# Patient Record
Sex: MALE | Race: BLACK OR AFRICAN AMERICAN | NOT HISPANIC OR LATINO | Employment: OTHER | URBAN - METROPOLITAN AREA
[De-identification: names, ages, dates, MRNs, and addresses within clinical notes are randomized per-mention and may not be internally consistent; named-entity substitution may affect disease eponyms.]

---

## 2023-01-01 ENCOUNTER — APPOINTMENT (OUTPATIENT)
Dept: WOUND CARE | Facility: CLINIC | Age: 78
End: 2023-01-01
Payer: MEDICARE

## 2023-01-01 ENCOUNTER — NURSING HOME VISIT (OUTPATIENT)
Dept: POST ACUTE CARE | Facility: EXTERNAL LOCATION | Age: 78
End: 2023-01-01

## 2023-01-01 ENCOUNTER — NURSING HOME VISIT (OUTPATIENT)
Dept: POST ACUTE CARE | Facility: EXTERNAL LOCATION | Age: 78
End: 2023-01-01
Payer: MEDICARE

## 2023-01-01 ENCOUNTER — APPOINTMENT (OUTPATIENT)
Dept: RADIOLOGY | Facility: HOSPITAL | Age: 78
DRG: 252 | End: 2023-01-01
Payer: MEDICARE

## 2023-01-01 ENCOUNTER — HOSPITAL ENCOUNTER (INPATIENT)
Dept: DATA CONVERSION | Facility: HOSPITAL | Age: 78
LOS: 18 days | Discharge: NURSING, REHAB OR LONG-TERM CARE | DRG: 252 | End: 2023-10-07
Attending: INTERNAL MEDICINE | Admitting: INTERNAL MEDICINE
Payer: MEDICARE

## 2023-01-01 ENCOUNTER — APPOINTMENT (OUTPATIENT)
Dept: RADIOLOGY | Facility: HOSPITAL | Age: 78
End: 2023-01-01
Payer: MEDICARE

## 2023-01-01 ENCOUNTER — HOSPITAL ENCOUNTER (EMERGENCY)
Facility: HOSPITAL | Age: 78
Discharge: NURSING, REHAB OR LONG-TERM CARE | End: 2023-10-09
Attending: EMERGENCY MEDICINE
Payer: MEDICARE

## 2023-01-01 ENCOUNTER — APPOINTMENT (OUTPATIENT)
Dept: CARDIOLOGY | Facility: HOSPITAL | Age: 78
DRG: 252 | End: 2023-01-01
Payer: MEDICARE

## 2023-01-01 ENCOUNTER — APPOINTMENT (OUTPATIENT)
Dept: INFECTIOUS DISEASES | Facility: CLINIC | Age: 78
End: 2023-01-01
Payer: MEDICARE

## 2023-01-01 ENCOUNTER — APPOINTMENT (OUTPATIENT)
Dept: DIALYSIS | Facility: HOSPITAL | Age: 78
DRG: 252 | End: 2023-01-01
Payer: MEDICARE

## 2023-01-01 ENCOUNTER — PREP FOR PROCEDURE (OUTPATIENT)
Dept: CARDIOLOGY | Facility: HOSPITAL | Age: 78
End: 2023-01-01

## 2023-01-01 VITALS
RESPIRATION RATE: 18 BRPM | HEART RATE: 79 BPM | HEIGHT: 68 IN | TEMPERATURE: 96.4 F | SYSTOLIC BLOOD PRESSURE: 100 MMHG | OXYGEN SATURATION: 93 % | DIASTOLIC BLOOD PRESSURE: 61 MMHG | WEIGHT: 216.71 LBS | BODY MASS INDEX: 32.84 KG/M2

## 2023-01-01 VITALS
DIASTOLIC BLOOD PRESSURE: 76 MMHG | RESPIRATION RATE: 18 BRPM | HEART RATE: 89 BPM | TEMPERATURE: 97.5 F | OXYGEN SATURATION: 99 % | SYSTOLIC BLOOD PRESSURE: 127 MMHG | BODY MASS INDEX: 29.98 KG/M2 | WEIGHT: 209.44 LBS | HEIGHT: 70 IN

## 2023-01-01 DIAGNOSIS — S91.301D OPEN WOUND OF RIGHT FOOT, SUBSEQUENT ENCOUNTER: ICD-10-CM

## 2023-01-01 DIAGNOSIS — I13.2: ICD-10-CM

## 2023-01-01 DIAGNOSIS — I73.9 PAD (PERIPHERAL ARTERY DISEASE) (CMS-HCC): Primary | ICD-10-CM

## 2023-01-01 DIAGNOSIS — I50.22 HYPERTENSIVE HEART AND KIDNEY DISEASE WITH CHRONIC SYSTOLIC CONGESTIVE HEART FAILURE AND STAGE 5 CHRONIC KIDNEY DISEASE ON CHRONIC DIALYSIS (MULTI): ICD-10-CM

## 2023-01-01 DIAGNOSIS — R53.1 WEAKNESS: ICD-10-CM

## 2023-01-01 DIAGNOSIS — I48.91 ATRIAL FIBRILLATION, UNSPECIFIED TYPE (MULTI): ICD-10-CM

## 2023-01-01 DIAGNOSIS — N18.6 ESRD ON HEMODIALYSIS (MULTI): Primary | ICD-10-CM

## 2023-01-01 DIAGNOSIS — I50.22 CHRONIC SYSTOLIC CONGESTIVE HEART FAILURE (MULTI): ICD-10-CM

## 2023-01-01 DIAGNOSIS — R53.1 WEAKNESS: Primary | ICD-10-CM

## 2023-01-01 DIAGNOSIS — R06.02 SHORTNESS OF BREATH: Primary | ICD-10-CM

## 2023-01-01 DIAGNOSIS — I48.11 LONGSTANDING PERSISTENT ATRIAL FIBRILLATION (MULTI): ICD-10-CM

## 2023-01-01 DIAGNOSIS — N18.6 HYPERTENSIVE HEART AND KIDNEY DISEASE WITH CHRONIC SYSTOLIC CONGESTIVE HEART FAILURE AND STAGE 5 CHRONIC KIDNEY DISEASE ON CHRONIC DIALYSIS (MULTI): ICD-10-CM

## 2023-01-01 DIAGNOSIS — Z99.2 HYPERTENSIVE HEART AND KIDNEY DISEASE WITH CHRONIC SYSTOLIC CONGESTIVE HEART FAILURE AND STAGE 5 CHRONIC KIDNEY DISEASE ON CHRONIC DIALYSIS (MULTI): ICD-10-CM

## 2023-01-01 DIAGNOSIS — J81.0 ACUTE PULMONARY EDEMA (MULTI): ICD-10-CM

## 2023-01-01 DIAGNOSIS — E11.69 TYPE 2 DIABETES MELLITUS WITH OTHER SPECIFIED COMPLICATION, UNSPECIFIED WHETHER LONG TERM INSULIN USE (MULTI): ICD-10-CM

## 2023-01-01 DIAGNOSIS — N18.6: ICD-10-CM

## 2023-01-01 DIAGNOSIS — M10.9 GOUT, UNSPECIFIED CAUSE, UNSPECIFIED CHRONICITY, UNSPECIFIED SITE: ICD-10-CM

## 2023-01-01 DIAGNOSIS — I13.2 HYPERTENSIVE HEART AND KIDNEY DISEASE WITH CHRONIC SYSTOLIC CONGESTIVE HEART FAILURE AND STAGE 5 CHRONIC KIDNEY DISEASE ON CHRONIC DIALYSIS (MULTI): ICD-10-CM

## 2023-01-01 DIAGNOSIS — R52 PAIN: ICD-10-CM

## 2023-01-01 DIAGNOSIS — I15.9 SECONDARY HYPERTENSION: ICD-10-CM

## 2023-01-01 DIAGNOSIS — S98.131A AMPUTATION OF TOE OF RIGHT FOOT (CMS-HCC): ICD-10-CM

## 2023-01-01 DIAGNOSIS — Z99.2 ESRD ON HEMODIALYSIS (MULTI): Primary | ICD-10-CM

## 2023-01-01 DIAGNOSIS — I99.8 OTHER DISORDER OF CIRCULATORY SYSTEM: ICD-10-CM

## 2023-01-01 DIAGNOSIS — I48.11 LONGSTANDING PERSISTENT ATRIAL FIBRILLATION (MULTI): Primary | ICD-10-CM

## 2023-01-01 DIAGNOSIS — J96.02 ACUTE HYPERCAPNIC RESPIRATORY FAILURE (MULTI): ICD-10-CM

## 2023-01-01 LAB
ABO GROUP (TYPE) IN BLOOD: NORMAL
ACTIVATED PARTIAL THROMBOPLASTIN TIME IN PPP BY COAGULATION ASSAY: NORMAL
ALANINE AMINOTRANSFERASE (SGPT) (U/L) IN SER/PLAS: 21 U/L (ref 10–52)
ALBUMIN (G/DL) IN SER/PLAS: 3.4 G/DL (ref 3.4–5)
ALBUMIN (G/DL) IN SER/PLAS: 4.1 G/DL (ref 3.4–5)
ALBUMIN (G/DL) IN SER/PLAS: NORMAL
ALBUMIN SERPL BCP-MCNC: 3 G/DL (ref 3.4–5)
ALBUMIN SERPL BCP-MCNC: 3.1 G/DL (ref 3.4–5)
ALBUMIN SERPL BCP-MCNC: 3.2 G/DL (ref 3.4–5)
ALBUMIN SERPL BCP-MCNC: 3.2 G/DL (ref 3.4–5)
ALBUMIN SERPL BCP-MCNC: 3.3 G/DL (ref 3.4–5)
ALBUMIN SERPL BCP-MCNC: 3.8 G/DL (ref 3.4–5)
ALKALINE PHOSPHATASE (U/L) IN SER/PLAS: 127 U/L (ref 33–136)
ALP SERPL-CCNC: 107 U/L (ref 33–136)
ALP SERPL-CCNC: 110 U/L (ref 33–136)
ALP SERPL-CCNC: 112 U/L (ref 33–136)
ALP SERPL-CCNC: 115 U/L (ref 33–136)
ALT SERPL W P-5'-P-CCNC: 17 U/L (ref 10–52)
ALT SERPL W P-5'-P-CCNC: 17 U/L (ref 10–52)
ALT SERPL W P-5'-P-CCNC: 27 U/L (ref 10–52)
ALT SERPL W P-5'-P-CCNC: 27 U/L (ref 10–52)
ANION GAP IN SER/PLAS: 20 MMOL/L (ref 10–20)
ANION GAP IN SER/PLAS: 21 MMOL/L (ref 10–20)
ANION GAP IN SER/PLAS: 22 MMOL/L (ref 10–20)
ANION GAP IN SER/PLAS: 23 MMOL/L (ref 10–20)
ANION GAP IN SER/PLAS: 25 MMOL/L (ref 10–20)
ANION GAP IN SER/PLAS: NORMAL
ANION GAP SERPL CALC-SCNC: 18 MMOL/L (ref 10–20)
ANION GAP SERPL CALC-SCNC: 19 MMOL/L (ref 10–20)
ANION GAP SERPL CALC-SCNC: 20 MMOL/L (ref 10–20)
ANION GAP SERPL CALC-SCNC: 20 MMOL/L (ref 10–20)
ANION GAP SERPL CALC-SCNC: 21 MMOL/L (ref 10–20)
ANION GAP SERPL CALC-SCNC: 22 MMOL/L (ref 10–20)
ANION GAP SERPL CALC-SCNC: 23 MMOL/L (ref 10–20)
ANION GAP SERPL CALC-SCNC: 23 MMOL/L (ref 10–20)
ANTIBODY SCREEN: NORMAL
APTT PPP: 30 SECONDS (ref 27–38)
APTT PPP: 31 SECONDS (ref 27–38)
ASPARTATE AMINOTRANSFERASE (SGOT) (U/L) IN SER/PLAS: 18 U/L (ref 9–39)
AST SERPL W P-5'-P-CCNC: 18 U/L (ref 9–39)
AST SERPL W P-5'-P-CCNC: 18 U/L (ref 9–39)
AST SERPL W P-5'-P-CCNC: 21 U/L (ref 9–39)
AST SERPL W P-5'-P-CCNC: 23 U/L (ref 9–39)
ATRIAL RATE: 0 BPM
ATRIAL RATE: 100 BPM
ATRIAL RATE: 110 BPM
BACTERIA BLD CULT: NORMAL
BACTERIA BLD CULT: NORMAL
BASOPHILS # BLD AUTO: 0.09 X10*3/UL (ref 0–0.1)
BASOPHILS # BLD AUTO: 0.13 X10*3/UL (ref 0–0.1)
BASOPHILS # BLD AUTO: 0.13 X10*3/UL (ref 0–0.1)
BASOPHILS # BLD AUTO: 0.15 X10*3/UL (ref 0–0.1)
BASOPHILS (10*3/UL) IN BLOOD BY AUTOMATED COUNT: 0.08 X10E9/L (ref 0–0.1)
BASOPHILS (10*3/UL) IN BLOOD BY AUTOMATED COUNT: NORMAL
BASOPHILS NFR BLD AUTO: 0.9 %
BASOPHILS NFR BLD AUTO: 1.1 %
BASOPHILS NFR BLD AUTO: 1.2 %
BASOPHILS NFR BLD AUTO: 1.4 %
BASOPHILS/100 LEUKOCYTES IN BLOOD BY AUTOMATED COUNT: 1.1 % (ref 0–2)
BASOPHILS/100 LEUKOCYTES IN BLOOD BY AUTOMATED COUNT: NORMAL
BILIRUB SERPL-MCNC: 0.4 MG/DL (ref 0–1.2)
BILIRUB SERPL-MCNC: 0.5 MG/DL (ref 0–1.2)
BILIRUB SERPL-MCNC: 0.6 MG/DL (ref 0–1.2)
BILIRUB SERPL-MCNC: 0.7 MG/DL (ref 0–1.2)
BILIRUBIN TOTAL (MG/DL) IN SER/PLAS: 0.7 MG/DL (ref 0–1.2)
BODY SURFACE AREA: 2.13 M2
BUN SERPL-MCNC: 23 MG/DL (ref 6–23)
BUN SERPL-MCNC: 24 MG/DL (ref 6–23)
BUN SERPL-MCNC: 30 MG/DL (ref 6–23)
BUN SERPL-MCNC: 31 MG/DL (ref 6–23)
BUN SERPL-MCNC: 40 MG/DL (ref 6–23)
BUN SERPL-MCNC: 43 MG/DL (ref 6–23)
BUN SERPL-MCNC: 52 MG/DL (ref 6–23)
BUN SERPL-MCNC: 55 MG/DL (ref 6–23)
CALCIUM (MG/DL) IN SER/PLAS: 8.3 MG/DL (ref 8.6–10.6)
CALCIUM (MG/DL) IN SER/PLAS: 8.4 MG/DL (ref 8.6–10.6)
CALCIUM (MG/DL) IN SER/PLAS: 8.4 MG/DL (ref 8.6–10.6)
CALCIUM (MG/DL) IN SER/PLAS: 8.5 MG/DL (ref 8.6–10.6)
CALCIUM (MG/DL) IN SER/PLAS: 8.6 MG/DL (ref 8.6–10.6)
CALCIUM (MG/DL) IN SER/PLAS: 8.6 MG/DL (ref 8.6–10.6)
CALCIUM (MG/DL) IN SER/PLAS: 8.8 MG/DL (ref 8.6–10.6)
CALCIUM (MG/DL) IN SER/PLAS: 8.9 MG/DL (ref 8.6–10.6)
CALCIUM (MG/DL) IN SER/PLAS: 8.9 MG/DL (ref 8.6–10.6)
CALCIUM (MG/DL) IN SER/PLAS: 9.2 MG/DL (ref 8.6–10.6)
CALCIUM (MG/DL) IN SER/PLAS: NORMAL
CALCIUM SERPL-MCNC: 8.9 MG/DL (ref 8.6–10.6)
CALCIUM SERPL-MCNC: 9 MG/DL (ref 8.6–10.6)
CALCIUM SERPL-MCNC: 9.1 MG/DL (ref 8.6–10.6)
CALCIUM SERPL-MCNC: 9.2 MG/DL (ref 8.6–10.6)
CALCIUM SERPL-MCNC: 9.3 MG/DL (ref 8.6–10.3)
CALCIUM SERPL-MCNC: 9.3 MG/DL (ref 8.6–10.6)
CARBON DIOXIDE, TOTAL (MMOL/L) IN SER/PLAS: 22 MMOL/L (ref 21–32)
CARBON DIOXIDE, TOTAL (MMOL/L) IN SER/PLAS: 23 MMOL/L (ref 21–32)
CARBON DIOXIDE, TOTAL (MMOL/L) IN SER/PLAS: 24 MMOL/L (ref 21–32)
CARBON DIOXIDE, TOTAL (MMOL/L) IN SER/PLAS: 24 MMOL/L (ref 21–32)
CARBON DIOXIDE, TOTAL (MMOL/L) IN SER/PLAS: 25 MMOL/L (ref 21–32)
CARBON DIOXIDE, TOTAL (MMOL/L) IN SER/PLAS: 26 MMOL/L (ref 21–32)
CARBON DIOXIDE, TOTAL (MMOL/L) IN SER/PLAS: 27 MMOL/L (ref 21–32)
CARBON DIOXIDE, TOTAL (MMOL/L) IN SER/PLAS: NORMAL
CARDIAC TROPONIN I PNL SERPL HS: 58 NG/L (ref 0–20)
CARDIAC TROPONIN I PNL SERPL HS: 60 NG/L (ref 0–20)
CHLORIDE (MMOL/L) IN SER/PLAS: 89 MMOL/L (ref 98–107)
CHLORIDE (MMOL/L) IN SER/PLAS: 90 MMOL/L (ref 98–107)
CHLORIDE (MMOL/L) IN SER/PLAS: 91 MMOL/L (ref 98–107)
CHLORIDE (MMOL/L) IN SER/PLAS: 92 MMOL/L (ref 98–107)
CHLORIDE (MMOL/L) IN SER/PLAS: 93 MMOL/L (ref 98–107)
CHLORIDE (MMOL/L) IN SER/PLAS: 94 MMOL/L (ref 98–107)
CHLORIDE (MMOL/L) IN SER/PLAS: 94 MMOL/L (ref 98–107)
CHLORIDE (MMOL/L) IN SER/PLAS: 95 MMOL/L (ref 98–107)
CHLORIDE (MMOL/L) IN SER/PLAS: NORMAL
CHLORIDE SERPL-SCNC: 90 MMOL/L (ref 98–107)
CHLORIDE SERPL-SCNC: 92 MMOL/L (ref 98–107)
CHLORIDE SERPL-SCNC: 92 MMOL/L (ref 98–107)
CHLORIDE SERPL-SCNC: 93 MMOL/L (ref 98–107)
CHLORIDE SERPL-SCNC: 94 MMOL/L (ref 98–107)
CHLORIDE SERPL-SCNC: 95 MMOL/L (ref 98–107)
CO2 SERPL-SCNC: 25 MMOL/L (ref 21–32)
CO2 SERPL-SCNC: 25 MMOL/L (ref 21–32)
CO2 SERPL-SCNC: 26 MMOL/L (ref 21–32)
CO2 SERPL-SCNC: 26 MMOL/L (ref 21–32)
CO2 SERPL-SCNC: 27 MMOL/L (ref 21–32)
CO2 SERPL-SCNC: 27 MMOL/L (ref 21–32)
CO2 SERPL-SCNC: 28 MMOL/L (ref 21–32)
CO2 SERPL-SCNC: 28 MMOL/L (ref 21–32)
COMPLETE PATHOLOGY REPORT: NORMAL
CONVERTED CLINICAL DIAGNOSIS-HISTORY: NORMAL
CONVERTED FINAL DIAGNOSIS: NORMAL
CONVERTED FINAL REPORT PDF LINK TO COPY AND PASTE: NORMAL
CONVERTED GROSS DESCRIPTION: NORMAL
CREAT SERPL-MCNC: 4.58 MG/DL (ref 0.5–1.3)
CREAT SERPL-MCNC: 5.55 MG/DL (ref 0.5–1.3)
CREAT SERPL-MCNC: 6.44 MG/DL (ref 0.5–1.3)
CREAT SERPL-MCNC: 6.81 MG/DL (ref 0.5–1.3)
CREAT SERPL-MCNC: 6.91 MG/DL (ref 0.5–1.3)
CREAT SERPL-MCNC: 7.74 MG/DL (ref 0.5–1.3)
CREAT SERPL-MCNC: 8.05 MG/DL (ref 0.5–1.3)
CREAT SERPL-MCNC: 8.56 MG/DL (ref 0.5–1.3)
CREATININE (MG/DL) IN SER/PLAS: 5.84 MG/DL (ref 0.5–1.3)
CREATININE (MG/DL) IN SER/PLAS: 6.54 MG/DL (ref 0.5–1.3)
CREATININE (MG/DL) IN SER/PLAS: 6.6 MG/DL (ref 0.5–1.3)
CREATININE (MG/DL) IN SER/PLAS: 6.74 MG/DL (ref 0.5–1.3)
CREATININE (MG/DL) IN SER/PLAS: 7.54 MG/DL (ref 0.5–1.3)
CREATININE (MG/DL) IN SER/PLAS: 8 MG/DL (ref 0.5–1.3)
CREATININE (MG/DL) IN SER/PLAS: 8.31 MG/DL (ref 0.5–1.3)
CREATININE (MG/DL) IN SER/PLAS: 8.53 MG/DL (ref 0.5–1.3)
CREATININE (MG/DL) IN SER/PLAS: 8.66 MG/DL (ref 0.5–1.3)
CREATININE (MG/DL) IN SER/PLAS: 9.79 MG/DL (ref 0.5–1.3)
CREATININE (MG/DL) IN SER/PLAS: NORMAL
CRP SERPL-MCNC: 11.53 MG/DL
CRP SERPL-MCNC: 21.44 MG/DL
EJECTION FRACTION APICAL 4 CHAMBER: 35.9
EOSINOPHIL # BLD AUTO: 0.38 X10*3/UL (ref 0–0.4)
EOSINOPHIL # BLD AUTO: 0.77 X10*3/UL (ref 0–0.4)
EOSINOPHIL # BLD AUTO: 0.97 X10*3/UL (ref 0–0.4)
EOSINOPHIL # BLD AUTO: 1.08 X10*3/UL (ref 0–0.4)
EOSINOPHIL NFR BLD AUTO: 4.8 %
EOSINOPHIL NFR BLD AUTO: 6.8 %
EOSINOPHIL NFR BLD AUTO: 8.3 %
EOSINOPHIL NFR BLD AUTO: 8.4 %
EOSINOPHILS (10*3/UL) IN BLOOD BY AUTOMATED COUNT: 0.95 X10E9/L (ref 0–0.4)
EOSINOPHILS (10*3/UL) IN BLOOD BY AUTOMATED COUNT: NORMAL
EOSINOPHILS/100 LEUKOCYTES IN BLOOD BY AUTOMATED COUNT: 12.9 % (ref 0–6)
EOSINOPHILS/100 LEUKOCYTES IN BLOOD BY AUTOMATED COUNT: NORMAL
ERYTHROCYTE DISTRIBUTION WIDTH (RATIO) BY AUTOMATED COUNT: 14.6 % (ref 11.5–14.5)
ERYTHROCYTE DISTRIBUTION WIDTH (RATIO) BY AUTOMATED COUNT: 14.8 % (ref 11.5–14.5)
ERYTHROCYTE DISTRIBUTION WIDTH (RATIO) BY AUTOMATED COUNT: 14.8 % (ref 11.5–14.5)
ERYTHROCYTE DISTRIBUTION WIDTH (RATIO) BY AUTOMATED COUNT: 14.9 % (ref 11.5–14.5)
ERYTHROCYTE DISTRIBUTION WIDTH (RATIO) BY AUTOMATED COUNT: 15.1 % (ref 11.5–14.5)
ERYTHROCYTE DISTRIBUTION WIDTH (RATIO) BY AUTOMATED COUNT: 15.1 % (ref 11.5–14.5)
ERYTHROCYTE DISTRIBUTION WIDTH (RATIO) BY AUTOMATED COUNT: NORMAL
ERYTHROCYTE MEAN CORPUSCULAR HEMOGLOBIN CONCENTRATION (G/DL) BY AUTOMATED: 29.1 G/DL (ref 32–36)
ERYTHROCYTE MEAN CORPUSCULAR HEMOGLOBIN CONCENTRATION (G/DL) BY AUTOMATED: 30.8 G/DL (ref 32–36)
ERYTHROCYTE MEAN CORPUSCULAR HEMOGLOBIN CONCENTRATION (G/DL) BY AUTOMATED: 31.2 G/DL (ref 32–36)
ERYTHROCYTE MEAN CORPUSCULAR HEMOGLOBIN CONCENTRATION (G/DL) BY AUTOMATED: 31.8 G/DL (ref 32–36)
ERYTHROCYTE MEAN CORPUSCULAR HEMOGLOBIN CONCENTRATION (G/DL) BY AUTOMATED: 32.1 G/DL (ref 32–36)
ERYTHROCYTE MEAN CORPUSCULAR HEMOGLOBIN CONCENTRATION (G/DL) BY AUTOMATED: 32.4 G/DL (ref 32–36)
ERYTHROCYTE MEAN CORPUSCULAR HEMOGLOBIN CONCENTRATION (G/DL) BY AUTOMATED: 32.6 G/DL (ref 32–36)
ERYTHROCYTE MEAN CORPUSCULAR HEMOGLOBIN CONCENTRATION (G/DL) BY AUTOMATED: 32.9 G/DL (ref 32–36)
ERYTHROCYTE MEAN CORPUSCULAR HEMOGLOBIN CONCENTRATION (G/DL) BY AUTOMATED: 33.4 G/DL (ref 32–36)
ERYTHROCYTE MEAN CORPUSCULAR HEMOGLOBIN CONCENTRATION (G/DL) BY AUTOMATED: NORMAL
ERYTHROCYTE MEAN CORPUSCULAR VOLUME (FL) BY AUTOMATED COUNT: 100 FL (ref 80–100)
ERYTHROCYTE MEAN CORPUSCULAR VOLUME (FL) BY AUTOMATED COUNT: 104 FL (ref 80–100)
ERYTHROCYTE MEAN CORPUSCULAR VOLUME (FL) BY AUTOMATED COUNT: 96 FL (ref 80–100)
ERYTHROCYTE MEAN CORPUSCULAR VOLUME (FL) BY AUTOMATED COUNT: 96 FL (ref 80–100)
ERYTHROCYTE MEAN CORPUSCULAR VOLUME (FL) BY AUTOMATED COUNT: 97 FL (ref 80–100)
ERYTHROCYTE MEAN CORPUSCULAR VOLUME (FL) BY AUTOMATED COUNT: 97 FL (ref 80–100)
ERYTHROCYTE MEAN CORPUSCULAR VOLUME (FL) BY AUTOMATED COUNT: 98 FL (ref 80–100)
ERYTHROCYTE MEAN CORPUSCULAR VOLUME (FL) BY AUTOMATED COUNT: 98 FL (ref 80–100)
ERYTHROCYTE MEAN CORPUSCULAR VOLUME (FL) BY AUTOMATED COUNT: 99 FL (ref 80–100)
ERYTHROCYTE MEAN CORPUSCULAR VOLUME (FL) BY AUTOMATED COUNT: NORMAL
ERYTHROCYTE [DISTWIDTH] IN BLOOD BY AUTOMATED COUNT: 14.8 % (ref 11.5–14.5)
ERYTHROCYTE [DISTWIDTH] IN BLOOD BY AUTOMATED COUNT: 14.8 % (ref 11.5–14.5)
ERYTHROCYTE [DISTWIDTH] IN BLOOD BY AUTOMATED COUNT: 15 % (ref 11.5–14.5)
ERYTHROCYTE [DISTWIDTH] IN BLOOD BY AUTOMATED COUNT: 15.2 % (ref 11.5–14.5)
ERYTHROCYTE [DISTWIDTH] IN BLOOD BY AUTOMATED COUNT: 15.6 % (ref 11.5–14.5)
ERYTHROCYTE [DISTWIDTH] IN BLOOD BY AUTOMATED COUNT: 15.7 % (ref 11.5–14.5)
ERYTHROCYTE [DISTWIDTH] IN BLOOD BY AUTOMATED COUNT: 15.8 % (ref 11.5–14.5)
ERYTHROCYTE [DISTWIDTH] IN BLOOD BY AUTOMATED COUNT: 16.4 % (ref 11.5–14.5)
ERYTHROCYTE [SEDIMENTATION RATE] IN BLOOD BY WESTERGREN METHOD: 110 MM/H (ref 0–20)
ERYTHROCYTES (10*6/UL) IN BLOOD BY AUTOMATED COUNT: 3.14 X10E12/L (ref 4.5–5.9)
ERYTHROCYTES (10*6/UL) IN BLOOD BY AUTOMATED COUNT: 3.17 X10E12/L (ref 4.5–5.9)
ERYTHROCYTES (10*6/UL) IN BLOOD BY AUTOMATED COUNT: 3.24 X10E12/L (ref 4.5–5.9)
ERYTHROCYTES (10*6/UL) IN BLOOD BY AUTOMATED COUNT: 3.24 X10E12/L (ref 4.5–5.9)
ERYTHROCYTES (10*6/UL) IN BLOOD BY AUTOMATED COUNT: 3.25 X10E12/L (ref 4.5–5.9)
ERYTHROCYTES (10*6/UL) IN BLOOD BY AUTOMATED COUNT: 3.44 X10E12/L (ref 4.5–5.9)
ERYTHROCYTES (10*6/UL) IN BLOOD BY AUTOMATED COUNT: 3.62 X10E12/L (ref 4.5–5.9)
ERYTHROCYTES (10*6/UL) IN BLOOD BY AUTOMATED COUNT: 3.75 X10E12/L (ref 4.5–5.9)
ERYTHROCYTES (10*6/UL) IN BLOOD BY AUTOMATED COUNT: 3.92 X10E12/L (ref 4.5–5.9)
ERYTHROCYTES (10*6/UL) IN BLOOD BY AUTOMATED COUNT: NORMAL
FLUAV RNA RESP QL NAA+PROBE: NOT DETECTED
FLUBV RNA RESP QL NAA+PROBE: NOT DETECTED
FUNGAL CULTURE/SMEAR: ABNORMAL
FUNGAL CULTURE/SMEAR: ABNORMAL
FUNGAL SMEAR: ABNORMAL
FUNGAL SMEAR: ABNORMAL
GFR FEMALE: NORMAL
GFR MALE: 5 ML/MIN/1.73M2
GFR MALE: 6 ML/MIN/1.73M2
GFR MALE: 7 ML/MIN/1.73M2
GFR MALE: 8 ML/MIN/1.73M2
GFR MALE: 9 ML/MIN/1.73M2
GFR MALE: NORMAL
GFR SERPL CREATININE-BSD FRML MDRD: 10 ML/MIN/1.73M*2
GFR SERPL CREATININE-BSD FRML MDRD: 12 ML/MIN/1.73M*2
GFR SERPL CREATININE-BSD FRML MDRD: 6 ML/MIN/1.73M*2
GFR SERPL CREATININE-BSD FRML MDRD: 6 ML/MIN/1.73M*2
GFR SERPL CREATININE-BSD FRML MDRD: 7 ML/MIN/1.73M*2
GFR SERPL CREATININE-BSD FRML MDRD: 8 ML/MIN/1.73M*2
GLUCOSE (MG/DL) IN SER/PLAS: 113 MG/DL (ref 74–99)
GLUCOSE (MG/DL) IN SER/PLAS: 116 MG/DL (ref 74–99)
GLUCOSE (MG/DL) IN SER/PLAS: 131 MG/DL (ref 74–99)
GLUCOSE (MG/DL) IN SER/PLAS: 135 MG/DL (ref 74–99)
GLUCOSE (MG/DL) IN SER/PLAS: 143 MG/DL (ref 74–99)
GLUCOSE (MG/DL) IN SER/PLAS: 146 MG/DL (ref 74–99)
GLUCOSE (MG/DL) IN SER/PLAS: 148 MG/DL (ref 74–99)
GLUCOSE (MG/DL) IN SER/PLAS: 159 MG/DL (ref 74–99)
GLUCOSE (MG/DL) IN SER/PLAS: 167 MG/DL (ref 74–99)
GLUCOSE (MG/DL) IN SER/PLAS: 168 MG/DL (ref 74–99)
GLUCOSE (MG/DL) IN SER/PLAS: NORMAL
GLUCOSE BLD MANUAL STRIP-MCNC: 158 MG/DL (ref 74–99)
GLUCOSE BLD MANUAL STRIP-MCNC: 163 MG/DL (ref 74–99)
GLUCOSE BLD MANUAL STRIP-MCNC: 211 MG/DL (ref 74–99)
GLUCOSE SERPL-MCNC: 119 MG/DL (ref 74–99)
GLUCOSE SERPL-MCNC: 134 MG/DL (ref 74–99)
GLUCOSE SERPL-MCNC: 143 MG/DL (ref 74–99)
GLUCOSE SERPL-MCNC: 146 MG/DL (ref 74–99)
GLUCOSE SERPL-MCNC: 163 MG/DL (ref 74–99)
GLUCOSE SERPL-MCNC: 164 MG/DL (ref 74–99)
GLUCOSE SERPL-MCNC: 164 MG/DL (ref 74–99)
GLUCOSE SERPL-MCNC: 84 MG/DL (ref 74–99)
GRAM STAIN: ABNORMAL
GRAM STAIN: NORMAL
HBV SURFACE AG SERPL QL IA: NONREACTIVE
HCT VFR BLD AUTO: 31.3 % (ref 41–52)
HCT VFR BLD AUTO: 32.9 % (ref 41–52)
HCT VFR BLD AUTO: 33.5 % (ref 41–52)
HCT VFR BLD AUTO: 34.4 % (ref 41–52)
HCT VFR BLD AUTO: 34.6 % (ref 41–52)
HCT VFR BLD AUTO: 34.8 % (ref 41–52)
HCT VFR BLD AUTO: 36.6 % (ref 41–52)
HCT VFR BLD AUTO: 38.9 % (ref 41–52)
HEMATOCRIT (%) IN BLOOD BY AUTOMATED COUNT: 30.7 % (ref 41–52)
HEMATOCRIT (%) IN BLOOD BY AUTOMATED COUNT: 31.1 % (ref 41–52)
HEMATOCRIT (%) IN BLOOD BY AUTOMATED COUNT: 31.2 % (ref 41–52)
HEMATOCRIT (%) IN BLOOD BY AUTOMATED COUNT: 31.6 % (ref 41–52)
HEMATOCRIT (%) IN BLOOD BY AUTOMATED COUNT: 32.5 % (ref 41–52)
HEMATOCRIT (%) IN BLOOD BY AUTOMATED COUNT: 35.2 % (ref 41–52)
HEMATOCRIT (%) IN BLOOD BY AUTOMATED COUNT: 35.7 % (ref 41–52)
HEMATOCRIT (%) IN BLOOD BY AUTOMATED COUNT: 35.9 % (ref 41–52)
HEMATOCRIT (%) IN BLOOD BY AUTOMATED COUNT: 38.4 % (ref 41–52)
HEMATOCRIT (%) IN BLOOD BY AUTOMATED COUNT: NORMAL
HEMOGLOBIN (G/DL) IN BLOOD: 10 G/DL (ref 13.5–17.5)
HEMOGLOBIN (G/DL) IN BLOOD: 10 G/DL (ref 13.5–17.5)
HEMOGLOBIN (G/DL) IN BLOOD: 10.1 G/DL (ref 13.5–17.5)
HEMOGLOBIN (G/DL) IN BLOOD: 10.3 G/DL (ref 13.5–17.5)
HEMOGLOBIN (G/DL) IN BLOOD: 10.4 G/DL (ref 13.5–17.5)
HEMOGLOBIN (G/DL) IN BLOOD: 11.4 G/DL (ref 13.5–17.5)
HEMOGLOBIN (G/DL) IN BLOOD: 12 G/DL (ref 13.5–17.5)
HEMOGLOBIN (G/DL) IN BLOOD: 12.2 G/DL (ref 13.5–17.5)
HEMOGLOBIN (G/DL) IN BLOOD: 9.7 G/DL (ref 13.5–17.5)
HEMOGLOBIN (G/DL) IN BLOOD: NORMAL
HEPARIN UNFRACTIONATED IN PPP BY CHROMOGENIC MET: 0.1 IU/ML
HEPARIN UNFRACTIONATED IN PPP BY CHROMOGENIC MET: 0.3 IU/ML
HEPARIN UNFRACTIONATED IN PPP BY CHROMOGENIC MET: 0.4 IU/ML
HEPARIN UNFRACTIONATED IN PPP BY CHROMOGENIC MET: 0.5 IU/ML
HEPARIN UNFRACTIONATED IN PPP BY CHROMOGENIC MET: 0.6 IU/ML
HEPARIN UNFRACTIONATED IN PPP BY CHROMOGENIC MET: 0.6 IU/ML
HEPARIN UNFRACTIONATED IN PPP BY CHROMOGENIC MET: 0.7 IU/ML
HEPARIN UNFRACTIONATED IN PPP BY CHROMOGENIC MET: 0.7 IU/ML
HEPARIN UNFRACTIONATED IN PPP BY CHROMOGENIC MET: 1 IU/ML
HEPATITIS B VIRUS SURFACE AB (MIU/ML) IN SERUM: 6.7 MIU/ML
HEPATITIS B VIRUS SURFACE AG PRESENCE IN SERUM: NONREACTIVE
HGB BLD-MCNC: 10 G/DL (ref 13.5–17.5)
HGB BLD-MCNC: 10.4 G/DL (ref 13.5–17.5)
HGB BLD-MCNC: 10.5 G/DL (ref 13.5–17.5)
HGB BLD-MCNC: 10.9 G/DL (ref 13.5–17.5)
HGB BLD-MCNC: 11.9 G/DL (ref 13.5–17.5)
IMM GRANULOCYTES # BLD AUTO: 0.05 X10*3/UL (ref 0–0.5)
IMM GRANULOCYTES # BLD AUTO: 0.08 X10*3/UL (ref 0–0.5)
IMM GRANULOCYTES # BLD AUTO: 0.08 X10*3/UL (ref 0–0.5)
IMM GRANULOCYTES # BLD AUTO: 0.09 X10*3/UL (ref 0–0.5)
IMM GRANULOCYTES NFR BLD AUTO: 0.6 % (ref 0–0.9)
IMM GRANULOCYTES NFR BLD AUTO: 0.6 % (ref 0–0.9)
IMM GRANULOCYTES NFR BLD AUTO: 0.7 % (ref 0–0.9)
IMM GRANULOCYTES NFR BLD AUTO: 0.9 % (ref 0–0.9)
IMMATURE GRANULOCYTES/100 LEUKOCYTES IN BLOOD BY AUTOMATED COUNT: 0.1 % (ref 0–0.9)
IMMATURE GRANULOCYTES/100 LEUKOCYTES IN BLOOD BY AUTOMATED COUNT: NORMAL
INR IN PPP BY COAGULATION ASSAY: 1.4 (ref 0.9–1.1)
LACTATE (MMOL/L) IN SER/PLAS: 0.9 MMOL/L (ref 0.4–2)
LEUKOCYTES (10*3/UL) IN BLOOD BY AUTOMATED COUNT: 11 X10E9/L (ref 4.4–11.3)
LEUKOCYTES (10*3/UL) IN BLOOD BY AUTOMATED COUNT: 11.4 X10E9/L (ref 4.4–11.3)
LEUKOCYTES (10*3/UL) IN BLOOD BY AUTOMATED COUNT: 11.5 X10E9/L (ref 4.4–11.3)
LEUKOCYTES (10*3/UL) IN BLOOD BY AUTOMATED COUNT: 11.7 X10E9/L (ref 4.4–11.3)
LEUKOCYTES (10*3/UL) IN BLOOD BY AUTOMATED COUNT: 12 X10E9/L (ref 4.4–11.3)
LEUKOCYTES (10*3/UL) IN BLOOD BY AUTOMATED COUNT: 12.9 X10E9/L (ref 4.4–11.3)
LEUKOCYTES (10*3/UL) IN BLOOD BY AUTOMATED COUNT: 6.7 X10E9/L (ref 4.4–11.3)
LEUKOCYTES (10*3/UL) IN BLOOD BY AUTOMATED COUNT: 7.4 X10E9/L (ref 4.4–11.3)
LEUKOCYTES (10*3/UL) IN BLOOD BY AUTOMATED COUNT: 9.3 X10E9/L (ref 4.4–11.3)
LEUKOCYTES (10*3/UL) IN BLOOD BY AUTOMATED COUNT: NORMAL
LYMPHOCYTES # BLD AUTO: 0.72 X10*3/UL (ref 0.8–3)
LYMPHOCYTES # BLD AUTO: 0.73 X10*3/UL (ref 0.8–3)
LYMPHOCYTES # BLD AUTO: 0.73 X10*3/UL (ref 0.8–3)
LYMPHOCYTES # BLD AUTO: 0.86 X10*3/UL (ref 0.8–3)
LYMPHOCYTES (10*3/UL) IN BLOOD BY AUTOMATED COUNT: 0.93 X10E9/L (ref 0.8–3)
LYMPHOCYTES (10*3/UL) IN BLOOD BY AUTOMATED COUNT: NORMAL
LYMPHOCYTES NFR BLD AUTO: 5.7 %
LYMPHOCYTES NFR BLD AUTO: 6 %
LYMPHOCYTES NFR BLD AUTO: 7.8 %
LYMPHOCYTES NFR BLD AUTO: 9 %
LYMPHOCYTES/100 LEUKOCYTES IN BLOOD BY AUTOMATED COUNT: 12.6 % (ref 13–44)
LYMPHOCYTES/100 LEUKOCYTES IN BLOOD BY AUTOMATED COUNT: NORMAL
MAGNESIUM (MG/DL) IN SER/PLAS: 2.15 MG/DL (ref 1.6–2.4)
MAGNESIUM (MG/DL) IN SER/PLAS: 2.16 MG/DL (ref 1.6–2.4)
MAGNESIUM (MG/DL) IN SER/PLAS: 2.39 MG/DL (ref 1.6–2.4)
MAGNESIUM SERPL-MCNC: 2.36 MG/DL (ref 1.6–2.4)
MAGNESIUM SERPL-MCNC: 2.89 MG/DL (ref 1.6–2.4)
MANUAL DIFFERENTIAL Y/N: NORMAL
MCH RBC QN AUTO: 29.3 PG (ref 26–34)
MCH RBC QN AUTO: 29.4 PG (ref 26–34)
MCH RBC QN AUTO: 29.5 PG (ref 26–34)
MCH RBC QN AUTO: 29.9 PG (ref 26–34)
MCH RBC QN AUTO: 30.1 PG (ref 26–34)
MCH RBC QN AUTO: 30.3 PG (ref 26–34)
MCHC RBC AUTO-ENTMCNC: 29.8 G/DL (ref 32–36)
MCHC RBC AUTO-ENTMCNC: 29.9 G/DL (ref 32–36)
MCHC RBC AUTO-ENTMCNC: 30.1 G/DL (ref 32–36)
MCHC RBC AUTO-ENTMCNC: 30.2 G/DL (ref 32–36)
MCHC RBC AUTO-ENTMCNC: 30.6 G/DL (ref 32–36)
MCHC RBC AUTO-ENTMCNC: 31 G/DL (ref 32–36)
MCHC RBC AUTO-ENTMCNC: 31.9 G/DL (ref 32–36)
MCHC RBC AUTO-ENTMCNC: 31.9 G/DL (ref 32–36)
MCV RBC AUTO: 100 FL (ref 80–100)
MCV RBC AUTO: 101 FL (ref 80–100)
MCV RBC AUTO: 92 FL (ref 80–100)
MCV RBC AUTO: 92 FL (ref 80–100)
MCV RBC AUTO: 97 FL (ref 80–100)
MCV RBC AUTO: 99 FL (ref 80–100)
MONOCYTES # BLD AUTO: 0.76 X10*3/UL (ref 0.05–0.8)
MONOCYTES # BLD AUTO: 1.04 X10*3/UL (ref 0.05–0.8)
MONOCYTES # BLD AUTO: 1.05 X10*3/UL (ref 0.05–0.8)
MONOCYTES # BLD AUTO: 1.22 X10*3/UL (ref 0.05–0.8)
MONOCYTES (10*3/UL) IN BLOOD BY AUTOMATED COUNT: 0.75 X10E9/L (ref 0.05–0.8)
MONOCYTES (10*3/UL) IN BLOOD BY AUTOMATED COUNT: NORMAL
MONOCYTES NFR BLD AUTO: 11.3 %
MONOCYTES NFR BLD AUTO: 8.1 %
MONOCYTES NFR BLD AUTO: 8.5 %
MONOCYTES NFR BLD AUTO: 9.5 %
MONOCYTES/100 LEUKOCYTES IN BLOOD BY AUTOMATED COUNT: 10.2 % (ref 2–10)
MONOCYTES/100 LEUKOCYTES IN BLOOD BY AUTOMATED COUNT: NORMAL
NEUTROPHILS # BLD AUTO: 11.05 X10*3/UL (ref 1.6–5.5)
NEUTROPHILS # BLD AUTO: 6 X10*3/UL (ref 1.6–5.5)
NEUTROPHILS # BLD AUTO: 6.57 X10*3/UL (ref 1.6–5.5)
NEUTROPHILS # BLD AUTO: 9.7 X10*3/UL (ref 1.6–5.5)
NEUTROPHILS (10*3/UL) IN BLOOD BY AUTOMATED COUNT: 4.64 X10E9/L (ref 1.6–5.5)
NEUTROPHILS (10*3/UL) IN BLOOD BY AUTOMATED COUNT: NORMAL
NEUTROPHILS NFR BLD AUTO: 70.3 %
NEUTROPHILS NFR BLD AUTO: 75 %
NEUTROPHILS NFR BLD AUTO: 75.9 %
NEUTROPHILS NFR BLD AUTO: 77.2 %
NEUTROPHILS/100 LEUKOCYTES IN BLOOD BY AUTOMATED COUNT: 63.1 % (ref 40–80)
NEUTROPHILS/100 LEUKOCYTES IN BLOOD BY AUTOMATED COUNT: NORMAL
NRBC (PER 100 WBCS) BY AUTOMATED COUNT: 0 /100 WBC (ref 0–0)
NRBC (PER 100 WBCS) BY AUTOMATED COUNT: 0.2 /100 WBC (ref 0–0)
NRBC (PER 100 WBCS) BY AUTOMATED COUNT: NORMAL
NRBC BLD-RTO: 0 /100 WBCS (ref 0–0)
NRBC BLD-RTO: 0 /100 WBCS (ref 0–0)
NRBC BLD-RTO: 0.1 /100 WBCS (ref 0–0)
NRBC BLD-RTO: 0.2 /100 WBCS (ref 0–0)
NRBC BLD-RTO: 0.5 /100 WBCS (ref 0–0)
PHOSPHATE (MG/DL) IN SER/PLAS: 3 MG/DL (ref 2.5–4.9)
PHOSPHATE (MG/DL) IN SER/PLAS: NORMAL
PHOSPHATE SERPL-MCNC: 3.2 MG/DL (ref 2.5–4.9)
PHOSPHATE SERPL-MCNC: 5 MG/DL (ref 2.5–4.9)
PHOSPHATE SERPL-MCNC: 5.7 MG/DL (ref 2.5–4.9)
PHOSPHATE SERPL-MCNC: 6 MG/DL (ref 2.5–4.9)
PHOSPHATE SERPL-MCNC: 7.3 MG/DL (ref 2.5–4.9)
PLATELET # BLD AUTO: 316 X10*3/UL (ref 150–450)
PLATELET # BLD AUTO: 333 X10*3/UL (ref 150–450)
PLATELET # BLD AUTO: 336 X10*3/UL (ref 150–450)
PLATELET # BLD AUTO: 346 X10*3/UL (ref 150–450)
PLATELET # BLD AUTO: 346 X10*3/UL (ref 150–450)
PLATELET # BLD AUTO: 359 X10*3/UL (ref 150–450)
PLATELET # BLD AUTO: 360 X10*3/UL (ref 150–450)
PLATELET # BLD AUTO: 368 X10*3/UL (ref 150–450)
PLATELETS (10*3/UL) IN BLOOD AUTOMATED COUNT: 167 X10E9/L (ref 150–450)
PLATELETS (10*3/UL) IN BLOOD AUTOMATED COUNT: 169 X10E9/L (ref 150–450)
PLATELETS (10*3/UL) IN BLOOD AUTOMATED COUNT: 171 X10E9/L (ref 150–450)
PLATELETS (10*3/UL) IN BLOOD AUTOMATED COUNT: 178 X10E9/L (ref 150–450)
PLATELETS (10*3/UL) IN BLOOD AUTOMATED COUNT: 181 X10E9/L (ref 150–450)
PLATELETS (10*3/UL) IN BLOOD AUTOMATED COUNT: 188 X10E9/L (ref 150–450)
PLATELETS (10*3/UL) IN BLOOD AUTOMATED COUNT: 190 X10E9/L (ref 150–450)
PLATELETS (10*3/UL) IN BLOOD AUTOMATED COUNT: 193 X10E9/L (ref 150–450)
PLATELETS (10*3/UL) IN BLOOD AUTOMATED COUNT: 225 X10E9/L (ref 150–450)
PLATELETS (10*3/UL) IN BLOOD AUTOMATED COUNT: NORMAL
PMV BLD AUTO: 10.5 FL (ref 7.5–11.5)
PMV BLD AUTO: 10.5 FL (ref 7.5–11.5)
PMV BLD AUTO: 10.6 FL (ref 7.5–11.5)
PMV BLD AUTO: 10.6 FL (ref 7.5–11.5)
PMV BLD AUTO: 10.7 FL (ref 7.5–11.5)
PMV BLD AUTO: 10.8 FL (ref 7.5–11.5)
PMV BLD AUTO: 10.9 FL (ref 7.5–11.5)
PMV BLD AUTO: 11 FL (ref 7.5–11.5)
POC ACTIVATED CLOTTING TIME LOW RANGE: 299 SECONDS (ref 89–169)
POCT GLUCOSE: 113 MG/DL (ref 74–99)
POCT GLUCOSE: 115 MG/DL (ref 74–99)
POCT GLUCOSE: 133 MG/DL (ref 74–99)
POCT GLUCOSE: 160 MG/DL (ref 74–99)
POTASSIUM (MMOL/L) IN SER/PLAS: 4 MMOL/L (ref 3.5–5.3)
POTASSIUM (MMOL/L) IN SER/PLAS: 4.1 MMOL/L (ref 3.5–5.3)
POTASSIUM (MMOL/L) IN SER/PLAS: 4.2 MMOL/L (ref 3.5–5.3)
POTASSIUM (MMOL/L) IN SER/PLAS: 4.3 MMOL/L (ref 3.5–5.3)
POTASSIUM (MMOL/L) IN SER/PLAS: 4.5 MMOL/L (ref 3.5–5.3)
POTASSIUM (MMOL/L) IN SER/PLAS: 4.6 MMOL/L (ref 3.5–5.3)
POTASSIUM (MMOL/L) IN SER/PLAS: 4.7 MMOL/L (ref 3.5–5.3)
POTASSIUM (MMOL/L) IN SER/PLAS: 4.7 MMOL/L (ref 3.5–5.3)
POTASSIUM (MMOL/L) IN SER/PLAS: NORMAL
POTASSIUM SERPL-SCNC: 3.6 MMOL/L (ref 3.5–5.3)
POTASSIUM SERPL-SCNC: 3.8 MMOL/L (ref 3.5–5.3)
POTASSIUM SERPL-SCNC: 4.1 MMOL/L (ref 3.5–5.3)
POTASSIUM SERPL-SCNC: 4.3 MMOL/L (ref 3.5–5.3)
POTASSIUM SERPL-SCNC: 4.8 MMOL/L (ref 3.5–5.3)
PROT SERPL-MCNC: 6.1 G/DL (ref 6.4–8.2)
PROT SERPL-MCNC: 6.6 G/DL (ref 6.4–8.2)
PROT SERPL-MCNC: 6.8 G/DL (ref 6.4–8.2)
PROT SERPL-MCNC: 7.3 G/DL (ref 6.4–8.2)
PROTEIN TOTAL: 6.2 G/DL (ref 6.4–8.2)
PROTHROMBIN TIME (PT) IN PPP BY COAGULATION ASSAY: 15.9 SEC (ref 9.8–12.8)
Q ONSET: 213 MS
Q ONSET: 224 MS
Q ONSET: 225 MS
QRS COUNT: 16 BEATS
QRS COUNT: 16 BEATS
QRS COUNT: 17 BEATS
QRS DURATION: 80 MS
QRS DURATION: 84 MS
QRS DURATION: 84 MS
QT INTERVAL: 374 MS
QT INTERVAL: 380 MS
QT INTERVAL: 408 MS
QTC CALCULATION(BAZETT): 469 MS
QTC CALCULATION(BAZETT): 477 MS
QTC CALCULATION(BAZETT): 536 MS
QTC FREDERICIA: 438 MS
QTC FREDERICIA: 440 MS
QTC FREDERICIA: 490 MS
R AXIS: 1 DEGREES
R AXIS: 13 DEGREES
R AXIS: 16 DEGREES
RBC # BLD AUTO: 3.41 X10*6/UL (ref 4.5–5.9)
RBC # BLD AUTO: 3.43 X10*6/UL (ref 4.5–5.9)
RBC # BLD AUTO: 3.45 X10*6/UL (ref 4.5–5.9)
RBC # BLD AUTO: 3.48 X10*6/UL (ref 4.5–5.9)
RBC # BLD AUTO: 3.52 X10*6/UL (ref 4.5–5.9)
RBC # BLD AUTO: 3.57 X10*6/UL (ref 4.5–5.9)
RBC # BLD AUTO: 3.62 X10*6/UL (ref 4.5–5.9)
RBC # BLD AUTO: 3.95 X10*6/UL (ref 4.5–5.9)
RH FACTOR: NORMAL
RSV RNA RESP QL NAA+PROBE: NOT DETECTED
SARS-COV-2 RNA RESP QL NAA+PROBE: NOT DETECTED
SODIUM (MMOL/L) IN SER/PLAS: 131 MMOL/L (ref 136–145)
SODIUM (MMOL/L) IN SER/PLAS: 132 MMOL/L (ref 136–145)
SODIUM (MMOL/L) IN SER/PLAS: 133 MMOL/L (ref 136–145)
SODIUM (MMOL/L) IN SER/PLAS: 134 MMOL/L (ref 136–145)
SODIUM (MMOL/L) IN SER/PLAS: 135 MMOL/L (ref 136–145)
SODIUM (MMOL/L) IN SER/PLAS: 136 MMOL/L (ref 136–145)
SODIUM (MMOL/L) IN SER/PLAS: NORMAL
SODIUM SERPL-SCNC: 134 MMOL/L (ref 136–145)
SODIUM SERPL-SCNC: 135 MMOL/L (ref 136–145)
SODIUM SERPL-SCNC: 136 MMOL/L (ref 136–145)
SODIUM SERPL-SCNC: 137 MMOL/L (ref 136–145)
SODIUM SERPL-SCNC: 137 MMOL/L (ref 136–145)
SODIUM SERPL-SCNC: 138 MMOL/L (ref 136–145)
STAPH/MRSA SCREEN, CULTURE: NORMAL
T AXIS: 226 DEGREES
T AXIS: 242 DEGREES
T AXIS: 263 DEGREES
T OFFSET: 400 MS
T OFFSET: 414 MS
T OFFSET: 429 MS
TISSUE/WOUND CULTURE/SMEAR: ABNORMAL
TISSUE/WOUND CULTURE/SMEAR: NORMAL
TRYPTASE: 16.9 MCG/L
URATE (MG/DL) IN SER/PLAS: 3.1 MG/DL (ref 4–7.5)
UREA NITROGEN (MG/DL) IN SER/PLAS: 32 MG/DL (ref 6–23)
UREA NITROGEN (MG/DL) IN SER/PLAS: 38 MG/DL (ref 6–23)
UREA NITROGEN (MG/DL) IN SER/PLAS: 38 MG/DL (ref 6–23)
UREA NITROGEN (MG/DL) IN SER/PLAS: 51 MG/DL (ref 6–23)
UREA NITROGEN (MG/DL) IN SER/PLAS: 52 MG/DL (ref 6–23)
UREA NITROGEN (MG/DL) IN SER/PLAS: 52 MG/DL (ref 6–23)
UREA NITROGEN (MG/DL) IN SER/PLAS: 61 MG/DL (ref 6–23)
UREA NITROGEN (MG/DL) IN SER/PLAS: 64 MG/DL (ref 6–23)
UREA NITROGEN (MG/DL) IN SER/PLAS: NORMAL
VANCOMYCIN (UG/ML) IN SER/PLAS: 17.7 UG/ML
VANCOMYCIN SERPL-MCNC: 16.6 UG/ML (ref 5–20)
VENTRICULAR RATE: 104 BPM
VENTRICULAR RATE: 92 BPM
VENTRICULAR RATE: 98 BPM
WBC # BLD AUTO: 12.8 X10*3/UL (ref 4.4–11.3)
WBC # BLD AUTO: 14.3 X10*3/UL (ref 4.4–11.3)
WBC # BLD AUTO: 15.9 X10*3/UL (ref 4.4–11.3)
WBC # BLD AUTO: 16.9 X10*3/UL (ref 4.4–11.3)
WBC # BLD AUTO: 8 X10*3/UL (ref 4.4–11.3)
WBC # BLD AUTO: 9.1 X10*3/UL (ref 4.4–11.3)
WBC # BLD AUTO: 9.3 X10*3/UL (ref 4.4–11.3)
WBC # BLD AUTO: 9.9 X10*3/UL (ref 4.4–11.3)

## 2023-01-01 PROCEDURE — 87205 SMEAR GRAM STAIN: CPT | Mod: 59

## 2023-01-01 PROCEDURE — 80202 ASSAY OF VANCOMYCIN: CPT

## 2023-01-01 PROCEDURE — 80069 RENAL FUNCTION PANEL: CPT

## 2023-01-01 PROCEDURE — 88311 DECALCIFY TISSUE: CPT

## 2023-01-01 PROCEDURE — 97161 PT EVAL LOW COMPLEX 20 MIN: CPT | Mod: GP

## 2023-01-01 PROCEDURE — 97116 GAIT TRAINING THERAPY: CPT | Mod: GP | Performed by: PHYSICAL THERAPIST

## 2023-01-01 PROCEDURE — 85027 COMPLETE CBC AUTOMATED: CPT | Performed by: STUDENT IN AN ORGANIZED HEALTH CARE EDUCATION/TRAINING PROGRAM

## 2023-01-01 PROCEDURE — 74018 RADEX ABDOMEN 1 VIEW: CPT | Performed by: RADIOLOGY

## 2023-01-01 PROCEDURE — 85025 COMPLETE CBC W/AUTO DIFF WBC: CPT | Performed by: EMERGENCY MEDICINE

## 2023-01-01 PROCEDURE — 99309 SBSQ NF CARE MODERATE MDM 30: CPT | Performed by: NURSE PRACTITIONER

## 2023-01-01 PROCEDURE — 36415 COLL VENOUS BLD VENIPUNCTURE: CPT

## 2023-01-01 PROCEDURE — 6350000001 HC RX 635 EPOETIN >10,000 UNITS: Performed by: STUDENT IN AN ORGANIZED HEALTH CARE EDUCATION/TRAINING PROGRAM

## 2023-01-01 PROCEDURE — 87206 SMEAR FLUORESCENT/ACID STAI: CPT | Mod: 59

## 2023-01-01 PROCEDURE — 99308 SBSQ NF CARE LOW MDM 20: CPT | Performed by: INTERNAL MEDICINE

## 2023-01-01 PROCEDURE — 99291 CRITICAL CARE FIRST HOUR: CPT

## 2023-01-01 PROCEDURE — 2500000001 HC RX 250 WO HCPCS SELF ADMINISTERED DRUGS (ALT 637 FOR MEDICARE OP): Performed by: STUDENT IN AN ORGANIZED HEALTH CARE EDUCATION/TRAINING PROGRAM

## 2023-01-01 PROCEDURE — 82947 ASSAY GLUCOSE BLOOD QUANT: CPT

## 2023-01-01 PROCEDURE — 82374 ASSAY BLOOD CARBON DIOXIDE: CPT | Performed by: STUDENT IN AN ORGANIZED HEALTH CARE EDUCATION/TRAINING PROGRAM

## 2023-01-01 PROCEDURE — C1725 CATH, TRANSLUMIN NON-LASER: HCPCS

## 2023-01-01 PROCEDURE — 85610 PROTHROMBIN TIME: CPT

## 2023-01-01 PROCEDURE — 74018 RADEX ABDOMEN 1 VIEW: CPT | Performed by: STUDENT IN AN ORGANIZED HEALTH CARE EDUCATION/TRAINING PROGRAM

## 2023-01-01 PROCEDURE — 9990 CHARGE CONVERSION

## 2023-01-01 PROCEDURE — 87106 FUNGI IDENTIFICATION YEAST: CPT | Mod: 59

## 2023-01-01 PROCEDURE — 8010000001 HC DIALYSIS - HEMODIALYSIS PER DAY

## 2023-01-01 PROCEDURE — C1769 GUIDE WIRE: HCPCS

## 2023-01-01 PROCEDURE — 71045 X-RAY EXAM CHEST 1 VIEW: CPT

## 2023-01-01 PROCEDURE — 2020000001 HC ICU ROOM DAILY

## 2023-01-01 PROCEDURE — 85027 COMPLETE CBC AUTOMATED: CPT

## 2023-01-01 PROCEDURE — 84484 ASSAY OF TROPONIN QUANT: CPT | Mod: 91 | Performed by: EMERGENCY MEDICINE

## 2023-01-01 PROCEDURE — 2500000001 HC RX 250 WO HCPCS SELF ADMINISTERED DRUGS (ALT 637 FOR MEDICARE OP): Performed by: NURSE PRACTITIONER

## 2023-01-01 PROCEDURE — 2500000002 HC RX 250 W HCPCS SELF ADMINISTERED DRUGS (ALT 637 FOR MEDICARE OP, ALT 636 FOR OP/ED): Performed by: STUDENT IN AN ORGANIZED HEALTH CARE EDUCATION/TRAINING PROGRAM

## 2023-01-01 PROCEDURE — 87637 SARSCOV2&INF A&B&RSV AMP PRB: CPT | Performed by: EMERGENCY MEDICINE

## 2023-01-01 PROCEDURE — 93005 ELECTROCARDIOGRAM TRACING: CPT

## 2023-01-01 PROCEDURE — 74018 RADEX ABDOMEN 1 VIEW: CPT

## 2023-01-01 PROCEDURE — 83735 ASSAY OF MAGNESIUM: CPT

## 2023-01-01 PROCEDURE — 2500000002 HC RX 250 W HCPCS SELF ADMINISTERED DRUGS (ALT 637 FOR MEDICARE OP, ALT 636 FOR OP/ED)

## 2023-01-01 PROCEDURE — 97530 THERAPEUTIC ACTIVITIES: CPT | Mod: GP

## 2023-01-01 PROCEDURE — 80048 BASIC METABOLIC PNL TOTAL CA: CPT

## 2023-01-01 PROCEDURE — 86850 RBC ANTIBODY SCREEN: CPT

## 2023-01-01 PROCEDURE — 6350000001 HC RX 635 EPOETIN >10,000 UNITS: Mod: JW

## 2023-01-01 PROCEDURE — 85520 HEPARIN ASSAY: CPT

## 2023-01-01 PROCEDURE — 87206 SMEAR FLUORESCENT/ACID STAI: CPT

## 2023-01-01 PROCEDURE — 90935 HEMODIALYSIS ONE EVALUATION: CPT | Performed by: INTERNAL MEDICINE

## 2023-01-01 PROCEDURE — 85025 COMPLETE CBC W/AUTO DIFF WBC: CPT

## 2023-01-01 PROCEDURE — 86901 BLOOD TYPING SEROLOGIC RH(D): CPT

## 2023-01-01 PROCEDURE — 2500000001 HC RX 250 WO HCPCS SELF ADMINISTERED DRUGS (ALT 637 FOR MEDICARE OP): Performed by: INTERNAL MEDICINE

## 2023-01-01 PROCEDURE — 97530 THERAPEUTIC ACTIVITIES: CPT | Mod: GO | Performed by: OCCUPATIONAL THERAPIST

## 2023-01-01 PROCEDURE — 86900 BLOOD TYPING SEROLOGIC ABO: CPT

## 2023-01-01 PROCEDURE — 87106 FUNGI IDENTIFICATION YEAST: CPT

## 2023-01-01 PROCEDURE — 2500000002 HC RX 250 W HCPCS SELF ADMINISTERED DRUGS (ALT 637 FOR MEDICARE OP, ALT 636 FOR OP/ED): Performed by: EMERGENCY MEDICINE

## 2023-01-01 PROCEDURE — 99232 SBSQ HOSP IP/OBS MODERATE 35: CPT

## 2023-01-01 PROCEDURE — 87075 CULTR BACTERIA EXCEPT BLOOD: CPT | Mod: 59

## 2023-01-01 PROCEDURE — 3490 HC RX 250 GENERAL PHARMACY W/ HCPCS (ALT 636 FOR OP/ED)

## 2023-01-01 PROCEDURE — 2500000001 HC RX 250 WO HCPCS SELF ADMINISTERED DRUGS (ALT 637 FOR MEDICARE OP)

## 2023-01-01 PROCEDURE — 2500000004 HC RX 250 GENERAL PHARMACY W/ HCPCS (ALT 636 FOR OP/ED): Performed by: STUDENT IN AN ORGANIZED HEALTH CARE EDUCATION/TRAINING PROGRAM

## 2023-01-01 PROCEDURE — 9990 CHARGE CONVERSION: Mod: 59

## 2023-01-01 PROCEDURE — 1100000001 HC PRIVATE ROOM DAILY

## 2023-01-01 PROCEDURE — C1887 CATHETER, GUIDING: HCPCS

## 2023-01-01 PROCEDURE — 99254 IP/OBS CNSLTJ NEW/EST MOD 60: CPT | Performed by: INTERNAL MEDICINE

## 2023-01-01 PROCEDURE — 84550 ASSAY OF BLOOD/URIC ACID: CPT

## 2023-01-01 PROCEDURE — 73630 X-RAY EXAM OF FOOT: CPT | Mod: RT

## 2023-01-01 PROCEDURE — 85347 COAGULATION TIME ACTIVATED: CPT

## 2023-01-01 PROCEDURE — 85027 COMPLETE CBC AUTOMATED: CPT | Performed by: INTERNAL MEDICINE

## 2023-01-01 PROCEDURE — 86706 HEP B SURFACE ANTIBODY: CPT

## 2023-01-01 PROCEDURE — 83520 IMMUNOASSAY QUANT NOS NONAB: CPT | Mod: 90

## 2023-01-01 PROCEDURE — 99309 SBSQ NF CARE MODERATE MDM 30: CPT | Performed by: INTERNAL MEDICINE

## 2023-01-01 PROCEDURE — 047T3ZZ DILATION OF RIGHT PERONEAL ARTERY, PERCUTANEOUS APPROACH: ICD-10-PCS | Performed by: INTERNAL MEDICINE

## 2023-01-01 PROCEDURE — 85520 HEPARIN ASSAY: CPT | Mod: 91

## 2023-01-01 PROCEDURE — 74018 RADEX ABDOMEN 1 VIEW: CPT | Mod: FY

## 2023-01-01 PROCEDURE — 87186 SC STD MICRODIL/AGAR DIL: CPT

## 2023-01-01 PROCEDURE — 2500000004 HC RX 250 GENERAL PHARMACY W/ HCPCS (ALT 636 FOR OP/ED): Performed by: NURSE PRACTITIONER

## 2023-01-01 PROCEDURE — 87040 BLOOD CULTURE FOR BACTERIA: CPT | Performed by: INTERNAL MEDICINE

## 2023-01-01 PROCEDURE — 047V3ZZ DILATION OF RIGHT FOOT ARTERY, PERCUTANEOUS APPROACH: ICD-10-PCS | Performed by: INTERNAL MEDICINE

## 2023-01-01 PROCEDURE — 0Y6V0Z0 DETACHMENT AT RIGHT 4TH TOE, COMPLETE, OPEN APPROACH: ICD-10-PCS | Performed by: PODIATRIST

## 2023-01-01 PROCEDURE — 93306 TTE W/DOPPLER COMPLETE: CPT | Performed by: INTERNAL MEDICINE

## 2023-01-01 PROCEDURE — 3490 HC RX 250 GENERAL PHARMACY W/ HCPCS (ALT 636 FOR OP/ED): Performed by: NURSE PRACTITIONER

## 2023-01-01 PROCEDURE — 97166 OT EVAL MOD COMPLEX 45 MIN: CPT | Mod: GO

## 2023-01-01 PROCEDURE — 85730 THROMBOPLASTIN TIME PARTIAL: CPT

## 2023-01-01 PROCEDURE — 84075 ASSAY ALKALINE PHOSPHATASE: CPT | Performed by: STUDENT IN AN ORGANIZED HEALTH CARE EDUCATION/TRAINING PROGRAM

## 2023-01-01 PROCEDURE — C1894 INTRO/SHEATH, NON-LASER: HCPCS

## 2023-01-01 PROCEDURE — 99285 EMERGENCY DEPT VISIT HI MDM: CPT | Mod: 25

## 2023-01-01 PROCEDURE — 82947 ASSAY GLUCOSE BLOOD QUANT: CPT | Mod: 91

## 2023-01-01 PROCEDURE — 2500000004 HC RX 250 GENERAL PHARMACY W/ HCPCS (ALT 636 FOR OP/ED)

## 2023-01-01 PROCEDURE — 85025 COMPLETE CBC W/AUTO DIFF WBC: CPT | Performed by: NURSE PRACTITIONER

## 2023-01-01 PROCEDURE — 80053 COMPREHEN METABOLIC PANEL: CPT | Performed by: EMERGENCY MEDICINE

## 2023-01-01 PROCEDURE — 0Y6T0Z0 DETACHMENT AT RIGHT 3RD TOE, COMPLETE, OPEN APPROACH: ICD-10-PCS | Performed by: PODIATRIST

## 2023-01-01 PROCEDURE — 3490 HC RX 250 GENERAL PHARMACY W/ HCPCS (ALT 636 FOR OP/ED): Performed by: INTERNAL MEDICINE

## 2023-01-01 PROCEDURE — 9990 CHARGE CONVERSION: Mod: 91

## 2023-01-01 PROCEDURE — 2550000001 HC RX 255 CONTRASTS: Performed by: EMERGENCY MEDICINE

## 2023-01-01 PROCEDURE — 37228 CHARGE CONVERSION: CPT

## 2023-01-01 PROCEDURE — A4657 SYRINGE W/WO NEEDLE: HCPCS

## 2023-01-01 PROCEDURE — 2500000005 HC RX 250 GENERAL PHARMACY W/O HCPCS

## 2023-01-01 PROCEDURE — 99232 SBSQ HOSP IP/OBS MODERATE 35: CPT | Performed by: INTERNAL MEDICINE

## 2023-01-01 PROCEDURE — 36415 COLL VENOUS BLD VENIPUNCTURE: CPT | Performed by: EMERGENCY MEDICINE

## 2023-01-01 PROCEDURE — 71045 X-RAY EXAM CHEST 1 VIEW: CPT | Mod: FY

## 2023-01-01 PROCEDURE — 99233 SBSQ HOSP IP/OBS HIGH 50: CPT | Performed by: INTERNAL MEDICINE

## 2023-01-01 PROCEDURE — 37232 CHARGE CONVERSION: CPT

## 2023-01-01 PROCEDURE — 73620 X-RAY EXAM OF FOOT: CPT | Mod: RT

## 2023-01-01 PROCEDURE — 84484 ASSAY OF TROPONIN QUANT: CPT | Performed by: EMERGENCY MEDICINE

## 2023-01-01 PROCEDURE — 85027 COMPLETE CBC AUTOMATED: CPT | Mod: 91

## 2023-01-01 PROCEDURE — 99233 SBSQ HOSP IP/OBS HIGH 50: CPT | Performed by: STUDENT IN AN ORGANIZED HEALTH CARE EDUCATION/TRAINING PROGRAM

## 2023-01-01 PROCEDURE — 2500000004 HC RX 250 GENERAL PHARMACY W/ HCPCS (ALT 636 FOR OP/ED): Performed by: INTERNAL MEDICINE

## 2023-01-01 PROCEDURE — 36415 COLL VENOUS BLD VENIPUNCTURE: CPT | Performed by: NURSE PRACTITIONER

## 2023-01-01 PROCEDURE — 99233 SBSQ HOSP IP/OBS HIGH 50: CPT

## 2023-01-01 PROCEDURE — 36415 COLL VENOUS BLD VENIPUNCTURE: CPT | Performed by: STUDENT IN AN ORGANIZED HEALTH CARE EDUCATION/TRAINING PROGRAM

## 2023-01-01 PROCEDURE — 36415 COLL VENOUS BLD VENIPUNCTURE: CPT | Performed by: INTERNAL MEDICINE

## 2023-01-01 PROCEDURE — 87340 HEPATITIS B SURFACE AG IA: CPT

## 2023-01-01 PROCEDURE — 99232 SBSQ HOSP IP/OBS MODERATE 35: CPT | Performed by: PHYSICIAN ASSISTANT

## 2023-01-01 PROCEDURE — 6350000001 HC RX 635 EPOETIN >10,000 UNITS: Mod: JW | Performed by: STUDENT IN AN ORGANIZED HEALTH CARE EDUCATION/TRAINING PROGRAM

## 2023-01-01 PROCEDURE — 85652 RBC SED RATE AUTOMATED: CPT | Performed by: STUDENT IN AN ORGANIZED HEALTH CARE EDUCATION/TRAINING PROGRAM

## 2023-01-01 PROCEDURE — 87070 CULTURE OTHR SPECIMN AEROBIC: CPT | Mod: 59

## 2023-01-01 PROCEDURE — 99232 SBSQ HOSP IP/OBS MODERATE 35: CPT | Performed by: NURSE PRACTITIONER

## 2023-01-01 PROCEDURE — 3490 HC RX 250 GENERAL PHARMACY W/ HCPCS (ALT 636 FOR OP/ED): Performed by: STUDENT IN AN ORGANIZED HEALTH CARE EDUCATION/TRAINING PROGRAM

## 2023-01-01 PROCEDURE — G0278 ILIAC ART ANGIO,CARDIAC CATH: HCPCS | Mod: 59

## 2023-01-01 PROCEDURE — 97530 THERAPEUTIC ACTIVITIES: CPT | Mod: GP | Performed by: PHYSICAL THERAPIST

## 2023-01-01 PROCEDURE — 0Y6X0Z0 DETACHMENT AT RIGHT 5TH TOE, COMPLETE, OPEN APPROACH: ICD-10-PCS | Performed by: PODIATRIST

## 2023-01-01 PROCEDURE — 88307 TISSUE EXAM BY PATHOLOGIST: CPT

## 2023-01-01 PROCEDURE — 2500000002 HC RX 250 W HCPCS SELF ADMINISTERED DRUGS (ALT 637 FOR MEDICARE OP, ALT 636 FOR OP/ED): Performed by: INTERNAL MEDICINE

## 2023-01-01 PROCEDURE — 36245 INS CATH ABD/L-EXT ART 1ST: CPT

## 2023-01-01 PROCEDURE — 97110 THERAPEUTIC EXERCISES: CPT | Mod: GP | Performed by: PHYSICAL THERAPIST

## 2023-01-01 PROCEDURE — 047P3ZZ DILATION OF RIGHT ANTERIOR TIBIAL ARTERY, PERCUTANEOUS APPROACH: ICD-10-PCS | Performed by: INTERNAL MEDICINE

## 2023-01-01 PROCEDURE — 94640 AIRWAY INHALATION TREATMENT: CPT

## 2023-01-01 PROCEDURE — 93306 TTE W/DOPPLER COMPLETE: CPT

## 2023-01-01 PROCEDURE — 87081 CULTURE SCREEN ONLY: CPT

## 2023-01-01 PROCEDURE — 82374 ASSAY BLOOD CARBON DIOXIDE: CPT | Performed by: NURSE PRACTITIONER

## 2023-01-01 PROCEDURE — 86140 C-REACTIVE PROTEIN: CPT

## 2023-01-01 PROCEDURE — 99222 1ST HOSP IP/OBS MODERATE 55: CPT | Performed by: INTERNAL MEDICINE

## 2023-01-01 PROCEDURE — 71275 CT ANGIOGRAPHY CHEST: CPT

## 2023-01-01 PROCEDURE — 87340 HEPATITIS B SURFACE AG IA: CPT | Performed by: INTERNAL MEDICINE

## 2023-01-01 PROCEDURE — 86140 C-REACTIVE PROTEIN: CPT | Performed by: STUDENT IN AN ORGANIZED HEALTH CARE EDUCATION/TRAINING PROGRAM

## 2023-01-01 PROCEDURE — 99305 1ST NF CARE MODERATE MDM 35: CPT | Performed by: INTERNAL MEDICINE

## 2023-01-01 PROCEDURE — 1200000002 HC GENERAL ROOM WITH TELEMETRY DAILY

## 2023-01-01 PROCEDURE — 84100 ASSAY OF PHOSPHORUS: CPT | Performed by: NURSE PRACTITIONER

## 2023-01-01 PROCEDURE — 87077 CULTURE AEROBIC IDENTIFY: CPT

## 2023-01-01 PROCEDURE — 047R3ZZ DILATION OF RIGHT POSTERIOR TIBIAL ARTERY, PERCUTANEOUS APPROACH: ICD-10-PCS | Performed by: INTERNAL MEDICINE

## 2023-01-01 PROCEDURE — 87102 FUNGUS ISOLATION CULTURE: CPT | Mod: 59

## 2023-01-01 PROCEDURE — 83605 ASSAY OF LACTIC ACID: CPT

## 2023-01-01 PROCEDURE — 87205 SMEAR GRAM STAIN: CPT

## 2023-01-01 PROCEDURE — 80053 COMPREHEN METABOLIC PANEL: CPT | Performed by: INTERNAL MEDICINE

## 2023-01-01 PROCEDURE — 93922 UPR/L XTREMITY ART 2 LEVELS: CPT

## 2023-01-01 PROCEDURE — 93286 PERI-PX EVAL PM/LDLS PM IP: CPT

## 2023-01-01 PROCEDURE — 85027 COMPLETE CBC AUTOMATED: CPT | Performed by: NURSE PRACTITIONER

## 2023-01-01 PROCEDURE — 83735 ASSAY OF MAGNESIUM: CPT | Performed by: STUDENT IN AN ORGANIZED HEALTH CARE EDUCATION/TRAINING PROGRAM

## 2023-01-01 PROCEDURE — 71275 CT ANGIOGRAPHY CHEST: CPT | Performed by: RADIOLOGY

## 2023-01-01 PROCEDURE — 0Y6R0Z0 DETACHMENT AT RIGHT 2ND TOE, COMPLETE, OPEN APPROACH: ICD-10-PCS | Performed by: PODIATRIST

## 2023-01-01 PROCEDURE — 0Y6P0Z0 DETACHMENT AT RIGHT 1ST TOE, COMPLETE, OPEN APPROACH: ICD-10-PCS | Performed by: PODIATRIST

## 2023-01-01 PROCEDURE — 71045 X-RAY EXAM CHEST 1 VIEW: CPT | Performed by: STUDENT IN AN ORGANIZED HEALTH CARE EDUCATION/TRAINING PROGRAM

## 2023-01-01 PROCEDURE — 5A1D70Z PERFORMANCE OF URINARY FILTRATION, INTERMITTENT, LESS THAN 6 HOURS PER DAY: ICD-10-PCS | Performed by: INTERNAL MEDICINE

## 2023-01-01 PROCEDURE — 85730 THROMBOPLASTIN TIME PARTIAL: CPT | Performed by: STUDENT IN AN ORGANIZED HEALTH CARE EDUCATION/TRAINING PROGRAM

## 2023-01-01 PROCEDURE — 97530 THERAPEUTIC ACTIVITIES: CPT | Mod: GO

## 2023-01-01 PROCEDURE — 80053 COMPREHEN METABOLIC PANEL: CPT

## 2023-01-01 PROCEDURE — 97129 THER IVNTJ 1ST 15 MIN: CPT | Mod: GO

## 2023-01-01 PROCEDURE — 84100 ASSAY OF PHOSPHORUS: CPT | Performed by: STUDENT IN AN ORGANIZED HEALTH CARE EDUCATION/TRAINING PROGRAM

## 2023-01-01 PROCEDURE — 97535 SELF CARE MNGMENT TRAINING: CPT | Mod: GO | Performed by: OCCUPATIONAL THERAPIST

## 2023-01-01 PROCEDURE — 99232 SBSQ HOSP IP/OBS MODERATE 35: CPT | Performed by: STUDENT IN AN ORGANIZED HEALTH CARE EDUCATION/TRAINING PROGRAM

## 2023-01-01 RX ORDER — POVIDONE-IODINE 10 MG/G
OINTMENT TOPICAL EVERY OTHER DAY
Status: DISCONTINUED | OUTPATIENT
Start: 2023-01-01 | End: 2023-01-01 | Stop reason: HOSPADM

## 2023-01-01 RX ORDER — TRAMADOL HYDROCHLORIDE 50 MG/1
50 TABLET ORAL EVERY 8 HOURS
Qty: 30 TABLET | Refills: 0 | Status: SHIPPED | OUTPATIENT
Start: 2023-01-01 | End: 2024-01-01 | Stop reason: SDUPTHER

## 2023-01-01 RX ORDER — COLCHICINE 0.6 MG/1
0.5 TABLET ORAL 2 TIMES WEEKLY
COMMUNITY
Start: 2023-01-01 | End: 2024-01-01 | Stop reason: HOSPADM

## 2023-01-01 RX ORDER — DOCUSATE SODIUM 100 MG/1
100 CAPSULE, LIQUID FILLED ORAL 2 TIMES DAILY
Status: DISCONTINUED | OUTPATIENT
Start: 2023-01-01 | End: 2023-01-01 | Stop reason: HOSPADM

## 2023-01-01 RX ORDER — CLINDAMYCIN PHOSPHATE 10 MG/G
1 GEL TOPICAL 2 TIMES DAILY
COMMUNITY
Start: 2023-01-01 | End: 2023-01-01 | Stop reason: HOSPADM

## 2023-01-01 RX ORDER — LANTHANUM CARBONATE 750 MG/1
750 TABLET, CHEWABLE ORAL
COMMUNITY
Start: 2023-01-01 | End: 2024-01-01 | Stop reason: HOSPADM

## 2023-01-01 RX ORDER — POLYETHYLENE GLYCOL 3350 17 G/17G
17 POWDER, FOR SOLUTION ORAL DAILY
Status: DISCONTINUED | OUTPATIENT
Start: 2023-01-01 | End: 2023-01-01

## 2023-01-01 RX ORDER — CEFEPIME 1 G/50ML
2 INJECTION, SOLUTION INTRAVENOUS
Status: DISCONTINUED | OUTPATIENT
Start: 2023-01-01 | End: 2023-01-01

## 2023-01-01 RX ORDER — SENNOSIDES 8.6 MG/1
2 TABLET ORAL NIGHTLY
Status: DISCONTINUED | OUTPATIENT
Start: 2023-01-01 | End: 2023-01-01

## 2023-01-01 RX ORDER — SENNOSIDES 8.6 MG/1
2 TABLET ORAL 2 TIMES DAILY
Status: DISCONTINUED | OUTPATIENT
Start: 2023-01-01 | End: 2023-01-01 | Stop reason: HOSPADM

## 2023-01-01 RX ORDER — POLYETHYLENE GLYCOL 3350 17 G/17G
17 POWDER, FOR SOLUTION ORAL 3 TIMES DAILY
Status: DISCONTINUED | OUTPATIENT
Start: 2023-01-01 | End: 2023-01-01 | Stop reason: HOSPADM

## 2023-01-01 RX ORDER — AMLODIPINE BESYLATE 5 MG/1
5 TABLET ORAL DAILY
Status: DISCONTINUED | OUTPATIENT
Start: 2023-01-01 | End: 2023-01-01

## 2023-01-01 RX ORDER — LIDOCAINE 36 MG/1
1 PATCH TOPICAL
COMMUNITY
Start: 2022-06-10 | End: 2023-01-01 | Stop reason: HOSPADM

## 2023-01-01 RX ORDER — LANTHANUM CARBONATE 500 MG/1
750 TABLET, CHEWABLE ORAL
Status: DISCONTINUED | OUTPATIENT
Start: 2023-01-01 | End: 2023-01-01 | Stop reason: HOSPADM

## 2023-01-01 RX ORDER — METOPROLOL SUCCINATE 25 MG/1
25 TABLET, EXTENDED RELEASE ORAL DAILY
Status: DISCONTINUED | OUTPATIENT
Start: 2023-01-01 | End: 2023-01-01 | Stop reason: HOSPADM

## 2023-01-01 RX ORDER — ALLOPURINOL 100 MG/1
100 TABLET ORAL DAILY
COMMUNITY
Start: 2023-01-01

## 2023-01-01 RX ORDER — CEFEPIME 1 G/50ML
2 INJECTION, SOLUTION INTRAVENOUS
Status: DISCONTINUED | OUTPATIENT
Start: 2023-01-01 | End: 2023-01-01 | Stop reason: HOSPADM

## 2023-01-01 RX ORDER — LORATADINE 10 MG/1
10 TABLET ORAL DAILY
Status: DISCONTINUED | OUTPATIENT
Start: 2023-01-01 | End: 2023-01-01 | Stop reason: HOSPADM

## 2023-01-01 RX ORDER — DOCUSATE SODIUM 100 MG/1
100 CAPSULE, LIQUID FILLED ORAL DAILY
COMMUNITY
Start: 2023-01-01 | End: 2024-01-01 | Stop reason: HOSPADM

## 2023-01-01 RX ORDER — IPRATROPIUM BROMIDE 21 UG/1
2 SPRAY, METERED NASAL EVERY 12 HOURS
COMMUNITY
Start: 2023-01-01 | End: 2024-01-01 | Stop reason: HOSPADM

## 2023-01-01 RX ORDER — IBUPROFEN 200 MG
200 TABLET ORAL ONCE
Status: DISCONTINUED | OUTPATIENT
Start: 2023-01-01 | End: 2023-01-01

## 2023-01-01 RX ORDER — ALBUTEROL SULFATE 0.83 MG/ML
3 SOLUTION RESPIRATORY (INHALATION) EVERY 6 HOURS PRN
Status: DISCONTINUED | OUTPATIENT
Start: 2023-01-01 | End: 2023-01-01 | Stop reason: HOSPADM

## 2023-01-01 RX ORDER — GABAPENTIN 100 MG/1
100 CAPSULE ORAL
Status: DISCONTINUED | OUTPATIENT
Start: 2023-01-01 | End: 2023-01-01 | Stop reason: HOSPADM

## 2023-01-01 RX ORDER — TRAMADOL HYDROCHLORIDE 50 MG/1
50 TABLET ORAL EVERY 8 HOURS
Status: DISCONTINUED | OUTPATIENT
Start: 2023-01-01 | End: 2023-01-01 | Stop reason: HOSPADM

## 2023-01-01 RX ORDER — ATORVASTATIN CALCIUM 10 MG/1
10 TABLET, FILM COATED ORAL DAILY
COMMUNITY
Start: 2023-01-01 | End: 2024-01-01 | Stop reason: HOSPADM

## 2023-01-01 RX ORDER — ASPIRIN 81 MG/1
81 TABLET ORAL DAILY
COMMUNITY
End: 2024-01-01 | Stop reason: HOSPADM

## 2023-01-01 RX ORDER — FLUOCINONIDE 0.5 MG/G
1 OINTMENT TOPICAL DAILY
COMMUNITY
Start: 2023-01-01 | End: 2023-01-01 | Stop reason: HOSPADM

## 2023-01-01 RX ORDER — CEFEPIME 1 G/50ML
1 INJECTION, SOLUTION INTRAVENOUS EVERY 24 HOURS
Status: DISCONTINUED | OUTPATIENT
Start: 2023-01-01 | End: 2023-01-01

## 2023-01-01 RX ORDER — ALBUTEROL SULFATE 0.83 MG/ML
3 SOLUTION RESPIRATORY (INHALATION)
Status: COMPLETED | OUTPATIENT
Start: 2023-01-01 | End: 2023-01-01

## 2023-01-01 RX ORDER — CINACALCET 30 MG/1
30 TABLET, FILM COATED ORAL
Status: DISCONTINUED | OUTPATIENT
Start: 2023-01-01 | End: 2023-01-01 | Stop reason: HOSPADM

## 2023-01-01 RX ORDER — METOPROLOL SUCCINATE 25 MG/1
25 TABLET, EXTENDED RELEASE ORAL DAILY
Qty: 30 TABLET | Refills: 11 | Status: SHIPPED | OUTPATIENT
Start: 2023-01-01 | End: 2023-01-01

## 2023-01-01 RX ORDER — POLYETHYLENE GLYCOL 3350 17 G/17G
17 POWDER, FOR SOLUTION ORAL DAILY
COMMUNITY
End: 2024-01-01 | Stop reason: ALTCHOICE

## 2023-01-01 RX ORDER — ONDANSETRON HYDROCHLORIDE 2 MG/ML
4 INJECTION, SOLUTION INTRAVENOUS EVERY 6 HOURS
Status: DISCONTINUED | OUTPATIENT
Start: 2023-01-01 | End: 2023-01-01 | Stop reason: HOSPADM

## 2023-01-01 RX ORDER — LORATADINE 10 MG/1
10 TABLET ORAL
Qty: 30 TABLET | Refills: 0 | Status: SHIPPED | OUTPATIENT
Start: 2023-01-01 | End: 2023-01-01

## 2023-01-01 RX ORDER — OXYCODONE HYDROCHLORIDE 5 MG/1
5 TABLET ORAL ONCE
Status: COMPLETED | OUTPATIENT
Start: 2023-01-01 | End: 2023-01-01

## 2023-01-01 RX ORDER — GABAPENTIN 100 MG/1
100 CAPSULE ORAL
COMMUNITY
Start: 2023-01-01 | End: 2024-01-01 | Stop reason: HOSPADM

## 2023-01-01 RX ORDER — COLCHICINE 0.6 MG/1
0.6 TABLET ORAL DAILY
COMMUNITY
Start: 2023-01-01 | End: 2023-01-01 | Stop reason: HOSPADM

## 2023-01-01 RX ORDER — BISACODYL 10 MG/1
10 SUPPOSITORY RECTAL DAILY
Status: DISCONTINUED | OUTPATIENT
Start: 2023-01-01 | End: 2023-01-01 | Stop reason: HOSPADM

## 2023-01-01 RX ORDER — MIDODRINE HYDROCHLORIDE 10 MG/1
10 TABLET ORAL 2 TIMES DAILY
COMMUNITY
End: 2024-01-01 | Stop reason: HOSPADM

## 2023-01-01 RX ORDER — LIDOCAINE 36 MG/1
PATCH TOPICAL
COMMUNITY
End: 2024-01-01 | Stop reason: HOSPADM

## 2023-01-01 RX ORDER — PANTOPRAZOLE SODIUM 40 MG/1
40 TABLET, DELAYED RELEASE ORAL
Status: DISCONTINUED | OUTPATIENT
Start: 2023-01-01 | End: 2023-01-01 | Stop reason: HOSPADM

## 2023-01-01 RX ORDER — ALBUTEROL SULFATE 90 UG/1
2 AEROSOL, METERED RESPIRATORY (INHALATION) EVERY 6 HOURS PRN
COMMUNITY
Start: 2023-01-01 | End: 2024-01-01 | Stop reason: ENTERED-IN-ERROR

## 2023-01-01 RX ORDER — POLYETHYLENE GLYCOL 3350, SODIUM CHLORIDE, SODIUM BICARBONATE, POTASSIUM CHLORIDE 420; 11.2; 5.72; 1.48 G/4L; G/4L; G/4L; G/4L
2000 POWDER, FOR SOLUTION ORAL ONCE AS NEEDED
Status: COMPLETED | OUTPATIENT
Start: 2023-01-01 | End: 2023-01-01

## 2023-01-01 RX ORDER — B COMPLEX, C NO.20/FOLIC ACID 1 MG
1 CAPSULE ORAL DAILY
COMMUNITY
Start: 2023-01-01

## 2023-01-01 RX ORDER — LORAZEPAM 2 MG/ML
0.5 INJECTION INTRAMUSCULAR ONCE
Status: COMPLETED | OUTPATIENT
Start: 2023-01-01 | End: 2023-01-01

## 2023-01-01 RX ORDER — CEFEPIME 1 G/50ML
2 INJECTION, SOLUTION INTRAVENOUS
Qty: 1200 ML | Refills: 0 | Status: SHIPPED | OUTPATIENT
Start: 2023-01-01 | End: 2023-01-01

## 2023-01-01 RX ORDER — CLONIDINE HYDROCHLORIDE 0.2 MG/1
0.2 TABLET ORAL EVERY 12 HOURS PRN
COMMUNITY
End: 2024-01-01 | Stop reason: HOSPADM

## 2023-01-01 RX ORDER — NAPROXEN SODIUM 220 MG/1
81 TABLET, FILM COATED ORAL DAILY
COMMUNITY
End: 2023-01-01 | Stop reason: HOSPADM

## 2023-01-01 RX ORDER — MENTHOL AND ZINC OXIDE .44; 20.625 G/100G; G/100G
1 OINTMENT TOPICAL DAILY
Status: DISCONTINUED | OUTPATIENT
Start: 2023-01-01 | End: 2023-01-01 | Stop reason: HOSPADM

## 2023-01-01 RX ORDER — TRAMADOL HYDROCHLORIDE 50 MG/1
50 TABLET ORAL EVERY 12 HOURS PRN
Status: DISCONTINUED | OUTPATIENT
Start: 2023-01-01 | End: 2023-01-01

## 2023-01-01 RX ORDER — MENTHOL AND ZINC OXIDE .44; 20.625 G/100G; G/100G
1 OINTMENT TOPICAL DAILY
Qty: 1 G | Refills: 0 | Status: SHIPPED | OUTPATIENT
Start: 2023-01-01 | End: 2024-01-01 | Stop reason: HOSPADM

## 2023-01-01 RX ORDER — ALBUTEROL SULFATE 90 UG/1
2 AEROSOL, METERED RESPIRATORY (INHALATION) EVERY 4 HOURS PRN
Status: DISCONTINUED | OUTPATIENT
Start: 2023-01-01 | End: 2023-01-01 | Stop reason: HOSPADM

## 2023-01-01 RX ORDER — AMLODIPINE BESYLATE 10 MG/1
10 TABLET ORAL DAILY
Status: DISCONTINUED | OUTPATIENT
Start: 2023-01-01 | End: 2023-01-01

## 2023-01-01 RX ORDER — METOPROLOL SUCCINATE 25 MG/1
TABLET, EXTENDED RELEASE ORAL
Status: COMPLETED
Start: 2023-01-01 | End: 2023-01-01

## 2023-01-01 RX ORDER — ALLOPURINOL 100 MG/1
100 TABLET ORAL EVERY 24 HOURS
Status: DISCONTINUED | OUTPATIENT
Start: 2023-01-01 | End: 2023-01-01 | Stop reason: HOSPADM

## 2023-01-01 RX ORDER — ACETAMINOPHEN 325 MG/1
650 TABLET ORAL EVERY 6 HOURS PRN
Status: DISCONTINUED | OUTPATIENT
Start: 2023-01-01 | End: 2023-01-01

## 2023-01-01 RX ORDER — CLOPIDOGREL BISULFATE 75 MG/1
75 TABLET ORAL DAILY
COMMUNITY
Start: 2023-01-01

## 2023-01-01 RX ORDER — AMLODIPINE BESYLATE 10 MG/1
10 TABLET ORAL DAILY
COMMUNITY
Start: 2023-01-01 | End: 2024-01-01 | Stop reason: HOSPADM

## 2023-01-01 RX ORDER — CLONIDINE HYDROCHLORIDE 0.2 MG/1
0.2 TABLET ORAL 2 TIMES DAILY PRN
COMMUNITY
Start: 2023-01-01 | End: 2023-01-01 | Stop reason: HOSPADM

## 2023-01-01 RX ORDER — OXYCODONE HYDROCHLORIDE 5 MG/1
7.5 TABLET ORAL EVERY 4 HOURS PRN
Status: DISCONTINUED | OUTPATIENT
Start: 2023-01-01 | End: 2023-01-01

## 2023-01-01 RX ORDER — ACETAMINOPHEN 160 MG/5ML
650 SOLUTION ORAL EVERY 4 HOURS PRN
Status: DISCONTINUED | OUTPATIENT
Start: 2023-01-01 | End: 2023-01-01 | Stop reason: HOSPADM

## 2023-01-01 RX ORDER — PANTOPRAZOLE SODIUM 40 MG/1
40 TABLET, DELAYED RELEASE ORAL
Qty: 30 TABLET | Refills: 3 | Status: SHIPPED | OUTPATIENT
Start: 2023-01-01 | End: 2023-01-01

## 2023-01-01 RX ORDER — POVIDONE-IODINE 10 MG/ML
SOLUTION TOPICAL EVERY OTHER DAY
Status: DISCONTINUED | OUTPATIENT
Start: 2023-01-01 | End: 2023-01-01

## 2023-01-01 RX ORDER — CLONIDINE HYDROCHLORIDE 0.1 MG/1
0.2 TABLET ORAL 2 TIMES DAILY PRN
Status: DISCONTINUED | OUTPATIENT
Start: 2023-01-01 | End: 2023-01-01

## 2023-01-01 RX ORDER — CINACALCET 30 MG/1
30 TABLET, FILM COATED ORAL
Qty: 30 TABLET | Refills: 0 | Status: SHIPPED | OUTPATIENT
Start: 2023-01-01 | End: 2024-01-01 | Stop reason: HOSPADM

## 2023-01-01 RX ORDER — VANCOMYCIN HYDROCHLORIDE 1 G/20ML
INJECTION, POWDER, LYOPHILIZED, FOR SOLUTION INTRAVENOUS DAILY PRN
Status: DISCONTINUED | OUTPATIENT
Start: 2023-01-01 | End: 2023-01-01

## 2023-01-01 RX ORDER — CLOPIDOGREL BISULFATE 75 MG/1
75 TABLET ORAL DAILY
Status: DISCONTINUED | OUTPATIENT
Start: 2023-01-01 | End: 2023-01-01 | Stop reason: HOSPADM

## 2023-01-01 RX ORDER — ATORVASTATIN CALCIUM 10 MG/1
10 TABLET, FILM COATED ORAL DAILY
Status: DISCONTINUED | OUTPATIENT
Start: 2023-01-01 | End: 2023-01-01 | Stop reason: HOSPADM

## 2023-01-01 RX ADMIN — LORATADINE 10 MG: 10 TABLET ORAL at 11:44

## 2023-01-01 RX ADMIN — APIXABAN 2.5 MG: 2.5 TABLET, FILM COATED ORAL at 00:49

## 2023-01-01 RX ADMIN — PIPERACILLIN SODIUM AND TAZOBACTAM SODIUM 2.25 G: 2; .25 INJECTION, SOLUTION INTRAVENOUS at 04:33

## 2023-01-01 RX ADMIN — Medication 1 APPLICATION: at 09:00

## 2023-01-01 RX ADMIN — ONDANSETRON 4 MG: 2 INJECTION INTRAMUSCULAR; INTRAVENOUS at 15:39

## 2023-01-01 RX ADMIN — LANTHANUM CARBONATE 750 MG: 500 TABLET, CHEWABLE ORAL at 15:55

## 2023-01-01 RX ADMIN — PERFLUTREN 0.5 ML: 6.52 INJECTION, SUSPENSION INTRAVENOUS at 10:35

## 2023-01-01 RX ADMIN — LANTHANUM CARBONATE 750 MG: 500 TABLET, CHEWABLE ORAL at 08:53

## 2023-01-01 RX ADMIN — METOPROLOL SUCCINATE 25 MG: 25 TABLET, EXTENDED RELEASE ORAL at 11:19

## 2023-01-01 RX ADMIN — ACETAMINOPHEN 650 MG: 650 SOLUTION ORAL at 03:07

## 2023-01-01 RX ADMIN — CINACALCET 30 MG: 30 TABLET ORAL at 09:15

## 2023-01-01 RX ADMIN — EPOETIN ALFA 5000 UNITS: 10000 SOLUTION INTRAVENOUS; SUBCUTANEOUS at 02:14

## 2023-01-01 RX ADMIN — ACETAMINOPHEN 650 MG: 650 SOLUTION ORAL at 11:35

## 2023-01-01 RX ADMIN — DOCUSATE SODIUM 100 MG: 100 CAPSULE, LIQUID FILLED ORAL at 09:00

## 2023-01-01 RX ADMIN — DOCUSATE SODIUM 100 MG: 100 CAPSULE, LIQUID FILLED ORAL at 08:49

## 2023-01-01 RX ADMIN — ALLOPURINOL 100 MG: 100 TABLET ORAL at 23:18

## 2023-01-01 RX ADMIN — ATORVASTATIN CALCIUM 10 MG: 20 TABLET, FILM COATED ORAL at 21:06

## 2023-01-01 RX ADMIN — NAPHAZOLINE HYDROCHLORIDE AND PHENIRAMINE MALEATE 1 DROP: .25; 3 SOLUTION/ DROPS OPHTHALMIC at 21:58

## 2023-01-01 RX ADMIN — ATORVASTATIN CALCIUM 10 MG: 20 TABLET, FILM COATED ORAL at 20:39

## 2023-01-01 RX ADMIN — ALBUTEROL SULFATE 3 ML: 2.5 SOLUTION RESPIRATORY (INHALATION) at 21:05

## 2023-01-01 RX ADMIN — METOPROLOL SUCCINATE 25 MG: 25 TABLET, EXTENDED RELEASE ORAL at 08:49

## 2023-01-01 RX ADMIN — LANTHANUM CARBONATE 750 MG: 500 TABLET, CHEWABLE ORAL at 09:15

## 2023-01-01 RX ADMIN — TRAMADOL HYDROCHLORIDE 50 MG: 50 TABLET, COATED ORAL at 11:21

## 2023-01-01 RX ADMIN — CLOPIDOGREL BISULFATE 75 MG: 75 TABLET, FILM COATED ORAL at 12:02

## 2023-01-01 RX ADMIN — NEPHROCAP 1 CAPSULE: 1 CAP ORAL at 08:49

## 2023-01-01 RX ADMIN — SENNOSIDES 17.2 MG: 8.6 TABLET, FILM COATED ORAL at 09:13

## 2023-01-01 RX ADMIN — APIXABAN 2.5 MG: 2.5 TABLET, FILM COATED ORAL at 13:51

## 2023-01-01 RX ADMIN — CINACALCET 30 MG: 30 TABLET ORAL at 14:17

## 2023-01-01 RX ADMIN — DOCUSATE SODIUM 100 MG: 100 CAPSULE, LIQUID FILLED ORAL at 20:39

## 2023-01-01 RX ADMIN — PANTOPRAZOLE SODIUM 40 MG: 40 TABLET, DELAYED RELEASE ORAL at 09:30

## 2023-01-01 RX ADMIN — DOCUSATE SODIUM 100 MG: 100 CAPSULE, LIQUID FILLED ORAL at 23:18

## 2023-01-01 RX ADMIN — OXYCODONE HYDROCHLORIDE 5 MG: 5 TABLET ORAL at 02:03

## 2023-01-01 RX ADMIN — LORATADINE 10 MG: 10 TABLET ORAL at 09:00

## 2023-01-01 RX ADMIN — NAPHAZOLINE HYDROCHLORIDE AND PHENIRAMINE MALEATE 1 DROP: .25; 3 SOLUTION/ DROPS OPHTHALMIC at 09:15

## 2023-01-01 RX ADMIN — BISACODYL 10 MG: 10 SUPPOSITORY RECTAL at 11:45

## 2023-01-01 RX ADMIN — CLOPIDOGREL BISULFATE 75 MG: 75 TABLET, FILM COATED ORAL at 08:49

## 2023-01-01 RX ADMIN — SENNOSIDES 17.2 MG: 8.6 TABLET, FILM COATED ORAL at 09:00

## 2023-01-01 RX ADMIN — POLYETHYLENE GLYCOL 3350 17 G: 17 POWDER, FOR SOLUTION ORAL at 08:49

## 2023-01-01 RX ADMIN — ALLOPURINOL 100 MG: 100 TABLET ORAL at 21:59

## 2023-01-01 RX ADMIN — ACETAMINOPHEN 650 MG: 325 TABLET, FILM COATED ORAL at 13:51

## 2023-01-01 RX ADMIN — ALBUTEROL SULFATE 3 ML: 2.5 SOLUTION RESPIRATORY (INHALATION) at 21:08

## 2023-01-01 RX ADMIN — NEPHROCAP 1 CAPSULE: 1 CAP ORAL at 11:43

## 2023-01-01 RX ADMIN — ACETAMINOPHEN 650 MG: 650 SOLUTION ORAL at 23:16

## 2023-01-01 RX ADMIN — NAPHAZOLINE HYDROCHLORIDE AND PHENIRAMINE MALEATE 1 DROP: .25; 3 SOLUTION/ DROPS OPHTHALMIC at 12:16

## 2023-01-01 RX ADMIN — GABAPENTIN 100 MG: 100 CAPSULE ORAL at 14:17

## 2023-01-01 RX ADMIN — PIPERACILLIN SODIUM AND TAZOBACTAM SODIUM 2.25 G: 2; .25 INJECTION, SOLUTION INTRAVENOUS at 22:29

## 2023-01-01 RX ADMIN — PIPERACILLIN SODIUM AND TAZOBACTAM SODIUM 2.25 G: 2; .25 INJECTION, SOLUTION INTRAVENOUS at 22:33

## 2023-01-01 RX ADMIN — NAPHAZOLINE HYDROCHLORIDE AND PHENIRAMINE MALEATE 1 DROP: .25; 3 SOLUTION/ DROPS OPHTHALMIC at 21:08

## 2023-01-01 RX ADMIN — CLOPIDOGREL BISULFATE 75 MG: 75 TABLET, FILM COATED ORAL at 09:13

## 2023-01-01 RX ADMIN — BISACODYL 10 MG: 10 SUPPOSITORY RECTAL at 08:49

## 2023-01-01 RX ADMIN — GABAPENTIN 100 MG: 100 CAPSULE ORAL at 15:53

## 2023-01-01 RX ADMIN — ACETAMINOPHEN 650 MG: 325 TABLET, FILM COATED ORAL at 08:52

## 2023-01-01 RX ADMIN — ALLOPURINOL 100 MG: 100 TABLET ORAL at 20:35

## 2023-01-01 RX ADMIN — ONDANSETRON 4 MG: 2 INJECTION INTRAMUSCULAR; INTRAVENOUS at 09:21

## 2023-01-01 RX ADMIN — CLOPIDOGREL BISULFATE 75 MG: 75 TABLET, FILM COATED ORAL at 11:35

## 2023-01-01 RX ADMIN — ONDANSETRON 4 MG: 2 INJECTION INTRAMUSCULAR; INTRAVENOUS at 20:35

## 2023-01-01 RX ADMIN — PIPERACILLIN SODIUM AND TAZOBACTAM SODIUM 2.25 G: 2; .25 INJECTION, SOLUTION INTRAVENOUS at 05:58

## 2023-01-01 RX ADMIN — NEPHROCAP 1 CAPSULE: 1 CAP ORAL at 09:13

## 2023-01-01 RX ADMIN — ISAVUCONAZONIUM SULFATE 372 MG: 186 CAPSULE ORAL at 21:59

## 2023-01-01 RX ADMIN — PANTOPRAZOLE SODIUM 40 MG: 40 TABLET, DELAYED RELEASE ORAL at 06:19

## 2023-01-01 RX ADMIN — APIXABAN 2.5 MG: 2.5 TABLET, FILM COATED ORAL at 13:00

## 2023-01-01 RX ADMIN — ALLOPURINOL 100 MG: 100 TABLET ORAL at 20:44

## 2023-01-01 RX ADMIN — ACETAMINOPHEN 650 MG: 650 SOLUTION ORAL at 15:53

## 2023-01-01 RX ADMIN — VANCOMYCIN HYDROCHLORIDE 1500 MG: 1.5 INJECTION, POWDER, LYOPHILIZED, FOR SOLUTION INTRAVENOUS at 17:15

## 2023-01-01 RX ADMIN — PANTOPRAZOLE SODIUM 40 MG: 40 TABLET, DELAYED RELEASE ORAL at 07:01

## 2023-01-01 RX ADMIN — TRAMADOL HYDROCHLORIDE 50 MG: 50 TABLET, COATED ORAL at 19:30

## 2023-01-01 RX ADMIN — POLYETHYLENE GLYCOL 3350 17 G: 17 POWDER, FOR SOLUTION ORAL at 09:12

## 2023-01-01 RX ADMIN — LANTHANUM CARBONATE 750 MG: 500 TABLET, CHEWABLE ORAL at 12:11

## 2023-01-01 RX ADMIN — CLOPIDOGREL BISULFATE 75 MG: 75 TABLET, FILM COATED ORAL at 08:53

## 2023-01-01 RX ADMIN — NEPHROCAP 1 CAPSULE: 1 CAP ORAL at 08:54

## 2023-01-01 RX ADMIN — LANTHANUM CARBONATE 750 MG: 500 TABLET, CHEWABLE ORAL at 08:00

## 2023-01-01 RX ADMIN — POLYETHYLENE GLYCOL 3350 17 G: 17 POWDER, FOR SOLUTION ORAL at 16:05

## 2023-01-01 RX ADMIN — APIXABAN 2.5 MG: 2.5 TABLET, FILM COATED ORAL at 01:58

## 2023-01-01 RX ADMIN — APIXABAN 2.5 MG: 2.5 TABLET, FILM COATED ORAL at 15:53

## 2023-01-01 RX ADMIN — CEFEPIME 2 G: 1 INJECTION, SOLUTION INTRAVENOUS at 12:26

## 2023-01-01 RX ADMIN — ATORVASTATIN CALCIUM 10 MG: 20 TABLET, FILM COATED ORAL at 23:18

## 2023-01-01 RX ADMIN — NEPHROCAP 1 CAPSULE: 1 CAP ORAL at 09:00

## 2023-01-01 RX ADMIN — CLOPIDOGREL BISULFATE 75 MG: 75 TABLET, FILM COATED ORAL at 09:00

## 2023-01-01 RX ADMIN — LORATADINE 10 MG: 10 TABLET ORAL at 08:49

## 2023-01-01 RX ADMIN — AMLODIPINE BESYLATE 10 MG: 10 TABLET ORAL at 08:53

## 2023-01-01 RX ADMIN — IOHEXOL 75 ML: 350 INJECTION, SOLUTION INTRAVENOUS at 21:42

## 2023-01-01 RX ADMIN — TRAMADOL HYDROCHLORIDE 50 MG: 50 TABLET, COATED ORAL at 09:48

## 2023-01-01 RX ADMIN — SENNOSIDES 17.2 MG: 8.6 TABLET, FILM COATED ORAL at 21:06

## 2023-01-01 RX ADMIN — ACETAMINOPHEN 650 MG: 650 SOLUTION ORAL at 10:39

## 2023-01-01 RX ADMIN — DOCUSATE SODIUM 100 MG: 100 CAPSULE, LIQUID FILLED ORAL at 21:06

## 2023-01-01 RX ADMIN — TRAMADOL HYDROCHLORIDE 50 MG: 50 TABLET, COATED ORAL at 02:32

## 2023-01-01 RX ADMIN — LORATADINE 10 MG: 10 TABLET ORAL at 09:12

## 2023-01-01 RX ADMIN — DOCUSATE SODIUM 100 MG: 100 CAPSULE, LIQUID FILLED ORAL at 12:02

## 2023-01-01 RX ADMIN — CEFEPIME 2 G: 1 INJECTION, SOLUTION INTRAVENOUS at 14:30

## 2023-01-01 RX ADMIN — ATORVASTATIN CALCIUM 10 MG: 20 TABLET, FILM COATED ORAL at 20:44

## 2023-01-01 RX ADMIN — ACETAMINOPHEN 650 MG: 650 SOLUTION ORAL at 20:35

## 2023-01-01 RX ADMIN — METOPROLOL SUCCINATE 25 MG: 25 TABLET, EXTENDED RELEASE ORAL at 10:48

## 2023-01-01 RX ADMIN — EPOETIN ALFA 5000 UNITS: 10000 SOLUTION INTRAVENOUS; SUBCUTANEOUS at 12:07

## 2023-01-01 RX ADMIN — ACETAMINOPHEN 650 MG: 650 SOLUTION ORAL at 00:49

## 2023-01-01 RX ADMIN — CINACALCET 30 MG: 30 TABLET ORAL at 12:02

## 2023-01-01 RX ADMIN — Medication 1 APPLICATION: at 08:55

## 2023-01-01 RX ADMIN — DOCUSATE SODIUM 100 MG: 100 CAPSULE, LIQUID FILLED ORAL at 21:59

## 2023-01-01 RX ADMIN — ONDANSETRON 4 MG: 2 INJECTION INTRAMUSCULAR; INTRAVENOUS at 09:15

## 2023-01-01 RX ADMIN — ACETAMINOPHEN 650 MG: 650 SOLUTION ORAL at 06:44

## 2023-01-01 RX ADMIN — ONDANSETRON 4 MG: 2 INJECTION INTRAMUSCULAR; INTRAVENOUS at 20:44

## 2023-01-01 RX ADMIN — CINACALCET 30 MG: 30 TABLET ORAL at 09:00

## 2023-01-01 RX ADMIN — METOPROLOL SUCCINATE 25 MG: 25 TABLET, EXTENDED RELEASE ORAL at 11:35

## 2023-01-01 RX ADMIN — POLYETHYLENE GLYCOL 3350 17 G: 17 POWDER, FOR SOLUTION ORAL at 09:00

## 2023-01-01 RX ADMIN — APIXABAN 2.5 MG: 2.5 TABLET, FILM COATED ORAL at 01:00

## 2023-01-01 RX ADMIN — ACETAMINOPHEN 650 MG: 650 SOLUTION ORAL at 20:56

## 2023-01-01 RX ADMIN — METOPROLOL SUCCINATE 25 MG: 25 TABLET, EXTENDED RELEASE ORAL at 09:45

## 2023-01-01 RX ADMIN — ACETAMINOPHEN 650 MG: 650 SOLUTION ORAL at 14:16

## 2023-01-01 RX ADMIN — APIXABAN 2.5 MG: 2.5 TABLET, FILM COATED ORAL at 14:42

## 2023-01-01 RX ADMIN — POLYETHYLENE GLYCOL 3350 17 G: 17 POWDER, FOR SOLUTION ORAL at 08:52

## 2023-01-01 RX ADMIN — APIXABAN 2.5 MG: 2.5 TABLET, FILM COATED ORAL at 14:18

## 2023-01-01 RX ADMIN — SENNOSIDES 17.2 MG: 8.6 TABLET, FILM COATED ORAL at 20:38

## 2023-01-01 RX ADMIN — PANTOPRAZOLE SODIUM 40 MG: 40 TABLET, DELAYED RELEASE ORAL at 09:13

## 2023-01-01 RX ADMIN — ACETAMINOPHEN 650 MG: 650 SOLUTION ORAL at 07:33

## 2023-01-01 RX ADMIN — NAPHAZOLINE HYDROCHLORIDE AND PHENIRAMINE MALEATE 1 DROP: .25; 3 SOLUTION/ DROPS OPHTHALMIC at 16:36

## 2023-01-01 RX ADMIN — ATORVASTATIN CALCIUM 10 MG: 20 TABLET, FILM COATED ORAL at 20:35

## 2023-01-01 RX ADMIN — APIXABAN 2.5 MG: 2.5 TABLET, FILM COATED ORAL at 01:16

## 2023-01-01 RX ADMIN — LANTHANUM CARBONATE 750 MG: 500 TABLET, CHEWABLE ORAL at 19:20

## 2023-01-01 RX ADMIN — ACETAMINOPHEN 650 MG: 650 SOLUTION ORAL at 15:39

## 2023-01-01 RX ADMIN — ACETAMINOPHEN 650 MG: 650 SOLUTION ORAL at 11:11

## 2023-01-01 RX ADMIN — ALLOPURINOL 100 MG: 100 TABLET ORAL at 20:39

## 2023-01-01 RX ADMIN — PANTOPRAZOLE SODIUM 40 MG: 40 TABLET, DELAYED RELEASE ORAL at 08:00

## 2023-01-01 RX ADMIN — DOCUSATE SODIUM 100 MG: 100 CAPSULE, LIQUID FILLED ORAL at 20:44

## 2023-01-01 RX ADMIN — ALBUTEROL SULFATE 3 ML: 2.5 SOLUTION RESPIRATORY (INHALATION) at 13:32

## 2023-01-01 RX ADMIN — POVIDONE-IODINE: 100 OINTMENT TOPICAL at 09:00

## 2023-01-01 RX ADMIN — ALLOPURINOL 100 MG: 100 TABLET ORAL at 21:06

## 2023-01-01 RX ADMIN — POLYETHYLENE GLYCOL 3350 17 G: 17 POWDER, FOR SOLUTION ORAL at 12:01

## 2023-01-01 RX ADMIN — PIPERACILLIN SODIUM AND TAZOBACTAM SODIUM 2.25 G: 2; .25 INJECTION, SOLUTION INTRAVENOUS at 16:53

## 2023-01-01 RX ADMIN — BISACODYL 10 MG: 10 SUPPOSITORY RECTAL at 09:00

## 2023-01-01 RX ADMIN — APIXABAN 2.5 MG: 2.5 TABLET, FILM COATED ORAL at 00:29

## 2023-01-01 RX ADMIN — EPOETIN ALFA 5000 UNITS: 10000 SOLUTION INTRAVENOUS; SUBCUTANEOUS at 09:00

## 2023-01-01 RX ADMIN — SENNOSIDES 17.2 MG: 8.6 TABLET, FILM COATED ORAL at 20:44

## 2023-01-01 RX ADMIN — DOCUSATE SODIUM 100 MG: 100 CAPSULE, LIQUID FILLED ORAL at 09:14

## 2023-01-01 RX ADMIN — ONDANSETRON 4 MG: 2 INJECTION INTRAMUSCULAR; INTRAVENOUS at 20:45

## 2023-01-01 RX ADMIN — DOCUSATE SODIUM 100 MG: 100 CAPSULE, LIQUID FILLED ORAL at 14:43

## 2023-01-01 RX ADMIN — POLYETHYLENE GLYCOL 3350 17 G: 17 POWDER, FOR SOLUTION ORAL at 20:38

## 2023-01-01 RX ADMIN — ATORVASTATIN CALCIUM 10 MG: 20 TABLET, FILM COATED ORAL at 21:58

## 2023-01-01 RX ADMIN — LORATADINE 10 MG: 10 TABLET ORAL at 09:13

## 2023-01-01 RX ADMIN — ACETAMINOPHEN 650 MG: 650 SOLUTION ORAL at 21:57

## 2023-01-01 RX ADMIN — DOCUSATE SODIUM 100 MG: 100 CAPSULE, LIQUID FILLED ORAL at 09:13

## 2023-01-01 RX ADMIN — PIPERACILLIN SODIUM AND TAZOBACTAM SODIUM 2.25 G: 2; .25 INJECTION, SOLUTION INTRAVENOUS at 10:46

## 2023-01-01 RX ADMIN — METOPROLOL SUCCINATE 25 MG: 25 TABLET, EXTENDED RELEASE ORAL at 12:10

## 2023-01-01 RX ADMIN — METOPROLOL SUCCINATE 25 MG: 25 TABLET, EXTENDED RELEASE ORAL at 09:13

## 2023-01-01 RX ADMIN — ISAVUCONAZONIUM SULFATE 372 MG: 186 CAPSULE ORAL at 21:06

## 2023-01-01 RX ADMIN — APIXABAN 2.5 MG: 2.5 TABLET, FILM COATED ORAL at 14:03

## 2023-01-01 RX ADMIN — TRAMADOL HYDROCHLORIDE 50 MG: 50 TABLET, COATED ORAL at 17:09

## 2023-01-01 RX ADMIN — NEPHROCAP 1 CAPSULE: 1 CAP ORAL at 12:03

## 2023-01-01 RX ADMIN — ACETAMINOPHEN 650 MG: 650 SOLUTION ORAL at 07:14

## 2023-01-01 RX ADMIN — TRAMADOL HYDROCHLORIDE 50 MG: 50 TABLET, COATED ORAL at 11:30

## 2023-01-01 RX ADMIN — LANTHANUM CARBONATE 750 MG: 500 TABLET, CHEWABLE ORAL at 17:09

## 2023-01-01 RX ADMIN — ONDANSETRON 4 MG: 2 INJECTION INTRAMUSCULAR; INTRAVENOUS at 04:26

## 2023-01-01 RX ADMIN — GABAPENTIN 100 MG: 100 CAPSULE ORAL at 12:16

## 2023-01-01 RX ADMIN — ACETAMINOPHEN 650 MG: 650 SOLUTION ORAL at 20:38

## 2023-01-01 RX ADMIN — SODIUM PHOSPHATE 1 ENEMA: 7; 19 ENEMA RECTAL at 04:28

## 2023-01-01 RX ADMIN — APIXABAN 2.5 MG: 2.5 TABLET, FILM COATED ORAL at 04:26

## 2023-01-01 RX ADMIN — ISAVUCONAZONIUM SULFATE 372 MG: 186 CAPSULE ORAL at 20:46

## 2023-01-01 RX ADMIN — SENNOSIDES 17.2 MG: 8.6 TABLET, FILM COATED ORAL at 21:58

## 2023-01-01 RX ADMIN — CEFEPIME 2 G: 1 INJECTION, SOLUTION INTRAVENOUS at 12:00

## 2023-01-01 RX ADMIN — APIXABAN 2.5 MG: 2.5 TABLET, FILM COATED ORAL at 01:56

## 2023-01-01 RX ADMIN — CINACALCET 30 MG: 30 TABLET ORAL at 08:53

## 2023-01-01 RX ADMIN — Medication: at 13:30

## 2023-01-01 RX ADMIN — TRAMADOL HYDROCHLORIDE 50 MG: 50 TABLET, COATED ORAL at 04:26

## 2023-01-01 RX ADMIN — ISAVUCONAZONIUM SULFATE 372 MG: 186 CAPSULE ORAL at 21:00

## 2023-01-01 RX ADMIN — LORATADINE 10 MG: 10 TABLET ORAL at 08:53

## 2023-01-01 RX ADMIN — SENNOSIDES 17.2 MG: 8.6 TABLET, FILM COATED ORAL at 12:01

## 2023-01-01 RX ADMIN — SENNOSIDES 17.2 MG: 8.6 TABLET, FILM COATED ORAL at 23:17

## 2023-01-01 RX ADMIN — TRAMADOL HYDROCHLORIDE 50 MG: 50 TABLET, COATED ORAL at 19:34

## 2023-01-01 RX ADMIN — ALBUTEROL SULFATE 3 ML: 2.5 SOLUTION RESPIRATORY (INHALATION) at 21:06

## 2023-01-01 RX ADMIN — ONDANSETRON 4 MG: 2 INJECTION INTRAMUSCULAR; INTRAVENOUS at 10:39

## 2023-01-01 RX ADMIN — POLYETHYLENE GLYCOL 3350 17 G: 17 POWDER, FOR SOLUTION ORAL at 09:13

## 2023-01-01 RX ADMIN — POLYETHYLENE GLYCOL 3350 17 G: 17 POWDER, FOR SOLUTION ORAL at 20:44

## 2023-01-01 RX ADMIN — PANTOPRAZOLE SODIUM 40 MG: 40 TABLET, DELAYED RELEASE ORAL at 12:02

## 2023-01-01 RX ADMIN — ONDANSETRON 4 MG: 2 INJECTION INTRAMUSCULAR; INTRAVENOUS at 02:32

## 2023-01-01 RX ADMIN — LORAZEPAM 0.5 MG: 2 INJECTION INTRAMUSCULAR; INTRAVENOUS at 01:40

## 2023-01-01 RX ADMIN — ONDANSETRON 4 MG: 2 INJECTION INTRAMUSCULAR; INTRAVENOUS at 03:28

## 2023-01-01 RX ADMIN — POLYETHYLENE GLYCOL 3350, SODIUM SULFATE ANHYDROUS, SODIUM BICARBONATE, SODIUM CHLORIDE, POTASSIUM CHLORIDE 2000 ML: 236; 22.74; 6.74; 5.86; 2.97 POWDER, FOR SOLUTION ORAL at 01:45

## 2023-01-01 RX ADMIN — ACETAMINOPHEN 650 MG: 650 SOLUTION ORAL at 02:32

## 2023-01-01 RX ADMIN — CEFEPIME 2 G: 1 INJECTION, SOLUTION INTRAVENOUS at 13:00

## 2023-01-01 RX ADMIN — PANTOPRAZOLE SODIUM 40 MG: 40 TABLET, DELAYED RELEASE ORAL at 11:34

## 2023-01-01 RX ADMIN — LORATADINE 10 MG: 10 TABLET ORAL at 12:03

## 2023-01-01 RX ADMIN — ACETAMINOPHEN 650 MG: 650 SOLUTION ORAL at 06:19

## 2023-01-01 RX ADMIN — METOPROLOL SUCCINATE 25 MG: 25 TABLET, EXTENDED RELEASE ORAL at 09:00

## 2023-01-01 RX ADMIN — ONDANSETRON 4 MG: 2 INJECTION INTRAMUSCULAR; INTRAVENOUS at 15:53

## 2023-01-01 ASSESSMENT — COGNITIVE AND FUNCTIONAL STATUS - GENERAL
MOVING TO AND FROM BED TO CHAIR: A LOT
WALKING IN HOSPITAL ROOM: TOTAL
MOVING TO AND FROM BED TO CHAIR: A LOT
TOILETING: A LITTLE
PERSONAL GROOMING: A LITTLE
CLIMB 3 TO 5 STEPS WITH RAILING: TOTAL
TURNING FROM BACK TO SIDE WHILE IN FLAT BAD: A LITTLE
DRESSING REGULAR LOWER BODY CLOTHING: TOTAL
WALKING IN HOSPITAL ROOM: TOTAL
DRESSING REGULAR UPPER BODY CLOTHING: A LITTLE
CLIMB 3 TO 5 STEPS WITH RAILING: TOTAL
HELP NEEDED FOR BATHING: A LOT
DAILY ACTIVITIY SCORE: 15
TOILETING: A LOT
HELP NEEDED FOR BATHING: A LOT
STANDING UP FROM CHAIR USING ARMS: A LOT
TOILETING: TOTAL
DRESSING REGULAR UPPER BODY CLOTHING: A LITTLE
MOVING TO AND FROM BED TO CHAIR: A LOT
MOBILITY SCORE: 11
MOBILITY SCORE: 13
MOVING FROM LYING ON BACK TO SITTING ON SIDE OF FLAT BED WITH BEDRAILS: A LITTLE
WALKING IN HOSPITAL ROOM: TOTAL
MOBILITY SCORE: 12
MOVING TO AND FROM BED TO CHAIR: A LOT
STANDING UP FROM CHAIR USING ARMS: A LOT
TURNING FROM BACK TO SIDE WHILE IN FLAT BAD: A LITTLE
CLIMB 3 TO 5 STEPS WITH RAILING: TOTAL
CLIMB 3 TO 5 STEPS WITH RAILING: TOTAL
TOILETING: TOTAL
WALKING IN HOSPITAL ROOM: TOTAL
DRESSING REGULAR UPPER BODY CLOTHING: A LITTLE
CLIMB 3 TO 5 STEPS WITH RAILING: TOTAL
TURNING FROM BACK TO SIDE WHILE IN FLAT BAD: A LOT
MOVING TO AND FROM BED TO CHAIR: A LOT
WALKING IN HOSPITAL ROOM: TOTAL
DAILY ACTIVITIY SCORE: 14
DRESSING REGULAR LOWER BODY CLOTHING: A LITTLE
MOVING TO AND FROM BED TO CHAIR: A LOT
DRESSING REGULAR LOWER BODY CLOTHING: A LITTLE
HELP NEEDED FOR BATHING: A LOT
STANDING UP FROM CHAIR USING ARMS: TOTAL
TURNING FROM BACK TO SIDE WHILE IN FLAT BAD: A LITTLE
MOBILITY SCORE: 11
STANDING UP FROM CHAIR USING ARMS: A LOT
STANDING UP FROM CHAIR USING ARMS: A LOT
MOBILITY SCORE: 11
MOBILITY SCORE: 13
MOVING FROM LYING ON BACK TO SITTING ON SIDE OF FLAT BED WITH BEDRAILS: A LOT
WALKING IN HOSPITAL ROOM: A LOT
MOBILITY SCORE: 13
DAILY ACTIVITIY SCORE: 19
MOVING TO AND FROM BED TO CHAIR: A LOT
TOILETING: A LITTLE
WALKING IN HOSPITAL ROOM: TOTAL
DRESSING REGULAR UPPER BODY CLOTHING: A LITTLE
STANDING UP FROM CHAIR USING ARMS: A LOT
MOVING TO AND FROM BED TO CHAIR: A LOT
TURNING FROM BACK TO SIDE WHILE IN FLAT BAD: A LITTLE
DRESSING REGULAR UPPER BODY CLOTHING: A LITTLE
CLIMB 3 TO 5 STEPS WITH RAILING: TOTAL
MOBILITY SCORE: 12
DAILY ACTIVITIY SCORE: 14
STANDING UP FROM CHAIR USING ARMS: TOTAL
PERSONAL GROOMING: A LITTLE
TURNING FROM BACK TO SIDE WHILE IN FLAT BAD: A LITTLE
MOVING FROM LYING ON BACK TO SITTING ON SIDE OF FLAT BED WITH BEDRAILS: A LOT
PERSONAL GROOMING: A LITTLE
HELP NEEDED FOR BATHING: A LITTLE
DRESSING REGULAR LOWER BODY CLOTHING: TOTAL
DRESSING REGULAR LOWER BODY CLOTHING: TOTAL
PERSONAL GROOMING: A LITTLE
MOVING FROM LYING ON BACK TO SITTING ON SIDE OF FLAT BED WITH BEDRAILS: A LITTLE
HELP NEEDED FOR BATHING: A LITTLE
TURNING FROM BACK TO SIDE WHILE IN FLAT BAD: A LOT
WALKING IN HOSPITAL ROOM: A LOT
EATING MEALS: A LITTLE
TOILETING: A LITTLE
DAILY ACTIVITIY SCORE: 19
STANDING UP FROM CHAIR USING ARMS: A LOT
DAILY ACTIVITIY SCORE: 14
DRESSING REGULAR LOWER BODY CLOTHING: A LITTLE
PERSONAL GROOMING: A LITTLE
CLIMB 3 TO 5 STEPS WITH RAILING: A LOT
TURNING FROM BACK TO SIDE WHILE IN FLAT BAD: A LITTLE
MOBILITY SCORE: 10
MOVING FROM LYING ON BACK TO SITTING ON SIDE OF FLAT BED WITH BEDRAILS: A LITTLE
HELP NEEDED FOR BATHING: A LOT
DRESSING REGULAR UPPER BODY CLOTHING: A LITTLE
DAILY ACTIVITIY SCORE: 20
DRESSING REGULAR LOWER BODY CLOTHING: TOTAL
STANDING UP FROM CHAIR USING ARMS: TOTAL
WALKING IN HOSPITAL ROOM: A LOT
TURNING FROM BACK TO SIDE WHILE IN FLAT BAD: A LITTLE
MOVING FROM LYING ON BACK TO SITTING ON SIDE OF FLAT BED WITH BEDRAILS: A LITTLE
MOVING TO AND FROM BED TO CHAIR: A LOT
TOILETING: TOTAL
MOVING TO AND FROM BED TO CHAIR: A LOT
STANDING UP FROM CHAIR USING ARMS: A LOT
CLIMB 3 TO 5 STEPS WITH RAILING: TOTAL
CLIMB 3 TO 5 STEPS WITH RAILING: TOTAL
TURNING FROM BACK TO SIDE WHILE IN FLAT BAD: A LOT
DRESSING REGULAR UPPER BODY CLOTHING: A LITTLE
MOVING FROM LYING ON BACK TO SITTING ON SIDE OF FLAT BED WITH BEDRAILS: A LOT
MOVING FROM LYING ON BACK TO SITTING ON SIDE OF FLAT BED WITH BEDRAILS: A LITTLE
HELP NEEDED FOR BATHING: A LITTLE
WALKING IN HOSPITAL ROOM: A LOT
MOVING FROM LYING ON BACK TO SITTING ON SIDE OF FLAT BED WITH BEDRAILS: A LITTLE

## 2023-01-01 ASSESSMENT — PAIN DESCRIPTION - DESCRIPTORS
DESCRIPTORS: JABBING
DESCRIPTORS: ACHING
DESCRIPTORS: SHOOTING;THROBBING
DESCRIPTORS: SHOOTING

## 2023-01-01 ASSESSMENT — PAIN SCALES - PAIN ASSESSMENT IN ADVANCED DEMENTIA (PAINAD)
BREATHING: NORMAL
NEGVOCALIZATION: OCCASIONAL MOAN/GROAN, LOW SPEECH, NEGATIVE/DISAPPROVING QUALITY
CONSOLABILITY: NO NEED TO CONSOLE
BODYLANGUAGE: NORMAL
BODYLANGUAGE: TENSE, DISTRESSED PACING, FIDGETING
CONSOLABILITY: NO NEED TO CONSOLE
BREATHING: NORMAL
BODYLANGUAGE: TENSE, DISTRESSED PACING, FIDGETING
BREATHING: NORMAL
TOTALSCORE: 5
FACIALEXPRESSION: FACIAL GRIMACING
CONSOLABILITY: NO NEED TO CONSOLE
CONSOLABILITY: NO NEED TO CONSOLE
FACIALEXPRESSION: SMILING OR INEXPRESSIVE
TOTALSCORE: 2
NEGVOCALIZATION: OCCASIONAL MOAN/GROAN, LOW SPEECH, NEGATIVE/DISAPPROVING QUALITY
FACIALEXPRESSION: FACIAL GRIMACING
BODYLANGUAGE: RELAXED
BREATHING: OCCASIONAL LABORED BREATHING, SHORT PERIOD OF HYPERVENTILATION

## 2023-01-01 ASSESSMENT — COLUMBIA-SUICIDE SEVERITY RATING SCALE - C-SSRS
6. HAVE YOU EVER DONE ANYTHING, STARTED TO DO ANYTHING, OR PREPARED TO DO ANYTHING TO END YOUR LIFE?: NO
1. IN THE PAST MONTH, HAVE YOU WISHED YOU WERE DEAD OR WISHED YOU COULD GO TO SLEEP AND NOT WAKE UP?: NO
2. HAVE YOU ACTUALLY HAD ANY THOUGHTS OF KILLING YOURSELF?: NO

## 2023-01-01 ASSESSMENT — PAIN - FUNCTIONAL ASSESSMENT
PAIN_FUNCTIONAL_ASSESSMENT: 0-10
PAIN_FUNCTIONAL_ASSESSMENT: NO/DENIES PAIN
PAIN_FUNCTIONAL_ASSESSMENT: 0-10
PAIN_FUNCTIONAL_ASSESSMENT: NO/DENIES PAIN
PAIN_FUNCTIONAL_ASSESSMENT: 0-10
PAIN_FUNCTIONAL_ASSESSMENT: 0-10

## 2023-01-01 ASSESSMENT — ENCOUNTER SYMPTOMS
ROS GI COMMENTS: SEE ABOVE
CONFUSION: 0
EYE PAIN: 0
RHINORRHEA: 0
EYE DISCHARGE: 0
HEADACHES: 0
NAUSEA: 0
FEVER: 0
ENDOCRINE NEGATIVE: 1
DIZZINESS: 0
COUGH: 0
RESPIRATORY NEGATIVE: 1
DYSURIA: 0
HEMATOLOGIC/LYMPHATIC NEGATIVE: 1
PALPITATIONS: 0
ALLERGIC/IMMUNOLOGIC NEGATIVE: 1
CONSTITUTIONAL NEGATIVE: 1
SORE THROAT: 0
PSYCHIATRIC NEGATIVE: 1
SHORTNESS OF BREATH: 1
CHILLS: 0
WOUND: 0
ABDOMINAL PAIN: 0
JOINT SWELLING: 0
NEUROLOGICAL NEGATIVE: 1
VOMITING: 0
CARDIOVASCULAR NEGATIVE: 1
AGITATION: 0
MUSCULOSKELETAL NEGATIVE: 1

## 2023-01-01 ASSESSMENT — PAIN SCALES - GENERAL
PAINLEVEL_OUTOF10: 0 - NO PAIN
PAINLEVEL_OUTOF10: 5 - MODERATE PAIN
PAINLEVEL_OUTOF10: 0 - NO PAIN
PAINLEVEL_OUTOF10: 6
PAINLEVEL_OUTOF10: 0 - NO PAIN
PAINLEVEL_OUTOF10: 0 - NO PAIN
PAINLEVEL_OUTOF10: 5 - MODERATE PAIN
PAINLEVEL_OUTOF10: 5 - MODERATE PAIN
PAINLEVEL_OUTOF10: 8
PAINLEVEL_OUTOF10: 2
PAINLEVEL_OUTOF10: 8
PAINLEVEL_OUTOF10: 5 - MODERATE PAIN
PAINLEVEL_OUTOF10: 5 - MODERATE PAIN
PAINLEVEL_OUTOF10: 0 - NO PAIN
PAINLEVEL_OUTOF10: 5 - MODERATE PAIN
PAINLEVEL_OUTOF10: 0 - NO PAIN
PAINLEVEL_OUTOF10: 5 - MODERATE PAIN
PAINLEVEL_OUTOF10: 7
PAINLEVEL_OUTOF10: 0 - NO PAIN
PAINLEVEL_OUTOF10: 10 - WORST POSSIBLE PAIN
PAINLEVEL_OUTOF10: 0 - NO PAIN
PAINLEVEL_OUTOF10: 8
PAINLEVEL_OUTOF10: 0 - NO PAIN
PAINLEVEL_OUTOF10: 10 - WORST POSSIBLE PAIN
PAINLEVEL_OUTOF10: 10 - WORST POSSIBLE PAIN
PAINLEVEL_OUTOF10: 0 - NO PAIN
PAINLEVEL_OUTOF10: 0 - NO PAIN
PAINLEVEL_OUTOF10: 10 - WORST POSSIBLE PAIN
PAINLEVEL_OUTOF10: 4
PAINLEVEL_OUTOF10: 5 - MODERATE PAIN
PAINLEVEL_OUTOF10: 10 - WORST POSSIBLE PAIN
PAINLEVEL_OUTOF10: 10 - WORST POSSIBLE PAIN
PAINLEVEL_OUTOF10: 2
PAINLEVEL_OUTOF10: 8
PAINLEVEL_OUTOF10: 6
PAINLEVEL_OUTOF10: 0 - NO PAIN
PAINLEVEL_OUTOF10: 6
PAINLEVEL_OUTOF10: 7
PAINLEVEL_OUTOF10: 0 - NO PAIN
PAINLEVEL_OUTOF10: 5 - MODERATE PAIN

## 2023-01-01 ASSESSMENT — ACTIVITIES OF DAILY LIVING (ADL)
BATHING_LEVEL_OF_ASSISTANCE: MODERATE ASSISTANCE
HOME_MANAGEMENT_TIME_ENTRY: 10

## 2023-03-03 LAB
GRAM STAIN: NORMAL
TISSUE/WOUND CULTURE/SMEAR: NORMAL

## 2023-03-08 LAB
GRAM STAIN: ABNORMAL
TISSUE/WOUND CULTURE/SMEAR: ABNORMAL

## 2023-09-30 PROBLEM — I48.91 ATRIAL FIBRILLATION (MULTI): Status: ACTIVE | Noted: 2022-04-30

## 2023-09-30 NOTE — PROGRESS NOTES
Service: Nephrology     Subjective Data:   SHAMEKA SUN is a 78 year old Male who is Hospital Day # 7 and POD #3 for 1. right foot TMA;2. wound vac application.    Additional Information:    pt resting in bed. He denies sob, n/v/d, constipation, fever, cough, chills, chest pains, c/o  Pain to rt lower leg    Objective Data:     Objective Information:      T   P  R  BP   MAP  SpO2   Value  36  95  18  139/82   101  97%  Date/Time 9/25 11:43 9/25 11:43 9/25 11:43 9/25 11:43  9/25 11:43 9/25 11:43  Range  (36C - 37C )  (86 - 116 )  (14 - 19 )  (128 - 164 )/ (76 - 99 )  (95 - 121 )  (95% - 100% )  Highest temp of 37 C was recorded at 9/24 11:27      Pain reported at 9/25 9:11: 8 = Severe    Physical Exam by System:    Constitutional: awake/alert/oriented x3, no distress,  alert and cooperative   Respiratory/Thorax: jacqueline lung sounds slightly diminished   Cardiovascular: afib 93   Gastrointestinal: distended, soft, non-tender to  palpation   Extremities: lt ankle with  minor pitting edema  rt foot amp with kerlix and acewrap drsg   Psychological: Appropriate mood and behavior   Skin: Warm and dry     Medication:    Medications:          Continuous Medications       --------------------------------  No continuous medications are active       Scheduled Medications       --------------------------------    1. Allopurinol:  100  mg  Oral  Every 24 Hours    2. amLODIPine (NORVASC):  5  mg  Oral  Daily    3. Apixaban:  2.5  mg  Oral  Every 12 Hours    4. Atorvastatin:  10  mg  Oral  Daily    5. Cinacalcet:  30  mg  Oral  Daily    6. Clopidogrel:  75  mg  Oral  Daily    7. Colchicine:  0.3  mg  Oral  <User Schedule>    8. diphenhydrAMINE Injectable:  50  mg  IntraVenous Push  Once    9. EPINEPHrine Injectable:  0.3  mg  IntraMuscular  Once    10. Famotidine Injectable:  20  mg  IntraVenous Push  Once    11. Gabapentin:  100  mg  Oral  With Every Dialysis Treatment    12. Influenza Virus (Inactive) HIGH DOSE Adult  Vaccine:  0.7  mL  IntraMuscular  Once    13. Lanthanum Carbonate:  750  mg  Oral  2 Times a Day With Meals    14. Menthol 0.44% - Zinc Oxide 20.625% Topical:  1  application(s)  Topical  Daily    15. methylPREDNISolone Sodium Succinate Injectable:  125  mg  IntraVenous Push  Once    16. Multivitamin Renal:  1  capsule(s)  Oral  Daily    17. Naphazoline 0.012% Ophthalmic:  1  drop(s)  Both Eyes  Every 4 Hours    18. Pneumococcal 23-Valent (PNEUMOVAX) Vaccine:  0.5  mL  IntraMuscular  Once    19. Polyethylene Glycol:  17  gram(s)  Oral  Daily    20. Vancomycin 750 mg/ D5W IVPB Premixed Soln. 150 mL:  750  mg  IntraVenous Piggyback  Following Every Dialysis         PRN Medications       --------------------------------    1. Acetaminophen:  650  mg  Oral  Every 6 Hours    2. Albuterol 2.5 mg/ 3 mL Nebulizer Soln:  3  mL  Inhalation  Every 6 Hours    3. Albuterol 90 micrograms/ Inhalation MDI:  2  inhalation  Inhalation  Every 4 Hours    4. cloNIDine (CATAPRES):  0.2  mg  Oral  2 Times a Day    5. Ondansetron Injectable:  4  mg  IntraVenous Push  Once    6. oxyCODONE Immediate Release:  7.5  mg  Oral  Every 4 Hours        Recent Lab Results:    Results:        I have reviewed these laboratory results:    Basic Metabolic Panel  24-Sep-2023 08:47:00      Result Value    Glucose, Serum  168   H   NA  136    K  4.2    CL  95   L   Bicarbonate, Serum  25    Anion Gap, Serum  20    BUN  32   H   CREAT  5.84   H   GFR Male  9   A   Calcium, Serum  8.3   L     Complete Blood Count  24-Sep-2023 08:47:00      Result Value    White Blood Cell Count  11.7   H   Nucleated Erythrocyte Count  0.0    Red Blood Cell Count  3.25   L   HGB  10.0   L   HCT  32.5   L   MCV  100    MCHC  30.8   L   PLT  167    RDW-CV  14.6   H     Magnesium, Serum  24-Sep-2023 08:47:00      Result Value    Magnesium, Serum  2.15        Assessment and Plan:   Code Status:  ·  Code Status Full Code     Assessment:    ESRD-HD: who was referred to us by   THERESA Sanchez, for consideration for revascularization    Tolerated hemodialysis saturday with 0ml ultrafiltration    Is hemodynamically stable, hypervolemic on exam and has stable electrolytes     Outpatient Dialysis schedule: TTS at [facility]  (Primary Nephrologist Dr. Medel    )     Access: lt ua fist with +thrill/bruit  - no issues - able to achieve prescribed     Anemia of ESRD: 9/25-Erythropoietin 5000 units  to be given on dialysis days..current hgb 10.3..will cont to monitor..(not on HILDA at outpt unit)    CKD-MBD: Phosphate binder: Cinacalcet:  30  mg  Oral  Daily, 1 cap Daily renal vitamin    Plan HD tomorrow with UF as tolerated    Renal diet     Please obtain daily standing wt (if possible)    Medication to be adjusted for ESRD     Patient to continue regular HD schedule while inpatient and to follow with the outpatient nephrologist at discharge        Electronic Signatures:  Portia Urias (APRN-CNP)  (Signed 25-Sep-2023 14:35)   Authored: Service, Subjective Data, Objective Data, Assessment  and Plan, Note Completion      Last Updated: 25-Sep-2023 14:35 by Portia Urias (APRN-CNP)

## 2023-09-30 NOTE — PROGRESS NOTES
Service: Podiatry     Subjective Data:   SHAMEKA SUN is a 78 year old Male who is Hospital Day #3.     Patient sitting in bed comfortably. Does report right foot pain. He is very surprised when we inform him about plan for a right foot TMA tomorrow. He had believed it would be having a amputation of digits  4 and 5 only. After lengthy discussion, patient is understanding of need for TMA and amenable to proceeding with TMA tomorrow. He states his wife will be visiting him today, and they will also discuss it further.     Also report right ankle pain, which he believes is due to gout. Reports a history of gout, has taken colchicine in the past.     Denies other pedal complaints. Denies constitutional symptoms.    Overnight Events: Patient had an uneventful night.     Objective Data:     Objective Information:      T   P  R  BP   MAP  SpO2   Value  36  72  18  162/98   97  99%  Date/Time 9/21 10:35 9/21 10:35 9/21 3:18 9/21 10:35  9/20 7:44 9/21 3:18  Range  (35.9C - 36.3C )  (69 - 124 )  (17 - 18 )  (130 - 195 )/ (77 - 106 )  (95 - 103 )  (95% - 99% )          Pain reported at 9/21 11:35: 5 = Moderate           Weights   9/21 10:35: Post-dialysis weight (Post-dialysis Weight (kg))  93  9/21 6:27: Pre-dialysis weight (Pre-dialysis Weight (kg))  96  9/20 17:13: Weight in kg (Weight (kg))  87  9/20 17:13: Weight in lbs ((lbs))  191.8  9/20 17:13: BMI (kg/m2) (BMI (kg/m2))  29.169    Physical Exam Narrative:  ·  Physical Exam:    General: Well-appearing. No acute distress. Cooperative. Pleasant.     Right focused:    Vascular: Faintly palpable DP/PT pulses. Mild edema of right foot. Skin temperature warm to warm from proximal to distal; was cool to cool before revasc.     Neuro: Right foot sensation to light touch diminished along all aspects. Right ankle pain elicited when assessing PT pulse.    MSK: Right ankle active ROM decreased. Right digits 1-3 active ROM intact. No active ROM of right digits 4 and 5.  Active ROM intact for digits left 1-5.    Derm: Left toenails 1-5 within normal limits. Left webspaces clean, dry, intact. Absence of right toenails 1-5 due to permanent removal in past.  Right foot with dry gangrene of entire digits 4 and 5 as well as distal digits 1, 2, 3; no gangrene or maceration of webspaces. No signs of infection. Right lateral heel with 1 cm superficial ulcer with granular base, no drainage, no pain, no SOI.    Lymphatic: No streaking from wound sites or otherwise.     Resp: Unlabored breathing.     Psych: Normal mood and affect. Communicating normally.      Medication:    Medications:          Continuous Medications       --------------------------------    1. Heparin 25,000 units/ D5W 250 mL Infusion..:  1600  units/hr  IntraVenous  <Continuous>         Scheduled Medications       --------------------------------    1. Allopurinol:  100  mg  Oral  Every 24 Hours    2. Atorvastatin:  10  mg  Oral  Daily    3. Cinacalcet:  30  mg  Oral  Daily    4. Clopidogrel:  75  mg  Oral  Daily    5. Colchicine:  0.3  mg  Oral  2 Times a Week    6. Docusate:  100  mg  Oral  2 Times a Day    7. Influenza Virus (Inactive) HIGH DOSE Adult Vaccine:  0.7  mL  IntraMuscular  Once    8. Lanthanum Carbonate:  750  mg  Oral  2 Times a Day With Meals    9. Menthol 0.44% - Zinc Oxide 20.625% Topical:  1  application(s)  Topical  Daily    10. Multivitamin Renal:  1  capsule(s)  Oral  Daily    11. Paricalcitol Injectable:  3  microgram(s)  IntraVenous Push  <User Schedule>    12. Pneumococcal 23-Valent (PNEUMOVAX) Vaccine:  0.5  mL  IntraMuscular  Once    13. Polyethylene Glycol:  17  gram(s)  Oral  Daily         PRN Medications       --------------------------------    1. Acetaminophen:  650  mg  Oral  Every 4 Hours    2. Albuterol 2.5 mg/ 3 mL Nebulizer Soln:  3  mL  Inhalation  Every 6 Hours    3. Albuterol 90 micrograms/ Inhalation MDI:  2  inhalation  Inhalation  Every 4 Hours    4. Magnesium Hydroxide -Al Hydrox  -Simethicone Oral Liquid:  30  mL  Oral  Every 6 Hours    5. oxyCODONE 5 mg - Acetaminophen 325 m  tablet(s)  Oral  Every 4 Hours    6. traMADol:  50  mg  Oral  Every 6 Hours        Recent Lab Results:    Results:    I have reviewed these laboratory results:        BMP: 2023 06:31  NA+        Cl-     BUN  /                         131 L   89 L    64 H /  --------------------------------  Glucose                ---------------------------  131 H    K+     HCO3-   Creat \                         4.7  22    8.53 H \  Calcium : 9.2     Anion Gap : 25 H      Coagulation: 2023 09:00  PT  /                              /  -------<    INR          ----------<                PTT\                              \            Heparin Assay: 0.3               Heparin assay, UFH  Trending View      Result 21-Sep-2023 09:00:00  20-Sep-2023 08:48:00  20-Sep-2023 03:38:00    Heparin assay, UFH 0.3   1.0   0.7        Basic Metabolic Panel  Trending View      Result 21-Sep-2023 06:31:00  20-Sep-2023 08:38:00    Glucose, Serum 131   H   148   H       L   135   L    K 4.7   4.1    CL 89   L   90   L    Bicarbonate, Serum 22   26    Anion Gap, Serum 25   H   23   H    BUN 64   H   51   H    CREAT 8.53   H   7.54   H    GFR Male 6   A   7   A    Calcium, Serum 9.2   8.6        Glucose_POCT  20-Sep-2023 13:07:00      Result Value    Glucose-POCT  133   H     Complete Blood Count  20-Sep-2023 08:38:00      Result Value    White Blood Cell Count  6.7    Nucleated Erythrocyte Count  0.0    Red Blood Cell Count  3.92   L   HGB  12.2   L   HCT  38.4   L   MCV  98    MCHC  31.8   L   PLT  193    RDW-CV  14.6   H     PT + INR, Plasma  20-Sep-2023 03:38:00      Result Value    Prothrombin Time, Plasma  15.9   H   International Normalized Ratio, Plasma  1.4   H     Complete Blood Count + Differential  20-Sep-2023 03:38:00      Result Value    White Blood Cell Count  7.4    Nucleated Erythrocyte Count  0.0    Red Blood  Cell Count  3.75   L   HGB  12.0   L   HCT  35.9   L   MCV  96    MCHC  33.4    PLT  188    RDW-CV  14.6   H   Neutrophil %  63.1    Immature Granulocytes %  0.1    Lymphocyte %  12.6    Monocyte %  10.2    Eosinophil %  12.9    Basophil %  1.1    Neutrophil Count  4.64    Lymphocyte Count  0.93    Monocyte Count  0.75    Eosinophil Count  0.95   H   Basophil Count  0.08      Lactate, Level  20-Sep-2023 03:38:00      Result Value    Lactate, Level  0.9      Renal Function Panel  20-Sep-2023 00:10:00      Result Value    Glucose, Serum  113   H   NA  134   L   K  4.0    CL  91   L   Bicarbonate, Serum  27    Anion Gap, Serum  20    BUN  52   H   CREAT  6.60   H   GFR Male  8   A   Calcium, Serum  8.9    Phosphorus, Serum  3.0    ALB  4.1        Radiology Results:    Results:      Conclusion:    Right Lower PVR: Evidence of moderate arterial occlusive disease in the right lower extremity at rest. Right pressures of >220 mmHg suggest no compressibility of vessels and may make absolute Segmental Limb Pressures (SLP) unreliable. Decreased digital  perfusion noted. Monophasic flow is noted in the right dorsalis pedis artery. Biphasic flow is noted in the right posterior tibial artery. Triphasic flow is noted in the right common femoral artery. Disease state called by waveforms (including digit PPG).  Left Lower PVR: Evidence of mild arterial occlusive disease in the left lower extremity at rest. Left pressures of >220 mmHg suggest no compressibility of vessels and may make absolute Segmental Limb Pressures (SLP) unreliable. Normal digital perfusion  noted. Monophasic flow is noted in the left dorsalis pedis artery. Biphasic flow is noted in the left posterior tibial artery. Triphasic flow is noted in the left common femoral artery. Disease state called by waveforms. Unable to obtain left brachial  pressure due to AVF.    Imaging & Doppler Findings:    RIGHT Lower PVR                Pressures Ratios  Right Posterior Tibial  (Ankle) 99 mmHg   0.69  Right Dorsalis Pedis (Ankle)   255 mmHg  1.78  Right Digit (Great Toe)        43 mmHg   0.30      LEFT Lower PVR                Pressures Ratios  Left Posterior Tibial (Ankle) 255 mmHg  1.78  Left Dorsalis Pedis (Ankle)   255 mmHg  1.78  Left Digit (Great Toe)        115 mmHg  0.80    Right  Brachial Pressure 143 mmHg      [id number] Stuart Nguyen MD  Electronically signed by [id number] Stuart Nguyen MD on 9/21/2023 at 8:13:08 AM      *** Final ***    VASC LAB PVR RAJ only [Sep 21 2023  8:13AM]        Impression:    1st distal phalangeal soft tissue ulcer without evidence of  underlying osseous erosion. If there is persistent clinical concern  for osteomyelitis, consider MRI.     MACRO: None    Xray Foot Complete Min 3 View [Sep 20 2023  9:58AM]      Assessment and Plan:   Daily Risk Screen:  ·  Does patient have an indwelling urinary catheter? n/a consulting service   ·  Does patient have a central line? 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 All findings discussed. All questions answered to patient's satisfaction.  - Chart, labs, imaging reviewed.  - WBC: 6.7   Hgb: 12.2   Lactate: 0.9     - PVR right: Moderate arterial occlusive disease at rest. No compressibility of vessels. Decreased digital perfusion. Monophasic flow of DP artery. Biphasic flow of PT artery. Triphasic flow of CF artery.   - PVR left: Mild arterial occlusive disease. No compressibility of vessels. Normal digital perfusion. Monophasic flow of DP artery. Biphasic flow of PT artery. Triphasic flow of CF artery. Unable to obtain left brachial pressure due to AVF.  - XR right:  1st distal phalangeal soft tissue ulcer without underlying osseous erosion. Consider MRI if persistent clinical concern for OM.       Recommendations:  - Had lengthy discussing with patient regarding right transmetatarsal amputation, surgical planning, risks and benefits, hospital course, post-op care, and prognosis. Patient was surprised by  plan for TMA as he had been preparing for amputation of right 4th and 5th digits. Patient was informed that given his current ulcerations, poor blood flow status, and comorbidities that the most appropriate and definitive surgical option at this time  is TMA. TMA will allow for removal of all gangrenous tissues, good functional status and continued mobility, and avoidance of future surgeries. In contrast, right 4th and 5th digital amputations would require 4th and 5th partial metatarsal resections  to allow for surgical site closure, and also not solve the issue of gangrenous tissues along remainder of his forefoot; given his poor blood flow, it would be matter of time before other toes / areas of the foot would also have to be amputated, and it  may be such that gangrene will progress to the point that he would require a more proximal RLE amputation. Also explained that the goal of yesterday's revasc was not for healing of his current gangrenous toes, but rather to allow for adequate healing  after amputation is performed. After lengthy discussion, patient was understanding of the need for TMA and amenable to proceed with surgery tomorrow.   - Scheduled for right transmetatarsal amputation for tomorrow, 9/22/23, at 7:15 am in [facility] [place].   - Please start NPO at midnight.  - Please check Type & Screen.  - Please manage DVT ppx agents, currently on heparin and clopidogrel.  - Dressings right foot: betadine 4x4, dry 4x4, ABD on heel, Kerlix. Nursing may reinforce or change as needed.   - Abx: IV vancomycin empirically per primary.   - Abx, medications, and systemic conditions  managed by primary teams.  - Podiatry will continue following.       Patient was discussed with the attending, MERT Calderon, PGY1/DPM  Podiatric Medicine and Surgery  Per, pager #[phone]    A total of 45 minutes were spent coordinating care for this patient. This time was spent doing a combination of tasks such as reviewing records including imaging, labs, previous medical records, as well as discussing the patient's diagnoses and different  treatment options. We discussed risks and benefits of the treatment options to the patient satisfaction, questions were answered. Time was also spent charting for this patient      Plan of Care Reviewed With:  Plan of Care Reviewed With: patient     Attestation:   Note Completion:  I am a:  Resident/Fellow   Attending Attestation I saw and evaluated the patient.  I personally obtained the key and critical portions of the history and physical exam or was physically present for key and  critical portions performed by the resident/fellow. I reviewed the resident/fellow?s documentation and discussed the patient with the resident/fellow.  I agree with the resident/fellow?s medical decision making as documented in the note.     I personally evaluated the patient on 21-Sep-2023         Electronic Signatures:  Elle Jeffery (DPM (Resident))  (Signed 21-Sep-2023 13:04)   Authored: Service, Subjective Data, Objective Data, Assessment  and Plan, Note Completion  Roopa Rodriguez ([email or web address])  (Signed 27-Sep-2023 12:20)   Authored: Note Completion   Co-Signer: Service, Subjective Data, Objective Data, Assessment and Plan, Note Completion      Last Updated: 27-Sep-2023 12:20 by Roopa Rodriguez (MERT)

## 2023-09-30 NOTE — PROGRESS NOTES
Chevy Benton is a 78 y.o. male on day 11 of admission     77 yo Male  PMH of AF on Eliquis, CHF (EF 37%), ESRD/HD (Tuesday Thursday Saturday, [facility], Dr. Medel) via left UE AV fistula, Anemia (POA), SSS, status post pacemaker, non smoker, nondiabetic, who was referred to us by Dr. Sanchez, [facility], for consideration for revascularization, in the setting of right toe gangrene (podiatrist Dr. Reese Solares).  Patient presented to clinic with his wife and son, stating he had a longstanding right heel crack and underwent PTA of right AT/PT on 4/12/2023 with Dr. Sanchez.  The heel crack almost healed.  He then developed right foot severe pain and great toe discoloration in August, was admitted at [facility], and underwent a PTA of right AT on 8/30/2023 with Dr. Sanchez, and  was told there were no other options available for him.  During that admission his toes became gangrenous.  He had a foot x-ray that was questionable for osteomyelitis,  was put on IV antibiotics during hospital stay, but was not discharged on any antibiotics. Up to mid August he was able to walk and drive. He denies CP/SOB.  Pre procedure RAJ/TBI showed impaired blood flow to the RLE: 0.69/0.3, Lt NC/0.8.  Given above presentation, pt was directly admitted to the hospital, for emergent angioplasty.    #Right CLI/RC-VI toe gangrene:  - 9/20/21 s/p PTA of Rt AT/DP, PTA of PT and lateral plantar. Complete reconstruction of AT/PT and plantar arch angioplasty with Dr. Linder,  - continue with Clopidogrel and Eliquis  - 1 month post procedure EVLS clinic with repeat testing  - PVR RAJ done 9/27 : Right Lower PVR: No evidence of arterial occlusive disease in the right lower extremity at rest. Biphasic flow is noted in the right posterior tibial artery. Triphasic flow is noted in the right common femoral artery and right dorsalis pedis artery. Severity of disease called by PVR tracings and Doppler waveforms due to non-compressible  "vessels.  #Rt foot toe gangrene s/p TMA:  - 9/22/2023 Status post right foot transmetatarsal amputation with wound vac placement by Dr. Rodriguez  - 9/25/23 wound vac removed by podiatry, due to some maceration on the plantar side of the flap, otherwise incision looks good.      Patient seen and examined this morning.  Patient does not feel well  Patient is diaphoretic and tachycardic to 100- 110s  Hypothermic to 94 °F  No labs available in last 2 days as patient was being planned for discharge yesterday  Also complains of stomach tightness      Objective     Physical Exam  Slight diaphoresis  AO x 3   CVS - normal S1s2, tachycardiac, no murmurs    Resp - CTAB  Abdo - Soft, + distension, no tenderness  Right extremity TMA    Last Recorded Vitals  Blood pressure 134/83, pulse 101, temperature 34.9 °C (94.8 °F), resp. rate 18, height 1.727 m (5' 7.99\"), weight 87 kg (191 lb 12.8 oz), SpO2 99 %.  Intake/Output last 3 Shifts:  No intake/output data recorded.      Assessment/Plan   Patient seen and examined this morning.  Patient does not feel well  Patient is diaphoretic and tachycardic to 100- 110s  Hypothermic to 94 °F  No labs available in last 2 days as patient was being planned for discharge yesterday  Also complains of stomach tightness    Assessment and plan    Right CLI/RC-VI toe gangrene:  S/p TMA    -We will initiate sepsis protocol  -We will get labs and pan culture  -Broad-spectrum antibiotics with Vanco and Zosyn   -Abdominal imaging  -Reconsult ID. Was on cefepime and for  C. Parapsilosis OM, treat concurrently with Oral Isavuconazole  -Hemodialysis as per schedule      - Leg elevation to control edema and maceration  - Recommend every other day dressing changes with Betadine soaked 4x4s, ABD, kerlix, light ace.       #HTN  - Pt was started on Amlodipine 10 mg daily as his BP was poorly controlled on admission. Pt tolerating medication well with -140's  - c/w as needed Clonidine 0.2 twice daily for SBP " > 180    Afib/CHF/SSS - stable remained on current home treatment   - c/w Eliquis     #Gout  - c/w home Allopurinol daily   - Uric Acid Low 3.1  - Colchicine  was held due to possible severe interaction with Isavuconazole      Roberto Carlos Petersen MD

## 2023-09-30 NOTE — PROGRESS NOTES
Service: Cardiology     Subjective Data:   SHAMEKA SUN is a 78 year old Male who is Hospital Day # 7 and POD #3 for 1. right foot TMA;2. wound vac application.    Overnight Events: Patient had an uneventful night.   Additional Information:    Pt is laying comfortably in bed, c/o foot pain post dressing change this am.  Pt states the pain was better controlled overnight.    Objective Data:     Objective Information:      T   P  R  BP   MAP  SpO2   Value  36.4  101  18  128/80   96  100%  Date/Time 9/25 7:50 9/25 7:50 9/25 7:50 9/25 7:50  9/25 7:50 9/25 7:50  Range  (36C - 37C )  (86 - 116 )  (14 - 19 )  (128 - 164 )/ (76 - 99 )  (95 - 121 )  (95% - 100% )  Highest temp of 37 C was recorded at 9/24 11:27      Pain reported at 9/25 4:15: 8 = Severe    Physical Exam Narrative:  ·  Physical Exam:    Physical Exam  Gen: Alert, awake, O x 3  Head: no evidence of trauma  Neck: no JVD  CVS: RRR, no murmur, no leg edema  Lungs: bilateral clear to auscultation  Abd: soft, NT, ND   Extremities: right foot dressed, leg warm; AVF left arm  Neuro: CN II-XII intact, 5/5 bilateral      Medication:    Medications:          Continuous Medications       --------------------------------  No continuous medications are active       Scheduled Medications       --------------------------------    1. Allopurinol:  100  mg  Oral  Every 24 Hours    2. amLODIPine (NORVASC):  5  mg  Oral  Daily    3. Apixaban:  2.5  mg  Oral  Every 12 Hours    4. Atorvastatin:  10  mg  Oral  Daily    5. Cinacalcet:  30  mg  Oral  Daily    6. Clopidogrel:  75  mg  Oral  Daily    7. Colchicine:  0.3  mg  Oral  <User Schedule>    8. Influenza Virus (Inactive) HIGH DOSE Adult Vaccine:  0.7  mL  IntraMuscular  Once    9. Lanthanum Carbonate:  750  mg  Oral  2 Times a Day With Meals    10. Menthol 0.44% - Zinc Oxide 20.625% Topical:  1  application(s)  Topical  Daily    11. Meropenem IV Piggy Back:  500  mg  IntraVenous Piggyback  Every 24 Hours    12.  Multivitamin Renal:  1  capsule(s)  Oral  Daily    13. Naphazoline 0.012% Ophthalmic:  1  drop(s)  Both Eyes  Every 4 Hours    14. Pneumococcal 23-Valent (PNEUMOVAX) Vaccine:  0.5  mL  IntraMuscular  Once    15. Polyethylene Glycol:  17  gram(s)  Oral  Daily    16. Vancomycin - DISCONTINUED - RPh to Dose:  1  each  As Specified  Variable    17. Vancomycin 750 mg/ D5W IVPB Premixed Soln. 150 mL:  750  mg  IntraVenous Piggyback  Following Every Dialysis         PRN Medications       --------------------------------    1. Acetaminophen:  650  mg  Oral  Every 6 Hours    2. Albuterol 2.5 mg/ 3 mL Nebulizer Soln:  3  mL  Inhalation  Every 6 Hours    3. Albuterol 90 micrograms/ Inhalation MDI:  2  inhalation  Inhalation  Every 4 Hours    4. cloNIDine (CATAPRES):  0.2  mg  Oral  2 Times a Day    5. oxyCODONE Immediate Release:  7.5  mg  Oral  Every 4 Hours        Recent Lab Results:    Results:    Coagulation: 9/24/2023 08:40  PT  /                              /  -------<    INR          ----------<                PTT\                              \            Heparin Assay: 0.6             I have reviewed these laboratory results:    Basic Metabolic Panel  23-Sep-2023 06:30:00      Result Value    Glucose, Serum  116   H   NA  132   L   K  4.7    CL  92   L   Bicarbonate, Serum  24    Anion Gap, Serum  21   H   BUN  51   H   CREAT  8.00   H   GFR Male  6   A   Calcium, Serum  8.4   L     Complete Blood Count  23-Sep-2023 06:30:00      Result Value    White Blood Cell Count  11.4   H   Nucleated Erythrocyte Count  0.0    Red Blood Cell Count  3.24   L   HGB  10.3   L   HCT  31.6   L   MCV  98    MCHC  32.6    PLT  181    RDW-CV  14.6   H       Assessment and Plan:        Additional Dx:   End-stage renal disease: Entered Date: 19-Sep-2023 18:50   Gangrene of toe of right foot: Entered Date: 19-Sep-2023  17:11       Surg History:   S/P transmetatarsal amputation of foot: Entered Date: 22-Sep-2023  16:24   S/P peripheral  artery angioplasty: Entered Date: 20-Sep-2023  17:06    Comorbidities:  ·  Comorbidity Other     Code Status:  ·  Code Status Full Code     Assessment:    77 yo Male  PMH of AF on Eliquis, CHF (EF 37%), ESRD/HD (Tuesday Thursday Saturday, [facility] [place], Dr. Medel) via left UE AV fistula, SSS, status post pacemaker, non  smoker, nondiabetic, who was referred to us by Dr. Sanchez, F, for consideration for revascularization, in the setting of right toe gangrene (podiatrist Dr. Reese Solares).  Patient presented to clinic with his wife and son, stating he had a longstanding  right heel crack and underwent PTA of right AT/PT on 4/12/2023 with Dr. Sanchez.  The heel crack almost healed.  He then developed right foot severe pain and great toe discoloration in August, was admitted at [facility], and underwent a PTA  of right AT on 8/30/2023 with Dr. Sanchez, and  was told there were no other options available for him.  During that admission his toes became gangrenous.  He had a foot x-ray that was questionable for osteomyelitis,  was put on IV antibiotics during  hospital stay, but was not discharged on any antibiotics. Up to mid August he was able to walk and drive. He denies CP/SOB.  Pre procedure RAJ/TBI showed impaired blood flow to the RLE: 0.69/0.3, Lt NC/0.8.  We have reviewed the images from angioplasties from [facility] (images in PACS).  Given above presentation,  pt was directly admitted to the hospital, for emergent angioplasty .    #Right CLI/RC-VI toe gangrene:  - 9/20/21 s/p PTA of Rt AT/DP, PTA of PT and lateral plantar. Complete reconstruction of AT/PT and plantar arch angioplasty with Dr. Linder,  - 9/22/2023 Status post right foot transmetatarsal amputation  with wound vac placement  - 9/25/23 wound vac removed by Podiatry  - continue with Clopidogrel and Eliquis  - Increased oxycodone to 7.5 mg q4h as needed for better pain control  - will add Neurontin 100 mg three times a week on Dialysis days  -  Per ID recs, stoped Vasc, started on Cefepime  2 G with dialysis on Tue and Thur, and 3 g with dialysis on Saturdays, for 6 weeks (from TMA date)  - PCN desensitization needed per ID if long term abx  - weekly labs: CBC, CMP, CRP, ESR to be faxed to ID     #Rt TMA:  - dressing was changed by podiatry today, wound vac removed  - TMA flap noted to have some maceration but no dusky discoloration. Retention sutures intact to surgical site. Mild federica-incision erythema. No active drainage, no calor, no malodor, no ascending lymphangitis.   - Recommend every other day dressing changes with Betadine soaked 4x4s, ABD, kerlix, light ace. Orders placed.    - Can consider Gabapentin 300mg TID for additional pain control modality  - Weight bearing as tolerated to HEEL ONLY in surgical shoe.   - Cleared for discharge from a podiatry standpoint.   - Patient will require SNF placement vs home health care for regular dressing changes.    - Patient to follow up with Dr. Rodriguez after discharge at [address] [place] [address]. Please call [phone] to schedule an appointment within one week of discharge.     #Penicillin allergy:  - per pt he last took Penicillin >40 years ago and developed itchiness and warmth to the skin, (uncertain of how soon after reaction occurred) without any cardiovascular-respiratory symptoms or compromise.  - Allergy Immunology consulted for PCN challenge  - Challenge starting with 875 mg of Augmentin was given with no reactions. He was observed for 15 minutes after the dose was given. Second dose of 875 mg of Augmentin was given with no reactions. Patient was observed for 2 hours after the dose was given.  - will monitor, Anaphylaxis order set placed for possible reaction      #ESRD: on HD TTS  - continue with TTS HD  - c/w home daily Nephrocaps, Sensipar and Fosrenol   - Nephrology following  - Renal dose IV Abx    #HTN  - Started Amlodipine 5 mg daily  - c/w as needed Clonidine 0.2 twice daily  for SBP > 180    #Gout  - c/w home Allopurinol daily  - c/w Colchicine Mon and Thu  - Uric Acid Low 3.1        Aruna Vazquez PA-C, MPAS  Pager # [phone] or [facility]  Endovascular /Limb Salvage Team, Pager #[phone] for day coverage (8am-5pm)  Interventional Cardiology Fellow, Pager # [phone], for night and weekend coverage  Night coverage: [facility] [id number]             Electronic Signatures:  Aruna Vazquez (PAC)  (Signed 25-Sep-2023 17:15)   Authored: Service, Subjective Data, Objective Data, Assessment  and Plan, Note Completion      Last Updated: 25-Sep-2023 17:15 by Aruna Vazquez (PAC)

## 2023-09-30 NOTE — PROGRESS NOTES
Service: Podiatry     Subjective Data:   SHAMEKA SUN is a 78 year old Male who is Hospital Day # 6 and POD #2 for 1. right foot TMA;2. wound vac application.     Patient resting comfortably in bed, surgical dressing c/d/i. Incisional wound vac in place without leak, occlusion, alarm. Reports moderate pain to right foot and requesting increase in pain control  regimen. Denies current constitutional symptoms and denies any new or acute pedal concerns.    Additional Information:    Patient reports wound vac quit working overnight. He reports nursing plugged it in to the  and it returned to functioning.     Objective Data:     Objective Information:      T   P  R  BP   MAP  SpO2   Value  37  107  14  143/91   108  97%  Date/Time 9/24 11:27 9/24 11:27 9/24 11:27 9/24 11:27 9/24 11:27 9/24 11:27  Range  (36C - 37.1C )  (67 - 107 )  (14 - 20 )  (109 - 170 )/ (67 - 104 )  (100 - 125 )  (94% - 100% )  Highest temp of 37.1 C was recorded at 9/23 16:00      Pain reported at 9/24 9:20: 10 = Severe    Physical Exam Narrative:  ·  Physical Exam:    Surgical dressing and incisional wound vac left in place to right foot.     General: Well-appearing. No acute distress. Cooperative. Pleasant.     Right focused:    Vascular: Faintly palpable DP/PT pulses. Mild edema of right foot. Skin temperature warm to warm from proximal to distal    Neuro: light touch diminished pain stimuli intact.     MSK: Right ankle active ROM decreased.     Derm: wound vac left in place. 125mmHg continous setting. No leak, alarm, or occlusion. <10cc output into canister.     Lymphatic: No streaking from wound sites or otherwise.     Resp: Unlabored breathing.     Psych: Normal mood and affect. Communicating normally.      Medication:    Medications:          Continuous Medications       --------------------------------    1. Heparin 25,000 units/ D5W 250 mL Infusion..:  1600  units/hr  IntraVenous  <Continuous>         Scheduled  Medications       --------------------------------    1. Allopurinol:  100  mg  Oral  Every 24 Hours    2. amLODIPine (NORVASC):  5  mg  Oral  Daily    3. Atorvastatin:  10  mg  Oral  Daily    4. Cinacalcet:  30  mg  Oral  Daily    5. Clopidogrel:  75  mg  Oral  Daily    6. Colchicine:  0.3  mg  Oral  <User Schedule>    7. Influenza Virus (Inactive) HIGH DOSE Adult Vaccine:  0.7  mL  IntraMuscular  Once    8. Lanthanum Carbonate:  750  mg  Oral  2 Times a Day With Meals    9. Menthol 0.44% - Zinc Oxide 20.625% Topical:  1  application(s)  Topical  Daily    10. Naphazoline 0.012% Ophthalmic:  1  drop(s)  Both Eyes  Every 4 Hours    11. Pneumococcal 23-Valent (PNEUMOVAX) Vaccine:  0.5  mL  IntraMuscular  Once    12. Polyethylene Glycol:  17  gram(s)  Oral  Daily    13. Vancomycin - RPh to Dose - IV Piggy Back:  1  each  As Specified  Variable    14. Vancomycin 750 mg/ D5W IVPB Premixed Soln. 150 mL:  750  mg  IntraVenous Piggyback  Following Every Dialysis         PRN Medications       --------------------------------    1. Acetaminophen:  650  mg  Oral  Every 6 Hours    2. Albuterol 2.5 mg/ 3 mL Nebulizer Soln:  3  mL  Inhalation  Every 6 Hours    3. Albuterol 90 micrograms/ Inhalation MDI:  2  inhalation  Inhalation  Every 4 Hours    4. cloNIDine (CATAPRES):  0.2  mg  Oral  2 Times a Day    5. oxyCODONE Immediate Release:  7.5  mg  Oral  Every 4 Hours        Recent Lab Results:    Results:    CBC: 9/24/2023 08:47              \     Hgb     /                              \     10.0 L    /  WBC  ----------------  Plt               11.7 H    ----------------    167              /     Hct     \                              /     32.5 L    \            RBC: 3.25 L    MCV: 100           BMP: 9/24/2023 08:47  NA+        Cl-     BUN  /                         136    95 L   32 H  /  --------------------------------  Glucose                ---------------------------  168 H    K+     HCO3-   Creat \                          4.2  25    5.84 H \  Calcium : 8.3 L    Anion Gap : 20      Coagulation: 9/24/2023 08:40  PT  /                              /  -------<    INR          ----------<                PTT\                              \            Heparin Assay: 0.6             I have reviewed these laboratory results:    Basic Metabolic Panel  24-Sep-2023 08:47:00      Result Value    Glucose, Serum  168   H   NA  136    K  4.2    CL  95   L   Bicarbonate, Serum  25    Anion Gap, Serum  20    BUN  32   H   CREAT  5.84   H   GFR Male  9   A   Calcium, Serum  8.3   L     Complete Blood Count  24-Sep-2023 08:47:00      Result Value    White Blood Cell Count  11.7   H   Nucleated Erythrocyte Count  0.0    Red Blood Cell Count  3.25   L   HGB  10.0   L   HCT  32.5   L   MCV  100    MCHC  30.8   L   PLT  167    RDW-CV  14.6   H     Magnesium, Serum  24-Sep-2023 08:47:00      Result Value    Magnesium, Serum  2.15      Heparin assay, UFH  24-Sep-2023 08:40:00      Result Value    Heparin assay, UFH  0.6        Radiology Results:    Results:        Impression:    Postsurgical changes in the right foot status post transmetatarsal  amputation. No evidence of osteomyelitis radiographically     Xray Foot 2 View [Sep 22 2023  1:32PM]      Conclusion:  CONCLUSIONS:  Right Lower PVR: Evidence of moderate arterial occlusive disease in the right lower extremity at rest. Right pressures of >220 mmHg suggest no compressibility of vessels and may make absolute Segmental Limb Pressures (SLP) unreliable. Decreased digital  perfusion noted. Monophasic flow is noted in the right dorsalis pedis artery. Biphasic flow is noted in the right posterior tibial artery. Triphasic flow is noted in the right common femoral artery. Disease state called by waveforms (including digit PPG).  Left Lower PVR: Evidence of mild arterial occlusive disease in the left lower extremity at rest. Left pressures of >220 mmHg suggest no compressibility of vessels and may make  absolute Segmental Limb Pressures (SLP) unreliable. Normal digital perfusion  noted. Monophasic flow is noted in the left dorsalis pedis artery. Biphasic flow is noted in the left posterior tibial artery. Triphasic flow is noted in the left common femoral artery. Disease state called by waveforms. Unable to obtain left brachial  pressure due to AVF.    Imaging & Doppler Findings:    RIGHT Lower PVR                Pressures Ratios  Right Posterior Tibial (Ankle) 99 mmHg   0.69  Right Dorsalis Pedis (Ankle)   255 mmHg  1.78  Right Digit (Great Toe)        43 mmHg   0.30        LEFT Lower PVR                Pressures Ratios  Left Posterior Tibial (Ankle) 255 mmHg  1.78  Left Dorsalis Pedis (Ankle)   255 mmHg  1.78  Left Digit (Great Toe)        115 mmHg  0.80      Right  Brachial Pressure 143 mmHg        [id number] Stuart Nguyen MD  Electronically signed by [id number] Stuart Nguyen MD on 9/21/2023 at 8:13:08 AM        *** Final ***     VASC LAB PVR RAJ only [Sep 21 2023  8:13AM]      Impression:    1st distal phalangeal soft tissue ulcer without evidence of  underlying osseous erosion. If there is persistent clinical concern  for osteomyelitis, consider MRI.     MACRO:  None     Xray Foot Complete Min 3 View [Sep 20 2023  9:58AM]      Assessment and Plan:   Daily Risk Screen:  ·  Does patient have an indwelling urinary catheter? n/a consulting service   ·  Does patient have a central line? 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 All findings discussed. All questions answered to patient's satisfaction.  - Chart, labs, imaging reviewed.  - WBC: 11.7   Hgb: 10.0   Lactate: 0.9     - PVR right: Moderate arterial occlusive disease at rest. No compressibility of vessels. Decreased digital perfusion. Monophasic flow of DP artery. Biphasic flow of PT artery. Triphasic flow of CF artery.   - PVR left: Mild arterial occlusive disease. No compressibility of vessels. Normal digital perfusion. Monophasic flow of DP artery. Biphasic flow of PT artery. Triphasic flow of CF artery. Unable to obtain left brachial pressure due to AVF.  - XR right: 1st distal phalangeal soft tissue ulcer without underlying osseous erosion. Consider MRI if persistent clinical concern for OM.   - deep tissue cx: Enterobacter cloacae, sensitivities pending  - clean bone margin cx: Enterobacter cloacae, sensitivities pending       Recommendations:  - intra-op dusky tissue discoloration encountered, bones resected to healthy bleeding bone. Bone margins sampled and growing E. cloacae.   - Dressings right foot: incisional wound vac. ABD, kerlix, ace outer dressing. Maintain wound vac at 125mmHg continuos   - can consider Gabapentin 300mg TID for additional pain control modality   - Abx: Meropenem, per primary/ID teams  - Abx, medications, and systemic conditions managed by primary teams.  - Podiatry will continue following.       Patient was discussed with the attending, MERT Calderon DPM PGY-2  Podiatric Medicine and Surgery  [facility], pager #[phone]        Plan of Care Reviewed With:  Plan of Care Reviewed With: patient     Attestation:   Note Completion:  I am a:  Resident/Fellow   Attending Attestation I saw and evaluated the patient.  I personally obtained the key and critical portions of the history and physical exam or was physically present for key and  critical portions performed by the resident/fellow. I reviewed the resident/fellow?s documentation and discussed the patient with the resident/fellow.  I agree with the resident/fellow?s medical decision making as  documented in the note.     I personally evaluated the patient on 24-Sep-2023         Electronic Signatures:  Nhung Hall (DPM (Resident))  (Signed 24-Sep-2023 12:14)   Authored: Service, Assessment/Plan Review, Subjective  Data, Objective Data, Assessment and Plan, Note Completion  Roopa Rodriguez (MERT)  (Signed 27-Sep-2023 12:36)   Authored: Assessment and Plan, Note Completion   Co-Signer: Service, Assessment/Plan Review, Subjective Data, Objective Data, Assessment and Plan, Note Completion      Last Updated: 27-Sep-2023 12:36 by Roopa Rodriguez (MERT)

## 2023-09-30 NOTE — PROGRESS NOTES
Vancomycin Dosing by Pharmacy- INITIAL    Chevy Benton is a 78 y.o. year old male who Pharmacy has been consulted for vancomycin dosing for CNS/meningoencephalitis. Based on the patient's indication and renal status this patient will be dosed based on a goal AUC of 500-600.     Renal function is currently declining.    Visit Vitals  /79   Pulse 93   Temp 36 °C (96.8 °F)   Resp 18        Lab Results   Component Value Date    CREATININE 8.31 (H) 09/28/2023    CREATININE 6.74 (H) 09/27/2023    CREATININE 9.79 (H) 09/26/2023    CREATININE 8.66 (H) 09/25/2023          Assessment/Plan     Patient will not be given a loading dose.  Will initiate vancomycin maintenance, a one time dose of 1500 mg.      Follow-up level will be ordered on 10/1 at 1800 unless clinically indicated sooner.  Will continue to monitor renal function daily while on vancomycin and order serum creatinine at least every 48 hours if not already ordered.  Follow for continued vancomycin needs, clinical response, and signs/symptoms of toxicity.       Angela Barros, ChristianD

## 2023-09-30 NOTE — PROGRESS NOTES
Service: Nephrology     Subjective Data:   SHAMEKA SUN is a 78 year old Male who is Hospital Day # 11 and POD #7 for 1. right foot TMA;2. wound vac application.    Additional Information:    pt resting in bed. He has c/o sob,denies  n/v/d, constipation, fever, cough, chills, chest pains, c/o  Pain to rt lower leg     Objective Data:     Objective Information:      T   P  R  BP   MAP  SpO2   Value  36  98  16  128/67   87  99%  Date/Time 9/29 11:40 9/29 11:40 9/29 11:40 9/29 11:40  9/29 11:40 9/29 11:40  Range  (36C - 37.4C )  (56 - 113 )  (16 - 19 )  (110 - 161 )/ (67 - 100 )  (87 - 112 )  (94% - 99% )  Highest temp of 37.4 C was recorded at 9/28 20:13      Pain reported at 9/28 20:30: 6 = Moderate    Physical Exam by System:    Constitutional: awake/alert/oriented x3, no distress,  alert and cooperative   Respiratory/Thorax: jacqueline lung sounds slightly diminished  o2 2L for c/o sob  pt unable to speak in full sentences   Cardiovascular: afib 111   Gastrointestinal: distended, slight soft, non-tender  to palpation   Genitourinary: anuric   Extremities: lt ankle with  minor pitting edema  rt foot amp with kerlix and acewrap drsg   Psychological: Appropriate mood and behavior   Skin: Warm and dry  diaphoretic     Medication:    Medications:          Continuous Medications       --------------------------------  No continuous medications are active       Scheduled Medications       --------------------------------    1. Allopurinol:  100  mg  Oral  Every 24 Hours    2. amLODIPine (NORVASC):  10  mg  Oral  Daily    3. Apixaban:  2.5  mg  Oral  Every 12 Hours    4. Atorvastatin:  10  mg  Oral  Daily    5. Cefepime 2 gram IVPB/ Premixed Soln 100 mL:  100  mL  IntraVenous Piggyback  <User Schedule>    6. Cinacalcet:  30  mg  Oral  Daily    7. Clopidogrel:  75  mg  Oral  Daily    8. Epoetin Jovanny (ESRD) Injectable:  5000  unit(s)  IntraVenous Push  <User Schedule>    9. Gabapentin:  100  mg  Oral  With Every  Dialysis Treatment    10. Influenza Virus (Inactive) HIGH DOSE Adult Vaccine:  0.7  mL  IntraMuscular  Once    11. Isavuconazonium Sulfate:  372  mg  Oral  Daily    12. Lanthanum Carbonate:  750  mg  Oral  2 Times a Day With Meals    13. Loratadine:  10  mg  Oral  Daily    14. Menthol 0.44% - Zinc Oxide 20.625% Topical:  1  application(s)  Topical  Daily    15. Multivitamin Renal:  1  capsule(s)  Oral  Daily    16. Naphazoline 0.012% Ophthalmic:  1  drop(s)  Both Eyes  Every 4 Hours    17. Pneumococcal 23-Valent (PNEUMOVAX) Vaccine:  0.5  mL  IntraMuscular  Once    18. Polyethylene Glycol:  17  gram(s)  Oral  Daily         PRN Medications       --------------------------------    1. Acetaminophen:  650  mg  Oral  Every 6 Hours    2. Albuterol 2.5 mg/ 3 mL Nebulizer Soln:  3  mL  Inhalation  Every 6 Hours    3. Albuterol 90 micrograms/ Inhalation MDI:  2  inhalation  Inhalation  Every 4 Hours    4. cloNIDine (CATAPRES):  0.2  mg  Oral  2 Times a Day    5. oxyCODONE Immediate Release:  7.5  mg  Oral  Every 4 Hours        Recent Lab Results:    Results:    CBC: 9/27/2023 07:51              \     Hgb     /                              \     10.4 L    /  WBC  ----------------  Plt               11.0       ----------------    190              /     Hct     \                              /     35.7 L    \            RBC: 3.44 L    MCV: 104 H          BMP: 9/27/2023 07:51  NA+        Cl-     BUN  /                         135 L   94 L    38 H /  --------------------------------  Glucose                ---------------------------  135 H    K+     HCO3-   Creat \                         4.3  25    6.74 H \  Calcium : 8.8     Anion Gap : 20      Assessment and Plan:   Code Status:  ·  Code Status Full Code     Assessment:    ESRD-HD: who was referred to us by Dr. Sanchez, [facility], for consideration for revascularization    9/29-spoke with King on primary team regarding pt with c/o sob, unable to speak in full sentences  they will order EKG, cxray    Tolerated hemodialysis yesterday with 1500ml ultrafiltration    Is hemodynamically unstable, hypervolemic on exam and has stable electrolytes.  slight HTN during tx, elevated hr during tx     Outpatient Dialysis schedule: TTS at [facility] [place]  (Primary Nephrologist Dr. Medel    )     Access: lt ua fist with +thrill/bruit  - no issues - able to achieve prescribed     Anemia of ESRD: 9/25-Erythropoietin 5000 units  to be given on dialysis days..current hgb 9.7..will cont to monitor..(not on HILDA at outpt unit)    CKD-MBD: Phosphate binder: Cinacalcet:  30  mg  Oral  Daily, 1 cap Daily renal vitamin    Plan HD tomorrow with UF as tolerated    Renal diet     Please obtain daily standing wt (if possible)    Medication to be adjusted for ESRD     Patient to continue regular HD schedule while inpatient and to follow with the outpatient nephrologist at discharge        Electronic Signatures:  Portia Urias (APRN-CNP)  (Signed 29-Sep-2023 13:18)   Authored: Service, Subjective Data, Objective Data, Assessment  and Plan, Note Completion      Last Updated: 29-Sep-2023 13:18 by Portia Urias (APRN-CNP)

## 2023-09-30 NOTE — PROGRESS NOTES
Service: Cardiology     Subjective Data:   SHAMEKA SUN is a 78 year old Male who is Hospital Day # 2.    Overnight Events: Patient had an uneventful night.   Additional Information:    Pt was seen in his room earlier this am, sitting comfortably in bed, complaining of Rt foot pain.     Objective Data:     Objective Information:      T   P  R  BP   MAP  SpO2   Value  36.3  71  17  131/80   97  97%  Date/Time 9/20 7:44 9/20 7:44 9/20 7:44 9/20 7:44  9/20 7:44 9/20 7:44  Range  (35.9C - 36.6C )  (69 - 86 )  (17 - 18 )  (131 - 163 )/ (77 - 90 )  (95 - 103 )  (96% - 100% )      Pain reported at 9/19 21:53: 7 = Severe    Physical Exam Narrative:  ·  Physical Exam:    Constitutional: Well developed, NAD, awake/alert/oriented x3, pleasant, cooperative   Head/Neck: Neck supple,  No JVD, trachea midline, no bruits   Respiratory/Thorax: Patent airways, CTAB, diminished breath sounds, thorax symmetric   Cardiovascular: irregularly irregular rhythm, no murmurs, normal S 1 and S 2   Gastrointestinal: Nondistended, soft, non-tender, +BS,   Psychological: Appropriate mood and behavior   Skin: Warm and dry,   Extremities: Rt foot edema, dependent rubor, toes 1-3 with ischemic skin changes, toes 4-5 dry gangrene, no foul odor, no drainage, skin warm to touch, Rt medial heel dry skin fissure. LLE no open sores, Lt leg 1+ edema  Lt arm AV fistula    Medication:    Medications:          Continuous Medications       --------------------------------    1. Heparin 25,000 units/ D5W 250 mL Infusion..:  1600  units/hr  IntraVenous  <Continuous>         Scheduled Medications       --------------------------------    1. Allopurinol:  100  mg  Oral  Every 24 Hours    2. Aspirin Enteric Coated:  81  mg  Oral  Daily    3. Atorvastatin:  10  mg  Oral  Daily    4. Cinacalcet:  30  mg  Oral  Daily    5. Colchicine:  0.3  mg  Oral  2 Times a Week    6. Docusate:  100  mg  Oral  2 Times a Day    7. Lanthanum Carbonate:  750  mg  Oral  2  Times a Day With Meals    8. Menthol 0.44% - Zinc Oxide 20.625% Topical:  1  application(s)  Topical  Daily    9. Multivitamin Renal:  1  capsule(s)  Oral  Daily    10. Polyethylene Glycol:  17  gram(s)  Oral  Daily    11. Vancomycin - RPh to Dose - IV Piggy Back:  1  each  As Specified  Variable         PRN Medications       --------------------------------    1. Acetaminophen:  650  mg  Oral  Every 4 Hours    2. Albuterol 2.5 mg/ 3 mL Nebulizer Soln:  3  mL  Inhalation  Every 6 Hours    3. Albuterol 90 micrograms/ Inhalation MDI:  2  inhalation  Inhalation  Every 4 Hours    4. Magnesium Hydroxide -Al Hydrox -Simethicone Oral Liquid:  30  mL  Oral  Every 6 Hours    5. oxyCODONE 5 mg - Acetaminophen 325 m  tablet(s)  Oral  Every 4 Hours    6. traMADol:  50  mg  Oral  Every 6 Hours        Recent Lab Results:    Results:    CBC: 2023 08:38              \     Hgb     /                              \     12.2 L    /  WBC  ----------------  Plt               6.7       ----------------    193              /     Hct     \                              /     38.4 L    \            RBC: 3.92 L    MCV: 98     Neutrophil %: 63.1      BMP: 2023 08:38  NA+        Cl-     BUN  /                         135 L   90 L    51 H /  --------------------------------  Glucose                ---------------------------  148 H    K+     HCO3-   Creat \                         4.1  26    7.54 H \  Calcium : 8.6     Anion Gap : 23 H      RFP: 2023 00:10  NA+        Cl-     BUN  /                         134 L   91 L    52 H /  --------------------------------  Glucose                ---------------------------  113 H    K+     HCO3-   Creat \                         4.0    27    6.60 H \  Calcium : 8.9Anion Gap : 20          Albumin : 4.1     Phos : 3.0      Coagulation: 2023 03:38  PT  /                    15.9 H /  -------<    INR          ----------<      1.4 H  PTT\                              \             Heparin Assay: 1.0           Radiology Results:    Results:        Impression:    1st distal phalangeal soft tissue ulcer without evidence of  underlying osseous erosion. If there is persistent clinical concern  for osteomyelitis, consider MRI.     MACRO:  None     Xray Foot Complete Min 3 View [Sep 20 2023  9:58AM]      Conclusion:  Preliminary Cardiology Report    [place] [address]  Tel [phone] and Fax [phone]      Preliminary Vascular Lab Report    PVR RAJ      Patient Name:     SHAMEKA SUN Reading Physician:[id number] Stuart Nguyen MD  Study Date:       9/19/2023         Referring Physician: ARELIS MEJIAS  MRN/PID:          [id number]          PCP:  Accession/Order#: [id number]      CC Report to:  YOB: 1945         Technologist:        Bridgette Freeman RVT  Gender:           M                 Technologist 2:      Elisabeth Betts RVT  Admission Status: Outpatient        Location Performed:  [facility]      Diagnosis/ICD: I70.223-Atherosclerosis of native arteries of extremities with  rest pain, bilateral legs  Procedure/CPT: 27601 Peripheral artery RAJ [id number]      PRELIMINARY CONCLUSIONS:  Right Lower PVR: Evidence of moderate arterial occlusive disease in the right lower extremity at rest. Right pressures of >220 mmHg suggest no compressibility of vessels and may make absolute Segmental Limb Pressures (SLP) unreliable. Decreased digital  perfusion noted. Monophasic flow is noted in the right dorsalis pedis artery. Biphasic flow is noted in the right posterior tibial artery. Triphasic flow is noted in the right common femoral artery.  Left Lower PVR: Evidence of moderate arterial occlusive disease in the left lower extremity at rest. Left pressures of >220 mmHg suggest no compressibility of vessels and may make absolute Segmental Limb Pressures (SLP) unreliable. Normal digital perfusion  noted. Monophasic flow is  noted in the left dorsalis pedis artery. Biphasic flow is noted in the left posterior tibial artery. Triphasic flow is noted in the left common femoral artery. Unable to obtain left brachial pressure due to AVF.    Imaging & Doppler Findings:    RIGHT Lower PVR                Pressures Ratios  Right Posterior Tibial (Ankle) 99 mmHg   0.69  Right Dorsalis Pedis (Ankle)   255 mmHg  1.78  Right Digit (Great Toe)        43 mmHg   0.30        LEFT Lower PVR                Pressures Ratios  Left Posterior Tibial (Ankle) 255 mmHg  1.78  Left Dorsalis Pedis (Ankle)   255 mmHg  1.78  Left Digit (Great Toe)        115 mmHg  0.80        Right  Brachial Pressure 143 mmHg          VASCULAR PRELIMINARY REPORT  completed by Bridgette Freeman RVT on 9/19/2023 at 2:20:49 PM        *** Preliminary ***     VASC LAB PVR RAJ only [Sep 19 2023  2:20PM]      Assessment and Plan:        Additional Dx:   Gangrene of toe of right foot: Entered Date: 19-Sep-2023  17:11   Critical limb ischemia with history of revascularization of same extremity : Entered Date: 19-Sep-2023 17:09    Code Status:  ·  Code Status Full Code     Advance Care Planning:  Advance Care Planning: I evaluated the patient  and determined the patient's capacity to understand the risks, benefits and alternatives to treatment. I elicited the patient's goals for treatment and reviewed advance directives and medical orders for life sustaining treatment. The patient was given  an opportunity to review a blank advance directive as appropriate.     Assessment:    SHAMEKA SUN is a 77 year old Male  PMH of AF on Eliquis, CHF (EF 37%), ESRD/HD (Tuesday Thursday Saturday, [facility] [place], Dr. Medel) via left UE AV fistula,  SSS, status post pacemaker, non smoker, nondiabetic, who was referred to us by Dr. Sanchez, [facility], for consideration for revascularization, in the setting of right toe gangrene (podiatrist Dr. Reese Solares).  Patient presented to clinic with his wife and  son,  stating he had a longstanding right heel crack and underwent PTA of right AT/PT on 4/12/2023 with Dr. Sanchez.  The heel crack almost healed.  He then developed right foot severe pain and great toe discoloration in August, was admitted at [facility], and underwent a PTA of right AT on 8/30/2023 with Dr. Sanchez, and  was told there were no other options available for him.  During that admission hes toes became gangrenous.  He had a foot x-ray that was questionable for osteomyelitis,  was  put on IV antibiotics during hospital stay, but was not discharged on any antibiotics.  Patient is complaining of severe rest pain at night that wakes him up from sleep, and  swelling of the foot.  Up to mid August he was able to walk and drive.  He denies CP/SOB.  ARJ/TBI shows impaired blood flow to the RLE: 0.69/0.3, Lt NC/0.8.  We have reviewed the images from angioplasties from [facility] (images in PACS).  Given above presentation, pt is at risk of major limb loss, if procedure is delayed.  Pt is being  directly admited  to the hospital, for emergent angioplasty, likely tomorrow.  Risk and benefits of the procedure are explained, Pt is agreeable with the plan     CLI/toe gangrene:  - 9/20/21 s/p PTA of Rt AT/DP, PTA of PT and lateral plantar. Complete reconstruction of AT/PT and plantar arch angioplasty with Dr. Linder, via Rt antergrade Rt CFA access, BR for 6 hr  - pt was loaded with  mg and Plavix 300 md PO post procedure  - podiatry to schedule him tentatively for 4-5 toe amputation on Friday   - continue with ASA and statin,   - resume Heparin drip (high intensity- AFib) in am  - holding  Eliquis  - will continue with Plavix and Eliquis at discharge  - pain management Percocet, Tramadol  - continue with Vanc for now, ID consulted  - Rt Foot Xray negative for osteo  - leg elevation and offloading for edema control and pressure ulcer prevention    ESRD:  - on HD TTS  - scheduled for HD tomorrow am  -  Nephrology following    Toe gangrene:  - podiatry following, plan for toe amputation on Friday      Aruna Vazquez PA-C, MPAS  Pager # [phone] or Doc-[facility]  Endovascular /Limb Salvage Team, Pager #[phone] for day coverage (8am-5pm)  Interventional Cardiology Fellow, Pager # [phone], for night and weekend coverage  Night coverage: [facility] [id number]       Plan of Care Reviewed With:  Plan of Care Reviewed With: patient; spouse; son       Electronic Signatures:  Aruna Vazquez (PAC)  (Signed 20-Sep-2023 17:05)   Authored: Service, Subjective Data, Objective Data, Assessment  and Plan, Note Completion      Last Updated: 20-Sep-2023 17:05 by Aruna Vazquez (PAC)

## 2023-09-30 NOTE — DISCHARGE SUMMARY
Send Summary:   Discharge Summary Providers:  Provider Role Provider Name   · Referring Correct Info, Needed   · Attending Homar Linder   · Consulting Dialysis Service   · Consulting Roopa Rodriguez   · Primary Free, Text Entry       Note Recipients: Correct Info, Needed, MD  Free, Text Entry, MD         Discharge:    Summary:   Admission Date: .19-Sep-2023 18:16:00   Discharge Date: 29-Sep-2023   Attending Physician at Discharge: Homar Linder   Admission Reason: PAD Intervention, Ischemic R foot (1)   Final Discharge Diagnoses: Anemia in other chronic  diseases classified elsewhere, Critical limb ischemia with history of revascularization of same extremity, Dependence on hemodialysis, End-stage renal disease, s/p Right TMA   Procedures: Date: 20-Sep-2023 16:05:00  Procedure Name: Peripheral angioplasty    Date: 22-Sep-2023 11:57:00  Procedure Name: 1. transmetatarsal amputation, right foot (94892)  2. incision bone cortex, right foot (43211)  3. wound vac application, right foot <50cm (51424)   Condition at Discharge: Satisfactory   Disposition at Discharge: Skilled Nursing Facility  (SNF)   Vital Signs:        T   P  R  BP   MAP  SpO2   Value  36.2  111  16  154/87   109  96%  Date/Time 9/29 8:34 9/29 8:34 9/29 8:34 9/29 8:34  9/29 8:34 9/29 8:34  Range  (36C - 37.4C )  (56 - 113 )  (16 - 19 )  (110 - 161 )/ (72 - 100 )  (93 - 112 )  (94% - 98% )  Highest temp of 37.4 C was recorded at 9/28 20:13    Date:            Weight/Scale Type:  Height:   20-Sep-2023 17:13  87  kg / standing  172.7  cm  Physical Exam:    Gen: Alert, awake, AO x 3  Head: no evidence of trauma  Neck: no JVD  CVS: irregular, Afib on tele no murmur, no leg edema  Lungs: bilateral lungs diminished BS at baseline (lung volume loss uses inhalers)  Abd: soft, NT, ND, +BS  Extremities: AVF left arm, right TMA site healing nicely with granulation at the incision site, TMA flap w/o maceration or dusky discoloration. Retention sutures  intact to surgical site. no significant federica-incision erythema. No active drainage, no calor,  no malodor. Dressing changed today by EVLS team per podiatry order. warm to touch, no edema, multiphasic doppler signal of DP/PT. Left leg/foot warm to touch, small dried blood at big toe from his wife trying to cut his toe nails yesterday (per pt) instructed  not to cut nails. multiphsic doppler signal of DP/PT  Neuro: CN II-XII intact, 5/5 bilateral      Hospital Course:    77 yo Male  PMH of AF on Eliquis, CHF (EF 37%), ESRD/HD (Tuesday Thursday Saturday, [facility] [place], Dr. Medel) via left UE AV fistula, Anemia (POA), SSS, status post  pacemaker, non smoker, nondiabetic, who was referred to us by Dr. Sanchez, [facility], for consideration for revascularization, in the setting of right toe gangrene (podiatrist Dr. Reese Solares).  Patient presented to clinic with his wife and son, stating he  had a longstanding right heel crack and underwent PTA of right AT/PT on 4/12/2023 with Dr. Sanchez.  The heel crack almost healed.  He then developed right foot severe pain and great toe discoloration in August, was admitted at [facility], and  underwent a PTA of right AT on 8/30/2023 with Dr. Sanchez, and  was told there were no other options available for him.  During that admission his toes became gangrenous.  He had a foot x-ray that was questionable for osteomyelitis,  was put on IV antibiotics  during hospital stay, but was not discharged on any antibiotics. Up to mid August he was able to walk and drive. He denies CP/SOB.  Pre procedure RAJ/TBI showed impaired blood flow to the RLE: 0.69/0.3, Lt NC/0.8.  We have reviewed the images from angioplasties from [facility] (images in PACS).  Given above presentation, pt was directly admitted to the hospital, for emergent angioplasty.  #Right CLI/RC-VI toe gangrene:  - 9/20/21 s/p PTA of Rt AT/DP, PTA of PT and lateral plantar. Complete reconstruction of AT/PT and plantar arch angioplasty  "with Dr. Linder,  - continue with Clopidogrel and Eliquis  - c/w oxycodone to 7.5 mg q4h as needed for better pain control  - c/w Neurontin 100 mg three times a week on Dialysis days  - 1 month post procedure EVLS clinic with repeat testing  - PVR RAJ done 9/27 : Right Lower PVR: No evidence of arterial occlusive disease in the right lower extremity at rest. Biphasic flow is noted in the right posterior tibial artery. Triphasic flow is noted in the right common femoral artery and right dorsalis  pedis artery. Severity of disease called by PVR tracings and Doppler waveforms due to non-compressible vessels.  #Rt foot toe gangrene s/p TMA:  - 9/22/2023 Status post right foot transmetatarsal amputation with wound vac placement by Dr. Rodriguez  - 9/25/23 wound vac removed by podiatry, due to some maceration on the plantar side of the flap, otherwise incision looks good.  - Weight bearing as tolerated to HEEL ONLY in surgical shoe- ordered  - Leg elevation to control edema and maceration  - Recommend every other day dressing changes with Betadine soaked 4x4s, ABD, kerlix, light ace.   - Patient to follow up with Dr. Rodriguez after discharge at [address] [place] [address]. Please call [phone] to schedule an appointment after discharge.   #ID  - 9/28 WBC 12.0, afebrile, no S&S of infection   - Per ID recs: Given positive cultures on bone margins: Treat for R foot residual OM with renally dosed IV Cefepime 2G every dialysis for total 6 weeks END DATE 11/3/2023. Will followup in ID clinic for next antibiotics steps.  - For  C. Parapsilosis OM, treat concurrently with Oral Isavuconazole 372mg Q8H for 2 days (LOADING DOSE), then start Isavuconazole 372mg Q24H afterwards for total 6 weeks course from [facility] operation date 9/22/23 - END DATE 11/7/2023.   - Send weekly CBCdiff, CMP, CRP, ESR, while on IV systemic antibiotics and fax results to ID clinic at [phone] with \"Re: attention Dr Glass\" Next due " 9/29/2023  - ID will arrange follow-up in 6 weeks with Dr. Glass   #History of Penicillin allergy:  - per pt he last took Penicillin >40 years ago and developed itchiness and warmth to the skin, (uncertain of how soon after reaction occurred) without any cardiovascular-respiratory symptoms or compromise.  - Allergy Immunology consulted for PCN challenge  - Pt passed Augmentin challenge on 9/25/23. Penicillin allergy removed from his chart per immunology recs  - Challenges prove that there was no immediate reaction, but this cannot exclude a delayed reaction in the future  #ESRD: on HD TTS  - continue with TTS HD Last session 9/28 tolerated well  - c/w home daily Nephrocaps, Sensipar and Fosrenol   - Renal dose IV Abx  - Epo 5000 units on dialysis days started at this admission and to continue post discharge  - Pt to continue follow up with home Nephrology  #HTN  - Pt was started on Amlodipine 10 mg daily as his BP was poorly controlled on admission. Pt tolerating medication well with -140's  - c/w as needed Clonidine 0.2 twice daily for SBP > 180  Afib/CHF/SSS - stable remained on current home treatment   - c/w Eliquis   - ECG obtained today - afib HR 's  #Gout  - c/w home Allopurinol daily   - Uric Acid Low 3.1  - Holding Colchicine due to possible severe interaction with Isavuconazole  #Allergy/COPD  - pt take Claritin at home. can c/w Claritin Q48 hrs due to CrCL < 30. Pt was educated on this since he been taking it from OTC w/o doctors ordered. He stated understanding and agreeable.   - Pt c/o feeling some what SOB today - Chest xray obtained today no pulmonary edema with mild atelectasis   Patient vital signs were stable. Discharge instruction and all test results were reviewed with the patient and wife. All questions were answered. Pt was discharged to [facility] in stable condition.       I personally spent 35 minutes with this patient, of which >50% of time was spent counseling and  "coordination of care                     Discharge Information:    and Continuing Care:   Lab Results - Pending:    Tryptase, Serum Drawn at 25-Sep-2023 16:08:00  Culture, Fungus + Fungus Stain Drawn at 22-Sep-2023 10:25:00  Radiology Results - Pending: None   Discharge Instructions:    Activity:           Weight-bearing Instructions: heel only weight bearing right foot.            Balance activity with rest, gradually increase your activity as tolerated          Exercise as prescribed by your physician    Nutrition/Diet:           special diet          Special Diet:   renal    Labs:           Lab Test(s):   CBC with dif,  Comprehensive Metabolic Panel,  CRP,  ESR          Date To Be Drawn:   9/30/23    Weekly          Fax Results To:   [phone] with \"Re: attention Dr Glass\"          Comments:   Send weekly CBCdiff, CMP, CRP, ESR, while on IV systemic antibiotics and fax results to ID clinic at [phone] with \"Re: attention Dr Glass\"    Wound Care:           Wound Site:   Transmetatarsal amputation site, right foot.          Wound Type:   surgical incision          Other Instructions:   Recommend every other day dressing changes with Betadine soaked 4x4s, ABD, kerlix, light ace.          Do NOT allow anyone but the Vascular Surgeon to remove staples or sutures.    Dialysis:           Hemodialysis Schedule:   Tue/Thur/Sat    Rehab Services:           Occupational Therapy Orders:   3-5 times/week          Physical Therapy Orders:   3-5 times/week    Care Recommendation:           I recommend that INPATIENT care is required at::   Skilled          Estimated Stay:   Convalescent stay < 30 days    Follow Up Appointments:    Follow-Up Appointment 01:           Physician/Dept/Service:   Dr. Rodriguez - Podiatry          Reason for Referral:   1 week Postop visit          Call to Schedule in:   2-3 days          [place]   [address] [place] [address]          Phone Number:   [phone]    Follow-Up " Appointment 02:           Physician/Dept/Service:   Cole Ybarra  / Infectious Disease          Reason for Referral:   6 week ID follow up          Call to Schedule in:   1 week          Location:   [facility], [address], [address] [place], [address]          Phone Number:       Follow-Up Appointment 03:           Physician/Dept/Service:   Aruna Vazquez PA-C/ [facility]          Reason for Referral:   Hospital f/u post procedure          Scheduled Date/Time:   26-Oct-2023 02:00          Location:   [facility], [address] [place], [address]  .          Phone Number:   [phone] Option 1    Discharge Medications: Home Medication   Eliquis 2.5 mg oral tablet - 1 tab(s) orally 2 times a day  lanthanum 750 mg oral tablet, chewable - 1 tab(s) orally 2 times a day (with meals)  menthol-zinc oxide 0.44%-20.625% topical ointment - null  polyethylene glycol 3350 oral powder for reconstitution - orally once a day  [place] Caps oral capsule - 1 cap(s) orally once a day  traMADol 50 mg oral tablet - 1 tab(s) orally every 6 hours  Ventolin HFA 90 mcg/inh inhalation aerosol - 2 puff(s) inhaled every 4 hours  ZTlido 1.8% topical film - Apply topically to affected area once a day- 12hrs on, 12 hrs off  epoetin dane - 5000 unit(s) intravenous Tuesday, Thursday, Saturday - to IntraVenous Push (Every 1  week at ,22:00 on Tuesday, Thursday, Saturday)  naphazoline 0.012% ophthalmic solution - 1 drop(s) in each eye every 4 hours  allopurinol 100 mg oral tablet - orally once a day  atorvastatin 10 mg oral tablet - 1 tab(s) orally once a day  cinacalcet 30 mg oral tablet - 1 tab(s) orally once a day  colchicine 0.6 mg oral tablet - 0.5 tab(s) orally 2 times a week    HOLD THIS MEDICATION WHILE PATIENT IS ON ORAL ANTIBIOTICS.  clopidogrel 75 mg oral tablet - 1 tab(s) orally once a day  amLODIPine 10 mg oral tablet - 1 tab(s) orally  "once a day  gabapentin 100 mg oral capsule - 1 cap(s) orally Tuesday, Thursday, Saturday - on Dialysis days  isavuconazonium 186 mg oral capsule - 2 cap(s) orally once a day      PT WILL NEED RX UPON DISCHARGE FROM SNF IF NOT COMPLETED  cefepime 2 g/100 mL intravenous solution - 2  intravenous Tuesday, Thursday, Saturday on dialysis days      PT WILL NEED RX UPON DISCHARGE FROM SNF IF NOT COMPLETED     PRN Medication   albuterol 2.5 mg/3 mL (0.083%) inhalation solution - 3 milliliter(s) inhaled every 6 hours, As Needed  loratadine 10 mg oral tablet - 1 tab(s) orally every other day, As Needed  cloNIDine 0.2 mg oral tablet - 1 tab(s) orally 2 times a day, As Needed  acetaminophen 325 mg oral tablet - 2 tab(s) orally every 6 hours, As needed, Pain - Mild (1-3)  oxyCODONE 7.5 mg oral tablet - 1 tab(s) orally every 4 hours, As needed, Pain - Severe (7-10)     DNR Status:   ·  Code Status Code Status order at time of discharge: Full Code     Attestation:   Note Completion:  Provider/Team Pager EDWIN Martin  Endovascular/Limb Salvage Team  Day pager [phone]/Night [facility] [phone]/Weekends pager [phone]  [place]          Electronic Signatures:  Flash Malik (APRN-CNP)  (Signed 29-Sep-2023 17:59)   Authored: Send Summary, Summary Content, Ongoing Care,  DNR Status, Note Completion      Last Updated: 29-Sep-2023 17:59 by Flash Malik (APRN-CNP)    References:  1.  Data Referenced From \"Consult-Infectious Disease\" 20-Sep-2023 18:34   "

## 2023-09-30 NOTE — PROGRESS NOTES
Service: Interventional Cardiology     Subjective Data:   SHAMEKA SUN is a 78 year old Male who is Hospital Day # 5 and POD #1 for 1. right foot TMA;2. wound vac application.    Additional Information:    No overnight major issues.  Patient continues to have pain in the right foot since the surgery.    Objective Data:     Objective Information:      T   P  R  BP   MAP  SpO2   Value  37  92  20  170/90   117  98%  Date/Time 9/23 12:07 9/23 12:08 9/23 12:07 9/23 12:08  9/23 12:08 9/23 12:08  Range  (36C - 37C )  (67 - 107 )  (17 - 20 )  (109 - 178 )/ (51 - 105 )  (100 - 125 )  (94% - 99% )  Highest temp of 37 C was recorded at 9/23 0:05      Pain reported at 9/23 14:45: 10 = Severe    Physical Exam Narrative:  ·  Physical Exam:    Physical Exam  Gen: Alert, awake, O x 3  Head: no evidence of trauma  Neck: access site w/o issues, no JVD  CVS: RRR, no murmur, no leg edema, groin access sites w/o issues  Lungs: bilateral clear to auscultation  Abd: soft, NT, ND   Extremities: right foot dressed; left arm AV fistula  Neuro: CN II-XII intact, 5/5 bilateral      Recent Lab Results:    Results:    CBC: 9/23/2023 06:30              \     Hgb     /                              \     10.3 L    /  WBC  ----------------  Plt               11.4 H    ----------------    181              /     Hct     \                              /     31.6 L    \            RBC: 3.24 L    MCV: 98           BMP: 9/23/2023 06:30  NA+        Cl-     BUN  /                         132 L   92 L    51 H /  --------------------------------  Glucose                ---------------------------  116 H    K+     HCO3-   Creat \                         4.7  24    8.00 H \  Calcium : 8.4 L    Anion Gap : 21 H      Coagulation: 9/23/2023 01:07  PT  /                              /  -------<    INR          ----------<                PTT\                              \            Heparin Assay: 0.7           Assessment and Plan:   Code  Status:  ·  Code Status Full Code     Assessment:    Assessment:    admitted SHAMEKA is a 77 year old Male  PMH of AF on Eliquis, CHF (EF 37%), ESRD/HD (Tuesday Thursday Saturday, [facility] [place], Dr. Medel) via left UE AV fistula,  SSS, status post pacemaker, non smoker, nondiabetic, who was referred to us by Dr. Sanchez, [facility], for consideration for revascularization, in the setting of right toe gangrene (podiatrist Dr. Reese Solares).  Patient presented to clinic with his wife and  son, stating he had a longstanding right heel crack and underwent PTA of right AT/PT on 4/12/2023 with Dr. Sanchez.  The heel crack almost healed.  He then developed right foot severe pain and great toe discoloration in August, was admitted at [facility], and underwent a PTA of right AT on 8/30/2023 with Dr. Sanchez, and  was told there were no other options available for him.  During that admission hes toes became gangrenous.  He had a foot x-ray that was questionable for osteomyelitis,  was  put on IV antibiotics during hospital stay, but was not discharged on any antibiotics.  Patient is complaining of severe rest pain at night that wakes him up from sleep, and  swelling of the foot.  Up to mid August he was able to walk and drive.  He denies CP/SOB.  RAJ/TBI shows impaired blood flow to the RLE: 0.69/0.3, Lt NC/0.8.  We have reviewed the images from angioplasties from [facility] (images in PACS).  Given above presentation, pt is at risk of major limb loss, if procedure is delayed.  Pt was directly admitted  to the hospital, for emergent angioplasty .    # Right CLI/RC-VI toe gangrene:  - 9/20/21 s/p PTA of Rt AT/DP, PTA of PT and lateral plantar. Complete reconstruction of AT/PT and plantar arch angioplasty with Dr. Linder, via Rt antergrade Rt CFA access  - Status post right foot transmetatarsal amputation 9/22/2023    -The patient is on heparin infusion perioperatively; clopidogrel was stopped  -We will check with podiatry  regarding resumption of clopidogrel from their standpoint  -Continue current pain control medications  -Continue to hold Eliquis while inpatient --> plan to discharge on Eliquis and clopidogrel  -Continue as per infectious disease team --> on vancomycin      #ESRD: on HD TTS  - HD done today  - c/w home daily Nephrocaps, Sensipar and Fosrenol   - Nephrology following  - Renal dose IV Abx    #HTN  - Started Amlodipine 5 mg daily  - c/w as needed Clonidine 0.2 twice daily for SBP > 180    #Gout  - c/w home Allopurinol daily  - c/w Colchicine Mon and Thu  - Uric Acid Low 3.1        Attestation:   Note Completion:  I am a:  Resident/Fellow   Attending Attestation I saw and evaluated the patient.  I personally obtained the key and critical portions of the history and physical exam or was physically present for key and  critical portions performed by the resident/fellow. I reviewed the resident/fellow?s documentation and discussed the patient with the resident/fellow.  I agree with the resident/fellow?s medical decision making as documented in the note.     I personally evaluated the patient on 23-Sep-2023         Electronic Signatures:  Constance Crockett)  (Signed 24-Sep-2023 19:20)   Authored: Assessment and Plan, Note Completion   Co-Signer: Service, Subjective Data, Objective Data, Assessment and Plan, Note Completion  Herbert Castillo (Fellow))  (Signed 23-Sep-2023 15:41)   Authored: Service, Subjective Data, Objective Data, Assessment  and Plan, Note Completion      Last Updated: 24-Sep-2023 19:20 by Constance Crockett)

## 2023-09-30 NOTE — PROGRESS NOTES
Service: Cardiology     Subjective Data:   SHAMEKA SUN is a 78 year old Male who is Hospital Day # 9 and POD #5 for 1. right foot TMA;2. wound vac application.    Overnight Events: Patient had an uneventful night.   Additional Information:    Pt seen and assessed at the bedside this morning. Pt in bed comfortable, within significant c/o pain. Pt legs elevated on a pillow.     Objective Data:     Objective Information:      T   P  R  BP   MAP  SpO2   Value  35.9  94  18  127/64   85  98%  Date/Time 9/27 11:15 9/27 11:15 9/27 7:24 9/27 11:15  9/27 11:15 9/27 11:15  Range  (35.9C - 36.8C )  (75 - 116 )  (18 - 18 )  (119 - 194 )/ (64 - 115 )  (83 - 127 )  (92% - 99% )      Pain reported at 9/27 8:00: 7 = Severe    Physical Exam Narrative:  ·  Physical Exam:    Physical Exam  Gen: Alert, awake, AO x 3  Head: no evidence of trauma  Neck: no JVD  CVS: RRR, no murmur, no leg edema  Lungs: bilateral clear to auscultation  Abd: soft, NT, ND   Extremities: AVF left arm, right TMA site dressed, C/D/I dressing by podiatry.   Neuro: CN II-XII intact, 5/5 bilateral      Medication:    Medications:          Continuous Medications       --------------------------------  No continuous medications are active       Scheduled Medications       --------------------------------    1. Allopurinol:  100  mg  Oral  Every 24 Hours    2. amLODIPine (NORVASC):  5  mg  Oral  Daily    3. Apixaban:  2.5  mg  Oral  Every 12 Hours    4. Atorvastatin:  10  mg  Oral  Daily    5. Cinacalcet:  30  mg  Oral  Daily    6. Clopidogrel:  75  mg  Oral  Daily    7. Epoetin Jovanny (ESRD) Injectable:  5000  unit(s)  IntraVenous Push  <User Schedule>    8. Gabapentin:  100  mg  Oral  With Every Dialysis Treatment    9. Influenza Virus (Inactive) HIGH DOSE Adult Vaccine:  0.7  mL  IntraMuscular  Once    10. Isavuconazonium Sulfate:  372  mg  Oral  Every 8 Hours    11. Lanthanum Carbonate:  750  mg  Oral  2 Times a Day With Meals    12. Loratadine:  10   mg  Oral  Daily    13. Menthol 0.44% - Zinc Oxide 20.625% Topical:  1  application(s)  Topical  Daily    14. Multivitamin Renal:  1  capsule(s)  Oral  Daily    15. Naphazoline 0.012% Ophthalmic:  1  drop(s)  Both Eyes  Every 4 Hours    16. Pneumococcal 23-Valent (PNEUMOVAX) Vaccine:  0.5  mL  IntraMuscular  Once    17. Polyethylene Glycol:  17  gram(s)  Oral  Daily         PRN Medications       --------------------------------    1. Acetaminophen:  650  mg  Oral  Every 6 Hours    2. Albuterol 2.5 mg/ 3 mL Nebulizer Soln:  3  mL  Inhalation  Every 6 Hours    3. Albuterol 90 micrograms/ Inhalation MDI:  2  inhalation  Inhalation  Every 4 Hours    4. cloNIDine (CATAPRES):  0.2  mg  Oral  2 Times a Day    5. oxyCODONE Immediate Release:  7.5  mg  Oral  Every 4 Hours        Recent Lab Results:    Results:        I have reviewed these laboratory results:    Basic Metabolic Panel  27-Sep-2023 07:51:00      Result Value    Glucose, Serum  135   H   NA  135   L   K  4.3    CL  94   L   Bicarbonate, Serum  25    Anion Gap, Serum  20    BUN  38   H   CREAT  6.74   H   GFR Male  8   A   Calcium, Serum  8.8      Complete Blood Count  27-Sep-2023 07:51:00      Result Value    White Blood Cell Count  11.0    Nucleated Erythrocyte Count  0.0    Red Blood Cell Count  3.44   L   HGB  10.4   L   HCT  35.7   L   MCV  104   H   MCHC  29.1   L   PLT  190    RDW-CV  15.1   H       Assessment and Plan:   Comorbidities:  ·  Comorbidity anemia   ·  Anemia anemia of chronic disease     Code Status:  ·  Code Status Full Code     Assessment:    77 yo Male  PMH of AF on Eliquis, CHF (EF 37%), ESRD/HD (Tuesday Thursday Saturday, [facility] Dr. Gianfranco Alcocer) via left UE AV fistula, Anemia (POA), SSS, status post  pacemaker, non smoker, nondiabetic, who was referred to us by Dr. Sanchez, [facility], for consideration for revascularization, in the setting of right toe gangrene (podiatrist Dr. Reese Solares).  Patient presented to clinic with his wife and son,  stating he  had a longstanding right heel crack and underwent PTA of right AT/PT on 4/12/2023 with Dr. Sanchez.  The heel crack almost healed.  He then developed right foot severe pain and great toe discoloration in August, was admitted at [facility], and  underwent a PTA of right AT on 8/30/2023 with Dr. Sanchez, and  was told there were no other options available for him.  During that admission his toes became gangrenous.  He had a foot x-ray that was questionable for osteomyelitis,  was put on IV antibiotics  during hospital stay, but was not discharged on any antibiotics. Up to mid August he was able to walk and drive. He denies CP/SOB.  Pre procedure RAJ/TBI showed impaired blood flow to the RLE: 0.69/0.3, Lt NC/0.8.  We have reviewed the images from angioplasties from [facility] (images in PACS).  Given above presentation,  pt was directly admitted to the hospital, for emergent angioplasty .    #Right CLI/RC-VI toe gangrene:  - 9/20/21 s/p PTA of Rt AT/DP, PTA of PT and lateral plantar. Complete reconstruction of AT/PT and plantar arch angioplasty with Dr. Linder,  - continue with Clopidogrel and Eliquis  - c/w oxycodone to 7.5 mg q4h as needed for better pain control  - will add Neurontin 100 mg three times a week on Dialysis days  - RAJ/TBI ordered  - 1 month post procedure EVLS clinic with repeat testing     #Rt foot toe gangrene s/p TMA:  - 9/22/2023 Status post right foot transmetatarsal amputation with wound vac placement  - 9/25/23 wound vac removed by podiatry, picture reviewed: some maceration on the plantar side of the flap, otherwise incision looks good.  - TMA flap noted to have some maceration but no dusky discoloration. Retention sutures intact to surgical site. Mild federica-incision erythema. No active drainage, no calor, no malodor, no ascending lymphangitis.   - Weight bearing as tolerated to HEEL ONLY in surgical shoe- ordered  - Leg elevation to control edema and maceration  - Recommend  "every other day dressing changes with Betadine soaked 4x4s, ABD, kerlix, light ace.   - Cleared for discharge from a podiatry standpoint.   - Patient to follow up with Dr. Rodriguez after discharge at [address] [place] [address]. Please call [phone] to schedule an appointment within one week of discharge.     #ID  - WBC 11.0, afebrile   - Per ID recs: Given positive cultures on bone margins: Treat for R foot residual OM with renally dosed IV Cefepime 2G every dialysis for total  6 weeks END DATE 12/3/2023. Will followup in ID clinic for next antibiotics  steps.  - For  C. Parapsilosis OM, treat concurrently with Oral Isavuconazole 372mg Q8H for 2 days (LOADING DOSE), then start Isavuconazole 372mg Q24H  afterwards for total 6 weeks course from TMA operation date 9/22/23 - END DATE 12/7/2023.   - Send weekly CBCdiff, CMP, CRP, ESR, while on IV systemic antibiotics  and fax results to ID clinic at [phone] with \"Re: attention Dr Glass\"  - ID will arrange follow-up in 6  weeks with Dr. Glass in ID clinic, Appointment will be in EMR chart    #History of Penicillin allergy:  - per pt he last took Penicillin >40 years ago and developed itchiness and warmth to the skin, (uncertain of how soon after reaction occurred) without any cardiovascular-respiratory symptoms or compromise.  - Allergy Immunology consulted for PCN challenge  - Pt passed Augmentin challenge on 9/25/23. Penicillin allergy removed from his chart per immunology recs  - Challenges prove that there was no immediate reaction, but this cannot exclude a delayed reaction in the future  - will monitor, Anaphylaxis order set placed for possible reaction    #ESRD: on HD TTS  - continue with TTS HD  - c/w home daily Nephrocaps, Sensipar and Fosrenol   - Nephrology following  - Renal dose IV Abx  - Epo 5000 units on dialysis days    #HTN  - c/w Amlodipine 5 mg daily  - c/w as needed Clonidine 0.2 twice daily for SBP > 180    #Gout  - c/w home " Allopurinol daily   - Uric Acid Low 3.1  - Holding Colchicine due to possible severe interaction with Isavuconazole    #Environmental allergy  - pt take Claritin at home  - scratchy throat and hoarseness   - c/w Claritin Q48 hrs    Dispo: SNF  - PT/OT recs: AM-PAC score 11 The pt would benefit from moderate intensity therapy for 3-5 days per week to maximize functional mobility and independence.  - Pt awaiting SNF selection                 Plan of Care Reviewed With:  Plan of Care Reviewed With: patient     Attestation:   Note Completion:  Provider/Team Pager EDWIN Martin  Endovascular/Limb Salvage Team  Day pager [phone]/Night HVI [phone]/Weekends pager [phone]  [place]          Electronic Signatures:  Flash Malik (JENIFFER-CNP)  (Signed 27-Sep-2023 14:09)   Authored: Service, Subjective Data, Objective Data, Assessment  and Plan, Note Completion      Last Updated: 27-Sep-2023 14:09 by Flash Malik (JENIFFER-CNP)

## 2023-09-30 NOTE — H&P
History of Present Illness:   Admission Reason: RLE gangrene   HPI:    SHAMEKA SUN is a 77 year old Male  PMH of AF on Eliquis, CHF (EF 37%), ESRD/HD (Tuesday Thursday Saturday, [facility] [place], Dr. Medel) via left UE AV fistula,  SSS, status post pacemaker, non smoker, nondiabetic, who was referred to us by Dr. Sanchez, [facility], for consideration for revascularization, in the setting of right toe gangrene (podiatrist Dr. Reese Solares).  Patient is presenting today with his wife and  son, stating he had a longstanding right heel crack and underwent PTA of right AT/PT on 4/12/2023 with Dr. Sanchez.  The heel crack almost healed.  He then developed right foot severe pain and great toe discoloration in August, was admitted at [facility], and underwent a PTA of right AT on 8/30/2023 with Dr. Sanchez, and  was told there were no other options available for him.  During that admission hes toes became gangrenous.  He had a foot x-ray that was questionable for osteomyelitis,  was  put on IV antibiotics during hospital stay, but was not discharged on any antibiotics.  Patient is complaining of severe rest pain at night that wakes him up from sleep, and  swelling of the foot.  Up to mid August he was able to walk and drive.  He denies CP/SOB  RAJ/TBI done today shows impaired blood flow to the RLE: 0.69/0.3, Lt NC/0.8.  We have reviewed the images from angioplasties from [facility] (images in PACS).  Given above presentation, pt is at risk of major limb loss, if procedure is delayed.  Pt is being  directly admited  to the hospital, for emergent angioplasty, likely tomorrow.  Risk and benefits of the procedure are explained, Pt is agreeable with the plan     Comorbidities:   Comorbidites:  ·  Comorbid Conditions atrial fibrillation, chronic kidney disease   ·  Type of Atrial Fibrillation unknown   ·  Chronic Kidney Disease diabetic   ·  CKD Stage End of stage renal disease     Family History:   Family History: reviewed  and not pertinent to presenting  problem     Social History:   Social History:  Smoking Status former smoker   Alcohol Use denies   Drug Use denies              Allergies:  ·  penicillin : Itching    Medications Prior to Admission:     albuterol 2.5 mg/3 mL (0.083%) inhalation solution: 3 milliliter(s) inhaled every 6 hours, As Needed  allopurinol 100 mg oral tablet: orally once a day  Aspirin Enteric Coated 81 mg oral delayed release tablet: 1 tab(s) orally once a day  atorvastatin 10 mg oral tablet: 1 tab(s) orally once a day  cinacalcet 30 mg oral tablet: 1 tab(s) orally once a day  cloNIDine 0.2 mg oral tablet: 1 tab(s) orally 2 times a day, As Needed  colchicine 0.6 mg oral tablet: 0.5 tab(s) orally 2 times a week  Eliquis 2.5 mg oral tablet: 1 tab(s) orally 2 times a day  lanthanum 750 mg oral tablet, chewable: 1 tab(s) orally 2 times a day (with meals)  menthol-zinc oxide 0.44%-20.625% topical ointment: null  polyethylene glycol 3350 oral powder for reconstitution: orally once a day  [place] Caps oral capsule: 1 cap(s) orally once a day  traMADol 50 mg oral tablet: 1 tab(s) orally every 6 hours  Ventolin HFA 90 mcg/inh inhalation aerosol: 2 puff(s) inhaled every 4 hours  ZTlido 1.8% topical film: Apply topically to affected area once a day- 12hrs on, 12 hrs off.    Review of Systems:   Cardiac: NEGATIVE: Chest Pain, Dyspnea on Exertion     Musculoskeletal: POSITIVE: Pain, Swelling     Skin: POSITIVE: Ulcer       Objective:   Physical Exam Narrative:  ·  Physical Exam:    Constitutional: Well developed, NAD, awake/alert/oriented x3, pleasant, cooperative   Head/Neck: Neck supple,  No JVD, trachea midline, no bruits   Respiratory/Thorax: Patent airways, CTAB, diminished breath sounds, thorax symmetric   Cardiovascular: irregularly irregular rhythm, no murmurs, normal S 1 and S 2   Gastrointestinal: Nondistended, soft, non-tender, +BS,   Psychological: Appropriate mood and behavior   Skin: Warm and  dry,   Extremities: Ry foot edema, dependent rubor, toes 1-3 with ischemic skin changes, toes 4-5 dry gangrene, no foul odor, no drainage, skin warm to touch, Rt medial heel dry skin fissure. LLE no open sores  Lt arm AV fistula    Radiology Results:    Results:        Conclusion:  Preliminary Cardiology Report    [place] [address]  Tel [phone] and Fax [phone]      Preliminary Vascular Lab Report    PVR RAJ      Patient Name:     SHAMEKA SUN Reading Physician:[id number] Stuart Nguyen MD  Study Date:       9/19/2023         Referring Physician: ARELIS MEJIAS  MRN/PID:          [id number]          PCP:  Accession/Order#: [id number]      CC Report to:  YOB: 1945         Technologist:        Bridgette Freeman RVT  Gender:           M                 Technologist 2:      Elisabeth Betts RVT  Admission Status: Outpatient        Location Performed:  [facility]      Diagnosis/ICD: I70.223-Atherosclerosis of native arteries of extremities with  rest pain, bilateral legs  Procedure/CPT: 95957 Peripheral artery RAJ [id number]      PRELIMINARY CONCLUSIONS:  Right Lower PVR: Evidence of moderate arterial occlusive disease in the right lower extremity at rest. Right pressures of >220 mmHg suggest no compressibility of vessels and may make absolute Segmental Limb Pressures (SLP) unreliable. Decreased digital  perfusion noted. Monophasic flow is noted in the right dorsalis pedis artery. Biphasic flow is noted in the right posterior tibial artery. Triphasic flow is noted in the right common femoral artery.  Left Lower PVR: Evidence of moderate arterial occlusive disease in the left lower extremity at rest. Left pressures of >220 mmHg suggest no compressibility of vessels and may make absolute Segmental Limb Pressures (SLP) unreliable. Normal digital perfusion  noted. Monophasic flow is noted in the left dorsalis pedis artery. Biphasic flow is noted  in the left posterior tibial artery. Triphasic flow is noted in the left common femoral artery. Unable to obtain left brachial pressure due to AVF.    Imaging & Doppler Findings:    RIGHT Lower PVR                Pressures Ratios  Right Posterior Tibial (Ankle) 99 mmHg   0.69  Right Dorsalis Pedis (Ankle)   255 mmHg  1.78  Right Digit (Great Toe)        43 mmHg   0.30        LEFT Lower PVR                Pressures Ratios  Left Posterior Tibial (Ankle) 255 mmHg  1.78  Left Dorsalis Pedis (Ankle)   255 mmHg  1.78  Left Digit (Great Toe)        115 mmHg  0.80        Right  Brachial Pressure 143 mmHg          VASCULAR PRELIMINARY REPORT  completed by Bridgette Freeman RVT on 9/19/2023 at 2:20:49 PM        *** Preliminary ***     VASC LAB PVR RAJ only [Sep 19 2023  2:20PM]      Assessment and Plan:   Assessment:    SHAMEKA SUN is a 77 year old Male  PMH of AF on Eliquis, CHF (EF 37%), ESRD/HD (Tuesday Thursday Saturday, [facility] [place], Dr. Medel) via left UE AV fistula,  SSS, status post pacemaker, non smoker, nondiabetic, who was referred to us by Dr. Sanchez, [facility], for consideration for revascularization, in the setting of right toe gangrene (podiatrist Dr. Reese Solares).  Patient is presenting today with his wife and  son, stating he had a longstanding right heel crack and underwent PTA of right AT/PT on 4/12/2023 with Dr. Sanchez.  The heel crack almost healed.  He then developed right foot severe pain and great toe discoloration in August, was admitted at [facility], and underwent a PTA of right AT on 8/30/2023 with Dr. Sanchez, and  was told there were no other options available for him.  During that admission hes toes became gangrenous.  He had a foot x-ray that was questionable for osteomyelitis,  was  put on IV antibiotics during hospital stay, but was not discharged on any antibiotics.  Patient is complaining of severe rest pain at night that wakes him up from sleep, and  swelling of the foot.  Up  to mid August he was able to walk and drive.  He denies CP/SOB  RAJ/TBI done today shows impaired blood flow to the RLE: 0.69/0.3, Lt NC/0.8.  We have reviewed the images from angioplasties from [facility] (images in PACS).  Given above presentation, pt is at risk of major limb loss, if procedure is delayed.  Pt is being  directly admited  to the hospital, for emergent angioplasty, likely tomorrow.  Risk and benefits of the procedure are explained, Pt is agreeable with the plan     CLI/toe gangrene:  - plan for angioplasty tomorrow with Dr. Linder   - NPO after midnight  - cotniue with ASA and statin, hold Eliquis  - start Heparin drip (high intensity- AFib)  - pain management Percocet, Tramadol  - consult podiatry, ID for recs  - start Vanc empirically  - Rt Foot Xray to r/u osteo    ESRD:  - pt received HD today, on HD TTS  - will arrange for HD inhouse   - Nephrology consult    Toe gangrene:  - wash with NS, paint with Betadine,   - podiatry consulted      Aruna Vazquez PA-C, MPAS  Pager # [phone] or [facility]  Endovascular /Limb Salvage Team, Pager #[phone] for day coverage (8am-5pm)  Interventional Cardiology Fellow, Pager # [phone], for night and weekend coverage  Night coverage: [facility] [id number]       Plan of Care Reviewed With:  Plan of Care Reviewed With: patient; spouse; son       Electronic Signatures:  Aruna Vazquez (PAC)  (Signed 19-Sep-2023 18:50)   Authored: History of Present Illness, Comorbidities,  Family History, Social History, Allergies, Medications Prior to Admission, Review of Systems, Objective, Assessment and Plan, Note Completion      Last Updated: 19-Sep-2023 18:50 by Aruna Vazquez (PAC)

## 2023-09-30 NOTE — PROGRESS NOTES
Service: Infectious Disease     Subjective Data:   SHAMEKA SUN is a 78 year old Male who is Hospital Day # 6 and POD #2 for 1. right foot TMA;2. wound vac application.     Afebrile  S/p TMA  Cxs with E. cloacae pending sens.    Objective Data:     Objective Information:      T   P  R  BP   MAP  SpO2   Value  36.5  97  15  146/81   103  97%  Date/Time 9/24 9:01 9/24 9:01 9/24 9:01 9/24 9:01 9/24 9:01 9/24 9:01  Range  (36C - 37.1C )  (67 - 105 )  (15 - 20 )  (109 - 170 )/ (67 - 104 )  (100 - 125 )  (94% - 100% )  Highest temp of 37.1 C was recorded at 9/23 16:00      Pain reported at 9/24 9:20: 10 = Severe    Physical Exam Narrative:  ·  Physical Exam:    Gen alert well appearing NAD pleasant (seen after OR)  HENT mmm, ncat  Eyes sclerae clear   CV rrr nl s1/2, + LUE avf with palpable thrill  Pulm CTAB anteriorly  Abd soft NT BS+  Ext +___   Skin no rash or other lesion  Psych nl mood nl affect good eye contact      Recent Lab Results:    Results:    CBC: 9/24/2023 08:47              \     Hgb     /                              \     10.0 L    /  WBC  ----------------  Plt               11.7 H    ----------------    167              /     Hct     \                              /     32.5 L    \            RBC: 3.25 L    MCV: 100           BMP: 9/24/2023 08:47  NA+        Cl-     BUN  /                         136    95 L   32 H  /  --------------------------------  Glucose                ---------------------------  168 H    K+     HCO3-   Creat \                         4.2  25    5.84 H \  Calcium : 8.3 L    Anion Gap : 20      Coagulation: 9/24/2023 08:40  PT  /                              /  -------<    INR          ----------<                PTT\                              \            Heparin Assay: 0.6           Assessment and Plan:   Daily Risk Screen:  ·  Does patient have an indwelling urinary catheter? n/a consulting service   ·  Does patient have a central line? n/a consulting  service     Comorbidities:  ·  Comorbidity Other     Code Status:  ·  Code Status Full Code     Assessment:    SHAMEKA SUN is a 77 year old Male  PMH of ESRD (TTS) via left UE AV fistula, PAD  with poor RLE perfusion with now R toes digit 1,4,5 with ulcer/gangrene s/p repeated angioplasty procedures for which ID is consulted for R foot OM workup. Patient has been previously seen at outside hospital by vascular surgeon  Dr. Sanhcez, [facility] and podiatrist Dr. Reese Solares .  He describes having a R cracked  heel, for these he underwent  PTA of right AT/PT on 4/12/2023 with Dr. Sanchez with the R  heel crack now almost healed.    He subsequently developed right foot severe pain and a dry gangrene ulcer over tip of right great toe. For this, he was  admitted again at [facility], and underwent a PTA of right AT on 8/30/2023 with Dr. Sanchez .  During the hospital stay (post intervention), he continued to have ischemic rest pain and digits 4/5 became gangrenous.  He had a foot x-ray that was questionable for osteomyelitis,  was  put on IV antibiotics during hospital stay, but was not discharged on any antibiotics. Representing now  9/19 of ischemic foot pain and gangreous R toes ulcers with ecchymosis. For his PAD, he is   now status post revascularization 9/20 PTA of AT/DP, PTA of PT and lateral planter, Complete reconstruction of AT/PT and planter arch for which ID is engaged to assess for R foot OM.    MICRO:   9/22 R foot clean bone margin culture sent: stain 3+ Gram NEG Bacilli, Cultures in progress  9/22 R foot deep foot ulture sent: stain 2+ gran, NO ORG, culturer in progress     Abx:   IV vanc 9/20 - (renal dosing)    ID Problem:  R foot PAD with R foot swelling and ecchymosis of R distal foot, with dry gangrene on tip of R 1st great toe, partially on 4th toe and entire 5th toe.   - Admission R foot xray not fully helpful to assess if OM truly present, although there appear be some irregularity over  the R distal 1st phalangeal joint, we found no correlative ulcer found there as suggested in xray. To best evaluate for OM, MRI would  be the imaging of choice. Some of his swelling likely also from recent procedure.     9/23 update:  -For his gangrenous right toes , he is now also status post 9/22 with podiatry   1. transmetatarsal amputation, right foot 2. incision bone cortex, right foot  3. wound vac application, right foot.  Reported clean margins of right foot with Healthy bleeding bone was appreciated at osteotomy sites.  Less bleeding was encountered than to be expected in setting of recent endovascular intervention.  R foot bone and tissue cultures sent .    UPDATE 9/24  Afebrile  WBC 11.7  S/p TMA  Bone cx with E. cloacae (pending sens)    RECOMMENDATION:    - STOP vanc    - START meropenem 500 mg IV q24    - Follow cxs    Following    Cole GO attending    TEAM B Pager: [phone]                    Electronic Signatures:  Cole Glass)  (Signed 24-Sep-2023 13:51)   Authored: Service, Subjective Data, Objective Data, Assessment  and Plan, Note Completion      Last Updated: 24-Sep-2023 13:51 by Cole Glass)

## 2023-09-30 NOTE — PROGRESS NOTES
Service: Infectious Disease     Subjective Data:   SHAMEKA SUN is a 78 year old Male who is Hospital Day # 7 and POD #3 for 1. right foot TMA;2. wound vac application.     .    Overnight Events: Acute events in the past 24 hours  include   Additional Information:    Afebrile and HDS     For RLE PAD: S/P PTA of right AT/PT on 4/12/2023 with Dr. Sanchez, PTA of right AT on 8/30/2023 with Dr. Sanchez and finally PTA of AT/DP,  PTA of PT and lateral planter with Complete reconstruction of AT/PT and planter arch with Dr Kurtz (9/20/23)    For R toe gangrene: S/p TMA  9/22 with clean margin per podiatry report- however clean bone margin and deep tissue cultured and growing organisms  as below    OR Cx:  9/22 clean bone margin: 4+ E. cloacae 3+ PSA (S cipro/levo, gent, zosyn, cefe, ceftaz),  1+ Candida parapsilosis  9/22 deep tissue culture: 4+ E. cloacae (S cefe, cipro/levo, bactrim), 3+ PSA (S cipro/levo, gent, zosyn, cefe, ceftaz)  9/22 MRSA negative    Seen by A&I who cleared him for future PCN use with successful Augmentin challenge    Objective Data:     Objective Information:      T   P  R  BP   MAP  SpO2   Value  37  89  19  160/79   106  98%  Date/Time 9/25 20:46 9/25 20:46 9/25 20:46 9/25 20:46  9/25 20:46 9/25 20:46  Range  (36C - 37.1C )  (86 - 116 )  (14 - 19 )  (128 - 164 )/ (76 - 99 )  (95 - 121 )  (95% - 100% )  Highest temp of 37.1 C was recorded at 9/25 16:30      Pain reported at 9/25 22:40: 7 = Severe    Physical Exam Narrative:  ·  Physical Exam:    Gen alert well appearing NAD pleasant   HENT mmm, ncat  Eyes sclerae clear   CV rrr nl s1/2, + LUE avf with palpable thrill  Pulm CTAB anteriorly  Abd soft NT BS+  Ext s/p RLE TMA with R foot fully dressed in surgical dressing  Skin no rash or other lesion  Psych nl mood nl affect good eye contact      Medication:    Medications:      BIOLOGICALS:    1. Epoetin Jovanny (ESRD) Injectable:  5000  unit(s)  IntraVenous Push  <User Schedule>       CARDIOVASCULAR AGENTS:    1. cloNIDine (CATAPRES):  0.2  mg  Oral  2 Times a Day   PRN       2. amLODIPine (NORVASC):  5  mg  Oral  Daily    3. EPINEPHrine Injectable:  0.3  mg  IntraMuscular  Once      CENTRAL NERVOUS SYSTEM AGENTS:    1. Acetaminophen:  650  mg  Oral  Every 6 Hours   PRN       2. oxyCODONE Immediate Release:  7.5  mg  Oral  Every 4 Hours   PRN       3. Gabapentin:  100  mg  Oral  With Every Dialysis Treatment    4. Ondansetron Injectable:  4  mg  IntraVenous Push  Once   PRN         COAGULATION MODIFIERS:    1. Apixaban:  2.5  mg  Oral  Every 12 Hours    2. Clopidogrel:  75  mg  Oral  Daily      GASTROINTESTINAL AGENTS:    1. Famotidine Injectable:  20  mg  IntraVenous Push  Once    2. Polyethylene Glycol:  17  gram(s)  Oral  Daily      HORMONES/HORMONE MODIFIERS:    1. methylPREDNISolone Sodium Succinate Injectable:  125  mg  IntraVenous Push  Once      IMMUNOLOGIC AGENTS:    1. Influenza Virus (Inactive) HIGH DOSE Adult Vaccine:  0.7  mL  IntraMuscular  Once    2. Pneumococcal 23-Valent (PNEUMOVAX) Vaccine:  0.5  mL  IntraMuscular  Once      METABOLIC AGENTS:    1. Allopurinol:  100  mg  Oral  Every 24 Hours    2. Colchicine:  0.3  mg  Oral  <User Schedule>    3. Atorvastatin:  10  mg  Oral  Daily      MISCELLANEOUS AGENTS:    1. Cinacalcet:  30  mg  Oral  Daily    2. Lanthanum Carbonate:  750  mg  Oral  2 Times a Day With Meals      NUTRITIONAL PRODUCTS:    1. Multivitamin Renal:  1  capsule(s)  Oral  Daily      RESPIRATORY AGENTS:    1. diphenhydrAMINE Injectable:  50  mg  IntraVenous Push  Once    2. Albuterol 2.5 mg/ 3 mL Nebulizer Soln:  3  mL  Inhalation  Every 6 Hours   PRN       3. Albuterol 90 micrograms/ Inhalation MDI:  2  inhalation  Inhalation  Every 4 Hours   PRN         TOPICAL AGENTS:    1. Menthol 0.44% - Zinc Oxide 20.625% Topical:  1  application(s)  Topical  Daily    2. Naphazoline 0.012% Ophthalmic:  1  drop(s)  Both Eyes  Every 4 Hours      Recent Lab  Results:    Results:        I have reviewed these laboratory results:    PATIENT: SHAMEKA SUN                MRN: [id number]    LOCATION: [place] [address]  BILL#:   [id number]                         : 43  AGE:    SEX: M     ORDER#:  [id number]                        ORDERED BY:   DANIEL PHELPS  SOURCE:  BIOPSY                            COLLECTED:  23 10:31  ANTIBIOTICS AT MICAH.:                      RECEIVED :  23 17:38  SITE:    CLEAN BONE MARGIN                               Corrected Report                                     R E S U L T S     GRAM STAIN                                   FINAL     23 02:43        1+ GRANULOCYTES. 3+ GRAM (-) BACILLI.     MISCELLANEOUS CULT./SM.BACT.                 FINAL     23 09:34                   ISOLATE1 : Enterobacter cloacae       4+     SECOND AND THIRD GENERATION CEPHALOSPORIN     RESULTS ARE NOT REPORTED AS     RESISTANCE TO SECOND AND THIRD GENERATION     CEPHALOSPORINS MAY DEVELOP DURING THERAPY     WITH THESE AGENTS.     FOR ANTIBIOTIC SUSCEPTIBILITY RESULTS SEE ACCN#/DATE     [id number]. 23.  Previous comment was modified by TBERT at 09:34 on 23.          4+        SECOND AND THIRD GENERATION CEPHALOSPORIN        RESULTS ARE NOT REPORTED AS        RESISTANCE TO SECOND AND THIRD GENERATION        CEPHALOSPORINS MAY DEVELOP DURING THERAPY        WITH THESE AGENTS.                   ISOLATE2 : Pseudomonas aeruginosa       3+     ____________________________________________  Organism                   Ps aerug           Antibiotic                 BP      INTRP      ____________________________________________  Aztreonam                          S          Ceftazidime                        S          Ciprofloxacin                      S          Cefepime                           S          Gentamicin                         S          Levofloxacin                       S          Piperc/Tazobact                    S           Tobramycin                         S          ______________________________________________________________________________  S=SUSCEPTIBLE  I=INTERMEDIATE  R=RESISTANT SDD=SUSCEPTIBLE DOSE DEPENDENT   NS=NONSUSCEPTIBLE  X=REPORTED IN ERROR  ______________________________________________________________________________  Abnormal.    PATIENT: SHAMEKA SUN                MRN: [id number]    LOCATION: [place] [address]  BILL#:   [id number]                         : 43  AGE:    SEX: M     ORDER#:  [id number]                        ORDERED BY:   DANIEL PHELPS  SOURCE:  BIOPSY                            COLLECTED:  23 10:31  ANTIBIOTICS AT MICAH.:                      RECEIVED :  23 17:39  SITE:    CLEAN BONE MARGIN                                     R E S U L T S     FUNGAL SMEAR                                 FINAL     23 15:47        FLUORESCENT FUNGAL STAIN: NEGATIVE     FUNGAL CULTURE/, MISC                      FINAL     23 15:52                   ISOLATE1 : Candida parapsilosis       1+  Abnormal.    PATIENT: SHAMEKA SUN                MRN: [id number]    LOCATION: [place] [address]  BILL#:   [id number]                         : 43  AGE:    SEX: M     ORDER#:  [id number]                        ORDERED BY:   DANIEL PHELPS  SOURCE:  BIOPSY                            COLLECTED:  23 10:25  ANTIBIOTICS AT MICAH.:                      RECEIVED :  23 18:04  SITE:    DEEP TISSUE RIGHT FOOT                                     R E S U L T S     GRAM STAIN                                   FINAL     23 02:56        2+ GRANULOCYTES. NO ORGANISMS SEEN.     MISCELLANEOUS CULT./SM.BACT.                 FINAL     23 09:35                   ISOLATE1 : Enterobacter cloacae        3+     SECOND AND THIRD GENERATION CEPHALOSPORIN     RESULTS ARE NOT REPORTED AS     RESISTANCE TO SECOND AND THIRD GENERATION     CEPHALOSPORINS MAY DEVELOP DURING THERAPY     WITH THESE  AGENTS.                   ISOLATE2 : Pseudomonas aeruginosa       4+     _____________________________________________________________  Organism                   Ent cloacae      Ps aerug           Antibiotic                 BP      INTRP    BP      INTRP      _____________________________________________________________  Ampicillin                         R                           Cefazolin                          R                           Ciprofloxacin                      S                S          Cefepime                           S                S          Gentamicin                         S                S          Levofloxacin                       S                S          Piperc/Tazobact                    S                S          Trimeth/Sulfa                      S                           Aztreonam                                           S          Ceftazidime                                         S          Tobramycin                                          S          ______________________________________________________________________________  S=SUSCEPTIBLE  I=INTERMEDIATE  R=RESISTANT SDD=SUSCEPTIBLE DOSE DEPENDENT   NS=NONSUSCEPTIBLE  X=REPORTED IN ERROR  ______________________________________________________________________________  Abnormal.      Complete Blood Count  Trending View      Result 25-Sep-2023 16:08:00  24-Sep-2023 08:47:00    White Blood Cell Count 12.9   H   11.7   H    Nucleated Erythrocyte Count 0.0   0.0    Red Blood Cell Count 3.14   L   3.25   L    HGB 10.0   L   10.0   L    HCT 31.2   L   32.5   L    MCV 99   100    MCHC 32.1   30.8   L       167    RDW-CV 15.1   H   14.6   H        Comprehensive Metabolic Panel  25-Sep-2023 16:08:00      Result Value    Glucose, Serum  167   H   NA  135   L   K  4.5    CL  94   L   Bicarbonate, Serum  23    Anion Gap, Serum  23   H   BUN  51   H   CREAT  8.66   H   GFR Male  6   A   Calcium, Serum  8.4   L   ALB   3.4    ALKP  127    T Pro  6.2   L   T Bili  0.7    Alanine Aminotransferase, Serum  21    Aspartate Transaminase, Serum  18      Basic Metabolic Panel  24-Sep-2023 08:47:00      Result Value    Glucose, Serum  168   H   NA  136    K  4.2    CL  95   L   Bicarbonate, Serum  25    Anion Gap, Serum  20    BUN  32   H   CREAT  5.84   H   GFR Male  9   A   Calcium, Serum  8.3   L     Magnesium, Serum  24-Sep-2023 08:47:00      Result Value    Magnesium, Serum  2.15      Heparin assay, UFH  24-Sep-2023 08:40:00      Result Value    Heparin assay, UFH  0.6        Radiology Results:    Results:        Conclusion:  CONCLUSIONS:  1. Indication: right leg CLTI, ulcers and osteomyelitis.  2. Successful PTA to AT, DP and planter arch.  3. Successful PTA to TP trunk, PT and planter arch.  4. Complete reconstruction of AT, PT and planter arch.  5. Plan: continue with aspirin and plavix for atleast 6 months.    ____________________________________________________________________________________  CPT Codes:  Moderate Sedation Services initial 15 minutes patient >5 years-58659; Moderate Sedation Services 2nd additional 15 minutes patient >5 years-50211; Moderate Sedation Services 1st additional 15 minutes patient >5 years-11522; Angiography, Extremity,uni,S&I  (PER)-52884; Angiography, Each 1st additional vessel studied after basic exam-36591; Revasc Tib/Per Angioplasty unilat initial vessel (PER)-22922; Revasc Tib/Per Angioplasty unilat each addl vessel (PER)-49106    ICD 10 Codes:  I70.235-Atherosclerosis of native arteries of right leg with ulceration of other part of foot    [id number] Homar Phelps DO  Performing Physician  Electronically signed by [id number] Homar Phelps DO on 9/24/2023 at 11:18:05 AM      cc Report to: HOMAR PHELPS          *** Final ***     Cardiac Catheterization Lab Procedures [Sep 24 2023 11:18AM]      Impression:    Postsurgical changes in the right foot status post transmetatarsal  amputation. No  evidence of osteomyelitis radiographically     Xray Foot 2 View [Sep 22 2023  1:32PM]      Conclusion:  CONCLUSIONS:  Right Lower PVR: Evidence of moderate arterial occlusive disease in the right lower extremity at rest. Right pressures of >220 mmHg suggest no compressibility of vessels and may make absolute Segmental Limb Pressures (SLP) unreliable. Decreased digital  perfusion noted. Monophasic flow is noted in the right dorsalis pedis artery. Biphasic flow is noted in the right posterior tibial artery. Triphasic flow is noted in the right common femoral artery. Disease state called by waveforms (including digit PPG).  Left Lower PVR: Evidence of mild arterial occlusive disease in the left lower extremity at rest. Left pressures of >220 mmHg suggest no compressibility of vessels and may make absolute Segmental Limb Pressures (SLP) unreliable. Normal digital perfusion  noted. Monophasic flow is noted in the left dorsalis pedis artery. Biphasic flow is noted in the left posterior tibial artery. Triphasic flow is noted in the left common femoral artery. Disease state called by waveforms. Unable to obtain left brachial  pressure due to AVF.    Imaging & Doppler Findings:    RIGHT Lower PVR                Pressures Ratios  Right Posterior Tibial (Ankle) 99 mmHg   0.69  Right Dorsalis Pedis (Ankle)   255 mmHg  1.78  Right Digit (Great Toe)        43 mmHg   0.30        LEFT Lower PVR                Pressures Ratios  Left Posterior Tibial (Ankle) 255 mmHg  1.78  Left Dorsalis Pedis (Ankle)   255 mmHg  1.78  Left Digit (Great Toe)        115 mmHg  0.80      Right  Brachial Pressure 143 mmHg        [id number] Stuart Nguyen MD  Electronically signed by [id number] Stuart Nguyen MD on 9/21/2023 at 8:13:08 AM        *** Final ***     VASC LAB PVR RAJ only [Sep 21 2023  8:13AM]      Impression:    1st distal phalangeal soft tissue ulcer without evidence of  underlying osseous erosion. If there is persistent clinical concern  for osteomyelitis, consider  MRI.     MACRO:  None     Xray Foot Complete Min 3 View [Sep 20 2023  9:58AM]      Assessment and Plan:   Daily Risk Screen:  ·  Does patient have an indwelling urinary catheter? n/a consulting service   ·  Does patient have a central line? n/a consulting service     Comorbidities:  ·  Comorbidity Other     Code Status:  ·  Code Status Full Code     Assessment:    SHAMEKA SUN is a 77 year old Male  PMH of ESRD (TTS) via left UE AV fistula, PAD  with poor RLE perfusion with now R toes digit 1,4,5 with ulcer/gangrene s/p repeated angioplasty procedures for which ID is consulted for R foot OM workup. Patient has been previously seen at outside hospital by vascular surgeon  Dr. Sanchez, [facility] and podiatrist Dr. Reese Solares .  He describes having a R cracked  heel, for these he underwent  PTA of right AT/PT on 4/12/2023 with Dr. Sanchez with the R  heel crack now almost healed.    He subsequently developed right foot severe pain and a dry gangrene ulcer over tip of right great toe. For this, he was  admitted again at [facility], and underwent a PTA of right AT on 8/30/2023 with Dr. Sanchez .  During the hospital stay (post intervention), he continued to have ischemic rest pain and digits 4/5 became gangrenous.  He had a foot x-ray that was questionable for osteomyelitis,  was  put on IV antibiotics during hospital stay, but was not discharged on any antibiotics. Representing now  9/19 of ischemic foot pain and gangreous R toes ulcers with ecchymosis. For his PAD, he is   now status post revascularization 9/20 PTA of AT/DP, PTA of PT and lateral planter, Complete reconstruction of AT/PT and planter arch for which ID is engaged to assess for R foot OM.    MICRO:   9/22 clean bone margin: 4+ E. cloacae 3+ PSA (S cipro/levo, gent, zosyn, cefe, ceftaz),  1+ Candida parapsilosis  9/22 deep tissue culture: 4+ E. cloacae (S cefe, cipro/levo, bactrim), 3+ PSA (S cipro/levo, gent, zosyn, cefe, ceftaz)  9/22 MRSA  negative    Abx:   IV vanc 9/20 - (renal dosing)    ID Problem:  R toes gangrene: R foot PAD with R foot swelling and ecchymosis of R distal foot, with dry gangrene on tip of R 1st great toe, partially  on 4th toe and entire 5th toe.   - Admission R foot xray not fully helpful to assess if OM truly present, although there appear be some irregularity over the R distal 1st phalangeal joint, we found  no correlative ulcer found there as suggested in xray.  MRI foot was recommended by ID to evaluate OM.  S/p 9/22-transmetatarsal amputation,  right foot 2. incision bone cortex, right foot  3. wound vac application, right foot.  Reported clean margins of right foot with Healthy bleeding bone was appreciated at osteotomy sites.  Less bleeding was encountered than to be expected in setting of recent endovascular intervention.  R foot clean bone margins and tissue cultures sent with clean bone margin cultures growing Ecoli, PSA,  C. parapsilosis and  deep tissue cultures and growing  Ecoli, PSA. Will treat for bacterial and fungal OM for 6 weeks course as below.    For RLE PAD: S/P PTA of right AT/PT on 4/12/2023 with Dr. Sanchez, PTA of right AT on 8/30/2023 with Dr. Sanchez and finally PTA of AT/DP,  PTA of PT and lateral planter with Complete reconstruction of AT/PT and planter arch with Dr Kurtz (9/20/23)    PCN Allergy:   Seen by A&I 9/25 who cleared him for future PCN use with successful Augmentin challenge    RECOMMENDATION:  - Given positive cultures on bone margins: Treat for R foot residual OM with renally dosed  IV Cefepime 2G every Tuesday HD/ 2G every Thurs HD/ 2G every Saturday HD for thrice weekly infusion for total 6 weeks course from TMA operation  date 9/22/23 - END DATE 12/3/2023. Will followup in ID clinic for next antibiotics  steps    - For  C. Parapsilosis OM, treat concurrently with Oral Isavuconazole 372mg Q8H for 2 days (LOADING DOSE), then start I savuconazole 372mg Q24H afterwards for total 6  "weeks course from from TMA operation date 9/22/23 - END DATE 12/3/2023 .     -Send weekly CBCdiff, CMP, CRP, ESR, while on IV systemic antibiotics and fax results to ID clinic at [phone] with \"Re: attention Dr Glass\"    -ID will arrange follow-up in 6 weeks with Dr. Glass in ID clinic, Appointment will be in EMR chart      Thank you for consult; ID has signed off  Pt seen and staffed with Attendant  Dr Marie Mendenhall MD MPH  Infectious Disease Fellow  TEAM B Pager: [phone]                    Attestation:   Note Completion:  I am a:  Resident/Fellow   Attending Attestation I saw and evaluated the patient.  I personally obtained the key and critical portions of the history and physical exam or was physically present for key and  critical portions performed by the resident/fellow. I reviewed the resident/fellow?s documentation and discussed the patient with the resident/fellow.  I agree with the resident/fellow?s medical decision making as documented in the note.   I personally evaluated the patient on 25-Sep-2023         Electronic Signatures:  Mariia Mendenhall (MD (Fellow))  (Signed 26-Sep-2023 12:19)   Authored: Service, Subjective Data, Objective Data, Assessment  and Plan, Note Completion  Cole Glass)  (Signed 26-Sep-2023 09:06)   Authored: Note Completion   Co-Signer: Service, Subjective Data, Objective Data, Assessment and Plan, Note Completion      Last Updated: 26-Sep-2023 12:19 by Mariia Mendenhall (Fellow))   "

## 2023-09-30 NOTE — H&P
History & Physical Reviewed:   I have reviewed the History and Physical dated:  19-Sep-2023   History and Physical reviewed and relevant findings noted. Patient examined to review pertinent physical  findings.: No significant changes   Home Medications Reviewed: no changes noted   Allergies Reviewed: no changes noted       ERAS (Enhanced Recovery After Surgery):  ·  ERAS Patient: no     Consent:   COVID-19 Consent:  ·  COVID-19 Risk Consent Surgeon has reviewed key risks related to the risk of adriana COVID-19 and if they contract COVID-19 what the risks are.     Attestation:   Note Completion:  I am a:  Resident/Fellow   Attending Attestation I saw and evaluated the patient.  I personally obtained the key and critical portions of the history and physical exam or was physically present for key and  critical portions performed by the resident/fellow. I reviewed the resident/fellow?s documentation and discussed the patient with the resident/fellow.  I agree with the resident/fellow?s medical decision making as documented in the note.     I personally evaluated the patient on 22-Sep-2023         Electronic Signatures:  Nhung Hall (MERT (Resident))  (Signed 22-Sep-2023 08:59)   Authored: History & Physical Reviewed, ERAS, Consent,  Note Completion  Roopa Rodriguez (DPSHRUTI)  (Signed 22-Sep-2023 09:02)   Authored: Note Completion   Co-Signer: History & Physical Reviewed, ERAS, Consent, Note Completion      Last Updated: 22-Sep-2023 09:02 by Roopa Rodriguez (MERT)

## 2023-09-30 NOTE — PROGRESS NOTES
Service: Infectious Disease     Subjective Data:   SHAMEKA SUN is a 78 year old Male who is Hospital Day # 5 and POD #1 for 1. right foot TMA;2. wound vac application.    Overnight Events: Acute events in the past 24 hours  include   Additional Information:    Patient is hemodynamically stable, afebrile on IV Vanco    For his PAD, he is status post revascularization 9/20 PTA of AT/DP, PTA of PT and lateral planter, Complete reconstruction of AT/PT and planter arch    For his gangrenous right toes , he is status post 9/22  1. transmetatarsal amputation,  right foot 2. incision bone cortex, right foot  3. wound vac application, right foot.  Reported clean margins of right foot with Healthy bleeding bone was appreciated at osteotomy  sites. Less bleeding was encountered than to be expected in setting of recent endovascular intervention.     9/22 R foot clean bone margin culture sent: stain 3+ Gram NEG Bacilli, Cultures in progress  9/22 R foot deep foot ulture sent: stain 2+ gran, NO ORG, culturer in progress     Objective Data:     Objective Information:      T   P  R  BP   MAP  SpO2   Value  36  85  17  130/81   100  94%  Date/Time 9/23 6:41 9/23 8:39 9/23 0:05 9/23 8:39  9/23 0:05 9/23 0:05  Range  (36C - 37C )  (67 - 107 )  (17 - 18 )  (109 - 178 )/ (51 - 105 )  (100 - 100 )  (94% - 99% )  Highest temp of 37 C was recorded at 9/23 0:05      Pain reported at 9/23 5:19: 8 = Severe    Physical Exam Narrative:  ·  Physical Exam:    Gen alert well appearing NAD pleasant (seen after OR)  HENT mmm, ncat  Eyes sclerae clear   CV rrr nl s1/2, + LUE avf with palpable thrill  Pulm CTAB anteriorly  Abd soft NT BS+  Ext +___   Skin no rash or other lesion  Psych nl mood nl affect good eye contact      Medication:    Medications:      1. Vancomycin - RPh to Dose - IV Piggy Back:  1  each  As Specified  Variable      ANTI-INFECTIVES:    1. Vancomycin 750 mg/ D5W IVPB Premixed Soln. 150 mL:  750  mg  IntraVenous  Piggyback  Following Every Dialysis      CARDIOVASCULAR AGENTS:    1. cloNIDine (CATAPRES):  0.2  mg  Oral  2 Times a Day   PRN       2. amLODIPine (NORVASC):  5  mg  Oral  Daily      CENTRAL NERVOUS SYSTEM AGENTS:    1. HYDROmorphone Injectable:  0.4  mg  IntraVenous Push  Every 4 Hours   PRN       2. oxyCODONE 5 mg - Acetaminophen 325 m  tablet(s)  Oral  Every 4 Hours   PRN       3. traMADol:  50  mg  Oral  Every 6 Hours   PRN         COAGULATION MODIFIERS:    1. Heparin 25,000 units/ D5W 250 mL Infusion..:  1600  units/hr  IntraVenous  <Continuous>      GASTROINTESTINAL AGENTS:    1. Polyethylene Glycol:  17  gram(s)  Oral  Daily      IMMUNOLOGIC AGENTS:    1. Influenza Virus (Inactive) HIGH DOSE Adult Vaccine:  0.7  mL  IntraMuscular  Once    2. Pneumococcal 23-Valent (PNEUMOVAX) Vaccine:  0.5  mL  IntraMuscular  Once      METABOLIC AGENTS:    1. Allopurinol:  100  mg  Oral  Every 24 Hours    2. Colchicine:  0.3  mg  Oral  <User Schedule>    3. Atorvastatin:  10  mg  Oral  Daily      MISCELLANEOUS AGENTS:    1. Cinacalcet:  30  mg  Oral  Daily    2. Lanthanum Carbonate:  750  mg  Oral  2 Times a Day With Meals      RESPIRATORY AGENTS:    1. Albuterol 2.5 mg/ 3 mL Nebulizer Soln:  3  mL  Inhalation  Every 6 Hours   PRN       2. Albuterol 90 micrograms/ Inhalation MDI:  2  inhalation  Inhalation  Every 4 Hours   PRN         TOPICAL AGENTS:    1. Menthol 0.44% - Zinc Oxide 20.625% Topical:  1  application(s)  Topical  Daily    2. Naphazoline 0.012% Ophthalmic:  1  drop(s)  Both Eyes  Every 4 Hours      Recent Lab Results:    Results:        I have reviewed these laboratory results:    Basic Metabolic Panel  23-Sep-2023 06:30:00      Result Value    Glucose, Serum  116   H   NA  132   L   K  4.7    CL  92   L   Bicarbonate, Serum  24    Anion Gap, Serum  21   H   BUN  51   H   CREAT  8.00   H   GFR Male  6   A   Calcium, Serum  8.4   L     Culture, Miscellaneous, includes Gram Stain  Trending View      Result  22-Sep-2023 10:31:00  22-Sep-2023 10:25:00    Gram Stain 1+ GRANULOCYTES. 3+ GRAM (-) BACILLI.   2+ GRANULOCYTES. NO ORGANISMS SEEN.          Assessment and Plan:   Daily Risk Screen:  ·  Does patient have an indwelling urinary catheter? n/a consulting service   ·  Does patient have a central line? n/a consulting service     Comorbidities:  ·  Comorbidity Other     Code Status:  ·  Code Status Full Code     Assessment:    SHAMEKA SUN is a 77 year old Male  PMH of ESRD (TTS) via left UE AV fistula, PAD  with poor RLE perfusion with now R toes digit 1,4,5 with ulcer/gangrene s/p repeated angioplasty procedures for which ID is consulted for R foot OM workup. Patient has been previously seen at outside hospital by vascular surgeon  Dr. Sanchez, [facility] and podiatrist Dr. Reese Solares .  He describes having a R cracked  heel, for these he underwent  PTA of right AT/PT on 4/12/2023 with Dr. Sanchez with the R  heel crack now almost healed.    He subsequently developed right foot severe pain and a dry gangrene ulcer over tip of right great toe. For this, he was  admitted again at [facility], and underwent a PTA of right AT on 8/30/2023 with Dr. Sanchez .  During the hospital stay (post intervention), he continued to have ischemic rest pain and digits 4/5 became gangrenous.  He had a foot x-ray that was questionable for osteomyelitis,  was  put on IV antibiotics during hospital stay, but was not discharged on any antibiotics. Representing now  9/19 of ischemic foot pain and gangreous R toes ulcers with ecchymosis. For his PAD, he is   now status post revascularization 9/20 PTA of AT/DP, PTA of PT and lateral planter, Complete reconstruction of AT/PT and planter arch for which ID is engaged to assess for R foot OM.    MICRO:   9/22 R foot clean bone margin culture sent: stain 3+ Gram NEG Bacilli, Cultures in progress  9/22 R foot deep foot ulture sent: stain 2+ gran, NO ORG, culturer in progress     Abx:   IV  vanc 9/20 - (renal dosing)    ID Problem:  R foot PAD with R foot swelling and ecchymosis of R distal foot, with dry gangrene on tip of R 1st great toe, partially on 4th toe and entire 5th toe.   - Admission R foot xray not fully helpful to assess if OM truly present, although there appear be some irregularity over the R distal 1st phalangeal joint, we found no correlative ulcer found there as suggested in xray. To best evaluate for OM, MRI would  be the imaging of choice. Some of his swelling likely also from recent procedure.     9/23 update:  -For his gangrenous right toes , he is now also status post 9/22 with podiatry   1. transmetatarsal amputation, right foot 2. incision bone cortex, right foot  3. wound vac application, right foot.  Reported clean margins of right foot with Healthy bleeding bone was appreciated at osteotomy sites.  Less bleeding was encountered than to be expected in setting of recent endovascular intervention.  R foot bone and tissue cultures sent .      RECOMMENDATION:  - Ok to continue renally dosed iv vanc    - Decision for MRI will weigh mostly Podiatry's assessment if they feel they have good source control from  recent TMA .    - A&I requested for PCN allergy testing. will followup, no note yet in chart    - Send MRSA swab (using same swab, swab nares armpit and groin)    - Will f/u OR TMA cultures    Thank you for consult; ID will follow  Pt seen and staffed with Attendant  Dr Quan Mendenhall MD MPH  Infectious Disease Fellow  TEAM B Pager: [phone]                  Attestation:   Note Completion:  I am a:  Resident/Fellow   Attending Attestation I saw and evaluated the patient.  I personally obtained the key and critical portions of the history and physical exam or was physically present for key and  critical portions performed by the resident/fellow. I reviewed the resident/fellow?s documentation and discussed the patient with the resident/fellow.  I agree with the  resident/fellow?s medical decision making as documented in the note.     I personally evaluated the patient on 23-Sep-2023         Electronic Signatures:  Mariia Mendenhall (Fellow))  (Signed 23-Sep-2023 18:04)   Authored: Service, Subjective Data, Objective Data, Assessment  and Plan, Note Completion  Cole Glass)  (Signed 24-Sep-2023 10:49)   Authored: Assessment and Plan, Note Completion   Co-Signer: Service, Subjective Data, Objective Data, Assessment and Plan, Note Completion      Last Updated: 24-Sep-2023 10:49 by Cole Glass)

## 2023-09-30 NOTE — H&P
Chief Complaint: tremors/shakes, nausea/eructation  HPI:   79 yo Male  PMH of AF on Eliquis, CHF (EF 37%), ESRD/HD (Tuesday Thursday Saturday, [facility] [place], Dr. Medel) via left UE AV fistula, Anemia (POA), SSSs/p PPM who is s/p reconstruction of AT/PT and plantar arch angioplasty with Dr. Linder on 9/20/21 and underwent post right foot transmetatarsal amputation transferred from the endovascular floor to the CICU with concerns for sepsis.     Briefly, patient presented for right toe gangrene and non-healing heel crack/ulcer despite endovascular intervention of PTA of right AT/PT on 4/12/2023 and right AT 8/30/2023 with Dr. Sanchez. He was then admitted to [facility] and underwent PTA of Rt AT/DP, PTA of PT and lateral plantar. Complete reconstruction of AT/PT and plantar arch angioplasty with Dr. Linder on 9/20/21 and underwent post right foot transmetatarsal amputation with wound vac placement on 9/22/2023 and wound vac removed on 9/25/2023.    Patient was to be discharged yesterday but per clinical event note:  Pt was scheduled to be discharge this evening but pt refused to go saying he has a new onset tremors and worried something major is wrong and needs to be worked up. Pt family also seem to endorse his symptoms saying he could not hold his food while eating dinner. Pt was seen 4 times today, at no point patient mentioned this symptoms until it was time to be discharged.  Pt stated he noticed the tremors last night but it went away so he did not tell anyone but this afternoon it seem to have returned worse than last night. Pt vital signs stable, afebrile, blood sugar 160, PE unremarkable, non-focal, no deficits noted, generalized weakness equal bilateral no change from baseline, mild tremor noted during examination. Pt/family asked if any of his medications including pain medication were causing the tremor. Nothing in particular can be r/I or r/o. Pain medication discontinued and not to be restarted.  No  additional testing requested at this time.  Transportation to [facility] cancelled. Pt will be observed overnight and if stable will be discharge to follow up outpatient. Plan was discussed with attending Dr. Linder.    This AM, patient had a code white call. Patient was found to be diaphoretic and tachycardic. He was reported abdominal distention, pain and nausea. Patient was found to have a temp of 94F, was in chronic afib but in RVR with rates in 120s,  (per code white nurse report), satting well on RA. Transferred to CICU w/ concerns for sepsis.   On arrival to CICU, patient had a HR of 100, /76 satting at 99% on RA.    He reported that he has been having tremors or 'shakes' of both hands since yesterday and unable to use his phone. He describes an overall feeling of being unwell since yesterday but unable to describe the feeling. He also reports abdominal distention, eructation and nausea w/ dry heaving since morning. He endorsed passing gas this morning and a very small BM 2 days ago. He denies chest pain, SOB, LH/dizziness, vomiting, hematochezia/ BRBPR,    Labs 9/28:  CBC WBC 12 Hb 9.7 Plt 225  RFP Na 134 K 4.6 Cl 92 HCO3 24 BUN 52 SCr     Imaging:   CXR IMPRESSION:1.  Decreased lung volumes with mild increased central pulmonary vascularity. Minor interstitial edema.    12 Point ROS was reviewed and negative unless otherwise stated in HPI    PMHx/PSHx: as above    Allergies: NKDA     Fam Hx: reviewed, NC    Soc Hx:  Alcohol: denies  Tobacco:  former, currently does not smoke  Illicits: denies      Physical Exam  Constitutional: Well developed, appeared uncomfortable w/ nausea/dry heaving into an emesis bag  Head/Neck: Neck supple,  No JVD,   Respiratory/Thorax: Patent airways, CTAB, diminished breath sounds at bases  Cardiovascular: mildly tachycardic, irregularly irregular rhythm, no murmurs, normal S 1 and S 2   Gastrointestinal: distended, soft, BS +, non tender  Psychological: Appropriate mood  and behavior   Skin: Warm and dry,   Extremities: Right foot in banadage w/out any oozing or bleeding or soakage. LLE no open sores  Lt arm AV fistula    Assessment:  79 yo Male  PMH of AF on Eliquis, CHF (EF 37%), ESRD/HD (Tuesday Thursday Saturday, [facility] [place], Dr. Medel) via left UE AV fistula, Anemia (POA), SSSs/p PPM who is s/p reconstruction of AT/PT and plantar arch angioplasty with Dr. Linder on 9/20/21 and underwent post right foot transmetatarsal amputation transferred from the endovascular floor to the CICU with concerns for sepsis after code white was called on finding patient diaphorertic, hypothermic (95F), afib (chronic) w/ RVR to 120s, BP stable. Patient reports feeling generally unwell, abdominal distention and w/ eructation. Will draw blood cultures, treat w/ vanc/zosyn for sepsis, consult podiatry to review foot wound and treat constipation.    NEURO  No active issues    CV  #HFrEF   #afib  #HTN  -c/w clopidogrel, eliquis, statin  -Hold amlodipine today given c/f sepsis, will restart tomorrow if BP allow  - Will r/v records for starting a BB tomorrow    PULM  No active issues    GI  #Constipation  #Eructation c/f GERD  -Start pantoprazole 40 daily  -f/u Xray abdomen  -Golytely ordred  -Can consider BMX    RENAL  #ESRD: on HD TTS  - continue with TTS HD Last session today 9/28 tolerated well  - c/w home lanthum   - Nephrology following  - Renal dose IV Abx  - Epo 5000 units on dialysis days    ID  #c/f sepsis  Presented with diaphoresis,   Plan:  -f/u BCX x 2 9/30  -discontinued cefepime  -Start Vanc and Zosyn  -podiatry consulted to review wound    #Rt foot/toe gangrene w/ OM s/p TMA w/ positive margins  - Per ID recs: Given positive cultures on bone margins: Treat for R foot residual OM with renally dosed IV Cefepime 2G every dialysis for total 6 weeks END DATE 12/3/2023  - For  C. Parapsilosis OM, treat concurrently with Oral Isavuconazole 372mg afterwards for total 6 weeks course from TMA  "operation date 9/22/23 - END DATE 12/7/2023.       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 Complete reconstruction of AT/PT and plantar arch angioplasty with Dr. Linder  - 9/22/2023 Status post right foot transmetatarsal amputation with wound vac placement  - 9/25/23 wound vac removed by podiatry, picture reviewed: some maceration on the plantar side of the flap, otherwise incision looks good.  - PVR RAJ done 9/27 : Right Lower PVR: No evidence of arterial occlusive disease in the right lower extremity at rest. Biphasic flow is noted in the right posterior tibial artery. Triphasic flow is noted in the right common femoral artery and right dorsalis pedis artery. Severity of disease called by PVR tracings and Doppler waveforms due to non-compressible vessels.  Plan:  - continue with Clopidogrel and Eliquis  - c/w Neurontin 100 mg three times a week on Dialysis days  -holding oxycodone for now with abdominal distention  - Consulted podiatry to review wound site and any focus of infection  -tylenol Prn for pain    #Gout  - c/w home Allopurinol daily   - Uric Acid Low 3.1  - Holding Colchicine due to possible severe interaction with Isavuconazole      Dispo: SNF  - PT/OT recs: AM-PAC score 11 The pt would benefit from moderate intensity therapy for 3-5 days per week to maximize functional mobility and independence.  - Pt and wife has selected SNF, precert pending.    Discharge planning:  - 1 month post procedure EVLS clinic with repeat testing  - Patient to follow up with Dr. Rodriguez after discharge at [address] [place] [address]. Please call [phone] to schedule an appointment within one week of discharge.   - Send weekly CBCdiff, CMP, CRP, ESR, while on IV systemic antibiotics and fax results to ID clinic at [phone] with \"Re: attention Dr Glass\"  - ID will arrange follow-up in 6 " weeks with Dr. Glass in ID clinic, Appointment will be in EMR chart      F: PRN  E: PRN  N: renal  GI: pantoprazole  DVT: home eliquis    Code Status: FULL (confirmed on admission)  Surrogate decision maker: Wife Julia [phone]

## 2023-09-30 NOTE — PROGRESS NOTES
Service: Nephrology     Subjective Data:   SHAMEKA SUN is a 78 year old Male who is Hospital Day # 9 and POD #5 for 1. right foot TMA;2. wound vac application.    Additional Information:    pt resting in bed. He denies sob, n/v/d, constipation, fever, cough, chills, chest pains, c/o  Pain to rt lower leg     Objective Data:     Objective Information:      T   P  R  BP   MAP  SpO2   Value  35.9  94  18  127/64   85  98%  Date/Time 9/27 11:15 9/27 11:15 9/27 7:24 9/27 11:15  9/27 11:15 9/27 11:15  Range  (35.9C - 36.8C )  (75 - 116 )  (18 - 18 )  (119 - 194 )/ (64 - 115 )  (83 - 127 )  (92% - 99% )      Pain reported at 9/27 8:00: 7 = Severe    Physical Exam by System:    Constitutional: awake/alert/oriented x3, no distress,  alert and cooperative   Respiratory/Thorax: jacqueline lung sounds slightly diminished   Cardiovascular: afib 88   Gastrointestinal: distended, soft, non-tender to  palpation   Genitourinary: anuric   Extremities: lt ankle with  minor pitting edema  rt foot amp with kerlix and acewrap drsg   Psychological: Appropriate mood and behavior   Skin: Warm and dry     Medication:    Medications:          Continuous Medications       --------------------------------  No continuous medications are active       Scheduled Medications       --------------------------------    1. Allopurinol:  100  mg  Oral  Every 24 Hours    2. amLODIPine (NORVASC):  5  mg  Oral  Daily    3. Apixaban:  2.5  mg  Oral  Every 12 Hours    4. Atorvastatin:  10  mg  Oral  Daily    5. Cinacalcet:  30  mg  Oral  Daily    6. Clopidogrel:  75  mg  Oral  Daily    7. Epoetin Jovanny (ESRD) Injectable:  5000  unit(s)  IntraVenous Push  <User Schedule>    8. Gabapentin:  100  mg  Oral  With Every Dialysis Treatment    9. Influenza Virus (Inactive) HIGH DOSE Adult Vaccine:  0.7  mL  IntraMuscular  Once    10. Isavuconazonium Sulfate:  372  mg  Oral  Every 8 Hours    11. Lanthanum Carbonate:  750  mg  Oral  2 Times a Day With Meals    12.  Loratadine:  10  mg  Oral  Daily    13. Menthol 0.44% - Zinc Oxide 20.625% Topical:  1  application(s)  Topical  Daily    14. Multivitamin Renal:  1  capsule(s)  Oral  Daily    15. Naphazoline 0.012% Ophthalmic:  1  drop(s)  Both Eyes  Every 4 Hours    16. Pneumococcal 23-Valent (PNEUMOVAX) Vaccine:  0.5  mL  IntraMuscular  Once    17. Polyethylene Glycol:  17  gram(s)  Oral  Daily         PRN Medications       --------------------------------    1. Acetaminophen:  650  mg  Oral  Every 6 Hours    2. Albuterol 2.5 mg/ 3 mL Nebulizer Soln:  3  mL  Inhalation  Every 6 Hours    3. Albuterol 90 micrograms/ Inhalation MDI:  2  inhalation  Inhalation  Every 4 Hours    4. cloNIDine (CATAPRES):  0.2  mg  Oral  2 Times a Day    5. oxyCODONE Immediate Release:  7.5  mg  Oral  Every 4 Hours        Recent Lab Results:    Results:    CBC: 9/27/2023 07:51              \     Hgb     /                              \     10.4 L    /  WBC  ----------------  Plt               11.0       ----------------    190              /     Hct     \                              /     35.7 L    \            RBC: 3.44 L    MCV: 104 H          BMP: 9/27/2023 07:51  NA+        Cl-     BUN  /                         135 L   94 L    38 H /  --------------------------------  Glucose                ---------------------------  135 H    K+     HCO3-   Creat \                         4.3  25    6.74 H \  Calcium : 8.8     Anion Gap : 20      Assessment and Plan:   Code Status:  ·  Code Status Full Code     Assessment:    ESRD-HD: who was referred to us by Dr. Sanchez, [facility], for consideration for revascularization    Tolerated hemodialysis yesterday with 1500ml ultrafiltration    Is hemodynamically stable, hypervolemic on exam and has stable electrolytes.  HTN during tx     Outpatient Dialysis schedule: TTS at [facility] [place]  (Primary Nephrologist Dr. Medel    )     Access: lt ua fist with +thrill/bruit  - no issues - able to achieve prescribed BFR  400    Anemia of ESRD: 9/25-Erythropoietin 5000 units  to be given on dialysis days..current hgb 10.4..will cont to monitor..(not on HILDA at outpt unit)    CKD-MBD: Phosphate binder: Cinacalcet:  30  mg  Oral  Daily, 1 cap Daily renal vitamin    Plan HD tomorrow with UF as tolerated    Renal diet     Please obtain daily standing wt (if possible)    Medication to be adjusted for ESRD     Patient to continue regular HD schedule while inpatient and to follow with the outpatient nephrologist at discharge        Electronic Signatures:  Portia Urias (APRN-CNP)  (Signed 27-Sep-2023 14:59)   Authored: Service, Subjective Data, Objective Data, Assessment  and Plan, Note Completion      Last Updated: 27-Sep-2023 14:59 by Portia Urias (APRN-CNP)

## 2023-09-30 NOTE — SIGNIFICANT EVENT
09/30/23 1041   Onset Documentation   Rapid Response Initiated By RN   Location/Room [facility]  (LT7)   Pager Time 1020   Arrival Time 1022   Primary Reason for Call Sepsis  - see sepsis narrator         Rapid Response Note  /83 (Patient Position: Lying)   Pulse 101   Temp 34.9 °C (94.8 °F)   Resp 18   SpO2 99%       Rapid Response called for concern for sepsis.  Per report patient tachycardic and diaphoretic. When I arrived to the bedside the patient was cool and clammy, in Afib but rate controlled, and normotensive.  Attending Dr. Linder concerned that patient is septic so patient to be transferred to the CICU.

## 2023-09-30 NOTE — PROGRESS NOTES
Service: Interventional Cardiology     Subjective Data:   SHAMEKA SUN is a 78 year old Male who is Hospital Day # 6 and POD #2 for 1. right foot TMA;2. wound vac application.    Additional Information:    Suboptimal pain control at the foot. No other issues    Objective Data:     Objective Information:      T   P  R  BP   MAP  SpO2   Value  36.5  97  15  146/81   103  97%  Date/Time 9/24 9:01 9/24 9:01 9/24 9:01 9/24 9:01 9/24 9:01 9/24 9:01  Range  (36C - 37.1C )  (67 - 105 )  (15 - 20 )  (109 - 170 )/ (67 - 104 )  (100 - 125 )  (94% - 100% )  Highest temp of 37.1 C was recorded at 9/23 16:00      Pain reported at 9/24 2:39: 7 = Severe    Physical Exam Narrative:  ·  Physical Exam:    Physical Exam  Gen: Alert, awake, O x 3  Head: no evidence of trauma  Neck: no JVD  CVS: RRR, no murmur, no leg edema  Lungs: bilateral clear to auscultation  Abd: soft, NT, ND   Extremities: right foot dressed, leg warm; AVF left arm  Neuro: CN II-XII intact, 5/5 bilateral      Medication:    Medications:          Continuous Medications       --------------------------------    1. Heparin 25,000 units/ D5W 250 mL Infusion..:  1600  units/hr  IntraVenous  <Continuous>         Scheduled Medications       --------------------------------    1. Allopurinol:  100  mg  Oral  Every 24 Hours    2. amLODIPine (NORVASC):  5  mg  Oral  Daily    3. Atorvastatin:  10  mg  Oral  Daily    4. Cinacalcet:  30  mg  Oral  Daily    5. Clopidogrel:  75  mg  Oral  Daily    6. Colchicine:  0.3  mg  Oral  <User Schedule>    7. Influenza Virus (Inactive) HIGH DOSE Adult Vaccine:  0.7  mL  IntraMuscular  Once    8. Lanthanum Carbonate:  750  mg  Oral  2 Times a Day With Meals    9. Menthol 0.44% - Zinc Oxide 20.625% Topical:  1  application(s)  Topical  Daily    10. Naphazoline 0.012% Ophthalmic:  1  drop(s)  Both Eyes  Every 4 Hours    11. Pneumococcal 23-Valent (PNEUMOVAX) Vaccine:  0.5  mL  IntraMuscular  Once    12. Polyethylene Glycol:  17   gram(s)  Oral  Daily    13. Vancomycin - RPh to Dose - IV Piggy Back:  1  each  As Specified  Variable    14. Vancomycin 750 mg/ D5W IVPB Premixed Soln. 150 mL:  750  mg  IntraVenous Piggyback  Following Every Dialysis         PRN Medications       --------------------------------    1. Acetaminophen:  650  mg  Oral  Every 6 Hours    2. Albuterol 2.5 mg/ 3 mL Nebulizer Soln:  3  mL  Inhalation  Every 6 Hours    3. Albuterol 90 micrograms/ Inhalation MDI:  2  inhalation  Inhalation  Every 4 Hours    4. cloNIDine (CATAPRES):  0.2  mg  Oral  2 Times a Day    5. oxyCODONE Immediate Release:  7.5  mg  Oral  Every 4 Hours        Recent Lab Results:    Results:    Coagulation: 9/24/2023 08:40  PT  /                              /  -------<    INR          ----------<                PTT\                              \            Heparin Assay: 0.6             I have reviewed these laboratory results:    Basic Metabolic Panel  23-Sep-2023 06:30:00      Result Value    Glucose, Serum  116   H   NA  132   L   K  4.7    CL  92   L   Bicarbonate, Serum  24    Anion Gap, Serum  21   H   BUN  51   H   CREAT  8.00   H   GFR Male  6   A   Calcium, Serum  8.4   L     Complete Blood Count  23-Sep-2023 06:30:00      Result Value    White Blood Cell Count  11.4   H   Nucleated Erythrocyte Count  0.0    Red Blood Cell Count  3.24   L   HGB  10.3   L   HCT  31.6   L   MCV  98    MCHC  32.6    PLT  181    RDW-CV  14.6   H       Assessment and Plan:   Comorbidities:  ·  Comorbidity Other     Code Status:  ·  Code Status Full Code     Assessment:    admitted SHAMEKA is a 77 year old Male  PMH of AF on Eliquis, CHF (EF 37%), ESRD/HD (Tuesday Thursday Saturday, [facility] Dr. Gianfranco Alcocer) via left UE AV fistula,  SSS, status post pacemaker, non smoker, nondiabetic, who was referred to us by Dr. Sanchez, [facility], for consideration for revascularization, in the setting of right toe gangrene (podiatrist Dr. Reese Solares).  Patient presented to clinic  with his wife and  son, stating he had a longstanding right heel crack and underwent PTA of right AT/PT on 4/12/2023 with Dr. Sanchez.  The heel crack almost healed.  He then developed right foot severe pain and great toe discoloration in August, was admitted at [facility], and underwent a PTA of right AT on 8/30/2023 with Dr. Sanchez, and  was told there were no other options available for him.  During that admission hes toes became gangrenous.  He had a foot x-ray that was questionable for osteomyelitis,  was  put on IV antibiotics during hospital stay, but was not discharged on any antibiotics.  Patient is complaining of severe rest pain at night that wakes him up from sleep, and  swelling of the foot.  Up to mid August he was able to walk and drive.  He denies CP/SOB.  RAJ/TBI shows impaired blood flow to the RLE: 0.69/0.3, Lt NC/0.8.  We have reviewed the images from angioplasties from [facility] (images in PACS).  Given above presentation, pt is at risk of major limb loss, if procedure is delayed.  Pt was directly admitted  to the hospital, for emergent angioplasty .    # Right CLI/RC-VI toe gangrene:  - 9/20/21 s/p PTA of Rt AT/DP, PTA of PT and lateral plantar. Complete reconstruction of AT/PT and plantar arch angioplasty with Dr. Linder, via Rt antergrade Rt CFA access  - Status post right foot transmetatarsal amputation 9/22/2023    -The patient is on heparin infusion perioperatively;  -Clopidogrel restarted 9/23/23 --> d/w podiatry --> will stop heparin drip and start eliquis 2.5 twice a day  -Increase oxycodone to 7.5 mg q4h as needed; stop Dilaudid and tramadol, continue tylenol  -Continue to hold Eliquis while inpatient --> plan to discharge on Eliquis and clopidogrel  -Continue as per infectious disease team --> on vancomycin  - PCN desensitization need per ID if long term abx --> d/w podiatry --> continue abx currently , they will advise on duration once final c/s are back      #ESRD: on HD  TTS  - HD done today  - c/w home daily Nephrocaps, Sensipar and Fosrenol   - Nephrology following  - Renal dose IV Abx    #HTN  - Started Amlodipine 5 mg daily  - c/w as needed Clonidine 0.2 twice daily for SBP > 180    #Gout  - c/w home Allopurinol daily  - c/w Colchicine Mon and Thu  - Uric Acid Low 3.1        Attestation:   Note Completion:  I am a:  Resident/Fellow             Attestation:   Note Completion:  I am a:  Resident/Fellow   Attending Attestation I saw and evaluated the patient.  I personally obtained the key and critical portions of the history and physical exam or was physically present for key and  critical portions performed by the resident/fellow. I reviewed the resident/fellow?s documentation and discussed the patient with the resident/fellow.  I agree with the resident/fellow?s medical decision making as documented in the note.     I personally evaluated the patient on 24-Sep-2023         Electronic Signatures:  Constance Crockett)  (Signed 24-Sep-2023 19:21)   Authored: Note Completion   Co-Signer: Service, Subjective Data, Objective Data, Assessment and Plan, Note Completion  Herbert Castillo (Fellow))  (Signed 24-Sep-2023 12:24)   Authored: Service, Subjective Data, Objective Data, Assessment  and Plan, Note Completion      Last Updated: 24-Sep-2023 19:21 by Constance Crockett)

## 2023-09-30 NOTE — PROGRESS NOTES
Service: Cardiology     Subjective Data:   SHAMEKA SUN is a 78 year old Male who is Hospital Day # 8 and POD #4 for 1. right foot TMA;2. wound vac application.    Overnight Events: Patient had an uneventful night.   Additional Information:    Pt is laying comfortably in bed, c/o foot pain   Pt states the pain was better controlled overnight.    Objective Data:     Objective Information:      T   P  R  BP   MAP  SpO2   Value  36.2  84  18  171/96   122  92%  Date/Time 9/26 10:39 9/26 10:56 9/26 4:31 9/26 10:56  9/26 4:34 9/26 4:34  Range  (36C - 37.1C )  (75 - 116 )  (18 - 19 )  (128 - 194 )/ (71 - 115 )  (94 - 127 )  (92% - 100% )  Highest temp of 37.1 C was recorded at 9/25 16:30      Pain reported at 9/26 6:30: 0 = None    Physical Exam Narrative:  ·  Physical Exam:    Physical Exam  Gen: Alert, awake, O x 3  Head: no evidence of trauma  Neck: no JVD  CVS: RRR, no murmur, no leg edema  Lungs: bilateral clear to auscultation  Abd: soft, NT, ND   Extremities: right foot dressed, leg warm; AVF left arm  Neuro: CN II-XII intact, 5/5 bilateral      Medication:    Medications:          Continuous Medications       --------------------------------  No continuous medications are active       Scheduled Medications       --------------------------------    1. Allopurinol:  100  mg  Oral  Every 24 Hours    2. amLODIPine (NORVASC):  5  mg  Oral  Daily    3. Apixaban:  2.5  mg  Oral  Every 12 Hours    4. Atorvastatin:  10  mg  Oral  Daily    5. Cefepime 2 gram IVPB/ Premixed Soln 100 mL:  100  mL  IntraVenous Piggyback  With Every Dialysis Treatment    6. Cinacalcet:  30  mg  Oral  Daily    7. Clopidogrel:  75  mg  Oral  Daily    8. Colchicine:  0.3  mg  Oral  <User Schedule>    9. Epoetin Jovanny (ESRD) Injectable:  5000  unit(s)  IntraVenous Push  <User Schedule>    10. Gabapentin:  100  mg  Oral  With Every Dialysis Treatment    11. Influenza Virus (Inactive) HIGH DOSE Adult Vaccine:  0.7  mL  IntraMuscular  Once     12. Lanthanum Carbonate:  750  mg  Oral  2 Times a Day With Meals    13. Menthol 0.44% - Zinc Oxide 20.625% Topical:  1  application(s)  Topical  Daily    14. Multivitamin Renal:  1  capsule(s)  Oral  Daily    15. Naphazoline 0.012% Ophthalmic:  1  drop(s)  Both Eyes  Every 4 Hours    16. Pneumococcal 23-Valent (PNEUMOVAX) Vaccine:  0.5  mL  IntraMuscular  Once    17. Polyethylene Glycol:  17  gram(s)  Oral  Daily         PRN Medications       --------------------------------    1. Acetaminophen:  650  mg  Oral  Every 6 Hours    2. Albuterol 2.5 mg/ 3 mL Nebulizer Soln:  3  mL  Inhalation  Every 6 Hours    3. Albuterol 90 micrograms/ Inhalation MDI:  2  inhalation  Inhalation  Every 4 Hours    4. cloNIDine (CATAPRES):  0.2  mg  Oral  2 Times a Day    5. oxyCODONE Immediate Release:  7.5  mg  Oral  Every 4 Hours        Recent Lab Results:    Results:    CBC: 9/26/2023 06:31              \     Hgb     /                              \     10.1 L    /  WBC  ----------------  Plt               11.5 H    ----------------    178              /     Hct     \                              /     30.7 L    \            RBC: 3.17 L    MCV: 97           BMP: 9/26/2023 06:31  NA+        Cl-     BUN  /                         134 L   92 L    61 H /  --------------------------------  Glucose                ---------------------------  143 H    K+     HCO3-   Creat \                         4.6  25    9.79 H \  Calcium : 8.5 L    Anion Gap : 22 H      CMP: 9/25/2023 16:08  NA+        Cl-     BUN  /                         135 L   94 L    51 H /  --------------------------------  Glucose                ---------------------------  167 H    K+     HCO3-   Creat \                         4.5    23    8.66 H \           \  T Bili  /                    \  0.7  /  AST  x ---- x ALT        18 x ---- x 21         /  Alk P   \               /  127  \  Calcium : 8.4 L    Anion Gap : 23 H     Albumin : 3.4     T Protein : 6.2  L          Assessment and Plan:        Additional Dx:   History of allergy to penicillin: Entered Date: 25-Sep-2023  17:16   Gangrene of toe of right foot: Entered Date: 19-Sep-2023  17:11   Critical limb ischemia with history of revascularization of same extremity : Entered Date: 19-Sep-2023 17:09       Surg History:   S/P transmetatarsal amputation of foot: Entered Date: 22-Sep-2023  16:24   S/P peripheral artery angioplasty: Entered Date: 20-Sep-2023  17:06    Comorbidities:  ·  Comorbidity anemia   ·  Anemia anemia of chronic disease     Code Status:  ·  Code Status Full Code     Assessment:    77 yo Male  PMH of AF on Eliquis, CHF (EF 37%), ESRD/HD (Tuesday Thursday Saturday, [facility] [place], Dr. Medel) via left UE AV fistula, Anemia (POA), SSS, status post  pacemaker, non smoker, nondiabetic, who was referred to us by Dr. Sanchez, [facility], for consideration for revascularization, in the setting of right toe gangrene (podiatrist Dr. Reese Solares).  Patient presented to clinic with his wife and son, stating he  had a longstanding right heel crack and underwent PTA of right AT/PT on 4/12/2023 with Dr. Sanchez.  The heel crack almost healed.  He then developed right foot severe pain and great toe discoloration in August, was admitted at [facility], and  underwent a PTA of right AT on 8/30/2023 with Dr. Sanchez, and  was told there were no other options available for him.  During that admission his toes became gangrenous.  He had a foot x-ray that was questionable for osteomyelitis,  was put on IV antibiotics  during hospital stay, but was not discharged on any antibiotics. Up to mid August he was able to walk and drive. He denies CP/SOB.  Pre procedure RAJ/TBI showed impaired blood flow to the RLE: 0.69/0.3, Lt NC/0.8.  We have reviewed the images from angioplasties from [facility] (images in PACS).  Given above presentation,  pt was directly admitted to the hospital, for emergent angioplasty .    #Right CLI/RC-VI toe  "gangrene:  - 9/20/21 s/p PTA of Rt AT/DP, PTA of PT and lateral plantar. Complete reconstruction of AT/PT and plantar arch angioplasty with Dr. Linder,  - 9/22/2023 Status post right foot transmetatarsal amputation  with wound vac placement  - 9/25/23 wound vac removed by Podiatry  - continue with Clopidogrel and Eliquis  - Increased oxycodone to 7.5 mg q4h as needed for better pain control  - will add Neurontin 100 mg three times a week on Dialysis days  - Per ID recs: Given positive cultures on bone margins: Treat for R foot residual OM with renally dosed IV Cefepime 2G every Tuesday HD/ 2G every  Thurs HD/ 3G every Saturday HD for thrice weekly infusion for total 6 weeks course from [facility] operation date 9/22/23 - END DATE 12/3/2023 . Will followup in ID clinic for next antibiotics steps.  - For  C. Parapsilosis OM, treat concurrently with Oral Isavuconazole 372mg Q8H for 2 days (LOADING DOSE), then start Isavuconazole 372mg Q24H  afterwards for total 6 weeks course from from [facility] operation date 9/22/23 - END DATE 12/3/2023.   - Send weekly CBCdiff, CMP, CRP, ESR, while on IV systemic antibiotics  and fax results to ID clinic at [phone] with \"Re: attention Dr Glass\"  - ID will arrange follow-up in 6  weeks with Dr. Glass in ID clinic, Appointment will be in EMR chart      #Rt TMA:  - dressing changed 9/25 by podiatry, picture reviewed: some maceration on the plantar side of the flap, otherwise incision looks good.  - [facility] flap noted to have some maceration but no dusky discoloration. Retention sutures intact to surgical site. Mild federica-incision erythema. No active drainage, no calor, no malodor, no ascending lymphangitis.   - Recommend every other day dressing changes with Betadine soaked 4x4s, ABD, kerlix, light ace.   - Weight bearing as tolerated to HEEL ONLY in surgical shoe- ordered  - Leg elevation to control edema and maceration  - Cleared for discharge from a podiatry standpoint.   - Patient will " require SNF placement vs home health care for regular dressing changes.    - Patient to follow up with Dr. Rodriguez after discharge at [address] [place] [address]. Please call [phone] to schedule an appointment within one week of discharge.     #History of Penicillin allergy:  - per pt he last took Penicillin >40 years ago and developed itchiness and warmth to the skin, (uncertain of how soon after reaction occurred) without any cardiovascular-respiratory symptoms or compromise.  - Allergy Immunology consulted for PCN challenge  - Pt passed Augmentin challenge on 9/25/23  - He is no longer allergic to Penicillin. Penicillin allergy removed from his chart  - ID/Primary team are free to start Augmentin 875 at any time  - Challenges prove that there was no immediate reaction, but this cannot exclude a delayed reaction in the future  - will monitor, Anaphylaxis order set placed for possible reaction      #ESRD: on HD TTS  - continue with TTS HD  - c/w home daily Nephrocaps, Sensipar and Fosrenol   - Nephrology following  - Renal dose IV Abx    #HTN  - Started Amlodipine 5 mg daily  - c/w as needed Clonidine 0.2 twice daily for SBP > 180    #Gout  - c/w home Allopurinol daily  - D/c'd Colchicine due to possible severe interaction with Isavuconazole   - Uric Acid Low 3.1    #Environmental allergy  - pt take Claritin at home  - scratchy throat and hoarseness   - will order    Dispo:  - PT/OT recs: The pt would benefit from moderate intensity therapy for 3-5 days per week. The pt would benefit from continued PT to maximize functional mobility and independence.      Aruna Vazquez PA-C, MPAS  Pager # [phone] or Doc-[facility]  Endovascular /Limb Salvage Team, Pager #[phone] for day coverage (8am-5pm)  Interventional Cardiology Fellow, Pager # [phone], for night and weekend coverage  [id number]             Electronic Signatures:  Aruna Vazquez (PAC)  (Signed 26-Sep-2023 18:02)   Authored: Service,  Subjective Data, Objective Data, Assessment  and Plan, Note Completion      Last Updated: 26-Sep-2023 18:02 by Aruna Vazquez (PAC)

## 2023-09-30 NOTE — PROGRESS NOTES
Service: Cardiology     Subjective Data:   SHAMEKA SUN is a 78 year old Male who is Hospital Day # 3.    Overnight Events: Acute events in the past 24 hours  include   Additional Information:    Pt was seen and assessed at dialysis, lying in bed. Pt complained of Rt foot and ankle pain. He said it may be his gout acting up again, because sometimes when he  has flare-ups it feels the same way. The pain medication is working but not lasting long.    9/20/23 s/p PTA of Rt AT/DP, PTA of PT and lateral plantar. Complete reconstruction  of AT/PT and plantar arch angioplasty with Dr. Linder, via Rt antegrade Rt CFA access.   Discussed plan with Pt of amputation of gangrene toes 4 & 5 and possibly needing TMA tomorrow but final to be determined by podiatry.         Objective Data:     Objective Information:      T   P  R  BP   MAP  SpO2   Value  36  79  18  164/94   97  99%  Date/Time 9/21 6:27 9/21 10:30 9/21 3:18 9/21 10:30  9/20 7:44 9/21 3:18  Range  (35.9C - 36.3C )  (69 - 124 )  (17 - 18 )  (130 - 195 )/ (77 - 106 )  (95 - 103 )  (95% - 99% )      Pain reported at 9/20 21:00: 7 = Severe    Physical Exam Narrative:  ·  Physical Exam:    Constitutional: Well developed, NAD, awake/alert/oriented x3, pleasant, cooperative   Head/Neck: Neck supple,  No JVD, trachea midline, no bruits   Respiratory/Thorax: Patent airways, CTAB, diminished breath sounds bases, thorax symmetric   Cardiovascular: irregularly irregular rhythm, no murmurs, normal S 1 and S 2   Gastrointestinal: Nondistended, soft, non-tender, +BS,    Skin: Warm and dry,   Extremities: Rt foot edema +1, dependent rubor, toes 1-3 with ischemic skin changes, toes 4-5 dry gangrene, no maceration of webspaces, no foul odor, no drainage, skin warm to touch, Rt medial heel dry skin fissure with granular base. Multiphasic doppler  signal of Rt PT/DP/distal AT. Absence of right toenails 1-5 due to permanent removal in past.  LLE no open sores, Lt leg 1+  edema  Left toenails 1-5 within normal limits. Left webspaces clean, dry, intact.   Lt arm AV fistula.  Psychological: Appropriate mood and behavior    Medication:    Medications:          Continuous Medications       --------------------------------    1. Heparin 25,000 units/ D5W 250 mL Infusion..:  1600  units/hr  IntraVenous  <Continuous>         Scheduled Medications       --------------------------------    1. Allopurinol:  100  mg  Oral  Every 24 Hours    2. Atorvastatin:  10  mg  Oral  Daily    3. Cinacalcet:  30  mg  Oral  Daily    4. Clopidogrel:  75  mg  Oral  Daily    5. Colchicine:  0.3  mg  Oral  2 Times a Week    6. Docusate:  100  mg  Oral  2 Times a Day    7. Influenza Virus (Inactive) HIGH DOSE Adult Vaccine:  0.7  mL  IntraMuscular  Once    8. Lanthanum Carbonate:  750  mg  Oral  2 Times a Day With Meals    9. Menthol 0.44% - Zinc Oxide 20.625% Topical:  1  application(s)  Topical  Daily    10. Multivitamin Renal:  1  capsule(s)  Oral  Daily    11. Paricalcitol Injectable:  3  microgram(s)  IntraVenous Push  <User Schedule>    12. Pneumococcal 23-Valent (PNEUMOVAX) Vaccine:  0.5  mL  IntraMuscular  Once    13. Polyethylene Glycol:  17  gram(s)  Oral  Daily         PRN Medications       --------------------------------    1. Acetaminophen:  650  mg  Oral  Every 4 Hours    2. Albuterol 2.5 mg/ 3 mL Nebulizer Soln:  3  mL  Inhalation  Every 6 Hours    3. Albuterol 90 micrograms/ Inhalation MDI:  2  inhalation  Inhalation  Every 4 Hours    4. Magnesium Hydroxide -Al Hydrox -Simethicone Oral Liquid:  30  mL  Oral  Every 6 Hours    5. oxyCODONE 5 mg - Acetaminophen 325 m  tablet(s)  Oral  Every 4 Hours    6. traMADol:  50  mg  Oral  Every 6 Hours        Recent Lab Results:    Results:        I have reviewed these laboratory results:    Heparin assay, [facility]  21-Sep-2023 09:00:00      Result Value    Heparin assay, U[facility]  0.3      Basic Metabolic Panel  21-Sep-2023 06:31:00      Result Value    Glucose,  Serum  131   H   NA  131   L   K  4.7    CL  89   L   Bicarbonate, Serum  22    Anion Gap, Serum  25   H   BUN  64   H   CREAT  8.53   H   GFR Male  6   A   Calcium, Serum  9.2        Assessment and Plan:   Code Status:  ·  Code Status Full Code     Assessment:    admitted SHAMEKA is a 77 year old Male  PMH of AF on Eliquis, CHF (EF 37%), ESRD/HD (Tuesday Thursday Saturday, [facility], Dr. Medel) via left UE AV fistula,  SSS, status post pacemaker, non smoker, nondiabetic, who was referred to us by Dr. Sanchez, [facility], for consideration for revascularization, in the setting of right toe gangrene (podiatrist Dr. Reese Solares).  Patient presented to clinic with his wife and  son, stating he had a longstanding right heel crack and underwent PTA of right AT/PT on 4/12/2023 with Dr. Sanchez.  The heel crack almost healed.  He then developed right foot severe pain and great toe discoloration in August, was admitted at [facility], and underwent a PTA of right AT on 8/30/2023 with Dr. Sanchez, and  was told there were no other options available for him.  During that admission hes toes became gangrenous.  He had a foot x-ray that was questionable for osteomyelitis,  was  put on IV antibiotics during hospital stay, but was not discharged on any antibiotics.  Patient is complaining of severe rest pain at night that wakes him up from sleep, and  swelling of the foot.  Up to mid August he was able to walk and drive.  He denies CP/SOB.  RAJ/TBI shows impaired blood flow to the RLE: 0.69/0.3, Lt NC/0.8.  We have reviewed the images from angioplasties from [facility] (images in PACS).  Given above presentation, pt is at risk of major limb loss, if procedure is delayed.  Pt was directly admitted  to the hospital, for emergent angioplasty .    # Right CLI/RC-VI toe gangrene:  - 9/20/21 s/p PTA of Rt AT/DP, PTA of PT and lateral plantar. Complete reconstruction of AT/PT and plantar arch angioplasty with Dr. Linder, via Rt  antergrade Rt CFA access  - pt was loaded with  mg and Plavix 300 md PO post procedure  - resumed Heparin drip and d/c ASA this AM  - continue with Plavix and Hep gtt for now will transition to Eliquis and Plavix at discharge  - continue holding Eliquis while inpt for procedures  - pain management Percocet, Tramadol    #Right foot toe 4&5 dry  gangrene, also with ischemic changes of 1-3  - podiatry following, plan for Right TMA tomorrow Friday 9/22/23  - NPO at [facility]  - T&S ordered  - Dressings right foot: betadine 4x4, dry 4x4, ABD on heel, Kerlix. Nursing may reinforce or change as needed.   - d/c Vanc yesterday 9/20 as Pt has no sign and symptom of infection WBC 6.7 Lactate 0.9 and Rt Foot Xray negative for osteo  - leg elevation and offloading for edema control and pressure ulcer prevention    #ESRD: on HD TTS  - HD done today  - c/w home daily Nephrocaps, Sensipar and Fosrenol   - Nephrology following    #Gout  - c/w home Allopurinol daily  - Colchicine Mon and Thu  - Uric Acid serum ordered     Diet: Renal, Diabetic  Access: PIV, LUE AV fistula   DVT ppx: Hep gtt  IVF: None  Code status: Full Code             Plan of Care Reviewed With:  Plan of Care Reviewed With: patient; spouse; son     Attestation:   Note Completion:  Provider/Team Pager EDWIN Martin  Endovascular/Limb Salvage Team  Day pager [phone]/Night HVI [phone]/Weekends pager [phone]  [facility]         Electronic Signatures:  Flash Malik (APRN-CNP)  (Signed 21-Sep-2023 14:27)   Authored: Service, Subjective Data, Objective Data, Assessment  and Plan, Note Completion      Last Updated: 21-Sep-2023 14:27 by Flash Malik (APRN-CNP)

## 2023-09-30 NOTE — PROGRESS NOTES
Service: Podiatry     Subjective Data:   SHAMEKA SUN is a 78 year old Male who is Hospital Day # 7 and POD #3 for 1. right foot TMA;2. wound vac application.     Patient seen at bedside, resting comfortably in bed. Patient admits to intermittent 7/10 pain to his right foot. Denies constitutional symptoms.    Objective Data:     Objective Information:      T   P  R  BP   MAP  SpO2   Value  36.4  101  18  128/80   96  100%  Date/Time 9/25 7:50 9/25 7:50 9/25 7:50 9/25 7:50  9/25 7:50 9/25 7:50  Range  (36C - 37C )  (86 - 116 )  (14 - 19 )  (128 - 164 )/ (76 - 99 )  (95 - 121 )  (95% - 100% )  Highest temp of 37 C was recorded at 9/24 11:27      Pain reported at 9/25 4:15: 8 = Severe    Physical Exam Narrative:  ·  Physical Exam:    Surgical dressing and incisional wound vac left in place to right foot.     General: Well-appearing. No acute distress. Cooperative. Pleasant.     Right focused:    Vascular: Faintly palpable DP/PT pulses. Mild edema of right foot. Skin temperature warm to warm from proximal to distal    Neuro: Light touch diminished pain stimuli intact.     MSK: Right ankle active ROM decreased.     Derm: Wound vac removed this visit, <50mLs. TMA flap noted to have some maceration but no dusky discoloration. Retention sutures intact to surgical site. Mild federica-incision erythema. No active drainage, no calor, no malodor, no ascending lymphangitis.     Lymphatic: No streaking from wound sites or otherwise.     Resp: Unlabored breathing.     Psych: Normal mood and affect. Communicating normally.      Medication:    Medications:          Continuous Medications       --------------------------------  No continuous medications are active       Scheduled Medications       --------------------------------    1. Allopurinol:  100  mg  Oral  Every 24 Hours    2. amLODIPine (NORVASC):  5  mg  Oral  Daily    3. Apixaban:  2.5  mg  Oral  Every 12 Hours    4. Atorvastatin:  10  mg  Oral  Daily    5.  Cinacalcet:  30  mg  Oral  Daily    6. Clopidogrel:  75  mg  Oral  Daily    7. Colchicine:  0.3  mg  Oral  <User Schedule>    8. HYDROmorphone Injectable:  0.2  mg  IntraVenous Push  Once    9. Influenza Virus (Inactive) HIGH DOSE Adult Vaccine:  0.7  mL  IntraMuscular  Once    10. Lanthanum Carbonate:  750  mg  Oral  2 Times a Day With Meals    11. Menthol 0.44% - Zinc Oxide 20.625% Topical:  1  application(s)  Topical  Daily    12. Meropenem IV Piggy Back:  500  mg  IntraVenous Piggyback  Every 24 Hours    13. Multivitamin Renal:  1  capsule(s)  Oral  Daily    14. Naphazoline 0.012% Ophthalmic:  1  drop(s)  Both Eyes  Every 4 Hours    15. Pneumococcal 23-Valent (PNEUMOVAX) Vaccine:  0.5  mL  IntraMuscular  Once    16. Polyethylene Glycol:  17  gram(s)  Oral  Daily    17. Vancomycin - DISCONTINUED - RPh to Dose:  1  each  As Specified  Variable    18. Vancomycin 750 mg/ D5W IVPB Premixed Soln. 150 mL:  750  mg  IntraVenous Piggyback  Following Every Dialysis         PRN Medications       --------------------------------    1. Acetaminophen:  650  mg  Oral  Every 6 Hours    2. Albuterol 2.5 mg/ 3 mL Nebulizer Soln:  3  mL  Inhalation  Every 6 Hours    3. Albuterol 90 micrograms/ Inhalation MDI:  2  inhalation  Inhalation  Every 4 Hours    4. cloNIDine (CATAPRES):  0.2  mg  Oral  2 Times a Day    5. oxyCODONE Immediate Release:  7.5  mg  Oral  Every 4 Hours        Recent Lab Results:    Results:    CBC: 9/24/2023 08:47              \     Hgb     /                              \     10.0 L    /  WBC  ----------------  Plt               11.7 H    ----------------    167              /     Hct     \                              /     32.5 L    \            RBC: 3.25 L    MCV: 100           BMP: 9/24/2023 08:47  NA+        Cl-     BUN  /                         136    95 L   32 H  /  --------------------------------  Glucose                ---------------------------  168 H    K+     HCO3-   Creat \                          4.2  25    5.84 H \  Calcium : 8.3 L    Anion Gap : 20      Coagulation: 9/24/2023 08:40  PT  /                              /  -------<    INR          ----------<                PTT\                              \            Heparin Assay: 0.6             I have reviewed these laboratory results:    Basic Metabolic Panel  24-Sep-2023 08:47:00      Result Value    Glucose, Serum  168   H   NA  136    K  4.2    CL  95   L   Bicarbonate, Serum  25    Anion Gap, Serum  20    BUN  32   H   CREAT  5.84   H   GFR Male  9   A   Calcium, Serum  8.3   L     Complete Blood Count  24-Sep-2023 08:47:00      Result Value    White Blood Cell Count  11.7   H   Nucleated Erythrocyte Count  0.0    Red Blood Cell Count  3.25   L   HGB  10.0   L   HCT  32.5   L   MCV  100    MCHC  30.8   L   PLT  167    RDW-CV  14.6   H     Magnesium, Serum  24-Sep-2023 08:47:00      Result Value    Magnesium, Serum  2.15      Heparin assay, UFH  24-Sep-2023 08:40:00      Result Value    Heparin assay, UFH  0.6        Radiology Results:    Results:        Impression:    Postsurgical changes in the right foot status post transmetatarsal  amputation. No evidence of osteomyelitis radiographically     Xray Foot 2 View [Sep 22 2023  1:32PM]      Conclusion:  CONCLUSIONS:  Right Lower PVR: Evidence of moderate arterial occlusive disease in the right lower extremity at rest. Right pressures of >220 mmHg suggest no compressibility of vessels and may make absolute Segmental Limb Pressures (SLP) unreliable. Decreased digital  perfusion noted. Monophasic flow is noted in the right dorsalis pedis artery. Biphasic flow is noted in the right posterior tibial artery. Triphasic flow is noted in the right common femoral artery. Disease state called by waveforms (including digit PPG).  Left Lower PVR: Evidence of mild arterial occlusive disease in the left lower extremity at rest. Left pressures of >220 mmHg suggest no compressibility of vessels and may make  absolute Segmental Limb Pressures (SLP) unreliable. Normal digital perfusion  noted. Monophasic flow is noted in the left dorsalis pedis artery. Biphasic flow is noted in the left posterior tibial artery. Triphasic flow is noted in the left common femoral artery. Disease state called by waveforms. Unable to obtain left brachial  pressure due to AVF.    Imaging & Doppler Findings:    RIGHT Lower PVR                Pressures Ratios  Right Posterior Tibial (Ankle) 99 mmHg   0.69  Right Dorsalis Pedis (Ankle)   255 mmHg  1.78  Right Digit (Great Toe)        43 mmHg   0.30        LEFT Lower PVR                Pressures Ratios  Left Posterior Tibial (Ankle) 255 mmHg  1.78  Left Dorsalis Pedis (Ankle)   255 mmHg  1.78  Left Digit (Great Toe)        115 mmHg  0.80      Right  Brachial Pressure 143 mmHg        [id number] Stuart Nguyen MD  Electronically signed by [id number] Stuart Nguyen MD on 9/21/2023 at 8:13:08 AM        *** Final ***     VASC LAB PVR RAJ only [Sep 21 2023  8:13AM]      Impression:    1st distal phalangeal soft tissue ulcer without evidence of  underlying osseous erosion. If there is persistent clinical concern  for osteomyelitis, consider MRI.     MACRO:  None     Xray Foot Complete Min 3 View [Sep 20 2023  9:58AM]      Assessment and Plan:   Daily Risk Screen:  ·  Does patient have an indwelling urinary catheter? n/a consulting service   ·  Does patient have a central line? 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 All findings discussed. All questions answered to patient's satisfaction.  - Chart, labs, imaging reviewed.  - No labs today.  Labs from 9/24/23 as follows: WBC: 11.7  Hgb: 10.0   Lactate: 0.9     - PVR right: Moderate arterial occlusive disease at rest. No compressibility of vessels. Decreased digital perfusion. Monophasic flow of DP artery. Biphasic flow of PT artery. Triphasic flow of CF artery.   - PVR left: Mild arterial occlusive disease. No compressibility of vessels. Normal digital perfusion. Monophasic flow of DP artery. Biphasic flow of PT artery. Triphasic flow of CF artery. Unable to obtain left brachial pressure due to AVF.  - XR right: 1st distal phalangeal soft tissue ulcer without underlying osseous erosion. Consider MRI if persistent clinical concern for OM.   - Deep tissue cx: Enterobacter cloacae, sensitive to multiple antibiotics.  - Clean bone margin cx: Enterobacter cloacae, sensitive to multiple antibiotics.      Recommendations:  - Intra-op dusky tissue discoloration encountered, bones resected to healthy bleeding bone. Bone margins sampled and growing E. cloacae.   - Wound vac removed this visit. Dressed with Betadine soaked 4x4s, ABD, kerlix, light ace outer dressing.   - Recommend every other day dressing changes with Betadine soaked 4x4s, ABD, kerlix, light ace. Orders placed.    - Can consider Gabapentin 300mg TID for additional pain control modality.   - Abx: per ID.  - Pain management per primary.  - Weight bearing as tolerated to HEEL ONLY in surgical shoe.     - Cleared for discharge from a podiatry standpoint.   - Patient will require SNF placement vs home health care for regular dressing changes.    - Patient to follow up with Dr. Rodriguez after discharge at [id number] [place] [address]. Please call [phone] to schedule an appointment within one week of discharge.     Patient was seen with the attending, Dr. Roopa Rodriguez, DPSHRUTI Johnson DPM PGY-3  Podiatric Medicine and Surgery  [facility], pager #[phone]    A total of 30 minutes were spent coordinating care for this patient. This time  was spent doing a combination of tasks such as reviewing records including imaging, labs, previous medical records, as well as discussing the patient's diagnoses and different  treatment options. We discussed risks and benefits of the treatment options to the patient satisfaction, questions were answered. Time was also spent charting for this patient      Plan of Care Reviewed With:  Plan of Care Reviewed With: patient     Attestation:   Note Completion:  I am a:  Resident/Fellow   Attending Attestation I saw and evaluated the patient.  I personally obtained the key and critical portions of the history and physical exam or was physically present for key and  critical portions performed by the resident/fellow. I reviewed the resident/fellow?s documentation and discussed the patient with the resident/fellow.  I agree with the resident/fellow?s medical decision making as documented in the note.     I personally evaluated the patient on 25-Sep-2023         Electronic Signatures:  Cheryle Johnson (DP (Resident))  (Signed 25-Sep-2023 11:27)   Authored: Service, Subjective Data, Objective Data, Assessment  and Plan, Note Completion  Roopa Rodriguez (RADHA)  (Signed 27-Sep-2023 12:36)   Authored: Note Completion   Co-Signer: Service, Subjective Data, Objective Data, Assessment and Plan, Note Completion      Last Updated: 27-Sep-2023 12:36 by Roopa Rodriguez (RADHA)

## 2023-09-30 NOTE — H&P
History & Physical Reviewed:   I have reviewed the History and Physical dated:  19-Sep-2023   History and Physical reviewed and relevant findings noted. Patient examined to review pertinent physical  findings.: No significant changes   Home Medications Reviewed: no changes noted   Allergies Reviewed: no changes noted       Airway/Sedation Assessment:  ·  Emotional Status calm   ·  Neurologic alert & oriented x 3   ·  Respiratory clear to auscultation   ·  Cardiovascular rhythm & rate regular   ·  GI/ soft, nontender     ·  Pulses absent: Pedal Right    present: Pedal Left, Radial Left, Radial Right     ·  Mouth Opening OK yes   ·  Neck Flexibility OK yes   ·  Loose Teeth no     Oropharyngeal Classification:          ·  Oropharyngeal Classification Class III   ·  ASA PS Classification ASA IV   ·  Sedation Plan moderate sedation       ERAS (Enhanced Recovery After Surgery):  ·  ERAS Patient: no     Consent:   COVID-19 Consent:  ·  COVID-19 Risk Consent Surgeon has reviewed key risks related to the risk of adriana COVID-19 and if they contract COVID-19 what the risks are.     Attestation:   Note Completion:  I am a:  Resident/Fellow   Attending Attestation I saw and evaluated the patient.  I personally obtained the key and critical portions of the history and physical exam or was physically present for key and  critical portions performed by the resident/fellow. I reviewed the resident/fellow?s documentation and discussed the patient with the resident/fellow.  I agree with the resident/fellow?s medical decision making as documented in the note.     I personally evaluated the patient on 20-Sep-2023         Electronic Signatures:  Darnell Umanzor (Fellow))  (Signed 20-Sep-2023 14:14)   Authored: History & Physical Reviewed, Airway/Sedation,  ERAS, Consent, Note Completion  Homar Linder (DO)  (Signed 24-Sep-2023 11:41)   Authored: Note Completion   Co-Signer: History & Physical Reviewed,  Methacholine challenge test.    Flow-volume loop showed adequate effort and tracings. Initial spirometry is within normal limits. Patient underwent methacholine challenge testing. At level 5, the patient's FEV1 dropped by 25%. Beta-2 agonist therapy reveals near normalization of the numbers. In summary,    Based upon a drop in level 5 the study is equivocal for reactive airway disease. It is not a positive test definitively. Clinical correlation recommended.     Lupe Walton MD Airway/Sedation, ERAS, Consent, Note Completion      Last Updated: 24-Sep-2023 11:41 by Homar Linder (DO)

## 2023-09-30 NOTE — PROGRESS NOTES
Service: Cardiology     Subjective Data:   SHAMEKA SUN is a 78 year old Male who is Hospital Day # 4 and POD #0 for 1. right foot TMA;2. wound vac application.    Overnight Events: Acute events in the past 24 hours  include   Additional Information:    Pt seen and assessed in his room this afternoon after he got back from OT. S/p Right TMA & Prevena wound Vac placement by Dr. Rodriguez podiatry. Pt reported moderate  pain on the foot. Vital signs stable.      Objective Data:     Objective Information:      T   P  R  BP   MAP  SpO2   Value  36.5  80  18  131/77      99%  Date/Time 9/22 7:43 9/22 7:43 9/22 7:43 9/22 7:43    9/22 7:43  Range  (36C - 36.6C )  (72 - 107 )  (17 - 18 )  (130 - 195 )/ (77 - 105 )    (95% - 99% )      Pain reported at 9/22 14:55: 8 = Severe    Physical Exam Narrative   ·  Physical Exam:    Constitutional: Well developed, NAD, awake/alert/oriented x3, pleasant, cooperative   Head/Neck: Neck supple,  No JVD, trachea midline, no bruits   Respiratory/Thorax: Patent airways, CTAB, diminished breath sounds bases, thorax symmetric   Cardiovascular: irregularly irregular rhythm, no murmurs, normal S 1 and S 2   Gastrointestinal: Nondistended, soft, non-tender, +BS,    Skin: Warm and dry,   Extremities: Rt foot edema +1, s/p Right TMA today with Prevena wound vac, surgical dressing C/D/I, unable to Doppler distal signals due to dressing. LLE no open sores, Lt leg 1+ edema Left toenails 1-5 within normal limits. Left webspaces clean, dry,  intact.   Lt arm AV fistula.  Psychological: Appropriate mood and behavior    Medication     Medications:          Continuous Medications       --------------------------------    1. Heparin 25,000 units/ D5W 250 mL Infusion..:  1600  units/hr  IntraVenous  <Continuous>         Scheduled Medications       --------------------------------    1. Allopurinol:  100  mg  Oral  Every 24 Hours    2. amLODIPine (NORVASC):  5  mg  Oral  Daily    3. Atorvastatin:  10   mg  Oral  Daily    4. Cinacalcet:  30  mg  Oral  Daily    5. Colchicine:  0.3  mg  Oral  <User Schedule>    6. Influenza Virus (Inactive) HIGH DOSE Adult Vaccine:  0.7  mL  IntraMuscular  Once    7. Lanthanum Carbonate:  750  mg  Oral  2 Times a Day With Meals    8. Menthol 0.44% - Zinc Oxide 20.625% Topical:  1  application(s)  Topical  Daily    9. Pneumococcal 23-Valent (PNEUMOVAX) Vaccine:  0.5  mL  IntraMuscular  Once    10. Polyethylene Glycol:  17  gram(s)  Oral  Daily    11. Vancomycin - RPh to Dose - IV Piggy Back:  1  each  As Specified  Variable    12. Vancomycin 750 mg/ D5W IVPB Premixed Soln. 150 mL:  750  mg  IntraVenous Piggyback  Following Every Dialysis         PRN Medications       --------------------------------    1. Albuterol 2.5 mg/ 3 mL Nebulizer Soln:  3  mL  Inhalation  Every 6 Hours    2. Albuterol 90 micrograms/ Inhalation MDI:  2  inhalation  Inhalation  Every 4 Hours    3. oxyCODONE 5 mg - Acetaminophen 325 m  tablet(s)  Oral  Every 4 Hours    4. traMADol:  50  mg  Oral  Every 6 Hours        Recent Lab Results     Results:        I have reviewed these laboratory results:    Complete Blood Count  22-Sep-2023 02:16:00      Result Value    White Blood Cell Count  9.3    Nucleated Erythrocyte Count  0.0    Red Blood Cell Count  3.62   L   HGB  11.4   L   HCT  35.2   L   MCV  97    MCHC  32.4    PLT  169    RDW-CV  14.9   H     Basic Metabolic Panel  22-Sep-2023 02:16:00      Result Value    Glucose, Serum  146   H   NA  133   L   K  4.6    CL  93   L   Bicarbonate, Serum  25    Anion Gap, Serum  20    BUN  38   H   CREAT  6.54   H   GFR Male  8   A   Calcium, Serum  8.9      Uric Acid, Serum  22-Sep-2023 02:16:00      Result Value    Uric Acid, Serum  3.1   L     Magnesium, Serum  22-Sep-2023 02:16:00      Result Value    Magnesium, Serum  2.16      Heparin assay, UFH  22-Sep-2023 02:16:00      Result Value    Heparin assay, UFH  0.5        Radiology Results     Results:         Impression:    Postsurgical changes in the right foot status post transmetatarsal  amputation. No evidence of osteomyelitis radiographically     Xray Foot 2 View [Sep 22 2023  1:32PM]      Assessment and Plan:   Code Status   ·  Code Status Full Code     Assessment:    admitted SHAMEKA is a 77 year old Male  PMH of AF on Eliquis, CHF (EF 37%), ESRD/HD (Tuesday Thursday Saturday, [facility] [place], Dr. Medel) via left UE AV fistula,  SSS, status post pacemaker, non smoker, nondiabetic, who was referred to us by Dr. Sanchez, [facility], for consideration for revascularization, in the setting of right toe gangrene (podiatrist Dr. Reese Solares).  Patient presented to clinic with his wife and  son, stating he had a longstanding right heel crack and underwent PTA of right AT/PT on 4/12/2023 with Dr. Sanchez.  The heel crack almost healed.  He then developed right foot severe pain and great toe discoloration in August, was admitted at [facility], and underwent a PTA of right AT on 8/30/2023 with Dr. Sanchez, and  was told there were no other options available for him.  During that admission hes toes became gangrenous.  He had a foot x-ray that was questionable for osteomyelitis,  was  put on IV antibiotics during hospital stay, but was not discharged on any antibiotics.  Patient is complaining of severe rest pain at night that wakes him up from sleep, and  swelling of the foot.  Up to mid August he was able to walk and drive.  He denies CP/SOB.  RAJ/TBI shows impaired blood flow to the RLE: 0.69/0.3, Lt NC/0.8.  We have reviewed the images from angioplasties from [facility] (images in PACS).  Given above presentation, pt is at risk of major limb loss, if procedure is delayed.  Pt was directly admitted  to the hospital, for emergent angioplasty .    # Right CLI/RC-VI toe gangrene:  - 9/20/21 s/p PTA of Rt AT/DP, PTA of PT and lateral plantar. Complete reconstruction of AT/PT and plantar arch angioplasty with Dr. Linder,  via Rt antergrade Rt CFA access  - pt was loaded with  mg and Plavix 300 md PO post procedure  - continue with Plavix and Hep gtt for now will transition to Eliquis and Plavix at discharge  - continue holding Eliquis while inpt for procedures  - pain management Percocet, Tramadol and Dilaudid for breakthrough     #Right foot toe 4&5 dry  gangrene, also with ischemic changes of 1-3  - d/c Vanc yesterday 9/20 as Pt has no sign and symptom of infection WBC 6.7 Lactate 0.9 and Rt Foot Xray negative for osteo  - leg elevation and offloading for edema control and pressure ulcer prevention  - 9/22/23 Pt underwent 1. right foot TMA;2. wound vac application by Dr. Rodriguez podiatry   - NWB to surgical extremity. May elevate surgical extremity.  - Please keep dressing clean, dry and intact with post-op shoegear.  - May reinforce dressings with 4x4s, ABDs, Kerlix, and light ACE wrap for strikethrough bleeding.  - Podiatry to change dressing daily.  - ease keep in patient until final intra-op cultures are available due infectious appearance of soft tissues.   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 awaiting recs  - Sending MRSA swab (using same swab, swab nares armpit and groin)    #ESRD: on HD TTS  - HD done today  - c/w home daily Nephrocaps, Sensipar and Fosrenol   - Nephrology following  - Renal dose IV Abx    #HTN  - Started Amlodipine 5 mg daily  - c/w as needed Clonidine 0.2 twice daily for SBP > 180    #Gout  - c/w home Allopurinol daily  - c/w Colchicine Mon and Thu  - Uric Acid Low 3.1    Diet: Diabetic Low Phos,  K  Access: PIV, LUE AV fistula   DVT ppx: Hep gtt  IVF: PRN  Code status: Full Code             Plan of Care Reviewed With   Plan of Care Reviewed With: patient     Attestation:   Note Completion   Provider/Team Pager EDWIN Martin  Endovascular/Limb Salvage Team  Day pager [phone]/Night HVI [phone]/Weekends pager [phone]  [place]       Electronic Signatures for Addendum Section:   Flash Malik (JENIFFER-CNP) (Signed Addendum 22-Sep-2023 16:41)   Cardiovascular  #Afib  #HFrEF EF 37% s/p PPM  - Afib on the monitor, rate controlled HR 70 - low 90's  - Occasional V-pacing.     Electronic Signatures:  Flash Malik (JENIFFER-CNP)  (Signed 22-Sep-2023 16:22)   Authored: Service, Assessment/Plan Review, Subjective  Data, Objective Data, Assessment and Plan, Note Completion      Last Updated: 22-Sep-2023 16:41 by Flash Malik (JENIFFER-KALEB)

## 2023-09-30 NOTE — PROGRESS NOTES
Service: Cardiology     Subjective Data:   SHAMEKA SUN is a 78 year old Male who is Hospital Day # 10 and POD #6 for 1. right foot TMA;2. wound vac application.    Overnight Events: Patient had an uneventful night.   Additional Information:    Pt seen and assessed at HD. Pt in bed comfortable, no significant c/o pain. Pt legs elevated on a pillow.     Objective Data:     Objective Information:      T   P  R  BP   MAP  SpO2   Value  36.5  108  18  145/89   98  95%  Date/Time 9/28 10:18 9/28 10:18 9/28 4:31 9/28 10:18  9/28 4:31 9/28 4:31  Range  (35.9C - 37.2C )  (64 - 113 )  (17 - 19 )  (110 - 161 )/ (64 - 100 )  (85 - 112 )  (94% - 98% )  Highest temp of 37.2 C was recorded at 9/28 4:31      Pain reported at 9/28 6:29: 4 = Moderate    Physical Exam Narrative:  ·  Physical Exam:    Physical Exam  Gen: Alert, awake, AO x 3  Head: no evidence of trauma  Neck: no JVD  CVS: RRR, no murmur, no leg edema  Lungs: bilateral clear to auscultation  Abd: soft, NT, ND   Extremities: AVF left arm, right TMA site dressed C/D/I dressing by podiatry. warm to touch, no edema, unable to doppler due to dressing.   Neuro: CN II-XII intact, 5/5 bilateral    Medication:    Medications:          Continuous Medications       --------------------------------  No continuous medications are active       Scheduled Medications       --------------------------------    1. Allopurinol:  100  mg  Oral  Every 24 Hours    2. amLODIPine (NORVASC):  5  mg  Oral  Daily    3. Apixaban:  2.5  mg  Oral  Every 12 Hours    4. Atorvastatin:  10  mg  Oral  Daily    5. Cefepime 2 gram IVPB/ Premixed Soln 100 mL:  100  mL  IntraVenous Piggyback  With Every Dialysis Treatment    6. Cinacalcet:  30  mg  Oral  Daily    7. Clopidogrel:  75  mg  Oral  Daily    8. Epoetin Jovanny (ESRD) Injectable:  5000  unit(s)  IntraVenous Push  <User Schedule>    9. Gabapentin:  100  mg  Oral  With Every Dialysis Treatment    10. Influenza Virus (Inactive) HIGH DOSE  Adult Vaccine:  0.7  mL  IntraMuscular  Once    11. Isavuconazonium Sulfate:  372  mg  Oral  Daily    12. Lanthanum Carbonate:  750  mg  Oral  2 Times a Day With Meals    13. Loratadine:  10  mg  Oral  Daily    14. Menthol 0.44% - Zinc Oxide 20.625% Topical:  1  application(s)  Topical  Daily    15. Multivitamin Renal:  1  capsule(s)  Oral  Daily    16. Naphazoline 0.012% Ophthalmic:  1  drop(s)  Both Eyes  Every 4 Hours    17. Pneumococcal 23-Valent (PNEUMOVAX) Vaccine:  0.5  mL  IntraMuscular  Once    18. Polyethylene Glycol:  17  gram(s)  Oral  Daily         PRN Medications       --------------------------------    1. Acetaminophen:  650  mg  Oral  Every 6 Hours    2. Albuterol 2.5 mg/ 3 mL Nebulizer Soln:  3  mL  Inhalation  Every 6 Hours    3. Albuterol 90 micrograms/ Inhalation MDI:  2  inhalation  Inhalation  Every 4 Hours    4. cloNIDine (CATAPRES):  0.2  mg  Oral  2 Times a Day    5. oxyCODONE Immediate Release:  7.5  mg  Oral  Every 4 Hours        Recent Lab Results:    Results:        I have reviewed these laboratory results:    Basic Metabolic Panel  28-Sep-2023 06:27:00      Result Value    Glucose, Serum  159   H   NA  134   L   K  4.6    CL  92   L   Bicarbonate, Serum  24    Anion Gap, Serum  23   H   BUN  52   H   CREAT  8.31   H   GFR Male  6   A   Calcium, Serum  8.6      Complete Blood Count  28-Sep-2023 06:27:00      Result Value    White Blood Cell Count  12.0   H   Nucleated Erythrocyte Count  0.2    Red Blood Cell Count  3.24   L   HGB  9.7   L   HCT  31.1   L   MCV  96    MCHC  31.2   L   PLT  225    RDW-CV  14.8   H       Radiology Results:    Results:        Conclusion:  Preliminary Cardiology Report    [place] [id number]  Tel [phone] and Fax [phone]      Preliminary Vascular Lab Report    PVR RAJ      Patient Name:     SHAMEKA CORINNE Mendez Physician:  [id number] Madeleine Hsu MD  Study Date:       9/27/2023         Referring            KAREN DOMINGO  Physician:  MRN/PID:          [id number]          PCP:  Accession/Order#: [id number]         CC Report to:  YOB: 1945         Technologist:       Lul De La Torre RVT  Gender:           M                 Technologist 2:  Admission Status: Inpatient         Location Performed: [facility]      Diagnosis/ICD: I73.9-Peripheral vascular disease, unspecified  Procedure/CPT: 92460 Peripheral artery RAJ Only-[id number]      Patient History: S/p Peripheral angioplasty.      PRELIMINARY CONCLUSIONS:  Right Lower PVR: No evidence of arterial occlusive disease in the right lower extremity at rest. Right pressures of >220 mmHg suggest no compressibility of vessels and may make absolute Segmental Limb Pressures (SLP) unreliable. Biphasic flow is noted  in the right posterior tibial artery. Triphasic flow is noted in the right common femoral artery and right dorsalis pedis artery. Severityof disease called by PVR tracings and Doppler waveforms due to non-compressible vessels.  Left Lower PVR: No evidence of arterial occlusive disease in the left lower extremity at rest. Left pressures of >220 mmHg suggest no compressibility of vesselsand may make absolute Segmental Limb Pressures (SLP) unreliable. Normal digital perfusion noted.  Triphasic flow is noted in the left common femoral artery, left posterior tibial artery and left dorsalis pedis artery. Severity of disease called by PVRtracings and Doppler waveforms due to non-compressible vessels.    Imaging & Doppler Findings:    RIGHT Lower PVR                Pressures Ratios  Right Posterior Tibial (Ankle) 255 mmHg  2.06  Right Dorsalis Pedis (Ankle)   255 mmHg  2.06        LEFT Lower PVR                Pressures Ratios  Left Posterior Tibial (Ankle) 255 mmHg  2.06  Left Dorsalis Pedis (Ankle)   255 mmHg  2.06  Left Digit (Great Toe)        91 mmHg   0.73        Right  Brachial Pressure 124 mmHg          VASCULAR PRELIMINARY  REPORT  completed by Lul De La Torre RVT on 9/27/2023 at 4:35:16 PM        *** Preliminary ***     VASC LAB PVR RAJ only [Sep 27 2023  4:35PM]      Assessment and Plan:   Comorbidities:  ·  Comorbidity anemia   ·  Anemia anemia of chronic disease     Code Status:  ·  Code Status Full Code     Assessment:    79 yo Male  PMH of AF on Eliquis, CHF (EF 37%), ESRD/HD (Tuesday Thursday Saturday, [facility] [place], Dr. Medel) via left UE AV fistula, Anemia (POA), SSS, status post  pacemaker, non smoker, nondiabetic, who was referred to us by Dr. Sanchez, [facility], for consideration for revascularization, in the setting of right toe gangrene (podiatrist Dr. Reese Solares).  Patient presented to clinic with his wife and son, stating he  had a longstanding right heel crack and underwent PTA of right AT/PT on 4/12/2023 with Dr. Sanchez.  The heel crack almost healed.  He then developed right foot severe pain and great toe discoloration in August, was admitted at [facility], and  underwent a PTA of right AT on 8/30/2023 with Dr. Sanchez, and  was told there were no other options available for him.  During that admission his toes became gangrenous.  He had a foot x-ray that was questionable for osteomyelitis,  was put on IV antibiotics  during hospital stay, but was not discharged on any antibiotics. Up to mid August he was able to walk and drive. He denies CP/SOB.  Pre procedure RAJ/TBI showed impaired blood flow to the RLE: 0.69/0.3, Lt NC/0.8.  We have reviewed the images from angioplasties from [facility] (images in PACS).  Given above presentation,  pt was directly admitted to the hospital, for emergent angioplasty .    #Right CLI/RC-VI toe gangrene:  - 9/20/21 s/p PTA of Rt AT/DP, PTA of PT and lateral plantar. Complete reconstruction of AT/PT and plantar arch angioplasty with Dr. Linder,  - continue with Clopidogrel and Eliquis  - c/w oxycodone to 7.5 mg q4h as needed for better pain control  - c/w Neurontin 100 mg three times  a week on Dialysis days  - 1 month post procedure EVLS clinic with repeat testing  - PVR RAJ done 9/27 : Right Lower PVR: No evidence of arterial occlusive disease in the right lower extremity at rest . Biphasic flow is noted in the right posterior tibial artery. Triphasic flow is noted in the right common femoral artery and right dorsalis pedis artery. Severity of disease called by PVR tracings and Doppler waveforms due to non-compressible vessels.    #Rt foot toe gangrene s/p TMA:  - 9/22/2023 Status post right foot transmetatarsal amputation with wound vac placement  - 9/25/23 wound vac removed by podiatry, picture reviewed: some maceration on the plantar side of the flap, otherwise incision looks good.  - TMA flap noted to have some maceration but no dusky discoloration. Retention sutures intact to surgical site. Mild federica-incision erythema. No active drainage, no calor, no malodor, no ascending lymphangitis.   - Weight bearing as tolerated to HEEL ONLY in surgical shoe- ordered  - Leg elevation to control edema and maceration  - Recommend every other day dressing changes with Betadine soaked 4x4s, ABD, kerlix, light ace.   - Cleared for discharge from a podiatry standpoint.   - Patient to follow up with Dr. Rodriguez after discharge at [address] [place] [address]. Please call [phone] to schedule an appointment within one week of discharge.     #ID  - 9/28 WBC 12.0, afebrile, no s7s of infection   - Per ID recs: Given positive cultures on bone margins: Treat for R foot residual OM with renally dosed IV Cefepime 2G every dialysis for total  6 weeks END DATE 12/3/2023. Will followup in ID clinic for next antibiotics  steps.  - For  C. Parapsilosis OM, treat concurrently with Oral Isavuconazole 372mg Q8H for 2 days (LOADING DOSE), then start Isavuconazole 372mg Q24H  afterwards for total 6 weeks course from TMA operation date 9/22/23 - END DATE 12/7/2023.   - Send weekly CBCdiff, CMP, CRP, ESR, while on  "IV systemic antibiotics  and fax results to ID clinic at [phone] with \"Re: attention Dr Glass\"  - ID will arrange follow-up in 6  weeks with Dr. Glass in ID clinic, Appointment will be in EMR chart    #History of Penicillin allergy:  - per pt he last took Penicillin >40 years ago and developed itchiness and warmth to the skin, (uncertain of how soon after reaction occurred) without any cardiovascular-respiratory symptoms or compromise.  - Allergy Immunology consulted for PCN challenge  - Pt passed Augmentin challenge on 9/25/23. Penicillin allergy removed from his chart per immunology recs  - Challenges prove that there was no immediate reaction, but this cannot exclude a delayed reaction in the future  - will monitor, Anaphylaxis order set placed for possible reaction    #ESRD: on HD TTS  - continue with TTS HD Last session today 9/28 tolerated well  - c/w home daily Nephrocaps, Sensipar and Fosrenol   - Nephrology following  - Renal dose IV Abx  - Epo 5000 units on dialysis days    #HTN  - c/w Amlodipine 5 mg daily  - c/w as needed Clonidine 0.2 twice daily for SBP > 180    #Gout  - c/w home Allopurinol daily   - Uric Acid Low 3.1  - Holding Colchicine due to possible severe interaction with Isavuconazole    #Environmental allergy  - pt take Claritin at home  - scratchy throat and hoarseness   - c/w Claritin Q48 hrs    Dispo: SNF  - PT/OT recs: AM-PAC score 11 The pt would benefit from moderate intensity therapy for 3-5 days per week to maximize functional mobility and independence.  - Pt and wife has selected SNF, precert pending.                  Plan of Care Reviewed With:  Plan of Care Reviewed With: patient     Attestation:   Note Completion:  Provider/Team Pager EDWIN Martin  Endovascular/Limb Salvage Team  Day pager [phone]/Night HVI [phone]/Weekends pager [phone]  [place]         Electronic Signatures:  Flash Malik (APRN-CNP)  (Signed 28-Sep-2023 12:51)   Authored: Service, Subjective " Data, Objective Data, Assessment  and Plan, Note Completion      Last Updated: 28-Sep-2023 12:51 by Flash Malik (APRN-CNP)

## 2023-10-01 NOTE — PROGRESS NOTES
"  Subjective:  Doing better since yesterday. Serial abdominal Xrays show non obstructive bowel gas pattern with radioopaque material in bowel c/f ? pills not passing through. NGT placed overnight w/ minimal output of 10cc when on intermittent suction.  Patient states his nausea/ dry heaving is better, no abdominal pain. He does still report abdominal distention and tightness but improved since yesterday. Denies CP, SOB . Hardly consumed golytely yesterday but received an enema w/ hardly a streak of BM   Slightly tachy through the day    Objective:  /83   Pulse 95   Temp 36.6 °C (97.9 °F)   Resp 19   Ht 1.727 m (5' 7.99\")   Wt 90.6 kg (199 lb 11.8 oz)   SpO2 93%   BMI 30.38 kg/m²      Physical Exam  Constitutional: Well developed, comfortable , NAD  Head/Neck: Neck supple,  No JVD,   Respiratory/Thorax: Patent airways, CTAB, diminished breath sounds at bases  Cardiovascular: mildly tachycardic, irregularly irregular rhythm, no murmurs             Gastrointestinal: distended abdomen, soft, BS +, non tender  Psychological: Appropriate mood and behavior           Skin: Warm and dry,      Extremities: Right foot in banadage w/out any oozing or bleeding or soakage. LLE no open sores  Lt arm AV fistula       Results for orders placed or performed during the hospital encounter of 09/19/23 (from the past 24 hour(s))   POCT GLUCOSE   Result Value Ref Range    POCT Glucose 211 (H) 74 - 99 mg/dL   CBC   Result Value Ref Range    WBC 15.9 (H) 4.4 - 11.3 x10*3/uL    nRBC 0.1 (H) 0.0 - 0.0 /100 WBCs    RBC 3.57 (L) 4.50 - 5.90 x10*6/uL    Hemoglobin 10.5 (L) 13.5 - 17.5 g/dL    Hematocrit 32.9 (L) 41.0 - 52.0 %    MCV 92 80 - 100 fL    MCH 29.4 26.0 - 34.0 pg    MCHC 31.9 (L) 32.0 - 36.0 g/dL    RDW 14.8 (H) 11.5 - 14.5 %    Platelets 336 150 - 450 x10*3/uL    MPV 10.9 7.5 - 11.5 fL   Comprehensive Metabolic Panel   Result Value Ref Range    Glucose 163 (H) 74 - 99 mg/dL    Sodium 135 (L) 136 - 145 mmol/L    Potassium " 4.8 3.5 - 5.3 mmol/L    Chloride 92 (L) 98 - 107 mmol/L    Bicarbonate 25 21 - 32 mmol/L    Anion Gap 23 (H) 10 - 20 mmol/L    Urea Nitrogen 52 (H) 6 - 23 mg/dL    Creatinine 8.56 (H) 0.50 - 1.30 mg/dL    eGFR 6 (L) >60 mL/min/1.73m*2    Calcium 9.1 8.6 - 10.6 mg/dL    Albumin 3.3 (L) 3.4 - 5.0 g/dL    Alkaline Phosphatase 112 33 - 136 U/L    Total Protein 6.8 6.4 - 8.2 g/dL    AST 23 9 - 39 U/L    Bilirubin, Total 0.6 0.0 - 1.2 mg/dL    ALT 27 10 - 52 U/L   C-Reactive Protein   Result Value Ref Range    C-Reactive Protein 21.44 (H) <1.00 mg/dL   CBC   Result Value Ref Range    WBC 16.9 (H) 4.4 - 11.3 x10*3/uL    nRBC 0.2 (H) 0.0 - 0.0 /100 WBCs    RBC 3.45 (L) 4.50 - 5.90 x10*6/uL    Hemoglobin 10.4 (L) 13.5 - 17.5 g/dL    Hematocrit 33.5 (L) 41.0 - 52.0 %    MCV 97 80 - 100 fL    MCH 30.1 26.0 - 34.0 pg    MCHC 31.0 (L) 32.0 - 36.0 g/dL    RDW 14.8 (H) 11.5 - 14.5 %    Platelets 316 150 - 450 x10*3/uL    MPV 10.5 7.5 - 11.5 fL   Magnesium   Result Value Ref Range    Magnesium 2.36 1.60 - 2.40 mg/dL   Phosphorus   Result Value Ref Range    Phosphorus 3.2 2.5 - 4.9 mg/dL   Comprehensive Metabolic Panel   Result Value Ref Range    Glucose 164 (H) 74 - 99 mg/dL    Sodium 138 136 - 145 mmol/L    Potassium 3.8 3.5 - 5.3 mmol/L    Chloride 94 (L) 98 - 107 mmol/L    Bicarbonate 27 21 - 32 mmol/L    Anion Gap 21 (H) 10 - 20 mmol/L    Urea Nitrogen 23 6 - 23 mg/dL    Creatinine 4.58 (H) 0.50 - 1.30 mg/dL    eGFR 12 (L) >60 mL/min/1.73m*2    Calcium 9.3 8.6 - 10.6 mg/dL    Albumin 3.3 (L) 3.4 - 5.0 g/dL    Alkaline Phosphatase 107 33 - 136 U/L    Total Protein 6.6 6.4 - 8.2 g/dL    AST 21 9 - 39 U/L    Bilirubin, Total 0.7 0.0 - 1.2 mg/dL    ALT 27 10 - 52 U/L   POCT GLUCOSE   Result Value Ref Range    POCT Glucose 163 (H) 74 - 99 mg/dL        XR abdomen 1 view 10/01/2023 ([facility] Read)  This result has not been signed. Information might be incompleted    Impression  Interval placement of enteric tube with tip projecting over  the right upper quadrant in the expected location of the gastric body/antrum.    [Gaseous distention of the bowel in a nonspecific nonobstructive pattern.]    Similar appearance of numerous radiopaque items projecting over the abdomen, most concentrated over the right hemiabdomen, likely representing ingested materials.     Scheduled medications  allopurinol, 100 mg, oral, q24h  apixaban, 2.5 mg, oral, q12h  atorvastatin, 10 mg, oral, Daily  B complex-vitamin C-folic acid, 1 capsule, oral, Daily  cinacalcet, 30 mg, oral, Daily with breakfast  clopidogrel, 75 mg, oral, Daily  [START ON 10/3/2023] epoetin dane, 5,000 Units, intravenous, Once per day on Tue Thu Sat  gabapentin, 100 mg, oral, After Dialysis  isavuconazonium sulfate, 372 mg, oral, Daily  lanthanum, 750 mg, oral, BID with meals  loratadine, 10 mg, oral, Daily  menthol-zinc oxide, 1 Application, Topical, Daily  naphazoline, 1 drop, Both Eyes, q4h  pantoprazole, 40 mg, oral, Daily before breakfast  piperacillin-tazobactam, 2.25 g, intravenous, q6h  pneumococcal polysaccharide, 0.5 mL, intramuscular, During hospitalization  polyethylene glycol, 17 g, oral, Daily  povidone-iodine, , Topical, Every other day  surgical lubricant, , ,   trimethobenzamide, 200 mg, intramuscular, Once    Assessment:  79 yo Male  PMH of AF on Eliquis, CHF (EF 37%), ESRD/HD (Tuesday Thursday Saturday, [facility], Dr. Medel) via left UE AV fistula, Anemia (POA), SSSs/p PPM who is s/p reconstruction of AT/PT and plantar arch angioplasty with Dr. Linder on 9/20/21 and underwent post right foot transmetatarsal amputation transferred from the endovascular floor to the CICU on 9/30 with concerns for sepsis after code heavenly was called on finding patient diaphorertic, hypothermic (95F), afib (chronic) w/ RVR to 120s, BP stable. Patient reports feeling generally unwell, w/ abdominal distention and w/ eructation/dry heaving. Will treat w/ vanc/zosyn for sepsis, consult podiatry to review  foot wound and consult GI for ileus.     Updates:  - Podiatry to review wound today  - GI consulted for ileus  - Start metop succ 25 daily  - Continue giving meds and hold suction for 2-4 hours after giving meds  - c/w Vanc and Zosyn for 48hours as low as he remians clinically stable, afebrile, cultures neg and then can discontinue but switch back to IV Cefepime 2G every dialysis for total 6 weeks END DATE 12/3/2023 for OM  - TTE ordered to evaluate EF (none in  EMR)    NEURO  No active issues     CV  #HFrEF   #afib  #HTN  #SSS s/p PPM  PPM interrogation 9/22: AT/AF 99.5%, appropriate sensing and capture, RV paced 40.5%. no VT episodes. UL rate 60 LL rate 130.  -c/w clopidogrel, eliquis, statin  -Start metoprolol succ 25mg daily  -Hold amlodipine and PRN clonidine given c/f sepsis  -ESRD limits GDMT but can still be started on outpatient basis     PULM  No active issues     GI  #Ileus  Xray abdomen showing gaseous distention of the bowel in a nonspecific nonobstructive pattern. Similar appearance of numerous radiopaque items projecting over the abdomen, most concentrated over the right hemiabdomen, likely representing ingested materials  S/p miralax and enema : no BM- hardly any stool burden on xray  Plan:  -c/w intermittent suction via NGT (not much output) for decompression of bowel  - c/w pantoprazole 40 daily  - GI consulted  - Ok to give PO meds w/ holding NGT suction for 2-4 hours     RENAL  #ESRD: on HD TTS  - continue with TTS HD Last session 9/30  - c/w home lanthanum, cinacalcinet, MV  - Nephrology following  - Epo 5000 units on dialysis days     ID  #c/f sepsis  Presented with diaphoresis, hypothermia, afib RVR  Plan:  -f/u BCX x 2 9/30: NGTD  -c/w Vanc and Zosyn (but transition to cefepime when discont. 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 Parapsilosis OM, treat concurrently with Oral Isavuconazole 372mg afterwards for total 6 weeks course from TMA operation date 9/22/23 - END DATE 12/7/2023.         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 Complete reconstruction of AT/PT and plantar arch angioplasty with Dr. Linder  - 9/22/2023 Status post right foot transmetatarsal amputation with wound vac placement  - 9/25/23 wound vac removed by podiatry, picture reviewed: some maceration on the plantar side of the flap, otherwise incision looks good.  - PVR RAJ done 9/27 : Right Lower PVR: No evidence of arterial occlusive disease in the right lower extremity at rest. Biphasic flow is noted in the right posterior tibial artery. Triphasic flow is noted in the right common femoral artery and right dorsalis pedis artery. Severity of disease called by PVR tracings and Doppler waveforms due to non-compressible vessels.  Plan:  - continue with Clopidogrel and Eliquis  - c/w Neurontin 100 mg three times a week on Dialysis days  -holding oxycodone given ileus  - Consulted podiatry to review wound site and any focus of infection  - tylenol Prn for pain     #Gout  - c/w home Allopurinol daily   - Uric Acid Low 3.1  - Holding Colchicine due to possible severe interaction with Isavuconazole        Dispo: SNF  - PT/OT recs: AM-PAC score 11 The pt would benefit from moderate intensity therapy for 3-5 days per week to maximize functional mobility and independence.  - Pt and wife has selected SNF, precert pending.    Discharge planning:  - 1 month post procedure EVLS clinic with repeat testing  - Patient to follow up with Dr. Rodriguez after discharge at [address] [place] [address]. Please call [phone] to schedule an appointment within one  "week of discharge.   - Send weekly CBCdiff, CMP, CRP, ESR, while on IV systemic antibiotics and fax results to ID clinic at [phone] with \"Re: attention Dr Glass\"  - ID will arrange follow-up in 6 weeks with Dr. Glass in ID clinic, Appointment will be in EMR chart        F: PRN  E: PRN  N: NPO while on NGT via intermittent suction  GI: pantoprazole  DVT: home eliquis     Code Status: FULL (confirmed on admission)  Surrogate decision maker: Wife Julia [phone]        "

## 2023-10-01 NOTE — OP NOTE
PROCEDURE DETAILS    Preoperative Diagnosis:  1. 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 transmetatarsal amputation, right foot (47608)  2. incision bone cortex, right foot (94025)  3. wound vac application, right foot <50cm (15308)   Anesthesia: MAC + local pre-op 24mL 1:1 mix of 2% lidocaine and 0.5% Marcaine pre + local intra op 20mL   Estimated Blood Loss: 150 mL  Findings: Intraoperative findings consistent with clinical and radiographic findings.   Specimens(s) Collected: yes,  1. right toes to path  2. deep tissue right foot for cx  3. clean bone margin right foot for cx    Patient Returned To/Condition: Patient returned to post operative recovery area with all vital signs stable and intact. Normal capillary perfusion noted to most distal aspect of surgical  extremity.         Operative Report:   Indication for Operation:  This patient presents for podiatric surgical intervention today. Patient has had unsuccessful conservative treatment in the past and wishes to have surgical intervention. All of the patient?s questions have been answered and concerns have been  appropriately addressed including an explanation of risks and benefits of today?s planned surgical intervention. The patient states that have been NPO since midnight. An updated H&P and consent have been completed prior to today?s surgical  intervention.    The nature of the deformity, problems anticipated procedures, recovery/convalescence, risks/complications including but not limited to numbness, CRPS, over/under correction, problems healing of soft tissue or bone, postoperative wound infection, wound  dehiscence, DVT and/or persistent pain/disability have been explained to the patient in detail. All questions were answered to  the patient's satisfaction. No promises or guarantees have been given.     Operative Report:  The patient was transferred from the preop holding area to the OR where he remained on his bed and secured in a secure supine position. MAC anesthesia and pre-op antibiotics were administered by the anesthesia team. Local anesthesia was obtained using  24mL of 1:1 mix of 2% lidocaine and 0.5% Marcaine in an ankle block fashion. The right lower extremity was prepped and draped in sterile aseptic technique.  An appropriate timeout was performed all in the room were in agreement.    Attention was directed to the right forefoot where dry gangrene was appreciated to digits 4 and 5 and the tips of digits 1-3.  Using a #15 blade, an incision was made at a level of the metatarsals such that this incision allowed adequate coverage of the  remaining metatarsal bones for closure while still allowing for resection of the necrotic tissue and bone. An additional 10mL of 1:1 mix of 2% lidocaine and 0.5% Marcaine was administered along the sural and posterior tibial nerves. This incision was  deepened to the level of the bone and utilizing sharp dissection, the distal aspects of the metatarsals were removed after a sagittal saw was used to perform the osteotomy of the metatarsals. Dusky discoloration and fat necrosis was appreciated to the  intermetatarsal spaces of digits 3,4,5. Deep discolored tissue was sent for culture. Healthy bleeding bone was appreciated at osteotomy sites. Less bleeding was encountered than to be expected in setting of recent endovascular intervention. Utilizing  a rongeur and other instrumentation, all devitalized soft tissue was removed from the area.     The wound was copiously irrigated. A bone cutter was used to take clean margin sample of all the mets. 2-0 silk, Bovie, and direct pressure were used to achieve hemostasis. Deep tissues were reapproximated using 3-0 monocryl. Subcutaneous approximation  was done  with 3-0 monocryl. Skin closure was done cautiously in an attempt to prevent further necrosis. Skin closure consisted of widely spaced 3-0 prolene and steri strips. An additional 10mL 1:1 mix of 2% Lidocaine and 0.5% Marcaine was administered  in an ankle block fashion. An incisional preavana wound vac was applied. Seal was verified with no signs of leak, occlusion, or alarms. Vac set to 125mmHg continuous. An exterior dry sterile dressing was applied consisting 4x4, ABD, kerlix, ace was applied.     The patient tolerated the procedure well and was transferred from the OR to PACU with all vital signs stable and vascular status unchanged to the  right lower extremity. Patient to return to nursing floor following PACU recovery.                         Attestation:   Note Completion:  Attending Attestation I was present for the entire procedure    I am a: Resident/Fellow         Electronic Signatures:  Nhung Hall (MERT (Resident))  (Signed 22-Sep-2023 21:50)   Authored: Post-Operative Note, Chart Review, Note Completion  Roopa Rodriguez (MERT)  (Signed 27-Sep-2023 12:34)   Authored: Post-Operative Note, Chart Review, Note Completion   Co-Signer: Post-Operative Note, Chart Review, Note Completion      Last Updated: 27-Sep-2023 12:34 by Roopa Rodriguez (MERT)

## 2023-10-01 NOTE — PROGRESS NOTES
Chevy Benton is a 78 y.o. male on day 12 of admission     77 yo Male  PMH of AF on Eliquis, CHF (EF 37%), ESRD/HD (Tuesday Thursday Saturday, [facility], Dr. Medel) via left UE AV fistula, Anemia (POA), SSS, status post pacemaker, non smoker, nondiabetic, who was referred to us by Dr. Sanchez, [facility], for consideration for revascularization, in the setting of right toe gangrene (podiatrist Dr. Reese Solares).  Patient presented to clinic with his wife and son, stating he had a longstanding right heel crack and underwent PTA of right AT/PT on 4/12/2023 with Dr. Sanchez.  The heel crack almost healed.  He then developed right foot severe pain and great toe discoloration in August, was admitted at [facility], and underwent a PTA of right AT on 8/30/2023 with Dr. Sanchez, and  was told there were no other options available for him.  During that admission his toes became gangrenous.  He had a foot x-ray that was questionable for osteomyelitis,  was put on IV antibiotics during hospital stay, but was not discharged on any antibiotics. Up to mid August he was able to walk and drive. He denies CP/SOB.  Pre procedure RAJ/TBI showed impaired blood flow to the RLE: 0.69/0.3, Lt NC/0.8.  Given above presentation, pt was directly admitted to the hospital, for emergent angioplasty.    #Right CLI/RC-VI toe gangrene:  - 9/20/21 s/p PTA of Rt AT/DP, PTA of PT and lateral plantar. Complete reconstruction of AT/PT and plantar arch angioplasty with Dr. Linder,  - continue with Clopidogrel and Eliquis  - 1 month post procedure EVLS clinic with repeat testing  - PVR RAJ done 9/27 : Right Lower PVR: No evidence of arterial occlusive disease in the right lower extremity at rest. Biphasic flow is noted in the right posterior tibial artery. Triphasic flow is noted in the right common femoral artery and right dorsalis pedis artery. Severity of disease called by PVR tracings and Doppler waveforms due to non-compressible  "vessels.  #Rt foot toe gangrene s/p TMA:  - 9/22/2023 Status post right foot transmetatarsal amputation with wound vac placement by Dr. Rodriguez  - 9/25/23 wound vac removed by podiatry, due to some maceration on the plantar side of the flap, otherwise incision looks good.  - 9/30 - was diphoretic tachycardiac, with abdominal pain and hypothermic , was moved to CICU for sepsis workup     Patient seen and examined this morning.  Patient doing better, stable vitals  Says he feels better   Labs with elevated WBC       Objective     Physical Exam  Lying in bed, comfortable, NG tube in place   AO x 3   CVS - normal S1s2, tachycardiac, no murmurs    Resp - CTAB  Abdo - Soft, + distension, no tenderness  Right extremity TMA    Last Recorded Vitals  Blood pressure 127/83, pulse 95, temperature 36.2 °C (97.2 °F), temperature source Temporal, resp. rate 19, height 1.727 m (5' 7.99\"), weight 90.6 kg (199 lb 11.8 oz), SpO2 93 %.  Intake/Output last 3 Shifts:  I/O last 3 completed shifts:  In: 1440 (15.9 mL/kg) [P.O.:290; I.V.:500 (5.5 mL/kg); IV Piggyback:650]  Out: 10 (0.1 mL/kg) [Emesis/NG output:10]  Weight: 90.6 kg       Assessment/Plan     Patient seen and examined this morning.  Patient doing better, stable vitals  Says he feels better   Abdominal pain is better, denies any CP     Assessment and plan    Right CLI/RC-VI toe gangrene:  S/p TMA    --Broad-spectrum antibiotics   -Abdominal XRAY with non specific distension with bowel gas   - ID and Podiatry consult   - Hemodialysis as per schedule  - Leg elevation to control edema and maceration  - Recommend every other day dressing changes with Betadine soaked 4x4s, ABD, kerlix, light ace.       #HTN  - Pt was started on Amlodipine 10 mg daily as his BP was poorly controlled on admission. Pt tolerating medication well with -140's  - c/w as needed Clonidine 0.2 twice daily for SBP > 180    Afib/CHF/SSS - stable remained on current home treatment   - c/w Eliquis "     #Gout  - c/w home Allopurinol daily   - Uric Acid Low 3.1  - Colchicine  was held due to possible severe interaction with Isavuconazole      Roberto Carlos Petersen MD

## 2023-10-01 NOTE — PROGRESS NOTES
"Chevy Benton is a 78 y.o. male on day 12 of admission.    Subjective   Patient seen at bedside. Patient admits to 2/10 pain to his right foot but states the pain has gradually improved since his surgery. Denies calf pain. Admits to some pain in his abdoment and feeling bloated but states he is feeling better than yesterday.        Objective     General: No acute distress. Cooperative. Pleasant.     Right focused:    Vascular: Faintly palpable DP/PT pulses. Mild edema of right foot. Skin temperature warm to warm from proximal to distal    Neuro: Light touch diminished pain stimuli intact.     MSK: Right ankle active ROM decreased.     Derm: Surgical incision to right foot with mostly granular tissue but some areas of fibrotic tissue. Retention sutures intact. Mild federica-incisional erythema noted. Very mild serosanguinous drainage noted. Mild calor noted. No purulence. No dusky discoloration. No malodor. No ascending lymphangitis.     Psych: Normal mood and affect. Communicating normally.    Last Recorded Vitals  Blood pressure 142/79, pulse 108, temperature 36.2 °C (97.2 °F), temperature source Temporal, resp. rate 21, height 1.727 m (5' 7.99\"), weight 90.6 kg (199 lb 11.8 oz), SpO2 94 %.  Intake/Output last 3 Shifts:  I/O last 3 completed shifts:  In: 1440 (15.9 mL/kg) [P.O.:290; I.V.:500 (5.5 mL/kg); IV Piggyback:650]  Out: 10 (0.1 mL/kg) [Emesis/NG output:10]  Weight: 90.6 kg     Relevant Results                Scheduled medications  allopurinol, 100 mg, oral, q24h  apixaban, 2.5 mg, oral, q12h  atorvastatin, 10 mg, oral, Daily  B complex-vitamin C-folic acid, 1 capsule, oral, Daily  cinacalcet, 30 mg, oral, Daily with breakfast  clopidogrel, 75 mg, oral, Daily  docusate sodium, 100 mg, oral, BID  [START ON 10/3/2023] epoetin dane, 5,000 Units, intravenous, Once per day on Tue Thu Sat  gabapentin, 100 mg, oral, After Dialysis  isavuconazonium sulfate, 372 mg, oral, Daily  lanthanum, 750 mg, oral, BID with " meals  loratadine, 10 mg, oral, Daily  menthol-zinc oxide, 1 Application, Topical, Daily  metoprolol succinate XL, 25 mg, oral, Daily  naphazoline, 1 drop, Both Eyes, q4h  pantoprazole, 40 mg, oral, Daily before breakfast  piperacillin-tazobactam, 2.25 g, intravenous, q6h  pneumococcal polysaccharide, 0.5 mL, intramuscular, During hospitalization  polyethylene glycol, 17 g, oral, Daily  povidone-iodine, , Topical, Every other day  sennosides, 2 tablet, oral, Nightly  surgical lubricant, , ,   trimethobenzamide, 200 mg, intramuscular, Once      Continuous medications     PRN medications  PRN medications: acetaminophen, albuterol, albuterol, surgical lubricant    Results for orders placed or performed during the hospital encounter of 09/19/23 (from the past 24 hour(s))   CBC   Result Value Ref Range    WBC 15.9 (H) 4.4 - 11.3 x10*3/uL    nRBC 0.1 (H) 0.0 - 0.0 /100 WBCs    RBC 3.57 (L) 4.50 - 5.90 x10*6/uL    Hemoglobin 10.5 (L) 13.5 - 17.5 g/dL    Hematocrit 32.9 (L) 41.0 - 52.0 %    MCV 92 80 - 100 fL    MCH 29.4 26.0 - 34.0 pg    MCHC 31.9 (L) 32.0 - 36.0 g/dL    RDW 14.8 (H) 11.5 - 14.5 %    Platelets 336 150 - 450 x10*3/uL    MPV 10.9 7.5 - 11.5 fL   Comprehensive Metabolic Panel   Result Value Ref Range    Glucose 163 (H) 74 - 99 mg/dL    Sodium 135 (L) 136 - 145 mmol/L    Potassium 4.8 3.5 - 5.3 mmol/L    Chloride 92 (L) 98 - 107 mmol/L    Bicarbonate 25 21 - 32 mmol/L    Anion Gap 23 (H) 10 - 20 mmol/L    Urea Nitrogen 52 (H) 6 - 23 mg/dL    Creatinine 8.56 (H) 0.50 - 1.30 mg/dL    eGFR 6 (L) >60 mL/min/1.73m*2    Calcium 9.1 8.6 - 10.6 mg/dL    Albumin 3.3 (L) 3.4 - 5.0 g/dL    Alkaline Phosphatase 112 33 - 136 U/L    Total Protein 6.8 6.4 - 8.2 g/dL    AST 23 9 - 39 U/L    Bilirubin, Total 0.6 0.0 - 1.2 mg/dL    ALT 27 10 - 52 U/L   C-Reactive Protein   Result Value Ref Range    C-Reactive Protein 21.44 (H) <1.00 mg/dL   CBC   Result Value Ref Range    WBC 16.9 (H) 4.4 - 11.3 x10*3/uL    nRBC 0.2 (H) 0.0 -  0.0 /100 WBCs    RBC 3.45 (L) 4.50 - 5.90 x10*6/uL    Hemoglobin 10.4 (L) 13.5 - 17.5 g/dL    Hematocrit 33.5 (L) 41.0 - 52.0 %    MCV 97 80 - 100 fL    MCH 30.1 26.0 - 34.0 pg    MCHC 31.0 (L) 32.0 - 36.0 g/dL    RDW 14.8 (H) 11.5 - 14.5 %    Platelets 316 150 - 450 x10*3/uL    MPV 10.5 7.5 - 11.5 fL   Magnesium   Result Value Ref Range    Magnesium 2.36 1.60 - 2.40 mg/dL   Phosphorus   Result Value Ref Range    Phosphorus 3.2 2.5 - 4.9 mg/dL   Comprehensive Metabolic Panel   Result Value Ref Range    Glucose 164 (H) 74 - 99 mg/dL    Sodium 138 136 - 145 mmol/L    Potassium 3.8 3.5 - 5.3 mmol/L    Chloride 94 (L) 98 - 107 mmol/L    Bicarbonate 27 21 - 32 mmol/L    Anion Gap 21 (H) 10 - 20 mmol/L    Urea Nitrogen 23 6 - 23 mg/dL    Creatinine 4.58 (H) 0.50 - 1.30 mg/dL    eGFR 12 (L) >60 mL/min/1.73m*2    Calcium 9.3 8.6 - 10.6 mg/dL    Albumin 3.3 (L) 3.4 - 5.0 g/dL    Alkaline Phosphatase 107 33 - 136 U/L    Total Protein 6.6 6.4 - 8.2 g/dL    AST 21 9 - 39 U/L    Bilirubin, Total 0.7 0.0 - 1.2 mg/dL    ALT 27 10 - 52 U/L   POCT GLUCOSE   Result Value Ref Range    POCT Glucose 163 (H) 74 - 99 mg/dL            Assessment/Plan   #S/p TMA, right foot (DOS: 9/22/23)  #CLI, right foot  #Chronic unstageable non-pressure ulcer, right foot  #Non-pressure ulcer to level of subcutaneous tissue, right heel  #Diabetic foot ulcer, right  #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 All findings discussed. All questions answered to patient's satisfaction.  - Chart, labs, imaging reviewed.  - WBC:16.9, tachycardic (108), mildly tachypneic (21)  - PVR right: Moderate arterial occlusive disease at rest. No compressibility of vessels. Decreased digital perfusion. Monophasic flow of DP artery. Biphasic flow of PT artery. Triphasic flow of CF artery.   - PVR left: Mild arterial occlusive disease. No compressibility  of vessels. Normal digital perfusion. Monophasic flow of DP artery. Biphasic flow of PT artery. Triphasic flow of CF artery. Unable to obtain left brachial pressure due to AVF.  - XR right: 1st distal phalangeal soft tissue ulcer without underlying osseous erosion. Consider MRI if persistent clinical concern for OM.   - Deep tissue cx: Enterobacter cloacae, sensitive to multiple antibiotics.  - Clean bone margin cx: Enterobacter cloacae, sensitive to multiple antibiotics.      Recommendations:  - Patient's foot is healing well after surgery. No signs of necrosis or infection at this time. Low suspicion that sepsis is stemming from the foot.   - Dressed with Betadine soaked 4x4s, ABD, kerlix, light ace outer dressing.   - Recommend every other day dressing changes with Betadine soaked 4x4s, ABD, kerlix, light ace. Orders placed.    - Can consider Gabapentin 300mg TID for additional pain control modality.   - Abx: per ID.  - Pain management per primary.  - Weight bearing as tolerated to HEEL ONLY in surgical shoe.   - Patient is okay to spend part of the day in a chair with feet hanging down to alleviate symptoms of ileus.      - Patient will require SNF placement vs home health care for regular dressing changes.    - Patient to follow up with Dr. Rodriguez after discharge at [address] [place] [address]. Please call [phone] to schedule an appointment within one week of discharge.   -Podiatry will follow peripherally.     Patient was seen with the attending, Dr. Poole. Note is not final until signed by attending.     Cheryle Johnson DPM PGY-3  Podiatric Medicine and Surgery  [facility], pager #[phone]           Cheryle Johnson DPM

## 2023-10-01 NOTE — CONSULTS
I saw and evaluated the patient.  I personally obtained the key and critical portions of the history and physical exam or was physically present for key and critical portions performed by the resident/fellow.  I reviewed the resident/fellow's documentation and discussed the patient with the resident/fellow.  I agree with the resident/fellow's medical decision making as documented in the note.      Clinically, stable, but only gas and no stool, though patient feels like needs to defecate.  Would add bisacodyl rectal suppository to see if able to move bowels.    Gastroenterology Consult Note    Reason For Consult  Constipation, abdominal distension    History Of Present Illness  Chevy Benton is a 78 y.o. male with a past medical history of PMH of AF on Eliquis, CHF (EF 37%), ESRD/HD (Tuesday Thursday Saturday, [facility] [place], Dr. 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 Berger followed by R LINO on 9/22/2023. GI is consulted for constipation and abdominal distension    Yesterday, was about to be discharged but reported abdominal pain and nausea/vomiting plus new onset tremors/shaking. Code White was called. He was mildly hypothermic to 94F, HR 120s, but normal BP. He was transferred to CICU for concern of sepsis. He was started on empiric Vanc/Zosyn. He had NGT placed that is now to [facility]. KUB showed nonobstructive bowel gas pattern. He was given golytely yesterday for possible constipation. Was receiving opiates earlier this admission for pain control but this has been stopped.    He feels better today. No more abdominal pain. Nausea is better, no more dry  heaving since NGT was placed. He is still passing gas, last was right before I walked into the room. His last BM was about 3-4 days ago.  At home, I usually has 1-2BMs daily in the morning and he usually takes miralax once  daily and a stool softner once daily. Doesn't remember the name but thinks its docusate    Review of Systems  Review of Systems   Constitutional: Negative.    HENT: Negative.     Respiratory: Negative.     Cardiovascular: Negative.    Gastrointestinal:         See above   Endocrine: Negative.    Genitourinary: Negative.    Musculoskeletal: Negative.    Skin: Negative.    Allergic/Immunologic: Negative.    Neurological: Negative.    Hematological: Negative.    Psychiatric/Behavioral: Negative.         Past Medical History:  See above    Surgical History:  TMA this admission  Fistula creation  Denies abdominal surgeries    Allergies:  No Known Allergies    Social History:  Denies tobacco, alcohol, drugs    Social History     Socioeconomic History    Marital status:      Spouse name: Not on file    Number of children: Not on file    Years of education: Not on file    Highest education level: Not on file   Occupational History    Not on file   Tobacco Use    Smoking status: Not on file    Smokeless tobacco: Not on file   Substance and Sexual Activity    Alcohol use: Not on file    Drug use: Not on file    Sexual activity: Not on file   Other Topics Concern    Not on file   Social History Narrative    Not on file     Social Determinants of Health     Financial Resource Strain: Not on file   Food Insecurity: Not on file   Transportation Needs: Not on file   Physical Activity: Not on file   Stress: Not on file   Social Connections: Not on file   Intimate Partner Violence: Not on file   Housing Stability: Not on file       Family History:  Non-contributory to current problem    Physical Exam:    Vitals:    10/01/23 0700 10/01/23 0800 10/01/23 0900 10/01/23 1000   BP:  127/83 127/81 123/70   Pulse: 109 95 105 100   Resp: 17 19 20 19   Temp:  36.2 °C (97.2 °F)     TempSrc:  Temporal     SpO2:  93% 93% 95%   Weight:       Height:             Intake/Output Summary (Last 24 hours) at 10/1/2023 1035  Last data filed at  9/30/2023 2229  Gross per 24 hour   Intake 1440 ml   Output 10 ml   Net 1430 ml         Physical Exam  General: well-nourished, no acute distress  HEENT: PERRLA, EOM intact, no scleral icterus, moist MM, NGT in place with bilious output   Respiratory: CTA bilaterally, normal work of breathing  Cardiovascular: RRR, no murmurs/rubs/gallops  Abdomen: Soft, nontender, nondistended, bowel sounds hypoactive, No masses palpated  Extremities: no edema, no asterixis, R TMA bandaged  Neuro: alert and oriented, CNII-XII grossly intact, moves all 4 extremities with no focal deficits     Relevant Results    Current Facility-Administered Medications:     acetaminophen (Tylenol) tablet 650 mg, 650 mg, oral, q6h PRN, Coral Xie MD, 650 mg at 09/30/23 0852    albuterol 2.5 mg /3 mL (0.083 %) nebulizer solution 3 mL, 3 mL, nebulization, q6h PRN, Coral Xie MD    albuterol 90 mcg/actuation inhaler 2 puff, 2 puff, inhalation, q4h PRN, Coral Xie MD    allopurinol (Zyloprim) tablet 100 mg, 100 mg, oral, q24h, Coral Xie MD    apixaban (Eliquis) tablet 2.5 mg, 2.5 mg, oral, q12h, Coral Xie MD, 2.5 mg at 09/30/23 1300    atorvastatin (Lipitor) tablet 10 mg, 10 mg, oral, Daily, Coral Xie MD    B complex-vitamin C-folic acid (Nephrocaps) capsule 1 capsule, 1 capsule, oral, Daily, Coral Xie MD, 1 capsule at 10/01/23 0900    cinacalcet (Sensipar) tablet 30 mg, 30 mg, oral, Daily with breakfast, Coral Xie MD, 30 mg at 10/01/23 0900    clopidogrel (Plavix) tablet 75 mg, 75 mg, oral, Daily, Coral Xie MD, 75 mg at 10/01/23 0900    [START ON 10/3/2023] epoetin adne (Epogen,Procrit) injection 5,000 Units, 5,000 Units, intravenous, Once per day on Tue Thu Sat, Coral Xie MD    gabapentin (Neurontin) capsule 100 mg, 100 mg, oral, After Dialysis, Coral Xie MD    isavuconazonium sulfate (Cresemba) capsule 372 mg, 372 mg, oral, Daily, Coral Xie MD    lanthanum (Fosrenol) chewable tablet  750 mg, 750 mg, oral, BID with meals, Coral Xie MD, 750 mg at 09/30/23 0853    loratadine (Claritin) tablet 10 mg, 10 mg, oral, Daily, Coral Xie MD, 10 mg at 10/01/23 0900    menthol-zinc oxide (Calmoseptine - Risamine) 0.44-20.6 % ointment 1 Application, 1 Application, Topical, Daily, Coral Xie MD, 1 Application at 09/30/23 0855    metoprolol succinate XL (Toprol-XL) 24 hr tablet 25 mg, 25 mg, oral, Daily, Cass Ortiz MD, 25 mg at 10/01/23 0945    naphazoline (Clear Eyes) 0.012-0.25 % ophthalmic solution 1 drop, 1 drop, Both Eyes, q4h, Coral Xie MD    pantoprazole (ProtoNix) EC tablet 40 mg, 40 mg, oral, Daily before breakfast, Coral Xie MD    piperacillin-tazobactam-dextrose (Zosyn) IV 2.25 g, 2.25 g, intravenous, q6h, oCral Xie MD, Stopped at 10/01/23 0628    pneumococcal polysaccharide (Pneumovax 23) vaccine injection 0.5 mL, 0.5 mL, intramuscular, During hospitalization, Coral Xie MD    polyethylene glycol (Glycolax, Miralax) packet 17 g, 17 g, oral, Daily, Coral Xie MD, 17 g at 10/01/23 0900    povidone-iodine (Betadine) 10 % ointment, , Topical, Every other day, Coral Xie MD    surgical lubricant gel  - Omnicell Override Pull, , , ,     trimethobenzamide (Tigan) injection 200 mg, 200 mg, intramuscular, Once, Fernando Levy MD     Results for orders placed or performed during the hospital encounter of 09/19/23 (from the past 24 hour(s))   POCT GLUCOSE   Result Value Ref Range    POCT Glucose 211 (H) 74 - 99 mg/dL   CBC   Result Value Ref Range    WBC 15.9 (H) 4.4 - 11.3 x10*3/uL    nRBC 0.1 (H) 0.0 - 0.0 /100 WBCs    RBC 3.57 (L) 4.50 - 5.90 x10*6/uL    Hemoglobin 10.5 (L) 13.5 - 17.5 g/dL    Hematocrit 32.9 (L) 41.0 - 52.0 %    MCV 92 80 - 100 fL    MCH 29.4 26.0 - 34.0 pg    MCHC 31.9 (L) 32.0 - 36.0 g/dL    RDW 14.8 (H) 11.5 - 14.5 %    Platelets 336 150 - 450 x10*3/uL    MPV 10.9 7.5 - 11.5 fL   Comprehensive Metabolic Panel   Result Value  Ref Range    Glucose 163 (H) 74 - 99 mg/dL    Sodium 135 (L) 136 - 145 mmol/L    Potassium 4.8 3.5 - 5.3 mmol/L    Chloride 92 (L) 98 - 107 mmol/L    Bicarbonate 25 21 - 32 mmol/L    Anion Gap 23 (H) 10 - 20 mmol/L    Urea Nitrogen 52 (H) 6 - 23 mg/dL    Creatinine 8.56 (H) 0.50 - 1.30 mg/dL    eGFR 6 (L) >60 mL/min/1.73m*2    Calcium 9.1 8.6 - 10.6 mg/dL    Albumin 3.3 (L) 3.4 - 5.0 g/dL    Alkaline Phosphatase 112 33 - 136 U/L    Total Protein 6.8 6.4 - 8.2 g/dL    AST 23 9 - 39 U/L    Bilirubin, Total 0.6 0.0 - 1.2 mg/dL    ALT 27 10 - 52 U/L   C-Reactive Protein   Result Value Ref Range    C-Reactive Protein 21.44 (H) <1.00 mg/dL   CBC   Result Value Ref Range    WBC 16.9 (H) 4.4 - 11.3 x10*3/uL    nRBC 0.2 (H) 0.0 - 0.0 /100 WBCs    RBC 3.45 (L) 4.50 - 5.90 x10*6/uL    Hemoglobin 10.4 (L) 13.5 - 17.5 g/dL    Hematocrit 33.5 (L) 41.0 - 52.0 %    MCV 97 80 - 100 fL    MCH 30.1 26.0 - 34.0 pg    MCHC 31.0 (L) 32.0 - 36.0 g/dL    RDW 14.8 (H) 11.5 - 14.5 %    Platelets 316 150 - 450 x10*3/uL    MPV 10.5 7.5 - 11.5 fL   Magnesium   Result Value Ref Range    Magnesium 2.36 1.60 - 2.40 mg/dL   Phosphorus   Result Value Ref Range    Phosphorus 3.2 2.5 - 4.9 mg/dL   Comprehensive Metabolic Panel   Result Value Ref Range    Glucose 164 (H) 74 - 99 mg/dL    Sodium 138 136 - 145 mmol/L    Potassium 3.8 3.5 - 5.3 mmol/L    Chloride 94 (L) 98 - 107 mmol/L    Bicarbonate 27 21 - 32 mmol/L    Anion Gap 21 (H) 10 - 20 mmol/L    Urea Nitrogen 23 6 - 23 mg/dL    Creatinine 4.58 (H) 0.50 - 1.30 mg/dL    eGFR 12 (L) >60 mL/min/1.73m*2    Calcium 9.3 8.6 - 10.6 mg/dL    Albumin 3.3 (L) 3.4 - 5.0 g/dL    Alkaline Phosphatase 107 33 - 136 U/L    Total Protein 6.6 6.4 - 8.2 g/dL    AST 21 9 - 39 U/L    Bilirubin, Total 0.7 0.0 - 1.2 mg/dL    ALT 27 10 - 52 U/L   POCT GLUCOSE   Result Value Ref Range    POCT Glucose 163 (H) 74 - 99 mg/dL         Imaging:     Preliminary KUB read 10/1:  IMPRESSION:  Interval placement of enteric tube  with tip projecting over the right upper quadrant in the expected location of the gastric body/antrum.     [Gaseous distention of the bowel in a nonspecific nonobstructive pattern.]     Similar appearance of numerous radiopaque items projecting over the abdomen, most concentrated over the right hemiabdomen, likely representing ingested materials.   This result has not been signed. Information might be incomplete.     ASSESSMENT/PLAN:  Chevy Benton is a 78 y.o. male with a past medical history of PMH of AF on Eliquis, CHF (EF 37%), ESRD/HD (Tuesday Thursday Saturday, [facility] [place], Dr. 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 GI is consulted for constipation and abdominal distension    #Ileus  -Suspect post operative ileus, possibly worsened by narcotics  -KUB is without transition point, has nonobstructive bowel gas pattern. Shows some pill products retained in RUQ suggestive of slow transit. He has no prior history of abdominal surgeries that would suggest he is high risk for SBO    Recommendations:  -Agree with NPO and NGT to LIWS for now  -Agree with discontinuation of opioids  -OOB (at least to chair) at least 3 times daily. Weight bearing recs per podiatry  -If unable to get OOB, would recommend turns Q2hr  -Bowel regimen: continue home miralax once daily, add back docusate once daily  -Consider adding Senna 2 tabs at bedtime  -Continue trending daily KUB    Patient was discussed with Dr. Genevieve Jalloh    Thank you for this interesting consult. Gastroenterology will continue to folllow  -During weekday hours of 7am-5pm please do not hesitate to contact me on [employer] Chat or page [phone] if there are any further questions between the weekday hours of 7 AM - 5 PM.   -After hours, on weekends, and on holidays, please page the [place] [phone]. Thank you.    I spent  35 minutes in the professional and overall care of this patient.    Kailyn Cabrera MD  PGY4 Gastroenterology Fellow  [facility] [place]

## 2023-10-01 NOTE — CONSULTS
"NEPHROLOGY NEW CONSULT NOTE   Chevy Benton   78 y.o.    @WT@  MRN/Room: [id number]/06/06-A    Reason for consult: ESRD management    HPI:  Chevy Benton is a 78 y.o. male   - With past medical Hx of ESRD TTS. Follows with Dr. 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  C/o nausea and vomiting  Denies any fevers     In The ER: /83   Pulse 95   Temp 36.2 °C (97.2 °F) (Temporal)   Resp 19   Ht 1.727 m (5' 7.99\")   Wt 90.6 kg (199 lb 11.8 oz)   SpO2 93%   BMI 30.38 kg/m²      No past medical history on file.   No past surgical history on file.   No family history on file.  Social History     Socioeconomic History    Marital status:      Spouse name: Not on file    Number of children: Not on file    Years of education: Not on file    Highest education level: Not on file   Occupational History    Not on file   Tobacco Use    Smoking status: Not on file    Smokeless tobacco: Not on file   Substance and Sexual Activity    Alcohol use: Not on file    Drug use: Not on file    Sexual activity: Not on file   Other Topics Concern    Not on file   Social History Narrative    Not on file     Social Determinants of Health     Financial Resource Strain: Not on file   Food Insecurity: Not on file   Transportation Needs: Not on file   Physical Activity: Not on file   Stress: Not on file   Social Connections: Not on file   Intimate Partner Violence: Not on file   Housing Stability: Not on file     No Known Allergies     [unfilled]     Meds:   allopurinol, 100 mg, q24h  apixaban, 2.5 mg, q12h  atorvastatin, 10 mg, Daily  B complex-vitamin C-folic acid, 1 capsule, Daily  cinacalcet, 30 mg, Daily with breakfast  clopidogrel, 75 mg, Daily  [START ON 10/3/2023] epoetin dane, 5,000 Units, Once per day on Tue Thu Sat  gabapentin, 100 mg, After " Dialysis  isavuconazonium sulfate, 372 mg, Daily  lanthanum, 750 mg, BID with meals  loratadine, 10 mg, Daily  menthol-zinc oxide, 1 Application, Daily  metoprolol succinate XL, 25 mg, Daily  naphazoline, 1 drop, q4h  pantoprazole, 40 mg, Daily before breakfast  piperacillin-tazobactam, 2.25 g, q6h  pneumococcal polysaccharide, 0.5 mL, During hospitalization  polyethylene glycol, 17 g, Daily  povidone-iodine, , Every other day  surgical lubricant, ,   trimethobenzamide, 200 mg, Once         acetaminophen, 650 mg, q6h PRN  albuterol, 3 mL, q6h PRN  albuterol, 2 puff, q4h PRN  surgical lubricant, ,         Vitals:    10/01/23 0800   BP: 127/83   Pulse: 95   Resp: 19   Temp: 36.2 °C (97.2 °F)   SpO2: 93%               General appearance: Awake and alert, oriented, . No distress  HEENT: supple,  oral mucosa, no mouth ulcers  Neck: nolymphadenopathy, no JVP  Skin: no apparent rash  Heart: heart sounds 1 & 2 present and normal, no murmurs heard or friction rub  Lungs: Adequate air entry, breath sounds no.  no wheezing/crackles  Abdomen: soft, non tender, no masses palpated, no flank tenderness  Extremities: No  edema, no joint swelling,  : no Alfonso  Neuro: No FND, cranial nerves 2-12 grossly intact,  no asterixis   ACCESS: LUE AVF    Blood Labs:  Results for orders placed or performed during the hospital encounter of 09/19/23 (from the past 96 hour(s))   Basic Metabolic Panel   Result Value Ref Range    Glucose 159 (H) 74 - 99 mg/dL    Sodium 134 (L) 136 - 145 mmol/L    Potassium 4.6 3.5 - 5.3 mmol/L    Chloride 92 (L) 98 - 107 mmol/L    Bicarbonate 24 21 - 32 mmol/L    Anion Gap 23 (H) 10 - 20 mmol/L    Urea Nitrogen 52 (H) 6 - 23 mg/dL    Creatinine 8.31 (H) 0.50 - 1.30 mg/dL    GFR MALE 6 (A) >90 mL/min/1.73m2    Calcium 8.6 8.6 - 10.6 mg/dL   CBC   Result Value Ref Range    WBC 12.0 (H) 4.4 - 11.3 x10E9/L    nRBC 0.2 0.0 - 0.0 /100 WBC    RBC 3.24 (L) 4.50 - 5.90 x10E12/L    Hemoglobin 9.7 (L) 13.5 - 17.5 g/dL     Hematocrit 31.1 (L) 41.0 - 52.0 %    MCV 96 80 - 100 fL    MCHC 31.2 (L) 32.0 - 36.0 g/dL    Platelets 225 150 - 450 x10E9/L    RDW 14.8 (H) 11.5 - 14.5 %   POCT GLUCOSE   Result Value Ref Range    POCT Glucose 160 (H) 74 - 99 mg/dL   POCT GLUCOSE   Result Value Ref Range    POCT Glucose 211 (H) 74 - 99 mg/dL   CBC   Result Value Ref Range    WBC 15.9 (H) 4.4 - 11.3 x10*3/uL    nRBC 0.1 (H) 0.0 - 0.0 /100 WBCs    RBC 3.57 (L) 4.50 - 5.90 x10*6/uL    Hemoglobin 10.5 (L) 13.5 - 17.5 g/dL    Hematocrit 32.9 (L) 41.0 - 52.0 %    MCV 92 80 - 100 fL    MCH 29.4 26.0 - 34.0 pg    MCHC 31.9 (L) 32.0 - 36.0 g/dL    RDW 14.8 (H) 11.5 - 14.5 %    Platelets 336 150 - 450 x10*3/uL    MPV 10.9 7.5 - 11.5 fL   Comprehensive Metabolic Panel   Result Value Ref Range    Glucose 163 (H) 74 - 99 mg/dL    Sodium 135 (L) 136 - 145 mmol/L    Potassium 4.8 3.5 - 5.3 mmol/L    Chloride 92 (L) 98 - 107 mmol/L    Bicarbonate 25 21 - 32 mmol/L    Anion Gap 23 (H) 10 - 20 mmol/L    Urea Nitrogen 52 (H) 6 - 23 mg/dL    Creatinine 8.56 (H) 0.50 - 1.30 mg/dL    eGFR 6 (L) >60 mL/min/1.73m*2    Calcium 9.1 8.6 - 10.6 mg/dL    Albumin 3.3 (L) 3.4 - 5.0 g/dL    Alkaline Phosphatase 112 33 - 136 U/L    Total Protein 6.8 6.4 - 8.2 g/dL    AST 23 9 - 39 U/L    Bilirubin, Total 0.6 0.0 - 1.2 mg/dL    ALT 27 10 - 52 U/L   C-Reactive Protein   Result Value Ref Range    C-Reactive Protein 21.44 (H) <1.00 mg/dL   CBC   Result Value Ref Range    WBC 16.9 (H) 4.4 - 11.3 x10*3/uL    nRBC 0.2 (H) 0.0 - 0.0 /100 WBCs    RBC 3.45 (L) 4.50 - 5.90 x10*6/uL    Hemoglobin 10.4 (L) 13.5 - 17.5 g/dL    Hematocrit 33.5 (L) 41.0 - 52.0 %    MCV 97 80 - 100 fL    MCH 30.1 26.0 - 34.0 pg    MCHC 31.0 (L) 32.0 - 36.0 g/dL    RDW 14.8 (H) 11.5 - 14.5 %    Platelets 316 150 - 450 x10*3/uL    MPV 10.5 7.5 - 11.5 fL   Magnesium   Result Value Ref Range    Magnesium 2.36 1.60 - 2.40 mg/dL   Phosphorus   Result Value Ref Range    Phosphorus 3.2 2.5 - 4.9 mg/dL   Comprehensive  Metabolic Panel   Result Value Ref Range    Glucose 164 (H) 74 - 99 mg/dL    Sodium 138 136 - 145 mmol/L    Potassium 3.8 3.5 - 5.3 mmol/L    Chloride 94 (L) 98 - 107 mmol/L    Bicarbonate 27 21 - 32 mmol/L    Anion Gap 21 (H) 10 - 20 mmol/L    Urea Nitrogen 23 6 - 23 mg/dL    Creatinine 4.58 (H) 0.50 - 1.30 mg/dL    eGFR 12 (L) >60 mL/min/1.73m*2    Calcium 9.3 8.6 - 10.6 mg/dL    Albumin 3.3 (L) 3.4 - 5.0 g/dL    Alkaline Phosphatase 107 33 - 136 U/L    Total Protein 6.6 6.4 - 8.2 g/dL    AST 21 9 - 39 U/L    Bilirubin, Total 0.7 0.0 - 1.2 mg/dL    ALT 27 10 - 52 U/L   POCT GLUCOSE   Result Value Ref Range    POCT Glucose 163 (H) 74 - 99 mg/dL          ASSESSMENT:  ESRD on HD TTS via LUE AVF   Sepsis 2/2 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

## 2023-10-02 PROBLEM — I73.9 PAD (PERIPHERAL ARTERY DISEASE) (CMS-HCC): Status: ACTIVE | Noted: 2023-01-01

## 2023-10-02 NOTE — CARE PLAN
Transcribed from  CARE:   Problem: ADLs  Goal: UB Bathing  Description: Bathing Upper Body: Established Date 26-Sep-2023  Bathing Upper Body: [place] Level Goal modified independent  Bathing Upper Body: Assistive Device Goal AE as needed    Outcome: Progressing  Goal: LB Bathing  Description: Bathing Lower Body: Established Date 26-Sep-2023  Bathing Lower Body: [place] Goal minimum assist (75% patients effort)  Bathing Lower Body: Assistive Device Goal AE as needed  Bathing Lower Body: Time Frame for Goal 2 wks    Outcome: Progressing  Goal: UB Dressing  Description: Upper Body Dressing: Established Date 26-Sep-2023  Upper Body Dressing: [place] Goal independent  Upper Body Dressing: Time Frame for Goal 2 wks    Outcome: Progressing  Goal: LB Dressing  Description: Lower Body Dressing: Established Date 26-Sep-2023  Lower Body Dressing: [place] Goal minimum assist (75% patients effort)  Lower Body Dressing: Assistive Device Goal AE as needed  Lower Body Dressing: Time Frame for Goal 2 wks    Outcome: Progressing  Goal: Toileting  Description: Toileting: Established Date 26-Sep-2023  Toileting: [place] Goal minimum assist (75% patients effort)  Toileting: Time Frame for Goal 2 wks    Outcome: Progressing     Problem: MOBILITY  Goal: Transfer goal  Description: Transfer: Established Date 26-Sep-2023  Transfer: Transfer Type Goal all transfers  Transfer: [place] Goal minimum assist (75% patients effort)  Transfer: Assistive Device Goal LRAD  Transfer: Time Frame for Goal 2 wks    Outcome: Progressing

## 2023-10-02 NOTE — CARE PLAN
Problem: Transfers  Goal: STG - Patient will perform bed mobility- Transcribed from  Care  Description: Bed Mobility: Date Established 26-Sep-2023  Bed Mobility: [place] Level Goal stand-by assist  Bed Mobility: Time Frame for Goal 2 wks    Outcome: Progressing     Problem: Transfers  Goal: STG - Patient will transfer sit to and from stand and bed to chair- Transcribed from  care  Description: 26-Sep-2023  bed-to-chair/chair-to-bed; sit-to-stand/stand-to-sit  minimum assist (75% patients effort)  1-person assist  rolling walker  2 wks    Outcome: Progressing     Problem: Balance  Goal: Balance Goal- Transcribed from  Care  Description: 26-Sep-2023  no LOB with transfers/ambulation  2 wks         Outcome: Progressing     Problem: Mobility  Goal: STG - Patient will ambulate- Transcribed from  Care  Description: 26-Sep-2023  minimum assist (75% patients effort)  rolling walker  50 feet    Outcome: Progressing  Goal: STG - Patient will ascend and descend a flight of stairs= Transcribed from  care   Description: 26-Sep-2023  minimum assist (75% patients effort)  ascend/descend 4 stairs with LRD  2 wks    Outcome: Progressing

## 2023-10-02 NOTE — PROGRESS NOTES
Physical Therapy    Physical Therapy Treatment    Patient Name: Chevy Benton  MRN: [id number]  Today's Date: 10/2/2023  Time Calculation  Start Time: 0958  Stop Time: 1025  Time Calculation (min): 27 min       Assessment/Plan   PT Assessment  PT Assessment Results: Decreased strength, Decreased endurance, Impaired balance, Decreased mobility, Impaired judgement  Rehab Prognosis: Good  Evaluation/Treatment Tolerance: Patient limited by fatigue  Medical Staff Made Aware: Yes  Strengths: Attitude of self  End of Session Communication: Bedside nurse  Assessment Comment: Pt participated in bed to chair transfer during treatment session. During this task the patient demonstrated impaired dynamic balance, activity tolerance and strength. It is recommended that the patient continue to receive skilled physical therapy to improve overall functional mobility.  End of Session Patient Position: Alarm off, caregiver present, Up in chair  PT Plan  Inpatient/Swing Bed or Outpatient: Inpatient  PT Plan  Treatment/Interventions: Bed mobility, Gait training, Transfer training, Stair training, Balance training, Neuromuscular re-education, Strengthening, Endurance training, Therapeutic activity, Therapeutic exercise, Home exercise program, Range of motion  PT Plan: Skilled PT  PT Frequency: 3 times per week  PT Discharge Recommendations: Moderate intensity level of continued care  Equipment Recommended upon Discharge:  (To be determined)  PT Recommended Transfer Status: Assist x2      General Visit Information:   PT  Visit  PT Received On: 10/02/23  Response to Previous Treatment: Patient with no complaints from previous session.  General  Reason for Referral: impaired functional mobility  Co-Treatment: OT  Co-Treatment Reason: Co-treatment performed to maximize patient safety  Prior to Session Communication: Bedside nurse  Patient Position Received: Bed, 3 rail up, Alarm off, not on at start of session  General Comment: Pt found  supine in bed and was agreeable to therapy    Subjective   Precautions:  Precautions  LE Weight Bearing Status:  (RLE heel weight bearing)  Medical Precautions: Fall precautions  Vital Signs:  Vital Signs  Heart Rate: 93  SpO2: 96 %  BP: 139/81  MAP (mmHg): 95  Patient Position:  (post vitals:  SPO2 98% /88)    Objective   Pain:  Pain Assessment  Pain Assessment: 0-10  Pain Score: 5 - Moderate pain  Pain Type: Surgical pain  Pain Location: Foot  Pain Orientation: Right  Pain Descriptors: Aching  Pain Frequency: Constant/continuous  Pain Onset: Ongoing       Treatments:  Therapeutic Exercise  Therapeutic Exercise Performed: Yes  Therapeutic Exercise Activity 1: [id number]  Therapeutic Exercise Activity 2: ankle [id number] bilaterally    Bed Mobility  Bed Mobility: Yes  Bed Mobility 1  Bed Mobility 1: Supine to sitting  Level of Assistance 1: Moderate assistance, Moderate verbal cues  Bed Mobility Comments 1: Pt required assistance at trunk    Ambulation/Gait Training  Ambulation/Gait Training Performed: Yes  Ambulation/Gait Training 1  Surface 1: Level tile  Device 1: Rolling walker  Assistance 1: Moderate assistance  Quality of Gait 1: Wide base of support  Comments/Distance (ft) 1: Pt ambulated 3 feet to bedside chair. Pt demonstrated retropulsion in standing, required cues for weightbearing, posture and sequencing.  Transfers  Transfer: Yes  Transfer 1  Transfer From 1: Sit to  Transfer to 1: Stand  Technique 1: Sit to stand  Transfer Device 1: Walker  Transfer Level of Assistance 1: Maximum assistance  Trials/Comments 1: Pt performed this twice with assist of two peopel  Transfers 2  Transfer From 2: Stand to  Transfer to 2: Sit  Technique 2: Stand to sit  Transfer Device 2: Walker  Transfer Level of Assistance 2: Moderate assistance  Trials/Comments 2: assistx2 people    Outcome Measures:  [facility] Basic Mobility  Turning from your back to your side while in a flat bed without using bedrails: A lot  Moving  from lying on your back to sitting on the side of a flat bed without using bedrails: A lot  Moving to and from bed to chair (including a wheelchair): A lot  Standing up from a chair using your arms (e.g. wheelchair or bedside chair): A lot  To walk in hospital room: Total  Climbing 3-5 steps with railing: Total  Basic Mobility - Total Score: 10    FSS-ICU  Ambulation: Walks <50 feet with any assistance x1 or walks any distance with assistance x2 people  Rolling: Moderate assistance (performs 50 - 74% of task)  Sitting: Supervision or set-up only  Transfer Sit-to-Stand: Maximal assistance (performs 25% - 49% of task)  Transfer Supine-to-Sit: Moderate assistance (performs 50 - 74% of task)  Total Score: 14    Education Documentation  Precautions, taught by Yulia Gomez PT at 10/2/2023 11:58 AM.  Learner: Patient  Readiness: Acceptance  Method: Explanation  Response: Verbalizes Understanding    Body Mechanics, taught by Yulia Gomez PT at 10/2/2023 11:58 AM.  Learner: Patient  Readiness: Acceptance  Method: Explanation  Response: Verbalizes Understanding    Mobility Training, taught by Yulia Gomez PT at 10/2/2023 11:58 AM.  Learner: Patient  Readiness: Acceptance  Method: Explanation  Response: Verbalizes Understanding    Education Comments  No comments found.        OP EDUCATION:       Encounter Problems       Encounter Problems (Active)       Balance       Balance Goal- Transcribed from [facility] (Progressing)       Start:  10/02/23    Expected End:  10/10/23       26-Sep-2023  no LOB with transfers/ambulation  2 wks                   Mobility       STG - Patient will ambulate- Transcribed from [facility] (Progressing)       Start:  10/02/23    Expected End:  10/10/23       26-Sep-2023  minimum assist (75% patients effort)  rolling walker  50 feet           STG - Patient will ascend and descend a flight of stairs= Transcribed from [facility]  (Progressing)       Start:  10/02/23    Expected End:  10/10/23        26-Sep-2023  minimum assist (75% patients effort)  ascend/descend 4 stairs with LRD  2 wks              Pain - Adult          Transfers       STG - Patient will perform bed mobility- Transcribed from  Care (Progressing)       Start:  10/02/23    Expected End:  10/10/23       Bed Mobility: Date Established 26-Sep-2023  Bed Mobility: [place] Level Goal stand-by assist  Bed Mobility: Time Frame for Goal 2 wks              Transfers       STG - Patient will transfer sit to and from stand and bed to chair- Transcribed from [facility] (Progressing)       Start:  10/02/23    Expected End:  10/10/23       26-Sep-2023  bed-to-chair/chair-to-bed; sit-to-stand/stand-to-sit  minimum assist (75% patients effort)  1-person assist  rolling walker  2 wks

## 2023-10-02 NOTE — CONSULTS
Vancomycin Dosing by Pharmacy- Cessation of Therapy    Consult to pharmacy for vancomycin dosing has been discontinued by the prescriber, pharmacy will sign off at this time.    Please call pharmacy if there are further questions or re-enter a consult if vancomycin is resumed.     Roopa March, PharmD

## 2023-10-02 NOTE — PROGRESS NOTES
Occupational Therapy    OT Treatment    Patient Name: Chevy Benton  MRN: [id number]  Today's Date: 10/2/2023  Time Calculation  Start Time: 0957  Stop Time: 1024  Time Calculation (min): 27 min         Assessment:  OT Assessment: Pt. requiring assist x2 for safe transfer. Pt. requiring increased time and cueing to complete. Reporting some dizziness with VSS. Pt. left seated in chair with X-ray tech present and all needs met.  Prognosis: Good  Barriers to Discharge: None  Evaluation/Treatment Tolerance: Patient tolerated treatment well  Medical Staff Made Aware: Yes  OT Assessment Results: Decreased ADL status, Decreased upper extremity strength, Decreased endurance, Decreased functional mobility  Prognosis: Good  Barriers to Discharge: None  Evaluation/Treatment Tolerance: Patient tolerated treatment well  Medical Staff Made Aware: Yes  Strengths: Ability to acquire knowledge, Attitude of self  Barriers to Participation: Coping skills  Plan:  Treatment Interventions:  (ADL retraining; IADL retraining; balance training; bed mobility training; positioning; stretching; strengthening; transfer training)  OT Frequency: 3 times per week  OT Discharge Recommendations: Moderate intensity level of continued care  OT Recommended Transfer Status: Assist of 2, Moderate assist  OT - Requires Next F/U Appointment: 10/04/23  Treatment Interventions:  (ADL retraining; IADL retraining; balance training; bed mobility training; positioning; stretching; strengthening; transfer training)    Subjective   General:  OT Received On: 10/02/23  Reason for Referral: ADL deficits  Referred By: Dr. Ku  Past Medical History Relevant to Rehab: AF on Eliquis, CHF (EF 37%), ESRD/HD (TTS) via left UE AV fistula, Anemia (POA), SSSs/p PPM who is s/p reconstruction of AT/PT and plantar arch angioplasty with Dr. Linder on 9/20/21 and underwent post right foot transmetatarsal amputation transferred from the endovascular floor to the CICU on  9/30 with concerns for sepsis after code white was called on finding patient diaphorertic, hypothermic (95F), afib (chronic) w/ RVR to 120s, BP stable.  Family/Caregiver Present: No  Caregiver Feedback: educated RN on pt assist level and position at end of session with pt left seated in chair with call light within reach and all needs met.  Co-Treatment: PT  Co-Treatment Reason: Patient requiring assist of 2 sets of skilled hands for safe OOB activity and skilled cueing for pt safety.  Prior to Session Communication: Bedside nurse  Patient Position Received: Bed, 3 rail up  Preferred Learning Style: visual, verbal, auditory  General Comment: Agreeable to OT sesion  Vital Signs:     Pain:  Pain Assessment  Pain Assessment: 0-10  Pain Score: 5 - Moderate pain  Pain Type: Acute pain  Pain Location: Foot  Pain Orientation: Right  Pain Descriptors: Aching  Pain Frequency: Constant/continuous  Pain Onset: Ongoing    Objective    Activities of Daily Living: Feeding  Feeding Level of Assistance: Setup    Grooming  Grooming Level of Assistance: Setup  Grooming Where Assessed: Chair    UE Bathing  UE Bathing Level of Assistance: Setup  UE Bathing Where Assessed:  (seated)    LE Bathing  LE Bathing Level of Assistance: Moderate assistance  LE Bathing Where Assessed:  (seated)    UE Dressing  UE Dressing Level of Assistance: Minimum assistance  UE Dressing Where Assessed:  (seated)    LE Dressing  LE Dressing: Yes  Pants Level of Assistance: Dependent  LE Dressing Where Assessed:  (seated)    Toileting  Toileting Level of Assistance: Dependent  Where Assessed:  (bed level or BSC)  Functional Standing Tolerance:     Bed Mobility/Transfers: Bed Mobility  Bed Mobility: Yes  Bed Mobility 1  Bed Mobility 1: Supine to sitting  Level of Assistance 1: Moderate assistance, Minimal verbal cues    Transfers  Transfer: Yes  Transfer 1  Transfer From 1: Sit to  Transfer to 1: Stand  Technique 1: Sit to stand  Transfer Device 1:  Walker  Transfer Level of Assistance 1: Maximum assistance, Moderate verbal cues, +2  Trials/Comments 1: cues for forward leaning and optimal upright posture  Transfers 2  Transfer From 2: Bed to  Transfer to 2: Chair with arms  Transfer Device 2: Walker  Transfer Level of Assistance 2: Moderate assistance, +2  Trials/Comments 2: increased time for sequencing with transfer  Transfers 3  Transfer From 3: Stand to  Transfer to 3: Sit  Transfer Device 3: Walker  Transfer Level of Assistance 3: Moderate assistance, +2       Vision:  Vision Comments: wearing corrective lenses during session  Strength:  Strength Comments: UB ROM WFL; UB MMT grossly 4-/5    Outcome Measures:[facility] Daily Activity  Putting on and taking off regular lower body clothing: Total  Bathing (including washing, rinsing, drying): A lot  Putting on and taking off regular upper body clothing: A little  Toileting, which includes using toilet, bedpan or urinal: Total  Taking care of personal grooming such as brushing teeth: None  Eating Meals: None  Daily Activity - Total Score: 15    Education Documentation  Body Mechanics, taught by Nevaeh Salcido OT at 10/2/2023 12:02 PM.  Learner: Patient  Readiness: Acceptance  Method: Explanation, Demonstration  Response: Needs Reinforcement, Verbalizes Understanding    ADL Training, taught by Nevaeh Salcido OT at 10/2/2023 12:02 PM.  Learner: Patient  Readiness: Acceptance  Method: Explanation, Demonstration  Response: Needs Reinforcement, Verbalizes Understanding    Education Comments  No comments found.           Goals:  Encounter Problems       Encounter Problems (Active)       ADLs       UB Bathing (Progressing)       Start:  10/02/23    Expected End:  10/10/23       Bathing Upper Body: Established Date 26-Sep-2023  Bathing Upper Body: [place] Level Goal modified independent  Bathing Upper Body: Assistive Device Goal AE as needed           LB Bathing (Progressing)       Start:  10/02/23    Expected  End:  10/10/23       Bathing Lower Body: Established Date 26-Sep-2023  Bathing Lower Body: [place] Level Goal minimum assist (75% patients effort)  Bathing Lower Body: Assistive Device Goal AE as needed  Bathing Lower Body: Time Frame for Goal 2 wks           UB Dressing (Progressing)       Start:  10/02/23    Expected End:  10/10/23       Upper Body Dressing: Established Date 26-Sep-2023  Upper Body Dressing: [place] Level Goal independent  Upper Body Dressing: Time Frame for Goal 2 wks           LB Dressing (Progressing)       Start:  10/02/23    Expected End:  10/10/23       Lower Body Dressing: Established Date 26-Sep-2023  Lower Body Dressing: [place] Level Goal minimum assist (75% patients effort)  Lower Body Dressing: Assistive Device Goal AE as needed  Lower Body Dressing: Time Frame for Goal 2 wks           Toileting (Progressing)       Start:  10/02/23    Expected End:  10/10/23       Toileting: Established Date 26-Sep-2023  Toileting: [place] Level Goal minimum assist (75% patients effort)  Toileting: Time Frame for Goal 2 wks              Balance       Balance Goal- Transcribed from  Care (Progressing)       Start:  10/02/23    Expected End:  10/10/23       26-Sep-2023  no LOB with transfers/ambulation  2 wks                   MOBILITY       Transfer goal (Progressing)       Start:  10/02/23    Expected End:  10/10/23       Transfer: Established Date 26-Sep-2023  Transfer: Transfer Type Goal all transfers  Transfer: [place] Level Goal minimum assist (75% patients effort)  Transfer: Assistive Device Goal LRAD  Transfer: Time Frame for Goal 2 wks              Mobility       STG - Patient will ambulate- Transcribed from  Care (Progressing)       Start:  10/02/23    Expected End:  10/10/23       26-Sep-2023  minimum assist (75% patients effort)  rolling walker  50 feet           STG - Patient will ascend and descend a flight of stairs= Transcribed from [facility]  (Progressing)        Start:  10/02/23    Expected End:  10/10/23       26-Sep-2023  minimum assist (75% patients effort)  ascend/descend 4 stairs with LRD  2 wks              Transfers       STG - Patient will perform bed mobility- Transcribed from  Care (Progressing)       Start:  10/02/23    Expected End:  10/10/23       Bed Mobility: Date Established 26-Sep-2023  Bed Mobility: [place] Level Goal stand-by assist  Bed Mobility: Time Frame for Goal 2 wks              Transfers       STG - Patient will transfer sit to and from stand and bed to chair- Transcribed from  care (Progressing)       Start:  10/02/23    Expected End:  10/10/23       26-Sep-2023  bed-to-chair/chair-to-bed; sit-to-stand/stand-to-sit  minimum assist (75% patients effort)  1-person assist  rolling walker  2 wks

## 2023-10-02 NOTE — PROGRESS NOTES
Chevy Benton is a 78 y.o. male on day 13 of admission presenting with PAD (peripheral artery disease) (CMS/[facility]).    Subjective    Feels better this morning, reports his abdomen is less distended a little bit. Has 400 ml of greenish material from NGT tube with no suction.     Hospital course:   79 yo Male  PMH of AF on Eliquis, CHF (EF 37%), ESRD/HD (Tuesday Thursday Saturday, [facility] [place], Dr. Medel) via left UE AV fistula, Anemia (POA), SSSs/p PPM who is s/p reconstruction of AT/PT and plantar arch angioplasty with Dr. Linder on 9/20/21 and underwent post right foot transmetatarsal amputation transferred from the endovascular floor to the CICU with concerns for sepsis.      Briefly, patient presented for right toe gangrene and non-healing heel crack/ulcer despite endovascular intervention of PTA of right AT/PT on 4/12/2023 and right AT 8/30/2023 with Dr. Sanchez. He was then admitted to [facility] and underwent PTA of Rt AT/DP, PTA of PT and lateral plantar. Complete reconstruction of AT/PT and plantar arch angioplasty with Dr. Linder on 9/20/21 and underwent post right foot transmetatarsal amputation with wound vac placement on 9/22/2023 and wound vac removed on 9/25/2023.     09/29 Pt was scheduled to be discharge 3 days ago but pt refused to go saying he has a new onset tremors and worried something major is wrong and needs to be worked up. Pt family also seem to endorse his symptoms saying he could not hold his food while eating dinner. Pt was seen 4 times then, at no point patient mentioned this symptoms until it was time to be discharged.  Pt stated he noticed the tremors last night but it went away so he did not tell anyone but this afternoon it seem to have returned worse than last night. Pt vital signs stable, afebrile, blood sugar 160, PE unremarkable, non-focal, no deficits noted, generalized weakness equal bilateral no change from baseline, mild tremor noted during examination. Pt/family asked if  "any of his medications including pain medication were causing the tremor. Nothing in particular can be r/I or r/o. Pain medication discontinued and not to be restarted.  No additional testing requested at this time.  Transportation to  cancelled. Pt will be observed overnight and if stable will be discharge to follow up outpatient. Plan was discussed with attending Dr. Linder.     09/30, patient had a code white call. Patient was found to be diaphoretic and tachycardic. He was reported abdominal distention, pain and nausea. Patient was found to have a temp of 94F, was in chronic afib but in RVR with rates in 120s,  (per code white nurse report), satting well on RA. Transferred to CICU w/ concerns for sepsis.   On arrival to CICU, patient had a HR of 100, /76 satting at 99% on RA.    Since transfer he has been on vanc/zosyn with no fevers and bcx has been negative to date for which he was switched back to cefepime. His course was complicated by ileus for which and NGT is in place.     To follow:  - TTE official to evaluate EF (no official TTE in our records)  - NGT for decompression and de-escalation (he is off suction 2-4 hours after meds) still doing meds PO             Objective   Visit Vitals  /75   Pulse 98   Temp 36.3 °C (97.3 °F) (Temporal)   Resp 17         Physical Exam  /75   Pulse 98   Temp 36.3 °C (97.3 °F) (Temporal)   Resp 17   Ht 1.727 m (5' 7.99\")   Wt 94.5 kg (208 lb 5.4 oz)   SpO2 92%   BMI 31.68 kg/m²     General Appearance:  Alert, cooperative, no distress, appears stated age   Head:  Normocephalic, without obvious abnormality, atraumatic                   Neck: Supple, symmetrical, trachea midline,   Back:   Symmetric, no curvature, ROM normal, no CVA tenderness   Lungs:   Clear to auscultation bilaterally, respirations unlabored   Chest Wall:  No tenderness or deformity   Heart:  Regular rate and rhythm, S1, S2 normal, no murmur, rub or gallop   Abdomen:   " "Soft, non-tender, bowel sounds hypoactive, no masses, no organomegaly           Extremities: Extremities normal, atraumatic, no cyanosis or edema   Pulses: 2+ and symmetric   Skin: Skin color, texture, turgor normal, no rashes or lesions       Neurologic: Normal         Last Recorded Vitals  Blood pressure 129/75, pulse 98, temperature 36.3 °C (97.3 °F), temperature source Temporal, resp. rate 17, height 1.727 m (5' 7.99\"), weight 94.5 kg (208 lb 5.4 oz), SpO2 92 %.  Intake/Output last 3 Shifts:  I/O last 3 completed shifts:  In: 1300 (13.8 mL/kg) [I.V.:500 (5.3 mL/kg); NG/GT:50; IV Piggyback:750]  Out: 10 (0.1 mL/kg) [Emesis/NG output:10]  Weight: 94.5 kg     Relevant Results  Scheduled medications  allopurinol, 100 mg, oral, q24h  apixaban, 2.5 mg, oral, q12h  atorvastatin, 10 mg, oral, Daily  B complex-vitamin C-folic acid, 1 capsule, oral, Daily  cinacalcet, 30 mg, oral, Daily with breakfast  clopidogrel, 75 mg, oral, Daily  docusate sodium, 100 mg, oral, BID  [START ON 10/3/2023] epoetin dane, 5,000 Units, intravenous, Once per day on Tue Thu Sat  gabapentin, 100 mg, oral, After Dialysis  isavuconazonium sulfate, 372 mg, oral, Daily  lanthanum, 750 mg, oral, BID with meals  loratadine, 10 mg, oral, Daily  menthol-zinc oxide, 1 Application, Topical, Daily  metoprolol succinate XL, 25 mg, oral, Daily  naphazoline-pheniramine, 1 drop, Both Eyes, 4x daily  pantoprazole, 40 mg, oral, Daily before breakfast  piperacillin-tazobactam, 2.25 g, intravenous, q6h  pneumococcal polysaccharide, 0.5 mL, intramuscular, During hospitalization  polyethylene glycol, 17 g, oral, Daily  povidone-iodine, , Topical, Every other day  sennosides, 2 tablet, oral, Nightly  trimethobenzamide, 200 mg, intramuscular, Once      Results for orders placed or performed during the hospital encounter of 09/19/23 (from the past 24 hour(s))   POCT GLUCOSE   Result Value Ref Range    POCT Glucose 163 (H) 74 - 99 mg/dL   POCT GLUCOSE   Result Value " Ref Range    POCT Glucose 158 (H) 74 - 99 mg/dL   Vancomycin   Result Value Ref Range    Vancomycin 16.6 5.0 - 20.0 ug/mL   Renal Function Panel   Result Value Ref Range    Glucose 134 (H) 74 - 99 mg/dL    Sodium 137 136 - 145 mmol/L    Potassium 4.3 3.5 - 5.3 mmol/L    Chloride 95 (L) 98 - 107 mmol/L    Bicarbonate 27 21 - 32 mmol/L    Anion Gap 19 10 - 20 mmol/L    Urea Nitrogen 40 (H) 6 - 23 mg/dL    Creatinine 6.44 (H) 0.50 - 1.30 mg/dL    eGFR 8 (L) >60 mL/min/1.73m*2    Calcium 9.1 8.6 - 10.6 mg/dL    Phosphorus 5.0 (H) 2.5 - 4.9 mg/dL    Albumin 3.0 (L) 3.4 - 5.0 g/dL   CBC and Auto Differential   Result Value Ref Range    WBC 14.3 (H) 4.4 - 11.3 x10*3/uL    nRBC 0.0 0.0 - 0.0 /100 WBCs    RBC 3.41 (L) 4.50 - 5.90 x10*6/uL    Hemoglobin 10.0 (L) 13.5 - 17.5 g/dL    Hematocrit 31.3 (L) 41.0 - 52.0 %    MCV 92 80 - 100 fL    MCH 29.3 26.0 - 34.0 pg    MCHC 31.9 (L) 32.0 - 36.0 g/dL    RDW 15.0 (H) 11.5 - 14.5 %    Platelets 333 150 - 450 x10*3/uL    MPV 10.6 7.5 - 11.5 fL    Neutrophils % 77.2 40.0 - 80.0 %    Immature Granulocytes %, Automated 0.6 0.0 - 0.9 %    Lymphocytes % 6.0 13.0 - 44.0 %    Monocytes % 8.5 2.0 - 10.0 %    Eosinophils % 6.8 0.0 - 6.0 %    Basophils % 0.9 0.0 - 2.0 %    Neutrophils Absolute 11.05 (H) 1.60 - 5.50 x10*3/uL    Immature Granulocytes Absolute, Automated 0.08 0.00 - 0.50 x10*3/uL    Lymphocytes Absolute 0.86 0.80 - 3.00 x10*3/uL    Monocytes Absolute 1.22 (H) 0.05 - 0.80 x10*3/uL    Eosinophils Absolute 0.97 (H) 0.00 - 0.40 x10*3/uL    Basophils Absolute 0.13 (H) 0.00 - 0.10 x10*3/uL               Assessment/Plan   Principal Problem:    PAD (peripheral artery disease) (CMS/HCC)    Assessment:  79 yo Male  PMH of AF on Eliquis, CHF (EF 37%), ESRD/HD (Tuesday Thursday Saturday, [facility], Dr. Medel) via left UE AV fistula, Anemia (POA), SSSs/p PPM who is s/p reconstruction of AT/PT and plantar arch angioplasty with Dr. Linder on 9/20/21 and underwent post right foot  transmetatarsal amputation transferred from the endovascular floor to the CICU on 9/30 with concerns for sepsis after code white was called on finding patient diaphorertic, hypothermic (95F), afib (chronic) w/ RVR to 120s, BP stable. Patient reports feeling generally better today, w/ abdominal distention improving. Currently on vanc/zosyn for sepsis.     Update 10/02:  - Vanc/zosyn dced, switched to cefepime 2gm post HD as per initial  plan  - Transfer to floor         NEURO  No active issues     CV  #HFrEF   #afib  #HTN  #SSS s/p PPM  PPM interrogation 9/22: AT/AF 99.5%, appropriate sensing and capture, RV paced 40.5%. no VT episodes. UL rate 60 LL rate 130.  -c/w clopidogrel, eliquis, statin  -c/w metoprolol succ 25mg daily  -Hold amlodipine and PRN clonidine given c/f sepsis  -ESRD limits GDMT but can still be started on outpatient basis     PULM  No active issues     GI  #Ileus  Xray abdomen showing gaseous distention of the bowel in a nonspecific nonobstructive pattern. Similar appearance of numerous radiopaque items projecting over the abdomen, most concentrated over the right hemiabdomen, likely representing ingested materials  S/p miralax and enema : no BM- hardly any stool burden on xray  Plan:  - c/w NGT  for decompression of bowel  - XR abdomen this AM   - c/w pantoprazole 40 daily  - GI on board   - Ok to give PO meds w/ holding NGT suction for 2-4 hours     RENAL  #ESRD: on HD TTS  - continue with TTS HD Last session 9/30  - c/w home lanthanum, cinacalcinet, MV  - Nephrology following  - Epo 5000 units on dialysis days     ID  #c/f sepsis  Presented with diaphoresis, hypothermia, afib RVR  Plan:  -f/u BCX x 2 9/30: NGTD  -transition to cefepime 2 gm post HD  With stop date 12/3/23)  -podiatry on board      #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 Parapsilosis OM, treat concurrently with Oral Isavuconazole 372mg afterwards for total 6 weeks course from TMA operation date 9/22/23 - END DATE 12/7/2023.         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 Complete reconstruction of AT/PT and plantar arch angioplasty with Dr. Linder  - 9/22/2023 Status post right foot transmetatarsal amputation with wound vac placement  - 9/25/23 wound vac removed by podiatry, picture reviewed: some maceration on the plantar side of the flap, otherwise incision looks good.  - PVR RAJ done 9/27 : Right Lower PVR: No evidence of arterial occlusive disease in the right lower extremity at rest. Biphasic flow is noted in the right posterior tibial artery. Triphasic flow is noted in the right common femoral artery and right dorsalis pedis artery. Severity of disease called by PVR tracings and Doppler waveforms due to non-compressible vessels.  Plan:  - continue with Clopidogrel and Eliquis  - c/w Neurontin 100 mg three times a week on Dialysis days  - holding oxycodone given ileus  - Consulted podiatry to review wound site and any focus of infection  - tylenol Prn for pain     #Gout  - c/w home Allopurinol daily   - Uric Acid Low 3.1  - Holding Colchicine due to possible severe interaction with Isavuconazole        Dispo: SNF  - PT/OT recs: AM-PAC score 11 The pt would benefit from moderate intensity therapy for 3-5 days per week to maximize functional mobility and independence.  - Pt and wife has selected SNF, precert pending.    Discharge planning:  - 1 month post procedure EVLS clinic with repeat testing  - Patient to follow up with Dr. Rodriguez after discharge at [address] [place] [address]. Please call [phone] to schedule an appointment within one week of discharge.   - Send weekly CBCdiff, CMP, CRP, ESR, while on IV  "systemic antibiotics and fax results to ID clinic at [phone] with \"Re: attention Dr Glass\"  - ID will arrange follow-up in 6 weeks with Dr. Glass in ID clinic, Appointment will be in EMR chart        F: PRN  E: PRN  N: NPO while on NGT   GI: pantoprazole  DVT: home eliquis     Code Status: FULL (confirmed on admission)  Surrogate decision maker: Wife Julia [phone]           Carina Payne MD      "

## 2023-10-02 NOTE — PROGRESS NOTES
Subjective Data:  Pt was seen and assessed this AM at the bedside in CICU. Pt sitting in a chair reported feeling still constipated, he has passed gas once or twice but still no BM x4 days. He reported he does not feel well at all, also still feel shaky.     Overnight Events:    Has 400 ml of greenish material from NGT tube with no suction.  no Bm.     Objective Data:  Last Recorded Vitals:  Vitals:    10/02/23 0600 10/02/23 0800 10/02/23 0958 10/02/23 1100   BP:   139/81    Pulse:   93    Resp:       Temp:  36.4 °C (97.5 °F)  36.1 °C (97 °F)   TempSrc:  Temporal     SpO2:   96%    Weight: 94.5 kg (208 lb 5.4 oz)      Height:           Last Labs:  CBC - 10/2/2023:  4:41 AM  14.3 10.0 333    31.3      CMP - 10/2/2023:  4:41 AM  9.1 6.6 21 --- 0.7   5.0 3.0 27 107           Last I/O:  I/O last 3 completed shifts:  In: 1300 (13.8 mL/kg) [I.V.:500 (5.3 mL/kg); NG/GT:50; IV Piggyback:750]  Out: 10 (0.1 mL/kg) [Emesis/NG output:10]  Weight: 94.5 kg     Echo:  Transthoracic Echo (TTE) Complete 10/2/2023    Inpatient Medications:  Scheduled medications   Medication Dose Route Frequency    allopurinol  100 mg oral q24h    apixaban  2.5 mg oral q12h    atorvastatin  10 mg oral Daily    B complex-vitamin C-folic acid  1 capsule oral Daily    bisacodyl  10 mg rectal Daily    cefepime  2 g intravenous After Dialysis    cinacalcet  30 mg oral Daily with breakfast    clopidogrel  75 mg oral Daily    docusate sodium  100 mg oral BID    [START ON 10/3/2023] epoetin dane  5,000 Units intravenous Once per day on Tue Thu Sat    gabapentin  100 mg oral After Dialysis    isavuconazonium sulfate  372 mg oral Daily    lanthanum  750 mg oral BID with meals    loratadine  10 mg oral Daily    menthol-zinc oxide  1 Application Topical Daily    metoprolol succinate XL  25 mg oral Daily    naphazoline-pheniramine  1 drop Both Eyes 4x daily    pantoprazole  40 mg oral Daily before breakfast    pneumococcal polysaccharide  0.5 mL intramuscular  During hospitalization    polyethylene glycol  17 g oral Daily    povidone-iodine   Topical Every other day    sennosides  2 tablet oral Nightly    trimethobenzamide  200 mg intramuscular Once     PRN medications   Medication    acetaminophen    albuterol    albuterol     Continuous Medications   Medication Dose Last Rate       Physical Exam:  Gen: Alert, awake, AO x 3  Head: no evidence of trauma, NG in place  Neck: no JVD  CVS: RRR, no murmur, no leg edema  Lungs: bilateral clear with diminished bases  Abd: obese, NT, mild distension, no mass palpated    Extremities: AVF left arm, right TMA site dressed C/D/I dressing by podiatry. warm to touch, no edema, unable to doppler due to dressing.   Neuro: CN II-XII intact, Generalized weakness      Assessment/Plan   79 yo Male  PMH of AF on Eliquis, CHF (EF 37%), [facility] via left UE AV fistula, Anemia (POA), SSS, status post pacemaker, non smoker, nondiabetic, who was referred to us by Dr. Sanchez, [facility], for consideration for revascularization, in the setting of right toe gangrene (podiatrist Dr. Reese Solares).  Patient presented to clinic with his wife and son, stating he had a longstanding right heel crack and underwent PTA of right AT/PT on 4/12/2023 with Dr. Sanchez.  The heel crack almost healed.  He then developed right foot severe pain and great toe discoloration in August, was admitted at [facility], and underwent a PTA of right AT on 8/30/2023 with Dr. Sanchez, and  was told there were no other options available for him.  During that admission his toes became gangrenous.  He had a foot x-ray that was questionable for osteomyelitis,  was put on IV antibiotics during hospital stay, but was not discharged on any antibiotics. Up to mid August he was able to walk and drive. He denies CP/SOB.  Pre procedure RAJ/TBI showed impaired blood flow to the RLE: 0.69/0.3, Lt NC/0.8.  We have reviewed the images from  angioplasties from [facility] (images in PACS).  Given above presentation, pt was directly admitted to the hospital, for emergent angioplasty.    09/29 Pt was scheduled to be discharge but pt refused to go saying he has a new onset tremors and worried something major is wrong and needs to be worked up. Pt family also seem to endorse his symptoms saying he could not hold his food while eating dinner. Pt stated he noticed the tremors last night but it went away so he did not tell anyone but this afternoon it seem to have returned worse than last night. Pt vital signs stable, afebrile, blood sugar 160, PE unremarkable, non-focal, no deficits noted, generalized weakness equal bilateral no change from baseline, mild tremor noted during examination. Pain medication discontinued.  No additional testing requested at this time. Transportation to SNF cancelled. Pt kept for an overnight observation.     09/30, patient had a code white call. Patient was found to be diaphoretic and tachycardic. He was reported abdominal distention, pain and nausea. Patient was found to have a temp of 94F, was in chronic afib but in RVR with rates in 120s,  (per code white nurse report), satting well on RA. Transferred to CICU w/ concerns for sepsis.   On arrival to CICU, patient had a HR of 100, /76 satting at 99% on RA.  Since transfer he has been on vanc/zosyn with no fevers and bcx has been negative to date for which he was switched back to cefepime today. KUB showed non-obstructive ileus for which and NGT is in place.     #Right CLI/RC-VI toe gangrene:  - 9/20/21 s/p PTA of Rt AT/DP, PTA of PT and lateral plantar. Complete reconstruction of AT/PT and plantar arch angioplasty with Dr. Linder,  - continue with Clopidogrel and Eliquis  - c/w Neurontin 100 mg three times a week on Dialysis days  - PVR RAJ done 9/27 : Right Lower PVR: No evidence of arterial occlusive disease in the right lower extremity at rest. Biphasic flow is noted  "in the right posterior tibial artery. Triphasic flow is noted in the right common femoral artery and right dorsalis pedis artery. Severity of disease called by PVR tracings and Doppler waveforms due to non-compressible vessels.  - 1 month post procedure EVLS clinic with repeat testing    10/2 - Pt feeling better and might transfer out of the CICU later today    #Rt foot toe gangrene s/p TMA:  - 9/22/2023 Status post right foot transmetatarsal amputation with wound vac placement  - 9/25/23 wound vac removed by podiatry, picture reviewed: some maceration on the plantar side of the flap, otherwise incision looks good.  - TMA flap noted to have some maceration but no dusky discoloration. Retention sutures intact to surgical site. Mild federica-incision erythema. No active drainage, no calor, no malodor, no ascending lymphangitis.   - Weight bearing as tolerated to HEEL ONLY in surgical shoe- ordered  - Leg elevation to control edema and maceration  - Recommend every other day dressing changes with Betadine soaked 4x4s, ABD, kerlix, light ace.   - Cleared for discharge from a podiatry standpoint.   - Patient to follow up with Dr. Rodriguez after discharge at [address] [place] [address]. Please call [phone] to schedule an appointment within one week of discharge.     #ID  - Per ID recs: Given positive cultures on bone margins: Treat for R foot residual OM with renally dosed IV Cefepime 2G every dialysis for total 6 weeks END DATE 11/3/2023. Will followup in ID clinic for next antibiotics steps.  - For  C. Parapsilosis OM, treat concurrently with Oral Isavuconazole 372mg Q8H for 2 days (LOADING DOSE), then start Isavuconazole 372mg Q24H afterwards for total 6 weeks course from TMA operation date 9/22/23 - END DATE 11/7/2023.   - Send weekly CBCdiff, CMP, CRP, ESR, while on IV systemic antibiotics and fax results to ID clinic at [phone] with \"Re: attention Dr Glass\"  - ID will arrange follow-up in 6 weeks with " Dr. Glass in ID clinic, Appointment will be in EMR chart  - blood Cx NTD  - discontinue Vanc/Zosyn c/w cefepime 2G every dialysis days  - 10/2 WBC 14.3, afebrile, no hypothermia Temp 36.4     GI  #Ileus  - 10/2 Xray abdomen:- multiple radiodense fragments continue to project over the abdomen, not significantly changed from prior exam. Stable air-filled distention of multiple central bowel loops.  - S/p miralax and enema : no BM- hardly any stool burden on xray  - c/w NGT  for decompression of bowel  - c/w pantoprazole 40 daily  - GI on board   - Ok to give PO meds w/ holding NGT suction for 2-4 hours    #ESRD: on HD TTS  - continue with TTS HD Last session today 9/28 tolerated well  - c/w home daily Nephrocaps, Sensipar and Fosrenol   - Nephrology following  - Renal dose IV Abx  - Epo 5000 units on dialysis days    #HFrEF   #afib  #HTN  #SSS s/p PPM  -PPM interrogation 9/22: AT/AF 99.5%, appropriate sensing and capture, RV paced 40.5%. no VT episodes. UL rate 60 LL rate 130.   - Resume Amlodipine 10 mg daily  - Resume  as needed Clonidine 0.2 twice daily for SBP > 180  - c/w metoprolol succ 25mg daily   - c/w Eliquis, Plavix and statin    #Gout  - c/w home Allopurinol daily   - Uric Acid Low 3.1  - continue to Hold Colchicine due to possible severe interaction with Isavuconazole    Dispo: SNF  - PT/OT recs: AM-PAC score 11 The pt would benefit from moderate intensity therapy for 3-5 days per week to maximize functional mobility and independence.  - Once Pt is transferred back to regular floor, will resubmit for Precert. Pt and wife have selected [facility] and was previously accepted.   Code Status:  Full Code      Peripheral IV 09/30/23 22 G Right;Posterior Hand (Active)   Site Assessment Clean;Dry 10/02/23 1122   Dressing Status Clean;Dry;Occlusive 10/02/23 1122   Number of days: 2       Peripheral IV 09/30/23 20 G Right Antecubital (Active)   Site Assessment Clean;Dry;Intact 10/02/23 0810   Dressing  Status Clean;Dry 10/02/23 0810   Number of days: 2       NG/OG/Feeding Tube Nasogastric Left nostril (Active)   Site Assessment Clean;Dry 10/02/23 1402   Number of days: 1       Hemodialysis Arteriovenous Fistula  Left Upper arm (Active)   Site Assessment Clean;Dry 10/02/23 1246   Dressing Status Clean;Dry 10/02/23 1246   Number of days:            JENIFFER Ann-CNP  Endovascular/Limb Salvage Team  [id number]/[place]

## 2023-10-03 NOTE — PROGRESS NOTES
Physical Therapy    Physical Therapy Treatment    Patient Name: Chevy Benton  MRN: [id number]  Today's Date: 10/3/2023  Time Calculation  Start Time: 1153  Stop Time: 1216  Time Calculation (min): 23 min       Assessment/Plan   PT Assessment  PT Assessment Results: Decreased strength, Impaired balance, Decreased endurance, Decreased mobility, Pain, Orthopedic restrictions  Rehab Prognosis: Good  Evaluation/Treatment Tolerance: Patient limited by pain  Medical Staff Made Aware: Yes  Strengths: Attitude of self  End of Session Communication: Bedside nurse  Assessment Comment: Pt limited by pain this date, required min A for supine to sit transfer. Pt contnues to demonstrate impaired dynamic balance, strength and endurnace and would benefit from continued skilled physical therapy to improve overall functional mobility.  .  End of Session Patient Position: Bed, 3 rail up, Alarm off, not on at start of session  PT Plan  Inpatient/Swing Bed or Outpatient: Inpatient  PT Plan  Treatment/Interventions: Bed mobility, Balance training, Gait training, Transfer training, Neuromuscular re-education, Strengthening, Endurance training, Range of motion, Therapeutic exercise, Therapeutic activity, Home exercise program, Positioning, Postural re-education  PT Plan: Skilled PT  PT Frequency: 3 times per week  PT Discharge Recommendations: Moderate intensity level of continued care  Equipment Recommended upon Discharge: Wheeled walker  PT Recommended Transfer Status: Assist x2      General Visit Information:   PT  Visit  PT Received On: 10/03/23  Response to Previous Treatment: Patient with no complaints from previous session.  General  Reason for Referral: impaired functional mobility  Prior to Session Communication: Bedside nurse  Patient Position Received: Bed, 3 rail up, Alarm off, not on at start of session  General Comment: Pt found supine in bed and was agreeable to therapy    Subjective   Precautions:  Precautions  LE Weight  Bearing Status: Heel Weight Bearing in Post-Op Shoe (RLE)  Medical Precautions: Fall precautions  Vital Signs:  Vital Signs  Heart Rate: 94  SpO2: 99 %  BP: 129/70  MAP (mmHg): 95  Patient Position: Other (Comment) (post vitals: HR: 96 SP)2: 98% BP: 133/71 (86))    Objective   Pain:  Pain Assessment  Pain Assessment: 0-10  Pain Score: 5 - Moderate pain  Pain Type: Surgical pain  Pain Location: Foot  Pain Orientation: Right  Pain Descriptors: Aching  Pain Frequency: Constant/continuous  Pain Onset: Ongoing  Pain Interventions: Repositioned        Treatments:  Bed Mobility  Bed Mobility: Yes  Bed Mobility 1  Bed Mobility 1: Supine to sitting, Sitting to supine  Level of Assistance 1: Minimum assistance  Bed Mobility Comments 1: Pt performed supine to sit transfer with min A. Pt sat at EOB for 15 minutes, complained of increasing pain level and shortness of breath. SPO2 WFL, Pt declined standing and requested to return to supine.        Outcome Measures:  [facility] Basic Mobility  Turning from your back to your side while in a flat bed without using bedrails: A little  Moving from lying on your back to sitting on the side of a flat bed without using bedrails: A little  Moving to and from bed to chair (including a wheelchair): A lot  Standing up from a chair using your arms (e.g. wheelchair or bedside chair): A lot  To walk in hospital room: Total  Climbing 3-5 steps with railing: Total  Basic Mobility - Total Score: 12    FSS-ICU  Ambulation: Unable to attempt due to weakness  Rolling: Minimal assistance (performs 75% or more of task)  Sitting: Supervision or set-up only  Transfer Sit-to-Stand: Maximal assistance (performs 25% - 49% of task)  Transfer Supine-to-Sit: Minimal assistance (performs 75% or more of task)  Total Score: 15    Education Documentation  Precautions, taught by Yulia Gomez, PT at 10/3/2023  1:22 PM.  Learner: Patient  Readiness: Acceptance  Method: Explanation  Response: Verbalizes  Understanding    Body Mechanics, taught by Yulia Gomez PT at 10/3/2023  1:22 PM.  Learner: Patient  Readiness: Acceptance  Method: Explanation  Response: Verbalizes Understanding    Mobility Training, taught by Yulia Gomez PT at 10/3/2023  1:22 PM.  Learner: Patient  Readiness: Acceptance  Method: Explanation  Response: Verbalizes Understanding    Education Comments  No comments found.               Encounter Problems       Encounter Problems (Active)       Balance       Balance Goal- Transcribed from [facility] (Progressing)       Start:  10/02/23    Expected End:  10/10/23       26-Sep-2023  no LOB with transfers/ambulation  2 wks                   Mobility       STG - Patient will ambulate- Transcribed from [facility] (Progressing)       Start:  10/02/23    Expected End:  10/10/23       26-Sep-2023  minimum assist (75% patients effort)  rolling walker  50 feet           STG - Patient will ascend and descend a flight of stairs= Transcribed from [facility]  (Progressing)       Start:  10/02/23    Expected End:  10/10/23       26-Sep-2023  minimum assist (75% patients effort)  ascend/descend 4 stairs with LRD  2 wks              Pain - Adult          Transfers       STG - Patient will perform bed mobility- Transcribed from [facility] (Progressing)       Start:  10/02/23    Expected End:  10/10/23       Bed Mobility: Date Established 26-Sep-2023  Bed Mobility: [place] Level Goal stand-by assist  Bed Mobility: Time Frame for Goal 2 wks              Transfers       STG - Patient will transfer sit to and from stand and bed to chair- Transcribed from [facility] (Progressing)       Start:  10/02/23    Expected End:  10/10/23       26-Sep-2023  bed-to-chair/chair-to-bed; sit-to-stand/stand-to-sit  minimum assist (75% patients effort)  1-person assist  rolling walker  2 wks

## 2023-10-03 NOTE — PROGRESS NOTES
Chevy Benton is a 78 y.o. male on day 14 of admission presenting with PAD (peripheral artery disease) (CMS/[facility]).    Subjective   Mr. Benton is feeling well this morning. Had a large bm after rectal bisacodyl yesterday. Passing small amount of gas this morning. Denies fever, chills, abdominal pain, chest pain. Sitting on bed pan during rounds.      Hospital course:  77 yo Male  PMH of AF on Eliquis, CHF (EF this admit 35-40%), ESRD/HD (Tuesday Thursday Saturday, [facility]note 3[facility] [place], Dr. Medel) via left UE AV fistula, Anemia (POA), SSSs/p PPM who is s/p reconstruction of AT/PT and plantar arch angioplasty with Dr. Linder on 9/20/21 and underwent post right foot transmetatarsal amputation transferred from the endovascular floor to the CICU on 9/30 with concerns for sepsis after code white was called on finding patient diaphorertic, hypothermic (95F), afib (chronic) w/ RVR to 120s, BP stable. He has now been transitioned from vanc/zosyn to cefepime that will be given with dialysis. Per ID note 9/25, plan for continuation of bother cefepime (R foot residual OM) and isavuconazole (for C. Parapsilosis OM) for 6 week course from TMA operation on 9/22/23, with stop date being 11/3/23 (previously incorrectly listed throughout chart as 12/3/23). He has remained in CICU in setting of ileus, with GI on board. This appears to be resolving, with large bm 10/2/23 after bisacodyl suppository. Plan for dialysis per regular schedule today. Considered stable for transfer to the floor, back to endovascular service.      Objective     Physical Exam  General- cooperative, no distress, appears stated age  HEENT- normocephalic, atraumatic, MMM, eomi  Lungs- clear, no crackles or wheezes  Cards- RRR, no murmurs  GI- NG tube in place, soft, nttp, non-distended  Extremities- trace R leg edema, none on left, R foot wrapped  Psych- appropriate mood and affect  Neuro- A&Ox3 , no focal deficit    Last Recorded Vitals  Blood pressure 129/70,  "pulse 94, temperature 36.2 °C (97.2 °F), resp. rate 14, height 1.727 m (5' 7.99\"), weight 93.3 kg (205 lb 11 oz), SpO2 99 %.  Intake/Output last 3 Shifts:  I/O last 3 completed shifts:  In: 400 (4.3 mL/kg) [NG/GT:250; IV Piggyback:150]  Out: 675 (7.2 mL/kg) [Emesis/NG output:675]  Weight: 93.3 kg     Relevant Results              Assessment/Plan   Principal Problem:    PAD (peripheral artery disease) (CMS/[facility])    Assessment:  79 yo Male  PMH of AF on Eliquis, CHF (EF 37%), ESRD/HD (Tuesday Thursday Saturday, [place], Dr. Medel) via left UE AV fistula, Anemia (POA), SSSs/p PPM who is s/p reconstruction of AT/PT and plantar arch angioplasty with Dr. Linder on 9/20/21 and underwent post right foot transmetatarsal amputation transferred from the endovascular floor to the CICU on 9/30 with concerns for sepsis after code heavenly was called on finding patient diaphorertic, hypothermic (95F), afib (chronic) w/ RVR to 120s, BP stable. Patient reports feeling generally better today, w/ abdominal distention improving. Transfer to floor today.    Update 10/3:  - Large bm yesterday after bisacodyl  - Dialysis today  - Of note, previous notes have stated stop date for abx as 12/3/23 but 6 weeks would be 11/3/23  - Transfer to floor         NEURO  No active issues     CV  #HFrEF   #afib  #HTN  #SSS s/p PPM  PPM interrogation 9/22: AT/AF 99.5%, appropriate sensing and capture, RV paced 40.5%. no VT episodes. UL rate 60 LL rate 130.  -c/w clopidogrel, eliquis, statin  -c/w metoprolol succ 25mg daily  -Hold amlodipine and PRN clonidine given c/f sepsis  -ESRD limits GDMT but can still be started on outpatient basis     PULM  No active issues     GI  #Ileus  Xray abdomen showing gaseous distention of the bowel in a nonspecific nonobstructive pattern. Similar appearance of numerous radiopaque items projecting over the abdomen, most concentrated over the right hemiabdomen, likely representing ingested materials  S/p miralax and " enema : large bm on 10/2/23  Plan:  - c/w NGT  for decompression of bowel  - XR abdomen this AM   - c/w pantoprazole 40 daily  - GI on board   - Ok to give PO meds w/ holding NGT suction for 2-4 hours     RENAL  #ESRD: on HD TTS  - continue with TTS HD Last session 9/30, session today  - c/w home lanthanum, cinacalcinet, MV  - Nephrology following  - Epo 5000 units on dialysis days     ID  #c/f sepsis  Presented with diaphoresis, hypothermia, afib RVR  Plan:  -f/u BCX x 2 9/30: NGTD  -transition to cefepime 2 gm post HD  With stop date 12/3/23)  -podiatry on board      #Rt foot/toe gangrene w/ OM s/p TMA w/ positive margins  - Per ID recs: Given positive cultures on bone margins: Treat for R foot residual OM with renally dosed IV Cefepime 2G every dialysis for total 6 weeks (end date written in ID note as 12/3/23, but 6 weeks would be 11/3/23)  - For  C. Parapsilosis OM, treat concurrently with Oral Isavuconazole 372mg afterwards for total 6 weeks course from TMA operation date 9/22/23 - END DATE 11/3/23     MSK  #Right CLI/RC-VI toe gangrene:  #Rt foot/toe gangrene w/ OM s/p TMA w/ positive margins  - 9/20/21 s/p PTA of Rt AT/DP, PTA of PT and lateral plantar. Complete reconstruction of AT/PT and plantar arch angioplasty with Dr. Linder  - 9/22/2023 Status post right foot transmetatarsal amputation with wound vac placement  - 9/25/23 wound vac removed by podiatry, picture reviewed: some maceration on the plantar side of the flap, otherwise incision looks good.  - PVR RAJ done 9/27 : Right Lower PVR: No evidence of arterial occlusive disease in the right lower extremity at rest. Biphasic flow is noted in the right posterior tibial artery. Triphasic flow is noted in the right common femoral artery and right dorsalis pedis artery. Severity of disease called by PVR tracings and Doppler waveforms due to non-compressible vessels.  Plan:  - continue with Clopidogrel and Eliquis  - c/w Neurontin 100 mg three times a  "week on Dialysis days  - holding oxycodone given ileus  - Consulted podiatry to review wound site and any focus of infection  - tylenol Prn for pain     #Gout  - c/w home Allopurinol daily   - Uric Acid Low 3.1  - Holding Colchicine due to possible severe interaction with Isavuconazole     Dispo: SNF  - PT/OT recs: AM-PAC score 11 The pt would benefit from moderate intensity therapy for 3-5 days per week to maximize functional mobility and independence.  - Pt and wife has selected SNF, precert pending.    Discharge planning:  - 1 month post procedure EVLS clinic with repeat testing  - Patient to follow up with Dr. Rodriguez after discharge at [address] [place] [address]. Please call [phone] to schedule an appointment within one week of discharge.   - Send weekly CBCdiff, CMP, CRP, ESR, while on IV systemic antibiotics and fax results to ID clinic at [phone] with \"Re: attention Dr Glass\"  - ID will arrange follow-up in 6 weeks with Dr. Glass in ID clinic, Appointment will be in EMR chart        F: PRN  E: PRN  N: NPO while on NGT   GI: pantoprazole  DVT: home eliquis     Code Status: FULL (confirmed on admission)  Surrogate decision maker: Wife Julia [phone]           Lucian Tripathi MD      "

## 2023-10-03 NOTE — PROGRESS NOTES
"Chevy Benton is a 78 y.o. male on day 14 of admission presenting with PAD (peripheral artery disease) (CMS/[facility]).    Subjective   BM yesterday after bisacodyl rectal. Still large NG output. Feeling slightly improved, certainly in better spirits today.        Objective     Physical Exam  Vitals reviewed.   Constitutional:       General: He is awake.   Cardiovascular:      Rate and Rhythm: Normal rate and regular rhythm.   Pulmonary:      Effort: Pulmonary effort is normal.      Breath sounds: Normal breath sounds.   Abdominal:      General: Abdomen is flat. Bowel sounds are normal. There is no distension.      Palpations: Abdomen is soft.      Tenderness: There is no abdominal tenderness. There is no guarding.   Musculoskeletal:         General: Deformity present.      Comments: S/p R TKA   Neurological:      Mental Status: He is alert and oriented to person, place, and time.   Psychiatric:         Attention and Perception: Attention and perception normal.         Behavior: Behavior normal.         Last Recorded Vitals  Blood pressure 140/78, pulse 91, temperature 36.5 °C (97.7 °F), temperature source Tympanic, resp. rate 13, height 1.727 m (5' 7.99\"), weight 93.3 kg (205 lb 11 oz), SpO2 97 %.  Intake/Output last 3 Shifts:  I/O last 3 completed shifts:  In: 400 (4.3 mL/kg) [NG/GT:250; IV Piggyback:150]  Out: 675 (7.2 mL/kg) [Emesis/NG output:675]  Weight: 93.3 kg     Relevant Results                             Assessment/Plan   Principal Problem:    PAD (peripheral artery disease) (CMS/[facility])    Chevy Benton is a 78 y.o. male with a past medical history of AF on Eliquis, CHF (EF 37%), ESRD/HD (Tuesday Thursday Saturday, [facility] Dr. Gianfranco Alcocer) via left UE AV fistula, Anemia (POA), SSSs/p PPM admitted on 9/19/2023 with right leg critical limb ischema with right toe gangrene s/p revascularization by Ilya Berger followed by R TMA on 9/22/2023. GI is consulted for constipation and abdominal distension. Pt " likely had mild ileus, this was likely post-op worsened by narcotics, pt also with limited physical activity/ mobility. At this time, KUB is pretty unremarkable- non obstructive bowel gas pattern, can see stool burden/ pill remants in RLQ suggestive of slow transit. Pt had first BM yesterday after bisacodyl rectal, will reattempt today.     - NG tube to low-intermittent suction  - please redose bisacodyl rectal today   - daily KUB  - Aggressively replete K and Mg (hypokalemia can worsen ileus, and hypomagnesemia can worsen hypokalemia)  - mobilize pt to chair as much as possible, or if unable, frequently change position in bed    Patient seen and examined with attending, Dr. Rehman.    Stephie Chen MD  GI Fellow     Thank you for the consultation. Gastroenterology will continue to the follow the patient.   Please do not hesitate to contact me on DocHalo or page [phone] if there are any further questions between the weekday hours of 7 AM - 5 PM.   If there is an urgent concern during the weekend, after-hours, or holidays; then please page the on-call GI fellow at [phone]. Thank you.

## 2023-10-03 NOTE — NURSING NOTE
Report from Sending RN:    Report From: Fernie ( RN)  Recent Surgery of Procedure: No  Baseline Level of Consciousness (LOC): A/O x 3  Oxygen Use: No  Type: n/a  Diabetic: Yes;   Last BP Med Given Day of Dialysis: metoprolol 25 mg 1135 am  Last Pain Med Given: tylenol 650 mg 1416  Lab Tests to be Obtained with Dialysis: No  Blood Transfusion to be Given During Dialysis: No  Available IV Access: Yes  Medications to be Administered During Dialysis: No  Continuous IV Infusion Running: no  Restraints on Currently or in the Last 24 Hours: No  Hand-Off Communication: No acute overnight or morning events; vss; pt will not need labs; Pt did take morning medications; Pt is a full code; Ela Morales RN.

## 2023-10-03 NOTE — PROGRESS NOTES
"Chevy Benton is a 78 y.o. male on day 14 of admission presenting with PAD (peripheral artery disease) (CMS/[facility]).    Subjective   Patient seen at bedside, laying flat in bed. Patient admits to nausea and pain all over his body including his feet.       Physical Exam    Objective     General: Uncomfortable but cooperative. Pleasant.      Right focused:     Vascular: Faintly palpable DP/PT pulses. Mild edema of right foot. Skin temperature warm to warm from proximal to distal     Neuro: Light touch diminished pain stimuli intact.      MSK: Right ankle active ROM decreased.      Derm: Surgical incision to right foot with fibrogranular tissue. Retention sutures intact. Very mild federica-incisional erythema noted. Very mild serous drainage noted. Mild calor noted. No purulence. No dusky discoloration. No malodor. No ascending lymphangitis.      Last Recorded Vitals  Blood pressure 150/86, pulse 92, temperature 36.5 °C (97.7 °F), temperature source Tympanic, resp. rate 14, height 1.727 m (5' 7.99\"), weight 93.3 kg (205 lb 11 oz), SpO2 97 %.    Intake/Output last 3 Shifts:  I/O last 3 completed shifts:  In: 400 (4.3 mL/kg) [NG/GT:250; IV Piggyback:150]  Out: 675 (7.2 mL/kg) [Emesis/NG output:675]  Weight: 93.3 kg     Relevant Results      Scheduled medications  allopurinol, 100 mg, oral, q24h  apixaban, 2.5 mg, oral, q12h  atorvastatin, 10 mg, oral, Daily  B complex-vitamin C-folic acid, 1 capsule, oral, Daily  bisacodyl, 10 mg, rectal, Daily  cefepime, 2 g, intravenous, After Dialysis  cinacalcet, 30 mg, oral, Daily with breakfast  clopidogrel, 75 mg, oral, Daily  docusate sodium, 100 mg, oral, BID  epoetin dane, 5,000 Units, intravenous, Once per day on Tue Thu Sat  gabapentin, 100 mg, oral, After Dialysis  isavuconazonium sulfate, 372 mg, oral, Daily  lanthanum, 750 mg, oral, BID with meals  loratadine, 10 mg, oral, Daily  menthol-zinc oxide, 1 Application, Topical, Daily  metoprolol succinate XL, 25 mg, oral, " Daily  naphazoline-pheniramine, 1 drop, Both Eyes, 4x daily  pantoprazole, 40 mg, oral, Daily before breakfast  pneumococcal polysaccharide, 0.5 mL, intramuscular, During hospitalization  polyethylene glycol, 17 g, oral, Daily  povidone-iodine, , Topical, Every other day  sennosides, 2 tablet, oral, Nightly  trimethobenzamide, 200 mg, intramuscular, Once      Continuous medications     PRN medications  PRN medications: acetaminophen, albuterol, albuterol    Results for orders placed or performed during the hospital encounter of 09/19/23 (from the past 24 hour(s))   CBC and Auto Differential   Result Value Ref Range    WBC 12.8 (H) 4.4 - 11.3 x10*3/uL    nRBC 0.2 (H) 0.0 - 0.0 /100 WBCs    RBC 3.62 (L) 4.50 - 5.90 x10*6/uL    Hemoglobin 10.9 (L) 13.5 - 17.5 g/dL    Hematocrit 36.6 (L) 41.0 - 52.0 %     (H) 80 - 100 fL    MCH 30.1 26.0 - 34.0 pg    MCHC 29.8 (L) 32.0 - 36.0 g/dL    RDW 15.2 (H) 11.5 - 14.5 %    Platelets 346 150 - 450 x10*3/uL    MPV 10.6 7.5 - 11.5 fL    Neutrophils % 75.9 40.0 - 80.0 %    Immature Granulocytes %, Automated 0.7 0.0 - 0.9 %    Lymphocytes % 5.7 13.0 - 44.0 %    Monocytes % 8.1 2.0 - 10.0 %    Eosinophils % 8.4 0.0 - 6.0 %    Basophils % 1.2 0.0 - 2.0 %    Neutrophils Absolute 9.70 (H) 1.60 - 5.50 x10*3/uL    Immature Granulocytes Absolute, Automated 0.09 0.00 - 0.50 x10*3/uL    Lymphocytes Absolute 0.73 (L) 0.80 - 3.00 x10*3/uL    Monocytes Absolute 1.04 (H) 0.05 - 0.80 x10*3/uL    Eosinophils Absolute 1.08 (H) 0.00 - 0.40 x10*3/uL    Basophils Absolute 0.15 (H) 0.00 - 0.10 x10*3/uL   Renal Function Panel   Result Value Ref Range    Glucose 119 (H) 74 - 99 mg/dL    Sodium 136 136 - 145 mmol/L    Potassium 4.1 3.5 - 5.3 mmol/L    Chloride 93 (L) 98 - 107 mmol/L    Bicarbonate 25 21 - 32 mmol/L    Anion Gap 22 (H) 10 - 20 mmol/L    Urea Nitrogen 55 (H) 6 - 23 mg/dL    Creatinine 8.05 (H) 0.50 - 1.30 mg/dL    eGFR 6 (L) >60 mL/min/1.73m*2    Calcium 9.2 8.6 - 10.6 mg/dL     Phosphorus 6.0 (H) 2.5 - 4.9 mg/dL    Albumin 3.3 (L) 3.4 - 5.0 g/dL   Magnesium   Result Value Ref Range    Magnesium 2.89 (H) 1.60 - 2.40 mg/dL            Assessment/Plan   #S/p TMA, right foot (DOS: 9/22/23)  #CLI, right foot  #Chronic unstageable non-pressure ulcer, right foot  #Non-pressure ulcer to level of subcutaneous tissue, right heel  #Diabetic foot ulcer, right  #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 All findings discussed. All questions answered to patient's satisfaction.  - Chart, labs, imaging reviewed.  - WBC:12.9 (trending down), mildly tachycardic (92). Vitals otherwise stable.   - PVR right: Moderate arterial occlusive disease at rest.   - PVR left: Mild arterial occlusive disease.   - XR right: 1st distal phalangeal soft tissue ulcer without underlying osseous erosion. Consider MRI if persistent clinical concern for OM.   - Deep tissue cx: Enterobacter cloacae, sensitive to multiple antibiotics.  - Clean bone margin cx: Enterobacter cloacae, sensitive to multiple antibiotics.        Recommendations:  - Patient's foot continues to heal well after surgery. No signs of necrosis or infection at this time. Low suspicion that sepsis is stemming from the foot.   - Dressings changed this visit in the form of Betadine soaked 4x4s, ABD, kerlix, light ace outer dressing.   - Recommend every other day dressing changes with Betadine soaked 4x4s, ABD, kerlix, light ace. Orders placed.    - Can consider Gabapentin 300mg TID for additional pain control modality.   - Abx: per ID.  - Pain management per primary.  - Weight bearing as tolerated to HEEL ONLY in surgical shoe.   - Patient is okay to spend part of the day in a chair with feet hanging down to alleviate symptoms of ileus.       - Patient will require SNF placement vs home health care for regular dressing changes.    - Patient  to follow up with Dr. Rodriguez after discharge at [address] [place] [address]. Please call [phone] to schedule an appointment within one week of discharge.   -Podiatry will follow peripherally.      Patient was discussed with the attending, Dr. Rodriguez. Note is not final until signed by attending.        Cheryle Johnson, DPM PGY-3  Pager [phone]

## 2023-10-03 NOTE — CARE PLAN
Problem: Transfers  Goal: STG - Patient will perform bed mobility- Transcribed from  Care  Description: Bed Mobility: Date Established 26-Sep-2023  Bed Mobility: [place] Goal stand-by assist  Bed Mobility: Time Frame for Goal 2 wks    Outcome: Progressing     Problem: Transfers  Goal: STG - Patient will transfer sit to and from stand and bed to chair- Transcribed from  care  Description: 26-Sep-2023  bed-to-chair/chair-to-bed; sit-to-stand/stand-to-sit  minimum assist (75% patients effort)  1-person assist  rolling walker  2 wks    Outcome: Progressing     Problem: Balance  Goal: Balance Goal- Transcribed from  Care  Description: 26-Sep-2023  no LOB with transfers/ambulation  2 wks         Outcome: Progressing     Problem: Mobility  Goal: STG - Patient will ambulate- Transcribed from  Care  Description: 26-Sep-2023  minimum assist (75% patients effort)  rolling walker  50 feet    Outcome: Progressing  Goal: STG - Patient will ascend and descend a flight of stairs= Transcribed from  care   Description: 26-Sep-2023  minimum assist (75% patients effort)  ascend/descend 4 stairs with LRD  2 wks    Outcome: Progressing

## 2023-10-03 NOTE — PROGRESS NOTES
Sw completed a chart review of the discharge planning note in the.  Assessment was completed. The patient was scheduled to be discharged  to [facility] on 9/29. SNF facility phone number: [phone]  the same number. Patient was found to be Septic and transferred to the CICU.

## 2023-10-03 NOTE — PROGRESS NOTES
Endovascular/limb salvage note     Subjective Data:  Pt was seen and assessed this AM at the bedside in CICU. Pt is laying in a bed, in NAD, Aox3, his stomach still feels uncomfortable, he had a BM last night, passing gas, no n/v, shakes are better, belly soft  Overnight Events:          Objective Data:  Last Recorded Vitals:  Vitals:    10/03/23 0700 10/03/23 0750 10/03/23 0800 10/03/23 0900   BP: 143/89  151/87 150/86   Pulse: 93  98 92   Resp: 24  (!) 31 14   Temp:  36.5 °C (97.7 °F)     TempSrc:  Tympanic     SpO2: 92%  94% 97%   Weight:       Height:           Last Labs:  CBC - 10/3/2023:  6:40 AM  12.8 10.9 346    36.6      CMP - 10/3/2023:  6:40 AM  9.2 6.6 21 --- 0.7   6.0 3.3 27 107           Last I/O:  I/O last 3 completed shifts:  In: 400 (4.3 mL/kg) [NG/GT:250; IV Piggyback:150]  Out: 675 (7.2 mL/kg) [Emesis/NG output:675]  Weight: 93.3 kg     Echo:  Transthoracic Echo (TTE) Complete 10/2/2023    Inpatient Medications:  Scheduled medications   Medication Dose Route Frequency    allopurinol  100 mg oral q24h    apixaban  2.5 mg oral q12h    atorvastatin  10 mg oral Daily    B complex-vitamin C-folic acid  1 capsule oral Daily    bisacodyl  10 mg rectal Daily    cefepime  2 g intravenous After Dialysis    cinacalcet  30 mg oral Daily with breakfast    clopidogrel  75 mg oral Daily    docusate sodium  100 mg oral BID    epoetin dane  5,000 Units intravenous Once per day on Tue Thu Sat    gabapentin  100 mg oral After Dialysis    isavuconazonium sulfate  372 mg oral Daily    lanthanum  750 mg oral BID with meals    loratadine  10 mg oral Daily    menthol-zinc oxide  1 Application Topical Daily    metoprolol succinate XL  25 mg oral Daily    naphazoline-pheniramine  1 drop Both Eyes 4x daily    pantoprazole  40 mg oral Daily before breakfast    pneumococcal polysaccharide  0.5 mL intramuscular During hospitalization    polyethylene glycol  17 g oral Daily    povidone-iodine   Topical Every other day     sennosides  2 tablet oral Nightly    trimethobenzamide  200 mg intramuscular Once     PRN medications   Medication    acetaminophen    albuterol    albuterol     Continuous Medications   Medication Dose Last Rate       Physical Exam:  Gen: Alert, awake, AO x 3  Head: no evidence of trauma, NG in place  Neck: no JVD  CVS: RRR, no murmur, no leg edema  Lungs: bilateral clear with diminished bases  Abd: obese, NT, soft, no mass palpated    Extremities: AVF left arm, right TMA site dressed C/D/I dressing by podiatry. warm to touch, no edema, unable to doppler due to dressing.   Neuro: CN II-XII intact, Generalized weakness      Assessment/Plan   79 yo Male  PMH of AF on Eliquis, CHF (EF 37%), ESRD/HD (Tuesday Thursday Saturday, [facility], Dr. Medel) via left UE AV fistula, Anemia (POA), SSS, status post pacemaker, non smoker, nondiabetic, who was referred to us by Dr. Sanchez, [facility], for consideration for revascularization, in the setting of right toe gangrene (podiatrist Dr. Reese Solares).  Patient presented to clinic with his wife and son, stating he had a longstanding right heel crack and underwent PTA of right AT/PT on 4/12/2023 with Dr. Sanchez.  The heel crack almost healed.  He then developed right foot severe pain and great toe discoloration in August, was admitted at [facility], and underwent a PTA of right AT on 8/30/2023 with Dr. Sanchez, and  was told there were no other options available for him.  During that admission his toes became gangrenous.  He had a foot x-ray that was questionable for osteomyelitis,  was put on IV antibiotics during hospital stay, but was not discharged on any antibiotics. Up to mid August he was able to walk and drive. He denies CP/SOB.  Pre procedure RAJ/TBI showed impaired blood flow to the RLE: 0.69/0.3, Lt NC/0.8.  We have reviewed the images from angioplasties from [facility] (images in PACS).  Given above presentation, pt was directly admitted to the hospital, for emergent  angioplasty.    09/29 Pt was scheduled to be discharged, but pt had a new onset tremors, and Pt was kept in CICU for observation.  09/30, patient had a code white call. Patient was found to be diaphoretic and tachycardic. He was reported abdominal distention, pain and nausea. Patient was found to have a temp of 94F, was in chronic afib but in RVR with rates in 120s,  (per code white nurse report), satting well on RA. Transferred to CICU w/ concerns for sepsis.   Since transfer he has been on IV abx.  KUB showed non-obstructive ileus for which and NGT is in place.     #Right CLI/RC-VI toe gangrene:  - 9/20/21 s/p PTA of Rt AT/DP, PTA of PT and lateral plantar. Complete reconstruction of AT/PT and plantar arch angioplasty with Dr. Linder,  - continue with Clopidogrel and Eliquis  - c/w Neurontin 100 mg three times a week on Dialysis days  - PVR RAJ done 9/27 : Right Lower PVR: No evidence of arterial occlusive disease in the right lower extremity at rest. Biphasic flow is noted in the right posterior tibial artery. Triphasic flow is noted in the right common femoral artery and right dorsalis pedis artery. Severity of disease called by PVR tracings and Doppler waveforms due to non-compressible vessels.  - 1 month post procedure EVLS clinic with repeat testing    10/2 - Pt feeling better and might transfer out of the CICU later today    #Rt foot toe gangrene s/p TMA:  - 9/22/2023 Status post right foot transmetatarsal amputation with wound vac placement  - 9/25/23 wound vac removed by podiatry, picture reviewed: some maceration on the plantar side of the flap, otherwise incision looks good.  - TMA flap noted to have some maceration but no dusky discoloration. Retention sutures intact to surgical site. Mild federica-incision erythema. No active drainage, no calor, no malodor, no ascending lymphangitis.   - Weight bearing as tolerated to HEEL ONLY in surgical shoe- ordered  - Leg elevation to control edema and  "maceration  - Recommend every other day dressing changes with Betadine soaked 4x4s, ABD, kerlix, light ace.   - Cleared for discharge from a podiatry standpoint.   - Patient to follow up with Dr. Rodriguez after discharge at [address] [place] [address]. Please call [phone] to schedule an appointment within one week of discharge.     #ID  - Per ID recs: Given positive cultures on bone margins: Treat for R foot residual OM with renally dosed IV Cefepime 2G every dialysis for total 6 weeks END DATE 11/3/2023. Will followup in ID clinic for next antibiotics steps.  - For  C. Parapsilosis OM, treat concurrently with Oral Isavuconazole 372mg Q8H for 2 days (LOADING DOSE), then start Isavuconazole 372mg Q24H afterwards for total 6 weeks course from [facility] operation date 9/22/23 - END DATE 11/7/2023.   - Send weekly CBCdiff, CMP, CRP, ESR, while on IV systemic antibiotics and fax results to ID clinic at [phone] with \"Re: attention Dr Glass\"  - ID will arrange follow-up in 6 weeks with Dr. Glass in ID clinic, Appointment will be in EMR chart  - blood Cx NTD  - discontinue Vanc/Zosyn c/w cefepime 2G every dialysis days  - WBC  trending down 12.8,  afebrile, Temp 36.5    GI  #Ileus  - 10/2 Xray abdomen:- multiple radiodense fragments continue to project over the abdomen, not significantly changed from prior exam. Stable air-filled distention of multiple central bowel loops.  - S/p miralax and enema : no BM- hardly any stool burden on xray  - c/w NGT  for decompression of bowel  - c/w pantoprazole 40 daily  - GI on board   - Ok to give PO meds w/ holding NGT suction for 2-4 hours    #ESRD: on HD TTS  - continue with TTS HD Last session today 9/28 tolerated well  - c/w home daily Nephrocaps, Sensipar and Fosrenol   - Nephrology following  - Renal dose IV Abx  - Epo 5000 units on dialysis days    #HFrEF   #afib  #HTN  #SSS s/p PPM  -PPM interrogation 9/22: AT/AF 99.5%, appropriate sensing and capture, RV paced " 40.5%. no VT episodes. UL rate 60 LL rate 130.   - Resume Amlodipine 10 mg daily  - Resume  as needed Clonidine 0.2 twice daily for SBP > 180  - c/w metoprolol succ 25mg daily   - c/w Eliquis, Plavix and statin    #Gout  - c/w home Allopurinol daily   - Uric Acid Low 3.1  - continue to Hold Colchicine due to possible severe interaction with Isavuconazole    Dispo: SNF  - PT/OT recs: AM-PAC score 11 The pt would benefit from moderate intensity therapy for 3-5 days per week to maximize functional mobility and independence.  - Once Pt is transferred back to regular floor, will resubmit for Precert. Pt and wife have selected [facility] and was previously accepted.   Code Status:  Full Code      Peripheral IV 09/30/23 22 G Right;Posterior Hand (Active)   Site Assessment Clean;Dry 10/02/23 1122   Dressing Status Clean;Dry;Occlusive 10/02/23 1122   Number of days: 2       Peripheral IV 09/30/23 20 G Right Antecubital (Active)   Site Assessment Clean;Dry;Intact 10/02/23 0810   Dressing Status Clean;Dry 10/02/23 0810   Number of days: 2       NG/OG/Feeding Tube Nasogastric Left nostril (Active)   Site Assessment Clean;Dry 10/02/23 1402   Number of days: 1       Hemodialysis Arteriovenous Fistula  Left Upper arm (Active)   Site Assessment Clean;Dry 10/02/23 1246   Dressing Status Clean;Dry 10/02/23 1246   Number of days:          Aruna Vazquez PA-C, MPAS  Endovascular/Limb Salvage Team  [id number]

## 2023-10-04 NOTE — PROGRESS NOTES
Transitional Care Coordination Progress Note:  Patient discussed during interdisciplinary rounds.   Team members present: Avery Schumacher RN TCC, Endovascular, Nurse Manager  Plan per Medical/Surgical team: s/p right foot amputation, PT/OT, dialysis, Nephrology, NG for decompression of bowel, IV antibiotics in dialysis  Payer: [employer]   Status: inpatient  Discharge disposition: [facility]  Potential Barriers: Need precert  ADOD: Early next week   Avery Schumacher RN Transitional Care Coordinator [phone]

## 2023-10-04 NOTE — PROGRESS NOTES
"Chevy Benton is a 78 y.o. male on day 15 of admission presenting with PAD (peripheral artery disease) (CMS/[facility]).    Subjective   BM yesterday. Transferred from ICU -- > Floor. This AM, NG tube still to low intermittent suction. Pt feeling well.        Objective     Physical Exam  Vitals reviewed.   Constitutional:       General: He is awake.   Cardiovascular:      Rate and Rhythm: Normal rate and regular rhythm.   Pulmonary:      Effort: Pulmonary effort is normal.      Breath sounds: Normal breath sounds.   Abdominal:      General: Abdomen is flat. Bowel sounds are normal. There is no distension.      Palpations: Abdomen is soft.      Tenderness: There is no abdominal tenderness. There is no guarding.   Musculoskeletal:         General: Deformity present.      Comments: S/p R TKA   Neurological:      Mental Status: He is alert and oriented to person, place, and time.   Psychiatric:         Attention and Perception: Attention and perception normal.         Behavior: Behavior normal.         Last Recorded Vitals  Blood pressure 149/79, pulse 97, temperature 36.3 °C (97.4 °F), temperature source Temporal, resp. rate 20, height 1.727 m (5' 7.99\"), weight 96.7 kg (213 lb 3 oz), SpO2 95 %.  Intake/Output last 3 Shifts:  I/O last 3 completed shifts:  In: 1650 (17.1 mL/kg) [I.V.:600 (6.2 mL/kg); Other:800; NG/GT:250]  Out: 4425 (45.8 mL/kg) [Emesis/NG output:425; Other:4000]  Weight: 96.7 kg     Relevant Results                             Assessment/Plan   Principal Problem:    PAD (peripheral artery disease) (CMS/[facility])    Chevy Benton is a 78 y.o. male with a past medical history of AF on Eliquis, CHF (EF 37%), ESRD/HD (Tuesday Thursday Saturday, [facility] Dr. Gianfranco Alcocer) via left UE AV fistula, Anemia (POA), SSSs/p PPM admitted on 9/19/2023 with right leg critical limb ischema with right toe gangrene s/p revascularization by Ilya Berger followed by R LINO on 9/22/2023. GI is consulted for constipation and " abdominal distension. Pt likely had mild ileus, this was likely post-op worsened by narcotics, pt also with limited physical activity/ mobility. At this time, KUB is pretty unremarkable- non obstructive bowel gas pattern, can see stool burden/ pill remants in RLQ suggestive of slow transit. Pt having Bms w/ TN bisacodyl.     - please put NG tube to gravity; advance diet   - continue per rectum bisacodyl today and tomorrow; can continue current miralax dose    Aggressively replete K and Mg (hypokalemia can worsen ileus, and hypomagnesemia can worsen hypokalemia)  - mobilize pt to chair as much as possible, or if unable, frequently change position in bed    Stephie Chen, GI fellow    Thank you for the consultation.  The consulting team will sign off now.  Please do not hesitate to contact us again on by paging the consultation team again between the weekday hours of 7 AM - 5 PM.  If there is an urgent concern during the weekend, after-hours, or holidays; then please page the on-call GI fellow at [phone]. Thank you.

## 2023-10-04 NOTE — CARE PLAN
The patient's goals for the shift include      The clinical goals for the shift include patient free from injury at end of shift    Over the shift, the patient did not make progress toward the following goals. Barriers to progression include none. Recommendations to address these barriers include none.

## 2023-10-04 NOTE — PROGRESS NOTES
"Chevy Benton is a 78 y.o. male on day 15 of admission presenting with PAD (peripheral artery disease) (CMS/HCC).    Subjective   Pt sitting up in chair. Denies n/v/d , constipation, fever, cough, chills , chest pains, cont with pain to rt leg, c/o sob       Objective     Physical Exam  Vitals reviewed.   Constitutional:       Appearance: Normal appearance.   HENT:      Mouth/Throat:      Comments: NGT  Cardiovascular:      Rate and Rhythm: Rhythm irregular.      Comments: Hr 93  Pulmonary:      Breath sounds: Rales present.      Comments: C/o sob..pox 90-91% RA  Scattered crackles  O2 4L  Breathing tx's/inhalers as needed  Abdominal:      General: There is distension.      Comments: Non tender to palpation   Musculoskeletal:         General: Swelling and tenderness present.      Comments: Rt leg wrapped with kerlix/acewrap   Skin:     General: Skin is warm and dry.   Neurological:      General: No focal deficit present.      Mental Status: He is alert and oriented to person, place, and time.         Last Recorded Vitals  Blood pressure 127/78, pulse 89, temperature 35.9 °C (96.7 °F), temperature source Temporal, resp. rate 20, height 1.727 m (5' 7.99\"), weight 96.7 kg (213 lb 3 oz), SpO2 91 %.  Intake/Output last 3 Shifts:  I/O last 3 completed shifts:  In: 1640 (17 mL/kg) [P.O.:240; I.V.:600 (6.2 mL/kg); Other:800]  Out: 4600 (47.6 mL/kg) [Emesis/NG output:600; Other:4000]  Weight: 96.7 kg     Relevant Results  Scheduled medications  allopurinol, 100 mg, oral, q24h  apixaban, 2.5 mg, oral, q12h  atorvastatin, 10 mg, oral, Daily  B complex-vitamin C-folic acid, 1 capsule, oral, Daily  bisacodyl, 10 mg, rectal, Daily  cefepime, 2 g, intravenous, After Dialysis  cinacalcet, 30 mg, oral, Daily with breakfast  clopidogrel, 75 mg, oral, Daily  docusate sodium, 100 mg, oral, BID  epoetin dane, 5,000 Units, intravenous, Once per day on Tue Thu Sat  gabapentin, 100 mg, oral, After Dialysis  isavuconazonium sulfate, 372 " mg, oral, Daily  lanthanum, 750 mg, oral, BID with meals  loratadine, 10 mg, oral, Daily  menthol-zinc oxide, 1 Application, Topical, Daily  metoprolol succinate XL, 25 mg, oral, Daily  naphazoline-pheniramine, 1 drop, Both Eyes, 4x daily  ondansetron, 4 mg, intravenous, q6h  pantoprazole, 40 mg, oral, Daily before breakfast  pneumococcal polysaccharide, 0.5 mL, intramuscular, During hospitalization  polyethylene glycol, 17 g, oral, TID  povidone-iodine, , Topical, Every other day  sennosides, 2 tablet, oral, BID      Continuous medications     PRN medications  PRN medications: acetaminophen, albuterol, albuterol                          Assessment/Plan   Principal Problem:    PAD (peripheral artery disease) (CMS/[facility])    -referred to us by Dr. Sanchez, [facility], for consideration for revascularization, in the setting of right toe gangrene (podiatrist Dr. Reese Solares).  Patient presented to clinic with his wife and son, stating he had a longstanding right heel crack and underwent PTA of right AT/PT on 4/12/2023 with Dr. Sanchez.  The heel crack almost healed.  He then developed right foot severe pain and great toe discoloration in August, was admitted at [facility], and underwent a PTA of right AT on 8/30/2023 with Dr. Sanchez, and  was told there were no other options available for him.     Tolerated hemodialysis with net fluid loss 2000cc    Is hemodynamically stable, hypervolemic on exam and has stable electrolytes      Outpatient Dialysis schedule:  Outpatient Dialysis schedule: TTS at [facility]  (Primary Nephrologist Dr. Medel    )      Access: lt fistula with +thrill/bruit- no issues - able to achieve      Anemia of ESRD:   10/3-epogen 5000 units on dialysis ..current hgb 10.9..will cont to monitor   (not on HILDA at unit)      CKD-MBD: cinacalcet 30 mg daily , Nephro caps 1 cap qd     Plan HD tomorrow with UF as tolerated     Renal diet      Please obtain daily standing wt (if possible)      Medication to be adjusted for ESRD      Patient to continue regular HD schedule while inpatient and to follow with the outpatient nephrologist at discharge      per primary        JENIFFER Bates-CNP

## 2023-10-04 NOTE — CARE PLAN
Problem: Transfers  Goal: STG - Patient will perform bed mobility- Transcribed from  Care  Description: Bed Mobility: Date Established 26-Sep-2023  Bed Mobility: [place] Level Goal stand-by assist  Bed Mobility: Time Frame for Goal 2 wks    Outcome: Progressing     Problem: Transfers  Goal: STG - Patient will transfer sit to and from stand and bed to chair- Transcribed from  care  Description: 26-Sep-2023  bed-to-chair/chair-to-bed; sit-to-stand/stand-to-sit  minimum assist (75% patients effort)  1-person assist  rolling walker  2 wks    Outcome: Progressing     Problem: Balance  Goal: Balance Goal- Transcribed from  Care  Description: 26-Sep-2023  no LOB with transfers/ambulation  2 wks         Outcome: Progressing     Problem: Mobility  Goal: STG - Patient will ambulate- Transcribed from  Care  Description: 26-Sep-2023  minimum assist (75% patients effort)  rolling walker  50 feet    Outcome: Progressing  Goal: STG - Patient will ascend and descend a flight of stairs= Transcribed from  care   Description: 26-Sep-2023  minimum assist (75% patients effort)  ascend/descend 4 stairs with LRD  2 wks    Outcome: Progressing

## 2023-10-04 NOTE — PROGRESS NOTES
Physical Therapy    Physical Therapy Treatment    Patient Name: Chevy Benton  MRN: [id number]  Today's Date: 10/4/2023  Time Calculation  Start Time: 1222  Stop Time: 1247  Time Calculation (min): 25 min       Assessment/Plan   PT Assessment  PT Assessment Results: Decreased strength, Impaired balance, Decreased endurance, Decreased mobility, Pain, Orthopedic restrictions  Rehab Prognosis: Good  Evaluation/Treatment Tolerance: Patient limited by pain  Medical Staff Made Aware: Yes  Strengths: Attitude of self  End of Session Communication: Bedside nurse  Assessment Comment: Pt continues to remain appropriate for MOD intensity continued PT after DC.  End of Session Patient Position: Up in chair, Alarm off, not on at start of session  PT Plan  Inpatient/Swing Bed or Outpatient: Inpatient  PT Plan  Treatment/Interventions: Bed mobility, Balance training, Gait training, Transfer training, Neuromuscular re-education, Strengthening, Endurance training, Range of motion, Therapeutic exercise, Therapeutic activity, Home exercise program, Positioning, Postural re-education  PT Plan: Skilled PT  PT Frequency: 3 times per week  PT Discharge Recommendations: Moderate intensity level of continued care  Equipment Recommended upon Discharge: Wheeled walker  PT Recommended Transfer Status: Assist x2      General Visit Information:   PT  Visit  PT Received On: 10/04/23  Response to Previous Treatment: Patient with no complaints from previous session.  General  Prior to Session Communication: Bedside nurse  Patient Position Received: Bed, 3 rail up, Alarm off, not on at start of session  General Comment: Pt supine in bed upon entry to room. Pt pleasant, cooperative and willing to work with PT.    Subjective   Precautions:  Precautions  LE Weight Bearing Status: Heel Weight Bearing in Post-Op Shoe (RLE)  Medical Precautions: Fall precautions    Objective   Pain:  Pain Assessment  Pain Assessment: 0-10  Pain Score: 5 - Moderate pain  (pt stating pain level incresed to 10/10 iwth movment; RN aware)  Pain Location:  (R foot)  Cognition:  Cognition  Overall Cognitive Status: Within Functional Limits  Orientation Level: Oriented X4    Treatments:  Therapeutic Exercise  Therapeutic Exercise Performed: Yes  Therapeutic Exercise Activity 1: x10 B LAQ  Therapeutic Exercise Activity 2: x5 B seated marches    Therapeutic Activity  Therapeutic Activity Performed: Yes  Therapeutic Activity 1: STS transfer with fww and max assist x1  Therapeutic Activity 2: x10 minutes static/dynamic sitting balance with sba     Bed Mobility  Bed Mobility: Yes  Bed Mobility 1  Bed Mobility 1: Supine to sitting  Level of Assistance 1: Minimum assistance    Ambulation/Gait Training  Ambulation/Gait Training Performed: Yes  Ambulation/Gait Training 1  Surface 1: Level tile  Device 1: Rolling walker  Assistance 1: Moderate assistance  Comments/Distance (ft) 1: 2ft to chair  Transfers  Transfer: Yes  Transfer 1  Transfer From 1: Sit to, Stand to  Transfer to 1: Stand, Sit  Transfer Device 1: Walker  Transfer Level of Assistance 1: Maximum assistance    Outcome Measures:  [facility] Basic [facility]  Turning from your back to your side while in a flat bed without using bedrails: A little  Moving from lying on your back to sitting on the side of a flat bed without using bedrails: A little  Moving to and from bed to chair (including a wheelchair): A lot  Standing up from a chair using your arms (e.g. wheelchair or bedside chair): A lot  To walk in hospital room: A lot  Climbing 3-5 steps with railing: Total  Basic Mobility - Total Score: 13    Education Documentation  No documentation found.  Education Comments  No comments found.      OP EDUCATION:       Encounter Problems       Encounter Problems (Active)       Balance       Balance Goal- Transcribed from  Care (Progressing)       Start:  10/02/23    Expected End:  10/10/23       26-Sep-2023  no LOB with transfers/ambulation  2 wks                    Mobility       STG - Patient will ambulate- Transcribed from [facility] (Progressing)       Start:  10/02/23    Expected End:  10/10/23       26-Sep-2023  minimum assist (75% patients effort)  rolling walker  50 feet           STG - Patient will ascend and descend a flight of stairs= Transcribed from [facility]  (Progressing)       Start:  10/02/23    Expected End:  10/10/23       26-Sep-2023  minimum assist (75% patients effort)  ascend/descend 4 stairs with LRD  2 wks              Pain - Adult          Transfers       STG - Patient will perform bed mobility- Transcribed from [facility] (Progressing)       Start:  10/02/23    Expected End:  10/10/23       Bed Mobility: Date Established 26-Sep-2023  Bed Mobility: [place] Level Goal stand-by assist  Bed Mobility: Time Frame for Goal 2 wks              Transfers       STG - Patient will transfer sit to and from stand and bed to chair- Transcribed from [facility] (Progressing)       Start:  10/02/23    Expected End:  10/10/23       26-Sep-2023  bed-to-chair/chair-to-bed; sit-to-stand/stand-to-sit  minimum assist (75% patients effort)  1-person assist  rolling walker  2 wks

## 2023-10-04 NOTE — PROGRESS NOTES
Endovascular/limb salvage note     Subjective Data:  Pt was seen and assessed this AM at the bedside. Pt is laying in a bed, in NAD, Aox3, his stomach still feels uncomfortable, he had a BM yesterday, passing gas, shakes are better but still having nausea and frothy sputum but no vomiting.     Overnight Events:     NO acute event overnight     Objective Data:  Last Recorded Vitals:  Vitals:    10/04/23 0357 10/04/23 0600 10/04/23 0751 10/04/23 1107   BP: 137/87  149/79 119/75   BP Location:   Right arm Right arm   Patient Position:   Lying Lying   Pulse: 83  97 98   Resp: 20  20 20   Temp: 36.7 °C (98 °F)  36.3 °C (97.4 °F) 36.3 °C (97.4 °F)   TempSrc:   Temporal Temporal   SpO2: 95%  95% 99%   Weight: 96.7 kg (213 lb 3 oz) 96.7 kg (213 lb 3 oz)     Height:           Last Labs:  CBC - 10/3/2023:  6:40 AM  12.8 10.9 346    36.6      CMP - 10/3/2023:  6:40 AM  9.2 6.6 21 --- 0.7   6.0 3.3 27 107           Last I/O:  I/O last 3 completed shifts:  In: 1650 (17.1 mL/kg) [I.V.:600 (6.2 mL/kg); Other:800; NG/GT:250]  Out: 4425 (45.8 mL/kg) [Emesis/NG output:425; Other:4000]  Weight: 96.7 kg     Echo:  Transthoracic Echo (TTE) Complete 10/2/2023      Inpatient Medications:  Scheduled medications   Medication Dose Route Frequency    allopurinol  100 mg oral q24h    apixaban  2.5 mg oral q12h    atorvastatin  10 mg oral Daily    B complex-vitamin C-folic acid  1 capsule oral Daily    bisacodyl  10 mg rectal Daily    cefepime  2 g intravenous After Dialysis    cinacalcet  30 mg oral Daily with breakfast    clopidogrel  75 mg oral Daily    docusate sodium  100 mg oral BID    epoetin dane  5,000 Units intravenous Once per day on Tue Thu Sat    gabapentin  100 mg oral After Dialysis    isavuconazonium sulfate  372 mg oral Daily    lanthanum  750 mg oral BID with meals    loratadine  10 mg oral Daily    menthol-zinc oxide  1 Application Topical Daily    metoprolol succinate XL   25 mg oral Daily    naphazoline-pheniramine  1 drop Both Eyes 4x daily    ondansetron  4 mg intravenous q6h    pantoprazole  40 mg oral Daily before breakfast    pneumococcal polysaccharide  0.5 mL intramuscular During hospitalization    polyethylene glycol  17 g oral TID    povidone-iodine   Topical Every other day    sennosides  2 tablet oral BID     PRN medications   Medication    acetaminophen    albuterol    albuterol     Continuous Medications   Medication Dose Last Rate       Physical Exam:  Gen: Alert, awake, AO x 3  Head: no evidence of trauma, NG in placed to LIWS  Neck: no JVD  CVS: irregular rate and rhythm, no murmur, no leg edema, Afib on Tele  Lungs: bilateral clear with diminished bases  Abd: obese, NT, soft, no mass, no pain with palpation   Extremities: AVF left arm, right TMA site dressed C/D/I dressing yesterday. warm to touch, no edema, unable to doppler due to dressing.   Neuro: CN II-XII intact, Generalized weakness      Assessment/Plan   79 yo Male  PMH of AF on Eliquis, CHF (EF 37%), ESRD/HD (Tuesday Thursday Saturday, [facility], Dr. Medel) via left UE AV fistula, Anemia (POA), SSS, status post pacemaker, non smoker, nondiabetic, who was referred to us by Dr. Sanchez, [facility], for consideration for revascularization, in the setting of right toe gangrene (podiatrist Dr. Reese Solares).  Patient presented to clinic with his wife and son, stating he had a longstanding right heel crack and underwent PTA of right AT/PT on 4/12/2023 with Dr. Sanchez.  The heel crack almost healed.  He then developed right foot severe pain and great toe discoloration in August, was admitted at [facility], and underwent a PTA of right AT on 8/30/2023 with Dr. Sanchez, and  was told there were no other options available for him.  During that admission his toes became gangrenous.  He had a foot x-ray that was questionable for osteomyelitis,  was put on IV antibiotics during hospital stay, but was not discharged  on any antibiotics. Up to mid August he was able to walk and drive. He denies CP/SOB.  Pre procedure RAJ/TBI showed impaired blood flow to the RLE: 0.69/0.3, Lt NC/0.8.  We have reviewed the images from angioplasties from [facility] (images in PACS).  Given above presentation, pt was directly admitted to the hospital, for emergent angioplasty.    09/29 Pt was scheduled to be discharged, but pt had a new onset tremors, and Pt was kept in CICU for observation.  09/30, patient had a code white call. Patient was found to be diaphoretic and tachycardic. He was reported abdominal distention, pain and nausea. Patient was found to have a temp of 94F, was in chronic afib but in RVR with rates in 120s,  (per code white nurse report), satting well on RA. Transferred to CICU w/ concerns for sepsis.   Since transfer he has been on IV abx.  KUB showed non-obstructive ileus for which and NGT is in place.     #Right CLI/RC-VI toe gangrene: (Resolved)  - 9/20/21 s/p PTA of Rt AT/DP, PTA of PT and lateral plantar. Complete reconstruction of AT/PT and plantar arch angioplasty with Dr. Linder,  - continue with Clopidogrel and Eliquis  - c/w Neurontin 100 mg three times a week on Dialysis days  - PVR RAJ done 9/27 : Right Lower PVR: No evidence of arterial occlusive disease in the right lower extremity at rest. Biphasic flow is noted in the right posterior tibial artery. Triphasic flow is noted in the right common femoral artery and right dorsalis pedis artery. Severity of disease called by PVR tracings and Doppler waveforms due to non-compressible vessels.  - 1 month post procedure EVLS clinic with repeat testing    #Rt foot toe gangrene s/p TMA:  - 9/22/2023 Status post right foot transmetatarsal amputation with wound vac placement  - 9/25/23 wound vac removed by podiatry, - TMA flap noted to have some maceration but no dusky discoloration. Retention sutures intact to surgical site. Mild federica-incision erythema. No active drainage,  "no calor, no malodor, no ascending lymphangitis.   - Weight bearing as tolerated to HEEL ONLY on surgical shoe  - Leg elevation to control edema and maceration  - Recommend every other day dressing changes with Betadine soaked 4x4s, ABD, kerlix, light ace.   - Patient to follow up with Dr. Rodriguez after discharge at [address] [place] [address]. Please call [phone] to schedule an appointment within one week of discharge.   - Cleared for discharge from a podiatry standpoint.     #ID  - Per ID recs: Given positive cultures on bone margins: Treat for R foot residual OM with renally dosed IV Cefepime 2G every dialysis for total 6 weeks END DATE 11/3/2023. Will followup in ID clinic for next antibiotics steps.  - For  C. Parapsilosis OM, treat concurrently with Oral Isavuconazole 372mg Q8H for 2 days (LOADING DOSE), then start Isavuconazole 372mg Q24H afterwards for total 6 weeks course from TMA operation date 9/22/23 - END DATE 11/7/2023.   - Send weekly CBCdiff, CMP, CRP, ESR, while on IV systemic antibiotics and fax results to ID clinic at [phone] with \"Re: attention Dr Glass\"  - ID will arrange follow-up in 6 weeks with Dr. Glass in ID clinic, Appointment will be in EMR chart  - blood Cx NTD  - discontinue Vanc/Zosyn c/w cefepime 2G every dialysis days  - 10/3 WBC trending down 12.8, afebrile, Temp 36.5    GI  #Ileus  - 10/2 Xray abdomen:- multiple radiodense fragments continue to project over the abdomen, not significantly changed from prior exam. Stable air-filled distention of multiple central bowel loops.  - GI on board with following recs:  - NG tube to low-intermittent suction. Ok to give PO meds w/ holding NGT suction for 1-2 hours  - please redose bisacodyl rectal today   - daily KUB ordered   - Aggressively replete K and Mg (hypokalemia can worsen ileus, and hypomagnesemia can worsen hypokalemia)  K 4.1  Mg 2.89   - mobilize pt to chair as much as possible, or if unable, frequently " change position in bed  - c/w BID Senna, Miralax and prn supp  - Holding oral opioids     #ESRD: on HD TTS  - continue with TTS HD Last session 10/3/23 tolerated well  - c/w home daily Nephrocaps, Sensipar and Fosrenol   - Nephrology following  - Renal dose IV Abx  - Epo 5000 units on dialysis days    #HFrEF   #afib  #HTN  #SSS s/p PPM  -PPM interrogation 9/22: AT/AF 99.5%, appropriate sensing and capture, RV paced 40.5%. no VT episodes. UL rate 60 LL rate 130.   - c/w Amlodipine 10 mg daily  - c/w  as needed Clonidine 0.2 twice daily for SBP > 180  - c/w metoprolol succ 25mg daily   - c/w Eliquis, Plavix and statin    #Gout  - c/w home Allopurinol daily   - Uric Acid Low 3.1  - continue to Hold Colchicine due to possible severe interaction with Isavuconazole    Dispo: SNF  - PT/OT recs: AM-PAC score 11 The pt would benefit from moderate intensity therapy for 3-5 days per week to maximize functional mobility and independence.  - Once Pt is ready for discharge will resubmit for precert. Pt and wife have selected [facility] and was previously accepted.   Code Status:  Full Code      Peripheral IV 09/30/23 22 G Right;Posterior Hand (Active)   Site Assessment Clean;Dry 10/02/23 1122   Dressing Status Clean;Dry;Occlusive 10/02/23 1122   Number of days: 2       Peripheral IV 09/30/23 20 G Right Antecubital (Active)   Site Assessment Clean;Dry;Intact 10/02/23 0810   Dressing Status Clean;Dry 10/02/23 0810   Number of days: 2       NG/OG/Feeding Tube Nasogastric Left nostril (Active)   Site Assessment Clean;Dry 10/02/23 1402   Number of days: 1       Hemodialysis Arteriovenous Fistula  Left Upper arm (Active)   Site Assessment Clean;Dry 10/02/23 1246   Dressing Status Clean;Dry 10/02/23 1246   Number of days:          Flash Malik CNP  Endovascular/Limb Salvage Service   Day: 07574/[place]/Night HHVI [id number]

## 2023-10-04 NOTE — CARE PLAN
Problem: Safety - Adult  Goal: Free from fall injury  Outcome: Progressing   The patient's goals for the shift include      The clinical goals for the shift include patient free from injury at end of shift    Over the shift, the patient did not make progress toward the following goals. Barriers to progression include pain level. Recommendations to address these barriers include stronger pain med.    Problem: Pain - Adult  Goal: Verbalizes/displays adequate comfort level or baseline comfort level  Outcome: Progressing     Problem: Safety - Adult  Goal: Free from fall injury  Outcome: Progressing     Problem: Discharge Planning  Goal: Discharge to home or other facility with appropriate resources  Outcome: Progressing     Problem: Chronic Conditions and Co-morbidities  Goal: Patient's chronic conditions and co-morbidity symptoms are monitored and maintained or improved  Outcome: Progressing     Problem: Skin  Goal: Decreased wound size/increased tissue granulation at next dressing change  Outcome: Progressing  Goal: Participates in plan/prevention/treatment measures  Outcome: Progressing  Goal: Prevent/manage excess moisture  Outcome: Progressing  Goal: Prevent/minimize sheer/friction injuries  Outcome: Progressing  Goal: Promote/optimize nutrition  Outcome: Progressing  Goal: Promote skin healing  Outcome: Progressing     Problem: Fall/Injury  Goal: Not fall by end of shift  Outcome: Progressing  Goal: Be free from injury by end of the shift  Outcome: Progressing  Goal: Verbalize understanding of personal risk factors for fall in the hospital  Outcome: Progressing  Goal: Verbalize understanding of risk factor reduction measures to prevent injury from fall in the home  Outcome: Progressing  Goal: Use assistive devices by end of the shift  Outcome: Progressing  Goal: Pace activities to prevent fatigue by end of the shift  Outcome: Progressing   .

## 2023-10-05 NOTE — PROGRESS NOTES
"Chevy Benton is a 78 y.o. male on day 16 of admission presenting with PAD (peripheral artery disease) (CMS/[facility]).    Subjective   Patient seen at bedside, laying in bed. Patient admits to pain in his left foot. Also admits to excessive saliva (spitting) but denies other constitutional symptoms. States he is feeling generally better today compared to the last few days.        Physical Exam    Objective     General: Alert, oriented x3, cooperative. Pleasant.      Right focused:     Vascular: Faintly palpable DP/PT pulses. Mild edema of right foot. Skin temperature warm to warm from proximal to distal     Neuro: Light touch diminished pain stimuli intact.      MSK: Right ankle active ROM decreased.      Derm: Surgical incision to right foot with fibrogranular tissue. Retention sutures intact. Very mild serous drainage noted. No erythema. No calor noted. No purulence. No dusky discoloration. No malodor. No ascending lymphangitis.      Last Recorded Vitals  Blood pressure 115/75, pulse 88, temperature 36.1 °C (96.9 °F), resp. rate 20, height 1.727 m (5' 7.99\"), weight 98.3 kg (216 lb 11.4 oz), SpO2 92 %.    Intake/Output last 3 Shifts:  I/O last 3 completed shifts:  In: 240 (2.4 mL/kg) [P.O.:240]  Out: 600 (6.1 mL/kg) [Emesis/NG output:600]  Weight: 98.3 kg     Relevant Results      Scheduled medications  allopurinol, 100 mg, oral, q24h  apixaban, 2.5 mg, oral, q12h  atorvastatin, 10 mg, oral, Daily  B complex-vitamin C-folic acid, 1 capsule, oral, Daily  bisacodyl, 10 mg, rectal, Daily  cefepime, 2 g, intravenous, After Dialysis  cinacalcet, 30 mg, oral, Daily with breakfast  clopidogrel, 75 mg, oral, Daily  docusate sodium, 100 mg, oral, BID  epoetin dane, 5,000 Units, intravenous, Once per day on Tue Thu Sat  gabapentin, 100 mg, oral, After Dialysis  isavuconazonium sulfate, 372 mg, oral, Daily  lanthanum, 750 mg, oral, BID with meals  loratadine, 10 mg, oral, Daily  menthol-zinc oxide, 1 Application, Topical, " Daily  metoprolol succinate XL, 25 mg, oral, Daily  naphazoline-pheniramine, 1 drop, Both Eyes, 4x daily  ondansetron, 4 mg, intravenous, q6h  pantoprazole, 40 mg, oral, Daily before breakfast  pneumococcal polysaccharide, 0.5 mL, intramuscular, During hospitalization  polyethylene glycol, 17 g, oral, TID  povidone-iodine, , Topical, Every other day  sennosides, 2 tablet, oral, BID      Continuous medications     PRN medications  PRN medications: acetaminophen, albuterol, albuterol    No results found for this or any previous visit (from the past 24 hour(s)).         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 All findings discussed. All questions answered to patient's satisfaction.  - Chart, labs, imaging reviewed.  - No labs today, vitals stable.   - PVR right: Moderate arterial occlusive disease at rest.   - PVR left: Mild arterial occlusive disease.   - XR right: 1st distal phalangeal soft tissue ulcer without underlying osseous erosion. Consider MRI if persistent clinical concern for OM.   - Deep tissue cx: Enterobacter cloacae, sensitive to multiple antibiotics.  - Clean bone margin cx: Enterobacter cloacae, sensitive to multiple antibiotics.        Recommendations:  - Patient's foot continues to heal well after surgery. No signs of necrosis or infection at this time. Low suspicion that sepsis was stemming from the foot.   - Dressings changed this visit in the form of Betadine soaked 4x4s, ABD, kerlix, light ace outer dressing.   - Recommend every other day dressing changes with Betadine soaked 4x4s, ABD, kerlix, light ace. Orders placed.    - Can consider Gabapentin 300mg  TID for additional pain control modality.   - Patient's pain is severe at times, however, per nurse, patient is being started on Tramadol today which should help.   - Abx: per ID.  - Pain management per primary.  - Weight bearing as tolerated to HEEL ONLY in surgical shoe.   - Patient is okay to spend part of the day in a chair with feet hanging down to alleviate symptoms of ileus.       - Waiting on precert to [facility].     - Patient to follow up with Dr. Rodriguez after discharge at [address] [place] [address]. Please call [phone] to schedule an appointment within one week of discharge.   -Podiatry signing off.      Patient was discussed with the attending, Dr. Rodriguez. Note is not final until signed by attending.        Cheryle Johnson, DPM PGY-3  Pager [phone]

## 2023-10-05 NOTE — PROGRESS NOTES
Subjective   Patient ID: Chevy Benton is a 78 y.o. male.     Evaluation of patient on dialysis at (Not currently on dialysis) on 9/19/2023     Chief Complaint   Patient presents with    Other     ACUTE LIMB ASCHEMIA- CALL  (SONS PHONE WHEN BED IS CLEAN)      HPI  Seen and examined during hemodialysis. Labs and events reviewed.        Subjective:   Feels OK, no c/o CP/SOB/F/C/Abd pain  No c/o related to HD     [unfilled]   Scheduled medications  allopurinol, 100 mg, oral, q24h  apixaban, 2.5 mg, oral, q12h  atorvastatin, 10 mg, oral, Daily  B complex-vitamin C-folic acid, 1 capsule, oral, Daily  bisacodyl, 10 mg, rectal, Daily  cefepime, 2 g, intravenous, After Dialysis  cinacalcet, 30 mg, oral, Daily with breakfast  clopidogrel, 75 mg, oral, Daily  docusate sodium, 100 mg, oral, BID  epoetin dane, 5,000 Units, intravenous, Once per day on Tue Thu Sat  gabapentin, 100 mg, oral, After Dialysis  isavuconazonium sulfate, 372 mg, oral, Daily  lanthanum, 750 mg, oral, BID with meals  loratadine, 10 mg, oral, Daily  menthol-zinc oxide, 1 Application, Topical, Daily  metoprolol succinate XL, 25 mg, oral, Daily  naphazoline-pheniramine, 1 drop, Both Eyes, 4x daily  ondansetron, 4 mg, intravenous, q6h  pantoprazole, 40 mg, oral, Daily before breakfast  pneumococcal polysaccharide, 0.5 mL, intramuscular, During hospitalization  polyethylene glycol, 17 g, oral, TID  povidone-iodine, , Topical, Every other day  sennosides, 2 tablet, oral, BID  traMADol, 50 mg, oral, q8h      Continuous medications     PRN medications  PRN medications: acetaminophen, albuterol, albuterol     Heart Rate:  [70-94]   Temp:  [35.6 °C (96.1 °F)-37.1 °C (98.7 °F)]   Resp:  [20-22]   BP: (115-137)/(68-83)   Weight:  [98.3 kg (216 lb 11.4 oz)]   SpO2:  [90 %-95 %]    Weight: 87 kg (191 lb 12.8 oz)   Gen: alert, NAD  HEENT: NC/AT, NG tube to drain   Neck: supple, no JVD   Pulm: clear ant b/l   CVS: RRR, no rub  Abd: S/NT/ND  LE:  trace  edema , no cyanosis   Neuro: no asterixis   Dialysis acces: LUEAVF        Results from last 7 days   Lab Units 10/03/23  0640   SODIUM mmol/L 136   POTASSIUM mmol/L 4.1   PHOSPHORUS mg/dL 6.0*   CALCIUM mg/dL 9.2   CO2 mmol/L 25   HEMOGLOBIN g/dL 10.9*        [unfilled]     Results from last 72 hours   Lab Units 10/03/23  0640   CO2 mmol/L 25   BUN mg/dL 55*   ALBUMIN g/dL 3.3*   GLUCOSE mg/dL 119*   HEMOGLOBIN g/dL 10.9*   WBC AUTO x10*3/uL 12.8*          Results from last 72 hours   Lab Units 10/03/23  0640   SODIUM mmol/L 136   POTASSIUM mmol/L 4.1   CO2 mmol/L 25   BUN mg/dL 55*   CREATININE mg/dL 8.05*   PHOSPHORUS mg/dL 6.0*   CALCIUM mg/dL 9.2   ALBUMIN g/dL 3.3*   GLUCOSE mg/dL 119*   HEMOGLOBIN g/dL 10.9*   WBC AUTO x10*3/uL 12.8*        A/P  77 yo Male  PMH of AF on Eliquis, CHF (EF 37%), ESRD/HD (Tuesday Thursday Saturday, [facility], Dr. 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

## 2023-10-05 NOTE — PROGRESS NOTES
Occupational Therapy                 Therapy Communication Note    Patient Name: Chevy Benton  MRN: [id number]  Today's Date: 10/5/2023     Discipline: Occupational Therapy    Missed Visit Reason: Missed Visit Reason:  (Unavailable, currently off unit at dialysis; will re-attempt as pt/schedule permits)    Missed Time: Attempt

## 2023-10-05 NOTE — NURSING NOTE
Report to Receiving RN:    Report to: trace  Time Report Called: 1918  Hand-Off Communication: 1l fluid removed, bp stable pt alert  Complications During Treatment: No  Ultrafiltration Treatment: Yes  Medications Administered During Dialysis: No  Blood Products Administered During Dialysis: No  Labs Sent During Dialysis: No  Heparin Drip Rate Changes: No    Electronic Signatures:  tf (Signed tf)   Authored: tf  tf (Signed tf)   Authored: tf    Last Updated: 7:20 PM by STEPHANIE GORMAN

## 2023-10-05 NOTE — PROGRESS NOTES
Endovascular/limb salvage note     Subjective Data:  Pt was seen and assessed this AM at the bedside. Pt is laying in a bed, in NAD, Aox3, He reported his feels much better. Had a large BM this AM. No nausea or vomiting overnight. Tolerating water and ice chips. Some frothy sputum. He worked with PT and was up in chair for most of the day. He is happy with his progress and asked when he can have the NG tube removed.    Overnight Events:     NO acute event overnight     Objective Data:  Pt appears to in good spirits today and happy he had a good BM. Abdomen was soft and non-distended or tender.     Last Recorded Vitals:  Vitals:    10/05/23 0434 10/05/23 0800 10/05/23 1200 10/05/23 1345   BP: 115/79 115/75 118/83    Pulse: 88 88 89 70   Resp: 20 20 20    Temp: 36.9 °C (98.5 °F) 36.1 °C (96.9 °F) 35.6 °C (96.1 °F) 36.2 °C (97.2 °F)   TempSrc:    Tympanic   SpO2: 95% 92% 91%    Weight: 98.3 kg (216 lb 11.4 oz)      Height:           Last Labs:  CBC - 10/3/2023:  6:40 AM  12.8 10.9 346    36.6      CMP - 10/3/2023:  6:40 AM  9.2 6.6 21 --- 0.7   6.0 3.3 27 107           Last I/O:  I/O last 3 completed shifts:  In: 240 (2.4 mL/kg) [P.O.:240]  Out: 600 (6.1 mL/kg) [Emesis/NG output:600]  Weight: 98.3 kg     Echo:  Transthoracic Echo (TTE) Complete 10/2/2023      Inpatient Medications:  Scheduled medications   Medication Dose Route Frequency    allopurinol  100 mg oral q24h    apixaban  2.5 mg oral q12h    atorvastatin  10 mg oral Daily    B complex-vitamin C-folic acid  1 capsule oral Daily    bisacodyl  10 mg rectal Daily    cefepime  2 g intravenous After Dialysis    cinacalcet  30 mg oral Daily with breakfast    clopidogrel  75 mg oral Daily    docusate sodium  100 mg oral BID    epoetin dane  5,000 Units intravenous Once per day on Tue Thu Sat    gabapentin  100 mg oral After Dialysis    isavuconazonium sulfate  372 mg oral Daily    lanthanum  750 mg oral BID with  meals    loratadine  10 mg oral Daily    menthol-zinc oxide  1 Application Topical Daily    metoprolol succinate XL  25 mg oral Daily    naphazoline-pheniramine  1 drop Both Eyes 4x daily    ondansetron  4 mg intravenous q6h    pantoprazole  40 mg oral Daily before breakfast    pneumococcal polysaccharide  0.5 mL intramuscular During hospitalization    polyethylene glycol  17 g oral TID    povidone-iodine   Topical Every other day    sennosides  2 tablet oral BID    traMADol  50 mg oral q8h     PRN medications   Medication    acetaminophen    albuterol    albuterol     Continuous Medications   Medication Dose Last Rate       Physical Exam:  Gen: Alert, awake, AO x 3  Head: no evidence of trauma, NG in placed suction on hold.   Neck: no JVD, bruit or trauma  CVS: irregular rate and rhythm, no murmur, no leg edema, Afib on Tele, has PPM  Lungs: bilateral clear with diminished bases  Abd: obese, NT, soft, no mass, no pain with palpation, +BM, +BS  Extremities: AVF left arm, right TMA site dressed C/D/I dressing by podiatry yesterday. warm to touch, no edema, unable to doppler due to dressing.   Neuro: CN II-XII intact, Generalized weakness      Assessment/Plan   79 yo Male  PMH of AF on Eliquis, CHF (EF 37%), ESRD/HD (Tuesday Thursday Saturday, [facility], Dr. Medel) via left UE AV fistula, Anemia (POA), SSS, status post pacemaker, non smoker, nondiabetic, who was referred to us by Dr. Sanchez, [facility], for consideration for revascularization, in the setting of right toe gangrene (podiatrist Dr. Reese Solares).  Patient presented to clinic with his wife and son, stating he had a longstanding right heel crack and underwent PTA of right AT/PT on 4/12/2023 with Dr. Sanchez.  The heel crack almost healed.  He then developed right foot severe pain and great toe discoloration in August, was admitted at [facility], and underwent a PTA of right AT on 8/30/2023 with Dr. Sanchez, and  was told there were no other options  available for him.  During that admission his toes became gangrenous.  He had a foot x-ray that was questionable for osteomyelitis,  was put on IV antibiotics during hospital stay, but was not discharged on any antibiotics. Up to mid August he was able to walk and drive. He denies CP/SOB.  Pre procedure RAJ/TBI showed impaired blood flow to the RLE: 0.69/0.3, Lt NC/0.8.  We have reviewed the images from angioplasties from [facility] (images in PACS).  Given above presentation, pt was directly admitted to the hospital, for emergent angioplasty.    09/29 Pt was scheduled to be discharged, but pt had a new onset tremors, and Pt was kept in CICU for observation.  09/30, patient had a code white call. Patient was found to be diaphoretic and tachycardic. He was reported abdominal distention, pain and nausea. Patient was found to have a temp of 94F, was in chronic afib but in RVR with rates in 120s,  (per code white nurse report), satting well on RA. Transferred to CICU w/ concerns for sepsis.   Since transfer he has been on IV abx.  KUB showed non-obstructive ileus for which and NGT is in place.   10/5/23 improved symptoms. 10/4 KUB IMPRESSION: There is residual dilated loops of small bowel and correlate with continued ileus. Repeat KUB ordered - pending result    #Right CLI/RC-VI toe gangrene: (Resolved)  - 9/20/21 s/p PTA of Rt AT/DP, PTA of PT and lateral plantar. Complete reconstruction of AT/PT and plantar arch angioplasty with Dr. Linder,  - continue with Clopidogrel and Eliquis  - c/w Neurontin 100 mg three times a week on Dialysis days  - PVR RAJ done 9/27 : Right Lower PVR: No evidence of arterial occlusive disease in the right lower extremity at rest. Biphasic flow is noted in the right posterior tibial artery. Triphasic flow is noted in the right common femoral artery and right dorsalis pedis artery. Severity of disease called by PVR tracings and Doppler waveforms due to non-compressible vessels.  - 1 month  "post procedure EVLS clinic with repeat testing  - no change in assessment     #Rt foot toe gangrene s/p TMA:  - 9/22/2023 Status post right foot transmetatarsal amputation with wound vac placement  - 9/25/23 wound vac removed by podiatry, - TMA flap noted to have some maceration but no dusky discoloration. Retention sutures intact to surgical site. Mild federica-incision erythema. No active drainage, no calor, no malodor, no ascending lymphangitis.   - Weight bearing as tolerated to HEEL ONLY on surgical shoe  - Leg elevation to control edema and maceration  - Recommend every other day dressing changes with Betadine soaked 4x4s, ABD, kerlix, light ace.   - Patient to follow up with Dr. Rodriguez after discharge at [address] [place] [address]. Please call [phone] to schedule an appointment within one week of discharge.   - Cleared for discharge from a podiatry standpoint.   - 10/5 dressing change by podiatry and wound healing progressing well. Added q8 tramadol for pain    #ID  - Per ID recs: Given positive cultures on bone margins: Treat for R foot residual OM with renally dosed IV Cefepime 2G every dialysis for total 6 weeks END DATE 11/3/2023. Will followup in ID clinic for next antibiotics steps.  - For  C. Parapsilosis OM, treat concurrently with Oral Isavuconazole 372mg Q8H for 2 days (LOADING DOSE), then start Isavuconazole 372mg Q24H afterwards for total 6 weeks course from TMA operation date 9/22/23 - END DATE 11/7/2023.   - Send weekly CBCdiff, CMP, CRP, ESR, while on IV systemic antibiotics and fax results to ID clinic at [phone] with \"Re: attention Dr Glass\"  - ID will arrange follow-up in 6 weeks with Dr. Glass in ID clinic, Appointment will be in EMR chart  - blood Cx NTD  - discontinue Vanc/Zosyn c/w cefepime 2G every dialysis days  - 10/3 WBC trending down 12.8, afebrile, Temp 36.5  - Repeat CBC today during HD pending     GI  #Ileus  - 10/2 Xray abdomen:- multiple radiodense fragments " continue to project over the abdomen, not significantly changed from prior exam. Stable air-filled distention of multiple central bowel loops.  - GI on board with following recs:  - NG tube suction on hold  - daily KUB ordered   - Aggressively replete K and Mg (hypokalemia can worsen ileus, and hypomagnesemia can worsen hypokalemia)  K 4.1  Mg 2.89   - mobilize pt to chair as much as possible, or if unable, frequently change position in bed  - c/w BID Senna, Miralax and prn supp  - Holding oral opioids   - Advanced diet to full liquids     #ESRD: on HD TTS  - continue with TTS HD  - c/w home daily Nephrocaps, Sensipar and Fosrenol   - Nephrology following  - Renal dose IV Abx  - Epo 5000 units on dialysis days  - HD per is home scheduled today     #HFrEF   #afib  #HTN  #SSS s/p PPM  -PPM interrogation 9/22: AT/AF 99.5%, appropriate sensing and capture, RV paced 40.5%. no VT episodes. UL rate 60 LL rate 130.   - HR 80-90's  - 140's  - c/w Amlodipine 10 mg daily  - c/w  as needed Clonidine 0.2 twice daily for SBP > 180  - c/w metoprolol succ 25mg daily   - c/w Eliquis, Plavix and statin    #Gout  - c/w home Allopurinol daily   - Uric Acid Low 3.1  - continue to Hold Colchicine due to possible severe interaction with Isavuconazole    Dispo: SNF  - PT/OT recs: AM-PAC score 11 The pt would benefit from moderate intensity therapy for 3-5 days per week to maximize functional mobility and independence.  - Once Pt is ready for discharge will resubmit for precert. Pt and wife have selected [facility] and was previously accepted.   Code Status:  Full Code      Peripheral IV 09/30/23 22 G Right;Posterior Hand (Active)   Site Assessment Clean;Dry 10/02/23 1122   Dressing Status Clean;Dry;Occlusive 10/02/23 1122   Number of days: 2       Peripheral IV 09/30/23 20 G Right Antecubital (Active)   Site Assessment Clean;Dry;Intact 10/02/23 0810   Dressing Status Clean;Dry 10/02/23 0810   Number of days: 2        NG/OG/Feeding Tube Nasogastric Left nostril (Active)   Site Assessment Clean;Dry 10/02/23 1402   Number of days: 1       Hemodialysis Arteriovenous Fistula  Left Upper arm (Active)   Site Assessment Clean;Dry 10/02/23 1246   Dressing Status Clean;Dry 10/02/23 1246   Number of days:          Flash Malik CNP  Endovascular/Limb Salvage Service   Day: 20220/Haiku/Night [facility]VI [id number]

## 2023-10-05 NOTE — PRE-PROCEDURE NOTE
Report from Sending RN:    Report From: ABEL Palomino  Recent Surgery of Procedure: No  Baseline Level of Consciousness (LOC): x4  Oxygen Use: No  Type:   Diabetic: No  Last BP Med Given Day of Dialysis:   Last Pain Med Given:   Lab Tests to be Obtained with Dialysis: No  Blood Transfusion to be Given During Dialysis: No  Available IV Access: Yes  Medications to be Administered During Dialysis: No  Continuous IV Infusion Running: No  Restraints on Currently or in the Last 24 Hours: No  Hand-Off Communication:   No complaint

## 2023-10-05 NOTE — PROGRESS NOTES
10/5/23 PCN note:   updates sent to [facility], ADOD still a few days, OT needed in order to initiate precert, requested in OT column.  Terrie Shane

## 2023-10-05 NOTE — DISCHARGE INSTRUCTIONS
Podiatry Discharge Instructions:  Patient to follow up with Dr. Rodriguez at [address] [place] [address].   Please call [phone] to schedule an appointment within one week of discharge.   Please continue every other day dressing changes in the form of betadine wet 2 dry.

## 2023-10-06 NOTE — CARE PLAN
The clinical goals for the shift include patient will rate pain below 6/10 by the end of shift, and pt will tolerate working with PT/OT this shfit.

## 2023-10-06 NOTE — CARE PLAN
Problem: Transfers  Goal: STG - Patient will perform bed mobility- Transcribed from  Care  Description: Bed Mobility: Date Established 26-Sep-2023  Bed Mobility: [place] Level Goal stand-by assist  Bed Mobility: Time Frame for Goal 2 wks    Outcome: Progressing     Problem: Transfers  Goal: STG - Patient will transfer sit to and from stand and bed to chair- Transcribed from  care  Description: 26-Sep-2023  bed-to-chair/chair-to-bed; sit-to-stand/stand-to-sit  minimum assist (75% patients effort)  1-person assist  rolling walker  2 wks    Outcome: Progressing     Problem: Balance  Goal: Balance Goal- Transcribed from  Care  Description: 26-Sep-2023  no LOB with transfers/ambulation  2 wks         Outcome: Progressing     Problem: Mobility  Goal: STG - Patient will ambulate- Transcribed from  Care  Description: 26-Sep-2023  minimum assist (75% patients effort)  rolling walker  50 feet    Outcome: Progressing  Goal: STG - Patient will ascend and descend a flight of stairs= Transcribed from  care   Description: 26-Sep-2023  minimum assist (75% patients effort)  ascend/descend 4 stairs with LRD  2 wks    Outcome: Progressing

## 2023-10-06 NOTE — PROGRESS NOTES
Endovascular/limb salvage note     Subjective Data:  Pt was seen and assessed this AM at bedside.   Pt is laying in a bed, in NAD, Aox3, He reported his feels much better. Had a  BM last night. No nausea or vomiting overnight. Tolerating liquid diet. He accidentally pulled his NG tube last night. At the end of the visit, he was asking to be assisted for another BM     Overnight Events:     NO acute event overnight     Objective Data:      Last Recorded Vitals:  Vitals:    10/05/23 1759 10/05/23 2041 10/06/23 0159 10/06/23 0835   BP:  118/74 108/70 113/65   BP Location:  Right arm Right arm    Patient Position:  Lying Lying    Pulse: 98 97 93 90   Resp:  18 18 18   Temp: 36.1 °C (97 °F) 37.2 °C (99 °F) 36.5 °C (97.7 °F) 35.8 °C (96.4 °F)   TempSrc: Temporal Temporal Temporal    SpO2:  90% 91% 93%   Weight:       Height:           Last Labs:  CBC - 10/5/2023:  3:00 PM  9.9 10.4 360    34.8      CMP - 10/5/2023:  3:00 PM  9.0 6.6 21 --- 0.7   7.3 3.2 27 107           Last I/O:  I/O last 3 completed shifts:  In: 800 (8.1 mL/kg) [I.V.:400 (4.1 mL/kg); Other:400]  Out: 1400 (14.2 mL/kg) [Other:1400]  Weight: 98.3 kg     Echo:  Transthoracic Echo (TTE) Complete 10/2/2023      Inpatient Medications:  Scheduled medications   Medication Dose Route Frequency    allopurinol  100 mg oral q24h    apixaban  2.5 mg oral q12h    atorvastatin  10 mg oral Daily    B complex-vitamin C-folic acid  1 capsule oral Daily    bisacodyl  10 mg rectal Daily    cefepime  2 g intravenous After Dialysis    cinacalcet  30 mg oral Daily with breakfast    clopidogrel  75 mg oral Daily    docusate sodium  100 mg oral BID    epoetin dane  5,000 Units intravenous Once per day on Tue Thu Sat    gabapentin  100 mg oral After Dialysis    isavuconazonium sulfate  372 mg oral Daily    lanthanum  750 mg oral BID with meals    loratadine  10 mg oral Daily    menthol-zinc oxide  1 Application Topical Daily     metoprolol succinate XL  25 mg oral Daily    naphazoline-pheniramine  1 drop Both Eyes 4x daily    ondansetron  4 mg intravenous q6h    pantoprazole  40 mg oral Daily before breakfast    pneumococcal polysaccharide  0.5 mL intramuscular During hospitalization    polyethylene glycol  17 g oral TID    povidone-iodine   Topical Every other day    sennosides  2 tablet oral BID    traMADol  50 mg oral q8h     PRN medications   Medication    acetaminophen    albuterol    albuterol     Continuous Medications   Medication Dose Last Rate       Physical Exam:  Gen: Alert, awake, AO x 3  Head: no evidence of trauma, NG in placed suction on hold.   Neck: no JVD, bruit   CVS: irregular rate and rhythm, no murmur, no leg edema, Afib on Tele, has PPM  Lungs: bilateral clear with diminished bases  Abd: obese, NT, soft, no mass, no pain with palpation, +BM, +BS  Extremities: AVF left arm, right TMA site dressed C/D/I. Skin warm to touch, no edema,   Neuro: CN II-XII intact, Generalized weakness      Assessment/Plan   79 yo Male  PMH of AF on Eliquis, CHF (EF 37%), [facility] via left UE AV fistula, Anemia (POA), SSS, status post pacemaker, non smoker, nondiabetic, who was referred to us by Dr. Sanchez, [facility], for consideration for revascularization, in the setting of right toe gangrene (podiatrist Dr. Reese Solares).  Patient presented to clinic with his wife and son, stating he had a longstanding right heel crack and underwent PTA of right AT/PT on 4/12/2023 with Dr. Sanchez.  The heel crack almost healed.  He then developed right foot severe pain and great toe discoloration in August, was admitted at [facility], and underwent a PTA of right AT on 8/30/2023 with Dr. Sanchez, and  was told there were no other options available for him.  During that admission his toes became gangrenous.  He had a foot x-ray that was questionable for osteomyelitis,  was put on IV antibiotics  during hospital stay, but was not discharged on any antibiotics. Up to mid August he was able to walk and drive. He denies CP/SOB.  Pre procedure RAJ/TBI showed impaired blood flow to the RLE: 0.69/0.3, Lt NC/0.8.  We have reviewed the images from angioplasties from [facility] (images in PACS).  Given above presentation, pt was directly admitted to the hospital, for emergent angioplasty.    09/29 Pt was scheduled to be discharged, but pt had a new onset tremors, and Pt was kept in CICU for observation.  09/30, patient had a code white call. He was found to be diaphoretic and tachycardic, with abdominal distention, pain and nausea. Patient was found to have a temp of 94F, was in chronic afib but in RVR with rates in 120s,  (per code white nurse report), satting well on RA. Transferred to CICU w/ concerns for sepsis.   Since transfer he has been on IV abx.  KUB showed non-obstructive ileus for which and NGT was placed.   10/4 KUB IMPRESSION: There is residual dilated loops of small bowel and correlate with continued ileus.   10/6  Symptoms resolved, Repeat KUB ordered - pending results    #Right CLI/RC-VI toe gangrene: (Resolved)  - 9/20/21 s/p PTA of Rt AT/DP, PTA of PT and lateral plantar. Complete reconstruction of AT/PT and plantar arch angioplasty with Dr. Linder  - continue with Clopidogrel and Eliquis  - c/w Neurontin 100 mg three times a week on Dialysis days  - PVR RAJ done 9/27 : Right Lower PVR: No evidence of arterial occlusive disease in the right lower extremity at rest. Biphasic flow is noted in the right posterior tibial artery. Triphasic flow is noted in the right common femoral artery and right dorsalis pedis artery. Severity of disease called by PVR tracings and Doppler waveforms due to non-compressible vessels.  - 1 month post procedure EVLS clinic with repeat testing      #Rt foot toe gangrene s/p TMA:  - 9/22/2023 Status post right foot transmetatarsal amputation with wound vac placement  -  "9/25/23 wound vac removed by podiatry, - TMA flap noted to have some maceration but no dusky discoloration. Retention sutures intact to surgical site. Mild federica-incision erythema. No active drainage, no calor, no malodor, no ascending lymphangitis.   - Weight bearing as tolerated to HEEL ONLY on surgical shoe  - Leg elevation to control edema and maceration  - Recommend every other day dressing changes with Betadine soaked 4x4s, ABD, kerlix, light ace.   - Patient to follow up with Dr. Rodriguez after discharge at [address] [place] [address]. Please call [phone] to schedule an appointment within one week of discharge.   - Cleared for discharge from a podiatry standpoint.   - 10/5 dressing change by podiatry and wound healing progressing well. Added q8 tramadol for pain    #ID  - Per ID recs: Given positive cultures on bone margins: Treat for R foot residual OM with renally dosed IV Cefepime 2G every dialysis for total 6 weeks END DATE 11/3/2023. Will followup in ID clinic for next antibiotics steps.  - For  C. Parapsilosis OM, treat concurrently with Oral Isavuconazole 372mg Q8H for 2 days (LOADING DOSE), then start Isavuconazole 372mg Q24H afterwards for total 6 weeks course from TMA operation date 9/22/23 - END DATE 11/7/2023.   - Send weekly CBCdiff, CMP, CRP, ESR, while on IV systemic antibiotics and fax results to ID clinic at [phone] with \"Re: attention Dr Glass\"  - ID will arrange follow-up in 6 weeks with Dr. Glass in ID clinic, Appointment will be in EMR chart  - blood Cx NTD  - discontinue Vanc/Zosyn c/w cefepime 2G every dialysis days    GI  #Ileus  - 10/2 Xray abdomen:- multiple radiodense fragments continue to project over the abdomen, not significantly changed from prior exam. Stable air-filled distention of multiple central bowel loops.  - GI on board  - NG tube removed  - KUB 10/6/23 pending  - Aggressively replete K and Mg (hypokalemia can worsen ileus, and hypomagnesemia can " worsen hypokalemia)    - mobilize pt to chair as much as possible, or if unable, frequently change position in bed  - c/w BID Senna, Miralax and prn supp  - Holding oral opioids   - Advanced diet to renal     #ESRD: on HD TTS  - continue with TTS HD  - c/w home daily Nephrocaps, Sensipar and Fosrenol   - Nephrology following  - Renal dose IV Abx  - Epo 5000 units on dialysis days  - HD per is home scheduled today     #HFrEF   #afib  #HTN  #SSS s/p PPM  -PPM interrogation 9/22: AT/AF 99.5%, appropriate sensing and capture, RV paced 40.5%. no VT episodes. UL rate 60 LL rate 130.   - HR 80-90's  - 140's  - c/w Amlodipine 10 mg daily  - c/w  as needed Clonidine 0.2 twice daily for SBP > 180  - c/w metoprolol succ 25mg daily   - c/w Eliquis, Plavix and statin    #Gout  - c/w home Allopurinol daily   - Uric Acid Low 3.1  - continue to Hold Colchicine due to possible severe interaction with Isavuconazole    Pt is not eligible for transfer of service: Given that patient is without acute active medical issues (ileus resolving, NG tube removed yesterday and diet advanced to renal diet today), he can stay on current primary team and continue with current medical management. If an acute medical issue arises or there is a change in the patient's condition over the weekend, please reach out to the [facility], who can assist with medical needs.          Dispo: SNF  - PT/OT recs: AM-PAC score 11 The pt would benefit from moderate intensity therapy for 3-5 days per week to maximize functional mobility and independence.  - Precert started. Pt and wife have selected [facility] and was previously accepted.   Code Status:  Full Code      Peripheral IV 09/30/23 22 G Right;Posterior Hand (Active)   Site Assessment Clean;Dry 10/02/23 1122   Dressing Status Clean;Dry;Occlusive 10/02/23 1122   Number of days: 2       Peripheral IV 09/30/23 20 G Right Antecubital (Active)   Site Assessment Clean;Dry;Intact 10/02/23 0810   Dressing  Status Clean;Dry 10/02/23 0810   Number of days: 2       NG/OG/Feeding Tube Nasogastric Left nostril (Active)   Site Assessment Clean;Dry 10/02/23 1402   Number of days: 1       Hemodialysis Arteriovenous Fistula  Left Upper arm (Active)   Site Assessment Clean;Dry 10/02/23 1246   Dressing Status Clean;Dry 10/02/23 1246   Number of days:          Aruna Vazquez PA-C  Endovascular/Limb Salvage Service   Day: 16155/Haiku/Night HHVI [id number]

## 2023-10-06 NOTE — PROGRESS NOTES
10/6/23 PCN note: updates sent to [facility] and asked for precert to be initiated.   Terrie Shane

## 2023-10-06 NOTE — PROGRESS NOTES
Occupational Therapy    OT Treatment    Patient Name: Chevy Benton  MRN: [id number]  Today's Date: 10/6/2023  Time Calculation  Start Time: 1107  Stop Time: 1132  Time Calculation (min): 25 min         Assessment:  OT Assessment: Pt tolerated session fairly well, limited by pain and requiring significant assist for functional transfers; pt will continue to benefit from skilled OT serviecs  Prognosis: Good  Prognosis: Good          Subjective   General:  OT Received On: 10/06/23  Missed Visit: Yes  Missed Visit Reason:  (Unavailable, currently off unit at dialysis; will re-attempt as pt/schedule permits)  Patient Position Received: Bed, 3 rail up  General Comment: Pt met in bed, cooperative and willing to participate; at end of session pt remains up in chair with chair alarm and needs in reach  Vital Signs:  Vital Signs  Heart Rate:  (90 at start of session with mobility up to 100, down to 93 at end of session)  Pain:  Pain Assessment  Pain Assessment: 0-10  Pain Score: 10 - Worst possible pain  Pain Type: Acute pain  Pain Location: Ankle  Pain Orientation: Right  Pain Interventions: Repositioned, Elevated, Rest (RN notified, present to administer meds)    Objective    Activities of Daily Living:      Toileting  Toileting Level of Assistance: Dependent  Where Assessed: Bed level  Toileting Comments: total assist for federica-care and hygiene following BM on bedpan, as received  Functional Standing Tolerance:  Functional Standing Tolerance Comments: Pt mobilized limited distances from EOB to bedside chair with max A and max VCs for safety, use of RW and sequencing, pt with poor safety awareness, impulsivity and poor problem solving requiring increased A to safely control lowering to chair  Bed Mobility/Transfers: Bed Mobility  Bed Mobility: Yes  Bed Mobility 1  Bed Mobility 1: Supine to sitting  Level of Assistance 1: Minimum assistance, Minimal verbal cues (increased time)    Transfers  Transfer: Yes  Transfer  1  Technique 1: Sit to stand, Stand to sit  Transfer Device 1: Walker  Transfer Level of Assistance 1: Maximum assistance, Moderate verbal cues (increased time, from elevated EOB, 2 trials for success, use of momentum; max A to safely controll eccentric lowering)    Outcome Measures:[facility] Daily Activity  Putting on and taking off regular lower body clothing: Total  Bathing (including washing, rinsing, drying): A lot  Putting on and taking off regular upper body clothing: A little  Toileting, which includes using toilet, bedpan or urinal: Total  Taking care of personal grooming such as brushing teeth: A little  Eating Meals: None  Daily Activity - Total Score: 14        Education Documentation  Body Mechanics, taught by Amanda Ding OT at 10/6/2023 12:12 PM.  Learner: Patient  Readiness: Acceptance  Method: Explanation  Response: Verbalizes Understanding    ADL Training, taught by Amanda Ding OT at 10/6/2023 12:12 PM.  Learner: Patient  Readiness: Acceptance  Method: Explanation  Response: Verbalizes Understanding         Goals:  Encounter Problems       Encounter Problems (Active)       ADLs       UB Bathing (Progressing)       Start:  10/02/23    Expected End:  10/10/23       Bathing Upper Body: Established Date 26-Sep-2023  Bathing Upper Body: [place] Level Goal modified independent  Bathing Upper Body: Assistive Device Goal AE as needed           LB Bathing (Progressing)       Start:  10/02/23    Expected End:  10/10/23       Bathing Lower Body: Established Date 26-Sep-2023  Bathing Lower Body: [place] Level Goal minimum assist (75% patients effort)  Bathing Lower Body: Assistive Device Goal AE as needed  Bathing Lower Body: Time Frame for Goal 2 wks           UB Dressing (Progressing)       Start:  10/02/23    Expected End:  10/10/23       Upper Body Dressing: Established Date 26-Sep-2023  Upper Body Dressing: [place] Level Goal independent  Upper Body Dressing: Time Frame for Goal 2  wks           LB Dressing (Progressing)       Start:  10/02/23    Expected End:  10/10/23       Lower Body Dressing: Established Date 26-Sep-2023  Lower Body Dressing: [place] Level Goal minimum assist (75% patients effort)  Lower Body Dressing: Assistive Device Goal AE as needed  Lower Body Dressing: Time Frame for Goal 2 wks           Toileting (Progressing)       Start:  10/02/23    Expected End:  10/10/23       Toileting: Established Date 26-Sep-2023  Toileting: [place] Level Goal minimum assist (75% patients effort)  Toileting: Time Frame for Goal 2 wks              Balance       Balance Goal- Transcribed from [facility] (Progressing)       Start:  10/02/23    Expected End:  10/10/23       26-Sep-2023  no LOB with transfers/ambulation  2 wks                   MOBILITY       Transfer goal (Progressing)       Start:  10/02/23    Expected End:  10/10/23       Transfer: Established Date 26-Sep-2023  Transfer: Transfer Type Goal all transfers  Transfer: [place] Level Goal minimum assist (75% patients effort)  Transfer: Assistive Device Goal LRAD  Transfer: Time Frame for Goal 2 wks              Mobility       STG - Patient will ambulate- Transcribed from  [facility] (Progressing)       Start:  10/02/23    Expected End:  10/10/23       26-Sep-2023  minimum assist (75% patients effort)  rolling walker  50 feet           STG - Patient will ascend and descend a flight of stairs= Transcribed from [facility]  (Progressing)       Start:  10/02/23    Expected End:  10/10/23       26-Sep-2023  minimum assist (75% patients effort)  ascend/descend 4 stairs with LRD  2 wks              Transfers       STG - Patient will perform bed mobility- Transcribed from [facility] (Progressing)       Start:  10/02/23    Expected End:  10/10/23       Bed Mobility: Date Established 26-Sep-2023  Bed Mobility: [place] Level Goal stand-by assist  Bed Mobility: Time Frame for Goal 2 wks              Transfers       STG - Patient will  transfer sit to and from stand and bed to chair- Transcribed from  care (Progressing)       Start:  10/02/23    Expected End:  10/10/23       26-Sep-2023  bed-to-chair/chair-to-bed; sit-to-stand/stand-to-sit  minimum assist (75% patients effort)  1-person assist  rolling walker  2 wks

## 2023-10-06 NOTE — CONSULTS
Reason For Consult  Transfer of service     History Of Present Illness  79 yo Male with PMH of AF on Eliquis, CHF (EF 37%), ESRD on HD (T/R/Sat at [facility] [place], Dr. Medel) via left UE AV fistula, T2DM (diet controlled), Anemia (POA), gout, SSS s/p pacemaker, PAD s/p R AT/PT (4/12/2023 w/ Dr. Sanchez at [facility]) c/b R toe gangrene with c/f OM s/p R AT PTA (8/20/2023 again with Dr. Sanchez) who presented as direct admit from endovascular limb clinic for emergent angioplasty.  S/p PTA of Rt AT/DP/PT and lateral plantar with complete reconstruction of AT/PT and plantar arch angioplasty via Rt antegrade CFA access with Dr. Linder on 9/20. Podiatry consulted for gangrene of toes 4 and 5. S/p R TMA on 9/22. Bone margin positive for 3+ gram neg bacilli and patient started on IV Cefepime 2G every dialysis for total 6 weeks (end 11/3/2023). 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 Patient scheduled to be discharged on 9/29, but developed SOB. On 9/30, patient found to be diaphoretic, tachycardic (Afib with RVR), hypothermic to 94F with abdominal distension, pain and nausea. Code white was called and patient transferred to CICU for concern of sepsis and started on empiric antibiotics (vanc/zosyn). Blood cx neg.  KUB on 10/1 showing non-obstructive ileus and NG tube was placed with gradual improvement of symptoms and advancement of diet. NG accidentally removed last yesterday evening, pending repeat KUB.     Today, patient without any acute complaints and denies any abdominal pain, nausea, vomiting. Had two loose bowel movements. Tolerated full liquid diet at lunch (milk, jello).      Past Medical History  As per HPI.    Surgical History  As per HPI     Social History  Former smoker. Denies alcohol or drug use.     Family History  Noncontributory    "  Allergies  Patient has no known allergies.    Review of Systems  As per HPI.     Physical Exam  Constitutional: Awake and alert.  No acute distress, Patient appears stated age.  Head and face:  Normocephalic, atraumatic. Hearing is grossly intact.   Eyes: Sclera non-icteric and eye lids are without obvious rash or drooping. EOMI.   Pulmonary: CTAB. No crackles or wheezes. No gasping or shortness of breath noted. Normal respiratory movements without use of accessory muscles.   Cardiovascular: irregularly irregular. Normal S1 and S2. No murmurs, rubs, or gallops.   Abdomen: non-tender, non-distended   Extremities: R foot in boot.   Psychiatric: Mood and affect are normal.       Last Recorded Vitals  Blood pressure 100/69, pulse 83, temperature 36.1 °C (96.9 °F), resp. rate 18, height 1.727 m (5' 7.99\"), weight 98.3 kg (216 lb 11.4 oz), SpO2 95 %.    Relevant Results  XR abdomen 1 view   Final Result   1.  Multiple dilated featureless loops of bowel throughout the   abdomen and pelvis, worse compared to prior study. Correlation with   worsening ileus or developing obstruction.   2. No definite evidence of free air in the abdomen. If there is   concern for free intra-abdominal air consider upright or decubitus   film.        Signed by: Kendrick Painting 10/6/2023 7:36 AM   Dictation workstation:   [id number]      XR abdomen 1 view   Final Result   1.  NG tube overlies the body of the stomach. There is residual   dilated loops of small bowel and correlate with continued ileus.        Signed by: Jimmy Ny 10/4/2023 4:22 PM   Dictation workstation:   [id number]      XR abdomen 1 view   Final Result   1.  Stable air-filled dilated loops of bowel projecting over the   central abdomen.        MACRO:   None        Signed by: Cj Croft 10/2/2023 11:34 AM   Dictation workstation:   [id number]      Transthoracic Echo (TTE) Complete   Final Result      XR abdomen 1 view   Final Result   1. Interval placement of " enteric tube with tip projecting over the   right upper quadrant in the expected location of the gastric   body/antrum.   2. Interval resolution of gaseous gastric distension. Similar gaseous   distention of multiple bowel loops and attention on follow-up imaging   is recommended.   3. Similar appearance of numerous radiopaque densities projecting   over the abdomen, mostly over the right hemiabdomen, likely   representing ingested material.        I personally reviewed the images/study and I agree with the resident   findings as stated. This study was interpreted at [place], [place].        MACRO:   None.        Signed by: Kirk Vanessa 10/1/2023 6:31 AM   Dictation workstation:   [id number]      XR abdomen 1 view   Final Result   1. Similar gaseous distention of stomach as well as multiple bowel   loops throughout the abdomen. Correlate with concern for ileus.   Attention on follow-up imaging is recommended.   2. Similar appearance of numerous radiopaque densities projecting   over the abdomen, mostly over the right hemiabdomen, likely   representing ingested material.        I personally reviewed the images/study and I agree with the resident   findings as stated. This study was interpreted at [place], [place].        MACRO:   None.        Signed by: Kirk Vanessa 10/1/2023 4:42 AM   Dictation workstation:   [id number]      XR abdomen 1 view         XR chest 1 view         XR chest 1 view   Final Result   1.  Decreased lung volumes with mild increased central pulmonary   vascularity. Minor interstitial edema.               MACRO:   None      PVR (Arterial Physiologic) RAJ   Final Result      XR foot   Final Result   Postsurgical changes in the right foot status post transmetatarsal   amputation. No evidence of osteomyelitis radiographically      Adult Cath   Final Result      XR foot   Final Result   1st distal  phalangeal soft tissue ulcer without evidence of   underlying osseous erosion. If there is persistent clinical concern   for osteomyelitis, consider MRI.       MACRO:   None      CARDIAC DEVICE REPORT    (Results Pending)   XR abdomen 1 view    (Results Pending)         Assessment/Plan     77 yo Male with PMH of AF on Eliquis, CHF (EF 37%), ESRD on HD ([facility], Dr. Medel) via left UE AV fistula, Anemia (POA), SSS s/p pacemaker, PAD s/p R AT/PT (4/12/2023 w/ Dr. Sanchez at [facility]) c/b R toe gangrene with c/f OM s/p R AT PTA (8/20/2023 again with Dr. Sanchez) who presented as direct admit from endovascular limb clinic for emergent angioplasty.  S/p PTA of Rt AT/DP/PT and lateral plantar with complete reconstruction of AT/PT and plantar arch angioplasty via Rt antegrade CFA access with Dr. Linder on 9/20. Podiatry consulted for gangrene of toes 4 and 5. S/p R TMA on 9/22. Bone margin positive for 3+ gram neg bacilli and patient started on IV Cefepime 2G every dialysis for total 6 weeks (end 11/3/2023). 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 Patient scheduled to be discharged on 9/29, but developed SOB and tremors. On 9/30, patient found to be diaphoretic, tachycardic (Afib with RVR), hypothermic to 94F with abdominal distension, pain and nausea. Code white was called and patient transferred to CICU for concern of sepsis and started on empiric antibiotics (vanc/zosyn). Blood Cx neg. KUB on 10/1 showing non-obstructive ileus and NG tube was placed with gradual improvement of symptoms and advancement of diet. NG accidentally removed last yesterday evening, pending repeat KUB.     Today, patient without any acute complaints and denies any abdominal pain, nausea, vomiting. Had two loose bowel movements. Tolerated full liquid diet at lunch and  will advance to renal diet for dinner.     Medicine consulted for transfer of service.     Patient is clinically stable and awaiting SNF placement. Pre-cert restarted today. Patient and wife selected [facility] and was previously accepted. Given that patient is without acute active medical issues (ileus resolving, NG tube removed yesterday and diet advanced to renal diet today), he can stay on current primary team and continue with current medical management. If an acute medical issue arises or there is a change in the patient's condition over the weekend, please reach out to the [facility], who can assist with medical needs.     Thank you for your consult. We will sign off at this time.   Patient seen and discussed with attending physician, Dr. Terry.       Nova Dubois MD  Anesthesiology, PGY1  Available via [employer]   Medicine consult pager:

## 2023-10-06 NOTE — PROGRESS NOTES
Physical Therapy    Physical Therapy Treatment    Patient Name: Chevy Benton  MRN: [id number]  Today's Date: 10/6/2023  Time Calculation  Start Time: 1552  Stop Time: 1607  Time Calculation (min): 15 min       Assessment/Plan   PT Assessment  PT Assessment Results: Decreased strength, Impaired balance, Decreased endurance, Decreased mobility, Pain, Orthopedic restrictions  Rehab Prognosis: Good  Evaluation/Treatment Tolerance: Patient limited by pain  Medical Staff Made Aware: Yes  Strengths: Attitude of self  End of Session Communication: Bedside nurse  Assessment Comment: Pt continues to remain appropriate for MOD intensity continued PT after DC.  End of Session Patient Position: Bed, 3 rail up, Alarm on  PT Plan  Inpatient/Swing Bed or Outpatient: Inpatient  PT Plan  Treatment/Interventions: Bed mobility, Balance training, Gait training, Transfer training, Neuromuscular re-education, Strengthening, Endurance training, Range of motion, Therapeutic exercise, Therapeutic activity, Home exercise program, Positioning, Postural re-education  PT Plan: Skilled PT  PT Frequency: 4 times per week  PT Discharge Recommendations: Moderate intensity level of continued care  Equipment Recommended upon Discharge: Wheeled walker  PT Recommended Transfer Status: Assist x2      General Visit Information:   PT  Visit  PT Received On: 10/06/23  Response to Previous Treatment: Patient with no complaints from previous session.  General  Prior to Session Communication: Bedside nurse  Patient Position Received: Bed, 3 rail up, Alarm off, not on at start of session  General Comment: Pt sitting in chair upon entry to room. Pt pleasant, cooperative and willing to work with PT.    Subjective   Precautions:  Precautions  LE Weight Bearing Status: Heel Weight Bearing in Post-Op Shoe (RLE)  Medical Precautions: Fall precautions  Vital Signs:       Objective   Pain:  Pain Assessment  Pain Assessment: 0-10  Pain Score: 10 - Worst possible  pain  Pain Type:  (R foot)  Pain Location:  (R foot)  Cognition:  Cognition  Overall Cognitive Status: Within Functional Limits  Orientation Level: Oriented X4  Postural Control:     Extremity/Trunk Assessments:    Activity Tolerance:     Treatments:  Bed Mobility  Bed Mobility: Yes  Bed Mobility 1  Bed Mobility 1: Sitting to supine  Level of Assistance 1: Contact guard    Ambulation/Gait Training  Ambulation/Gait Training Performed: Yes  Ambulation/Gait Training 1  Surface 1: Level tile  Device 1: Rolling walker  Assistance 1: Maximum assistance (x2)  Quality of Gait 1:  (pt with posterior lean)  Comments/Distance (ft) 1: 2ft  Transfers  Transfer: Yes  Transfer 1  Transfer From 1: Sit to, Stand to  Transfer to 1: Stand, Sit  Technique 1: Sit to stand, Stand to sit  Transfer Device 1: Walker  Transfer Level of Assistance 1: Maximum assistance (x2)    Outcome Measures:  [facility] Basic Mobility  Turning from your back to your side while in a flat bed without using bedrails: A little  Moving from lying on your back to sitting on the side of a flat bed without using bedrails: A little  Moving to and from bed to chair (including a wheelchair): A lot  Standing up from a chair using your arms (e.g. wheelchair or bedside chair): Total  To walk in hospital room: Total  Climbing 3-5 steps with railing: Total  Basic Mobility - Total Score: 11    Education Documentation  No documentation found.  Education Comments  No comments found.        OP EDUCATION:       Encounter Problems       Encounter Problems (Active)       Balance       Balance Goal- Transcribed from  Care (Progressing)       Start:  10/02/23    Expected End:  10/10/23       26-Sep-2023  no LOB with transfers/ambulation  2 wks                   Mobility       STG - Patient will ambulate- Transcribed from  Care (Progressing)       Start:  10/02/23    Expected End:  10/10/23       26-Sep-2023  minimum assist (75% patients effort)  rolling walker  50 feet            STG - Patient will ascend and descend a flight of stairs= Transcribed from [facility]  (Progressing)       Start:  10/02/23    Expected End:  10/10/23       26-Sep-2023  minimum assist (75% patients effort)  ascend/descend 4 stairs with LRD  2 wks              Pain - Adult          Transfers       STG - Patient will perform bed mobility- Transcribed from [facility] (Progressing)       Start:  10/02/23    Expected End:  10/10/23       Bed Mobility: Date Established 26-Sep-2023  Bed Mobility: [place] Level Goal stand-by assist  Bed Mobility: Time Frame for Goal 2 wks              Transfers       STG - Patient will transfer sit to and from stand and bed to chair- Transcribed from [facility] (Progressing)       Start:  10/02/23    Expected End:  10/10/23       26-Sep-2023  bed-to-chair/chair-to-bed; sit-to-stand/stand-to-sit  minimum assist (75% patients effort)  1-person assist  rolling walker  2 wks

## 2023-10-06 NOTE — CARE PLAN
Problem: ADLs  Goal: UB Bathing  Description: Bathing Upper Body: Established Date 26-Sep-2023  Bathing Upper Body: [place] Level Goal modified independent  Bathing Upper Body: Assistive Device Goal AE as needed    Outcome: Progressing  Goal: LB Bathing  Description: Bathing Lower Body: Established Date 26-Sep-2023  Bathing Lower Body: [place] Level Goal minimum assist (75% patients effort)  Bathing Lower Body: Assistive Device Goal AE as needed  Bathing Lower Body: Time Frame for Goal 2 wks    Outcome: Progressing  Goal: UB Dressing  Description: Upper Body Dressing: Established Date 26-Sep-2023  Upper Body Dressing: [place] Level Goal independent  Upper Body Dressing: Time Frame for Goal 2 wks    Outcome: Progressing  Goal: LB Dressing  Description: Lower Body Dressing: Established Date 26-Sep-2023  Lower Body Dressing: [place] Level Goal minimum assist (75% patients effort)  Lower Body Dressing: Assistive Device Goal AE as needed  Lower Body Dressing: Time Frame for Goal 2 wks    Outcome: Progressing  Goal: Toileting  Description: Toileting: Established Date 26-Sep-2023  Toileting: [place] Level Goal minimum assist (75% patients effort)  Toileting: Time Frame for Goal 2 wks    Outcome: Progressing     Problem: MOBILITY  Goal: Transfer goal  Description: Transfer: Established Date 26-Sep-2023  Transfer: Transfer Type Goal all transfers  Transfer: [place] Level Goal minimum assist (75% patients effort)  Transfer: Assistive Device Goal LRAD  Transfer: Time Frame for Goal 2 wks    Outcome: Progressing

## 2023-10-07 PROBLEM — S68.712A: Status: ACTIVE | Noted: 2023-01-01

## 2023-10-07 NOTE — DISCHARGE SUMMARY
Discharge Diagnosis  PAD (peripheral artery disease) (CMS/[facility])    Issues Requiring Follow-Up  As mentioned below.    Test Results Pending At Discharge  Pending Labs       Order Current Status    Basic metabolic panel In process    Extra Tubes In process    Light Blue Top In process            Hospital Course   77 yo Male  PMH of AF on Eliquis, CHF (EF 37%), ESRD/HD (Tuesday Thursday Saturday, [facility], Dr. Medel) via left UE AV fistula, Anemia (POA), SSS, status post pacemaker, non smoker, nondiabetic, who was referred to us by Dr. Sanchez, [facility], for consideration for revascularization, in the setting of right toe gangrene (podiatrist Dr. Reese Solares).  Patient presented to clinic with his wife and son, stating he had a longstanding right heel crack and underwent PTA of right AT/PT on 4/12/2023 with Dr. Sanchez.  The heel crack almost healed.  He then developed right foot severe pain and great toe discoloration in August, was admitted at [facility], and underwent a PTA of right AT on 8/30/2023 with Dr. Sanchez, and  was told there were no other options available for him.  During that admission his toes became gangrenous.  He had a foot x-ray that was questionable for osteomyelitis,  was put on IV antibiotics during hospital stay, but was not discharged on any antibiotics. Up to mid August he was able to walk and drive. He denies CP/SOB.  Pre procedure RAJ/TBI showed impaired blood flow to the RLE: 0.69/0.3, Lt NC/0.8.  We have reviewed the images from angioplasties from [facility] (images in PACS).  Given above presentation, pt was directly admitted to the hospital, for emergent angioplasty.     09/29 Pt was scheduled to be discharged, but pt had a new onset tremors, and Pt was kept in CICU for observation.  09/30, patient had a code white call. He was found to be diaphoretic and tachycardic, with abdominal distention, pain and nausea. Patient was found to have a temp of 94F, was in chronic afib but in RVR with  rates in 120s,  (per code white nurse report), satting well on RA. Transferred to CICU w/ concerns for sepsis.   Since transfer he has been on IV abx.  KUB showed non-obstructive ileus for which and NGT was placed.   10/4 KUB IMPRESSION: There is residual dilated loops of small bowel and correlate with continued ileus.   10/6  Symptoms resolved, Repeat KUB ordered - pending results     #Right CLI/RC-VI toe gangrene: (Resolved)  - 9/20/21 s/p PTA of Rt AT/DP, PTA of PT and lateral plantar. Complete reconstruction of AT/PT and plantar arch angioplasty with Dr. Linder  - continue with Clopidogrel and Eliquis  - c/w Neurontin 100 mg three times a week on Dialysis days  - PVR RAJ done 9/27 : Right Lower PVR: No evidence of arterial occlusive disease in the right lower extremity at rest. Biphasic flow is noted in the right posterior tibial artery. Triphasic flow is noted in the right common femoral artery and right dorsalis pedis artery. Severity of disease called by PVR tracings and Doppler waveforms due to non-compressible vessels.  - 1 month post procedure EVLS clinic with repeat testing        #Rt foot toe gangrene s/p TMA:  - 9/22/2023 Status post right foot transmetatarsal amputation with wound vac placement  - 9/25/23 wound vac removed by podiatry, - TMA flap noted to have some maceration but no dusky discoloration. Retention sutures intact to surgical site. Mild federica-incision erythema. No active drainage, no calor, no malodor, no ascending lymphangitis.   - Weight bearing as tolerated to HEEL ONLY on surgical shoe  - Leg elevation to control edema and maceration  - Recommend every other day dressing changes with Betadine soaked 4x4s, ABD, kerlix, light ace.   - Patient to follow up with Dr. Rodriguez after discharge at [address] [place] [address]. Please call [phone] to schedule an appointment within one week of discharge.   - Cleared for discharge from a podiatry standpoint.   - 10/5  "dressing change by podiatry and wound healing progressing well. Added q8 tramadol for pain     #ID  - Per ID recs: Given positive cultures on bone margins: Treat for R foot residual OM with renally dosed IV Cefepime 2G every dialysis for total 6 weeks END DATE 11/3/2023. Will followup in ID clinic for next antibiotics steps.  - For  C. Parapsilosis OM, treat concurrently with Oral Isavuconazole 372mg Q8H for 2 days (LOADING DOSE), then start Isavuconazole 372mg Q24H afterwards for total 6 weeks course from TMA operation date 9/22/23 - END DATE 11/7/2023.   - Send weekly CBCdiff, CMP, CRP, ESR, while on IV systemic antibiotics and fax results to ID clinic at [phone] with \"Re: attention Dr Glass\"  - ID will arrange follow-up in 6 weeks with Dr. Glass in ID clinic, Appointment will be in EMR chart  - blood Cx NTD  - discontinue Vanc/Zosyn c/w cefepime 2G every dialysis days    Pertinent Physical Exam At Time of Discharge  Physical Exam    Home Medications     Medication List      START taking these medications     cefepime IV; Commonly known as: Maxipime; Infuse 100 mL (2 g) at 200   mL/hr over 30 minutes into a venous catheter once daily on dialysis days   for 12 doses. Do not start before October 8, 2023.; Start taking on:   October 8, 2023   cinacalcet 30 mg tablet; Commonly known as: Sensipar; Take 1 tablet (30   mg) by mouth once daily with a meal. Take with food or shortly afer a   meal. Swallow tablet whole; do not break or divide.   epoetin dane 3,000 unit/mL injection; Commonly known as: Epogen,Procrit;   Inject 1 mL (3,000 Units) under the skin 3 times a week.   isavuconazonium sulfate 186 mg capsule capsule; Commonly known as:   Cresemba; Take 2 capsules (372 mg) by mouth once daily.   loratadine 10 mg tablet; Commonly known as: Claritin; Take 1 tablet (10   mg) by mouth every other day if needed for allergies.   menthol-zinc oxide 0.44-20.6 % ointment; Commonly known as: Calmoseptine   - Risamine; " Apply 1 Application topically once daily. Apply to sacrum   metoprolol succinate XL 25 mg 24 hr tablet; Commonly known as:   Toprol-XL; Take 1 tablet (25 mg) by mouth once daily. Do not crush or   chew.   naphazoline-pheniramine 0.025-0.3 % ophthalmic solution; Commonly known   as: Naphcon-A; Administer 1 drop into both eyes 4 times a day.   pantoprazole 40 mg EC tablet; Commonly known as: ProtoNix; Take 1 tablet   (40 mg) by mouth once daily in the morning. Take before meals. Do not   crush, chew, or split.   traMADol 50 mg tablet; Commonly known as: Ultram; Take 1 tablet (50 mg)   by mouth every 8 hours.     CONTINUE taking these medications     albuterol 90 mcg/actuation inhaler   allopurinol 100 mg tablet; Commonly known as: Zyloprim   amLODIPine 10 mg tablet; Commonly known as: Norvasc   apixaban 2.5 mg tablet; Commonly known as: Eliquis   atorvastatin 10 mg tablet; Commonly known as: Lipitor   clopidogrel 75 mg tablet; Commonly known as: Plavix   docusate sodium 100 mg capsule; Commonly known as: Colace   gabapentin 100 mg capsule; Commonly known as: Neurontin   ipratropium 21 mcg (0.03 %) nasal spray; Commonly known as: Atrovent   lanthanum 750 mg chewable tablet; Commonly known as: Fosrenol   midodrine 10 mg tablet; Commonly known as: Proamatine   Miralax 17 gram packet; Generic drug: polyethylene glycol   Triphrocaps 1 mg capsule; Generic drug: B complex-vitamin C-folic acid     STOP taking these medications     aspirin 81 mg chewable tablet   clindamycin 1 % gel; Commonly known as: Clindagel   cloNIDine 0.2 mg tablet; Commonly known as: Catapres   colchicine (gout) 0.6 mg tablet   fluocinonide 0.05 % ointment; Commonly known as: Lidex   ZTlido 1.8 % adhesive patch,medicated; Generic drug: lidocaine       Outpatient Follow-Up  Future Appointments   Date Time Provider Department Center   10/26/2023  2:00 PM [facility] 1 [facility]   10/26/2023  3:00 PM [id number]   11/9/2023 11:00 AM  Cole Glass MD MPH [id number] Academic       Darnell Umanzor MD MPH

## 2023-10-07 NOTE — CARE PLAN
The patient's goals for the shift include      The clinical goals for the shift include and tolerate working with PT/OT    Problem: Pain - Adult  Goal: Verbalizes/displays adequate comfort level or baseline comfort level  Outcome: Met     Problem: Safety - Adult  Goal: Free from fall injury  Outcome: Met     Problem: Discharge Planning  Goal: Discharge to home or other facility with appropriate resources  Outcome: Met     Problem: Chronic Conditions and Co-morbidities  Goal: Patient's chronic conditions and co-morbidity symptoms are monitored and maintained or improved  Outcome: Met     Over the shift, the patient did not make progress toward the following goals. Barriers to progression include   Recommendations to address these barriers include

## 2023-10-07 NOTE — PROGRESS NOTES
Transitional Care Coordinator Note: Patient confirmed discharge to [facility]. [facility] arranged transport confirmed for 6pm. Patient with family at bedside updated. Nursing and team notified and report number provided.

## 2023-10-07 NOTE — NURSING NOTE
10/7/2023--1600--Nursing Discharge Note--IV's and tele removed.PAD (peripheral artery disease) (CMS/[facility]) summary sent with patient to facility--SNF.Ambulance to  patient at 1800 Called report to [employer] A: ready bfor discharge B Katia ROMAN

## 2023-10-07 NOTE — NURSING NOTE
Report from Sending RN:    Report From: ABEL Buitrago  Recent Surgery of Procedure: No  Baseline Level of Consciousness (LOC): x4  Oxygen Use: No  Type:   Diabetic: No  Last BP Med Given Day of Dialysis:   Last Pain Med Given:   Lab Tests to be Obtained with Dialysis: yes  Blood Transfusion to be Given During Dialysis: No  Available IV Access: Yes  Medications to be Administered During Dialysis: No  Continuous IV Infusion Running: No  Restraints on Currently or in the Last 24 Hours: No  Hand-Off Communication:   Patient complaint Right foot pain and received Tramadol this morning.

## 2023-10-07 NOTE — PROGRESS NOTES
CLINICAL EVENT - DIALYSIS NOTE    Chevy Benotn is a 78 y.o. male on day 18 of admission presenting with PAD (peripheral artery disease) (CMS/[facility]).  Nephrology following for dialysis care.    Seen during hemodialysis, undergoing treatment per submitted orders:  2 K,  2.5 Ca,  4 hours. Fluid removal      1 liter as tolerated (keep SBP> 90mmHg).  Doing well.    Vitals 24HR  Heart Rate:  [58-93]   Temp:  [35.7 °C (96.3 °F)-36.4 °C (97.5 °F)]   Resp:  [18]   BP: (102-120)/(68-69)   SpO2:  [90 %-96 %]            Results from last 72 hours   Lab Units 10/07/23  0719   SODIUM mmol/L 136  136   POTASSIUM mmol/L 3.8  3.8   CO2 mmol/L 26  26   BUN mg/dL 31*  30*   CREATININE mg/dL 6.91*  6.81*   PHOSPHORUS mg/dL 5.7*   CALCIUM mg/dL 9.1  8.9   ALBUMIN g/dL 3.1*  3.2*   GLUCOSE mg/dL 146*  143*   WBC AUTO x10*3/uL 9.3  9.1

## 2023-10-08 NOTE — PROGRESS NOTES
Patient with end-stage renal disease, postoperative from foot surgery, with dyspnea.  Needs CT PE.  Given chronic renal failure, benefits outweigh risk at this point in time for CT PE.

## 2023-10-08 NOTE — ED PROVIDER NOTES
HPI   Chief Complaint   Patient presents with    Shortness of Breath     Started this morning, he said that he has spells of SOB and he is having one today         78-year male, multiple medical problems, presents with dyspnea.  Recent toe amputation at [facility], just discharged to rehab facility yesterday.  Complains of intermittent dyspnea.  Actually has had it intermittently for a week, marked dyspnea at the extended care/rehab facility so transferred to the ED.  Apparently felt diaphoretic.  No new pain in any location other than ongoing right foot/toe pain from the surgery cell.  No chest pain.  No cough or sputum.  It appears that he is likely on oxygen fairly regularly with an oxygen concentrator in the rehab room.  When EMS found him, he was off of the oxygen/nasal cannula was not in his nose.  They placed him back on nasal cannula oxygen his saturations come up into the mid to high 90% range.    Patient feels improved at this point time with the oxygen on.  Again denies pain no.  Denies any cough sputum or hemoptysis or any new pain.    Per review from ECF, chronic atrial fibrillation on Eliquis Plavix, PAD dyslipidemia diabetes anemia hypertension GERD pacemaker placement end-stage renal disease hemodialysis Tuesday Thursday Saturday with full dialysis Saturday which is 1 day prior to arrival in the ED today.                            Douglas Coma Scale Score: 15                  Patient History   No past medical history on file.  No past surgical history on file.  No family history on file.  Social History     Tobacco Use    Smoking status: Not on file    Smokeless tobacco: Not on file   Substance Use Topics    Alcohol use: Not on file    Drug use: Not on file       Review of Systems   Constitutional:  Negative for chills and fever.   HENT:  Negative for rhinorrhea and sore throat.    Eyes:  Negative for pain and discharge.   Respiratory:  Positive for shortness of breath. Negative for cough.     Cardiovascular:  Negative for chest pain and palpitations.   Gastrointestinal:  Negative for abdominal pain, nausea and vomiting.   Genitourinary:  Negative for dysuria and urgency.   Musculoskeletal:  Negative for joint swelling.        Foot pain from surgery.   Skin:  Negative for rash and wound.   Neurological:  Negative for dizziness and headaches.        No focal/lateralizing weakness or numbness on review   Psychiatric/Behavioral:  Negative for agitation and confusion.    All other systems reviewed and are negative.       Physical Exam   ED Triage Vitals   Temp Pulse Resp BP   -- -- -- --      SpO2 Temp src Heart Rate Source Patient Position   -- -- -- --      BP Location FiO2 (%)     -- --       Physical Exam  Vitals reviewed.   Constitutional:       General: He is not in acute distress.     Appearance: He is well-developed.   HENT:      Head: Normocephalic and atraumatic.      Right Ear: External ear normal.      Left Ear: External ear normal.      Nose: Nose normal.      Mouth/Throat:      Mouth: Mucous membranes are moist.      Pharynx: Oropharynx is clear.   Eyes:      Conjunctiva/sclera: Conjunctivae normal.      Pupils: Pupils are equal, round, and reactive to light.   Neck:      Vascular: No JVD.   Cardiovascular:      Rate and Rhythm: Normal rate and regular rhythm.      Pulses: Normal pulses.   Pulmonary:      Effort: Pulmonary effort is normal. No accessory muscle usage or respiratory distress.      Comments: Scattered rhonchi and occasional crackle but no definitive consolidation.  No asymmetry.  No overt increased work of breathing.  Oxygenation status adequate.  Abdominal:      General: Abdomen is flat. There is no distension.      Palpations: Abdomen is soft. There is no mass.      Tenderness: There is no abdominal tenderness.   Musculoskeletal:         General: Normal range of motion.      Cervical back: Normal range of motion and neck supple.      Comments: Right foot with extensive  bandage/dressing in place left currently given close postop status.    No gross asymmetry in the legs  Overall.   Lymphadenopathy:      Cervical: No cervical adenopathy.   Skin:     General: Skin is warm and dry.      Capillary Refill: Capillary refill takes less than 2 seconds.      Comments: No zoster rash seen   Neurological:      General: No focal deficit present.      Mental Status: He is alert. Mental status is at baseline.   Psychiatric:         Mood and Affect: Mood normal.                 ECG if performed, ordered and interpreted by ED physician: Atrial fibrillation, 94 bpm.  Nonspecific ST-T wave abnormalities but no acute change from prior ECG in comparison.  Narrow QRS.        Labs Reviewed   CBC WITH AUTO DIFFERENTIAL - Abnormal       Result Value    WBC 8.0      nRBC 0.5 (*)     RBC 3.95 (*)     Hemoglobin 11.9 (*)     Hematocrit 38.9 (*)     MCV 99      MCH 30.1      MCHC 30.6 (*)     RDW 16.4 (*)     Platelets 368      MPV 10.7      Neutrophils % 75.0      Immature Granulocytes %, Automated 0.6      Lymphocytes % 9.0      Monocytes % 9.5      Eosinophils % 4.8      Basophils % 1.1      Neutrophils Absolute 6.00 (*)     Immature Granulocytes Absolute, Automated 0.05      Lymphocytes Absolute 0.72 (*)     Monocytes Absolute 0.76      Eosinophils Absolute 0.38      Basophils Absolute 0.09     COMPREHENSIVE METABOLIC PANEL - Abnormal    Glucose 164 (*)     Sodium 134 (*)     Potassium 3.6      Chloride 92 (*)     Bicarbonate 28      Anion Gap 18      Urea Nitrogen 24 (*)     Creatinine 5.55 (*)     eGFR 10 (*)     Calcium 9.3      Albumin 3.8      Alkaline Phosphatase 115      Total Protein 7.3      AST 18      Bilirubin, Total 0.5      ALT 17     SERIAL TROPONIN-INITIAL - Abnormal    Troponin I, High Sensitivity 58 (*)     Narrative:     Less than 99th percentile of normal range cutoff-  Female and children under 18 years old <14 ng/L; Male <21 ng/L: Negative  Repeat testing should be performed if  clinically indicated.     Female and children under 18 years old 14-50 ng/L; Male 21-50 ng/L:  Consistent with possible cardiac damage and possible increased clinical   risk. Serial measurements may help to assess extent of myocardial damage.     >50 ng/L: Consistent with cardiac damage, increased clinical risk and  myocardial infarction. Serial measurements may help assess extent of   myocardial damage.      NOTE: Children less than 1 year old may have higher baseline troponin   levels and results should be interpreted in conjunction with the overall   clinical context.     NOTE: Troponin I testing is performed using a different   testing methodology at [facility] than at other   [facility]. Direct result comparisons should only   be made within the same method.   SERIAL TROPONIN, 1 HOUR - Abnormal    Troponin I, High Sensitivity 60 (*)     Narrative:     Less than 99th percentile of normal range cutoff-  Female and children under 18 years old <14 ng/L; Male <21 ng/L: Negative  Repeat testing should be performed if clinically indicated.     Female and children under 18 years old 14-50 ng/L; Male 21-50 ng/L:  Consistent with possible cardiac damage and possible increased clinical   risk. Serial measurements may help to assess extent of myocardial damage.     >50 ng/L: Consistent with cardiac damage, increased clinical risk and  myocardial infarction. Serial measurements may help assess extent of   myocardial damage.      NOTE: Children less than 1 year old may have higher baseline troponin   levels and results should be interpreted in conjunction with the overall   clinical context.     NOTE: Troponin I testing is performed using a different   testing methodology at [facility] than at other   [facility]. Direct result comparisons should only   be made within the same method.   SARS-COV-2 PCR, SYMPTOMATIC - Normal    Coronavirus 2019, PCR Not Detected      Narrative:     This  assay has received FDA Emergency Use Authorization (EUA) and is only authorized for the duration of time that circumstances exist to justify the authorization of the emergency use of in vitro diagnostic tests for the detection of SARS-CoV-2 virus and/or diagnosis of COVID-19 infection under section 564(b)(1) of the Act, 21 U.S.C. 360bbb-3(b)(1). This assay is an in vitro diagnostic nucleic acid amplification test for the qualitative detection of SARS-CoV-2 from nasopharyngeal specimens and has been validated for use at [facility]. Negative results do not preclude COVID-19 infections and should not be used as the sole basis for diagnosis, treatment, or other management decisions.     INFLUENZA A AND B PCR - Normal    Flu A Result Not Detected      Flu B Result Not Detected      Narrative:     This assay is an in vitro diagnostic multiplex nucleic acid amplification test for the detection and discrimination of Influenza A & B from nasopharyngeal specimens, and has been validated for use at [facility]. Negative results do not preclude Influenza A/B infections, and should not be used as the sole basis for diagnosis, treatment, or other management decisions. If Influenza A/B and RSV PCR results are negative, testing for Parainfluenza virus, Adenovirus and Metapneumovirus is routinely performed for [facility] pediatric oncology and intensive care inpatients, and is available on other patients by placing an add-on request.   RSV PCR - Normal    RSV PCR Not Detected      Narrative:     This assay is an FDA-cleared, in vitro diagnostic nucleic acid amplification test for the detection of RSV from nasopharyngeal specimens, and has been validated for use at [facility]. Negative results do not preclude RSV infections, and should not be used as the sole basis for diagnosis, treatment, or other management decisions. If Influenza A/B and RSV PCR results are negative,  testing for Parainfluenza virus, Adenovirus and Metapneumovirus is routinely performed for pediatric oncology and intensive care inpatients at [facility], and is available on other patients by placing an add-on request.       TROPONIN SERIES- (INITIAL, 1 HR)    Narrative:     The following orders were created for panel order Troponin I Series, High Sensitivity (0, 1 HR).  Procedure                               Abnormality         Status                     ---------                               -----------         ------                     Troponin I, High Sensiti...[[id number]]  Abnormal            Final result               Troponin, High Sensitivi...[[id number]]  Abnormal            Final result                 Please view results for these tests on the individual orders.   TROPONIN I, HIGH SENSITIVITY          CT angio chest for pulmonary embolism   Final Result   No acute pulmonary embolus to the segmental level.        Cardiomegaly with thickened interlobular septal markings suggestive   of pulmonary interstitial edema.        Moderate right and small left pleural effusions with superimposed   atelectasis.        Incompletely visualized indeterminate large hypodense area in the   right hepatic lobe with several calcifications. Recommend nonemergent   follow-up with MRI.        Enlarged left thyroid lobe with 1 cm hypodense nodule. Recommend   nonemergent targeted ultrasound.        MACRO:   Critical Finding:  See findings. Notification was initiated on   10/8/2023 at 10:12 pm by  Evan Finkelstein.  (**-YCF-**) Instructions:        Signed by: Evan Finkelstein 10/8/2023 10:13 PM   Dictation workstation:   [id number]               ED Course & MDM     ED Medication Administration from 10/08/2023 1826 to 10/08/2023 2311         Date/Time Order Dose Route Action Action by     10/08/2023 2105 EDT albuterol 2.5 mg /3 mL (0.083 %) nebulizer solution 3 mL 3 mL nebulization Given YURIDIA Shanks     10/08/2023 2106 EDT albuterol  2.5 mg /3 mL (0.083 %) nebulizer solution 3 mL 3 mL nebulization Given Haydu, H     10/08/2023 2108 EDT albuterol 2.5 mg /3 mL (0.083 %) nebulizer solution 3 mL 3 mL nebulization Given Haydu, H     10/08/2023 2142 EDT iohexol (OMNIPaque) 350 mg iodine/mL injection 75 mL 75 mL intravenous Given SANDRO Villa as of 10/08/23 2311   Shortness of breath   Acute pulmonary edema (CMS/HCC)       Medical Decision Making  Dyspnea.  Resolved with nasal cannula application.  Does not appear in distress and vital signs are stable.  Nonetheless, postop and recent hospitalization, possibility of PE despite being on Eliquis.  Basic labs, chest imaging and reassessment.      Evaluation unremarkable other than perhaps minimal venous congestion on CT.  No PE.  Troponins mildly elevated but flat.    Patient was sleeping comfortably in the room.  1 to 2 L nasal cannula 99%.  No increased respiratory rate.  Reasonable for discharge back.  Due for dialysis on Tuesday.  Understood signs symptoms to watch return for.          Procedure  Procedures                 Isaias Manning MD MPH  10/08/23 1846       Isaias Manning MD MPH  10/08/23 1853       Isaias Manning MD MPH  10/08/23 2311

## 2023-10-12 NOTE — CONSULTS
Service:   Service: Infectious Disease     Consult:  Consult requested by (Attending Name): Homar Linder   Reason: Rt foot gangrene, ? osteo, CLI     History of Present Illness:   Admission Reason: PAD Intervention, Ischemic R foot   HPI:    SHAMEKA SUN is a 77 year old Male  PMH of ESRD (TTS) via left UE AV fistula, PAD  with poor RLE perfusion with now R toes digit 1,4,5 with ulcer/gangrene s/p repeated angioplasty procedures for which ID is consulted for R foot OM workup.    Patient has been previously seen at outside hospital by vascular surgeon Dr. Sanchez,  [facility] and podiatrist Dr. Reese Solares .  He describes having a R cracked heel, for these  he underwent  PTA of right AT/PT on 4/12/2023 with Dr. Sanchez with the R  heel crack now almost healed.      He subsequently developed right foot severe pain and a dry gangrene ulcer over tip of right great toe. For this, he was  admitted again at [facility], and underwent a PTA of right AT on 8/30/2023 with Dr. Sanchez .  During the hospital stay (post intervention), he continued to have ischemic rest pain and digits 4/5 became gangrenous.  He had a foot x-ray that was questionable for osteomyelitis,  was  put on IV antibiotics during hospital stay, but was not discharged on any antibiotics.     He is readmitted now 9/19 at [facility] for additional PAD intervention through Dr Kurtz's service for continued R leg severe rest pain at night that wakes him up from sleep, and  swelling  of the foot.    Admisison labs:  WBC 7.4 >12< 188 N63.5  Cr 6.60, Bun 52  Lactate 0.9  9/19 R foot xray: 1st distal phalangeal soft tissue ulcer without evidence of  underlying osseous erosion. If there is persistent clinical concern  for osteomyelitis, consider MRI.  RAJ/TBI done showed impaired blood flow to the RLE  with decision made to admit admitted for urgent revascularization on 9/20.  ID consulted for evaluation of R foot to assess and manage for possible R  toe osteomyelitis    PMH: afib on eliquis, hfref, SSS, status post pacemaker  PSH:  as above  Meds: see med rec  ALL: PCN (from many year ago, developed itchiness, wife says also developed skin rash on back)  FH: reviewed and negative  SH: lives with wife, no animals          Review Family/Social History and ROS:   Social History:    Smoking Status: former smoker  (1)   Alcohol Use: denies (1)   Drug Use: denies  (1)            Allergies:  ·  penicillin : Itching    Objective:     Objective Information:        T   P  R  BP   MAP  SpO2   Value  36.3  71  17  131/80   97  97%  Date/Time  7:44  7:44  7:44  7:44   7:44  7:44  Range  (35.9C - 36.6C )  (69 - 86 )  (17 - 18 )  (131 - 163 )/ (77 - 90 )  (95 - 103 )  (96% - 100% )    Physical Exam Narrative:  ·  Physical Exam:    Gen alert well appearing NAD pleasant (seen after OR)  HENT mmm, ncat  Eyes sclerae clear   CV rrr nl s1/2, + LUE avf with palpable thrill  Pulm CTAB anteriorly  Abd soft NT BS+  Ext + R distal foot with mild-moderate swelling; + ecchymosis over R toes digit 2-5, with  dry gangrene on tip of R 1st great toe, partially on 4th toe and entire 5th toe. + appears to have sensation over toes 1-4.  + small shallow skin ulcer of R lateral heel with good granulation tissue  seen over base  Skin no rash or other lesion  Psych nl mood nl affect good eye contact      Medications:    Medications:      CENTRAL NERVOUS SYSTEM AGENTS:    1. Acetaminophen:  650  mg  Oral  Every 4 Hours   PRN       2. oxyCODONE 5 mg - Acetaminophen 325 m  tablet(s)  Oral  Every 4 Hours   PRN       3. traMADol:  50  mg  Oral  Every 6 Hours   PRN         COAGULATION MODIFIERS:    1. Heparin 25,000 units/ D5W 250 mL Infusion..:  1600  units/hr  IntraVenous  <Continuous>    2. Clopidogrel:  75  mg  Oral  Daily      GASTROINTESTINAL AGENTS:    1. Magnesium Hydroxide -Al Hydrox -Simethicone Oral Liquid:  30  mL  Oral  Every 6 Hours   PRN       2. Docusate:  100   mg  Oral  2 Times a Day    3. Polyethylene Glycol:  17  gram(s)  Oral  Daily      IMMUNOLOGIC AGENTS:    1. Influenza Virus (Inactive) HIGH DOSE Adult Vaccine:  0.7  mL  IntraMuscular  Once    2. Pneumococcal 23-Valent (PNEUMOVAX) Vaccine:  0.5  mL  IntraMuscular  Once      METABOLIC AGENTS:    1. Allopurinol:  100  mg  Oral  Every 24 Hours    2. Colchicine:  0.3  mg  Oral  <User Schedule>    3. Atorvastatin:  10  mg  Oral  Daily      MISCELLANEOUS AGENTS:    1. Cinacalcet:  30  mg  Oral  Daily    2. Lanthanum Carbonate:  750  mg  Oral  2 Times a Day With Meals      NUTRITIONAL PRODUCTS:    1. Multivitamin Renal:  1  capsule(s)  Oral  Daily    2. Paricalcitol Injectable:  3  microgram(s)  IntraVenous Push  <User Schedule>      RESPIRATORY AGENTS:    1. Albuterol 2.5 mg/ 3 mL Nebulizer Soln:  3  mL  Inhalation  Every 6 Hours   PRN       2. Albuterol 90 micrograms/ Inhalation MDI:  2  inhalation  Inhalation  Every 4 Hours   PRN         TOPICAL AGENTS:    1. Menthol 0.44% - Zinc Oxide 20.625% Topical:  1  application(s)  Topical  Daily      Recent Lab Results:    Results:        I have reviewed these laboratory results:    Glucose_POCT  20-Sep-2023 13:07:00      Result Value    Glucose-POCT  133   H     Heparin assay, UFH  Trending View      Result 20-Sep-2023 08:48:00  20-Sep-2023 03:38:00    Heparin assay, UFH 1.0   0.7        Basic Metabolic Panel  20-Sep-2023 08:38:00      Result Value    Glucose, Serum  148   H   NA  135   L   K  4.1    CL  90   L   Bicarbonate, Serum  26    Anion Gap, Serum  23   H   BUN  51   H   CREAT  7.54   H   GFR Male  7   A   Calcium, Serum  8.6      Complete Blood Count  20-Sep-2023 08:38:00      Result Value    White Blood Cell Count  6.7    Nucleated Erythrocyte Count  0.0    Red Blood Cell Count  3.92   L   HGB  12.2   L   HCT  38.4   L   MCV  98    MCHC  31.8   L   PLT  193    RDW-CV  14.6   H     PT + INR, Plasma  20-Sep-2023 03:38:00      Result Value    Prothrombin Time, Plasma   15.9   H   International Normalized Ratio, Plasma  1.4   H     Complete Blood Count + Differential  20-Sep-2023 03:38:00      Result Value    White Blood Cell Count  7.4    Nucleated Erythrocyte Count  0.0    Red Blood Cell Count  3.75   L   HGB  12.0   L   HCT  35.9   L   MCV  96    MCHC  33.4    PLT  188    RDW-CV  14.6   H   Neutrophil %  63.1    Immature Granulocytes %  0.1    Lymphocyte %  12.6    Monocyte %  10.2    Eosinophil %  12.9    Basophil %  1.1    Neutrophil Count  4.64    Lymphocyte Count  0.93    Monocyte Count  0.75    Eosinophil Count  0.95   H   Basophil Count  0.08      Lactate, Level  20-Sep-2023 03:38:00      Result Value    Lactate, Level  0.9      Renal Function Panel  20-Sep-2023 00:10:00      Result Value    Glucose, Serum  113   H   NA  134   L   K  4.0    CL  91   L   Bicarbonate, Serum  27    Anion Gap, Serum  20    BUN  52   H   CREAT  6.60   H   GFR Male  8   A   Calcium, Serum  8.9    Phosphorus, Serum  3.0    ALB  4.1          Assessment:    SHAMEKA SUN is a 77 year old Male  PMH of ESRD (TTS) via left UE AV fistula, PAD  with poor RLE perfusion with now R toes digit 1,4,5 with ulcer/gangrene s/p repeated angioplasty procedures for which ID is consulted for R foot OM workup.    Patient has been previously seen at outside hospital by vascular surgeon Dr. Sanhcez,  [facility] and podiatrist Dr. Resee Solares .  He describes having a R cracked heel, for these  he underwent  PTA of right AT/PT on 4/12/2023 with Dr. Sanchez with the R  heel crack now almost healed.      He subsequently developed right foot severe pain and a dry gangrene ulcer over tip of right great toe. For this, he was  admitted again at [facility], and underwent a PTA of right AT on 8/30/2023 with Dr. Sanchez .  During the hospital stay (post intervention), he continued to have ischemic rest pain and digits 4/5 became gangrenous.  He had a foot x-ray that was questionable for osteomyelitis,  was  put on IV  antibiotics during hospital stay, but was not discharged on any antibiotics.     MICRO:   none    Abx:   IV vanc 9/20    ID Problem:  R foot PAD with R foot swelling and ecchymosis of R distal foot, with dry gangrene on tip of R 1st great toe, partially on 4th toe and entire 5th toe.   - Admission R foot xray not fully helpful to assess if OM truly present, although there appear be some irregularity over the R distal 1st phalangeal joint, we found no correlative ulcer found there as suggested in xray. To best evaluate for OM, MRI would  be the imaging of choice. Some of his swelling likely also from recent procedure.     RECOMMENDATION:  - Ok to continue renally dosed iv vanc    - Attain MRI of R foot (add contrast if safe and permissible with nephrology esrd team)    - Consult allergy/immunology for allergy testing/medication challenge as pt has remote unclear PCN allergy which limits treatment options.     - Send MRSA swab (using same swab, swab nares armpit and groin)    Thank you for consult; ID will follow  Pt seen and staffed with Attendant  Dr So Mendenhall MD MPH  Infectious Disease Fellow  TEAM B Pager: [phone]              Consult Status:  Consult Status    (select all that apply): initial  consult complete, will follow   Consult Order ID: [id number]     Attestation:   Note Completion:  I am a:  Resident/Fellow   Attending Attestation I saw and evaluated the patient.  I personally obtained the key and critical portions of the history and physical exam or was physically present for key and  critical portions performed by the resident/fellow. I reviewed the resident/fellow?s documentation and discussed the patient with the resident/fellow.  I agree with the resident/fellow?s medical decision making as documented in the note.     I personally evaluated the patient on 20-Sep-2023         Electronic Signatures:  Dain Rizzo)  (Signed 21-Sep-2023 21:11)   Authored: Objective, Note  "Completion  Mariia Mendenhall (Fellow))  (Signed 20-Sep-2023 21:39)   Authored: Service, History of Present Illness, Review  Family/Social History and ROS, Allergies, Objective, Assessment/Recommendations, Note Completion      Last Updated: 21-Sep-2023 21:11 by Dain Rizzo)    References:  1.  Data Referenced From \"Consult-Podiatry\" 20-Sep-2023 12:48   "

## 2023-10-12 NOTE — CONSULTS
Service:   Service: Podiatry     Consult:  Consult requested by (Attending Name): Homar Linder   Reason: RLE gangrene     History of Present Illness:   Admission Reason: CLI, right   HPI:    SHAMEKA SUN is a 77 year old Male  PMH of AF on Eliquis, CHF (EF 37%), ESRD/HD (Tuesday Thursday Saturday, [facility], Dr. Medel) via left UE AV fistula,  SSS, status post pacemaker, non smoker, nondiabetic, who was referred to us by Dr. Sanchez, [facility], for consideration for revascularization, in the setting of right toe gangrene (podiatrist Dr. Reese Solares).  Patient presented to Dr. Linder's vascular  clinic with his wife and son, stating he had a longstanding right heel crack and underwent PTA of right AT/PT on 4/12/2023 with Dr. Sanchez. The heel crack almost healed. He then developed right foot severe pain and great toe discoloration in August,  was admitted at [facility], and underwent a PTA of right AT on 8/30/2023 with Dr. Sanchez, and was told there were no other options available for him. During that admission his toes became gangrenous. He had a foot x-ray that was questionable  for osteomyelitis, was put on IV antibiotics during hospital stay, but was not discharged on any antibiotics. Patient is complaining of severe rest pain at night that wakes him up from sleep, and swelling of the foot. Up to mid-August he was able to walk  and drive. Admitted to [facility] yesterday, 9/19/23, for emergent angioplasty. Angioplasty is planned for tomorrow, 9/21/23, with Dr. Linder.    Podiatry consulted for right foot gangrene. Patient seen and evaluated at bedside. Laying in bed comfortably. Reports pain and ulcerations of the right foot. He reports that the wounds have been present for over a month with some times of improvement  and then worsening. Denies issues with the left foot. States he sees outpatient podiatrist, Dr. Solares and wound care podiatrist, Dr. Marmolejo. Denies acute pedal concerns.  Denies constitutional symptoms.    PMHx: as above    Surgical: per EMR    Social: former smoker, denies EtOH, denies illicit drugs     Family: non contributaory     Allergies: penicillin (itching)    ROS: A 10 point review of systems was performed; negative except as per HPI.         Review Family/Social History and ROS:   Social History:    Smoking Status: former smoker  (1)   Alcohol Use: denies (1)   Drug Use: denies  (1)            Allergies:  ·  penicillin : Itching    Objective:     Objective Information:        T   P  R  BP   MAP  SpO2   Value  36.3  71  17  131/80   97  97%  Date/Time 9/20 7:44 9/20 7:44 9/20 7:44 9/20 7:44  9/20 7:44 9/20 7:44  Range  (35.9C - 36.6C )  (69 - 86 )  (17 - 18 )  (131 - 163 )/ (77 - 90 )  (95 - 103 )  (96% - 100% )        Pain reported at 9/19 21:53: 7 = Severe         Weights   9/19 21:59: Weight in kg (Weight (kg))  94.3  9/19 21:59: Weight in lbs ((lbs))  207.8  9/19 21:59: BMI (kg/m2) (BMI (kg/m2))  29.829    Physical Exam Narrative:  ·  Physical Exam:    General: Well-appearing. No acute distress. Cooperative. Pleasant.     Vascular: Non-palpable DP/PT pulses b/l. Left foot pitting pedal and federica-malleolar edema. No edema of right foot. Skin temperature cool to cool from tibial tuberosity to digits b/l.     Neuro: Sensation to light touch diminished along all aspects of feet b/l.     MSK: Active ROM of ankles b/l intact. Active ROM of digits 1-5 L, digits 1-3 R, no active ROM of R 4-5.     Derm: Left toenails 1-5 within normal limits. Left webspaces clean, dry, intact. Absence of right toenails 1-5 due to permanent removal in past.  Right foot with dry gangrene of entire digits 4 and 5 as well as distal digits 1, 2, 3; no gangrene or maceration of webspaces. No signs of infection. Right lateral heel with 1 cm superficial ulcer with granular base, no drainage, no pain, no SOI.    Lymphatic: No streaking from wound sites or otherwise.     Resp: Unlabored breathing.      Psych: Normal mood and affect. Communicating normally.      Medications:    Medications:          Continuous Medications       --------------------------------    1. Heparin 25,000 units/ D5W 250 mL Infusion..:  1600  units/hr  IntraVenous  <Continuous>         Scheduled Medications       --------------------------------    1. Allopurinol:  100  mg  Oral  Every 24 Hours    2. Aspirin Enteric Coated:  81  mg  Oral  Daily    3. Atorvastatin:  10  mg  Oral  Daily    4. Cinacalcet:  30  mg  Oral  Daily    5. Colchicine:  0.3  mg  Oral  2 Times a Week    6. Docusate:  100  mg  Oral  2 Times a Day    7. Lanthanum Carbonate:  750  mg  Oral  2 Times a Day With Meals    8. Menthol 0.44% - Zinc Oxide 20.625% Topical:  1  application(s)  Topical  Daily    9. Multivitamin Renal:  1  capsule(s)  Oral  Daily    10. Polyethylene Glycol:  17  gram(s)  Oral  Daily    11. Vancomycin - RPh to Dose - IV Piggy Back:  1  each  As Specified  Variable         PRN Medications       --------------------------------    1. Acetaminophen:  650  mg  Oral  Every 4 Hours    2. Albuterol 2.5 mg/ 3 mL Nebulizer Soln:  3  mL  Inhalation  Every 6 Hours    3. Albuterol 90 micrograms/ Inhalation MDI:  2  inhalation  Inhalation  Every 4 Hours    4. Magnesium Hydroxide -Al Hydrox -Simethicone Oral Liquid:  30  mL  Oral  Every 6 Hours    5. oxyCODONE 5 mg - Acetaminophen 325 m  tablet(s)  Oral  Every 4 Hours    6. traMADol:  50  mg  Oral  Every 6 Hours        Recent Lab Results:    Results:        I have reviewed these laboratory results:    Heparin assay, UFH  Trending View      Result 20-Sep-2023 08:48:00  20-Sep-2023 03:38:00    Heparin assay, UFH 1.0   0.7        Basic Metabolic Panel  20-Sep-2023 08:38:00      Result Value    Glucose, Serum  148   H   NA  135   L   K  4.1    CL  90   L   Bicarbonate, Serum  26    Anion Gap, Serum  23   H   BUN  51   H   CREAT  7.54   H   GFR Male  7   A   Calcium, Serum  8.6      Complete Blood Count   20-Sep-2023 08:38:00      Result Value    White Blood Cell Count  6.7    Nucleated Erythrocyte Count  0.0    Red Blood Cell Count  3.92   L   HGB  12.2   L   HCT  38.4   L   MCV  98    MCHC  31.8   L   PLT  193    RDW-CV  14.6   H     PT + INR, Plasma  20-Sep-2023 03:38:00      Result Value    Prothrombin Time, Plasma  15.9   H   International Normalized Ratio, Plasma  1.4   H     Complete Blood Count + Differential  20-Sep-2023 03:38:00      Result Value    White Blood Cell Count  7.4    Nucleated Erythrocyte Count  0.0    Red Blood Cell Count  3.75   L   HGB  12.0   L   HCT  35.9   L   MCV  96    MCHC  33.4    PLT  188    RDW-CV  14.6   H   Neutrophil %  63.1    Immature Granulocytes %  0.1    Lymphocyte %  12.6    Monocyte %  10.2    Eosinophil %  12.9    Basophil %  1.1    Neutrophil Count  4.64    Lymphocyte Count  0.93    Monocyte Count  0.75    Eosinophil Count  0.95   H   Basophil Count  0.08      Lactate, Level  20-Sep-2023 03:38:00      Result Value    Lactate, Level  0.9      Renal Function Panel  20-Sep-2023 00:10:00      Result Value    Glucose, Serum  113   H   NA  134   L   K  4.0    CL  91   L   Bicarbonate, Serum  27    Anion Gap, Serum  20    BUN  52   H   CREAT  6.60   H   GFR Male  8   A   Calcium, Serum  8.9    Phosphorus, Serum  3.0    ALB  4.1        Radiology Results:    Results:        Impression:    1st distal phalangeal soft tissue ulcer without evidence of  underlying osseous erosion. If there is persistent clinical concern  for osteomyelitis, consider MRI.     MACRO:  None     Xray Foot Complete Min 3 View [Sep 20 2023  9:58AM]          Conclusion:  Preliminary Cardiology Report    [place] [address]  Tel [phone] and Fax [phone]      Preliminary Vascular Lab Report    PVR RAJ      Patient Name:     SHAMEKA Mendez Physician:[id number] Nguyen MD  Study Date:       9/19/2023         Referring Physician: ARELIS  RANDELL  MRN/PID:          [id number]          PCP:  Accession/Order#: [id number]      CC Report to:  YOB: 1945         Technologist:        Bridgette Freeman RVT  Gender:           M                 Technologist 2:      Elisabeth Betts RVT  Admission Status: Outpatient        Location Performed:  [facility]      Diagnosis/ICD: I70.223-Atherosclerosis of native arteries of extremities with  rest pain, bilateral legs  Procedure/CPT: 98154 Peripheral artery RAJ [id number]      PRELIMINARY CONCLUSIONS:  Right Lower PVR: Evidence of moderate arterial occlusive disease in the right lower extremity at rest. Right pressures of >220 mmHg suggest no compressibility of vessels and may make absolute Segmental Limb Pressures (SLP) unreliable. Decreased digital  perfusion noted. Monophasic flow is noted in the right dorsalis pedis artery. Biphasic flow is noted in the right posterior tibial artery. Triphasic flow is noted in the right common femoral artery.  Left Lower PVR: Evidence of moderate arterial occlusive disease in the left lower extremity at rest. Left pressures of >220 mmHg suggest no compressibility of vessels and may make absolute Segmental Limb Pressures (SLP) unreliable. Normal digital perfusion  noted. Monophasic flow is noted in the left dorsalis pedis artery. Biphasic flow is noted in the left posterior tibial artery. Triphasic flow is noted in the left common femoral artery. Unable to obtain left brachial pressure due to AVF.    Imaging & Doppler Findings:    RIGHT Lower PVR                Pressures Ratios  Right Posterior Tibial (Ankle) 99 mmHg   0.69  Right Dorsalis Pedis (Ankle)   255 mmHg  1.78  Right Digit (Great Toe)        43 mmHg   0.30        LEFT Lower PVR                Pressures Ratios  Left Posterior Tibial (Ankle) 255 mmHg  1.78  Left Dorsalis Pedis (Ankle)   255 mmHg  1.78  Left Digit (Great Toe)        115 mmHg  0.80        Right  Brachial Pressure 143  mmHg          VASCULAR PRELIMINARY REPORT  completed by Bridgette Freeman RVT on 9/19/2023 at 2:20:49 PM        *** Preliminary ***     VASC LAB PVR RAJ only [Sep 19 2023  2:20PM]        Assessment:    #CLI, right foot  #Chronic unstageable non-pressure ulcer, right foot  #Non-pressure ulcer to level of subcutaneous tissue, right heel  #Diabetic foot ulcer. right  #Type 2 diabetes mellitus,   #Diabetic peripheral neuropathy      - Patient was seen and evaluated. All findings discussed. All questions answered to patient's satisfaction.  - Chart, labs, imaging reviewed.  - WBC: 6.7   Lactate: 0.9     - PVR right: Moderate arterial occlusive disease at rest. No compressibility of vessels. Decreased digital perfusion. Monophasic flow of DP artery. Biphasic flow of PT artery. Triphasic flow of CF artery.   - PVR left: Mild arterial occlusive disease. No compressibility of vessels. Normal digital perfusion. Monophasic flow of DP artery. Biphasic flow of PT artery. Triphasic flow of CF artery. Unable to obtain left brachial pressure due to AVF.  - XR right: 1st distal phalangeal soft tissue ulcer without underlying osseous erosion. Consider MRI if persistent clinical concern for OM.       Recommendations:  - Plan for angioplasty with Dr. Linder/ALICIA tomorrow, 9/21/23.  - No indication for emergent podiatric surgery at this time. No plan for podiatric surgery during current admission. Patient sees outpatient podiatrist, Dr. Beck DPM, and podiatrist at wound  care, Dr. Francie DPM. Will defer to them for further surgical planning in the out patient setting.  - Dressings right foot: betadine 4x4, dry 4x4, ABD on heel, Kerlix. Nursing may reinforce or change as needed.   - Abx: IV vancomycin empirically per primary.   - Abx, medications, and systemic conditions managed by primary teams.  - Podiatry will continue following. Will re-evaluate after tomorrow's scheduled revasc.     Patient was seen and evaluated with the  attending, Dr. Roopa Rodriguez, MERT Jeffery, PGY1/DPM  Podiatric Medicine and Surgery  [facility], pager #[phone]    A total of 60 minutes were spent coordinating care for this patient. This time was spent doing a combination of tasks such as reviewing records including imaging, labs, previous medical records, as well as discussing the patient's diagnoses and different  treatment options. We discussed risks and benefits of the treatment options to the patient satisfaction, questions were answered. Time was also spent charting for this patient      Plan of Care Reviewed With:  Plan of Care Reviewed With: patient     Consult Status:  Consult Status    (select all that apply): initial  consult complete, will follow   Consult Order ID: [id number]     Attestation:   Note Completion:  I am a:  Resident/Fellow   Attending Attestation I saw and evaluated the patient.  I personally obtained the key and critical portions of the history and physical exam or was physically present for key and  critical portions performed by the resident/fellow. I reviewed the resident/fellow?s documentation and discussed the patient with the resident/fellow.  I agree with the resident/fellow?s medical decision making as documented in the note.     I personally evaluated the patient on 20-Sep-2023         Electronic Signatures:  Nhung Hall (DPM (Resident))  (Signed 20-Sep-2023 13:35)   Authored: History of Present Illness, Objective, Assessment/Recommendations  Elle Jeffery (DP (Resident))  (Signed 21-Sep-2023 13:04)   Authored: Service, History of Present Illness, Review  Family/Social History and ROS, Allergies, Objective, Assessment/Recommendations, Note Completion  Roopa Rodriguez ([facility])  (Signed 27-Sep-2023 12:20)   Authored: Assessment/Recommendations, Note Completion   Co-Signer: History of Present Illness, Objective, Assessment/Recommendations      Last Updated: 27-Sep-2023 12:20 by Roopa Rodriguez ([facility])    References:  1.  " Data Referenced From \"Patient Profile - Adult v2\" 19-Sep-2023 21:59   "

## 2023-10-12 NOTE — CONSULTS
Service:   Service: Nephrology     Consult:  Consult requested by (Attending Name): Homar Linder   Reason: HD pt, TTS, [facility], Dr Gianfranco Alcocer     History of Present Illness:   HPI:    SHAMEKA SUN is a 78 year old Male with ESRD admitted for evaluation of lower extremity ichemia/possible infection.    He gets dialysis TTS at [facility] [place]/Dr Davis is his primary nephrologist  Last HD 9/19 with post weight 93.8 kg  DW 93 kg  He has a Left forearm AV Fistula    Labs:  K 4.0  Hgb 12.0  /84  Not on any HILDA at [facility]      PM  ESRD , PAD with now R toes digit 1,4,5 with ulcer/gangrene s/p repeated angioplasty procedures         Review Family/Social History and ROS:     Review Family/Social History and ROS:       I have reviewed the family and social history and review of systems from the History and Physical.    Social History:    Smoking Status: former smoker  (1)   Alcohol Use: denies (1)   Drug Use: denies  (1)            Allergies:  ·  penicillin : Itching    Objective:     Objective Information:        T   P  R  BP   MAP  SpO2   Value  36  79  18  164/94   97  99%  Date/Time 9/21 6:27 9/21 10:30 9/21 3:18 9/21 10:30  9/20 7:44 9/21 3:18  Range  (35.9C - 36.3C )  (69 - 124 )  (17 - 18 )  (130 - 195 )/ (77 - 106 )  (95 - 103 )  (95% - 99% )    Physical Exam by System:    Constitutional: Well developed, awake/alert/oriented  x3, no distress, alert and cooperative   Eyes: EOMI, clear sclera   ENMT: mucous membranes moist, no apparent injury,  no lesions seen   Head/Neck: Neck supple, no apparent injury,  No JVD,  trachea midline, no bruits   Respiratory/Thorax: Patent airways, CTAB, normal  breath sounds with good chest expansion, thorax symmetric   Cardiovascular: Regular, rate and rhythm, no murmurs,  normal S 1and S 2   Gastrointestinal: Abdomen soft   Extremities: Left upper extremity AV fistula in place   Skin: Warm and dry, no lesions, no rashes     Medications:    Medications:          Continuous  Medications       --------------------------------    1. Heparin 25,000 units/ D5W 250 mL Infusion..:  1600  units/hr  IntraVenous  <Continuous>         Scheduled Medications       --------------------------------    1. Allopurinol:  100  mg  Oral  Every 24 Hours    2. Atorvastatin:  10  mg  Oral  Daily    3. Cinacalcet:  30  mg  Oral  Daily    4. Clopidogrel:  75  mg  Oral  Daily    5. Colchicine:  0.3  mg  Oral  2 Times a Week    6. Docusate:  100  mg  Oral  2 Times a Day    7. Influenza Virus (Inactive) HIGH DOSE Adult Vaccine:  0.7  mL  IntraMuscular  Once    8. Lanthanum Carbonate:  750  mg  Oral  2 Times a Day With Meals    9. Menthol 0.44% - Zinc Oxide 20.625% Topical:  1  application(s)  Topical  Daily    10. Multivitamin Renal:  1  capsule(s)  Oral  Daily    11. Paricalcitol Injectable:  3  microgram(s)  IntraVenous Push  <User Schedule>    12. Pneumococcal 23-Valent (PNEUMOVAX) Vaccine:  0.5  mL  IntraMuscular  Once    13. Polyethylene Glycol:  17  gram(s)  Oral  Daily         PRN Medications       --------------------------------    1. Acetaminophen:  650  mg  Oral  Every 4 Hours    2. Albuterol 2.5 mg/ 3 mL Nebulizer Soln:  3  mL  Inhalation  Every 6 Hours    3. Albuterol 90 micrograms/ Inhalation MDI:  2  inhalation  Inhalation  Every 4 Hours    4. Magnesium Hydroxide -Al Hydrox -Simethicone Oral Liquid:  30  mL  Oral  Every 6 Hours    5. oxyCODONE 5 mg - Acetaminophen 325 m  tablet(s)  Oral  Every 4 Hours    6. traMADol:  50  mg  Oral  Every 6 Hours          Assessment:    78 year old patient with ESRD admitted with PAD/lower limb ischemia/infection.  Patient seen and examined while on dialysis. Tolerating dialysis well, recent events, orders, labs and medications reviewed.   Will follow overall management per primary team and continue regular dialysis while in house.     Consult Status:  Consult Status    (select all that apply): initial  consult complete, will follow   Consult Order ID:  "[id number]       Electronic Signatures:  Corine Will)  (Signed 21-Sep-2023 10:33)   Authored: Service, History of Present Illness, Review  Family/Social History and ROS, Allergies, Objective, Assessment/Recommendations, Note Completion      Last Updated: 21-Sep-2023 10:33 by Corine Will)    References:  1.  Data Referenced From \"Consult-Infectious Disease\" 20-Sep-2023 18:34   "

## 2023-10-26 PROBLEM — R60.0 BILATERAL LOWER EXTREMITY EDEMA: Status: ACTIVE | Noted: 2022-04-29

## 2023-10-26 PROBLEM — I74.3 EMBOLISM AND THROMBOSIS OF ARTERIES OF THE LOWER EXTREMITIES (MULTI): Status: ACTIVE | Noted: 2023-01-01

## 2023-10-26 PROBLEM — S82.843A BIMALLEOLAR ANKLE FRACTURE: Status: ACTIVE | Noted: 2022-04-29

## 2023-10-26 PROBLEM — A41.9 SEPSIS (MULTI): Status: ACTIVE | Noted: 2021-07-20

## 2023-10-26 PROBLEM — N18.6 ESRD ON HEMODIALYSIS (MULTI): Status: ACTIVE | Noted: 2023-01-01

## 2023-10-26 PROBLEM — N18.6 END STAGE RENAL DISEASE (MULTI): Status: ACTIVE | Noted: 2022-04-30

## 2023-10-26 PROBLEM — K56.41 FECAL IMPACTION (MULTI): Status: ACTIVE | Noted: 2023-01-01

## 2023-10-26 PROBLEM — Z99.2 DEPENDENCE ON RENAL DIALYSIS (CMS-HCC): Status: ACTIVE | Noted: 2022-04-30

## 2023-10-26 PROBLEM — E78.5 HYPERLIPIDEMIA LDL GOAL <70: Status: ACTIVE | Noted: 2020-01-20

## 2023-10-26 PROBLEM — E11.9 DM2 (DIABETES MELLITUS, TYPE 2) (MULTI): Status: ACTIVE | Noted: 2022-04-30

## 2023-10-26 PROBLEM — I48.91 ATRIAL FIBRILLATION WITH RVR (MULTI): Status: ACTIVE | Noted: 2023-01-01

## 2023-10-26 PROBLEM — E66.9 OBESITY, CLASS I, BMI 30-34.9: Status: ACTIVE | Noted: 2023-01-01

## 2023-10-26 PROBLEM — S91.301A OPEN WOUND OF RIGHT FOOT: Status: ACTIVE | Noted: 2023-01-01

## 2023-10-26 PROBLEM — M25.461 BILATERAL KNEE SWELLING: Status: ACTIVE | Noted: 2022-04-29

## 2023-10-26 PROBLEM — D72.829 LEUKOCYTOSIS: Status: ACTIVE | Noted: 2023-01-01

## 2023-10-26 PROBLEM — Z95.0 PACEMAKER: Status: ACTIVE | Noted: 2023-01-01

## 2023-10-26 PROBLEM — R07.89 NON-CARDIAC CHEST PAIN: Status: ACTIVE | Noted: 2023-01-01

## 2023-10-26 PROBLEM — I96 TOE GANGRENE (MULTI): Status: ACTIVE | Noted: 2023-01-01

## 2023-10-26 PROBLEM — Z98.890 CRITICAL LIMB ISCHEMIA WITH HISTORY OF REVASCULARIZATION OF SAME EXTREMITY (MULTI): Status: ACTIVE | Noted: 2023-01-01

## 2023-10-26 PROBLEM — I77.810 AORTIC ROOT DILATION (CMS-HCC): Status: ACTIVE | Noted: 2017-06-19

## 2023-10-26 PROBLEM — I25.119 CORONARY ARTERY DISEASE INVOLVING NATIVE CORONARY ARTERY OF NATIVE HEART WITH ANGINA PECTORIS (CMS-HCC): Status: ACTIVE | Noted: 2020-01-20

## 2023-10-26 PROBLEM — M25.462 BILATERAL KNEE SWELLING: Status: ACTIVE | Noted: 2022-04-29

## 2023-10-26 PROBLEM — I50.22 CHRONIC SYSTOLIC CONGESTIVE HEART FAILURE (MULTI): Status: ACTIVE | Noted: 2021-07-20

## 2023-10-26 PROBLEM — I77.810 THORACIC AORTIC ECTASIA (CMS-HCC): Status: ACTIVE | Noted: 2022-04-30

## 2023-10-26 PROBLEM — M10.9 GOUT: Status: ACTIVE | Noted: 2023-01-01

## 2023-10-26 PROBLEM — E44.1 MALNUTRITION OF MILD DEGREE (MULTI): Status: ACTIVE | Noted: 2023-01-01

## 2023-10-26 PROBLEM — E04.9 THYROID ENLARGED: Status: ACTIVE | Noted: 2017-08-24

## 2023-10-26 PROBLEM — I27.20 PULMONARY HTN (MULTI): Status: ACTIVE | Noted: 2017-06-19

## 2023-10-26 PROBLEM — I10 PRIMARY HYPERTENSION: Status: ACTIVE | Noted: 2020-01-20

## 2023-10-26 PROBLEM — M19.90 UNSPECIFIED OSTEOARTHRITIS, UNSPECIFIED SITE: Status: ACTIVE | Noted: 2022-04-30

## 2023-10-26 PROBLEM — I49.5 SICK SINUS SYNDROME (MULTI): Status: ACTIVE | Noted: 2017-08-16

## 2023-10-26 PROBLEM — I96 GANGRENE OF RIGHT FOOT (MULTI): Status: ACTIVE | Noted: 2023-01-01

## 2023-10-26 PROBLEM — E78.5 HYPERLIPIDEMIA, UNSPECIFIED: Status: ACTIVE | Noted: 2022-04-30

## 2023-10-26 PROBLEM — I34.0 NON-RHEUMATIC MITRAL REGURGITATION: Status: ACTIVE | Noted: 2017-06-19

## 2023-10-26 PROBLEM — I70.261 ATHEROSCLEROSIS OF NATIVE ARTERY OF RIGHT LOWER EXTREMITY WITH GANGRENE (MULTI): Status: ACTIVE | Noted: 2023-01-01

## 2023-10-26 PROBLEM — Z99.2 ESRD ON HEMODIALYSIS (MULTI): Status: ACTIVE | Noted: 2023-01-01

## 2023-10-26 PROBLEM — L81.9 DISCOLORATION OF SKIN OF MULTIPLE SITES: Status: ACTIVE | Noted: 2023-01-01

## 2023-10-26 PROBLEM — R16.0 HEPATOMEGALY, NOT ELSEWHERE CLASSIFIED: Status: ACTIVE | Noted: 2022-04-30

## 2023-10-26 PROBLEM — K63.89 PNEUMATOSIS INTESTINALIS: Status: ACTIVE | Noted: 2023-01-01

## 2023-10-26 PROBLEM — I48.11 LONGSTANDING PERSISTENT ATRIAL FIBRILLATION (MULTI): Status: ACTIVE | Noted: 2023-01-01

## 2023-10-26 PROBLEM — E11.51 PERIPHERAL VASCULAR DISORDER DUE TO DIABETES MELLITUS (MULTI): Status: ACTIVE | Noted: 2023-01-01

## 2023-10-26 PROBLEM — R23.9 ALTERATION IN SKIN INTEGRITY DUE TO MOISTURE: Status: ACTIVE | Noted: 2023-01-01

## 2023-10-26 PROBLEM — K56.609 SMALL BOWEL OBSTRUCTION (MULTI): Status: ACTIVE | Noted: 2023-01-01

## 2023-10-26 PROBLEM — I70.244 ATHEROSCLEROSIS OF NATIVE ARTERIES OF LEFT LEG WITH ULCERATION OF HEEL AND MIDFOOT (MULTI): Status: ACTIVE | Noted: 2023-01-01

## 2023-10-26 PROBLEM — I34.0 NONRHEUMATIC MITRAL (VALVE) INSUFFICIENCY: Status: ACTIVE | Noted: 2022-04-30

## 2023-10-26 PROBLEM — L97.412: Status: ACTIVE | Noted: 2023-01-01

## 2023-10-26 PROBLEM — L97.511: Status: ACTIVE | Noted: 2023-01-01

## 2023-10-26 PROBLEM — Z98.62 HISTORY OF ANGIOPLASTY OF PERIPHERAL VESSEL: Status: ACTIVE | Noted: 2023-01-01

## 2023-10-26 PROBLEM — M15.9 POLYOSTEOARTHRITIS, UNSPECIFIED: Status: ACTIVE | Noted: 2022-04-30

## 2023-10-26 PROBLEM — I70.229 CRITICAL LIMB ISCHEMIA WITH HISTORY OF REVASCULARIZATION OF SAME EXTREMITY (MULTI): Status: ACTIVE | Noted: 2023-01-01

## 2023-10-26 PROBLEM — L97.512 NON-PRESSURE CHRONIC ULCER OF OTHER PART OF RIGHT FOOT WITH FAT LAYER EXPOSED (MULTI): Status: ACTIVE | Noted: 2023-01-01

## 2023-10-26 PROBLEM — R79.89 LFT ELEVATION: Status: ACTIVE | Noted: 2021-07-20

## 2023-10-26 PROBLEM — I70.25: Status: ACTIVE | Noted: 2023-01-01

## 2023-10-26 PROBLEM — D63.8 ANEMIA IN OTHER CHRONIC DISEASES CLASSIFIED ELSEWHERE: Status: ACTIVE | Noted: 2022-04-30

## 2023-10-26 PROBLEM — D64.9 ANEMIA: Status: ACTIVE | Noted: 2023-01-01

## 2023-12-15 PROBLEM — I10 HTN (HYPERTENSION): Status: ACTIVE | Noted: 2023-01-01

## 2023-12-15 PROBLEM — L89.92: Status: ACTIVE | Noted: 2023-01-01

## 2023-12-15 PROBLEM — S98.131A AMPUTATION OF TOE OF RIGHT FOOT (CMS-HCC): Status: ACTIVE | Noted: 2023-01-01

## 2023-12-15 PROBLEM — J96.02 ACUTE HYPERCAPNIC RESPIRATORY FAILURE (MULTI): Status: ACTIVE | Noted: 2023-01-01

## 2023-12-15 NOTE — PROGRESS NOTES
HISTORY & PHYSICAL    Subjective   Chief complaint: Chevy Benton is a 78 y.o. male who is being seen and evaluated for multiple medical problems.  Patient admitted to SNF for therapy due to weakness after recent hospitalization.    HPI:  HPI  Patient has a past medical history of anemia, aortic root dilation, A-fib, end-stage renal disease on HD, diabetes, hypertension, sick sinus syndrome status post pacemaker.  Patient presented to the hospital with concern for mental status change, family reported more confusion, sleepy, falling asleep midsentence.  Patient was admitted with a diagnosis of acute respiratory failure with hypercapnia.  Patient was found to have respiratory acidosis, pulmonary edema on x-ray and fluid overloaded.  Patient was treated in hospital with BiPAP albuterol treatments and CPAP for nighttime.  Therapy evaluated patient recommending further rehab.  Patient is to follow-up with vascular surgery outpatient.  Patient deemed stable for discharge to skilled nursing facility for continued medical management and therapy.  Patient seen and examined at bedside, no acute distress.    Past Medical History:   Diagnosis Date    Acute hypercapnic respiratory failure (CMS/HCC)     Acute on chronic HFrEF (heart failure with reduced ejection fraction) (CMS/[facility])     ESRD on hemodialysis (CMS/[facility])     HTN (hypertension)     Stage II pressure ulcer (CMS/[facility])        No past surgical history on file.    Family History   Problem Relation Name Age of Onset    Diabetes Mother         Social History     Socioeconomic History    Marital status:      Spouse name: Not on file    Number of children: Not on file    Years of education: Not on file    Highest education level: Not on file   Occupational History    Not on file   Tobacco Use    Smoking status: Not on file    Smokeless tobacco: Not on file   Substance and Sexual Activity    Alcohol use: Not on file    Drug use: Not on file    Sexual activity: Not  on file   Other Topics Concern    Not on file   Social History Narrative    Not on file     Social Determinants of Health     Financial Resource Strain: Not on file   Food Insecurity: Not on file   Transportation Needs: Not on file   Physical Activity: Not on file   Stress: Not on file   Social Connections: Not on file   Intimate Partner Violence: Not on file   Housing Stability: Not on file       Vital signs: 116/60, 97.5, 18, 94, blood sugar 135, 95%    Objective   Physical Exam  Constitutional:       General: He is not in acute distress.  Eyes:      Extraocular Movements: Extraocular movements intact.   Pulmonary:      Effort: Pulmonary effort is normal.   Musculoskeletal:      Cervical back: Neck supple.   Neurological:      Mental Status: He is alert.   Psychiatric:         Mood and Affect: Mood normal.         Behavior: Behavior is cooperative.         Assessment/Plan   Problem List Items Addressed This Visit       Atrial fibrillation (CMS/[facility])     Apixaban  Monitor heart rate           ESRD on hemodialysis (CMS/[facility]) - Primary     Follow-up with  Renal diet  Cinacalcet  Selevamer Carbonate  Nephro-Consuelo           Acute hypercapnic respiratory failure (CMS/[facility])     Treated with BiPAP           HTN (hypertension)     Controlled  Monitor BP         Amputation of toe of right foot (CMS/[facility])     Follows with  vascular  From prior hospitalization  Wound care as listed          Hospital records reviewed  Medications, treatments, and labs reviewed  Continue medications and treatments as listed in EMR  Discussed with nursing and therapy      Scribe Attestation  I, Erickson San   attest that this documentation has been prepared under the direction and in the presence of Miley Guerra MD    Provider Attestation - Scribe documentation  All medical record entries made by the Scribe were at my direction and personally dictated by me. I have reviewed the chart and agree that the record accurately reflects my  personal performance of the history, physical exam, discussion and plan.   Miley Guerra MD

## 2023-12-15 NOTE — LETTER
Patient: Chevy Benton  : 1945    Encounter Date: 12/15/2023    HISTORY & PHYSICAL    Subjective  Chief complaint: Chevy Benton is a 78 y.o. male who is being seen and evaluated for multiple medical problems.  Patient admitted to SNF for therapy due to weakness after recent hospitalization.    HPI:  HPI  Patient has a past medical history of anemia, aortic root dilation, A-fib, end-stage renal disease on HD, diabetes, hypertension, sick sinus syndrome status post pacemaker.  Patient presented to the hospital with concern for mental status change, family reported more confusion, sleepy, falling asleep midsentence.  Patient was admitted with a diagnosis of acute respiratory failure with hypercapnia.  Patient was found to have respiratory acidosis, pulmonary edema on x-ray and fluid overloaded.  Patient was treated in hospital with BiPAP albuterol treatments and CPAP for nighttime.  Therapy evaluated patient recommending further rehab.  Patient is to follow-up with vascular surgery outpatient.  Patient deemed stable for discharge to skilled nursing facility for continued medical management and therapy.  Patient seen and examined at bedside, no acute distress.    Past Medical History:   Diagnosis Date   • Acute hypercapnic respiratory failure (CMS/HCC)    • Acute on chronic HFrEF (heart failure with reduced ejection fraction) (CMS/HCC)    • ESRD on hemodialysis (CMS/HCC)    • HTN (hypertension)    • Stage II pressure ulcer (CMS/HCC)        No past surgical history on file.    Family History   Problem Relation Name Age of Onset   • Diabetes Mother         Social History     Socioeconomic History   • Marital status:      Spouse name: Not on file   • Number of children: Not on file   • Years of education: Not on file   • Highest education level: Not on file   Occupational History   • Not on file   Tobacco Use   • Smoking status: Not on file   • Smokeless tobacco: Not on file   Substance and Sexual  Activity   • Alcohol use: Not on file   • Drug use: Not on file   • Sexual activity: Not on file   Other Topics Concern   • Not on file   Social History Narrative   • Not on file     Social Determinants of Health     Financial Resource Strain: Not on file   Food Insecurity: Not on file   Transportation Needs: Not on file   Physical Activity: Not on file   Stress: Not on file   Social Connections: Not on file   Intimate Partner Violence: Not on file   Housing Stability: Not on file       Vital signs: 116/60, 97.5, 18, 94, blood sugar 135, 95%    Objective  Physical Exam  Constitutional:       General: He is not in acute distress.  Eyes:      Extraocular Movements: Extraocular movements intact.   Pulmonary:      Effort: Pulmonary effort is normal.   Musculoskeletal:      Cervical back: Neck supple.   Neurological:      Mental Status: He is alert.   Psychiatric:         Mood and Affect: Mood normal.         Behavior: Behavior is cooperative.         Assessment/Plan  Problem List Items Addressed This Visit       Atrial fibrillation (CMS/HCC)     Apixaban  Monitor heart rate           ESRD on hemodialysis (CMS/[facility]) - Primary     Follow-up with  Renal diet  Cinacalcet  Selevamer Carbonate  Nephro-Consuelo           Acute hypercapnic respiratory failure (CMS/HCC)     Treated with BiPAP           HTN (hypertension)     Controlled  Monitor BP         Amputation of toe of right foot (CMS/[facility])     Follows with  vascular  From prior hospitalization  Wound care as listed          Hospital records reviewed  Medications, treatments, and labs reviewed  Continue medications and treatments as listed in EMR  Discussed with nursing and therapy      Scribe Attestation  I, Erickson San   attest that this documentation has been prepared under the direction and in the presence of Miley Guerra MD    Provider Attestation - Scribe documentation  All medical record entries made by the Scribe were at my direction and personally  dictated by me. I have reviewed the chart and agree that the record accurately reflects my personal performance of the history, physical exam, discussion and plan.   Miley Guerra MD      Electronically Signed By: Miley Guerra MD   12/16/23  8:21 AM

## 2023-12-18 PROBLEM — N18.6: Status: ACTIVE | Noted: 2023-01-01

## 2023-12-18 PROBLEM — I13.2: Status: ACTIVE | Noted: 2023-01-01

## 2023-12-18 PROBLEM — R53.1 WEAKNESS: Status: ACTIVE | Noted: 2023-01-01

## 2023-12-18 NOTE — PROGRESS NOTES
PROGRESS NOTE    Subjective   Chief complaint: Chevy Benton is a 78 y.o. male who is an acute skilled patient being seen and evaluated for weakness    HPI:  Patient recently admitted to nursing facility is working in therapy due to weakness and deconditioning since recent hospitalization. Therapy to evaluate and treat accordingly. Denies constitutional symptoms. No acute distress.       Objective   Vital signs: 145/79, 99%    Physical Exam  Constitutional:       General: He is not in acute distress.  Eyes:      Extraocular Movements: Extraocular movements intact.   Cardiovascular:      Rate and Rhythm: Normal rate and regular rhythm.   Pulmonary:      Effort: Pulmonary effort is normal.      Breath sounds: Normal breath sounds.   Abdominal:      General: Bowel sounds are normal.      Palpations: Abdomen is soft.   Musculoskeletal:      Cervical back: Neck supple.      Right lower leg: Edema present.      Left lower leg: Edema present.   Skin:     Comments: Right foot dressing clean and intact   Neurological:      Mental Status: He is alert.   Psychiatric:         Mood and Affect: Mood normal.         Behavior: Behavior is cooperative.         Assessment/Plan   Problem List Items Addressed This Visit       Atrial fibrillation (CMS/HCC)     Apixaban  Monitor heart rate         Chronic systolic congestive heart failure (CMS/HCC)     Stable, no shortness of breath.  Remove extra fluid and dialysis  Monitor weight         Hypertensive heart failure with end stage renal disease (CMS/HCC)     Monitor BP  HD per nephrology  Monitor SOB, orthopnea or weight gain         Weakness - Primary     Therapy to eval and treat          Medications, treatments, and labs reviewed  Continue medications and treatments as listed in PCC    Scribe Attestation  By signing my name below, IMarilyn, Scribe   attest that this documentation has been prepared under the direction and in the presence of Mariposa Stiles,  APRN-CNP.    Provider Attestation - Scribe documentation  All medical record entries made by the Scribe were at my direction and personally dictated by me. I have reviewed the chart and agree that the record accurately reflects my personal performance of the history, physical exam, discussion and plan.

## 2023-12-18 NOTE — LETTER
Patient: Chevy Benton  : 1945    Encounter Date: 2023    PROGRESS NOTE    Subjective  Chief complaint: Chevy Benton is a 78 y.o. male who is an acute skilled patient being seen and evaluated for weakness    HPI:  Patient recently admitted to nursing facility is working in therapy due to weakness and deconditioning since recent hospitalization. Therapy to evaluate and treat accordingly. Denies constitutional symptoms. No acute distress.       Objective  Vital signs: 145/79, 99%    Physical Exam  Constitutional:       General: He is not in acute distress.  Eyes:      Extraocular Movements: Extraocular movements intact.   Cardiovascular:      Rate and Rhythm: Normal rate and regular rhythm.   Pulmonary:      Effort: Pulmonary effort is normal.      Breath sounds: Normal breath sounds.   Abdominal:      General: Bowel sounds are normal.      Palpations: Abdomen is soft.   Musculoskeletal:      Cervical back: Neck supple.      Right lower leg: Edema present.      Left lower leg: Edema present.   Skin:     Comments: Right foot dressing clean and intact   Neurological:      Mental Status: He is alert.   Psychiatric:         Mood and Affect: Mood normal.         Behavior: Behavior is cooperative.         Assessment/Plan  Problem List Items Addressed This Visit       Atrial fibrillation (CMS/HCC)     Apixaban  Monitor heart rate         Chronic systolic congestive heart failure (CMS/HCC)     Stable, no shortness of breath.  Remove extra fluid and dialysis  Monitor weight         Hypertensive heart failure with end stage renal disease (CMS/HCC)     Monitor BP  HD per nephrology  Monitor SOB, orthopnea or weight gain         Weakness - Primary     Therapy to eval and treat          Medications, treatments, and labs reviewed  Continue medications and treatments as listed in PCC    Scribe Attestation  By signing my name below, IMarilyn, Erickson   attest that this documentation has been prepared  under the direction and in the presence of EDWIN Aguilar.    Provider Attestation - Scribe documentation  All medical record entries made by the Scribe were at my direction and personally dictated by me. I have reviewed the chart and agree that the record accurately reflects my personal performance of the history, physical exam, discussion and plan.      Electronically Signed By: EDWIN Aguilar   12/24/23  6:41 PM

## 2023-12-19 NOTE — PROGRESS NOTES
PROGRESS NOTE    Subjective   Chief complaint: Chevy Benton is a 78 y.o. male who is an acute skilled patient being seen and evaluated for weakness    HPI:  Patient admitted to SNF for therapy d/t weakness after recent hospitalization. Patient requires assist with ADLs and transfers.  Patient able to sit EOB x 15 minutes with min to SBA. Patient completing multiple therapeutic exercises to increase strength, mobility and flexibility.  No new complaints.        Objective   Vital signs: 136/76,86,98%,     Physical Exam  Constitutional:       General: He is not in acute distress.  Eyes:      Extraocular Movements: Extraocular movements intact.   Cardiovascular:      Rate and Rhythm: Normal rate and regular rhythm.   Pulmonary:      Effort: Pulmonary effort is normal.      Breath sounds: Normal breath sounds.   Abdominal:      General: Bowel sounds are normal.      Palpations: Abdomen is soft.   Musculoskeletal:      Cervical back: Neck supple.      Right lower leg: Edema present.      Left lower leg: Edema present.   Skin:     Comments: Right foot dressing clean and intact   Neurological:      Mental Status: He is alert.   Psychiatric:         Mood and Affect: Mood normal.         Behavior: Behavior is cooperative.         Assessment/Plan   Problem List Items Addressed This Visit       Atrial fibrillation (CMS/[facility])     Apixaban  Monitor heart rate  Bleeding precautions         DM2 (diabetes mellitus, type 2) (CMS/[facility])     Insulin  Gabapentin   Monitor blood sugar         Weakness     Continue therapy          Hypertensive heart failure with end stage renal disease (CMS/[facility])     Monitor BP  HD per nephrology  Monitor SOB, orthopnea or weight gain          Medications, treatments, and labs reviewed  Continue medications and treatments as listed in EMR    Scribe Attestation  Jaye LAUREN Scribe   attest that this documentation has been prepared under the direction and in the presence of Miley BAHENA  MD Mari.     Provider Attestation - Scribe documentation  All medical record entries made by the Scribe were at my direction and personally dictated by me. I have reviewed the chart and agree that the record accurately reflects my personal performance of the history, physical exam, discussion and plan.   Miley Guerra MD

## 2023-12-19 NOTE — LETTER
Patient: Chevy Benton  : 1945    Encounter Date: 2023    PROGRESS NOTE    Subjective  Chief complaint: Chevy Benton is a 78 y.o. male who is an acute skilled patient being seen and evaluated for weakness    HPI:  Patient admitted to SNF for therapy d/t weakness after recent hospitalization. Patient requires assist with ADLs and transfers.  Patient able to sit EOB x 15 minutes with min to SBA. Patient completing multiple therapeutic exercises to increase strength, mobility and flexibility.  No new complaints.        Objective  Vital signs: 136/76,86,98%,     Physical Exam  Constitutional:       General: He is not in acute distress.  Eyes:      Extraocular Movements: Extraocular movements intact.   Cardiovascular:      Rate and Rhythm: Normal rate and regular rhythm.   Pulmonary:      Effort: Pulmonary effort is normal.      Breath sounds: Normal breath sounds.   Abdominal:      General: Bowel sounds are normal.      Palpations: Abdomen is soft.   Musculoskeletal:      Cervical back: Neck supple.      Right lower leg: Edema present.      Left lower leg: Edema present.   Skin:     Comments: Right foot dressing clean and intact   Neurological:      Mental Status: He is alert.   Psychiatric:         Mood and Affect: Mood normal.         Behavior: Behavior is cooperative.         Assessment/Plan  Problem List Items Addressed This Visit       Atrial fibrillation (CMS/[facility])     Apixaban  Monitor heart rate  Bleeding precautions         DM2 (diabetes mellitus, type 2) (CMS/[facility])     Insulin  Gabapentin   Monitor blood sugar         Weakness     Continue therapy          Hypertensive heart failure with end stage renal disease (CMS/[facility])     Monitor BP  HD per nephrology  Monitor SOB, orthopnea or weight gain          Medications, treatments, and labs reviewed  Continue medications and treatments as listed in EMR    Scribe Attestation  I, Erickson Yung   attest that this documentation  has been prepared under the direction and in the presence of Miley Guerra MD.     Provider Attestation - Scribe documentation  All medical record entries made by the Scribe were at my direction and personally dictated by me. I have reviewed the chart and agree that the record accurately reflects my personal performance of the history, physical exam, discussion and plan.   Miley Guerra MD      Electronically Signed By: Miley Guerra MD   12/19/23  4:47 PM

## 2023-12-20 NOTE — LETTER
Patient: Chevy Benton  : 1945    Encounter Date: 2023    PROGRESS NOTE    Subjective  Chief complaint: Chevy Benton is a 78 y.o. male who is an acute skilled patient being seen and evaluated for weakness    HPI:  HPI Patient working in therapy due to weakness and debility. He requires max a x2 with sit to supine. He is totally dependent for positioning in bed. No acute distress. Denies SOB or orthopnea.     Objective  Vital signs: 126/74, 98%    Physical Exam  Constitutional:       General: He is not in acute distress.  Eyes:      Extraocular Movements: Extraocular movements intact.   Cardiovascular:      Rate and Rhythm: Normal rate and regular rhythm.   Pulmonary:      Effort: Pulmonary effort is normal.      Breath sounds: Normal breath sounds.   Musculoskeletal:      Cervical back: Neck supple.      Right lower leg: No edema.      Left lower leg: No edema.   Skin:     Comments: Dressing Right foot CDI   Neurological:      Mental Status: He is alert.      Motor: Weakness present.   Psychiatric:         Mood and Affect: Mood normal.         Behavior: Behavior is cooperative.         Assessment/Plan  Problem List Items Addressed This Visit       Chronic systolic congestive heart failure (CMS/HCC)     Stable, no shortness of breath.  Remove extra fluid and dialysis  Monitor weight         Hypertensive heart failure with end stage renal disease (CMS/HCC)     Monitor BP  HD per nephrology  Monitor SOB, orthopnea or weight gain         Longstanding persistent atrial fibrillation (CMS/HCC)     Apixaban  Monitor heart rate         Open wound of right foot     Wound doctor has been consulted and will be responsible for the evaluation and comprehensive management of of the wound including appropriate control of complicating factors such as unrelieved pressure, infection, vascular and/or uncontrolled metabolic derangement, and/or nutritional deficiency in addition to appropriate debridement.          Weakness - Primary     Continue to work in therapy          Medications, treatments, and labs reviewed  Continue medications and treatments as listed in PCC    Scribe Attestation  By signing my name below, I, Erickson Blanchard   attest that this documentation has been prepared under the direction and in the presence of EDWIN Aguilar.    Provider Attestation - Scribe documentation  All medical record entries made by the Scribe were at my direction and personally dictated by me. I have reviewed the chart and agree that the record accurately reflects my personal performance of the history, physical exam, discussion and plan.      Electronically Signed By: EDWIN Aguilar   12/24/23  8:43 PM

## 2023-12-21 NOTE — LETTER
Patient: Chevy Benton  : 1945    Encounter Date: 2023    PROGRESS NOTE    Subjective  Chief complaint: Chevy Benton is a 78 y.o. male who is an acute skilled patient being seen and evaluated for weakness    HPI:  HPI  Therapy has been working with the patient to improve strength and endurance with ADLs, transfers, and mobility.  Patient continues to work toward goals.  Patient on HD due to diagnosis of ESRD.  Patient is stable and has no new complaints.  Denying chest pain, shortness of breath, nausea or vomiting.  Nursing staff voices no new concerns today.    Objective  Vital signs: 126/74, 18, 80, 98%     Physical Exam  Constitutional:       General: He is not in acute distress.  Eyes:      Extraocular Movements: Extraocular movements intact.   Cardiovascular:      Rate and Rhythm: Normal rate. Rhythm irregular.   Pulmonary:      Effort: Pulmonary effort is normal.      Breath sounds: Normal breath sounds.   Musculoskeletal:      Cervical back: Neck supple.      Right lower leg: No edema.      Left lower leg: No edema.   Neurological:      Mental Status: He is alert.      Motor: Weakness present.   Psychiatric:         Mood and Affect: Mood normal.         Behavior: Behavior is cooperative.         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  Monitor BP and weight  HD per nephrology  Remove extra fluid in dialysis  Monitor SOB, orthopnea or weight gain         Longstanding persistent atrial fibrillation (CMS/[facility])     Apixaban  Monitor heart rate  Bleeding precautions         Weakness - Primary     Continue to work in therapy          Medications, treatments, and labs reviewed  Continue medications and treatments as listed in EMR      Scribe Attestation  I,  Erickson San   attest that this documentation has been prepared under the direction and in the presence of EDWIN Aguilar    Provider Attestation - Scribe documentation  All medical record entries made by the Scribe were at my direction and personally dictated by me. I have reviewed the chart and agree that the record accurately reflects my personal performance of the history, physical exam, discussion and plan.   EDWIN Aguilar        Electronically Signed By: EDWIN Aguilar   12/27/23  3:36 PM

## 2023-12-22 NOTE — PROGRESS NOTES
PROGRESS NOTE    Subjective   Chief complaint: Chevy Benton is a 78 y.o. male who is an acute skilled patient being seen and evaluated for weakness    HPI:  HPI Patient working in therapy due to weakness and debility. He requires max a x2 with sit to supine. He is totally dependent for positioning in bed. No acute distress. Denies SOB or orthopnea.     Objective   Vital signs: 126/74, 98%    Physical Exam  Constitutional:       General: He is not in acute distress.  Eyes:      Extraocular Movements: Extraocular movements intact.   Cardiovascular:      Rate and Rhythm: Normal rate and regular rhythm.   Pulmonary:      Effort: Pulmonary effort is normal.      Breath sounds: Normal breath sounds.   Musculoskeletal:      Cervical back: Neck supple.      Right lower leg: No edema.      Left lower leg: No edema.   Skin:     Comments: Dressing Right foot CDI   Neurological:      Mental Status: He is alert.      Motor: Weakness present.   Psychiatric:         Mood and Affect: Mood normal.         Behavior: Behavior is cooperative.         Assessment/Plan   Problem List Items Addressed This Visit       Chronic systolic congestive heart failure (CMS/HCC)     Stable, no shortness of breath.  Remove extra fluid and dialysis  Monitor weight         Hypertensive heart failure with end stage renal disease (CMS/HCC)     Monitor BP  HD per nephrology  Monitor SOB, orthopnea or weight gain         Longstanding persistent atrial fibrillation (CMS/HCC)     Apixaban  Monitor heart rate         Open wound of right foot     Wound doctor has been consulted and will be responsible for the evaluation and comprehensive management of of the wound including appropriate control of complicating factors such as unrelieved pressure, infection, vascular and/or uncontrolled metabolic derangement, and/or nutritional deficiency in addition to appropriate debridement.         Weakness - Primary     Continue to work in therapy           Medications, treatments, and labs reviewed  Continue medications and treatments as listed in PCC    Scribe Attestation  By signing my name below, I, Erickson Blanchard   attest that this documentation has been prepared under the direction and in the presence of EDWIN Aguilar.    Provider Attestation - Scribe documentation  All medical record entries made by the Scribe were at my direction and personally dictated by me. I have reviewed the chart and agree that the record accurately reflects my personal performance of the history, physical exam, discussion and plan.

## 2023-12-22 NOTE — PROGRESS NOTES
PROGRESS NOTE    Subjective   Chief complaint: Chevy Benton is a 78 y.o. male who is an acute skilled patient being seen and evaluated for weakness    HPI:  HPI  Patient is working in therapy due to weakness after recent hospitalization, patient requires max assist x 2 with sit<-> supine.  Patient requires total dependence for positioning in bed.  Patient is on HD for diagnosis of ESRD.  Nursing reporting no new concerns at this time.  Patient seen and examined at bedside, no acute distress.    Objective   Vital signs: 128/60, 98.0, 72, 18, blood sugar 1 1295%    Physical Exam  Constitutional:       General: He is not in acute distress.  Eyes:      Extraocular Movements: Extraocular movements intact.   Cardiovascular:      Rate and Rhythm: Normal rate. Rhythm irregular.   Pulmonary:      Effort: Pulmonary effort is normal.      Breath sounds: Normal breath sounds.   Abdominal:      General: Bowel sounds are normal.      Palpations: Abdomen is soft.   Musculoskeletal:      Cervical back: Neck supple.      Right lower leg: No edema.      Left lower leg: No edema.   Skin:     Comments: AV fistula left arm  Coccyx wound   Neurological:      Mental Status: He is alert.      Motor: Weakness present.   Psychiatric:         Mood and Affect: Mood normal.         Behavior: Behavior is cooperative.         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 I have reviewed the chart and agree that the record accurately reflects my personal performance of the history, physical exam, discussion and plan.   Miley Guerra MD

## 2023-12-22 NOTE — LETTER
Patient: Chevy Benton  : 1945    Encounter Date: 2023    PROGRESS NOTE    Subjective  Chief complaint: Chevy Benton is a 78 y.o. male who is an acute skilled patient being seen and evaluated for weakness    HPI:  HPI  Patient is working in therapy due to weakness after recent hospitalization, patient requires max assist x 2 with sit<-> supine.  Patient requires total dependence for positioning in bed.  Patient is on HD for diagnosis of ESRD.  Nursing reporting no new concerns at this time.  Patient seen and examined at bedside, no acute distress.    Objective  Vital signs: 128/60, 98.0, 72, 18, blood sugar 1 1295%    Physical Exam  Constitutional:       General: He is not in acute distress.  Eyes:      Extraocular Movements: Extraocular movements intact.   Cardiovascular:      Rate and Rhythm: Normal rate. Rhythm irregular.   Pulmonary:      Effort: Pulmonary effort is normal.      Breath sounds: Normal breath sounds.   Abdominal:      General: Bowel sounds are normal.      Palpations: Abdomen is soft.   Musculoskeletal:      Cervical back: Neck supple.      Right lower leg: No edema.      Left lower leg: No edema.   Skin:     Comments: AV fistula left arm  Coccyx wound   Neurological:      Mental Status: He is alert.      Motor: Weakness present.   Psychiatric:         Mood and Affect: Mood normal.         Behavior: Behavior is cooperative.         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 I have reviewed the chart and agree that the record accurately reflects my personal performance of the history, physical exam, discussion and plan.   Miley Guerra MD        Electronically Signed By: Miley Guerra MD   12/23/23  8:10 AM

## 2023-12-24 PROBLEM — K56.609 SMALL BOWEL OBSTRUCTION (MULTI): Status: RESOLVED | Noted: 2023-01-01 | Resolved: 2023-01-01

## 2023-12-24 PROBLEM — N18.6 ESRD ON HEMODIALYSIS (MULTI): Status: RESOLVED | Noted: 2023-01-01 | Resolved: 2023-01-01

## 2023-12-24 PROBLEM — K56.41 FECAL IMPACTION (MULTI): Status: RESOLVED | Noted: 2023-01-01 | Resolved: 2023-01-01

## 2023-12-24 PROBLEM — J96.02 ACUTE HYPERCAPNIC RESPIRATORY FAILURE (MULTI): Status: RESOLVED | Noted: 2023-01-01 | Resolved: 2023-01-01

## 2023-12-24 PROBLEM — M25.462 BILATERAL KNEE SWELLING: Status: RESOLVED | Noted: 2022-04-29 | Resolved: 2023-01-01

## 2023-12-24 PROBLEM — R60.0 BILATERAL LOWER EXTREMITY EDEMA: Status: RESOLVED | Noted: 2022-04-29 | Resolved: 2023-01-01

## 2023-12-24 PROBLEM — R79.89 LFT ELEVATION: Status: RESOLVED | Noted: 2021-07-20 | Resolved: 2023-01-01

## 2023-12-24 PROBLEM — I48.91 ATRIAL FIBRILLATION (MULTI): Status: RESOLVED | Noted: 2022-04-30 | Resolved: 2023-01-01

## 2023-12-24 PROBLEM — I10 HTN (HYPERTENSION): Status: RESOLVED | Noted: 2023-01-01 | Resolved: 2023-01-01

## 2023-12-24 PROBLEM — N18.6 END STAGE RENAL DISEASE (MULTI): Status: RESOLVED | Noted: 2022-04-30 | Resolved: 2023-01-01

## 2023-12-24 PROBLEM — I48.91 ATRIAL FIBRILLATION WITH RVR (MULTI): Status: RESOLVED | Noted: 2023-01-01 | Resolved: 2023-01-01

## 2023-12-24 PROBLEM — M25.461 BILATERAL KNEE SWELLING: Status: RESOLVED | Noted: 2022-04-29 | Resolved: 2023-01-01

## 2023-12-24 PROBLEM — Z99.2 ESRD ON HEMODIALYSIS (MULTI): Status: RESOLVED | Noted: 2023-01-01 | Resolved: 2023-01-01

## 2023-12-24 PROBLEM — L97.511: Status: RESOLVED | Noted: 2023-01-01 | Resolved: 2023-01-01

## 2023-12-24 PROBLEM — I10 PRIMARY HYPERTENSION: Status: RESOLVED | Noted: 2020-01-20 | Resolved: 2023-01-01

## 2023-12-24 PROBLEM — D72.829 LEUKOCYTOSIS: Status: RESOLVED | Noted: 2023-01-01 | Resolved: 2023-01-01

## 2023-12-24 PROBLEM — R23.9 ALTERATION IN SKIN INTEGRITY DUE TO MOISTURE: Status: RESOLVED | Noted: 2023-01-01 | Resolved: 2023-01-01

## 2023-12-24 PROBLEM — I96 TOE GANGRENE (MULTI): Status: RESOLVED | Noted: 2023-01-01 | Resolved: 2023-01-01

## 2023-12-24 PROBLEM — R07.89 NON-CARDIAC CHEST PAIN: Status: RESOLVED | Noted: 2023-01-01 | Resolved: 2023-01-01

## 2023-12-24 PROBLEM — Z98.62 HISTORY OF ANGIOPLASTY OF PERIPHERAL VESSEL: Status: RESOLVED | Noted: 2023-01-01 | Resolved: 2023-01-01

## 2023-12-25 NOTE — ASSESSMENT & PLAN NOTE
Wound doctor has been consulted and will be responsible for the evaluation and comprehensive management of of the wound including appropriate control of complicating factors such as unrelieved pressure, infection, vascular and/or uncontrolled metabolic derangement, and/or nutritional deficiency in addition to appropriate debridement.

## 2023-12-26 PROBLEM — R52 PAIN: Status: ACTIVE | Noted: 2023-01-01

## 2023-12-26 NOTE — PROGRESS NOTES
PROGRESS NOTE    Subjective   Chief complaint: Chevy Benton is a 78 y.o. male who is an acute skilled patient being seen and evaluated for weakness    HPI:  Patient presents for general medical care and f/u.  Patient seen and examined at bedside. Patient has diagosis of Afib, denies palpitation or chest pain.. T2DM, Patient denies vision changes, excessive thirst, sweating, urinary frequency. Patient has diagnosis of ESRD.  Continues on dialysis Monday Wednesday and Fridays. Tolerating well. CHF, no changes in weight or SOB.  Patient has c\o leg pain, currently has ultram 25mg BID PRN. He continues working with therapy and requires mod assist with balance and posture at EOB. Therapy reports he demos self limiting behaviors and lack of initiation of tasks and is very passive.       Objective   Vital signs: 156/90,82,96%,    Physical Exam  Constitutional:       General: He is not in acute distress.  Eyes:      Extraocular Movements: Extraocular movements intact.   Cardiovascular:      Rate and Rhythm: Normal rate. Rhythm irregular.   Pulmonary:      Effort: Pulmonary effort is normal.      Breath sounds: Normal breath sounds.   Abdominal:      General: Bowel sounds are normal.      Palpations: Abdomen is soft.   Musculoskeletal:      Cervical back: Neck supple.      Right lower leg: No edema.      Left lower leg: No edema.   Skin:     Comments: AV fistula left arm  Coccyx wound   Neurological:      Mental Status: He is alert.      Motor: Weakness present.   Psychiatric:         Mood and Affect: Mood normal.         Behavior: Behavior is cooperative.         Assessment/Plan   Problem List Items Addressed This Visit       DM2 (diabetes mellitus, type 2) (CMS/[facility])     Insulin  Gabapentin   Monitor blood sugar         Longstanding persistent atrial fibrillation (CMS/[facility])     Apixaban  Monitor heart rate  Bleeding precautions         Weakness     Continue therapy          Hypertensive heart failure with end  stage renal disease (CMS/[facility])     Monitor BP  HD per nephrology  Monitor SOB, orthopnea or weight gain         Pain     Increase Tramadol to Q6H PRN  Monitor pain          Medications, treatments, and labs reviewed  Continue medications and treatments as listed in EMR    Scribe Attestation  Jaye LAUREN Scribe   attest that this documentation has been prepared under the direction and in the presence of Miley Guerra MD.     Provider Attestation - Scribe documentation  All medical record entries made by the Scribe were at my direction and personally dictated by me. I have reviewed the chart and agree that the record accurately reflects my personal performance of the history, physical exam, discussion and plan.   Miley Guerra MD

## 2023-12-26 NOTE — ASSESSMENT & PLAN NOTE
Blood pressure at goal  CHF stable, no shortness of breath.  Monitor BP and weight  HD per nephrology  Remove extra fluid in dialysis  Monitor SOB, orthopnea or weight gain

## 2023-12-26 NOTE — PROGRESS NOTES
PROGRESS NOTE    Subjective   Chief complaint: Chevy Benton is a 78 y.o. male who is an acute skilled patient being seen and evaluated for weakness    HPI:  HPI  Therapy has been working with the patient to improve strength and endurance with ADLs, transfers, and mobility.  Patient continues to work toward goals.  Patient on HD due to diagnosis of ESRD.  Patient is stable and has no new complaints.  Denying chest pain, shortness of breath, nausea or vomiting.  Nursing staff voices no new concerns today.    Objective   Vital signs: 126/74, 18, 80, 98%     Physical Exam  Constitutional:       General: He is not in acute distress.  Eyes:      Extraocular Movements: Extraocular movements intact.   Cardiovascular:      Rate and Rhythm: Normal rate. Rhythm irregular.   Pulmonary:      Effort: Pulmonary effort is normal.      Breath sounds: Normal breath sounds.   Musculoskeletal:      Cervical back: Neck supple.      Right lower leg: No edema.      Left lower leg: No edema.   Neurological:      Mental Status: He is alert.      Motor: Weakness present.   Psychiatric:         Mood and Affect: Mood normal.         Behavior: Behavior is cooperative.         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  Monitor BP and weight  HD per nephrology  Remove extra fluid in dialysis  Monitor SOB, orthopnea or weight gain         Longstanding persistent atrial fibrillation (CMS/[facility])     Apixaban  Monitor heart rate  Bleeding precautions         Weakness - Primary     Continue to work in therapy          Medications, treatments, and labs reviewed  Continue medications and treatments as listed in EMR      Scribe Attestation  XIN, Erickson San   attest that this documentation has been prepared  under the direction and in the presence of EDWIN Aguilar    Provider Attestation - Scribe documentation  All medical record entries made by the Scribe were at my direction and personally dictated by me. I have reviewed the chart and agree that the record accurately reflects my personal performance of the history, physical exam, discussion and plan.   EDWIN Aguilar

## 2023-12-26 NOTE — LETTER
Patient: Chevy Benton  : 1945    Encounter Date: 2023    PROGRESS NOTE    Subjective  Chief complaint: Chevy Benton is a 78 y.o. male who is an acute skilled patient being seen and evaluated for weakness    HPI:  Patient presents for general medical care and f/u.  Patient seen and examined at bedside. Patient has diagosis of Afib, denies palpitation or chest pain.. T2DM, Patient denies vision changes, excessive thirst, sweating, urinary frequency. Patient has diagnosis of ESRD.  Continues on dialysis  and . Tolerating well. CHF, no changes in weight or SOB.  Patient has c\o leg pain, currently has ultram 25mg BID PRN. He continues working with therapy and requires mod assist with balance and posture at EOB. Therapy reports he demos self limiting behaviors and lack of initiation of tasks and is very passive.       Objective  Vital signs: 156/90,82,96%,    Physical Exam  Constitutional:       General: He is not in acute distress.  Eyes:      Extraocular Movements: Extraocular movements intact.   Cardiovascular:      Rate and Rhythm: Normal rate. Rhythm irregular.   Pulmonary:      Effort: Pulmonary effort is normal.      Breath sounds: Normal breath sounds.   Abdominal:      General: Bowel sounds are normal.      Palpations: Abdomen is soft.   Musculoskeletal:      Cervical back: Neck supple.      Right lower leg: No edema.      Left lower leg: No edema.   Skin:     Comments: AV fistula left arm  Coccyx wound   Neurological:      Mental Status: He is alert.      Motor: Weakness present.   Psychiatric:         Mood and Affect: Mood normal.         Behavior: Behavior is cooperative.         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     Provider Attestation - Scribe documentation  All medical record entries made by the Scribe were at my direction and personally dictated by me. I have reviewed the chart and agree that the record accurately reflects my personal performance of the history, physical exam, discussion and plan.   Miley Guerra MD      Electronically Signed By: Miley Guerra MD   12/26/23  6:15 PM

## 2023-12-27 PROBLEM — Z99.2: Status: ACTIVE | Noted: 2023-01-01

## 2023-12-27 PROBLEM — S68.712A: Status: RESOLVED | Noted: 2023-01-01 | Resolved: 2023-01-01

## 2023-12-27 PROBLEM — M19.90 UNSPECIFIED OSTEOARTHRITIS, UNSPECIFIED SITE: Status: RESOLVED | Noted: 2022-04-30 | Resolved: 2023-01-01

## 2023-12-27 PROBLEM — Z99.2 DEPENDENCE ON RENAL DIALYSIS (CMS-HCC): Status: RESOLVED | Noted: 2022-04-30 | Resolved: 2023-01-01

## 2023-12-27 PROBLEM — I50.22 CHRONIC SYSTOLIC CONGESTIVE HEART FAILURE (MULTI): Status: RESOLVED | Noted: 2021-07-20 | Resolved: 2023-01-01

## 2023-12-27 PROBLEM — D64.9 ANEMIA: Status: RESOLVED | Noted: 2023-01-01 | Resolved: 2023-01-01

## 2023-12-27 PROBLEM — I50.22: Status: ACTIVE | Noted: 2023-01-01

## 2023-12-27 PROBLEM — A41.9 SEPSIS (MULTI): Status: RESOLVED | Noted: 2021-07-20 | Resolved: 2023-01-01

## 2023-12-27 PROBLEM — R52 PAIN: Status: RESOLVED | Noted: 2023-01-01 | Resolved: 2023-01-01

## 2023-12-27 PROBLEM — E78.5 HYPERLIPIDEMIA, UNSPECIFIED: Status: RESOLVED | Noted: 2022-04-30 | Resolved: 2023-01-01

## 2023-12-27 PROBLEM — L81.9 DISCOLORATION OF SKIN OF MULTIPLE SITES: Status: RESOLVED | Noted: 2023-01-01 | Resolved: 2023-01-01

## 2023-12-27 NOTE — LETTER
Patient: Chevy Benton  : 1945    Encounter Date: 2023    PROGRESS NOTE    Subjective  Chief complaint: Chevy Benton is a 78 y.o. male who is an acute skilled patient being seen and evaluated for weakness    HPI:  HPI  Patient working in therapy, requires max assist x 2 to perform sit to supine.  Patient was total dependence for positioning in bed.  Patient complains of right foot pain x 3 days.  Feels he is having a gout flare.  Patient seen and examined at bedside, in no apparent distress.  Denies chest pain or shortness of breath.  Denies nausea, vomiting    Objective  Vital signs: 130/53, 97.8, 80, 18, 98%, blood sugar 77    Physical Exam  Constitutional:       General: He is not in acute distress.  Eyes:      Extraocular Movements: Extraocular movements intact.   Cardiovascular:      Rate and Rhythm: Normal rate. Rhythm irregular.   Pulmonary:      Effort: Pulmonary effort is normal.      Breath sounds: Normal breath sounds.   Musculoskeletal:      Cervical back: Neck supple.      Right lower leg: Edema present.      Left lower leg: Edema present.   Skin:     Comments: Right foot DSG, CDI, foot TTP   Neurological:      Mental Status: He is alert.      Motor: Weakness present.   Psychiatric:         Mood and Affect: Mood normal.         Behavior: Behavior is cooperative.         Assessment/Plan  Problem List Items Addressed This Visit       Gout     Continue allopurinol and colchicine.  Obtain uric acid level.  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 (CMS/HCC)     BP at goal  Monitor BP  HD per nephrology  Monitor SOB, orthopnea or weight gain         Longstanding persistent atrial fibrillation (CMS/[facility])     Apixaban  Monitor heart rate  Bleeding precautions         Weakness - Primary     Continue to work in therapy          Medications, treatments, and labs  reviewed  Continue medications and treatments as listed in EMR      Scribe Attestation  Darline LAUREN Scribe   attest that this documentation has been prepared under the direction and in the presence of EDWIN Aguilar    Provider Attestation - Scribe documentation  All medical record entries made by the Scribe were at my direction and personally dictated by me. I have reviewed the chart and agree that the record accurately reflects my personal performance of the history, physical exam, discussion and plan.   EDWIN Aguilar        Electronically Signed By: EDWIN Aguilar   12/29/23  5:39 PM

## 2023-12-28 NOTE — LETTER
Patient: Chevy Benton  : 1945    Encounter Date: 2023    PROGRESS NOTE    Subjective  Chief complaint: Chevy Benton is a 78 y.o. male who is an acute skilled patient being seen and evaluated for weakness    HPI:  HPI Patient continues to work in therapy.  He requires max assist of 2 with sit to supine transfers.  Patient is totally dependent for positioning in bed.  No new concerns today.    Objective  Vital signs: 132/64, 97%    Physical Exam  Constitutional:       General: He is not in acute distress.  Eyes:      Extraocular Movements: Extraocular movements intact.   Cardiovascular:      Rate and Rhythm: Normal rate. Rhythm irregular.   Pulmonary:      Effort: Pulmonary effort is normal.      Breath sounds: Normal breath sounds.   Musculoskeletal:      Cervical back: Neck supple.      Right lower leg: No edema.      Left lower leg: No edema.   Neurological:      Mental Status: He is alert.      Motor: Weakness present.   Psychiatric:         Mood and Affect: Mood normal.         Behavior: Behavior is cooperative.         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  Monitor BP and weight  HD per nephrology  Remove extra fluid in dialysis  Monitor SOB, orthopnea or weight gain         Weakness - Primary     Continue to work in therapy          Medications, treatments, and labs reviewed  Continue medications and treatments as listed in PCC    Scribe Attestation  By signing my name below, Marilyn LAUREN Scribe   attest that this documentation has been prepared under the direction and in the presence of EDWIN Aguilar.    Provider Attestation - Scribe documentation  All medical record entries made by the Scribe were at my direction and personally  dictated by me. I have reviewed the chart and agree that the record accurately reflects my personal performance of the history, physical exam, discussion and plan.      Electronically Signed By: EDWIN Aguilar   1/6/24  8:28 PM

## 2023-12-28 NOTE — PROGRESS NOTES
PROGRESS NOTE    Subjective   Chief complaint: Chevy Benton is a 78 y.o. male who is an acute skilled patient being seen and evaluated for weakness    HPI:  HPI  Patient working in therapy, requires max assist x 2 to perform sit to supine.  Patient was total dependence for positioning in bed.  Patient complains of right foot pain x 3 days.  Feels he is having a gout flare.  Patient seen and examined at bedside, in no apparent distress.  Denies chest pain or shortness of breath.  Denies nausea, vomiting    Objective   Vital signs: 130/53, 97.8, 80, 18, 98%, blood sugar 77    Physical Exam  Constitutional:       General: He is not in acute distress.  Eyes:      Extraocular Movements: Extraocular movements intact.   Cardiovascular:      Rate and Rhythm: Normal rate. Rhythm irregular.   Pulmonary:      Effort: Pulmonary effort is normal.      Breath sounds: Normal breath sounds.   Musculoskeletal:      Cervical back: Neck supple.      Right lower leg: Edema present.      Left lower leg: Edema present.   Skin:     Comments: Right foot DSG, CDI, foot TTP   Neurological:      Mental Status: He is alert.      Motor: Weakness present.   Psychiatric:         Mood and Affect: Mood normal.         Behavior: Behavior is cooperative.         Assessment/Plan   Problem List Items Addressed This Visit       Gout     Continue allopurinol and colchicine.  Obtain uric acid level.  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 (CMS/[facility])     BP at goal  Monitor BP  HD per nephrology  Monitor SOB, orthopnea or weight gain         Longstanding persistent atrial fibrillation (CMS/[facility])     Apixaban  Monitor heart rate  Bleeding precautions         Weakness - Primary     Continue to work in therapy          Medications, treatments, and labs reviewed  Continue medications and treatments as listed in EMR      Scribe  Attestation  I, Erickson San   attest that this documentation has been prepared under the direction and in the presence of EDWIN Aguilar    Provider Attestation - Scribe documentation  All medical record entries made by the Scribe were at my direction and personally dictated by me. I have reviewed the chart and agree that the record accurately reflects my personal performance of the history, physical exam, discussion and plan.   EDWIN Aguilar

## 2023-12-29 NOTE — PROGRESS NOTES
PROGRESS NOTE    Subjective   Chief complaint: Chevy Benton is a 78 y.o. male who is an acute skilled patient being seen and evaluated for weakness    HPI:  HPI  Patient is seen in follow-up of right foot pain x 3 days.  Patient feels that it is a Croker and is on allopurinol and colchicine.  Patient was started on Medrol Dosepak and ordered uric acid level, uric acid level is 2.8. Patient is reporting pain is better.  Patient was seen and examined at bedside, in no apparent distress.  Denies chest pain or shortness of breath.  Denies nausea or vomiting.  Therapy continues to work with patient, requires max to total dependence for all bed mobility and all transfers.  Patient with a diagnosis of ESRD on HD.    Objective   Vital signs: 142/78, 97.5, 88, 18, blood sugar 137    Physical Exam  Constitutional:       General: He is not in acute distress.  Eyes:      Extraocular Movements: Extraocular movements intact.   Cardiovascular:      Rate and Rhythm: Normal rate. Rhythm irregular.   Pulmonary:      Effort: Pulmonary effort is normal.      Breath sounds: Normal breath sounds.   Abdominal:      General: Bowel sounds are normal.      Palpations: Abdomen is soft.   Musculoskeletal:      Cervical back: Neck supple.      Right lower leg: No edema.      Left lower leg: No edema.   Skin:     Comments: AV fistula left arm  Coccyx wound   Neurological:      Mental Status: He is alert.      Motor: Weakness present.   Psychiatric:         Mood and Affect: Mood normal.         Behavior: Behavior is cooperative.         Assessment/Plan   Problem List Items Addressed This Visit       Longstanding persistent atrial fibrillation (CMS/HCC)     Apixaban  Monitor heart rate  Bleeding precautions         Gout     Continue allopurinol and colchicine.  Obtain uric acid level.  Start Medrol Dosepak         Weakness - Primary     Continue to work in therapy         Hypertensive heart and kidney disease with chronic systolic  congestive heart failure and stage 5 chronic kidney disease on chronic dialysis (CMS/[facility])     Monitor BP  HD per nephrology  Monitor SOB, orthopnea or weight gain          Medications, treatments, and labs reviewed  Continue medications and treatments as listed in EMR      Scribe Attestation  Darline LAUREN Scribe   attest that this documentation has been prepared under the direction and in the presence of Miley Guerra MD    Provider Attestation - Scribe documentation  All medical record entries made by the Scribe were at my direction and personally dictated by me. I have reviewed the chart and agree that the record accurately reflects my personal performance of the history, physical exam, discussion and plan.   Miley Guerra MD

## 2023-12-29 NOTE — LETTER
Patient: Chevy Benton  : 1945    Encounter Date: 2023    PROGRESS NOTE    Subjective  Chief complaint: Chevy Benton is a 78 y.o. male who is an acute skilled patient being seen and evaluated for weakness    HPI:  HPI  Patient is seen in follow-up of right foot pain x 3 days.  Patient feels that it is a Croker and is on allopurinol and colchicine.  Patient was started on Medrol Dosepak and ordered uric acid level, uric acid level is 2.8. Patient is reporting pain is better.  Patient was seen and examined at bedside, in no apparent distress.  Denies chest pain or shortness of breath.  Denies nausea or vomiting.  Therapy continues to work with patient, requires max to total dependence for all bed mobility and all transfers.  Patient with a diagnosis of ESRD on HD.    Objective  Vital signs: 142/78, 97.5, 88, 18, blood sugar 137    Physical Exam  Constitutional:       General: He is not in acute distress.  Eyes:      Extraocular Movements: Extraocular movements intact.   Cardiovascular:      Rate and Rhythm: Normal rate. Rhythm irregular.   Pulmonary:      Effort: Pulmonary effort is normal.      Breath sounds: Normal breath sounds.   Abdominal:      General: Bowel sounds are normal.      Palpations: Abdomen is soft.   Musculoskeletal:      Cervical back: Neck supple.      Right lower leg: No edema.      Left lower leg: No edema.   Skin:     Comments: AV fistula left arm  Coccyx wound   Neurological:      Mental Status: He is alert.      Motor: Weakness present.   Psychiatric:         Mood and Affect: Mood normal.         Behavior: Behavior is cooperative.         Assessment/Plan  Problem List Items Addressed This Visit       Longstanding persistent atrial fibrillation (CMS/HCC)     Apixaban  Monitor heart rate  Bleeding precautions         Gout     Continue allopurinol and colchicine.  Obtain uric acid level.  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 I have reviewed the chart and agree that the record accurately reflects my personal performance of the history, physical exam, discussion and plan.   Miley Guerra MD        Electronically Signed By: Miley Guerra MD   12/31/23  7:12 AM

## 2023-12-30 NOTE — PROGRESS NOTES
PROGRESS NOTE    Subjective   Chief complaint: Chevy Benton is a 78 y.o. male who is an acute skilled patient being seen and evaluated for weakness    HPI:  HPI Patient continues to work in therapy.  He requires max assist of 2 with sit to supine transfers.  Patient is totally dependent for positioning in bed.  No new concerns today.    Objective   Vital signs: 132/64, 97%    Physical Exam  Constitutional:       General: He is not in acute distress.  Eyes:      Extraocular Movements: Extraocular movements intact.   Cardiovascular:      Rate and Rhythm: Normal rate. Rhythm irregular.   Pulmonary:      Effort: Pulmonary effort is normal.      Breath sounds: Normal breath sounds.   Musculoskeletal:      Cervical back: Neck supple.      Right lower leg: No edema.      Left lower leg: No edema.   Neurological:      Mental Status: He is alert.      Motor: Weakness present.   Psychiatric:         Mood and Affect: Mood normal.         Behavior: Behavior is cooperative.         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  Monitor BP and weight  HD per nephrology  Remove extra fluid in dialysis  Monitor SOB, orthopnea or weight gain         Weakness - Primary     Continue to work in therapy          Medications, treatments, and labs reviewed  Continue medications and treatments as listed in PCC    Scribe Attestation  By signing my name below, I, Erickson Blanchard   attest that this documentation has been prepared under the direction and in the presence of EDWIN Aguilar.    Provider Attestation - Scribe documentation  All medical record entries made by the Scribe were at my direction and personally dictated by me. I have reviewed the chart and agree that the record  accurately reflects my personal performance of the history, physical exam, discussion and plan.

## 2024-01-01 ENCOUNTER — NURSING HOME VISIT (OUTPATIENT)
Dept: POST ACUTE CARE | Facility: EXTERNAL LOCATION | Age: 79
End: 2024-01-01
Payer: MEDICARE

## 2024-01-01 ENCOUNTER — APPOINTMENT (OUTPATIENT)
Dept: RADIOLOGY | Facility: HOSPITAL | Age: 79
DRG: 252 | End: 2024-01-01
Payer: MEDICARE

## 2024-01-01 ENCOUNTER — APPOINTMENT (OUTPATIENT)
Dept: VASCULAR MEDICINE | Facility: HOSPITAL | Age: 79
DRG: 252 | End: 2024-01-01
Payer: MEDICARE

## 2024-01-01 ENCOUNTER — APPOINTMENT (OUTPATIENT)
Dept: DIALYSIS | Facility: HOSPITAL | Age: 79
End: 2024-01-01
Payer: MEDICARE

## 2024-01-01 ENCOUNTER — ANESTHESIA EVENT (OUTPATIENT)
Dept: CARDIOLOGY | Facility: HOSPITAL | Age: 79
DRG: 252 | End: 2024-01-01
Payer: MEDICARE

## 2024-01-01 ENCOUNTER — APPOINTMENT (OUTPATIENT)
Dept: CARDIOLOGY | Facility: HOSPITAL | Age: 79
DRG: 252 | End: 2024-01-01
Payer: MEDICARE

## 2024-01-01 ENCOUNTER — ANESTHESIA (OUTPATIENT)
Dept: OPERATING ROOM | Facility: HOSPITAL | Age: 79
DRG: 252 | End: 2024-01-01
Payer: MEDICARE

## 2024-01-01 ENCOUNTER — APPOINTMENT (OUTPATIENT)
Dept: CARDIOLOGY | Facility: HOSPITAL | Age: 79
DRG: 981 | End: 2024-01-01
Payer: MEDICARE

## 2024-01-01 ENCOUNTER — APPOINTMENT (OUTPATIENT)
Dept: RADIOLOGY | Facility: HOSPITAL | Age: 79
DRG: 208 | End: 2024-01-01
Payer: MEDICARE

## 2024-01-01 ENCOUNTER — ANESTHESIA EVENT (OUTPATIENT)
Dept: OPERATING ROOM | Facility: HOSPITAL | Age: 79
DRG: 252 | End: 2024-01-01
Payer: MEDICARE

## 2024-01-01 ENCOUNTER — OFFICE VISIT (OUTPATIENT)
Dept: CARDIOLOGY | Facility: CLINIC | Age: 79
End: 2024-01-01
Payer: MEDICARE

## 2024-01-01 ENCOUNTER — NURSING HOME VISIT (OUTPATIENT)
Dept: POST ACUTE CARE | Facility: EXTERNAL LOCATION | Age: 79
End: 2024-01-01

## 2024-01-01 ENCOUNTER — APPOINTMENT (OUTPATIENT)
Dept: VASCULAR SURGERY | Facility: CLINIC | Age: 79
End: 2024-01-01
Payer: MEDICARE

## 2024-01-01 ENCOUNTER — APPOINTMENT (OUTPATIENT)
Dept: CARDIOLOGY | Facility: CLINIC | Age: 79
End: 2024-01-01
Payer: MEDICARE

## 2024-01-01 ENCOUNTER — APPOINTMENT (OUTPATIENT)
Dept: VASCULAR MEDICINE | Facility: HOSPITAL | Age: 79
End: 2024-01-01
Payer: MEDICARE

## 2024-01-01 ENCOUNTER — LAB REQUISITION (OUTPATIENT)
Dept: LAB | Facility: HOSPITAL | Age: 79
End: 2024-01-01

## 2024-01-01 ENCOUNTER — HOSPITAL ENCOUNTER (INPATIENT)
Facility: HOSPITAL | Age: 79
LOS: 32 days | Discharge: NURSING, REHAB OR LONG-TERM CARE | DRG: 252 | End: 2024-02-10
Attending: INTERNAL MEDICINE | Admitting: INTERNAL MEDICINE
Payer: MEDICARE

## 2024-01-01 ENCOUNTER — HOSPITAL ENCOUNTER (INPATIENT)
Facility: HOSPITAL | Age: 79
LOS: 12 days | Discharge: NURSING, REHAB OR LONG-TERM CARE | DRG: 981 | End: 2024-02-29
Attending: EMERGENCY MEDICINE | Admitting: INTERNAL MEDICINE
Payer: MEDICARE

## 2024-01-01 ENCOUNTER — ANESTHESIA (OUTPATIENT)
Dept: CARDIOLOGY | Facility: HOSPITAL | Age: 79
DRG: 252 | End: 2024-01-01
Payer: MEDICARE

## 2024-01-01 ENCOUNTER — APPOINTMENT (OUTPATIENT)
Dept: WOUND CARE | Facility: CLINIC | Age: 79
End: 2024-01-01
Payer: MEDICARE

## 2024-01-01 ENCOUNTER — HOSPITAL ENCOUNTER (OUTPATIENT)
Dept: VASCULAR MEDICINE | Facility: HOSPITAL | Age: 79
Discharge: HOME | DRG: 252 | End: 2024-01-09
Payer: MEDICARE

## 2024-01-01 ENCOUNTER — PREP FOR PROCEDURE (OUTPATIENT)
Dept: CARDIOLOGY | Facility: HOSPITAL | Age: 79
End: 2024-01-01

## 2024-01-01 ENCOUNTER — APPOINTMENT (OUTPATIENT)
Dept: ENDOCRINOLOGY | Facility: CLINIC | Age: 79
End: 2024-01-01
Payer: MEDICARE

## 2024-01-01 ENCOUNTER — HOSPITAL ENCOUNTER (INPATIENT)
Facility: HOSPITAL | Age: 79
LOS: 2 days | DRG: 208 | End: 2024-04-08
Attending: GENERAL PRACTICE | Admitting: INTERNAL MEDICINE
Payer: MEDICARE

## 2024-01-01 ENCOUNTER — CLINICAL SUPPORT (OUTPATIENT)
Dept: EMERGENCY MEDICINE | Facility: HOSPITAL | Age: 79
DRG: 252 | End: 2024-01-01
Payer: MEDICARE

## 2024-01-01 ENCOUNTER — HOSPITAL ENCOUNTER (EMERGENCY)
Facility: HOSPITAL | Age: 79
Discharge: OTHER INSTITUTION | End: 2024-03-08
Attending: EMERGENCY MEDICINE
Payer: MEDICARE

## 2024-01-01 ENCOUNTER — HOSPITAL ENCOUNTER (INPATIENT)
Facility: HOSPITAL | Age: 79
LOS: 26 days | Discharge: NURSING, REHAB OR LONG-TERM CARE | DRG: 252 | End: 2024-04-03
Attending: EMERGENCY MEDICINE | Admitting: INTERNAL MEDICINE
Payer: MEDICARE

## 2024-01-01 VITALS
BODY MASS INDEX: 28.41 KG/M2 | HEART RATE: 88 BPM | WEIGHT: 191.8 LBS | DIASTOLIC BLOOD PRESSURE: 76 MMHG | HEIGHT: 69 IN | TEMPERATURE: 97 F | OXYGEN SATURATION: 92 % | RESPIRATION RATE: 20 BRPM | SYSTOLIC BLOOD PRESSURE: 120 MMHG

## 2024-01-01 VITALS
SYSTOLIC BLOOD PRESSURE: 143 MMHG | BODY MASS INDEX: 27.46 KG/M2 | WEIGHT: 191.8 LBS | TEMPERATURE: 96.4 F | HEART RATE: 61 BPM | DIASTOLIC BLOOD PRESSURE: 91 MMHG | HEIGHT: 70 IN | OXYGEN SATURATION: 95 % | RESPIRATION RATE: 16 BRPM

## 2024-01-01 VITALS
HEIGHT: 70 IN | TEMPERATURE: 98.6 F | SYSTOLIC BLOOD PRESSURE: 100 MMHG | WEIGHT: 217.59 LBS | OXYGEN SATURATION: 100 % | RESPIRATION RATE: 18 BRPM | BODY MASS INDEX: 31.15 KG/M2 | HEART RATE: 90 BPM | DIASTOLIC BLOOD PRESSURE: 78 MMHG

## 2024-01-01 VITALS
OXYGEN SATURATION: 99 % | RESPIRATION RATE: 18 BRPM | SYSTOLIC BLOOD PRESSURE: 90 MMHG | HEART RATE: 101 BPM | BODY MASS INDEX: 27.74 KG/M2 | WEIGHT: 193.78 LBS | TEMPERATURE: 97.1 F | HEIGHT: 70 IN | DIASTOLIC BLOOD PRESSURE: 53 MMHG

## 2024-01-01 VITALS
OXYGEN SATURATION: 93 % | HEIGHT: 70 IN | TEMPERATURE: 96.7 F | SYSTOLIC BLOOD PRESSURE: 90 MMHG | DIASTOLIC BLOOD PRESSURE: 67 MMHG | RESPIRATION RATE: 15 BRPM | BODY MASS INDEX: 28.63 KG/M2 | HEART RATE: 80 BPM | WEIGHT: 200 LBS

## 2024-01-01 VITALS — DIASTOLIC BLOOD PRESSURE: 101 MMHG | SYSTOLIC BLOOD PRESSURE: 146 MMHG | HEART RATE: 89 BPM

## 2024-01-01 DIAGNOSIS — K21.9 GASTROESOPHAGEAL REFLUX DISEASE, UNSPECIFIED WHETHER ESOPHAGITIS PRESENT: ICD-10-CM

## 2024-01-01 DIAGNOSIS — Z99.2 HYPERTENSIVE HEART AND KIDNEY DISEASE WITH CHRONIC SYSTOLIC CONGESTIVE HEART FAILURE AND STAGE 5 CHRONIC KIDNEY DISEASE ON CHRONIC DIALYSIS (MULTI): ICD-10-CM

## 2024-01-01 DIAGNOSIS — I70.25: ICD-10-CM

## 2024-01-01 DIAGNOSIS — I48.91 ATRIAL FIBRILLATION, UNSPECIFIED TYPE (MULTI): ICD-10-CM

## 2024-01-01 DIAGNOSIS — M79.89 SWELLING OF ARM: ICD-10-CM

## 2024-01-01 DIAGNOSIS — I13.2 HYPERTENSIVE HEART AND KIDNEY DISEASE WITH CHRONIC SYSTOLIC CONGESTIVE HEART FAILURE AND STAGE 5 CHRONIC KIDNEY DISEASE ON CHRONIC DIALYSIS (MULTI): ICD-10-CM

## 2024-01-01 DIAGNOSIS — N18.6 HYPERTENSIVE HEART AND KIDNEY DISEASE WITH CHRONIC SYSTOLIC CONGESTIVE HEART FAILURE AND STAGE 5 CHRONIC KIDNEY DISEASE ON CHRONIC DIALYSIS (MULTI): ICD-10-CM

## 2024-01-01 DIAGNOSIS — Z79.4 TYPE 2 DIABETES MELLITUS WITH DIABETIC PERIPHERAL ANGIOPATHY AND GANGRENE, WITH LONG-TERM CURRENT USE OF INSULIN (MULTI): ICD-10-CM

## 2024-01-01 DIAGNOSIS — E11.52 TYPE 2 DIABETES MELLITUS WITH DIABETIC PERIPHERAL ANGIOPATHY AND GANGRENE, WITH LONG-TERM CURRENT USE OF INSULIN (MULTI): ICD-10-CM

## 2024-01-01 DIAGNOSIS — U07.1 COVID: ICD-10-CM

## 2024-01-01 DIAGNOSIS — J10.1 INFLUENZA A: Primary | ICD-10-CM

## 2024-01-01 DIAGNOSIS — E11.69 TYPE 2 DIABETES MELLITUS WITH OTHER SPECIFIED COMPLICATION, UNSPECIFIED WHETHER LONG TERM INSULIN USE (MULTI): ICD-10-CM

## 2024-01-01 DIAGNOSIS — R53.1 WEAKNESS: Primary | ICD-10-CM

## 2024-01-01 DIAGNOSIS — L97.412 SKIN ULCER OF RIGHT HEEL WITH FAT LAYER EXPOSED (MULTI): ICD-10-CM

## 2024-01-01 DIAGNOSIS — I50.22 HYPERTENSIVE HEART AND KIDNEY DISEASE WITH CHRONIC SYSTOLIC CONGESTIVE HEART FAILURE AND STAGE 5 CHRONIC KIDNEY DISEASE ON CHRONIC DIALYSIS (MULTI): ICD-10-CM

## 2024-01-01 DIAGNOSIS — I50.20 HFREF (HEART FAILURE WITH REDUCED EJECTION FRACTION) (MULTI): ICD-10-CM

## 2024-01-01 DIAGNOSIS — J18.9 PNEUMONIA DUE TO INFECTIOUS ORGANISM, UNSPECIFIED LATERALITY, UNSPECIFIED PART OF LUNG: ICD-10-CM

## 2024-01-01 DIAGNOSIS — Z98.890 CRITICAL LIMB ISCHEMIA WITH HISTORY OF REVASCULARIZATION OF SAME EXTREMITY (MULTI): ICD-10-CM

## 2024-01-01 DIAGNOSIS — L97.909 ULCER OF LOWER EXTREMITY, UNSPECIFIED LATERALITY, UNSPECIFIED ULCER STAGE (MULTI): ICD-10-CM

## 2024-01-01 DIAGNOSIS — N18.9 CHRONIC RENAL FAILURE, UNSPECIFIED CKD STAGE: ICD-10-CM

## 2024-01-01 DIAGNOSIS — E11.59 TYPE 2 DIABETES MELLITUS WITH OTHER CIRCULATORY COMPLICATION, UNSPECIFIED WHETHER LONG TERM INSULIN USE (MULTI): ICD-10-CM

## 2024-01-01 DIAGNOSIS — T82.838D HEMORRHAGE OF ARTERIOVENOUS FISTULA, SUBSEQUENT ENCOUNTER: ICD-10-CM

## 2024-01-01 DIAGNOSIS — N18.6 ESRD (END STAGE RENAL DISEASE) ON DIALYSIS (MULTI): ICD-10-CM

## 2024-01-01 DIAGNOSIS — R07.9 CHEST PAIN, UNSPECIFIED TYPE: ICD-10-CM

## 2024-01-01 DIAGNOSIS — I73.9 PAD (PERIPHERAL ARTERY DISEASE) (CMS-HCC): ICD-10-CM

## 2024-01-01 DIAGNOSIS — K21.9 GASTROESOPHAGEAL REFLUX DISEASE WITHOUT ESOPHAGITIS: ICD-10-CM

## 2024-01-01 DIAGNOSIS — I48.11 LONGSTANDING PERSISTENT ATRIAL FIBRILLATION (MULTI): ICD-10-CM

## 2024-01-01 DIAGNOSIS — S98.131A AMPUTATION OF TOE OF RIGHT FOOT (CMS-HCC): Primary | ICD-10-CM

## 2024-01-01 DIAGNOSIS — A41.9 SEPSIS, DUE TO UNSPECIFIED ORGANISM, UNSPECIFIED WHETHER ACUTE ORGAN DYSFUNCTION PRESENT (MULTI): Primary | ICD-10-CM

## 2024-01-01 DIAGNOSIS — R53.1 WEAKNESS: ICD-10-CM

## 2024-01-01 DIAGNOSIS — S98.131A AMPUTATION OF TOE OF RIGHT FOOT (CMS-HCC): ICD-10-CM

## 2024-01-01 DIAGNOSIS — I96 GANGRENE OF TOE OF RIGHT FOOT (MULTI): ICD-10-CM

## 2024-01-01 DIAGNOSIS — R60.0 EDEMA OF BOTH UPPER ARMS: ICD-10-CM

## 2024-01-01 DIAGNOSIS — I50.42 CHRONIC COMBINED SYSTOLIC AND DIASTOLIC HEART FAILURE (MULTI): ICD-10-CM

## 2024-01-01 DIAGNOSIS — Z99.2 ESRD (END STAGE RENAL DISEASE) ON DIALYSIS (MULTI): ICD-10-CM

## 2024-01-01 DIAGNOSIS — I70.235 ATHEROSCLEROSIS OF NATIVE ARTERIES OF RIGHT LEG WITH ULCERATION OF OTHER PART OF FOOT (MULTI): ICD-10-CM

## 2024-01-01 DIAGNOSIS — I70.222 CRITICAL LIMB ISCHEMIA OF LEFT LOWER EXTREMITY (MULTI): Primary | ICD-10-CM

## 2024-01-01 DIAGNOSIS — R60.9 SWELLING: ICD-10-CM

## 2024-01-01 DIAGNOSIS — R52 GENERALIZED PAIN: ICD-10-CM

## 2024-01-01 DIAGNOSIS — I49.5 SSS (SICK SINUS SYNDROME) (MULTI): ICD-10-CM

## 2024-01-01 DIAGNOSIS — I70.229 CRITICAL LIMB ISCHEMIA WITH HISTORY OF REVASCULARIZATION OF SAME EXTREMITY (MULTI): ICD-10-CM

## 2024-01-01 DIAGNOSIS — T82.838A HEMORRHAGE OF ARTERIOVENOUS FISTULA, INITIAL ENCOUNTER (CMS-HCC): Primary | ICD-10-CM

## 2024-01-01 DIAGNOSIS — I70.223 CRITICAL LIMB ISCHEMIA OF BOTH LOWER EXTREMITIES (MULTI): Primary | ICD-10-CM

## 2024-01-01 DIAGNOSIS — R52 PAIN: Primary | ICD-10-CM

## 2024-01-01 DIAGNOSIS — A41.9 SEPSIS, DUE TO UNSPECIFIED ORGANISM, UNSPECIFIED WHETHER ACUTE ORGAN DYSFUNCTION PRESENT (MULTI): ICD-10-CM

## 2024-01-01 DIAGNOSIS — N48.1 BALANITIS: ICD-10-CM

## 2024-01-01 DIAGNOSIS — I95.9 HYPOTENSION, UNSPECIFIED HYPOTENSION TYPE: ICD-10-CM

## 2024-01-01 DIAGNOSIS — I70.221 CRITICAL LIMB ISCHEMIA OF RIGHT LOWER EXTREMITY (MULTI): ICD-10-CM

## 2024-01-01 DIAGNOSIS — N18.6 ESRD (END STAGE RENAL DISEASE) (MULTI): ICD-10-CM

## 2024-01-01 DIAGNOSIS — T07.XXXA MULTIPLE WOUNDS: ICD-10-CM

## 2024-01-01 DIAGNOSIS — I87.1 SUBCLAVIAN VEIN STENOSIS, LEFT: ICD-10-CM

## 2024-01-01 DIAGNOSIS — D64.9 ANEMIA, UNSPECIFIED: ICD-10-CM

## 2024-01-01 DIAGNOSIS — R57.9 SHOCK (MULTI): ICD-10-CM

## 2024-01-01 DIAGNOSIS — E78.5 HYPERLIPIDEMIA LDL GOAL <70: ICD-10-CM

## 2024-01-01 DIAGNOSIS — L97.512 NON-PRESSURE CHRONIC ULCER OF OTHER PART OF RIGHT FOOT WITH FAT LAYER EXPOSED (MULTI): ICD-10-CM

## 2024-01-01 DIAGNOSIS — N48.1 BALANITIS: Primary | ICD-10-CM

## 2024-01-01 DIAGNOSIS — L97.906: ICD-10-CM

## 2024-01-01 DIAGNOSIS — R60.1 GENERALIZED EDEMA: ICD-10-CM

## 2024-01-01 DIAGNOSIS — T82.838D HEMORRHAGE OF ARTERIOVENOUS FISTULA, SUBSEQUENT ENCOUNTER: Primary | ICD-10-CM

## 2024-01-01 DIAGNOSIS — I49.5 SICK SINUS SYNDROME (MULTI): ICD-10-CM

## 2024-01-01 DIAGNOSIS — I77.0 A-V FISTULA (CMS-HCC): Primary | ICD-10-CM

## 2024-01-01 DIAGNOSIS — M79.89 OTHER SPECIFIED SOFT TISSUE DISORDERS: ICD-10-CM

## 2024-01-01 DIAGNOSIS — I70.222 CRITICAL LIMB ISCHEMIA OF LEFT LOWER EXTREMITY (MULTI): ICD-10-CM

## 2024-01-01 DIAGNOSIS — L97.509 TOE ULCER (MULTI): ICD-10-CM

## 2024-01-01 DIAGNOSIS — I82.A23: ICD-10-CM

## 2024-01-01 LAB
ABO GROUP (TYPE) IN BLOOD: NORMAL
ACT BLD: 344 SEC (ref 89–169)
ACT BLD: 358 SEC (ref 89–169)
ACT BLD: 399 SEC (ref 89–169)
ACTH PLAS-MCNC: 39.7 PG/ML (ref 7.2–63.3)
ALBUMIN SERPL BCP-MCNC: 1.8 G/DL (ref 3.4–5)
ALBUMIN SERPL BCP-MCNC: 2.2 G/DL (ref 3.4–5)
ALBUMIN SERPL BCP-MCNC: 2.2 G/DL (ref 3.4–5)
ALBUMIN SERPL BCP-MCNC: 2.3 G/DL (ref 3.4–5)
ALBUMIN SERPL BCP-MCNC: 2.4 G/DL (ref 3.4–5)
ALBUMIN SERPL BCP-MCNC: 2.5 G/DL (ref 3.4–5)
ALBUMIN SERPL BCP-MCNC: 2.6 G/DL (ref 3.4–5)
ALBUMIN SERPL BCP-MCNC: 2.7 G/DL (ref 3.4–5)
ALBUMIN SERPL BCP-MCNC: 2.8 G/DL (ref 3.4–5)
ALBUMIN SERPL BCP-MCNC: 2.9 G/DL (ref 3.4–5)
ALBUMIN SERPL BCP-MCNC: 2.9 G/DL (ref 3.4–5)
ALBUMIN SERPL BCP-MCNC: 3 G/DL (ref 3.4–5)
ALBUMIN SERPL BCP-MCNC: 3.1 G/DL (ref 3.4–5)
ALBUMIN SERPL BCP-MCNC: 3.2 G/DL (ref 3.4–5)
ALBUMIN SERPL BCP-MCNC: 3.3 G/DL (ref 3.4–5)
ALBUMIN SERPL BCP-MCNC: 3.4 G/DL (ref 3.4–5)
ALP SERPL-CCNC: 108 U/L (ref 33–136)
ALP SERPL-CCNC: 133 U/L (ref 33–136)
ALP SERPL-CCNC: 133 U/L (ref 33–136)
ALP SERPL-CCNC: 150 U/L (ref 33–136)
ALP SERPL-CCNC: 153 U/L (ref 33–136)
ALP SERPL-CCNC: 164 U/L (ref 33–136)
ALP SERPL-CCNC: 181 U/L (ref 33–136)
ALP SERPL-CCNC: 78 U/L (ref 33–136)
ALP SERPL-CCNC: 79 U/L (ref 33–136)
ALP SERPL-CCNC: 83 U/L (ref 33–136)
ALP SERPL-CCNC: 86 U/L (ref 33–136)
ALT SERPL W P-5'-P-CCNC: 12 U/L (ref 10–52)
ALT SERPL W P-5'-P-CCNC: 14 U/L (ref 10–52)
ALT SERPL W P-5'-P-CCNC: 16 U/L (ref 10–52)
ALT SERPL W P-5'-P-CCNC: 16 U/L (ref 10–52)
ALT SERPL W P-5'-P-CCNC: 19 U/L (ref 10–52)
ALT SERPL W P-5'-P-CCNC: 4 U/L (ref 10–52)
ALT SERPL W P-5'-P-CCNC: 6 U/L (ref 10–52)
ALT SERPL W P-5'-P-CCNC: 6 U/L (ref 10–52)
ALT SERPL W P-5'-P-CCNC: <3 U/L (ref 10–52)
AMMONIA PLAS-SCNC: 48 UMOL/L (ref 16–53)
ANION GAP BLDA CALCULATED.4IONS-SCNC: 10 MMO/L (ref 10–25)
ANION GAP BLDA CALCULATED.4IONS-SCNC: 12 MMO/L (ref 10–25)
ANION GAP BLDA CALCULATED.4IONS-SCNC: 3 MMO/L (ref 10–25)
ANION GAP BLDA CALCULATED.4IONS-SCNC: 6 MMO/L (ref 10–25)
ANION GAP BLDA CALCULATED.4IONS-SCNC: 6 MMO/L (ref 10–25)
ANION GAP BLDA CALCULATED.4IONS-SCNC: 7 MMO/L (ref 10–25)
ANION GAP BLDA CALCULATED.4IONS-SCNC: 8 MMO/L (ref 10–25)
ANION GAP BLDA CALCULATED.4IONS-SCNC: 9 MMO/L (ref 10–25)
ANION GAP BLDA CALCULATED.4IONS-SCNC: 9 MMO/L (ref 10–25)
ANION GAP BLDV CALCULATED.4IONS-SCNC: 11 MMOL/L (ref 10–25)
ANION GAP BLDV CALCULATED.4IONS-SCNC: 9 MMOL/L (ref 10–25)
ANION GAP SERPL CALC-SCNC: 11 MMOL/L (ref 10–20)
ANION GAP SERPL CALC-SCNC: 12 MMOL/L (ref 10–20)
ANION GAP SERPL CALC-SCNC: 13 MMOL/L (ref 10–20)
ANION GAP SERPL CALC-SCNC: 14 MMOL/L (ref 10–20)
ANION GAP SERPL CALC-SCNC: 15 MMOL/L
ANION GAP SERPL CALC-SCNC: 15 MMOL/L (ref 10–20)
ANION GAP SERPL CALC-SCNC: 16 MMOL/L (ref 10–20)
ANION GAP SERPL CALC-SCNC: 17 MMOL/L (ref 10–20)
ANION GAP SERPL CALC-SCNC: 18 MMOL/L (ref 10–20)
ANION GAP SERPL CALC-SCNC: 19 MMOL/L (ref 10–20)
ANION GAP SERPL CALC-SCNC: 20 MMOL/L (ref 10–20)
ANION GAP SERPL CALC-SCNC: 21 MMOL/L (ref 10–20)
ANION GAP SERPL CALC-SCNC: 21 MMOL/L (ref 10–20)
ANION GAP SERPL CALC-SCNC: 22 MMOL/L (ref 10–20)
ANION GAP SERPL CALC-SCNC: 23 MMOL/L (ref 10–20)
ANTIBODY SCREEN: NORMAL
AORTIC VALVE MEAN GRADIENT: 13 MMHG
AORTIC VALVE MEAN GRADIENT: 7 MMHG
AORTIC VALVE PEAK VELOCITY: 1.98 M/S
AORTIC VALVE PEAK VELOCITY: 2.51 M/S
APTT PPP: 135 SECONDS (ref 27–38)
APTT PPP: 27 SECONDS (ref 27–38)
APTT PPP: 28 SECONDS (ref 27–38)
APTT PPP: 30 SECONDS (ref 27–38)
APTT PPP: 30 SECONDS (ref 27–38)
AST SERPL W P-5'-P-CCNC: 13 U/L (ref 9–39)
AST SERPL W P-5'-P-CCNC: 14 U/L (ref 9–39)
AST SERPL W P-5'-P-CCNC: 14 U/L (ref 9–39)
AST SERPL W P-5'-P-CCNC: 15 U/L (ref 9–39)
AST SERPL W P-5'-P-CCNC: 16 U/L (ref 9–39)
AST SERPL W P-5'-P-CCNC: 19 U/L (ref 9–39)
AST SERPL W P-5'-P-CCNC: 20 U/L (ref 9–39)
AST SERPL W P-5'-P-CCNC: 23 U/L (ref 9–39)
AST SERPL W P-5'-P-CCNC: 27 U/L (ref 9–39)
AST SERPL W P-5'-P-CCNC: 28 U/L (ref 9–39)
AST SERPL W P-5'-P-CCNC: 37 U/L (ref 9–39)
ATRIAL RATE: 136 BPM
ATRIAL RATE: 150 BPM
ATRIAL RATE: 28 BPM
ATRIAL RATE: 68 BPM
ATRIAL RATE: 88 BPM
AV PEAK GRADIENT: 15.7 MMHG
AV PEAK GRADIENT: 25.2 MMHG
AVA (PEAK VEL): 1.31 CM2
AVA (PEAK VEL): 1.58 CM2
AVA (VTI): 1.35 CM2
AVA (VTI): 1.5 CM2
BACTERIA BLD CULT: NORMAL
BACTERIA GENITAL AEROBE CULT: NORMAL
BACTERIA GENITAL AEROBE CULT: NORMAL
BACTERIA SPEC CULT: ABNORMAL
BACTERIA SPEC CULT: NORMAL
BASE EXCESS BLDA CALC-SCNC: -0.6 MMOL/L (ref -2–3)
BASE EXCESS BLDA CALC-SCNC: -0.6 MMOL/L (ref -2–3)
BASE EXCESS BLDA CALC-SCNC: -0.8 MMOL/L (ref -2–3)
BASE EXCESS BLDA CALC-SCNC: -0.9 MMOL/L (ref -2–3)
BASE EXCESS BLDA CALC-SCNC: -1.2 MMOL/L (ref -2–3)
BASE EXCESS BLDA CALC-SCNC: -1.6 MMOL/L (ref -2–3)
BASE EXCESS BLDA CALC-SCNC: -2.6 MMOL/L (ref -2–3)
BASE EXCESS BLDA CALC-SCNC: 0 MMOL/L (ref -2–3)
BASE EXCESS BLDA CALC-SCNC: 0.2 MMOL/L (ref -2–3)
BASE EXCESS BLDA CALC-SCNC: 0.4 MMOL/L (ref -2–3)
BASE EXCESS BLDA CALC-SCNC: 0.7 MMOL/L (ref -2–3)
BASE EXCESS BLDA CALC-SCNC: 0.9 MMOL/L (ref -2–3)
BASE EXCESS BLDA CALC-SCNC: 2.6 MMOL/L (ref -2–3)
BASE EXCESS BLDA CALC-SCNC: 4.2 MMOL/L (ref -2–3)
BASE EXCESS BLDA CALC-SCNC: 4.7 MMOL/L (ref -2–3)
BASE EXCESS BLDV CALC-SCNC: -0.8 MMOL/L (ref -2–3)
BASE EXCESS BLDV CALC-SCNC: 1.8 MMOL/L (ref -2–3)
BASE EXCESS BLDV CALC-SCNC: 3.4 MMOL/L (ref -2–3)
BASE EXCESS BLDV CALC-SCNC: 4 MMOL/L (ref -2–3)
BASOPHILS # BLD AUTO: 0.01 X10*3/UL (ref 0–0.1)
BASOPHILS # BLD AUTO: 0.01 X10*3/UL (ref 0–0.1)
BASOPHILS # BLD AUTO: 0.02 X10*3/UL (ref 0–0.1)
BASOPHILS # BLD AUTO: 0.02 X10*3/UL (ref 0–0.1)
BASOPHILS # BLD AUTO: 0.03 X10*3/UL (ref 0–0.1)
BASOPHILS # BLD AUTO: 0.05 X10*3/UL (ref 0–0.1)
BASOPHILS # BLD AUTO: 0.05 X10*3/UL (ref 0–0.1)
BASOPHILS # BLD AUTO: 0.06 X10*3/UL (ref 0–0.1)
BASOPHILS # BLD AUTO: 0.07 X10*3/UL (ref 0–0.1)
BASOPHILS # BLD AUTO: 0.08 X10*3/UL (ref 0–0.1)
BASOPHILS # BLD AUTO: 0.09 X10*3/UL (ref 0–0.1)
BASOPHILS # BLD AUTO: 0.16 X10*3/UL (ref 0–0.1)
BASOPHILS # BLD MANUAL: 0 X10*3/UL (ref 0–0.1)
BASOPHILS NFR BLD AUTO: 0.1 %
BASOPHILS NFR BLD AUTO: 0.1 %
BASOPHILS NFR BLD AUTO: 0.2 %
BASOPHILS NFR BLD AUTO: 0.2 %
BASOPHILS NFR BLD AUTO: 0.3 %
BASOPHILS NFR BLD AUTO: 0.4 %
BASOPHILS NFR BLD AUTO: 0.5 %
BASOPHILS NFR BLD AUTO: 0.6 %
BASOPHILS NFR BLD AUTO: 0.7 %
BASOPHILS NFR BLD AUTO: 0.7 %
BASOPHILS NFR BLD AUTO: 0.8 %
BASOPHILS NFR BLD AUTO: 1 %
BASOPHILS NFR BLD AUTO: 1.3 %
BASOPHILS NFR BLD MANUAL: 0 %
BILIRUB DIRECT SERPL-MCNC: 0.2 MG/DL (ref 0–0.3)
BILIRUB DIRECT SERPL-MCNC: 0.2 MG/DL (ref 0–0.3)
BILIRUB DIRECT SERPL-MCNC: 0.3 MG/DL (ref 0–0.3)
BILIRUB SERPL-MCNC: 0.5 MG/DL (ref 0–1.2)
BILIRUB SERPL-MCNC: 0.6 MG/DL (ref 0–1.2)
BILIRUB SERPL-MCNC: 0.7 MG/DL (ref 0–1.2)
BLOOD EXPIRATION DATE: NORMAL
BNP SERPL-MCNC: 1525 PG/ML (ref 0–99)
BNP SERPL-MCNC: 1717 PG/ML (ref 0–99)
BNP SERPL-MCNC: 4033 PG/ML (ref 0–99)
BNP SERPL-MCNC: 486 PG/ML (ref 0–99)
BODY TEMPERATURE: 37 DEGREES CELSIUS
BUN SERPL-MCNC: 12 MG/DL (ref 6–23)
BUN SERPL-MCNC: 15 MG/DL (ref 6–23)
BUN SERPL-MCNC: 16 MG/DL (ref 6–23)
BUN SERPL-MCNC: 17 MG/DL (ref 6–23)
BUN SERPL-MCNC: 18 MG/DL (ref 6–23)
BUN SERPL-MCNC: 20 MG/DL (ref 6–23)
BUN SERPL-MCNC: 21 MG/DL (ref 6–23)
BUN SERPL-MCNC: 21 MG/DL (ref 6–23)
BUN SERPL-MCNC: 22 MG/DL (ref 6–23)
BUN SERPL-MCNC: 23 MG/DL (ref 6–23)
BUN SERPL-MCNC: 23 MG/DL (ref 6–23)
BUN SERPL-MCNC: 24 MG/DL (ref 6–23)
BUN SERPL-MCNC: 25 MG/DL (ref 6–23)
BUN SERPL-MCNC: 26 MG/DL (ref 6–23)
BUN SERPL-MCNC: 26 MG/DL (ref 6–23)
BUN SERPL-MCNC: 27 MG/DL (ref 6–23)
BUN SERPL-MCNC: 30 MG/DL (ref 6–23)
BUN SERPL-MCNC: 32 MG/DL (ref 6–23)
BUN SERPL-MCNC: 33 MG/DL (ref 6–23)
BUN SERPL-MCNC: 34 MG/DL (ref 6–23)
BUN SERPL-MCNC: 35 MG/DL (ref 6–23)
BUN SERPL-MCNC: 36 MG/DL (ref 6–23)
BUN SERPL-MCNC: 37 MG/DL (ref 6–23)
BUN SERPL-MCNC: 38 MG/DL (ref 6–23)
BUN SERPL-MCNC: 39 MG/DL (ref 6–23)
BUN SERPL-MCNC: 40 MG/DL (ref 6–23)
BUN SERPL-MCNC: 40 MG/DL (ref 6–23)
BUN SERPL-MCNC: 41 MG/DL (ref 6–23)
BUN SERPL-MCNC: 41 MG/DL (ref 6–23)
BUN SERPL-MCNC: 42 MG/DL (ref 6–23)
BUN SERPL-MCNC: 43 MG/DL (ref 6–23)
BUN SERPL-MCNC: 44 MG/DL (ref 6–23)
BUN SERPL-MCNC: 45 MG/DL (ref 6–23)
BUN SERPL-MCNC: 45 MG/DL (ref 6–23)
BUN SERPL-MCNC: 46 MG/DL (ref 6–23)
BUN SERPL-MCNC: 48 MG/DL (ref 6–23)
BUN SERPL-MCNC: 49 MG/DL (ref 6–23)
BUN SERPL-MCNC: 50 MG/DL (ref 6–23)
BUN SERPL-MCNC: 52 MG/DL (ref 6–23)
BUN SERPL-MCNC: 52 MG/DL (ref 6–23)
BUN SERPL-MCNC: 53 MG/DL (ref 6–23)
BUN SERPL-MCNC: 53 MG/DL (ref 6–23)
BUN SERPL-MCNC: 54 MG/DL (ref 6–23)
BUN SERPL-MCNC: 58 MG/DL (ref 6–23)
BUN SERPL-MCNC: 60 MG/DL (ref 6–23)
BUN SERPL-MCNC: 64 MG/DL (ref 6–23)
BUN SERPL-MCNC: 67 MG/DL (ref 6–23)
BUN SERPL-MCNC: 70 MG/DL (ref 6–23)
BURR CELLS BLD QL SMEAR: NORMAL
C TRACH SPEC QL CULT: NORMAL
CA-I BLD-SCNC: 1.29 MMOL/L (ref 1.1–1.33)
CA-I BLD-SCNC: 1.3 MMOL/L (ref 1.1–1.33)
CA-I BLD-SCNC: 1.31 MMOL/L (ref 1.1–1.33)
CA-I BLD-SCNC: 1.37 MMOL/L (ref 1.1–1.33)
CA-I BLDA-SCNC: 1.19 MMOL/L (ref 1.1–1.33)
CA-I BLDA-SCNC: 1.25 MMOL/L (ref 1.1–1.33)
CA-I BLDA-SCNC: 1.27 MMOL/L (ref 1.1–1.33)
CA-I BLDA-SCNC: 1.28 MMOL/L (ref 1.1–1.33)
CA-I BLDA-SCNC: 1.33 MMOL/L (ref 1.1–1.33)
CA-I BLDA-SCNC: 1.34 MMOL/L (ref 1.1–1.33)
CA-I BLDA-SCNC: 1.34 MMOL/L (ref 1.1–1.33)
CA-I BLDA-SCNC: 1.35 MMOL/L (ref 1.1–1.33)
CA-I BLDA-SCNC: 1.35 MMOL/L (ref 1.1–1.33)
CA-I BLDA-SCNC: 1.36 MMOL/L (ref 1.1–1.33)
CA-I BLDA-SCNC: 1.36 MMOL/L (ref 1.1–1.33)
CA-I BLDA-SCNC: 1.37 MMOL/L (ref 1.1–1.33)
CA-I BLDV-SCNC: 1.2 MMOL/L (ref 1.1–1.33)
CA-I BLDV-SCNC: 1.25 MMOL/L (ref 1.1–1.33)
CA-I BLDV-SCNC: 1.26 MMOL/L (ref 1.1–1.33)
CA-I BLDV-SCNC: 1.4 MMOL/L (ref 1.1–1.33)
CALCIUM SERPL-MCNC: 10 MG/DL (ref 8.6–10.3)
CALCIUM SERPL-MCNC: 10 MG/DL (ref 8.6–10.6)
CALCIUM SERPL-MCNC: 10 MG/DL (ref 8.6–10.6)
CALCIUM SERPL-MCNC: 10.1 MG/DL (ref 8.6–10.6)
CALCIUM SERPL-MCNC: 10.2 MG/DL (ref 8.6–10.6)
CALCIUM SERPL-MCNC: 10.2 MG/DL (ref 8.6–10.6)
CALCIUM SERPL-MCNC: 10.3 MG/DL (ref 8.6–10.6)
CALCIUM SERPL-MCNC: 10.4 MG/DL (ref 8.6–10.6)
CALCIUM SERPL-MCNC: 10.4 MG/DL (ref 8.6–10.6)
CALCIUM SERPL-MCNC: 10.5 MG/DL (ref 8.6–10.6)
CALCIUM SERPL-MCNC: 6.4 MG/DL (ref 8.6–10.6)
CALCIUM SERPL-MCNC: 8.2 MG/DL (ref 8.6–10.6)
CALCIUM SERPL-MCNC: 9 MG/DL (ref 8.6–10.6)
CALCIUM SERPL-MCNC: 9 MG/DL (ref 8.6–10.6)
CALCIUM SERPL-MCNC: 9.1 MG/DL (ref 8.6–10.6)
CALCIUM SERPL-MCNC: 9.2 MG/DL (ref 8.6–10.3)
CALCIUM SERPL-MCNC: 9.2 MG/DL (ref 8.6–10.6)
CALCIUM SERPL-MCNC: 9.2 MG/DL (ref 8.6–10.6)
CALCIUM SERPL-MCNC: 9.3 MG/DL (ref 8.6–10.3)
CALCIUM SERPL-MCNC: 9.3 MG/DL (ref 8.6–10.3)
CALCIUM SERPL-MCNC: 9.3 MG/DL (ref 8.6–10.6)
CALCIUM SERPL-MCNC: 9.4 MG/DL (ref 8.6–10.6)
CALCIUM SERPL-MCNC: 9.5 MG/DL (ref 8.6–10.3)
CALCIUM SERPL-MCNC: 9.5 MG/DL (ref 8.6–10.6)
CALCIUM SERPL-MCNC: 9.6 MG/DL (ref 8.6–10.3)
CALCIUM SERPL-MCNC: 9.6 MG/DL (ref 8.6–10.6)
CALCIUM SERPL-MCNC: 9.7 MG/DL (ref 8.6–10.3)
CALCIUM SERPL-MCNC: 9.7 MG/DL (ref 8.6–10.6)
CALCIUM SERPL-MCNC: 9.8 MG/DL (ref 8.6–10.3)
CALCIUM SERPL-MCNC: 9.8 MG/DL (ref 8.6–10.6)
CALCIUM SERPL-MCNC: 9.9 MG/DL (ref 8.6–10.3)
CALCIUM SERPL-MCNC: 9.9 MG/DL (ref 8.6–10.6)
CARDIAC TROPONIN I PNL SERPL HS: 33 NG/L (ref 0–53)
CARDIAC TROPONIN I PNL SERPL HS: 36 NG/L (ref 0–53)
CARDIAC TROPONIN I PNL SERPL HS: 91 NG/L (ref 0–20)
CARDIAC TROPONIN I PNL SERPL HS: 92 NG/L (ref 0–20)
CHLORIDE BLDA-SCNC: 100 MMOL/L (ref 98–107)
CHLORIDE BLDA-SCNC: 91 MMOL/L (ref 98–107)
CHLORIDE BLDA-SCNC: 93 MMOL/L (ref 98–107)
CHLORIDE BLDA-SCNC: 93 MMOL/L (ref 98–107)
CHLORIDE BLDA-SCNC: 95 MMOL/L (ref 98–107)
CHLORIDE BLDA-SCNC: 97 MMOL/L (ref 98–107)
CHLORIDE BLDA-SCNC: 98 MMOL/L (ref 98–107)
CHLORIDE BLDA-SCNC: 99 MMOL/L (ref 98–107)
CHLORIDE BLDA-SCNC: 99 MMOL/L (ref 98–107)
CHLORIDE BLDV-SCNC: 91 MMOL/L (ref 98–107)
CHLORIDE BLDV-SCNC: 93 MMOL/L (ref 98–107)
CHLORIDE BLDV-SCNC: 94 MMOL/L (ref 98–107)
CHLORIDE BLDV-SCNC: 95 MMOL/L (ref 98–107)
CHLORIDE SERPL-SCNC: 100 MMOL/L (ref 98–107)
CHLORIDE SERPL-SCNC: 100 MMOL/L (ref 98–107)
CHLORIDE SERPL-SCNC: 101 MMOL/L (ref 98–107)
CHLORIDE SERPL-SCNC: 111 MMOL/L (ref 98–107)
CHLORIDE SERPL-SCNC: 87 MMOL/L (ref 98–107)
CHLORIDE SERPL-SCNC: 87 MMOL/L (ref 98–107)
CHLORIDE SERPL-SCNC: 88 MMOL/L (ref 98–107)
CHLORIDE SERPL-SCNC: 88 MMOL/L (ref 98–107)
CHLORIDE SERPL-SCNC: 89 MMOL/L (ref 98–107)
CHLORIDE SERPL-SCNC: 90 MMOL/L (ref 98–107)
CHLORIDE SERPL-SCNC: 91 MMOL/L (ref 98–107)
CHLORIDE SERPL-SCNC: 92 MMOL/L (ref 98–107)
CHLORIDE SERPL-SCNC: 93 MMOL/L (ref 98–107)
CHLORIDE SERPL-SCNC: 94 MMOL/L (ref 98–107)
CHLORIDE SERPL-SCNC: 95 MMOL/L (ref 98–107)
CHLORIDE SERPL-SCNC: 96 MMOL/L (ref 98–107)
CHLORIDE SERPL-SCNC: 97 MMOL/L (ref 98–107)
CHLORIDE SERPL-SCNC: 98 MMOL/L (ref 98–107)
CHLORIDE SERPL-SCNC: 99 MMOL/L (ref 98–107)
CHLORIDE SERPL-SCNC: 99 MMOL/L (ref 98–107)
CLUE CELLS SPEC QL WET PREP: ABNORMAL
CO2 SERPL-SCNC: 16 MMOL/L (ref 21–32)
CO2 SERPL-SCNC: 19 MMOL/L (ref 21–32)
CO2 SERPL-SCNC: 20 MMOL/L (ref 21–32)
CO2 SERPL-SCNC: 22 MMOL/L (ref 21–32)
CO2 SERPL-SCNC: 22 MMOL/L (ref 21–32)
CO2 SERPL-SCNC: 23 MMOL/L (ref 21–32)
CO2 SERPL-SCNC: 24 MMOL/L (ref 21–32)
CO2 SERPL-SCNC: 25 MMOL/L (ref 21–32)
CO2 SERPL-SCNC: 26 MMOL/L (ref 21–32)
CO2 SERPL-SCNC: 27 MMOL/L (ref 21–32)
CO2 SERPL-SCNC: 28 MMOL/L (ref 21–32)
CO2 SERPL-SCNC: 29 MMOL/L (ref 21–32)
CO2 SERPL-SCNC: 30 MMOL/L (ref 21–32)
CO2 SERPL-SCNC: 31 MMOL/L (ref 21–32)
CO2 SERPL-SCNC: 32 MMOL/L (ref 21–32)
CORTIS AM PEAK SERPL-MSCNC: 21 UG/DL (ref 5–20)
CORTIS AM PEAK SERPL-MSCNC: 22.2 UG/DL (ref 5–20)
CREAT SERPL-MCNC: 1.53 MG/DL (ref 0.5–1.3)
CREAT SERPL-MCNC: 1.62 MG/DL (ref 0.5–1.3)
CREAT SERPL-MCNC: 1.62 MG/DL (ref 0.5–1.3)
CREAT SERPL-MCNC: 1.64 MG/DL (ref 0.5–1.3)
CREAT SERPL-MCNC: 1.88 MG/DL (ref 0.5–1.3)
CREAT SERPL-MCNC: 1.89 MG/DL (ref 0.5–1.3)
CREAT SERPL-MCNC: 1.91 MG/DL (ref 0.5–1.3)
CREAT SERPL-MCNC: 1.94 MG/DL (ref 0.5–1.3)
CREAT SERPL-MCNC: 1.96 MG/DL (ref 0.5–1.3)
CREAT SERPL-MCNC: 1.96 MG/DL (ref 0.5–1.3)
CREAT SERPL-MCNC: 2.22 MG/DL (ref 0.5–1.3)
CREAT SERPL-MCNC: 2.24 MG/DL (ref 0.5–1.3)
CREAT SERPL-MCNC: 2.29 MG/DL (ref 0.5–1.3)
CREAT SERPL-MCNC: 2.35 MG/DL (ref 0.5–1.3)
CREAT SERPL-MCNC: 2.36 MG/DL (ref 0.5–1.3)
CREAT SERPL-MCNC: 2.36 MG/DL (ref 0.5–1.3)
CREAT SERPL-MCNC: 2.37 MG/DL (ref 0.5–1.3)
CREAT SERPL-MCNC: 2.63 MG/DL (ref 0.5–1.3)
CREAT SERPL-MCNC: 2.63 MG/DL (ref 0.5–1.3)
CREAT SERPL-MCNC: 2.76 MG/DL (ref 0.5–1.3)
CREAT SERPL-MCNC: 2.83 MG/DL (ref 0.5–1.3)
CREAT SERPL-MCNC: 2.85 MG/DL (ref 0.5–1.3)
CREAT SERPL-MCNC: 2.89 MG/DL (ref 0.5–1.3)
CREAT SERPL-MCNC: 2.92 MG/DL (ref 0.5–1.3)
CREAT SERPL-MCNC: 2.92 MG/DL (ref 0.5–1.3)
CREAT SERPL-MCNC: 2.93 MG/DL (ref 0.5–1.3)
CREAT SERPL-MCNC: 2.93 MG/DL (ref 0.5–1.3)
CREAT SERPL-MCNC: 3.1 MG/DL (ref 0.5–1.3)
CREAT SERPL-MCNC: 3.12 MG/DL (ref 0.5–1.3)
CREAT SERPL-MCNC: 3.15 MG/DL (ref 0.5–1.3)
CREAT SERPL-MCNC: 3.17 MG/DL (ref 0.5–1.3)
CREAT SERPL-MCNC: 3.23 MG/DL (ref 0.5–1.3)
CREAT SERPL-MCNC: 3.34 MG/DL (ref 0.5–1.3)
CREAT SERPL-MCNC: 3.35 MG/DL (ref 0.5–1.3)
CREAT SERPL-MCNC: 3.41 MG/DL (ref 0.5–1.3)
CREAT SERPL-MCNC: 3.42 MG/DL (ref 0.5–1.3)
CREAT SERPL-MCNC: 3.43 MG/DL (ref 0.5–1.3)
CREAT SERPL-MCNC: 3.43 MG/DL (ref 0.5–1.3)
CREAT SERPL-MCNC: 3.45 MG/DL (ref 0.5–1.3)
CREAT SERPL-MCNC: 3.46 MG/DL (ref 0.5–1.3)
CREAT SERPL-MCNC: 3.49 MG/DL (ref 0.5–1.3)
CREAT SERPL-MCNC: 3.58 MG/DL (ref 0.5–1.3)
CREAT SERPL-MCNC: 3.59 MG/DL (ref 0.5–1.3)
CREAT SERPL-MCNC: 3.62 MG/DL (ref 0.5–1.3)
CREAT SERPL-MCNC: 3.69 MG/DL (ref 0.5–1.3)
CREAT SERPL-MCNC: 3.7 MG/DL (ref 0.5–1.3)
CREAT SERPL-MCNC: 3.71 MG/DL (ref 0.5–1.3)
CREAT SERPL-MCNC: 3.73 MG/DL (ref 0.5–1.3)
CREAT SERPL-MCNC: 3.75 MG/DL (ref 0.5–1.3)
CREAT SERPL-MCNC: 3.77 MG/DL (ref 0.5–1.3)
CREAT SERPL-MCNC: 3.79 MG/DL (ref 0.5–1.3)
CREAT SERPL-MCNC: 3.81 MG/DL (ref 0.5–1.3)
CREAT SERPL-MCNC: 3.83 MG/DL (ref 0.5–1.3)
CREAT SERPL-MCNC: 3.85 MG/DL (ref 0.5–1.3)
CREAT SERPL-MCNC: 3.92 MG/DL (ref 0.5–1.3)
CREAT SERPL-MCNC: 3.97 MG/DL (ref 0.5–1.3)
CREAT SERPL-MCNC: 4 MG/DL (ref 0.5–1.3)
CREAT SERPL-MCNC: 4.02 MG/DL (ref 0.5–1.3)
CREAT SERPL-MCNC: 4.04 MG/DL (ref 0.5–1.3)
CREAT SERPL-MCNC: 4.06 MG/DL (ref 0.5–1.3)
CREAT SERPL-MCNC: 4.07 MG/DL (ref 0.5–1.3)
CREAT SERPL-MCNC: 4.31 MG/DL (ref 0.5–1.3)
CREAT SERPL-MCNC: 4.31 MG/DL (ref 0.5–1.3)
CREAT SERPL-MCNC: 4.38 MG/DL (ref 0.5–1.3)
CREAT SERPL-MCNC: 4.44 MG/DL (ref 0.5–1.3)
CREAT SERPL-MCNC: 4.46 MG/DL (ref 0.5–1.3)
CREAT SERPL-MCNC: 4.53 MG/DL (ref 0.5–1.3)
CREAT SERPL-MCNC: 4.55 MG/DL (ref 0.5–1.3)
CREAT SERPL-MCNC: 4.56 MG/DL (ref 0.5–1.3)
CREAT SERPL-MCNC: 4.59 MG/DL (ref 0.5–1.3)
CREAT SERPL-MCNC: 4.68 MG/DL (ref 0.5–1.3)
CREAT SERPL-MCNC: 4.69 MG/DL (ref 0.5–1.3)
CREAT SERPL-MCNC: 4.69 MG/DL (ref 0.5–1.3)
CREAT SERPL-MCNC: 4.85 MG/DL (ref 0.5–1.3)
CREAT SERPL-MCNC: 4.93 MG/DL (ref 0.5–1.3)
CREAT SERPL-MCNC: 5.1 MG/DL (ref 0.5–1.3)
CREAT SERPL-MCNC: 5.23 MG/DL (ref 0.5–1.3)
CREAT SERPL-MCNC: 5.28 MG/DL (ref 0.5–1.3)
CREAT SERPL-MCNC: 5.34 MG/DL (ref 0.5–1.3)
CREAT SERPL-MCNC: 5.44 MG/DL (ref 0.5–1.3)
CREAT SERPL-MCNC: 6.11 MG/DL (ref 0.5–1.3)
DACRYOCYTES BLD QL SMEAR: NORMAL
DISPENSE STATUS: NORMAL
EGFRCR SERPLBLD CKD-EPI 2021: 10 ML/MIN/1.73M*2
EGFRCR SERPLBLD CKD-EPI 2021: 11 ML/MIN/1.73M*2
EGFRCR SERPLBLD CKD-EPI 2021: 12 ML/MIN/1.73M*2
EGFRCR SERPLBLD CKD-EPI 2021: 13 ML/MIN/1.73M*2
EGFRCR SERPLBLD CKD-EPI 2021: 14 ML/MIN/1.73M*2
EGFRCR SERPLBLD CKD-EPI 2021: 15 ML/MIN/1.73M*2
EGFRCR SERPLBLD CKD-EPI 2021: 16 ML/MIN/1.73M*2
EGFRCR SERPLBLD CKD-EPI 2021: 17 ML/MIN/1.73M*2
EGFRCR SERPLBLD CKD-EPI 2021: 18 ML/MIN/1.73M*2
EGFRCR SERPLBLD CKD-EPI 2021: 19 ML/MIN/1.73M*2
EGFRCR SERPLBLD CKD-EPI 2021: 20 ML/MIN/1.73M*2
EGFRCR SERPLBLD CKD-EPI 2021: 20 ML/MIN/1.73M*2
EGFRCR SERPLBLD CKD-EPI 2021: 21 ML/MIN/1.73M*2
EGFRCR SERPLBLD CKD-EPI 2021: 22 ML/MIN/1.73M*2
EGFRCR SERPLBLD CKD-EPI 2021: 22 ML/MIN/1.73M*2
EGFRCR SERPLBLD CKD-EPI 2021: 23 ML/MIN/1.73M*2
EGFRCR SERPLBLD CKD-EPI 2021: 24 ML/MIN/1.73M*2
EGFRCR SERPLBLD CKD-EPI 2021: 24 ML/MIN/1.73M*2
EGFRCR SERPLBLD CKD-EPI 2021: 27 ML/MIN/1.73M*2
EGFRCR SERPLBLD CKD-EPI 2021: 28 ML/MIN/1.73M*2
EGFRCR SERPLBLD CKD-EPI 2021: 29 ML/MIN/1.73M*2
EGFRCR SERPLBLD CKD-EPI 2021: 29 ML/MIN/1.73M*2
EGFRCR SERPLBLD CKD-EPI 2021: 34 ML/MIN/1.73M*2
EGFRCR SERPLBLD CKD-EPI 2021: 34 ML/MIN/1.73M*2
EGFRCR SERPLBLD CKD-EPI 2021: 35 ML/MIN/1.73M*2
EGFRCR SERPLBLD CKD-EPI 2021: 35 ML/MIN/1.73M*2
EGFRCR SERPLBLD CKD-EPI 2021: 36 ML/MIN/1.73M*2
EGFRCR SERPLBLD CKD-EPI 2021: 36 ML/MIN/1.73M*2
EGFRCR SERPLBLD CKD-EPI 2021: 42 ML/MIN/1.73M*2
EGFRCR SERPLBLD CKD-EPI 2021: 43 ML/MIN/1.73M*2
EGFRCR SERPLBLD CKD-EPI 2021: 43 ML/MIN/1.73M*2
EGFRCR SERPLBLD CKD-EPI 2021: 46 ML/MIN/1.73M*2
EGFRCR SERPLBLD CKD-EPI 2021: 9 ML/MIN/1.73M*2
EJECTION FRACTION APICAL 4 CHAMBER: 35
EJECTION FRACTION APICAL 4 CHAMBER: 39.4
EJECTION FRACTION: 38 %
EJECTION FRACTION: 38 %
EOSINOPHIL # BLD AUTO: 0.04 X10*3/UL (ref 0–0.4)
EOSINOPHIL # BLD AUTO: 0.08 X10*3/UL (ref 0–0.4)
EOSINOPHIL # BLD AUTO: 0.1 X10*3/UL (ref 0–0.4)
EOSINOPHIL # BLD AUTO: 0.11 X10*3/UL (ref 0–0.4)
EOSINOPHIL # BLD AUTO: 0.11 X10*3/UL (ref 0–0.4)
EOSINOPHIL # BLD AUTO: 0.15 X10*3/UL (ref 0–0.4)
EOSINOPHIL # BLD AUTO: 0.24 X10*3/UL (ref 0–0.4)
EOSINOPHIL # BLD AUTO: 0.27 X10*3/UL (ref 0–0.4)
EOSINOPHIL # BLD AUTO: 0.28 X10*3/UL (ref 0–0.4)
EOSINOPHIL # BLD AUTO: 0.28 X10*3/UL (ref 0–0.4)
EOSINOPHIL # BLD AUTO: 0.3 X10*3/UL (ref 0–0.4)
EOSINOPHIL # BLD AUTO: 0.34 X10*3/UL (ref 0–0.4)
EOSINOPHIL # BLD AUTO: 0.34 X10*3/UL (ref 0–0.4)
EOSINOPHIL # BLD AUTO: 0.36 X10*3/UL (ref 0–0.4)
EOSINOPHIL # BLD AUTO: 0.37 X10*3/UL (ref 0–0.4)
EOSINOPHIL # BLD AUTO: 0.42 X10*3/UL (ref 0–0.4)
EOSINOPHIL # BLD AUTO: 0.5 X10*3/UL (ref 0–0.4)
EOSINOPHIL # BLD AUTO: 2.97 X10*3/UL (ref 0–0.4)
EOSINOPHIL # BLD MANUAL: 0.65 X10*3/UL (ref 0–0.4)
EOSINOPHIL NFR BLD AUTO: 0.5 %
EOSINOPHIL NFR BLD AUTO: 0.9 %
EOSINOPHIL NFR BLD AUTO: 1.1 %
EOSINOPHIL NFR BLD AUTO: 1.1 %
EOSINOPHIL NFR BLD AUTO: 1.2 %
EOSINOPHIL NFR BLD AUTO: 1.7 %
EOSINOPHIL NFR BLD AUTO: 2 %
EOSINOPHIL NFR BLD AUTO: 2.7 %
EOSINOPHIL NFR BLD AUTO: 2.9 %
EOSINOPHIL NFR BLD AUTO: 25 %
EOSINOPHIL NFR BLD AUTO: 3.2 %
EOSINOPHIL NFR BLD AUTO: 3.6 %
EOSINOPHIL NFR BLD AUTO: 3.8 %
EOSINOPHIL NFR BLD AUTO: 3.9 %
EOSINOPHIL NFR BLD AUTO: 4 %
EOSINOPHIL NFR BLD AUTO: 4.4 %
EOSINOPHIL NFR BLD AUTO: 4.9 %
EOSINOPHIL NFR BLD AUTO: 5 %
EOSINOPHIL NFR BLD MANUAL: 6 %
ERYTHROCYTE [DISTWIDTH] IN BLOOD BY AUTOMATED COUNT: 17.2 % (ref 11.5–14.5)
ERYTHROCYTE [DISTWIDTH] IN BLOOD BY AUTOMATED COUNT: 17.2 % (ref 11.5–14.5)
ERYTHROCYTE [DISTWIDTH] IN BLOOD BY AUTOMATED COUNT: 17.4 % (ref 11.5–14.5)
ERYTHROCYTE [DISTWIDTH] IN BLOOD BY AUTOMATED COUNT: 17.4 % (ref 11.5–14.5)
ERYTHROCYTE [DISTWIDTH] IN BLOOD BY AUTOMATED COUNT: 17.5 % (ref 11.5–14.5)
ERYTHROCYTE [DISTWIDTH] IN BLOOD BY AUTOMATED COUNT: 17.7 % (ref 11.5–14.5)
ERYTHROCYTE [DISTWIDTH] IN BLOOD BY AUTOMATED COUNT: 17.7 % (ref 11.5–14.5)
ERYTHROCYTE [DISTWIDTH] IN BLOOD BY AUTOMATED COUNT: 17.9 % (ref 11.5–14.5)
ERYTHROCYTE [DISTWIDTH] IN BLOOD BY AUTOMATED COUNT: 17.9 % (ref 11.5–14.5)
ERYTHROCYTE [DISTWIDTH] IN BLOOD BY AUTOMATED COUNT: 18.1 % (ref 11.5–14.5)
ERYTHROCYTE [DISTWIDTH] IN BLOOD BY AUTOMATED COUNT: 18.2 % (ref 11.5–14.5)
ERYTHROCYTE [DISTWIDTH] IN BLOOD BY AUTOMATED COUNT: 18.2 % (ref 11.5–14.5)
ERYTHROCYTE [DISTWIDTH] IN BLOOD BY AUTOMATED COUNT: 18.3 % (ref 11.5–14.5)
ERYTHROCYTE [DISTWIDTH] IN BLOOD BY AUTOMATED COUNT: 18.3 % (ref 11.5–14.5)
ERYTHROCYTE [DISTWIDTH] IN BLOOD BY AUTOMATED COUNT: 18.4 % (ref 11.5–14.5)
ERYTHROCYTE [DISTWIDTH] IN BLOOD BY AUTOMATED COUNT: 18.5 % (ref 11.5–14.5)
ERYTHROCYTE [DISTWIDTH] IN BLOOD BY AUTOMATED COUNT: 18.6 % (ref 11.5–14.5)
ERYTHROCYTE [DISTWIDTH] IN BLOOD BY AUTOMATED COUNT: 18.7 % (ref 11.5–14.5)
ERYTHROCYTE [DISTWIDTH] IN BLOOD BY AUTOMATED COUNT: 18.8 % (ref 11.5–14.5)
ERYTHROCYTE [DISTWIDTH] IN BLOOD BY AUTOMATED COUNT: 18.9 % (ref 11.5–14.5)
ERYTHROCYTE [DISTWIDTH] IN BLOOD BY AUTOMATED COUNT: 18.9 % (ref 11.5–14.5)
ERYTHROCYTE [DISTWIDTH] IN BLOOD BY AUTOMATED COUNT: 19 % (ref 11.5–14.5)
ERYTHROCYTE [DISTWIDTH] IN BLOOD BY AUTOMATED COUNT: 19.1 % (ref 11.5–14.5)
ERYTHROCYTE [DISTWIDTH] IN BLOOD BY AUTOMATED COUNT: 19.2 % (ref 11.5–14.5)
ERYTHROCYTE [DISTWIDTH] IN BLOOD BY AUTOMATED COUNT: 19.3 % (ref 11.5–14.5)
ERYTHROCYTE [DISTWIDTH] IN BLOOD BY AUTOMATED COUNT: 19.4 % (ref 11.5–14.5)
ERYTHROCYTE [DISTWIDTH] IN BLOOD BY AUTOMATED COUNT: 19.5 % (ref 11.5–14.5)
ERYTHROCYTE [DISTWIDTH] IN BLOOD BY AUTOMATED COUNT: 19.7 % (ref 11.5–14.5)
ERYTHROCYTE [DISTWIDTH] IN BLOOD BY AUTOMATED COUNT: 19.9 % (ref 11.5–14.5)
ERYTHROCYTE [DISTWIDTH] IN BLOOD BY AUTOMATED COUNT: 19.9 % (ref 11.5–14.5)
ERYTHROCYTE [DISTWIDTH] IN BLOOD BY AUTOMATED COUNT: 20 % (ref 11.5–14.5)
ERYTHROCYTE [DISTWIDTH] IN BLOOD BY AUTOMATED COUNT: 20 % (ref 11.5–14.5)
ERYTHROCYTE [DISTWIDTH] IN BLOOD BY AUTOMATED COUNT: 20.1 % (ref 11.5–14.5)
ERYTHROCYTE [DISTWIDTH] IN BLOOD BY AUTOMATED COUNT: 20.2 % (ref 11.5–14.5)
ERYTHROCYTE [DISTWIDTH] IN BLOOD BY AUTOMATED COUNT: 20.4 % (ref 11.5–14.5)
ERYTHROCYTE [DISTWIDTH] IN BLOOD BY AUTOMATED COUNT: 20.5 % (ref 11.5–14.5)
ERYTHROCYTE [DISTWIDTH] IN BLOOD BY AUTOMATED COUNT: 20.6 % (ref 11.5–14.5)
ERYTHROCYTE [DISTWIDTH] IN BLOOD BY AUTOMATED COUNT: 20.7 % (ref 11.5–14.5)
ERYTHROCYTE [DISTWIDTH] IN BLOOD BY AUTOMATED COUNT: 20.9 % (ref 11.5–14.5)
ERYTHROCYTE [DISTWIDTH] IN BLOOD BY AUTOMATED COUNT: 20.9 % (ref 11.5–14.5)
ERYTHROCYTE [DISTWIDTH] IN BLOOD BY AUTOMATED COUNT: 21.1 % (ref 11.5–14.5)
ERYTHROCYTE [DISTWIDTH] IN BLOOD BY AUTOMATED COUNT: 21.2 % (ref 11.5–14.5)
ERYTHROCYTE [DISTWIDTH] IN BLOOD BY AUTOMATED COUNT: 21.2 % (ref 11.5–14.5)
ERYTHROCYTE [DISTWIDTH] IN BLOOD BY AUTOMATED COUNT: 21.3 % (ref 11.5–14.5)
ERYTHROCYTE [DISTWIDTH] IN BLOOD BY AUTOMATED COUNT: 21.4 % (ref 11.5–14.5)
ERYTHROCYTE [DISTWIDTH] IN BLOOD BY AUTOMATED COUNT: 21.5 % (ref 11.5–14.5)
ERYTHROCYTE [DISTWIDTH] IN BLOOD BY AUTOMATED COUNT: 21.6 % (ref 11.5–14.5)
ERYTHROCYTE [DISTWIDTH] IN BLOOD BY AUTOMATED COUNT: 21.6 % (ref 11.5–14.5)
ERYTHROCYTE [DISTWIDTH] IN BLOOD BY AUTOMATED COUNT: 21.9 % (ref 11.5–14.5)
ERYTHROCYTE [DISTWIDTH] IN BLOOD BY AUTOMATED COUNT: 22 % (ref 11.5–14.5)
EST. AVERAGE GLUCOSE BLD GHB EST-MCNC: 94 MG/DL
FERRITIN SERPL-MCNC: 333 NG/ML (ref 20–300)
FLUAV RNA RESP QL NAA+PROBE: DETECTED
FLUBV RNA RESP QL NAA+PROBE: NOT DETECTED
FOLATE SERPL-MCNC: >24 NG/ML
FUNGUS SPEC CULT: ABNORMAL
FUNGUS SPEC CULT: NORMAL
FUNGUS SPEC FUNGUS STN: ABNORMAL
FUNGUS SPEC FUNGUS STN: NORMAL
GLUCOSE BLD MANUAL STRIP-MCNC: 100 MG/DL (ref 74–99)
GLUCOSE BLD MANUAL STRIP-MCNC: 101 MG/DL (ref 74–99)
GLUCOSE BLD MANUAL STRIP-MCNC: 102 MG/DL (ref 74–99)
GLUCOSE BLD MANUAL STRIP-MCNC: 103 MG/DL (ref 74–99)
GLUCOSE BLD MANUAL STRIP-MCNC: 104 MG/DL (ref 74–99)
GLUCOSE BLD MANUAL STRIP-MCNC: 105 MG/DL (ref 74–99)
GLUCOSE BLD MANUAL STRIP-MCNC: 107 MG/DL (ref 74–99)
GLUCOSE BLD MANUAL STRIP-MCNC: 108 MG/DL (ref 74–99)
GLUCOSE BLD MANUAL STRIP-MCNC: 109 MG/DL (ref 74–99)
GLUCOSE BLD MANUAL STRIP-MCNC: 110 MG/DL (ref 74–99)
GLUCOSE BLD MANUAL STRIP-MCNC: 110 MG/DL (ref 74–99)
GLUCOSE BLD MANUAL STRIP-MCNC: 111 MG/DL (ref 74–99)
GLUCOSE BLD MANUAL STRIP-MCNC: 112 MG/DL (ref 74–99)
GLUCOSE BLD MANUAL STRIP-MCNC: 113 MG/DL (ref 74–99)
GLUCOSE BLD MANUAL STRIP-MCNC: 113 MG/DL (ref 74–99)
GLUCOSE BLD MANUAL STRIP-MCNC: 114 MG/DL (ref 74–99)
GLUCOSE BLD MANUAL STRIP-MCNC: 114 MG/DL (ref 74–99)
GLUCOSE BLD MANUAL STRIP-MCNC: 115 MG/DL (ref 74–99)
GLUCOSE BLD MANUAL STRIP-MCNC: 115 MG/DL (ref 74–99)
GLUCOSE BLD MANUAL STRIP-MCNC: 116 MG/DL (ref 74–99)
GLUCOSE BLD MANUAL STRIP-MCNC: 117 MG/DL (ref 74–99)
GLUCOSE BLD MANUAL STRIP-MCNC: 118 MG/DL (ref 74–99)
GLUCOSE BLD MANUAL STRIP-MCNC: 118 MG/DL (ref 74–99)
GLUCOSE BLD MANUAL STRIP-MCNC: 119 MG/DL (ref 74–99)
GLUCOSE BLD MANUAL STRIP-MCNC: 120 MG/DL (ref 74–99)
GLUCOSE BLD MANUAL STRIP-MCNC: 121 MG/DL (ref 74–99)
GLUCOSE BLD MANUAL STRIP-MCNC: 122 MG/DL (ref 74–99)
GLUCOSE BLD MANUAL STRIP-MCNC: 123 MG/DL (ref 74–99)
GLUCOSE BLD MANUAL STRIP-MCNC: 123 MG/DL (ref 74–99)
GLUCOSE BLD MANUAL STRIP-MCNC: 124 MG/DL (ref 74–99)
GLUCOSE BLD MANUAL STRIP-MCNC: 125 MG/DL (ref 74–99)
GLUCOSE BLD MANUAL STRIP-MCNC: 125 MG/DL (ref 74–99)
GLUCOSE BLD MANUAL STRIP-MCNC: 126 MG/DL (ref 74–99)
GLUCOSE BLD MANUAL STRIP-MCNC: 127 MG/DL (ref 74–99)
GLUCOSE BLD MANUAL STRIP-MCNC: 128 MG/DL (ref 74–99)
GLUCOSE BLD MANUAL STRIP-MCNC: 129 MG/DL (ref 74–99)
GLUCOSE BLD MANUAL STRIP-MCNC: 130 MG/DL (ref 74–99)
GLUCOSE BLD MANUAL STRIP-MCNC: 130 MG/DL (ref 74–99)
GLUCOSE BLD MANUAL STRIP-MCNC: 131 MG/DL (ref 74–99)
GLUCOSE BLD MANUAL STRIP-MCNC: 132 MG/DL (ref 74–99)
GLUCOSE BLD MANUAL STRIP-MCNC: 133 MG/DL (ref 74–99)
GLUCOSE BLD MANUAL STRIP-MCNC: 135 MG/DL (ref 74–99)
GLUCOSE BLD MANUAL STRIP-MCNC: 136 MG/DL (ref 74–99)
GLUCOSE BLD MANUAL STRIP-MCNC: 138 MG/DL (ref 74–99)
GLUCOSE BLD MANUAL STRIP-MCNC: 139 MG/DL (ref 74–99)
GLUCOSE BLD MANUAL STRIP-MCNC: 140 MG/DL (ref 74–99)
GLUCOSE BLD MANUAL STRIP-MCNC: 141 MG/DL (ref 74–99)
GLUCOSE BLD MANUAL STRIP-MCNC: 141 MG/DL (ref 74–99)
GLUCOSE BLD MANUAL STRIP-MCNC: 142 MG/DL (ref 74–99)
GLUCOSE BLD MANUAL STRIP-MCNC: 144 MG/DL (ref 74–99)
GLUCOSE BLD MANUAL STRIP-MCNC: 144 MG/DL (ref 74–99)
GLUCOSE BLD MANUAL STRIP-MCNC: 145 MG/DL (ref 74–99)
GLUCOSE BLD MANUAL STRIP-MCNC: 147 MG/DL (ref 74–99)
GLUCOSE BLD MANUAL STRIP-MCNC: 149 MG/DL (ref 74–99)
GLUCOSE BLD MANUAL STRIP-MCNC: 150 MG/DL (ref 74–99)
GLUCOSE BLD MANUAL STRIP-MCNC: 151 MG/DL (ref 74–99)
GLUCOSE BLD MANUAL STRIP-MCNC: 152 MG/DL (ref 74–99)
GLUCOSE BLD MANUAL STRIP-MCNC: 153 MG/DL (ref 74–99)
GLUCOSE BLD MANUAL STRIP-MCNC: 153 MG/DL (ref 74–99)
GLUCOSE BLD MANUAL STRIP-MCNC: 154 MG/DL (ref 74–99)
GLUCOSE BLD MANUAL STRIP-MCNC: 155 MG/DL (ref 74–99)
GLUCOSE BLD MANUAL STRIP-MCNC: 155 MG/DL (ref 74–99)
GLUCOSE BLD MANUAL STRIP-MCNC: 156 MG/DL (ref 74–99)
GLUCOSE BLD MANUAL STRIP-MCNC: 156 MG/DL (ref 74–99)
GLUCOSE BLD MANUAL STRIP-MCNC: 157 MG/DL (ref 74–99)
GLUCOSE BLD MANUAL STRIP-MCNC: 158 MG/DL (ref 74–99)
GLUCOSE BLD MANUAL STRIP-MCNC: 159 MG/DL (ref 74–99)
GLUCOSE BLD MANUAL STRIP-MCNC: 160 MG/DL (ref 74–99)
GLUCOSE BLD MANUAL STRIP-MCNC: 160 MG/DL (ref 74–99)
GLUCOSE BLD MANUAL STRIP-MCNC: 161 MG/DL (ref 74–99)
GLUCOSE BLD MANUAL STRIP-MCNC: 162 MG/DL (ref 74–99)
GLUCOSE BLD MANUAL STRIP-MCNC: 162 MG/DL (ref 74–99)
GLUCOSE BLD MANUAL STRIP-MCNC: 163 MG/DL (ref 74–99)
GLUCOSE BLD MANUAL STRIP-MCNC: 163 MG/DL (ref 74–99)
GLUCOSE BLD MANUAL STRIP-MCNC: 164 MG/DL (ref 74–99)
GLUCOSE BLD MANUAL STRIP-MCNC: 165 MG/DL (ref 74–99)
GLUCOSE BLD MANUAL STRIP-MCNC: 167 MG/DL (ref 74–99)
GLUCOSE BLD MANUAL STRIP-MCNC: 169 MG/DL (ref 74–99)
GLUCOSE BLD MANUAL STRIP-MCNC: 169 MG/DL (ref 74–99)
GLUCOSE BLD MANUAL STRIP-MCNC: 170 MG/DL (ref 74–99)
GLUCOSE BLD MANUAL STRIP-MCNC: 171 MG/DL (ref 74–99)
GLUCOSE BLD MANUAL STRIP-MCNC: 172 MG/DL (ref 74–99)
GLUCOSE BLD MANUAL STRIP-MCNC: 173 MG/DL (ref 74–99)
GLUCOSE BLD MANUAL STRIP-MCNC: 175 MG/DL (ref 74–99)
GLUCOSE BLD MANUAL STRIP-MCNC: 176 MG/DL (ref 74–99)
GLUCOSE BLD MANUAL STRIP-MCNC: 177 MG/DL (ref 74–99)
GLUCOSE BLD MANUAL STRIP-MCNC: 178 MG/DL (ref 74–99)
GLUCOSE BLD MANUAL STRIP-MCNC: 178 MG/DL (ref 74–99)
GLUCOSE BLD MANUAL STRIP-MCNC: 180 MG/DL (ref 74–99)
GLUCOSE BLD MANUAL STRIP-MCNC: 183 MG/DL (ref 74–99)
GLUCOSE BLD MANUAL STRIP-MCNC: 183 MG/DL (ref 74–99)
GLUCOSE BLD MANUAL STRIP-MCNC: 184 MG/DL (ref 74–99)
GLUCOSE BLD MANUAL STRIP-MCNC: 185 MG/DL (ref 74–99)
GLUCOSE BLD MANUAL STRIP-MCNC: 186 MG/DL (ref 74–99)
GLUCOSE BLD MANUAL STRIP-MCNC: 188 MG/DL (ref 74–99)
GLUCOSE BLD MANUAL STRIP-MCNC: 188 MG/DL (ref 74–99)
GLUCOSE BLD MANUAL STRIP-MCNC: 189 MG/DL (ref 74–99)
GLUCOSE BLD MANUAL STRIP-MCNC: 191 MG/DL (ref 74–99)
GLUCOSE BLD MANUAL STRIP-MCNC: 192 MG/DL (ref 74–99)
GLUCOSE BLD MANUAL STRIP-MCNC: 196 MG/DL (ref 74–99)
GLUCOSE BLD MANUAL STRIP-MCNC: 199 MG/DL (ref 74–99)
GLUCOSE BLD MANUAL STRIP-MCNC: 199 MG/DL (ref 74–99)
GLUCOSE BLD MANUAL STRIP-MCNC: 200 MG/DL (ref 74–99)
GLUCOSE BLD MANUAL STRIP-MCNC: 201 MG/DL (ref 74–99)
GLUCOSE BLD MANUAL STRIP-MCNC: 206 MG/DL (ref 74–99)
GLUCOSE BLD MANUAL STRIP-MCNC: 206 MG/DL (ref 74–99)
GLUCOSE BLD MANUAL STRIP-MCNC: 207 MG/DL (ref 74–99)
GLUCOSE BLD MANUAL STRIP-MCNC: 208 MG/DL (ref 74–99)
GLUCOSE BLD MANUAL STRIP-MCNC: 210 MG/DL (ref 74–99)
GLUCOSE BLD MANUAL STRIP-MCNC: 210 MG/DL (ref 74–99)
GLUCOSE BLD MANUAL STRIP-MCNC: 212 MG/DL (ref 74–99)
GLUCOSE BLD MANUAL STRIP-MCNC: 213 MG/DL (ref 74–99)
GLUCOSE BLD MANUAL STRIP-MCNC: 216 MG/DL (ref 74–99)
GLUCOSE BLD MANUAL STRIP-MCNC: 219 MG/DL (ref 74–99)
GLUCOSE BLD MANUAL STRIP-MCNC: 222 MG/DL (ref 74–99)
GLUCOSE BLD MANUAL STRIP-MCNC: 223 MG/DL (ref 74–99)
GLUCOSE BLD MANUAL STRIP-MCNC: 233 MG/DL (ref 74–99)
GLUCOSE BLD MANUAL STRIP-MCNC: 236 MG/DL (ref 74–99)
GLUCOSE BLD MANUAL STRIP-MCNC: 263 MG/DL (ref 74–99)
GLUCOSE BLD MANUAL STRIP-MCNC: 276 MG/DL (ref 74–99)
GLUCOSE BLD MANUAL STRIP-MCNC: 280 MG/DL (ref 74–99)
GLUCOSE BLD MANUAL STRIP-MCNC: 41 MG/DL (ref 74–99)
GLUCOSE BLD MANUAL STRIP-MCNC: 58 MG/DL (ref 74–99)
GLUCOSE BLD MANUAL STRIP-MCNC: 63 MG/DL (ref 74–99)
GLUCOSE BLD MANUAL STRIP-MCNC: 63 MG/DL (ref 74–99)
GLUCOSE BLD MANUAL STRIP-MCNC: 64 MG/DL (ref 74–99)
GLUCOSE BLD MANUAL STRIP-MCNC: 68 MG/DL (ref 74–99)
GLUCOSE BLD MANUAL STRIP-MCNC: 71 MG/DL (ref 74–99)
GLUCOSE BLD MANUAL STRIP-MCNC: 76 MG/DL (ref 74–99)
GLUCOSE BLD MANUAL STRIP-MCNC: 77 MG/DL (ref 74–99)
GLUCOSE BLD MANUAL STRIP-MCNC: 77 MG/DL (ref 74–99)
GLUCOSE BLD MANUAL STRIP-MCNC: 79 MG/DL (ref 74–99)
GLUCOSE BLD MANUAL STRIP-MCNC: 80 MG/DL (ref 74–99)
GLUCOSE BLD MANUAL STRIP-MCNC: 81 MG/DL (ref 74–99)
GLUCOSE BLD MANUAL STRIP-MCNC: 82 MG/DL (ref 74–99)
GLUCOSE BLD MANUAL STRIP-MCNC: 83 MG/DL (ref 74–99)
GLUCOSE BLD MANUAL STRIP-MCNC: 84 MG/DL (ref 74–99)
GLUCOSE BLD MANUAL STRIP-MCNC: 85 MG/DL (ref 74–99)
GLUCOSE BLD MANUAL STRIP-MCNC: 85 MG/DL (ref 74–99)
GLUCOSE BLD MANUAL STRIP-MCNC: 87 MG/DL (ref 74–99)
GLUCOSE BLD MANUAL STRIP-MCNC: 88 MG/DL (ref 74–99)
GLUCOSE BLD MANUAL STRIP-MCNC: 88 MG/DL (ref 74–99)
GLUCOSE BLD MANUAL STRIP-MCNC: 89 MG/DL (ref 74–99)
GLUCOSE BLD MANUAL STRIP-MCNC: 89 MG/DL (ref 74–99)
GLUCOSE BLD MANUAL STRIP-MCNC: 90 MG/DL (ref 74–99)
GLUCOSE BLD MANUAL STRIP-MCNC: 91 MG/DL (ref 74–99)
GLUCOSE BLD MANUAL STRIP-MCNC: 92 MG/DL (ref 74–99)
GLUCOSE BLD MANUAL STRIP-MCNC: 92 MG/DL (ref 74–99)
GLUCOSE BLD MANUAL STRIP-MCNC: 93 MG/DL (ref 74–99)
GLUCOSE BLD MANUAL STRIP-MCNC: 94 MG/DL (ref 74–99)
GLUCOSE BLD MANUAL STRIP-MCNC: 94 MG/DL (ref 74–99)
GLUCOSE BLD MANUAL STRIP-MCNC: 95 MG/DL (ref 74–99)
GLUCOSE BLD MANUAL STRIP-MCNC: 95 MG/DL (ref 74–99)
GLUCOSE BLD MANUAL STRIP-MCNC: 98 MG/DL (ref 74–99)
GLUCOSE BLDA-MCNC: 111 MG/DL (ref 74–99)
GLUCOSE BLDA-MCNC: 118 MG/DL (ref 74–99)
GLUCOSE BLDA-MCNC: 120 MG/DL (ref 74–99)
GLUCOSE BLDA-MCNC: 123 MG/DL (ref 74–99)
GLUCOSE BLDA-MCNC: 126 MG/DL (ref 74–99)
GLUCOSE BLDA-MCNC: 126 MG/DL (ref 74–99)
GLUCOSE BLDA-MCNC: 133 MG/DL (ref 74–99)
GLUCOSE BLDA-MCNC: 179 MG/DL (ref 74–99)
GLUCOSE BLDA-MCNC: 181 MG/DL (ref 74–99)
GLUCOSE BLDA-MCNC: 195 MG/DL (ref 74–99)
GLUCOSE BLDA-MCNC: 196 MG/DL (ref 74–99)
GLUCOSE BLDA-MCNC: 222 MG/DL (ref 74–99)
GLUCOSE BLDA-MCNC: 253 MG/DL (ref 74–99)
GLUCOSE BLDA-MCNC: 81 MG/DL (ref 74–99)
GLUCOSE BLDV-MCNC: 158 MG/DL (ref 74–99)
GLUCOSE BLDV-MCNC: 167 MG/DL (ref 74–99)
GLUCOSE BLDV-MCNC: 186 MG/DL (ref 74–99)
GLUCOSE BLDV-MCNC: 83 MG/DL (ref 74–99)
GLUCOSE SERPL-MCNC: 100 MG/DL (ref 74–99)
GLUCOSE SERPL-MCNC: 101 MG/DL (ref 74–99)
GLUCOSE SERPL-MCNC: 102 MG/DL (ref 74–99)
GLUCOSE SERPL-MCNC: 111 MG/DL (ref 74–99)
GLUCOSE SERPL-MCNC: 112 MG/DL (ref 74–99)
GLUCOSE SERPL-MCNC: 114 MG/DL (ref 74–99)
GLUCOSE SERPL-MCNC: 115 MG/DL (ref 74–99)
GLUCOSE SERPL-MCNC: 119 MG/DL (ref 74–99)
GLUCOSE SERPL-MCNC: 119 MG/DL (ref 74–99)
GLUCOSE SERPL-MCNC: 121 MG/DL (ref 74–99)
GLUCOSE SERPL-MCNC: 121 MG/DL (ref 74–99)
GLUCOSE SERPL-MCNC: 123 MG/DL (ref 74–99)
GLUCOSE SERPL-MCNC: 127 MG/DL (ref 74–99)
GLUCOSE SERPL-MCNC: 128 MG/DL (ref 74–99)
GLUCOSE SERPL-MCNC: 129 MG/DL (ref 74–99)
GLUCOSE SERPL-MCNC: 129 MG/DL (ref 74–99)
GLUCOSE SERPL-MCNC: 130 MG/DL (ref 74–99)
GLUCOSE SERPL-MCNC: 131 MG/DL (ref 74–99)
GLUCOSE SERPL-MCNC: 132 MG/DL (ref 74–99)
GLUCOSE SERPL-MCNC: 135 MG/DL (ref 74–99)
GLUCOSE SERPL-MCNC: 136 MG/DL (ref 74–99)
GLUCOSE SERPL-MCNC: 137 MG/DL (ref 74–99)
GLUCOSE SERPL-MCNC: 138 MG/DL (ref 74–99)
GLUCOSE SERPL-MCNC: 139 MG/DL (ref 74–99)
GLUCOSE SERPL-MCNC: 141 MG/DL (ref 74–99)
GLUCOSE SERPL-MCNC: 141 MG/DL (ref 74–99)
GLUCOSE SERPL-MCNC: 144 MG/DL (ref 74–99)
GLUCOSE SERPL-MCNC: 145 MG/DL (ref 74–99)
GLUCOSE SERPL-MCNC: 146 MG/DL (ref 74–99)
GLUCOSE SERPL-MCNC: 146 MG/DL (ref 74–99)
GLUCOSE SERPL-MCNC: 147 MG/DL (ref 74–99)
GLUCOSE SERPL-MCNC: 148 MG/DL (ref 74–99)
GLUCOSE SERPL-MCNC: 149 MG/DL (ref 74–99)
GLUCOSE SERPL-MCNC: 151 MG/DL (ref 74–99)
GLUCOSE SERPL-MCNC: 152 MG/DL (ref 74–99)
GLUCOSE SERPL-MCNC: 157 MG/DL (ref 74–99)
GLUCOSE SERPL-MCNC: 159 MG/DL (ref 74–99)
GLUCOSE SERPL-MCNC: 164 MG/DL (ref 74–99)
GLUCOSE SERPL-MCNC: 165 MG/DL (ref 74–99)
GLUCOSE SERPL-MCNC: 165 MG/DL (ref 74–99)
GLUCOSE SERPL-MCNC: 168 MG/DL (ref 74–99)
GLUCOSE SERPL-MCNC: 170 MG/DL (ref 74–99)
GLUCOSE SERPL-MCNC: 172 MG/DL (ref 74–99)
GLUCOSE SERPL-MCNC: 172 MG/DL (ref 74–99)
GLUCOSE SERPL-MCNC: 173 MG/DL (ref 74–99)
GLUCOSE SERPL-MCNC: 173 MG/DL (ref 74–99)
GLUCOSE SERPL-MCNC: 175 MG/DL (ref 74–99)
GLUCOSE SERPL-MCNC: 176 MG/DL (ref 74–99)
GLUCOSE SERPL-MCNC: 176 MG/DL (ref 74–99)
GLUCOSE SERPL-MCNC: 179 MG/DL (ref 74–99)
GLUCOSE SERPL-MCNC: 181 MG/DL (ref 74–99)
GLUCOSE SERPL-MCNC: 182 MG/DL (ref 74–99)
GLUCOSE SERPL-MCNC: 183 MG/DL (ref 74–99)
GLUCOSE SERPL-MCNC: 184 MG/DL (ref 74–99)
GLUCOSE SERPL-MCNC: 196 MG/DL (ref 74–99)
GLUCOSE SERPL-MCNC: 200 MG/DL (ref 74–99)
GLUCOSE SERPL-MCNC: 205 MG/DL (ref 74–99)
GLUCOSE SERPL-MCNC: 221 MG/DL (ref 74–99)
GLUCOSE SERPL-MCNC: 234 MG/DL (ref 74–99)
GLUCOSE SERPL-MCNC: 68 MG/DL (ref 74–99)
GLUCOSE SERPL-MCNC: 69 MG/DL (ref 74–99)
GLUCOSE SERPL-MCNC: 74 MG/DL (ref 74–99)
GLUCOSE SERPL-MCNC: 78 MG/DL (ref 74–99)
GLUCOSE SERPL-MCNC: 79 MG/DL (ref 74–99)
GLUCOSE SERPL-MCNC: 79 MG/DL (ref 74–99)
GLUCOSE SERPL-MCNC: 89 MG/DL (ref 74–99)
GLUCOSE SERPL-MCNC: 89 MG/DL (ref 74–99)
GLUCOSE SERPL-MCNC: 93 MG/DL (ref 74–99)
GLUCOSE SERPL-MCNC: 93 MG/DL (ref 74–99)
GLUCOSE SERPL-MCNC: 94 MG/DL (ref 74–99)
GLUCOSE SERPL-MCNC: 98 MG/DL (ref 74–99)
GLUCOSE SERPL-MCNC: 98 MG/DL (ref 74–99)
GRAM STN SPEC: ABNORMAL
GRAM STN SPEC: NORMAL
GRAM STN SPEC: NORMAL
HBA1C MFR BLD: 4.9 %
HBV SURFACE AB SER-ACNC: 3.3 MIU/ML
HBV SURFACE AB SER-ACNC: <3.1 MIU/ML
HBV SURFACE AG SERPL QL IA: NONREACTIVE
HCO3 BLDA-SCNC: 24.7 MMOL/L (ref 22–26)
HCO3 BLDA-SCNC: 25.4 MMOL/L (ref 22–26)
HCO3 BLDA-SCNC: 25.4 MMOL/L (ref 22–26)
HCO3 BLDA-SCNC: 26 MMOL/L (ref 22–26)
HCO3 BLDA-SCNC: 26 MMOL/L (ref 22–26)
HCO3 BLDA-SCNC: 26.4 MMOL/L (ref 22–26)
HCO3 BLDA-SCNC: 26.4 MMOL/L (ref 22–26)
HCO3 BLDA-SCNC: 26.6 MMOL/L (ref 22–26)
HCO3 BLDA-SCNC: 27 MMOL/L (ref 22–26)
HCO3 BLDA-SCNC: 27.6 MMOL/L (ref 22–26)
HCO3 BLDA-SCNC: 27.7 MMOL/L (ref 22–26)
HCO3 BLDA-SCNC: 27.8 MMOL/L (ref 22–26)
HCO3 BLDA-SCNC: 29.2 MMOL/L (ref 22–26)
HCO3 BLDA-SCNC: 29.2 MMOL/L (ref 22–26)
HCO3 BLDA-SCNC: 29.8 MMOL/L (ref 22–26)
HCO3 BLDV-SCNC: 27.4 MMOL/L (ref 22–26)
HCO3 BLDV-SCNC: 28.5 MMOL/L (ref 22–26)
HCO3 BLDV-SCNC: 29.8 MMOL/L (ref 22–26)
HCO3 BLDV-SCNC: 29.9 MMOL/L (ref 22–26)
HCT VFR BLD AUTO: 20.7 % (ref 41–52)
HCT VFR BLD AUTO: 20.8 % (ref 41–52)
HCT VFR BLD AUTO: 21.2 % (ref 41–52)
HCT VFR BLD AUTO: 22.1 % (ref 41–52)
HCT VFR BLD AUTO: 22.2 % (ref 41–52)
HCT VFR BLD AUTO: 22.5 % (ref 41–52)
HCT VFR BLD AUTO: 22.6 % (ref 41–52)
HCT VFR BLD AUTO: 23 % (ref 41–52)
HCT VFR BLD AUTO: 24 % (ref 41–52)
HCT VFR BLD AUTO: 24.1 % (ref 41–52)
HCT VFR BLD AUTO: 24.2 % (ref 41–52)
HCT VFR BLD AUTO: 24.3 % (ref 41–52)
HCT VFR BLD AUTO: 24.4 % (ref 41–52)
HCT VFR BLD AUTO: 24.5 % (ref 41–52)
HCT VFR BLD AUTO: 24.5 % (ref 41–52)
HCT VFR BLD AUTO: 24.6 % (ref 41–52)
HCT VFR BLD AUTO: 24.6 % (ref 41–52)
HCT VFR BLD AUTO: 24.7 % (ref 41–52)
HCT VFR BLD AUTO: 24.8 % (ref 41–52)
HCT VFR BLD AUTO: 24.8 % (ref 41–52)
HCT VFR BLD AUTO: 25 % (ref 41–52)
HCT VFR BLD AUTO: 25.1 % (ref 41–52)
HCT VFR BLD AUTO: 25.2 % (ref 41–52)
HCT VFR BLD AUTO: 25.3 % (ref 41–52)
HCT VFR BLD AUTO: 25.4 % (ref 41–52)
HCT VFR BLD AUTO: 25.5 % (ref 41–52)
HCT VFR BLD AUTO: 25.6 % (ref 41–52)
HCT VFR BLD AUTO: 25.9 % (ref 41–52)
HCT VFR BLD AUTO: 25.9 % (ref 41–52)
HCT VFR BLD AUTO: 26.1 % (ref 41–52)
HCT VFR BLD AUTO: 26.1 % (ref 41–52)
HCT VFR BLD AUTO: 26.2 % (ref 41–52)
HCT VFR BLD AUTO: 26.3 % (ref 41–52)
HCT VFR BLD AUTO: 26.5 % (ref 41–52)
HCT VFR BLD AUTO: 26.6 % (ref 41–52)
HCT VFR BLD AUTO: 26.7 % (ref 41–52)
HCT VFR BLD AUTO: 26.8 % (ref 41–52)
HCT VFR BLD AUTO: 27 % (ref 41–52)
HCT VFR BLD AUTO: 27 % (ref 41–52)
HCT VFR BLD AUTO: 27.1 % (ref 41–52)
HCT VFR BLD AUTO: 27.2 % (ref 41–52)
HCT VFR BLD AUTO: 27.4 % (ref 41–52)
HCT VFR BLD AUTO: 27.7 % (ref 41–52)
HCT VFR BLD AUTO: 28 % (ref 41–52)
HCT VFR BLD AUTO: 28.3 % (ref 41–52)
HCT VFR BLD AUTO: 28.4 % (ref 41–52)
HCT VFR BLD AUTO: 28.9 % (ref 41–52)
HCT VFR BLD AUTO: 28.9 % (ref 41–52)
HCT VFR BLD AUTO: 29 % (ref 41–52)
HCT VFR BLD AUTO: 29.4 % (ref 41–52)
HCT VFR BLD AUTO: 29.6 % (ref 41–52)
HCT VFR BLD AUTO: 29.6 % (ref 41–52)
HCT VFR BLD AUTO: 29.9 % (ref 41–52)
HCT VFR BLD AUTO: 30 % (ref 41–52)
HCT VFR BLD AUTO: 30.1 % (ref 41–52)
HCT VFR BLD AUTO: 30.1 % (ref 41–52)
HCT VFR BLD AUTO: 30.8 % (ref 41–52)
HCT VFR BLD AUTO: 31.6 % (ref 41–52)
HCT VFR BLD AUTO: 31.8 % (ref 41–52)
HCT VFR BLD AUTO: 31.9 % (ref 41–52)
HCT VFR BLD AUTO: 32.4 % (ref 41–52)
HCT VFR BLD AUTO: 32.5 % (ref 41–52)
HCT VFR BLD AUTO: 33.9 % (ref 41–52)
HCT VFR BLD AUTO: 34 % (ref 41–52)
HCT VFR BLD AUTO: 34.4 % (ref 41–52)
HCT VFR BLD AUTO: 34.8 % (ref 41–52)
HCT VFR BLD AUTO: 35.8 % (ref 41–52)
HCT VFR BLD AUTO: 36 % (ref 41–52)
HCT VFR BLD AUTO: 36.1 % (ref 41–52)
HCT VFR BLD AUTO: 37.9 % (ref 41–52)
HCT VFR BLD EST: 20 % (ref 41–52)
HCT VFR BLD EST: 24 % (ref 41–52)
HCT VFR BLD EST: 25 % (ref 41–52)
HCT VFR BLD EST: 25 % (ref 41–52)
HCT VFR BLD EST: 26 % (ref 41–52)
HCT VFR BLD EST: 27 % (ref 41–52)
HCT VFR BLD EST: 28 % (ref 41–52)
HCT VFR BLD EST: 29 % (ref 41–52)
HCT VFR BLD EST: 29 % (ref 41–52)
HCT VFR BLD EST: 31 % (ref 41–52)
HCT VFR BLD EST: 53 % (ref 41–52)
HGB BLD-MCNC: 10 G/DL (ref 13.5–17.5)
HGB BLD-MCNC: 10.1 G/DL (ref 13.5–17.5)
HGB BLD-MCNC: 10.3 G/DL (ref 13.5–17.5)
HGB BLD-MCNC: 10.5 G/DL (ref 13.5–17.5)
HGB BLD-MCNC: 10.5 G/DL (ref 13.5–17.5)
HGB BLD-MCNC: 10.6 G/DL (ref 13.5–17.5)
HGB BLD-MCNC: 11 G/DL (ref 13.5–17.5)
HGB BLD-MCNC: 11.1 G/DL (ref 13.5–17.5)
HGB BLD-MCNC: 11.2 G/DL (ref 13.5–17.5)
HGB BLD-MCNC: 11.3 G/DL (ref 13.5–17.5)
HGB BLD-MCNC: 11.6 G/DL (ref 13.5–17.5)
HGB BLD-MCNC: 6.5 G/DL (ref 13.5–17.5)
HGB BLD-MCNC: 6.6 G/DL (ref 13.5–17.5)
HGB BLD-MCNC: 6.9 G/DL (ref 13.5–17.5)
HGB BLD-MCNC: 7 G/DL (ref 13.5–17.5)
HGB BLD-MCNC: 7.1 G/DL (ref 13.5–17.5)
HGB BLD-MCNC: 7.2 G/DL (ref 13.5–17.5)
HGB BLD-MCNC: 7.3 G/DL (ref 13.5–17.5)
HGB BLD-MCNC: 7.4 G/DL (ref 13.5–17.5)
HGB BLD-MCNC: 7.4 G/DL (ref 13.5–17.5)
HGB BLD-MCNC: 7.5 G/DL (ref 13.5–17.5)
HGB BLD-MCNC: 7.5 G/DL (ref 13.5–17.5)
HGB BLD-MCNC: 7.6 G/DL (ref 13.5–17.5)
HGB BLD-MCNC: 7.6 G/DL (ref 13.5–17.5)
HGB BLD-MCNC: 7.7 G/DL (ref 13.5–17.5)
HGB BLD-MCNC: 7.8 G/DL (ref 13.5–17.5)
HGB BLD-MCNC: 7.9 G/DL (ref 13.5–17.5)
HGB BLD-MCNC: 8 G/DL (ref 13.5–17.5)
HGB BLD-MCNC: 8.1 G/DL (ref 13.5–17.5)
HGB BLD-MCNC: 8.2 G/DL (ref 13.5–17.5)
HGB BLD-MCNC: 8.3 G/DL (ref 13.5–17.5)
HGB BLD-MCNC: 8.3 G/DL (ref 13.5–17.5)
HGB BLD-MCNC: 8.4 G/DL (ref 13.5–17.5)
HGB BLD-MCNC: 8.5 G/DL (ref 13.5–17.5)
HGB BLD-MCNC: 8.7 G/DL (ref 13.5–17.5)
HGB BLD-MCNC: 8.9 G/DL (ref 13.5–17.5)
HGB BLD-MCNC: 9.2 G/DL (ref 13.5–17.5)
HGB BLD-MCNC: 9.3 G/DL (ref 13.5–17.5)
HGB BLD-MCNC: 9.3 G/DL (ref 13.5–17.5)
HGB BLD-MCNC: 9.4 G/DL (ref 13.5–17.5)
HGB BLDA-MCNC: 8.1 G/DL (ref 13.5–17.5)
HGB BLDA-MCNC: 8.2 G/DL (ref 13.5–17.5)
HGB BLDA-MCNC: 8.4 G/DL (ref 13.5–17.5)
HGB BLDA-MCNC: 8.5 G/DL (ref 13.5–17.5)
HGB BLDA-MCNC: 8.7 G/DL (ref 13.5–17.5)
HGB BLDA-MCNC: 8.8 G/DL (ref 13.5–17.5)
HGB BLDA-MCNC: 8.9 G/DL (ref 13.5–17.5)
HGB BLDA-MCNC: 9.1 G/DL (ref 13.5–17.5)
HGB BLDA-MCNC: 9.2 G/DL (ref 13.5–17.5)
HGB BLDA-MCNC: 9.2 G/DL (ref 13.5–17.5)
HGB BLDA-MCNC: 9.4 G/DL (ref 13.5–17.5)
HGB BLDA-MCNC: 9.6 G/DL (ref 13.5–17.5)
HGB BLDV-MCNC: 10.4 G/DL (ref 13.5–17.5)
HGB BLDV-MCNC: 17.6 G/DL (ref 13.5–17.5)
HGB BLDV-MCNC: 6.5 G/DL (ref 13.5–17.5)
HGB BLDV-MCNC: 9.5 G/DL (ref 13.5–17.5)
IMM GRANULOCYTES # BLD AUTO: 0.03 X10*3/UL (ref 0–0.5)
IMM GRANULOCYTES # BLD AUTO: 0.04 X10*3/UL (ref 0–0.5)
IMM GRANULOCYTES # BLD AUTO: 0.05 X10*3/UL (ref 0–0.5)
IMM GRANULOCYTES # BLD AUTO: 0.05 X10*3/UL (ref 0–0.5)
IMM GRANULOCYTES # BLD AUTO: 0.06 X10*3/UL (ref 0–0.5)
IMM GRANULOCYTES # BLD AUTO: 0.07 X10*3/UL (ref 0–0.5)
IMM GRANULOCYTES # BLD AUTO: 0.08 X10*3/UL (ref 0–0.5)
IMM GRANULOCYTES # BLD AUTO: 0.08 X10*3/UL (ref 0–0.5)
IMM GRANULOCYTES # BLD AUTO: 0.17 X10*3/UL (ref 0–0.5)
IMM GRANULOCYTES # BLD AUTO: 0.23 X10*3/UL (ref 0–0.5)
IMM GRANULOCYTES NFR BLD AUTO: 0.4 % (ref 0–0.9)
IMM GRANULOCYTES NFR BLD AUTO: 0.5 % (ref 0–0.9)
IMM GRANULOCYTES NFR BLD AUTO: 0.6 % (ref 0–0.9)
IMM GRANULOCYTES NFR BLD AUTO: 0.7 % (ref 0–0.9)
IMM GRANULOCYTES NFR BLD AUTO: 0.8 % (ref 0–0.9)
IMM GRANULOCYTES NFR BLD AUTO: 1.2 % (ref 0–0.9)
IMM GRANULOCYTES NFR BLD AUTO: 1.7 % (ref 0–0.9)
INHALED O2 CONCENTRATION: 100 %
INHALED O2 CONCENTRATION: 21 %
INHALED O2 CONCENTRATION: 24 %
INHALED O2 CONCENTRATION: 28 %
INHALED O2 CONCENTRATION: 30 %
INHALED O2 CONCENTRATION: 32 %
INHALED O2 CONCENTRATION: 40 %
INHALED O2 CONCENTRATION: 95 %
INR PPP: 1.1 (ref 0.9–1.1)
INR PPP: 1.2 (ref 0.9–1.1)
INR PPP: 1.3 (ref 0.9–1.1)
INR PPP: 1.3 (ref 0.9–1.1)
INTERPRETATION FOR ANTI-PLATELET FACTOR 4 ANTIBODY: NEGATIVE
IRON SATN MFR SERPL: 9 % (ref 25–45)
IRON SERPL-MCNC: 20 UG/DL (ref 35–150)
LACTATE BLDA-SCNC: 0.5 MMOL/L (ref 0.4–2)
LACTATE BLDA-SCNC: 0.7 MMOL/L (ref 0.4–2)
LACTATE BLDA-SCNC: 0.8 MMOL/L (ref 0.4–2)
LACTATE BLDA-SCNC: 0.9 MMOL/L (ref 0.4–2)
LACTATE BLDA-SCNC: 1 MMOL/L (ref 0.4–2)
LACTATE BLDA-SCNC: 1.1 MMOL/L (ref 0.4–2)
LACTATE BLDA-SCNC: 1.2 MMOL/L (ref 0.4–2)
LACTATE BLDA-SCNC: 1.3 MMOL/L (ref 0.4–2)
LACTATE BLDA-SCNC: 1.3 MMOL/L (ref 0.4–2)
LACTATE BLDA-SCNC: 1.4 MMOL/L (ref 0.4–2)
LACTATE BLDA-SCNC: 1.8 MMOL/L (ref 0.4–2)
LACTATE BLDA-SCNC: 2.6 MMOL/L (ref 0.4–2)
LACTATE BLDV-SCNC: 1.1 MMOL/L (ref 0.4–2)
LACTATE BLDV-SCNC: 1.1 MMOL/L (ref 0.4–2)
LACTATE BLDV-SCNC: 1.2 MMOL/L (ref 0.4–2)
LACTATE BLDV-SCNC: 1.2 MMOL/L (ref 0.4–2)
LACTATE BLDV-SCNC: 3.5 MMOL/L (ref 0.4–2)
LACTATE SERPL-SCNC: 0.7 MMOL/L (ref 0.4–2)
LACTATE SERPL-SCNC: 1 MMOL/L (ref 0.4–2)
LACTATE SERPL-SCNC: 1.1 MMOL/L (ref 0.4–2)
LACTATE SERPL-SCNC: 1.7 MMOL/L (ref 0.4–2)
LACTATE SERPL-SCNC: 1.9 MMOL/L (ref 0.4–2)
LACTATE SERPL-SCNC: 2.2 MMOL/L (ref 0.4–2)
LACTATE SERPL-SCNC: 2.4 MMOL/L (ref 0.4–2)
LACTATE SERPL-SCNC: 3.8 MMOL/L (ref 0.4–2)
LEFT ATRIUM VOLUME AREA LENGTH INDEX BSA: 58.1 ML/M2
LEFT ATRIUM VOLUME AREA LENGTH INDEX BSA: 63.8 ML/M2
LEFT VENTRICLE INTERNAL DIMENSION DIASTOLE: 3.7 CM (ref 3.5–6)
LEFT VENTRICLE INTERNAL DIMENSION DIASTOLE: 4.5 CM (ref 3.5–6)
LEFT VENTRICULAR OUTFLOW TRACT DIAMETER: 1.9 CM
LEFT VENTRICULAR OUTFLOW TRACT DIAMETER: 2 CM
LYMPHOCYTES # BLD AUTO: 0.36 X10*3/UL (ref 0.8–3)
LYMPHOCYTES # BLD AUTO: 0.42 X10*3/UL (ref 0.8–3)
LYMPHOCYTES # BLD AUTO: 0.5 X10*3/UL (ref 0.8–3)
LYMPHOCYTES # BLD AUTO: 0.6 X10*3/UL (ref 0.8–3)
LYMPHOCYTES # BLD AUTO: 0.62 X10*3/UL (ref 0.8–3)
LYMPHOCYTES # BLD AUTO: 0.71 X10*3/UL (ref 0.8–3)
LYMPHOCYTES # BLD AUTO: 0.75 X10*3/UL (ref 0.8–3)
LYMPHOCYTES # BLD AUTO: 0.81 X10*3/UL (ref 0.8–3)
LYMPHOCYTES # BLD AUTO: 0.81 X10*3/UL (ref 0.8–3)
LYMPHOCYTES # BLD AUTO: 0.88 X10*3/UL (ref 0.8–3)
LYMPHOCYTES # BLD AUTO: 0.92 X10*3/UL (ref 0.8–3)
LYMPHOCYTES # BLD AUTO: 0.97 X10*3/UL (ref 0.8–3)
LYMPHOCYTES # BLD AUTO: 1.02 X10*3/UL (ref 0.8–3)
LYMPHOCYTES # BLD AUTO: 1.06 X10*3/UL (ref 0.8–3)
LYMPHOCYTES # BLD AUTO: 1.16 X10*3/UL (ref 0.8–3)
LYMPHOCYTES # BLD AUTO: 1.41 X10*3/UL (ref 0.8–3)
LYMPHOCYTES # BLD AUTO: 1.66 X10*3/UL (ref 0.8–3)
LYMPHOCYTES # BLD AUTO: 1.68 X10*3/UL (ref 0.8–3)
LYMPHOCYTES # BLD MANUAL: 1.19 X10*3/UL (ref 0.8–3)
LYMPHOCYTES NFR BLD AUTO: 10.3 %
LYMPHOCYTES NFR BLD AUTO: 10.7 %
LYMPHOCYTES NFR BLD AUTO: 11.9 %
LYMPHOCYTES NFR BLD AUTO: 12.5 %
LYMPHOCYTES NFR BLD AUTO: 13.6 %
LYMPHOCYTES NFR BLD AUTO: 14.1 %
LYMPHOCYTES NFR BLD AUTO: 14.3 %
LYMPHOCYTES NFR BLD AUTO: 15 %
LYMPHOCYTES NFR BLD AUTO: 4 %
LYMPHOCYTES NFR BLD AUTO: 4.9 %
LYMPHOCYTES NFR BLD AUTO: 5.6 %
LYMPHOCYTES NFR BLD AUTO: 7 %
LYMPHOCYTES NFR BLD AUTO: 7.2 %
LYMPHOCYTES NFR BLD AUTO: 7.4 %
LYMPHOCYTES NFR BLD AUTO: 7.4 %
LYMPHOCYTES NFR BLD AUTO: 8.6 %
LYMPHOCYTES NFR BLD AUTO: 9 %
LYMPHOCYTES NFR BLD AUTO: 9.6 %
LYMPHOCYTES NFR BLD MANUAL: 11 %
MAGNESIUM SERPL-MCNC: 1.5 MG/DL (ref 1.6–2.4)
MAGNESIUM SERPL-MCNC: 1.79 MG/DL (ref 1.6–2.4)
MAGNESIUM SERPL-MCNC: 1.84 MG/DL (ref 1.6–2.4)
MAGNESIUM SERPL-MCNC: 1.85 MG/DL (ref 1.6–2.4)
MAGNESIUM SERPL-MCNC: 1.88 MG/DL (ref 1.6–2.4)
MAGNESIUM SERPL-MCNC: 1.89 MG/DL (ref 1.6–2.4)
MAGNESIUM SERPL-MCNC: 1.9 MG/DL (ref 1.6–2.4)
MAGNESIUM SERPL-MCNC: 1.9 MG/DL (ref 1.6–2.4)
MAGNESIUM SERPL-MCNC: 1.99 MG/DL (ref 1.6–2.4)
MAGNESIUM SERPL-MCNC: 2.06 MG/DL (ref 1.6–2.4)
MAGNESIUM SERPL-MCNC: 2.09 MG/DL (ref 1.6–2.4)
MAGNESIUM SERPL-MCNC: 2.12 MG/DL (ref 1.6–2.4)
MAGNESIUM SERPL-MCNC: 2.14 MG/DL (ref 1.6–2.4)
MAGNESIUM SERPL-MCNC: 2.15 MG/DL (ref 1.6–2.4)
MAGNESIUM SERPL-MCNC: 2.17 MG/DL (ref 1.6–2.4)
MAGNESIUM SERPL-MCNC: 2.18 MG/DL (ref 1.6–2.4)
MAGNESIUM SERPL-MCNC: 2.2 MG/DL (ref 1.6–2.4)
MAGNESIUM SERPL-MCNC: 2.22 MG/DL (ref 1.6–2.4)
MAGNESIUM SERPL-MCNC: 2.24 MG/DL (ref 1.6–2.4)
MAGNESIUM SERPL-MCNC: 2.26 MG/DL (ref 1.6–2.4)
MAGNESIUM SERPL-MCNC: 2.27 MG/DL (ref 1.6–2.4)
MCH RBC QN AUTO: 26.3 PG (ref 26–34)
MCH RBC QN AUTO: 26.7 PG (ref 26–34)
MCH RBC QN AUTO: 26.8 PG (ref 26–34)
MCH RBC QN AUTO: 26.9 PG (ref 26–34)
MCH RBC QN AUTO: 27.1 PG (ref 26–34)
MCH RBC QN AUTO: 27.3 PG (ref 26–34)
MCH RBC QN AUTO: 27.4 PG (ref 26–34)
MCH RBC QN AUTO: 27.4 PG (ref 26–34)
MCH RBC QN AUTO: 27.7 PG (ref 26–34)
MCH RBC QN AUTO: 27.8 PG (ref 26–34)
MCH RBC QN AUTO: 28 PG (ref 26–34)
MCH RBC QN AUTO: 28.1 PG (ref 26–34)
MCH RBC QN AUTO: 28.2 PG (ref 26–34)
MCH RBC QN AUTO: 28.3 PG (ref 26–34)
MCH RBC QN AUTO: 28.4 PG (ref 26–34)
MCH RBC QN AUTO: 28.5 PG (ref 26–34)
MCH RBC QN AUTO: 29 PG (ref 26–34)
MCH RBC QN AUTO: 29 PG (ref 26–34)
MCH RBC QN AUTO: 29.1 PG (ref 26–34)
MCH RBC QN AUTO: 29.2 PG (ref 26–34)
MCH RBC QN AUTO: 29.3 PG (ref 26–34)
MCH RBC QN AUTO: 29.3 PG (ref 26–34)
MCH RBC QN AUTO: 29.4 PG (ref 26–34)
MCH RBC QN AUTO: 29.5 PG (ref 26–34)
MCH RBC QN AUTO: 29.6 PG (ref 26–34)
MCH RBC QN AUTO: 29.7 PG (ref 26–34)
MCH RBC QN AUTO: 29.7 PG (ref 26–34)
MCH RBC QN AUTO: 29.9 PG (ref 26–34)
MCH RBC QN AUTO: 30 PG (ref 26–34)
MCH RBC QN AUTO: 30 PG (ref 26–34)
MCH RBC QN AUTO: 30.1 PG (ref 26–34)
MCH RBC QN AUTO: 30.2 PG (ref 26–34)
MCH RBC QN AUTO: 30.3 PG (ref 26–34)
MCH RBC QN AUTO: 30.4 PG (ref 26–34)
MCH RBC QN AUTO: 30.6 PG (ref 26–34)
MCH RBC QN AUTO: 30.6 PG (ref 26–34)
MCH RBC QN AUTO: 30.7 PG (ref 26–34)
MCH RBC QN AUTO: 30.8 PG (ref 26–34)
MCH RBC QN AUTO: 30.9 PG (ref 26–34)
MCH RBC QN AUTO: 30.9 PG (ref 26–34)
MCH RBC QN AUTO: 31 PG (ref 26–34)
MCH RBC QN AUTO: 31.1 PG (ref 26–34)
MCH RBC QN AUTO: 31.2 PG (ref 26–34)
MCH RBC QN AUTO: 31.4 PG (ref 26–34)
MCHC RBC AUTO-ENTMCNC: 26.4 G/DL (ref 32–36)
MCHC RBC AUTO-ENTMCNC: 26.6 G/DL (ref 32–36)
MCHC RBC AUTO-ENTMCNC: 26.6 G/DL (ref 32–36)
MCHC RBC AUTO-ENTMCNC: 26.9 G/DL (ref 32–36)
MCHC RBC AUTO-ENTMCNC: 27.1 G/DL (ref 32–36)
MCHC RBC AUTO-ENTMCNC: 27.3 G/DL (ref 32–36)
MCHC RBC AUTO-ENTMCNC: 27.8 G/DL (ref 32–36)
MCHC RBC AUTO-ENTMCNC: 27.8 G/DL (ref 32–36)
MCHC RBC AUTO-ENTMCNC: 28 G/DL (ref 32–36)
MCHC RBC AUTO-ENTMCNC: 28.2 G/DL (ref 32–36)
MCHC RBC AUTO-ENTMCNC: 28.2 G/DL (ref 32–36)
MCHC RBC AUTO-ENTMCNC: 28.3 G/DL (ref 32–36)
MCHC RBC AUTO-ENTMCNC: 28.4 G/DL (ref 32–36)
MCHC RBC AUTO-ENTMCNC: 28.5 G/DL (ref 32–36)
MCHC RBC AUTO-ENTMCNC: 28.5 G/DL (ref 32–36)
MCHC RBC AUTO-ENTMCNC: 28.6 G/DL (ref 32–36)
MCHC RBC AUTO-ENTMCNC: 28.7 G/DL (ref 32–36)
MCHC RBC AUTO-ENTMCNC: 28.8 G/DL (ref 32–36)
MCHC RBC AUTO-ENTMCNC: 29 G/DL (ref 32–36)
MCHC RBC AUTO-ENTMCNC: 29 G/DL (ref 32–36)
MCHC RBC AUTO-ENTMCNC: 29.1 G/DL (ref 32–36)
MCHC RBC AUTO-ENTMCNC: 29.2 G/DL (ref 32–36)
MCHC RBC AUTO-ENTMCNC: 29.3 G/DL (ref 32–36)
MCHC RBC AUTO-ENTMCNC: 29.3 G/DL (ref 32–36)
MCHC RBC AUTO-ENTMCNC: 29.4 G/DL (ref 32–36)
MCHC RBC AUTO-ENTMCNC: 29.5 G/DL (ref 32–36)
MCHC RBC AUTO-ENTMCNC: 29.6 G/DL (ref 32–36)
MCHC RBC AUTO-ENTMCNC: 29.7 G/DL (ref 32–36)
MCHC RBC AUTO-ENTMCNC: 29.7 G/DL (ref 32–36)
MCHC RBC AUTO-ENTMCNC: 29.8 G/DL (ref 32–36)
MCHC RBC AUTO-ENTMCNC: 30.1 G/DL (ref 32–36)
MCHC RBC AUTO-ENTMCNC: 30.1 G/DL (ref 32–36)
MCHC RBC AUTO-ENTMCNC: 30.3 G/DL (ref 32–36)
MCHC RBC AUTO-ENTMCNC: 30.4 G/DL (ref 32–36)
MCHC RBC AUTO-ENTMCNC: 30.5 G/DL (ref 32–36)
MCHC RBC AUTO-ENTMCNC: 30.6 G/DL (ref 32–36)
MCHC RBC AUTO-ENTMCNC: 30.6 G/DL (ref 32–36)
MCHC RBC AUTO-ENTMCNC: 30.7 G/DL (ref 32–36)
MCHC RBC AUTO-ENTMCNC: 30.8 G/DL (ref 32–36)
MCHC RBC AUTO-ENTMCNC: 30.8 G/DL (ref 32–36)
MCHC RBC AUTO-ENTMCNC: 30.9 G/DL (ref 32–36)
MCHC RBC AUTO-ENTMCNC: 31 G/DL (ref 32–36)
MCHC RBC AUTO-ENTMCNC: 31.3 G/DL (ref 32–36)
MCHC RBC AUTO-ENTMCNC: 31.4 G/DL (ref 32–36)
MCHC RBC AUTO-ENTMCNC: 31.7 G/DL (ref 32–36)
MCHC RBC AUTO-ENTMCNC: 31.7 G/DL (ref 32–36)
MCHC RBC AUTO-ENTMCNC: 31.8 G/DL (ref 32–36)
MCHC RBC AUTO-ENTMCNC: 31.9 G/DL (ref 32–36)
MCHC RBC AUTO-ENTMCNC: 31.9 G/DL (ref 32–36)
MCHC RBC AUTO-ENTMCNC: 32.1 G/DL (ref 32–36)
MCHC RBC AUTO-ENTMCNC: 32.1 G/DL (ref 32–36)
MCHC RBC AUTO-ENTMCNC: 32.5 G/DL (ref 32–36)
MCHC RBC AUTO-ENTMCNC: 33 G/DL (ref 32–36)
MCHC RBC AUTO-ENTMCNC: 33.6 G/DL (ref 32–36)
MCHC RBC AUTO-ENTMCNC: 33.6 G/DL (ref 32–36)
MCHC RBC AUTO-ENTMCNC: 33.8 G/DL (ref 32–36)
MCV RBC AUTO: 100 FL (ref 80–100)
MCV RBC AUTO: 101 FL (ref 80–100)
MCV RBC AUTO: 102 FL (ref 80–100)
MCV RBC AUTO: 103 FL (ref 80–100)
MCV RBC AUTO: 104 FL (ref 80–100)
MCV RBC AUTO: 106 FL (ref 80–100)
MCV RBC AUTO: 109 FL (ref 80–100)
MCV RBC AUTO: 90 FL (ref 80–100)
MCV RBC AUTO: 90 FL (ref 80–100)
MCV RBC AUTO: 92 FL (ref 80–100)
MCV RBC AUTO: 92 FL (ref 80–100)
MCV RBC AUTO: 93 FL (ref 80–100)
MCV RBC AUTO: 94 FL (ref 80–100)
MCV RBC AUTO: 94 FL (ref 80–100)
MCV RBC AUTO: 95 FL (ref 80–100)
MCV RBC AUTO: 96 FL (ref 80–100)
MCV RBC AUTO: 97 FL (ref 80–100)
MCV RBC AUTO: 98 FL (ref 80–100)
MCV RBC AUTO: 99 FL (ref 80–100)
MITRAL VALVE E/E' RATIO: 9.57
MONOCYTES # BLD AUTO: 0.59 X10*3/UL (ref 0.05–0.8)
MONOCYTES # BLD AUTO: 0.61 X10*3/UL (ref 0.05–0.8)
MONOCYTES # BLD AUTO: 0.61 X10*3/UL (ref 0.05–0.8)
MONOCYTES # BLD AUTO: 0.63 X10*3/UL (ref 0.05–0.8)
MONOCYTES # BLD AUTO: 0.65 X10*3/UL (ref 0.05–0.8)
MONOCYTES # BLD AUTO: 0.7 X10*3/UL (ref 0.05–0.8)
MONOCYTES # BLD AUTO: 0.75 X10*3/UL (ref 0.05–0.8)
MONOCYTES # BLD AUTO: 0.77 X10*3/UL (ref 0.05–0.8)
MONOCYTES # BLD AUTO: 0.78 X10*3/UL (ref 0.05–0.8)
MONOCYTES # BLD AUTO: 0.79 X10*3/UL (ref 0.05–0.8)
MONOCYTES # BLD AUTO: 0.81 X10*3/UL (ref 0.05–0.8)
MONOCYTES # BLD AUTO: 0.84 X10*3/UL (ref 0.05–0.8)
MONOCYTES # BLD AUTO: 0.87 X10*3/UL (ref 0.05–0.8)
MONOCYTES # BLD AUTO: 0.9 X10*3/UL (ref 0.05–0.8)
MONOCYTES # BLD AUTO: 0.91 X10*3/UL (ref 0.05–0.8)
MONOCYTES # BLD AUTO: 0.99 X10*3/UL (ref 0.05–0.8)
MONOCYTES # BLD AUTO: 1.28 X10*3/UL (ref 0.05–0.8)
MONOCYTES # BLD AUTO: 1.42 X10*3/UL (ref 0.05–0.8)
MONOCYTES # BLD MANUAL: 0.54 X10*3/UL (ref 0.05–0.8)
MONOCYTES NFR BLD AUTO: 10.2 %
MONOCYTES NFR BLD AUTO: 10.9 %
MONOCYTES NFR BLD AUTO: 11.2 %
MONOCYTES NFR BLD AUTO: 12.8 %
MONOCYTES NFR BLD AUTO: 6.6 %
MONOCYTES NFR BLD AUTO: 6.9 %
MONOCYTES NFR BLD AUTO: 7.2 %
MONOCYTES NFR BLD AUTO: 7.3 %
MONOCYTES NFR BLD AUTO: 7.3 %
MONOCYTES NFR BLD AUTO: 8 %
MONOCYTES NFR BLD AUTO: 8 %
MONOCYTES NFR BLD AUTO: 8.2 %
MONOCYTES NFR BLD AUTO: 8.3 %
MONOCYTES NFR BLD AUTO: 8.7 %
MONOCYTES NFR BLD AUTO: 8.8 %
MONOCYTES NFR BLD AUTO: 8.9 %
MONOCYTES NFR BLD AUTO: 9.6 %
MONOCYTES NFR BLD AUTO: 9.7 %
MONOCYTES NFR BLD MANUAL: 5 %
N GONORRHOEA SPEC QL CULT: NORMAL
N GONORRHOEA SPEC QL CULT: NORMAL
NEUTROPHILS # BLD AUTO: 10.36 X10*3/UL (ref 1.6–5.5)
NEUTROPHILS # BLD AUTO: 4.17 X10*3/UL (ref 1.6–5.5)
NEUTROPHILS # BLD AUTO: 4.49 X10*3/UL (ref 1.6–5.5)
NEUTROPHILS # BLD AUTO: 5.59 X10*3/UL (ref 1.6–5.5)
NEUTROPHILS # BLD AUTO: 6.02 X10*3/UL (ref 1.6–5.5)
NEUTROPHILS # BLD AUTO: 6.18 X10*3/UL (ref 1.6–5.5)
NEUTROPHILS # BLD AUTO: 7.01 X10*3/UL (ref 1.6–5.5)
NEUTROPHILS # BLD AUTO: 7.19 X10*3/UL (ref 1.6–5.5)
NEUTROPHILS # BLD AUTO: 7.27 X10*3/UL (ref 1.6–5.5)
NEUTROPHILS # BLD AUTO: 7.3 X10*3/UL (ref 1.6–5.5)
NEUTROPHILS # BLD AUTO: 7.34 X10*3/UL (ref 1.6–5.5)
NEUTROPHILS # BLD AUTO: 7.41 X10*3/UL (ref 1.6–5.5)
NEUTROPHILS # BLD AUTO: 7.73 X10*3/UL (ref 1.6–5.5)
NEUTROPHILS # BLD AUTO: 7.76 X10*3/UL (ref 1.6–5.5)
NEUTROPHILS # BLD AUTO: 7.93 X10*3/UL (ref 1.6–5.5)
NEUTROPHILS # BLD AUTO: 7.95 X10*3/UL (ref 1.6–5.5)
NEUTROPHILS # BLD AUTO: 8.26 X10*3/UL (ref 1.6–5.5)
NEUTROPHILS # BLD AUTO: 9.68 X10*3/UL (ref 1.6–5.5)
NEUTROPHILS NFR BLD AUTO: 50.6 %
NEUTROPHILS NFR BLD AUTO: 65.8 %
NEUTROPHILS NFR BLD AUTO: 68.7 %
NEUTROPHILS NFR BLD AUTO: 70 %
NEUTROPHILS NFR BLD AUTO: 72.7 %
NEUTROPHILS NFR BLD AUTO: 73.4 %
NEUTROPHILS NFR BLD AUTO: 74.1 %
NEUTROPHILS NFR BLD AUTO: 76 %
NEUTROPHILS NFR BLD AUTO: 77.2 %
NEUTROPHILS NFR BLD AUTO: 77.8 %
NEUTROPHILS NFR BLD AUTO: 81 %
NEUTROPHILS NFR BLD AUTO: 81.1 %
NEUTROPHILS NFR BLD AUTO: 81.5 %
NEUTROPHILS NFR BLD AUTO: 81.9 %
NEUTROPHILS NFR BLD AUTO: 84.4 %
NEUTROPHILS NFR BLD AUTO: 86.1 %
NEUTROPHILS NFR BLD AUTO: 86.4 %
NEUTROPHILS NFR BLD AUTO: 87.2 %
NEUTS SEG # BLD MANUAL: 8.32 X10*3/UL (ref 1.6–5)
NEUTS SEG NFR BLD MANUAL: 77 %
NRBC BLD-RTO: 0 /100 WBCS (ref 0–0)
NRBC BLD-RTO: 0.1 /100 WBCS (ref 0–0)
NRBC BLD-RTO: 0.2 /100 WBCS (ref 0–0)
NRBC BLD-RTO: 0.3 /100 WBCS (ref 0–0)
NRBC BLD-RTO: 0.4 /100 WBCS (ref 0–0)
NRBC BLD-RTO: 0.5 /100 WBCS (ref 0–0)
NRBC BLD-RTO: 0.6 /100 WBCS (ref 0–0)
NRBC BLD-RTO: 0.7 /100 WBCS (ref 0–0)
NRBC BLD-RTO: 0.8 /100 WBCS (ref 0–0)
NRBC BLD-RTO: 1 /100 WBCS (ref 0–0)
NRBC BLD-RTO: 1 /100 WBCS (ref 0–0)
NRBC BLD-RTO: 1.2 /100 WBCS (ref 0–0)
NRBC BLD-RTO: 1.4 /100 WBCS (ref 0–0)
NRBC BLD-RTO: 1.5 /100 WBCS (ref 0–0)
NRBC BLD-RTO: 1.6 /100 WBCS (ref 0–0)
NRBC BLD-RTO: 2 /100 WBCS (ref 0–0)
NRBC BLD-RTO: 2.2 /100 WBCS (ref 0–0)
NRBC BLD-RTO: 2.9 /100 WBCS (ref 0–0)
NRBC BLD-RTO: 3.2 /100 WBCS (ref 0–0)
OVALOCYTES BLD QL SMEAR: NORMAL
OVALOCYTES BLD QL SMEAR: NORMAL
OXYHGB MFR BLDA: 69.3 % (ref 94–98)
OXYHGB MFR BLDA: 86.2 % (ref 94–98)
OXYHGB MFR BLDA: 94.1 % (ref 94–98)
OXYHGB MFR BLDA: 95.7 % (ref 94–98)
OXYHGB MFR BLDA: 95.9 % (ref 94–98)
OXYHGB MFR BLDA: 96.5 % (ref 94–98)
OXYHGB MFR BLDA: 96.6 % (ref 94–98)
OXYHGB MFR BLDA: 97 % (ref 94–98)
OXYHGB MFR BLDA: 97.1 % (ref 94–98)
OXYHGB MFR BLDA: 97.2 % (ref 94–98)
OXYHGB MFR BLDA: 97.5 % (ref 94–98)
OXYHGB MFR BLDA: 97.8 % (ref 94–98)
OXYHGB MFR BLDA: 97.8 % (ref 94–98)
OXYHGB MFR BLDV: 51 % (ref 45–75)
OXYHGB MFR BLDV: 68 % (ref 45–75)
OXYHGB MFR BLDV: 85.7 % (ref 45–75)
OXYHGB MFR BLDV: 96.7 % (ref 45–75)
PCO2 BLDA: 42 MM HG (ref 38–42)
PCO2 BLDA: 43 MM HG (ref 38–42)
PCO2 BLDA: 43 MM HG (ref 38–42)
PCO2 BLDA: 45 MM HG (ref 38–42)
PCO2 BLDA: 47 MM HG (ref 38–42)
PCO2 BLDA: 54 MM HG (ref 38–42)
PCO2 BLDA: 54 MM HG (ref 38–42)
PCO2 BLDA: 55 MM HG (ref 38–42)
PCO2 BLDA: 55 MM HG (ref 38–42)
PCO2 BLDA: 59 MM HG (ref 38–42)
PCO2 BLDA: 60 MM HG (ref 38–42)
PCO2 BLDA: 62 MM HG (ref 38–42)
PCO2 BLDA: 62 MM HG (ref 38–42)
PCO2 BLDA: 63 MM HG (ref 38–42)
PCO2 BLDA: 63 MM HG (ref 38–42)
PCO2 BLDV: 53 MM HG (ref 41–51)
PCO2 BLDV: 54 MM HG (ref 41–51)
PCO2 BLDV: 54 MM HG (ref 41–51)
PCO2 BLDV: 57 MM HG (ref 41–51)
PH BLDA: 7.23 PH (ref 7.38–7.42)
PH BLDA: 7.24 PH (ref 7.38–7.42)
PH BLDA: 7.24 PH (ref 7.38–7.42)
PH BLDA: 7.26 PH (ref 7.38–7.42)
PH BLDA: 7.27 PH (ref 7.38–7.42)
PH BLDA: 7.28 PH (ref 7.38–7.42)
PH BLDA: 7.29 PH (ref 7.38–7.42)
PH BLDA: 7.32 PH (ref 7.38–7.42)
PH BLDA: 7.34 PH (ref 7.38–7.42)
PH BLDA: 7.38 PH (ref 7.38–7.42)
PH BLDA: 7.39 PH (ref 7.38–7.42)
PH BLDA: 7.42 PH (ref 7.38–7.42)
PH BLDA: 7.45 PH (ref 7.38–7.42)
PH BLDV: 7.29 PH (ref 7.33–7.43)
PH BLDV: 7.33 PH (ref 7.33–7.43)
PH BLDV: 7.35 PH (ref 7.33–7.43)
PH BLDV: 7.36 PH (ref 7.33–7.43)
PHOSPHATE SERPL-MCNC: 1.8 MG/DL (ref 2.5–4.9)
PHOSPHATE SERPL-MCNC: 2 MG/DL (ref 2.5–4.9)
PHOSPHATE SERPL-MCNC: 2.2 MG/DL (ref 2.5–4.9)
PHOSPHATE SERPL-MCNC: 2.2 MG/DL (ref 2.5–4.9)
PHOSPHATE SERPL-MCNC: 2.4 MG/DL (ref 2.5–4.9)
PHOSPHATE SERPL-MCNC: 2.5 MG/DL (ref 2.5–4.9)
PHOSPHATE SERPL-MCNC: 2.6 MG/DL (ref 2.5–4.9)
PHOSPHATE SERPL-MCNC: 2.7 MG/DL (ref 2.5–4.9)
PHOSPHATE SERPL-MCNC: 2.9 MG/DL (ref 2.5–4.9)
PHOSPHATE SERPL-MCNC: 2.9 MG/DL (ref 2.5–4.9)
PHOSPHATE SERPL-MCNC: 3 MG/DL (ref 2.5–4.9)
PHOSPHATE SERPL-MCNC: 3 MG/DL (ref 2.5–4.9)
PHOSPHATE SERPL-MCNC: 3.2 MG/DL (ref 2.5–4.9)
PHOSPHATE SERPL-MCNC: 3.3 MG/DL (ref 2.5–4.9)
PHOSPHATE SERPL-MCNC: 3.3 MG/DL (ref 2.5–4.9)
PHOSPHATE SERPL-MCNC: 3.5 MG/DL (ref 2.5–4.9)
PHOSPHATE SERPL-MCNC: 3.7 MG/DL (ref 2.5–4.9)
PHOSPHATE SERPL-MCNC: 3.9 MG/DL (ref 2.5–4.9)
PHOSPHATE SERPL-MCNC: 3.9 MG/DL (ref 2.5–4.9)
PHOSPHATE SERPL-MCNC: 4 MG/DL (ref 2.5–4.9)
PHOSPHATE SERPL-MCNC: 4.2 MG/DL (ref 2.5–4.9)
PHOSPHATE SERPL-MCNC: 4.3 MG/DL (ref 2.5–4.9)
PHOSPHATE SERPL-MCNC: 4.3 MG/DL (ref 2.5–4.9)
PHOSPHATE SERPL-MCNC: 4.4 MG/DL (ref 2.5–4.9)
PHOSPHATE SERPL-MCNC: 4.4 MG/DL (ref 2.5–4.9)
PHOSPHATE SERPL-MCNC: 4.5 MG/DL (ref 2.5–4.9)
PHOSPHATE SERPL-MCNC: 4.5 MG/DL (ref 2.5–4.9)
PHOSPHATE SERPL-MCNC: 4.7 MG/DL (ref 2.5–4.9)
PHOSPHATE SERPL-MCNC: 4.7 MG/DL (ref 2.5–4.9)
PHOSPHATE SERPL-MCNC: 4.8 MG/DL (ref 2.5–4.9)
PHOSPHATE SERPL-MCNC: 4.9 MG/DL (ref 2.5–4.9)
PHOSPHATE SERPL-MCNC: 5 MG/DL (ref 2.5–4.9)
PHOSPHATE SERPL-MCNC: 5 MG/DL (ref 2.5–4.9)
PHOSPHATE SERPL-MCNC: 5.2 MG/DL (ref 2.5–4.9)
PHOSPHATE SERPL-MCNC: 5.3 MG/DL (ref 2.5–4.9)
PHOSPHATE SERPL-MCNC: 5.3 MG/DL (ref 2.5–4.9)
PHOSPHATE SERPL-MCNC: 5.5 MG/DL (ref 2.5–4.9)
PHOSPHATE SERPL-MCNC: 5.6 MG/DL (ref 2.5–4.9)
PHOSPHATE SERPL-MCNC: 5.7 MG/DL (ref 2.5–4.9)
PHOSPHATE SERPL-MCNC: 5.8 MG/DL (ref 2.5–4.9)
PHOSPHATE SERPL-MCNC: 5.8 MG/DL (ref 2.5–4.9)
PHOSPHATE SERPL-MCNC: 5.9 MG/DL (ref 2.5–4.9)
PHOSPHATE SERPL-MCNC: 6 MG/DL (ref 2.5–4.9)
PHOSPHATE SERPL-MCNC: 6 MG/DL (ref 2.5–4.9)
PHOSPHATE SERPL-MCNC: 6.1 MG/DL (ref 2.5–4.9)
PHOSPHATE SERPL-MCNC: 6.4 MG/DL (ref 2.5–4.9)
PHOSPHATE SERPL-MCNC: 6.4 MG/DL (ref 2.5–4.9)
PHOSPHATE SERPL-MCNC: 6.5 MG/DL (ref 2.5–4.9)
PHOSPHATE SERPL-MCNC: 6.7 MG/DL (ref 2.5–4.9)
PHOSPHATE SERPL-MCNC: 6.8 MG/DL (ref 2.5–4.9)
PHOSPHATE SERPL-MCNC: 7 MG/DL (ref 2.5–4.9)
PHOSPHATE SERPL-MCNC: 7.5 MG/DL (ref 2.5–4.9)
PHOSPHATE SERPL-MCNC: 7.7 MG/DL (ref 2.5–4.9)
PLATELET # BLD AUTO: 100 X10*3/UL (ref 150–450)
PLATELET # BLD AUTO: 102 X10*3/UL (ref 150–450)
PLATELET # BLD AUTO: 110 X10*3/UL (ref 150–450)
PLATELET # BLD AUTO: 112 X10*3/UL (ref 150–450)
PLATELET # BLD AUTO: 112 X10*3/UL (ref 150–450)
PLATELET # BLD AUTO: 119 X10*3/UL (ref 150–450)
PLATELET # BLD AUTO: 139 X10*3/UL (ref 150–450)
PLATELET # BLD AUTO: 163 X10*3/UL (ref 150–450)
PLATELET # BLD AUTO: 173 X10*3/UL (ref 150–450)
PLATELET # BLD AUTO: 178 X10*3/UL (ref 150–450)
PLATELET # BLD AUTO: 195 X10*3/UL (ref 150–450)
PLATELET # BLD AUTO: 197 X10*3/UL (ref 150–450)
PLATELET # BLD AUTO: 216 X10*3/UL (ref 150–450)
PLATELET # BLD AUTO: 220 X10*3/UL (ref 150–450)
PLATELET # BLD AUTO: 222 X10*3/UL (ref 150–450)
PLATELET # BLD AUTO: 243 X10*3/UL (ref 150–450)
PLATELET # BLD AUTO: 243 X10*3/UL (ref 150–450)
PLATELET # BLD AUTO: 253 X10*3/UL (ref 150–450)
PLATELET # BLD AUTO: 255 X10*3/UL (ref 150–450)
PLATELET # BLD AUTO: 261 X10*3/UL (ref 150–450)
PLATELET # BLD AUTO: 263 X10*3/UL (ref 150–450)
PLATELET # BLD AUTO: 264 X10*3/UL (ref 150–450)
PLATELET # BLD AUTO: 265 X10*3/UL (ref 150–450)
PLATELET # BLD AUTO: 265 X10*3/UL (ref 150–450)
PLATELET # BLD AUTO: 271 X10*3/UL (ref 150–450)
PLATELET # BLD AUTO: 275 X10*3/UL (ref 150–450)
PLATELET # BLD AUTO: 285 X10*3/UL (ref 150–450)
PLATELET # BLD AUTO: 309 X10*3/UL (ref 150–450)
PLATELET # BLD AUTO: 312 X10*3/UL (ref 150–450)
PLATELET # BLD AUTO: 320 X10*3/UL (ref 150–450)
PLATELET # BLD AUTO: 323 X10*3/UL (ref 150–450)
PLATELET # BLD AUTO: 326 X10*3/UL (ref 150–450)
PLATELET # BLD AUTO: 330 X10*3/UL (ref 150–450)
PLATELET # BLD AUTO: 331 X10*3/UL (ref 150–450)
PLATELET # BLD AUTO: 332 X10*3/UL (ref 150–450)
PLATELET # BLD AUTO: 332 X10*3/UL (ref 150–450)
PLATELET # BLD AUTO: 339 X10*3/UL (ref 150–450)
PLATELET # BLD AUTO: 341 X10*3/UL (ref 150–450)
PLATELET # BLD AUTO: 349 X10*3/UL (ref 150–450)
PLATELET # BLD AUTO: 350 X10*3/UL (ref 150–450)
PLATELET # BLD AUTO: 350 X10*3/UL (ref 150–450)
PLATELET # BLD AUTO: 351 X10*3/UL (ref 150–450)
PLATELET # BLD AUTO: 357 X10*3/UL (ref 150–450)
PLATELET # BLD AUTO: 358 X10*3/UL (ref 150–450)
PLATELET # BLD AUTO: 369 X10*3/UL (ref 150–450)
PLATELET # BLD AUTO: 394 X10*3/UL (ref 150–450)
PLATELET # BLD AUTO: 404 X10*3/UL (ref 150–450)
PLATELET # BLD AUTO: 411 X10*3/UL (ref 150–450)
PLATELET # BLD AUTO: 415 X10*3/UL (ref 150–450)
PLATELET # BLD AUTO: 418 X10*3/UL (ref 150–450)
PLATELET # BLD AUTO: 423 X10*3/UL (ref 150–450)
PLATELET # BLD AUTO: 424 X10*3/UL (ref 150–450)
PLATELET # BLD AUTO: 424 X10*3/UL (ref 150–450)
PLATELET # BLD AUTO: 431 X10*3/UL (ref 150–450)
PLATELET # BLD AUTO: 432 X10*3/UL (ref 150–450)
PLATELET # BLD AUTO: 433 X10*3/UL (ref 150–450)
PLATELET # BLD AUTO: 442 X10*3/UL (ref 150–450)
PLATELET # BLD AUTO: 447 X10*3/UL (ref 150–450)
PLATELET # BLD AUTO: 456 X10*3/UL (ref 150–450)
PLATELET # BLD AUTO: 459 X10*3/UL (ref 150–450)
PLATELET # BLD AUTO: 460 X10*3/UL (ref 150–450)
PLATELET # BLD AUTO: 500 X10*3/UL (ref 150–450)
PLATELET # BLD AUTO: 71 X10*3/UL (ref 150–450)
PLATELET # BLD AUTO: 78 X10*3/UL (ref 150–450)
PLATELET # BLD AUTO: 79 X10*3/UL (ref 150–450)
PLATELET # BLD AUTO: 79 X10*3/UL (ref 150–450)
PLATELET # BLD AUTO: 80 X10*3/UL (ref 150–450)
PLATELET # BLD AUTO: 80 X10*3/UL (ref 150–450)
PLATELET # BLD AUTO: 81 X10*3/UL (ref 150–450)
PLATELET # BLD AUTO: 82 X10*3/UL (ref 150–450)
PLATELET # BLD AUTO: 83 X10*3/UL (ref 150–450)
PLATELET # BLD AUTO: 86 X10*3/UL (ref 150–450)
PLATELET # BLD AUTO: 88 X10*3/UL (ref 150–450)
PLATELET # BLD AUTO: 89 X10*3/UL (ref 150–450)
PLATELET # BLD AUTO: 91 X10*3/UL (ref 150–450)
PLATELET # BLD AUTO: 98 X10*3/UL (ref 150–450)
PO2 BLDA: 100 MM HG (ref 85–95)
PO2 BLDA: 116 MM HG (ref 85–95)
PO2 BLDA: 123 MM HG (ref 85–95)
PO2 BLDA: 124 MM HG (ref 85–95)
PO2 BLDA: 126 MM HG (ref 85–95)
PO2 BLDA: 130 MM HG (ref 85–95)
PO2 BLDA: 134 MM HG (ref 85–95)
PO2 BLDA: 136 MM HG (ref 85–95)
PO2 BLDA: 146 MM HG (ref 85–95)
PO2 BLDA: 161 MM HG (ref 85–95)
PO2 BLDA: 164 MM HG (ref 85–95)
PO2 BLDA: 48 MM HG (ref 85–95)
PO2 BLDA: 60 MM HG (ref 85–95)
PO2 BLDA: 76 MM HG (ref 85–95)
PO2 BLDA: 92 MM HG (ref 85–95)
PO2 BLDV: 109 MM HG (ref 35–45)
PO2 BLDV: 33 MM HG (ref 35–45)
PO2 BLDV: 49 MM HG (ref 35–45)
PO2 BLDV: 58 MM HG (ref 35–45)
POLYCHROMASIA BLD QL SMEAR: ABNORMAL
POLYCHROMASIA BLD QL SMEAR: NORMAL
POLYCHROMASIA BLD QL SMEAR: NORMAL
POTASSIUM BLDA-SCNC: 3.4 MMOL/L (ref 3.5–5.3)
POTASSIUM BLDA-SCNC: 3.5 MMOL/L (ref 3.5–5.3)
POTASSIUM BLDA-SCNC: 3.6 MMOL/L (ref 3.5–5.3)
POTASSIUM BLDA-SCNC: 3.7 MMOL/L (ref 3.5–5.3)
POTASSIUM BLDA-SCNC: 3.8 MMOL/L (ref 3.5–5.3)
POTASSIUM BLDA-SCNC: 3.8 MMOL/L (ref 3.5–5.3)
POTASSIUM BLDA-SCNC: 3.9 MMOL/L (ref 3.5–5.3)
POTASSIUM BLDA-SCNC: 3.9 MMOL/L (ref 3.5–5.3)
POTASSIUM BLDA-SCNC: 4.1 MMOL/L (ref 3.5–5.3)
POTASSIUM BLDA-SCNC: 4.1 MMOL/L (ref 3.5–5.3)
POTASSIUM BLDA-SCNC: 4.2 MMOL/L (ref 3.5–5.3)
POTASSIUM BLDA-SCNC: 5.2 MMOL/L (ref 3.5–5.3)
POTASSIUM BLDA-SCNC: 5.2 MMOL/L (ref 3.5–5.3)
POTASSIUM BLDA-SCNC: 5.4 MMOL/L (ref 3.5–5.3)
POTASSIUM BLDV-SCNC: 4 MMOL/L (ref 3.5–5.3)
POTASSIUM BLDV-SCNC: 4.4 MMOL/L (ref 3.5–5.3)
POTASSIUM BLDV-SCNC: 4.5 MMOL/L (ref 3.5–5.3)
POTASSIUM BLDV-SCNC: 4.6 MMOL/L (ref 3.5–5.3)
POTASSIUM SERPL-SCNC: 3.2 MMOL/L (ref 3.5–5.3)
POTASSIUM SERPL-SCNC: 3.2 MMOL/L (ref 3.5–5.3)
POTASSIUM SERPL-SCNC: 3.3 MMOL/L (ref 3.5–5.3)
POTASSIUM SERPL-SCNC: 3.4 MMOL/L (ref 3.5–5.3)
POTASSIUM SERPL-SCNC: 3.4 MMOL/L (ref 3.5–5.3)
POTASSIUM SERPL-SCNC: 3.5 MMOL/L (ref 3.5–5.3)
POTASSIUM SERPL-SCNC: 3.6 MMOL/L (ref 3.5–5.3)
POTASSIUM SERPL-SCNC: 3.7 MMOL/L (ref 3.5–5.3)
POTASSIUM SERPL-SCNC: 3.8 MMOL/L (ref 3.5–5.3)
POTASSIUM SERPL-SCNC: 3.9 MMOL/L (ref 3.5–5.3)
POTASSIUM SERPL-SCNC: 4 MMOL/L (ref 3.5–5.3)
POTASSIUM SERPL-SCNC: 4.1 MMOL/L (ref 3.5–5.3)
POTASSIUM SERPL-SCNC: 4.2 MMOL/L (ref 3.5–5.3)
POTASSIUM SERPL-SCNC: 4.3 MMOL/L (ref 3.5–5.3)
POTASSIUM SERPL-SCNC: 4.4 MMOL/L (ref 3.5–5.3)
POTASSIUM SERPL-SCNC: 4.5 MMOL/L (ref 3.5–5.3)
POTASSIUM SERPL-SCNC: 4.6 MMOL/L (ref 3.5–5.3)
POTASSIUM SERPL-SCNC: 4.6 MMOL/L (ref 3.5–5.3)
POTASSIUM SERPL-SCNC: 4.7 MMOL/L (ref 3.5–5.3)
POTASSIUM SERPL-SCNC: 4.8 MMOL/L (ref 3.5–5.3)
POTASSIUM SERPL-SCNC: 4.9 MMOL/L (ref 3.5–5.3)
POTASSIUM SERPL-SCNC: 4.9 MMOL/L (ref 3.5–5.3)
POTASSIUM SERPL-SCNC: 5 MMOL/L (ref 3.5–5.3)
POTASSIUM SERPL-SCNC: 5 MMOL/L (ref 3.5–5.3)
POTASSIUM SERPL-SCNC: 5.1 MMOL/L (ref 3.5–5.3)
POTASSIUM SERPL-SCNC: 5.1 MMOL/L (ref 3.5–5.3)
POTASSIUM SERPL-SCNC: 5.3 MMOL/L (ref 3.5–5.3)
POTASSIUM SERPL-SCNC: 5.4 MMOL/L (ref 3.5–5.3)
POTASSIUM SERPL-SCNC: 5.7 MMOL/L (ref 3.5–5.3)
POTASSIUM SERPL-SCNC: 6.7 MMOL/L (ref 3.5–5.3)
POTASSIUM SERPL-SCNC: 9 MMOL/L (ref 3.5–5.3)
PROCALCITONIN SERPL-MCNC: 0.7 NG/ML
PRODUCT BLOOD TYPE: 7300
PRODUCT CODE: NORMAL
PROT SERPL-MCNC: 4.5 G/DL (ref 6.4–8.2)
PROT SERPL-MCNC: 4.9 G/DL (ref 6.4–8.2)
PROT SERPL-MCNC: 5.3 G/DL (ref 6.4–8.2)
PROT SERPL-MCNC: 5.3 G/DL (ref 6.4–8.2)
PROT SERPL-MCNC: 5.6 G/DL (ref 6.4–8.2)
PROT SERPL-MCNC: 5.7 G/DL (ref 6.4–8.2)
PROT SERPL-MCNC: 5.9 G/DL (ref 6.4–8.2)
PROT SERPL-MCNC: 6 G/DL (ref 6.4–8.2)
PROTHROMBIN TIME: 12.2 SECONDS (ref 9.8–12.8)
PROTHROMBIN TIME: 13.9 SECONDS (ref 9.8–12.8)
PROTHROMBIN TIME: 14.5 SECONDS (ref 9.8–12.8)
PROTHROMBIN TIME: 14.7 SECONDS (ref 9.8–12.8)
PTH-INTACT SERPL-MCNC: 158 PG/ML (ref 18.5–88)
Q ONSET: 214 MS
Q ONSET: 223 MS
Q ONSET: 224 MS
Q ONSET: 225 MS
Q ONSET: 225 MS
Q ONSET: 227 MS
Q ONSET: 228 MS
QRS COUNT: 11 BEATS
QRS COUNT: 12 BEATS
QRS COUNT: 13 BEATS
QRS COUNT: 14 BEATS
QRS COUNT: 16 BEATS
QRS DURATION: 80 MS
QRS DURATION: 82 MS
QRS DURATION: 82 MS
QRS DURATION: 84 MS
QRS DURATION: 88 MS
QT INTERVAL: 332 MS
QT INTERVAL: 340 MS
QT INTERVAL: 354 MS
QT INTERVAL: 354 MS
QT INTERVAL: 392 MS
QT INTERVAL: 400 MS
QT INTERVAL: 432 MS
QTC CALCULATION(BAZETT): 404 MS
QTC CALCULATION(BAZETT): 419 MS
QTC CALCULATION(BAZETT): 438 MS
QTC CALCULATION(BAZETT): 447 MS
QTC CALCULATION(BAZETT): 449 MS
QTC CALCULATION(BAZETT): 462 MS
QTC CALCULATION(BAZETT): 476 MS
QTC FREDERICIA: 381 MS
QTC FREDERICIA: 388 MS
QTC FREDERICIA: 414 MS
QTC FREDERICIA: 415 MS
QTC FREDERICIA: 425 MS
QTC FREDERICIA: 446 MS
QTC FREDERICIA: 452 MS
R AXIS: 12 DEGREES
R AXIS: 32 DEGREES
R AXIS: 33 DEGREES
R AXIS: 4 DEGREES
R AXIS: 41 DEGREES
R AXIS: 46 DEGREES
R AXIS: 7 DEGREES
RBC # BLD AUTO: 2.24 X10*6/UL (ref 4.5–5.9)
RBC # BLD AUTO: 2.25 X10*6/UL (ref 4.5–5.9)
RBC # BLD AUTO: 2.28 X10*6/UL (ref 4.5–5.9)
RBC # BLD AUTO: 2.29 X10*6/UL (ref 4.5–5.9)
RBC # BLD AUTO: 2.34 X10*6/UL (ref 4.5–5.9)
RBC # BLD AUTO: 2.41 X10*6/UL (ref 4.5–5.9)
RBC # BLD AUTO: 2.41 X10*6/UL (ref 4.5–5.9)
RBC # BLD AUTO: 2.42 X10*6/UL (ref 4.5–5.9)
RBC # BLD AUTO: 2.43 X10*6/UL (ref 4.5–5.9)
RBC # BLD AUTO: 2.44 X10*6/UL (ref 4.5–5.9)
RBC # BLD AUTO: 2.44 X10*6/UL (ref 4.5–5.9)
RBC # BLD AUTO: 2.45 X10*6/UL (ref 4.5–5.9)
RBC # BLD AUTO: 2.46 X10*6/UL (ref 4.5–5.9)
RBC # BLD AUTO: 2.46 X10*6/UL (ref 4.5–5.9)
RBC # BLD AUTO: 2.48 X10*6/UL (ref 4.5–5.9)
RBC # BLD AUTO: 2.49 X10*6/UL (ref 4.5–5.9)
RBC # BLD AUTO: 2.51 X10*6/UL (ref 4.5–5.9)
RBC # BLD AUTO: 2.51 X10*6/UL (ref 4.5–5.9)
RBC # BLD AUTO: 2.52 X10*6/UL (ref 4.5–5.9)
RBC # BLD AUTO: 2.53 X10*6/UL (ref 4.5–5.9)
RBC # BLD AUTO: 2.53 X10*6/UL (ref 4.5–5.9)
RBC # BLD AUTO: 2.54 X10*6/UL (ref 4.5–5.9)
RBC # BLD AUTO: 2.54 X10*6/UL (ref 4.5–5.9)
RBC # BLD AUTO: 2.56 X10*6/UL (ref 4.5–5.9)
RBC # BLD AUTO: 2.57 X10*6/UL (ref 4.5–5.9)
RBC # BLD AUTO: 2.57 X10*6/UL (ref 4.5–5.9)
RBC # BLD AUTO: 2.58 X10*6/UL (ref 4.5–5.9)
RBC # BLD AUTO: 2.59 X10*6/UL (ref 4.5–5.9)
RBC # BLD AUTO: 2.6 X10*6/UL (ref 4.5–5.9)
RBC # BLD AUTO: 2.6 X10*6/UL (ref 4.5–5.9)
RBC # BLD AUTO: 2.61 X10*6/UL (ref 4.5–5.9)
RBC # BLD AUTO: 2.62 X10*6/UL (ref 4.5–5.9)
RBC # BLD AUTO: 2.63 X10*6/UL (ref 4.5–5.9)
RBC # BLD AUTO: 2.65 X10*6/UL (ref 4.5–5.9)
RBC # BLD AUTO: 2.66 X10*6/UL (ref 4.5–5.9)
RBC # BLD AUTO: 2.66 X10*6/UL (ref 4.5–5.9)
RBC # BLD AUTO: 2.69 X10*6/UL (ref 4.5–5.9)
RBC # BLD AUTO: 2.7 X10*6/UL (ref 4.5–5.9)
RBC # BLD AUTO: 2.71 X10*6/UL (ref 4.5–5.9)
RBC # BLD AUTO: 2.72 X10*6/UL (ref 4.5–5.9)
RBC # BLD AUTO: 2.75 X10*6/UL (ref 4.5–5.9)
RBC # BLD AUTO: 2.77 X10*6/UL (ref 4.5–5.9)
RBC # BLD AUTO: 2.8 X10*6/UL (ref 4.5–5.9)
RBC # BLD AUTO: 2.8 X10*6/UL (ref 4.5–5.9)
RBC # BLD AUTO: 2.84 X10*6/UL (ref 4.5–5.9)
RBC # BLD AUTO: 2.85 X10*6/UL (ref 4.5–5.9)
RBC # BLD AUTO: 2.89 X10*6/UL (ref 4.5–5.9)
RBC # BLD AUTO: 2.91 X10*6/UL (ref 4.5–5.9)
RBC # BLD AUTO: 2.93 X10*6/UL (ref 4.5–5.9)
RBC # BLD AUTO: 2.96 X10*6/UL (ref 4.5–5.9)
RBC # BLD AUTO: 2.97 X10*6/UL (ref 4.5–5.9)
RBC # BLD AUTO: 3 X10*6/UL (ref 4.5–5.9)
RBC # BLD AUTO: 3 X10*6/UL (ref 4.5–5.9)
RBC # BLD AUTO: 3.09 X10*6/UL (ref 4.5–5.9)
RBC # BLD AUTO: 3.1 X10*6/UL (ref 4.5–5.9)
RBC # BLD AUTO: 3.2 X10*6/UL (ref 4.5–5.9)
RBC # BLD AUTO: 3.23 X10*6/UL (ref 4.5–5.9)
RBC # BLD AUTO: 3.27 X10*6/UL (ref 4.5–5.9)
RBC # BLD AUTO: 3.28 X10*6/UL (ref 4.5–5.9)
RBC # BLD AUTO: 3.42 X10*6/UL (ref 4.5–5.9)
RBC # BLD AUTO: 3.47 X10*6/UL (ref 4.5–5.9)
RBC # BLD AUTO: 3.5 X10*6/UL (ref 4.5–5.9)
RBC # BLD AUTO: 3.51 X10*6/UL (ref 4.5–5.9)
RBC # BLD AUTO: 3.53 X10*6/UL (ref 4.5–5.9)
RBC # BLD AUTO: 3.64 X10*6/UL (ref 4.5–5.9)
RBC # BLD AUTO: 3.65 X10*6/UL (ref 4.5–5.9)
RBC # BLD AUTO: 3.75 X10*6/UL (ref 4.5–5.9)
RBC # BLD AUTO: 3.81 X10*6/UL (ref 4.5–5.9)
RBC MORPH BLD: ABNORMAL
RBC MORPH BLD: NORMAL
RBC MORPH BLD: NORMAL
RH FACTOR (ANTIGEN D): NORMAL
RIGHT VENTRICLE FREE WALL PEAK S': 3.55 CM/S
RIGHT VENTRICLE PEAK SYSTOLIC PRESSURE: 33.5 MMHG
RIGHT VENTRICLE PEAK SYSTOLIC PRESSURE: 35.5 MMHG
SAO2 % BLDA: 100 % (ref 94–100)
SAO2 % BLDA: 71 % (ref 94–100)
SAO2 % BLDA: 88 % (ref 94–100)
SAO2 % BLDA: 97 % (ref 94–100)
SAO2 % BLDA: 98 % (ref 94–100)
SAO2 % BLDA: 98 % (ref 94–100)
SAO2 % BLDA: 99 % (ref 94–100)
SAO2 % BLDV: 52 % (ref 45–75)
SAO2 % BLDV: 70 % (ref 45–75)
SAO2 % BLDV: 88 % (ref 45–75)
SAO2 % BLDV: 99 % (ref 45–75)
SARS-COV-2 RNA RESP QL NAA+PROBE: DETECTED
SARS-COV-2 RNA RESP QL NAA+PROBE: NOT DETECTED
SERUM AND PLATELET FACTOR 4: 0.12 OD UNITS
SODIUM BLDA-SCNC: 121 MMOL/L (ref 136–145)
SODIUM BLDA-SCNC: 125 MMOL/L (ref 136–145)
SODIUM BLDA-SCNC: 125 MMOL/L (ref 136–145)
SODIUM BLDA-SCNC: 126 MMOL/L (ref 136–145)
SODIUM BLDA-SCNC: 127 MMOL/L (ref 136–145)
SODIUM BLDA-SCNC: 128 MMOL/L (ref 136–145)
SODIUM BLDA-SCNC: 129 MMOL/L (ref 136–145)
SODIUM BLDA-SCNC: 129 MMOL/L (ref 136–145)
SODIUM BLDA-SCNC: 130 MMOL/L (ref 136–145)
SODIUM BLDA-SCNC: 131 MMOL/L (ref 136–145)
SODIUM BLDV-SCNC: 123 MMOL/L (ref 136–145)
SODIUM BLDV-SCNC: 128 MMOL/L (ref 136–145)
SODIUM BLDV-SCNC: 128 MMOL/L (ref 136–145)
SODIUM BLDV-SCNC: 129 MMOL/L (ref 136–145)
SODIUM SERPL-SCNC: 126 MMOL/L (ref 136–145)
SODIUM SERPL-SCNC: 126 MMOL/L (ref 136–145)
SODIUM SERPL-SCNC: 127 MMOL/L (ref 136–145)
SODIUM SERPL-SCNC: 128 MMOL/L (ref 136–145)
SODIUM SERPL-SCNC: 129 MMOL/L (ref 136–145)
SODIUM SERPL-SCNC: 130 MMOL/L (ref 136–145)
SODIUM SERPL-SCNC: 131 MMOL/L (ref 136–145)
SODIUM SERPL-SCNC: 132 MMOL/L (ref 136–145)
SODIUM SERPL-SCNC: 133 MMOL/L (ref 136–145)
SODIUM SERPL-SCNC: 134 MMOL/L (ref 136–145)
SODIUM SERPL-SCNC: 135 MMOL/L (ref 136–145)
SODIUM SERPL-SCNC: 136 MMOL/L (ref 136–145)
SODIUM SERPL-SCNC: 137 MMOL/L (ref 136–145)
STAPHYLOCOCCUS SPEC CULT: ABNORMAL
STAPHYLOCOCCUS SPEC CULT: NORMAL
T AXIS: -52 DEGREES
T AXIS: 147 DEGREES
T AXIS: 221 DEGREES
T AXIS: 230 DEGREES
T AXIS: 261 DEGREES
T AXIS: 264 DEGREES
T AXIS: 67 DEGREES
T OFFSET: 390 MS
T OFFSET: 391 MS
T OFFSET: 395 MS
T OFFSET: 402 MS
T OFFSET: 424 MS
T OFFSET: 427 MS
T OFFSET: 439 MS
T VAGINALIS SPEC QL WET PREP: ABNORMAL
TIBC SERPL-MCNC: 211 UG/DL (ref 240–445)
TOTAL CELLS COUNTED BLD: 100
TREPONEMA PALLIDUM IGG+IGM AB [PRESENCE] IN SERUM OR PLASMA BY IMMUNOASSAY: NONREACTIVE
TREPONEMA PALLIDUM IGG+IGM AB [PRESENCE] IN SERUM OR PLASMA BY IMMUNOASSAY: NONREACTIVE
TRICHOMONAS REFLEX COMMENT: ABNORMAL
TRICUSPID ANNULAR PLANE SYSTOLIC EXCURSION: 0.9 CM
TSH SERPL-ACNC: 2 MIU/L (ref 0.44–3.98)
UFH PPP CHRO-ACNC: 0.3 IU/ML
UFH PPP CHRO-ACNC: 0.4 IU/ML
UFH PPP CHRO-ACNC: 0.5 IU/ML
UFH PPP CHRO-ACNC: 0.6 IU/ML
UFH PPP CHRO-ACNC: 0.7 IU/ML
UFH PPP CHRO-ACNC: 0.7 IU/ML
UFH PPP CHRO-ACNC: 0.8 IU/ML
UFH PPP CHRO-ACNC: 0.9 IU/ML
UFH PPP CHRO-ACNC: 1 IU/ML
UFH PPP CHRO-ACNC: 1 IU/ML
UFH PPP CHRO-ACNC: 1.2 IU/ML
UFH PPP CHRO-ACNC: 1.7 IU/ML
UFH PPP CHRO-ACNC: 1.7 IU/ML
UFH PPP CHRO-ACNC: >2 IU/ML
UIBC SERPL-MCNC: 191 UG/DL (ref 110–370)
UNIT ABO: NORMAL
UNIT NUMBER: NORMAL
UNIT RH: NORMAL
UNIT VOLUME: 350
VANCOMYCIN SERPL-MCNC: 18.5 UG/ML (ref 5–20)
VANCOMYCIN SERPL-MCNC: 20.4 UG/ML (ref 5–20)
VANCOMYCIN SERPL-MCNC: 21.9 UG/ML (ref 5–20)
VANCOMYCIN SERPL-MCNC: 8.9 UG/ML (ref 5–20)
VANCOMYCIN TROUGH SERPL-MCNC: 8.3 UG/ML (ref 5–20)
VANCOMYCIN TROUGH SERPL-MCNC: 8.5 UG/ML (ref 5–20)
VARIANT LYMPHS # BLD MANUAL: 0.11 X10*3/UL (ref 0–0.3)
VARIANT LYMPHS NFR BLD: 1 %
VENTRICULAR RATE: 69 BPM
VENTRICULAR RATE: 72 BPM
VENTRICULAR RATE: 85 BPM
VENTRICULAR RATE: 89 BPM
VENTRICULAR RATE: 96 BPM
VENTRICULAR RATE: 96 BPM
VENTRICULAR RATE: 97 BPM
VIT B12 SERPL-MCNC: 1438 PG/ML (ref 211–911)
WBC # BLD AUTO: 10 X10*3/UL (ref 4.4–11.3)
WBC # BLD AUTO: 10.2 X10*3/UL (ref 4.4–11.3)
WBC # BLD AUTO: 10.4 X10*3/UL (ref 4.4–11.3)
WBC # BLD AUTO: 10.5 X10*3/UL (ref 4.4–11.3)
WBC # BLD AUTO: 10.5 X10*3/UL (ref 4.4–11.3)
WBC # BLD AUTO: 10.8 X10*3/UL (ref 4.4–11.3)
WBC # BLD AUTO: 10.9 X10*3/UL (ref 4.4–11.3)
WBC # BLD AUTO: 11 X10*3/UL (ref 4.4–11.3)
WBC # BLD AUTO: 11.3 X10*3/UL (ref 4.4–11.3)
WBC # BLD AUTO: 11.5 X10*3/UL (ref 4.4–11.3)
WBC # BLD AUTO: 11.7 X10*3/UL (ref 4.4–11.3)
WBC # BLD AUTO: 11.8 X10*3/UL (ref 4.4–11.3)
WBC # BLD AUTO: 11.9 X10*3/UL (ref 4.4–11.3)
WBC # BLD AUTO: 12 X10*3/UL (ref 4.4–11.3)
WBC # BLD AUTO: 12 X10*3/UL (ref 4.4–11.3)
WBC # BLD AUTO: 12.3 X10*3/UL (ref 4.4–11.3)
WBC # BLD AUTO: 12.9 X10*3/UL (ref 4.4–11.3)
WBC # BLD AUTO: 13.1 X10*3/UL (ref 4.4–11.3)
WBC # BLD AUTO: 13.2 X10*3/UL (ref 4.4–11.3)
WBC # BLD AUTO: 13.5 X10*3/UL (ref 4.4–11.3)
WBC # BLD AUTO: 13.6 X10*3/UL (ref 4.4–11.3)
WBC # BLD AUTO: 13.8 X10*3/UL (ref 4.4–11.3)
WBC # BLD AUTO: 14 X10*3/UL (ref 4.4–11.3)
WBC # BLD AUTO: 14 X10*3/UL (ref 4.4–11.3)
WBC # BLD AUTO: 14.4 X10*3/UL (ref 4.4–11.3)
WBC # BLD AUTO: 14.5 X10*3/UL (ref 4.4–11.3)
WBC # BLD AUTO: 14.5 X10*3/UL (ref 4.4–11.3)
WBC # BLD AUTO: 14.8 X10*3/UL (ref 4.4–11.3)
WBC # BLD AUTO: 14.9 X10*3/UL (ref 4.4–11.3)
WBC # BLD AUTO: 15.1 X10*3/UL (ref 4.4–11.3)
WBC # BLD AUTO: 15.2 X10*3/UL (ref 4.4–11.3)
WBC # BLD AUTO: 15.4 X10*3/UL (ref 4.4–11.3)
WBC # BLD AUTO: 15.7 X10*3/UL (ref 4.4–11.3)
WBC # BLD AUTO: 2.3 X10*3/UL (ref 4.4–11.3)
WBC # BLD AUTO: 6 X10*3/UL (ref 4.4–11.3)
WBC # BLD AUTO: 6.1 X10*3/UL (ref 4.4–11.3)
WBC # BLD AUTO: 6.4 X10*3/UL (ref 4.4–11.3)
WBC # BLD AUTO: 6.8 X10*3/UL (ref 4.4–11.3)
WBC # BLD AUTO: 6.8 X10*3/UL (ref 4.4–11.3)
WBC # BLD AUTO: 7.3 X10*3/UL (ref 4.4–11.3)
WBC # BLD AUTO: 7.4 X10*3/UL (ref 4.4–11.3)
WBC # BLD AUTO: 7.5 X10*3/UL (ref 4.4–11.3)
WBC # BLD AUTO: 7.7 X10*3/UL (ref 4.4–11.3)
WBC # BLD AUTO: 7.7 X10*3/UL (ref 4.4–11.3)
WBC # BLD AUTO: 7.9 X10*3/UL (ref 4.4–11.3)
WBC # BLD AUTO: 8.1 X10*3/UL (ref 4.4–11.3)
WBC # BLD AUTO: 8.2 X10*3/UL (ref 4.4–11.3)
WBC # BLD AUTO: 8.3 X10*3/UL (ref 4.4–11.3)
WBC # BLD AUTO: 8.4 X10*3/UL (ref 4.4–11.3)
WBC # BLD AUTO: 8.5 X10*3/UL (ref 4.4–11.3)
WBC # BLD AUTO: 8.6 X10*3/UL (ref 4.4–11.3)
WBC # BLD AUTO: 8.9 X10*3/UL (ref 4.4–11.3)
WBC # BLD AUTO: 8.9 X10*3/UL (ref 4.4–11.3)
WBC # BLD AUTO: 9 X10*3/UL (ref 4.4–11.3)
WBC # BLD AUTO: 9.1 X10*3/UL (ref 4.4–11.3)
WBC # BLD AUTO: 9.1 X10*3/UL (ref 4.4–11.3)
WBC # BLD AUTO: 9.4 X10*3/UL (ref 4.4–11.3)
WBC # BLD AUTO: 9.5 X10*3/UL (ref 4.4–11.3)
WBC # BLD AUTO: 9.5 X10*3/UL (ref 4.4–11.3)
WBC # BLD AUTO: 9.6 X10*3/UL (ref 4.4–11.3)
WBC # BLD AUTO: 9.7 X10*3/UL (ref 4.4–11.3)
WBC # BLD AUTO: 9.7 X10*3/UL (ref 4.4–11.3)
WBC # BLD AUTO: 9.8 X10*3/UL (ref 4.4–11.3)
WBC # BLD AUTO: 9.9 X10*3/UL (ref 4.4–11.3)
WBC # BLD AUTO: 9.9 X10*3/UL (ref 4.4–11.3)
WBC VAG QL WET PREP: ABNORMAL
XM INTEP: NORMAL
YEAST VAG QL WET PREP: PRESENT

## 2024-01-01 PROCEDURE — 99233 SBSQ HOSP IP/OBS HIGH 50: CPT

## 2024-01-01 PROCEDURE — 82947 ASSAY GLUCOSE BLOOD QUANT: CPT

## 2024-01-01 PROCEDURE — 2500000001 HC RX 250 WO HCPCS SELF ADMINISTERED DRUGS (ALT 637 FOR MEDICARE OP): Performed by: PHYSICIAN ASSISTANT

## 2024-01-01 PROCEDURE — 2500000001 HC RX 250 WO HCPCS SELF ADMINISTERED DRUGS (ALT 637 FOR MEDICARE OP): Performed by: STUDENT IN AN ORGANIZED HEALTH CARE EDUCATION/TRAINING PROGRAM

## 2024-01-01 PROCEDURE — 1100000001 HC PRIVATE ROOM DAILY

## 2024-01-01 PROCEDURE — 2500000004 HC RX 250 GENERAL PHARMACY W/ HCPCS (ALT 636 FOR OP/ED): Performed by: STUDENT IN AN ORGANIZED HEALTH CARE EDUCATION/TRAINING PROGRAM

## 2024-01-01 PROCEDURE — 99232 SBSQ HOSP IP/OBS MODERATE 35: CPT | Performed by: STUDENT IN AN ORGANIZED HEALTH CARE EDUCATION/TRAINING PROGRAM

## 2024-01-01 PROCEDURE — 85520 HEPARIN ASSAY: CPT | Performed by: STUDENT IN AN ORGANIZED HEALTH CARE EDUCATION/TRAINING PROGRAM

## 2024-01-01 PROCEDURE — 36415 COLL VENOUS BLD VENIPUNCTURE: CPT

## 2024-01-01 PROCEDURE — 2500000005 HC RX 250 GENERAL PHARMACY W/O HCPCS: Performed by: NURSE PRACTITIONER

## 2024-01-01 PROCEDURE — 2500000001 HC RX 250 WO HCPCS SELF ADMINISTERED DRUGS (ALT 637 FOR MEDICARE OP)

## 2024-01-01 PROCEDURE — 80069 RENAL FUNCTION PANEL: CPT | Performed by: STUDENT IN AN ORGANIZED HEALTH CARE EDUCATION/TRAINING PROGRAM

## 2024-01-01 PROCEDURE — 8010000001 HC DIALYSIS - HEMODIALYSIS PER DAY

## 2024-01-01 PROCEDURE — 02HV33Z INSERTION OF INFUSION DEVICE INTO SUPERIOR VENA CAVA, PERCUTANEOUS APPROACH: ICD-10-PCS | Performed by: EMERGENCY MEDICINE

## 2024-01-01 PROCEDURE — 2500000001 HC RX 250 WO HCPCS SELF ADMINISTERED DRUGS (ALT 637 FOR MEDICARE OP): Performed by: NURSE PRACTITIONER

## 2024-01-01 PROCEDURE — 1200000002 HC GENERAL ROOM WITH TELEMETRY DAILY

## 2024-01-01 PROCEDURE — 97530 THERAPEUTIC ACTIVITIES: CPT | Mod: GP

## 2024-01-01 PROCEDURE — 85520 HEPARIN ASSAY: CPT

## 2024-01-01 PROCEDURE — 2500000004 HC RX 250 GENERAL PHARMACY W/ HCPCS (ALT 636 FOR OP/ED): Performed by: GENERAL PRACTICE

## 2024-01-01 PROCEDURE — 92526 ORAL FUNCTION THERAPY: CPT | Mod: GN | Performed by: SPEECH-LANGUAGE PATHOLOGIST

## 2024-01-01 PROCEDURE — 2720000007 HC OR 272 NO HCPCS: Performed by: SURGERY

## 2024-01-01 PROCEDURE — 2500000002 HC RX 250 W HCPCS SELF ADMINISTERED DRUGS (ALT 637 FOR MEDICARE OP, ALT 636 FOR OP/ED): Performed by: INTERNAL MEDICINE

## 2024-01-01 PROCEDURE — 83605 ASSAY OF LACTIC ACID: CPT | Performed by: NURSE PRACTITIONER

## 2024-01-01 PROCEDURE — 99233 SBSQ HOSP IP/OBS HIGH 50: CPT | Performed by: NURSE PRACTITIONER

## 2024-01-01 PROCEDURE — 84132 ASSAY OF SERUM POTASSIUM: CPT

## 2024-01-01 PROCEDURE — 2500000001 HC RX 250 WO HCPCS SELF ADMINISTERED DRUGS (ALT 637 FOR MEDICARE OP): Performed by: INTERNAL MEDICINE

## 2024-01-01 PROCEDURE — 80202 ASSAY OF VANCOMYCIN: CPT | Performed by: STUDENT IN AN ORGANIZED HEALTH CARE EDUCATION/TRAINING PROGRAM

## 2024-01-01 PROCEDURE — 3600000008 HC OR TIME - EACH INCREMENTAL 1 MINUTE - PROCEDURE LEVEL THREE: Performed by: PODIATRIST

## 2024-01-01 PROCEDURE — 2500000004 HC RX 250 GENERAL PHARMACY W/ HCPCS (ALT 636 FOR OP/ED): Performed by: NURSE PRACTITIONER

## 2024-01-01 PROCEDURE — C1887 CATHETER, GUIDING: HCPCS | Performed by: INTERNAL MEDICINE

## 2024-01-01 PROCEDURE — 94640 AIRWAY INHALATION TREATMENT: CPT

## 2024-01-01 PROCEDURE — 85027 COMPLETE CBC AUTOMATED: CPT | Performed by: STUDENT IN AN ORGANIZED HEALTH CARE EDUCATION/TRAINING PROGRAM

## 2024-01-01 PROCEDURE — 80069 RENAL FUNCTION PANEL: CPT

## 2024-01-01 PROCEDURE — 71045 X-RAY EXAM CHEST 1 VIEW: CPT

## 2024-01-01 PROCEDURE — 84100 ASSAY OF PHOSPHORUS: CPT

## 2024-01-01 PROCEDURE — 85025 COMPLETE CBC W/AUTO DIFF WBC: CPT

## 2024-01-01 PROCEDURE — 86022 PLATELET ANTIBODIES: CPT

## 2024-01-01 PROCEDURE — 03LY0ZZ OCCLUSION OF UPPER ARTERY, OPEN APPROACH: ICD-10-PCS | Performed by: SURGERY

## 2024-01-01 PROCEDURE — 99497 ADVNCD CARE PLAN 30 MIN: CPT

## 2024-01-01 PROCEDURE — 85027 COMPLETE CBC AUTOMATED: CPT | Performed by: NURSE PRACTITIONER

## 2024-01-01 PROCEDURE — C1725 CATH, TRANSLUMIN NON-LASER: HCPCS

## 2024-01-01 PROCEDURE — 36415 COLL VENOUS BLD VENIPUNCTURE: CPT | Performed by: STUDENT IN AN ORGANIZED HEALTH CARE EDUCATION/TRAINING PROGRAM

## 2024-01-01 PROCEDURE — 2500000004 HC RX 250 GENERAL PHARMACY W/ HCPCS (ALT 636 FOR OP/ED)

## 2024-01-01 PROCEDURE — 86706 HEP B SURFACE ANTIBODY: CPT | Performed by: INTERNAL MEDICINE

## 2024-01-01 PROCEDURE — 93970 EXTREMITY STUDY: CPT

## 2024-01-01 PROCEDURE — 82330 ASSAY OF CALCIUM: CPT | Performed by: STUDENT IN AN ORGANIZED HEALTH CARE EDUCATION/TRAINING PROGRAM

## 2024-01-01 PROCEDURE — C1750 CATH, HEMODIALYSIS,LONG-TERM: HCPCS

## 2024-01-01 PROCEDURE — 36415 COLL VENOUS BLD VENIPUNCTURE: CPT | Performed by: GENERAL PRACTICE

## 2024-01-01 PROCEDURE — C1894 INTRO/SHEATH, NON-LASER: HCPCS | Performed by: INTERNAL MEDICINE

## 2024-01-01 PROCEDURE — 2500000005 HC RX 250 GENERAL PHARMACY W/O HCPCS: Performed by: INTERNAL MEDICINE

## 2024-01-01 PROCEDURE — 97110 THERAPEUTIC EXERCISES: CPT | Mod: GP

## 2024-01-01 PROCEDURE — 2500000004 HC RX 250 GENERAL PHARMACY W/ HCPCS (ALT 636 FOR OP/ED): Performed by: INTERNAL MEDICINE

## 2024-01-01 PROCEDURE — 80053 COMPREHEN METABOLIC PANEL: CPT | Performed by: INTERNAL MEDICINE

## 2024-01-01 PROCEDURE — 93005 ELECTROCARDIOGRAM TRACING: CPT

## 2024-01-01 PROCEDURE — 7100000001 HC RECOVERY ROOM TIME - INITIAL BASE CHARGE: Performed by: SURGERY

## 2024-01-01 PROCEDURE — 87081 CULTURE SCREEN ONLY: CPT | Performed by: STUDENT IN AN ORGANIZED HEALTH CARE EDUCATION/TRAINING PROGRAM

## 2024-01-01 PROCEDURE — 85610 PROTHROMBIN TIME: CPT | Performed by: INTERNAL MEDICINE

## 2024-01-01 PROCEDURE — 80069 RENAL FUNCTION PANEL: CPT | Performed by: NURSE PRACTITIONER

## 2024-01-01 PROCEDURE — C1769 GUIDE WIRE: HCPCS

## 2024-01-01 PROCEDURE — 99233 SBSQ HOSP IP/OBS HIGH 50: CPT | Performed by: INTERNAL MEDICINE

## 2024-01-01 PROCEDURE — 0JH63XZ INSERTION OF TUNNELED VASCULAR ACCESS DEVICE INTO CHEST SUBCUTANEOUS TISSUE AND FASCIA, PERCUTANEOUS APPROACH: ICD-10-PCS

## 2024-01-01 PROCEDURE — 99223 1ST HOSP IP/OBS HIGH 75: CPT | Performed by: INTERNAL MEDICINE

## 2024-01-01 PROCEDURE — 99498 ADVNCD CARE PLAN ADDL 30 MIN: CPT

## 2024-01-01 PROCEDURE — 85025 COMPLETE CBC W/AUTO DIFF WBC: CPT | Performed by: NURSE PRACTITIONER

## 2024-01-01 PROCEDURE — 3600000008 HC OR TIME - EACH INCREMENTAL 1 MINUTE - PROCEDURE LEVEL THREE: Performed by: SURGERY

## 2024-01-01 PROCEDURE — 36430 TRANSFUSION BLD/BLD COMPNT: CPT | Performed by: ANESTHESIOLOGIST ASSISTANT

## 2024-01-01 PROCEDURE — 2500000004 HC RX 250 GENERAL PHARMACY W/ HCPCS (ALT 636 FOR OP/ED): Mod: JW

## 2024-01-01 PROCEDURE — 2500000002 HC RX 250 W HCPCS SELF ADMINISTERED DRUGS (ALT 637 FOR MEDICARE OP, ALT 636 FOR OP/ED): Performed by: NURSE PRACTITIONER

## 2024-01-01 PROCEDURE — 83735 ASSAY OF MAGNESIUM: CPT

## 2024-01-01 PROCEDURE — 87070 CULTURE OTHR SPECIMN AEROBIC: CPT | Performed by: STUDENT IN AN ORGANIZED HEALTH CARE EDUCATION/TRAINING PROGRAM

## 2024-01-01 PROCEDURE — 86901 BLOOD TYPING SEROLOGIC RH(D): CPT | Performed by: STUDENT IN AN ORGANIZED HEALTH CARE EDUCATION/TRAINING PROGRAM

## 2024-01-01 PROCEDURE — 99231 SBSQ HOSP IP/OBS SF/LOW 25: CPT | Performed by: NURSE PRACTITIONER

## 2024-01-01 PROCEDURE — 2780000003 HC OR 278 NO HCPCS: Performed by: INTERNAL MEDICINE

## 2024-01-01 PROCEDURE — 80053 COMPREHEN METABOLIC PANEL: CPT

## 2024-01-01 PROCEDURE — 97535 SELF CARE MNGMENT TRAINING: CPT | Mod: GO

## 2024-01-01 PROCEDURE — 99153 MOD SED SAME PHYS/QHP EA: CPT | Performed by: INTERNAL MEDICINE

## 2024-01-01 PROCEDURE — 80202 ASSAY OF VANCOMYCIN: CPT | Performed by: INTERNAL MEDICINE

## 2024-01-01 PROCEDURE — 99233 SBSQ HOSP IP/OBS HIGH 50: CPT | Performed by: STUDENT IN AN ORGANIZED HEALTH CARE EDUCATION/TRAINING PROGRAM

## 2024-01-01 PROCEDURE — 99232 SBSQ HOSP IP/OBS MODERATE 35: CPT | Performed by: INTERNAL MEDICINE

## 2024-01-01 PROCEDURE — 84100 ASSAY OF PHOSPHORUS: CPT | Performed by: INTERNAL MEDICINE

## 2024-01-01 PROCEDURE — A4217 STERILE WATER/SALINE, 500 ML: HCPCS | Performed by: PODIATRIST

## 2024-01-01 PROCEDURE — C1887 CATHETER, GUIDING: HCPCS

## 2024-01-01 PROCEDURE — 71045 X-RAY EXAM CHEST 1 VIEW: CPT | Performed by: RADIOLOGY

## 2024-01-01 PROCEDURE — 6360000003 HC OR 636 NO HCPCS: Performed by: PODIATRIST

## 2024-01-01 PROCEDURE — 2720000007 HC OR 272 NO HCPCS

## 2024-01-01 PROCEDURE — 83036 HEMOGLOBIN GLYCOSYLATED A1C: CPT | Performed by: NURSE PRACTITIONER

## 2024-01-01 PROCEDURE — 37799 UNLISTED PX VASCULAR SURGERY: CPT

## 2024-01-01 PROCEDURE — 36246 INS CATH ABD/L-EXT ART 2ND: CPT | Performed by: INTERNAL MEDICINE

## 2024-01-01 PROCEDURE — 7100000010 HC PHASE TWO TIME - EACH INCREMENTAL 1 MINUTE: Performed by: INTERNAL MEDICINE

## 2024-01-01 PROCEDURE — 82330 ASSAY OF CALCIUM: CPT

## 2024-01-01 PROCEDURE — 84520 ASSAY OF UREA NITROGEN: CPT | Performed by: STUDENT IN AN ORGANIZED HEALTH CARE EDUCATION/TRAINING PROGRAM

## 2024-01-01 PROCEDURE — 99232 SBSQ HOSP IP/OBS MODERATE 35: CPT | Performed by: NURSE PRACTITIONER

## 2024-01-01 PROCEDURE — 82805 BLOOD GASES W/O2 SATURATION: CPT | Performed by: STUDENT IN AN ORGANIZED HEALTH CARE EDUCATION/TRAINING PROGRAM

## 2024-01-01 PROCEDURE — 84100 ASSAY OF PHOSPHORUS: CPT | Performed by: NURSE PRACTITIONER

## 2024-01-01 PROCEDURE — 85027 COMPLETE CBC AUTOMATED: CPT

## 2024-01-01 PROCEDURE — 2500000002 HC RX 250 W HCPCS SELF ADMINISTERED DRUGS (ALT 637 FOR MEDICARE OP, ALT 636 FOR OP/ED): Performed by: STUDENT IN AN ORGANIZED HEALTH CARE EDUCATION/TRAINING PROGRAM

## 2024-01-01 PROCEDURE — 83605 ASSAY OF LACTIC ACID: CPT | Performed by: EMERGENCY MEDICINE

## 2024-01-01 PROCEDURE — 73620 X-RAY EXAM OF FOOT: CPT | Mod: RT

## 2024-01-01 PROCEDURE — 99232 SBSQ HOSP IP/OBS MODERATE 35: CPT | Performed by: HOSPITALIST

## 2024-01-01 PROCEDURE — 2500000004 HC RX 250 GENERAL PHARMACY W/ HCPCS (ALT 636 FOR OP/ED): Performed by: ANESTHESIOLOGIST ASSISTANT

## 2024-01-01 PROCEDURE — 84100 ASSAY OF PHOSPHORUS: CPT | Performed by: STUDENT IN AN ORGANIZED HEALTH CARE EDUCATION/TRAINING PROGRAM

## 2024-01-01 PROCEDURE — 93922 UPR/L XTREMITY ART 2 LEVELS: CPT | Performed by: INTERNAL MEDICINE

## 2024-01-01 PROCEDURE — 6350000001 HC RX 635 EPOETIN >10,000 UNITS: Mod: JW | Performed by: INTERNAL MEDICINE

## 2024-01-01 PROCEDURE — C1751 CATH, INF, PER/CENT/MIDLINE: HCPCS

## 2024-01-01 PROCEDURE — C1887 CATHETER, GUIDING: HCPCS | Performed by: SURGERY

## 2024-01-01 PROCEDURE — 73718 MRI LOWER EXTREMITY W/O DYE: CPT | Mod: RT,RSC

## 2024-01-01 PROCEDURE — 93922 UPR/L XTREMITY ART 2 LEVELS: CPT

## 2024-01-01 PROCEDURE — 73630 X-RAY EXAM OF FOOT: CPT | Mod: RT

## 2024-01-01 PROCEDURE — 36589 REMOVAL TUNNELED CV CATH: CPT | Performed by: NURSE PRACTITIONER

## 2024-01-01 PROCEDURE — 36410 VNPNXR 3YR/> PHY/QHP DX/THER: CPT

## 2024-01-01 PROCEDURE — 90937 HEMODIALYSIS REPEATED EVAL: CPT

## 2024-01-01 PROCEDURE — 2500000004 HC RX 250 GENERAL PHARMACY W/ HCPCS (ALT 636 FOR OP/ED): Performed by: EMERGENCY MEDICINE

## 2024-01-01 PROCEDURE — 87070 CULTURE OTHR SPECIMN AEROBIC: CPT

## 2024-01-01 PROCEDURE — 84132 ASSAY OF SERUM POTASSIUM: CPT | Performed by: NURSE PRACTITIONER

## 2024-01-01 PROCEDURE — 92610 EVALUATE SWALLOWING FUNCTION: CPT | Mod: GN | Performed by: SPEECH-LANGUAGE PATHOLOGIST

## 2024-01-01 PROCEDURE — A4217 STERILE WATER/SALINE, 500 ML: HCPCS | Performed by: EMERGENCY MEDICINE

## 2024-01-01 PROCEDURE — 2500000001 HC RX 250 WO HCPCS SELF ADMINISTERED DRUGS (ALT 637 FOR MEDICARE OP): Performed by: HOSPITALIST

## 2024-01-01 PROCEDURE — C1894 INTRO/SHEATH, NON-LASER: HCPCS | Performed by: SURGERY

## 2024-01-01 PROCEDURE — 6350000001 HC RX 635 EPOETIN >10,000 UNITS: Performed by: NURSE PRACTITIONER

## 2024-01-01 PROCEDURE — 99222 1ST HOSP IP/OBS MODERATE 55: CPT | Performed by: NURSE PRACTITIONER

## 2024-01-01 PROCEDURE — 2500000005 HC RX 250 GENERAL PHARMACY W/O HCPCS: Performed by: RADIOLOGY

## 2024-01-01 PROCEDURE — B41F1ZZ FLUOROSCOPY OF RIGHT LOWER EXTREMITY ARTERIES USING LOW OSMOLAR CONTRAST: ICD-10-PCS | Performed by: INTERNAL MEDICINE

## 2024-01-01 PROCEDURE — 99418 PROLNG IP/OBS E/M EA 15 MIN: CPT | Performed by: NURSE PRACTITIONER

## 2024-01-01 PROCEDURE — 94660 CPAP INITIATION&MGMT: CPT

## 2024-01-01 PROCEDURE — 87340 HEPATITIS B SURFACE AG IA: CPT | Performed by: INTERNAL MEDICINE

## 2024-01-01 PROCEDURE — 82746 ASSAY OF FOLIC ACID SERUM: CPT

## 2024-01-01 PROCEDURE — 99309 SBSQ NF CARE MODERATE MDM 30: CPT | Performed by: NURSE PRACTITIONER

## 2024-01-01 PROCEDURE — 36901 INTRO CATH DIALYSIS CIRCUIT: CPT | Performed by: RADIOLOGY

## 2024-01-01 PROCEDURE — 99291 CRITICAL CARE FIRST HOUR: CPT | Performed by: INTERNAL MEDICINE

## 2024-01-01 PROCEDURE — 87340 HEPATITIS B SURFACE AG IA: CPT | Mod: AHULAB | Performed by: INTERNAL MEDICINE

## 2024-01-01 PROCEDURE — 99232 SBSQ HOSP IP/OBS MODERATE 35: CPT

## 2024-01-01 PROCEDURE — 0BH17EZ INSERTION OF ENDOTRACHEAL AIRWAY INTO TRACHEA, VIA NATURAL OR ARTIFICIAL OPENING: ICD-10-PCS | Performed by: INTERNAL MEDICINE

## 2024-01-01 PROCEDURE — 99291 CRITICAL CARE FIRST HOUR: CPT | Mod: 25 | Performed by: EMERGENCY MEDICINE

## 2024-01-01 PROCEDURE — 97530 THERAPEUTIC ACTIVITIES: CPT | Mod: GP | Performed by: PHYSICAL THERAPIST

## 2024-01-01 PROCEDURE — 2500000004 HC RX 250 GENERAL PHARMACY W/ HCPCS (ALT 636 FOR OP/ED): Mod: JZ | Performed by: INTERNAL MEDICINE

## 2024-01-01 PROCEDURE — 2060000001 HC INTERMEDIATE ICU ROOM DAILY

## 2024-01-01 PROCEDURE — 2500000005 HC RX 250 GENERAL PHARMACY W/O HCPCS: Performed by: SURGERY

## 2024-01-01 PROCEDURE — 85014 HEMATOCRIT: CPT | Performed by: STUDENT IN AN ORGANIZED HEALTH CARE EDUCATION/TRAINING PROGRAM

## 2024-01-01 PROCEDURE — 84132 ASSAY OF SERUM POTASSIUM: CPT | Performed by: STUDENT IN AN ORGANIZED HEALTH CARE EDUCATION/TRAINING PROGRAM

## 2024-01-01 PROCEDURE — A4217 STERILE WATER/SALINE, 500 ML: HCPCS | Performed by: NURSE PRACTITIONER

## 2024-01-01 PROCEDURE — 92950 HEART/LUNG RESUSCITATION CPR: CPT | Performed by: STUDENT IN AN ORGANIZED HEALTH CARE EDUCATION/TRAINING PROGRAM

## 2024-01-01 PROCEDURE — 2500000005 HC RX 250 GENERAL PHARMACY W/O HCPCS: Performed by: STUDENT IN AN ORGANIZED HEALTH CARE EDUCATION/TRAINING PROGRAM

## 2024-01-01 PROCEDURE — 93010 ELECTROCARDIOGRAM REPORT: CPT | Performed by: INTERNAL MEDICINE

## 2024-01-01 PROCEDURE — 99221 1ST HOSP IP/OBS SF/LOW 40: CPT | Performed by: INTERNAL MEDICINE

## 2024-01-01 PROCEDURE — C1769 GUIDE WIRE: HCPCS | Performed by: INTERNAL MEDICINE

## 2024-01-01 PROCEDURE — 36558 INSERT TUNNELED CV CATH: CPT

## 2024-01-01 PROCEDURE — 87040 BLOOD CULTURE FOR BACTERIA: CPT | Performed by: STUDENT IN AN ORGANIZED HEALTH CARE EDUCATION/TRAINING PROGRAM

## 2024-01-01 PROCEDURE — 36415 COLL VENOUS BLD VENIPUNCTURE: CPT | Performed by: NURSE PRACTITIONER

## 2024-01-01 PROCEDURE — 76000 FLUOROSCOPY <1 HR PHYS/QHP: CPT

## 2024-01-01 PROCEDURE — 85025 COMPLETE CBC W/AUTO DIFF WBC: CPT | Performed by: EMERGENCY MEDICINE

## 2024-01-01 PROCEDURE — 36430 TRANSFUSION BLD/BLD COMPNT: CPT

## 2024-01-01 PROCEDURE — 93306 TTE W/DOPPLER COMPLETE: CPT | Performed by: INTERNAL MEDICINE

## 2024-01-01 PROCEDURE — 94762 N-INVAS EAR/PLS OXIMTRY CONT: CPT

## 2024-01-01 PROCEDURE — B548ZZA ULTRASONOGRAPHY OF SUPERIOR VENA CAVA, GUIDANCE: ICD-10-PCS | Performed by: RADIOLOGY

## 2024-01-01 PROCEDURE — 05763ZZ DILATION OF LEFT SUBCLAVIAN VEIN, PERCUTANEOUS APPROACH: ICD-10-PCS | Performed by: INTERNAL MEDICINE

## 2024-01-01 PROCEDURE — 97166 OT EVAL MOD COMPLEX 45 MIN: CPT | Mod: GO

## 2024-01-01 PROCEDURE — 99285 EMERGENCY DEPT VISIT HI MDM: CPT | Performed by: EMERGENCY MEDICINE

## 2024-01-01 PROCEDURE — 85610 PROTHROMBIN TIME: CPT | Performed by: NURSE PRACTITIONER

## 2024-01-01 PROCEDURE — 5A1D90Z PERFORMANCE OF URINARY FILTRATION, CONTINUOUS, GREATER THAN 18 HOURS PER DAY: ICD-10-PCS | Performed by: INTERNAL MEDICINE

## 2024-01-01 PROCEDURE — A4217 STERILE WATER/SALINE, 500 ML: HCPCS | Performed by: SURGERY

## 2024-01-01 PROCEDURE — 2780000003 HC OR 278 NO HCPCS: Performed by: SURGERY

## 2024-01-01 PROCEDURE — 94668 MNPJ CHEST WALL SBSQ: CPT

## 2024-01-01 PROCEDURE — 85520 HEPARIN ASSAY: CPT | Performed by: NURSE PRACTITIONER

## 2024-01-01 PROCEDURE — 80069 RENAL FUNCTION PANEL: CPT | Performed by: INTERNAL MEDICINE

## 2024-01-01 PROCEDURE — 71046 X-RAY EXAM CHEST 2 VIEWS: CPT | Performed by: RADIOLOGY

## 2024-01-01 PROCEDURE — 97535 SELF CARE MNGMENT TRAINING: CPT | Mod: GO,CO

## 2024-01-01 PROCEDURE — 93922 UPR/L XTREMITY ART 2 LEVELS: CPT | Performed by: SURGERY

## 2024-01-01 PROCEDURE — 2020000001 HC ICU ROOM DAILY

## 2024-01-01 PROCEDURE — 93970 EXTREMITY STUDY: CPT | Performed by: SURGERY

## 2024-01-01 PROCEDURE — 99100 ANES PT EXTEME AGE<1 YR&>70: CPT | Performed by: STUDENT IN AN ORGANIZED HEALTH CARE EDUCATION/TRAINING PROGRAM

## 2024-01-01 PROCEDURE — 83540 ASSAY OF IRON: CPT | Performed by: INTERNAL MEDICINE

## 2024-01-01 PROCEDURE — 99309 SBSQ NF CARE MODERATE MDM 30: CPT | Performed by: INTERNAL MEDICINE

## 2024-01-01 PROCEDURE — A4217 STERILE WATER/SALINE, 500 ML: HCPCS

## 2024-01-01 PROCEDURE — 80076 HEPATIC FUNCTION PANEL: CPT | Mod: CCI | Performed by: STUDENT IN AN ORGANIZED HEALTH CARE EDUCATION/TRAINING PROGRAM

## 2024-01-01 PROCEDURE — P9047 ALBUMIN (HUMAN), 25%, 50ML: HCPCS | Mod: JZ

## 2024-01-01 PROCEDURE — 99231 SBSQ HOSP IP/OBS SF/LOW 25: CPT | Performed by: STUDENT IN AN ORGANIZED HEALTH CARE EDUCATION/TRAINING PROGRAM

## 2024-01-01 PROCEDURE — 93306 TTE W/DOPPLER COMPLETE: CPT

## 2024-01-01 PROCEDURE — 84484 ASSAY OF TROPONIN QUANT: CPT | Performed by: STUDENT IN AN ORGANIZED HEALTH CARE EDUCATION/TRAINING PROGRAM

## 2024-01-01 PROCEDURE — 93286 PERI-PX EVAL PM/LDLS PM IP: CPT | Performed by: INTERNAL MEDICINE

## 2024-01-01 PROCEDURE — 77001 FLUOROGUIDE FOR VEIN DEVICE: CPT

## 2024-01-01 PROCEDURE — 37799 UNLISTED PX VASCULAR SURGERY: CPT | Performed by: STUDENT IN AN ORGANIZED HEALTH CARE EDUCATION/TRAINING PROGRAM

## 2024-01-01 PROCEDURE — 74018 RADEX ABDOMEN 1 VIEW: CPT | Performed by: RADIOLOGY

## 2024-01-01 PROCEDURE — 2780000003 HC OR 278 NO HCPCS

## 2024-01-01 PROCEDURE — 97161 PT EVAL LOW COMPLEX 20 MIN: CPT | Mod: GP

## 2024-01-01 PROCEDURE — 85025 COMPLETE CBC W/AUTO DIFF WBC: CPT | Performed by: STUDENT IN AN ORGANIZED HEALTH CARE EDUCATION/TRAINING PROGRAM

## 2024-01-01 PROCEDURE — 99305 1ST NF CARE MODERATE MDM 35: CPT | Performed by: INTERNAL MEDICINE

## 2024-01-01 PROCEDURE — 99222 1ST HOSP IP/OBS MODERATE 55: CPT | Performed by: SURGERY

## 2024-01-01 PROCEDURE — 80202 ASSAY OF VANCOMYCIN: CPT | Performed by: PHARMACIST

## 2024-01-01 PROCEDURE — 82728 ASSAY OF FERRITIN: CPT | Performed by: INTERNAL MEDICINE

## 2024-01-01 PROCEDURE — 70450 CT HEAD/BRAIN W/O DYE: CPT | Performed by: STUDENT IN AN ORGANIZED HEALTH CARE EDUCATION/TRAINING PROGRAM

## 2024-01-01 PROCEDURE — 2500000005 HC RX 250 GENERAL PHARMACY W/O HCPCS

## 2024-01-01 PROCEDURE — 0HRMXK3 REPLACEMENT OF RIGHT FOOT SKIN WITH NONAUTOLOGOUS TISSUE SUBSTITUTE, FULL THICKNESS, EXTERNAL APPROACH: ICD-10-PCS | Performed by: PODIATRIST

## 2024-01-01 PROCEDURE — P9016 RBC LEUKOCYTES REDUCED: HCPCS

## 2024-01-01 PROCEDURE — B51W1ZZ FLUOROSCOPY OF DIALYSIS SHUNT/FISTULA USING LOW OSMOLAR CONTRAST: ICD-10-PCS | Performed by: SURGERY

## 2024-01-01 PROCEDURE — 3600000003 HC OR TIME - INITIAL BASE CHARGE - PROCEDURE LEVEL THREE: Performed by: PODIATRIST

## 2024-01-01 PROCEDURE — 82607 VITAMIN B-12: CPT

## 2024-01-01 PROCEDURE — 2550000001 HC RX 255 CONTRASTS: Performed by: INTERNAL MEDICINE

## 2024-01-01 PROCEDURE — 3700000001 HC GENERAL ANESTHESIA TIME - INITIAL BASE CHARGE: Performed by: PODIATRIST

## 2024-01-01 PROCEDURE — 83735 ASSAY OF MAGNESIUM: CPT | Performed by: STUDENT IN AN ORGANIZED HEALTH CARE EDUCATION/TRAINING PROGRAM

## 2024-01-01 PROCEDURE — 87635 SARS-COV-2 COVID-19 AMP PRB: CPT

## 2024-01-01 PROCEDURE — 97112 NEUROMUSCULAR REEDUCATION: CPT | Mod: GO

## 2024-01-01 PROCEDURE — 84443 ASSAY THYROID STIM HORMONE: CPT

## 2024-01-01 PROCEDURE — 85347 COAGULATION TIME ACTIVATED: CPT

## 2024-01-01 PROCEDURE — 85027 COMPLETE CBC AUTOMATED: CPT | Performed by: INTERNAL MEDICINE

## 2024-01-01 PROCEDURE — 99239 HOSP IP/OBS DSCHRG MGMT >30: CPT | Performed by: INTERNAL MEDICINE

## 2024-01-01 PROCEDURE — 2500000005 HC RX 250 GENERAL PHARMACY W/O HCPCS: Performed by: PODIATRIST

## 2024-01-01 PROCEDURE — C1769 GUIDE WIRE: HCPCS | Performed by: SURGERY

## 2024-01-01 PROCEDURE — 73630 X-RAY EXAM OF FOOT: CPT | Mod: LT

## 2024-01-01 PROCEDURE — 82374 ASSAY BLOOD CARBON DIOXIDE: CPT | Performed by: INTERNAL MEDICINE

## 2024-01-01 PROCEDURE — 7100000002 HC RECOVERY ROOM TIME - EACH INCREMENTAL 1 MINUTE: Performed by: SURGERY

## 2024-01-01 PROCEDURE — 31500 INSERT EMERGENCY AIRWAY: CPT | Mod: ZK | Performed by: NURSE PRACTITIONER

## 2024-01-01 PROCEDURE — 2500000005 HC RX 250 GENERAL PHARMACY W/O HCPCS: Performed by: ANESTHESIOLOGIST ASSISTANT

## 2024-01-01 PROCEDURE — 71046 X-RAY EXAM CHEST 2 VIEWS: CPT

## 2024-01-01 PROCEDURE — 99222 1ST HOSP IP/OBS MODERATE 55: CPT | Performed by: INTERNAL MEDICINE

## 2024-01-01 PROCEDURE — 99234 HOSP IP/OBS SM DT SF/LOW 45: CPT | Performed by: STUDENT IN AN ORGANIZED HEALTH CARE EDUCATION/TRAINING PROGRAM

## 2024-01-01 PROCEDURE — 85347 COAGULATION TIME ACTIVATED: CPT | Performed by: INTERNAL MEDICINE

## 2024-01-01 PROCEDURE — 7100000001 HC RECOVERY ROOM TIME - INITIAL BASE CHARGE: Performed by: PODIATRIST

## 2024-01-01 PROCEDURE — 84484 ASSAY OF TROPONIN QUANT: CPT | Performed by: GENERAL PRACTICE

## 2024-01-01 PROCEDURE — 71045 X-RAY EXAM CHEST 1 VIEW: CPT | Mod: FOREIGN READ | Performed by: RADIOLOGY

## 2024-01-01 PROCEDURE — 80053 COMPREHEN METABOLIC PANEL: CPT | Performed by: EMERGENCY MEDICINE

## 2024-01-01 PROCEDURE — 75710 ARTERY X-RAYS ARM/LEG: CPT | Performed by: INTERNAL MEDICINE

## 2024-01-01 PROCEDURE — 90935 HEMODIALYSIS ONE EVALUATION: CPT | Performed by: INTERNAL MEDICINE

## 2024-01-01 PROCEDURE — 99291 CRITICAL CARE FIRST HOUR: CPT

## 2024-01-01 PROCEDURE — 97129 THER IVNTJ 1ST 15 MIN: CPT | Mod: GO

## 2024-01-01 PROCEDURE — 83880 ASSAY OF NATRIURETIC PEPTIDE: CPT

## 2024-01-01 PROCEDURE — 99235 HOSP IP/OBS SAME DATE MOD 70: CPT | Performed by: STUDENT IN AN ORGANIZED HEALTH CARE EDUCATION/TRAINING PROGRAM

## 2024-01-01 PROCEDURE — 36558 INSERT TUNNELED CV CATH: CPT | Performed by: RADIOLOGY

## 2024-01-01 PROCEDURE — 97110 THERAPEUTIC EXERCISES: CPT | Mod: GP | Performed by: PHYSICAL THERAPIST

## 2024-01-01 PROCEDURE — 97110 THERAPEUTIC EXERCISES: CPT | Mod: GP,CQ

## 2024-01-01 PROCEDURE — 97165 OT EVAL LOW COMPLEX 30 MIN: CPT | Mod: GO

## 2024-01-01 PROCEDURE — 05743ZZ DILATION OF LEFT INNOMINATE VEIN, PERCUTANEOUS APPROACH: ICD-10-PCS | Performed by: RADIOLOGY

## 2024-01-01 PROCEDURE — 2550000001 HC RX 255 CONTRASTS: Performed by: RADIOLOGY

## 2024-01-01 PROCEDURE — A11043 PR DEBRIDEMENT, SKIN, SUB-Q TISSUE,MUSCLE,=<20 SQ CM

## 2024-01-01 PROCEDURE — 2500000005 HC RX 250 GENERAL PHARMACY W/O HCPCS: Performed by: ANESTHESIOLOGY

## 2024-01-01 PROCEDURE — 82248 BILIRUBIN DIRECT: CPT | Performed by: STUDENT IN AN ORGANIZED HEALTH CARE EDUCATION/TRAINING PROGRAM

## 2024-01-01 PROCEDURE — C1725 CATH, TRANSLUMIN NON-LASER: HCPCS | Performed by: SURGERY

## 2024-01-01 PROCEDURE — 82024 ASSAY OF ACTH: CPT

## 2024-01-01 PROCEDURE — 99221 1ST HOSP IP/OBS SF/LOW 40: CPT | Performed by: NURSE PRACTITIONER

## 2024-01-01 PROCEDURE — 87040 BLOOD CULTURE FOR BACTERIA: CPT | Mod: AHULAB | Performed by: EMERGENCY MEDICINE

## 2024-01-01 PROCEDURE — 0QBN0ZZ EXCISION OF RIGHT METATARSAL, OPEN APPROACH: ICD-10-PCS | Performed by: PODIATRIST

## 2024-01-01 PROCEDURE — 86780 TREPONEMA PALLIDUM: CPT

## 2024-01-01 PROCEDURE — 99285 EMERGENCY DEPT VISIT HI MDM: CPT | Mod: 25

## 2024-01-01 PROCEDURE — 99152 MOD SED SAME PHYS/QHP 5/>YRS: CPT | Performed by: INTERNAL MEDICINE

## 2024-01-01 PROCEDURE — 6350000001 HC RX 635 EPOETIN >10,000 UNITS: Mod: JZ | Performed by: STUDENT IN AN ORGANIZED HEALTH CARE EDUCATION/TRAINING PROGRAM

## 2024-01-01 PROCEDURE — 86780 TREPONEMA PALLIDUM: CPT | Performed by: STUDENT IN AN ORGANIZED HEALTH CARE EDUCATION/TRAINING PROGRAM

## 2024-01-01 PROCEDURE — 37228 PR REVSC OPN/PRQ TIB/PERO W/ANGIOPLASTY UNI: CPT | Performed by: INTERNAL MEDICINE

## 2024-01-01 PROCEDURE — 36902 INTRO CATH DIALYSIS CIRCUIT: CPT

## 2024-01-01 PROCEDURE — 5A1935Z RESPIRATORY VENTILATION, LESS THAN 24 CONSECUTIVE HOURS: ICD-10-PCS | Performed by: INTERNAL MEDICINE

## 2024-01-01 PROCEDURE — 86901 BLOOD TYPING SEROLOGIC RH(D): CPT | Performed by: INTERNAL MEDICINE

## 2024-01-01 PROCEDURE — 31500 INSERT EMERGENCY AIRWAY: CPT | Performed by: NURSE PRACTITIONER

## 2024-01-01 PROCEDURE — A4217 STERILE WATER/SALINE, 500 ML: HCPCS | Performed by: INTERNAL MEDICINE

## 2024-01-01 PROCEDURE — 99223 1ST HOSP IP/OBS HIGH 75: CPT

## 2024-01-01 PROCEDURE — 83970 ASSAY OF PARATHORMONE: CPT | Performed by: STUDENT IN AN ORGANIZED HEALTH CARE EDUCATION/TRAINING PROGRAM

## 2024-01-01 PROCEDURE — 2500000002 HC RX 250 W HCPCS SELF ADMINISTERED DRUGS (ALT 637 FOR MEDICARE OP, ALT 636 FOR OP/ED)

## 2024-01-01 PROCEDURE — 80048 BASIC METABOLIC PNL TOTAL CA: CPT | Performed by: INTERNAL MEDICINE

## 2024-01-01 PROCEDURE — 76937 US GUIDE VASCULAR ACCESS: CPT

## 2024-01-01 PROCEDURE — 2500000004 HC RX 250 GENERAL PHARMACY W/ HCPCS (ALT 636 FOR OP/ED): Performed by: ANESTHESIOLOGY

## 2024-01-01 PROCEDURE — C9113 INJ PANTOPRAZOLE SODIUM, VIA: HCPCS

## 2024-01-01 PROCEDURE — 77001 FLUOROGUIDE FOR VEIN DEVICE: CPT | Performed by: RADIOLOGY

## 2024-01-01 PROCEDURE — 83605 ASSAY OF LACTIC ACID: CPT

## 2024-01-01 PROCEDURE — 2500000004 HC RX 250 GENERAL PHARMACY W/ HCPCS (ALT 636 FOR OP/ED): Mod: JW | Performed by: NURSE PRACTITIONER

## 2024-01-01 PROCEDURE — 36246 INS CATH ABD/L-EXT ART 2ND: CPT | Mod: 59 | Performed by: INTERNAL MEDICINE

## 2024-01-01 PROCEDURE — 99231 SBSQ HOSP IP/OBS SF/LOW 25: CPT

## 2024-01-01 PROCEDURE — 1159F MED LIST DOCD IN RCRD: CPT | Performed by: INTERNAL MEDICINE

## 2024-01-01 PROCEDURE — 82565 ASSAY OF CREATININE: CPT | Performed by: STUDENT IN AN ORGANIZED HEALTH CARE EDUCATION/TRAINING PROGRAM

## 2024-01-01 PROCEDURE — C1725 CATH, TRANSLUMIN NON-LASER: HCPCS | Performed by: INTERNAL MEDICINE

## 2024-01-01 PROCEDURE — 2720000007 HC OR 272 NO HCPCS: Performed by: PODIATRIST

## 2024-01-01 PROCEDURE — 83880 ASSAY OF NATRIURETIC PEPTIDE: CPT | Performed by: NURSE PRACTITIONER

## 2024-01-01 PROCEDURE — 82140 ASSAY OF AMMONIA: CPT | Performed by: NURSE PRACTITIONER

## 2024-01-01 PROCEDURE — 6360000002 HC RX 636 FACTOR: Mod: JZ | Performed by: EMERGENCY MEDICINE

## 2024-01-01 PROCEDURE — 85610 PROTHROMBIN TIME: CPT

## 2024-01-01 PROCEDURE — 83735 ASSAY OF MAGNESIUM: CPT | Performed by: GENERAL PRACTICE

## 2024-01-01 PROCEDURE — 37232 PR REVSC OPN/PRQ TIB/PERO W/ANGIOPLASTY UNI EA VSL: CPT | Performed by: INTERNAL MEDICINE

## 2024-01-01 PROCEDURE — 36556 INSERT NON-TUNNEL CV CATH: CPT

## 2024-01-01 PROCEDURE — 5A09357 ASSISTANCE WITH RESPIRATORY VENTILATION, LESS THAN 24 CONSECUTIVE HOURS, CONTINUOUS POSITIVE AIRWAY PRESSURE: ICD-10-PCS | Performed by: INTERNAL MEDICINE

## 2024-01-01 PROCEDURE — 7100000002 HC RECOVERY ROOM TIME - EACH INCREMENTAL 1 MINUTE: Performed by: PODIATRIST

## 2024-01-01 PROCEDURE — 73630 X-RAY EXAM OF FOOT: CPT | Mod: RIGHT SIDE | Performed by: RADIOLOGY

## 2024-01-01 PROCEDURE — 37607 LIG/BANDING ANGIOACS AV FSTL: CPT | Performed by: SURGERY

## 2024-01-01 PROCEDURE — 92612 ENDOSCOPY SWALLOW (FEES) VID: CPT | Mod: GN | Performed by: SPEECH-LANGUAGE PATHOLOGIST

## 2024-01-01 PROCEDURE — 3700000002 HC GENERAL ANESTHESIA TIME - EACH INCREMENTAL 1 MINUTE: Performed by: SURGERY

## 2024-01-01 PROCEDURE — 97530 THERAPEUTIC ACTIVITIES: CPT | Mod: GP,CQ

## 2024-01-01 PROCEDURE — 80051 ELECTROLYTE PANEL: CPT | Performed by: INTERNAL MEDICINE

## 2024-01-01 PROCEDURE — 03HY32Z INSERTION OF MONITORING DEVICE INTO UPPER ARTERY, PERCUTANEOUS APPROACH: ICD-10-PCS | Performed by: STUDENT IN AN ORGANIZED HEALTH CARE EDUCATION/TRAINING PROGRAM

## 2024-01-01 PROCEDURE — 02HV33Z INSERTION OF INFUSION DEVICE INTO SUPERIOR VENA CAVA, PERCUTANEOUS APPROACH: ICD-10-PCS

## 2024-01-01 PROCEDURE — 71046 X-RAY EXAM CHEST 2 VIEWS: CPT | Performed by: STUDENT IN AN ORGANIZED HEALTH CARE EDUCATION/TRAINING PROGRAM

## 2024-01-01 PROCEDURE — 36902 INTRO CATH DIALYSIS CIRCUIT: CPT | Performed by: RADIOLOGY

## 2024-01-01 PROCEDURE — 93970 EXTREMITY STUDY: CPT | Performed by: INTERNAL MEDICINE

## 2024-01-01 PROCEDURE — 83735 ASSAY OF MAGNESIUM: CPT | Performed by: EMERGENCY MEDICINE

## 2024-01-01 PROCEDURE — 70450 CT HEAD/BRAIN W/O DYE: CPT

## 2024-01-01 PROCEDURE — 70450 CT HEAD/BRAIN W/O DYE: CPT | Performed by: RADIOLOGY

## 2024-01-01 PROCEDURE — 82947 ASSAY GLUCOSE BLOOD QUANT: CPT | Performed by: STUDENT IN AN ORGANIZED HEALTH CARE EDUCATION/TRAINING PROGRAM

## 2024-01-01 PROCEDURE — 2720000007 HC OR 272 NO HCPCS: Performed by: INTERNAL MEDICINE

## 2024-01-01 PROCEDURE — 93010 ELECTROCARDIOGRAM REPORT: CPT | Performed by: EMERGENCY MEDICINE

## 2024-01-01 PROCEDURE — 5A1D70Z PERFORMANCE OF URINARY FILTRATION, INTERMITTENT, LESS THAN 6 HOURS PER DAY: ICD-10-PCS | Performed by: STUDENT IN AN ORGANIZED HEALTH CARE EDUCATION/TRAINING PROGRAM

## 2024-01-01 PROCEDURE — 92950 HEART/LUNG RESUSCITATION CPR: CPT

## 2024-01-01 PROCEDURE — 85730 THROMBOPLASTIN TIME PARTIAL: CPT | Performed by: EMERGENCY MEDICINE

## 2024-01-01 PROCEDURE — 86920 COMPATIBILITY TEST SPIN: CPT

## 2024-01-01 PROCEDURE — 73630 X-RAY EXAM OF FOOT: CPT | Mod: LEFT SIDE | Performed by: RADIOLOGY

## 2024-01-01 PROCEDURE — 80048 BASIC METABOLIC PNL TOTAL CA: CPT | Performed by: EMERGENCY MEDICINE

## 2024-01-01 PROCEDURE — C1760 CLOSURE DEV, VASC: HCPCS | Performed by: INTERNAL MEDICINE

## 2024-01-01 PROCEDURE — 05LY0ZZ OCCLUSION OF UPPER VEIN, OPEN APPROACH: ICD-10-PCS | Performed by: SURGERY

## 2024-01-01 PROCEDURE — 83735 ASSAY OF MAGNESIUM: CPT | Performed by: NURSE PRACTITIONER

## 2024-01-01 PROCEDURE — 2500000004 HC RX 250 GENERAL PHARMACY W/ HCPCS (ALT 636 FOR OP/ED): Performed by: RADIOLOGY

## 2024-01-01 PROCEDURE — 76937 US GUIDE VASCULAR ACCESS: CPT | Performed by: RADIOLOGY

## 2024-01-01 PROCEDURE — 3700000001 HC GENERAL ANESTHESIA TIME - INITIAL BASE CHARGE: Performed by: SURGERY

## 2024-01-01 PROCEDURE — 2500000004 HC RX 250 GENERAL PHARMACY W/ HCPCS (ALT 636 FOR OP/ED): Performed by: SURGERY

## 2024-01-01 PROCEDURE — 87210 SMEAR WET MOUNT SALINE/INK: CPT

## 2024-01-01 PROCEDURE — 2780000003 HC OR 278 NO HCPCS: Performed by: PODIATRIST

## 2024-01-01 PROCEDURE — 84520 ASSAY OF UREA NITROGEN: CPT

## 2024-01-01 PROCEDURE — A11043 PR DEBRIDEMENT, SKIN, SUB-Q TISSUE,MUSCLE,=<20 SQ CM: Performed by: STUDENT IN AN ORGANIZED HEALTH CARE EDUCATION/TRAINING PROGRAM

## 2024-01-01 PROCEDURE — 37228 HC REVASCULARIZE TIBIAL/PERON ARTERY,ANGIOPLASTY INITIAL: CPT | Performed by: INTERNAL MEDICINE

## 2024-01-01 PROCEDURE — 97110 THERAPEUTIC EXERCISES: CPT | Mod: GO

## 2024-01-01 PROCEDURE — 2500000004 HC RX 250 GENERAL PHARMACY W/ HCPCS (ALT 636 FOR OP/ED): Mod: JZ

## 2024-01-01 PROCEDURE — 2500000002 HC RX 250 W HCPCS SELF ADMINISTERED DRUGS (ALT 637 FOR MEDICARE OP, ALT 636 FOR OP/ED): Performed by: HOSPITALIST

## 2024-01-01 PROCEDURE — 80053 COMPREHEN METABOLIC PANEL: CPT | Performed by: GENERAL PRACTICE

## 2024-01-01 PROCEDURE — 37232 HC REVASCULARIZE TIBIAL/PERON ARTERY,ANGIOPLASTY EA ADD: CPT | Performed by: INTERNAL MEDICINE

## 2024-01-01 PROCEDURE — 5A1D70Z PERFORMANCE OF URINARY FILTRATION, INTERMITTENT, LESS THAN 6 HOURS PER DAY: ICD-10-PCS

## 2024-01-01 PROCEDURE — 1125F AMNT PAIN NOTED PAIN PRSNT: CPT | Performed by: INTERNAL MEDICINE

## 2024-01-01 PROCEDURE — 87110 CHLAMYDIA CULTURE: CPT | Performed by: STUDENT IN AN ORGANIZED HEALTH CARE EDUCATION/TRAINING PROGRAM

## 2024-01-01 PROCEDURE — 74018 RADEX ABDOMEN 1 VIEW: CPT

## 2024-01-01 PROCEDURE — 84075 ASSAY ALKALINE PHOSPHATASE: CPT

## 2024-01-01 PROCEDURE — 99239 HOSP IP/OBS DSCHRG MGMT >30: CPT | Performed by: STUDENT IN AN ORGANIZED HEALTH CARE EDUCATION/TRAINING PROGRAM

## 2024-01-01 PROCEDURE — 2500000004 HC RX 250 GENERAL PHARMACY W/ HCPCS (ALT 636 FOR OP/ED): Performed by: HOSPITALIST

## 2024-01-01 PROCEDURE — 36600 WITHDRAWAL OF ARTERIAL BLOOD: CPT

## 2024-01-01 PROCEDURE — 97530 THERAPEUTIC ACTIVITIES: CPT | Mod: GO

## 2024-01-01 PROCEDURE — 2550000001 HC RX 255 CONTRASTS: Performed by: STUDENT IN AN ORGANIZED HEALTH CARE EDUCATION/TRAINING PROGRAM

## 2024-01-01 PROCEDURE — 99152 MOD SED SAME PHYS/QHP 5/>YRS: CPT | Performed by: RADIOLOGY

## 2024-01-01 PROCEDURE — A36832 PR REVISE AV FISTULA,W/O THROMBECTOMY: Performed by: STUDENT IN AN ORGANIZED HEALTH CARE EDUCATION/TRAINING PROGRAM

## 2024-01-01 PROCEDURE — 75820 VEIN X-RAY ARM/LEG: CPT | Mod: LT | Performed by: RADIOLOGY

## 2024-01-01 PROCEDURE — 99308 SBSQ NF CARE LOW MDM 20: CPT | Performed by: INTERNAL MEDICINE

## 2024-01-01 PROCEDURE — 97162 PT EVAL MOD COMPLEX 30 MIN: CPT | Mod: GP

## 2024-01-01 PROCEDURE — 82533 TOTAL CORTISOL: CPT

## 2024-01-01 PROCEDURE — 5A1D70Z PERFORMANCE OF URINARY FILTRATION, INTERMITTENT, LESS THAN 6 HOURS PER DAY: ICD-10-PCS | Performed by: INTERNAL MEDICINE

## 2024-01-01 PROCEDURE — 99152 MOD SED SAME PHYS/QHP 5/>YRS: CPT

## 2024-01-01 PROCEDURE — 85007 BL SMEAR W/DIFF WBC COUNT: CPT | Performed by: GENERAL PRACTICE

## 2024-01-01 PROCEDURE — 87205 SMEAR GRAM STAIN: CPT | Performed by: STUDENT IN AN ORGANIZED HEALTH CARE EDUCATION/TRAINING PROGRAM

## 2024-01-01 PROCEDURE — 85027 COMPLETE CBC AUTOMATED: CPT | Performed by: GENERAL PRACTICE

## 2024-01-01 PROCEDURE — 97165 OT EVAL LOW COMPLEX 30 MIN: CPT | Mod: GO | Performed by: OCCUPATIONAL THERAPIST

## 2024-01-01 PROCEDURE — 97530 THERAPEUTIC ACTIVITIES: CPT | Mod: GO,CO

## 2024-01-01 PROCEDURE — 80048 BASIC METABOLIC PNL TOTAL CA: CPT

## 2024-01-01 PROCEDURE — 3700000002 HC GENERAL ANESTHESIA TIME - EACH INCREMENTAL 1 MINUTE: Performed by: PODIATRIST

## 2024-01-01 PROCEDURE — 96374 THER/PROPH/DIAG INJ IV PUSH: CPT

## 2024-01-01 PROCEDURE — 93971 EXTREMITY STUDY: CPT | Performed by: INTERNAL MEDICINE

## 2024-01-01 PROCEDURE — 99222 1ST HOSP IP/OBS MODERATE 55: CPT

## 2024-01-01 PROCEDURE — 93286 PERI-PX EVAL PM/LDLS PM IP: CPT

## 2024-01-01 PROCEDURE — 36556 INSERT NON-TUNNEL CV CATH: CPT | Performed by: EMERGENCY MEDICINE

## 2024-01-01 PROCEDURE — 85730 THROMBOPLASTIN TIME PARTIAL: CPT

## 2024-01-01 PROCEDURE — 99100 ANES PT EXTEME AGE<1 YR&>70: CPT | Performed by: ANESTHESIOLOGY

## 2024-01-01 PROCEDURE — 87040 BLOOD CULTURE FOR BACTERIA: CPT

## 2024-01-01 PROCEDURE — 057F3ZZ DILATION OF LEFT CEPHALIC VEIN, PERCUTANEOUS APPROACH: ICD-10-PCS | Performed by: INTERNAL MEDICINE

## 2024-01-01 PROCEDURE — 36415 COLL VENOUS BLD VENIPUNCTURE: CPT | Performed by: INTERNAL MEDICINE

## 2024-01-01 PROCEDURE — 84145 PROCALCITONIN (PCT): CPT

## 2024-01-01 PROCEDURE — 36600 WITHDRAWAL OF ARTERIAL BLOOD: CPT | Performed by: STUDENT IN AN ORGANIZED HEALTH CARE EDUCATION/TRAINING PROGRAM

## 2024-01-01 PROCEDURE — 2500000002 HC RX 250 W HCPCS SELF ADMINISTERED DRUGS (ALT 637 FOR MEDICARE OP, ALT 636 FOR OP/ED): Mod: MUE | Performed by: HOSPITALIST

## 2024-01-01 PROCEDURE — 99215 OFFICE O/P EST HI 40 MIN: CPT | Performed by: INTERNAL MEDICINE

## 2024-01-01 PROCEDURE — 2500000004 HC RX 250 GENERAL PHARMACY W/ HCPCS (ALT 636 FOR OP/ED): Performed by: PATHOLOGY

## 2024-01-01 PROCEDURE — 87102 FUNGUS ISOLATION CULTURE: CPT | Performed by: STUDENT IN AN ORGANIZED HEALTH CARE EDUCATION/TRAINING PROGRAM

## 2024-01-01 PROCEDURE — 84132 ASSAY OF SERUM POTASSIUM: CPT | Performed by: INTERNAL MEDICINE

## 2024-01-01 PROCEDURE — 75710 ARTERY X-RAYS ARM/LEG: CPT | Mod: 59 | Performed by: INTERNAL MEDICINE

## 2024-01-01 PROCEDURE — 93971 EXTREMITY STUDY: CPT

## 2024-01-01 PROCEDURE — 36415 COLL VENOUS BLD VENIPUNCTURE: CPT | Performed by: EMERGENCY MEDICINE

## 2024-01-01 PROCEDURE — 87081 CULTURE SCREEN ONLY: CPT

## 2024-01-01 PROCEDURE — 85610 PROTHROMBIN TIME: CPT | Performed by: STUDENT IN AN ORGANIZED HEALTH CARE EDUCATION/TRAINING PROGRAM

## 2024-01-01 PROCEDURE — A4217 STERILE WATER/SALINE, 500 ML: HCPCS | Performed by: STUDENT IN AN ORGANIZED HEALTH CARE EDUCATION/TRAINING PROGRAM

## 2024-01-01 PROCEDURE — 2500000004 HC RX 250 GENERAL PHARMACY W/ HCPCS (ALT 636 FOR OP/ED): Performed by: PODIATRIST

## 2024-01-01 PROCEDURE — 86706 HEP B SURFACE ANTIBODY: CPT | Mod: AHULAB | Performed by: INTERNAL MEDICINE

## 2024-01-01 PROCEDURE — 5A12012 PERFORMANCE OF CARDIAC OUTPUT, SINGLE, MANUAL: ICD-10-PCS | Performed by: INTERNAL MEDICINE

## 2024-01-01 PROCEDURE — 36600 WITHDRAWAL OF ARTERIAL BLOOD: CPT | Performed by: NURSE PRACTITIONER

## 2024-01-01 PROCEDURE — 83880 ASSAY OF NATRIURETIC PEPTIDE: CPT | Performed by: GENERAL PRACTICE

## 2024-01-01 PROCEDURE — 3600000003 HC OR TIME - INITIAL BASE CHARGE - PROCEDURE LEVEL THREE: Performed by: SURGERY

## 2024-01-01 PROCEDURE — 99223 1ST HOSP IP/OBS HIGH 75: CPT | Performed by: PHYSICIAN ASSISTANT

## 2024-01-01 PROCEDURE — 96374 THER/PROPH/DIAG INJ IV PUSH: CPT | Performed by: EMERGENCY MEDICINE

## 2024-01-01 PROCEDURE — 86901 BLOOD TYPING SEROLOGIC RH(D): CPT

## 2024-01-01 PROCEDURE — 5A2204Z RESTORATION OF CARDIAC RHYTHM, SINGLE: ICD-10-PCS | Performed by: INTERNAL MEDICINE

## 2024-01-01 PROCEDURE — 0KBV0ZZ EXCISION OF RIGHT FOOT MUSCLE, OPEN APPROACH: ICD-10-PCS

## 2024-01-01 PROCEDURE — 7100000009 HC PHASE TWO TIME - INITIAL BASE CHARGE: Performed by: INTERNAL MEDICINE

## 2024-01-01 PROCEDURE — 2550000001 HC RX 255 CONTRASTS: Performed by: SURGERY

## 2024-01-01 PROCEDURE — C1894 INTRO/SHEATH, NON-LASER: HCPCS

## 2024-01-01 PROCEDURE — 96374 THER/PROPH/DIAG INJ IV PUSH: CPT | Mod: 59

## 2024-01-01 PROCEDURE — 87636 SARSCOV2 & INF A&B AMP PRB: CPT | Performed by: GENERAL PRACTICE

## 2024-01-01 PROCEDURE — A37248: Performed by: ANESTHESIOLOGIST ASSISTANT

## 2024-01-01 PROCEDURE — 057Y3ZZ DILATION OF UPPER VEIN, PERCUTANEOUS APPROACH: ICD-10-PCS | Performed by: RADIOLOGY

## 2024-01-01 PROCEDURE — A37248: Performed by: ANESTHESIOLOGY

## 2024-01-01 PROCEDURE — 85025 COMPLETE CBC W/AUTO DIFF WBC: CPT | Performed by: INTERNAL MEDICINE

## 2024-01-01 PROCEDURE — 87110 CHLAMYDIA CULTURE: CPT

## 2024-01-01 DEVICE — MICROMATRIX® UBM STANDARD PARTICULATE 1000 MG
Type: IMPLANTABLE DEVICE | Site: FOOT | Status: FUNCTIONAL
Brand: MICROMATRIX®

## 2024-01-01 DEVICE — WOUND MATRIX, INTEGRA, MESHED BILAYER, 2 X 2 IN, 1 SHEET: Type: IMPLANTABLE DEVICE | Site: FOOT | Status: FUNCTIONAL

## 2024-01-01 RX ORDER — NOREPINEPHRINE BITARTRATE/D5W 8 MG/250ML
.01-1 PLASTIC BAG, INJECTION (ML) INTRAVENOUS CONTINUOUS
Status: DISCONTINUED | OUTPATIENT
Start: 2024-01-01 | End: 2024-01-01

## 2024-01-01 RX ORDER — ALUMINUM HYDROXIDE, MAGNESIUM HYDROXIDE, AND SIMETHICONE 1200; 120; 1200 MG/30ML; MG/30ML; MG/30ML
30 SUSPENSION ORAL 4 TIMES DAILY PRN
Status: DISCONTINUED | OUTPATIENT
Start: 2024-01-01 | End: 2024-01-01

## 2024-01-01 RX ORDER — ACETAMINOPHEN 325 MG/1
650 TABLET ORAL EVERY 4 HOURS PRN
Status: DISCONTINUED | OUTPATIENT
Start: 2024-01-01 | End: 2024-01-01

## 2024-01-01 RX ORDER — ACETAMINOPHEN 325 MG/1
975 TABLET ORAL EVERY 6 HOURS PRN
Start: 2024-01-01 | End: 2024-04-08

## 2024-01-01 RX ORDER — ACETAMINOPHEN 500 MG
5 TABLET ORAL NIGHTLY
Start: 2024-01-01 | End: 2024-04-08

## 2024-01-01 RX ORDER — OXYCODONE HYDROCHLORIDE 5 MG/1
5 TABLET ORAL EVERY 4 HOURS PRN
Status: DISCONTINUED | OUTPATIENT
Start: 2024-01-01 | End: 2024-01-01 | Stop reason: HOSPADM

## 2024-01-01 RX ORDER — ALBUMIN HUMAN 250 G/1000ML
12.5 SOLUTION INTRAVENOUS DAILY
Status: DISCONTINUED | OUTPATIENT
Start: 2024-01-01 | End: 2024-01-01

## 2024-01-01 RX ORDER — POLYETHYLENE GLYCOL 3350 17 G/17G
17 POWDER, FOR SOLUTION ORAL DAILY
Status: DISCONTINUED | OUTPATIENT
Start: 2024-01-01 | End: 2024-01-01 | Stop reason: HOSPADM

## 2024-01-01 RX ORDER — PANTOPRAZOLE SODIUM 40 MG/10ML
40 INJECTION, POWDER, LYOPHILIZED, FOR SOLUTION INTRAVENOUS
Status: DISCONTINUED | OUTPATIENT
Start: 2024-01-01 | End: 2024-04-08 | Stop reason: HOSPADM

## 2024-01-01 RX ORDER — COLLAGENASE SANTYL 250 [ARB'U]/G
1 OINTMENT TOPICAL DAILY PRN
COMMUNITY

## 2024-01-01 RX ORDER — MUPIROCIN 20 MG/G
OINTMENT TOPICAL 2 TIMES DAILY
Qty: 22 G | Refills: 0 | Status: SHIPPED | OUTPATIENT
Start: 2024-01-01 | End: 2024-01-01 | Stop reason: HOSPADM

## 2024-01-01 RX ORDER — OXYCODONE HYDROCHLORIDE 5 MG/1
2.5 TABLET ORAL ONCE
Status: COMPLETED | OUTPATIENT
Start: 2024-01-01 | End: 2024-01-01

## 2024-01-01 RX ORDER — IPRATROPIUM BROMIDE AND ALBUTEROL SULFATE 2.5; .5 MG/3ML; MG/3ML
3 SOLUTION RESPIRATORY (INHALATION) EVERY 2 HOUR PRN
Status: DISCONTINUED | OUTPATIENT
Start: 2024-01-01 | End: 2024-04-08 | Stop reason: HOSPADM

## 2024-01-01 RX ORDER — CEFAZOLIN 1 G/1
INJECTION, POWDER, FOR SOLUTION INTRAVENOUS AS NEEDED
Status: DISCONTINUED | OUTPATIENT
Start: 2024-01-01 | End: 2024-01-01 | Stop reason: HOSPADM

## 2024-01-01 RX ORDER — ACETAMINOPHEN 325 MG/1
975 TABLET ORAL EVERY 6 HOURS PRN
Status: DISCONTINUED | OUTPATIENT
Start: 2024-01-01 | End: 2024-01-01

## 2024-01-01 RX ORDER — ALBUTEROL SULFATE 90 UG/1
2 AEROSOL, METERED RESPIRATORY (INHALATION) EVERY 6 HOURS PRN
Status: DISCONTINUED | OUTPATIENT
Start: 2024-01-01 | End: 2024-01-01 | Stop reason: HOSPADM

## 2024-01-01 RX ORDER — INSULIN LISPRO 100 [IU]/ML
0-5 INJECTION, SOLUTION INTRAVENOUS; SUBCUTANEOUS
Status: DISCONTINUED | OUTPATIENT
Start: 2024-01-01 | End: 2024-01-01 | Stop reason: HOSPADM

## 2024-01-01 RX ORDER — PHENYLEPHRINE HCL IN 0.9% NACL 0.4MG/10ML
SYRINGE (ML) INTRAVENOUS AS NEEDED
Status: DISCONTINUED | OUTPATIENT
Start: 2024-01-01 | End: 2024-01-01

## 2024-01-01 RX ORDER — DEXTROSE MONOHYDRATE 100 MG/ML
0.3 INJECTION, SOLUTION INTRAVENOUS ONCE AS NEEDED
Status: COMPLETED | OUTPATIENT
Start: 2024-01-01 | End: 2024-01-01

## 2024-01-01 RX ORDER — OXYCODONE HYDROCHLORIDE 5 MG/1
5 TABLET ORAL ONCE
Status: COMPLETED | OUTPATIENT
Start: 2024-01-01 | End: 2024-01-01

## 2024-01-01 RX ORDER — CLOPIDOGREL BISULFATE 300 MG/1
TABLET, FILM COATED ORAL AS NEEDED
Status: DISCONTINUED | OUTPATIENT
Start: 2024-01-01 | End: 2024-01-01 | Stop reason: HOSPADM

## 2024-01-01 RX ORDER — INDOMETHACIN 25 MG/1
CAPSULE ORAL CODE/TRAUMA/SEDATION MEDICATION
Status: COMPLETED | OUTPATIENT
Start: 2024-01-01 | End: 2024-01-01

## 2024-01-01 RX ORDER — CEFEPIME 1 G/50ML
2 INJECTION, SOLUTION INTRAVENOUS 3 TIMES WEEKLY
Status: DISCONTINUED | OUTPATIENT
Start: 2024-01-01 | End: 2024-01-01 | Stop reason: HOSPADM

## 2024-01-01 RX ORDER — IODIXANOL 320 MG/ML
INJECTION, SOLUTION INTRAVASCULAR AS NEEDED
Status: DISCONTINUED | OUTPATIENT
Start: 2024-01-01 | End: 2024-01-01 | Stop reason: HOSPADM

## 2024-01-01 RX ORDER — FENTANYL CITRATE 50 UG/ML
INJECTION, SOLUTION INTRAMUSCULAR; INTRAVENOUS AS NEEDED
Status: DISCONTINUED | OUTPATIENT
Start: 2024-01-01 | End: 2024-01-01

## 2024-01-01 RX ORDER — HYDROMORPHONE HYDROCHLORIDE 1 MG/ML
0.2 INJECTION, SOLUTION INTRAMUSCULAR; INTRAVENOUS; SUBCUTANEOUS ONCE
Status: COMPLETED | OUTPATIENT
Start: 2024-01-01 | End: 2024-01-01

## 2024-01-01 RX ORDER — MIDODRINE HYDROCHLORIDE 5 MG/1
15 TABLET ORAL EVERY 8 HOURS
Status: DISCONTINUED | OUTPATIENT
Start: 2024-01-01 | End: 2024-01-01 | Stop reason: HOSPADM

## 2024-01-01 RX ORDER — HEPARIN SODIUM 1000 [USP'U]/ML
2000 INJECTION, SOLUTION INTRAVENOUS; SUBCUTANEOUS
Status: DISCONTINUED | OUTPATIENT
Start: 2024-01-01 | End: 2024-01-01 | Stop reason: HOSPADM

## 2024-01-01 RX ORDER — ALLOPURINOL 100 MG/1
100 TABLET ORAL DAILY
Status: DISCONTINUED | OUTPATIENT
Start: 2024-01-01 | End: 2024-01-01 | Stop reason: HOSPADM

## 2024-01-01 RX ORDER — ALBUTEROL SULFATE 90 UG/1
2 AEROSOL, METERED RESPIRATORY (INHALATION) EVERY 6 HOURS PRN
Status: DISCONTINUED | OUTPATIENT
Start: 2024-01-01 | End: 2024-01-01

## 2024-01-01 RX ORDER — HEPARIN SODIUM 5000 [USP'U]/ML
1200 INJECTION, SOLUTION INTRAVENOUS; SUBCUTANEOUS AS NEEDED
Status: DISCONTINUED | OUTPATIENT
Start: 2024-01-01 | End: 2024-01-01

## 2024-01-01 RX ORDER — DEXTROSE 50 % IN WATER (D50W) INTRAVENOUS SYRINGE
25
Status: DISCONTINUED | OUTPATIENT
Start: 2024-01-01 | End: 2024-04-08 | Stop reason: HOSPADM

## 2024-01-01 RX ORDER — BENZONATATE 100 MG/1
200 CAPSULE ORAL 3 TIMES DAILY PRN
Status: DISCONTINUED | OUTPATIENT
Start: 2024-01-01 | End: 2024-01-01 | Stop reason: HOSPADM

## 2024-01-01 RX ORDER — ROCURONIUM BROMIDE 10 MG/ML
INJECTION, SOLUTION INTRAVENOUS AS NEEDED
Status: DISCONTINUED | OUTPATIENT
Start: 2024-01-01 | End: 2024-01-01

## 2024-01-01 RX ORDER — CEFEPIME 1 G/50ML
2 INJECTION, SOLUTION INTRAVENOUS 3 TIMES WEEKLY
Qty: 1000 ML | Refills: 0
Start: 2024-01-01 | End: 2024-04-08

## 2024-01-01 RX ORDER — TRAMADOL HYDROCHLORIDE 50 MG/1
50 TABLET ORAL EVERY 12 HOURS PRN
Status: DISCONTINUED | OUTPATIENT
Start: 2024-01-01 | End: 2024-01-01

## 2024-01-01 RX ORDER — DOCUSATE SODIUM 100 MG/1
100 CAPSULE, LIQUID FILLED ORAL 2 TIMES DAILY PRN
Status: DISCONTINUED | OUTPATIENT
Start: 2024-01-01 | End: 2024-01-01 | Stop reason: HOSPADM

## 2024-01-01 RX ORDER — HYDROCORTISONE 25 MG/G
OINTMENT TOPICAL 2 TIMES DAILY PRN
Status: DISCONTINUED | OUTPATIENT
Start: 2024-01-01 | End: 2024-01-01 | Stop reason: HOSPADM

## 2024-01-01 RX ORDER — POTASSIUM CHLORIDE 1.5 G/1.58G
40 POWDER, FOR SOLUTION ORAL ONCE
Status: COMPLETED | OUTPATIENT
Start: 2024-01-01 | End: 2024-01-01

## 2024-01-01 RX ORDER — HEPARIN SODIUM 5000 [USP'U]/ML
2000-4000 INJECTION, SOLUTION INTRAVENOUS; SUBCUTANEOUS EVERY 4 HOURS PRN
Status: DISCONTINUED | OUTPATIENT
Start: 2024-01-01 | End: 2024-01-01

## 2024-01-01 RX ORDER — PHENYLEPHRINE 10 MG/250 ML(40 MCG/ML)IN 0.9 % SOD.CHLORIDE INTRAVENOUS
CONTINUOUS PRN
Status: DISCONTINUED | OUTPATIENT
Start: 2024-01-01 | End: 2024-01-01

## 2024-01-01 RX ORDER — MIDODRINE HYDROCHLORIDE 10 MG/1
20 TABLET ORAL EVERY 8 HOURS
Status: DISCONTINUED | OUTPATIENT
Start: 2024-01-01 | End: 2024-01-01

## 2024-01-01 RX ORDER — GABAPENTIN 100 MG/1
100 CAPSULE ORAL EVERY 24 HOURS
Status: DISCONTINUED | OUTPATIENT
Start: 2024-01-01 | End: 2024-01-01

## 2024-01-01 RX ORDER — NITROGLYCERIN 5 MG/ML
INJECTION, SOLUTION INTRAVENOUS AS NEEDED
Status: DISCONTINUED | OUTPATIENT
Start: 2024-01-01 | End: 2024-01-01 | Stop reason: HOSPADM

## 2024-01-01 RX ORDER — VANCOMYCIN HYDROCHLORIDE 750 MG/150ML
750 INJECTION, SOLUTION INTRAVENOUS ONCE
Status: COMPLETED | OUTPATIENT
Start: 2024-01-01 | End: 2024-01-01

## 2024-01-01 RX ORDER — ACETAMINOPHEN 325 MG/1
975 TABLET ORAL EVERY 6 HOURS PRN
Status: DISCONTINUED | OUTPATIENT
Start: 2024-01-01 | End: 2024-01-01 | Stop reason: HOSPADM

## 2024-01-01 RX ORDER — SODIUM CHLORIDE 0.9 G/100ML
IRRIGANT IRRIGATION AS NEEDED
Status: DISCONTINUED | OUTPATIENT
Start: 2024-01-01 | End: 2024-01-01 | Stop reason: HOSPADM

## 2024-01-01 RX ORDER — ALBUMIN HUMAN 250 G/1000ML
25 SOLUTION INTRAVENOUS ONCE
Status: COMPLETED | OUTPATIENT
Start: 2024-01-01 | End: 2024-01-01

## 2024-01-01 RX ORDER — FUROSEMIDE 10 MG/ML
80 INJECTION INTRAMUSCULAR; INTRAVENOUS ONCE
Status: COMPLETED | OUTPATIENT
Start: 2024-01-01 | End: 2024-01-01

## 2024-01-01 RX ORDER — MIDODRINE HYDROCHLORIDE 10 MG/1
20 TABLET ORAL
Status: DISCONTINUED | OUTPATIENT
Start: 2024-01-01 | End: 2024-01-01

## 2024-01-01 RX ORDER — TALC
6 POWDER (GRAM) TOPICAL NIGHTLY
Status: DISCONTINUED | OUTPATIENT
Start: 2024-01-01 | End: 2024-04-08 | Stop reason: HOSPADM

## 2024-01-01 RX ORDER — HYDROXYZINE HYDROCHLORIDE 25 MG/1
25 TABLET, FILM COATED ORAL EVERY 8 HOURS PRN
Status: DISCONTINUED | OUTPATIENT
Start: 2024-01-01 | End: 2024-04-08 | Stop reason: HOSPADM

## 2024-01-01 RX ORDER — CLOTRIMAZOLE 1 %
1 CREAM (GRAM) TOPICAL 2 TIMES DAILY
Qty: 28 G | Refills: 0 | Status: SHIPPED | OUTPATIENT
Start: 2024-01-01 | End: 2024-01-01 | Stop reason: HOSPADM

## 2024-01-01 RX ORDER — CALCIUM CHLORIDE INJECTION 100 MG/ML
INJECTION, SOLUTION INTRAVENOUS CODE/TRAUMA/SEDATION MEDICATION
Status: COMPLETED | OUTPATIENT
Start: 2024-01-01 | End: 2024-01-01

## 2024-01-01 RX ORDER — CEFEPIME 1 G/50ML
2 INJECTION, SOLUTION INTRAVENOUS 3 TIMES WEEKLY
Status: DISCONTINUED | OUTPATIENT
Start: 2024-04-08 | End: 2024-01-01

## 2024-01-01 RX ORDER — HYDROMORPHONE HYDROCHLORIDE 1 MG/ML
0.2 INJECTION, SOLUTION INTRAMUSCULAR; INTRAVENOUS; SUBCUTANEOUS EVERY 8 HOURS PRN
Status: DISCONTINUED | OUTPATIENT
Start: 2024-01-01 | End: 2024-01-01

## 2024-01-01 RX ORDER — ONDANSETRON HYDROCHLORIDE 2 MG/ML
4 INJECTION, SOLUTION INTRAVENOUS ONCE AS NEEDED
Status: DISCONTINUED | OUTPATIENT
Start: 2024-01-01 | End: 2024-01-01 | Stop reason: HOSPADM

## 2024-01-01 RX ORDER — VANCOMYCIN HYDROCHLORIDE 750 MG/150ML
750 INJECTION, SOLUTION INTRAVENOUS
Status: DISCONTINUED | OUTPATIENT
Start: 2024-01-01 | End: 2024-01-01

## 2024-01-01 RX ORDER — OXYCODONE HYDROCHLORIDE 5 MG/1
5 TABLET ORAL EVERY 6 HOURS PRN
Status: DISCONTINUED | OUTPATIENT
Start: 2024-01-01 | End: 2024-01-01 | Stop reason: HOSPADM

## 2024-01-01 RX ORDER — LEVOFLOXACIN 750 MG/1
750 TABLET ORAL ONCE
Status: COMPLETED | OUTPATIENT
Start: 2024-01-01 | End: 2024-01-01

## 2024-01-01 RX ORDER — HEPARIN SODIUM 20000 [USP'U]/ML
250 INJECTION INTRAVENOUS; SUBCUTANEOUS EVERY 12 HOURS
Status: DISCONTINUED | OUTPATIENT
Start: 2024-01-01 | End: 2024-01-01

## 2024-01-01 RX ORDER — CHLORHEXIDINE GLUCONATE 40 MG/ML
SOLUTION TOPICAL DAILY
Status: ACTIVE | OUTPATIENT
Start: 2024-01-01 | End: 2024-01-01

## 2024-01-01 RX ORDER — IPRATROPIUM BROMIDE AND ALBUTEROL SULFATE 2.5; .5 MG/3ML; MG/3ML
3 SOLUTION RESPIRATORY (INHALATION) EVERY 6 HOURS PRN
Status: DISCONTINUED | OUTPATIENT
Start: 2024-01-01 | End: 2024-01-01

## 2024-01-01 RX ORDER — ACETAMINOPHEN 325 MG/1
650 TABLET ORAL EVERY 4 HOURS PRN
Status: DISCONTINUED | OUTPATIENT
Start: 2024-01-01 | End: 2024-01-01 | Stop reason: HOSPADM

## 2024-01-01 RX ORDER — POVIDONE-IODINE 7.5 MG/ML
1 SOLUTION TOPICAL DAILY PRN
Status: DISCONTINUED | OUTPATIENT
Start: 2024-01-01 | End: 2024-01-01 | Stop reason: CLARIF

## 2024-01-01 RX ORDER — OSELTAMIVIR PHOSPHATE 30 MG/1
30 CAPSULE ORAL ONCE
Status: DISCONTINUED | OUTPATIENT
Start: 2024-01-01 | End: 2024-01-01

## 2024-01-01 RX ORDER — HEPARIN SODIUM 1000 [USP'U]/ML
2000 INJECTION, SOLUTION INTRAVENOUS; SUBCUTANEOUS
Status: DISCONTINUED | OUTPATIENT
Start: 2024-04-08 | End: 2024-04-08 | Stop reason: HOSPADM

## 2024-01-01 RX ORDER — INSULIN LISPRO 100 [IU]/ML
0-10 INJECTION, SOLUTION INTRAVENOUS; SUBCUTANEOUS
Status: DISCONTINUED | OUTPATIENT
Start: 2024-01-01 | End: 2024-04-08 | Stop reason: HOSPADM

## 2024-01-01 RX ORDER — METRONIDAZOLE 500 MG/100ML
500 INJECTION, SOLUTION INTRAVENOUS ONCE
Status: COMPLETED | OUTPATIENT
Start: 2024-01-01 | End: 2024-01-01

## 2024-01-01 RX ORDER — IODIXANOL 270 MG/ML
INJECTION, SOLUTION INTRAVASCULAR AS NEEDED
Status: DISCONTINUED | OUTPATIENT
Start: 2024-01-01 | End: 2024-01-01 | Stop reason: HOSPADM

## 2024-01-01 RX ORDER — MUPIROCIN 20 MG/G
OINTMENT TOPICAL 3 TIMES DAILY
Status: DISCONTINUED | OUTPATIENT
Start: 2024-01-01 | End: 2024-01-01 | Stop reason: HOSPADM

## 2024-01-01 RX ORDER — ATORVASTATIN CALCIUM 40 MG/1
40 TABLET, FILM COATED ORAL NIGHTLY
Status: DISCONTINUED | OUTPATIENT
Start: 2024-01-01 | End: 2024-01-01 | Stop reason: HOSPADM

## 2024-01-01 RX ORDER — PROPOFOL 10 MG/ML
INJECTION, EMULSION INTRAVENOUS AS NEEDED
Status: DISCONTINUED | OUTPATIENT
Start: 2024-01-01 | End: 2024-01-01

## 2024-01-01 RX ORDER — POVIDONE-IODINE 7.5 MG/ML
1 SOLUTION TOPICAL DAILY PRN
Status: DISCONTINUED | OUTPATIENT
Start: 2024-01-01 | End: 2024-01-01 | Stop reason: HOSPADM

## 2024-01-01 RX ORDER — LANOLIN ALCOHOL/MO/W.PET/CERES
400 CREAM (GRAM) TOPICAL ONCE
Status: COMPLETED | OUTPATIENT
Start: 2024-01-01 | End: 2024-01-01

## 2024-01-01 RX ORDER — HYDROXYZINE HYDROCHLORIDE 25 MG/1
25 TABLET, FILM COATED ORAL EVERY 8 HOURS PRN
Start: 2024-01-01 | End: 2024-04-08

## 2024-01-01 RX ORDER — ATORVASTATIN CALCIUM 40 MG/1
40 TABLET, FILM COATED ORAL NIGHTLY
Start: 2024-01-01 | End: 2024-04-08

## 2024-01-01 RX ORDER — IPRATROPIUM BROMIDE AND ALBUTEROL SULFATE 2.5; .5 MG/3ML; MG/3ML
3 SOLUTION RESPIRATORY (INHALATION) EVERY 2 HOUR PRN
Status: DISCONTINUED | OUTPATIENT
Start: 2024-01-01 | End: 2024-01-01 | Stop reason: HOSPADM

## 2024-01-01 RX ORDER — PANTOPRAZOLE SODIUM 40 MG/1
40 TABLET, DELAYED RELEASE ORAL
Status: DISCONTINUED | OUTPATIENT
Start: 2024-01-01 | End: 2024-01-01 | Stop reason: HOSPADM

## 2024-01-01 RX ORDER — ONDANSETRON HYDROCHLORIDE 2 MG/ML
4 INJECTION, SOLUTION INTRAVENOUS ONCE
Status: COMPLETED | OUTPATIENT
Start: 2024-01-01 | End: 2024-01-01

## 2024-01-01 RX ORDER — AMOXICILLIN 250 MG
2 CAPSULE ORAL 2 TIMES DAILY
Status: DISCONTINUED | OUTPATIENT
Start: 2024-01-01 | End: 2024-01-01 | Stop reason: HOSPADM

## 2024-01-01 RX ORDER — LIDOCAINE 560 MG/1
2 PATCH PERCUTANEOUS; TOPICAL; TRANSDERMAL DAILY
Status: DISCONTINUED | OUTPATIENT
Start: 2024-01-01 | End: 2024-01-01 | Stop reason: HOSPADM

## 2024-01-01 RX ORDER — LIDOCAINE HYDROCHLORIDE 10 MG/ML
INJECTION INFILTRATION; PERINEURAL AS NEEDED
Status: DISCONTINUED | OUTPATIENT
Start: 2024-01-01 | End: 2024-01-01 | Stop reason: HOSPADM

## 2024-01-01 RX ORDER — HYDROMORPHONE HYDROCHLORIDE 1 MG/ML
0.2 INJECTION, SOLUTION INTRAMUSCULAR; INTRAVENOUS; SUBCUTANEOUS EVERY 5 MIN PRN
Status: DISCONTINUED | OUTPATIENT
Start: 2024-01-01 | End: 2024-01-01 | Stop reason: HOSPADM

## 2024-01-01 RX ORDER — VANCOMYCIN HYDROCHLORIDE 1 G/20ML
INJECTION, POWDER, LYOPHILIZED, FOR SOLUTION INTRAVENOUS DAILY PRN
Status: DISCONTINUED | OUTPATIENT
Start: 2024-01-01 | End: 2024-01-01

## 2024-01-01 RX ORDER — DEXTROSE 50 % IN WATER (D50W) INTRAVENOUS SYRINGE
12.5
Status: DISCONTINUED | OUTPATIENT
Start: 2024-01-01 | End: 2024-04-08 | Stop reason: HOSPADM

## 2024-01-01 RX ORDER — PANTOPRAZOLE SODIUM 40 MG/1
40 TABLET, DELAYED RELEASE ORAL
Status: DISCONTINUED | OUTPATIENT
Start: 2024-01-01 | End: 2024-04-08 | Stop reason: HOSPADM

## 2024-01-01 RX ORDER — OSELTAMIVIR PHOSPHATE 30 MG/1
30 CAPSULE ORAL
Status: DISCONTINUED | OUTPATIENT
Start: 2024-04-08 | End: 2024-04-08 | Stop reason: HOSPADM

## 2024-01-01 RX ORDER — DEXTROSE 50 % IN WATER (D50W) INTRAVENOUS SYRINGE
25
Start: 2024-01-01 | End: 2024-04-08

## 2024-01-01 RX ORDER — SODIUM CHLORIDE 9 MG/ML
INJECTION, SOLUTION INTRAVENOUS CONTINUOUS PRN
Status: COMPLETED | OUTPATIENT
Start: 2024-01-01 | End: 2024-01-01

## 2024-01-01 RX ORDER — PANTOPRAZOLE SODIUM 40 MG/1
40 TABLET, DELAYED RELEASE ORAL
Start: 2024-01-01 | End: 2024-04-08

## 2024-01-01 RX ORDER — HEPARIN SODIUM 10000 [USP'U]/100ML
0-4500 INJECTION, SOLUTION INTRAVENOUS CONTINUOUS
Status: DISCONTINUED | OUTPATIENT
Start: 2024-01-01 | End: 2024-01-01

## 2024-01-01 RX ORDER — LIDOCAINE HYDROCHLORIDE 10 MG/ML
5 INJECTION INFILTRATION; PERINEURAL ONCE
Status: DISCONTINUED | OUTPATIENT
Start: 2024-01-01 | End: 2024-01-01 | Stop reason: WASHOUT

## 2024-01-01 RX ORDER — HYDROMORPHONE HYDROCHLORIDE 1 MG/ML
0.6 INJECTION, SOLUTION INTRAMUSCULAR; INTRAVENOUS; SUBCUTANEOUS
Status: DISCONTINUED | OUTPATIENT
Start: 2024-01-01 | End: 2024-01-01

## 2024-01-01 RX ORDER — MIDODRINE HYDROCHLORIDE 10 MG/1
15 TABLET ORAL EVERY 8 HOURS
Status: DISCONTINUED | OUTPATIENT
Start: 2024-01-01 | End: 2024-01-01 | Stop reason: HOSPADM

## 2024-01-01 RX ORDER — MIDAZOLAM HYDROCHLORIDE 1 MG/ML
INJECTION, SOLUTION INTRAMUSCULAR; INTRAVENOUS AS NEEDED
Status: DISCONTINUED | OUTPATIENT
Start: 2024-01-01 | End: 2024-01-01 | Stop reason: HOSPADM

## 2024-01-01 RX ORDER — INSULIN GLARGINE 100 [IU]/ML
6 INJECTION, SOLUTION SUBCUTANEOUS NIGHTLY
Status: DISCONTINUED | OUTPATIENT
Start: 2024-01-01 | End: 2024-01-01

## 2024-01-01 RX ORDER — CLOPIDOGREL BISULFATE 75 MG/1
75 TABLET ORAL DAILY
Status: DISCONTINUED | OUTPATIENT
Start: 2024-01-01 | End: 2024-01-01 | Stop reason: HOSPADM

## 2024-01-01 RX ORDER — HYDROMORPHONE HYDROCHLORIDE 1 MG/ML
0.6 INJECTION, SOLUTION INTRAMUSCULAR; INTRAVENOUS; SUBCUTANEOUS ONCE
Status: COMPLETED | OUTPATIENT
Start: 2024-01-01 | End: 2024-01-01

## 2024-01-01 RX ORDER — HEPARIN SODIUM 5000 [USP'U]/ML
80 INJECTION, SOLUTION INTRAVENOUS; SUBCUTANEOUS ONCE
Status: COMPLETED | OUTPATIENT
Start: 2024-01-01 | End: 2024-01-01

## 2024-01-01 RX ORDER — ACETAMINOPHEN 160 MG/5ML
650 SOLUTION ORAL EVERY 4 HOURS PRN
Status: DISCONTINUED | OUTPATIENT
Start: 2024-01-01 | End: 2024-01-01

## 2024-01-01 RX ORDER — HEPARIN SODIUM 1000 [USP'U]/ML
2000 INJECTION, SOLUTION INTRAVENOUS; SUBCUTANEOUS
Start: 2024-01-01 | End: 2024-04-08

## 2024-01-01 RX ORDER — SODIUM CHLORIDE, SODIUM LACTATE, POTASSIUM CHLORIDE, CALCIUM CHLORIDE 600; 310; 30; 20 MG/100ML; MG/100ML; MG/100ML; MG/100ML
INJECTION, SOLUTION INTRAVENOUS CONTINUOUS PRN
Status: DISCONTINUED | OUTPATIENT
Start: 2024-01-01 | End: 2024-01-01

## 2024-01-01 RX ORDER — LIDOCAINE 40 MG/G
CREAM TOPICAL 4 TIMES DAILY PRN
Status: DISCONTINUED | OUTPATIENT
Start: 2024-01-01 | End: 2024-01-01 | Stop reason: HOSPADM

## 2024-01-01 RX ORDER — INSULIN GLARGINE 100 [IU]/ML
6 INJECTION, SOLUTION SUBCUTANEOUS NIGHTLY
Status: DISCONTINUED | OUTPATIENT
Start: 2024-01-01 | End: 2024-01-01 | Stop reason: HOSPADM

## 2024-01-01 RX ORDER — PANTOPRAZOLE SODIUM 40 MG/1
40 TABLET, DELAYED RELEASE ORAL
Status: DISCONTINUED | OUTPATIENT
Start: 2024-01-01 | End: 2024-01-01

## 2024-01-01 RX ORDER — MIDAZOLAM HYDROCHLORIDE 1 MG/ML
INJECTION INTRAMUSCULAR; INTRAVENOUS AS NEEDED
Status: DISCONTINUED | OUTPATIENT
Start: 2024-01-01 | End: 2024-01-01 | Stop reason: HOSPADM

## 2024-01-01 RX ORDER — DEXTROSE MONOHYDRATE 100 MG/ML
0.3 INJECTION, SOLUTION INTRAVENOUS ONCE AS NEEDED
Status: DISCONTINUED | OUTPATIENT
Start: 2024-01-01 | End: 2024-01-01

## 2024-01-01 RX ORDER — MIDAZOLAM HYDROCHLORIDE 1 MG/ML
INJECTION INTRAMUSCULAR; INTRAVENOUS
Status: COMPLETED | OUTPATIENT
Start: 2024-01-01 | End: 2024-01-01

## 2024-01-01 RX ORDER — HEPARIN SODIUM 5000 [USP'U]/ML
5000 INJECTION, SOLUTION INTRAVENOUS; SUBCUTANEOUS EVERY 8 HOURS
Status: DISCONTINUED | OUTPATIENT
Start: 2024-01-01 | End: 2024-01-01

## 2024-01-01 RX ORDER — OXYCODONE HYDROCHLORIDE 5 MG/1
5 TABLET ORAL EVERY 6 HOURS PRN
Status: DISCONTINUED | OUTPATIENT
Start: 2024-01-01 | End: 2024-01-01

## 2024-01-01 RX ORDER — MAGNESIUM SULFATE HEPTAHYDRATE 40 MG/ML
2 INJECTION, SOLUTION INTRAVENOUS ONCE
Status: COMPLETED | OUTPATIENT
Start: 2024-01-01 | End: 2024-01-01

## 2024-01-01 RX ORDER — MIDAZOLAM HYDROCHLORIDE 1 MG/ML
INJECTION INTRAMUSCULAR; INTRAVENOUS CONTINUOUS PRN
Status: DISCONTINUED | OUTPATIENT
Start: 2024-01-01 | End: 2024-01-01

## 2024-01-01 RX ORDER — POLYETHYLENE GLYCOL 3350 17 G/17G
17 POWDER, FOR SOLUTION ORAL DAILY
Status: DISCONTINUED | OUTPATIENT
Start: 2024-01-01 | End: 2024-01-01

## 2024-01-01 RX ORDER — MIDODRINE HYDROCHLORIDE 5 MG/1
15 TABLET ORAL EVERY 8 HOURS
Status: DISCONTINUED | OUTPATIENT
Start: 2024-01-01 | End: 2024-04-08 | Stop reason: HOSPADM

## 2024-01-01 RX ORDER — OXYCODONE HYDROCHLORIDE 5 MG/1
5 TABLET ORAL ONCE
Status: DISCONTINUED | OUTPATIENT
Start: 2024-01-01 | End: 2024-01-01

## 2024-01-01 RX ORDER — LIDOCAINE HYDROCHLORIDE 10 MG/ML
0.1 INJECTION INFILTRATION; PERINEURAL ONCE
Status: DISCONTINUED | OUTPATIENT
Start: 2024-01-01 | End: 2024-01-01 | Stop reason: HOSPADM

## 2024-01-01 RX ORDER — OXYCODONE HYDROCHLORIDE 5 MG/1
5 TABLET ORAL ONCE
Status: DISCONTINUED | OUTPATIENT
Start: 2024-01-01 | End: 2024-01-01 | Stop reason: SDUPTHER

## 2024-01-01 RX ORDER — HEPARIN SODIUM 1000 [USP'U]/ML
INJECTION, SOLUTION INTRAVENOUS; SUBCUTANEOUS AS NEEDED
Status: DISCONTINUED | OUTPATIENT
Start: 2024-01-01 | End: 2024-01-01 | Stop reason: HOSPADM

## 2024-01-01 RX ORDER — DEXAMETHASONE 6 MG/1
6 TABLET ORAL DAILY
Status: COMPLETED | OUTPATIENT
Start: 2024-01-01 | End: 2024-01-01

## 2024-01-01 RX ORDER — CEFAZOLIN 1 G/1
INJECTION, POWDER, FOR SOLUTION INTRAVENOUS AS NEEDED
Status: DISCONTINUED | OUTPATIENT
Start: 2024-01-01 | End: 2024-01-01

## 2024-01-01 RX ORDER — POLYETHYLENE GLYCOL 3350 17 G/17G
17 POWDER, FOR SOLUTION ORAL DAILY
Status: DISCONTINUED | OUTPATIENT
Start: 2024-01-01 | End: 2024-01-01 | Stop reason: SDUPTHER

## 2024-01-01 RX ORDER — HEPARIN SODIUM 5000 [USP'U]/ML
3000-6000 INJECTION, SOLUTION INTRAVENOUS; SUBCUTANEOUS EVERY 4 HOURS PRN
Status: DISCONTINUED | OUTPATIENT
Start: 2024-01-01 | End: 2024-01-01

## 2024-01-01 RX ORDER — POVIDONE-IODINE 7.5 MG/ML
1 SOLUTION TOPICAL DAILY PRN
Start: 2024-01-01 | End: 2024-04-08

## 2024-01-01 RX ORDER — GABAPENTIN 100 MG/1
100 CAPSULE ORAL DAILY
Start: 2024-01-01 | End: 2024-04-08

## 2024-01-01 RX ORDER — ACETAMINOPHEN 325 MG/1
650 TABLET ORAL EVERY 4 HOURS PRN
Status: DISCONTINUED | OUTPATIENT
Start: 2024-01-01 | End: 2024-04-08 | Stop reason: HOSPADM

## 2024-01-01 RX ORDER — ACETAMINOPHEN 325 MG/1
975 TABLET ORAL EVERY 6 HOURS
Status: DISCONTINUED | OUTPATIENT
Start: 2024-01-01 | End: 2024-01-01 | Stop reason: HOSPADM

## 2024-01-01 RX ORDER — DEXTROSE 50 % IN WATER (D50W) INTRAVENOUS SYRINGE
25
Status: DISCONTINUED | OUTPATIENT
Start: 2024-01-01 | End: 2024-01-01 | Stop reason: HOSPADM

## 2024-01-01 RX ORDER — DEXTROSE MONOHYDRATE 100 MG/ML
0.3 INJECTION, SOLUTION INTRAVENOUS ONCE AS NEEDED
Status: DISCONTINUED | OUTPATIENT
Start: 2024-01-01 | End: 2024-01-01 | Stop reason: HOSPADM

## 2024-01-01 RX ORDER — PANTOPRAZOLE SODIUM 40 MG/10ML
40 INJECTION, POWDER, LYOPHILIZED, FOR SOLUTION INTRAVENOUS DAILY
Status: DISCONTINUED | OUTPATIENT
Start: 2024-01-01 | End: 2024-01-01

## 2024-01-01 RX ORDER — KETOTIFEN FUMARATE 0.35 MG/ML
1 SOLUTION/ DROPS OPHTHALMIC 2 TIMES DAILY
Status: DISCONTINUED | OUTPATIENT
Start: 2024-01-01 | End: 2024-01-01 | Stop reason: HOSPADM

## 2024-01-01 RX ORDER — ALBUTEROL SULFATE 0.83 MG/ML
2.5 SOLUTION RESPIRATORY (INHALATION) EVERY 6 HOURS PRN
Status: DISCONTINUED | OUTPATIENT
Start: 2024-01-01 | End: 2024-01-01 | Stop reason: HOSPADM

## 2024-01-01 RX ORDER — HYDRALAZINE HYDROCHLORIDE 20 MG/ML
INJECTION INTRAMUSCULAR; INTRAVENOUS AS NEEDED
Status: DISCONTINUED | OUTPATIENT
Start: 2024-01-01 | End: 2024-01-01 | Stop reason: HOSPADM

## 2024-01-01 RX ORDER — POLYETHYLENE GLYCOL 3350 17 G/17G
17 POWDER, FOR SOLUTION ORAL DAILY
COMMUNITY

## 2024-01-01 RX ORDER — OXYCODONE HYDROCHLORIDE 5 MG/1
5 TABLET ORAL EVERY 6 HOURS PRN
Qty: 15 TABLET | Refills: 0 | Status: SHIPPED | OUTPATIENT
Start: 2024-01-01 | End: 2024-01-01 | Stop reason: HOSPADM

## 2024-01-01 RX ORDER — ONDANSETRON 4 MG/1
4 TABLET, FILM COATED ORAL EVERY 8 HOURS PRN
Status: DISCONTINUED | OUTPATIENT
Start: 2024-01-01 | End: 2024-04-08 | Stop reason: HOSPADM

## 2024-01-01 RX ORDER — BENZONATATE 200 MG/1
200 CAPSULE ORAL 3 TIMES DAILY PRN
Qty: 20 CAPSULE | Refills: 0
Start: 2024-01-01 | End: 2024-04-08

## 2024-01-01 RX ORDER — MUPIROCIN 20 MG/G
OINTMENT TOPICAL 2 TIMES DAILY
Status: COMPLETED | OUTPATIENT
Start: 2024-01-01 | End: 2024-01-01

## 2024-01-01 RX ORDER — OXYCODONE HYDROCHLORIDE 5 MG/1
5 TABLET ORAL ONCE AS NEEDED
Status: COMPLETED | OUTPATIENT
Start: 2024-01-01 | End: 2024-01-01

## 2024-01-01 RX ORDER — METHOCARBAMOL 500 MG/1
500 TABLET, FILM COATED ORAL EVERY 8 HOURS PRN
Status: DISCONTINUED | OUTPATIENT
Start: 2024-01-01 | End: 2024-01-01

## 2024-01-01 RX ORDER — POLYETHYLENE GLYCOL 3350 17 G/17G
17 POWDER, FOR SOLUTION ORAL DAILY
Status: DISCONTINUED | OUTPATIENT
Start: 2024-01-01 | End: 2024-04-08 | Stop reason: HOSPADM

## 2024-01-01 RX ORDER — INSULIN LISPRO 100 [IU]/ML
0-5 INJECTION, SOLUTION INTRAVENOUS; SUBCUTANEOUS
Start: 2024-01-01 | End: 2024-01-01 | Stop reason: HOSPADM

## 2024-01-01 RX ORDER — HEPARIN SODIUM 5000 [USP'U]/ML
1200 INJECTION, SOLUTION INTRAVENOUS; SUBCUTANEOUS AS NEEDED
Status: DISCONTINUED | OUTPATIENT
Start: 2024-01-01 | End: 2024-01-01 | Stop reason: HOSPADM

## 2024-01-01 RX ORDER — LIDOCAINE HYDROCHLORIDE 20 MG/ML
INJECTION, SOLUTION INFILTRATION; PERINEURAL AS NEEDED
Status: DISCONTINUED | OUTPATIENT
Start: 2024-01-01 | End: 2024-01-01

## 2024-01-01 RX ORDER — SENNOSIDES 8.6 MG/1
1 TABLET ORAL NIGHTLY
Status: DISCONTINUED | OUTPATIENT
Start: 2024-01-01 | End: 2024-01-01 | Stop reason: HOSPADM

## 2024-01-01 RX ORDER — FENTANYL CITRATE 50 UG/ML
INJECTION, SOLUTION INTRAMUSCULAR; INTRAVENOUS
Status: COMPLETED | OUTPATIENT
Start: 2024-01-01 | End: 2024-01-01

## 2024-01-01 RX ORDER — NOREPINEPHRINE BITARTRATE/D5W 8 MG/250ML
PLASTIC BAG, INJECTION (ML) INTRAVENOUS
Status: DISPENSED
Start: 2024-01-01 | End: 2024-01-01

## 2024-01-01 RX ORDER — SODIUM CHLORIDE 0.9 % (FLUSH) 0.9 %
10 SYRINGE (ML) INJECTION AS NEEDED
Status: DISCONTINUED | OUTPATIENT
Start: 2024-01-01 | End: 2024-01-01 | Stop reason: HOSPADM

## 2024-01-01 RX ORDER — ADENOSINE 3 MG/ML
INJECTION, SOLUTION INTRAVENOUS AS NEEDED
Status: DISCONTINUED | OUTPATIENT
Start: 2024-01-01 | End: 2024-01-01 | Stop reason: HOSPADM

## 2024-01-01 RX ORDER — ACETAMINOPHEN 500 MG
5 TABLET ORAL NIGHTLY
Status: DISCONTINUED | OUTPATIENT
Start: 2024-01-01 | End: 2024-01-01 | Stop reason: HOSPADM

## 2024-01-01 RX ORDER — ONDANSETRON HYDROCHLORIDE 2 MG/ML
INJECTION, SOLUTION INTRAVENOUS AS NEEDED
Status: DISCONTINUED | OUTPATIENT
Start: 2024-01-01 | End: 2024-01-01

## 2024-01-01 RX ORDER — IPRATROPIUM BROMIDE AND ALBUTEROL SULFATE 2.5; .5 MG/3ML; MG/3ML
3 SOLUTION RESPIRATORY (INHALATION) EVERY 6 HOURS PRN
Status: DISCONTINUED | OUTPATIENT
Start: 2024-01-01 | End: 2024-01-01 | Stop reason: HOSPADM

## 2024-01-01 RX ORDER — HEPARIN SODIUM 5000 [USP'U]/ML
5000-10000 INJECTION, SOLUTION INTRAVENOUS; SUBCUTANEOUS EVERY 4 HOURS PRN
Status: DISCONTINUED | OUTPATIENT
Start: 2024-01-01 | End: 2024-01-01

## 2024-01-01 RX ORDER — TRAMADOL HYDROCHLORIDE 50 MG/1
50 TABLET ORAL EVERY 6 HOURS PRN
Status: DISCONTINUED | OUTPATIENT
Start: 2024-01-01 | End: 2024-01-01

## 2024-01-01 RX ORDER — FENTANYL CITRATE 50 UG/ML
INJECTION, SOLUTION INTRAMUSCULAR; INTRAVENOUS AS NEEDED
Status: DISCONTINUED | OUTPATIENT
Start: 2024-01-01 | End: 2024-01-01 | Stop reason: HOSPADM

## 2024-01-01 RX ORDER — MAGNESIUM SULFATE 1 G/100ML
1 INJECTION INTRAVENOUS ONCE
Status: DISCONTINUED | OUTPATIENT
Start: 2024-01-01 | End: 2024-01-01

## 2024-01-01 RX ORDER — MIDODRINE HYDROCHLORIDE 10 MG/1
10 TABLET ORAL
Status: DISCONTINUED | OUTPATIENT
Start: 2024-01-01 | End: 2024-01-01

## 2024-01-01 RX ORDER — VANCOMYCIN HYDROCHLORIDE 1 G/200ML
1000 INJECTION, SOLUTION INTRAVENOUS EVERY 12 HOURS
Status: DISCONTINUED | OUTPATIENT
Start: 2024-01-01 | End: 2024-01-01

## 2024-01-01 RX ORDER — LIDOCAINE 560 MG/1
1 PATCH PERCUTANEOUS; TOPICAL; TRANSDERMAL DAILY
Status: DISCONTINUED | OUTPATIENT
Start: 2024-01-01 | End: 2024-01-01 | Stop reason: HOSPADM

## 2024-01-01 RX ORDER — TRAMADOL HYDROCHLORIDE 50 MG/1
50 TABLET ORAL 3 TIMES DAILY PRN
Status: DISCONTINUED | OUTPATIENT
Start: 2024-01-01 | End: 2024-01-01 | Stop reason: HOSPADM

## 2024-01-01 RX ORDER — GABAPENTIN 100 MG/1
100 CAPSULE ORAL DAILY
Status: DISCONTINUED | OUTPATIENT
Start: 2024-01-01 | End: 2024-01-01 | Stop reason: HOSPADM

## 2024-01-01 RX ORDER — ACETAMINOPHEN 325 MG/1
975 TABLET ORAL EVERY 6 HOURS
Status: ON HOLD
Start: 2024-01-01 | End: 2024-01-01 | Stop reason: SDUPTHER

## 2024-01-01 RX ORDER — HYDROMORPHONE HYDROCHLORIDE 1 MG/ML
0.5 INJECTION, SOLUTION INTRAMUSCULAR; INTRAVENOUS; SUBCUTANEOUS EVERY 5 MIN PRN
Status: DISCONTINUED | OUTPATIENT
Start: 2024-01-01 | End: 2024-01-01 | Stop reason: HOSPADM

## 2024-01-01 RX ORDER — POTASSIUM CHLORIDE 1.5 G/1.58G
20 POWDER, FOR SOLUTION ORAL ONCE
Status: COMPLETED | OUTPATIENT
Start: 2024-01-01 | End: 2024-01-01

## 2024-01-01 RX ORDER — OXYCODONE HYDROCHLORIDE 5 MG/1
2.5 TABLET ORAL EVERY 6 HOURS PRN
Status: DISCONTINUED | OUTPATIENT
Start: 2024-01-01 | End: 2024-01-01

## 2024-01-01 RX ORDER — ACETAMINOPHEN 650 MG/1
650 SUPPOSITORY RECTAL EVERY 4 HOURS PRN
Status: DISCONTINUED | OUTPATIENT
Start: 2024-01-01 | End: 2024-01-01

## 2024-01-01 RX ORDER — DROPERIDOL 2.5 MG/ML
0.62 INJECTION, SOLUTION INTRAMUSCULAR; INTRAVENOUS ONCE AS NEEDED
Status: DISCONTINUED | OUTPATIENT
Start: 2024-01-01 | End: 2024-01-01 | Stop reason: HOSPADM

## 2024-01-01 RX ORDER — TRAMADOL HYDROCHLORIDE 50 MG/1
25 TABLET ORAL EVERY 12 HOURS PRN
Qty: 30 TABLET | Refills: 1 | Status: SHIPPED | OUTPATIENT
Start: 2024-01-01 | End: 2024-01-01 | Stop reason: HOSPADM

## 2024-01-01 RX ORDER — HEPARIN SODIUM 1000 [USP'U]/ML
INJECTION, SOLUTION INTRAVENOUS; SUBCUTANEOUS
Status: COMPLETED
Start: 2024-01-01 | End: 2024-01-01

## 2024-01-01 RX ORDER — LIDOCAINE HYDROCHLORIDE 10 MG/ML
5 INJECTION INFILTRATION; PERINEURAL ONCE
Status: DISCONTINUED | OUTPATIENT
Start: 2024-01-01 | End: 2024-01-01 | Stop reason: HOSPADM

## 2024-01-01 RX ORDER — SODIUM CHLORIDE 9 MG/ML
20 INJECTION, SOLUTION INTRAVENOUS CONTINUOUS
Status: DISCONTINUED | OUTPATIENT
Start: 2024-01-01 | End: 2024-01-01 | Stop reason: HOSPADM

## 2024-01-01 RX ORDER — EPINEPHRINE 0.1 MG/ML
INJECTION INTRACARDIAC; INTRAVENOUS CODE/TRAUMA/SEDATION MEDICATION
Status: COMPLETED | OUTPATIENT
Start: 2024-01-01 | End: 2024-01-01

## 2024-01-01 RX ORDER — LORATADINE 10 MG/1
10 TABLET ORAL
Status: DISCONTINUED | OUTPATIENT
Start: 2024-01-01 | End: 2024-04-08 | Stop reason: HOSPADM

## 2024-01-01 RX ORDER — ATORVASTATIN CALCIUM 40 MG/1
40 TABLET, FILM COATED ORAL NIGHTLY
Status: DISCONTINUED | OUTPATIENT
Start: 2024-01-01 | End: 2024-04-08 | Stop reason: HOSPADM

## 2024-01-01 RX ORDER — MIDODRINE HYDROCHLORIDE 5 MG/1
15 TABLET ORAL EVERY 8 HOURS
Start: 2024-01-01 | End: 2024-04-08

## 2024-01-01 RX ORDER — HEPARIN SODIUM 5000 [USP'U]/ML
80 INJECTION, SOLUTION INTRAVENOUS; SUBCUTANEOUS ONCE
Status: DISCONTINUED | OUTPATIENT
Start: 2024-01-01 | End: 2024-01-01

## 2024-01-01 RX ORDER — INSULIN GLARGINE 100 [IU]/ML
6 INJECTION, SOLUTION SUBCUTANEOUS NIGHTLY
Start: 2024-01-01 | End: 2024-01-01 | Stop reason: HOSPADM

## 2024-01-01 RX ORDER — IPRATROPIUM BROMIDE AND ALBUTEROL SULFATE 2.5; .5 MG/3ML; MG/3ML
3 SOLUTION RESPIRATORY (INHALATION) EVERY 6 HOURS PRN
Qty: 180 ML | Refills: 11 | Status: SHIPPED | OUTPATIENT
Start: 2024-01-01 | End: 2024-04-08

## 2024-01-01 RX ORDER — HEPARIN SODIUM 1000 [USP'U]/ML
INJECTION, SOLUTION INTRAVENOUS; SUBCUTANEOUS AS NEEDED
Status: DISCONTINUED | OUTPATIENT
Start: 2024-01-01 | End: 2024-01-01

## 2024-01-01 RX ORDER — ONDANSETRON HYDROCHLORIDE 2 MG/ML
4 INJECTION, SOLUTION INTRAVENOUS EVERY 8 HOURS PRN
Status: DISCONTINUED | OUTPATIENT
Start: 2024-01-01 | End: 2024-04-08 | Stop reason: HOSPADM

## 2024-01-01 RX ORDER — SENNOSIDES 8.6 MG/1
1 TABLET ORAL 2 TIMES DAILY
Status: DISCONTINUED | OUTPATIENT
Start: 2024-01-01 | End: 2024-01-01

## 2024-01-01 RX ORDER — TRAMADOL HYDROCHLORIDE 50 MG/1
50 TABLET ORAL EVERY 12 HOURS PRN
Status: DISCONTINUED | OUTPATIENT
Start: 2024-01-01 | End: 2024-04-08 | Stop reason: HOSPADM

## 2024-01-01 RX ORDER — PROPOFOL 10 MG/ML
INJECTION, EMULSION INTRAVENOUS CONTINUOUS PRN
Status: DISCONTINUED | OUTPATIENT
Start: 2024-01-01 | End: 2024-01-01

## 2024-01-01 RX ORDER — SEVELAMER CARBONATE 800 MG/1
800 TABLET, FILM COATED ORAL
Status: DISCONTINUED | OUTPATIENT
Start: 2024-01-01 | End: 2024-01-01 | Stop reason: HOSPADM

## 2024-01-01 RX ORDER — METOPROLOL SUCCINATE 25 MG/1
25 TABLET, EXTENDED RELEASE ORAL DAILY
Status: DISCONTINUED | OUTPATIENT
Start: 2024-01-01 | End: 2024-01-01

## 2024-01-01 RX ORDER — ACETAMINOPHEN 650 MG/1
650 SUPPOSITORY RECTAL EVERY 4 HOURS PRN
Status: DISCONTINUED | OUTPATIENT
Start: 2024-01-01 | End: 2024-04-08 | Stop reason: HOSPADM

## 2024-01-01 RX ORDER — ALBUTEROL SULFATE 0.83 MG/ML
2.5 SOLUTION RESPIRATORY (INHALATION) ONCE AS NEEDED
Status: DISCONTINUED | OUTPATIENT
Start: 2024-01-01 | End: 2024-01-01 | Stop reason: HOSPADM

## 2024-01-01 RX ORDER — BENZONATATE 100 MG/1
200 CAPSULE ORAL 3 TIMES DAILY PRN
Status: DISCONTINUED | OUTPATIENT
Start: 2024-01-01 | End: 2024-04-08 | Stop reason: HOSPADM

## 2024-01-01 RX ORDER — CLOPIDOGREL BISULFATE 75 MG/1
75 TABLET ORAL DAILY
Status: DISCONTINUED | OUTPATIENT
Start: 2024-01-01 | End: 2024-04-08 | Stop reason: HOSPADM

## 2024-01-01 RX ORDER — HYDROXYZINE HYDROCHLORIDE 25 MG/1
25 TABLET, FILM COATED ORAL EVERY 8 HOURS PRN
Status: DISCONTINUED | OUTPATIENT
Start: 2024-01-01 | End: 2024-01-01 | Stop reason: HOSPADM

## 2024-01-01 RX ORDER — TRAMADOL HYDROCHLORIDE 50 MG/1
50 TABLET ORAL EVERY 12 HOURS PRN
Qty: 60 TABLET | Refills: 3 | Status: SHIPPED | OUTPATIENT
Start: 2024-01-01 | End: 2024-04-08

## 2024-01-01 RX ORDER — NITROGLYCERIN 40 MG/100ML
INJECTION INTRAVENOUS AS NEEDED
Status: DISCONTINUED | OUTPATIENT
Start: 2024-01-01 | End: 2024-01-01 | Stop reason: HOSPADM

## 2024-01-01 RX ORDER — ONDANSETRON HYDROCHLORIDE 2 MG/ML
4 INJECTION, SOLUTION INTRAVENOUS EVERY 8 HOURS PRN
Status: DISCONTINUED | OUTPATIENT
Start: 2024-01-01 | End: 2024-01-01 | Stop reason: HOSPADM

## 2024-01-01 RX ORDER — LIDOCAINE 560 MG/1
1 PATCH PERCUTANEOUS; TOPICAL; TRANSDERMAL DAILY
Status: DISCONTINUED | OUTPATIENT
Start: 2024-01-01 | End: 2024-04-08 | Stop reason: HOSPADM

## 2024-01-01 RX ORDER — TRAMADOL HYDROCHLORIDE 50 MG/1
50 TABLET ORAL ONCE
Status: COMPLETED | OUTPATIENT
Start: 2024-01-01 | End: 2024-01-01

## 2024-01-01 RX ORDER — VANCOMYCIN HYDROCHLORIDE 1 G/200ML
1000 INJECTION, SOLUTION INTRAVENOUS
Status: DISCONTINUED | OUTPATIENT
Start: 2024-01-01 | End: 2024-01-01

## 2024-01-01 RX ORDER — ALLOPURINOL 100 MG/1
100 TABLET ORAL DAILY
Status: DISCONTINUED | OUTPATIENT
Start: 2024-01-01 | End: 2024-04-08 | Stop reason: HOSPADM

## 2024-01-01 RX ORDER — SENNOSIDES 8.6 MG/1
1 TABLET ORAL NIGHTLY
Status: DISCONTINUED | OUTPATIENT
Start: 2024-01-01 | End: 2024-04-08 | Stop reason: HOSPADM

## 2024-01-01 RX ORDER — AMMONIUM LACTATE 12 G/100G
LOTION TOPICAL
Status: DISCONTINUED | OUTPATIENT
Start: 2024-01-01 | End: 2024-01-01 | Stop reason: HOSPADM

## 2024-01-01 RX ORDER — PETROLATUM 420 MG/G
OINTMENT TOPICAL
Status: DISCONTINUED | OUTPATIENT
Start: 2024-01-01 | End: 2024-01-01 | Stop reason: HOSPADM

## 2024-01-01 RX ORDER — FENTANYL CITRATE 50 UG/ML
25 INJECTION, SOLUTION INTRAMUSCULAR; INTRAVENOUS EVERY 5 MIN PRN
Status: DISCONTINUED | OUTPATIENT
Start: 2024-01-01 | End: 2024-01-01 | Stop reason: HOSPADM

## 2024-01-01 RX ORDER — VANCOMYCIN HYDROCHLORIDE 750 MG/150ML
750 INJECTION, SOLUTION INTRAVENOUS EVERY 12 HOURS
Status: DISCONTINUED | OUTPATIENT
Start: 2024-01-01 | End: 2024-01-01

## 2024-01-01 RX ORDER — INSULIN GLARGINE 100 [IU]/ML
12 INJECTION, SOLUTION SUBCUTANEOUS NIGHTLY
Status: DISCONTINUED | OUTPATIENT
Start: 2024-01-01 | End: 2024-01-01

## 2024-01-01 RX ORDER — VANCOMYCIN HYDROCHLORIDE 750 MG/150ML
750 INJECTION, SOLUTION INTRAVENOUS ONCE
Status: DISCONTINUED | OUTPATIENT
Start: 2024-01-01 | End: 2024-01-01

## 2024-01-01 RX ORDER — GUAIFENESIN 100 MG/5ML
200 SOLUTION ORAL EVERY 4 HOURS PRN
Status: DISCONTINUED | OUTPATIENT
Start: 2024-01-01 | End: 2024-01-01 | Stop reason: HOSPADM

## 2024-01-01 RX ORDER — INSULIN LISPRO 100 [IU]/ML
0-5 INJECTION, SOLUTION INTRAVENOUS; SUBCUTANEOUS
Status: DISCONTINUED | OUTPATIENT
Start: 2024-01-01 | End: 2024-01-01

## 2024-01-01 RX ORDER — OXYCODONE AND ACETAMINOPHEN 5; 325 MG/1; MG/1
1 TABLET ORAL EVERY 6 HOURS PRN
Status: DISCONTINUED | OUTPATIENT
Start: 2024-01-01 | End: 2024-01-01 | Stop reason: HOSPADM

## 2024-01-01 RX ORDER — SODIUM CHLORIDE 0.9 % (FLUSH) 0.9 %
10 SYRINGE (ML) INJECTION EVERY 12 HOURS
Status: DISCONTINUED | OUTPATIENT
Start: 2024-01-01 | End: 2024-01-01

## 2024-01-01 RX ORDER — NAPROXEN SODIUM 220 MG/1
81 TABLET, FILM COATED ORAL DAILY
Status: DISCONTINUED | OUTPATIENT
Start: 2024-01-01 | End: 2024-01-01

## 2024-01-01 RX ORDER — LIDOCAINE HYDROCHLORIDE 20 MG/ML
INJECTION, SOLUTION INFILTRATION; PERINEURAL AS NEEDED
Status: DISCONTINUED | OUTPATIENT
Start: 2024-01-01 | End: 2024-01-01 | Stop reason: HOSPADM

## 2024-01-01 RX ORDER — OSELTAMIVIR PHOSPHATE 30 MG/1
30 CAPSULE ORAL ONCE
Status: COMPLETED | OUTPATIENT
Start: 2024-01-01 | End: 2024-01-01

## 2024-01-01 RX ORDER — ACETAMINOPHEN 160 MG/5ML
650 SOLUTION ORAL EVERY 4 HOURS PRN
Status: DISCONTINUED | OUTPATIENT
Start: 2024-01-01 | End: 2024-04-08 | Stop reason: HOSPADM

## 2024-01-01 RX ORDER — SODIUM CHLORIDE, SODIUM LACTATE, POTASSIUM CHLORIDE, CALCIUM CHLORIDE 600; 310; 30; 20 MG/100ML; MG/100ML; MG/100ML; MG/100ML
100 INJECTION, SOLUTION INTRAVENOUS CONTINUOUS
Status: DISCONTINUED | OUTPATIENT
Start: 2024-01-01 | End: 2024-01-01 | Stop reason: HOSPADM

## 2024-01-01 RX ORDER — SENNOSIDES 8.6 MG/1
1 TABLET ORAL NIGHTLY
Start: 2024-01-01 | End: 2024-04-08

## 2024-01-01 RX ORDER — INSULIN LISPRO 100 [IU]/ML
0-5 INJECTION, SOLUTION INTRAVENOUS; SUBCUTANEOUS
Qty: 4.5 ML | Refills: 0
Start: 2024-01-01 | End: 2024-04-08

## 2024-01-01 RX ORDER — NALOXONE HYDROCHLORIDE 0.4 MG/ML
0.4 INJECTION, SOLUTION INTRAMUSCULAR; INTRAVENOUS; SUBCUTANEOUS
Status: DISCONTINUED | OUTPATIENT
Start: 2024-01-01 | End: 2024-01-01 | Stop reason: HOSPADM

## 2024-01-01 RX ORDER — GABAPENTIN 100 MG/1
100 CAPSULE ORAL DAILY
Status: DISCONTINUED | OUTPATIENT
Start: 2024-01-01 | End: 2024-04-08 | Stop reason: HOSPADM

## 2024-01-01 RX ORDER — LIDOCAINE HYDROCHLORIDE 10 MG/ML
INJECTION, SOLUTION EPIDURAL; INFILTRATION; INTRACAUDAL; PERINEURAL AS NEEDED
Status: DISCONTINUED | OUTPATIENT
Start: 2024-01-01 | End: 2024-01-01 | Stop reason: HOSPADM

## 2024-01-01 RX ORDER — PROTAMINE SULFATE 10 MG/ML
INJECTION, SOLUTION INTRAVENOUS CONTINUOUS PRN
Status: COMPLETED | OUTPATIENT
Start: 2024-01-01 | End: 2024-01-01

## 2024-01-01 RX ORDER — LIDOCAINE 560 MG/1
1 PATCH PERCUTANEOUS; TOPICAL; TRANSDERMAL DAILY
Start: 2024-01-01 | End: 2024-04-08

## 2024-01-01 RX ORDER — POTASSIUM CHLORIDE 20 MEQ/1
20 TABLET, EXTENDED RELEASE ORAL ONCE
Status: COMPLETED | OUTPATIENT
Start: 2024-01-01 | End: 2024-01-01

## 2024-01-01 RX ORDER — AMLODIPINE BESYLATE 10 MG/1
10 TABLET ORAL DAILY
Status: DISCONTINUED | OUTPATIENT
Start: 2024-01-01 | End: 2024-01-01

## 2024-01-01 RX ADMIN — TRAMADOL HYDROCHLORIDE 50 MG: 50 TABLET, COATED ORAL at 20:45

## 2024-01-01 RX ADMIN — NEPHROCAP 1 CAPSULE: 1 CAP ORAL at 09:05

## 2024-01-01 RX ADMIN — CLOPIDOGREL BISULFATE 75 MG: 75 TABLET, FILM COATED ORAL at 11:33

## 2024-01-01 RX ADMIN — MIDODRINE HYDROCHLORIDE 15 MG: 10 TABLET ORAL at 08:26

## 2024-01-01 RX ADMIN — CARBOXYMETHYLCELLULOSE SODIUM 2 DROP: 5 SOLUTION/ DROPS OPHTHALMIC at 08:55

## 2024-01-01 RX ADMIN — OXYCODONE AND ACETAMINOPHEN 1 TABLET: 5; 325 TABLET ORAL at 15:38

## 2024-01-01 RX ADMIN — APIXABAN 2.5 MG: 2.5 TABLET, FILM COATED ORAL at 20:29

## 2024-01-01 RX ADMIN — ASPIRIN 81 MG CHEWABLE TABLET 81 MG: 81 TABLET CHEWABLE at 08:44

## 2024-01-01 RX ADMIN — POTASSIUM CHLORIDE 20 MEQ: 1500 TABLET, EXTENDED RELEASE ORAL at 15:30

## 2024-01-01 RX ADMIN — OXYCODONE HYDROCHLORIDE 2.5 MG: 5 TABLET ORAL at 22:07

## 2024-01-01 RX ADMIN — MIDODRINE HYDROCHLORIDE 15 MG: 10 TABLET ORAL at 00:10

## 2024-01-01 RX ADMIN — MIDODRINE HYDROCHLORIDE 15 MG: 5 TABLET ORAL at 13:27

## 2024-01-01 RX ADMIN — Medication 5 MG: at 00:37

## 2024-01-01 RX ADMIN — Medication 5 MG: at 21:27

## 2024-01-01 RX ADMIN — VANCOMYCIN HYDROCHLORIDE 750 MG: 750 INJECTION, SOLUTION INTRAVENOUS at 17:37

## 2024-01-01 RX ADMIN — GABAPENTIN 100 MG: 100 CAPSULE ORAL at 11:29

## 2024-01-01 RX ADMIN — MIDODRINE HYDROCHLORIDE 20 MG: 10 TABLET ORAL at 08:33

## 2024-01-01 RX ADMIN — INSULIN GLARGINE 6 UNITS: 100 INJECTION, SOLUTION SUBCUTANEOUS at 20:56

## 2024-01-01 RX ADMIN — OXYCODONE HYDROCHLORIDE 2.5 MG: 5 TABLET ORAL at 11:49

## 2024-01-01 RX ADMIN — CLOPIDOGREL BISULFATE 75 MG: 75 TABLET, FILM COATED ORAL at 12:49

## 2024-01-01 RX ADMIN — ATORVASTATIN CALCIUM 40 MG: 40 TABLET, FILM COATED ORAL at 22:12

## 2024-01-01 RX ADMIN — EPOETIN ALFA 10000 UNITS: 10000 SOLUTION INTRAVENOUS; SUBCUTANEOUS at 16:30

## 2024-01-01 RX ADMIN — Medication 200 MCG: at 11:52

## 2024-01-01 RX ADMIN — INSULIN LISPRO 1 UNITS: 100 INJECTION, SOLUTION INTRAVENOUS; SUBCUTANEOUS at 18:33

## 2024-01-01 RX ADMIN — EPINEPHRINE 1 MG: 0.1 INJECTION, SOLUTION ENDOTRACHEAL; INTRACARDIAC; INTRAVENOUS at 18:47

## 2024-01-01 RX ADMIN — Medication: at 15:15

## 2024-01-01 RX ADMIN — MIDODRINE HYDROCHLORIDE 20 MG: 10 TABLET ORAL at 14:01

## 2024-01-01 RX ADMIN — ACETAMINOPHEN 650 MG: 325 TABLET ORAL at 11:05

## 2024-01-01 RX ADMIN — Medication 5 MG: at 20:47

## 2024-01-01 RX ADMIN — MIDODRINE HYDROCHLORIDE 20 MG: 10 TABLET ORAL at 16:29

## 2024-01-01 RX ADMIN — GABAPENTIN 100 MG: 100 CAPSULE ORAL at 12:57

## 2024-01-01 RX ADMIN — ACETAMINOPHEN 975 MG: 325 TABLET ORAL at 17:23

## 2024-01-01 RX ADMIN — ALLOPURINOL 100 MG: 100 TABLET ORAL at 09:20

## 2024-01-01 RX ADMIN — METOPROLOL SUCCINATE 25 MG: 25 TABLET, FILM COATED, EXTENDED RELEASE ORAL at 10:08

## 2024-01-01 RX ADMIN — SENNOSIDES 8.6 MG: 8.6 TABLET, FILM COATED ORAL at 20:47

## 2024-01-01 RX ADMIN — GABAPENTIN 100 MG: 100 CAPSULE ORAL at 08:12

## 2024-01-01 RX ADMIN — NEPHROCAP 1 CAPSULE: 1 CAP ORAL at 09:34

## 2024-01-01 RX ADMIN — APIXABAN 2.5 MG: 5 TABLET, FILM COATED ORAL at 08:21

## 2024-01-01 RX ADMIN — ALLOPURINOL 100 MG: 100 TABLET ORAL at 10:46

## 2024-01-01 RX ADMIN — GABAPENTIN 100 MG: 100 CAPSULE ORAL at 20:50

## 2024-01-01 RX ADMIN — POLYETHYLENE GLYCOL 3350 17 G: 17 POWDER, FOR SOLUTION ORAL at 08:03

## 2024-01-01 RX ADMIN — INSULIN LISPRO 1 UNITS: 100 INJECTION, SOLUTION INTRAVENOUS; SUBCUTANEOUS at 12:30

## 2024-01-01 RX ADMIN — MUPIROCIN: 20 OINTMENT TOPICAL at 09:00

## 2024-01-01 RX ADMIN — Medication 10 ML: at 04:30

## 2024-01-01 RX ADMIN — MIDODRINE HYDROCHLORIDE 15 MG: 10 TABLET ORAL at 00:04

## 2024-01-01 RX ADMIN — APIXABAN 2.5 MG: 5 TABLET, FILM COATED ORAL at 22:48

## 2024-01-01 RX ADMIN — PIPERACILLIN SODIUM AND TAZOBACTAM SODIUM 2.25 G: 2; .25 INJECTION, SOLUTION INTRAVENOUS at 19:17

## 2024-01-01 RX ADMIN — MEROPENEM 1000 MG: 1 INJECTION, POWDER, FOR SOLUTION INTRAVENOUS at 02:48

## 2024-01-01 RX ADMIN — Medication 5 MG: at 20:29

## 2024-01-01 RX ADMIN — Medication 160 MCG: at 11:35

## 2024-01-01 RX ADMIN — FUROSEMIDE 80 MG: 10 INJECTION, SOLUTION INTRAMUSCULAR; INTRAVENOUS at 15:22

## 2024-01-01 RX ADMIN — NEPHROCAP 1 CAPSULE: 1 CAP ORAL at 09:16

## 2024-01-01 RX ADMIN — INSULIN LISPRO 2 UNITS: 100 INJECTION, SOLUTION INTRAVENOUS; SUBCUTANEOUS at 18:32

## 2024-01-01 RX ADMIN — Medication 200 MCG: at 12:07

## 2024-01-01 RX ADMIN — MIDODRINE HYDROCHLORIDE 15 MG: 10 TABLET ORAL at 08:40

## 2024-01-01 RX ADMIN — ATORVASTATIN CALCIUM 40 MG: 40 TABLET, FILM COATED ORAL at 20:19

## 2024-01-01 RX ADMIN — PANTOPRAZOLE SODIUM 40 MG: 40 TABLET, DELAYED RELEASE ORAL at 09:37

## 2024-01-01 RX ADMIN — MIDODRINE HYDROCHLORIDE 20 MG: 10 TABLET ORAL at 09:03

## 2024-01-01 RX ADMIN — OXYCODONE HYDROCHLORIDE 5 MG: 5 TABLET ORAL at 09:19

## 2024-01-01 RX ADMIN — INSULIN GLARGINE 6 UNITS: 100 INJECTION, SOLUTION SUBCUTANEOUS at 20:07

## 2024-01-01 RX ADMIN — ATORVASTATIN CALCIUM 40 MG: 40 TABLET, FILM COATED ORAL at 20:32

## 2024-01-01 RX ADMIN — ALLOPURINOL 100 MG: 100 TABLET ORAL at 08:34

## 2024-01-01 RX ADMIN — CARBOXYMETHYLCELLULOSE SODIUM 2 DROP: 5 SOLUTION/ DROPS OPHTHALMIC at 21:40

## 2024-01-01 RX ADMIN — HEPARIN SODIUM 5000 UNITS: 5000 INJECTION INTRAVENOUS; SUBCUTANEOUS at 16:52

## 2024-01-01 RX ADMIN — ATORVASTATIN CALCIUM 40 MG: 40 TABLET, FILM COATED ORAL at 21:22

## 2024-01-01 RX ADMIN — MIDODRINE HYDROCHLORIDE 15 MG: 10 TABLET ORAL at 08:12

## 2024-01-01 RX ADMIN — ALLOPURINOL 100 MG: 100 TABLET ORAL at 09:47

## 2024-01-01 RX ADMIN — MUPIROCIN: 20 OINTMENT TOPICAL at 11:58

## 2024-01-01 RX ADMIN — RENO CAPS 1 CAPSULE: 100; 1.5; 1.7; 20; 10; 1; 150; 5; 6 CAPSULE ORAL at 09:34

## 2024-01-01 RX ADMIN — ALLOPURINOL 100 MG: 100 TABLET ORAL at 10:02

## 2024-01-01 RX ADMIN — SENNOSIDES 8.6 MG: 8.6 TABLET, FILM COATED ORAL at 20:03

## 2024-01-01 RX ADMIN — NEPHROCAP 1 CAPSULE: 1 CAP ORAL at 23:03

## 2024-01-01 RX ADMIN — GABAPENTIN 100 MG: 100 CAPSULE ORAL at 09:39

## 2024-01-01 RX ADMIN — LIDOCAINE 1 PATCH: 4 PATCH TOPICAL at 09:00

## 2024-01-01 RX ADMIN — ACETAMINOPHEN 975 MG: 325 TABLET ORAL at 21:22

## 2024-01-01 RX ADMIN — INSULIN LISPRO 1 UNITS: 100 INJECTION, SOLUTION INTRAVENOUS; SUBCUTANEOUS at 17:28

## 2024-01-01 RX ADMIN — SODIUM CHLORIDE 250 ML: 9 INJECTION, SOLUTION INTRAVENOUS at 01:53

## 2024-01-01 RX ADMIN — MUPIROCIN: 20 OINTMENT TOPICAL at 21:14

## 2024-01-01 RX ADMIN — AMLODIPINE BESYLATE 10 MG: 10 TABLET ORAL at 09:23

## 2024-01-01 RX ADMIN — ACETAMINOPHEN 975 MG: 325 TABLET ORAL at 00:54

## 2024-01-01 RX ADMIN — OXYCODONE HYDROCHLORIDE 5 MG: 5 TABLET ORAL at 21:04

## 2024-01-01 RX ADMIN — ATORVASTATIN CALCIUM 40 MG: 40 TABLET, FILM COATED ORAL at 21:40

## 2024-01-01 RX ADMIN — ACETAMINOPHEN 975 MG: 325 TABLET ORAL at 11:49

## 2024-01-01 RX ADMIN — CLOPIDOGREL 75 MG: 75 TABLET ORAL at 13:45

## 2024-01-01 RX ADMIN — Medication 10 ML: at 16:22

## 2024-01-01 RX ADMIN — VANCOMYCIN HYDROCHLORIDE 750 MG: 750 INJECTION, SOLUTION INTRAVENOUS at 21:16

## 2024-01-01 RX ADMIN — MUPIROCIN: 20 OINTMENT TOPICAL at 09:23

## 2024-01-01 RX ADMIN — MIDODRINE HYDROCHLORIDE 15 MG: 10 TABLET ORAL at 16:57

## 2024-01-01 RX ADMIN — TRAMADOL HYDROCHLORIDE 50 MG: 50 TABLET, COATED ORAL at 05:18

## 2024-01-01 RX ADMIN — CARBOXYMETHYLCELLULOSE SODIUM 2 DROP: 5 SOLUTION/ DROPS OPHTHALMIC at 22:12

## 2024-01-01 RX ADMIN — MIDODRINE HYDROCHLORIDE 15 MG: 10 TABLET ORAL at 17:01

## 2024-01-01 RX ADMIN — ATORVASTATIN CALCIUM 40 MG: 40 TABLET, FILM COATED ORAL at 21:59

## 2024-01-01 RX ADMIN — MUPIROCIN: 20 OINTMENT TOPICAL at 16:00

## 2024-01-01 RX ADMIN — ACETAMINOPHEN 975 MG: 325 TABLET ORAL at 06:57

## 2024-01-01 RX ADMIN — PANTOPRAZOLE SODIUM 40 MG: 40 TABLET, DELAYED RELEASE ORAL at 08:34

## 2024-01-01 RX ADMIN — ACETAMINOPHEN 975 MG: 325 TABLET ORAL at 11:00

## 2024-01-01 RX ADMIN — Medication 0.04 MCG/KG/MIN: at 15:01

## 2024-01-01 RX ADMIN — GABAPENTIN 100 MG: 100 CAPSULE ORAL at 21:20

## 2024-01-01 RX ADMIN — APIXABAN 2.5 MG: 5 TABLET, FILM COATED ORAL at 21:42

## 2024-01-01 RX ADMIN — SENNOSIDES AND DOCUSATE SODIUM 2 TABLET: 8.6; 5 TABLET ORAL at 20:34

## 2024-01-01 RX ADMIN — Medication 5 MG: at 20:32

## 2024-01-01 RX ADMIN — ALLOPURINOL 100 MG: 100 TABLET ORAL at 15:43

## 2024-01-01 RX ADMIN — CARBOXYMETHYLCELLULOSE SODIUM 2 DROP: 5 SOLUTION/ DROPS OPHTHALMIC at 15:44

## 2024-01-01 RX ADMIN — ATORVASTATIN CALCIUM 40 MG: 40 TABLET, FILM COATED ORAL at 21:48

## 2024-01-01 RX ADMIN — APIXABAN 2.5 MG: 2.5 TABLET, FILM COATED ORAL at 20:02

## 2024-01-01 RX ADMIN — HEPARIN SODIUM 5000 UNITS: 5000 INJECTION INTRAVENOUS; SUBCUTANEOUS at 10:17

## 2024-01-01 RX ADMIN — Medication 400 MG: at 11:50

## 2024-01-01 RX ADMIN — ALLOPURINOL 100 MG: 100 TABLET ORAL at 12:49

## 2024-01-01 RX ADMIN — OXYCODONE HYDROCHLORIDE 5 MG: 5 TABLET ORAL at 02:07

## 2024-01-01 RX ADMIN — Medication 120 MCG: at 11:17

## 2024-01-01 RX ADMIN — ATORVASTATIN CALCIUM 40 MG: 40 TABLET, FILM COATED ORAL at 20:44

## 2024-01-01 RX ADMIN — CLOPIDOGREL BISULFATE 75 MG: 75 TABLET, FILM COATED ORAL at 11:00

## 2024-01-01 RX ADMIN — Medication 200 MCG: at 11:43

## 2024-01-01 RX ADMIN — ACETAMINOPHEN 975 MG: 325 TABLET ORAL at 12:42

## 2024-01-01 RX ADMIN — ATORVASTATIN CALCIUM 40 MG: 40 TABLET, FILM COATED ORAL at 21:42

## 2024-01-01 RX ADMIN — Medication 10 ML: at 04:21

## 2024-01-01 RX ADMIN — ALLOPURINOL 100 MG: 100 TABLET ORAL at 08:50

## 2024-01-01 RX ADMIN — ALLOPURINOL 100 MG: 100 TABLET ORAL at 09:09

## 2024-01-01 RX ADMIN — OXYCODONE HYDROCHLORIDE 2.5 MG: 5 TABLET ORAL at 21:21

## 2024-01-01 RX ADMIN — ACETAMINOPHEN 650 MG: 325 TABLET ORAL at 13:25

## 2024-01-01 RX ADMIN — GABAPENTIN 100 MG: 100 CAPSULE ORAL at 21:15

## 2024-01-01 RX ADMIN — SENNOSIDES AND DOCUSATE SODIUM 2 TABLET: 8.6; 5 TABLET ORAL at 20:16

## 2024-01-01 RX ADMIN — MIDODRINE HYDROCHLORIDE 15 MG: 10 TABLET ORAL at 01:24

## 2024-01-01 RX ADMIN — ACETAMINOPHEN 975 MG: 325 TABLET ORAL at 14:01

## 2024-01-01 RX ADMIN — INSULIN LISPRO 1 UNITS: 100 INJECTION, SOLUTION INTRAVENOUS; SUBCUTANEOUS at 10:05

## 2024-01-01 RX ADMIN — SEVELAMER CARBONATE 800 MG: 800 TABLET, FILM COATED ORAL at 09:48

## 2024-01-01 RX ADMIN — ACETAMINOPHEN 975 MG: 325 TABLET ORAL at 20:31

## 2024-01-01 RX ADMIN — MIDODRINE HYDROCHLORIDE 15 MG: 10 TABLET ORAL at 15:38

## 2024-01-01 RX ADMIN — IOHEXOL 75 ML: 350 INJECTION, SOLUTION INTRAVENOUS at 16:21

## 2024-01-01 RX ADMIN — POLYETHYLENE GLYCOL 3350 17 G: 17 POWDER, FOR SOLUTION ORAL at 08:49

## 2024-01-01 RX ADMIN — ACETAMINOPHEN 975 MG: 325 TABLET ORAL at 23:31

## 2024-01-01 RX ADMIN — GABAPENTIN 100 MG: 100 CAPSULE ORAL at 08:33

## 2024-01-01 RX ADMIN — ALLOPURINOL 100 MG: 100 TABLET ORAL at 09:04

## 2024-01-01 RX ADMIN — ALLOPURINOL 100 MG: 100 TABLET ORAL at 12:58

## 2024-01-01 RX ADMIN — CLOPIDOGREL BISULFATE 75 MG: 75 TABLET ORAL at 12:47

## 2024-01-01 RX ADMIN — OXYCODONE AND ACETAMINOPHEN 1 TABLET: 5; 325 TABLET ORAL at 18:43

## 2024-01-01 RX ADMIN — CARBOXYMETHYLCELLULOSE SODIUM 2 DROP: 5 SOLUTION/ DROPS OPHTHALMIC at 16:28

## 2024-01-01 RX ADMIN — ALLOPURINOL 100 MG: 100 TABLET ORAL at 09:37

## 2024-01-01 RX ADMIN — METOPROLOL SUCCINATE 25 MG: 25 TABLET, FILM COATED, EXTENDED RELEASE ORAL at 09:32

## 2024-01-01 RX ADMIN — EPOETIN ALFA-EPBX 6000 UNITS: 10000 INJECTION, SOLUTION INTRAVENOUS; SUBCUTANEOUS at 21:34

## 2024-01-01 RX ADMIN — SENNOSIDES AND DOCUSATE SODIUM 2 TABLET: 8.6; 5 TABLET ORAL at 23:55

## 2024-01-01 RX ADMIN — ATORVASTATIN CALCIUM 40 MG: 40 TABLET, FILM COATED ORAL at 21:27

## 2024-01-01 RX ADMIN — NEPHROCAP 1 CAPSULE: 1 CAP ORAL at 15:42

## 2024-01-01 RX ADMIN — CARBOXYMETHYLCELLULOSE SODIUM 2 DROP: 5 SOLUTION/ DROPS OPHTHALMIC at 12:42

## 2024-01-01 RX ADMIN — CARBOXYMETHYLCELLULOSE SODIUM 2 DROP: 5 SOLUTION/ DROPS OPHTHALMIC at 08:40

## 2024-01-01 RX ADMIN — CLOPIDOGREL BISULFATE 75 MG: 75 TABLET ORAL at 10:44

## 2024-01-01 RX ADMIN — ATORVASTATIN CALCIUM 40 MG: 40 TABLET, FILM COATED ORAL at 21:16

## 2024-01-01 RX ADMIN — ACETAMINOPHEN 975 MG: 325 TABLET ORAL at 12:30

## 2024-01-01 RX ADMIN — PIPERACILLIN SODIUM AND TAZOBACTAM SODIUM 2.25 G: 2; .25 INJECTION, SOLUTION INTRAVENOUS at 17:17

## 2024-01-01 RX ADMIN — APIXABAN 2.5 MG: 2.5 TABLET, FILM COATED ORAL at 20:50

## 2024-01-01 RX ADMIN — CLOPIDOGREL BISULFATE 75 MG: 75 TABLET, FILM COATED ORAL at 08:34

## 2024-01-01 RX ADMIN — SENNOSIDES 8.6 MG: 8.6 TABLET, FILM COATED ORAL at 22:20

## 2024-01-01 RX ADMIN — GABAPENTIN 100 MG: 100 CAPSULE ORAL at 15:44

## 2024-01-01 RX ADMIN — PANTOPRAZOLE SODIUM 40 MG: 40 TABLET, DELAYED RELEASE ORAL at 09:25

## 2024-01-01 RX ADMIN — OXYCODONE HYDROCHLORIDE 2.5 MG: 5 TABLET ORAL at 18:45

## 2024-01-01 RX ADMIN — DAKIN'S SOLUTION 0.125% (QUARTER STRENGTH): 0.12 SOLUTION at 13:00

## 2024-01-01 RX ADMIN — RENO CAPS 1 CAPSULE: 100; 1.5; 1.7; 20; 10; 1; 150; 5; 6 CAPSULE ORAL at 08:12

## 2024-01-01 RX ADMIN — POLYETHYLENE GLYCOL 3350 17 G: 17 POWDER, FOR SOLUTION ORAL at 09:00

## 2024-01-01 RX ADMIN — NEPHROCAP 1 CAPSULE: 1 CAP ORAL at 09:48

## 2024-01-01 RX ADMIN — ACETAMINOPHEN 650 MG: 650 SOLUTION ORAL at 16:52

## 2024-01-01 RX ADMIN — ALLOPURINOL 100 MG: 100 TABLET ORAL at 08:54

## 2024-01-01 RX ADMIN — MIDODRINE HYDROCHLORIDE 15 MG: 5 TABLET ORAL at 03:24

## 2024-01-01 RX ADMIN — SENNOSIDES AND DOCUSATE SODIUM 2 TABLET: 8.6; 5 TABLET ORAL at 12:38

## 2024-01-01 RX ADMIN — GABAPENTIN 100 MG: 100 CAPSULE ORAL at 08:53

## 2024-01-01 RX ADMIN — MIDODRINE HYDROCHLORIDE 20 MG: 10 TABLET ORAL at 17:17

## 2024-01-01 RX ADMIN — Medication 5 MG: at 22:20

## 2024-01-01 RX ADMIN — CLOPIDOGREL BISULFATE 75 MG: 75 TABLET, FILM COATED ORAL at 11:19

## 2024-01-01 RX ADMIN — HYDROMORPHONE HYDROCHLORIDE 0.2 MG: 1 INJECTION, SOLUTION INTRAMUSCULAR; INTRAVENOUS; SUBCUTANEOUS at 11:11

## 2024-01-01 RX ADMIN — ACETAMINOPHEN 975 MG: 325 TABLET ORAL at 17:04

## 2024-01-01 RX ADMIN — OXYCODONE AND ACETAMINOPHEN 1 TABLET: 5; 325 TABLET ORAL at 09:31

## 2024-01-01 RX ADMIN — LIDOCAINE 4%: 4 CREAM TOPICAL at 15:38

## 2024-01-01 RX ADMIN — Medication 100 PERCENT: at 07:40

## 2024-01-01 RX ADMIN — ACETAMINOPHEN 975 MG: 325 TABLET ORAL at 18:31

## 2024-01-01 RX ADMIN — ALLOPURINOL 100 MG: 100 TABLET ORAL at 09:16

## 2024-01-01 RX ADMIN — PIPERACILLIN SODIUM AND TAZOBACTAM SODIUM 2.25 G: 2; .25 INJECTION, SOLUTION INTRAVENOUS at 05:01

## 2024-01-01 RX ADMIN — INSULIN LISPRO 1 UNITS: 100 INJECTION, SOLUTION INTRAVENOUS; SUBCUTANEOUS at 17:06

## 2024-01-01 RX ADMIN — RENO CAPS 1 CAPSULE: 100; 1.5; 1.7; 20; 10; 1; 150; 5; 6 CAPSULE ORAL at 10:46

## 2024-01-01 RX ADMIN — SENNOSIDES AND DOCUSATE SODIUM 2 TABLET: 8.6; 5 TABLET ORAL at 12:45

## 2024-01-01 RX ADMIN — MIDODRINE HYDROCHLORIDE 15 MG: 10 TABLET ORAL at 08:34

## 2024-01-01 RX ADMIN — TRAMADOL HYDROCHLORIDE 50 MG: 50 TABLET, COATED ORAL at 11:35

## 2024-01-01 RX ADMIN — ACETAMINOPHEN 975 MG: 325 TABLET ORAL at 17:17

## 2024-01-01 RX ADMIN — TRAMADOL HYDROCHLORIDE 50 MG: 50 TABLET, COATED ORAL at 13:09

## 2024-01-01 RX ADMIN — ALLOPURINOL 100 MG: 100 TABLET ORAL at 09:05

## 2024-01-01 RX ADMIN — APIXABAN 2.5 MG: 5 TABLET, FILM COATED ORAL at 08:49

## 2024-01-01 RX ADMIN — MIDODRINE HYDROCHLORIDE 20 MG: 10 TABLET ORAL at 00:31

## 2024-01-01 RX ADMIN — POLYETHYLENE GLYCOL (3350) 17 G: 17 POWDER, FOR SOLUTION ORAL at 10:01

## 2024-01-01 RX ADMIN — Medication 150 MCG: at 12:02

## 2024-01-01 RX ADMIN — ACETAMINOPHEN 975 MG: 325 TABLET ORAL at 02:41

## 2024-01-01 RX ADMIN — MUPIROCIN: 20 OINTMENT TOPICAL at 15:58

## 2024-01-01 RX ADMIN — NEPHROCAP 1 CAPSULE: 1 CAP ORAL at 08:50

## 2024-01-01 RX ADMIN — EPINEPHRINE 1 MG: 0.1 INJECTION, SOLUTION ENDOTRACHEAL; INTRACARDIAC; INTRAVENOUS at 18:51

## 2024-01-01 RX ADMIN — GABAPENTIN 100 MG: 100 CAPSULE ORAL at 08:51

## 2024-01-01 RX ADMIN — LIDOCAINE 1 PATCH: 4 PATCH TOPICAL at 08:44

## 2024-01-01 RX ADMIN — HEPARIN SODIUM 22000 UNITS: 20000 INJECTION, SOLUTION INTRAVENOUS; SUBCUTANEOUS at 10:55

## 2024-01-01 RX ADMIN — SENNOSIDES AND DOCUSATE SODIUM 2 TABLET: 8.6; 5 TABLET ORAL at 10:41

## 2024-01-01 RX ADMIN — LIDOCAINE 1 PATCH: 4 PATCH TOPICAL at 09:47

## 2024-01-01 RX ADMIN — INSULIN LISPRO 1 UNITS: 100 INJECTION, SOLUTION INTRAVENOUS; SUBCUTANEOUS at 08:21

## 2024-01-01 RX ADMIN — OXYCODONE AND ACETAMINOPHEN 1 TABLET: 5; 325 TABLET ORAL at 18:39

## 2024-01-01 RX ADMIN — NEPHROCAP 1 CAPSULE: 1 CAP ORAL at 11:33

## 2024-01-01 RX ADMIN — ALLOPURINOL 100 MG: 100 TABLET ORAL at 12:01

## 2024-01-01 RX ADMIN — POLYETHYLENE GLYCOL 3350 17 G: 17 POWDER, FOR SOLUTION ORAL at 16:22

## 2024-01-01 RX ADMIN — ATORVASTATIN CALCIUM 40 MG: 40 TABLET, FILM COATED ORAL at 22:02

## 2024-01-01 RX ADMIN — OXYCODONE AND ACETAMINOPHEN 1 TABLET: 5; 325 TABLET ORAL at 10:00

## 2024-01-01 RX ADMIN — MIDODRINE HYDROCHLORIDE 20 MG: 10 TABLET ORAL at 17:00

## 2024-01-01 RX ADMIN — PANTOPRAZOLE SODIUM 40 MG: 40 TABLET, DELAYED RELEASE ORAL at 06:23

## 2024-01-01 RX ADMIN — OXYCODONE HYDROCHLORIDE 5 MG: 5 TABLET ORAL at 12:58

## 2024-01-01 RX ADMIN — APIXABAN 2.5 MG: 2.5 TABLET, FILM COATED ORAL at 08:17

## 2024-01-01 RX ADMIN — MIDODRINE HYDROCHLORIDE 20 MG: 10 TABLET ORAL at 08:03

## 2024-01-01 RX ADMIN — INSULIN GLARGINE 6 UNITS: 100 INJECTION, SOLUTION SUBCUTANEOUS at 21:12

## 2024-01-01 RX ADMIN — HEPARIN SODIUM 5000 UNITS: 1000 INJECTION INTRAVENOUS; SUBCUTANEOUS at 13:03

## 2024-01-01 RX ADMIN — DEXAMETHASONE 6 MG: 6 TABLET ORAL at 09:23

## 2024-01-01 RX ADMIN — MIDODRINE HYDROCHLORIDE 20 MG: 10 TABLET ORAL at 09:15

## 2024-01-01 RX ADMIN — NEPHROCAP 1 CAPSULE: 1 CAP ORAL at 08:06

## 2024-01-01 RX ADMIN — MIDODRINE HYDROCHLORIDE 15 MG: 10 TABLET ORAL at 23:23

## 2024-01-01 RX ADMIN — MIDODRINE HYDROCHLORIDE 15 MG: 10 TABLET ORAL at 20:53

## 2024-01-01 RX ADMIN — DAKIN'S SOLUTION 0.125% (QUARTER STRENGTH): 0.12 SOLUTION at 13:56

## 2024-01-01 RX ADMIN — ALLOPURINOL 100 MG: 100 TABLET ORAL at 10:29

## 2024-01-01 RX ADMIN — POLYETHYLENE GLYCOL 3350 17 G: 17 POWDER, FOR SOLUTION ORAL at 08:07

## 2024-01-01 RX ADMIN — ACETAMINOPHEN 975 MG: 325 TABLET ORAL at 10:41

## 2024-01-01 RX ADMIN — ACETAMINOPHEN 975 MG: 325 TABLET ORAL at 01:34

## 2024-01-01 RX ADMIN — HEPARIN SODIUM 11.6 UNITS/HR: 10000 INJECTION, SOLUTION INTRAVENOUS at 07:31

## 2024-01-01 RX ADMIN — SENNOSIDES AND DOCUSATE SODIUM 2 TABLET: 8.6; 5 TABLET ORAL at 08:27

## 2024-01-01 RX ADMIN — DEXTROSE MONOHYDRATE 25 G: 25 INJECTION, SOLUTION INTRAVENOUS at 22:53

## 2024-01-01 RX ADMIN — ATORVASTATIN CALCIUM 40 MG: 40 TABLET, FILM COATED ORAL at 20:07

## 2024-01-01 RX ADMIN — Medication 5 MG: at 21:36

## 2024-01-01 RX ADMIN — RENO CAPS 1 CAPSULE: 100; 1.5; 1.7; 20; 10; 1; 150; 5; 6 CAPSULE ORAL at 10:42

## 2024-01-01 RX ADMIN — VANCOMYCIN HYDROCHLORIDE 1000 MG: 1 INJECTION, SOLUTION INTRAVENOUS at 17:20

## 2024-01-01 RX ADMIN — GABAPENTIN 100 MG: 100 CAPSULE ORAL at 21:12

## 2024-01-01 RX ADMIN — HEPARIN SODIUM 1160 UNITS/HR: 10000 INJECTION, SOLUTION INTRAVENOUS at 06:06

## 2024-01-01 RX ADMIN — INSULIN LISPRO 1 UNITS: 100 INJECTION, SOLUTION INTRAVENOUS; SUBCUTANEOUS at 09:09

## 2024-01-01 RX ADMIN — ALLOPURINOL 100 MG: 100 TABLET ORAL at 08:33

## 2024-01-01 RX ADMIN — ALLOPURINOL 100 MG: 100 TABLET ORAL at 09:34

## 2024-01-01 RX ADMIN — Medication 5 MG: at 20:02

## 2024-01-01 RX ADMIN — POLYETHYLENE GLYCOL 3350 17 G: 17 POWDER, FOR SOLUTION ORAL at 09:03

## 2024-01-01 RX ADMIN — DOCUSATE SODIUM 100 MG: 100 CAPSULE, LIQUID FILLED ORAL at 11:21

## 2024-01-01 RX ADMIN — MIDODRINE HYDROCHLORIDE 15 MG: 5 TABLET ORAL at 18:24

## 2024-01-01 RX ADMIN — MIDODRINE HYDROCHLORIDE 20 MG: 10 TABLET ORAL at 00:41

## 2024-01-01 RX ADMIN — OXYCODONE HYDROCHLORIDE 2.5 MG: 5 TABLET ORAL at 12:02

## 2024-01-01 RX ADMIN — MIDODRINE HYDROCHLORIDE 15 MG: 10 TABLET ORAL at 00:11

## 2024-01-01 RX ADMIN — SEVELAMER CARBONATE 800 MG: 800 TABLET, FILM COATED ORAL at 13:24

## 2024-01-01 RX ADMIN — INSULIN GLARGINE 6 UNITS: 100 INJECTION, SOLUTION SUBCUTANEOUS at 20:03

## 2024-01-01 RX ADMIN — PANTOPRAZOLE SODIUM 40 MG: 40 TABLET, DELAYED RELEASE ORAL at 08:26

## 2024-01-01 RX ADMIN — MIDODRINE HYDROCHLORIDE 15 MG: 5 TABLET ORAL at 19:39

## 2024-01-01 RX ADMIN — LIDOCAINE 1 PATCH: 4 PATCH TOPICAL at 10:00

## 2024-01-01 RX ADMIN — ALLOPURINOL 100 MG: 100 TABLET ORAL at 09:23

## 2024-01-01 RX ADMIN — CARBOXYMETHYLCELLULOSE SODIUM 2 DROP: 5 SOLUTION/ DROPS OPHTHALMIC at 20:32

## 2024-01-01 RX ADMIN — INSULIN GLARGINE 6 UNITS: 100 INJECTION, SOLUTION SUBCUTANEOUS at 22:23

## 2024-01-01 RX ADMIN — NEPHROCAP 1 CAPSULE: 1 CAP ORAL at 08:25

## 2024-01-01 RX ADMIN — PANTOPRAZOLE SODIUM 40 MG: 40 TABLET, DELAYED RELEASE ORAL at 12:02

## 2024-01-01 RX ADMIN — ACETAMINOPHEN 975 MG: 325 TABLET ORAL at 15:44

## 2024-01-01 RX ADMIN — PANTOPRAZOLE SODIUM 40 MG: 40 TABLET, DELAYED RELEASE ORAL at 10:30

## 2024-01-01 RX ADMIN — POLYETHYLENE GLYCOL (3350) 17 G: 17 POWDER, FOR SOLUTION ORAL at 09:12

## 2024-01-01 RX ADMIN — INSULIN GLARGINE 12 UNITS: 100 INJECTION, SOLUTION SUBCUTANEOUS at 21:20

## 2024-01-01 RX ADMIN — MIDODRINE HYDROCHLORIDE 15 MG: 5 TABLET ORAL at 21:03

## 2024-01-01 RX ADMIN — CARBOXYMETHYLCELLULOSE SODIUM 2 DROP: 5 SOLUTION/ DROPS OPHTHALMIC at 11:59

## 2024-01-01 RX ADMIN — GABAPENTIN 100 MG: 100 CAPSULE ORAL at 08:21

## 2024-01-01 RX ADMIN — PANTOPRAZOLE SODIUM 40 MG: 40 TABLET, DELAYED RELEASE ORAL at 12:58

## 2024-01-01 RX ADMIN — APIXABAN 2.5 MG: 5 TABLET, FILM COATED ORAL at 22:40

## 2024-01-01 RX ADMIN — PIPERACILLIN SODIUM AND TAZOBACTAM SODIUM 2.25 G: 2; .25 INJECTION, SOLUTION INTRAVENOUS at 18:38

## 2024-01-01 RX ADMIN — PANTOPRAZOLE SODIUM 40 MG: 40 TABLET, DELAYED RELEASE ORAL at 10:02

## 2024-01-01 RX ADMIN — TRAMADOL HYDROCHLORIDE 50 MG: 50 TABLET, COATED ORAL at 21:41

## 2024-01-01 RX ADMIN — PANTOPRAZOLE SODIUM 40 MG: 40 INJECTION, POWDER, FOR SOLUTION INTRAVENOUS at 09:14

## 2024-01-01 RX ADMIN — INSULIN LISPRO 1 UNITS: 100 INJECTION, SOLUTION INTRAVENOUS; SUBCUTANEOUS at 12:43

## 2024-01-01 RX ADMIN — PANTOPRAZOLE SODIUM 40 MG: 40 TABLET, DELAYED RELEASE ORAL at 08:03

## 2024-01-01 RX ADMIN — CLOPIDOGREL BISULFATE 75 MG: 75 TABLET, FILM COATED ORAL at 08:50

## 2024-01-01 RX ADMIN — OXYCODONE HYDROCHLORIDE 5 MG: 5 TABLET ORAL at 21:59

## 2024-01-01 RX ADMIN — INSULIN LISPRO 1 UNITS: 100 INJECTION, SOLUTION INTRAVENOUS; SUBCUTANEOUS at 17:25

## 2024-01-01 RX ADMIN — ACETAMINOPHEN 650 MG: 325 TABLET ORAL at 18:06

## 2024-01-01 RX ADMIN — HEPARIN SODIUM 5000 UNITS: 5000 INJECTION INTRAVENOUS; SUBCUTANEOUS at 09:15

## 2024-01-01 RX ADMIN — ASPIRIN 81 MG CHEWABLE TABLET 81 MG: 81 TABLET CHEWABLE at 08:05

## 2024-01-01 RX ADMIN — PIPERACILLIN SODIUM AND TAZOBACTAM SODIUM 2.25 G: 2; .25 INJECTION, SOLUTION INTRAVENOUS at 05:24

## 2024-01-01 RX ADMIN — PIPERACILLIN SODIUM AND TAZOBACTAM SODIUM 2.25 G: 2; .25 INJECTION, SOLUTION INTRAVENOUS at 18:24

## 2024-01-01 RX ADMIN — OXYCODONE HYDROCHLORIDE 2.5 MG: 5 TABLET ORAL at 07:30

## 2024-01-01 RX ADMIN — Medication 120 MCG: at 11:14

## 2024-01-01 RX ADMIN — APIXABAN 2.5 MG: 2.5 TABLET, FILM COATED ORAL at 20:19

## 2024-01-01 RX ADMIN — PANTOPRAZOLE SODIUM 40 MG: 40 TABLET, DELAYED RELEASE ORAL at 07:56

## 2024-01-01 RX ADMIN — MIDODRINE HYDROCHLORIDE 15 MG: 5 TABLET ORAL at 05:57

## 2024-01-01 RX ADMIN — APIXABAN 2.5 MG: 2.5 TABLET, FILM COATED ORAL at 11:18

## 2024-01-01 RX ADMIN — PANTOPRAZOLE SODIUM 40 MG: 40 TABLET, DELAYED RELEASE ORAL at 08:40

## 2024-01-01 RX ADMIN — Medication 2000 UNITS: at 16:46

## 2024-01-01 RX ADMIN — HEPARIN SODIUM 929 UNITS/HR: 10000 INJECTION, SOLUTION INTRAVENOUS at 09:03

## 2024-01-01 RX ADMIN — ACETAMINOPHEN 650 MG: 650 SOLUTION ORAL at 00:41

## 2024-01-01 RX ADMIN — OXYCODONE HYDROCHLORIDE 5 MG: 5 TABLET ORAL at 16:26

## 2024-01-01 RX ADMIN — INSULIN GLARGINE 6 UNITS: 100 INJECTION, SOLUTION SUBCUTANEOUS at 21:24

## 2024-01-01 RX ADMIN — APIXABAN 2.5 MG: 5 TABLET, FILM COATED ORAL at 12:09

## 2024-01-01 RX ADMIN — TRAMADOL HYDROCHLORIDE 50 MG: 50 TABLET, COATED ORAL at 10:53

## 2024-01-01 RX ADMIN — APIXABAN 2.5 MG: 2.5 TABLET, FILM COATED ORAL at 12:47

## 2024-01-01 RX ADMIN — HEPARIN SODIUM 929 UNITS/HR: 10000 INJECTION, SOLUTION INTRAVENOUS at 21:37

## 2024-01-01 RX ADMIN — Medication 5 MG: at 20:43

## 2024-01-01 RX ADMIN — MIDODRINE HYDROCHLORIDE 15 MG: 10 TABLET ORAL at 16:28

## 2024-01-01 RX ADMIN — PANTOPRAZOLE SODIUM 40 MG: 40 TABLET, DELAYED RELEASE ORAL at 06:36

## 2024-01-01 RX ADMIN — ALLOPURINOL 100 MG: 100 TABLET ORAL at 08:45

## 2024-01-01 RX ADMIN — POLYETHYLENE GLYCOL 3350 17 G: 17 POWDER, FOR SOLUTION ORAL at 08:56

## 2024-01-01 RX ADMIN — HEPARIN SODIUM 5000 UNITS: 5000 INJECTION INTRAVENOUS; SUBCUTANEOUS at 18:18

## 2024-01-01 RX ADMIN — APIXABAN 2.5 MG: 2.5 TABLET, FILM COATED ORAL at 21:23

## 2024-01-01 RX ADMIN — Medication 200 MCG: at 11:49

## 2024-01-01 RX ADMIN — MIDODRINE HYDROCHLORIDE 15 MG: 10 TABLET ORAL at 12:11

## 2024-01-01 RX ADMIN — GABAPENTIN 100 MG: 100 CAPSULE ORAL at 11:11

## 2024-01-01 RX ADMIN — HEPARIN SODIUM 5000 UNITS: 5000 INJECTION INTRAVENOUS; SUBCUTANEOUS at 17:16

## 2024-01-01 RX ADMIN — PANTOPRAZOLE SODIUM 40 MG: 40 TABLET, DELAYED RELEASE ORAL at 07:00

## 2024-01-01 RX ADMIN — ASPIRIN 81 MG CHEWABLE TABLET 81 MG: 81 TABLET CHEWABLE at 08:50

## 2024-01-01 RX ADMIN — ACETAMINOPHEN 650 MG: 650 SOLUTION ORAL at 20:58

## 2024-01-01 RX ADMIN — INSULIN GLARGINE 6 UNITS: 100 INJECTION, SOLUTION SUBCUTANEOUS at 21:59

## 2024-01-01 RX ADMIN — SODIUM CHLORIDE, POTASSIUM CHLORIDE, SODIUM LACTATE AND CALCIUM CHLORIDE: 600; 310; 30; 20 INJECTION, SOLUTION INTRAVENOUS at 12:41

## 2024-01-01 RX ADMIN — HEPARIN SODIUM 2600 UNITS: 1000 INJECTION, SOLUTION INTRAVENOUS; SUBCUTANEOUS at 11:30

## 2024-01-01 RX ADMIN — MIDODRINE HYDROCHLORIDE 20 MG: 10 TABLET ORAL at 11:00

## 2024-01-01 RX ADMIN — CEFEPIME 1 G: 1 INJECTION, POWDER, FOR SOLUTION INTRAMUSCULAR; INTRAVENOUS at 01:00

## 2024-01-01 RX ADMIN — CLOPIDOGREL BISULFATE 75 MG: 75 TABLET ORAL at 08:54

## 2024-01-01 RX ADMIN — ACETAMINOPHEN 975 MG: 325 TABLET ORAL at 23:59

## 2024-01-01 RX ADMIN — ATORVASTATIN CALCIUM 40 MG: 40 TABLET, FILM COATED ORAL at 20:29

## 2024-01-01 RX ADMIN — PANTOPRAZOLE SODIUM 40 MG: 40 TABLET, DELAYED RELEASE ORAL at 11:55

## 2024-01-01 RX ADMIN — ACETAMINOPHEN 650 MG: 325 TABLET ORAL at 08:41

## 2024-01-01 RX ADMIN — CLOPIDOGREL BISULFATE 75 MG: 75 TABLET ORAL at 09:34

## 2024-01-01 RX ADMIN — NEPHROCAP 1 CAPSULE: 1 CAP ORAL at 08:49

## 2024-01-01 RX ADMIN — PIPERACILLIN SODIUM AND TAZOBACTAM SODIUM 2.25 G: 2; .25 INJECTION, SOLUTION INTRAVENOUS at 18:11

## 2024-01-01 RX ADMIN — MIDODRINE HYDROCHLORIDE 15 MG: 10 TABLET ORAL at 00:37

## 2024-01-01 RX ADMIN — ATORVASTATIN CALCIUM 40 MG: 40 TABLET, FILM COATED ORAL at 20:15

## 2024-01-01 RX ADMIN — INSULIN LISPRO 1 UNITS: 100 INJECTION, SOLUTION INTRAVENOUS; SUBCUTANEOUS at 17:00

## 2024-01-01 RX ADMIN — Medication 5 MG: at 22:03

## 2024-01-01 RX ADMIN — RENO CAPS 1 CAPSULE: 100; 1.5; 1.7; 20; 10; 1; 150; 5; 6 CAPSULE ORAL at 11:11

## 2024-01-01 RX ADMIN — Medication 5 MG: at 20:15

## 2024-01-01 RX ADMIN — INSULIN GLARGINE 12 UNITS: 100 INJECTION, SOLUTION SUBCUTANEOUS at 23:07

## 2024-01-01 RX ADMIN — Medication 3 L/MIN: at 09:00

## 2024-01-01 RX ADMIN — PIPERACILLIN SODIUM AND TAZOBACTAM SODIUM 2.25 G: 2; .25 INJECTION, SOLUTION INTRAVENOUS at 06:06

## 2024-01-01 RX ADMIN — MIDODRINE HYDROCHLORIDE 20 MG: 10 TABLET ORAL at 16:32

## 2024-01-01 RX ADMIN — INSULIN GLARGINE 6 UNITS: 100 INJECTION, SOLUTION SUBCUTANEOUS at 21:16

## 2024-01-01 RX ADMIN — GABAPENTIN 100 MG: 100 CAPSULE ORAL at 09:13

## 2024-01-01 RX ADMIN — ATORVASTATIN CALCIUM 40 MG: 40 TABLET, FILM COATED ORAL at 20:26

## 2024-01-01 RX ADMIN — SENNOSIDES AND DOCUSATE SODIUM 2 TABLET: 8.6; 5 TABLET ORAL at 20:20

## 2024-01-01 RX ADMIN — Medication 150 MCG: at 11:57

## 2024-01-01 RX ADMIN — MIDODRINE HYDROCHLORIDE 20 MG: 10 TABLET ORAL at 23:43

## 2024-01-01 RX ADMIN — Medication 5 MG: at 21:40

## 2024-01-01 RX ADMIN — SEVELAMER CARBONATE 800 MG: 800 TABLET, FILM COATED ORAL at 14:00

## 2024-01-01 RX ADMIN — ACETAMINOPHEN 975 MG: 325 TABLET ORAL at 10:42

## 2024-01-01 RX ADMIN — ACETAMINOPHEN 975 MG: 325 TABLET ORAL at 23:19

## 2024-01-01 RX ADMIN — OXYCODONE AND ACETAMINOPHEN 1 TABLET: 5; 325 TABLET ORAL at 02:23

## 2024-01-01 RX ADMIN — MIDODRINE HYDROCHLORIDE 20 MG: 10 TABLET ORAL at 08:05

## 2024-01-01 RX ADMIN — GABAPENTIN 100 MG: 100 CAPSULE ORAL at 09:47

## 2024-01-01 RX ADMIN — HYDROMORPHONE HYDROCHLORIDE 0.2 MG: 1 INJECTION, SOLUTION INTRAMUSCULAR; INTRAVENOUS; SUBCUTANEOUS at 21:36

## 2024-01-01 RX ADMIN — MIDODRINE HYDROCHLORIDE 15 MG: 10 TABLET ORAL at 15:03

## 2024-01-01 RX ADMIN — HEPARIN SODIUM 2000 UNITS: 1000 INJECTION INTRAVENOUS; SUBCUTANEOUS at 10:15

## 2024-01-01 RX ADMIN — MIDODRINE HYDROCHLORIDE 10 MG: 10 TABLET ORAL at 21:20

## 2024-01-01 RX ADMIN — CLOPIDOGREL BISULFATE 75 MG: 75 TABLET ORAL at 13:17

## 2024-01-01 RX ADMIN — MIDODRINE HYDROCHLORIDE 15 MG: 10 TABLET ORAL at 16:23

## 2024-01-01 RX ADMIN — CLOPIDOGREL BISULFATE 75 MG: 75 TABLET, FILM COATED ORAL at 09:48

## 2024-01-01 RX ADMIN — POLYETHYLENE GLYCOL 3350 17 G: 17 POWDER, FOR SOLUTION ORAL at 12:10

## 2024-01-01 RX ADMIN — NEPHROCAP 1 CAPSULE: 1 CAP ORAL at 08:45

## 2024-01-01 RX ADMIN — PANTOPRAZOLE SODIUM 40 MG: 40 TABLET, DELAYED RELEASE ORAL at 06:18

## 2024-01-01 RX ADMIN — TRAMADOL HYDROCHLORIDE 50 MG: 50 TABLET, COATED ORAL at 15:01

## 2024-01-01 RX ADMIN — CARBOXYMETHYLCELLULOSE SODIUM 2 DROP: 5 SOLUTION/ DROPS OPHTHALMIC at 17:15

## 2024-01-01 RX ADMIN — APIXABAN 2.5 MG: 2.5 TABLET, FILM COATED ORAL at 15:44

## 2024-01-01 RX ADMIN — DEXAMETHASONE 6 MG: 6 TABLET ORAL at 09:05

## 2024-01-01 RX ADMIN — CARBOXYMETHYLCELLULOSE SODIUM 2 DROP: 5 SOLUTION/ DROPS OPHTHALMIC at 15:00

## 2024-01-01 RX ADMIN — CLOPIDOGREL BISULFATE 75 MG: 75 TABLET ORAL at 09:20

## 2024-01-01 RX ADMIN — SENNOSIDES 8.6 MG: 8.6 TABLET, FILM COATED ORAL at 20:43

## 2024-01-01 RX ADMIN — TRAMADOL HYDROCHLORIDE 50 MG: 50 TABLET, COATED ORAL at 08:05

## 2024-01-01 RX ADMIN — HEPARIN SODIUM 5000 UNITS: 5000 INJECTION INTRAVENOUS; SUBCUTANEOUS at 00:29

## 2024-01-01 RX ADMIN — CARBOXYMETHYLCELLULOSE SODIUM 2 DROP: 5 SOLUTION/ DROPS OPHTHALMIC at 17:45

## 2024-01-01 RX ADMIN — RENO CAPS 1 CAPSULE: 100; 1.5; 1.7; 20; 10; 1; 150; 5; 6 CAPSULE ORAL at 08:40

## 2024-01-01 RX ADMIN — CLOPIDOGREL BISULFATE 75 MG: 75 TABLET, FILM COATED ORAL at 09:15

## 2024-01-01 RX ADMIN — OXYCODONE HYDROCHLORIDE 2.5 MG: 5 TABLET ORAL at 17:12

## 2024-01-01 RX ADMIN — PANTOPRAZOLE SODIUM 40 MG: 40 INJECTION, POWDER, FOR SOLUTION INTRAVENOUS at 08:25

## 2024-01-01 RX ADMIN — ATORVASTATIN CALCIUM 40 MG: 40 TABLET, FILM COATED ORAL at 20:57

## 2024-01-01 RX ADMIN — SUGAMMADEX 200 MG: 100 INJECTION, SOLUTION INTRAVENOUS at 13:57

## 2024-01-01 RX ADMIN — ALLOPURINOL 100 MG: 100 TABLET ORAL at 11:00

## 2024-01-01 RX ADMIN — CARBOXYMETHYLCELLULOSE SODIUM 2 DROP: 5 SOLUTION/ DROPS OPHTHALMIC at 20:44

## 2024-01-01 RX ADMIN — POLYETHYLENE GLYCOL 3350 17 G: 17 POWDER, FOR SOLUTION ORAL at 11:52

## 2024-01-01 RX ADMIN — OXYCODONE HYDROCHLORIDE 5 MG: 5 TABLET ORAL at 06:50

## 2024-01-01 RX ADMIN — POTASSIUM PHOSPHATE, MONOBASIC AND POTASSIUM PHOSPHATE, DIBASIC 15 MMOL: 224; 236 INJECTION, SOLUTION, CONCENTRATE INTRAVENOUS at 13:20

## 2024-01-01 RX ADMIN — CARBOXYMETHYLCELLULOSE SODIUM 2 DROP: 5 SOLUTION/ DROPS OPHTHALMIC at 20:43

## 2024-01-01 RX ADMIN — NOREPINEPHRINE BITARTRATE 0.03 MCG/KG/MIN: 8 INJECTION, SOLUTION INTRAVENOUS at 21:11

## 2024-01-01 RX ADMIN — EPINEPHRINE 1 MG: 0.1 INJECTION, SOLUTION ENDOTRACHEAL; INTRACARDIAC; INTRAVENOUS at 18:58

## 2024-01-01 RX ADMIN — HEPARIN SODIUM 5000 UNITS: 5000 INJECTION INTRAVENOUS; SUBCUTANEOUS at 18:29

## 2024-01-01 RX ADMIN — FENTANYL CITRATE 25 MCG: 50 INJECTION, SOLUTION INTRAMUSCULAR; INTRAVENOUS at 16:22

## 2024-01-01 RX ADMIN — HEPARIN SODIUM 929 UNITS/HR: 10000 INJECTION, SOLUTION INTRAVENOUS at 20:51

## 2024-01-01 RX ADMIN — MIDODRINE HYDROCHLORIDE 20 MG: 10 TABLET ORAL at 05:27

## 2024-01-01 RX ADMIN — APIXABAN 2.5 MG: 5 TABLET, FILM COATED ORAL at 23:36

## 2024-01-01 RX ADMIN — ALLOPURINOL 100 MG: 100 TABLET ORAL at 09:03

## 2024-01-01 RX ADMIN — LIDOCAINE 1 PATCH: 4 PATCH TOPICAL at 08:11

## 2024-01-01 RX ADMIN — OXYCODONE HYDROCHLORIDE 5 MG: 5 TABLET ORAL at 19:19

## 2024-01-01 RX ADMIN — TRAMADOL HYDROCHLORIDE 50 MG: 50 TABLET, COATED ORAL at 21:16

## 2024-01-01 RX ADMIN — SODIUM CHLORIDE, POTASSIUM CHLORIDE, SODIUM LACTATE AND CALCIUM CHLORIDE: 600; 310; 30; 20 INJECTION, SOLUTION INTRAVENOUS at 12:43

## 2024-01-01 RX ADMIN — GABAPENTIN 100 MG: 100 CAPSULE ORAL at 09:16

## 2024-01-01 RX ADMIN — MUPIROCIN: 20 OINTMENT TOPICAL at 09:03

## 2024-01-01 RX ADMIN — ACETAMINOPHEN 650 MG: 650 SOLUTION ORAL at 11:45

## 2024-01-01 RX ADMIN — GABAPENTIN 100 MG: 100 CAPSULE ORAL at 11:00

## 2024-01-01 RX ADMIN — APIXABAN 2.5 MG: 5 TABLET, FILM COATED ORAL at 13:45

## 2024-01-01 RX ADMIN — SENNOSIDES AND DOCUSATE SODIUM 2 TABLET: 8.6; 5 TABLET ORAL at 09:09

## 2024-01-01 RX ADMIN — APIXABAN 2.5 MG: 5 TABLET, FILM COATED ORAL at 11:11

## 2024-01-01 RX ADMIN — PANTOPRAZOLE SODIUM 40 MG: 40 TABLET, DELAYED RELEASE ORAL at 12:38

## 2024-01-01 RX ADMIN — KETOTIFEN FUMARATE 1 DROP: 0.35 SOLUTION/ DROPS OPHTHALMIC at 20:26

## 2024-01-01 RX ADMIN — ALTEPLASE 2 MG: 2.2 INJECTION, POWDER, LYOPHILIZED, FOR SOLUTION INTRAVENOUS at 07:15

## 2024-01-01 RX ADMIN — PIPERACILLIN SODIUM AND TAZOBACTAM SODIUM 2.25 G: 2; .25 INJECTION, SOLUTION INTRAVENOUS at 00:48

## 2024-01-01 RX ADMIN — PANTOPRAZOLE SODIUM 40 MG: 40 TABLET, DELAYED RELEASE ORAL at 09:05

## 2024-01-01 RX ADMIN — APIXABAN 2.5 MG: 5 TABLET, FILM COATED ORAL at 22:03

## 2024-01-01 RX ADMIN — ATORVASTATIN CALCIUM 40 MG: 40 TABLET, FILM COATED ORAL at 20:40

## 2024-01-01 RX ADMIN — MIDODRINE HYDROCHLORIDE 15 MG: 10 TABLET ORAL at 21:23

## 2024-01-01 RX ADMIN — OXYCODONE HYDROCHLORIDE 5 MG: 5 TABLET ORAL at 20:05

## 2024-01-01 RX ADMIN — APIXABAN 2.5 MG: 5 TABLET, FILM COATED ORAL at 08:00

## 2024-01-01 RX ADMIN — INSULIN GLARGINE 6 UNITS: 100 INJECTION, SOLUTION SUBCUTANEOUS at 20:57

## 2024-01-01 RX ADMIN — PANTOPRAZOLE SODIUM 40 MG: 40 TABLET, DELAYED RELEASE ORAL at 09:56

## 2024-01-01 RX ADMIN — ATORVASTATIN CALCIUM 40 MG: 40 TABLET, FILM COATED ORAL at 20:50

## 2024-01-01 RX ADMIN — DEXTROSE MONOHYDRATE 25 G: 25 INJECTION, SOLUTION INTRAVENOUS at 11:44

## 2024-01-01 RX ADMIN — TRAMADOL HYDROCHLORIDE 50 MG: 50 TABLET, COATED ORAL at 08:19

## 2024-01-01 RX ADMIN — CARBOXYMETHYLCELLULOSE SODIUM 2 DROP: 5 SOLUTION/ DROPS OPHTHALMIC at 21:54

## 2024-01-01 RX ADMIN — ACETAMINOPHEN 975 MG: 325 TABLET ORAL at 05:25

## 2024-01-01 RX ADMIN — LIDOCAINE 1 PATCH: 4 PATCH TOPICAL at 10:29

## 2024-01-01 RX ADMIN — APIXABAN 2.5 MG: 5 TABLET, FILM COATED ORAL at 10:01

## 2024-01-01 RX ADMIN — ACETAMINOPHEN 975 MG: 325 TABLET ORAL at 05:36

## 2024-01-01 RX ADMIN — INSULIN GLARGINE 6 UNITS: 100 INJECTION, SOLUTION SUBCUTANEOUS at 20:50

## 2024-01-01 RX ADMIN — IOHEXOL 15 ML: 350 INJECTION, SOLUTION INTRAVENOUS at 16:25

## 2024-01-01 RX ADMIN — ATORVASTATIN CALCIUM 40 MG: 40 TABLET, FILM COATED ORAL at 21:47

## 2024-01-01 RX ADMIN — NEPHROCAP 1 CAPSULE: 1 CAP ORAL at 08:34

## 2024-01-01 RX ADMIN — EPINEPHRINE 1 MG: 0.1 INJECTION, SOLUTION ENDOTRACHEAL; INTRACARDIAC; INTRAVENOUS at 19:02

## 2024-01-01 RX ADMIN — INSULIN LISPRO 1 UNITS: 100 INJECTION, SOLUTION INTRAVENOUS; SUBCUTANEOUS at 09:36

## 2024-01-01 RX ADMIN — PANTOPRAZOLE SODIUM 40 MG: 40 TABLET, DELAYED RELEASE ORAL at 09:02

## 2024-01-01 RX ADMIN — MUPIROCIN: 20 OINTMENT TOPICAL at 21:02

## 2024-01-01 RX ADMIN — ACETAMINOPHEN 975 MG: 325 TABLET ORAL at 08:16

## 2024-01-01 RX ADMIN — METOPROLOL SUCCINATE 25 MG: 25 TABLET, FILM COATED, EXTENDED RELEASE ORAL at 08:40

## 2024-01-01 RX ADMIN — ATORVASTATIN CALCIUM 40 MG: 40 TABLET, FILM COATED ORAL at 20:06

## 2024-01-01 RX ADMIN — MIDODRINE HYDROCHLORIDE 15 MG: 5 TABLET ORAL at 05:59

## 2024-01-01 RX ADMIN — MIDODRINE HYDROCHLORIDE 15 MG: 10 TABLET ORAL at 07:30

## 2024-01-01 RX ADMIN — POLYETHYLENE GLYCOL (3350) 17 G: 17 POWDER, FOR SOLUTION ORAL at 08:49

## 2024-01-01 RX ADMIN — GABAPENTIN 100 MG: 100 CAPSULE ORAL at 09:00

## 2024-01-01 RX ADMIN — PIPERACILLIN SODIUM AND TAZOBACTAM SODIUM 2.25 G: 2; .25 INJECTION, SOLUTION INTRAVENOUS at 17:53

## 2024-01-01 RX ADMIN — ROCURONIUM BROMIDE 30 MG: 10 INJECTION, SOLUTION INTRAVENOUS at 11:50

## 2024-01-01 RX ADMIN — CLOPIDOGREL 75 MG: 75 TABLET ORAL at 08:00

## 2024-01-01 RX ADMIN — POTASSIUM CHLORIDE 40 MEQ: 1.5 POWDER, FOR SOLUTION ORAL at 18:32

## 2024-01-01 RX ADMIN — POLYETHYLENE GLYCOL 3350 17 G: 17 POWDER, FOR SOLUTION ORAL at 08:00

## 2024-01-01 RX ADMIN — PANTOPRAZOLE SODIUM 40 MG: 40 TABLET, DELAYED RELEASE ORAL at 08:51

## 2024-01-01 RX ADMIN — APIXABAN 2.5 MG: 2.5 TABLET, FILM COATED ORAL at 10:44

## 2024-01-01 RX ADMIN — CARBOXYMETHYLCELLULOSE SODIUM 2 DROP: 5 SOLUTION/ DROPS OPHTHALMIC at 16:14

## 2024-01-01 RX ADMIN — KETOTIFEN FUMARATE 1 DROP: 0.35 SOLUTION/ DROPS OPHTHALMIC at 08:49

## 2024-01-01 RX ADMIN — SEVELAMER CARBONATE 800 MG: 800 TABLET, FILM COATED ORAL at 17:00

## 2024-01-01 RX ADMIN — EPOETIN ALFA-EPBX 6000 UNITS: 10000 INJECTION, SOLUTION INTRAVENOUS; SUBCUTANEOUS at 17:57

## 2024-01-01 RX ADMIN — Medication 120 MCG: at 10:37

## 2024-01-01 RX ADMIN — NEPHROCAP 1 CAPSULE: 1 CAP ORAL at 09:00

## 2024-01-01 RX ADMIN — CLOPIDOGREL BISULFATE 75 MG: 75 TABLET, FILM COATED ORAL at 08:33

## 2024-01-01 RX ADMIN — Medication 5 MG: at 19:40

## 2024-01-01 RX ADMIN — ROCURONIUM BROMIDE 50 MG: 10 INJECTION, SOLUTION INTRAVENOUS at 11:21

## 2024-01-01 RX ADMIN — OXYCODONE AND ACETAMINOPHEN 1 TABLET: 5; 325 TABLET ORAL at 17:17

## 2024-01-01 RX ADMIN — ALLOPURINOL 100 MG: 100 TABLET ORAL at 09:00

## 2024-01-01 RX ADMIN — ATORVASTATIN CALCIUM 40 MG: 40 TABLET, FILM COATED ORAL at 20:18

## 2024-01-01 RX ADMIN — MIDODRINE HYDROCHLORIDE 20 MG: 10 TABLET ORAL at 16:52

## 2024-01-01 RX ADMIN — GABAPENTIN 100 MG: 100 CAPSULE ORAL at 12:38

## 2024-01-01 RX ADMIN — GABAPENTIN 100 MG: 100 CAPSULE ORAL at 10:46

## 2024-01-01 RX ADMIN — DEXAMETHASONE 6 MG: 6 TABLET ORAL at 08:50

## 2024-01-01 RX ADMIN — CLOPIDOGREL BISULFATE 75 MG: 75 TABLET ORAL at 12:38

## 2024-01-01 RX ADMIN — SENNOSIDES AND DOCUSATE SODIUM 2 TABLET: 8.6; 5 TABLET ORAL at 21:27

## 2024-01-01 RX ADMIN — HEPARIN SODIUM 5000 UNITS: 5000 INJECTION INTRAVENOUS; SUBCUTANEOUS at 17:55

## 2024-01-01 RX ADMIN — APIXABAN 2.5 MG: 2.5 TABLET, FILM COATED ORAL at 05:30

## 2024-01-01 RX ADMIN — EPOETIN ALFA 10000 UNITS: 10000 SOLUTION INTRAVENOUS; SUBCUTANEOUS at 16:32

## 2024-01-01 RX ADMIN — ATORVASTATIN CALCIUM 40 MG: 40 TABLET, FILM COATED ORAL at 20:16

## 2024-01-01 RX ADMIN — RENO CAPS 1 CAPSULE: 100; 1.5; 1.7; 20; 10; 1; 150; 5; 6 CAPSULE ORAL at 08:17

## 2024-01-01 RX ADMIN — NEPHROCAP 1 CAPSULE: 1 CAP ORAL at 09:47

## 2024-01-01 RX ADMIN — HEPARIN SODIUM 929 UNITS/HR: 10000 INJECTION, SOLUTION INTRAVENOUS at 05:22

## 2024-01-01 RX ADMIN — HEPARIN SODIUM 5000 UNITS: 5000 INJECTION INTRAVENOUS; SUBCUTANEOUS at 08:43

## 2024-01-01 RX ADMIN — CLOPIDOGREL BISULFATE 75 MG: 75 TABLET, FILM COATED ORAL at 08:44

## 2024-01-01 RX ADMIN — ATORVASTATIN CALCIUM 40 MG: 40 TABLET, FILM COATED ORAL at 23:55

## 2024-01-01 RX ADMIN — HEPARIN SODIUM 5000 UNITS: 5000 INJECTION INTRAVENOUS; SUBCUTANEOUS at 17:18

## 2024-01-01 RX ADMIN — SENNOSIDES 8.6 MG: 8.6 TABLET, FILM COATED ORAL at 21:29

## 2024-01-01 RX ADMIN — CEFAZOLIN 2 G: 1 INJECTION, POWDER, FOR SOLUTION INTRAMUSCULAR; INTRAVENOUS at 12:44

## 2024-01-01 RX ADMIN — APIXABAN 2.5 MG: 5 TABLET, FILM COATED ORAL at 12:55

## 2024-01-01 RX ADMIN — SUGAMMADEX 200 MG: 100 INJECTION, SOLUTION INTRAVENOUS at 13:46

## 2024-01-01 RX ADMIN — NEPHROCAP 1 CAPSULE: 1 CAP ORAL at 09:23

## 2024-01-01 RX ADMIN — ATORVASTATIN CALCIUM 40 MG: 40 TABLET, FILM COATED ORAL at 20:43

## 2024-01-01 RX ADMIN — MIDODRINE HYDROCHLORIDE 15 MG: 10 TABLET ORAL at 10:41

## 2024-01-01 RX ADMIN — MIDODRINE HYDROCHLORIDE 20 MG: 10 TABLET ORAL at 12:00

## 2024-01-01 RX ADMIN — APIXABAN 2.5 MG: 2.5 TABLET, FILM COATED ORAL at 10:30

## 2024-01-01 RX ADMIN — GABAPENTIN 100 MG: 100 CAPSULE ORAL at 23:03

## 2024-01-01 RX ADMIN — ACETAMINOPHEN 975 MG: 325 TABLET ORAL at 11:33

## 2024-01-01 RX ADMIN — OXYCODONE HYDROCHLORIDE 2.5 MG: 5 TABLET ORAL at 14:50

## 2024-01-01 RX ADMIN — ALLOPURINOL 100 MG: 100 TABLET ORAL at 10:08

## 2024-01-01 RX ADMIN — HEPARIN SODIUM 2000 UNITS: 1000 INJECTION INTRAVENOUS; SUBCUTANEOUS at 16:25

## 2024-01-01 RX ADMIN — IPRATROPIUM BROMIDE AND ALBUTEROL SULFATE 3 ML: .5; 3 SOLUTION RESPIRATORY (INHALATION) at 20:20

## 2024-01-01 RX ADMIN — ACETAMINOPHEN 975 MG: 325 TABLET ORAL at 13:54

## 2024-01-01 RX ADMIN — SODIUM CHLORIDE 1000 ML: 0.9 INJECTION, SOLUTION INTRAVENOUS at 15:40

## 2024-01-01 RX ADMIN — MIDODRINE HYDROCHLORIDE 20 MG: 10 TABLET ORAL at 20:47

## 2024-01-01 RX ADMIN — MIDODRINE HYDROCHLORIDE 15 MG: 5 TABLET ORAL at 16:13

## 2024-01-01 RX ADMIN — LIDOCAINE HYDROCHLORIDE 100 MG: 20 INJECTION, SOLUTION INFILTRATION; PERINEURAL at 11:21

## 2024-01-01 RX ADMIN — ACETAMINOPHEN 975 MG: 325 TABLET ORAL at 10:27

## 2024-01-01 RX ADMIN — INSULIN GLARGINE 6 UNITS: 100 INJECTION, SOLUTION SUBCUTANEOUS at 21:11

## 2024-01-01 RX ADMIN — PANTOPRAZOLE SODIUM 40 MG: 40 TABLET, DELAYED RELEASE ORAL at 09:19

## 2024-01-01 RX ADMIN — DAKIN'S SOLUTION 0.125% (QUARTER STRENGTH): 0.12 SOLUTION at 11:32

## 2024-01-01 RX ADMIN — Medication 120 MCG: at 11:07

## 2024-01-01 RX ADMIN — ALLOPURINOL 100 MG: 100 TABLET ORAL at 09:15

## 2024-01-01 RX ADMIN — PIPERACILLIN SODIUM AND TAZOBACTAM SODIUM 2.25 G: 2; .25 INJECTION, SOLUTION INTRAVENOUS at 17:08

## 2024-01-01 RX ADMIN — PIPERACILLIN SODIUM AND TAZOBACTAM SODIUM 2.25 G: 2; .25 INJECTION, SOLUTION INTRAVENOUS at 05:56

## 2024-01-01 RX ADMIN — GABAPENTIN 100 MG: 100 CAPSULE ORAL at 08:44

## 2024-01-01 RX ADMIN — SENNOSIDES AND DOCUSATE SODIUM 2 TABLET: 8.6; 5 TABLET ORAL at 09:20

## 2024-01-01 RX ADMIN — SENNOSIDES 8.6 MG: 8.6 TABLET, FILM COATED ORAL at 20:07

## 2024-01-01 RX ADMIN — NEPHROCAP 1 CAPSULE: 1 CAP ORAL at 08:33

## 2024-01-01 RX ADMIN — ACETAMINOPHEN 975 MG: 325 TABLET ORAL at 16:56

## 2024-01-01 RX ADMIN — CLOPIDOGREL 75 MG: 75 TABLET ORAL at 10:01

## 2024-01-01 RX ADMIN — CALCIUM CHLORIDE 1 G: 100 INJECTION, SOLUTION INTRAVENOUS at 19:00

## 2024-01-01 RX ADMIN — HEPARIN SODIUM 5000 UNITS: 5000 INJECTION INTRAVENOUS; SUBCUTANEOUS at 17:15

## 2024-01-01 RX ADMIN — ATORVASTATIN CALCIUM 40 MG: 40 TABLET, FILM COATED ORAL at 21:36

## 2024-01-01 RX ADMIN — MIDODRINE HYDROCHLORIDE 15 MG: 10 TABLET ORAL at 16:22

## 2024-01-01 RX ADMIN — MUPIROCIN: 20 OINTMENT TOPICAL at 08:27

## 2024-01-01 RX ADMIN — NEPHROCAP 1 CAPSULE: 1 CAP ORAL at 11:18

## 2024-01-01 RX ADMIN — CLOPIDOGREL BISULFATE 75 MG: 75 TABLET ORAL at 15:43

## 2024-01-01 RX ADMIN — MIDODRINE HYDROCHLORIDE 15 MG: 10 TABLET ORAL at 15:11

## 2024-01-01 RX ADMIN — PANTOPRAZOLE SODIUM 40 MG: 40 TABLET, DELAYED RELEASE ORAL at 06:02

## 2024-01-01 RX ADMIN — CARBOXYMETHYLCELLULOSE SODIUM 2 DROP: 5 SOLUTION/ DROPS OPHTHALMIC at 21:39

## 2024-01-01 RX ADMIN — GABAPENTIN 100 MG: 100 CAPSULE ORAL at 19:33

## 2024-01-01 RX ADMIN — LIDOCAINE 1 PATCH: 4 PATCH TOPICAL at 09:31

## 2024-01-01 RX ADMIN — OXYCODONE HYDROCHLORIDE 5 MG: 5 TABLET ORAL at 14:39

## 2024-01-01 RX ADMIN — SODIUM HYPOCHLORITE: 2.5 SOLUTION TOPICAL at 09:00

## 2024-01-01 RX ADMIN — CARBOXYMETHYLCELLULOSE SODIUM 2 DROP: 5 SOLUTION/ DROPS OPHTHALMIC at 15:41

## 2024-01-01 RX ADMIN — LIDOCAINE 1 PATCH: 4 PATCH TOPICAL at 11:20

## 2024-01-01 RX ADMIN — EPOETIN ALFA 10000 UNITS: 10000 SOLUTION INTRAVENOUS; SUBCUTANEOUS at 21:33

## 2024-01-01 RX ADMIN — MIDODRINE HYDROCHLORIDE 15 MG: 10 TABLET ORAL at 01:55

## 2024-01-01 RX ADMIN — MIDODRINE HYDROCHLORIDE 15 MG: 10 TABLET ORAL at 16:00

## 2024-01-01 RX ADMIN — HEPARIN SODIUM 22000 UNITS: 20000 INJECTION, SOLUTION INTRAVENOUS; SUBCUTANEOUS at 19:41

## 2024-01-01 RX ADMIN — RENO CAPS 1 CAPSULE: 100; 1.5; 1.7; 20; 10; 1; 150; 5; 6 CAPSULE ORAL at 09:04

## 2024-01-01 RX ADMIN — MIDODRINE HYDROCHLORIDE 15 MG: 10 TABLET ORAL at 08:02

## 2024-01-01 RX ADMIN — MIDODRINE HYDROCHLORIDE 20 MG: 10 TABLET ORAL at 12:35

## 2024-01-01 RX ADMIN — MIDODRINE HYDROCHLORIDE 15 MG: 10 TABLET ORAL at 00:26

## 2024-01-01 RX ADMIN — Medication 160 MCG: at 11:29

## 2024-01-01 RX ADMIN — MUPIROCIN: 20 OINTMENT TOPICAL at 02:23

## 2024-01-01 RX ADMIN — CLOPIDOGREL 75 MG: 75 TABLET ORAL at 09:02

## 2024-01-01 RX ADMIN — Medication 5 MG: at 23:55

## 2024-01-01 RX ADMIN — MIDODRINE HYDROCHLORIDE 15 MG: 10 TABLET ORAL at 18:42

## 2024-01-01 RX ADMIN — ALLOPURINOL 100 MG: 100 TABLET ORAL at 08:09

## 2024-01-01 RX ADMIN — PANTOPRAZOLE SODIUM 40 MG: 40 TABLET, DELAYED RELEASE ORAL at 06:10

## 2024-01-01 RX ADMIN — CARBOXYMETHYLCELLULOSE SODIUM 2 DROP: 5 SOLUTION/ DROPS OPHTHALMIC at 09:10

## 2024-01-01 RX ADMIN — SODIUM HYPOCHLORITE: 2.5 SOLUTION TOPICAL at 05:50

## 2024-01-01 RX ADMIN — SENNOSIDES AND DOCUSATE SODIUM 2 TABLET: 8.6; 5 TABLET ORAL at 21:42

## 2024-01-01 RX ADMIN — ACETAMINOPHEN 975 MG: 325 TABLET ORAL at 18:29

## 2024-01-01 RX ADMIN — MAGNESIUM SULFATE HEPTAHYDRATE 2 G: 40 INJECTION, SOLUTION INTRAVENOUS at 11:11

## 2024-01-01 RX ADMIN — GABAPENTIN 100 MG: 100 CAPSULE ORAL at 10:30

## 2024-01-01 RX ADMIN — APIXABAN 2.5 MG: 2.5 TABLET, FILM COATED ORAL at 21:54

## 2024-01-01 RX ADMIN — PIPERACILLIN SODIUM AND TAZOBACTAM SODIUM 2.25 G: 2; .25 INJECTION, SOLUTION INTRAVENOUS at 05:55

## 2024-01-01 RX ADMIN — ATORVASTATIN CALCIUM 40 MG: 40 TABLET, FILM COATED ORAL at 20:47

## 2024-01-01 RX ADMIN — Medication 5 MG: at 20:35

## 2024-01-01 RX ADMIN — APIXABAN 2.5 MG: 5 TABLET, FILM COATED ORAL at 22:56

## 2024-01-01 RX ADMIN — METRONIDAZOLE 500 MG: 500 INJECTION, SOLUTION INTRAVENOUS at 10:20

## 2024-01-01 RX ADMIN — CLOPIDOGREL BISULFATE 75 MG: 75 TABLET, FILM COATED ORAL at 08:25

## 2024-01-01 RX ADMIN — ACETAMINOPHEN 975 MG: 325 TABLET ORAL at 17:15

## 2024-01-01 RX ADMIN — ATORVASTATIN CALCIUM 40 MG: 40 TABLET, FILM COATED ORAL at 23:03

## 2024-01-01 RX ADMIN — ALLOPURINOL 100 MG: 100 TABLET ORAL at 08:40

## 2024-01-01 RX ADMIN — METOPROLOL SUCCINATE 25 MG: 25 TABLET, FILM COATED, EXTENDED RELEASE ORAL at 09:23

## 2024-01-01 RX ADMIN — APIXABAN 2.5 MG: 5 TABLET, FILM COATED ORAL at 09:30

## 2024-01-01 RX ADMIN — ALLOPURINOL 100 MG: 100 TABLET ORAL at 08:06

## 2024-01-01 RX ADMIN — APIXABAN 2.5 MG: 5 TABLET, FILM COATED ORAL at 22:12

## 2024-01-01 RX ADMIN — INSULIN GLARGINE 6 UNITS: 100 INJECTION, SOLUTION SUBCUTANEOUS at 22:12

## 2024-01-01 RX ADMIN — MIDODRINE HYDROCHLORIDE 15 MG: 10 TABLET ORAL at 16:48

## 2024-01-01 RX ADMIN — NEPHROCAP 1 CAPSULE: 1 CAP ORAL at 09:10

## 2024-01-01 RX ADMIN — INSULIN LISPRO 1 UNITS: 100 INJECTION, SOLUTION INTRAVENOUS; SUBCUTANEOUS at 09:20

## 2024-01-01 RX ADMIN — POTASSIUM CHLORIDE 20 MEQ: 1.5 POWDER, FOR SOLUTION ORAL at 16:12

## 2024-01-01 RX ADMIN — CARBOXYMETHYLCELLULOSE SODIUM 2 DROP: 5 SOLUTION/ DROPS OPHTHALMIC at 17:02

## 2024-01-01 RX ADMIN — ACETAMINOPHEN 975 MG: 325 TABLET ORAL at 17:09

## 2024-01-01 RX ADMIN — MIDODRINE HYDROCHLORIDE 20 MG: 10 TABLET ORAL at 12:34

## 2024-01-01 RX ADMIN — PANTOPRAZOLE SODIUM 40 MG: 40 TABLET, DELAYED RELEASE ORAL at 06:00

## 2024-01-01 RX ADMIN — ALLOPURINOL 100 MG: 100 TABLET ORAL at 12:47

## 2024-01-01 RX ADMIN — OXYCODONE HYDROCHLORIDE 5 MG: 5 TABLET ORAL at 22:42

## 2024-01-01 RX ADMIN — POLYETHYLENE GLYCOL (3350) 17 G: 17 POWDER, FOR SOLUTION ORAL at 09:39

## 2024-01-01 RX ADMIN — HEPARIN SODIUM 5000 UNITS: 5000 INJECTION INTRAVENOUS; SUBCUTANEOUS at 10:28

## 2024-01-01 RX ADMIN — ATORVASTATIN CALCIUM 40 MG: 40 TABLET, FILM COATED ORAL at 20:51

## 2024-01-01 RX ADMIN — ALLOPURINOL 100 MG: 100 TABLET ORAL at 08:17

## 2024-01-01 RX ADMIN — INSULIN LISPRO 1 UNITS: 100 INJECTION, SOLUTION INTRAVENOUS; SUBCUTANEOUS at 16:16

## 2024-01-01 RX ADMIN — CLOPIDOGREL BISULFATE 75 MG: 75 TABLET ORAL at 11:11

## 2024-01-01 RX ADMIN — BENZONATATE 200 MG: 100 CAPSULE ORAL at 08:16

## 2024-01-01 RX ADMIN — MUPIROCIN: 20 OINTMENT TOPICAL at 16:15

## 2024-01-01 RX ADMIN — CLOPIDOGREL BISULFATE 75 MG: 75 TABLET ORAL at 11:57

## 2024-01-01 RX ADMIN — INSULIN GLARGINE 6 UNITS: 100 INJECTION, SOLUTION SUBCUTANEOUS at 20:51

## 2024-01-01 RX ADMIN — CLOPIDOGREL BISULFATE 75 MG: 75 TABLET, FILM COATED ORAL at 09:10

## 2024-01-01 RX ADMIN — INSULIN LISPRO 1 UNITS: 100 INJECTION, SOLUTION INTRAVENOUS; SUBCUTANEOUS at 13:53

## 2024-01-01 RX ADMIN — VANCOMYCIN HYDROCHLORIDE 2000 MG: 5 INJECTION, POWDER, LYOPHILIZED, FOR SOLUTION INTRAVENOUS at 14:28

## 2024-01-01 RX ADMIN — OXYCODONE AND ACETAMINOPHEN 1 TABLET: 5; 325 TABLET ORAL at 18:47

## 2024-01-01 RX ADMIN — OXYCODONE HYDROCHLORIDE 5 MG: 5 TABLET ORAL at 20:20

## 2024-01-01 RX ADMIN — MIDODRINE HYDROCHLORIDE 15 MG: 5 TABLET ORAL at 11:28

## 2024-01-01 RX ADMIN — MUPIROCIN: 20 OINTMENT TOPICAL at 08:49

## 2024-01-01 RX ADMIN — CLOPIDOGREL 75 MG: 75 TABLET ORAL at 09:30

## 2024-01-01 RX ADMIN — CARBOXYMETHYLCELLULOSE SODIUM 2 DROP: 5 SOLUTION/ DROPS OPHTHALMIC at 19:40

## 2024-01-01 RX ADMIN — MIDODRINE HYDROCHLORIDE 15 MG: 10 TABLET ORAL at 23:07

## 2024-01-01 RX ADMIN — MIDODRINE HYDROCHLORIDE 15 MG: 5 TABLET ORAL at 02:29

## 2024-01-01 RX ADMIN — SODIUM PHOSPHATE, MONOBASIC, MONOHYDRATE AND SODIUM PHOSPHATE, DIBASIC, ANHYDROUS 15 MMOL: 142; 276 INJECTION, SOLUTION INTRAVENOUS at 13:26

## 2024-01-01 RX ADMIN — SODIUM HYPOCHLORITE: 2.5 SOLUTION TOPICAL at 05:52

## 2024-01-01 RX ADMIN — HYDROMORPHONE HYDROCHLORIDE 0.6 MG: 1 INJECTION, SOLUTION INTRAMUSCULAR; INTRAVENOUS; SUBCUTANEOUS at 03:10

## 2024-01-01 RX ADMIN — CARBOXYMETHYLCELLULOSE SODIUM 2 DROP: 5 SOLUTION/ DROPS OPHTHALMIC at 20:30

## 2024-01-01 RX ADMIN — ACETAMINOPHEN 975 MG: 325 TABLET ORAL at 08:33

## 2024-01-01 RX ADMIN — TRAMADOL HYDROCHLORIDE 50 MG: 50 TABLET, COATED ORAL at 01:33

## 2024-01-01 RX ADMIN — EPOETIN ALFA 12000 UNITS: 10000 SOLUTION INTRAVENOUS; SUBCUTANEOUS at 09:17

## 2024-01-01 RX ADMIN — HEPARIN SODIUM 5000 UNITS: 5000 INJECTION INTRAVENOUS; SUBCUTANEOUS at 01:15

## 2024-01-01 RX ADMIN — MUPIROCIN: 20 OINTMENT TOPICAL at 20:44

## 2024-01-01 RX ADMIN — APIXABAN 2.5 MG: 5 TABLET, FILM COATED ORAL at 10:24

## 2024-01-01 RX ADMIN — OXYCODONE HYDROCHLORIDE 2.5 MG: 5 TABLET ORAL at 06:06

## 2024-01-01 RX ADMIN — ATORVASTATIN CALCIUM 40 MG: 40 TABLET, FILM COATED ORAL at 21:29

## 2024-01-01 RX ADMIN — CARBOXYMETHYLCELLULOSE SODIUM 2 DROP: 5 SOLUTION/ DROPS OPHTHALMIC at 20:55

## 2024-01-01 RX ADMIN — APIXABAN 2.5 MG: 2.5 TABLET, FILM COATED ORAL at 12:49

## 2024-01-01 RX ADMIN — APIXABAN 2.5 MG: 2.5 TABLET, FILM COATED ORAL at 12:42

## 2024-01-01 RX ADMIN — METOPROLOL SUCCINATE 25 MG: 25 TABLET, FILM COATED, EXTENDED RELEASE ORAL at 10:17

## 2024-01-01 RX ADMIN — INSULIN GLARGINE 6 UNITS: 100 INJECTION, SOLUTION SUBCUTANEOUS at 22:11

## 2024-01-01 RX ADMIN — IPRATROPIUM BROMIDE AND ALBUTEROL SULFATE 3 ML: .5; 3 SOLUTION RESPIRATORY (INHALATION) at 20:52

## 2024-01-01 RX ADMIN — HEPARIN SODIUM 5000 UNITS: 5000 INJECTION INTRAVENOUS; SUBCUTANEOUS at 09:16

## 2024-01-01 RX ADMIN — POTASSIUM PHOSPHATE, MONOBASIC POTASSIUM PHOSPHATE, DIBASIC 15 MMOL: 224; 236 INJECTION, SOLUTION, CONCENTRATE INTRAVENOUS at 20:58

## 2024-01-01 RX ADMIN — DEXAMETHASONE 6 MG: 6 TABLET ORAL at 09:22

## 2024-01-01 RX ADMIN — OXYCODONE HYDROCHLORIDE 2.5 MG: 5 TABLET ORAL at 09:36

## 2024-01-01 RX ADMIN — CARBOXYMETHYLCELLULOSE SODIUM 2 DROP: 5 SOLUTION/ DROPS OPHTHALMIC at 09:34

## 2024-01-01 RX ADMIN — CLOPIDOGREL 75 MG: 75 TABLET ORAL at 11:29

## 2024-01-01 RX ADMIN — ATORVASTATIN CALCIUM 40 MG: 40 TABLET, FILM COATED ORAL at 20:35

## 2024-01-01 RX ADMIN — MIDODRINE HYDROCHLORIDE 20 MG: 10 TABLET ORAL at 00:00

## 2024-01-01 RX ADMIN — HYDROCORTISONE SODIUM SUCCINATE 100 MG: 100 INJECTION, POWDER, FOR SOLUTION INTRAMUSCULAR; INTRAVENOUS at 09:48

## 2024-01-01 RX ADMIN — MIDODRINE HYDROCHLORIDE 20 MG: 10 TABLET ORAL at 06:04

## 2024-01-01 RX ADMIN — ATORVASTATIN CALCIUM 40 MG: 40 TABLET, FILM COATED ORAL at 21:08

## 2024-01-01 RX ADMIN — GABAPENTIN 100 MG: 100 CAPSULE ORAL at 08:10

## 2024-01-01 RX ADMIN — HEPARIN SODIUM 929 UNITS/HR: 10000 INJECTION, SOLUTION INTRAVENOUS at 10:09

## 2024-01-01 RX ADMIN — Medication 5 MG: at 20:53

## 2024-01-01 RX ADMIN — CLOPIDOGREL BISULFATE 75 MG: 75 TABLET, FILM COATED ORAL at 09:46

## 2024-01-01 RX ADMIN — CLOPIDOGREL BISULFATE 75 MG: 75 TABLET ORAL at 12:58

## 2024-01-01 RX ADMIN — PIPERACILLIN SODIUM AND TAZOBACTAM SODIUM 2.25 G: 2; .25 INJECTION, SOLUTION INTRAVENOUS at 17:46

## 2024-01-01 RX ADMIN — PANTOPRAZOLE SODIUM 40 MG: 40 TABLET, DELAYED RELEASE ORAL at 11:11

## 2024-01-01 RX ADMIN — PANTOPRAZOLE SODIUM 40 MG: 40 TABLET, DELAYED RELEASE ORAL at 12:42

## 2024-01-01 RX ADMIN — ATORVASTATIN CALCIUM 40 MG: 40 TABLET, FILM COATED ORAL at 22:22

## 2024-01-01 RX ADMIN — MIDODRINE HYDROCHLORIDE 15 MG: 5 TABLET ORAL at 03:44

## 2024-01-01 RX ADMIN — OXYCODONE HYDROCHLORIDE 2.5 MG: 5 TABLET ORAL at 00:08

## 2024-01-01 RX ADMIN — TRAMADOL HYDROCHLORIDE 50 MG: 50 TABLET, COATED ORAL at 10:19

## 2024-01-01 RX ADMIN — CLOPIDOGREL BISULFATE 75 MG: 75 TABLET ORAL at 10:30

## 2024-01-01 RX ADMIN — MIDODRINE HYDROCHLORIDE 20 MG: 10 TABLET ORAL at 08:25

## 2024-01-01 RX ADMIN — CARBOXYMETHYLCELLULOSE SODIUM 2 DROP: 5 SOLUTION/ DROPS OPHTHALMIC at 09:08

## 2024-01-01 RX ADMIN — NEPHROCAP 1 CAPSULE: 1 CAP ORAL at 10:29

## 2024-01-01 RX ADMIN — SENNOSIDES 8.6 MG: 8.6 TABLET, FILM COATED ORAL at 22:03

## 2024-01-01 RX ADMIN — PIPERACILLIN SODIUM AND TAZOBACTAM SODIUM 2.25 G: 2; .25 INJECTION, SOLUTION INTRAVENOUS at 05:17

## 2024-01-01 RX ADMIN — INSULIN GLARGINE 6 UNITS: 100 INJECTION, SOLUTION SUBCUTANEOUS at 20:37

## 2024-01-01 RX ADMIN — CLOPIDOGREL BISULFATE 75 MG: 75 TABLET ORAL at 08:26

## 2024-01-01 RX ADMIN — FENTANYL CITRATE 25 MCG: 50 INJECTION, SOLUTION INTRAMUSCULAR; INTRAVENOUS at 10:18

## 2024-01-01 RX ADMIN — ACETAMINOPHEN 975 MG: 325 TABLET ORAL at 07:37

## 2024-01-01 RX ADMIN — ALLOPURINOL 100 MG: 100 TABLET ORAL at 09:10

## 2024-01-01 RX ADMIN — OXYCODONE HYDROCHLORIDE 5 MG: 5 TABLET ORAL at 22:40

## 2024-01-01 RX ADMIN — PIPERACILLIN SODIUM AND TAZOBACTAM SODIUM 2.25 G: 2; .25 INJECTION, SOLUTION INTRAVENOUS at 01:15

## 2024-01-01 RX ADMIN — Medication 5 MG: at 20:26

## 2024-01-01 RX ADMIN — ALLOPURINOL 100 MG: 100 TABLET ORAL at 08:10

## 2024-01-01 RX ADMIN — RENO CAPS 1 CAPSULE: 100; 1.5; 1.7; 20; 10; 1; 150; 5; 6 CAPSULE ORAL at 09:09

## 2024-01-01 RX ADMIN — HYDROMORPHONE HYDROCHLORIDE 0.2 MG: 1 INJECTION, SOLUTION INTRAMUSCULAR; INTRAVENOUS; SUBCUTANEOUS at 09:51

## 2024-01-01 RX ADMIN — SENNOSIDES AND DOCUSATE SODIUM 2 TABLET: 8.6; 5 TABLET ORAL at 20:18

## 2024-01-01 RX ADMIN — SEVELAMER CARBONATE 800 MG: 800 TABLET, FILM COATED ORAL at 08:25

## 2024-01-01 RX ADMIN — Medication 5 MG: at 20:21

## 2024-01-01 RX ADMIN — MUPIROCIN: 20 OINTMENT TOPICAL at 09:11

## 2024-01-01 RX ADMIN — DAKIN'S SOLUTION 0.125% (QUARTER STRENGTH): 0.12 SOLUTION at 20:44

## 2024-01-01 RX ADMIN — POLYETHYLENE GLYCOL (3350) 17 G: 17 POWDER, FOR SOLUTION ORAL at 13:45

## 2024-01-01 RX ADMIN — PIPERACILLIN SODIUM AND TAZOBACTAM SODIUM 2.25 G: 2; .25 INJECTION, SOLUTION INTRAVENOUS at 19:50

## 2024-01-01 RX ADMIN — SEVELAMER CARBONATE 800 MG: 800 TABLET, FILM COATED ORAL at 12:35

## 2024-01-01 RX ADMIN — ASPIRIN 81 MG CHEWABLE TABLET 81 MG: 81 TABLET CHEWABLE at 08:25

## 2024-01-01 RX ADMIN — EPOETIN ALFA 10000 UNITS: 10000 SOLUTION INTRAVENOUS; SUBCUTANEOUS at 00:27

## 2024-01-01 RX ADMIN — MIDODRINE HYDROCHLORIDE 15 MG: 10 TABLET ORAL at 00:05

## 2024-01-01 RX ADMIN — OXYCODONE HYDROCHLORIDE 5 MG: 5 TABLET ORAL at 01:24

## 2024-01-01 RX ADMIN — RENO CAPS 1 CAPSULE: 100; 1.5; 1.7; 20; 10; 1; 150; 5; 6 CAPSULE ORAL at 10:03

## 2024-01-01 RX ADMIN — ASPIRIN 81 MG CHEWABLE TABLET 81 MG: 81 TABLET CHEWABLE at 09:00

## 2024-01-01 RX ADMIN — Medication 10 ML: at 17:59

## 2024-01-01 RX ADMIN — Medication 5 MG: at 20:18

## 2024-01-01 RX ADMIN — MIDODRINE HYDROCHLORIDE 20 MG: 10 TABLET ORAL at 00:10

## 2024-01-01 RX ADMIN — PANTOPRAZOLE SODIUM 40 MG: 40 TABLET, DELAYED RELEASE ORAL at 06:32

## 2024-01-01 RX ADMIN — GABAPENTIN 100 MG: 100 CAPSULE ORAL at 12:47

## 2024-01-01 RX ADMIN — NEPHROCAP 1 CAPSULE: 1 CAP ORAL at 12:48

## 2024-01-01 RX ADMIN — HEPARIN SODIUM 5000 UNITS: 5000 INJECTION INTRAVENOUS; SUBCUTANEOUS at 01:26

## 2024-01-01 RX ADMIN — INSULIN GLARGINE 6 UNITS: 100 INJECTION, SOLUTION SUBCUTANEOUS at 21:42

## 2024-01-01 RX ADMIN — SEVELAMER CARBONATE 800 MG: 800 TABLET, FILM COATED ORAL at 08:50

## 2024-01-01 RX ADMIN — CLOPIDOGREL BISULFATE 75 MG: 75 TABLET, FILM COATED ORAL at 09:03

## 2024-01-01 RX ADMIN — ATORVASTATIN CALCIUM 40 MG: 40 TABLET, FILM COATED ORAL at 20:53

## 2024-01-01 RX ADMIN — ACETAMINOPHEN 975 MG: 325 TABLET ORAL at 08:40

## 2024-01-01 RX ADMIN — PANTOPRAZOLE SODIUM 40 MG: 40 TABLET, DELAYED RELEASE ORAL at 08:00

## 2024-01-01 RX ADMIN — Medication 5 MG: at 20:07

## 2024-01-01 RX ADMIN — MIDODRINE HYDROCHLORIDE 15 MG: 10 TABLET ORAL at 07:38

## 2024-01-01 RX ADMIN — DEXAMETHASONE 6 MG: 6 TABLET ORAL at 08:40

## 2024-01-01 RX ADMIN — HEPARIN SODIUM 5000 UNITS: 5000 INJECTION INTRAVENOUS; SUBCUTANEOUS at 08:45

## 2024-01-01 RX ADMIN — PIPERACILLIN SODIUM AND TAZOBACTAM SODIUM 2.25 G: 2; .25 INJECTION, SOLUTION INTRAVENOUS at 17:01

## 2024-01-01 RX ADMIN — ACETAMINOPHEN 975 MG: 325 TABLET ORAL at 20:06

## 2024-01-01 RX ADMIN — ASPIRIN 81 MG CHEWABLE TABLET 81 MG: 81 TABLET CHEWABLE at 10:08

## 2024-01-01 RX ADMIN — INSULIN GLARGINE 6 UNITS: 100 INJECTION, SOLUTION SUBCUTANEOUS at 20:20

## 2024-01-01 RX ADMIN — PANTOPRAZOLE SODIUM 40 MG: 40 TABLET, DELAYED RELEASE ORAL at 12:47

## 2024-01-01 RX ADMIN — ATORVASTATIN CALCIUM 40 MG: 40 TABLET, FILM COATED ORAL at 20:37

## 2024-01-01 RX ADMIN — MIDODRINE HYDROCHLORIDE 15 MG: 5 TABLET ORAL at 10:24

## 2024-01-01 RX ADMIN — SODIUM CHLORIDE: 9 INJECTION, SOLUTION INTRAVENOUS at 11:21

## 2024-01-01 RX ADMIN — EPOETIN ALFA 10000 UNITS: 10000 SOLUTION INTRAVENOUS; SUBCUTANEOUS at 17:18

## 2024-01-01 RX ADMIN — INSULIN LISPRO 1 UNITS: 100 INJECTION, SOLUTION INTRAVENOUS; SUBCUTANEOUS at 17:04

## 2024-01-01 RX ADMIN — METHOCARBAMOL 500 MG: 500 TABLET ORAL at 14:39

## 2024-01-01 RX ADMIN — RENO CAPS 1 CAPSULE: 100; 1.5; 1.7; 20; 10; 1; 150; 5; 6 CAPSULE ORAL at 12:01

## 2024-01-01 RX ADMIN — MIDODRINE HYDROCHLORIDE 15 MG: 10 TABLET ORAL at 12:49

## 2024-01-01 RX ADMIN — MIDODRINE HYDROCHLORIDE 15 MG: 10 TABLET ORAL at 17:57

## 2024-01-01 RX ADMIN — ALLOPURINOL 100 MG: 100 TABLET ORAL at 10:17

## 2024-01-01 RX ADMIN — ALLOPURINOL 100 MG: 100 TABLET ORAL at 08:19

## 2024-01-01 RX ADMIN — OXYCODONE HYDROCHLORIDE 2.5 MG: 5 TABLET ORAL at 03:24

## 2024-01-01 RX ADMIN — MIDODRINE HYDROCHLORIDE 15 MG: 10 TABLET ORAL at 00:51

## 2024-01-01 RX ADMIN — HYDROXYZINE HYDROCHLORIDE 25 MG: 25 TABLET, FILM COATED ORAL at 12:58

## 2024-01-01 RX ADMIN — OXYCODONE HYDROCHLORIDE 2.5 MG: 5 TABLET ORAL at 05:31

## 2024-01-01 RX ADMIN — PIPERACILLIN SODIUM AND TAZOBACTAM SODIUM 2.25 G: 2; .25 INJECTION, SOLUTION INTRAVENOUS at 05:29

## 2024-01-01 RX ADMIN — CLOPIDOGREL BISULFATE 75 MG: 75 TABLET, FILM COATED ORAL at 08:10

## 2024-01-01 RX ADMIN — MIDODRINE HYDROCHLORIDE 15 MG: 10 TABLET ORAL at 00:44

## 2024-01-01 RX ADMIN — ATORVASTATIN CALCIUM 40 MG: 40 TABLET, FILM COATED ORAL at 21:02

## 2024-01-01 RX ADMIN — SENNOSIDES AND DOCUSATE SODIUM 2 TABLET: 8.6; 5 TABLET ORAL at 08:54

## 2024-01-01 RX ADMIN — PANTOPRAZOLE SODIUM 40 MG: 40 TABLET, DELAYED RELEASE ORAL at 08:12

## 2024-01-01 RX ADMIN — AMLODIPINE BESYLATE 10 MG: 10 TABLET ORAL at 08:40

## 2024-01-01 RX ADMIN — Medication 5 MG: at 21:03

## 2024-01-01 RX ADMIN — ASPIRIN 81 MG CHEWABLE TABLET 81 MG: 81 TABLET CHEWABLE at 09:48

## 2024-01-01 RX ADMIN — EPOETIN ALFA 12000 UNITS: 10000 SOLUTION INTRAVENOUS; SUBCUTANEOUS at 22:00

## 2024-01-01 RX ADMIN — MIDODRINE HYDROCHLORIDE 15 MG: 10 TABLET ORAL at 23:55

## 2024-01-01 RX ADMIN — NEPHROCAP 1 CAPSULE: 1 CAP ORAL at 08:40

## 2024-01-01 RX ADMIN — CARBOXYMETHYLCELLULOSE SODIUM 2 DROP: 5 SOLUTION/ DROPS OPHTHALMIC at 20:49

## 2024-01-01 RX ADMIN — MIDODRINE HYDROCHLORIDE 20 MG: 10 TABLET ORAL at 07:44

## 2024-01-01 RX ADMIN — SEVELAMER CARBONATE 800 MG: 800 TABLET, FILM COATED ORAL at 10:18

## 2024-01-01 RX ADMIN — CALCIUM CHLORIDE, MAGNESIUM CHLORIDE, DEXTROSE MONOHYDRATE, LACTIC ACID, SODIUM CHLORIDE, SODIUM BICARBONATE AND POTASSIUM CHLORIDE 2700 ML/HR: 5.15; 2.03; 22; 5.4; 6.46; 3.09; .157 INJECTION INTRAVENOUS at 14:30

## 2024-01-01 RX ADMIN — EPOETIN ALFA-EPBX 6000 UNITS: 10000 INJECTION, SOLUTION INTRAVENOUS; SUBCUTANEOUS at 17:59

## 2024-01-01 RX ADMIN — CLOPIDOGREL BISULFATE 75 MG: 75 TABLET, FILM COATED ORAL at 08:05

## 2024-01-01 RX ADMIN — HEPARIN SODIUM 5000 UNITS: 5000 INJECTION INTRAVENOUS; SUBCUTANEOUS at 09:14

## 2024-01-01 RX ADMIN — HEPARIN SODIUM 1429 UNITS/HR: 10000 INJECTION, SOLUTION INTRAVENOUS at 02:13

## 2024-01-01 RX ADMIN — PANTOPRAZOLE SODIUM 40 MG: 40 TABLET, DELAYED RELEASE ORAL at 06:31

## 2024-01-01 RX ADMIN — ALLOPURINOL 100 MG: 100 TABLET ORAL at 11:26

## 2024-01-01 RX ADMIN — ALLOPURINOL 100 MG: 100 TABLET ORAL at 11:18

## 2024-01-01 RX ADMIN — MIDODRINE HYDROCHLORIDE 15 MG: 10 TABLET ORAL at 15:43

## 2024-01-01 RX ADMIN — HYDROMORPHONE HYDROCHLORIDE 0.2 MG: 1 INJECTION, SOLUTION INTRAMUSCULAR; INTRAVENOUS; SUBCUTANEOUS at 09:08

## 2024-01-01 RX ADMIN — ACETAMINOPHEN 975 MG: 325 TABLET ORAL at 18:13

## 2024-01-01 RX ADMIN — ACETAMINOPHEN 975 MG: 325 TABLET ORAL at 17:37

## 2024-01-01 RX ADMIN — HEPARIN SODIUM 5000 UNITS: 5000 INJECTION INTRAVENOUS; SUBCUTANEOUS at 10:59

## 2024-01-01 RX ADMIN — APIXABAN 2.5 MG: 2.5 TABLET, FILM COATED ORAL at 09:47

## 2024-01-01 RX ADMIN — GABAPENTIN 100 MG: 100 CAPSULE ORAL at 10:01

## 2024-01-01 RX ADMIN — ATORVASTATIN CALCIUM 40 MG: 40 TABLET, FILM COATED ORAL at 22:03

## 2024-01-01 RX ADMIN — EPOETIN ALFA-EPBX 6000 UNITS: 10000 INJECTION, SOLUTION INTRAVENOUS; SUBCUTANEOUS at 08:48

## 2024-01-01 RX ADMIN — GABAPENTIN 100 MG: 100 CAPSULE ORAL at 09:04

## 2024-01-01 RX ADMIN — SODIUM HYPOCHLORITE: 2.5 SOLUTION TOPICAL at 05:59

## 2024-01-01 RX ADMIN — SENNOSIDES AND DOCUSATE SODIUM 2 TABLET: 8.6; 5 TABLET ORAL at 20:44

## 2024-01-01 RX ADMIN — PANTOPRAZOLE SODIUM 40 MG: 40 TABLET, DELAYED RELEASE ORAL at 08:17

## 2024-01-01 RX ADMIN — MIDODRINE HYDROCHLORIDE 20 MG: 10 TABLET ORAL at 16:15

## 2024-01-01 RX ADMIN — OXYCODONE AND ACETAMINOPHEN 1 TABLET: 5; 325 TABLET ORAL at 06:07

## 2024-01-01 RX ADMIN — CARBOXYMETHYLCELLULOSE SODIUM 2 DROP: 5 SOLUTION/ DROPS OPHTHALMIC at 09:00

## 2024-01-01 RX ADMIN — LIDOCAINE HYDROCHLORIDE 10 ML: 20 INJECTION, SOLUTION INFILTRATION; PERINEURAL at 09:52

## 2024-01-01 RX ADMIN — MIDODRINE HYDROCHLORIDE 20 MG: 10 TABLET ORAL at 06:01

## 2024-01-01 RX ADMIN — MIDODRINE HYDROCHLORIDE 15 MG: 10 TABLET ORAL at 00:27

## 2024-01-01 RX ADMIN — TRAMADOL HYDROCHLORIDE 50 MG: 50 TABLET, COATED ORAL at 18:00

## 2024-01-01 RX ADMIN — GABAPENTIN 100 MG: 100 CAPSULE ORAL at 08:48

## 2024-01-01 RX ADMIN — GABAPENTIN 100 MG: 100 CAPSULE ORAL at 13:44

## 2024-01-01 RX ADMIN — HEPARIN SODIUM 2000 UNITS: 1000 INJECTION INTRAVENOUS; SUBCUTANEOUS at 16:26

## 2024-01-01 RX ADMIN — POLYETHYLENE GLYCOL 3350 17 G: 17 POWDER, FOR SOLUTION ORAL at 09:23

## 2024-01-01 RX ADMIN — INSULIN LISPRO 1 UNITS: 100 INJECTION, SOLUTION INTRAVENOUS; SUBCUTANEOUS at 13:39

## 2024-01-01 RX ADMIN — SENNOSIDES 8.6 MG: 8.6 TABLET, FILM COATED ORAL at 21:20

## 2024-01-01 RX ADMIN — ATORVASTATIN CALCIUM 40 MG: 40 TABLET, FILM COATED ORAL at 21:04

## 2024-01-01 RX ADMIN — Medication 5 MG: at 20:57

## 2024-01-01 RX ADMIN — MIDODRINE HYDROCHLORIDE 15 MG: 10 TABLET ORAL at 15:41

## 2024-01-01 RX ADMIN — HEPARIN SODIUM 1629 UNITS/HR: 10000 INJECTION, SOLUTION INTRAVENOUS at 23:43

## 2024-01-01 RX ADMIN — SENNOSIDES 8.6 MG: 8.6 TABLET, FILM COATED ORAL at 20:57

## 2024-01-01 RX ADMIN — PROPOFOL 120 MG: 10 INJECTION, EMULSION INTRAVENOUS at 11:21

## 2024-01-01 RX ADMIN — APIXABAN 2.5 MG: 5 TABLET, FILM COATED ORAL at 08:27

## 2024-01-01 RX ADMIN — Medication 120 MCG: at 10:29

## 2024-01-01 RX ADMIN — INSULIN LISPRO 1 UNITS: 100 INJECTION, SOLUTION INTRAVENOUS; SUBCUTANEOUS at 14:39

## 2024-01-01 RX ADMIN — RENO CAPS 1 CAPSULE: 100; 1.5; 1.7; 20; 10; 1; 150; 5; 6 CAPSULE ORAL at 13:17

## 2024-01-01 RX ADMIN — Medication 5 MG: at 20:44

## 2024-01-01 RX ADMIN — MUPIROCIN: 20 OINTMENT TOPICAL at 14:43

## 2024-01-01 RX ADMIN — APIXABAN 2.5 MG: 2.5 TABLET, FILM COATED ORAL at 20:53

## 2024-01-01 RX ADMIN — PIPERACILLIN SODIUM AND TAZOBACTAM SODIUM 2.25 G: 2; .25 INJECTION, SOLUTION INTRAVENOUS at 05:31

## 2024-01-01 RX ADMIN — LIDOCAINE 1 PATCH: 4 PATCH TOPICAL at 11:34

## 2024-01-01 RX ADMIN — POLYETHYLENE GLYCOL 3350 17 G: 17 POWDER, FOR SOLUTION ORAL at 09:37

## 2024-01-01 RX ADMIN — AMLODIPINE BESYLATE 10 MG: 10 TABLET ORAL at 11:26

## 2024-01-01 RX ADMIN — ASPIRIN 81 MG CHEWABLE TABLET 81 MG: 81 TABLET CHEWABLE at 09:16

## 2024-01-01 RX ADMIN — Medication 5 MG: at 21:23

## 2024-01-01 RX ADMIN — ATORVASTATIN CALCIUM 40 MG: 40 TABLET, FILM COATED ORAL at 21:23

## 2024-01-01 RX ADMIN — GABAPENTIN 100 MG: 100 CAPSULE ORAL at 08:40

## 2024-01-01 RX ADMIN — NEPHROCAP 1 CAPSULE: 1 CAP ORAL at 10:08

## 2024-01-01 RX ADMIN — ATORVASTATIN CALCIUM 40 MG: 40 TABLET, FILM COATED ORAL at 21:20

## 2024-01-01 RX ADMIN — LIDOCAINE 1 PATCH: 4 PATCH TOPICAL at 09:02

## 2024-01-01 RX ADMIN — SENNOSIDES 8.6 MG: 8.6 TABLET, FILM COATED ORAL at 21:12

## 2024-01-01 RX ADMIN — MIDODRINE HYDROCHLORIDE 15 MG: 10 TABLET ORAL at 10:03

## 2024-01-01 RX ADMIN — SENNOSIDES 8.6 MG: 8.6 TABLET, FILM COATED ORAL at 20:25

## 2024-01-01 RX ADMIN — EPOETIN ALFA-EPBX 6000 UNITS: 10000 INJECTION, SOLUTION INTRAVENOUS; SUBCUTANEOUS at 17:05

## 2024-01-01 RX ADMIN — CARBOXYMETHYLCELLULOSE SODIUM 2 DROP: 5 SOLUTION/ DROPS OPHTHALMIC at 17:08

## 2024-01-01 RX ADMIN — ASPIRIN 81 MG CHEWABLE TABLET 81 MG: 81 TABLET CHEWABLE at 10:17

## 2024-01-01 RX ADMIN — EPOETIN ALFA 15000 UNITS: 20000 SOLUTION INTRAVENOUS; SUBCUTANEOUS at 21:09

## 2024-01-01 RX ADMIN — INSULIN LISPRO 1 UNITS: 100 INJECTION, SOLUTION INTRAVENOUS; SUBCUTANEOUS at 16:51

## 2024-01-01 RX ADMIN — SENNOSIDES 8.6 MG: 8.6 TABLET, FILM COATED ORAL at 20:50

## 2024-01-01 RX ADMIN — MIDODRINE HYDROCHLORIDE 20 MG: 10 TABLET ORAL at 17:16

## 2024-01-01 RX ADMIN — SEVELAMER CARBONATE 800 MG: 800 TABLET, FILM COATED ORAL at 17:51

## 2024-01-01 RX ADMIN — ASPIRIN 81 MG CHEWABLE TABLET 81 MG: 81 TABLET CHEWABLE at 09:05

## 2024-01-01 RX ADMIN — MIDODRINE HYDROCHLORIDE 15 MG: 10 TABLET ORAL at 17:06

## 2024-01-01 RX ADMIN — ACETAMINOPHEN 975 MG: 325 TABLET ORAL at 01:18

## 2024-01-01 RX ADMIN — INSULIN LISPRO 1 UNITS: 100 INJECTION, SOLUTION INTRAVENOUS; SUBCUTANEOUS at 08:36

## 2024-01-01 RX ADMIN — ACETAMINOPHEN 975 MG: 325 TABLET ORAL at 01:47

## 2024-01-01 RX ADMIN — DEXTROSE MONOHYDRATE 21 MMOL: 50 INJECTION, SOLUTION INTRAVENOUS at 12:48

## 2024-01-01 RX ADMIN — MIDODRINE HYDROCHLORIDE 20 MG: 10 TABLET ORAL at 00:15

## 2024-01-01 RX ADMIN — HYDROMORPHONE HYDROCHLORIDE 0.2 MG: 1 INJECTION, SOLUTION INTRAMUSCULAR; INTRAVENOUS; SUBCUTANEOUS at 02:33

## 2024-01-01 RX ADMIN — ASPIRIN 81 MG CHEWABLE TABLET 81 MG: 81 TABLET CHEWABLE at 09:46

## 2024-01-01 RX ADMIN — INSULIN GLARGINE 6 UNITS: 100 INJECTION, SOLUTION SUBCUTANEOUS at 21:55

## 2024-01-01 RX ADMIN — GABAPENTIN 100 MG: 100 CAPSULE ORAL at 22:22

## 2024-01-01 RX ADMIN — OXYCODONE HYDROCHLORIDE 2.5 MG: 5 TABLET ORAL at 15:42

## 2024-01-01 RX ADMIN — RENO CAPS 1 CAPSULE: 100; 1.5; 1.7; 20; 10; 1; 150; 5; 6 CAPSULE ORAL at 12:46

## 2024-01-01 RX ADMIN — PANTOPRAZOLE SODIUM 40 MG: 40 TABLET, DELAYED RELEASE ORAL at 06:07

## 2024-01-01 RX ADMIN — PANTOPRAZOLE SODIUM 40 MG: 40 TABLET, DELAYED RELEASE ORAL at 10:46

## 2024-01-01 RX ADMIN — HYDROMORPHONE HYDROCHLORIDE 0.2 MG: 1 INJECTION, SOLUTION INTRAMUSCULAR; INTRAVENOUS; SUBCUTANEOUS at 08:51

## 2024-01-01 RX ADMIN — SODIUM CHLORIDE: 9 INJECTION, SOLUTION INTRAVENOUS at 10:16

## 2024-01-01 RX ADMIN — GABAPENTIN 100 MG: 100 CAPSULE ORAL at 08:19

## 2024-01-01 RX ADMIN — ALLOPURINOL 100 MG: 100 TABLET ORAL at 08:25

## 2024-01-01 RX ADMIN — OXYCODONE HYDROCHLORIDE 5 MG: 5 TABLET ORAL at 23:23

## 2024-01-01 RX ADMIN — ACETAMINOPHEN 975 MG: 325 TABLET ORAL at 12:09

## 2024-01-01 RX ADMIN — EPINEPHRINE 1 MG: 0.1 INJECTION, SOLUTION ENDOTRACHEAL; INTRACARDIAC; INTRAVENOUS at 18:56

## 2024-01-01 RX ADMIN — APIXABAN 2.5 MG: 5 TABLET, FILM COATED ORAL at 20:44

## 2024-01-01 RX ADMIN — NEPHROCAP 1 CAPSULE: 1 CAP ORAL at 08:09

## 2024-01-01 RX ADMIN — SODIUM HYPOCHLORITE: 2.5 SOLUTION TOPICAL at 05:32

## 2024-01-01 RX ADMIN — OXYCODONE AND ACETAMINOPHEN 1 TABLET: 5; 325 TABLET ORAL at 14:31

## 2024-01-01 RX ADMIN — PANTOPRAZOLE SODIUM 40 MG: 40 TABLET, DELAYED RELEASE ORAL at 06:24

## 2024-01-01 RX ADMIN — MIDODRINE HYDROCHLORIDE 15 MG: 10 TABLET ORAL at 01:28

## 2024-01-01 RX ADMIN — RENO CAPS 1 CAPSULE: 100; 1.5; 1.7; 20; 10; 1; 150; 5; 6 CAPSULE ORAL at 09:21

## 2024-01-01 RX ADMIN — CARBOXYMETHYLCELLULOSE SODIUM 2 DROP: 5 SOLUTION/ DROPS OPHTHALMIC at 14:55

## 2024-01-01 RX ADMIN — PANTOPRAZOLE SODIUM 40 MG: 40 TABLET, DELAYED RELEASE ORAL at 06:01

## 2024-01-01 RX ADMIN — EPOETIN ALFA 12000 UNITS: 10000 SOLUTION INTRAVENOUS; SUBCUTANEOUS at 22:40

## 2024-01-01 RX ADMIN — HYDROXYZINE HYDROCHLORIDE 25 MG: 25 TABLET, FILM COATED ORAL at 22:03

## 2024-01-01 RX ADMIN — EPOETIN ALFA 20000 UNITS: 20000 SOLUTION INTRAVENOUS; SUBCUTANEOUS at 00:37

## 2024-01-01 RX ADMIN — VANCOMYCIN HYDROCHLORIDE 2000 MG: 5 INJECTION, POWDER, LYOPHILIZED, FOR SOLUTION INTRAVENOUS at 20:40

## 2024-01-01 RX ADMIN — GABAPENTIN 100 MG: 100 CAPSULE ORAL at 08:45

## 2024-01-01 RX ADMIN — CLOPIDOGREL BISULFATE 75 MG: 75 TABLET, FILM COATED ORAL at 09:16

## 2024-01-01 RX ADMIN — HYDROMORPHONE HYDROCHLORIDE 0.2 MG: 1 INJECTION, SOLUTION INTRAMUSCULAR; INTRAVENOUS; SUBCUTANEOUS at 10:49

## 2024-01-01 RX ADMIN — ASPIRIN 81 MG CHEWABLE TABLET 81 MG: 81 TABLET CHEWABLE at 09:03

## 2024-01-01 RX ADMIN — HEPARIN SODIUM 5000 UNITS: 5000 INJECTION INTRAVENOUS; SUBCUTANEOUS at 00:15

## 2024-01-01 RX ADMIN — PANTOPRAZOLE SODIUM 40 MG: 40 TABLET, DELAYED RELEASE ORAL at 10:44

## 2024-01-01 RX ADMIN — SENNOSIDES 8.6 MG: 8.6 TABLET, FILM COATED ORAL at 23:03

## 2024-01-01 RX ADMIN — SENNOSIDES AND DOCUSATE SODIUM 2 TABLET: 8.6; 5 TABLET ORAL at 21:22

## 2024-01-01 RX ADMIN — INSULIN LISPRO 1 UNITS: 100 INJECTION, SOLUTION INTRAVENOUS; SUBCUTANEOUS at 13:22

## 2024-01-01 RX ADMIN — INSULIN GLARGINE 6 UNITS: 100 INJECTION, SOLUTION SUBCUTANEOUS at 22:21

## 2024-01-01 RX ADMIN — DAKIN'S SOLUTION 0.125% (QUARTER STRENGTH): 0.12 SOLUTION at 16:16

## 2024-01-01 RX ADMIN — MEROPENEM 1000 MG: 1 INJECTION, POWDER, FOR SOLUTION INTRAVENOUS at 18:08

## 2024-01-01 RX ADMIN — CLOPIDOGREL BISULFATE 75 MG: 75 TABLET ORAL at 15:47

## 2024-01-01 RX ADMIN — POLYETHYLENE GLYCOL 3350 17 G: 17 POWDER, FOR SOLUTION ORAL at 12:38

## 2024-01-01 RX ADMIN — CLOPIDOGREL BISULFATE 75 MG: 75 TABLET, FILM COATED ORAL at 08:03

## 2024-01-01 RX ADMIN — HEPARIN SODIUM 7000 UNITS: 5000 INJECTION INTRAVENOUS; SUBCUTANEOUS at 20:07

## 2024-01-01 RX ADMIN — ASPIRIN 81 MG CHEWABLE TABLET 81 MG: 81 TABLET CHEWABLE at 08:10

## 2024-01-01 RX ADMIN — MIDODRINE HYDROCHLORIDE 20 MG: 10 TABLET ORAL at 05:36

## 2024-01-01 RX ADMIN — SEVELAMER CARBONATE 800 MG: 800 TABLET, FILM COATED ORAL at 17:25

## 2024-01-01 RX ADMIN — MIDODRINE HYDROCHLORIDE 20 MG: 10 TABLET ORAL at 08:34

## 2024-01-01 RX ADMIN — SODIUM ZIRCONIUM CYCLOSILICATE 10 G: 10 POWDER, FOR SUSPENSION ORAL at 20:49

## 2024-01-01 RX ADMIN — APIXABAN 2.5 MG: 5 TABLET, FILM COATED ORAL at 22:53

## 2024-01-01 RX ADMIN — ACETAMINOPHEN 975 MG: 325 TABLET ORAL at 20:29

## 2024-01-01 RX ADMIN — HEPARIN SODIUM 22000 UNITS: 20000 INJECTION, SOLUTION INTRAVENOUS; SUBCUTANEOUS at 08:40

## 2024-01-01 RX ADMIN — MIDODRINE HYDROCHLORIDE 15 MG: 5 TABLET ORAL at 10:01

## 2024-01-01 RX ADMIN — ACETAMINOPHEN 975 MG: 325 TABLET ORAL at 17:16

## 2024-01-01 RX ADMIN — TRAMADOL HYDROCHLORIDE 50 MG: 50 TABLET, COATED ORAL at 13:38

## 2024-01-01 RX ADMIN — Medication 10 ML: at 16:00

## 2024-01-01 RX ADMIN — MIDODRINE HYDROCHLORIDE 15 MG: 10 TABLET ORAL at 09:04

## 2024-01-01 RX ADMIN — ACETAMINOPHEN 975 MG: 325 TABLET ORAL at 08:24

## 2024-01-01 RX ADMIN — CLOPIDOGREL 75 MG: 75 TABLET ORAL at 09:11

## 2024-01-01 RX ADMIN — OXYCODONE HYDROCHLORIDE 5 MG: 5 TABLET ORAL at 08:53

## 2024-01-01 RX ADMIN — INSULIN LISPRO 1 UNITS: 100 INJECTION, SOLUTION INTRAVENOUS; SUBCUTANEOUS at 12:37

## 2024-01-01 RX ADMIN — ACETAMINOPHEN 975 MG: 325 TABLET ORAL at 20:21

## 2024-01-01 RX ADMIN — POLYETHYLENE GLYCOL 3350 17 G: 17 POWDER, FOR SOLUTION ORAL at 10:41

## 2024-01-01 RX ADMIN — VANCOMYCIN HYDROCHLORIDE 750 MG: 750 INJECTION, SOLUTION INTRAVENOUS at 08:34

## 2024-01-01 RX ADMIN — MIDODRINE HYDROCHLORIDE 15 MG: 10 TABLET ORAL at 09:03

## 2024-01-01 RX ADMIN — EPOETIN ALFA 20000 UNITS: 20000 SOLUTION INTRAVENOUS; SUBCUTANEOUS at 20:50

## 2024-01-01 RX ADMIN — CLOPIDOGREL 75 MG: 75 TABLET ORAL at 08:21

## 2024-01-01 RX ADMIN — GABAPENTIN 100 MG: 100 CAPSULE ORAL at 10:42

## 2024-01-01 RX ADMIN — CARBOXYMETHYLCELLULOSE SODIUM 2 DROP: 5 SOLUTION/ DROPS OPHTHALMIC at 09:05

## 2024-01-01 RX ADMIN — Medication 120 MCG: at 10:59

## 2024-01-01 RX ADMIN — ALLOPURINOL 100 MG: 100 TABLET ORAL at 08:26

## 2024-01-01 RX ADMIN — CLOPIDOGREL 75 MG: 75 TABLET ORAL at 09:38

## 2024-01-01 RX ADMIN — CARBOXYMETHYLCELLULOSE SODIUM 2 DROP: 5 SOLUTION/ DROPS OPHTHALMIC at 12:02

## 2024-01-01 RX ADMIN — MIDODRINE HYDROCHLORIDE 15 MG: 5 TABLET ORAL at 17:17

## 2024-01-01 RX ADMIN — PANTOPRAZOLE SODIUM 40 MG: 40 TABLET, DELAYED RELEASE ORAL at 13:17

## 2024-01-01 RX ADMIN — FENTANYL CITRATE 25 MCG: 50 INJECTION, SOLUTION INTRAMUSCULAR; INTRAVENOUS at 13:08

## 2024-01-01 RX ADMIN — MIDODRINE HYDROCHLORIDE 15 MG: 10 TABLET ORAL at 17:23

## 2024-01-01 RX ADMIN — EPOETIN ALFA 15000 UNITS: 20000 SOLUTION INTRAVENOUS; SUBCUTANEOUS at 20:31

## 2024-01-01 RX ADMIN — ACETAMINOPHEN 975 MG: 325 TABLET ORAL at 19:32

## 2024-01-01 RX ADMIN — APIXABAN 2.5 MG: 2.5 TABLET, FILM COATED ORAL at 09:04

## 2024-01-01 RX ADMIN — Medication 2 L/MIN: at 20:00

## 2024-01-01 RX ADMIN — Medication 100 MCG: at 11:50

## 2024-01-01 RX ADMIN — SENNOSIDES AND DOCUSATE SODIUM 2 TABLET: 8.6; 5 TABLET ORAL at 09:37

## 2024-01-01 RX ADMIN — POLYETHYLENE GLYCOL 3350 17 G: 17 POWDER, FOR SOLUTION ORAL at 08:50

## 2024-01-01 RX ADMIN — SENNOSIDES AND DOCUSATE SODIUM 2 TABLET: 8.6; 5 TABLET ORAL at 20:15

## 2024-01-01 RX ADMIN — MUPIROCIN: 20 OINTMENT TOPICAL at 08:54

## 2024-01-01 RX ADMIN — OXYCODONE HYDROCHLORIDE 5 MG: 5 TABLET ORAL at 09:33

## 2024-01-01 RX ADMIN — HYDROMORPHONE HYDROCHLORIDE 0.2 MG: 1 INJECTION, SOLUTION INTRAMUSCULAR; INTRAVENOUS; SUBCUTANEOUS at 01:35

## 2024-01-01 RX ADMIN — DOCUSATE SODIUM 100 MG: 100 CAPSULE, LIQUID FILLED ORAL at 12:49

## 2024-01-01 RX ADMIN — APIXABAN 2.5 MG: 2.5 TABLET, FILM COATED ORAL at 13:17

## 2024-01-01 RX ADMIN — SEVELAMER CARBONATE 800 MG: 800 TABLET, FILM COATED ORAL at 18:31

## 2024-01-01 RX ADMIN — POLYETHYLENE GLYCOL 3350 17 G: 17 POWDER, FOR SOLUTION ORAL at 08:40

## 2024-01-01 RX ADMIN — SENNOSIDES 8.6 MG: 8.6 TABLET, FILM COATED ORAL at 21:16

## 2024-01-01 RX ADMIN — OXYCODONE HYDROCHLORIDE 2.5 MG: 5 TABLET ORAL at 08:35

## 2024-01-01 RX ADMIN — ATORVASTATIN CALCIUM 40 MG: 40 TABLET, FILM COATED ORAL at 22:20

## 2024-01-01 RX ADMIN — VANCOMYCIN HYDROCHLORIDE 750 MG: 750 INJECTION, SOLUTION INTRAVENOUS at 17:01

## 2024-01-01 RX ADMIN — Medication 5 MG: at 20:40

## 2024-01-01 RX ADMIN — ALLOPURINOL 100 MG: 100 TABLET ORAL at 13:17

## 2024-01-01 RX ADMIN — MIDODRINE HYDROCHLORIDE 20 MG: 10 TABLET ORAL at 16:27

## 2024-01-01 RX ADMIN — GABAPENTIN 100 MG: 100 CAPSULE ORAL at 11:56

## 2024-01-01 RX ADMIN — MIDODRINE HYDROCHLORIDE 15 MG: 10 TABLET ORAL at 18:15

## 2024-01-01 RX ADMIN — MIDODRINE HYDROCHLORIDE 15 MG: 10 TABLET ORAL at 05:03

## 2024-01-01 RX ADMIN — MIDODRINE HYDROCHLORIDE 20 MG: 10 TABLET ORAL at 06:58

## 2024-01-01 RX ADMIN — MIDODRINE HYDROCHLORIDE 15 MG: 5 TABLET ORAL at 18:40

## 2024-01-01 RX ADMIN — INSULIN GLARGINE 12 UNITS: 100 INJECTION, SOLUTION SUBCUTANEOUS at 21:50

## 2024-01-01 RX ADMIN — APIXABAN 2.5 MG: 2.5 TABLET, FILM COATED ORAL at 12:38

## 2024-01-01 RX ADMIN — ACETAMINOPHEN 975 MG: 325 TABLET ORAL at 00:37

## 2024-01-01 RX ADMIN — ALLOPURINOL 100 MG: 100 TABLET ORAL at 08:44

## 2024-01-01 RX ADMIN — ACETAMINOPHEN 975 MG: 325 TABLET ORAL at 12:49

## 2024-01-01 RX ADMIN — BENZONATATE 200 MG: 100 CAPSULE ORAL at 02:47

## 2024-01-01 RX ADMIN — PANTOPRAZOLE SODIUM 40 MG: 40 INJECTION, POWDER, FOR SOLUTION INTRAVENOUS at 08:05

## 2024-01-01 RX ADMIN — ALLOPURINOL 100 MG: 100 TABLET ORAL at 11:55

## 2024-01-01 RX ADMIN — EPOETIN ALFA 15000 UNITS: 20000 SOLUTION INTRAVENOUS; SUBCUTANEOUS at 22:00

## 2024-01-01 RX ADMIN — ALLOPURINOL 100 MG: 100 TABLET ORAL at 11:11

## 2024-01-01 RX ADMIN — TRAMADOL HYDROCHLORIDE 50 MG: 50 TABLET, COATED ORAL at 13:53

## 2024-01-01 RX ADMIN — MIDODRINE HYDROCHLORIDE 15 MG: 10 TABLET ORAL at 16:13

## 2024-01-01 RX ADMIN — Medication 5 MG: at 21:11

## 2024-01-01 RX ADMIN — BENZONATATE 200 MG: 100 CAPSULE ORAL at 10:05

## 2024-01-01 RX ADMIN — NEPHROCAP 1 CAPSULE: 1 CAP ORAL at 08:10

## 2024-01-01 RX ADMIN — AMLODIPINE BESYLATE 10 MG: 10 TABLET ORAL at 10:08

## 2024-01-01 RX ADMIN — ACETAMINOPHEN 975 MG: 325 TABLET ORAL at 15:46

## 2024-01-01 RX ADMIN — METOPROLOL SUCCINATE 25 MG: 25 TABLET, FILM COATED, EXTENDED RELEASE ORAL at 23:03

## 2024-01-01 RX ADMIN — CLOPIDOGREL BISULFATE 75 MG: 75 TABLET ORAL at 12:42

## 2024-01-01 RX ADMIN — MIDODRINE HYDROCHLORIDE 15 MG: 10 TABLET ORAL at 05:00

## 2024-01-01 RX ADMIN — GABAPENTIN 100 MG: 100 CAPSULE ORAL at 12:42

## 2024-01-01 RX ADMIN — FENTANYL CITRATE 50 MCG: 50 INJECTION, SOLUTION INTRAMUSCULAR; INTRAVENOUS at 11:21

## 2024-01-01 RX ADMIN — ATORVASTATIN CALCIUM 40 MG: 40 TABLET, FILM COATED ORAL at 21:12

## 2024-01-01 RX ADMIN — MIDODRINE HYDROCHLORIDE 15 MG: 10 TABLET ORAL at 14:38

## 2024-01-01 RX ADMIN — MIDODRINE HYDROCHLORIDE 15 MG: 10 TABLET ORAL at 00:42

## 2024-01-01 RX ADMIN — TRAMADOL HYDROCHLORIDE 50 MG: 50 TABLET, COATED ORAL at 22:23

## 2024-01-01 RX ADMIN — OXYCODONE HYDROCHLORIDE 5 MG: 5 TABLET ORAL at 05:21

## 2024-01-01 RX ADMIN — MIDODRINE HYDROCHLORIDE 15 MG: 10 TABLET ORAL at 01:29

## 2024-01-01 RX ADMIN — SENNOSIDES AND DOCUSATE SODIUM 2 TABLET: 8.6; 5 TABLET ORAL at 08:12

## 2024-01-01 RX ADMIN — POLYETHYLENE GLYCOL (3350) 17 G: 17 POWDER, FOR SOLUTION ORAL at 09:00

## 2024-01-01 RX ADMIN — CEFAZOLIN 2 G: 330 INJECTION, POWDER, FOR SOLUTION INTRAMUSCULAR; INTRAVENOUS at 10:25

## 2024-01-01 RX ADMIN — LIDOCAINE 1 PATCH: 4 PATCH TOPICAL at 09:11

## 2024-01-01 RX ADMIN — HEPARIN SODIUM 5000 UNITS: 5000 INJECTION INTRAVENOUS; SUBCUTANEOUS at 01:56

## 2024-01-01 RX ADMIN — CLOPIDOGREL 75 MG: 75 TABLET ORAL at 08:48

## 2024-01-01 RX ADMIN — PANTOPRAZOLE SODIUM 40 MG: 40 TABLET, DELAYED RELEASE ORAL at 06:04

## 2024-01-01 RX ADMIN — ATORVASTATIN CALCIUM 40 MG: 40 TABLET, FILM COATED ORAL at 21:11

## 2024-01-01 RX ADMIN — OXYCODONE HYDROCHLORIDE 5 MG: 5 TABLET ORAL at 16:22

## 2024-01-01 RX ADMIN — INSULIN GLARGINE 12 UNITS: 100 INJECTION, SOLUTION SUBCUTANEOUS at 21:41

## 2024-01-01 RX ADMIN — OXYCODONE HYDROCHLORIDE 5 MG: 5 TABLET ORAL at 06:01

## 2024-01-01 RX ADMIN — MIDODRINE HYDROCHLORIDE 20 MG: 10 TABLET ORAL at 12:12

## 2024-01-01 RX ADMIN — INSULIN GLARGINE 6 UNITS: 100 INJECTION, SOLUTION SUBCUTANEOUS at 22:18

## 2024-01-01 RX ADMIN — MIDODRINE HYDROCHLORIDE 15 MG: 10 TABLET ORAL at 00:00

## 2024-01-01 RX ADMIN — BENZONATATE 200 MG: 100 CAPSULE ORAL at 12:47

## 2024-01-01 RX ADMIN — GABAPENTIN 100 MG: 100 CAPSULE ORAL at 08:00

## 2024-01-01 RX ADMIN — Medication 5 MG: at 21:22

## 2024-01-01 RX ADMIN — TRAMADOL HYDROCHLORIDE 50 MG: 50 TABLET, COATED ORAL at 19:00

## 2024-01-01 RX ADMIN — INSULIN GLARGINE 6 UNITS: 100 INJECTION, SOLUTION SUBCUTANEOUS at 00:29

## 2024-01-01 RX ADMIN — INSULIN GLARGINE 6 UNITS: 100 INJECTION, SOLUTION SUBCUTANEOUS at 22:58

## 2024-01-01 RX ADMIN — ASPIRIN 81 MG CHEWABLE TABLET 81 MG: 81 TABLET CHEWABLE at 08:34

## 2024-01-01 RX ADMIN — ASPIRIN 81 MG CHEWABLE TABLET 81 MG: 81 TABLET CHEWABLE at 08:33

## 2024-01-01 RX ADMIN — ALLOPURINOL 100 MG: 100 TABLET ORAL at 11:33

## 2024-01-01 RX ADMIN — SEVELAMER CARBONATE 800 MG: 800 TABLET, FILM COATED ORAL at 09:33

## 2024-01-01 RX ADMIN — ACETAMINOPHEN 975 MG: 325 TABLET ORAL at 22:10

## 2024-01-01 RX ADMIN — OXYCODONE HYDROCHLORIDE 5 MG: 5 TABLET ORAL at 21:00

## 2024-01-01 RX ADMIN — Medication 5 MG: at 20:06

## 2024-01-01 RX ADMIN — MIDODRINE HYDROCHLORIDE 15 MG: 5 TABLET ORAL at 14:42

## 2024-01-01 RX ADMIN — SODIUM CHLORIDE 500 ML: 9 INJECTION, SOLUTION INTRAVENOUS at 09:49

## 2024-01-01 RX ADMIN — GABAPENTIN 100 MG: 100 CAPSULE ORAL at 12:48

## 2024-01-01 RX ADMIN — INSULIN GLARGINE 6 UNITS: 100 INJECTION, SOLUTION SUBCUTANEOUS at 20:58

## 2024-01-01 RX ADMIN — CARBOXYMETHYLCELLULOSE SODIUM 2 DROP: 5 SOLUTION/ DROPS OPHTHALMIC at 22:03

## 2024-01-01 RX ADMIN — BENZONATATE 200 MG: 100 CAPSULE ORAL at 12:58

## 2024-01-01 RX ADMIN — GABAPENTIN 100 MG: 100 CAPSULE ORAL at 09:09

## 2024-01-01 RX ADMIN — VANCOMYCIN HYDROCHLORIDE 2000 MG: 5 INJECTION, POWDER, LYOPHILIZED, FOR SOLUTION INTRAVENOUS at 11:34

## 2024-01-01 RX ADMIN — ASPIRIN 81 MG CHEWABLE TABLET 81 MG: 81 TABLET CHEWABLE at 09:22

## 2024-01-01 RX ADMIN — ASPIRIN 81 MG CHEWABLE TABLET 81 MG: 81 TABLET CHEWABLE at 09:21

## 2024-01-01 RX ADMIN — LIDOCAINE 1 PATCH: 4 PATCH TOPICAL at 09:39

## 2024-01-01 RX ADMIN — APIXABAN 2.5 MG: 2.5 TABLET, FILM COATED ORAL at 21:27

## 2024-01-01 RX ADMIN — APIXABAN 2.5 MG: 2.5 TABLET, FILM COATED ORAL at 20:54

## 2024-01-01 RX ADMIN — APIXABAN 2.5 MG: 5 TABLET, FILM COATED ORAL at 09:02

## 2024-01-01 RX ADMIN — ALBUTEROL SULFATE 2 PUFF: 90 AEROSOL, METERED RESPIRATORY (INHALATION) at 16:12

## 2024-01-01 RX ADMIN — INSULIN LISPRO 1 UNITS: 100 INJECTION, SOLUTION INTRAVENOUS; SUBCUTANEOUS at 17:46

## 2024-01-01 RX ADMIN — PROPOFOL 70 MCG/KG/MIN: 10 INJECTION, EMULSION INTRAVENOUS at 10:19

## 2024-01-01 RX ADMIN — HEPARIN SODIUM 5000 UNITS: 5000 INJECTION INTRAVENOUS; SUBCUTANEOUS at 01:44

## 2024-01-01 RX ADMIN — ACETAMINOPHEN 975 MG: 325 TABLET ORAL at 20:07

## 2024-01-01 RX ADMIN — INSULIN LISPRO 1 UNITS: 100 INJECTION, SOLUTION INTRAVENOUS; SUBCUTANEOUS at 17:56

## 2024-01-01 RX ADMIN — APIXABAN 2.5 MG: 5 TABLET, FILM COATED ORAL at 20:37

## 2024-01-01 RX ADMIN — MIDAZOLAM HYDROCHLORIDE 0.5 MG: 1 INJECTION, SOLUTION INTRAMUSCULAR; INTRAVENOUS at 16:22

## 2024-01-01 RX ADMIN — ATORVASTATIN CALCIUM 40 MG: 40 TABLET, FILM COATED ORAL at 19:40

## 2024-01-01 RX ADMIN — ALLOPURINOL 100 MG: 100 TABLET ORAL at 08:51

## 2024-01-01 RX ADMIN — ASPIRIN 81 MG CHEWABLE TABLET 81 MG: 81 TABLET CHEWABLE at 08:03

## 2024-01-01 RX ADMIN — METOPROLOL SUCCINATE 25 MG: 25 TABLET, FILM COATED, EXTENDED RELEASE ORAL at 09:22

## 2024-01-01 RX ADMIN — MIDODRINE HYDROCHLORIDE 15 MG: 5 TABLET ORAL at 10:50

## 2024-01-01 RX ADMIN — ACETAMINOPHEN 975 MG: 325 TABLET ORAL at 21:20

## 2024-01-01 RX ADMIN — ACETAMINOPHEN 975 MG: 325 TABLET ORAL at 12:11

## 2024-01-01 RX ADMIN — CARBOXYMETHYLCELLULOSE SODIUM 2 DROP: 5 SOLUTION/ DROPS OPHTHALMIC at 20:18

## 2024-01-01 RX ADMIN — INSULIN LISPRO 1 UNITS: 100 INJECTION, SOLUTION INTRAVENOUS; SUBCUTANEOUS at 13:14

## 2024-01-01 RX ADMIN — SEVELAMER CARBONATE 800 MG: 800 TABLET, FILM COATED ORAL at 08:34

## 2024-01-01 RX ADMIN — OXYCODONE HYDROCHLORIDE 2.5 MG: 5 TABLET ORAL at 17:23

## 2024-01-01 RX ADMIN — CLOPIDOGREL BISULFATE 75 MG: 75 TABLET, FILM COATED ORAL at 10:32

## 2024-01-01 RX ADMIN — MIDODRINE HYDROCHLORIDE 15 MG: 5 TABLET ORAL at 02:50

## 2024-01-01 RX ADMIN — OSELTAMAVIR PHOSPHATE 30 MG: 30 CAPSULE ORAL at 16:44

## 2024-01-01 RX ADMIN — OXYCODONE HYDROCHLORIDE 5 MG: 5 TABLET ORAL at 09:26

## 2024-01-01 RX ADMIN — ATORVASTATIN CALCIUM 40 MG: 40 TABLET, FILM COATED ORAL at 20:20

## 2024-01-01 RX ADMIN — APIXABAN 2.5 MG: 5 TABLET, FILM COATED ORAL at 11:29

## 2024-01-01 RX ADMIN — DEXAMETHASONE 6 MG: 6 TABLET ORAL at 23:03

## 2024-01-01 RX ADMIN — METOPROLOL SUCCINATE 25 MG: 25 TABLET, FILM COATED, EXTENDED RELEASE ORAL at 08:41

## 2024-01-01 RX ADMIN — NEPHROCAP 1 CAPSULE: 1 CAP ORAL at 08:44

## 2024-01-01 RX ADMIN — INSULIN LISPRO 1 UNITS: 100 INJECTION, SOLUTION INTRAVENOUS; SUBCUTANEOUS at 08:56

## 2024-01-01 RX ADMIN — Medication 5 MG: at 21:54

## 2024-01-01 RX ADMIN — PANTOPRAZOLE SODIUM 40 MG: 40 TABLET, DELAYED RELEASE ORAL at 06:58

## 2024-01-01 RX ADMIN — NEPHROCAP 1 CAPSULE: 1 CAP ORAL at 11:30

## 2024-01-01 RX ADMIN — ATORVASTATIN CALCIUM 40 MG: 40 TABLET, FILM COATED ORAL at 00:37

## 2024-01-01 RX ADMIN — Medication 10 ML: at 03:58

## 2024-01-01 RX ADMIN — MIDODRINE HYDROCHLORIDE 15 MG: 10 TABLET ORAL at 08:19

## 2024-01-01 RX ADMIN — ACETAMINOPHEN 975 MG: 325 TABLET ORAL at 06:02

## 2024-01-01 RX ADMIN — SENNOSIDES AND DOCUSATE SODIUM 2 TABLET: 8.6; 5 TABLET ORAL at 21:04

## 2024-01-01 RX ADMIN — VANCOMYCIN HYDROCHLORIDE 750 MG: 750 INJECTION, SOLUTION INTRAVENOUS at 17:17

## 2024-01-01 RX ADMIN — HEPARIN SODIUM 5000 UNITS: 5000 INJECTION INTRAVENOUS; SUBCUTANEOUS at 08:25

## 2024-01-01 RX ADMIN — TRAMADOL HYDROCHLORIDE 50 MG: 50 TABLET, COATED ORAL at 06:13

## 2024-01-01 RX ADMIN — METOPROLOL SUCCINATE 25 MG: 25 TABLET, FILM COATED, EXTENDED RELEASE ORAL at 08:50

## 2024-01-01 RX ADMIN — INSULIN GLARGINE 6 UNITS: 100 INJECTION, SOLUTION SUBCUTANEOUS at 21:00

## 2024-01-01 RX ADMIN — LIDOCAINE 4%: 4 CREAM TOPICAL at 16:26

## 2024-01-01 RX ADMIN — Medication: at 09:00

## 2024-01-01 RX ADMIN — CARBOXYMETHYLCELLULOSE SODIUM 2 DROP: 5 SOLUTION/ DROPS OPHTHALMIC at 09:21

## 2024-01-01 RX ADMIN — ASPIRIN 81 MG CHEWABLE TABLET 81 MG: 81 TABLET CHEWABLE at 10:27

## 2024-01-01 RX ADMIN — HYDROMORPHONE HYDROCHLORIDE 0.2 MG: 1 INJECTION, SOLUTION INTRAMUSCULAR; INTRAVENOUS; SUBCUTANEOUS at 18:51

## 2024-01-01 RX ADMIN — ACETAMINOPHEN 975 MG: 325 TABLET ORAL at 10:30

## 2024-01-01 RX ADMIN — APIXABAN 2.5 MG: 5 TABLET, FILM COATED ORAL at 12:01

## 2024-01-01 RX ADMIN — HEPARIN SODIUM 22000 UNITS: 20000 INJECTION, SOLUTION INTRAVENOUS; SUBCUTANEOUS at 20:16

## 2024-01-01 RX ADMIN — GABAPENTIN 100 MG: 100 CAPSULE ORAL at 09:37

## 2024-01-01 RX ADMIN — FENTANYL CITRATE 25 MCG: 50 INJECTION, SOLUTION INTRAMUSCULAR; INTRAVENOUS at 13:35

## 2024-01-01 RX ADMIN — SENNOSIDES AND DOCUSATE SODIUM 2 TABLET: 8.6; 5 TABLET ORAL at 12:57

## 2024-01-01 RX ADMIN — SODIUM BICARBONATE 50 MEQ: 84 INJECTION, SOLUTION INTRAVENOUS at 18:50

## 2024-01-01 RX ADMIN — KETOTIFEN FUMARATE 1 DROP: 0.35 SOLUTION/ DROPS OPHTHALMIC at 22:14

## 2024-01-01 RX ADMIN — SENNOSIDES 8.6 MG: 8.6 TABLET, FILM COATED ORAL at 22:12

## 2024-01-01 RX ADMIN — OXYCODONE HYDROCHLORIDE 5 MG: 5 TABLET ORAL at 20:03

## 2024-01-01 RX ADMIN — RENO CAPS 1 CAPSULE: 100; 1.5; 1.7; 20; 10; 1; 150; 5; 6 CAPSULE ORAL at 12:38

## 2024-01-01 RX ADMIN — MIDODRINE HYDROCHLORIDE 15 MG: 10 TABLET ORAL at 09:37

## 2024-01-01 RX ADMIN — MIDODRINE HYDROCHLORIDE 15 MG: 10 TABLET ORAL at 08:51

## 2024-01-01 RX ADMIN — TRAMADOL HYDROCHLORIDE 50 MG: 50 TABLET, COATED ORAL at 21:40

## 2024-01-01 RX ADMIN — ACETAMINOPHEN 975 MG: 325 TABLET ORAL at 17:56

## 2024-01-01 RX ADMIN — SENNOSIDES AND DOCUSATE SODIUM 2 TABLET: 8.6; 5 TABLET ORAL at 20:29

## 2024-01-01 RX ADMIN — PANTOPRAZOLE SODIUM 40 MG: 40 TABLET, DELAYED RELEASE ORAL at 06:45

## 2024-01-01 RX ADMIN — POLYETHYLENE GLYCOL 3350 17 G: 17 POWDER, FOR SOLUTION ORAL at 11:11

## 2024-01-01 RX ADMIN — MIDODRINE HYDROCHLORIDE 15 MG: 10 TABLET ORAL at 08:53

## 2024-01-01 RX ADMIN — CARBOXYMETHYLCELLULOSE SODIUM 2 DROP: 5 SOLUTION/ DROPS OPHTHALMIC at 10:30

## 2024-01-01 RX ADMIN — PANTOPRAZOLE SODIUM 40 MG: 40 INJECTION, POWDER, FOR SOLUTION INTRAVENOUS at 11:44

## 2024-01-01 RX ADMIN — ALBUMIN HUMAN 25 G: 0.25 SOLUTION INTRAVENOUS at 17:13

## 2024-01-01 RX ADMIN — ALLOPURINOL 100 MG: 100 TABLET ORAL at 08:00

## 2024-01-01 RX ADMIN — HYDROMORPHONE HYDROCHLORIDE 0.2 MG: 1 INJECTION, SOLUTION INTRAMUSCULAR; INTRAVENOUS; SUBCUTANEOUS at 21:16

## 2024-01-01 RX ADMIN — PANTOPRAZOLE SODIUM 40 MG: 40 INJECTION, POWDER, FOR SOLUTION INTRAVENOUS at 08:33

## 2024-01-01 RX ADMIN — CEFEPIME HYDROCHLORIDE 500 MG: 1 INJECTION, POWDER, FOR SOLUTION INTRAMUSCULAR; INTRAVENOUS at 13:26

## 2024-01-01 RX ADMIN — ASPIRIN 81 MG CHEWABLE TABLET 81 MG: 81 TABLET CHEWABLE at 08:40

## 2024-01-01 RX ADMIN — CLOPIDOGREL BISULFATE 75 MG: 75 TABLET, FILM COATED ORAL at 09:47

## 2024-01-01 RX ADMIN — GABAPENTIN 100 MG: 100 CAPSULE ORAL at 21:22

## 2024-01-01 RX ADMIN — RENO CAPS 1 CAPSULE: 100; 1.5; 1.7; 20; 10; 1; 150; 5; 6 CAPSULE ORAL at 10:30

## 2024-01-01 RX ADMIN — INSULIN GLARGINE 12 UNITS: 100 INJECTION, SOLUTION SUBCUTANEOUS at 20:59

## 2024-01-01 RX ADMIN — CARBOXYMETHYLCELLULOSE SODIUM 2 DROP: 5 SOLUTION/ DROPS OPHTHALMIC at 21:22

## 2024-01-01 RX ADMIN — RENO CAPS 1 CAPSULE: 100; 1.5; 1.7; 20; 10; 1; 150; 5; 6 CAPSULE ORAL at 08:26

## 2024-01-01 RX ADMIN — MIDODRINE HYDROCHLORIDE 15 MG: 10 TABLET ORAL at 01:38

## 2024-01-01 RX ADMIN — CARBOXYMETHYLCELLULOSE SODIUM 2 DROP: 5 SOLUTION/ DROPS OPHTHALMIC at 18:25

## 2024-01-01 RX ADMIN — APIXABAN 2.5 MG: 5 TABLET, FILM COATED ORAL at 21:23

## 2024-01-01 RX ADMIN — HEPARIN SODIUM 5000 UNITS: 5000 INJECTION INTRAVENOUS; SUBCUTANEOUS at 00:59

## 2024-01-01 RX ADMIN — APIXABAN 2.5 MG: 2.5 TABLET, FILM COATED ORAL at 09:34

## 2024-01-01 RX ADMIN — ACETAMINOPHEN 975 MG: 325 TABLET ORAL at 21:39

## 2024-01-01 RX ADMIN — PIPERACILLIN SODIUM AND TAZOBACTAM SODIUM 2.25 G: 2; .25 INJECTION, SOLUTION INTRAVENOUS at 08:21

## 2024-01-01 RX ADMIN — INSULIN GLARGINE 12 UNITS: 100 INJECTION, SOLUTION SUBCUTANEOUS at 21:12

## 2024-01-01 RX ADMIN — MIDODRINE HYDROCHLORIDE 15 MG: 10 TABLET ORAL at 17:09

## 2024-01-01 RX ADMIN — MIDODRINE HYDROCHLORIDE 20 MG: 10 TABLET ORAL at 17:09

## 2024-01-01 RX ADMIN — HEPARIN SODIUM 5000 UNITS: 5000 INJECTION INTRAVENOUS; SUBCUTANEOUS at 01:14

## 2024-01-01 RX ADMIN — VANCOMYCIN HYDROCHLORIDE 1500 MG: 5 INJECTION, POWDER, LYOPHILIZED, FOR SOLUTION INTRAVENOUS at 21:20

## 2024-01-01 RX ADMIN — CLOPIDOGREL BISULFATE 75 MG: 75 TABLET, FILM COATED ORAL at 10:17

## 2024-01-01 RX ADMIN — APIXABAN 2.5 MG: 5 TABLET, FILM COATED ORAL at 23:49

## 2024-01-01 RX ADMIN — MIDODRINE HYDROCHLORIDE 15 MG: 10 TABLET ORAL at 10:46

## 2024-01-01 RX ADMIN — HEPARIN SODIUM 5000 UNITS: 5000 INJECTION INTRAVENOUS; SUBCUTANEOUS at 16:29

## 2024-01-01 RX ADMIN — ALLOPURINOL 100 MG: 100 TABLET ORAL at 08:12

## 2024-01-01 RX ADMIN — INSULIN LISPRO 1 UNITS: 100 INJECTION, SOLUTION INTRAVENOUS; SUBCUTANEOUS at 17:42

## 2024-01-01 RX ADMIN — ONDANSETRON 4 MG: 2 INJECTION, SOLUTION INTRAMUSCULAR; INTRAVENOUS at 13:45

## 2024-01-01 RX ADMIN — ACETAMINOPHEN 975 MG: 325 TABLET ORAL at 06:00

## 2024-01-01 RX ADMIN — GABAPENTIN 100 MG: 100 CAPSULE ORAL at 21:49

## 2024-01-01 RX ADMIN — MEROPENEM 1 G: 1 INJECTION, POWDER, FOR SOLUTION INTRAVENOUS at 16:45

## 2024-01-01 RX ADMIN — ALLOPURINOL 100 MG: 100 TABLET ORAL at 10:44

## 2024-01-01 RX ADMIN — PANTOPRAZOLE SODIUM 40 MG: 40 TABLET, DELAYED RELEASE ORAL at 08:54

## 2024-01-01 RX ADMIN — MUPIROCIN: 20 OINTMENT TOPICAL at 20:28

## 2024-01-01 RX ADMIN — ACETAMINOPHEN 975 MG: 325 TABLET ORAL at 18:43

## 2024-01-01 RX ADMIN — SODIUM CHLORIDE 1000 ML: 0.9 INJECTION, SOLUTION INTRAVENOUS at 15:30

## 2024-01-01 RX ADMIN — CARBOXYMETHYLCELLULOSE SODIUM 2 DROP: 5 SOLUTION/ DROPS OPHTHALMIC at 08:19

## 2024-01-01 RX ADMIN — ACETAMINOPHEN 975 MG: 325 TABLET ORAL at 08:12

## 2024-01-01 RX ADMIN — MUPIROCIN: 20 OINTMENT TOPICAL at 22:17

## 2024-01-01 RX ADMIN — OXYCODONE HYDROCHLORIDE 5 MG: 5 TABLET ORAL at 19:03

## 2024-01-01 RX ADMIN — INSULIN LISPRO 1 UNITS: 100 INJECTION, SOLUTION INTRAVENOUS; SUBCUTANEOUS at 18:10

## 2024-01-01 RX ADMIN — PERFLUTREN 1 ML OF DILUTION: 6.52 INJECTION, SUSPENSION INTRAVENOUS at 10:10

## 2024-01-01 RX ADMIN — MIDODRINE HYDROCHLORIDE 15 MG: 10 TABLET ORAL at 10:30

## 2024-01-01 RX ADMIN — OXYCODONE AND ACETAMINOPHEN 1 TABLET: 5; 325 TABLET ORAL at 05:15

## 2024-01-01 RX ADMIN — ASPIRIN 81 MG CHEWABLE TABLET 81 MG: 81 TABLET CHEWABLE at 11:26

## 2024-01-01 RX ADMIN — INSULIN LISPRO 2 UNITS: 100 INJECTION, SOLUTION INTRAVENOUS; SUBCUTANEOUS at 14:55

## 2024-01-01 RX ADMIN — SODIUM CHLORIDE 20 ML/HR: 9 INJECTION, SOLUTION INTRAVENOUS at 12:30

## 2024-01-01 RX ADMIN — MIDODRINE HYDROCHLORIDE 15 MG: 10 TABLET ORAL at 17:45

## 2024-01-01 RX ADMIN — NEPHROCAP 1 CAPSULE: 1 CAP ORAL at 10:28

## 2024-01-01 RX ADMIN — INSULIN LISPRO 1 UNITS: 100 INJECTION, SOLUTION INTRAVENOUS; SUBCUTANEOUS at 09:04

## 2024-01-01 RX ADMIN — PIPERACILLIN SODIUM AND TAZOBACTAM SODIUM 2.25 G: 2; .25 INJECTION, SOLUTION INTRAVENOUS at 05:35

## 2024-01-01 RX ADMIN — ALLOPURINOL 100 MG: 100 TABLET ORAL at 23:03

## 2024-01-01 RX ADMIN — GABAPENTIN 100 MG: 100 CAPSULE ORAL at 09:05

## 2024-01-01 RX ADMIN — SEVELAMER CARBONATE 800 MG: 800 TABLET, FILM COATED ORAL at 12:37

## 2024-01-01 RX ADMIN — Medication 0.04 MCG/KG/MIN: at 18:55

## 2024-01-01 RX ADMIN — MIDODRINE HYDROCHLORIDE 15 MG: 5 TABLET ORAL at 10:19

## 2024-01-01 RX ADMIN — HYDROMORPHONE HYDROCHLORIDE 0.6 MG: 1 INJECTION, SOLUTION INTRAMUSCULAR; INTRAVENOUS; SUBCUTANEOUS at 22:30

## 2024-01-01 RX ADMIN — GABAPENTIN 100 MG: 100 CAPSULE ORAL at 12:02

## 2024-01-01 RX ADMIN — MIDODRINE HYDROCHLORIDE 15 MG: 5 TABLET ORAL at 20:37

## 2024-01-01 RX ADMIN — GABAPENTIN 100 MG: 100 CAPSULE ORAL at 21:28

## 2024-01-01 RX ADMIN — ASPIRIN 81 MG CHEWABLE TABLET 81 MG: 81 TABLET CHEWABLE at 11:00

## 2024-01-01 RX ADMIN — MIDODRINE HYDROCHLORIDE 15 MG: 5 TABLET ORAL at 14:00

## 2024-01-01 RX ADMIN — SENNOSIDES AND DOCUSATE SODIUM 2 TABLET: 8.6; 5 TABLET ORAL at 11:11

## 2024-01-01 RX ADMIN — ACETAMINOPHEN 975 MG: 325 TABLET ORAL at 06:04

## 2024-01-01 RX ADMIN — TRAMADOL HYDROCHLORIDE 50 MG: 50 TABLET, COATED ORAL at 17:37

## 2024-01-01 RX ADMIN — MUPIROCIN: 20 OINTMENT TOPICAL at 21:00

## 2024-01-01 RX ADMIN — Medication 5 MG: at 20:50

## 2024-01-01 RX ADMIN — HEPARIN SODIUM 5000 UNITS: 5000 INJECTION INTRAVENOUS; SUBCUTANEOUS at 08:33

## 2024-01-01 RX ADMIN — ATORVASTATIN CALCIUM 40 MG: 40 TABLET, FILM COATED ORAL at 21:54

## 2024-01-01 RX ADMIN — APIXABAN 2.5 MG: 2.5 TABLET, FILM COATED ORAL at 20:06

## 2024-01-01 RX ADMIN — ALLOPURINOL 100 MG: 100 TABLET ORAL at 12:38

## 2024-01-01 RX ADMIN — GABAPENTIN 100 MG: 100 CAPSULE ORAL at 08:26

## 2024-01-01 RX ADMIN — PIPERACILLIN SODIUM AND TAZOBACTAM SODIUM 2.25 G: 2; .25 INJECTION, SOLUTION INTRAVENOUS at 18:09

## 2024-01-01 RX ADMIN — PANTOPRAZOLE SODIUM 40 MG: 40 TABLET, DELAYED RELEASE ORAL at 06:05

## 2024-01-01 RX ADMIN — TRAMADOL HYDROCHLORIDE 50 MG: 50 TABLET, COATED ORAL at 19:39

## 2024-01-01 RX ADMIN — BENZONATATE 200 MG: 100 CAPSULE ORAL at 20:59

## 2024-01-01 RX ADMIN — AMLODIPINE BESYLATE 10 MG: 10 TABLET ORAL at 08:50

## 2024-01-01 RX ADMIN — SENNOSIDES AND DOCUSATE SODIUM 2 TABLET: 8.6; 5 TABLET ORAL at 11:58

## 2024-01-01 RX ADMIN — Medication 10 ML: at 05:07

## 2024-01-01 RX ADMIN — SENNOSIDES 8.6 MG: 8.6 TABLET, FILM COATED ORAL at 20:51

## 2024-01-01 RX ADMIN — GABAPENTIN 100 MG: 100 CAPSULE ORAL at 11:18

## 2024-01-01 RX ADMIN — MIDODRINE HYDROCHLORIDE 15 MG: 10 TABLET ORAL at 09:21

## 2024-01-01 RX ADMIN — HEPARIN SODIUM 1629 UNITS/HR: 10000 INJECTION, SOLUTION INTRAVENOUS at 01:44

## 2024-01-01 RX ADMIN — ATORVASTATIN CALCIUM 40 MG: 40 TABLET, FILM COATED ORAL at 20:34

## 2024-01-01 RX ADMIN — MIDODRINE HYDROCHLORIDE 10 MG: 10 TABLET ORAL at 08:50

## 2024-01-01 RX ADMIN — PANTOPRAZOLE SODIUM 40 MG: 40 TABLET, DELAYED RELEASE ORAL at 06:55

## 2024-01-01 RX ADMIN — ACETAMINOPHEN 650 MG: 650 SOLUTION ORAL at 22:20

## 2024-01-01 RX ADMIN — ALLOPURINOL 100 MG: 100 TABLET ORAL at 10:30

## 2024-01-01 RX ADMIN — PANTOPRAZOLE SODIUM 40 MG: 40 TABLET, DELAYED RELEASE ORAL at 06:57

## 2024-01-01 RX ADMIN — OXYCODONE HYDROCHLORIDE 5 MG: 5 TABLET ORAL at 11:00

## 2024-01-01 RX ADMIN — POLYETHYLENE GLYCOL 3350 17 G: 17 POWDER, FOR SOLUTION ORAL at 11:00

## 2024-01-01 RX ADMIN — INSULIN LISPRO 1 UNITS: 100 INJECTION, SOLUTION INTRAVENOUS; SUBCUTANEOUS at 12:50

## 2024-01-01 RX ADMIN — IPRATROPIUM BROMIDE AND ALBUTEROL SULFATE 3 ML: .5; 3 SOLUTION RESPIRATORY (INHALATION) at 17:32

## 2024-01-01 RX ADMIN — MUPIROCIN: 20 OINTMENT TOPICAL at 20:21

## 2024-01-01 RX ADMIN — INSULIN GLARGINE 6 UNITS: 100 INJECTION, SOLUTION SUBCUTANEOUS at 20:43

## 2024-01-01 RX ADMIN — MUPIROCIN: 20 OINTMENT TOPICAL at 14:39

## 2024-01-01 RX ADMIN — METOPROLOL SUCCINATE 25 MG: 25 TABLET, FILM COATED, EXTENDED RELEASE ORAL at 15:20

## 2024-01-01 RX ADMIN — OXYCODONE HYDROCHLORIDE 2.5 MG: 5 TABLET ORAL at 17:45

## 2024-01-01 RX ADMIN — ONDANSETRON 4 MG: 2 INJECTION INTRAMUSCULAR; INTRAVENOUS at 18:13

## 2024-01-01 RX ADMIN — RENO CAPS 1 CAPSULE: 100; 1.5; 1.7; 20; 10; 1; 150; 5; 6 CAPSULE ORAL at 08:53

## 2024-01-01 RX ADMIN — ACETAMINOPHEN 650 MG: 650 SOLUTION ORAL at 12:06

## 2024-01-01 RX ADMIN — ACETAMINOPHEN 975 MG: 325 TABLET ORAL at 00:06

## 2024-01-01 RX ADMIN — GABAPENTIN 100 MG: 100 CAPSULE ORAL at 13:17

## 2024-01-01 RX ADMIN — POLYETHYLENE GLYCOL 3350 17 G: 17 POWDER, FOR SOLUTION ORAL at 08:27

## 2024-01-01 RX ADMIN — SENNOSIDES AND DOCUSATE SODIUM 2 TABLET: 8.6; 5 TABLET ORAL at 21:53

## 2024-01-01 RX ADMIN — INSULIN LISPRO 1 UNITS: 100 INJECTION, SOLUTION INTRAVENOUS; SUBCUTANEOUS at 10:24

## 2024-01-01 RX ADMIN — NEPHROCAP 1 CAPSULE: 1 CAP ORAL at 09:15

## 2024-01-01 RX ADMIN — ACETAMINOPHEN 975 MG: 325 TABLET ORAL at 12:34

## 2024-01-01 RX ADMIN — HYDROXYZINE HYDROCHLORIDE 25 MG: 25 TABLET, FILM COATED ORAL at 08:17

## 2024-01-01 RX ADMIN — CARBOXYMETHYLCELLULOSE SODIUM 2 DROP: 5 SOLUTION/ DROPS OPHTHALMIC at 20:40

## 2024-01-01 RX ADMIN — OXYCODONE HYDROCHLORIDE 5 MG: 5 TABLET ORAL at 20:43

## 2024-01-01 RX ADMIN — SEVELAMER CARBONATE 800 MG: 800 TABLET, FILM COATED ORAL at 09:56

## 2024-01-01 RX ADMIN — ACETAMINOPHEN 650 MG: 650 SOLUTION ORAL at 09:03

## 2024-01-01 RX ADMIN — CARBOXYMETHYLCELLULOSE SODIUM 2 DROP: 5 SOLUTION/ DROPS OPHTHALMIC at 10:41

## 2024-01-01 RX ADMIN — Medication 5 MG: at 20:16

## 2024-01-01 RX ADMIN — HYDROMORPHONE HYDROCHLORIDE 0.2 MG: 0.2 INJECTION, SOLUTION INTRAMUSCULAR; INTRAVENOUS; SUBCUTANEOUS at 01:48

## 2024-01-01 RX ADMIN — MIDODRINE HYDROCHLORIDE 15 MG: 5 TABLET ORAL at 08:49

## 2024-01-01 RX ADMIN — PANTOPRAZOLE SODIUM 40 MG: 40 TABLET, DELAYED RELEASE ORAL at 11:18

## 2024-01-01 RX ADMIN — LIDOCAINE 1 PATCH: 4 PATCH TOPICAL at 13:45

## 2024-01-01 RX ADMIN — CARBOXYMETHYLCELLULOSE SODIUM 2 DROP: 5 SOLUTION/ DROPS OPHTHALMIC at 15:38

## 2024-01-01 RX ADMIN — HEPARIN SODIUM 1360 UNITS/HR: 10000 INJECTION, SOLUTION INTRAVENOUS at 03:54

## 2024-01-01 RX ADMIN — MIDODRINE HYDROCHLORIDE 20 MG: 10 TABLET ORAL at 12:30

## 2024-01-01 RX ADMIN — OXYCODONE HYDROCHLORIDE 5 MG: 5 TABLET ORAL at 08:33

## 2024-01-01 RX ADMIN — PANTOPRAZOLE SODIUM 40 MG: 40 INJECTION, POWDER, FOR SOLUTION INTRAVENOUS at 08:43

## 2024-01-01 RX ADMIN — INSULIN LISPRO 1 UNITS: 100 INJECTION, SOLUTION INTRAVENOUS; SUBCUTANEOUS at 17:19

## 2024-01-01 RX ADMIN — CLOPIDOGREL BISULFATE 75 MG: 75 TABLET ORAL at 08:16

## 2024-01-01 RX ADMIN — Medication 120 MCG: at 10:34

## 2024-01-01 RX ADMIN — ALBUTEROL SULFATE 2 PUFF: 90 AEROSOL, METERED RESPIRATORY (INHALATION) at 08:50

## 2024-01-01 RX ADMIN — APIXABAN 2.5 MG: 5 TABLET, FILM COATED ORAL at 12:38

## 2024-01-01 RX ADMIN — PIPERACILLIN SODIUM AND TAZOBACTAM SODIUM 2.25 G: 2; .25 INJECTION, SOLUTION INTRAVENOUS at 05:32

## 2024-01-01 RX ADMIN — ACETAMINOPHEN 975 MG: 325 TABLET ORAL at 08:52

## 2024-01-01 RX ADMIN — PANTOPRAZOLE SODIUM 40 MG: 40 TABLET, DELAYED RELEASE ORAL at 08:49

## 2024-01-01 RX ADMIN — INSULIN GLARGINE 6 UNITS: 100 INJECTION, SOLUTION SUBCUTANEOUS at 20:26

## 2024-01-01 RX ADMIN — GABAPENTIN 100 MG: 100 CAPSULE ORAL at 13:45

## 2024-01-01 RX ADMIN — CLOPIDOGREL 75 MG: 75 TABLET ORAL at 10:24

## 2024-01-01 RX ADMIN — MIDODRINE HYDROCHLORIDE 15 MG: 10 TABLET ORAL at 17:16

## 2024-01-01 RX ADMIN — CARBOXYMETHYLCELLULOSE SODIUM 2 DROP: 5 SOLUTION/ DROPS OPHTHALMIC at 17:06

## 2024-01-01 RX ADMIN — PANTOPRAZOLE SODIUM 40 MG: 40 TABLET, DELAYED RELEASE ORAL at 06:54

## 2024-01-01 RX ADMIN — OXYCODONE HYDROCHLORIDE 5 MG: 5 TABLET ORAL at 06:33

## 2024-01-01 RX ADMIN — KETOTIFEN FUMARATE 1 DROP: 0.35 SOLUTION/ DROPS OPHTHALMIC at 22:00

## 2024-01-01 RX ADMIN — LIDOCAINE 4% 1 PATCH: 40 PATCH TOPICAL at 08:00

## 2024-01-01 RX ADMIN — MIDODRINE HYDROCHLORIDE 15 MG: 10 TABLET ORAL at 05:20

## 2024-01-01 RX ADMIN — LIDOCAINE 1 PATCH: 4 PATCH TOPICAL at 09:16

## 2024-01-01 RX ADMIN — APIXABAN 2.5 MG: 2.5 TABLET, FILM COATED ORAL at 20:39

## 2024-01-01 RX ADMIN — HYDROMORPHONE HYDROCHLORIDE 0.6 MG: 1 INJECTION, SOLUTION INTRAMUSCULAR; INTRAVENOUS; SUBCUTANEOUS at 09:14

## 2024-01-01 RX ADMIN — MIDODRINE HYDROCHLORIDE 15 MG: 10 TABLET ORAL at 09:09

## 2024-01-01 RX ADMIN — Medication 120 MCG: at 10:45

## 2024-01-01 RX ADMIN — OXYCODONE AND ACETAMINOPHEN 1 TABLET: 5; 325 TABLET ORAL at 21:01

## 2024-01-01 RX ADMIN — CLOPIDOGREL BISULFATE 75 MG: 75 TABLET ORAL at 10:02

## 2024-01-01 RX ADMIN — GABAPENTIN 100 MG: 100 CAPSULE ORAL at 10:28

## 2024-01-01 RX ADMIN — MIDODRINE HYDROCHLORIDE 15 MG: 10 TABLET ORAL at 08:16

## 2024-01-01 RX ADMIN — RENO CAPS 1 CAPSULE: 100; 1.5; 1.7; 20; 10; 1; 150; 5; 6 CAPSULE ORAL at 08:19

## 2024-01-01 RX ADMIN — SODIUM HYPOCHLORITE: 2.5 SOLUTION TOPICAL at 05:26

## 2024-01-01 RX ADMIN — GABAPENTIN 100 MG: 100 CAPSULE ORAL at 08:17

## 2024-01-01 RX ADMIN — ACETAMINOPHEN 975 MG: 325 TABLET ORAL at 18:05

## 2024-01-01 RX ADMIN — DEXTROSE MONOHYDRATE 0.3 G/KG/HR: 100 INJECTION, SOLUTION INTRAVENOUS at 12:11

## 2024-01-01 RX ADMIN — OXYCODONE HYDROCHLORIDE 2.5 MG: 5 TABLET ORAL at 05:25

## 2024-01-01 RX ADMIN — RENO CAPS 1 CAPSULE: 100; 1.5; 1.7; 20; 10; 1; 150; 5; 6 CAPSULE ORAL at 12:42

## 2024-01-01 RX ADMIN — APIXABAN 2.5 MG: 5 TABLET, FILM COATED ORAL at 10:52

## 2024-01-01 RX ADMIN — ASPIRIN 81 MG CHEWABLE TABLET 81 MG: 81 TABLET CHEWABLE at 09:09

## 2024-01-01 RX ADMIN — Medication 10 ML: at 05:15

## 2024-01-01 RX ADMIN — HEPARIN SODIUM 5000 UNITS: 5000 INJECTION INTRAVENOUS; SUBCUTANEOUS at 01:34

## 2024-01-01 RX ADMIN — GABAPENTIN 100 MG: 100 CAPSULE ORAL at 10:02

## 2024-01-01 RX ADMIN — POVIDONE-IODINE 1 APPLICATION: 7.5 SOLUTION TOPICAL at 04:00

## 2024-01-01 RX ADMIN — OXYCODONE HYDROCHLORIDE 5 MG: 5 TABLET ORAL at 05:19

## 2024-01-01 RX ADMIN — Medication 80 MCG: at 11:21

## 2024-01-01 RX ADMIN — RENO CAPS 1 CAPSULE: 100; 1.5; 1.7; 20; 10; 1; 150; 5; 6 CAPSULE ORAL at 11:57

## 2024-01-01 RX ADMIN — MIDODRINE HYDROCHLORIDE 15 MG: 5 TABLET ORAL at 05:16

## 2024-01-01 RX ADMIN — APIXABAN 2.5 MG: 5 TABLET, FILM COATED ORAL at 21:01

## 2024-01-01 RX ADMIN — RENO CAPS 1 CAPSULE: 100; 1.5; 1.7; 20; 10; 1; 150; 5; 6 CAPSULE ORAL at 08:51

## 2024-01-01 RX ADMIN — ACETAMINOPHEN 650 MG: 325 TABLET ORAL at 01:26

## 2024-01-01 RX ADMIN — APIXABAN 2.5 MG: 5 TABLET, FILM COATED ORAL at 09:11

## 2024-01-01 RX ADMIN — GABAPENTIN 100 MG: 100 CAPSULE ORAL at 09:34

## 2024-01-01 RX ADMIN — CALCIUM CHLORIDE 1 G: 100 INJECTION, SOLUTION INTRAVENOUS at 18:49

## 2024-01-01 RX ADMIN — ONDANSETRON 4 MG: 2 INJECTION, SOLUTION INTRAMUSCULAR; INTRAVENOUS at 13:35

## 2024-01-01 RX ADMIN — PANTOPRAZOLE SODIUM 40 MG: 40 TABLET, DELAYED RELEASE ORAL at 11:33

## 2024-01-01 RX ADMIN — RENO CAPS 1 CAPSULE: 100; 1.5; 1.7; 20; 10; 1; 150; 5; 6 CAPSULE ORAL at 09:37

## 2024-01-01 RX ADMIN — GABAPENTIN 100 MG: 100 CAPSULE ORAL at 09:02

## 2024-01-01 RX ADMIN — PANTOPRAZOLE SODIUM 40 MG: 40 TABLET, DELAYED RELEASE ORAL at 08:19

## 2024-01-01 RX ADMIN — CLOPIDOGREL BISULFATE 75 MG: 75 TABLET, FILM COATED ORAL at 09:00

## 2024-01-01 RX ADMIN — LIDOCAINE 1 PATCH: 4 PATCH TOPICAL at 15:43

## 2024-01-01 RX ADMIN — MIDODRINE HYDROCHLORIDE 15 MG: 10 TABLET ORAL at 09:42

## 2024-01-01 RX ADMIN — SENNOSIDES 8.6 MG: 8.6 TABLET, FILM COATED ORAL at 21:11

## 2024-01-01 RX ADMIN — ATORVASTATIN CALCIUM 40 MG: 40 TABLET, FILM COATED ORAL at 20:03

## 2024-01-01 RX ADMIN — NEPHROCAP 1 CAPSULE: 1 CAP ORAL at 09:22

## 2024-01-01 RX ADMIN — MIDODRINE HYDROCHLORIDE 15 MG: 5 TABLET ORAL at 22:12

## 2024-01-01 RX ADMIN — BENZONATATE 200 MG: 100 CAPSULE ORAL at 22:03

## 2024-01-01 RX ADMIN — CLOPIDOGREL BISULFATE 75 MG: 75 TABLET ORAL at 08:40

## 2024-01-01 RX ADMIN — INSULIN LISPRO 2 UNITS: 100 INJECTION, SOLUTION INTRAVENOUS; SUBCUTANEOUS at 17:09

## 2024-01-01 RX ADMIN — EPINEPHRINE 1 MG: 0.1 INJECTION, SOLUTION ENDOTRACHEAL; INTRACARDIAC; INTRAVENOUS at 19:30

## 2024-01-01 RX ADMIN — ACETAMINOPHEN 650 MG: 325 TABLET ORAL at 18:37

## 2024-01-01 RX ADMIN — Medication 120 MCG: at 10:42

## 2024-01-01 RX ADMIN — CARBOXYMETHYLCELLULOSE SODIUM 2 DROP: 5 SOLUTION/ DROPS OPHTHALMIC at 20:15

## 2024-01-01 RX ADMIN — EPOETIN ALFA 15000 UNITS: 20000 SOLUTION INTRAVENOUS; SUBCUTANEOUS at 00:46

## 2024-01-01 RX ADMIN — INSULIN LISPRO 2 UNITS: 100 INJECTION, SOLUTION INTRAVENOUS; SUBCUTANEOUS at 08:28

## 2024-01-01 RX ADMIN — PIPERACILLIN SODIUM AND TAZOBACTAM SODIUM 2.25 G: 2; .25 INJECTION, SOLUTION INTRAVENOUS at 18:21

## 2024-01-01 RX ADMIN — NALOXONE HYDROCHLORIDE 0.4 MG: 0.4 INJECTION, SOLUTION INTRAMUSCULAR; INTRAVENOUS; SUBCUTANEOUS at 01:39

## 2024-01-01 RX ADMIN — MUPIROCIN: 20 OINTMENT TOPICAL at 21:08

## 2024-01-01 RX ADMIN — CEFAZOLIN 2 G: 1 INJECTION, POWDER, FOR SOLUTION INTRAMUSCULAR; INTRAVENOUS at 11:29

## 2024-01-01 RX ADMIN — METOPROLOL SUCCINATE 25 MG: 25 TABLET, FILM COATED, EXTENDED RELEASE ORAL at 08:25

## 2024-01-01 RX ADMIN — ACETAMINOPHEN 975 MG: 325 TABLET ORAL at 18:34

## 2024-01-01 RX ADMIN — APIXABAN 2.5 MG: 5 TABLET, FILM COATED ORAL at 08:48

## 2024-01-01 RX ADMIN — Medication 10 ML: at 19:50

## 2024-01-01 RX ADMIN — ATORVASTATIN CALCIUM 40 MG: 40 TABLET, FILM COATED ORAL at 20:02

## 2024-01-01 RX ADMIN — MIDODRINE HYDROCHLORIDE 20 MG: 10 TABLET ORAL at 11:34

## 2024-01-01 RX ADMIN — PANTOPRAZOLE SODIUM 40 MG: 40 TABLET, DELAYED RELEASE ORAL at 09:42

## 2024-01-01 RX ADMIN — POLYETHYLENE GLYCOL 3350 17 G: 17 POWDER, FOR SOLUTION ORAL at 09:22

## 2024-01-01 RX ADMIN — ATORVASTATIN CALCIUM 40 MG: 40 TABLET, FILM COATED ORAL at 20:59

## 2024-01-01 RX ADMIN — CLOPIDOGREL BISULFATE 75 MG: 75 TABLET ORAL at 09:09

## 2024-01-01 RX ADMIN — MIDODRINE HYDROCHLORIDE 20 MG: 10 TABLET ORAL at 13:13

## 2024-01-01 RX ADMIN — HEPARIN SODIUM 5000 UNITS: 5000 INJECTION INTRAVENOUS; SUBCUTANEOUS at 18:16

## 2024-01-01 RX ADMIN — Medication 10 ML: at 16:48

## 2024-01-01 RX ADMIN — MIDODRINE HYDROCHLORIDE 15 MG: 5 TABLET ORAL at 22:24

## 2024-01-01 RX ADMIN — LIDOCAINE HYDROCHLORIDE 5 ML: 20 INJECTION, SOLUTION INFILTRATION; PERINEURAL at 15:48

## 2024-01-01 RX ADMIN — SENNOSIDES AND DOCUSATE SODIUM 2 TABLET: 8.6; 5 TABLET ORAL at 09:02

## 2024-01-01 RX ADMIN — PIPERACILLIN SODIUM AND TAZOBACTAM SODIUM 2.25 G: 2; .25 INJECTION, SOLUTION INTRAVENOUS at 05:36

## 2024-01-01 RX ADMIN — CEFEPIME 2 G: 2 INJECTION, POWDER, FOR SOLUTION INTRAVENOUS at 07:31

## 2024-01-01 RX ADMIN — CLOPIDOGREL BISULFATE 75 MG: 75 TABLET ORAL at 09:05

## 2024-01-01 RX ADMIN — AMLODIPINE BESYLATE 10 MG: 10 TABLET ORAL at 08:25

## 2024-01-01 RX ADMIN — PANTOPRAZOLE SODIUM 40 MG: 40 TABLET, DELAYED RELEASE ORAL at 09:33

## 2024-01-01 RX ADMIN — CARBOXYMETHYLCELLULOSE SODIUM 2 DROP: 5 SOLUTION/ DROPS OPHTHALMIC at 12:38

## 2024-01-01 RX ADMIN — ACETAMINOPHEN 650 MG: 325 TABLET ORAL at 18:40

## 2024-01-01 RX ADMIN — ACETAMINOPHEN 975 MG: 325 TABLET ORAL at 11:56

## 2024-01-01 RX ADMIN — POLYETHYLENE GLYCOL 3350 17 G: 17 POWDER, FOR SOLUTION ORAL at 08:11

## 2024-01-01 RX ADMIN — APIXABAN 2.5 MG: 2.5 TABLET, FILM COATED ORAL at 09:00

## 2024-01-01 RX ADMIN — MIDODRINE HYDROCHLORIDE 15 MG: 5 TABLET ORAL at 06:44

## 2024-01-01 RX ADMIN — MIDODRINE HYDROCHLORIDE 15 MG: 10 TABLET ORAL at 00:24

## 2024-01-01 RX ADMIN — HEPARIN SODIUM 5000 UNITS: 5000 INJECTION INTRAVENOUS; SUBCUTANEOUS at 11:05

## 2024-01-01 RX ADMIN — OXYCODONE HYDROCHLORIDE 2.5 MG: 5 TABLET ORAL at 08:26

## 2024-01-01 RX ADMIN — PERFLUTREN 1 ML OF DILUTION: 6.52 INJECTION, SUSPENSION INTRAVENOUS at 15:43

## 2024-01-01 RX ADMIN — Medication 1 CAPSULE: at 08:00

## 2024-01-01 RX ADMIN — GABAPENTIN 100 MG: 100 CAPSULE ORAL at 20:57

## 2024-01-01 RX ADMIN — ACETAMINOPHEN 975 MG: 325 TABLET ORAL at 11:07

## 2024-01-01 RX ADMIN — GABAPENTIN 100 MG: 100 CAPSULE ORAL at 09:11

## 2024-01-01 RX ADMIN — GABAPENTIN 100 MG: 100 CAPSULE ORAL at 09:20

## 2024-01-01 RX ADMIN — APIXABAN 2.5 MG: 5 TABLET, FILM COATED ORAL at 23:07

## 2024-01-01 RX ADMIN — CLOPIDOGREL BISULFATE 75 MG: 75 TABLET ORAL at 09:04

## 2024-01-01 RX ADMIN — APIXABAN 2.5 MG: 5 TABLET, FILM COATED ORAL at 21:47

## 2024-01-01 RX ADMIN — CLOPIDOGREL BISULFATE 75 MG: 75 TABLET ORAL at 12:02

## 2024-01-01 RX ADMIN — APIXABAN 2.5 MG: 2.5 TABLET, FILM COATED ORAL at 21:36

## 2024-01-01 RX ADMIN — SODIUM HYPOCHLORITE: 2.5 SOLUTION TOPICAL at 06:21

## 2024-01-01 RX ADMIN — TRAMADOL HYDROCHLORIDE 50 MG: 50 TABLET, COATED ORAL at 11:05

## 2024-01-01 RX ADMIN — ALLOPURINOL 100 MG: 100 TABLET ORAL at 12:42

## 2024-01-01 RX ADMIN — MIDODRINE HYDROCHLORIDE 15 MG: 5 TABLET ORAL at 21:01

## 2024-01-01 RX ADMIN — HEPARIN SODIUM 7250 UNITS: 5000 INJECTION INTRAVENOUS; SUBCUTANEOUS at 23:08

## 2024-01-01 RX ADMIN — LEVOFLOXACIN 750 MG: 750 TABLET, FILM COATED ORAL at 02:43

## 2024-01-01 RX ADMIN — EPINEPHRINE 1 MG: 0.1 INJECTION, SOLUTION ENDOTRACHEAL; INTRACARDIAC; INTRAVENOUS at 18:54

## 2024-01-01 RX ADMIN — CARBOXYMETHYLCELLULOSE SODIUM 2 DROP: 5 SOLUTION/ DROPS OPHTHALMIC at 14:03

## 2024-01-01 RX ADMIN — HEPARIN SODIUM 1560 UNITS/HR: 10000 INJECTION, SOLUTION INTRAVENOUS at 20:04

## 2024-01-01 RX ADMIN — Medication 5 MG: at 20:34

## 2024-01-01 RX ADMIN — APIXABAN 2.5 MG: 5 TABLET, FILM COATED ORAL at 21:08

## 2024-01-01 RX ADMIN — Medication 5 MG: at 20:19

## 2024-01-01 RX ADMIN — RENO CAPS 1 CAPSULE: 100; 1.5; 1.7; 20; 10; 1; 150; 5; 6 CAPSULE ORAL at 15:44

## 2024-01-01 RX ADMIN — APIXABAN 2.5 MG: 5 TABLET, FILM COATED ORAL at 21:59

## 2024-01-01 RX ADMIN — PANTOPRAZOLE SODIUM 40 MG: 40 TABLET, DELAYED RELEASE ORAL at 09:09

## 2024-01-01 RX ADMIN — ACETAMINOPHEN 650 MG: 325 TABLET ORAL at 18:59

## 2024-01-01 RX ADMIN — INSULIN GLARGINE 6 UNITS: 100 INJECTION, SOLUTION SUBCUTANEOUS at 22:03

## 2024-01-01 RX ADMIN — APIXABAN 2.5 MG: 5 TABLET, FILM COATED ORAL at 20:26

## 2024-01-01 RX ADMIN — CARBOXYMETHYLCELLULOSE SODIUM 2 DROP: 5 SOLUTION/ DROPS OPHTHALMIC at 10:03

## 2024-01-01 RX ADMIN — KETOTIFEN FUMARATE 1 DROP: 0.35 SOLUTION/ DROPS OPHTHALMIC at 09:37

## 2024-01-01 RX ADMIN — MEROPENEM 1 G: 1 INJECTION, POWDER, FOR SOLUTION INTRAVENOUS at 04:37

## 2024-01-01 RX ADMIN — ROCURONIUM BROMIDE 20 MG: 10 INJECTION, SOLUTION INTRAVENOUS at 12:50

## 2024-01-01 RX ADMIN — INSULIN LISPRO 1 UNITS: 100 INJECTION, SOLUTION INTRAVENOUS; SUBCUTANEOUS at 12:00

## 2024-01-01 RX ADMIN — LIDOCAINE 1 PATCH: 4 PATCH TOPICAL at 12:49

## 2024-01-01 SDOH — SOCIAL STABILITY: SOCIAL INSECURITY: ARE YOU OR HAVE YOU BEEN THREATENED OR ABUSED PHYSICALLY, EMOTIONALLY, OR SEXUALLY BY ANYONE?: UNABLE TO ASSESS

## 2024-01-01 SDOH — SOCIAL STABILITY: SOCIAL INSECURITY: ARE THERE ANY APPARENT SIGNS OF INJURIES/BEHAVIORS THAT COULD BE RELATED TO ABUSE/NEGLECT?: NO

## 2024-01-01 SDOH — SOCIAL STABILITY: SOCIAL INSECURITY: HAS ANYONE EVER THREATENED TO HURT YOUR FAMILY OR YOUR PETS?: NO

## 2024-01-01 SDOH — HEALTH STABILITY: MENTAL HEALTH: CURRENT SMOKER: 0

## 2024-01-01 SDOH — SOCIAL STABILITY: SOCIAL INSECURITY: WERE YOU ABLE TO COMPLETE ALL THE BEHAVIORAL HEALTH SCREENINGS?: NO

## 2024-01-01 SDOH — SOCIAL STABILITY: SOCIAL INSECURITY: ARE YOU OR HAVE YOU BEEN THREATENED OR ABUSED PHYSICALLY, EMOTIONALLY, OR SEXUALLY BY ANYONE?: NO

## 2024-01-01 SDOH — SOCIAL STABILITY: SOCIAL INSECURITY: DO YOU FEEL ANYONE HAS EXPLOITED OR TAKEN ADVANTAGE OF YOU FINANCIALLY OR OF YOUR PERSONAL PROPERTY?: NO

## 2024-01-01 SDOH — SOCIAL STABILITY: SOCIAL INSECURITY: DO YOU FEEL ANYONE HAS EXPLOITED OR TAKEN ADVANTAGE OF YOU FINANCIALLY OR OF YOUR PERSONAL PROPERTY?: UNABLE TO ASSESS

## 2024-01-01 SDOH — SOCIAL STABILITY: SOCIAL INSECURITY: WERE YOU ABLE TO COMPLETE ALL THE BEHAVIORAL HEALTH SCREENINGS?: YES

## 2024-01-01 SDOH — SOCIAL STABILITY: SOCIAL INSECURITY: HAS ANYONE EVER THREATENED TO HURT YOUR FAMILY OR YOUR PETS?: UNABLE TO ASSESS

## 2024-01-01 SDOH — SOCIAL STABILITY: SOCIAL INSECURITY: DO YOU FEEL UNSAFE GOING BACK TO THE PLACE WHERE YOU ARE LIVING?: NO

## 2024-01-01 SDOH — SOCIAL STABILITY: SOCIAL INSECURITY: ABUSE: ADULT

## 2024-01-01 SDOH — SOCIAL STABILITY: SOCIAL INSECURITY: DOES ANYONE TRY TO KEEP YOU FROM HAVING/CONTACTING OTHER FRIENDS OR DOING THINGS OUTSIDE YOUR HOME?: NO

## 2024-01-01 SDOH — SOCIAL STABILITY: SOCIAL INSECURITY: HAVE YOU HAD THOUGHTS OF HARMING ANYONE ELSE?: NO

## 2024-01-01 SDOH — SOCIAL STABILITY: SOCIAL INSECURITY: HAVE YOU HAD THOUGHTS OF HARMING ANYONE ELSE?: UNABLE TO ASSESS

## 2024-01-01 SDOH — SOCIAL STABILITY: SOCIAL INSECURITY: ARE THERE ANY APPARENT SIGNS OF INJURIES/BEHAVIORS THAT COULD BE RELATED TO ABUSE/NEGLECT?: UNABLE TO ASSESS

## 2024-01-01 SDOH — SOCIAL STABILITY: SOCIAL INSECURITY: DOES ANYONE TRY TO KEEP YOU FROM HAVING/CONTACTING OTHER FRIENDS OR DOING THINGS OUTSIDE YOUR HOME?: UNABLE TO ASSESS

## 2024-01-01 SDOH — SOCIAL STABILITY: SOCIAL INSECURITY: DO YOU FEEL UNSAFE GOING BACK TO THE PLACE WHERE YOU ARE LIVING?: UNABLE TO ASSESS

## 2024-01-01 ASSESSMENT — ENCOUNTER SYMPTOMS
HEADACHES: 0
DIARRHEA: 0
VOMITING: 0
CONSTIPATION: 0
ABDOMINAL PAIN: 0
FLANK PAIN: 0
SHORTNESS OF BREATH: 0
FEVER: 0
CARDIOVASCULAR NEGATIVE: 1
GASTROINTESTINAL NEGATIVE: 1
NAUSEA: 0
EYE ITCHING: 0
DIZZINESS: 0
BLOOD IN STOOL: 0
ACTIVITY CHANGE: 0
ABDOMINAL PAIN: 0
EYE DISCHARGE: 0
COLOR CHANGE: 0
ABDOMINAL PAIN: 0
CARDIOVASCULAR NEGATIVE: 1
ABDOMINAL DISTENTION: 0
ABDOMINAL PAIN: 0
APPETITE CHANGE: 1
ABDOMINAL PAIN: 0
NEUROLOGICAL NEGATIVE: 1
SPEECH DIFFICULTY: 0
PALPITATIONS: 0
WOUND: 1
VOMITING: 0
DIZZINESS: 0
DYSURIA: 0
SHORTNESS OF BREATH: 0
CHILLS: 0
NAUSEA: 0
ALLERGIC/IMMUNOLOGIC NEGATIVE: 1
COUGH: 1
VOMITING: 0
FEVER: 0
HEMATURIA: 0
CONFUSION: 0
APPETITE CHANGE: 0
BACK PAIN: 0
CONSTITUTIONAL NEGATIVE: 1
EYES NEGATIVE: 1
HEMATOLOGIC/LYMPHATIC NEGATIVE: 1
WOUND: 1
SHORTNESS OF BREATH: 0
HEADACHES: 0
MYALGIAS: 1
NEUROLOGICAL NEGATIVE: 1
ENDOCRINE NEGATIVE: 1
ABDOMINAL DISTENTION: 0
SEIZURES: 0
DIZZINESS: 0
CHILLS: 0
PALPITATIONS: 0
DYSURIA: 0
CHEST TIGHTNESS: 0
RESPIRATORY NEGATIVE: 1
DIARRHEA: 0
EYE PAIN: 0
DIFFICULTY URINATING: 0
VOMITING: 0
PSYCHIATRIC NEGATIVE: 1
EYE DISCHARGE: 0
PSYCHIATRIC NEGATIVE: 1
WOUND: 1
DYSURIA: 0
NAUSEA: 0
CONSTIPATION: 0
DIFFICULTY URINATING: 0
NECK PAIN: 0
WOUND: 1
DIFFICULTY URINATING: 0
DIARRHEA: 0
MUSCULOSKELETAL NEGATIVE: 1
VOMITING: 0
GASTROINTESTINAL NEGATIVE: 1
AGITATION: 0
MYALGIAS: 0
ARTHRALGIAS: 1
NUMBNESS: 0
ACTIVITY CHANGE: 1
COUGH: 0
SHORTNESS OF BREATH: 1
APPETITE CHANGE: 0
FEVER: 0
FEVER: 0

## 2024-01-01 ASSESSMENT — COGNITIVE AND FUNCTIONAL STATUS - GENERAL
STANDING UP FROM CHAIR USING ARMS: TOTAL
EATING MEALS: A LOT
MOVING FROM LYING ON BACK TO SITTING ON SIDE OF FLAT BED WITH BEDRAILS: TOTAL
HELP NEEDED FOR BATHING: TOTAL
HELP NEEDED FOR BATHING: TOTAL
PERSONAL GROOMING: TOTAL
MOVING FROM LYING ON BACK TO SITTING ON SIDE OF FLAT BED WITH BEDRAILS: TOTAL
DRESSING REGULAR LOWER BODY CLOTHING: TOTAL
TURNING FROM BACK TO SIDE WHILE IN FLAT BAD: TOTAL
WALKING IN HOSPITAL ROOM: TOTAL
WALKING IN HOSPITAL ROOM: TOTAL
MOVING TO AND FROM BED TO CHAIR: TOTAL
DRESSING REGULAR LOWER BODY CLOTHING: TOTAL
CLIMB 3 TO 5 STEPS WITH RAILING: TOTAL
DRESSING REGULAR UPPER BODY CLOTHING: TOTAL
HELP NEEDED FOR BATHING: TOTAL
TOILETING: TOTAL
DRESSING REGULAR LOWER BODY CLOTHING: TOTAL
STANDING UP FROM CHAIR USING ARMS: TOTAL
MOVING FROM LYING ON BACK TO SITTING ON SIDE OF FLAT BED WITH BEDRAILS: A LOT
MOVING FROM LYING ON BACK TO SITTING ON SIDE OF FLAT BED WITH BEDRAILS: TOTAL
WALKING IN HOSPITAL ROOM: TOTAL
HELP NEEDED FOR BATHING: TOTAL
HELP NEEDED FOR BATHING: TOTAL
EATING MEALS: TOTAL
EATING MEALS: TOTAL
WALKING IN HOSPITAL ROOM: TOTAL
WALKING IN HOSPITAL ROOM: TOTAL
EATING MEALS: TOTAL
TURNING FROM BACK TO SIDE WHILE IN FLAT BAD: TOTAL
DRESSING REGULAR UPPER BODY CLOTHING: TOTAL
STANDING UP FROM CHAIR USING ARMS: TOTAL
STANDING UP FROM CHAIR USING ARMS: TOTAL
HELP NEEDED FOR BATHING: TOTAL
WALKING IN HOSPITAL ROOM: TOTAL
MOVING FROM LYING ON BACK TO SITTING ON SIDE OF FLAT BED WITH BEDRAILS: A LOT
MOBILITY SCORE: 6
MOBILITY SCORE: 6
TOILETING: TOTAL
HELP NEEDED FOR BATHING: TOTAL
WALKING IN HOSPITAL ROOM: TOTAL
MOVING TO AND FROM BED TO CHAIR: TOTAL
DRESSING REGULAR UPPER BODY CLOTHING: TOTAL
TOILETING: TOTAL
HELP NEEDED FOR BATHING: A LOT
MOVING TO AND FROM BED TO CHAIR: TOTAL
MOVING TO AND FROM BED TO CHAIR: TOTAL
DRESSING REGULAR UPPER BODY CLOTHING: TOTAL
TOILETING: TOTAL
EATING MEALS: A LOT
PERSONAL GROOMING: TOTAL
TOILETING: TOTAL
PERSONAL GROOMING: A LOT
TOILETING: TOTAL
CLIMB 3 TO 5 STEPS WITH RAILING: TOTAL
STANDING UP FROM CHAIR USING ARMS: TOTAL
CLIMB 3 TO 5 STEPS WITH RAILING: TOTAL
TOILETING: TOTAL
CLIMB 3 TO 5 STEPS WITH RAILING: TOTAL
MOBILITY SCORE: 8
STANDING UP FROM CHAIR USING ARMS: TOTAL
MOVING FROM LYING ON BACK TO SITTING ON SIDE OF FLAT BED WITH BEDRAILS: TOTAL
WALKING IN HOSPITAL ROOM: TOTAL
TOILETING: TOTAL
MOBILITY SCORE: 6
MOVING TO AND FROM BED TO CHAIR: TOTAL
CLIMB 3 TO 5 STEPS WITH RAILING: TOTAL
MOVING TO AND FROM BED TO CHAIR: TOTAL
PERSONAL GROOMING: TOTAL
MOBILITY SCORE: 6
DAILY ACTIVITIY SCORE: 6
STANDING UP FROM CHAIR USING ARMS: TOTAL
DRESSING REGULAR LOWER BODY CLOTHING: TOTAL
MOVING FROM LYING ON BACK TO SITTING ON SIDE OF FLAT BED WITH BEDRAILS: A LOT
WALKING IN HOSPITAL ROOM: TOTAL
TURNING FROM BACK TO SIDE WHILE IN FLAT BAD: TOTAL
EATING MEALS: TOTAL
DRESSING REGULAR UPPER BODY CLOTHING: TOTAL
DRESSING REGULAR LOWER BODY CLOTHING: A LOT
PERSONAL GROOMING: TOTAL
MOVING FROM LYING ON BACK TO SITTING ON SIDE OF FLAT BED WITH BEDRAILS: A LOT
PERSONAL GROOMING: TOTAL
TURNING FROM BACK TO SIDE WHILE IN FLAT BAD: TOTAL
STANDING UP FROM CHAIR USING ARMS: TOTAL
EATING MEALS: TOTAL
MOBILITY SCORE: 6
MOVING TO AND FROM BED TO CHAIR: TOTAL
TURNING FROM BACK TO SIDE WHILE IN FLAT BAD: A LOT
STANDING UP FROM CHAIR USING ARMS: TOTAL
EATING MEALS: A LOT
WALKING IN HOSPITAL ROOM: TOTAL
WALKING IN HOSPITAL ROOM: TOTAL
DRESSING REGULAR UPPER BODY CLOTHING: TOTAL
WALKING IN HOSPITAL ROOM: TOTAL
DRESSING REGULAR UPPER BODY CLOTHING: TOTAL
CLIMB 3 TO 5 STEPS WITH RAILING: TOTAL
EATING MEALS: A LOT
DRESSING REGULAR UPPER BODY CLOTHING: TOTAL
DRESSING REGULAR LOWER BODY CLOTHING: TOTAL
DRESSING REGULAR UPPER BODY CLOTHING: TOTAL
DRESSING REGULAR UPPER BODY CLOTHING: TOTAL
DRESSING REGULAR LOWER BODY CLOTHING: TOTAL
TOILETING: TOTAL
TURNING FROM BACK TO SIDE WHILE IN FLAT BAD: TOTAL
PERSONAL GROOMING: TOTAL
DRESSING REGULAR LOWER BODY CLOTHING: TOTAL
DAILY ACTIVITIY SCORE: 11
MOBILITY SCORE: 6
MOVING TO AND FROM BED TO CHAIR: TOTAL
HELP NEEDED FOR BATHING: TOTAL
DRESSING REGULAR UPPER BODY CLOTHING: TOTAL
PERSONAL GROOMING: TOTAL
DRESSING REGULAR UPPER BODY CLOTHING: TOTAL
TOILETING: TOTAL
DAILY ACTIVITIY SCORE: 6
PERSONAL GROOMING: TOTAL
TOILETING: TOTAL
MOVING TO AND FROM BED TO CHAIR: TOTAL
STANDING UP FROM CHAIR USING ARMS: TOTAL
PERSONAL GROOMING: TOTAL
MOVING FROM LYING ON BACK TO SITTING ON SIDE OF FLAT BED WITH BEDRAILS: TOTAL
HELP NEEDED FOR BATHING: A LOT
DRESSING REGULAR LOWER BODY CLOTHING: TOTAL
CLIMB 3 TO 5 STEPS WITH RAILING: TOTAL
EATING MEALS: TOTAL
TURNING FROM BACK TO SIDE WHILE IN FLAT BAD: TOTAL
MOVING FROM LYING ON BACK TO SITTING ON SIDE OF FLAT BED WITH BEDRAILS: TOTAL
DRESSING REGULAR UPPER BODY CLOTHING: TOTAL
STANDING UP FROM CHAIR USING ARMS: TOTAL
DAILY ACTIVITIY SCORE: 6
MOVING TO AND FROM BED TO CHAIR: TOTAL
MOVING FROM LYING ON BACK TO SITTING ON SIDE OF FLAT BED WITH BEDRAILS: TOTAL
TOILETING: A LOT
MOVING TO AND FROM BED TO CHAIR: TOTAL
EATING MEALS: TOTAL
MOVING FROM LYING ON BACK TO SITTING ON SIDE OF FLAT BED WITH BEDRAILS: TOTAL
WALKING IN HOSPITAL ROOM: TOTAL
MOVING TO AND FROM BED TO CHAIR: TOTAL
HELP NEEDED FOR BATHING: TOTAL
MOBILITY SCORE: 8
WALKING IN HOSPITAL ROOM: TOTAL
EATING MEALS: A LOT
HELP NEEDED FOR BATHING: TOTAL
MOVING TO AND FROM BED TO CHAIR: TOTAL
STANDING UP FROM CHAIR USING ARMS: TOTAL
MOBILITY SCORE: 6
EATING MEALS: TOTAL
MOVING TO AND FROM BED TO CHAIR: TOTAL
DRESSING REGULAR LOWER BODY CLOTHING: TOTAL
WALKING IN HOSPITAL ROOM: TOTAL
MOVING TO AND FROM BED TO CHAIR: TOTAL
PERSONAL GROOMING: TOTAL
EATING MEALS: A LOT
MOVING TO AND FROM BED TO CHAIR: TOTAL
EATING MEALS: A LOT
HELP NEEDED FOR BATHING: TOTAL
DRESSING REGULAR LOWER BODY CLOTHING: TOTAL
TOILETING: TOTAL
DAILY ACTIVITIY SCORE: 7
DAILY ACTIVITIY SCORE: 13
MOVING TO AND FROM BED TO CHAIR: TOTAL
WALKING IN HOSPITAL ROOM: TOTAL
DRESSING REGULAR UPPER BODY CLOTHING: A LOT
CLIMB 3 TO 5 STEPS WITH RAILING: TOTAL
PERSONAL GROOMING: A LOT
DRESSING REGULAR LOWER BODY CLOTHING: TOTAL
CLIMB 3 TO 5 STEPS WITH RAILING: TOTAL
MOBILITY SCORE: 6
DAILY ACTIVITIY SCORE: 6
DRESSING REGULAR LOWER BODY CLOTHING: TOTAL
HELP NEEDED FOR BATHING: A LOT
TURNING FROM BACK TO SIDE WHILE IN FLAT BAD: TOTAL
MOVING TO AND FROM BED TO CHAIR: TOTAL
DRESSING REGULAR UPPER BODY CLOTHING: TOTAL
CLIMB 3 TO 5 STEPS WITH RAILING: TOTAL
TURNING FROM BACK TO SIDE WHILE IN FLAT BAD: TOTAL
DRESSING REGULAR LOWER BODY CLOTHING: TOTAL
DRESSING REGULAR UPPER BODY CLOTHING: TOTAL
PERSONAL GROOMING: TOTAL
MOVING TO AND FROM BED TO CHAIR: TOTAL
MOVING FROM LYING ON BACK TO SITTING ON SIDE OF FLAT BED WITH BEDRAILS: A LOT
MOVING TO AND FROM BED TO CHAIR: TOTAL
DRESSING REGULAR UPPER BODY CLOTHING: TOTAL
MOBILITY SCORE: 6
STANDING UP FROM CHAIR USING ARMS: TOTAL
MOBILITY SCORE: 6
EATING MEALS: A LOT
MOBILITY SCORE: 6
MOBILITY SCORE: 6
MOVING FROM LYING ON BACK TO SITTING ON SIDE OF FLAT BED WITH BEDRAILS: TOTAL
PERSONAL GROOMING: A LOT
EATING MEALS: TOTAL
DAILY ACTIVITIY SCORE: 7
TURNING FROM BACK TO SIDE WHILE IN FLAT BAD: TOTAL
STANDING UP FROM CHAIR USING ARMS: TOTAL
CLIMB 3 TO 5 STEPS WITH RAILING: TOTAL
MOVING FROM LYING ON BACK TO SITTING ON SIDE OF FLAT BED WITH BEDRAILS: A LOT
HELP NEEDED FOR BATHING: A LOT
MOVING FROM LYING ON BACK TO SITTING ON SIDE OF FLAT BED WITH BEDRAILS: TOTAL
CLIMB 3 TO 5 STEPS WITH RAILING: TOTAL
MOBILITY SCORE: 6
WALKING IN HOSPITAL ROOM: TOTAL
HELP NEEDED FOR BATHING: TOTAL
MOVING FROM LYING ON BACK TO SITTING ON SIDE OF FLAT BED WITH BEDRAILS: A LOT
MOVING TO AND FROM BED TO CHAIR: TOTAL
MOBILITY SCORE: 6
STANDING UP FROM CHAIR USING ARMS: TOTAL
MOBILITY SCORE: 6
TOILETING: TOTAL
DAILY ACTIVITIY SCORE: 7
CLIMB 3 TO 5 STEPS WITH RAILING: TOTAL
EATING MEALS: TOTAL
TURNING FROM BACK TO SIDE WHILE IN FLAT BAD: TOTAL
MOBILITY SCORE: 6
PERSONAL GROOMING: TOTAL
CLIMB 3 TO 5 STEPS WITH RAILING: TOTAL
TURNING FROM BACK TO SIDE WHILE IN FLAT BAD: TOTAL
TURNING FROM BACK TO SIDE WHILE IN FLAT BAD: TOTAL
HELP NEEDED FOR BATHING: A LOT
STANDING UP FROM CHAIR USING ARMS: TOTAL
STANDING UP FROM CHAIR USING ARMS: TOTAL
TURNING FROM BACK TO SIDE WHILE IN FLAT BAD: TOTAL
WALKING IN HOSPITAL ROOM: TOTAL
WALKING IN HOSPITAL ROOM: TOTAL
EATING MEALS: A LOT
PERSONAL GROOMING: TOTAL
CLIMB 3 TO 5 STEPS WITH RAILING: TOTAL
MOBILITY SCORE: 6
MOBILITY SCORE: 6
CLIMB 3 TO 5 STEPS WITH RAILING: TOTAL
DRESSING REGULAR UPPER BODY CLOTHING: TOTAL
DRESSING REGULAR UPPER BODY CLOTHING: TOTAL
MOVING TO AND FROM BED TO CHAIR: TOTAL
HELP NEEDED FOR BATHING: TOTAL
STANDING UP FROM CHAIR USING ARMS: TOTAL
WALKING IN HOSPITAL ROOM: TOTAL
MOBILITY SCORE: 6
DRESSING REGULAR LOWER BODY CLOTHING: A LOT
PERSONAL GROOMING: TOTAL
WALKING IN HOSPITAL ROOM: TOTAL
MOVING FROM LYING ON BACK TO SITTING ON SIDE OF FLAT BED WITH BEDRAILS: TOTAL
WALKING IN HOSPITAL ROOM: TOTAL
STANDING UP FROM CHAIR USING ARMS: TOTAL
TOILETING: TOTAL
MOVING TO AND FROM BED TO CHAIR: TOTAL
EATING MEALS: A LOT
TOILETING: TOTAL
DRESSING REGULAR UPPER BODY CLOTHING: TOTAL
DAILY ACTIVITIY SCORE: 10
MOVING TO AND FROM BED TO CHAIR: TOTAL
TOILETING: TOTAL
EATING MEALS: TOTAL
MOVING TO AND FROM BED TO CHAIR: TOTAL
TURNING FROM BACK TO SIDE WHILE IN FLAT BAD: TOTAL
MOVING TO AND FROM BED TO CHAIR: TOTAL
TOILETING: A LOT
DRESSING REGULAR LOWER BODY CLOTHING: TOTAL
PERSONAL GROOMING: TOTAL
TOILETING: TOTAL
DAILY ACTIVITIY SCORE: 6
DRESSING REGULAR UPPER BODY CLOTHING: TOTAL
TURNING FROM BACK TO SIDE WHILE IN FLAT BAD: TOTAL
TOILETING: TOTAL
PERSONAL GROOMING: A LOT
CLIMB 3 TO 5 STEPS WITH RAILING: TOTAL
CLIMB 3 TO 5 STEPS WITH RAILING: TOTAL
DRESSING REGULAR LOWER BODY CLOTHING: TOTAL
DAILY ACTIVITIY SCORE: 8
DRESSING REGULAR LOWER BODY CLOTHING: TOTAL
CLIMB 3 TO 5 STEPS WITH RAILING: TOTAL
DRESSING REGULAR UPPER BODY CLOTHING: A LOT
MOBILITY SCORE: 6
STANDING UP FROM CHAIR USING ARMS: TOTAL
DRESSING REGULAR UPPER BODY CLOTHING: TOTAL
DRESSING REGULAR UPPER BODY CLOTHING: A LOT
DRESSING REGULAR LOWER BODY CLOTHING: TOTAL
DRESSING REGULAR LOWER BODY CLOTHING: TOTAL
MOBILITY SCORE: 6
WALKING IN HOSPITAL ROOM: TOTAL
EATING MEALS: A LOT
MOBILITY SCORE: 6
STANDING UP FROM CHAIR USING ARMS: TOTAL
TOILETING: TOTAL
DAILY ACTIVITIY SCORE: 8
MOVING FROM LYING ON BACK TO SITTING ON SIDE OF FLAT BED WITH BEDRAILS: TOTAL
STANDING UP FROM CHAIR USING ARMS: TOTAL
TOILETING: TOTAL
CLIMB 3 TO 5 STEPS WITH RAILING: TOTAL
TOILETING: TOTAL
EATING MEALS: TOTAL
DRESSING REGULAR UPPER BODY CLOTHING: TOTAL
TURNING FROM BACK TO SIDE WHILE IN FLAT BAD: A LOT
MOBILITY SCORE: 6
STANDING UP FROM CHAIR USING ARMS: TOTAL
WALKING IN HOSPITAL ROOM: TOTAL
STANDING UP FROM CHAIR USING ARMS: TOTAL
DRESSING REGULAR LOWER BODY CLOTHING: TOTAL
DAILY ACTIVITIY SCORE: 9
TOILETING: TOTAL
PERSONAL GROOMING: TOTAL
MOVING FROM LYING ON BACK TO SITTING ON SIDE OF FLAT BED WITH BEDRAILS: TOTAL
TOILETING: TOTAL
WALKING IN HOSPITAL ROOM: TOTAL
MOVING TO AND FROM BED TO CHAIR: A LOT
PERSONAL GROOMING: TOTAL
HELP NEEDED FOR BATHING: A LOT
TOILETING: TOTAL
TOILETING: TOTAL
DRESSING REGULAR LOWER BODY CLOTHING: TOTAL
STANDING UP FROM CHAIR USING ARMS: TOTAL
WALKING IN HOSPITAL ROOM: TOTAL
DRESSING REGULAR LOWER BODY CLOTHING: TOTAL
DAILY ACTIVITIY SCORE: 7
MOVING TO AND FROM BED TO CHAIR: TOTAL
PATIENT BASELINE BEDBOUND: UNABLE TO ASSESS AT THIS TIME
HELP NEEDED FOR BATHING: TOTAL
DRESSING REGULAR LOWER BODY CLOTHING: TOTAL
TURNING FROM BACK TO SIDE WHILE IN FLAT BAD: TOTAL
MOVING FROM LYING ON BACK TO SITTING ON SIDE OF FLAT BED WITH BEDRAILS: TOTAL
PERSONAL GROOMING: A LOT
DRESSING REGULAR LOWER BODY CLOTHING: TOTAL
DAILY ACTIVITIY SCORE: 6
MOBILITY SCORE: 6
DRESSING REGULAR UPPER BODY CLOTHING: TOTAL
WALKING IN HOSPITAL ROOM: TOTAL
WALKING IN HOSPITAL ROOM: TOTAL
MOVING FROM LYING ON BACK TO SITTING ON SIDE OF FLAT BED WITH BEDRAILS: A LOT
CLIMB 3 TO 5 STEPS WITH RAILING: TOTAL
TURNING FROM BACK TO SIDE WHILE IN FLAT BAD: TOTAL
DRESSING REGULAR UPPER BODY CLOTHING: A LOT
CLIMB 3 TO 5 STEPS WITH RAILING: TOTAL
MOVING FROM LYING ON BACK TO SITTING ON SIDE OF FLAT BED WITH BEDRAILS: TOTAL
PERSONAL GROOMING: A LOT
CLIMB 3 TO 5 STEPS WITH RAILING: TOTAL
MOVING TO AND FROM BED TO CHAIR: TOTAL
DRESSING REGULAR UPPER BODY CLOTHING: TOTAL
HELP NEEDED FOR BATHING: TOTAL
MOVING TO AND FROM BED TO CHAIR: TOTAL
MOVING FROM LYING ON BACK TO SITTING ON SIDE OF FLAT BED WITH BEDRAILS: TOTAL
CLIMB 3 TO 5 STEPS WITH RAILING: TOTAL
WALKING IN HOSPITAL ROOM: TOTAL
STANDING UP FROM CHAIR USING ARMS: TOTAL
PERSONAL GROOMING: TOTAL
TURNING FROM BACK TO SIDE WHILE IN FLAT BAD: A LOT
CLIMB 3 TO 5 STEPS WITH RAILING: TOTAL
DAILY ACTIVITIY SCORE: 7
PERSONAL GROOMING: A LITTLE
PERSONAL GROOMING: A LOT
DRESSING REGULAR LOWER BODY CLOTHING: TOTAL
HELP NEEDED FOR BATHING: TOTAL
TOILETING: TOTAL
DRESSING REGULAR UPPER BODY CLOTHING: TOTAL
TURNING FROM BACK TO SIDE WHILE IN FLAT BAD: TOTAL
TURNING FROM BACK TO SIDE WHILE IN FLAT BAD: TOTAL
MOVING FROM LYING ON BACK TO SITTING ON SIDE OF FLAT BED WITH BEDRAILS: TOTAL
STANDING UP FROM CHAIR USING ARMS: TOTAL
EATING MEALS: A LITTLE
WALKING IN HOSPITAL ROOM: TOTAL
STANDING UP FROM CHAIR USING ARMS: TOTAL
PERSONAL GROOMING: TOTAL
DAILY ACTIVITIY SCORE: 6
MOVING TO AND FROM BED TO CHAIR: TOTAL
PERSONAL GROOMING: TOTAL
STANDING UP FROM CHAIR USING ARMS: TOTAL
MOBILITY SCORE: 6
STANDING UP FROM CHAIR USING ARMS: TOTAL
DRESSING REGULAR UPPER BODY CLOTHING: TOTAL
DAILY ACTIVITIY SCORE: 10
MOVING TO AND FROM BED TO CHAIR: TOTAL
WALKING IN HOSPITAL ROOM: TOTAL
DAILY ACTIVITIY SCORE: 7
STANDING UP FROM CHAIR USING ARMS: TOTAL
PERSONAL GROOMING: TOTAL
DRESSING REGULAR UPPER BODY CLOTHING: A LOT
STANDING UP FROM CHAIR USING ARMS: TOTAL
CLIMB 3 TO 5 STEPS WITH RAILING: TOTAL
CLIMB 3 TO 5 STEPS WITH RAILING: TOTAL
WALKING IN HOSPITAL ROOM: TOTAL
HELP NEEDED FOR BATHING: A LOT
MOVING TO AND FROM BED TO CHAIR: TOTAL
HELP NEEDED FOR BATHING: TOTAL
TURNING FROM BACK TO SIDE WHILE IN FLAT BAD: TOTAL
WALKING IN HOSPITAL ROOM: TOTAL
WALKING IN HOSPITAL ROOM: TOTAL
MOBILITY SCORE: 7
CLIMB 3 TO 5 STEPS WITH RAILING: TOTAL
TOILETING: TOTAL
CLIMB 3 TO 5 STEPS WITH RAILING: TOTAL
TURNING FROM BACK TO SIDE WHILE IN FLAT BAD: TOTAL
MOVING FROM LYING ON BACK TO SITTING ON SIDE OF FLAT BED WITH BEDRAILS: TOTAL
TURNING FROM BACK TO SIDE WHILE IN FLAT BAD: TOTAL
EATING MEALS: TOTAL
DAILY ACTIVITIY SCORE: 6
DRESSING REGULAR UPPER BODY CLOTHING: TOTAL
DRESSING REGULAR LOWER BODY CLOTHING: TOTAL
HELP NEEDED FOR BATHING: TOTAL
MOVING FROM LYING ON BACK TO SITTING ON SIDE OF FLAT BED WITH BEDRAILS: A LOT
DAILY ACTIVITIY SCORE: 6
DRESSING REGULAR UPPER BODY CLOTHING: TOTAL
MOBILITY SCORE: 7
DRESSING REGULAR LOWER BODY CLOTHING: TOTAL
WALKING IN HOSPITAL ROOM: TOTAL
TOILETING: TOTAL
MOVING TO AND FROM BED TO CHAIR: TOTAL
DRESSING REGULAR UPPER BODY CLOTHING: TOTAL
STANDING UP FROM CHAIR USING ARMS: TOTAL
MOVING FROM LYING ON BACK TO SITTING ON SIDE OF FLAT BED WITH BEDRAILS: A LOT
MOVING FROM LYING ON BACK TO SITTING ON SIDE OF FLAT BED WITH BEDRAILS: A LOT
DRESSING REGULAR LOWER BODY CLOTHING: TOTAL
WALKING IN HOSPITAL ROOM: TOTAL
STANDING UP FROM CHAIR USING ARMS: TOTAL
DAILY ACTIVITIY SCORE: 6
EATING MEALS: A LOT
PERSONAL GROOMING: A LOT
HELP NEEDED FOR BATHING: A LOT
MOVING TO AND FROM BED TO CHAIR: TOTAL
DRESSING REGULAR UPPER BODY CLOTHING: TOTAL
DAILY ACTIVITIY SCORE: 6
DRESSING REGULAR LOWER BODY CLOTHING: TOTAL
DAILY ACTIVITIY SCORE: 10
DAILY ACTIVITIY SCORE: 7
STANDING UP FROM CHAIR USING ARMS: TOTAL
TOILETING: TOTAL
TURNING FROM BACK TO SIDE WHILE IN FLAT BAD: TOTAL
DRESSING REGULAR LOWER BODY CLOTHING: TOTAL
TURNING FROM BACK TO SIDE WHILE IN FLAT BAD: TOTAL
TURNING FROM BACK TO SIDE WHILE IN FLAT BAD: TOTAL
MOBILITY SCORE: 6
WALKING IN HOSPITAL ROOM: TOTAL
MOVING FROM LYING ON BACK TO SITTING ON SIDE OF FLAT BED WITH BEDRAILS: A LOT
STANDING UP FROM CHAIR USING ARMS: TOTAL
DRESSING REGULAR UPPER BODY CLOTHING: TOTAL
TOILETING: TOTAL
MOVING FROM LYING ON BACK TO SITTING ON SIDE OF FLAT BED WITH BEDRAILS: TOTAL
DAILY ACTIVITIY SCORE: 6
DRESSING REGULAR LOWER BODY CLOTHING: A LOT
DRESSING REGULAR LOWER BODY CLOTHING: TOTAL
CLIMB 3 TO 5 STEPS WITH RAILING: TOTAL
MOVING TO AND FROM BED TO CHAIR: TOTAL
CLIMB 3 TO 5 STEPS WITH RAILING: TOTAL
MOVING TO AND FROM BED TO CHAIR: TOTAL
HELP NEEDED FOR BATHING: TOTAL
TOILETING: TOTAL
EATING MEALS: A LOT
HELP NEEDED FOR BATHING: TOTAL
MOVING TO AND FROM BED TO CHAIR: A LOT
TOILETING: TOTAL
MOVING TO AND FROM BED TO CHAIR: TOTAL
EATING MEALS: TOTAL
STANDING UP FROM CHAIR USING ARMS: TOTAL
DRESSING REGULAR UPPER BODY CLOTHING: TOTAL
MOVING TO AND FROM BED TO CHAIR: TOTAL
STANDING UP FROM CHAIR USING ARMS: TOTAL
DRESSING REGULAR UPPER BODY CLOTHING: TOTAL
EATING MEALS: TOTAL
TOILETING: TOTAL
MOBILITY SCORE: 6
MOVING TO AND FROM BED TO CHAIR: TOTAL
PERSONAL GROOMING: TOTAL
TOILETING: TOTAL
WALKING IN HOSPITAL ROOM: TOTAL
MOVING TO AND FROM BED TO CHAIR: TOTAL
MOVING FROM LYING ON BACK TO SITTING ON SIDE OF FLAT BED WITH BEDRAILS: A LOT
MOVING FROM LYING ON BACK TO SITTING ON SIDE OF FLAT BED WITH BEDRAILS: TOTAL
DRESSING REGULAR UPPER BODY CLOTHING: TOTAL
CLIMB 3 TO 5 STEPS WITH RAILING: TOTAL
MOVING FROM LYING ON BACK TO SITTING ON SIDE OF FLAT BED WITH BEDRAILS: TOTAL
MOVING FROM LYING ON BACK TO SITTING ON SIDE OF FLAT BED WITH BEDRAILS: TOTAL
EATING MEALS: TOTAL
TOILETING: TOTAL
DRESSING REGULAR UPPER BODY CLOTHING: TOTAL
MOVING FROM LYING ON BACK TO SITTING ON SIDE OF FLAT BED WITH BEDRAILS: TOTAL
EATING MEALS: A LOT
DAILY ACTIVITIY SCORE: 8
TURNING FROM BACK TO SIDE WHILE IN FLAT BAD: A LOT
WALKING IN HOSPITAL ROOM: TOTAL
HELP NEEDED FOR BATHING: TOTAL
WALKING IN HOSPITAL ROOM: TOTAL
TURNING FROM BACK TO SIDE WHILE IN FLAT BAD: TOTAL
MOBILITY SCORE: 8
MOVING FROM LYING ON BACK TO SITTING ON SIDE OF FLAT BED WITH BEDRAILS: TOTAL
WALKING IN HOSPITAL ROOM: TOTAL
MOVING FROM LYING ON BACK TO SITTING ON SIDE OF FLAT BED WITH BEDRAILS: TOTAL
EATING MEALS: A LITTLE
MOVING FROM LYING ON BACK TO SITTING ON SIDE OF FLAT BED WITH BEDRAILS: A LOT
TURNING FROM BACK TO SIDE WHILE IN FLAT BAD: TOTAL
TURNING FROM BACK TO SIDE WHILE IN FLAT BAD: TOTAL
PERSONAL GROOMING: TOTAL
MOBILITY SCORE: 6
TOILETING: TOTAL
CLIMB 3 TO 5 STEPS WITH RAILING: TOTAL
TURNING FROM BACK TO SIDE WHILE IN FLAT BAD: TOTAL
EATING MEALS: TOTAL
STANDING UP FROM CHAIR USING ARMS: TOTAL
EATING MEALS: A LITTLE
DRESSING REGULAR LOWER BODY CLOTHING: TOTAL
WALKING IN HOSPITAL ROOM: TOTAL
DRESSING REGULAR UPPER BODY CLOTHING: TOTAL
MOVING FROM LYING ON BACK TO SITTING ON SIDE OF FLAT BED WITH BEDRAILS: A LOT
STANDING UP FROM CHAIR USING ARMS: TOTAL
MOBILITY SCORE: 8
TURNING FROM BACK TO SIDE WHILE IN FLAT BAD: TOTAL
HELP NEEDED FOR BATHING: TOTAL
DAILY ACTIVITIY SCORE: 11
TURNING FROM BACK TO SIDE WHILE IN FLAT BAD: A LOT
MOVING TO AND FROM BED TO CHAIR: TOTAL
MOBILITY SCORE: 6
MOVING TO AND FROM BED TO CHAIR: TOTAL
TOILETING: TOTAL
DRESSING REGULAR UPPER BODY CLOTHING: TOTAL
TURNING FROM BACK TO SIDE WHILE IN FLAT BAD: TOTAL
CLIMB 3 TO 5 STEPS WITH RAILING: TOTAL
EATING MEALS: A LITTLE
EATING MEALS: TOTAL
CLIMB 3 TO 5 STEPS WITH RAILING: TOTAL
MOBILITY SCORE: 6
STANDING UP FROM CHAIR USING ARMS: TOTAL
HELP NEEDED FOR BATHING: TOTAL
DRESSING REGULAR LOWER BODY CLOTHING: TOTAL
DAILY ACTIVITIY SCORE: 12
DRESSING REGULAR LOWER BODY CLOTHING: TOTAL
HELP NEEDED FOR BATHING: TOTAL
PERSONAL GROOMING: A LOT
MOVING TO AND FROM BED TO CHAIR: TOTAL
DRESSING REGULAR LOWER BODY CLOTHING: A LOT
STANDING UP FROM CHAIR USING ARMS: TOTAL
WALKING IN HOSPITAL ROOM: TOTAL
MOVING FROM LYING ON BACK TO SITTING ON SIDE OF FLAT BED WITH BEDRAILS: TOTAL
STANDING UP FROM CHAIR USING ARMS: TOTAL
MOVING TO AND FROM BED TO CHAIR: TOTAL
HELP NEEDED FOR BATHING: TOTAL
EATING MEALS: TOTAL
CLIMB 3 TO 5 STEPS WITH RAILING: TOTAL
TOILETING: TOTAL
CLIMB 3 TO 5 STEPS WITH RAILING: TOTAL
HELP NEEDED FOR BATHING: TOTAL
WALKING IN HOSPITAL ROOM: TOTAL
DRESSING REGULAR LOWER BODY CLOTHING: TOTAL
PERSONAL GROOMING: A LOT
EATING MEALS: A LOT
DRESSING REGULAR UPPER BODY CLOTHING: TOTAL
PERSONAL GROOMING: TOTAL
WALKING IN HOSPITAL ROOM: TOTAL
STANDING UP FROM CHAIR USING ARMS: TOTAL
EATING MEALS: TOTAL
STANDING UP FROM CHAIR USING ARMS: TOTAL
MOVING TO AND FROM BED TO CHAIR: TOTAL
DAILY ACTIVITIY SCORE: 6
EATING MEALS: TOTAL
TURNING FROM BACK TO SIDE WHILE IN FLAT BAD: TOTAL
MOVING TO AND FROM BED TO CHAIR: TOTAL
HELP NEEDED FOR BATHING: TOTAL
CLIMB 3 TO 5 STEPS WITH RAILING: TOTAL
WALKING IN HOSPITAL ROOM: TOTAL
PERSONAL GROOMING: A LOT
CLIMB 3 TO 5 STEPS WITH RAILING: TOTAL
DRESSING REGULAR UPPER BODY CLOTHING: TOTAL
MOVING TO AND FROM BED TO CHAIR: TOTAL
DRESSING REGULAR LOWER BODY CLOTHING: TOTAL
MOBILITY SCORE: 6
STANDING UP FROM CHAIR USING ARMS: TOTAL
MOVING TO AND FROM BED TO CHAIR: TOTAL
EATING MEALS: TOTAL
DRESSING REGULAR UPPER BODY CLOTHING: TOTAL
WALKING IN HOSPITAL ROOM: TOTAL
STANDING UP FROM CHAIR USING ARMS: TOTAL
STANDING UP FROM CHAIR USING ARMS: TOTAL
MOBILITY SCORE: 6
DRESSING REGULAR UPPER BODY CLOTHING: TOTAL
DAILY ACTIVITIY SCORE: 6
MOVING FROM LYING ON BACK TO SITTING ON SIDE OF FLAT BED WITH BEDRAILS: A LOT
MOVING FROM LYING ON BACK TO SITTING ON SIDE OF FLAT BED WITH BEDRAILS: A LOT
HELP NEEDED FOR BATHING: TOTAL
CLIMB 3 TO 5 STEPS WITH RAILING: TOTAL
EATING MEALS: TOTAL
DRESSING REGULAR LOWER BODY CLOTHING: TOTAL
TURNING FROM BACK TO SIDE WHILE IN FLAT BAD: TOTAL
MOVING TO AND FROM BED TO CHAIR: TOTAL
DRESSING REGULAR UPPER BODY CLOTHING: TOTAL
EATING MEALS: A LOT
MOBILITY SCORE: 6
HELP NEEDED FOR BATHING: TOTAL
TURNING FROM BACK TO SIDE WHILE IN FLAT BAD: TOTAL
STANDING UP FROM CHAIR USING ARMS: TOTAL
EATING MEALS: TOTAL
MOVING FROM LYING ON BACK TO SITTING ON SIDE OF FLAT BED WITH BEDRAILS: A LOT
TURNING FROM BACK TO SIDE WHILE IN FLAT BAD: TOTAL
DRESSING REGULAR UPPER BODY CLOTHING: TOTAL
WALKING IN HOSPITAL ROOM: TOTAL
DRESSING REGULAR LOWER BODY CLOTHING: TOTAL
CLIMB 3 TO 5 STEPS WITH RAILING: TOTAL
CLIMB 3 TO 5 STEPS WITH RAILING: TOTAL
MOVING TO AND FROM BED TO CHAIR: TOTAL
HELP NEEDED FOR BATHING: A LOT
DAILY ACTIVITIY SCORE: 6
PERSONAL GROOMING: TOTAL
STANDING UP FROM CHAIR USING ARMS: TOTAL
TOILETING: TOTAL
STANDING UP FROM CHAIR USING ARMS: TOTAL
MOVING FROM LYING ON BACK TO SITTING ON SIDE OF FLAT BED WITH BEDRAILS: TOTAL
PERSONAL GROOMING: TOTAL
TURNING FROM BACK TO SIDE WHILE IN FLAT BAD: A LOT
PERSONAL GROOMING: TOTAL
EATING MEALS: A LOT
MOBILITY SCORE: 6
DRESSING REGULAR UPPER BODY CLOTHING: A LOT
DRESSING REGULAR LOWER BODY CLOTHING: TOTAL
MOVING FROM LYING ON BACK TO SITTING ON SIDE OF FLAT BED WITH BEDRAILS: TOTAL
MOBILITY SCORE: 6
DRESSING REGULAR UPPER BODY CLOTHING: A LOT
EATING MEALS: TOTAL
PERSONAL GROOMING: TOTAL
MOBILITY SCORE: 7
DRESSING REGULAR LOWER BODY CLOTHING: TOTAL
TURNING FROM BACK TO SIDE WHILE IN FLAT BAD: TOTAL
DAILY ACTIVITIY SCORE: 9
EATING MEALS: TOTAL
CLIMB 3 TO 5 STEPS WITH RAILING: TOTAL
EATING MEALS: A LOT
PERSONAL GROOMING: A LOT
STANDING UP FROM CHAIR USING ARMS: TOTAL
DAILY ACTIVITIY SCORE: 10
MOVING TO AND FROM BED TO CHAIR: TOTAL
CLIMB 3 TO 5 STEPS WITH RAILING: TOTAL
DRESSING REGULAR LOWER BODY CLOTHING: TOTAL
TOILETING: TOTAL
MOVING TO AND FROM BED TO CHAIR: TOTAL
WALKING IN HOSPITAL ROOM: TOTAL
EATING MEALS: A LOT
PERSONAL GROOMING: TOTAL
TOILETING: TOTAL
MOVING FROM LYING ON BACK TO SITTING ON SIDE OF FLAT BED WITH BEDRAILS: TOTAL
TOILETING: TOTAL
MOVING FROM LYING ON BACK TO SITTING ON SIDE OF FLAT BED WITH BEDRAILS: TOTAL
TURNING FROM BACK TO SIDE WHILE IN FLAT BAD: TOTAL
CLIMB 3 TO 5 STEPS WITH RAILING: TOTAL
MOVING FROM LYING ON BACK TO SITTING ON SIDE OF FLAT BED WITH BEDRAILS: TOTAL
TURNING FROM BACK TO SIDE WHILE IN FLAT BAD: TOTAL
DRESSING REGULAR UPPER BODY CLOTHING: TOTAL
DAILY ACTIVITIY SCORE: 6
DRESSING REGULAR UPPER BODY CLOTHING: A LOT
MOVING TO AND FROM BED TO CHAIR: TOTAL
TOILETING: TOTAL
DAILY ACTIVITIY SCORE: 8
HELP NEEDED FOR BATHING: TOTAL
STANDING UP FROM CHAIR USING ARMS: TOTAL
TURNING FROM BACK TO SIDE WHILE IN FLAT BAD: TOTAL
DRESSING REGULAR LOWER BODY CLOTHING: TOTAL
EATING MEALS: TOTAL
TOILETING: TOTAL
EATING MEALS: A LOT
DAILY ACTIVITIY SCORE: 6
DAILY ACTIVITIY SCORE: 6
TURNING FROM BACK TO SIDE WHILE IN FLAT BAD: TOTAL
DRESSING REGULAR UPPER BODY CLOTHING: A LOT
STANDING UP FROM CHAIR USING ARMS: TOTAL
DAILY ACTIVITIY SCORE: 6
MOVING FROM LYING ON BACK TO SITTING ON SIDE OF FLAT BED WITH BEDRAILS: TOTAL
HELP NEEDED FOR BATHING: TOTAL
DRESSING REGULAR UPPER BODY CLOTHING: TOTAL
TOILETING: TOTAL
TURNING FROM BACK TO SIDE WHILE IN FLAT BAD: TOTAL
DRESSING REGULAR LOWER BODY CLOTHING: TOTAL
PERSONAL GROOMING: TOTAL
TOILETING: TOTAL
STANDING UP FROM CHAIR USING ARMS: TOTAL
PERSONAL GROOMING: A LITTLE
HELP NEEDED FOR BATHING: A LOT
MOBILITY SCORE: 6
DRESSING REGULAR UPPER BODY CLOTHING: TOTAL
PERSONAL GROOMING: TOTAL
CLIMB 3 TO 5 STEPS WITH RAILING: TOTAL
STANDING UP FROM CHAIR USING ARMS: TOTAL
MOVING FROM LYING ON BACK TO SITTING ON SIDE OF FLAT BED WITH BEDRAILS: TOTAL
WALKING IN HOSPITAL ROOM: TOTAL
EATING MEALS: A LITTLE
MOVING FROM LYING ON BACK TO SITTING ON SIDE OF FLAT BED WITH BEDRAILS: TOTAL
CLIMB 3 TO 5 STEPS WITH RAILING: TOTAL
TURNING FROM BACK TO SIDE WHILE IN FLAT BAD: A LOT
MOBILITY SCORE: 6
STANDING UP FROM CHAIR USING ARMS: TOTAL
STANDING UP FROM CHAIR USING ARMS: TOTAL
MOBILITY SCORE: 6
STANDING UP FROM CHAIR USING ARMS: TOTAL
CLIMB 3 TO 5 STEPS WITH RAILING: TOTAL
PERSONAL GROOMING: A LOT
DAILY ACTIVITIY SCORE: 6
HELP NEEDED FOR BATHING: TOTAL
WALKING IN HOSPITAL ROOM: TOTAL
MOVING FROM LYING ON BACK TO SITTING ON SIDE OF FLAT BED WITH BEDRAILS: TOTAL
STANDING UP FROM CHAIR USING ARMS: TOTAL
PERSONAL GROOMING: TOTAL
PERSONAL GROOMING: TOTAL
MOBILITY SCORE: 7
TURNING FROM BACK TO SIDE WHILE IN FLAT BAD: TOTAL
MOBILITY SCORE: 6
CLIMB 3 TO 5 STEPS WITH RAILING: TOTAL
DRESSING REGULAR UPPER BODY CLOTHING: TOTAL
STANDING UP FROM CHAIR USING ARMS: TOTAL
DAILY ACTIVITIY SCORE: 8
TOILETING: TOTAL
TURNING FROM BACK TO SIDE WHILE IN FLAT BAD: TOTAL
HELP NEEDED FOR BATHING: TOTAL
STANDING UP FROM CHAIR USING ARMS: TOTAL
WALKING IN HOSPITAL ROOM: TOTAL
HELP NEEDED FOR BATHING: TOTAL
DAILY ACTIVITIY SCORE: 6
HELP NEEDED FOR BATHING: TOTAL
PERSONAL GROOMING: TOTAL
EATING MEALS: TOTAL
TURNING FROM BACK TO SIDE WHILE IN FLAT BAD: TOTAL
CLIMB 3 TO 5 STEPS WITH RAILING: TOTAL
MOBILITY SCORE: 7
MOBILITY SCORE: 6
EATING MEALS: A LITTLE
STANDING UP FROM CHAIR USING ARMS: TOTAL
STANDING UP FROM CHAIR USING ARMS: TOTAL
DRESSING REGULAR LOWER BODY CLOTHING: TOTAL
STANDING UP FROM CHAIR USING ARMS: TOTAL
DAILY ACTIVITIY SCORE: 8
HELP NEEDED FOR BATHING: TOTAL
STANDING UP FROM CHAIR USING ARMS: TOTAL
MOVING FROM LYING ON BACK TO SITTING ON SIDE OF FLAT BED WITH BEDRAILS: A LOT
STANDING UP FROM CHAIR USING ARMS: TOTAL
MOVING FROM LYING ON BACK TO SITTING ON SIDE OF FLAT BED WITH BEDRAILS: TOTAL
MOVING FROM LYING ON BACK TO SITTING ON SIDE OF FLAT BED WITH BEDRAILS: TOTAL
MOBILITY SCORE: 8
TURNING FROM BACK TO SIDE WHILE IN FLAT BAD: TOTAL
MOBILITY SCORE: 6
CLIMB 3 TO 5 STEPS WITH RAILING: TOTAL
MOVING FROM LYING ON BACK TO SITTING ON SIDE OF FLAT BED WITH BEDRAILS: TOTAL
TURNING FROM BACK TO SIDE WHILE IN FLAT BAD: A LOT
STANDING UP FROM CHAIR USING ARMS: TOTAL
HELP NEEDED FOR BATHING: TOTAL
CLIMB 3 TO 5 STEPS WITH RAILING: TOTAL
PERSONAL GROOMING: TOTAL
DRESSING REGULAR LOWER BODY CLOTHING: TOTAL
PERSONAL GROOMING: A LOT
DAILY ACTIVITIY SCORE: 6
DAILY ACTIVITIY SCORE: 6
MOVING FROM LYING ON BACK TO SITTING ON SIDE OF FLAT BED WITH BEDRAILS: TOTAL
MOVING TO AND FROM BED TO CHAIR: TOTAL
PERSONAL GROOMING: TOTAL
DRESSING REGULAR UPPER BODY CLOTHING: A LOT
MOVING FROM LYING ON BACK TO SITTING ON SIDE OF FLAT BED WITH BEDRAILS: A LOT
TURNING FROM BACK TO SIDE WHILE IN FLAT BAD: TOTAL
CLIMB 3 TO 5 STEPS WITH RAILING: TOTAL
TURNING FROM BACK TO SIDE WHILE IN FLAT BAD: TOTAL
MOBILITY SCORE: 6
STANDING UP FROM CHAIR USING ARMS: TOTAL
MOVING FROM LYING ON BACK TO SITTING ON SIDE OF FLAT BED WITH BEDRAILS: A LOT
MOVING FROM LYING ON BACK TO SITTING ON SIDE OF FLAT BED WITH BEDRAILS: TOTAL
DRESSING REGULAR LOWER BODY CLOTHING: TOTAL
EATING MEALS: A LOT
EATING MEALS: A LOT
TURNING FROM BACK TO SIDE WHILE IN FLAT BAD: TOTAL
MOVING FROM LYING ON BACK TO SITTING ON SIDE OF FLAT BED WITH BEDRAILS: TOTAL
DRESSING REGULAR LOWER BODY CLOTHING: TOTAL
MOBILITY SCORE: 7
HELP NEEDED FOR BATHING: TOTAL
DRESSING REGULAR LOWER BODY CLOTHING: TOTAL
MOVING FROM LYING ON BACK TO SITTING ON SIDE OF FLAT BED WITH BEDRAILS: TOTAL
TOILETING: TOTAL
HELP NEEDED FOR BATHING: TOTAL
DRESSING REGULAR LOWER BODY CLOTHING: TOTAL
STANDING UP FROM CHAIR USING ARMS: TOTAL
WALKING IN HOSPITAL ROOM: TOTAL
TOILETING: TOTAL
TOILETING: TOTAL
MOVING FROM LYING ON BACK TO SITTING ON SIDE OF FLAT BED WITH BEDRAILS: A LOT
MOVING FROM LYING ON BACK TO SITTING ON SIDE OF FLAT BED WITH BEDRAILS: TOTAL
EATING MEALS: A LOT
CLIMB 3 TO 5 STEPS WITH RAILING: TOTAL
TURNING FROM BACK TO SIDE WHILE IN FLAT BAD: TOTAL
HELP NEEDED FOR BATHING: TOTAL
DRESSING REGULAR UPPER BODY CLOTHING: TOTAL
TURNING FROM BACK TO SIDE WHILE IN FLAT BAD: TOTAL
WALKING IN HOSPITAL ROOM: TOTAL
WALKING IN HOSPITAL ROOM: TOTAL
PERSONAL GROOMING: A LITTLE
TOILETING: TOTAL
TOILETING: TOTAL
MOVING FROM LYING ON BACK TO SITTING ON SIDE OF FLAT BED WITH BEDRAILS: TOTAL
DRESSING REGULAR LOWER BODY CLOTHING: TOTAL
DAILY ACTIVITIY SCORE: 6
WALKING IN HOSPITAL ROOM: TOTAL
DAILY ACTIVITIY SCORE: 6
CLIMB 3 TO 5 STEPS WITH RAILING: TOTAL
EATING MEALS: A LOT
MOVING TO AND FROM BED TO CHAIR: TOTAL
WALKING IN HOSPITAL ROOM: TOTAL
EATING MEALS: A LOT
DAILY ACTIVITIY SCORE: 6
WALKING IN HOSPITAL ROOM: TOTAL
WALKING IN HOSPITAL ROOM: TOTAL
TURNING FROM BACK TO SIDE WHILE IN FLAT BAD: TOTAL
PATIENT BASELINE BEDBOUND: YES
CLIMB 3 TO 5 STEPS WITH RAILING: TOTAL
MOVING FROM LYING ON BACK TO SITTING ON SIDE OF FLAT BED WITH BEDRAILS: TOTAL
DRESSING REGULAR LOWER BODY CLOTHING: TOTAL
DAILY ACTIVITIY SCORE: 6
HELP NEEDED FOR BATHING: TOTAL
WALKING IN HOSPITAL ROOM: TOTAL
PERSONAL GROOMING: TOTAL
DRESSING REGULAR UPPER BODY CLOTHING: TOTAL
DRESSING REGULAR UPPER BODY CLOTHING: TOTAL
EATING MEALS: A LOT
WALKING IN HOSPITAL ROOM: TOTAL
DRESSING REGULAR LOWER BODY CLOTHING: A LOT
MOVING FROM LYING ON BACK TO SITTING ON SIDE OF FLAT BED WITH BEDRAILS: TOTAL
TOILETING: TOTAL
DRESSING REGULAR UPPER BODY CLOTHING: A LOT
DRESSING REGULAR LOWER BODY CLOTHING: TOTAL
EATING MEALS: A LOT
WALKING IN HOSPITAL ROOM: TOTAL
MOVING TO AND FROM BED TO CHAIR: TOTAL
TOILETING: TOTAL
DRESSING REGULAR UPPER BODY CLOTHING: TOTAL
PERSONAL GROOMING: A LOT
MOVING FROM LYING ON BACK TO SITTING ON SIDE OF FLAT BED WITH BEDRAILS: A LOT
STANDING UP FROM CHAIR USING ARMS: TOTAL
DAILY ACTIVITIY SCORE: 6
EATING MEALS: A LOT
DRESSING REGULAR LOWER BODY CLOTHING: TOTAL
DRESSING REGULAR LOWER BODY CLOTHING: TOTAL
STANDING UP FROM CHAIR USING ARMS: TOTAL
PERSONAL GROOMING: TOTAL
MOVING FROM LYING ON BACK TO SITTING ON SIDE OF FLAT BED WITH BEDRAILS: TOTAL
PERSONAL GROOMING: TOTAL
WALKING IN HOSPITAL ROOM: TOTAL
CLIMB 3 TO 5 STEPS WITH RAILING: TOTAL
DRESSING REGULAR UPPER BODY CLOTHING: TOTAL
MOBILITY SCORE: 7
MOVING FROM LYING ON BACK TO SITTING ON SIDE OF FLAT BED WITH BEDRAILS: TOTAL
HELP NEEDED FOR BATHING: TOTAL
TOILETING: TOTAL
DRESSING REGULAR LOWER BODY CLOTHING: TOTAL
HELP NEEDED FOR BATHING: TOTAL
TOILETING: TOTAL
DRESSING REGULAR UPPER BODY CLOTHING: A LOT
WALKING IN HOSPITAL ROOM: TOTAL
CLIMB 3 TO 5 STEPS WITH RAILING: TOTAL
PATIENT BASELINE BEDBOUND: YES
DRESSING REGULAR UPPER BODY CLOTHING: TOTAL
HELP NEEDED FOR BATHING: TOTAL
TOILETING: A LOT
DRESSING REGULAR UPPER BODY CLOTHING: A LOT
STANDING UP FROM CHAIR USING ARMS: TOTAL
MOVING TO AND FROM BED TO CHAIR: TOTAL
DAILY ACTIVITIY SCORE: 6
DRESSING REGULAR LOWER BODY CLOTHING: TOTAL
CLIMB 3 TO 5 STEPS WITH RAILING: TOTAL
MOVING TO AND FROM BED TO CHAIR: TOTAL
DAILY ACTIVITIY SCORE: 11
TURNING FROM BACK TO SIDE WHILE IN FLAT BAD: TOTAL
DRESSING REGULAR LOWER BODY CLOTHING: TOTAL
PERSONAL GROOMING: TOTAL
MOVING FROM LYING ON BACK TO SITTING ON SIDE OF FLAT BED WITH BEDRAILS: TOTAL
PERSONAL GROOMING: TOTAL
DRESSING REGULAR LOWER BODY CLOTHING: TOTAL
TURNING FROM BACK TO SIDE WHILE IN FLAT BAD: TOTAL
DRESSING REGULAR LOWER BODY CLOTHING: TOTAL
PERSONAL GROOMING: A LOT
TOILETING: TOTAL
STANDING UP FROM CHAIR USING ARMS: TOTAL
STANDING UP FROM CHAIR USING ARMS: TOTAL
MOBILITY SCORE: 8
MOBILITY SCORE: 6
MOVING TO AND FROM BED TO CHAIR: TOTAL
TURNING FROM BACK TO SIDE WHILE IN FLAT BAD: TOTAL
MOVING TO AND FROM BED TO CHAIR: TOTAL
TOILETING: TOTAL
CLIMB 3 TO 5 STEPS WITH RAILING: TOTAL
TURNING FROM BACK TO SIDE WHILE IN FLAT BAD: TOTAL
WALKING IN HOSPITAL ROOM: TOTAL
MOVING FROM LYING ON BACK TO SITTING ON SIDE OF FLAT BED WITH BEDRAILS: TOTAL
TURNING FROM BACK TO SIDE WHILE IN FLAT BAD: TOTAL
TURNING FROM BACK TO SIDE WHILE IN FLAT BAD: TOTAL
MOVING TO AND FROM BED TO CHAIR: TOTAL
CLIMB 3 TO 5 STEPS WITH RAILING: TOTAL
TURNING FROM BACK TO SIDE WHILE IN FLAT BAD: TOTAL
STANDING UP FROM CHAIR USING ARMS: TOTAL
DAILY ACTIVITIY SCORE: 9
MOVING TO AND FROM BED TO CHAIR: TOTAL
TURNING FROM BACK TO SIDE WHILE IN FLAT BAD: A LOT
DAILY ACTIVITIY SCORE: 6
TOILETING: TOTAL
DRESSING REGULAR LOWER BODY CLOTHING: TOTAL
CLIMB 3 TO 5 STEPS WITH RAILING: TOTAL
MOVING TO AND FROM BED TO CHAIR: TOTAL
PERSONAL GROOMING: TOTAL
TURNING FROM BACK TO SIDE WHILE IN FLAT BAD: TOTAL
MOVING TO AND FROM BED TO CHAIR: TOTAL
DAILY ACTIVITIY SCORE: 6
PERSONAL GROOMING: TOTAL
EATING MEALS: TOTAL
HELP NEEDED FOR BATHING: A LOT
HELP NEEDED FOR BATHING: TOTAL
DRESSING REGULAR UPPER BODY CLOTHING: TOTAL
STANDING UP FROM CHAIR USING ARMS: TOTAL
HELP NEEDED FOR BATHING: TOTAL
MOVING FROM LYING ON BACK TO SITTING ON SIDE OF FLAT BED WITH BEDRAILS: TOTAL
PERSONAL GROOMING: TOTAL
DRESSING REGULAR UPPER BODY CLOTHING: TOTAL
HELP NEEDED FOR BATHING: A LOT
CLIMB 3 TO 5 STEPS WITH RAILING: TOTAL
TOILETING: TOTAL
STANDING UP FROM CHAIR USING ARMS: TOTAL
CLIMB 3 TO 5 STEPS WITH RAILING: TOTAL
EATING MEALS: TOTAL
TOILETING: TOTAL
MOVING FROM LYING ON BACK TO SITTING ON SIDE OF FLAT BED WITH BEDRAILS: TOTAL
MOVING TO AND FROM BED TO CHAIR: TOTAL
TOILETING: TOTAL
TURNING FROM BACK TO SIDE WHILE IN FLAT BAD: TOTAL
MOBILITY SCORE: 8
MOVING TO AND FROM BED TO CHAIR: TOTAL
TURNING FROM BACK TO SIDE WHILE IN FLAT BAD: TOTAL
WALKING IN HOSPITAL ROOM: TOTAL
MOVING TO AND FROM BED TO CHAIR: TOTAL
DAILY ACTIVITIY SCORE: 6
WALKING IN HOSPITAL ROOM: TOTAL
WALKING IN HOSPITAL ROOM: TOTAL
MOVING TO AND FROM BED TO CHAIR: TOTAL
STANDING UP FROM CHAIR USING ARMS: TOTAL
PERSONAL GROOMING: TOTAL
WALKING IN HOSPITAL ROOM: TOTAL
DRESSING REGULAR LOWER BODY CLOTHING: TOTAL
DAILY ACTIVITIY SCORE: 8
MOVING TO AND FROM BED TO CHAIR: TOTAL
TURNING FROM BACK TO SIDE WHILE IN FLAT BAD: TOTAL
DAILY ACTIVITIY SCORE: 8
DAILY ACTIVITIY SCORE: 6
TOILETING: TOTAL
TURNING FROM BACK TO SIDE WHILE IN FLAT BAD: TOTAL
MOVING FROM LYING ON BACK TO SITTING ON SIDE OF FLAT BED WITH BEDRAILS: TOTAL
WALKING IN HOSPITAL ROOM: TOTAL
MOVING FROM LYING ON BACK TO SITTING ON SIDE OF FLAT BED WITH BEDRAILS: TOTAL
CLIMB 3 TO 5 STEPS WITH RAILING: TOTAL
STANDING UP FROM CHAIR USING ARMS: TOTAL
EATING MEALS: A LOT
HELP NEEDED FOR BATHING: TOTAL
PERSONAL GROOMING: TOTAL
TURNING FROM BACK TO SIDE WHILE IN FLAT BAD: TOTAL
STANDING UP FROM CHAIR USING ARMS: TOTAL
CLIMB 3 TO 5 STEPS WITH RAILING: TOTAL
DRESSING REGULAR LOWER BODY CLOTHING: TOTAL
HELP NEEDED FOR BATHING: TOTAL
WALKING IN HOSPITAL ROOM: TOTAL
HELP NEEDED FOR BATHING: TOTAL
WALKING IN HOSPITAL ROOM: TOTAL
HELP NEEDED FOR BATHING: TOTAL
PERSONAL GROOMING: TOTAL
HELP NEEDED FOR BATHING: TOTAL
MOVING FROM LYING ON BACK TO SITTING ON SIDE OF FLAT BED WITH BEDRAILS: TOTAL
TURNING FROM BACK TO SIDE WHILE IN FLAT BAD: TOTAL
WALKING IN HOSPITAL ROOM: TOTAL
STANDING UP FROM CHAIR USING ARMS: TOTAL
EATING MEALS: TOTAL
TURNING FROM BACK TO SIDE WHILE IN FLAT BAD: TOTAL
STANDING UP FROM CHAIR USING ARMS: TOTAL
DRESSING REGULAR LOWER BODY CLOTHING: TOTAL
DAILY ACTIVITIY SCORE: 8
MOBILITY SCORE: 6
EATING MEALS: TOTAL
EATING MEALS: TOTAL
MOBILITY SCORE: 6
PERSONAL GROOMING: A LOT
HELP NEEDED FOR BATHING: TOTAL
CLIMB 3 TO 5 STEPS WITH RAILING: TOTAL
HELP NEEDED FOR BATHING: TOTAL
MOBILITY SCORE: 6
DAILY ACTIVITIY SCORE: 7
TURNING FROM BACK TO SIDE WHILE IN FLAT BAD: TOTAL
MOVING FROM LYING ON BACK TO SITTING ON SIDE OF FLAT BED WITH BEDRAILS: TOTAL
MOBILITY SCORE: 6
HELP NEEDED FOR BATHING: TOTAL
DRESSING REGULAR UPPER BODY CLOTHING: TOTAL
CLIMB 3 TO 5 STEPS WITH RAILING: TOTAL
PERSONAL GROOMING: TOTAL
HELP NEEDED FOR BATHING: TOTAL
MOBILITY SCORE: 6
TURNING FROM BACK TO SIDE WHILE IN FLAT BAD: TOTAL
TURNING FROM BACK TO SIDE WHILE IN FLAT BAD: TOTAL
HELP NEEDED FOR BATHING: TOTAL
HELP NEEDED FOR BATHING: TOTAL
STANDING UP FROM CHAIR USING ARMS: TOTAL
HELP NEEDED FOR BATHING: TOTAL
MOVING FROM LYING ON BACK TO SITTING ON SIDE OF FLAT BED WITH BEDRAILS: A LOT
DAILY ACTIVITIY SCORE: 6
MOBILITY SCORE: 6
MOVING TO AND FROM BED TO CHAIR: TOTAL
CLIMB 3 TO 5 STEPS WITH RAILING: TOTAL
PERSONAL GROOMING: TOTAL
EATING MEALS: A LOT
MOBILITY SCORE: 7
MOBILITY SCORE: 6
CLIMB 3 TO 5 STEPS WITH RAILING: TOTAL
DAILY ACTIVITIY SCORE: 7
PERSONAL GROOMING: TOTAL
TOILETING: TOTAL
MOVING TO AND FROM BED TO CHAIR: TOTAL
MOBILITY SCORE: 6
PERSONAL GROOMING: TOTAL
WALKING IN HOSPITAL ROOM: TOTAL
MOBILITY SCORE: 8
STANDING UP FROM CHAIR USING ARMS: TOTAL
STANDING UP FROM CHAIR USING ARMS: TOTAL
TOILETING: TOTAL
STANDING UP FROM CHAIR USING ARMS: TOTAL
DAILY ACTIVITIY SCORE: 7
TOILETING: TOTAL
EATING MEALS: TOTAL
EATING MEALS: A LOT
MOBILITY SCORE: 6
PERSONAL GROOMING: A LOT
DAILY ACTIVITIY SCORE: 10
DAILY ACTIVITIY SCORE: 10
STANDING UP FROM CHAIR USING ARMS: TOTAL
MOVING FROM LYING ON BACK TO SITTING ON SIDE OF FLAT BED WITH BEDRAILS: TOTAL
DRESSING REGULAR UPPER BODY CLOTHING: TOTAL
TURNING FROM BACK TO SIDE WHILE IN FLAT BAD: TOTAL
PERSONAL GROOMING: TOTAL
DRESSING REGULAR LOWER BODY CLOTHING: TOTAL
DRESSING REGULAR LOWER BODY CLOTHING: TOTAL
TURNING FROM BACK TO SIDE WHILE IN FLAT BAD: TOTAL
MOBILITY SCORE: 6
MOVING TO AND FROM BED TO CHAIR: TOTAL
DRESSING REGULAR UPPER BODY CLOTHING: TOTAL
STANDING UP FROM CHAIR USING ARMS: TOTAL
PERSONAL GROOMING: TOTAL
HELP NEEDED FOR BATHING: TOTAL
TURNING FROM BACK TO SIDE WHILE IN FLAT BAD: TOTAL
MOBILITY SCORE: 7
TOILETING: TOTAL
DRESSING REGULAR LOWER BODY CLOTHING: TOTAL
STANDING UP FROM CHAIR USING ARMS: TOTAL
CLIMB 3 TO 5 STEPS WITH RAILING: TOTAL
MOBILITY SCORE: 6
WALKING IN HOSPITAL ROOM: TOTAL
PERSONAL GROOMING: TOTAL
EATING MEALS: TOTAL
WALKING IN HOSPITAL ROOM: TOTAL
EATING MEALS: A LOT
HELP NEEDED FOR BATHING: TOTAL
DRESSING REGULAR UPPER BODY CLOTHING: TOTAL
DAILY ACTIVITIY SCORE: 7
MOVING FROM LYING ON BACK TO SITTING ON SIDE OF FLAT BED WITH BEDRAILS: TOTAL
PERSONAL GROOMING: A LOT
EATING MEALS: A LOT
MOVING TO AND FROM BED TO CHAIR: TOTAL
CLIMB 3 TO 5 STEPS WITH RAILING: TOTAL
MOVING TO AND FROM BED TO CHAIR: TOTAL
HELP NEEDED FOR BATHING: TOTAL
DRESSING REGULAR LOWER BODY CLOTHING: TOTAL
MOBILITY SCORE: 7
DRESSING REGULAR LOWER BODY CLOTHING: TOTAL
CLIMB 3 TO 5 STEPS WITH RAILING: TOTAL
PATIENT BASELINE BEDBOUND: YES
TURNING FROM BACK TO SIDE WHILE IN FLAT BAD: TOTAL
TOILETING: TOTAL
MOBILITY SCORE: 6
PERSONAL GROOMING: TOTAL
DAILY ACTIVITIY SCORE: 7
MOBILITY SCORE: 6
WALKING IN HOSPITAL ROOM: TOTAL
DRESSING REGULAR LOWER BODY CLOTHING: TOTAL
WALKING IN HOSPITAL ROOM: TOTAL
WALKING IN HOSPITAL ROOM: TOTAL
DAILY ACTIVITIY SCORE: 6
EATING MEALS: TOTAL
MOVING FROM LYING ON BACK TO SITTING ON SIDE OF FLAT BED WITH BEDRAILS: TOTAL
MOVING FROM LYING ON BACK TO SITTING ON SIDE OF FLAT BED WITH BEDRAILS: TOTAL
DAILY ACTIVITIY SCORE: 7
WALKING IN HOSPITAL ROOM: TOTAL
MOBILITY SCORE: 6
DRESSING REGULAR UPPER BODY CLOTHING: TOTAL
TOILETING: TOTAL
TURNING FROM BACK TO SIDE WHILE IN FLAT BAD: TOTAL
DRESSING REGULAR UPPER BODY CLOTHING: TOTAL
DAILY ACTIVITIY SCORE: 6
TOILETING: TOTAL
DRESSING REGULAR UPPER BODY CLOTHING: TOTAL
MOVING TO AND FROM BED TO CHAIR: TOTAL
CLIMB 3 TO 5 STEPS WITH RAILING: TOTAL
PERSONAL GROOMING: TOTAL
PERSONAL GROOMING: TOTAL
MOVING FROM LYING ON BACK TO SITTING ON SIDE OF FLAT BED WITH BEDRAILS: TOTAL
TOILETING: TOTAL
MOBILITY SCORE: 9
EATING MEALS: TOTAL
MOVING TO AND FROM BED TO CHAIR: TOTAL
DRESSING REGULAR LOWER BODY CLOTHING: TOTAL
HELP NEEDED FOR BATHING: TOTAL
MOBILITY SCORE: 8
WALKING IN HOSPITAL ROOM: TOTAL
MOBILITY SCORE: 6
EATING MEALS: A LOT
MOVING TO AND FROM BED TO CHAIR: TOTAL
TOILETING: TOTAL
MOBILITY SCORE: 6
MOBILITY SCORE: 6
TOILETING: TOTAL
TOILETING: TOTAL
MOBILITY SCORE: 6
HELP NEEDED FOR BATHING: TOTAL
CLIMB 3 TO 5 STEPS WITH RAILING: TOTAL
MOBILITY SCORE: 6
DAILY ACTIVITIY SCORE: 7
MOBILITY SCORE: 6
HELP NEEDED FOR BATHING: TOTAL
EATING MEALS: TOTAL
MOVING TO AND FROM BED TO CHAIR: TOTAL
PERSONAL GROOMING: A LITTLE
MOVING TO AND FROM BED TO CHAIR: TOTAL
EATING MEALS: A LOT
WALKING IN HOSPITAL ROOM: TOTAL
DAILY ACTIVITIY SCORE: 6
STANDING UP FROM CHAIR USING ARMS: TOTAL
TOILETING: TOTAL
DRESSING REGULAR UPPER BODY CLOTHING: TOTAL
EATING MEALS: TOTAL
TURNING FROM BACK TO SIDE WHILE IN FLAT BAD: TOTAL
DAILY ACTIVITIY SCORE: 7
STANDING UP FROM CHAIR USING ARMS: TOTAL
DRESSING REGULAR UPPER BODY CLOTHING: TOTAL
MOVING TO AND FROM BED TO CHAIR: TOTAL
MOBILITY SCORE: 6
HELP NEEDED FOR BATHING: TOTAL
DAILY ACTIVITIY SCORE: 7
WALKING IN HOSPITAL ROOM: TOTAL
DAILY ACTIVITIY SCORE: 7
MOVING TO AND FROM BED TO CHAIR: TOTAL
MOBILITY SCORE: 7
DRESSING REGULAR UPPER BODY CLOTHING: TOTAL
TOILETING: TOTAL
MOBILITY SCORE: 6
DAILY ACTIVITIY SCORE: 7
CLIMB 3 TO 5 STEPS WITH RAILING: TOTAL
DRESSING REGULAR LOWER BODY CLOTHING: TOTAL
MOBILITY SCORE: 6
EATING MEALS: TOTAL
STANDING UP FROM CHAIR USING ARMS: TOTAL
DRESSING REGULAR LOWER BODY CLOTHING: TOTAL
HELP NEEDED FOR BATHING: TOTAL
EATING MEALS: TOTAL
WALKING IN HOSPITAL ROOM: TOTAL
DRESSING REGULAR UPPER BODY CLOTHING: TOTAL
HELP NEEDED FOR BATHING: TOTAL
DRESSING REGULAR LOWER BODY CLOTHING: TOTAL
TURNING FROM BACK TO SIDE WHILE IN FLAT BAD: TOTAL
DRESSING REGULAR LOWER BODY CLOTHING: TOTAL
STANDING UP FROM CHAIR USING ARMS: TOTAL
HELP NEEDED FOR BATHING: A LOT
CLIMB 3 TO 5 STEPS WITH RAILING: TOTAL
PERSONAL GROOMING: TOTAL
DRESSING REGULAR LOWER BODY CLOTHING: TOTAL
DRESSING REGULAR UPPER BODY CLOTHING: A LOT
TOILETING: TOTAL
TURNING FROM BACK TO SIDE WHILE IN FLAT BAD: TOTAL
CLIMB 3 TO 5 STEPS WITH RAILING: TOTAL
MOVING FROM LYING ON BACK TO SITTING ON SIDE OF FLAT BED WITH BEDRAILS: TOTAL
DRESSING REGULAR UPPER BODY CLOTHING: A LOT
DRESSING REGULAR LOWER BODY CLOTHING: A LOT
CLIMB 3 TO 5 STEPS WITH RAILING: TOTAL
MOVING TO AND FROM BED TO CHAIR: TOTAL
MOVING TO AND FROM BED TO CHAIR: TOTAL
PERSONAL GROOMING: TOTAL
EATING MEALS: A LOT
DRESSING REGULAR UPPER BODY CLOTHING: A LOT
TURNING FROM BACK TO SIDE WHILE IN FLAT BAD: TOTAL
TURNING FROM BACK TO SIDE WHILE IN FLAT BAD: A LOT
MOVING TO AND FROM BED TO CHAIR: TOTAL
TOILETING: TOTAL
DRESSING REGULAR UPPER BODY CLOTHING: TOTAL
DRESSING REGULAR LOWER BODY CLOTHING: TOTAL
WALKING IN HOSPITAL ROOM: TOTAL
EATING MEALS: TOTAL
MOVING TO AND FROM BED TO CHAIR: TOTAL
DRESSING REGULAR UPPER BODY CLOTHING: TOTAL
DRESSING REGULAR LOWER BODY CLOTHING: TOTAL
TURNING FROM BACK TO SIDE WHILE IN FLAT BAD: TOTAL
STANDING UP FROM CHAIR USING ARMS: TOTAL
DAILY ACTIVITIY SCORE: 12
PERSONAL GROOMING: TOTAL
MOVING FROM LYING ON BACK TO SITTING ON SIDE OF FLAT BED WITH BEDRAILS: TOTAL
DRESSING REGULAR UPPER BODY CLOTHING: TOTAL
PERSONAL GROOMING: TOTAL
DRESSING REGULAR LOWER BODY CLOTHING: TOTAL
DRESSING REGULAR UPPER BODY CLOTHING: TOTAL
HELP NEEDED FOR BATHING: TOTAL
MOBILITY SCORE: 6
MOVING TO AND FROM BED TO CHAIR: TOTAL
CLIMB 3 TO 5 STEPS WITH RAILING: TOTAL
CLIMB 3 TO 5 STEPS WITH RAILING: TOTAL
PERSONAL GROOMING: TOTAL
DRESSING REGULAR LOWER BODY CLOTHING: TOTAL
MOVING TO AND FROM BED TO CHAIR: TOTAL
TOILETING: TOTAL
MOVING FROM LYING ON BACK TO SITTING ON SIDE OF FLAT BED WITH BEDRAILS: TOTAL
DRESSING REGULAR UPPER BODY CLOTHING: TOTAL
WALKING IN HOSPITAL ROOM: TOTAL
EATING MEALS: TOTAL
WALKING IN HOSPITAL ROOM: TOTAL
MOBILITY SCORE: 6
MOVING TO AND FROM BED TO CHAIR: TOTAL
TOILETING: TOTAL
DRESSING REGULAR LOWER BODY CLOTHING: TOTAL
DAILY ACTIVITIY SCORE: 8
HELP NEEDED FOR BATHING: TOTAL
WALKING IN HOSPITAL ROOM: TOTAL
WALKING IN HOSPITAL ROOM: TOTAL
CLIMB 3 TO 5 STEPS WITH RAILING: TOTAL
DRESSING REGULAR LOWER BODY CLOTHING: TOTAL
TOILETING: TOTAL
DRESSING REGULAR LOWER BODY CLOTHING: TOTAL
MOVING TO AND FROM BED TO CHAIR: TOTAL
MOVING TO AND FROM BED TO CHAIR: TOTAL
TURNING FROM BACK TO SIDE WHILE IN FLAT BAD: A LOT
CLIMB 3 TO 5 STEPS WITH RAILING: TOTAL
MOVING TO AND FROM BED TO CHAIR: TOTAL
DRESSING REGULAR UPPER BODY CLOTHING: TOTAL
TOILETING: TOTAL
DAILY ACTIVITIY SCORE: 6
CLIMB 3 TO 5 STEPS WITH RAILING: TOTAL
MOBILITY SCORE: 6
HELP NEEDED FOR BATHING: TOTAL
WALKING IN HOSPITAL ROOM: TOTAL
CLIMB 3 TO 5 STEPS WITH RAILING: TOTAL
DRESSING REGULAR LOWER BODY CLOTHING: TOTAL
DRESSING REGULAR UPPER BODY CLOTHING: TOTAL
MOBILITY SCORE: 6
MOVING TO AND FROM BED TO CHAIR: TOTAL
DRESSING REGULAR UPPER BODY CLOTHING: TOTAL
DAILY ACTIVITIY SCORE: 9
EATING MEALS: TOTAL
DAILY ACTIVITIY SCORE: 6
WALKING IN HOSPITAL ROOM: TOTAL
MOBILITY SCORE: 6
WALKING IN HOSPITAL ROOM: TOTAL
TURNING FROM BACK TO SIDE WHILE IN FLAT BAD: A LOT
STANDING UP FROM CHAIR USING ARMS: TOTAL
DRESSING REGULAR UPPER BODY CLOTHING: TOTAL
CLIMB 3 TO 5 STEPS WITH RAILING: TOTAL
HELP NEEDED FOR BATHING: TOTAL
MOBILITY SCORE: 6
MOVING FROM LYING ON BACK TO SITTING ON SIDE OF FLAT BED WITH BEDRAILS: TOTAL
PERSONAL GROOMING: TOTAL
MOBILITY SCORE: 7
TOILETING: A LOT
TURNING FROM BACK TO SIDE WHILE IN FLAT BAD: TOTAL
CLIMB 3 TO 5 STEPS WITH RAILING: TOTAL
DAILY ACTIVITIY SCORE: 10
DRESSING REGULAR LOWER BODY CLOTHING: TOTAL
DAILY ACTIVITIY SCORE: 12
TOILETING: TOTAL
DRESSING REGULAR UPPER BODY CLOTHING: TOTAL
MOBILITY SCORE: 8
HELP NEEDED FOR BATHING: TOTAL
HELP NEEDED FOR BATHING: A LOT
HELP NEEDED FOR BATHING: TOTAL
TOILETING: TOTAL
MOVING TO AND FROM BED TO CHAIR: TOTAL
TURNING FROM BACK TO SIDE WHILE IN FLAT BAD: TOTAL
STANDING UP FROM CHAIR USING ARMS: TOTAL
PERSONAL GROOMING: TOTAL
TOILETING: TOTAL
DAILY ACTIVITIY SCORE: 10
DRESSING REGULAR LOWER BODY CLOTHING: TOTAL
WALKING IN HOSPITAL ROOM: TOTAL
TURNING FROM BACK TO SIDE WHILE IN FLAT BAD: TOTAL
MOVING FROM LYING ON BACK TO SITTING ON SIDE OF FLAT BED WITH BEDRAILS: TOTAL
PERSONAL GROOMING: TOTAL
CLIMB 3 TO 5 STEPS WITH RAILING: TOTAL
MOVING FROM LYING ON BACK TO SITTING ON SIDE OF FLAT BED WITH BEDRAILS: A LOT
DRESSING REGULAR LOWER BODY CLOTHING: TOTAL
MOBILITY SCORE: 6
MOBILITY SCORE: 9
CLIMB 3 TO 5 STEPS WITH RAILING: TOTAL
STANDING UP FROM CHAIR USING ARMS: TOTAL
WALKING IN HOSPITAL ROOM: TOTAL
EATING MEALS: A LITTLE
MOBILITY SCORE: 7
CLIMB 3 TO 5 STEPS WITH RAILING: TOTAL
CLIMB 3 TO 5 STEPS WITH RAILING: TOTAL
MOBILITY SCORE: 8
DRESSING REGULAR LOWER BODY CLOTHING: TOTAL
MOVING TO AND FROM BED TO CHAIR: TOTAL
TURNING FROM BACK TO SIDE WHILE IN FLAT BAD: TOTAL
TOILETING: TOTAL
TOILETING: TOTAL
STANDING UP FROM CHAIR USING ARMS: TOTAL
MOVING FROM LYING ON BACK TO SITTING ON SIDE OF FLAT BED WITH BEDRAILS: TOTAL
CLIMB 3 TO 5 STEPS WITH RAILING: TOTAL
MOVING FROM LYING ON BACK TO SITTING ON SIDE OF FLAT BED WITH BEDRAILS: TOTAL
STANDING UP FROM CHAIR USING ARMS: TOTAL
EATING MEALS: TOTAL
WALKING IN HOSPITAL ROOM: TOTAL
CLIMB 3 TO 5 STEPS WITH RAILING: TOTAL
HELP NEEDED FOR BATHING: TOTAL
MOVING FROM LYING ON BACK TO SITTING ON SIDE OF FLAT BED WITH BEDRAILS: TOTAL
MOVING TO AND FROM BED TO CHAIR: TOTAL
HELP NEEDED FOR BATHING: TOTAL
CLIMB 3 TO 5 STEPS WITH RAILING: TOTAL
HELP NEEDED FOR BATHING: TOTAL
DRESSING REGULAR LOWER BODY CLOTHING: TOTAL
PERSONAL GROOMING: A LOT
STANDING UP FROM CHAIR USING ARMS: TOTAL
EATING MEALS: TOTAL
EATING MEALS: A LOT
STANDING UP FROM CHAIR USING ARMS: TOTAL
TURNING FROM BACK TO SIDE WHILE IN FLAT BAD: TOTAL
PERSONAL GROOMING: A LOT
MOVING FROM LYING ON BACK TO SITTING ON SIDE OF FLAT BED WITH BEDRAILS: TOTAL
STANDING UP FROM CHAIR USING ARMS: TOTAL
DAILY ACTIVITIY SCORE: 7
DRESSING REGULAR LOWER BODY CLOTHING: TOTAL
PERSONAL GROOMING: TOTAL
PERSONAL GROOMING: TOTAL
EATING MEALS: A LOT
TOILETING: TOTAL
WALKING IN HOSPITAL ROOM: TOTAL
TURNING FROM BACK TO SIDE WHILE IN FLAT BAD: TOTAL
PERSONAL GROOMING: TOTAL
HELP NEEDED FOR BATHING: A LOT
CLIMB 3 TO 5 STEPS WITH RAILING: TOTAL
CLIMB 3 TO 5 STEPS WITH RAILING: TOTAL
HELP NEEDED FOR BATHING: TOTAL
MOVING TO AND FROM BED TO CHAIR: TOTAL
MOVING TO AND FROM BED TO CHAIR: TOTAL
MOVING FROM LYING ON BACK TO SITTING ON SIDE OF FLAT BED WITH BEDRAILS: TOTAL
WALKING IN HOSPITAL ROOM: TOTAL
TURNING FROM BACK TO SIDE WHILE IN FLAT BAD: TOTAL
WALKING IN HOSPITAL ROOM: TOTAL
TURNING FROM BACK TO SIDE WHILE IN FLAT BAD: TOTAL
MOVING FROM LYING ON BACK TO SITTING ON SIDE OF FLAT BED WITH BEDRAILS: TOTAL
DRESSING REGULAR UPPER BODY CLOTHING: TOTAL
DAILY ACTIVITIY SCORE: 6
DAILY ACTIVITIY SCORE: 8
TURNING FROM BACK TO SIDE WHILE IN FLAT BAD: TOTAL
DAILY ACTIVITIY SCORE: 7
DRESSING REGULAR UPPER BODY CLOTHING: A LOT
TOILETING: TOTAL
MOVING FROM LYING ON BACK TO SITTING ON SIDE OF FLAT BED WITH BEDRAILS: TOTAL
CLIMB 3 TO 5 STEPS WITH RAILING: TOTAL
TURNING FROM BACK TO SIDE WHILE IN FLAT BAD: A LOT
DRESSING REGULAR UPPER BODY CLOTHING: TOTAL
MOVING TO AND FROM BED TO CHAIR: TOTAL
CLIMB 3 TO 5 STEPS WITH RAILING: TOTAL
MOVING FROM LYING ON BACK TO SITTING ON SIDE OF FLAT BED WITH BEDRAILS: TOTAL
MOVING TO AND FROM BED TO CHAIR: TOTAL
CLIMB 3 TO 5 STEPS WITH RAILING: TOTAL
TOILETING: TOTAL
MOVING TO AND FROM BED TO CHAIR: TOTAL
CLIMB 3 TO 5 STEPS WITH RAILING: TOTAL
TURNING FROM BACK TO SIDE WHILE IN FLAT BAD: TOTAL
DRESSING REGULAR UPPER BODY CLOTHING: TOTAL
DRESSING REGULAR LOWER BODY CLOTHING: TOTAL
DAILY ACTIVITIY SCORE: 6
TOILETING: TOTAL
MOVING FROM LYING ON BACK TO SITTING ON SIDE OF FLAT BED WITH BEDRAILS: TOTAL
TOILETING: TOTAL
MOBILITY SCORE: 6
PERSONAL GROOMING: A LOT
TURNING FROM BACK TO SIDE WHILE IN FLAT BAD: TOTAL
CLIMB 3 TO 5 STEPS WITH RAILING: TOTAL
EATING MEALS: TOTAL
EATING MEALS: A LOT
WALKING IN HOSPITAL ROOM: TOTAL
DRESSING REGULAR LOWER BODY CLOTHING: TOTAL
WALKING IN HOSPITAL ROOM: TOTAL
WALKING IN HOSPITAL ROOM: TOTAL
MOVING TO AND FROM BED TO CHAIR: TOTAL
MOVING FROM LYING ON BACK TO SITTING ON SIDE OF FLAT BED WITH BEDRAILS: TOTAL
PERSONAL GROOMING: A LOT
MOVING FROM LYING ON BACK TO SITTING ON SIDE OF FLAT BED WITH BEDRAILS: TOTAL
MOVING FROM LYING ON BACK TO SITTING ON SIDE OF FLAT BED WITH BEDRAILS: A LOT
PERSONAL GROOMING: TOTAL
EATING MEALS: TOTAL
HELP NEEDED FOR BATHING: TOTAL

## 2024-01-01 ASSESSMENT — PAIN SCALES - GENERAL
PAINLEVEL_OUTOF10: 5 - MODERATE PAIN
PAINLEVEL_OUTOF10: 0 - NO PAIN
PAINLEVEL_OUTOF10: 8
PAINLEVEL_OUTOF10: 10 - WORST POSSIBLE PAIN
PAINLEVEL_OUTOF10: 9
PAINLEVEL_OUTOF10: 0 - NO PAIN
PAINLEVEL_OUTOF10: 10 - WORST POSSIBLE PAIN
PAINLEVEL_OUTOF10: 0 - NO PAIN
PAINLEVEL_OUTOF10: 8
PAINLEVEL_OUTOF10: 0 - NO PAIN
PAINLEVEL_OUTOF10: 5 - MODERATE PAIN
PAINLEVEL_OUTOF10: 5 - MODERATE PAIN
PAINLEVEL_OUTOF10: 10 - WORST POSSIBLE PAIN
PAINLEVEL_OUTOF10: 10 - WORST POSSIBLE PAIN
PAINLEVEL_OUTOF10: 0 - NO PAIN
PAINLEVEL_OUTOF10: 0 - NO PAIN
PAINLEVEL_OUTOF10: 6
PAINLEVEL_OUTOF10: 10 - WORST POSSIBLE PAIN
PAINLEVEL_OUTOF10: 10 - WORST POSSIBLE PAIN
PAINLEVEL_OUTOF10: 0 - NO PAIN
PAINLEVEL_OUTOF10: 7
PAINLEVEL_OUTOF10: 9
PAINLEVEL_OUTOF10: 3
PAINLEVEL_OUTOF10: 4
PAINLEVEL_OUTOF10: 10 - WORST POSSIBLE PAIN
PAINLEVEL_OUTOF10: 0 - NO PAIN
PAINLEVEL_OUTOF10: 10 - WORST POSSIBLE PAIN
PAINLEVEL_OUTOF10: 10 - WORST POSSIBLE PAIN
PAINLEVEL_OUTOF10: 6
PAINLEVEL_OUTOF10: 9
PAINLEVEL_OUTOF10: 0 - NO PAIN
PAINLEVEL_OUTOF10: 0 - NO PAIN
PAINLEVEL_OUTOF10: 7
PAINLEVEL_OUTOF10: 3
PAINLEVEL_OUTOF10: 0 - NO PAIN
PAINLEVEL_OUTOF10: 5 - MODERATE PAIN
PAINLEVEL_OUTOF10: 0 - NO PAIN
PAINLEVEL_OUTOF10: 6
PAINLEVEL_OUTOF10: 0 - NO PAIN
PAINLEVEL_OUTOF10: 6
PAINLEVEL_OUTOF10: 8
PAINLEVEL_OUTOF10: 6
PAINLEVEL_OUTOF10: 2
PAINLEVEL_OUTOF10: 2
PAINLEVEL_OUTOF10: 5 - MODERATE PAIN
PAINLEVEL_OUTOF10: 0 - NO PAIN
PAINLEVEL_OUTOF10: 10 - WORST POSSIBLE PAIN
PAINLEVEL_OUTOF10: 10 - WORST POSSIBLE PAIN
PAINLEVEL_OUTOF10: 6
PAINLEVEL_OUTOF10: 4
PAINLEVEL_OUTOF10: 8
PAINLEVEL_OUTOF10: 3
PAINLEVEL_OUTOF10: 0 - NO PAIN
PAINLEVEL_OUTOF10: 10 - WORST POSSIBLE PAIN
PAINLEVEL_OUTOF10: 0 - NO PAIN
PAINLEVEL_OUTOF10: 8
PAINLEVEL_OUTOF10: 0 - NO PAIN
PAINLEVEL_OUTOF10: 8
PAINLEVEL_OUTOF10: 0 - NO PAIN
PAINLEVEL_OUTOF10: 10 - WORST POSSIBLE PAIN
PAINLEVEL_OUTOF10: 6
PAINLEVEL_OUTOF10: 0 - NO PAIN
PAINLEVEL_OUTOF10: 7
PAINLEVEL_OUTOF10: 5 - MODERATE PAIN
PAINLEVEL_OUTOF10: 6
PAINLEVEL_OUTOF10: 0 - NO PAIN
PAINLEVEL_OUTOF10: 3
PAINLEVEL_OUTOF10: 0 - NO PAIN
PAINLEVEL_OUTOF10: 0 - NO PAIN
PAINLEVEL_OUTOF10: 8
PAINLEVEL_OUTOF10: 1
PAINLEVEL_OUTOF10: 5 - MODERATE PAIN
PAINLEVEL_OUTOF10: 0 - NO PAIN
PAINLEVEL_OUTOF10: 4
PAINLEVEL_OUTOF10: 8
PAINLEVEL_OUTOF10: 10 - WORST POSSIBLE PAIN
PAINLEVEL_OUTOF10: 0 - NO PAIN
PAINLEVEL_OUTOF10: 10 - WORST POSSIBLE PAIN
PAINLEVEL_OUTOF10: 8
PAINLEVEL_OUTOF10: 8
PAINLEVEL_OUTOF10: 6
PAINLEVEL_OUTOF10: 10 - WORST POSSIBLE PAIN
PAINLEVEL_OUTOF10: 0 - NO PAIN
PAINLEVEL_OUTOF10: 5 - MODERATE PAIN
PAINLEVEL_OUTOF10: 10 - WORST POSSIBLE PAIN
PAINLEVEL_OUTOF10: 7
PAINLEVEL_OUTOF10: 5 - MODERATE PAIN
PAINLEVEL_OUTOF10: 3
PAINLEVEL_OUTOF10: 8
PAINLEVEL_OUTOF10: 5 - MODERATE PAIN
PAINLEVEL_OUTOF10: 0 - NO PAIN
PAINLEVEL_OUTOF10: 1
PAINLEVEL_OUTOF10: 0 - NO PAIN
PAINLEVEL_OUTOF10: 9
PAINLEVEL_OUTOF10: 6
PAINLEVEL_OUTOF10: 7
PAINLEVEL_OUTOF10: 4
PAINLEVEL_OUTOF10: 5 - MODERATE PAIN
PAINLEVEL_OUTOF10: 10 - WORST POSSIBLE PAIN
PAINLEVEL_OUTOF10: 3
PAINLEVEL_OUTOF10: 2
PAINLEVEL_OUTOF10: 0 - NO PAIN
PAINLEVEL_OUTOF10: 4
PAINLEVEL_OUTOF10: 0 - NO PAIN
PAINLEVEL_OUTOF10: 10 - WORST POSSIBLE PAIN
PAINLEVEL_OUTOF10: 7
PAINLEVEL_OUTOF10: 6
PAINLEVEL_OUTOF10: 5 - MODERATE PAIN
PAINLEVEL_OUTOF10: 3
PAINLEVEL_OUTOF10: 0 - NO PAIN
PAINLEVEL_OUTOF10: 3
PAINLEVEL_OUTOF10: 0 - NO PAIN
PAINLEVEL_OUTOF10: 6
PAINLEVEL_OUTOF10: 0 - NO PAIN
PAINLEVEL_OUTOF10: 0 - NO PAIN
PAINLEVEL_OUTOF10: 2
PAINLEVEL_OUTOF10: 3
PAINLEVEL_OUTOF10: 0 - NO PAIN
PAINLEVEL_OUTOF10: 8
PAINLEVEL_OUTOF10: 0 - NO PAIN
PAINLEVEL_OUTOF10: 8
PAINLEVEL_OUTOF10: 8
PAINLEVEL_OUTOF10: 0 - NO PAIN
PAINLEVEL_OUTOF10: 5 - MODERATE PAIN
PAINLEVEL_OUTOF10: 0 - NO PAIN
PAINLEVEL_OUTOF10: 10 - WORST POSSIBLE PAIN
PAINLEVEL_OUTOF10: 7
PAINLEVEL_OUTOF10: 0 - NO PAIN
PAINLEVEL_OUTOF10: 3
PAINLEVEL_OUTOF10: 0 - NO PAIN
PAINLEVEL_OUTOF10: 4
PAINLEVEL_OUTOF10: 10 - WORST POSSIBLE PAIN
PAINLEVEL_OUTOF10: 0 - NO PAIN
PAINLEVEL_OUTOF10: 10 - WORST POSSIBLE PAIN
PAINLEVEL_OUTOF10: 0 - NO PAIN
PAINLEVEL_OUTOF10: 6
PAINLEVEL_OUTOF10: 4
PAINLEVEL_OUTOF10: 2
PAINLEVEL_OUTOF10: 0 - NO PAIN
PAINLEVEL_OUTOF10: 7
PAINLEVEL_OUTOF10: 0 - NO PAIN
PAINLEVEL_OUTOF10: 2
PAINLEVEL_OUTOF10: 3
PAINLEVEL_OUTOF10: 6
PAINLEVEL_OUTOF10: 4
PAINLEVEL_OUTOF10: 0 - NO PAIN
PAINLEVEL_OUTOF10: 5 - MODERATE PAIN
PAIN_LEVEL: 1
PAINLEVEL_OUTOF10: 4
PAINLEVEL_OUTOF10: 0 - NO PAIN
PAINLEVEL_OUTOF10: 6
PAINLEVEL_OUTOF10: 10 - WORST POSSIBLE PAIN
PAINLEVEL_OUTOF10: 6
PAINLEVEL_OUTOF10: 10 - WORST POSSIBLE PAIN
PAINLEVEL_OUTOF10: 6
PAINLEVEL_OUTOF10: 2
PAINLEVEL_OUTOF10: 0 - NO PAIN
PAINLEVEL_OUTOF10: 0 - NO PAIN
PAINLEVEL_OUTOF10: 10 - WORST POSSIBLE PAIN
PAINLEVEL_OUTOF10: 6
PAINLEVEL_OUTOF10: 0 - NO PAIN
PAINLEVEL_OUTOF10: 0 - NO PAIN
PAINLEVEL_OUTOF10: 3
PAINLEVEL_OUTOF10: 9
PAINLEVEL_OUTOF10: 10 - WORST POSSIBLE PAIN
PAINLEVEL_OUTOF10: 5 - MODERATE PAIN
PAINLEVEL_OUTOF10: 0 - NO PAIN
PAINLEVEL_OUTOF10: 10 - WORST POSSIBLE PAIN
PAINLEVEL_OUTOF10: 5 - MODERATE PAIN
PAINLEVEL_OUTOF10: 0 - NO PAIN
PAINLEVEL_OUTOF10: 10 - WORST POSSIBLE PAIN
PAINLEVEL_OUTOF10: 0 - NO PAIN
PAINLEVEL_OUTOF10: 5 - MODERATE PAIN
PAINLEVEL_OUTOF10: 7
PAINLEVEL_OUTOF10: 0 - NO PAIN
PAINLEVEL_OUTOF10: 7
PAINLEVEL_OUTOF10: 0 - NO PAIN
PAINLEVEL_OUTOF10: 3
PAINLEVEL_OUTOF10: 4
PAINLEVEL_OUTOF10: 10 - WORST POSSIBLE PAIN
PAINLEVEL_OUTOF10: 6
PAINLEVEL_OUTOF10: 0 - NO PAIN
PAINLEVEL_OUTOF10: 10 - WORST POSSIBLE PAIN
PAINLEVEL_OUTOF10: 9
PAINLEVEL_OUTOF10: 10 - WORST POSSIBLE PAIN
PAINLEVEL_OUTOF10: 0 - NO PAIN
PAINLEVEL_OUTOF10: 0 - NO PAIN
PAINLEVEL_OUTOF10: 5 - MODERATE PAIN
PAINLEVEL_OUTOF10: 0 - NO PAIN
PAINLEVEL_OUTOF10: 5 - MODERATE PAIN
PAINLEVEL_OUTOF10: 0 - NO PAIN
PAINLEVEL_OUTOF10: 7
PAINLEVEL_OUTOF10: 0 - NO PAIN
PAINLEVEL_OUTOF10: 3
PAINLEVEL_OUTOF10: 10 - WORST POSSIBLE PAIN
PAINLEVEL_OUTOF10: 4
PAINLEVEL_OUTOF10: 0 - NO PAIN
PAINLEVEL_OUTOF10: 6
PAINLEVEL_OUTOF10: 10 - WORST POSSIBLE PAIN
PAINLEVEL_OUTOF10: 5 - MODERATE PAIN
PAINLEVEL_OUTOF10: 10 - WORST POSSIBLE PAIN
PAINLEVEL: 10-WORST PAIN EVER
PAINLEVEL_OUTOF10: 1
PAINLEVEL_OUTOF10: 6
PAINLEVEL_OUTOF10: 5 - MODERATE PAIN
PAINLEVEL_OUTOF10: 8
PAINLEVEL_OUTOF10: 0 - NO PAIN
PAINLEVEL_OUTOF10: 10 - WORST POSSIBLE PAIN
PAINLEVEL_OUTOF10: 7
PAINLEVEL_OUTOF10: 9
PAINLEVEL_OUTOF10: 10 - WORST POSSIBLE PAIN
PAINLEVEL_OUTOF10: 9
PAINLEVEL_OUTOF10: 9
PAINLEVEL_OUTOF10: 7
PAINLEVEL_OUTOF10: 9
PAINLEVEL_OUTOF10: 3
PAINLEVEL_OUTOF10: 0 - NO PAIN
PAINLEVEL_OUTOF10: 0 - NO PAIN
PAINLEVEL_OUTOF10: 10 - WORST POSSIBLE PAIN
PAINLEVEL_OUTOF10: 2
PAINLEVEL_OUTOF10: 0 - NO PAIN
PAINLEVEL_OUTOF10: 0 - NO PAIN
PAINLEVEL_OUTOF10: 10 - WORST POSSIBLE PAIN
PAINLEVEL_OUTOF10: 6
PAINLEVEL_OUTOF10: 0 - NO PAIN
PAINLEVEL_OUTOF10: 10 - WORST POSSIBLE PAIN
PAINLEVEL_OUTOF10: 0 - NO PAIN
PAINLEVEL_OUTOF10: 0 - NO PAIN
PAINLEVEL_OUTOF10: 10 - WORST POSSIBLE PAIN
PAINLEVEL_OUTOF10: 2
PAINLEVEL_OUTOF10: 0 - NO PAIN
PAINLEVEL_OUTOF10: 3
PAINLEVEL_OUTOF10: 8
PAINLEVEL_OUTOF10: 10 - WORST POSSIBLE PAIN
PAINLEVEL_OUTOF10: 10 - WORST POSSIBLE PAIN
PAINLEVEL_OUTOF10: 7
PAINLEVEL_OUTOF10: 2
PAINLEVEL_OUTOF10: 7
PAINLEVEL_OUTOF10: 3
PAINLEVEL_OUTOF10: 2
PAINLEVEL_OUTOF10: 0 - NO PAIN
PAINLEVEL_OUTOF10: 7
PAINLEVEL_OUTOF10: 10 - WORST POSSIBLE PAIN
PAINLEVEL_OUTOF10: 0 - NO PAIN
PAINLEVEL_OUTOF10: 1
PAINLEVEL_OUTOF10: 0 - NO PAIN
PAINLEVEL_OUTOF10: 7
PAINLEVEL_OUTOF10: 4
PAINLEVEL_OUTOF10: 0 - NO PAIN
PAINLEVEL_OUTOF10: 6
PAINLEVEL_OUTOF10: 0 - NO PAIN
PAINLEVEL_OUTOF10: 0 - NO PAIN
PAINLEVEL_OUTOF10: 10 - WORST POSSIBLE PAIN
PAINLEVEL_OUTOF10: 0 - NO PAIN
PAINLEVEL_OUTOF10: 3
PAINLEVEL_OUTOF10: 7
PAINLEVEL_OUTOF10: 10 - WORST POSSIBLE PAIN
PAINLEVEL_OUTOF10: 0 - NO PAIN
PAINLEVEL_OUTOF10: 0 - NO PAIN
PAINLEVEL_OUTOF10: 8
PAINLEVEL_OUTOF10: 0 - NO PAIN
PAINLEVEL_OUTOF10: 7
PAINLEVEL_OUTOF10: 5 - MODERATE PAIN
PAINLEVEL_OUTOF10: 7
PAINLEVEL_OUTOF10: 2
PAINLEVEL_OUTOF10: 1
PAINLEVEL_OUTOF10: 8
PAINLEVEL_OUTOF10: 0 - NO PAIN
PAINLEVEL_OUTOF10: 10 - WORST POSSIBLE PAIN
PAINLEVEL_OUTOF10: 0 - NO PAIN
PAINLEVEL_OUTOF10: 10 - WORST POSSIBLE PAIN
PAINLEVEL_OUTOF10: 8
PAINLEVEL_OUTOF10: 8
PAINLEVEL_OUTOF10: 3
PAINLEVEL_OUTOF10: 7
PAINLEVEL_OUTOF10: 8
PAINLEVEL_OUTOF10: 8
PAINLEVEL_OUTOF10: 0 - NO PAIN
PAINLEVEL_OUTOF10: 7
PAINLEVEL_OUTOF10: 0 - NO PAIN
PAINLEVEL_OUTOF10: 8
PAINLEVEL_OUTOF10: 10 - WORST POSSIBLE PAIN
PAINLEVEL_OUTOF10: 8
PAINLEVEL_OUTOF10: 3
PAINLEVEL_OUTOF10: 0 - NO PAIN
PAINLEVEL_OUTOF10: 7
PAINLEVEL_OUTOF10: 8
PAINLEVEL_OUTOF10: 0 - NO PAIN
PAINLEVEL_OUTOF10: 4
PAINLEVEL_OUTOF10: 0 - NO PAIN
PAINLEVEL_OUTOF10: 7
PAINLEVEL_OUTOF10: 0 - NO PAIN
PAINLEVEL_OUTOF10: 6
PAINLEVEL_OUTOF10: 0 - NO PAIN
PAINLEVEL_OUTOF10: 4
PAINLEVEL_OUTOF10: 0 - NO PAIN
PAINLEVEL_OUTOF10: 6
PAINLEVEL_OUTOF10: 9
PAINLEVEL_OUTOF10: 5 - MODERATE PAIN
PAINLEVEL_OUTOF10: 4
PAINLEVEL_OUTOF10: 0 - NO PAIN
PAINLEVEL_OUTOF10: 8
PAINLEVEL_OUTOF10: 2
PAINLEVEL_OUTOF10: 0 - NO PAIN
PAINLEVEL_OUTOF10: 7
PAINLEVEL_OUTOF10: 10 - WORST POSSIBLE PAIN
PAINLEVEL_OUTOF10: 2
PAINLEVEL_OUTOF10: 5 - MODERATE PAIN
PAINLEVEL_OUTOF10: 4

## 2024-01-01 ASSESSMENT — LIFESTYLE VARIABLES
AUDIT-C TOTAL SCORE: 0
AUDIT-C TOTAL SCORE: -1
SKIP TO QUESTIONS 9-10: 1
HOW OFTEN DO YOU HAVE A DRINK CONTAINING ALCOHOL: NEVER
HOW OFTEN DO YOU HAVE 6 OR MORE DRINKS ON ONE OCCASION: NEVER
AUDIT-C TOTAL SCORE: -1
SUBSTANCE_ABUSE_PAST_12_MONTHS: NO
HOW MANY STANDARD DRINKS CONTAINING ALCOHOL DO YOU HAVE ON A TYPICAL DAY: PATIENT DECLINED
AUDIT-C TOTAL SCORE: 0
SKIP TO QUESTIONS 9-10: 0
HOW OFTEN DO YOU HAVE 6 OR MORE DRINKS ON ONE OCCASION: NEVER
PRESCIPTION_ABUSE_PAST_12_MONTHS: NO
SKIP TO QUESTIONS 9-10: 1
SKIP TO QUESTIONS 9-10: 1
HOW OFTEN DO YOU HAVE A DRINK CONTAINING ALCOHOL: NEVER
AUDIT-C TOTAL SCORE: 0
AUDIT-C TOTAL SCORE: 0
HOW MANY STANDARD DRINKS CONTAINING ALCOHOL DO YOU HAVE ON A TYPICAL DAY: PATIENT DOES NOT DRINK
HOW OFTEN DO YOU HAVE A DRINK CONTAINING ALCOHOL: NEVER
AUDIT-C TOTAL SCORE: 0
HOW OFTEN DO YOU HAVE 6 OR MORE DRINKS ON ONE OCCASION: PATIENT DECLINED
HOW OFTEN DO YOU HAVE A DRINK CONTAINING ALCOHOL: PATIENT DECLINED
AUDIT-C TOTAL SCORE: 0
HOW OFTEN DO YOU HAVE 6 OR MORE DRINKS ON ONE OCCASION: NEVER
HOW MANY STANDARD DRINKS CONTAINING ALCOHOL DO YOU HAVE ON A TYPICAL DAY: PATIENT DOES NOT DRINK
HOW MANY STANDARD DRINKS CONTAINING ALCOHOL DO YOU HAVE ON A TYPICAL DAY: PATIENT DOES NOT DRINK

## 2024-01-01 ASSESSMENT — PAIN - FUNCTIONAL ASSESSMENT
PAIN_FUNCTIONAL_ASSESSMENT: 0-10
PAIN_FUNCTIONAL_ASSESSMENT: CPOT (CRITICAL CARE PAIN OBSERVATION TOOL)
PAIN_FUNCTIONAL_ASSESSMENT: 0-10
PAIN_FUNCTIONAL_ASSESSMENT: NO/DENIES PAIN
PAIN_FUNCTIONAL_ASSESSMENT: 0-10
PAIN_FUNCTIONAL_ASSESSMENT: CPOT (CRITICAL CARE PAIN OBSERVATION TOOL)
PAIN_FUNCTIONAL_ASSESSMENT: 0-10
PAIN_FUNCTIONAL_ASSESSMENT: 0-10
PAIN_FUNCTIONAL_ASSESSMENT: CPOT (CRITICAL CARE PAIN OBSERVATION TOOL)
PAIN_FUNCTIONAL_ASSESSMENT: 0-10
PAIN_FUNCTIONAL_ASSESSMENT: NO/DENIES PAIN
PAIN_FUNCTIONAL_ASSESSMENT: 0-10
PAIN_FUNCTIONAL_ASSESSMENT: CPOT (CRITICAL CARE PAIN OBSERVATION TOOL)
PAIN_FUNCTIONAL_ASSESSMENT: 0-10
PAIN_FUNCTIONAL_ASSESSMENT: NO/DENIES PAIN
PAIN_FUNCTIONAL_ASSESSMENT: 0-10
PAIN_FUNCTIONAL_ASSESSMENT: NO/DENIES PAIN
PAIN_FUNCTIONAL_ASSESSMENT: 0-10
PAIN_FUNCTIONAL_ASSESSMENT: CPOT (CRITICAL CARE PAIN OBSERVATION TOOL)
PAIN_FUNCTIONAL_ASSESSMENT: 0-10
PAIN_FUNCTIONAL_ASSESSMENT: 0-10
PAIN_FUNCTIONAL_ASSESSMENT: CPOT (CRITICAL CARE PAIN OBSERVATION TOOL)
PAIN_FUNCTIONAL_ASSESSMENT: 0-10
PAIN_FUNCTIONAL_ASSESSMENT: WONG-BAKER FACES
PAIN_FUNCTIONAL_ASSESSMENT: 0-10
PAIN_FUNCTIONAL_ASSESSMENT: NO/DENIES PAIN
PAIN_FUNCTIONAL_ASSESSMENT: 0-10
PAIN_FUNCTIONAL_ASSESSMENT: NO/DENIES PAIN
PAIN_FUNCTIONAL_ASSESSMENT: 0-10
PAIN_FUNCTIONAL_ASSESSMENT: NO/DENIES PAIN
PAIN_FUNCTIONAL_ASSESSMENT: 0-10
PAIN_FUNCTIONAL_ASSESSMENT: 0-10
PAIN_FUNCTIONAL_ASSESSMENT: CPOT (CRITICAL CARE PAIN OBSERVATION TOOL)
PAIN_FUNCTIONAL_ASSESSMENT: 0-10
PAIN_FUNCTIONAL_ASSESSMENT: NO/DENIES PAIN
PAIN_FUNCTIONAL_ASSESSMENT: 0-10
PAIN_FUNCTIONAL_ASSESSMENT: CPOT (CRITICAL CARE PAIN OBSERVATION TOOL)
PAIN_FUNCTIONAL_ASSESSMENT: 0-10
PAIN_FUNCTIONAL_ASSESSMENT: CPOT (CRITICAL CARE PAIN OBSERVATION TOOL)
PAIN_FUNCTIONAL_ASSESSMENT: 0-10
PAIN_FUNCTIONAL_ASSESSMENT: 0-10
PAIN_FUNCTIONAL_ASSESSMENT: CPOT (CRITICAL CARE PAIN OBSERVATION TOOL)
PAIN_FUNCTIONAL_ASSESSMENT: 0-10
PAIN_FUNCTIONAL_ASSESSMENT: NO/DENIES PAIN
PAIN_FUNCTIONAL_ASSESSMENT: NO/DENIES PAIN
PAIN_FUNCTIONAL_ASSESSMENT: 0-10
PAIN_FUNCTIONAL_ASSESSMENT: 0-10
PAIN_FUNCTIONAL_ASSESSMENT: NO/DENIES PAIN
PAIN_FUNCTIONAL_ASSESSMENT: 0-10
PAIN_FUNCTIONAL_ASSESSMENT: NO/DENIES PAIN
PAIN_FUNCTIONAL_ASSESSMENT: 0-10
PAIN_FUNCTIONAL_ASSESSMENT: NO/DENIES PAIN
PAIN_FUNCTIONAL_ASSESSMENT: 0-10
PAIN_FUNCTIONAL_ASSESSMENT: CPOT (CRITICAL CARE PAIN OBSERVATION TOOL)
PAIN_FUNCTIONAL_ASSESSMENT: 0-10
PAIN_FUNCTIONAL_ASSESSMENT: NO/DENIES PAIN
PAIN_FUNCTIONAL_ASSESSMENT: NO/DENIES PAIN
PAIN_FUNCTIONAL_ASSESSMENT: 0-10
PAIN_FUNCTIONAL_ASSESSMENT: WONG-BAKER FACES
PAIN_FUNCTIONAL_ASSESSMENT: 0-10
PAIN_FUNCTIONAL_ASSESSMENT: NO/DENIES PAIN
PAIN_FUNCTIONAL_ASSESSMENT: 0-10
PAIN_FUNCTIONAL_ASSESSMENT: NO/DENIES PAIN
PAIN_FUNCTIONAL_ASSESSMENT: 0-10
PAIN_FUNCTIONAL_ASSESSMENT: WONG-BAKER FACES
PAIN_FUNCTIONAL_ASSESSMENT: 0-10
PAIN_FUNCTIONAL_ASSESSMENT: NO/DENIES PAIN
PAIN_FUNCTIONAL_ASSESSMENT: 0-10
PAIN_FUNCTIONAL_ASSESSMENT: NO/DENIES PAIN
PAIN_FUNCTIONAL_ASSESSMENT: 0-10
PAIN_FUNCTIONAL_ASSESSMENT: NO/DENIES PAIN
PAIN_FUNCTIONAL_ASSESSMENT: 0-10
PAIN_FUNCTIONAL_ASSESSMENT: CPOT (CRITICAL CARE PAIN OBSERVATION TOOL)
PAIN_FUNCTIONAL_ASSESSMENT: 0-10
PAIN_FUNCTIONAL_ASSESSMENT: NO/DENIES PAIN
PAIN_FUNCTIONAL_ASSESSMENT: 0-10
PAIN_FUNCTIONAL_ASSESSMENT: 0-10
PAIN_FUNCTIONAL_ASSESSMENT: NO/DENIES PAIN
PAIN_FUNCTIONAL_ASSESSMENT: 0-10
PAIN_FUNCTIONAL_ASSESSMENT: CPOT (CRITICAL CARE PAIN OBSERVATION TOOL)
PAIN_FUNCTIONAL_ASSESSMENT: 0-10
PAIN_FUNCTIONAL_ASSESSMENT: NO/DENIES PAIN
PAIN_FUNCTIONAL_ASSESSMENT: NO/DENIES PAIN
PAIN_FUNCTIONAL_ASSESSMENT: 0-10

## 2024-01-01 ASSESSMENT — PAIN SCALES - PAIN ASSESSMENT IN ADVANCED DEMENTIA (PAINAD)
TOTALSCORE: 1
TOTALSCORE: 0
BODYLANGUAGE: RELAXED
CONSOLABILITY: NO NEED TO CONSOLE
FACIALEXPRESSION: SMILING OR INEXPRESSIVE
BODYLANGUAGE: RELAXED
BREATHING: NORMAL
CONSOLABILITY: NO NEED TO CONSOLE
FACIALEXPRESSION: SMILING OR INEXPRESSIVE
TOTALSCORE: 2
BODYLANGUAGE: RELAXED
FACIALEXPRESSION: SMILING OR INEXPRESSIVE
BODYLANGUAGE: RELAXED
BREATHING: NORMAL
BREATHING: NORMAL
BODYLANGUAGE: NORMAL
FACIALEXPRESSION: SAD, FRIGHTENED, FROWN
CONSOLABILITY: NO NEED TO CONSOLE
FACIALEXPRESSION: FACIAL GRIMACING
BREATHING: NORMAL
BODYLANGUAGE: NORMAL
TOTALSCORE: 0
BODYLANGUAGE: NORMAL
CONSOLABILITY: NO NEED TO CONSOLE
BODYLANGUAGE: NORMAL

## 2024-01-01 ASSESSMENT — ACTIVITIES OF DAILY LIVING (ADL)
WALKS IN HOME: DEPENDENT
JUDGMENT_ADEQUATE_SAFELY_COMPLETE_DAILY_ACTIVITIES: UNABLE TO ASSESS
FEEDING YOURSELF: DEPENDENT
TOILETING: DEPENDENT
JUDGMENT_ADEQUATE_SAFELY_COMPLETE_DAILY_ACTIVITIES: YES
DRESSING YOURSELF: DEPENDENT
HOME_MANAGEMENT_TIME_ENTRY: 15
WALKS IN HOME: DEPENDENT
PATIENT'S MEMORY ADEQUATE TO SAFELY COMPLETE DAILY ACTIVITIES?: YES
TOILETING: DEPENDENT
LACK_OF_TRANSPORTATION: PATIENT UNABLE TO ANSWER
GROOMING: DEPENDENT
ADEQUATE_TO_COMPLETE_ADL: YES
HEARING - LEFT EAR: DIFFICULTY WITH NOISE
BATHING_ASSISTANCE: TOTAL
BATHING: DEPENDENT
PATIENT'S MEMORY ADEQUATE TO SAFELY COMPLETE DAILY ACTIVITIES?: NO
HOME_MANAGEMENT_TIME_ENTRY: 17
HOME_MANAGEMENT_TIME_ENTRY: 13
FEEDING YOURSELF: NEEDS ASSISTANCE
FEEDING YOURSELF: DEPENDENT
FEEDING YOURSELF: NEEDS ASSISTANCE
ADEQUATE_TO_COMPLETE_ADL: UNABLE TO ASSESS
GROOMING: DEPENDENT
BATHING: DEPENDENT
HEARING - LEFT EAR: FUNCTIONAL
BATHING: DEPENDENT
LACK_OF_TRANSPORTATION: NO
LACK_OF_TRANSPORTATION: NO
HEARING - LEFT EAR: FUNCTIONAL
BATHING_ASSISTANCE: MAXIMAL
BATHING_ASSISTANCE: TOTAL
HEARING - LEFT EAR: UNABLE TO ASSESS
HOME_MANAGEMENT_TIME_ENTRY: 14
HEARING - RIGHT EAR: FUNCTIONAL
JUDGMENT_ADEQUATE_SAFELY_COMPLETE_DAILY_ACTIVITIES: NO
GROOMING: DEPENDENT
WALKS IN HOME: DEPENDENT
ADEQUATE_TO_COMPLETE_ADL: YES
ASSISTIVE_DEVICE: DENTURES UPPER;DENTURES LOWER
HEARING - RIGHT EAR: DIFFICULTY WITH NOISE
WALKS IN HOME: DEPENDENT
TOILETING: DEPENDENT
HEARING - RIGHT EAR: UNABLE TO ASSESS
DRESSING YOURSELF: DEPENDENT
HOME_MANAGEMENT_TIME_ENTRY: 10
ASSISTIVE_DEVICE: WHEELCHAIR
BATHING: DEPENDENT
PATIENT'S MEMORY ADEQUATE TO SAFELY COMPLETE DAILY ACTIVITIES?: YES
LACK_OF_TRANSPORTATION: NO
JUDGMENT_ADEQUATE_SAFELY_COMPLETE_DAILY_ACTIVITIES: YES
ASSISTIVE_DEVICE: DENTURES LOWER;DENTURES UPPER;WHEELCHAIR
ADL_ASSISTANCE: INDEPENDENT
DRESSING YOURSELF: DEPENDENT
BATHING_ASSISTANCE: MAXIMAL
GROOMING: DEPENDENT
DRESSING YOURSELF: DEPENDENT
LACK_OF_TRANSPORTATION: NO
PATIENT'S MEMORY ADEQUATE TO SAFELY COMPLETE DAILY ACTIVITIES?: UNABLE TO ASSESS
HEARING - RIGHT EAR: FUNCTIONAL
LACK_OF_TRANSPORTATION: NO
TOILETING: DEPENDENT
ADEQUATE_TO_COMPLETE_ADL: YES

## 2024-01-01 ASSESSMENT — PAIN DESCRIPTION - LOCATION
LOCATION: LEG
LOCATION: ELBOW
LOCATION: LEG
LOCATION: BACK
LOCATION: LEG
LOCATION: SHOULDER
LOCATION: NECK
LOCATION: FOOT
LOCATION: BACK
LOCATION: ARM
LOCATION: LEG
LOCATION: GENERALIZED
LOCATION: ARM
LOCATION: FOOT
LOCATION: LEG
LOCATION: FOOT
LOCATION: FOOT
LOCATION: LEG
LOCATION: FOOT
LOCATION: HEAD
LOCATION: LEG
LOCATION: FOOT
LOCATION: BUTTOCKS
LOCATION: BACK
LOCATION: LEG
LOCATION: LEG
LOCATION: BUTTOCKS
LOCATION: LEG
LOCATION: LEG
LOCATION: OTHER (COMMENT)
LOCATION: ARM
LOCATION: BACK
LOCATION: LEG
LOCATION: FOOT
LOCATION: LEG
LOCATION: SHOULDER
LOCATION: ARM
LOCATION: LEG
LOCATION: GENERALIZED
LOCATION: SHOULDER
LOCATION: SHOULDER
LOCATION: SACRUM
LOCATION: PENIS
LOCATION: FOOT
LOCATION: LEG
LOCATION: LEG
LOCATION: ARM
LOCATION: LEG
LOCATION: BACK
LOCATION: PENIS

## 2024-01-01 ASSESSMENT — PAIN DESCRIPTION - DESCRIPTORS
DESCRIPTORS: ACHING
DESCRIPTORS: ACHING;BURNING
DESCRIPTORS: ACHING;SHARP;SHOOTING
DESCRIPTORS: ACHING
DESCRIPTORS: BURNING
DESCRIPTORS: ACHING
DESCRIPTORS: ACHING
DESCRIPTORS: SHOOTING;SHARP;BURNING
DESCRIPTORS: ACHING
DESCRIPTORS: ACHING
DESCRIPTORS: SORE;ACHING
DESCRIPTORS: ACHING
DESCRIPTORS: ACHING
DESCRIPTORS: SHARP;SHOOTING
DESCRIPTORS: ACHING
DESCRIPTORS: OTHER (COMMENT)
DESCRIPTORS: ACHING
DESCRIPTORS: ACHING

## 2024-01-01 ASSESSMENT — COLUMBIA-SUICIDE SEVERITY RATING SCALE - C-SSRS
6. HAVE YOU EVER DONE ANYTHING, STARTED TO DO ANYTHING, OR PREPARED TO DO ANYTHING TO END YOUR LIFE?: NO
1. IN THE PAST MONTH, HAVE YOU WISHED YOU WERE DEAD OR WISHED YOU COULD GO TO SLEEP AND NOT WAKE UP?: NO
6. HAVE YOU EVER DONE ANYTHING, STARTED TO DO ANYTHING, OR PREPARED TO DO ANYTHING TO END YOUR LIFE?: NO
6. HAVE YOU EVER DONE ANYTHING, STARTED TO DO ANYTHING, OR PREPARED TO DO ANYTHING TO END YOUR LIFE?: NO
2. HAVE YOU ACTUALLY HAD ANY THOUGHTS OF KILLING YOURSELF?: NO

## 2024-01-01 ASSESSMENT — PAIN DESCRIPTION - ORIENTATION
ORIENTATION: LEFT;UPPER
ORIENTATION: LEFT
ORIENTATION: RIGHT;LEFT
ORIENTATION: INNER
ORIENTATION: RIGHT
ORIENTATION: RIGHT
ORIENTATION: RIGHT;LEFT
ORIENTATION: MID
ORIENTATION: RIGHT
ORIENTATION: RIGHT
ORIENTATION: LOWER
ORIENTATION: LOWER
ORIENTATION: RIGHT;LEFT
ORIENTATION: RIGHT;LEFT
ORIENTATION: RIGHT
ORIENTATION: RIGHT;LEFT
ORIENTATION: RIGHT
ORIENTATION: LEFT;RIGHT
ORIENTATION: LOWER
ORIENTATION: RIGHT;LEFT;LOWER
ORIENTATION: LEFT;UPPER
ORIENTATION: LEFT;UPPER
ORIENTATION: LOWER
ORIENTATION: RIGHT
ORIENTATION: LEFT

## 2024-01-01 ASSESSMENT — PAIN SCALES - WONG BAKER
WONGBAKER_NUMERICALRESPONSE: HURTS LITTLE BIT
WONGBAKER_NUMERICALRESPONSE: NO HURT
WONGBAKER_NUMERICALRESPONSE: HURTS WHOLE LOT
WONGBAKER_NUMERICALRESPONSE: HURTS LITTLE BIT
WONGBAKER_NUMERICALRESPONSE: NO HURT
WONGBAKER_NUMERICALRESPONSE: HURTS WORST
WONGBAKER_NUMERICALRESPONSE: HURTS WHOLE LOT
WONGBAKER_NUMERICALRESPONSE: NO HURT
WONGBAKER_NUMERICALRESPONSE: HURTS LITTLE MORE
WONGBAKER_NUMERICALRESPONSE: HURTS LITTLE BIT
WONGBAKER_NUMERICALRESPONSE: NO HURT
WONGBAKER_NUMERICALRESPONSE: NO HURT
WONGBAKER_NUMERICALRESPONSE: HURTS LITTLE MORE
WONGBAKER_NUMERICALRESPONSE: NO HURT
WONGBAKER_NUMERICALRESPONSE: HURTS EVEN MORE
WONGBAKER_NUMERICALRESPONSE: NO HURT
WONGBAKER_NUMERICALRESPONSE: HURTS EVEN MORE
WONGBAKER_NUMERICALRESPONSE: NO HURT
WONGBAKER_NUMERICALRESPONSE: HURTS EVEN MORE
WONGBAKER_NUMERICALRESPONSE: NO HURT
WONGBAKER_NUMERICALRESPONSE: HURTS LITTLE BIT

## 2024-01-01 ASSESSMENT — PATIENT HEALTH QUESTIONNAIRE - PHQ9
SUM OF ALL RESPONSES TO PHQ9 QUESTIONS 1 & 2: 0
2. FEELING DOWN, DEPRESSED OR HOPELESS: NOT AT ALL
SUM OF ALL RESPONSES TO PHQ9 QUESTIONS 1 & 2: 0
2. FEELING DOWN, DEPRESSED OR HOPELESS: NOT AT ALL
1. LITTLE INTEREST OR PLEASURE IN DOING THINGS: NOT AT ALL
2. FEELING DOWN, DEPRESSED OR HOPELESS: NOT AT ALL
1. LITTLE INTEREST OR PLEASURE IN DOING THINGS: NOT AT ALL
SUM OF ALL RESPONSES TO PHQ9 QUESTIONS 1 & 2: 0
1. LITTLE INTEREST OR PLEASURE IN DOING THINGS: NOT AT ALL
2. FEELING DOWN, DEPRESSED OR HOPELESS: NOT AT ALL
1. LITTLE INTEREST OR PLEASURE IN DOING THINGS: NOT AT ALL
SUM OF ALL RESPONSES TO PHQ9 QUESTIONS 1 & 2: 0

## 2024-01-02 NOTE — LETTER
Patient: Chevy Benton  : 1945    Encounter Date: 2024    PROGRESS NOTE    Subjective  Chief complaint: Chevy Benton is a 78 y.o. male who is an acute skilled patient being seen and evaluated for weakness    HPI:  Patient remains at skilled facility working with therapy to improve strength and mobility. He is totally dependent for all bed mobility. Therapy reports that patient demos poor carryover, lack of initiation, poor motivation, and lack of progress. He is TD with all self care minus eating. Nursing has no new concerns. Patient has no complaints.       Objective  Vital signs: 142/78,86,96%,    Physical Exam  Constitutional:       General: He is not in acute distress.  Eyes:      Extraocular Movements: Extraocular movements intact.   Cardiovascular:      Rate and Rhythm: Normal rate. Rhythm irregular.   Pulmonary:      Effort: Pulmonary effort is normal.      Breath sounds: Normal breath sounds.   Abdominal:      General: Bowel sounds are normal.      Palpations: Abdomen is soft.   Musculoskeletal:      Cervical back: Neck supple.      Right lower leg: No edema.      Left lower leg: No edema.   Skin:     Comments: AV fistula left arm  Coccyx wound   Neurological:      Mental Status: He is alert.      Motor: Weakness present.   Psychiatric:         Mood and Affect: Mood normal.         Behavior: Behavior is cooperative.         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     Provider Attestation - Scribe documentation  All medical record entries made by the Scribe were at my direction and personally dictated by me. I have reviewed the chart and agree that the record accurately reflects my personal performance of the history, physical exam, discussion and plan.   Miley Guerra MD      Electronically Signed By: Miley Guerra MD   1/3/24  7:36 PM

## 2024-01-02 NOTE — LETTER
Patient: Chevy Benton  : 1945    Encounter Date: 2024    PROGRESS NOTE    Subjective  Chief complaint: Chevy Benton is a 78 y.o. male who is an acute skilled patient being seen and evaluated for weakness    HPI:  HPI  Patient continues working in therapy.  Therapy is addressing bed mobility, transfers.  Patient does require max to total dependence for bed mobility and transfers.  Nursing called on 1\2, reported patient with shortness of breath.  Pulse ox 100% on room air, positive for COVID.  Orders placed to start dexamethasone, obtain BMP, CBC, chest x-ray stat.  Labs were unremarkable.  Chest x-ray showed moderate CHF with superimposed pneumonia.  Patient was seen and examined at bedside, in no apparent distress.  Afebrile.    Objective  Vital signs: 142/51, 97.1, 86, 18, blood sugar 147    Physical Exam  Constitutional:       General: He is not in acute distress.  Eyes:      Extraocular Movements: Extraocular movements intact.   Cardiovascular:      Rate and Rhythm: Normal rate. Rhythm irregular.   Pulmonary:      Effort: Pulmonary effort is normal.      Breath sounds: Normal breath sounds.   Abdominal:      General: Bowel sounds are normal.      Palpations: Abdomen is soft.   Musculoskeletal:      Cervical back: Neck supple.      Right lower leg: No edema.      Left lower leg: No edema.   Skin:     Comments: AV fistula left arm  Coccyx wound   Neurological:      Mental Status: He is alert.      Motor: Weakness present.   Psychiatric:         Mood and Affect: Mood normal.         Behavior: Behavior is cooperative.         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  on chronic dialysis (CMS/[facility])     Monitor BP  HD per nephrology  Monitor SOB, orthopnea or weight gain  Notify nephrology of chest x-ray results/moderate CHF         PNA (pneumonia)     Start Levaquin          Medications, treatments, and labs reviewed  Continue medications and treatments as listed in EMR      Scribe Attestation  Darline ALUREN Scribe   attest that this documentation has been prepared under the direction and in the presence of EDWIN Aguilar    Provider Attestation - Scribe documentation  All medical record entries made by the Scribe were at my direction and personally dictated by me. I have reviewed the chart and agree that the record accurately reflects my personal performance of the history, physical exam, discussion and plan.   EDWIN Aguilar        Electronically Signed By: EDWIN Aguilar   1/18/24  4:31 PM

## 2024-01-03 NOTE — PROGRESS NOTES
PROGRESS NOTE    Subjective   Chief complaint: Chevy Benton is a 78 y.o. male who is an acute skilled patient being seen and evaluated for weakness    HPI:  Patient remains at skilled facility working with therapy to improve strength and mobility. He is totally dependent for all bed mobility. Therapy reports that patient demos poor carryover, lack of initiation, poor motivation, and lack of progress. He is TD with all self care minus eating. Nursing has no new concerns. Patient has no complaints.       Objective   Vital signs: 142/78,86,96%,    Physical Exam  Constitutional:       General: He is not in acute distress.  Eyes:      Extraocular Movements: Extraocular movements intact.   Cardiovascular:      Rate and Rhythm: Normal rate. Rhythm irregular.   Pulmonary:      Effort: Pulmonary effort is normal.      Breath sounds: Normal breath sounds.   Abdominal:      General: Bowel sounds are normal.      Palpations: Abdomen is soft.   Musculoskeletal:      Cervical back: Neck supple.      Right lower leg: No edema.      Left lower leg: No edema.   Skin:     Comments: AV fistula left arm  Coccyx wound   Neurological:      Mental Status: He is alert.      Motor: Weakness present.   Psychiatric:         Mood and Affect: Mood normal.         Behavior: Behavior is cooperative.         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     Provider Attestation - Scribe documentation  All medical record entries made by the Scribe were at my direction and personally dictated by me. I have reviewed the chart and agree that the record accurately reflects my personal performance of the history, physical exam, discussion and plan.   Miley Guerra MD

## 2024-01-03 NOTE — LETTER
Patient: Chevy Benton  : 1945    Encounter Date: 2024    PROGRESS NOTE    Subjective  Chief complaint: Chevy Benton is a 78 y.o. male who is an acute skilled patient being seen and evaluated for weakness    HPI:  HPI  Patient does continue to work in therapy due to generalized weakness.  Patient is requiring max to total dependence for bed mobility and to complete transfers.  Nursing called yesterday reported the patient was short of breath with a pulse ox at 100% on room air.  Patient did test positive for COVID.  Patient was placed on isolation precautions.  Orders placed to start dexamethasone, obtain BMP and chest x-ray stat.  Patient's labs were unremarkable.  Chest x-ray showed moderate CHF with superimposed pneumonia.  Patient is afebrile.    Objective  Vital signs: 142/51, 98.0, 88, 18, blood sugar 139, 96%    Physical Exam  Constitutional:       General: He is not in acute distress.  Eyes:      Extraocular Movements: Extraocular movements intact.   Cardiovascular:      Rate and Rhythm: Normal rate. Rhythm irregular.   Pulmonary:      Effort: Pulmonary effort is normal.      Breath sounds: Normal breath sounds.   Abdominal:      General: Bowel sounds are normal.      Palpations: Abdomen is soft.   Musculoskeletal:      Cervical back: Neck supple.      Right lower leg: No edema.      Left lower leg: No edema.   Neurological:      Mental Status: He is alert.      Motor: Weakness present.   Psychiatric:         Mood and Affect: Mood normal.         Behavior: Behavior is cooperative.         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 (CMS/[facility])     Monitor BP  HD per nephrology  Monitor SOB, orthopnea or weight gain  Notify nephrology of chest x-ray results/moderate CHF          COVID     Dexamethasone  Isolation precautions         PNA (pneumonia)     Start Levaquin          Medications, treatments, and labs reviewed  Continue medications and treatments as listed in EMR      Scribe Attestation  IDarline Scribe   attest that this documentation has been prepared under the direction and in the presence of EDWIN Aguilar    Provider Attestation - Scribe documentation  All medical record entries made by the Scribe were at my direction and personally dictated by me. I have reviewed the chart and agree that the record accurately reflects my personal performance of the history, physical exam, discussion and plan.   EDWIN Aguilar        Electronically Signed By: EDWIN Aguilar   3/8/24 12:10 PM

## 2024-01-04 NOTE — LETTER
Patient: Chevy Benton  : 1945    Encounter Date: 2024    PROGRESS NOTE    Subjective  Chief complaint: Chevy Benton is a 78 y.o. male who is an acute skilled patient being seen and evaluated for weakness    HPI:  HPI  Patient continues to work in therapy due to generalized weakness.  Patient does required max to total dependence to complete bed mobility and transfers.  Patient is also seen in follow-up for pneumonia.  Patient had been started on Levaquin.  Tolerating without adverse effects.  Patient denies shortness of breath, fever, chills.  Nursing reports no other new concerns at this time.    Objective  Vital signs: 133/78, 97.9, 88, 18, blood sugar 223, 95%    Physical Exam  Constitutional:       General: He is not in acute distress.  Eyes:      Extraocular Movements: Extraocular movements intact.   Cardiovascular:      Rate and Rhythm: Normal rate. Rhythm irregular.   Pulmonary:      Effort: Pulmonary effort is normal.      Breath sounds: Normal breath sounds.      Comments: Diminished breath sounds  Musculoskeletal:      Cervical back: Neck supple.      Right lower leg: No edema.      Left lower leg: No edema.   Neurological:      Mental Status: He is alert.      Motor: Weakness present.   Psychiatric:         Mood and Affect: Mood normal.         Behavior: Behavior is cooperative.         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 (CMS/HCC)     BP at goal  CHF stable, no sob  Remove extra fluid in dialysis  Monitor BP  HD per nephrology  Monitor SOB, orthopnea or weight gain         Longstanding persistent atrial fibrillation (CMS/HCC)     Apixaban  Monitor heart rate  Bleeding precautions         PNA (pneumonia)     Continue Levaquin until complete         Weakness - Primary     Continue working in therapy           Medications, treatments, and labs reviewed  Continue medications and treatments as listed in EMR      Scribe Attestation  I, Erickson San   attest that this documentation has been prepared under the direction and in the presence of EDWIN Aguilar    Provider Attestation - Scribe documentation  All medical record entries made by the Scribe were at my direction and personally dictated by me. I have reviewed the chart and agree that the record accurately reflects my personal performance of the history, physical exam, discussion and plan.   EDWIN Aguilar        Electronically Signed By: EDWIN Aguilar   1/17/24  1:24 PM

## 2024-01-05 PROBLEM — J18.9 PNA (PNEUMONIA): Status: ACTIVE | Noted: 2024-01-01

## 2024-01-05 PROBLEM — U07.1 COVID: Status: ACTIVE | Noted: 2024-01-01

## 2024-01-05 NOTE — LETTER
Patient: Chevy Benton  : 1945    Encounter Date: 2024    PROGRESS NOTE    Subjective  Chief complaint: Chevy Benton is a 78 y.o. male who is an acute skilled patient being seen and evaluated for weakness    HPI:  HPI  Patient is seen in follow-up to pneumonia, patient was started on Levaquin.  Patient with cough and congestion.  Patient also tested positive for COVID.  On isolation precautions.  Afebrile.  Patient was started on dexamethasone.  Patient is also working in therapy.  Patient requires max to total dependence for bed mobility and transfers.  Patient seen and examined at bedside, in no acute distress.  Denies chest pain or shortness of breath.  Denies nausea or vomiting    Objective  Vital signs: 144/70, 97.5, 80, 18, blood sugar 395, 96%    Physical Exam  Constitutional:       General: He is not in acute distress.  Eyes:      Extraocular Movements: Extraocular movements intact.   Cardiovascular:      Rate and Rhythm: Normal rate. Rhythm irregular.   Pulmonary:      Effort: Pulmonary effort is normal.      Breath sounds: Normal breath sounds.   Abdominal:      General: Bowel sounds are normal.      Palpations: Abdomen is soft.   Musculoskeletal:      Cervical back: Neck supple.      Right lower leg: No edema.      Left lower leg: No edema.   Skin:     Comments: AV fistula left arm  Coccyx wound   Neurological:      Mental Status: He is alert.      Motor: Weakness present.   Psychiatric:         Mood and Affect: Mood normal.         Behavior: Behavior is cooperative.         Assessment/Plan  Problem List Items Addressed This Visit       DM2 (diabetes mellitus, type 2) (CMS/[facility])     Insulin  Gabapentin   Monitor blood sugar         Longstanding persistent atrial fibrillation (CMS/[facility])     Apixaban  Monitor heart rate  Bleeding precautions         Weakness - Primary     Continue working in therapy         Hypertensive heart and kidney disease with chronic systolic congestive heart  failure and stage 5 chronic kidney disease on chronic dialysis (CMS/[facility])     Monitor BP  HD per nephrology  Monitor SOB, orthopnea or weight gain         COVID     Isolation precautions  Supportive treatment as needed  Dexamethasone  Obtain labs         PNA (pneumonia)     Levaquin          Medications, treatments, and labs reviewed  Continue medications and treatments as listed in EMR      Scribe Attestation  Darline LAUREN Scribe   attest that this documentation has been prepared under the direction and in the presence of Miley Guerra MD    Provider Attestation - Scribe documentation  All medical record entries made by the Scribe were at my direction and personally dictated by me. I have reviewed the chart and agree that the record accurately reflects my personal performance of the history, physical exam, discussion and plan.   Miley Guerra MD        Electronically Signed By: Miley Guerra MD   1/5/24  3:02 PM

## 2024-01-05 NOTE — PROGRESS NOTES
PROGRESS NOTE    Subjective   Chief complaint: Chevy Benton is a 78 y.o. male who is an acute skilled patient being seen and evaluated for weakness    HPI:  HPI  Patient is seen in follow-up to pneumonia, patient was started on Levaquin.  Patient with cough and congestion.  Patient also tested positive for COVID.  On isolation precautions.  Afebrile.  Patient was started on dexamethasone.  Patient is also working in therapy.  Patient requires max to total dependence for bed mobility and transfers.  Patient seen and examined at bedside, in no acute distress.  Denies chest pain or shortness of breath.  Denies nausea or vomiting    Objective   Vital signs: 144/70, 97.5, 80, 18, blood sugar 395, 96%    Physical Exam  Constitutional:       General: He is not in acute distress.  Eyes:      Extraocular Movements: Extraocular movements intact.   Cardiovascular:      Rate and Rhythm: Normal rate. Rhythm irregular.   Pulmonary:      Effort: Pulmonary effort is normal.      Breath sounds: Normal breath sounds.   Abdominal:      General: Bowel sounds are normal.      Palpations: Abdomen is soft.   Musculoskeletal:      Cervical back: Neck supple.      Right lower leg: No edema.      Left lower leg: No edema.   Skin:     Comments: AV fistula left arm  Coccyx wound   Neurological:      Mental Status: He is alert.      Motor: Weakness present.   Psychiatric:         Mood and Affect: Mood normal.         Behavior: Behavior is cooperative.         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 (CMS/[facility])      Monitor BP  HD per nephrology  Monitor SOB, orthopnea or weight gain         COVID     Isolation precautions  Supportive treatment as needed  Dexamethasone  Obtain labs         PNA (pneumonia)     Levaquin          Medications, treatments, and labs reviewed  Continue medications and treatments as listed in EMR      Scribe Attestation  Darline LAUREN Scribe   attest that this documentation has been prepared under the direction and in the presence of Miely Guerra MD    Provider Attestation - Scribe documentation  All medical record entries made by the Scribe were at my direction and personally dictated by me. I have reviewed the chart and agree that the record accurately reflects my personal performance of the history, physical exam, discussion and plan.   Miley Guerra MD

## 2024-01-08 NOTE — PROGRESS NOTES
PROGRESS NOTE    Subjective   Chief complaint: Chevy Benton is a 78 y.o. male who is an acute skilled patient being seen and evaluated for weakness    HPI:  HPI  Patient continues working in therapy.  Therapy is addressing bed mobility, transfers.  Patient does require max to total dependence for bed mobility and transfers.  Nursing called on 1\2, reported patient with shortness of breath.  Pulse ox 100% on room air, positive for COVID.  Orders placed to start dexamethasone, obtain BMP, CBC, chest x-ray stat.  Labs were unremarkable.  Chest x-ray showed moderate CHF with superimposed pneumonia.  Patient was seen and examined at bedside, in no apparent distress.  Afebrile.    Objective   Vital signs: 142/51, 97.1, 86, 18, blood sugar 147    Physical Exam  Constitutional:       General: He is not in acute distress.  Eyes:      Extraocular Movements: Extraocular movements intact.   Cardiovascular:      Rate and Rhythm: Normal rate. Rhythm irregular.   Pulmonary:      Effort: Pulmonary effort is normal.      Breath sounds: Normal breath sounds.   Abdominal:      General: Bowel sounds are normal.      Palpations: Abdomen is soft.   Musculoskeletal:      Cervical back: Neck supple.      Right lower leg: No edema.      Left lower leg: No edema.   Skin:     Comments: AV fistula left arm  Coccyx wound   Neurological:      Mental Status: He is alert.      Motor: Weakness present.   Psychiatric:         Mood and Affect: Mood normal.         Behavior: Behavior is cooperative.         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 I have reviewed the chart and agree that the record accurately reflects my personal performance of the history, physical exam, discussion and plan.   EDWIN Aguilar

## 2024-01-08 NOTE — LETTER
Patient: Chevy Benton  : 1945    Encounter Date: 2024    PROGRESS NOTE    Subjective  Chief complaint: Chevy Benton is a 78 y.o. male who is an acute skilled patient being seen and evaluated for weakness    HPI:  HPI  Patient is working in therapy due to generalized weakness.  Patient is requiring max assist for bed mobility and rolling.  Patient is also requiring Rhett lift for transfers.  Patient seen in follow-up for COVID, remains on isolation precautions.  Afebrile and asymptomatic at this time.  Patient also seen for pneumonia and was started on Levaquin on 1/3,, tolerating without adverse effects.  Patient reports feeling okay.  Patient was seen and examined at bedside, appears to be in no acute distress.  Denies chest pain or shortness of breath.    Objective  Vital signs: 160/83, 97.8, 80, 18, blood sugar 152, 97%    Physical Exam  Constitutional:       General: He is not in acute distress.  Eyes:      Extraocular Movements: Extraocular movements intact.   Cardiovascular:      Rate and Rhythm: Normal rate. Rhythm irregular.   Pulmonary:      Effort: Pulmonary effort is normal.      Breath sounds: Normal breath sounds.      Comments: O2  Musculoskeletal:      Cervical back: Neck supple.      Right lower leg: No edema.      Left lower leg: No edema.   Neurological:      Mental Status: He is alert.      Motor: Weakness present.   Psychiatric:         Mood and Affect: Mood normal.         Behavior: Behavior is cooperative.         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 (CMS/[facility])     Monitor labs BP and weight  HD per nephrology  Monitor SOB, orthopnea or weight  gain  Remove extra fluid in dialysis         PNA (pneumonia)     Continue Levaquin until complete         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, with long-term current use of insulin (CMS/[facility])     Insulin  Gabapentin   Monitor blood sugar  FBG at goal           Weakness - Primary     Continue working in therapy, requiring Rhett for transfers          Medications, treatments, and labs reviewed  Continue medications and treatments as listed in EMR      Scribe Attestation  IDarline Scribe   attest that this documentation has been prepared under the direction and in the presence of EDWIN Aguilar    Provider Attestation - Scribe documentation  All medical record entries made by the Scribe were at my direction and personally dictated by me. I have reviewed the chart and agree that the record accurately reflects my personal performance of the history, physical exam, discussion and plan.   EDWIN Aguilar        Electronically Signed By: EDWIN Aguilar   3/8/24 12:15 PM   Yes

## 2024-01-08 NOTE — ASSESSMENT & PLAN NOTE
Monitor BP  HD per nephrology  Monitor SOB, orthopnea or weight gain  Notify nephrology of chest x-ray results/moderate CHF

## 2024-01-09 PROBLEM — I70.223 CRITICAL LIMB ISCHEMIA OF BOTH LOWER EXTREMITIES (MULTI): Status: ACTIVE | Noted: 2024-01-01

## 2024-01-09 NOTE — PATIENT INSTRUCTIONS
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

## 2024-01-09 NOTE — PROGRESS NOTES
PROGRESS NOTE    Subjective   Chief complaint: Chevy Benton is a 78 y.o. male who is an acute skilled patient being seen and evaluated for weakness    HPI:  HPI  Patient continues to work in therapy due to generalized weakness.  Patient does required max to total dependence to complete bed mobility and transfers.  Patient is also seen in follow-up for pneumonia.  Patient had been started on Levaquin.  Tolerating without adverse effects.  Patient denies shortness of breath, fever, chills.  Nursing reports no other new concerns at this time.    Objective   Vital signs: 133/78, 97.9, 88, 18, blood sugar 223, 95%    Physical Exam  Constitutional:       General: He is not in acute distress.  Eyes:      Extraocular Movements: Extraocular movements intact.   Cardiovascular:      Rate and Rhythm: Normal rate. Rhythm irregular.   Pulmonary:      Effort: Pulmonary effort is normal.      Breath sounds: Normal breath sounds.      Comments: Diminished breath sounds  Musculoskeletal:      Cervical back: Neck supple.      Right lower leg: No edema.      Left lower leg: No edema.   Neurological:      Mental Status: He is alert.      Motor: Weakness present.   Psychiatric:         Mood and Affect: Mood normal.         Behavior: Behavior is cooperative.         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 (CMS/HCC)     BP at goal  CHF stable, no sob  Remove extra fluid in dialysis  Monitor BP  HD per nephrology  Monitor SOB, orthopnea or weight gain         Longstanding persistent atrial fibrillation (CMS/HCC)     Apixaban  Monitor heart rate  Bleeding precautions         PNA (pneumonia)     Continue Levaquin until complete         Weakness - Primary     Continue working in therapy          Medications, treatments, and labs reviewed  Continue medications and treatments  as listed in EMR      Scribe Attestation  I, Erickson San   attest that this documentation has been prepared under the direction and in the presence of EDWIN Aguilar    Provider Attestation - Scribe documentation  All medical record entries made by the Scribe were at my direction and personally dictated by me. I have reviewed the chart and agree that the record accurately reflects my personal performance of the history, physical exam, discussion and plan.   EDWIN Aguilar

## 2024-01-09 NOTE — PROGRESS NOTES
Chief Complaint   Patient presents with    Follow-up     Follow up visit, c/o pain in right foot, buttocks and left arm. Rt TMA wound complication.         History of Present Illness  Chevy Benton is a 78 y.o. year old male patient who underwent PTA right lower extremity on 9/20/23 with subsequent TMA right foot on 9/22/23. Postop PVRs were good, however, PVRs/ABIs on 1/9 show occlusive disease to right lower extremity. Patient presents today with his son and wife. Admits to pain in right foot intermittently. States he has not been walking since his procedure due to multiple hospital admissions resulting in decreased muscle strength. States he has been doing physical therapy but still is unable to walk on his own and would like to get everything resolved soon so that he can walk again.         Past Medical History  Past Medical History:   Diagnosis Date    Acute hypercapnic respiratory failure (CMS/[facility])     Acute on chronic HFrEF (heart failure with reduced ejection fraction) (CMS/[facility])     ESRD on hemodialysis (CMS/[facility])     History of angioplasty of peripheral vessel 10/26/2023    HTN (hypertension)     LFT elevation 07/20/2021    Small bowel obstruction (CMS/HCC) 10/10/2023    Stage II pressure ulcer (CMS/[facility])        Past Surgical History  No past surgical history on file.    Social History  Social History     Tobacco Use    Smoking status: Never     Passive exposure: Past    Smokeless tobacco: Never   Substance Use Topics    Alcohol use: Not on file    Drug use: Not on file       Family History     Family History   Problem Relation Name Age of Onset    Diabetes Mother         Review of Systems  As per HPI, all other systems reviewed and negative.    Allergies:  Allergies   Allergen Reactions    Penicillins Hives        Outpatient Medications:  Current Outpatient Medications   Medication Instructions    albuterol 90 mcg/actuation inhaler 2 puffs, inhalation, Every 6 hours PRN    allopurinol (ZYLOPRIM) 100  mg, oral, Daily    amLODIPine (NORVASC) 10 mg, oral, Daily    apixaban (ELIQUIS) 2.5 mg, oral, 2 times daily    aspirin (ADULT LOW DOSE ASPIRIN) 81 mg, oral, Daily    atorvastatin (LIPITOR) 10 mg, oral, Daily    cinacalcet (SENSIPAR) 30 mg, oral, Daily with breakfast, Take with food or shortly afer a meal. Swallow tablet whole; do not break or divide.    cloNIDine (CATAPRES) 0.2 mg, oral, Every 12 hours PRN    clopidogrel (PLAVIX) 75 mg, oral, Daily    colchicine, gout, 0.6 mg tablet 0.5 tablets, oral, 2 times weekly    docusate sodium (COLACE) 100 mg, oral, Daily    epoetin dane (EPOGEN,PROCRIT) 3,000 Units, subcutaneous, 3 times weekly    gabapentin (NEURONTIN) 100 mg, oral, After Dialysis    ipratropium (Atrovent) 21 mcg (0.03 %) nasal spray 2 sprays, Each Nostril, Every 12 hours    lanthanum (FOSRENOL) 750 mg, oral, 3 times daily with meals    lidocaine (ZTlido) 1.8 % adhesive patch,medicated topical (top), Apply patch 12 hours on 12 hours off    loratadine (CLARITIN) 10 mg, oral, Every 48 hours PRN    menthol-zinc oxide (Calmoseptine - Risamine) 0.44-20.6 % ointment 1 Application, Topical, Daily, Apply to sacrum    metoprolol succinate XL (TOPROL-XL) 25 mg, oral, Daily, Swallow whole. Do not chew or crush    midodrine (PROAMATINE) 10 mg, oral, 2 times daily    naphazoline-pheniramine (Naphcon-A) 0.025-0.3 % ophthalmic solution 1 drop, Both Eyes, 4 times daily    pantoprazole (PROTONIX) 40 mg, oral, Daily before breakfast, Do not crush, chew, or split.    polyethylene glycol (MIRALAX) 17 g, oral, Daily    traMADol (ULTRAM) 25 mg, oral, Every 12 hours PRN    Triphrocaps 1 mg capsule 1 capsule, oral, Daily         Vitals:  Vitals:    01/09/24 1338   BP: (!) 146/101   Pulse: 89       Physical Exam:  Physical Exam   DP/PT pulses nonpalpable b/l. Capillary fill time sluggish to all digits b/l.  Skin temperature warm to cool from proximal to distal b/l. Right foot TMA noted with layer hyperkeratotic skin and  underlying granular base. Mild serous drainage noted. No calor, no malodor, no ascending lymphangitis noted.       Reviewed Study(s):    Cardiac Studies  Echo: No results found for this or any previous visit.  Angiogram:     Vascular Studies   Vascular US ankle brachial index (RAJ) without exercise  Preliminary Cardiology Report              [place] [address]    Tel [phone] and Fax [phone]                 Preliminary Vascular Lab Report     VAS US ANKLE BRACHIAL INDEX (RAJ) WITHOUT EXERCISE       Patient Name:      SHAMEKA Mendez Physician: [id number] Yany SUN MD  Study Date:        1/9/2024           Ordering           [id number] ARELIS MEJIAS                                        Physician:  MRN/PID:           [id number]           Technologist:      Lorie Munguia RVT  Accession#:        [id number]       Technologist 2:    Naye Loera T  Date of Birth/Age: 1945          Encounter#:        [id number]  Gender:            M  Admission Status:  Outpatient         Location           [facility]                                        Performed:       Diagnosis/ICD: Peripheral vascular disease, unspecified-I73.9  Procedure/CPT: 20416.52 Peripheral artery RAJ Only Reduced Service       PRELIMINARY CONCLUSIONS:  Right Lower PVR: Evidence of severe arterial occlusive disease in the right lower extremity at rest. Monophasic flow is noted in the right posterior tibial artery and right dorsalis pedis artery. Biphasic flow is noted in the right common femoral artery. Digits are noted to be amputated. Level of disease called off of Doppler AND pvr tracings.  Left Lower PVR: Evidence of severe arterial occlusive disease in the left lower extremity at rest. Decreased digital perfusion noted. Biphasic flow is noted in the left posterior tibial artery and left common femoral  artery. Level of disease called off of Doppler and pvr tracings . AVG noted in left arm.     Additional Findings:  Due to patients immobility, study was performed sitting in wheel chair with legs  elevated.       Imaging & Doppler Findings:     RIGHT Lower PVR                Pressures Ratios  Right Posterior Tibial (Ankle) 0 mmHg    0.00  Right Dorsalis Pedis (Ankle)   255 mmHg  1.58  Right Digit (Great Toe)        0 mmHg    0.00          LEFT Lower PVR                Pressures Ratios  Left Posterior Tibial (Ankle) 255 mmHg  1.58  Left Dorsalis Pedis (Ankle)   255 mmHg  1.58  Left Digit (Great Toe)        0 mmHg    0.00                             Right  Brachial Pressure 161 mmHg            VASCULAR PRELIMINARY REPORT  completed by Lorie Munguia RVT on 1/9/2024 at 12:02:27 PM       ** Final **       Assessment/Plan   Patient underwent PTA right lower extremity on 9/20/23 with subsequent TMA right foot on 9/22/23. Postop PVRs were good, however, PVRs/ABIs on 1/9 show occlusive disease to right lower extremity, results are above.     Patient will be admitted to medicine today. Planning angioplasty, right lower extremity. Will assess left lower extremity as well.     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

## 2024-01-09 NOTE — ASSESSMENT & PLAN NOTE
BP at goal  CHF stable, no sob  Remove extra fluid in dialysis  Monitor BP  HD per nephrology  Monitor SOB, orthopnea or weight gain

## 2024-01-09 NOTE — CARE PLAN
Problem: Pain - Adult  Goal: Verbalizes/displays adequate comfort level or baseline comfort level  Outcome: Progressing     Problem: Safety - Adult  Goal: Free from fall injury  Outcome: Progressing     Problem: Discharge Planning  Goal: Discharge to home or other facility with appropriate resources  Outcome: Progressing     Problem: Chronic Conditions and Co-morbidities  Goal: Patient's chronic conditions and co-morbidity symptoms are monitored and maintained or improved  Outcome: Progressing     Problem: Diabetes  Goal: Achieve decreasing blood glucose levels by end of shift  Outcome: Progressing  Goal: Increase stability of blood glucose readings by end of shift  Outcome: Progressing  Goal: Decrease in ketones present in urine by end of shift  Outcome: Progressing  Goal: Maintain electrolyte levels within acceptable range throughout shift  Outcome: Progressing  Goal: Maintain glucose levels >70mg/dl to <250mg/dl throughout shift  Outcome: Progressing  Goal: No changes in neurological exam by end of shift  Outcome: Progressing  Goal: Learn about and adhere to nutrition recommendations by end of shift  Outcome: Progressing  Goal: Vital signs within normal range for age by end of shift  Outcome: Progressing  Goal: Increase self care and/or family involovement by end of shift  Outcome: Progressing  Goal: Receive DSME education by end of shift  Outcome: Progressing     Problem: Skin  Goal: Decreased wound size/increased tissue granulation at next dressing change  Outcome: Progressing  Goal: Participates in plan/prevention/treatment measures  Outcome: Progressing  Goal: Prevent/manage excess moisture  Outcome: Progressing  Goal: Prevent/minimize sheer/friction injuries  Outcome: Progressing  Goal: Promote/optimize nutrition  Outcome: Progressing  Goal: Promote skin healing  Outcome: Progressing     Problem: Fall/Injury  Goal: Not fall by end of shift  Outcome: Progressing  Goal: Be free from injury by end of the  shift  Outcome: Progressing  Goal: Verbalize understanding of personal risk factors for fall in the hospital  Outcome: Progressing  Goal: Verbalize understanding of risk factor reduction measures to prevent injury from fall in the home  Outcome: Progressing  Goal: Use assistive devices by end of the shift  Outcome: Progressing  Goal: Pace activities to prevent fatigue by end of the shift  Outcome: Progressing   The patient's goals for the shift include      The clinical goals for the shift include fix my foot

## 2024-01-10 PROBLEM — I70.221 CRITICAL LIMB ISCHEMIA OF RIGHT LOWER EXTREMITY (MULTI): Status: ACTIVE | Noted: 2024-01-01

## 2024-01-10 NOTE — PRE-PROCEDURE NOTE
INTERVENTIONAL RADIOLOGY PRE-PROCEDURE NOTE    Chevy Benton is a 78 y.o. male with PMHx of LUE swelling with possible venous stenosis who presents to the interventional radiology department for LUE and central venogram and possible intervention.    Procedure: Left upper extremity and central venogram with possible intervention    Indication for procedure: The primary encounter diagnosis was Critical limb ischemia of both lower extremities (CMS/HCC). Diagnoses of Swelling, DVT of axillary vein, chronic, bilateral (CMS/HCC), Critical limb ischemia of right lower extremity (CMS/HCC), and Generalized edema were also pertinent to this visit.    Past Medical History:   Diagnosis Date    Acute hypercapnic respiratory failure (CMS/HCC)     Acute on chronic HFrEF (heart failure with reduced ejection fraction) (CMS/[facility])     ESRD on hemodialysis (CMS/[facility])     History of angioplasty of peripheral vessel 10/26/2023    HTN (hypertension)     LFT elevation 07/20/2021    Small bowel obstruction (CMS/HCC) 10/10/2023    Stage II pressure ulcer (CMS/[facility])       No past surgical history on file.    Relevant Labs:   Lab Results   Component Value Date    CREATININE 5.10 (H) 01/10/2024    EGFR 11 (L) 01/10/2024    INR 1.3 (H) 01/09/2024    PROTIME 14.7 (H) 01/09/2024       Planned Sedation/Anesthesia: Moderate    Directed physical examination:    General Appearance: Normal cognition and NAD  Heart: Heart regular rate and rhythm  Lungs: No increased work of breathing  Abdomen: soft and nontender  Psych exam: oriented to time, place and person      Current Facility-Administered Medications:     acetaminophen (Tylenol) tablet 650 mg, 650 mg, oral, q4h PRN **OR** acetaminophen (Tylenol) oral liquid 650 mg, 650 mg, nasogastric tube, q4h PRN **OR** acetaminophen (Tylenol) suppository 650 mg, 650 mg, rectal, q4h PRN, Simone Acosta MD    albuterol 90 mcg/actuation inhaler 2 puff, 2 puff, inhalation, q6h PRN, Simone Acosta,  MD    allopurinol (Zyloprim) tablet 100 mg, 100 mg, oral, Daily, Simone Acosta MD, 100 mg at 01/10/24 0923    alum-mag hydroxide-simeth (Mylanta) 200-200-20 mg/5 mL oral suspension 30 mL, 30 mL, oral, 4x daily PRN, Simone Acosta MD    amLODIPine (Norvasc) tablet 10 mg, 10 mg, oral, Daily, Simone Acosta MD, 10 mg at 01/10/24 0923    aspirin chewable tablet 81 mg, 81 mg, oral, Daily, Simone Acosta MD, 81 mg at 01/10/24 0922    atorvastatin (Lipitor) tablet 40 mg, 40 mg, oral, Nightly, Simone Acosta MD, 40 mg at 01/09/24 2303    B complex-vitamin C-folic acid (Nephrocaps) capsule 1 capsule, 1 capsule, oral, Daily, Simone Acosta MD, 1 capsule at 01/10/24 0923    dexAMETHasone (Decadron) tablet 6 mg, 6 mg, oral, Daily, Simone Acosta MD, 6 mg at 01/10/24 0923    dextrose 10 % in water (D10W) infusion, 0.3 g/kg/hr, intravenous, Once PRN, Simone Acosta MD    dextrose 50 % injection 25 g, 25 g, intravenous, q15 min PRN, Simone Acosta MD    docusate sodium (Colace) capsule 100 mg, 100 mg, oral, BID PRN, Simone Acosta MD    gabapentin (Neurontin) capsule 100 mg, 100 mg, oral, q24h, Simone Acosta MD, 100 mg at 01/09/24 2303    glucagon (Glucagen) injection 1 mg, 1 mg, intramuscular, q15 min PRN, Simone Acosta MD    heparin (porcine) injection 2,000-4,000 Units, 2,000-4,000 Units, intravenous, q4h PRN, Simone Acosta MD    heparin (porcine) injection 3,000-6,000 Units, 3,000-6,000 Units, intravenous, q4h PRN, Simone Acosta MD    heparin (porcine) injection 5,000-10,000 Units, 5,000-10,000 Units, intravenous, q4h PRN, Simone Acosta MD    heparin 25,000 Units in dextrose 5% 250 mL (100 Units/mL) infusion (premix), 0-4,500 Units/hr, intravenous, Continuous, Simone Acosta MD    heparin 25,000 Units in dextrose 5% 250 mL (100 Units/mL) infusion (premix), 0-4,500 Units/hr, intravenous, Continuous, Simone BAHENA  MD Karla, Last Rate: 16.3 mL/hr at 01/10/24 1420, 1,629 Units/hr at 01/10/24 1420    heparin 25,000 Units in dextrose 5% 250 mL (100 Units/mL) infusion (premix), 0-4,500 Units/hr, intravenous, Continuous, Simone Acosta MD, Stopped at 01/09/24 2312    hydrocortisone 2.5 % ointment, , Topical, BID PRN, Simone Acosta MD    insulin glargine (Lantus) injection 12 Units, 12 Units, subcutaneous, Nightly, Simone Acosta MD, 12 Units at 01/09/24 2307    insulin lispro (HumaLOG) injection 0-5 Units, 0-5 Units, subcutaneous, TID with meals, Simone Acosta MD    ipratropium-albuteroL (Duo-Neb) 0.5-2.5 mg/3 mL nebulizer solution 3 mL, 3 mL, nebulization, q6h PRN, Simone Acosta MD    methocarbamol (Robaxin) tablet 500 mg, 500 mg, oral, q8h PRN, Simone Acosta MD    metoprolol succinate XL (Toprol-XL) 24 hr tablet 25 mg, 25 mg, oral, Daily, Simone Acosta MD, 25 mg at 01/10/24 0922    pantoprazole (ProtoNix) EC tablet 40 mg, 40 mg, oral, Daily before breakfast, Simone Acosta MD, 40 mg at 01/10/24 0925    polyethylene glycol (Glycolax, Miralax) packet 17 g, 17 g, oral, Daily, Simone Acosta MD    sennosides (Senokot) tablet 8.6 mg, 1 tablet, oral, Nightly, Simone Acosta MD, 8.6 mg at 01/09/24 2303    sevelamer carbonate (Renvela) tablet 800 mg, 800 mg, oral, TID with meals, Simone Acosta MD    traMADol (Ultram) tablet 50 mg, 50 mg, oral, q6h PRN, Simone Acosta MD, 50 mg at 01/10/24 1353     Mallampati: Unable to Assess    ASA Score: ASA 3 - Patient with moderate systemic disease with functional limitations    Benefits, risks and alternatives of procedure and planned sedation have been discussed with the patient and/or their representative. All questions answered and they agree to proceed.     Duncan Crystal MD, PGY-5  Vascular & Interventional Radiology  IR pager: [phone]    NON-Urgent on call weekends and after hours weekdays (5pm -  5am) IR pager: [phone]  Urgent & emergent on call weekends and after hours weekdays (5pm-7am) IR pager: [phone]

## 2024-01-10 NOTE — PROGRESS NOTES
"Chevy Benton is a 78 y.o. male on day 1 of admission presenting with Critical limb ischemia of both lower extremities (CMS/HCC).    Subjective   Pt went with IR for Proximal LUE & Central Venogram this am (No evidence of central stenosis identified. Very mild stenosis of the central aspect of the subclavian vein. No evidence of stenosis of the left jugular and subclavian confluence.). Of note, Endovascular medicine team was unable to do his EVLS. Per (Monika SWIFT-CNP) plan for next Tuesday.     Nephrology consulted for HD management          Objective     Physical Exam    Constitutional:       Appearance: Normal appearance.   HENT:      Head: Normocephalic.   Eyes:      Extraocular Movements: Extraocular movements intact.      Pupils: Pupils are equal, round, and reactive to light.   Cardiovascular:      Rate and Rhythm: Normal rate. Rhythm irregular.      Heart sounds: Normal heart sounds. No murmur heard.  Pulmonary:      Effort: Pulmonary effort is normal. No respiratory distress.      Breath sounds: Normal breath sounds. No wheezing.   Abdominal:      General: Abdomen is flat. There is no distension.      Palpations: Abdomen is soft.      Tenderness: There is no abdominal tenderness.   Musculoskeletal:         General: Swelling (Rt arm and Lt arm swollen (Lt arm with 2 swelling at fistula site). No hotness or tenderness.) and deformity (Rt TMA covered with dry clean dressing. Cold bilateral LE. Pulses very weak in DP. Sensation intact. Power 4/5 bilaterally) present.      Cervical back: Normal range of motion.   Skin:     General: Skin is dry.   Neurological:      General: No focal deficit present.      Mental Status: He is alert and oriented to person, place, and time.          Last Recorded Vitals  Blood pressure 84/69, pulse 79, temperature 36.4 °C (97.5 °F), temperature source Temporal, resp. rate 16, height 1.778 m (5' 10\"), weight 90.5 kg (199 lb 8 oz), SpO2 98 %.  Intake/Output last 3 " Shifts:  No intake/output data recorded.    Relevant Results                             Assessment/Plan   Principal Problem:    Critical limb ischemia of both lower extremities (CMS/HCC)  Active Problems:    Critical limb ischemia of right lower extremity (CMS/HCC)      Chevy Benton is a 78 y.o. male with PMH of ESRD on HD TTS (Lt chest TDC, Hx of LUE Fistula Pseudoaneurysms), DM, HTN, HFrEF (TTE 10/23 EF 35-40%), A fib, PTA right lower extremity on 9/20/23 with subsequent TMA right foot on 9/22/23, Non Occlusive DVT (Rt Subclavian), Aortic Root Dilation, MR, SSS s/p pacemaker who presented as a direct admit from endovascular clinic 1/9 for concerns of critical limb ischemia. Patient underwent PVR 1/9  with results concerning for severe ischemia and warranting admission for potential intervention. Patient is getting started on Heparin ggt, Consulting Podiatry and Vascular surgery (LUE fistula pseudoaneurysms). He remains vitally and hemodynamically stable otherwise.   Pt went with IR for Proximal LUE & Central Venogram 1/10  (No evidence of central stenosis identified, very mild stenosis of the central aspect of the subclavian vein, no evidence of stenosis of the left jugular and subclavian confluence.). Of note, Pt could not attend his EVLS with Endovascular medicine team due to scheduling challenging with IR team to undergo his Venogram. Per (Monika SWIFT-CNP) plan for next Tuesday.          #Critical Limb Ischemia  #Hx of PTA Rt LE w/ subsequent TMA 09/23  -Start Heparin ggt, maintain therapeutic assays   -Endovascular surgery aware   -Podiatry consulted, will evaluate need for amputation after endovascular intervention   -No need for abx, low threshold to start if concerns for infection arise. Labs and Xrays pending at this time   -Tylenol and Tramadol PRN for pain   -c/w home ASA 81mg daily (Patient supposed to be on Plavix per notes from endovascular 9/2023 however per son at bedside was never  able to get it started due to frequent hospitalizations. Called SNF to confirm, it is not on patient's list). Will likely need it on dc if new angioplasty is performed, defer to endovascular to decide  -c/w home Atorvastatin 10mg daily (patient above 75 YO. No need for high intensity)     #COVID  -Currently asymptomatic however requiting 2L NC   -Cont with Dexamethasone 6mg for 10 days (last day 12/14)   -Wean O2 as tolerated      #ESRD on HD  #Thrombosed LUE AVF, occluded since October..  - HD access via a L internal jugular catheter   -c/w home Sevelamer 800mg TID  -c/w home Nephro cap 1 tab daily   -Nephrology consulted for HD management   -Patient needs HD in a site other than LUE to avoid risk of further clot formation.   -Will call IR for TDC in upper body later this admission.    -On home Eliquis 2.5mg bid, now switched to Heparin ggt for CTL       #No evidence of central stenosis identified  #Mild stenosis of the central aspect of the subclavian vein  #no evidence of stenosis of the left jugular and subclavian confluence   -On home Eliquis 2.5mg bid, now switched to Heparin ggt for CTL        #HFrEF (EF 35-40% 10/23)   -Patient was on Metop succ 25mg daily (hasn't been requiring at SNF due to controlled rates). Will re start for GDMT benefit      #Atrial Fibrillation  -Rate controlled  -Hold home Eliquis while on Heparin ggt     #HTN  -c/w home Amlodipine 10mg daily     #Hx of SBO  -c/w home Miralax/Senna scheduled. Docusate PRN      #DM w/ Neuropathy  -c/w home Glargine  -ISS with accuchecks  -c/w home Gabapentin 100mg at bedtime      E: replete as needed  F: bolus as needed  N:  Renal   DVT ppx: Heparin ggt  GI ppx: PPI     Code: full, confirmed on admission              I spent 90 minutes in the professional and overall care of this patient.      Wilma Redding MD

## 2024-01-10 NOTE — PROGRESS NOTES
I attempted to meet with Chevy at the bedside regarding discharge planning and home going needs. Patient did not participate in assessment so I did call patient's spouse [phone] all information received from her. Patient came to us from [facility] where his wife wants him to return to. Patient was previously living at home where he was independent with ADL's he does have a walker and cane in the home. Patient not medically cleared for discharge at this time. I will continue to follow with a safe discharge plan.

## 2024-01-10 NOTE — CARE PLAN
Problem: Pain - Adult  Goal: Verbalizes/displays adequate comfort level or baseline comfort level  Outcome: Progressing     Problem: Safety - Adult  Goal: Free from fall injury  Outcome: Progressing     Problem: Discharge Planning  Goal: Discharge to home or other facility with appropriate resources  Outcome: Progressing     Problem: Chronic Conditions and Co-morbidities  Goal: Patient's chronic conditions and co-morbidity symptoms are monitored and maintained or improved  Outcome: Progressing     Problem: Diabetes  Goal: Achieve decreasing blood glucose levels by end of shift  Outcome: Progressing  Goal: Increase stability of blood glucose readings by end of shift  Outcome: Progressing  Goal: Decrease in ketones present in urine by end of shift  Outcome: Progressing  Goal: Maintain electrolyte levels within acceptable range throughout shift  Outcome: Progressing  Goal: Maintain glucose levels >70mg/dl to <250mg/dl throughout shift  Outcome: Progressing  Goal: No changes in neurological exam by end of shift  Outcome: Progressing  Goal: Learn about and adhere to nutrition recommendations by end of shift  Outcome: Progressing  Goal: Vital signs within normal range for age by end of shift  Outcome: Progressing  Goal: Increase self care and/or family involovement by end of shift  Outcome: Progressing  Goal: Receive DSME education by end of shift  Outcome: Progressing     Problem: Skin  Goal: Decreased wound size/increased tissue granulation at next dressing change  Outcome: Progressing  Goal: Participates in plan/prevention/treatment measures  Outcome: Progressing  Goal: Prevent/manage excess moisture  Outcome: Progressing  Goal: Prevent/minimize sheer/friction injuries  Outcome: Progressing  Goal: Promote/optimize nutrition  Outcome: Progressing  Goal: Promote skin healing  Outcome: Progressing     Problem: Fall/Injury  Goal: Not fall by end of shift  Outcome: Progressing  Goal: Be free from injury by end of the  shift  Outcome: Progressing  Goal: Verbalize understanding of personal risk factors for fall in the hospital  Outcome: Progressing  Goal: Verbalize understanding of risk factor reduction measures to prevent injury from fall in the home  Outcome: Progressing  Goal: Use assistive devices by end of the shift  Outcome: Progressing  Goal: Pace activities to prevent fatigue by end of the shift  Outcome: Progressing   The patient's goals for the shift include      The clinical goals for the shift include patient will have a therapeutic heparin assay at least 1x throughout the shift

## 2024-01-10 NOTE — CARE PLAN
Pt was scheduled for peripheral angioplasty procedure today on RLE for CLTI RC VI, however Pt was taken to IR for UE venogram. Endovascular was not made aware of this plan and Pt could not come to the cath lab for the peripheral angioplasty procedure. Due to cath lab availability pt was rescheduled for Tuesday 1/16/2024 with Dr. Stuart Nguyen. We will try hard if we can get the case done before Tuesday and we will update primary team accordingly.    - continue with ASA EC 81 mg and Hep gtt  - continue to hold Eliquis for now   - Wound care per podiatry               JENIFFER Ann-CNP  Endovascular/Limb Salvage Service   Day: [id number]/Haiku/Night [facility]VI [id number]

## 2024-01-10 NOTE — H&P
History Of Present Illness  Chevy Benton is a 78 y.o. male with PMH of ESRD on HD TTS (Lt chest TDC, Hx of LUE Fistula Pseudoaneurysms), DM, HTN, HFrEF (TTE 10/23 EF 35-40%), A fib, PTA right lower extremity on 9/20/23 with subsequent TMA right foot on 9/22/23, Non Occlusive DVT (Rt Subclavian), Aortic Root Dilation, MR, SSS s/p pacemaker who presented as a direct admit from endovascular clinic today for concerns of critical limb ischemia. Patient underwent PVR today with results concerning for severe ischemia and warranting admission for potential intervention. Patient reported increasing Rt foot pain requiting more frequent doses of pain meds. He denied any CP, SOB, cough, fever as of now. However, on 12/2 he reported SOB for which SNF staff worked it up and was found to have COVID +ve test, patient became hypoxic and was started on Dexamethasone on 12/4. Per nursing staff at SNF, he had CXR done showing c/f PNA for which Levofloxacin was started 250mg everyother day for 3 doses. Patient reported chronic Rt and Lt upper extremity swellings since the time of his most recent discharge. Of note, patient's course between Sep-Nov 2023 was c/b SBO (managed non surgically) and RUE Subclavian non occlusive DVT (on low dose Eliquis 2.5mg bid, had thrombocytopenia during hospital stay) as well as MICU stay at OSH for sepsis.     Past Medical History  Past Medical History:   Diagnosis Date    Acute hypercapnic respiratory failure (CMS/[facility])     Acute on chronic HFrEF (heart failure with reduced ejection fraction) (CMS/[facility])     ESRD on hemodialysis (CMS/[facility])     History of angioplasty of peripheral vessel 10/26/2023    HTN (hypertension)     LFT elevation 07/20/2021    Small bowel obstruction (CMS/[facility]) 10/10/2023    Stage II pressure ulcer (CMS/[facility])        Surgical History  No past surgical history on file.     Social History  He reports that he has never smoked. He has been exposed to tobacco smoke. He has never used  smokeless tobacco. No history on file for alcohol use and drug use.    Family History  Family History   Problem Relation Name Age of Onset    Diabetes Mother          Allergies  Penicillins    Review of Systems   Constitutional:  Negative for appetite change.   Eyes:  Negative for discharge and itching.   Cardiovascular:  Negative for chest pain, palpitations and leg swelling.   Gastrointestinal:  Negative for abdominal pain, blood in stool, diarrhea, nausea and vomiting.   Endocrine: Negative for cold intolerance and heat intolerance.   Genitourinary:  Negative for difficulty urinating, dysuria, flank pain and hematuria.   Neurological:  Negative for dizziness and headaches.          Physical Exam  Constitutional:       Appearance: Normal appearance.   HENT:      Head: Normocephalic.   Eyes:      Extraocular Movements: Extraocular movements intact.      Pupils: Pupils are equal, round, and reactive to light.   Cardiovascular:      Rate and Rhythm: Normal rate. Rhythm irregular.      Heart sounds: Normal heart sounds. No murmur heard.  Pulmonary:      Effort: Pulmonary effort is normal. No respiratory distress.      Breath sounds: Normal breath sounds. No wheezing.   Abdominal:      General: Abdomen is flat. There is no distension.      Palpations: Abdomen is soft.      Tenderness: There is no abdominal tenderness.   Musculoskeletal:         General: Swelling (Rt arm and Lt arm swollen (Lt arm with 2 swelling at fistula site). No hotness or tenderness.) and deformity (Rt TMA covered with dry clean dressing. Cold bilateral LE. Pulses very weak in DP. Sensation intact. Power 4/5 bilaterally) present.      Cervical back: Normal range of motion.   Skin:     General: Skin is dry.   Neurological:      General: No focal deficit present.      Mental Status: He is alert and oriented to person, place, and time.            Last Recorded Vitals  There were no vitals taken for this visit.    Relevant Results  Scheduled  medications  allopurinol, 100 mg, oral, Daily  amLODIPine, 10 mg, oral, Daily  aspirin, 81 mg, oral, Daily  atorvastatin, 40 mg, oral, Nightly  B complex-vitamin C-folic acid, 1 capsule, oral, Daily  dexAMETHasone, 6 mg, oral, Daily  gabapentin, 100 mg, oral, q24h  heparin, 80 Units/kg, intravenous, Once  insulin glargine, 12 Units, subcutaneous, Nightly  [START ON 1/10/2024] insulin lispro, 0-5 Units, subcutaneous, TID with meals  metoprolol succinate XL, 25 mg, oral, Daily  [START ON 1/10/2024] pantoprazole, 40 mg, oral, Daily before breakfast  polyethylene glycol, 17 g, oral, Daily  sennosides, 1 tablet, oral, Nightly  [START ON 1/10/2024] sevelamer carbonate, 800 mg, oral, TID with meals      Continuous medications  heparin, 0-4,500 Units/hr  heparin, 0-4,500 Units/hr  heparin, 0-4,500 Units/hr      PRN medications  PRN medications: acetaminophen **OR** acetaminophen **OR** acetaminophen, albuterol, alum-mag hydroxide-simeth, dextrose 10 % in water (D10W), dextrose, docusate sodium, glucagon, heparin, heparin, heparin, hydrocortisone, ipratropium-albuteroL, methocarbamol, traMADol     Vascular US ankle brachial index (RAJ) without exercise  Result Date: 1/9/2024   Right Lower PVR: Evidence of severe arterial occlusive disease in the right lower extremity at rest. Monophasic flow is noted in the right posterior tibial artery and right dorsalis pedis artery. Biphasic flow is noted in the right common femoral artery. Digits are noted to be amputated. Level of disease called off of Doppler AND pvr tracings. Left Lower PVR: Evidence of severe arterial occlusive disease in the left lower extremity at rest. Decreased digital perfusion noted. Biphasic flow is noted in the left posterior tibial artery and left common femoral artery. Level of disease called off of Doppler and pvr tracings . AVG noted in left arm.       Assessment/Plan   Principal Problem:    Critical limb ischemia of both lower extremities  (CMS/[facility])    Chevy Benton is a 78 y.o. male with PMH of ESRD on HD TTS (Lt chest TDC, Hx of LUE Fistula Pseudoaneurysms), DM, HTN, HFrEF (TTE 10/23 EF 35-40%), A fib, PTA right lower extremity on 9/20/23 with subsequent TMA right foot on 9/22/23, Non Occlusive DVT (Rt Subclavian), Aortic Root Dilation, MR, SSS s/p pacemaker who presented as a direct admit from endovascular clinic today for concerns of critical limb ischemia. Patient underwent PVR today with results concerning for severe ischemia and warranting admission for potential intervention. Patient is getting started on Heparin ggt, NPO after MN for possible procedure tomorrow. Consulting Podiatry and Vascular surgery (LUE fistula pseudoaneurysms). He remains vitally and hemodynamically stable otherwise.     #Critical Limb Ischemia  #Hx of PTA Rt LE w/ subsequent TMA 09/23  -Start Heparin ggt, maintain therapeutic assays   -Endovascular surgery aware, plan for EVLS 1/10  -Podiatry consulted, will evaluate need for amputation after endovascular intervention   -No need for abx, low threshold to start if concerns for infection arise. Labs and Xrays pending at this time   -Tylenol and Tramadol PRN for pain   -c/w home ASA 81mg daily (Patient supposed to be on Plavix per notes from endovascular 9/2023 however per son at bedside was never able to get it started due to frequent hospitalizations. Called SNF to confirm, it is not on patient's list). Will likely need it on dc if new angioplasty is performed, defer to endovascular to decide  -c/w home Atorvastatin 10mg daily (patient above 75 YO. No need for high intensity)    #COVID  -Currently asymptomatic however requiting 2L NC  -Ordered PCR if negative won't need isolation   -Cont withDexamethasone 6mg for 10 days (last day 12/14)  -Ordered CXR   -Wean O2 as tolerated     #Rt Subclavian DVT (Non occlusive)  #LUE Fistula Pseudoaneurysms   -Consulted vascular surgery, recommended IR consult for  venoplasty  -Patient needs HD in a site other than LUE to avoid risk of further clot formation. However, with hx of subclavian DVT, will get duplex US if negative can consider TDC in upper body. If clot persists, would recommend vascular medicine consult for assistance.  -On home Eliquis 2.5mg bid, now switched to Heparin ggt for CTL     #ESRD on HD  -c/w home Sevelamer 800mg TID  -c/w home Nephro cap 1 tab daily   -Nephro consult in AM    #HFrEF (EF 35-40% 10/23)  -Doesn't seem in decompensation   -Patient was on Metop succ 25mg daily (hasn't been requiring at SNF due to controlled rates). Will re start for GDMT benefit     #Atrial Fibrillation  -Rate controlled  -Hold home Eliquis while on Heparin ggt    #HTN  -c/w home Amlodipine 10mg daily    #Hx of SBO  -c/w home Miralax/Senna scheduled. Docusate PRN     #DM w/ Neuropathy  -c/w home Glargine  -ISS with accuchecks  -c/w home Gabapentin 100mg at bedtime     E: replete as needed  F: bolus as needed  N: NPO @ MN  DVT ppx: Heparin ggt  GI ppx: PPI    Code: full, confirmed on admission       Simone Acosta MD  Internal Medicine, PGY3

## 2024-01-10 NOTE — POST-PROCEDURE NOTE
Interventional Radiology Post-Procedure Note    Proximal LUE & Central Venogram      Indication for procedure: The primary encounter diagnosis was Critical limb ischemia of both lower extremities (CMS/HCC). Diagnoses of Swelling, DVT of axillary vein, chronic, bilateral (CMS/HCC), Critical limb ischemia of right lower extremity (CMS/HCC), and Generalized edema were also pertinent to this visit.    Pre-Procedure Verification and Time Out:  · Procedure Location procedure area   · HUDDLE - Pre-procedure Verification completed   · TIME OUT - Final Verification completed immediately prior to procedure start   · DEBRIEF completed     General Information:  Date/Time of Procedure: 01/10/24 at 4:54 PM  Indication(s)/Pre - Procedure Diagnoses: central stenosis  Post-Procedure Diagnosis: LUE edema  Procedure Name: Proximal LUE & Central Venogram  Findings: See PACS  Procedure performed by: Duncan Crystal MD   Assistant(s): Dr. Jose Wagn MD  Estimated Blood Loss (mL): minimal  Specimen: No  Informed Consent: written consent obtained    Procedure Details:    Technically successful and uncomplicated proximal LUE & central venogram. No evidence of central stenosis identified. Very mild stenosis of the central aspect of the subclavian vein. No evidence of stenosis of the left jugular and subclavian confluence.  Please see PACS for full procedural details.    Patient Tolerance: good  Complications: None    Prep:  Ultrasound Guided Insertion: Yes  Large Drape, Hand Hygiene, Surgical Cap, Surgical Mask, Sterile Gown, Sterile Gloves, Glasses, and Scrubs  Patient Position: Supine  Site Prep: chlorhexidine, draped, usual sterile procedure followed    Anesthesia/Medications:  Procedural Sedation:  Medications  As of 01/10/24 1764      polyethylene glycol (Glycolax, Miralax) packet 17 g (g) Total dose:  0 g*   *Administration not included in total     Date/Time Rate/Dose/Volume Action       01/09/24  1900 *17 g Missed                polyethylene glycol (Glycolax, Miralax) packet 17 g (g) Total dose:  0 g* Dosing weight:  90.5   *Administration not included in total     Date/Time Rate/Dose/Volume Action       01/09/24 2000 *17 g Missed     01/10/24  0900 *17 g Missed               heparin 25,000 Units in dextrose 5% 250 mL (100 Units/mL) infusion (premix) (Units/hr) Total dose:  Cannot be calculated*   *Total user-documented volume 137.47 mL may contain volume from other administrations     Date/Time Rate/Dose/Volume Action       01/09/24  1900 *Not included in total Missed               heparin 25,000 Units in dextrose 5% 250 mL (100 Units/mL) infusion (premix) (Units/hr) Total dose:  Cannot be calculated*   *Total user-documented volume 137.47 mL may contain volume from other administrations     Date/Time Rate/Dose/Volume Action       01/09/24  2343 1,629 Units/hr - 16.3 mL/hr New Bag     01/10/24  0528  Held Per Protocol      0838  Held Per Protocol      1015 1,629 Units/hr - 16.3 mL/hr Restarted      1345  Stopped      1420 1,629 Units/hr - 16.3 mL/hr Restarted      Canceled Entry               heparin 25,000 Units in dextrose 5% 250 mL (100 Units/mL) infusion (premix) (Units/hr) Total dose:  Cannot be calculated*   *Total user-documented volume 137.47 mL may contain volume from other administrations     Date/Time Rate/Dose/Volume Action       01/09/24  2312  Stopped               heparin (porcine) injection 7,250 Units (Units) Total dose:  7,250 Units Dosing weight:  90.5      Date/Time Rate/Dose/Volume Action       01/09/24 2308 7,250 Units Given               allopurinol (Zyloprim) tablet 100 mg (mg) Total dose:  200 mg Dosing weight:  90.5      Date/Time Rate/Dose/Volume Action       01/09/24 2303 100 mg Given     01/10/24  0923 100 mg Given               aspirin chewable tablet 81 mg (mg) Total dose:  81 mg Dosing weight:  90.5      Date/Time Rate/Dose/Volume Action       01/10/24  0922 81 mg Given               atorvastatin  (Lipitor) tablet 40 mg (mg) Total dose:  40 mg Dosing weight:  90.5      Date/Time Rate/Dose/Volume Action       01/09/24 2303 40 mg Given               gabapentin (Neurontin) capsule 100 mg (mg) Total dose:  100 mg Dosing weight:  90.5      Date/Time Rate/Dose/Volume Action       01/09/24 2303 100 mg Given               insulin glargine (Lantus) injection 12 Units (Units) Total dose:  12 Units Dosing weight:  90.5      Date/Time Rate/Dose/Volume Action       01/09/24 2307 12 Units Given               B complex-vitamin C-folic acid (Nephrocaps) capsule 1 capsule (capsule) Total dose:  2 capsule Dosing weight:  90.5      Date/Time Rate/Dose/Volume Action       01/09/24 2303 1 capsule Given     01/10/24  0923 1 capsule Given               pantoprazole (ProtoNix) EC tablet 40 mg (mg) Total dose:  40 mg Dosing weight:  90.5      Date/Time Rate/Dose/Volume Action       01/10/24  0925 40 mg Given               sennosides (Senokot) tablet 8.6 mg (mg) Total dose:  1 tablet Dosing weight:  90.5      Date/Time Rate/Dose/Volume Action       01/09/24 2303 8.6 mg Given               sevelamer carbonate (Renvela) tablet 800 mg (mg) Total dose:  0 mg* Dosing weight:  90.5   *Administration not included in total     Date/Time Rate/Dose/Volume Action       01/10/24  0800 *800 mg Missed      1200 *800 mg Missed               traMADol (Ultram) tablet 50 mg (mg) Total dose:  50 mg Dosing weight:  90.5      Date/Time Rate/Dose/Volume Action       01/10/24  1353 50 mg Given               dexAMETHasone (Decadron) tablet 6 mg (mg) Total dose:  12 mg Dosing weight:  90.5      Date/Time Rate/Dose/Volume Action       01/09/24 2303 6 mg Given     01/10/24  0923 6 mg Given               insulin lispro (HumaLOG) injection 0-5 Units (Units) Total dose:  Cannot be calculated* Dosing weight:  90.5   *Administration dose not documented     Date/Time Rate/Dose/Volume Action       01/10/24  0800 *Not included in total Missed      1200 *Not  included in total Missed               amLODIPine (Norvasc) tablet 10 mg (mg) Total dose:  10 mg Dosing weight:  90.5      Date/Time Rate/Dose/Volume Action       01/10/24  0923 10 mg Given               metoprolol succinate XL (Toprol-XL) 24 hr tablet 25 mg (mg) Total dose:  50 mg Dosing weight:  90.5      Date/Time Rate/Dose/Volume Action       01/09/24  2303 25 mg Given     01/10/24  0922 25 mg Given               iohexol (OMNIPaque) 350 mg iodine/mL solution 75 mL (mL) Total volume:  75 mL Dosing weight:  90.5      Date/Time Rate/Dose/Volume Action       01/10/24  1621 75 mL Given               fentaNYL PF (Sublimaze) injection (mcg) Total dose:  25 mcg      Date/Time Rate/Dose/Volume Action       01/10/24  1622 25 mcg Given               midazolam (Versed) injection (mg) Total dose:  0.5 mg      Date/Time Rate/Dose/Volume Action       01/10/24  1622 0.5 mg Given                   Attending Attestation:  I was present for the entire procedure    Duncan Crystal MD, PGY-5  Interventional Radiology  IR pager: [phone]    NON-Urgent on call weekends and after hours weekdays (5pm - 5am) IR pager: [phone]  Urgent & emergent on call weekends and after hours weekdays (5pm-7am) IR pager: [phone]

## 2024-01-10 NOTE — PROGRESS NOTES
Per [facility], pt from [facility] is agreeable to return when medically ready. Return referral submitted for review.      JEAN-PIERRE Means

## 2024-01-10 NOTE — CONSULTS
Inpatient consult to Nephrology  Consult performed by: Corine Will MD  Consult ordered by: Wilma Redding MD      NEPHROLOGY NEW CONSULT NOTE   Patient ID: Chevy Benton is a 78 y.o. male.     Reason for consult: ESRD-HD       HPI  Chevy Benton is a 78 y.o. male   - With past medical Hx as below   - Admitted for Lower extremity ischemia  - Nephrology was consulted for ESRD management     [facility]. Last HD was Monday 1/8/23. K 5.0; Hgb 11.2.   DW 86.9; Left IJ catheter;   Has had issues with left AV fistula and multiple catheters placed per chart review.    Past Medical History:   Diagnosis Date    Acute hypercapnic respiratory failure (CMS/HCC)     Acute on chronic HFrEF (heart failure with reduced ejection fraction) (CMS/HCC)     ESRD on hemodialysis (CMS/[facility])     History of angioplasty of peripheral vessel 10/26/2023    HTN (hypertension)     LFT elevation 07/20/2021    Small bowel obstruction (CMS/HCC) 10/10/2023    Stage II pressure ulcer (CMS/[facility])       No past surgical history on file.   Family History   Problem Relation Name Age of Onset    Diabetes Mother           Allergies   Allergen Reactions    Penicillins Hives        Scheduled medications  allopurinol, 100 mg, oral, Daily  amLODIPine, 10 mg, oral, Daily  aspirin, 81 mg, oral, Daily  atorvastatin, 40 mg, oral, Nightly  B complex-vitamin C-folic acid, 1 capsule, oral, Daily  dexAMETHasone, 6 mg, oral, Daily  gabapentin, 100 mg, oral, q24h  insulin glargine, 12 Units, subcutaneous, Nightly  insulin lispro, 0-5 Units, subcutaneous, TID with meals  metoprolol succinate XL, 25 mg, oral, Daily  pantoprazole, 40 mg, oral, Daily before breakfast  polyethylene glycol, 17 g, oral, Daily  sennosides, 1 tablet, oral, Nightly  sevelamer carbonate, 800 mg, oral, TID with meals      Continuous medications  heparin, 0-4,500 Units/hr  heparin, 0-4,500 Units/hr, Last  "Rate: 1,629 Units/hr (01/10/24 1420)  heparin, 0-4,500 Units/hr, Last Rate: Stopped (01/09/24 2312)      PRN medications  PRN medications: acetaminophen **OR** acetaminophen **OR** acetaminophen, albuterol, alum-mag hydroxide-simeth, dextrose 10 % in water (D10W), dextrose, docusate sodium, glucagon, heparin, heparin, heparin, hydrocortisone, ipratropium-albuteroL, methocarbamol, traMADol       Heart Rate:  [84-94]   Temp:  [35.6 °C (96.1 °F)-36.4 °C (97.5 °F)]   Resp:  [14-18]   BP: (131-146)/(86-95)   Height:  [177.8 cm (5' 10\")]   Weight:  [90.5 kg (199 lb 8 oz)]   SpO2:  [98 %-100 %]    Weight: 90.5 kg (199 lb 8 oz)   General appearance: Awake and alert, oriented, . No distress  HEENT: supple, moist oral mucosa, Neck: No JVD  Skin: no apparent rash  Heart: heart sounds 1 & 2 present and normal, no murmurs heard or friction rub  Lungs: Adequate air entry,  no wheezing/crackles  Abdomen: soft, non tender, no masses palpated, no flank tenderness  Extremities: No  edema, no joint swelling,  ACCESS:  IJ TC              Results from last 72 hours   Lab Units 01/10/24  1013   SODIUM mmol/L 127*   POTASSIUM mmol/L 4.5   CO2 mmol/L 24   BUN mg/dL 67*   CREATININE mg/dL 5.10*   PHOSPHORUS mg/dL 5.5*   CALCIUM mg/dL 9.7   ALBUMIN g/dL 3.1*   GLUCOSE mg/dL 130*   WBC AUTO x10*3/uL 8.5        A/P  ESRD-HD admitted with lower extremity ischemia  Also vascular evaluating for central stenosis/upper extremity swelling    Plan HD per submitted orders, UF   as tolerated  MBD - Phosphorus binder: continue with Sevelamer q AC  Anemia of CKD: EPO to be given on the floor x 3 per week   Renal Diet   Daily renal MVI   Continue 3 x per week hemodialysis; SW to ensure availability of o/p HD chair at discharge    Corine Will MD       "

## 2024-01-10 NOTE — CONSULTS
Vascular Surgery Consult Note    Reason for Consult: Thrombosed LUE AVF    HPI:  Chevy Benton is a 78 y.o. male with PMH of anemia, aortic root   dilatation, atrial fibrillation, end-stage renal disease on hemodialysis,   diabetes, hypertension, mitral regurgitation, sick sinus syndrome status   post pacemaker, PTA right lower extremity on 9/20/23 with subsequent TMA right foot on 9/22/23. He presented to outpatient EVLS clinic today due to continued pain in his right foot. He states that he has been unable to walk following his surgery. He underwent ABIs/PVRs today which showed signifant ASOD of the RLE. He was sent for admission with plans for endovascular intervention tomorrow    Vascular surgery was consulted for evaluation of occluded LUE AVF. Patient's fistula has been occluded since October 2023, for which a temporary HD cath was placed. It has been relocated to several areas d/t venous thrombosis. Currently, patient has HD access via a L internal jugular line. Patient has had intermittent left upper extremity edema since placement. However, per the family, the swelling has nearly doubled in size in the past day. Significant 2+ edema throughout entire upper extremity. He denies any changes to sensation or strength. No numbness or tingling.       Past Medical History:   Past Medical History:   Diagnosis Date    Acute hypercapnic respiratory failure (CMS/[facility])     Acute on chronic HFrEF (heart failure with reduced ejection fraction) (CMS/[facility])     ESRD on hemodialysis (CMS/[facility])     History of angioplasty of peripheral vessel 10/26/2023    HTN (hypertension)     LFT elevation 07/20/2021    Small bowel obstruction (CMS/[facility]) 10/10/2023    Stage II pressure ulcer (CMS/[facility])        Past Surgical History:   No past surgical history on file.    Medications:     Current Facility-Administered Medications:     acetaminophen (Tylenol) tablet 650 mg, 650 mg, oral, q4h PRN **OR** acetaminophen (Tylenol) oral liquid 650  mg, 650 mg, nasogastric tube, q4h PRN **OR** acetaminophen (Tylenol) suppository 650 mg, 650 mg, rectal, q4h PRN, Simone Acosta MD    albuterol 90 mcg/actuation inhaler 2 puff, 2 puff, inhalation, q6h PRN, Simone Acosta MD    allopurinol (Zyloprim) tablet 100 mg, 100 mg, oral, Daily, Simone Acosta MD    alum-mag hydroxide-simeth (Mylanta) 200-200-20 mg/5 mL oral suspension 30 mL, 30 mL, oral, 4x daily PRN, Simone Acosta MD    amLODIPine (Norvasc) tablet 10 mg, 10 mg, oral, Daily, Simone Acosta MD    aspirin chewable tablet 81 mg, 81 mg, oral, Daily, Simone Acosta MD    atorvastatin (Lipitor) tablet 40 mg, 40 mg, oral, Nightly, Simone Acosta MD    B complex-vitamin C-folic acid (Nephrocaps) capsule 1 capsule, 1 capsule, oral, Daily, Simone Acosta MD    dexAMETHasone (Decadron) tablet 6 mg, 6 mg, oral, Daily, Smione Acosta MD    dextrose 10 % in water (D10W) infusion, 0.3 g/kg/hr, intravenous, Once PRN, Simone Acosta MD    dextrose 50 % injection 25 g, 25 g, intravenous, q15 min PRN, Simone Acosta MD    docusate sodium (Colace) capsule 100 mg, 100 mg, oral, BID PRN, Simone Acosta MD    gabapentin (Neurontin) capsule 100 mg, 100 mg, oral, q24h, Simone Acosta MD    glucagon (Glucagen) injection 1 mg, 1 mg, intramuscular, q15 min PRN, Simone Acosta MD    heparin (porcine) injection 2,000-4,000 Units, 2,000-4,000 Units, intravenous, q4h PRN, Simone Acosta MD    heparin (porcine) injection 3,000-6,000 Units, 3,000-6,000 Units, intravenous, q4h PRN, Simone Acosta MD    heparin (porcine) injection 5,000-10,000 Units, 5,000-10,000 Units, intravenous, q4h PRN, Simone Acosta MD    heparin (porcine) injection 7,250 Units, 80 Units/kg, intravenous, Once, Simone Acosta MD    heparin 25,000 Units in dextrose 5% 250 mL (100 Units/mL) infusion (premix), 0-4,500 Units/hr, intravenous,  Continuous, Simone Acosta MD    heparin 25,000 Units in dextrose 5% 250 mL (100 Units/mL) infusion (premix), 0-4,500 Units/hr, intravenous, Continuous, Simone Acosta MD    heparin 25,000 Units in dextrose 5% 250 mL (100 Units/mL) infusion (premix), 0-4,500 Units/hr, intravenous, Continuous, Simone Acosta MD    hydrocortisone 2.5 % ointment, , Topical, BID PRN, Simone Acosta MD    insulin glargine (Lantus) injection 12 Units, 12 Units, subcutaneous, Nightly, Simone Acosta MD    [START ON 1/10/2024] insulin lispro (HumaLOG) injection 0-5 Units, 0-5 Units, subcutaneous, TID with meals, Simone Acosta MD    ipratropium-albuteroL (Duo-Neb) 0.5-2.5 mg/3 mL nebulizer solution 3 mL, 3 mL, nebulization, q6h PRN, Simone Acosta MD    methocarbamol (Robaxin) tablet 500 mg, 500 mg, oral, q8h PRN, Simone Acosta MD    metoprolol succinate XL (Toprol-XL) 24 hr tablet 25 mg, 25 mg, oral, Daily, Simone Acosta MD    [START ON 1/10/2024] pantoprazole (ProtoNix) EC tablet 40 mg, 40 mg, oral, Daily before breakfast, Simone Acosta MD    polyethylene glycol (Glycolax, Miralax) packet 17 g, 17 g, oral, Daily, Simone Acosta MD    sennosides (Senokot) tablet 8.6 mg, 1 tablet, oral, Nightly, Simone Acosta MD    [START ON 1/10/2024] sevelamer carbonate (Renvela) tablet 800 mg, 800 mg, oral, TID with meals, Simone Acosta MD    traMADol (Ultram) tablet 50 mg, 50 mg, oral, q6h PRN, Simone Acosta MD     Social History:   Social History     Socioeconomic History    Marital status:      Spouse name: Not on file    Number of children: Not on file    Years of education: Not on file    Highest education level: Not on file   Occupational History    Not on file   Tobacco Use    Smoking status: Never     Passive exposure: Past    Smokeless tobacco: Never   Substance and Sexual Activity    Alcohol use: Not on file    Drug use: Not on file     Sexual activity: Not on file   Other Topics Concern    Not on file   Social History Narrative    Not on file     Social Determinants of Health     Financial Resource Strain: Low Risk  (1/9/2024)    Overall Financial Resource Strain (CARDIA)     Difficulty of Paying Living Expenses: Not hard at all   Food Insecurity: Not on file   Transportation Needs: No Transportation Needs (1/9/2024)    PRAPARE - Transportation     Lack of Transportation (Medical): No     Lack of Transportation (Non-Medical): No   Physical Activity: Not on file   Stress: Not on file   Social Connections: Not on file   Intimate Partner Violence: Not on file   Housing Stability: Low Risk  (1/9/2024)    Housing Stability Vital Sign     Unable to Pay for Housing in the Last Year: No     Number of Places Lived in the Last Year: 1     Unstable Housing in the Last Year: No        Family History:   Family History   Problem Relation Name Age of Onset    Diabetes Mother          Allergies:   Allergies   Allergen Reactions    Penicillins Hives        Review of Systems:   Denies chest pain, shortness of breath, nausea, vomiting, weakness, vertigo, leg swelling, fevers, chills    ------------------------------------------------------------------------------------------    Objective    Vitals:   Temp:  [36.4 °C (97.5 °F)] 36.4 °C (97.5 °F)  Heart Rate:  [89-94] 94  Resp:  [17] 17  BP: (131-146)/() 131/95      I/O  No intake/output data recorded.      Physical Exam  GENERAL APPEARANCE:  AxOx4, generally well-appearing no acute distress.  HEART:  Normal rate and regular rhythm  LUNGS:  Equal chest rise and fall, non-labored breathing  ABDOMEN:  Soft, nontender, nondistended  EXTREMITIES:  LUE AVF w/o thrill. L brachial pulse palpable. Radial Multiphasic. Diffuse edema throughout. Chronic, hyperkeratotic skin changes overlying prior access site.   NEUROLOGICAL:  Grossly nonfocal. Alert and oriented, moving all 4 extremities.           Labs:  Lab Results    Component Value Date    WBC 8.0 10/08/2023    HGB 11.9 (L) 10/08/2023    HCT 38.9 (L) 10/08/2023    MCV 99 10/08/2023     10/08/2023     Lab Results   Component Value Date    GLUCOSE 164 (H) 10/08/2023    CALCIUM 9.3 10/08/2023     (L) 10/08/2023    K 3.6 10/08/2023    CO2 28 10/08/2023    CL 92 (L) 10/08/2023    BUN 24 (H) 10/08/2023    CREATININE 5.55 (H) 10/08/2023     Lab Results   Component Value Date    ALT 17 10/08/2023    AST 18 10/08/2023    ALKPHOS 115 10/08/2023    BILITOT 0.5 10/08/2023                 Imaging  Vascular US ankle brachial index (RAJ) without exercise    Result Date: 1/9/2024  Preliminary Cardiology Report           [place] [address]   Tel [phone] and Fax [phone]            Preliminary Vascular Lab Report  [facility] US ANKLE BRACHIAL INDEX (RAJ) WITHOUT EXERCISE  Patient Name:      SHAMEKA Mendez Physician: [id number] Yany SUN MD Study Date:        1/9/2024           Ordering           [id number] ARELIS MEJIAS                                       Physician: MRN/PID:           [id number]           Technologist:      Lorie Munguia RVT Accession#:        [id number]       Technologist 2:    Naye Loera RVT Date of Birth/Age: 1945          Encounter#:        [id number] Gender:            M Admission Status:  Outpatient         Location           [facility]                                       Performed:  Diagnosis/ICD: Peripheral vascular disease, unspecified-I73.9 Procedure/CPT: 27968.52 Peripheral artery RAJ Only Reduced Service  PRELIMINARY CONCLUSIONS: Right Lower PVR: Evidence of severe arterial occlusive disease in the right lower extremity at rest. Monophasic flow is noted in the right posterior tibial artery and right dorsalis pedis artery. Biphasic flow is noted in the right common femoral artery. Digits are noted to be  amputated. Level of disease called off of Doppler AND pvr tracings. Left Lower PVR: Evidence of severe arterial occlusive disease in the left lower extremity at rest. Decreased digital perfusion noted. Biphasic flow is noted in the left posterior tibial artery and left common femoral artery. Level of disease called off of Doppler and pvr tracings . AVG noted in left arm.  Additional Findings: Due to patients immobility, study was performed sitting in wheel chair with legs elevated.  Imaging & Doppler Findings:  RIGHT Lower PVR                Pressures Ratios Right Posterior Tibial (Ankle) 0 mmHg    0.00 Right Dorsalis Pedis (Ankle)   255 mmHg  1.58 Right Digit (Great Toe)        0 mmHg    0.00   LEFT Lower PVR                Pressures Ratios Left Posterior Tibial (Ankle) 255 mmHg  1.58 Left Dorsalis Pedis (Ankle)   255 mmHg  1.58 Left Digit (Great Toe)        0 mmHg    0.00                      Right Brachial Pressure 161 mmHg   VASCULAR PRELIMINARY REPORT completed by Lorie Munguia ARGELIA on 1/9/2024 at 12:02:27 PM  ** Final **       -------------------------------------------------------------------------------------------    Assessment  78 y.o. male PVD, ESRD HD dependent for 9 years, presenting as a direct admit from EVLS clinic after PVRs revealed significant ASOD of the RLE. Patient also has thrombosed LUE AVF which has been occluded since October. Patient has had several temporary HD lines since the occlusion. Currently, HD access via a LUE internal jugular catheter. Per patient, he has had intermittent left arm swelling since placement of the left internal jugular cath. For the past day, his arm swelling has significantly increased.    Recommendations  -Patient likely has central venous stenosis 2/2 his HD catheter.   -Would recommend interventional radiology consult for venogram and potential venoplasty  -If feasible, would also recommend relocating HD catheter, which may be challenging as it appears patient  has failed access in prior sites.  -Agree with heparin gtt  -Vascular surgery available for any questions or concerns    Discussed with vascular fellow, Dr. Keith as well as attending physician, Dr. Laurie Hanks,   Department of Surgery / IR Integrated PGY1  Vascular Surgery

## 2024-01-10 NOTE — PROGRESS NOTES
Chevy Benton is a 78 y.o. male on day 1 of admission presenting with Critical limb ischemia of both lower extremities (CMS/HCC).    Vascular Surgery Progress Note     Subjective  NAEO. Patient is overall feeling well and denies pain.    Objective  Physical Exam  GENERAL APPEARANCE:  Generally well-appearing no acute distress.  HEART:  Normal rate   LUNGS:  Equal chest rise and fall, non-labored breathing on NC  ABDOMEN:  Soft, nontender, nondistended  EXTREMITIES:  LUE AVF. L brachial and radial pulses palpable. Diffuse edema throughout. Chronic, hyperkeratotic skin changes overlying prior access site.   NEUROLOGICAL:  Grossly nonfocal. Moving all 4 extremities.     Vitals  Temp:  [35.6 °C (96.1 °F)-36.4 °C (97.5 °F)] 35.6 °C (96.1 °F)  Heart Rate:  [85-94] 85  Resp:  [14-18] 18  BP: (131-146)/() 139/87    Intake/Output last 3 Shifts:  No intake/output data recorded.    Medications  Scheduled medications  allopurinol, 100 mg, oral, Daily  amLODIPine, 10 mg, oral, Daily  aspirin, 81 mg, oral, Daily  atorvastatin, 40 mg, oral, Nightly  B complex-vitamin C-folic acid, 1 capsule, oral, Daily  dexAMETHasone, 6 mg, oral, Daily  gabapentin, 100 mg, oral, q24h  insulin glargine, 12 Units, subcutaneous, Nightly  insulin lispro, 0-5 Units, subcutaneous, TID with meals  metoprolol succinate XL, 25 mg, oral, Daily  pantoprazole, 40 mg, oral, Daily before breakfast  polyethylene glycol, 17 g, oral, Daily  sennosides, 1 tablet, oral, Nightly  sevelamer carbonate, 800 mg, oral, TID with meals      Continuous medications  heparin, 0-4,500 Units/hr  heparin, 0-4,500 Units/hr, Last Rate: Stopped (01/10/24 2290)  heparin, 0-4,500 Units/hr, Last Rate: Stopped (01/09/24 4259)      PRN medications  PRN medications: acetaminophen **OR** acetaminophen **OR** acetaminophen, albuterol, alum-mag hydroxide-simeth, dextrose 10 % in water (D10W), dextrose, docusate sodium, glucagon, heparin, heparin, heparin, hydrocortisone,  ipratropium-albuteroL, methocarbamol, traMADol    Relevant Results  Lab Results   Component Value Date    WBC 8.5 01/10/2024    HGB 11.3 (L) 01/10/2024    HCT 36.1 (L) 01/10/2024    MCV 96 01/10/2024     01/10/2024     Lab Results   Component Value Date    GLUCOSE 130 (H) 01/10/2024    CALCIUM 9.7 01/10/2024     (L) 01/10/2024    K 4.5 01/10/2024    CO2 24 01/10/2024    CL 90 (L) 01/10/2024    BUN 67 (H) 01/10/2024    CREATININE 5.10 (H) 01/10/2024         Lab Results   Component Value Date    ALT 17 10/08/2023    AST 18 10/08/2023    ALKPHOS 115 10/08/2023    BILITOT 0.5 10/08/2023       Assessment/Plan  78 y.o. male PVD, ESRD HD dependent for 9 years, presenting as a direct admit from EVLS clinic after PVRs revealed significant ASOD of the RLE. Patient also has thrombosed LUE AVF which has been occluded since October. Patient has had several temporary HD lines since the occlusion. Currently, HD access via a L internal jugular catheter. Per patient, he has had intermittent left arm swelling since placement of the left internal jugular cath which has increased recently.     Recommendations  - Patient likely has central venous stenosis 2/2 his HD catheter.   -Would recommend IR consult for venogram and potential venoplasty  - Continue heparin gtt  - Vascular surgery available for any questions or concerns     Discussed with attending physician, Dr. Laurie Ayon MD  Vascular Surgery [phone]

## 2024-01-10 NOTE — PROGRESS NOTES
"Subjective Data:  Mr Mirza was admitted from the clinic yesterday for management of CLI.  He was ween this am in his room, NAD, AOx3, no complains.    Overnight Events:    No events      Objective Data:  Last Recorded Vitals:  Vitals:    01/09/24 1900 01/09/24 1946 01/10/24 0009 01/10/24 0900   BP: (!) 131/95  146/89 139/87   BP Location: Right arm      Patient Position: Lying      Pulse: 94  86 85   Resp: 17  14 18   Temp: 36.4 °C (97.5 °F)  36.2 °C (97.2 °F) 35.6 °C (96.1 °F)   TempSrc:    Tympanic   SpO2: 98%  99% 100%   Weight:  90.5 kg (199 lb 8 oz)     Height:  1.778 m (5' 10\")         Last Labs:  CBC - 1/10/2024: 10:13 AM  8.5 11.3 271    36.1      CMP - 1/10/2024: 10:13 AM  9.7 7.3 18 --- 0.5   5.5 3.1 17 115      PTT - 1/9/2024:  9:35 PM  1.3   14.7 28     TROPHS   Date/Time Value Ref Range Status   10/08/2023 09:00 PM 60 0 - 20 ng/L Final     Comment:     Previous result verified on 10/8/2023 2030 on specimen/case [id number] called with component [facility] for procedure Troponin I, High Sensitivity, Initial with value 58 ng/L.   10/08/2023 07:26 PM 58 0 - 20 ng/L Final     HGBA1C   Date/Time Value Ref Range Status   12/03/2023 07:38 AM 5.3 4.3 - 5.6 % Final     Comment:     American Diabetes Association guidelines indicate that patients with HgbA1c in the range 5.7-6.4% are at increased risk for development of diabetes, and intervention by lifestyle modification may be beneficial. HgbA1c greater or equal to 6.5% is considered diagnostic of diabetes.   04/25/2022 08:34 PM 6.5 4.3 - 5.6 % Final     Comment:     American Diabetes Association guidelines indicate that patients with HgbA1c in the range 5.7-6.4% are at increased risk for development of diabetes, and intervention by lifestyle modification may be beneficial. HgbA1c greater or equal to 6.5% is considered diagnostic of diabetes.      Last I/O:  No intake/output data recorded.    Past Cardiology Tests (Last 3 Years):  EKG:  No results found for " this or any previous visit from the past 1095 days.    Echo:  Transthoracic Echo (TTE) Complete 10/02/2023      Inpatient Medications:  Scheduled medications   Medication Dose Route Frequency    allopurinol  100 mg oral Daily    amLODIPine  10 mg oral Daily    aspirin  81 mg oral Daily    atorvastatin  40 mg oral Nightly    B complex-vitamin C-folic acid  1 capsule oral Daily    dexAMETHasone  6 mg oral Daily    gabapentin  100 mg oral q24h    insulin glargine  12 Units subcutaneous Nightly    insulin lispro  0-5 Units subcutaneous TID with meals    metoprolol succinate XL  25 mg oral Daily    pantoprazole  40 mg oral Daily before breakfast    polyethylene glycol  17 g oral Daily    sennosides  1 tablet oral Nightly    sevelamer carbonate  800 mg oral TID with meals     PRN medications   Medication    acetaminophen    Or    acetaminophen    Or    acetaminophen    albuterol    alum-mag hydroxide-simeth    dextrose 10 % in water (D10W)    dextrose    docusate sodium    glucagon    heparin    heparin    heparin    hydrocortisone    ipratropium-albuteroL    methocarbamol    traMADol     Continuous Medications   Medication Dose Last Rate    heparin  0-4,500 Units/hr      heparin  0-4,500 Units/hr Stopped (01/10/24 0528)    heparin  0-4,500 Units/hr Stopped (01/09/24 2312)       Physical Exam:  Constitutional: Well developed, NAD, awake/alert/oriented x3, pleasant, cooperative   Head/Neck: Neck supple,  No JVD, trachea midline, no bruits   Respiratory/Thorax: Patent airways, CTAB, normal breath sounds, thorax symmetric, Lt chest HD catheter in place   Cardiovascular: Regular, rate and rhythm, no murmurs, normal S 1 and S 2   Gastrointestinal: Nondistended, soft, non-tender, +BS,   Psychological: Appropriate mood and behavior   Skin: Warm and dry,   L Extremities: DP/PT pulses nonpalpable b/l. Capillary fill time sluggish to all digits b/l.  Skin temperature warm to cool from proximal to distal b/l. Right foot TMA noted  with layer hyperkeratotic skin and underlying granular base. Mild serous drainage noted. No calor, no malodor, no ascending lymphangitis noted.   UE: LUE AVF w/o thrill. L brachial pulse palpable. Radial Multiphasic. Diffuse edema throughout. Chronic, hyperkeratotic skin changes overlying prior access site      Assessment/Plan   Chevy Benton is a 78 y.o. male with PMH of ESRD on HD TTS (Lt chest TDC, Hx of LUE Fistula Pseudoaneurysms), DM, HTN, HFrEF (TTE 10/23 EF 35-40%), A fib, PTA right lower extremity on 9/20/23 with subsequent TMA right foot on 9/22/23, Non Occlusive DVT (Rt Subclavian), Aortic Root Dilation, MR, SSS s/p pacemaker, who presented as a direct admit from Dr Linder endovascular clinic for concerns of critical limb ischemia. Patient underwent PVR with results concerning for severe ischemia.  Patient reported increasing Rt foot pain requiting more frequent doses of pain meds. He denied any CP, SOB, cough, fever.  However, on 12/2 he reported SOB for which SNF staff worked it up and was found to have COVID +ve test, patient became hypoxic and was started on Dexamethasone on 12/4.   Per nursing staff at SNF, he had CXR done showing c/f PNA for which Levofloxacin was started 250mg everyother day for 3 doses. Patient reported chronic Rt and Lt upper extremity swellings since the time of his most recent discharge. Of note, patient's course between Sep-Nov 2023 was c/b SBO (managed non surgically) and RUE Subclavian non occlusive DVT (on low dose Eliquis 2.5mg bid, and thrombocytopenia during hospital stay) as well as MICU stay at OSH for sepsis.      Pt was seen by podiatry yesterday during EVLS visit, and are following, plan for debridement post peripheral intervention    CLI:  - RAJ/TBI Rt NC (with flat ankle wave forms),  Lt NC/0  (post revascularization results 9/28/23 Rt NC with adequate wave forms)  - plan for peripheral angioplasty today  - continue with ASA and heparin drip  - per SNF,  Plavix was never resumed between his admissions  - holding Eliquis for now    Rt TMA nonhealing wound:  - podiatry following  - Foot Xray: Status post transmetatarsal amputation of the right 1st through 5th digits with normal appearance of the osseous stumps.    No erosive changes or osseous destruction     LUE AV fistula/ arm edema  - Duplex US  preliminary results: patent AV fistula  - vascular surgery following    - ESRD  - consult renal for continuation of HD      Peripheral IV 01/09/24 22 G Right Antecubital (Active)   Site Assessment Clean;Dry;Intact 01/10/24 0000   Dressing Status Clean;Dry 01/10/24 0000   Number of days: 1       Hemodialysis Cath Double lumen Left Chest (Active)   Line Necessity Hemodialysis 01/10/24 1100   Number of days:        Code Status:  Full Code      Aruna Vazquez PA-C  Endovascular/Limb Salvage Service   Day: 35001/Haiku/Night HHVI [id number]

## 2024-01-10 NOTE — NURSING NOTE
.Report from Sending RN:    Report From: ABEL Mena  Recent Surgery of Procedure: No  Baseline Level of Consciousness (LOC): A&O X3  Oxygen Use: No  Type: 2L NC  Diabetic: No  Last BP Med Given Day of Dialysis: metoprolol  Last Pain Med Given: tramadol  Lab Tests to be Obtained with Dialysis: No  Blood Transfusion to be Given During Dialysis: No  Available IV Access: Yes  Medications to be Administered During Dialysis: No  Continuous IV Infusion Running: Yes, Heparin 18ml/hr held  Restraints on Currently or in the Last 24 Hours: No  Hand-Off Communication: Pt had no acute event, vital signs stable. On contact precaution for Covid. Pt receive from IR BP 84/59 per IR Nurse, not symptomatic.

## 2024-01-11 PROBLEM — I87.1 SUBCLAVIAN VEIN STENOSIS, LEFT: Status: ACTIVE | Noted: 2024-01-01

## 2024-01-11 NOTE — PROCEDURES
Vascular Access Team Procedure Note     Visit Date: 1/11/2024      Patient Name: Chevy Benton         MRN: [id number]             Procedure:Midline placed without difficulty and okay to use.           Midline 01/11/24 Single lumen Right Cephalic vein (Active)   01/11/24 1717 Cephalic vein   Earliest Known Present:    Placed by External Staff?:    Hand Hygiene Completed: Yes   Catheter Time Out Checklist Completed: Yes   Size (Fr): 3   Lumen Type: Single lumen   Description (optional):    Catheter to Vein Ratio Less Than 50%: Yes   Total Length (cm): 11 cm   External Length (cm): 0 cm   Orientation: Right   Site Prep: Chlorhexidine ;Alcohol;Usual sterile procedure followed   Local Anesthetic:    Indication: Parenteral medications   Insertion Team Members In The Room: Nurse   Initial Extremity Circumference (cm): 39 cm   Placed by: Brittnee Jara RN   Insertion attempts: 1   Patient Tolerance: Tolerated well   Comfort Measures: Subcutaneous anesthetic   Procedure Location: Bedside   Safety Measures: Patient specific safety measures addressed with RN   Estimated Blood Loss (mL): 1 mL   Vessel Fully Compressible Proximally and Distally to Insertion Site: Yes   Brisk Blood Return Obtained and Line Draws Easily: Yes   Catheter Tip Location: Peripheral/midline   Line Confirmation: Blood return;Ultrasound   Lot #: [id number]   : bard   Expiration Date: 10/31/24   Securement Method: Stat lock;Skin barrier;Transparent dressing   Number of Sutures Placed: 0   Post Procedure Checklist: Handoff with RN;Bed at lowest level and wheels locked;Obtain all new IV tubing prior to use   Earliest Known Removed:    Removal Reason :    Removal Catheter Length (cm):    Tip Intact:    Site Prep Agent has Completely Dried Before Insertion:    All 5 Sterile Barriers Used (Gloves, Gown, Cap, Mask, Large Sterile Drape):    Placement Verification:    Catheter Tip Cultured:    Securement Method:    Site Assessment Clean;Dry;Intact  01/11/24 1720   Proximal Lumen Status Blood return noted;Flushed 01/11/24 1720                 Brittnee Jara RN  1/11/2024  5:20 PM

## 2024-01-11 NOTE — PROGRESS NOTES
Chevy Benton is a 78 y.o. male on day 1 of admission presenting with Critical limb ischemia of both lower extremities (CMS/HCC).    Subjective   Pt was seen resting comfortably in bed and NAD. Podiatry is familiar with pt as he is s/p RLE TMA w wound VAC application (DOS 9/22/23). He was unable to follow up for post operative visits as [facility]. Pt has no other pedal complaints and denies constitutional symptoms.         Physical Exam    Objective     REVIEW OF SYSTEMS  GENERAL:  Negative for malaise, significant weight loss, fever  HEENT:  No changes in hearing or vision, no nose bleeds or other nasal problems and Negative for frequent or significant headaches  NECK:  Negative for lumps, goiter, pain and significant neck swelling  RESPIRATORY:  Negative for cough, wheezing and shortness of breath  CARDIOVASCULAR:  Negative for chest pain, leg swelling and palpitations  GI:  Negative for abdominal discomfort, blood in stools or black stools and change in bowel habits  :  Negative for dysuria, frequency and incontinence  MUSCULOSKELETAL:  Negative for joint pain or swelling, back pain, and muscle pain.  SKIN:  Dry gangrenous changes to right TMA site  PSYCH:  Negative for sleep disturbance, mood disorder and recent psychosocial stressors  HEMATOLOGY/LYMPHOLOGY:  Negative for prolonged bleeding, bruising easily, and swollen nodes.  ENDOCRINE:  Negative for cold or heat intolerance, polyuria, polydipsia and goiter  NEURO: Negative, denies any burning, tingling or numbness     Objective:   Vasc: DP and PT pulses are nonpalpable bilateral. Skin temperature is warm to cool proximal to distal bilateral.      Neuro:  Light touch is absent to the foot bilateral.  Protective sensation is absent to the foot when tested with the 5.07 SWM bilateral.      Derm: S/P right TMA (DOS 9/22/23). Intact nonmobile necrotic eschar overlying TMA site. The wound itself measures  "2.1x3.4 cm. No active bleeding or drainage noted. No purulent drainage noted. No rising lymphangitis or lymphadenopathy.    MSK: S/P right open TMA (DOS 9/22/23). 4/5 MSK strength to bilateral lower foot in all four pedal compartments      Last Recorded Vitals  Blood pressure 124/82, pulse 79, temperature 36.4 °C (97.5 °F), temperature source Temporal, resp. rate 15, height 1.778 m (5' 10\"), weight 90.5 kg (199 lb 8 oz), SpO2 93 %.    Intake/Output last 3 Shifts:  I/O last 3 completed shifts:  In: 337.5 (3.7 mL/kg) [I.V.:337.5 (3.7 mL/kg)]  Out: - (0 mL/kg)   Weight: 90.5 kg     Relevant Results           Assessment/Plan   Principal Problem:    Critical limb ischemia of both lower extremities (CMS/HCC)  Active Problems:    Critical limb ischemia of right lower extremity (CMS/HCC)    # Chronic non pressure ulcer with unspecified severity RLE L97.519  # Disruption of external operation wound T81.31xA  # DM II w diabetic polyneuropathy E11.42  # Atherosclerosis of native arteries of RLE w ulcer I70.235    Plan:  - Patient was seen at bedside and is resting comfortably.  A total of 55 minutes of face-to-face time was spent on this patient for professional services including but not limited to obtaining medially appropriate history, performing physical examination, collecting and explaining pertinent findings to patient, discussing relevant and viable treatment options, making appropriate level of medical decision, and coordinating care and facilitating treatment.  The end of the encounter was spent educating patient and family on treatment plan. All questions and concerns were answered and addressed to the pt's apparent satisfaction.  - Labs reviewed.  - Imaging reviewed.   - Continue abx therapy per ID/Primary  - Continue pain management per Medicine/Primary  - Appreciate EVLS recs. Podiatric surgical intervention pending EVLS intervention (scheduled Tuesday 1/16/24 per EVLS note)  - Continue anticoag per EVLS  - " Dressings: betadine, gauze, kerlix  - Podiatry will continue to monitor pt while in house  - All questions and concerns were answered and addressed to the patient and patient's family apparent satisfaction  - NWB to RLE  - Treatment and plan was discussed with attending Dr. Roopa Rodriguez DPM    Case to be discussed with attending, A&P above reflects a tentative plan. Please await for the final signature from the attending physician on service.     John Sullivan DPM PGY-3  Podiatric Medicine & Surgery  Please Haiku message me with any questions or concerns.          John Sullivan DPM

## 2024-01-11 NOTE — PROGRESS NOTES
Mr Mirza was admitted from the clinic on 1/9/2024 for management of RLE CLI RC-VI.  Subjective Data:  He was seen this am in his room, NAD, AOx3, no complains of foot or leg pain but some numbness unchanged from before. UE swelling unchanged without pain. Pt denied fever, chills, SOB or CP. Pt was updated on plan for peripheral angioplasty scheduled for 1/16/24 with EVLS and fistulogram scheduled for 1/12/24 with vascular surgery.     Overnight Events:    No events      Objective Data:  Last Recorded Vitals:  Vitals:    01/10/24 2000 01/10/24 2131 01/10/24 2300 01/11/24 0700   BP:  124/82 124/75 111/77   BP Location:  Right arm Right arm    Patient Position:  Lying     Pulse: 72 79 65 74   Resp:  15 16 20   Temp: 36.5 °C (97.7 °F) 36.4 °C (97.5 °F) 36.4 °C (97.5 °F) 35.6 °C (96.1 °F)   TempSrc: Temporal Temporal Temporal Temporal   SpO2:  93% 94% 95%   Weight:       Height:           Last Labs:  CBC - 1/10/2024: 10:13 AM  8.5 11.3 271    36.1      CMP - 1/11/2024:  7:07 AM  10.3 7.3 18 --- 0.5   5.5 3.1 17 115      PTT - 1/11/2024:  7:10 AM  1.3   14.7 135     TROPHS   Date/Time Value Ref Range Status   10/08/2023 09:00 PM 60 0 - 20 ng/L Final     Comment:     Previous result verified on 10/8/2023 2030 on specimen/case [id number] called with component [facility] for procedure Troponin I, High Sensitivity, Initial with value 58 ng/L.   10/08/2023 07:26 PM 58 0 - 20 ng/L Final     HGBA1C   Date/Time Value Ref Range Status   12/03/2023 07:38 AM 5.3 4.3 - 5.6 % Final     Comment:     American Diabetes Association guidelines indicate that patients with HgbA1c in the range 5.7-6.4% are at increased risk for development of diabetes, and intervention by lifestyle modification may be beneficial. HgbA1c greater or equal to 6.5% is considered diagnostic of diabetes.   04/25/2022 08:34 PM 6.5 4.3 - 5.6 % Final     Comment:     American Diabetes Association guidelines indicate that patients with HgbA1c in the range 5.7-6.4%  are at increased risk for development of diabetes, and intervention by lifestyle modification may be beneficial. HgbA1c greater or equal to 6.5% is considered diagnostic of diabetes.      Last I/O:  I/O last 3 completed shifts:  In: 977.5 (10.8 mL/kg) [P.O.:240; I.V.:737.5 (8.1 mL/kg)]  Out: 0 (0 mL/kg)   Weight: 90.5 kg     Past Cardiology Tests (Last 3 Years):  Echo:  Transthoracic Echo (TTE) Complete 10/02/2023    Most recent Vascular Testing     Vascular US ankle brachial index (RAJ) without exercise 01/09/2024    [place] [id number]  Tel [phone] and Fax [phone]    Vascular Lab Report  [facility] US ANKLE BRACHIAL INDEX (RAJ) WITHOUT EXERCISE    Patient Name:     SHAMEKA Mendez Physician: [id number] Yany Juarez MD  Study Date:       1/9/2024            Ordering           [id number] ARELIS MEJIAS  Physician:  MRN/PID:          [id number]            Technologist:      Lorie Munguia RVT  Accession#:       [id number]        Technologist 2:    Naye Loera  RVT  Date of           1945 / [birth year]      Encounter#:        [id number]  Birth/Age:        years  Gender:           M  Admission Status: Outpatient          Location           [facility]  Performed:    Diagnosis/ICD: Peripheral vascular disease, unspecified-I73.9  CPT Codes:     22884.52 Peripheral artery RAJ Only Reduced Service    CONCLUSIONS:  Right Lower PVR: Evidence of severe arterial occlusive disease in the right lower extremity at rest. Monophasic flow is noted in the right posterior tibial artery and right dorsalis pedis artery. Biphasic flow is noted in the right common femoral artery. Digits are noted to be amputated. Level of disease called off of Doppler AND pvr tracings.  Left Lower PVR: Evidence of severe arterial occlusive disease in the left lower extremity at rest. Decreased digital perfusion noted. Biphasic flow is noted in the left posterior tibial  artery and left common femoral artery. Level of disease called off of Doppler and pvr tracings . AVG noted in left arm.    Additional Findings:  Due to patients immobility, study was performed sitting in wheel chair with legs  elevated.    Imaging & Doppler Findings:    RIGHT Lower PVR                Pressures Ratios  Right Posterior Tibial (Ankle) 0 mmHg    0.00  Right Dorsalis Pedis (Ankle)   255 mmHg  1.58  Right Digit (Great Toe)        0 mmHg    0.00    LEFT Lower PVR                Pressures Ratios  Left Posterior Tibial (Ankle) 255 mmHg  1.58  Left Dorsalis Pedis (Ankle)   255 mmHg  1.58  Left Digit (Great Toe)        0 mmHg    0.00    Right  Brachial Pressure 161 mmHg    [id number] Yany Juarez MD  Electronically signed by [id number] Yany Juarez MD on 1/10/2024 at 6:19:41 PM    XR foot left 3+ views 01/09/2024  XR foot right 3+ views 01/09/2024    Narrative  Interpreted By:  Liz Ruiz and Stephens Katherine  STUDY:  XR FOOT LEFT 3+ VIEWS; XR FOOT RIGHT 3+ VIEWS; ;  1/9/2024 8:25 pm    INDICATION:  Signs/Symptoms:pain. 7    COMPARISON:  Radiographs of the right foot 09/22/2020    ACCESSION NUMBER(S):  [id number]; [id number]    ORDERING CLINICIAN:  FAWAD HERNANDEZ    FINDINGS:  Bilateral feet, three views each.    Left foot:  No acute fracture or malalignment.  The joint spaces are maintained.  Plantar calcaneal spurring is present.  Vascular calcifications are present.    Right foot:  Transmetatarsal amputation changes are noted of the 1st through 5th  digits with sharp margination of the osseous stumps. There is  lobulated morphology of the soft tissue stump. No acute fracture or  malalignment. No erosive changes or osseous destruction.  Mild soft tissue swelling of the foot.  Prominent vascular calcifications noted.    Impression  1. No acute fracture or malalignment of the bilateral feet.  2. Status post transmetatarsal amputation of the right 1st through  5th digits with normal appearance of  the osseous stumps.  3. Lobulated morphology of the right midfoot soft tissue stump with  stump breakdown/wound dehiscence not excluded. Correlate with  physical examination  4. Unremarkable radiographic evaluation of the left foot.    I personally reviewed the images/study and I agree with Cass Mancini DO's (radiology resident) findings as stated. This study  was interpreted at [facility],  [place], [place].    MACRO:  None    Signed by: Liz Ruiz 1/10/2024 10:03 AM  Dictation workstation:   [id number]      Inpatient Medications:  Scheduled medications   Medication Dose Route Frequency    allopurinol  100 mg oral Daily    amLODIPine  10 mg oral Daily    aspirin  81 mg oral Daily    atorvastatin  40 mg oral Nightly    B complex-vitamin C-folic acid  1 capsule oral Daily    dexAMETHasone  6 mg oral Daily    gabapentin  100 mg oral q24h    insulin glargine  12 Units subcutaneous Nightly    insulin lispro  0-5 Units subcutaneous TID with meals    metoprolol succinate XL  25 mg oral Daily    pantoprazole  40 mg oral Daily before breakfast    polyethylene glycol  17 g oral Daily    sennosides  1 tablet oral Nightly    sevelamer carbonate  800 mg oral TID with meals     PRN medications   Medication    acetaminophen    Or    acetaminophen    Or    acetaminophen    albuterol    alum-mag hydroxide-simeth    dextrose 10 % in water (D10W)    dextrose    docusate sodium    glucagon    heparin    heparin    heparin    hydrocortisone    ipratropium-albuteroL    methocarbamol    traMADol     Continuous Medications   Medication Dose Last Rate    heparin  0-4,500 Units/hr      heparin  0-4,500 Units/hr 1,629 Units/hr (01/10/24 1420)    heparin  0-4,500 Units/hr 1,129 Units/hr (01/11/24 0955)      Latest Reference Range & Units Most Recent   Heparin Unfractionated See Comment Below for Therapeutic Ranges IU/mL 1.2 ()  1/11/24 07:10   (): Data is critically high    Physical  Exam:  Constitutional: ill appearing, NAD, awake/alert/oriented x3, pleasant, cooperative   Head/Neck: Neck supple,  No JVD, trachea midline, no bruits   Respiratory/Thorax: Patent airways, diminished breath sounds throughout, on RA. Lt chest wall HD catheter in place  Cardiovascular: irregular, rate and rhythm, no murmurs, normal S 1 and S 2   Gastrointestinal: obese,, Non-distended, soft, non-tender, +BS,    Skin: Warm and dry,   L Extremities: DP/PT pulses nonpalpable or Doppler able b/l. Capillary fill time sluggish to all digits b/l.  Skin temperature warm to cool from proximal to distal b/l. Right TMA site with necrotic eschar overlying, No active bleeding or drainage, noted with layer hyperkeratotic skin of the sole, no malodor, no ascending lymphangitis noted.   UE: LUE AVF with diminished thrill and bruit. L brachial pulse palpable. Radial Multiphasic. +3 edema entire left arm and hand. Chronic hyperkeratotic skin changes overlying prior access site. Sensation and motor function intact.  RUE: +2 edema of the arm and hand, palpable radial pulses, motor and sensation intact  Psychological: Appropriate mood and behavior  Neuro: non-focal, sensation very diminished bilateral foot, motor function intact    Assessment/Plan   Chevy Benton is a 78 y.o. male with PMH of ESRD on HD TTS (Lt chest TDC, Hx of LUE Fistula Pseudoaneurysms), T2DM, HTN, HFrEF (TTE 10/23 EF 35-40%), A fib, PTA right lower extremity on 9/20/23 with subsequent TMA on 9/22/23, Non Occlusive DVT (Rt Subclavian), Aortic Root Dilation, MR, SSS s/p pacemaker, who presented as a direct admit from Dr Linder endovascular clinic for concerns of critical limb ischemia. Patient underwent PVR with results concerning for severe ischemia. Patient reported increasing Rt foot pain requiring more frequent doses of pain meds. Pt also said he has not been taking Plavix since hospital discharge in late September due to the frequent hospitalization for  SBO. He denied any CP, SOB, cough, fever. Since hospital admission Pt was seen by podiatry, vascular surgery and nephrology team. He has venogram on 1/10/24 by IR. Pt was scheduled for urgent peripheral angioplasty on 1/11/24, however Pt was taken to IR for venogram and due to cath lab availability procedure had to be cancelled and reschedule for Tuesday 1/16. Pt also scheduled for fistulogram by vascular surgery tomorrow 1/12/24.    1/10 3V Foot Xray - No erosive changes or osseous destruction. Lobulated morphology of the right midfoot soft tissue stump with stump breakdown/wound dehiscence not excluded.     Hep assay high this am 1.2 - gtt was on hold    RLE CLTI  RC-VI  Rt TMA site with overlaying necrosis  - continue with ASA EC 81 mg daily and Hep gtt (Hep assay 0.3-0.7) Please closely FU on hep dosing and assay  - plan for peripheral angioplasty on Tuesday 1/16/2024 with Dr. Stuart Nguyen  - continue to hold Eliquis for now  - NPO at MN on 1/16/24  - TMA site care per podiatry recs  - No need to stop ASA of Hep gtt for this procedure. Pt can have morning meds with small sips of water  - Case and plan discussed with attendings Dr Linder and Dr. Stuart BASILIO arm edema/ AV fistula thrombosis  - Duplex US: patent AV fistula  - Venogram showed no significant stenosis  - vascular surgery following - fistulogram recommended and scheduled for tomorrow in the OR by Dr. Michelle Barrios    Please do not hesitate to contact EVLS team with any questions or concerns.       Peripheral IV 01/09/24 22 G Right Antecubital (Active)   Site Assessment Clean;Dry;Intact 01/10/24 0000   Dressing Status Clean;Dry 01/10/24 0000   Number of days: 1       Hemodialysis Cath Double lumen Left Chest (Active)   Line Necessity Hemodialysis 01/10/24 1100   Number of days:        Code Status:  Full Code      EDWIN Ann  Endovascular/Limb Salvage Service   Day: 89701/Haiku/Night HHVI [id number]

## 2024-01-11 NOTE — PROGRESS NOTES
Chevy Benton is a 78 y.o. male on day 2 of admission presenting with Critical limb ischemia of both lower extremities (CMS/HCC).    Vascular Surgery Progress Note     Subjective  NAEO. Patient is overall feeling well and denies pain.    Objective  Physical Exam  GENERAL APPEARANCE:  Generally well-appearing no acute distress.  HEART:  Normal rate   LUNGS:  Equal chest rise and fall, non-labored breathing on NC  ABDOMEN:  Soft, nontender, nondistended  EXTREMITIES:  LUE AVF with palpable thrill distally. L brachial and radial pulses palpable. Diffuse edema throughout. Chronic, hyperkeratotic skin changes overlying prior access site.   NEUROLOGICAL:  Grossly nonfocal. Moving all 4 extremities.     Vitals  Temp:  [35.6 °C (96.1 °F)-36.5 °C (97.7 °F)] 36.4 °C (97.5 °F)  Heart Rate:  [65-85] 65  Resp:  [10-18] 16  BP: ()/(61-87) 124/75    Intake/Output last 3 Shifts:  I/O last 3 completed shifts:  In: 977.5 (10.8 mL/kg) [P.O.:240; I.V.:737.5 (8.1 mL/kg)]  Out: 0 (0 mL/kg)   Weight: 90.5 kg     Medications  Scheduled medications  allopurinol, 100 mg, oral, Daily  amLODIPine, 10 mg, oral, Daily  aspirin, 81 mg, oral, Daily  atorvastatin, 40 mg, oral, Nightly  B complex-vitamin C-folic acid, 1 capsule, oral, Daily  dexAMETHasone, 6 mg, oral, Daily  gabapentin, 100 mg, oral, q24h  insulin glargine, 12 Units, subcutaneous, Nightly  insulin lispro, 0-5 Units, subcutaneous, TID with meals  metoprolol succinate XL, 25 mg, oral, Daily  pantoprazole, 40 mg, oral, Daily before breakfast  polyethylene glycol, 17 g, oral, Daily  sennosides, 1 tablet, oral, Nightly  sevelamer carbonate, 800 mg, oral, TID with meals      Continuous medications  heparin, 0-4,500 Units/hr  heparin, 0-4,500 Units/hr, Last Rate: 1,629 Units/hr (01/10/24 1420)  heparin, 0-4,500 Units/hr, Last Rate: 1,429 Units/hr (01/11/24 0213)      PRN medications  PRN medications: acetaminophen **OR** acetaminophen **OR** acetaminophen, albuterol, alum-mag  hydroxide-simeth, dextrose 10 % in water (D10W), dextrose, docusate sodium, glucagon, heparin, heparin, heparin, hydrocortisone, ipratropium-albuteroL, methocarbamol, traMADol    Relevant Results  Lab Results   Component Value Date    WBC 8.5 01/10/2024    HGB 11.3 (L) 01/10/2024    HCT 36.1 (L) 01/10/2024    MCV 96 01/10/2024     01/10/2024     Lab Results   Component Value Date    GLUCOSE 130 (H) 01/10/2024    CALCIUM 9.7 01/10/2024     (L) 01/10/2024    K 4.5 01/10/2024    CO2 24 01/10/2024    CL 90 (L) 01/10/2024    BUN 67 (H) 01/10/2024    CREATININE 5.10 (H) 01/10/2024         Lab Results   Component Value Date    ALT 17 10/08/2023    AST 18 10/08/2023    ALKPHOS 115 10/08/2023    BILITOT 0.5 10/08/2023       Assessment/Plan  78 y.o. male PVD, ESRD HD dependent for 9 years, presenting as a direct admit from EVLS clinic after PVRs revealed significant ASOD of the RLE. Patient also has thrombosed LUE AVF which has been occluded since October. Patient has had several temporary HD lines since the occlusion. Currently, HD access via a L internal jugular catheter. Per patient, he has had intermittent left arm swelling since placement of the left internal jugular cath which has increased recently. AVF appears to be open based on doppler and exam, fistulogram recommended for further workup.     Recommendations  - Will plan for Fistulogram in OR tomorrow with Dr. Barrios  - NPO at midnight  - Continue heparin gtt  - Vascular surgery available for any questions or concerns     Discussed with attending physician, Dr. Laurie Ayon MD  Vascular Surgery [id number]

## 2024-01-11 NOTE — SIGNIFICANT EVENT
Asked by team for midline placement for access.  Okay to place midline from nephrology standpoint, please remove at discharge.

## 2024-01-11 NOTE — CARE PLAN
The patient's goals for the shift include      The clinical goals for the shift include pt will remain safe and hemodynamicaly stable throughout my shift  Pt remained safe and stable throughout my shift. Heparin gtt was not theraputic next draw at 0615 will pass along to next shift and time for assay. Overall pt slept most of the shift.   Problem: Pain - Adult  Goal: Verbalizes/displays adequate comfort level or baseline comfort level  Outcome: Progressing     Problem: Safety - Adult  Goal: Free from fall injury  Outcome: Progressing     Problem: Discharge Planning  Goal: Discharge to home or other facility with appropriate resources  Outcome: Progressing     Problem: Chronic Conditions and Co-morbidities  Goal: Patient's chronic conditions and co-morbidity symptoms are monitored and maintained or improved  Outcome: Progressing     Problem: Diabetes  Goal: Achieve decreasing blood glucose levels by end of shift  Outcome: Progressing  Goal: Increase stability of blood glucose readings by end of shift  Outcome: Progressing  Goal: Decrease in ketones present in urine by end of shift  Outcome: Progressing  Goal: Maintain electrolyte levels within acceptable range throughout shift  Outcome: Progressing  Goal: Maintain glucose levels >70mg/dl to <250mg/dl throughout shift  Outcome: Progressing  Goal: No changes in neurological exam by end of shift  Outcome: Progressing  Goal: Learn about and adhere to nutrition recommendations by end of shift  Outcome: Progressing  Goal: Vital signs within normal range for age by end of shift  Outcome: Progressing  Goal: Increase self care and/or family involovement by end of shift  Outcome: Progressing  Goal: Receive DSME education by end of shift  Outcome: Progressing     Problem: Skin  Goal: Decreased wound size/increased tissue granulation at next dressing change  Outcome: Progressing  Goal: Participates in plan/prevention/treatment measures  Outcome: Progressing  Goal: Prevent/manage  excess moisture  Outcome: Progressing  Goal: Prevent/minimize sheer/friction injuries  Outcome: Progressing  Goal: Promote/optimize nutrition  Outcome: Progressing  Goal: Promote skin healing  Outcome: Progressing     Problem: Fall/Injury  Goal: Not fall by end of shift  Outcome: Progressing  Goal: Be free from injury by end of the shift  Outcome: Progressing  Goal: Verbalize understanding of personal risk factors for fall in the hospital  Outcome: Progressing  Goal: Verbalize understanding of risk factor reduction measures to prevent injury from fall in the home  Outcome: Progressing  Goal: Use assistive devices by end of the shift  Outcome: Progressing  Goal: Pace activities to prevent fatigue by end of the shift  Outcome: Progressing

## 2024-01-11 NOTE — PROGRESS NOTES
Chevy Benton is a 78 y.o. male on day 2 of admission presenting with Critical limb ischemia of both lower extremities (CMS/HCC).    Subjective   1/11  Placed midline as his current access not working and RNs are unable to draws labs.  Although taking in to consideration his risks for bleeding is much higher in addition to the procedure alone putting the patient at risk for bleeding, Endovascular team agreed to place midline. No any additional events Wife was called and updated.    NPO midnight for fistulogram with vascular surgery   Angioplasty scheduled for 1/16/24 with EVLS     1/10  Pt went with IR for Proximal LUE & Central Venogram this am (No evidence of central stenosis identified. Very mild stenosis of the central aspect of the subclavian vein. No evidence of stenosis of the left jugular and subclavian confluence.). Of note, Endovascular medicine team was unable to do his EVLS. Per (Monika SWIFT-CNP) plan for next Tuesday.     Nephrology consulted for HD management          Objective     Physical Exam    Constitutional:       Appearance: Normal appearance.   HENT:      Head: Normocephalic.   Eyes:      Extraocular Movements: Extraocular movements intact.      Pupils: Pupils are equal, round, and reactive to light.   Cardiovascular:      Rate and Rhythm: Normal rate. Rhythm irregular.      Heart sounds: Normal heart sounds. No murmur heard.  Pulmonary:      Effort: Pulmonary effort is normal. No respiratory distress.      Breath sounds: Normal breath sounds. No wheezing.   Abdominal:      General: Abdomen is flat. There is no distension.      Palpations: Abdomen is soft.      Tenderness: There is no abdominal tenderness.   Musculoskeletal:         General: Swelling (Rt arm and Lt arm swollen (Lt arm with 2 swelling at fistula site). No hotness or tenderness.) and deformity (Rt TMA covered with dry clean dressing. Cold bilateral LE. Pulses very weak in DP. Sensation intact. Power 4/5 bilaterally)  "present.      Cervical back: Normal range of motion.   Skin:     General: Skin is dry.   Neurological:      General: No focal deficit present.      Mental Status: He is alert and oriented to person, place, and time.          Last Recorded Vitals  Blood pressure 167/73, pulse 75, temperature 35.2 °C (95.4 °F), temperature source Temporal, resp. rate 20, height 1.778 m (5' 10\"), weight 90.5 kg (199 lb 8 oz), SpO2 95 %.  Intake/Output last 3 Shifts:  I/O last 3 completed shifts:  In: 977.5 (10.8 mL/kg) [P.O.:240; I.V.:737.5 (8.1 mL/kg)]  Out: 0 (0 mL/kg)   Weight: 90.5 kg     Relevant Results                             Assessment/Plan   Principal Problem:    Critical limb ischemia of both lower extremities (CMS/HCC)  Active Problems:    Critical limb ischemia of right lower extremity (CMS/HCC)    Subclavian vein stenosis, left      Chevy Benton is a 78 y.o. male with PMH of ESRD on HD TTS (Lt chest TDC, Hx of LUE Fistula Pseudoaneurysms), DM, HTN, HFrEF (TTE 10/23 EF 35-40%), A fib, PTA right lower extremity on 9/20/23 with subsequent TMA right foot on 9/22/23, Non Occlusive DVT (Rt Subclavian), Aortic Root Dilation, MR, SSS s/p pacemaker who presented as a direct admit from endovascular clinic 1/9 for concerns of critical limb ischemia. Patient underwent PVR 1/9  with results concerning for severe ischemia and warranting admission for potential intervention. Patient is getting started on Heparin ggt, Consulting Podiatry and Vascular surgery (LUE fistula pseudoaneurysms). He remains vitally and hemodynamically stable otherwise.   Pt went with IR for Proximal LUE & Central Venogram 1/10  (No evidence of central stenosis identified, very mild stenosis of the central aspect of the subclavian vein, no evidence of stenosis of the left jugular and subclavian confluence.). Of note, Pt could not attend his EVLS with Endovascular medicine team due to scheduling challenging with IR team to undergo his Venogram. Per " (Monika Malik APRN-CNP) plan for next Tuesday.          #Critical Limb Ischemia  #Hx of PTA Rt LE w/ subsequent TMA 09/23  -Start Heparin ggt, maintain therapeutic assays   -Endovascular surgery aware   -Podiatry consulted, will evaluate need for amputation after endovascular intervention   -No need for abx, low threshold to start if concerns for infection arise. Labs and Xrays pending at this time   -Tylenol and Tramadol PRN for pain   -c/w home ASA 81mg daily (Patient supposed to be on Plavix per notes from endovascular 9/2023 however per son at bedside was never able to get it started due to frequent hospitalizations. Called SNF to confirm, it is not on patient's list). Will likely need it on dc if new angioplasty is performed, defer to endovascular to decide  -c/w home Atorvastatin 10mg daily (patient above 75 YO. No need for high intensity)     #COVID  -Currently asymptomatic however requiting 2L NC   -Cont with Dexamethasone 6mg for 10 days (last day 12/14)   -Wean O2 as tolerated      #ESRD on HD  #Thrombosed LUE AVF, occluded since October..  - HD access via a L internal jugular catheter   -c/w home Sevelamer 800mg TID  -c/w home Nephro cap 1 tab daily   -Nephrology consulted for HD management   -Patient needs HD in a site other than LUE to avoid risk of further clot formation.   -Will call IR for TDC in upper body later this admission.    -On home Eliquis 2.5mg bid, now switched to Heparin ggt for CTL       #No evidence of central stenosis identified  #Mild stenosis of the central aspect of the subclavian vein  #no evidence of stenosis of the left jugular and subclavian confluence   -On home Eliquis 2.5mg bid, now switched to Heparin ggt for CTL        #HFrEF (EF 35-40% 10/23)   -Patient was on Metop succ 25mg daily (hasn't been requiring at SNF due to controlled rates). Will re start for GDMT benefit      #Atrial Fibrillation  -Rate controlled  -Hold home Eliquis while on Heparin ggt     #HTN  -c/w home  Amlodipine 10mg daily     #Hx of SBO  -c/w home Miralax/Senna scheduled. Docusate PRN      #DM w/ Neuropathy  -c/w home Glargine  -ISS with accuchecks  -c/w home Gabapentin 100mg at bedtime      E: replete as needed  F: bolus as needed  N:  Renal   DVT ppx: Heparin ggt  GI ppx: PPI     Code: full, confirmed on admission              I spent 90 minutes in the professional and overall care of this patient.      Wilma Redding MD

## 2024-01-11 NOTE — PROGRESS NOTES
"Chevy Benton is a 78 y.o. male on day 2 of admission presenting with Critical limb ischemia of both lower extremities (CMS/HCC).    Subjective   Pt resting in bed. Denies n/v/d, sob, fever, cough, chills, pain, chest pains       Objective     Physical Exam  Constitutional:       Appearance: Normal appearance. He is obese.   Cardiovascular:      Rate and Rhythm: Normal rate and regular rhythm.   Pulmonary:      Comments: Magdiel lung sounds diminished  Abdominal:      General: There is distension.      Comments: Non-tender to palpation   Genitourinary:     Comments: States make urine  Musculoskeletal:      Comments: Ble with +2 pitting edema  Lt arm edematous    Skin:     General: Skin is warm and dry.   Neurological:      Mental Status: He is alert and oriented to person, place, and time.   Psychiatric:         Mood and Affect: Mood normal.         Behavior: Behavior normal.         Last Recorded Vitals  Blood pressure 111/77, pulse 74, temperature 35.6 °C (96.1 °F), temperature source Temporal, resp. rate 20, height 1.778 m (5' 10\"), weight 90.5 kg (199 lb 8 oz), SpO2 95 %.  Intake/Output last 3 Shifts:  I/O last 3 completed shifts:  In: 977.5 (10.8 mL/kg) [P.O.:240; I.V.:737.5 (8.1 mL/kg)]  Out: 0 (0 mL/kg)   Weight: 90.5 kg       Scheduled medications  allopurinol, 100 mg, oral, Daily  amLODIPine, 10 mg, oral, Daily  aspirin, 81 mg, oral, Daily  atorvastatin, 40 mg, oral, Nightly  B complex-vitamin C-folic acid, 1 capsule, oral, Daily  dexAMETHasone, 6 mg, oral, Daily  gabapentin, 100 mg, oral, q24h  insulin glargine, 12 Units, subcutaneous, Nightly  insulin lispro, 0-5 Units, subcutaneous, TID with meals  lidocaine, 5 mL, infiltration, Once  metoprolol succinate XL, 25 mg, oral, Daily  pantoprazole, 40 mg, oral, Daily before breakfast  polyethylene glycol, 17 g, oral, Daily  sennosides, 1 tablet, oral, Nightly  sevelamer carbonate, 800 mg, oral, TID with meals      Continuous medications  heparin, 0-4,500 " Units/hr  heparin, 0-4,500 Units/hr, Last Rate: 1,629 Units/hr (01/10/24 1420)  heparin, 0-4,500 Units/hr, Last Rate: 1,129 Units/hr (01/11/24 0955)      PRN medications  PRN medications: acetaminophen **OR** acetaminophen **OR** acetaminophen, albuterol, alum-mag hydroxide-simeth, dextrose 10 % in water (D10W), dextrose, docusate sodium, glucagon, heparin, heparin, heparin, hydrocortisone, ipratropium-albuteroL, methocarbamol, traMADol         Results for orders placed or performed during the hospital encounter of 01/09/24 (from the past 24 hour(s))   POCT GLUCOSE   Result Value Ref Range    POCT Glucose 119 (H) 74 - 99 mg/dL   Heparin Assay   Result Value Ref Range    Heparin Unfractionated 0.8 See Comment Below for Therapeutic Ranges IU/mL   Renal Function Panel   Result Value Ref Range    Glucose 127 (H) 74 - 99 mg/dL    Sodium 130 (L) 136 - 145 mmol/L    Potassium 4.5 3.5 - 5.3 mmol/L    Chloride 92 (L) 98 - 107 mmol/L    Bicarbonate 23 21 - 32 mmol/L    Anion Gap 20 10 - 20 mmol/L    Urea Nitrogen 53 (H) 6 - 23 mg/dL    Creatinine 4.04 (H) 0.50 - 1.30 mg/dL    eGFR 14 (L) >60 mL/min/1.73m*2    Calcium 10.3 8.6 - 10.6 mg/dL    Phosphorus 5.5 (H) 2.5 - 4.9 mg/dL    Albumin 3.1 (L) 3.4 - 5.0 g/dL   Heparin Assay, UFH   Result Value Ref Range    Heparin Unfractionated 1.2 (HH) See Comment Below for Therapeutic Ranges IU/mL   aPTT - baseline   Result Value Ref Range    aPTT 135 (HH) 27 - 38 seconds   POCT GLUCOSE   Result Value Ref Range    POCT Glucose 121 (H) 74 - 99 mg/dL   POCT GLUCOSE   Result Value Ref Range    POCT Glucose 130 (H) 74 - 99 mg/dL             Assessment/Plan   Principal Problem:    Critical limb ischemia of both lower extremities (CMS/HCC)  Active Problems:    Critical limb ischemia of right lower extremity (CMS/HCC)    Subclavian vein stenosis, left    Tolerated hemodialysis yesterday with net fluid loss 1815cc    Bp stable during tx, hypervolemic on exam and has stable electrolytes       Outpatient Dialysis schedule:   [facility]/Dr Hannah; Will be MWF while inpatient     Access: lt IJ- no issues - able to achieve   1/12-fistulogram to lt arm     Anemia of ESRD:   not on HILDA .... Current hgb 11.3.. will cont to monitor     CKD-MBD Phosphate Binder:B complex-vitamin C-folic acid (Nephrocaps) capsule 1 capsule daily, sevelamer carbonate (Renvela) tablet 800 mg tid ac     Plan HD tomorrow with UF as tolerated     Renal diet      Please obtain daily standing wt (if possible)     Medication to be adjusted for ESRD      Patient to continue regular HD schedule while inpatient and to follow with the outpatient nephrologist at discharge        JENIFFER Bates-CNP

## 2024-01-11 NOTE — SIGNIFICANT EVENT
EVLS team asked for midline placement for access. Pt on Hep gtt with elevated Hep assay 1.2 from this AM. Hep gtt was stopped for 1 hour and resumed at a reduced rate. Multiple attempts(5-6) were made including with US to obtain labs was unsuccessful. We are aware of the bleeding risk which is minimal for venous insertion, benefit outweigh risk. PICC team can proceed with the insertion. Please remove midline at discharge.     Discussed with attending Dr. Stuart Malik, APRN-CNP  Endovascular/Limb [facility] Service   Day: [id number]/Haiku/Night [facility]VI [id number]

## 2024-01-12 NOTE — PROGRESS NOTES
Chevy Benton is a 78 y.o. male on day 3 of admission presenting with Critical limb ischemia of both lower extremities (CMS/HCC).    Subjective     1/12  No events, went to fistulogram with vascular surgery, successfully ballooned. He will be good to use his fistula today for dialysis.     1/11  Placed midline as his current access not working and RNs are unable to draws labs.  Although taking in to consideration his risks for bleeding is much higher in addition to the procedure alone putting the patient at risk for bleeding, Endovascular team agreed to place midline. No any additional events Wife was called and updated.    NPO midnight for fistulogram with vascular surgery   Angioplasty scheduled for 1/16/24 with EVLS     1/10  Pt went with IR for Proximal LUE & Central Venogram this am (No evidence of central stenosis identified. Very mild stenosis of the central aspect of the subclavian vein. No evidence of stenosis of the left jugular and subclavian confluence.). Of note, Endovascular medicine team was unable to do his EVLS. Per (Monika SWIFT-CNP) plan for next Tuesday.     Nephrology consulted for HD management          Objective     Physical Exam    Constitutional:       Appearance: Normal appearance.   HENT:      Head: Normocephalic.   Eyes:      Extraocular Movements: Extraocular movements intact.      Pupils: Pupils are equal, round, and reactive to light.   Cardiovascular:      Rate and Rhythm: Normal rate. Rhythm irregular.      Heart sounds: Normal heart sounds. No murmur heard.  Pulmonary:      Effort: Pulmonary effort is normal. No respiratory distress.      Breath sounds: Normal breath sounds. No wheezing.   Abdominal:      General: Abdomen is flat. There is no distension.      Palpations: Abdomen is soft.      Tenderness: There is no abdominal tenderness.   Musculoskeletal:         General: Swelling (Rt arm and Lt arm swollen (Lt arm with 2 swelling at fistula site). No hotness or  "tenderness.) and deformity (Rt TMA covered with dry clean dressing. Cold bilateral LE. Pulses very weak in DP. Sensation intact. Power 4/5 bilaterally) present.      Cervical back: Normal range of motion.   Skin:     General: Skin is dry.   Neurological:      General: No focal deficit present.      Mental Status: He is alert and oriented to person, place, and time.          Last Recorded Vitals  Blood pressure 121/74, pulse 69, temperature (!) 1 °C (33.8 °F), resp. rate 12, height 1.778 m (5' 10\"), weight 90.5 kg (199 lb 8 oz), SpO2 95 %.  Intake/Output last 3 Shifts:  I/O last 3 completed shifts:  In: 640 (7.1 mL/kg) [P.O.:240; I.V.:400 (4.4 mL/kg)]  Out: 0 (0 mL/kg)   Weight: 90.5 kg     Relevant Results                             Assessment/Plan   Principal Problem:    Critical limb ischemia of both lower extremities (CMS/HCC)  Active Problems:    Critical limb ischemia of right lower extremity (CMS/HCC)    Subclavian vein stenosis, left      Chevy Benton is a 78 y.o. male with PMH of ESRD on HD TTS (Lt chest TDC, Hx of LUE Fistula Pseudoaneurysms), DM, HTN, HFrEF (TTE 10/23 EF 35-40%), A fib, PTA right lower extremity on 9/20/23 with subsequent TMA right foot on 9/22/23, Non Occlusive DVT (Rt Subclavian), Aortic Root Dilation, MR, SSS s/p pacemaker who presented as a direct admit from endovascular clinic 1/9 for concerns of critical limb ischemia. Patient underwent PVR 1/9  with results concerning for severe ischemia and warranting admission for potential intervention. Patient is getting started on Heparin ggt, Consulting Podiatry and Vascular surgery (LUE fistula pseudoaneurysms). He remains vitally and hemodynamically stable otherwise.   Pt went with IR for Proximal LUE & Central Venogram 1/10  (No evidence of central stenosis identified, very mild stenosis of the central aspect of the subclavian vein, no evidence of stenosis of the left jugular and subclavian confluence.). Of note, Pt could not attend " his EVLS with Endovascular medicine team due to scheduling challenging with IR team to undergo his Venogram. Per (Monika SWIFT-CNP) plan for next Tuesday.          #S/p Left upper extremity fistulogram  1/12   #Peripheral angioplasty scheduled for 1/16/24 with EVLS  #Needs to remove midline at discharge.      #Critical Limb Ischemia  #Hx of PTA Rt LE w/ subsequent TMA 09/23  -Start Heparin ggt, maintain therapeutic assays   -Endovascular surgery aware   -Podiatry consulted, will evaluate need for amputation after endovascular intervention   -No need for abx, low threshold to start if concerns for infection arise. Labs and Xrays pending at this time   -Tylenol and Tramadol PRN for pain   -c/w home ASA 81mg daily (Patient supposed to be on Plavix per notes from endovascular 9/2023 however per son at bedside was never able to get it started due to frequent hospitalizations. Called SNF to confirm, it is not on patient's list). Will likely need it on dc if new angioplasty is performed, defer to endovascular to decide  -c/w home Atorvastatin 10mg daily (patient above 73 YO. No need for high intensity)     #COVID  -Currently asymptomatic however requiting 2L NC   -Cont with Dexamethasone 6mg for 10 days (last day 12/14)   -Wean O2 as tolerated      #ESRD on HD  #Thrombosed LUE AVF, occluded since October..  - HD access via a L internal jugular catheter   -c/w home Sevelamer 800mg TID  -c/w home Nephro cap 1 tab daily   -Nephrology consulted for HD management   -Patient needs HD in a site other than LUE to avoid risk of further clot formation.   -Will call IR for TDC in upper body later this admission.    -On home Eliquis 2.5mg bid, now switched to Heparin ggt for CTL       #No evidence of central stenosis identified  #Mild stenosis of the central aspect of the subclavian vein  #no evidence of stenosis of the left jugular and subclavian confluence   -On home Eliquis 2.5mg bid, now switched to Heparin ggt for CTL         #HFrEF (EF 35-40% 10/23)   -Patient was on Metop succ 25mg daily (hasn't been requiring at SNF due to controlled rates). Will re start for GDMT benefit      #Atrial Fibrillation  -Rate controlled  -Hold home Eliquis while on Heparin ggt     #HTN  -c/w home Amlodipine 10mg daily     #Hx of SBO  -c/w home Miralax/Senna scheduled. Docusate PRN      #DM w/ Neuropathy  -c/w home Glargine  -ISS with accuchecks  -c/w home Gabapentin 100mg at bedtime      E: replete as needed  F: bolus as needed  N:  Renal   DVT ppx: Heparin ggt  GI ppx: PPI     Code: full, confirmed on admission              I spent 90 minutes in the professional and overall care of this patient.      Wilma Redding MD

## 2024-01-12 NOTE — INTERVAL H&P NOTE
H&P reviewed. The patient was examined and there are no changes to the H&P.    Patient to proceed to OR today for fistulogram with possible intervention as indicated.

## 2024-01-12 NOTE — NURSING NOTE
Called lab at this time for heparin assay results, not showing in computer that it was collected and sent after 1 am.

## 2024-01-12 NOTE — ANESTHESIA PREPROCEDURE EVALUATION
Patient: Chevy Benton    Procedure Information       Date/Time: 01/12/24 0909    Procedure: Left upper extremity FISTULOGRAM with possible intervention (Left) - Left upper extremity FISTULOGRAM with possible intervention    [place] [facility] [place] [facility] [facility] [place] [facility]    Surgeons: Michelle Barrios MD            Relevant Problems   Anesthesia (within normal limits)      Cardiovascular   (+) Atherosclerosis of native arteries of left leg with ulceration of heel and midfoot (CMS/HCC)   (+) Atherosclerosis of native artery of right lower extremity with gangrene (CMS/HCC)   (+) Coronary artery disease involving native coronary artery of native heart with angina pectoris (CMS/HCC)   (+) Critical limb ischemia of both lower extremities (CMS/HCC)   (+) Critical limb ischemia of right lower extremity (CMS/HCC)   (+) 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)   (+) Hyperlipidemia LDL goal <70   (+) 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 (CMS/HCC)   (+) Longstanding persistent atrial fibrillation (CMS/HCC)   (+) Non-rheumatic mitral regurgitation   (+) Nonrheumatic mitral (valve) insufficiency   (+) PAD (peripheral artery disease) (CMS/HCC)   (+) Pacemaker   (+) Peripheral vascular disorder due to diabetes mellitus (CMS/HCC)   (+) Pulmonary HTN (CMS/HCC)   (+) Sick sinus syndrome (CMS/HCC)      Endocrine   (+) DM2 (diabetes mellitus, type 2) (CMS/HCC)   (+) Thyroid enlarged      /Renal   (+) Hepatomegaly, not elsewhere classified   (+) 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 (CMS/HCC)      Pulmonary   (+) PNA (pneumonia)   (+) Pulmonary HTN (CMS/HCC)      Hematology   (+) Anemia in other chronic diseases classified elsewhere   (+) Embolism and thrombosis of arteries of the lower extremities  (CMS/[facility])      Musculoskeletal   (+) Polyosteoarthritis, unspecified      Infectious Disease   (+) COVID       Clinical information reviewed:    Allergies  Meds               NPO Detail:  No data recorded     PHYSICAL EXAM    Anesthesia Plan    History of general anesthesia?: yes  History of complications of general anesthesia?: no    ASA 3     general     intravenous induction   Postoperative administration of opioids is intended.  Trial extubation is planned.    Plan discussed with CAA.

## 2024-01-12 NOTE — OP NOTE
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]     Procedures  Ultrasound guided access of left hemodialysis access  Left upper extremity fistulagram  Central venogram  Left cephalic vein angioplasty  Left subclavian vein angioplasty    Surgeons      * Michelle Barrios - Primary    Resident/Fellow/Other Assistant:  Surgeon(s) and Role:     * Alonzo Sarmiento MD - Resident - Assisting    Procedure Summary  Anesthesia: General  ASA: III  Anesthesia Staff: Anesthesiologist: Prasanth Lopez MD  C-AA: CHOLO Minaya  Estimated Blood Loss: 5mL  Intra-op Medications: * No intraprocedure medications in log *           Anesthesia Record               Intraprocedure I/O Totals          Intake    LR infusion 51.67 mL    Total Intake 51.67 mL       Output    Est. Blood Loss 5 mL    Total Output 5 mL       Net    Net Volume 46.67 mL          Specimen: No specimens collected     Staff:   Circulator: Radha Lopez RN; Iftikhar Durham RN  Relief Scrub: Huber Matamoros  Scrub Person: Ayana Bonilla    Findings:  Mid-subclavian and axillary vein stenosis.  After balloon angioplasty, stenosis resolved without recoil on completion fistulogram.  Good hemostasis at conclusion of case.    Indications: Chevy Benton is an 78 y.o. 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 Patient has a brachiocephalic fistula created several years but unable to be used. He has a left IJV catheter in the meantime. Left arm swelling with concern for central venous stenosis.    The patient was seen in the preoperative area. The risks, benefits,  complications, treatment options, non-operative alternatives, expected recovery and outcomes were discussed with the patient. The possibilities of reaction to medication, pulmonary aspiration, injury to surrounding structures, bleeding, recurrent infection, the need for additional procedures, failure to diagnose a condition, and creating a complication requiring transfusion or operation were discussed with the patient. The patient concurred with the proposed plan, giving informed consent.  The site of surgery was properly noted/marked if necessary per policy. The patient has been actively warmed in preoperative area. Preoperative antibiotics have been ordered and given within 1 hours of incision. Venous thrombosis prophylaxis not indicated.    Procedure Details:     The patient was brought to the operating room and placed in the supine position on the operating table. A safety check was performed to confirm correct patient using two patient identifiers.     The left upper extremity was then prepped and draped in the usual sterile fashion. The soft tissue around the arterial anastomosis was anesthestized with 3cc of 1% lidocaine.     Under direct ultrasound guidance, the fistula was cannulated using a micropuncture needle in the venous direction and an 0.018-inch guide wire was advanced into the fistula. Access confirmed under fluoroscopy. A 4F microsheath was advanced over the wire after removing the micropuncture needle. The 0.018-inch micropuncture guide wire was then exchanged for an angled stiff glidewire which was then advanced under fluoroscopic guidance into the left brachiocephalic vein. The 4F microsheath was then exchanged for a 6F sheath, then over the angled glidewire, an angled glide catheter was then advanced.     A fistulogram was then performed that revealed the two known pseudoaneurysms coming off the fistula. Additionally, areas of stenosis were identified in the mid-cephalic vein just past the  proximal pseudoaneurysm, cephalic arch, and mid-subclavian vein. The angled glidewire was exchanged for a working Falk wire. Using an 8mm x 40mm noncompliant Essex balloon,  the mid cephalic and cephalic arch underwent angioplasty with satisfactory results on subsequent fistulogram. The cephalic arch was treated with a 10x20 Essex afterwards with improvement in stenosis.    We then turned our attention to the mid-subclavian vein stenosis. The 6F sheath was upsized to a 7F sheath and a 12mm x 40mm noncompliant Hammond balloon was advanced over the Falk wire and the distal subclavian vein was treated with satisfactory results on the completion fistulogram. The balloon and Falk wire were removed, followed by the removal of the 7F sheath after placing a 4-0 monocryl stitch on either side of the sheath, which was pulled tight and tied down upon removal of the 7F sheath. Gentle pressure was then held over the access site and hemostasis was achieved. The case was then concluded and the patient was awakened and transported in stable condition to the PACU where he removed post-operatively. The patient tolerated the procedure and there were no complications.    Complications:  None; patient tolerated the procedure well.    Disposition: PACU - hemodynamically stable.  Condition: stable     Discussed with attending surgeon, Dr. Barrios.    Alonzo Sarmiento MD  Vascular Surgery, PGY3  Team Pager: [phone]

## 2024-01-12 NOTE — CARE PLAN
The patient's goals for the shift include      The clinical goals for the shift include pt will remain safe and free from harm throughout my shift    Pt did remain safe and free from harm throughout my shift.   Problem: Pain - Adult  Goal: Verbalizes/displays adequate comfort level or baseline comfort level  Outcome: Progressing     Problem: Safety - Adult  Goal: Free from fall injury  Outcome: Progressing     Problem: Discharge Planning  Goal: Discharge to home or other facility with appropriate resources  Outcome: Progressing     Problem: Chronic Conditions and Co-morbidities  Goal: Patient's chronic conditions and co-morbidity symptoms are monitored and maintained or improved  Outcome: Progressing     Problem: Diabetes  Goal: Achieve decreasing blood glucose levels by end of shift  Outcome: Progressing  Goal: Increase stability of blood glucose readings by end of shift  Outcome: Progressing  Goal: Decrease in ketones present in urine by end of shift  Outcome: Progressing  Goal: Maintain electrolyte levels within acceptable range throughout shift  Outcome: Progressing  Goal: Maintain glucose levels >70mg/dl to <250mg/dl throughout shift  Outcome: Progressing  Goal: No changes in neurological exam by end of shift  Outcome: Progressing  Goal: Learn about and adhere to nutrition recommendations by end of shift  Outcome: Progressing  Goal: Vital signs within normal range for age by end of shift  Outcome: Progressing  Goal: Increase self care and/or family involovement by end of shift  Outcome: Progressing  Goal: Receive DSME education by end of shift  Outcome: Progressing     Problem: Skin  Goal: Decreased wound size/increased tissue granulation at next dressing change  Outcome: Progressing  Goal: Participates in plan/prevention/treatment measures  Outcome: Progressing  Goal: Prevent/manage excess moisture  Outcome: Progressing  Goal: Prevent/minimize sheer/friction injuries  Outcome: Progressing  Goal:  Promote/optimize nutrition  Outcome: Progressing  Goal: Promote skin healing  Outcome: Progressing     Problem: Fall/Injury  Goal: Not fall by end of shift  Outcome: Progressing  Goal: Be free from injury by end of the shift  Outcome: Progressing  Goal: Verbalize understanding of personal risk factors for fall in the hospital  Outcome: Progressing  Goal: Verbalize understanding of risk factor reduction measures to prevent injury from fall in the home  Outcome: Progressing  Goal: Use assistive devices by end of the shift  Outcome: Progressing  Goal: Pace activities to prevent fatigue by end of the shift  Outcome: Progressing

## 2024-01-12 NOTE — PROGRESS NOTES
Subjective     Interval History: Chevy Benton    Patient seen on dialysis, no complaints  Had fistulogram done -showed Mid-subclavian and axillary vein stenosis.  Had balloon angioplasty,         Current Facility-Administered Medications:     acetaminophen (Tylenol) tablet 650 mg, 650 mg, oral, q4h PRN, 650 mg at 01/11/24 1105 **OR** acetaminophen (Tylenol) oral liquid 650 mg, 650 mg, nasogastric tube, q4h PRN **OR** acetaminophen (Tylenol) suppository 650 mg, 650 mg, rectal, q4h PRN, Simone Acosta MD    albuterol 90 mcg/actuation inhaler 2 puff, 2 puff, inhalation, q6h PRN, Simone Acosta MD    allopurinol (Zyloprim) tablet 100 mg, 100 mg, oral, Daily, Simone Acosta MD, 100 mg at 01/12/24 0905    alum-mag hydroxide-simeth (Mylanta) 200-200-20 mg/5 mL oral suspension 30 mL, 30 mL, oral, 4x daily PRN, Simone Acosta MD    amLODIPine (Norvasc) tablet 10 mg, 10 mg, oral, Daily, Simone Acosta MD, 10 mg at 01/11/24 0923    aspirin chewable tablet 81 mg, 81 mg, oral, Daily, Simone Acosta MD, 81 mg at 01/12/24 0905    atorvastatin (Lipitor) tablet 40 mg, 40 mg, oral, Nightly, Simone Acosta MD, 40 mg at 01/11/24 2059    B complex-vitamin C-folic acid (Nephrocaps) capsule 1 capsule, 1 capsule, oral, Daily, Simone Acosta MD, 1 capsule at 01/12/24 0905    dexAMETHasone (Decadron) tablet 6 mg, 6 mg, oral, Daily, Simone Acosta MD, 6 mg at 01/12/24 0905    dextrose 10 % in water (D10W) infusion, 0.3 g/kg/hr, intravenous, Once PRN, Simone Acosta MD    dextrose 50 % injection 25 g, 25 g, intravenous, q15 min PRN, Simone Acosta MD    docusate sodium (Colace) capsule 100 mg, 100 mg, oral, BID PRN, Simone Acosta MD    gabapentin (Neurontin) capsule 100 mg, 100 mg, oral, q24h, Simone Acosta MD, 100 mg at 01/11/24 1933    glucagon (Glucagen) injection 1 mg, 1 mg, intramuscular, q15 min PRN, Simone Acosta MD    heparin  (porcine) injection 3,000-6,000 Units, 3,000-6,000 Units, intravenous, q4h PRN, Simone Acosta MD    heparin 25,000 Units in dextrose 5% 250 mL (100 Units/mL) infusion (premix), 0-4,500 Units/hr, intravenous, Continuous, Simone Acosta MD, Last Rate: 9.3 mL/hr at 01/12/24 0200, 929 Units/hr at 01/12/24 0200    hydrocortisone 2.5 % ointment, , Topical, BID PRN, Simone Acosta MD    insulin glargine (Lantus) injection 12 Units, 12 Units, subcutaneous, Nightly, Simone Acosta MD, 12 Units at 01/11/24 2059    insulin lispro (HumaLOG) injection 0-5 Units, 0-5 Units, subcutaneous, TID with meals, Simone Acosta MD, 2 Units at 01/11/24 1832    ipratropium-albuteroL (Duo-Neb) 0.5-2.5 mg/3 mL nebulizer solution 3 mL, 3 mL, nebulization, q6h PRN, Simone Acosta MD    lidocaine (Xylocaine) 10 mg/mL (1 %) injection 50 mg, 5 mL, infiltration, Once, Wilma Redding MD    methocarbamol (Robaxin) tablet 500 mg, 500 mg, oral, q8h PRN, Simone Acosta MD, 500 mg at 01/11/24 1439    metoprolol succinate XL (Toprol-XL) 24 hr tablet 25 mg, 25 mg, oral, Daily, Simone Acosta MD, 25 mg at 01/11/24 0923    pantoprazole (ProtoNix) EC tablet 40 mg, 40 mg, oral, Daily before breakfast, Simone Acosta MD, 40 mg at 01/11/24 0956    polyethylene glycol (Glycolax, Miralax) packet 17 g, 17 g, oral, Daily, Simone Acosta MD, 17 g at 01/11/24 0922    sennosides (Senokot) tablet 8.6 mg, 1 tablet, oral, Nightly, Simone Acosta MD, 8.6 mg at 01/10/24 2129    sevelamer carbonate (Renvela) tablet 800 mg, 800 mg, oral, TID with meals, Simone Acosta MD, 800 mg at 01/11/24 1700    traMADol (Ultram) tablet 50 mg, 50 mg, oral, q6h PRN, Simone Acosta MD, 50 mg at 01/11/24 2140    Physical Exam  Physical Exam  Heart S1 S2 RRR, Lungs CTA, no edema      Vital signs in last 24 hours:  Temp:  [35.5 °C (95.9 °F)-36.3 °C (97.3 °F)] 35.5 °C (95.9 °F)  Heart Rate:  [62-74]  62  Resp:  [12-20] 12  BP: ()/(59-82) 121/74         Labs:  Results from last 7 days   Lab Units 01/11/24  1508   WBC AUTO x10*3/uL 8.2   RBC AUTO x10*6/uL 3.81*   HEMOGLOBIN g/dL 11.6*   HEMATOCRIT % 37.9*     Results from last 7 days   Lab Units 01/11/24  0707   SODIUM mmol/L 130*   POTASSIUM mmol/L 4.5   CHLORIDE mmol/L 92*   CO2 mmol/L 23   BUN mg/dL 53*   CREATININE mg/dL 4.04*   CALCIUM mg/dL 10.3   PHOSPHORUS mg/dL 5.5*            Assessment/Plan   Patient seen and examined while on dialysis, recent events, labs, medications reviewed. Will follow overall management per primary team, and continue regular dialysis.    Principal Problem:    Critical limb ischemia of both lower extremities (CMS/HCC)  Active Problems:    Critical limb ischemia of right lower extremity (CMS/HCC)    Subclavian vein stenosis, left        Corine Will MD  1/12/2024  3:54 PM

## 2024-01-12 NOTE — BRIEF OP NOTE
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]     Procedures  Left upper extremity FISTULOGRAM with possible intervention  66524 - CT THROMBOLYSIS ART/VENOUS INFSN W/IMAGE SUBSQ TX      Surgeons      * Michelle Barrios - Primary    Resident/Fellow/Other Assistant:  Surgeon(s) and Role:     * Alonzo Sarmiento MD - Resident - Assisting    Procedure Summary  Anesthesia: General  ASA: III  Anesthesia Staff: Anesthesiologist: Prasanth Lopez MD  C-AA: CHOLO Minaya  Estimated Blood Loss: 5mL  Intra-op Medications: * No intraprocedure medications in log *           Anesthesia Record               Intraprocedure I/O Totals          Intake    LR infusion 51.67 mL    Total Intake 51.67 mL       Output    Est. Blood Loss 5 mL    Total Output 5 mL       Net    Net Volume 46.67 mL          Specimen: No specimens collected     Staff:   Circulator: Radha Lopez RN; Iftikhar Durham RN  Relief Scrub: Huber Matamoros  Scrub Person: Ayana Bonilla    Findings:   Mid-subclavian and axillary vein stenosis.  After balloon angioplasty, stenosis resolved without recoil on completion fistulogram.  Good hemostasis at conclusion of case.    Complications:  None; patient tolerated the procedure well.     Disposition: PACU - hemodynamically stable.  Condition: stable  Specimens Collected: No specimens collected    Alonzo Sarmiento MD  Vascular Surgery, PGY3  Team Pager: [phone]

## 2024-01-12 NOTE — PROGRESS NOTES
Mr Benton was admitted from the [facility] outpatient clinic on 1/9/2024 for management of RLE CLI RC-VI.    Subjective Data:  He was seen this am in his room, NAD, AOx3, no complains of foot or leg pain but some numbness unchanged from before. UE swelling unchanged without pain. Pt denied fever, chills, SOB or CP. Pt was updated on plan for fistulogram today with vascular surgery and peripheral angioplasty scheduled for 1/16/24 with EVLS. Pt agreeable to these plans. He reported he had some breathing issues yesterday feeling better today but reported having some occasional wheezing and sputum.     Overnight Events:    No events      Objective Data:  Last Recorded Vitals:  Vitals:    01/11/24 0700 01/11/24 1500 01/11/24 2307 01/12/24 0700   BP: 111/77 167/73 128/82 98/59   Pulse: 74 75 68 74   Resp: 20 20 18 20   Temp: 35.6 °C (96.1 °F) 35.2 °C (95.4 °F) 35.7 °C (96.3 °F) 35.6 °C (96.1 °F)   TempSrc: Temporal Temporal Temporal Temporal   SpO2: 95% 95% 94% 95%   Weight:       Height:           Last Labs:  CBC - 1/11/2024:  3:08 PM  8.2 11.6 264    37.9      CMP - 1/11/2024:  7:07 AM  10.3 7.3 18 --- 0.5   5.5 3.1 17 115      PTT - 1/11/2024:  7:10 AM  1.3   14.5 135     TROPHS   Date/Time Value Ref Range Status   10/08/2023 09:00 PM 60 0 - 20 ng/L Final     Comment:     Previous result verified on 10/8/2023 2030 on specimen/case [id number] called with component [facility] [[email or web address]] for procedure Troponin I, High Sensitivity, Initial with value 58 ng/L.   10/08/2023 07:26 PM 58 0 - 20 ng/L Final     HGBA1C   Date/Time Value Ref Range Status   12/03/2023 07:38 AM 5.3 4.3 - 5.6 % Final     Comment:     American Diabetes Association guidelines indicate that patients with HgbA1c in the range 5.7-6.4% are at increased risk for development of diabetes, and intervention by lifestyle modification may be beneficial. HgbA1c greater or equal to 6.5% is considered diagnostic of diabetes.   04/25/2022 08:34 PM 6.5 4.3 - 5.6 % Final     Comment:      American Diabetes Association guidelines indicate that patients with HgbA1c in the range 5.7-6.4% are at increased risk for development of diabetes, and intervention by lifestyle modification may be beneficial. HgbA1c greater or equal to 6.5% is considered diagnostic of diabetes.      Last I/O:  I/O last 3 completed shifts:  In: 640 (7.1 mL/kg) [P.O.:240; I.V.:400 (4.4 mL/kg)]  Out: 0 (0 mL/kg)   Weight: 90.5 kg     Past Cardiology Tests (Last 3 Years):  Echo:  Transthoracic Echo (TTE) Complete 10/02/2023    Most recent Vascular Testing     Vascular US ankle brachial index (RAJ) without exercise 01/09/2024    CONCLUSIONS:  Right Lower PVR: Evidence of severe arterial occlusive disease in the right lower extremity at rest. Monophasic flow is noted in the right posterior tibial artery and right dorsalis pedis artery. Biphasic flow is noted in the right common femoral artery. Digits are noted to be amputated. Level of disease called off of Doppler AND pvr tracings.  Left Lower PVR: Evidence of severe arterial occlusive disease in the left lower extremity at rest. Decreased digital perfusion noted. Biphasic flow is noted in the left posterior tibial artery and left common femoral artery. Level of disease called off of Doppler and pvr tracings . AVG noted in left arm.    Additional Findings:  Due to patients immobility, study was performed sitting in wheel chair with legs  elevated.    Imaging & Doppler Findings:    RIGHT Lower PVR                Pressures Ratios  Right Posterior Tibial (Ankle) 0 mmHg    0.00  Right Dorsalis Pedis (Ankle)   255 mmHg  1.58  Right Digit (Great Toe)        0 mmHg    0.00    LEFT Lower PVR                Pressures Ratios  Left Posterior Tibial (Ankle) 255 mmHg  1.58  Left Dorsalis Pedis (Ankle)   255 mmHg  1.58  Left Digit (Great Toe)        0 mmHg    0.00    Right  Brachial Pressure 161 mmHg    [id number] Yany Juarez MD  Electronically signed by [id number] Yany Juarez MD on 1/10/2024 at  6:19:41 PM    XR foot left 3+ views 01/09/2024  XR foot right 3+ views 01/09/2024    Narrative  Interpreted By:  Liz Ruiz and Stephens Katherine  STUDY:  XR FOOT LEFT 3+ VIEWS; XR FOOT RIGHT 3+ VIEWS; ;  1/9/2024 8:25 pm    FINDINGS:  Bilateral feet, three views each.    Left foot:  No acute fracture or malalignment.  The joint spaces are maintained.  Plantar calcaneal spurring is present.  Vascular calcifications are present.    Right foot:  Transmetatarsal amputation changes are noted of the 1st through 5th  digits with sharp margination of the osseous stumps. There is  lobulated morphology of the soft tissue stump. No acute fracture or  malalignment. No erosive changes or osseous destruction.  Mild soft tissue swelling of the foot.  Prominent vascular calcifications noted.    Impression  1. No acute fracture or malalignment of the bilateral feet.  2. 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  3. Lobulated morphology of the right midfoot soft tissue stump with  stump breakdown/wound dehiscence not excluded. Correlate with  physical examination  4. Unremarkable radiographic evaluation of the left foot.    I personally reviewed the images/study and I agree with Cass Mancini DO's (radiology resident) findings as stated. This study  was interpreted at [facility],  [place], [place].      Inpatient Medications:  Scheduled medications   Medication Dose Route Frequency    allopurinol  100 mg oral Daily    amLODIPine  10 mg oral Daily    aspirin  81 mg oral Daily    atorvastatin  40 mg oral Nightly    B complex-vitamin C-folic acid  1 capsule oral Daily    dexAMETHasone  6 mg oral Daily    gabapentin  100 mg oral q24h    insulin glargine  12 Units subcutaneous Nightly    insulin lispro  0-5 Units subcutaneous TID with meals    lidocaine  5 mL infiltration Once    metoprolol succinate XL  25 mg  oral Daily    pantoprazole  40 mg oral Daily before breakfast    polyethylene glycol  17 g oral Daily    sennosides  1 tablet oral Nightly    sevelamer carbonate  800 mg oral TID with meals     PRN medications   Medication    acetaminophen    Or    acetaminophen    Or    acetaminophen    albuterol    alum-mag hydroxide-simeth    dextrose 10 % in water (D10W)    dextrose    docusate sodium    glucagon    heparin    hydrocortisone    ipratropium-albuteroL    methocarbamol    traMADol     Continuous Medications   Medication Dose Last Rate    heparin  0-4,500 Units/hr 929 Units/hr (01/12/24 0200)      Latest Reference Range & Units Most Recent   Heparin Unfractionated See Comment Below for Therapeutic Ranges IU/mL 0.6  1/12/24 06:20       Physical Exam:  Constitutional: ill appearing, NAD, awake/alert/oriented x3, pleasant, cooperative   Head/Neck: Neck supple,  No JVD, trachea midline, no bruits   Respiratory/Thorax: Patent airways, diminished breath sounds throughout, on RA. Lt chest wall HD catheter in place  Cardiovascular: irregular, rate and rhythm, no murmurs, normal S 1 and S 2   Gastrointestinal: obese,, Non-distended, soft, non-tender, +BS,    Skin: Warm and dry,   L Extremities: DP/PT pulses nonpalpable or Doppler able b/l. Capillary fill time sluggish to all digits b/l.  Skin temperature warm to cool from proximal to distal b/l. Right TMA site with necrotic eschar overlying, No active bleeding or drainage, noted with layer hyperkeratotic skin of the sole, no malodor, no ascending lymphangitis noted. B/L LE +2 edema.   UE: LUE AVF without thrill. L brachial pulse palpable. Radial Multiphasic. +3 edema entire left arm and hand. Chronic hyperkeratotic skin changes overlying prior access site. Sensation and motor function intact.  RUE: +2 edema of the arm and hand, palpable radial pulses, motor and sensation intact  Psychological: Appropriate mood and behavior  Neuro: non-focal, sensation very diminished  bilateral foot, motor function intact    Assessment/Plan   Chevy Benton is a 78 y.o. male with PMH of ESRD on HD TTS (Lt chest TDC, Hx of LUE Fistula Pseudoaneurysms), T2DM, HTN, HFrEF (TTE 10/23 EF 35-40%), A fib, PTA right lower extremity on 9/20/23 with subsequent TMA on 9/22/23, Non Occlusive DVT (Rt Subclavian), Aortic Root Dilation, MR, SSS s/p pacemaker, who presented as a direct admit from Dr Linder endovascular clinic for concerns of critical limb ischemia. Patient underwent PVR with results concerning for severe ischemia. Patient reported increasing Rt foot pain requiring more frequent doses of pain meds. Pt also said he has not been taking Plavix since hospital discharge in late September due to the frequent hospitalization for SBO. He denied any CP, SOB, cough, fever. Since hospital admission Pt was seen by podiatry, vascular surgery and nephrology team. He has venogram on 1/10/24 by IR. Pt was scheduled for urgent peripheral angioplasty on 1/11/24, however Pt was taken to IR for venogram and due to cath lab availability procedure had to be cancelled and reschedule for Tuesday 1/16. Pt also scheduled for fistulogram by vascular surgery tomorrow 1/12/24.    1/10 3V Foot Xray - No erosive changes or osseous destruction. Lobulated morphology of the right midfoot soft tissue stump with stump breakdown/wound dehiscence not excluded.     1/12/2024 - unchanged physical assessment. HDS, on and off 2L NC, scheduled for fistulogram with vascular surgery. Hep therapeutic with assay 0.6 currently running at 9.3 ml    RLE CLTI  RC-VI  Rt TMA site with overlaying necrosis  - continue with ASA EC 81 mg daily and Hep gtt (Hep assay 0.3-0.7) Please closely FU on hep dosing and assay. Monitor for sign and symptom of bleeding  - plan for peripheral angioplasty on Tuesday 1/16/2024 with Dr. Stuart Nguyen  - continue to hold Eliquis for now  - NPO at MN on 1/16/24  - TMA site care per podiatry recs  - Ammonium lactate  lotion or cream for dry legs and feet. Do not apply between toes.   - Truvue EHOB boot to prevent pressure ulcer on the heels. Heel offloading at all times.   - No need to stop ASA of Hep gtt for this procedure. Pt can have morning meds on 1/16 with small sips of water  - Case and plan discussed with attendings Dr Linder and Dr. Stuart BASILIO arm edema/ AV fistula thrombosis  - Duplex US: patent AV fistula  - Venogram showed no significant stenosis  - vascular surgery following - fistulogram recommended for further assessment and scheduled for tomorrow in the OR by Dr. Michelle Barrios    Please do not hesitate to contact EVLS team with any questions or concerns.       Peripheral IV 01/09/24 22 G Right Antecubital (Active)   Site Assessment Clean;Dry;Intact 01/10/24 0000   Dressing Status Clean;Dry 01/10/24 0000   Number of days: 1       Hemodialysis Cath Double lumen Left Chest (Active)   Line Necessity Hemodialysis 01/10/24 1100   Number of days:        Code Status:  Full Code      EDWIN Ann  Endovascular/Limb Salvage Service   Day: [id number]/Haiku/Night HHVI [id number]

## 2024-01-12 NOTE — NURSING NOTE
Report from Sending RN:    Report From: Ericka  Recent Surgery of Procedure: Yes, fistulogram today  Baseline Level of Consciousness (LOC): A and O x 3  Oxygen Use: Yes  Type: 2 lpm  Diabetic: Yes  Last BP Med Given Day of Dialysis: None.   Last Pain Med Given: Tramadol   Lab Tests to be Obtained with Dialysis: Yes  Blood Transfusion to be Given During Dialysis: Yes  Available IV Access: Yes  Medications to be Administered During Dialysis: No  Continuous IV Infusion Running: Yes Hep drip at 9.3/ hr  Restraints on Currently or in the Last 24 Hours: N/A  Hand-Off Communication: Ericka will let us know when he goes for fistulogram.

## 2024-01-12 NOTE — NURSING NOTE
Report from Sending RN:    Report From: Sushila in the PACU  Recent Surgery of Procedure: Yes, fistulogram  Baseline Level of Consciousness (LOC): A and O x 4  Oxygen Use: Yes  Type: 2  Diabetic: No  Last BP Med Given Day of Dialysis: None  Last Pain Med Given: None  Lab Tests to be Obtained with Dialysis: No  Blood Transfusion to be Given During Dialysis: N/A  Available IV Access: Yes  Medications to be Administered During Dialysis: No  Continuous IV Infusion Running: No  Restraints on Currently or in the Last 24 Hours: No  Hand-Off Communication: Pt is done with OR and alert and oriented, ready to come to dialysis.

## 2024-01-12 NOTE — NURSING NOTE
Report to Receiving RN:    Report To: ABEL Barnett  Time Report Called: 18:45  Hand-Off Communication: No acute change from RN to RN report.  Patient tolerated treatment well with 1L fluid removed.  Last /88.  No complaint of pain or discomfort.  Patient is in transport.  Complications During Treatment: No  Ultrafiltration Treatment: Yes  Medications Administered During Dialysis: No  Blood Products Administered During Dialysis: No  Labs Sent During Dialysis: No  Heparin Drip Rate Changes: No    Electronic Signatures:   (Signed 1/12/24)   Authored: Ike Lai RN   (Signed )   Authored:     Last Updated: 6:46 PM by IKE LAI

## 2024-01-12 NOTE — PROGRESS NOTES
Patient is not medically cleared for discharge at this time pending endovascular procedure next week and post surgical needs. Patient's spouse would like her  returned to [facility] where he was previously . I will continue to follow with a safe discharge plan.

## 2024-01-12 NOTE — ANESTHESIA POSTPROCEDURE EVALUATION
Patient: Chevy Benton    Procedure Summary       Date: 01/12/24 Room / Location: [facility] OR 16 / [facility] OR    Anesthesia Start: 1229 Anesthesia Stop: 1356    Procedure: Left upper extremity FISTULOGRAM with possible intervention (Left) Diagnosis:       Subclavian vein stenosis, left      (Subclavian vein stenosis, left [I87.1])    Surgeons: Michelle Barrios MD Responsible Provider: Prasanth Lopez MD    Anesthesia Type: general ASA Status: 3            Anesthesia Type: general    Vitals Value Taken Time   /74 01/12/24 1350   Temp 36.3 °C (97.3 °F) 01/12/24 1345   Pulse 73 01/12/24 1421   Resp 14 01/12/24 1352   SpO2 93 % 01/12/24 1421   Vitals shown include unvalidated device data.    Anesthesia Post Evaluation    Patient location during evaluation: PACU  Patient participation: complete - patient participated  Level of consciousness: awake  Pain management: adequate  Airway patency: patent  Cardiovascular status: acceptable  Respiratory status: acceptable  Hydration status: acceptable  Postoperative Nausea and Vomiting: none        No notable events documented.

## 2024-01-13 NOTE — PROGRESS NOTES
Chevy Benton is a 78 y.o. male on day 4 of admission presenting with Critical limb ischemia of both lower extremities (CMS/HCC).    Subjective       1/13  Weaned off O2, has no further concerns  Dressings: betadine, gauze, kerlix,  Podiatry will continue to help with wound care     1/12  No events, went to fistulogram with vascular surgery, successfully ballooned. He will be good to use his fistula today for dialysis.     1/11  Placed midline as his current access not working and RNs are unable to draws labs.  Although taking in to consideration his risks for bleeding is much higher in addition to the procedure alone putting the patient at risk for bleeding, Endovascular team agreed to place midline. No any additional events Wife was called and updated.    NPO midnight for fistulogram with vascular surgery   Angioplasty scheduled for 1/16/24 with EVLS     1/10  Pt went with IR for Proximal LUE & Central Venogram this am (No evidence of central stenosis identified. Very mild stenosis of the central aspect of the subclavian vein. No evidence of stenosis of the left jugular and subclavian confluence.). Of note, Endovascular medicine team was unable to do his EVLS. Per (Monika SWIFT-CNP) plan for next Tuesday.     Nephrology consulted for HD management          Objective     Physical Exam    Constitutional:       Appearance: Normal appearance.   HENT:      Head: Normocephalic.   Eyes:      Extraocular Movements: Extraocular movements intact.      Pupils: Pupils are equal, round, and reactive to light.   Cardiovascular:      Rate and Rhythm: Normal rate. Rhythm irregular.      Heart sounds: Normal heart sounds. No murmur heard.  Pulmonary:      Effort: Pulmonary effort is normal. No respiratory distress.      Breath sounds: Normal breath sounds. No wheezing.   Abdominal:      General: Abdomen is flat. There is no distension.      Palpations: Abdomen is soft.      Tenderness: There is no abdominal  "tenderness.   Musculoskeletal:         General: Swelling (Rt arm and Lt arm swollen (Lt arm with 2 swelling at fistula site). No hotness or tenderness.) and deformity (Rt TMA covered with dry clean dressing. Cold bilateral LE. Pulses very weak in DP. Sensation intact. Power 4/5 bilaterally) present.      Cervical back: Normal range of motion.   Skin:     General: Skin is dry.   Neurological:      General: No focal deficit present.      Mental Status: He is alert and oriented to person, place, and time.          Last Recorded Vitals  Blood pressure (!) 157/91, pulse 87, temperature 36 °C (96.8 °F), temperature source Tympanic, resp. rate 20, height 1.778 m (5' 10\"), weight 90.5 kg (199 lb 8 oz), SpO2 94 %.  Intake/Output last 3 Shifts:  I/O last 3 completed shifts:  In: 1551.7 (17.1 mL/kg) [I.V.:751.7 (8.3 mL/kg); Other:800]  Out: 2705 (29.9 mL/kg) [Other:2700; Blood:5]  Weight: 90.5 kg     Relevant Results                             Assessment/Plan   Principal Problem:    Critical limb ischemia of both lower extremities (CMS/HCC)  Active Problems:    Critical limb ischemia of right lower extremity (CMS/HCC)    Subclavian vein stenosis, left      Chevy Benton is a 78 y.o. male with PMH of ESRD on HD TTS (Lt chest TDC, Hx of LUE Fistula Pseudoaneurysms), DM, HTN, HFrEF (TTE 10/23 EF 35-40%), A fib, PTA right lower extremity on 9/20/23 with subsequent TMA right foot on 9/22/23, Non Occlusive DVT (Rt Subclavian), Aortic Root Dilation, MR, SSS s/p pacemaker who presented as a direct admit from endovascular clinic 1/9 for concerns of critical limb ischemia. Patient underwent PVR 1/9  with results concerning for severe ischemia and warranting admission for potential intervention. Patient is getting started on Heparin ggt, Consulting Podiatry and Vascular surgery (LUE fistula pseudoaneurysms). He remains vitally and hemodynamically stable otherwise.   Pt went with IR for Proximal LUE & Central Venogram 1/10  (No " evidence of central stenosis identified, very mild stenosis of the central aspect of the subclavian vein, no evidence of stenosis of the left jugular and subclavian confluence.). Of note, Pt could not attend his EVLS with Endovascular medicine team due to scheduling challenging with IR team to undergo his Venogram. Per (Monika SWIFT-CNP) plan for next Tuesday.          #S/p Left upper extremity fistulogram  1/12   #Peripheral angioplasty scheduled for 1/16/24 with EVLS  #Needs to remove midline at discharge.      #Critical Limb Ischemia  #Hx of PTA Rt LE w/ subsequent TMA 09/23  -Start Heparin ggt, maintain therapeutic assays   -Endovascular surgery aware   -Podiatry consulted, will evaluate need for amputation after endovascular intervention   -No need for abx, low threshold to start if concerns for infection arise. Labs and Xrays pending at this time   -Tylenol and Tramadol PRN for pain   -c/w home ASA 81mg daily (Patient supposed to be on Plavix per notes from endovascular 9/2023 however per son at bedside was never able to get it started due to frequent hospitalizations. Called SNF to confirm, it is not on patient's list). Will likely need it on dc if new angioplasty is performed, defer to endovascular to decide  -c/w home Atorvastatin 10mg daily (patient above 73 YO. No need for high intensity)     #COVID  -Currently asymptomatic however requiting 2L NC   -Cont with Dexamethasone 6mg for 10 days (last day 12/14)   -Wean O2 as tolerated      #ESRD on HD  #Thrombosed LUE AVF, occluded since October..  - HD access via a L internal jugular catheter   -c/w home Sevelamer 800mg TID  -c/w home Nephro cap 1 tab daily   -Nephrology consulted for HD management   -Patient needs HD in a site other than LUE to avoid risk of further clot formation.   -Will call IR for TDC in upper body later this admission.    -On home Eliquis 2.5mg bid, now switched to Heparin ggt for CTL       #No evidence of central stenosis  identified  #Mild stenosis of the central aspect of the subclavian vein  #no evidence of stenosis of the left jugular and subclavian confluence   -On home Eliquis 2.5mg bid, now switched to Heparin ggt for CTL        #HFrEF (EF 35-40% 10/23)   -Patient was on Metop succ 25mg daily (hasn't been requiring at SNF due to controlled rates). Will re start for GDMT benefit      #Atrial Fibrillation  -Rate controlled  -Hold home Eliquis while on Heparin ggt     #HTN  -c/w home Amlodipine 10mg daily     #Hx of SBO  -c/w home Miralax/Senna scheduled. Docusate PRN      #DM w/ Neuropathy  -c/w home Glargine  -ISS with accuchecks  -c/w home Gabapentin 100mg at bedtime      E: replete as needed  F: bolus as needed  N:  Renal   DVT ppx: Heparin ggt  GI ppx: PPI     Code: full, confirmed on admission              I spent 90 minutes in the professional and overall care of this patient.      Wilma Redding MD

## 2024-01-13 NOTE — CARE PLAN
Problem: Pain - Adult  Goal: Verbalizes/displays adequate comfort level or baseline comfort level  Outcome: Progressing     Problem: Safety - Adult  Goal: Free from fall injury  Outcome: Progressing     Problem: Diabetes  Goal: Achieve decreasing blood glucose levels by end of shift  Outcome: Progressing  Goal: Increase stability of blood glucose readings by end of shift  Outcome: Progressing  Goal: Decrease in ketones present in urine by end of shift  Outcome: Progressing  Goal: Maintain electrolyte levels within acceptable range throughout shift  Outcome: Progressing  Goal: Maintain glucose levels >70mg/dl to <250mg/dl throughout shift  Outcome: Progressing  Goal: No changes in neurological exam by end of shift  Outcome: Progressing  Goal: Learn about and adhere to nutrition recommendations by end of shift  Outcome: Progressing  Goal: Vital signs within normal range for age by end of shift  Outcome: Progressing  Goal: Increase self care and/or family involovement by end of shift  Outcome: Progressing  Goal: Receive DSME education by end of shift  Outcome: Progressing     Problem: Skin  Goal: Decreased wound size/increased tissue granulation at next dressing change  Outcome: Progressing  Goal: Participates in plan/prevention/treatment measures  Outcome: Progressing  Goal: Prevent/manage excess moisture  Outcome: Progressing  Goal: Prevent/minimize sheer/friction injuries  Outcome: Progressing  Goal: Promote/optimize nutrition  Outcome: Progressing  Goal: Promote skin healing  Outcome: Progressing     Problem: Fall/Injury  Goal: Not fall by end of shift  Outcome: Progressing  Goal: Be free from injury by end of the shift  Outcome: Progressing  Goal: Verbalize understanding of personal risk factors for fall in the hospital  Outcome: Progressing  Goal: Verbalize understanding of risk factor reduction measures to prevent injury from fall in the home  Outcome: Progressing  Goal: Use assistive devices by end of the  shift  Outcome: Progressing  Goal: Pace activities to prevent fatigue by end of the shift  Outcome: Progressing   The patient's goals for the shift include      The clinical goals for the shift include patient will be free from injury this shift

## 2024-01-13 NOTE — SIGNIFICANT EVENT
Vascular Surgery Post-Op Check    S:   POD 0 from left upper extremity fistulagram with venoplasty    O:   Vital signs are stable, normotensive, afebrile, no new or worsening oxygen requirement, not tachycardic  Visit Vitals  /70   Pulse 76   Temp 36.6 °C (97.9 °F) (Temporal)   Resp 14        Constitutional: no acute distress  Skin: warm and dry overall   Cardiac: RRR  Pulmonary: Unlabored respirations   Abdomen: Non distended, non tender  : Voiding  Extremities: Left upper extremity still swollen, similar to priors. Thrill present. No signs of hematoma formation.    A/P:  Overall, patient is doing well postoperatively with no acute concerns.  Will continue to optimize pain control as needed.  Will continue to monitor clinical exam, vitals, I&O's, and labs when available.  Will follow up on the patient in the a.m. or sooner as needed.      Rajesh Hanks DO  Surgical Oncology  Department of Surgery / IR Integrated PGY1

## 2024-01-13 NOTE — SIGNIFICANT EVENT
Vascular Surgery Plan of Care Note:    Patient s/p LUE fistulogram and venoplasty yesterday (1/12) and then underwent dialysis via the LUE fistula without any issues per chart review, successfully dialyzed 1L off.    Patient otherwise doing well from fistula-standpoint.    Vascular surgery will sign off at this time.    Patient can follow up with already established vascular surgeon that handles his fistula care.    Please do not hesitate to re-engage with any questions or concerns.    Alonzo Sarmiento MD  Vascular Surgery, PGY3  Team Pager: [phone]

## 2024-01-14 NOTE — CARE PLAN
Problem: Pain - Adult  Goal: Verbalizes/displays adequate comfort level or baseline comfort level  Outcome: Progressing     Problem: Safety - Adult  Goal: Free from fall injury  Outcome: Progressing   The patient's goals for the shift include      The clinical goals for the shift include patient will be free from injury this shift

## 2024-01-14 NOTE — PROGRESS NOTES
"Chevy Benton is a 78 y.o. male on day 5 of admission presenting with Critical limb ischemia of both lower extremities (CMS/HCC).    Subjective             Objective     Physical Exam    Constitutional:       Appearance: Normal appearance.   HENT:      Head: Normocephalic.   Eyes:      Extraocular Movements: Extraocular movements intact.      Pupils: Pupils are equal, round, and reactive to light.   Cardiovascular:      Rate and Rhythm: Normal rate. Rhythm irregular.      Heart sounds: Normal heart sounds. No murmur heard.  Pulmonary:      Effort: Pulmonary effort is normal. No respiratory distress.      Breath sounds: Normal breath sounds. No wheezing.   Abdominal:      General: Abdomen is flat. There is no distension.      Palpations: Abdomen is soft.      Tenderness: There is no abdominal tenderness.   Musculoskeletal:         General: Swelling (Rt arm and Lt arm swollen (Lt arm with 2 swelling at fistula site). No hotness or tenderness.) and deformity (Rt TMA covered with dry clean dressing. Cold bilateral LE. Pulses very weak in DP. Sensation intact. Power 4/5 bilaterally) present.      Cervical back: Normal range of motion.   Skin:     General: Skin is dry.   Neurological:      General: No focal deficit present.      Mental Status: He is alert and oriented to person, place, and time.          Last Recorded Vitals  Blood pressure 154/80, pulse 80, temperature 36 °C (96.8 °F), resp. rate 20, height 1.778 m (5' 10\"), weight 90.5 kg (199 lb 8 oz), SpO2 94 %.  Intake/Output last 3 Shifts:  I/O last 3 completed shifts:  In: 519.1 (5.7 mL/kg) [I.V.:519.1 (5.7 mL/kg)]  Out: 1 (0 mL/kg) [Stool:1]  Weight: 90.5 kg     Relevant Results                             Assessment/Plan   Principal Problem:    Critical limb ischemia of both lower extremities (CMS/HCC)  Active Problems:    Critical limb ischemia of right lower extremity (CMS/HCC)    Subclavian vein stenosis, left      Chevy Benton is a 78 y.o. male " with PMH of ESRD on HD TTS (Lt chest TDC, Hx of LUE Fistula Pseudoaneurysms), DM, HTN, HFrEF (TTE 10/23 EF 35-40%), A fib, PTA right lower extremity on 9/20/23 with subsequent TMA right foot on 9/22/23, Non Occlusive DVT (Rt Subclavian), Aortic Root Dilation, MR, SSS s/p pacemaker who presented as a direct admit from endovascular clinic 1/9 for concerns of critical limb ischemia. Patient underwent PVR 1/9  with results concerning for severe ischemia and warranting admission for potential intervention. Patient is getting started on Heparin ggt, Consulting Podiatry and Vascular surgery (LUE fistula pseudoaneurysms). He remains vitally and hemodynamically stable otherwise. Pt went with IR for Proximal LUE & Central Venogram 1/10  (No evidence of central stenosis identified, very mild stenosis of the central aspect of the subclavian vein, no evidence of stenosis of the left jugular and subclavian confluence.). Vascular surgery underwent Left upper extremity fistulogram 1/12   Thing to follow on:  -Peripheral angioplasty scheduled for 1/16/24 with EVLS  -Currently on heparin drip. Need to touch base with EVLS tomorrow.  -Needs to remove midline at discharge.    -COVID, Currently asymptomatic, on 1 L NC             #S/p Left upper extremity fistulogram  1/12   #Peripheral angioplasty scheduled for 1/16/24 with EVLS    #Critical Limb Ischemia  #Hx of PTA Rt LE w/ subsequent TMA 09/23  -Start Heparin ggt, maintain therapeutic assays   -Endovascular surgery aware   -Podiatry consulted, will evaluate need for amputation after endovascular intervention   -No need for abx, low threshold to start if concerns for infection arise. Labs and Xrays pending at this time   -Tylenol and Tramadol PRN for pain   -c/w home ASA 81mg daily (Patient supposed to be on Plavix per notes from endovascular 9/2023 however per son at bedside was never able to get it started due to frequent hospitalizations. Called SNF to confirm, it is not on  patient's list). Will likely need it on dc if new angioplasty is performed, defer to endovascular to decide  -c/w home Atorvastatin 10mg daily (patient above 75 YO. 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  - HD access via a L internal jugular catheter   -c/w home Sevelamer 800mg TID  -c/w home Nephro cap 1 tab daily   -Nephrology consulted for HD management   -Patient needs HD in a site other than LUE to avoid risk of further clot formation.   -On home Eliquis 2.5mg bid, now switched to Heparin ggt for CTL       #No evidence of central stenosis identified  #Mild stenosis of the central aspect of the subclavian vein  #no evidence of stenosis of the left jugular and subclavian confluence   -On home Eliquis 2.5mg bid, now switched to Heparin ggt for CTL        #HFrEF (EF 35-40% 10/23)   -Patient was on Metop succ 25mg daily (hasn't been requiring at SNF due to controlled rates). Will re start for GDMT benefit      #Atrial Fibrillation  -Rate controlled  -Hold home Eliquis while on Heparin ggt     #HTN  -c/w home Amlodipine 10mg daily     #Hx of SBO  -c/w home Miralax/Senna scheduled. Docusate PRN      #DM w/ Neuropathy  -c/w home Glargine  -ISS with accuchecks  -c/w home Gabapentin 100mg at bedtime      E: replete as needed  F: bolus as needed  N:  Renal   DVT ppx: Heparin ggt  GI ppx: PPI     Code: full, confirmed on admission              I spent 90 minutes in the professional and overall care of this patient.      Wilma Redding MD

## 2024-01-15 NOTE — CARE PLAN
Pt is scheduled for LE peripheral angioplasty in  the cath lab tomorrow  - NPO after midnight  - Please revise the isolation status, pt was first diagnosed with COVID on 1/2/24  - spoke with pt and updated with plan    Aruna Vazquez PA-C

## 2024-01-15 NOTE — NURSING NOTE
Report to Receiving RN:    Report To: Tameka  Time Report Called: [id number]  Hand-Off Communication: Removed 2 L of fluid, /107, P 59. Pt is c/o of SOB and placed on O2 at 2 LPM. This was increased to 3 LPM.   Complications During Treatment: No  Ultrafiltration Treatment: Yes  Medications Administered During Dialysis: No  Blood Products Administered During Dialysis: No  Labs Sent During Dialysis: No  Heparin Drip Rate Changes: No        Last Updated: 4:45 PM by PRAMOD STERN

## 2024-01-15 NOTE — NURSING NOTE
Report from Sending RN:    Report From: Chandni (RN)  Recent Surgery of Procedure: No  Baseline Level of Consciousness (LOC): A/O x 4  Oxygen Use: Yes, 1 liter  Type: NC  Diabetic: Yes, 127  Last BP Med Given Day of Dialysis: Norvasc 10 mg, metoprolol 25 mg 0900 am  Last Pain Med Given: none  Lab Tests to be Obtained with Dialysis: No  Blood Transfusion to be Given During Dialysis: No  Available IV Access: Yes  Medications to be Administered During Dialysis: No  Continuous IV Infusion Running: No  Restraints on Currently or in the Last 24 Hours: No  Hand-Off Communication: No acute overnight or morning events; vss; Pt did take morning medications; no labs needed; droplet precautions initiated d/t + for Covid 19; Pt is a full code; Ela Morales RN.

## 2024-01-15 NOTE — PROGRESS NOTES
Assessment/Plan   Chevy Benton is a 78 y.o. male with PMH of ESRD on HD TTS (Lt chest TDC, Hx of LUE Fistula Pseudoaneurysms), DM, HTN, HFrEF (TTE 10/23 EF 35-40%), A fib, PTA right lower extremity on 9/20/23 with subsequent TMA right foot on 9/22/23, Non Occlusive DVT (Rt Subclavian), Aortic Root Dilation, MR, SSS s/p pacemaker who presented as a direct admit from endovascular clinic 1/9 for concerns of critical limb ischemia. Patient underwent PVR 1/9  with results concerning for severe ischemia and warranting admission for potential intervention. Patient is getting started on Heparin ggt, Consulting Podiatry and Vascular surgery (LUE fistula pseudoaneurysms). He remains  hemodynamically stable otherwise. S/p  Proximal LUE & Central Venogram 1/10 by IR with no evidence of central stenosis identified, very mild stenosis of the central aspect of the subclavian vein, no evidence of stenosis of the left jugular and subclavian confluence and Left upper extremity fistulogram 1/12 with vascular surgery. Tolerating HD via his fistual without complications.  Has Peripheral angioplasty scheduled for 1/16/24 with EVLS.        Critical Limb Ischemia  #Hx of PTA Rt LE w/ subsequent TMA 09/23  -Start Heparin ggt, maintain therapeutic assays   -Endovascular surgery aware   -Podiatry consulted, will evaluate need for amputation after endovascular intervention   -No need for abx, low threshold to start if concerns for infection arise. Labs and Xrays pending at this time   -Tylenol and Tramadol PRN for pain   -c/w home ASA 81mg daily (Patient supposed to be on Plavix per notes from endovascular 9/2023 however per son at bedside was never able to get it started due to frequent hospitalizations. Called SNF to confirm, it is not on patient's list). Will likely need it on dc if new angioplasty is performed, defer to endovascular to decide. Vascualar surg signing off. Currently, Peripheral angioplasty scheduled for 1/16/24 with  "EVLS.  -c/w home Atorvastatin 10mg daily (patient above 75 YO. No need for high intensity)  -On home Eliquis 2.5mg bid, now switched to Heparin ggt for CTL      #COVID  -Currently asymptomatic however requiting 1-2L NC   -Cont with Dexamethasone 6mg for 10 days (last day 12/14)   -Wean O2 as tolerated      #ESRD on HD  #Thrombosed LUE AVF, occluded since October..  - HD access via a L internal jugular catheter   -S/p central venogram and  Left upper extremity fistulogram  1/12:  No evidence of central stenosis identified,  Mild stenosis of the central aspect of the subclavian vein  -c/w home Sevelamer 800mg TID  -c/w home Nephro cap 1 tab daily   -Nephrology consulted for HD management   -Patient needs HD in a site other than [facility] [place] to avoid risk of further clot formation.     #HFrEF (EF 35-40% 10/23)   -Patient was on Metop succ 25mg daily (hasn't been requiring at SNF due to controlled rates). Will re start for GDMT when able      #Atrial Fibrillation  -Rate controlled  -Hold home Eliquis while on Heparin ggt     #HTN  -c/w home Amlodipine 10mg daily     #Hx of SBO  -c/w home Miralax/Senna scheduled. Docusate PRN      #DM w/ Neuropathy  -c/w home Glargine  -ISS with accuchecks  -c/w home Gabapentin 100mg at bedtime      Scheduled outpatient appointments in system:   Future Appointments   Date Time Provider Department Center   1/15/2024 12:00 PM HD CHAIR [facility]     ---------------------------------------------------------------------------------------------------  Subjective   No new events.  He was able to tolerate dialysis over the weekend. Denies pain. No neuro changes. No cp, sob, dizziness.      ---------------------------------------------------------------------------------------------------  Objective   Last Recorded Vitals  Blood pressure 126/82, pulse 75, temperature 36.1 °C (97 °F), temperature source Temporal, resp. rate 20, height 1.778 m (5' 10\"), weight 90.5 kg (199 lb 8 oz), SpO2 95 " %.  Intake/Output last 3 Shifts:  I/O last 3 completed shifts:  In: 240 (2.7 mL/kg) [P.O.:240]  Out: 2 (0 mL/kg) [Stool:2]  Weight: 90.5 kg     Physical Exam  Constitutional:       Appearance: Normal appearance. He is obese.   HENT:      Head: Atraumatic.      Mouth/Throat:      Mouth: Mucous membranes are moist.   Eyes:      Conjunctiva/sclera: Conjunctivae normal.   Cardiovascular:      Rate and Rhythm: Normal rate and regular rhythm.   Pulmonary:      Comments: Magdiel lung sounds diminished  Abdominal:      General: There is distension.      Comments: Non-tender to palpation   Genitourinary:     Comments: Notes some bleeding around cath  Urine is clear  Musculoskeletal:         General: Swelling present.      Cervical back: Neck supple.      Comments: Ble with +2 pitting edema  Lt arm edematous    Skin:     General: Skin is warm and dry.   Neurological:      Mental Status: He is alert and oriented to person, place, and time.   Psychiatric:         Mood and Affect: Mood normal.         Behavior: Behavior normal.         Relevant Results  Lab Results   Component Value Date    WBC 8.9 01/15/2024    HGB 10.5 (L) 01/15/2024    HCT 33.9 (L) 01/15/2024    MCV 98 01/15/2024     01/15/2024      Lab Results   Component Value Date    GLUCOSE 123 (H) 01/15/2024    CALCIUM 9.6 01/15/2024     (L) 01/15/2024    K 4.5 01/15/2024    CO2 27 01/15/2024    CL 89 (L) 01/15/2024    BUN 60 (H) 01/15/2024    CREATININE 4.69 (H) 01/15/2024     Scheduled medications  allopurinol, 100 mg, oral, Daily  amLODIPine, 10 mg, oral, Daily  aspirin, 81 mg, oral, Daily  atorvastatin, 40 mg, oral, Nightly  B complex-vitamin C-folic acid, 1 capsule, oral, Daily  gabapentin, 100 mg, oral, q24h  insulin glargine, 12 Units, subcutaneous, Nightly  insulin lispro, 0-5 Units, subcutaneous, TID with meals  lidocaine, 5 mL, infiltration, Once  metoprolol succinate XL, 25 mg, oral, Daily  pantoprazole, 40 mg, oral, Daily before  breakfast  polyethylene glycol, 17 g, oral, Daily  sennosides, 1 tablet, oral, Nightly  sevelamer carbonate, 800 mg, oral, TID with meals      Continuous medications  heparin, 0-4,500 Units/hr, Last Rate: 929 Units/hr (01/15/24 1009)      PRN medications  PRN medications: acetaminophen **OR** acetaminophen **OR** acetaminophen, albuterol, alum-mag hydroxide-simeth, dextrose, docusate sodium, glucagon, heparin, hydrocortisone, ipratropium-albuteroL, methocarbamol, traMADol    Donn Quiñonez MD

## 2024-01-15 NOTE — PROGRESS NOTES
Subjective     Interval History: Chevy Benton    Patient seen on dialysis, some shortness of breath today. Otherwise feels well.          Current Facility-Administered Medications:     acetaminophen (Tylenol) tablet 650 mg, 650 mg, oral, q4h PRN, 650 mg at 01/11/24 1105 **OR** acetaminophen (Tylenol) oral liquid 650 mg, 650 mg, nasogastric tube, q4h PRN **OR** acetaminophen (Tylenol) suppository 650 mg, 650 mg, rectal, q4h PRN, Simone Acosta MD    albuterol 90 mcg/actuation inhaler 2 puff, 2 puff, inhalation, q6h PRN, Simone Acosta MD, 2 puff at 01/14/24 0850    allopurinol (Zyloprim) tablet 100 mg, 100 mg, oral, Daily, Simone Acosta MD, 100 mg at 01/15/24 1008    alum-mag hydroxide-simeth (Mylanta) 200-200-20 mg/5 mL oral suspension 30 mL, 30 mL, oral, 4x daily PRN, Simone Acosta MD    amLODIPine (Norvasc) tablet 10 mg, 10 mg, oral, Daily, Simone Acosta MD, 10 mg at 01/15/24 1008    aspirin chewable tablet 81 mg, 81 mg, oral, Daily, Simone Acosta MD, 81 mg at 01/15/24 1008    atorvastatin (Lipitor) tablet 40 mg, 40 mg, oral, Nightly, Simone Acosta MD, 40 mg at 01/14/24 2120    B complex-vitamin C-folic acid (Nephrocaps) capsule 1 capsule, 1 capsule, oral, Daily, Simone Acosta MD, 1 capsule at 01/15/24 1008    dextrose 50 % injection 25 g, 25 g, intravenous, q15 min PRN, Simone Acosta MD    docusate sodium (Colace) capsule 100 mg, 100 mg, oral, BID PRN, Simone Acosta MD    gabapentin (Neurontin) capsule 100 mg, 100 mg, oral, q24h, Simone Acosta MD, 100 mg at 01/14/24 2120    glucagon (Glucagen) injection 1 mg, 1 mg, intramuscular, q15 min PRN, Simone Acosta MD    heparin (porcine) injection 3,000-6,000 Units, 3,000-6,000 Units, intravenous, q4h PRN, Simone Acosta MD    heparin 25,000 Units in dextrose 5% 250 mL (100 Units/mL) infusion (premix), 0-4,500 Units/hr, intravenous, Continuous, Simone BAHENA  MD Karla, Last Rate: 9.3 mL/hr at 01/15/24 1009, 929 Units/hr at 01/15/24 1009    hydrocortisone 2.5 % ointment, , Topical, BID PRN, Simone Acotsa MD    insulin glargine (Lantus) injection 12 Units, 12 Units, subcutaneous, Nightly, Simone Acosta MD, 12 Units at 01/14/24 2120    insulin lispro (HumaLOG) injection 0-5 Units, 0-5 Units, subcutaneous, TID with meals, Simone Acosta MD, 1 Units at 01/14/24 1725    ipratropium-albuteroL (Duo-Neb) 0.5-2.5 mg/3 mL nebulizer solution 3 mL, 3 mL, nebulization, q6h PRN, Simone Acosta MD    lidocaine (Xylocaine) 10 mg/mL (1 %) injection 50 mg, 5 mL, infiltration, Once, Wilma Redding MD    methocarbamol (Robaxin) tablet 500 mg, 500 mg, oral, q8h PRN, Simone Acosta MD, 500 mg at 01/11/24 1439    metoprolol succinate XL (Toprol-XL) 24 hr tablet 25 mg, 25 mg, oral, Daily, Simone Acosta MD, 25 mg at 01/15/24 1008    pantoprazole (ProtoNix) EC tablet 40 mg, 40 mg, oral, Daily before breakfast, Simone Acosta MD, 40 mg at 01/15/24 0624    polyethylene glycol (Glycolax, Miralax) packet 17 g, 17 g, oral, Daily, Simone Acosta MD, 17 g at 01/13/24 0840    sennosides (Senokot) tablet 8.6 mg, 1 tablet, oral, Nightly, Simone Acosta MD, 8.6 mg at 01/14/24 2120    sevelamer carbonate (Renvela) tablet 800 mg, 800 mg, oral, TID with meals, Simone Acosta MD, 800 mg at 01/14/24 1725    traMADol (Ultram) tablet 50 mg, 50 mg, oral, q6h PRN, Simone Acosta MD, 50 mg at 01/13/24 1737    Physical Exam  Physical Exam  Heart S1 S2 RRR, Lungs CTA, no edema      Vital signs in last 24 hours:  Temp:  [36 °C (96.8 °F)-36.5 °C (97.7 °F)] 36.1 °C (97 °F)  Heart Rate:  [71-80] 71  BP: (126-154)/(78-82) 126/82         Labs:  Results from last 7 days   Lab Units 01/15/24  0622   WBC AUTO x10*3/uL 8.9   RBC AUTO x10*6/uL 3.47*   HEMOGLOBIN g/dL 10.5*   HEMATOCRIT % 33.9*     Results from last 7 days   Lab Units  01/15/24  0622   SODIUM mmol/L 128*   POTASSIUM mmol/L 4.5   CHLORIDE mmol/L 89*   CO2 mmol/L 27   BUN mg/dL 60*   CREATININE mg/dL 4.69*   CALCIUM mg/dL 9.6   PHOSPHORUS mg/dL 6.4*            Assessment/Plan   Patient seen and examined while on dialysis, recent events, labs, medications reviewed. Will follow overall management per primary team, and continue regular dialysis.  Consider chest X ray to evaluate fluid status/COVID  Possible additional Ultrafiltration 1/16 to optimize fluid status.    Principal Problem:    Critical limb ischemia of both lower extremities (CMS/HCC)  Active Problems:    Critical limb ischemia of right lower extremity (CMS/HCC)    Subclavian vein stenosis, left        Corine Will MD  1/15/2024  1:34 PM

## 2024-01-16 NOTE — NURSING NOTE
.Report from Sending RN:    Report From: ABEL Rizo  Recent Surgery of Procedure: No  Baseline Level of Consciousness (LOC): A&O X3  Oxygen Use: Yes  Type: 2L NC  Diabetic: Yes  Last BP Med Given Day of Dialysis: Metoprolol  Last Pain Med Given: none  Lab Tests to be Obtained with Dialysis: No  Blood Transfusion to be Given During Dialysis: No  Available IV Access: Yes  Medications to be Administered During Dialysis: No  Continuous IV Infusion Running: heparin 9.3 ml/hr  Restraints on Currently or in the Last 24 Hours: No  Hand-Off Communication: Pt had no acute event overnight, vital signs stable. On Covid precaution.

## 2024-01-16 NOTE — PROGRESS NOTES
Assessment/Plan   Chevy Benton is a 78 y.o. male with PMH of ESRD on HD TTS (Lt chest TDC, Hx of LUE Fistula Pseudoaneurysms), DM, HTN, HFrEF (TTE 10/23 EF 35-40%), A fib, PTA right lower extremity on 9/20/23 with subsequent TMA right foot on 9/22/23, Non Occlusive DVT (Rt Subclavian), Aortic Root Dilation, MR, SSS s/p pacemaker who presented as a direct admit from endovascular clinic 1/9 for concerns of critical limb ischemia. Patient underwent PVR 1/9  with results concerning for severe ischemia and warranting admission for potential intervention. Patient is getting started on Heparin ggt, Consulting Podiatry and Vascular surgery (LUE fistula pseudoaneurysms). He remains  hemodynamically stable otherwise. S/p  Proximal LUE & Central Venogram 1/10 by IR with no evidence of central stenosis identified, very mild stenosis of the central aspect of the subclavian vein, no evidence of stenosis of the left jugular and subclavian confluence and Left upper extremity fistulogram 1/12 with vascular surgery. Tolerating HD via his fistual without complications.  Has Peripheral angioplasty scheduled for 1/16/24 with [facility], now delayed due to dyspnea.     #Critical Limb Ischemia  #Hx of PTA Rt LE w/ subsequent TMA 09/23  -Start Heparin ggt, maintain therapeutic assays   -Endovascular surgery aware   -Podiatry consulted, will evaluate need for amputation after endovascular intervention   -No need for abx, low threshold to start if concerns for infection arise. Labs and Xrays pending at this time   -Tylenol and Tramadol PRN for pain   -c/w home ASA 81mg daily (Patient supposed to be on Plavix per notes from endovascular 9/2023 however per son at bedside was never able to get it started due to frequent hospitalizations. Called SNF to confirm, it is not on patient's list). Will likely need it on dc if new angioplasty is performed, defer to endovascular to decide. Vascualar surg signing off. Currently, Peripheral angioplasty  scheduled for 1/16/24 with [facility]. now delayed due to dyspnea.   -c/w home Atorvastatin 10mg daily (patient above 74 YO. No need for high intensity)  -On home Eliquis 2.5mg bid, now switched to Heparin ggt for CTL      # Acute dyspnea  # Hypoxic respiratory failure, acute on chronic  -Currently on 1 to 2 L  -Worsening dyspnea noted, orthopnea.  Appears grossly overloaded  -Chest x-ray ordered  -Will discuss volume management with nephrology if etiology consistent with volume overload.  Does not appear to have symptoms of pneumonia to correlate at this time.  With diminished air movement bilaterally, will order I-S, Acapella, respiratory therapy.    #COVID - recovered  -Currently asymptomatic however requiting 1-2L NC   -Cont with Dexamethasone 6mg for 10 days (last day 12/14)   -Wean O2 as tolerated   - >10 days, considered recovered. Will see how to dc isolation.      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  - HD access via a L internal jugular catheter   -S/p central venogram and  Left upper extremity fistulogram  1/12:  No evidence of central stenosis identified,  Mild stenosis of the central aspect of the subclavian vein  -c/w home Sevelamer 800mg TID  -c/w home Nephro cap 1 tab daily   -Nephrology consulted for HD management   -Patient needs HD in a site other than LUE to avoid risk of further clot formation.     #HFrEF (EF 35-40% 10/23)   -Patient was on Metop succ 25mg daily (hasn't been requiring at SNF due to controlled rates). Will re start for GDMT when able      #Atrial Fibrillation  -Rate controlled  -Hold home Eliquis while on Heparin ggt     #HTN  -c/w home Amlodipine 10mg daily     #Hx of SBO  -c/w home Miralax/Senna scheduled. Docusate PRN      #DM w/ Neuropathy  -c/w home Glargine  -ISS with accuchecks  -c/w home Gabapentin 100mg at bedtime      Scheduled outpatient appointments in system:   Future Appointments   Date Time Provider Department Center   1/16/2024  3:00 PM HD [facility]  "[facility]     ---------------------------------------------------------------------------------------------------  Subjective   No new events.  He is feeling more short of breath today, worsening since yesterday but reporting that he has had shortness of breath all week.  He was scheduled to have angioplasty today which is delayed.     ---------------------------------------------------------------------------------------------------  Objective   Last Recorded Vitals  Blood pressure 147/85, pulse 78, temperature 36 °C (96.8 °F), temperature source Temporal, resp. rate 18, height 1.778 m (5' 10\"), weight 90.5 kg (199 lb 8 oz), SpO2 96 %.  Intake/Output last 3 Shifts:  I/O last 3 completed shifts:  In: 1000 (11.1 mL/kg) [I.V.:600 (6.6 mL/kg); Other:400]  Out: 2401 (26.5 mL/kg) [Other:2400; Stool:1]  Weight: 90.5 kg     Physical Exam  Constitutional:       Appearance: Normal appearance. He is obese.   HENT:      Head: Atraumatic.      Mouth/Throat:      Mouth: Mucous membranes are moist.   Eyes:      Conjunctiva/sclera: Conjunctivae normal.   Cardiovascular:      Rate and Rhythm: Normal rate and regular rhythm.   Pulmonary:      Comments: Magdiel lung sounds diminished  Abdominal:      General: There is distension.      Comments: Non-tender to palpation   Genitourinary:     Comments: Notes some bleeding around cath  Urine is clear  Musculoskeletal:         General: Swelling present.      Cervical back: Neck supple.      Comments: Ble with +2 pitting edema  B/l UE edematous  R foot TMA in dressing c/d/i    Skin:     General: Skin is warm and dry.   Neurological:      Mental Status: He is alert and oriented to person, place, and time.   Psychiatric:         Mood and Affect: Mood normal.         Behavior: Behavior normal.         Relevant Results  Lab Results   Component Value Date    WBC 10.0 01/16/2024    HGB 11.0 (L) 01/16/2024    HCT 36.0 (L) 01/16/2024    MCV 99 01/16/2024     (L) 01/16/2024    "   Lab Results   Component Value Date    GLUCOSE 98 01/16/2024    CALCIUM 9.9 01/16/2024     (L) 01/16/2024    K 4.5 01/16/2024    CO2 30 01/16/2024    CL 90 (L) 01/16/2024    BUN 42 (H) 01/16/2024    CREATININE 4.07 (H) 01/16/2024     Scheduled medications  allopurinol, 100 mg, oral, Daily  amLODIPine, 10 mg, oral, Daily  aspirin, 81 mg, oral, Daily  atorvastatin, 40 mg, oral, Nightly  B complex-vitamin C-folic acid, 1 capsule, oral, Daily  gabapentin, 100 mg, oral, q24h  insulin glargine, 6 Units, subcutaneous, Nightly  insulin lispro, 0-5 Units, subcutaneous, TID with meals  lidocaine, 5 mL, infiltration, Once  metoprolol succinate XL, 25 mg, oral, Daily  pantoprazole, 40 mg, oral, Daily before breakfast  polyethylene glycol, 17 g, oral, Daily  sennosides, 1 tablet, oral, Nightly  sevelamer carbonate, 800 mg, oral, TID with meals      Continuous medications  heparin, 0-4,500 Units/hr, Last Rate: 929 Units/hr (01/15/24 1009)      PRN medications  PRN medications: acetaminophen **OR** acetaminophen **OR** acetaminophen, albuterol, alum-mag hydroxide-simeth, dextrose, docusate sodium, glucagon, heparin, hydrocortisone, ipratropium-albuteroL, methocarbamol, traMADol    Donn Quiñonez MD

## 2024-01-16 NOTE — CONSULTS
Wound Care Consult     Visit Date: 1/16/2024      Patient Name: Chevy Benton         MRN: [id number]           YOB: 1945     Reason for Consult: Assessment of wounds        Wound History: Mr. Benton is a 77 yo male who presented as a direct admit from Dr Linder endovascular clinic for concerns of critical limb ischemia.            Wound Assessment:  Wound Incision Foot Dorsal foot;Right (Active)   Wound Image   01/16/24 1101   Site Assessment Black;Brown 01/16/24 1101   Janessa-Wound Assessment Dry 01/16/24 1101   Wound Length (cm) 2.5 cm 01/16/24 1101   Wound Width (cm) 12 cm 01/16/24 1101   Wound Surface Area (cm^2) 30 cm^2 01/16/24 1101   Wound Depth (cm) 0 cm 01/16/24 1101   Wound Volume (cm^3) 0 cm^3 01/16/24 1101   Sutures/Staple Line Approximated 01/16/24 1101   Drainage Description None 01/16/24 1101   Drainage Amount None 01/16/24 1101   Dressing ABD;Kerlix/rolled gauze 01/16/24 1101   Dressing Status Clean 01/16/24 1101       Wound 01/09/24 Moisture Associated Skin Damage Buttocks Right (Active)   Wound Image   01/16/24 1110   Site Assessment [place] 01/16/24 1110   Janessa-Wound Assessment Clean;Intact 01/16/24 1110   Drainage Description None 01/16/24 1110   Drainage Amount None 01/16/24 1110   Dressing Barrier film 01/16/24 1110   Dressing Changed New 01/15/24 2147   Dressing Status Clean;Dry 01/15/24 2147       Wound 01/10/24 Incision Arm Left;Proximal;Upper;Anterior (Active)   Janessa-Wound Assessment Clean;Dry;Intact 01/15/24 2147   Dressing Status Dry;Clean 01/15/24 2147       Wound Team Summary Assessment: Right foot  dry with flaky tissue. Tissue cool to touch. No drainage noted. Painted incision with betadine, covered with ABD and wrapped with kerlix.     Buttocks with spots of hypopigmented tissue. Appears to be healed wounds that hypopigmented. Tissue also moist from having a Mepilex on.      Recommendations:    Right foot: Paint with betadine on incision. Cover with ABD. Wrap  with Kerlix and change daily. Moisturizer to left foot and heel of right foot daily. Haresh Martha boots to bilateral heels while in bed.     Buttocks: Clean with cleansing cloths. Apply Critic Aid barrier cream BID and after every episode of incontinence.   Turn and position every 2 hours. EHOB Mattress and only one layer of linen. ( One sheet, one draw sheet and one incontinence pad) No briefs while in bed please.    Provider: If you agree with recommendations, please write orders for care.  Wound Team Plan: Wound team will not follow. Please re consult for any worsening of wounds.       SUZANNE GERHARDT, RN, BSN, CWOCN  1/16/2024  2:33 PM

## 2024-01-16 NOTE — CARE PLAN
Problem: Pain - Adult  Goal: Verbalizes/displays adequate comfort level or baseline comfort level  Outcome: Progressing   The patient's goals for the shift include      The clinical goals for the shift include pt will remain HDS    Over the shift, the patient did not make progress toward the following goals. Barriers to progression include . Recommendations to address these barriers include .

## 2024-01-16 NOTE — NURSING NOTE
Per hospital records from patient past admission to [facility] original date for COVID 19 + was 11/29/2023.

## 2024-01-16 NOTE — PROGRESS NOTES
Mr Benton was admitted from the [facility] outpatient clinic on 1/9/2024 for management of RLE CLI RC-VI.    Subjective Data:  He was seen this am in his room, NAD, AOx3, no complains of foot or leg pain but some numbness unchanged from before. UE swelling unchanged without pain. Reported arm feeling cold and stiff hands due to swelling. He reported he was feeling SOB this morning but is now feeling fine. He is on 2L NC, speaking in complete sentences. Pt nurse and wound care nurse were at the bedside. Pt's peripheral angioplasty scheduled for 1/16/24 with [facility]LS delayed due to SOB this morning. Pt agreeable to these plans. He still have some occasional wheezing and sputum unchanged from last week. Pt denied fever, chills, SOB or CP. He is scheduled for additional session of HD today.     Overnight Events:    No significant event reported      Objective Data:  Last Recorded Vitals:  Vitals:    01/15/24 1700 01/15/24 1900 01/15/24 2300 01/16/24 0748   BP: (!) 184/78 133/82 109/87 147/85   BP Location: Right arm  Right arm Right arm   Patient Position: Lying      Pulse: 77 76 77 78   Resp: 16 16 15 18   Temp: 36.3 °C (97.3 °F) 36 °C (96.8 °F) 35.6 °C (96.1 °F) 36 °C (96.8 °F)   TempSrc: Temporal Temporal Temporal Temporal   SpO2: 94% 97% 96% 96%   Weight:       Height:           Last Labs:  CBC - 1/16/2024:  6:27 AM  10.0 11.0 139    36.0      CMP - 1/16/2024:  6:27 AM  9.9 7.3 18 --- 0.5   5.5 3.4 17 115       Latest Reference Range & Units Most Recent   Heparin Unfractionated See Comment Below for Therapeutic Ranges IU/mL 0.5  1/16/24 06:27     TROPHS   Date/Time Value Ref Range Status   10/08/2023 09:00 PM 60 0 - 20 ng/L Final     Comment:     Previous result verified on 10/8/2023 2030 on specimen/case [id number] called with component [facility] for procedure Troponin I, High Sensitivity, Initial with value 58 ng/L.   10/08/2023 07:26 PM 58 0 - 20 ng/L Final     HGBA1C   Date/Time Value Ref Range Status   12/03/2023 07:38  AM 5.3 4.3 - 5.6 % Final     Comment:     American Diabetes Association guidelines indicate that patients with HgbA1c in the range 5.7-6.4% are at increased risk for development of diabetes, and intervention by lifestyle modification may be beneficial. HgbA1c greater or equal to 6.5% is considered diagnostic of diabetes.   04/25/2022 08:34 PM 6.5 4.3 - 5.6 % Final     Comment:     American Diabetes Association guidelines indicate that patients with HgbA1c in the range 5.7-6.4% are at increased risk for development of diabetes, and intervention by lifestyle modification may be beneficial. HgbA1c greater or equal to 6.5% is considered diagnostic of diabetes.      Last I/O:  I/O last 3 completed shifts:  In: 1000 (11.1 mL/kg) [I.V.:600 (6.6 mL/kg); Other:400]  Out: 2401 (26.5 mL/kg) [Other:2400; Stool:1]  Weight: 90.5 kg     Past Cardiology Tests (Last 3 Years):  Echo:  Transthoracic Echo (TTE) Complete 10/02/2023    Most recent Vascular Testing     Vascular US ankle brachial index (RAJ) without exercise 01/09/2024    CONCLUSIONS:  Right Lower PVR: Evidence of severe arterial occlusive disease in the right lower extremity at rest. Monophasic flow is noted in the right posterior tibial artery and right dorsalis pedis artery. Biphasic flow is noted in the right common femoral artery. Digits are noted to be amputated. Level of disease called off of Doppler AND pvr tracings.  Left Lower PVR: Evidence of severe arterial occlusive disease in the left lower extremity at rest. Decreased digital perfusion noted. Biphasic flow is noted in the left posterior tibial artery and left common femoral artery. Level of disease called off of Doppler and pvr tracings . AVG noted in left arm.    Additional Findings:  Due to patients immobility, study was performed sitting in wheel chair with legs  elevated.    Imaging & Doppler Findings:    RIGHT Lower PVR                Pressures Ratios  Right Posterior Tibial (Ankle) 0 mmHg     0.00  Right Dorsalis Pedis (Ankle)   255 mmHg  1.58  Right Digit (Great Toe)        0 mmHg    0.00    LEFT Lower PVR                Pressures Ratios  Left Posterior Tibial (Ankle) 255 mmHg  1.58  Left Dorsalis Pedis (Ankle)   255 mmHg  1.58  Left Digit (Great Toe)        0 mmHg    0.00    Right  Brachial Pressure 161 mmHg    [id number] Yany Juarez MD  Electronically signed by [id number] Yany Juarez MD on 1/10/2024 at 6:19:41 PM    XR foot left 3+ views 01/09/2024  XR foot right 3+ views 01/09/2024    Narrative  Interpreted By:  Liz Ruiz and Stephens Katherine  STUDY:  XR FOOT LEFT 3+ VIEWS; XR FOOT RIGHT 3+ VIEWS; ;  1/9/2024 8:25 pm    FINDINGS:  Bilateral feet, three views each.    Left foot:  No acute fracture or malalignment.  The joint spaces are maintained.  Plantar calcaneal spurring is present.  Vascular calcifications are present.    Right foot:  Transmetatarsal amputation changes are noted of the 1st through 5th  digits with sharp margination of the osseous stumps. There is  lobulated morphology of the soft tissue stump. No acute fracture or  malalignment. No erosive changes or osseous destruction.  Mild soft tissue swelling of the foot.  Prominent vascular calcifications noted.    Impression  1. No acute fracture or malalignment of the bilateral feet.  2. 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  3. Lobulated morphology of the right midfoot soft tissue stump with  stump breakdown/wound dehiscence not excluded. Correlate with  physical examination  4. Unremarkable radiographic evaluation of the left foot.    I personally reviewed the images/study and I agree with Cass Mancini DO's (radiology resident) findings as stated. This study  was interpreted at [facility],  [place], [place].      Inpatient Medications:  Scheduled medications   Medication Dose Route Frequency     allopurinol  100 mg oral Daily    amLODIPine  10 mg oral Daily    aspirin  81 mg oral Daily    atorvastatin  40 mg oral Nightly    B complex-vitamin C-folic acid  1 capsule oral Daily    gabapentin  100 mg oral q24h    insulin glargine  6 Units subcutaneous Nightly    insulin lispro  0-5 Units subcutaneous TID with meals    lidocaine  5 mL infiltration Once    metoprolol succinate XL  25 mg oral Daily    pantoprazole  40 mg oral Daily before breakfast    polyethylene glycol  17 g oral Daily    sennosides  1 tablet oral Nightly    sevelamer carbonate  800 mg oral TID with meals     PRN medications   Medication    acetaminophen    Or    acetaminophen    Or    acetaminophen    albuterol    alum-mag hydroxide-simeth    dextrose    docusate sodium    glucagon    heparin    hydrocortisone    ipratropium-albuteroL    methocarbamol    traMADol     Continuous Medications   Medication Dose Last Rate    heparin  0-4,500 Units/hr 929 Units/hr (01/15/24 1009)         Physical Exam:  Constitutional: NAD, awake/alert/oriented x3, pleasant, cooperative   Head/Neck: Neck supple,  No JVD, trachea midline, no bruits   Respiratory/Thorax: Patent airways, diminished breath sounds throughout, on RA. Lt chest wall HD catheter in place, On 2L NC, speaking in complete sentences but with mild dyspnea  Cardiovascular: irregular, rate and rhythm, no murmurs, normal S 1 and S 2   Gastrointestinal: obese,, Non-distended, soft, non-tender,+BS,    Skin: Warm and dry,   L Extremities: DP/PT pulses nonpalpable or Doppler able b/l. Capillary fill time sluggish to all digits b/l.  Skin temperature warm to cool from proximal to distal b/l. Right TMA site and surrounding tissue with necrotic eschar overlying, No active bleeding or drainage, noted with layer hyperkeratotic skin of the sole, no malodor, no ascending lymphangitis noted. B/L LE +2 edema.   UE: LUE AVF without thrill. L brachial pulse palpable. Radial Multiphasic. +3 edema entire left arm  and hand. Chronic hyperkeratotic skin changes overlying prior access site. Sensation and motor function intact.  RUE: +3 edema of the arm and hand, palpable radial pulses, motor and sensation intact, cool extremity   Neuro: non-focal, sensation very diminished bilateral foot, motor function intact  Psychological: Appropriate mood and behavior    Assessment/Plan   Chevy Benton is a 78 y.o. male with PMH of ESRD on HD TTS (Lt chest TDC, Hx of LUE Fistula Pseudoaneurysms), T2DM, HTN, HFrEF (TTE 10/23 EF 35-40%), A fib, PTA right lower extremity on 9/20/23 with subsequent TMA on 9/22/23, Non Occlusive DVT (Rt Subclavian), Aortic Root Dilation, MR, SSS s/p pacemaker, who presented as a direct admit from Dr Linder endovascular clinic for concerns of critical limb ischemia. Pt has not been taking Plavix since hospital discharge in late September due to the frequent hospitalization for SBO. He denied any CP, SOB, cough, fever. Since hospital admission Pt was seen by podiatry, vascular surgery and nephrology team. He had venogram on 1/10/24 by IR and fistulogram by vascular surgery on 1/12/24.    1/10 3V Foot Xray - No erosive changes or osseous destruction. Lobulated morphology of the right midfoot soft tissue stump with stump breakdown/wound dehiscence not excluded.     1/16/2024 - HDS, speaking in complete sentences but with mild dyspnea on 2L NC 96%, Hep therapeutic with assay 0.5 currently running at 9.3 ml    RLE CLTI  RC-VI  Rt TMA site with overlaying necrosis  - continue with ASA EC 81 mg daily and Hep gtt (Hep assay 0.3-0.7) Please closely FU on hep dosing and assay. Monitor for sign and symptom of bleeding  - Delayed peripheral angioplasty until tomorrow 1/17/2024 with Dr. Stuart Nguyen  - Pt to get additional session of HD, recommend to remove at least 2L. Nephrology updated on plan  - continue to hold Eliquis for now  - NPO at [facility] on 1/16/24  - TMA site care per podiatry recs (betadine soaked gauze, 4x4, ABD,  Kerlix)  - Ammonium lactate lotion or cream for dry legs and feet. Do not apply between toes.   - Truvue EHOB boot to prevent pressure ulcer on the heels. Heel offloading at all times.   - No need to stop ASA of Hep gtt for this procedure. Pt can have morning meds on 1/16 with small sips of water  - Case and plan discussed with attendings Dr. Stuart BASILIO arm edema/ AV fistula thrombosis  - Duplex US: patent AV fistula  - Venogram showed no significant stenosis  - 1/12/24 fistulogram by Dr. Michelle Barrios    Please do not hesitate to contact EVLS team with any questions or concerns.       Peripheral IV 01/09/24 22 G Right Antecubital (Active)   Site Assessment Clean;Dry;Intact 01/10/24 0000   Dressing Status Clean;Dry 01/10/24 0000   Number of days: 1       Hemodialysis Cath Double lumen Left Chest (Active)   Line Necessity Hemodialysis 01/10/24 1100   Number of days:        Code Status:  Full Code      JENIFFER Ann-CNP  Endovascular/Limb Salvage Service   Day: [id number]/Haiku/Night HHVI [id number]

## 2024-01-16 NOTE — CARE PLAN
The patient's goals for the shift include      The clinical goals for the shift include pt will remain HDS      Problem: Pain - Adult  Goal: Verbalizes/displays adequate comfort level or baseline comfort level  Outcome: Progressing     Problem: Safety - Adult  Goal: Free from fall injury  Outcome: Progressing     Problem: Discharge Planning  Goal: Discharge to home or other facility with appropriate resources  Outcome: Progressing     Problem: Chronic Conditions and Co-morbidities  Goal: Patient's chronic conditions and co-morbidity symptoms are monitored and maintained or improved  Outcome: Progressing     Problem: Diabetes  Goal: Achieve decreasing blood glucose levels by end of shift  Outcome: Progressing  Goal: Increase stability of blood glucose readings by end of shift  Outcome: Progressing  Goal: Decrease in ketones present in urine by end of shift  Outcome: Progressing  Goal: Maintain electrolyte levels within acceptable range throughout shift  Outcome: Progressing  Goal: Maintain glucose levels >70mg/dl to <250mg/dl throughout shift  Outcome: Progressing  Goal: No changes in neurological exam by end of shift  Outcome: Progressing  Goal: Learn about and adhere to nutrition recommendations by end of shift  Outcome: Progressing  Goal: Vital signs within normal range for age by end of shift  Outcome: Progressing  Goal: Increase self care and/or family involovement by end of shift  Outcome: Progressing  Goal: Receive DSME education by end of shift  Outcome: Progressing     Problem: Skin  Goal: Decreased wound size/increased tissue granulation at next dressing change  Outcome: Progressing  Goal: Participates in plan/prevention/treatment measures  Outcome: Progressing  Goal: Prevent/manage excess moisture  Outcome: Progressing  Goal: Prevent/minimize sheer/friction injuries  Outcome: Progressing  Goal: Promote/optimize nutrition  Outcome: Progressing  Goal: Promote skin healing  Outcome: Progressing     Problem:  Fall/Injury  Goal: Not fall by end of shift  Outcome: Progressing  Goal: Be free from injury by end of the shift  Outcome: Progressing  Goal: Verbalize understanding of personal risk factors for fall in the hospital  Outcome: Progressing  Goal: Verbalize understanding of risk factor reduction measures to prevent injury from fall in the home  Outcome: Progressing  Goal: Use assistive devices by end of the shift  Outcome: Progressing  Goal: Pace activities to prevent fatigue by end of the shift  Outcome: Progressing

## 2024-01-17 NOTE — NURSING NOTE
Report to Receiving RN:    Report To: Robina  Time Report Called: 2030  Hand-Off Communication: 2 L of fluid removed. BP remained stable 130/77, P 78. He is co pain at his bedsore site.  Complications During Treatment: No  Ultrafiltration Treatment: Yes  Medications Administered During Dialysis: No  Blood Products Administered During Dialysis: No  Labs Sent During Dialysis: No  Heparin Drip Rate Changes: No, Hep bag ran dry with 8 min of tx to go. Since we used needles and accessed fistula this was left off so hge would not bleed excessively.          Last Updated: 8:27 PM by PRAMOD STERN

## 2024-01-17 NOTE — NURSING NOTE
Report from Sending RN:    Report From: Rajesh ( RN)  Recent Surgery of Procedure: No  Baseline Level of Consciousness (LOC): A/O x 4  Oxygen Use: No  Type: none  Diabetic: No  Last BP Med Given Day of Dialysis: metoprolol 10 mg 0800 am, amlodipine 10 mg 0800 am.  Last Pain Med Given: none  Lab Tests to be Obtained with Dialysis: No  Blood Transfusion to be Given During Dialysis: No  Available IV Access: Yes  Medications to be Administered During Dialysis: No  Continuous IV Infusion Running: Yes, heparin drip 9.3 ml/hr  Restraints on Currently or in the Last 24 Hours: None  Hand-Off Communication: no acute overnight or morning events; vss; Pt did take morning medications; Pt will not need labs; Pt is a full code; Ela Morales RN.

## 2024-01-17 NOTE — CARE PLAN
Problem: Pain - Adult  Goal: Verbalizes/displays adequate comfort level or baseline comfort level  Outcome: Progressing     Problem: Safety - Adult  Goal: Free from fall injury  Outcome: Progressing     Problem: Discharge Planning  Goal: Discharge to home or other facility with appropriate resources  Outcome: Progressing     Problem: Chronic Conditions and Co-morbidities  Goal: Patient's chronic conditions and co-morbidity symptoms are monitored and maintained or improved  Outcome: Progressing     Problem: Diabetes  Goal: Achieve decreasing blood glucose levels by end of shift  Outcome: Progressing  Goal: Increase stability of blood glucose readings by end of shift  Outcome: Progressing  Goal: Decrease in ketones present in urine by end of shift  Outcome: Progressing  Goal: Maintain electrolyte levels within acceptable range throughout shift  Outcome: Progressing  Goal: Maintain glucose levels >70mg/dl to <250mg/dl throughout shift  Outcome: Progressing  Goal: No changes in neurological exam by end of shift  Outcome: Progressing  Goal: Learn about and adhere to nutrition recommendations by end of shift  Outcome: Progressing  Goal: Vital signs within normal range for age by end of shift  Outcome: Progressing  Goal: Increase self care and/or family involovement by end of shift  Outcome: Progressing  Goal: Receive DSME education by end of shift  Outcome: Progressing     Problem: Skin  Goal: Decreased wound size/increased tissue granulation at next dressing change  Outcome: Progressing  Goal: Participates in plan/prevention/treatment measures  Outcome: Progressing  Goal: Prevent/manage excess moisture  Outcome: Progressing  Goal: Prevent/minimize sheer/friction injuries  Outcome: Progressing  Goal: Promote/optimize nutrition  Outcome: Progressing  Goal: Promote skin healing  Outcome: Progressing     Problem: Fall/Injury  Goal: Not fall by end of shift  Outcome: Progressing  Goal: Be free from injury by end of the  shift  Outcome: Progressing  Goal: Verbalize understanding of personal risk factors for fall in the hospital  Outcome: Progressing  Goal: Verbalize understanding of risk factor reduction measures to prevent injury from fall in the home  Outcome: Progressing  Goal: Use assistive devices by end of the shift  Outcome: Progressing  Goal: Pace activities to prevent fatigue by end of the shift  Outcome: Progressing   The patient's goals for the shift include      The clinical goals for the shift include patient will remain absen of new injury by the end of the shift

## 2024-01-17 NOTE — PROGRESS NOTES
Assessment/Plan   Chevy Benton is a 78 y.o. male with PMH of ESRD on HD TTS (Lt chest TDC, Hx of LUE Fistula Pseudoaneurysms), DM, HTN, HFrEF (TTE 10/23 EF 35-40%), A fib, PTA right lower extremity on 9/20/23 with subsequent TMA right foot on 9/22/23, Non Occlusive DVT (Rt Subclavian), Aortic Root Dilation, MR, SSS s/p pacemaker who presented as a direct admit from endovascular clinic 1/9 for concerns of critical limb ischemia. Patient underwent PVR 1/9  with results concerning for severe ischemia and warranting admission for potential intervention. Patient is getting started on Heparin ggt, Consulting Podiatry and Vascular surgery (LUE fistula pseudoaneurysms). He remains  hemodynamically stable otherwise. S/p  Proximal LUE & Central Venogram 1/10 by IR with no evidence of central stenosis identified, very mild stenosis of the central aspect of the subclavian vein, no evidence of stenosis of the left jugular and subclavian confluence and Left upper extremity fistulogram 1/12 with vascular surgery. Tolerating HD via his fistual without complications.  Has Peripheral angioplasty scheduled for 1/16/24 with EVLS, now delayed due to dyspnea.  Received fluid removal with dialysis, breathing improving, planning to proceed with angioplasty on 1/17.    #Critical Limb Ischemia  #Hx of PTA Rt LE w/ subsequent TMA 09/23  #right foot wound (POA) in setting of dm and pvd.   -Start Heparin ggt, maintain therapeutic assays   -Endovascular surgery aware   -Podiatry consulted, will evaluate need for amputation after endovascular intervention   -No need for abx, low threshold to start if concerns for infection arise. Labs and Xrays pending at this time   -Tylenol and Tramadol PRN for pain   -c/w home ASA 81mg daily (Patient supposed to be on Plavix per notes from endovascular 9/2023 however per son at bedside was never able to get it started due to frequent hospitalizations. Called SNF to confirm, it is not on patient's list).  Will likely need it on dc if new angioplasty is performed, defer to endovascular to decide. Vascualar surg signing off. Currently, Peripheral angioplasty scheduled for 1/16/24 with EVLS. now delayed due to dyspnea.  Rescheduled for 1/17.  -c/w home Atorvastatin 10mg daily (patient above 73 YO. No need for high intensity)  -On home Eliquis 2.5mg bid, now switched to Heparin ggt for CTL   - wound care updated 1/7     # Acute dyspnea  # Hypoxic respiratory failure, acute on chronic  -Currently on 1 to 2 L with Worsening dyspnea noted, orthopnea.  Appears grossly overloaded.  Received volume removal from nephrology with improvement in his dyspnea  -Will continue to monitor and wean as tolerated  -Chest x-ray ordered  - I-S, Acapella, respiratory therapy.    #COVID - recovered  -Currently asymptomatic however requiting 1-2L NC   -Cont with Dexamethasone 6mg for 10 days (last day 12/14)   -Wean O2 as tolerated   - >10 days, considered recovered. Will see how to dc isolation.      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  - HD access via a L internal jugular catheter   -S/p central venogram and  Left upper extremity fistulogram  1/12:  No evidence of central stenosis identified,  Mild stenosis of the central aspect of the subclavian vein  -c/w home Sevelamer 800mg TID  -c/w home Nephro cap 1 tab daily   -Nephrology consulted for HD management   -Patient needs HD in a site other than LUE to avoid risk of further clot formation.     #HFrEF (EF 35-40% 10/23)   -Patient was on Metop succ 25mg daily (hasn't been requiring at SNF due to controlled rates). Will re start for GDMT when able      #Atrial Fibrillation  -Rate controlled  -Hold home Eliquis while on Heparin ggt     #HTN  -c/w home Amlodipine 10mg daily     #Hx of SBO  -c/w home Miralax/Senna scheduled. Docusate PRN      #DM w/ Neuropathy  -c/w home Glargine  -ISS with accuchecks  -c/w home Gabapentin 100mg at bedtime      Scheduled outpatient  "appointments in system:   Future Appointments   Date Time Provider Department [place]   1/17/2024 12:30 PM HD CHAIR [facility]     ---------------------------------------------------------------------------------------------------  Subjective   No new events.  His breathing is improved after receiving dialysis for fluid removal yesterday, more somnolent this morning but after some conversation wakes up but is interacting at his baseline.  No fevers or chills, no chest pain no dizziness.  Denies significant pain.  Wound care updated per wound care recommendations.   ---------------------------------------------------------------------------------------------------  Objective   Last Recorded Vitals  Blood pressure 117/88, pulse 89, temperature 35.5 °C (95.9 °F), temperature source Tympanic, resp. rate (!) 1, height 1.778 m (5' 10\"), weight 90.5 kg (199 lb 8 oz), SpO2 97 %.  Intake/Output last 3 Shifts:  I/O last 3 completed shifts:  In: 1600 (17.7 mL/kg) [I.V.:800 (8.8 mL/kg); Other:800]  Out: 4800 (53 mL/kg) [Other:4800]  Weight: 90.5 kg     Physical Exam  Constitutional:       Appearance: Normal appearance. He is obese.   HENT:      Head: Atraumatic.      Mouth/Throat:      Mouth: Mucous membranes are moist.   Eyes:      Conjunctiva/sclera: Conjunctivae normal.   Cardiovascular:      Rate and Rhythm: Normal rate and regular rhythm.   Pulmonary:      Comments: Magdiel lung sounds diminished  Abdominal:      General: There is distension.      Comments: Non-tender to palpation   Genitourinary:     Comments: Notes some bleeding around cath  Urine is clear  Musculoskeletal:         General: Swelling present.      Cervical back: Neck supple.      Comments: Ble with +2 pitting edema  B/l UE edematous  R foot TMA in dressing c/d/i    Skin:     General: Skin is warm and dry.   Neurological:      Mental Status: He is alert and oriented to person, place, and time.   Psychiatric:         Mood and Affect: Mood normal.    "      Behavior: Behavior normal.         Relevant Results  Lab Results   Component Value Date    WBC 9.7 01/17/2024    HGB 10.6 (L) 01/17/2024    HCT 34.4 (L) 01/17/2024    MCV 98 01/17/2024     (L) 01/17/2024      Lab Results   Component Value Date    GLUCOSE 93 01/17/2024    CALCIUM 9.4 01/17/2024     (L) 01/17/2024    K 4.7 01/17/2024    CO2 27 01/17/2024    CL 88 (L) 01/17/2024    BUN 49 (H) 01/17/2024    CREATININE 4.69 (H) 01/17/2024     Scheduled medications  allopurinol, 100 mg, oral, Daily  amLODIPine, 10 mg, oral, Daily  aspirin, 81 mg, oral, Daily  atorvastatin, 40 mg, oral, Nightly  B complex-vitamin C-folic acid, 1 capsule, oral, Daily  gabapentin, 100 mg, oral, q24h  insulin glargine, 6 Units, subcutaneous, Nightly  insulin lispro, 0-5 Units, subcutaneous, TID with meals  lidocaine, 5 mL, infiltration, Once  metoprolol succinate XL, 25 mg, oral, Daily  pantoprazole, 40 mg, oral, Daily before breakfast  polyethylene glycol, 17 g, oral, Daily  sennosides, 1 tablet, oral, Nightly  sevelamer carbonate, 800 mg, oral, TID with meals      Continuous medications  heparin, 0-4,500 Units/hr, Last Rate: 929 Units/hr (01/16/24 2051)      PRN medications  PRN medications: acetaminophen **OR** acetaminophen **OR** acetaminophen, albuterol, alum-mag hydroxide-simeth, dextrose, docusate sodium, glucagon, heparin, hydrocortisone, ipratropium-albuteroL, methocarbamol, traMADol    Donn Quiñonez MD

## 2024-01-17 NOTE — NURSING NOTE
Report to Receiving RN:    Report To: Narayan Hernandez RN)  Time Report Called: 1410 pm  Hand-Off Communication: vss- 157/79; HR 71;  ml; Pt hd tx ended 2 hours early d/t Pt had to go to cardiac cath lab for LIZA; nephrologist aware.  Complications During Treatment: No  Ultrafiltration Treatment: No  Medications Administered During Dialysis: No  Blood Products Administered During Dialysis: No  Labs Sent During Dialysis: No  Heparin Drip Rate Changes: No    Electronic Signatures:   (Signed Kathleen uQiroz RN)   Authored:    (Signed )   Authored:     Last Updated: 2:38 PM by KATHLEEN QUIROZ

## 2024-01-17 NOTE — PROGRESS NOTES
Subjective     Interval History: Chevy Benton    Patient seen on dialysis, no complaints, breathing stable          Current Facility-Administered Medications:     acetaminophen (Tylenol) tablet 650 mg, 650 mg, oral, q4h PRN, 650 mg at 01/16/24 0841 **OR** acetaminophen (Tylenol) oral liquid 650 mg, 650 mg, nasogastric tube, q4h PRN **OR** acetaminophen (Tylenol) suppository 650 mg, 650 mg, rectal, q4h PRN, Simone Acosta MD    albuterol 90 mcg/actuation inhaler 2 puff, 2 puff, inhalation, q6h PRN, Simone Acosta MD, 2 puff at 01/14/24 0850    allopurinol (Zyloprim) tablet 100 mg, 100 mg, oral, Daily, Simone Acosta MD, 100 mg at 01/17/24 0825    alum-mag hydroxide-simeth (Mylanta) 200-200-20 mg/5 mL oral suspension 30 mL, 30 mL, oral, 4x daily PRN, Simone Acosta MD    amLODIPine (Norvasc) tablet 10 mg, 10 mg, oral, Daily, Simone Acosta MD, 10 mg at 01/17/24 0825    ammonium lactate (Lac-Hydrin) 12 % lotion, , Topical, q1h PRN, Donn Quiñonez MD    aspirin chewable tablet 81 mg, 81 mg, oral, Daily, Simone Acosta MD, 81 mg at 01/17/24 0825    atorvastatin (Lipitor) tablet 40 mg, 40 mg, oral, Nightly, Simone Acosta MD, 40 mg at 01/16/24 2057    B complex-vitamin C-folic acid (Nephrocaps) capsule 1 capsule, 1 capsule, oral, Daily, Simone Acosta MD, 1 capsule at 01/17/24 0825    dextrose 50 % injection 25 g, 25 g, intravenous, q15 min PRN, Simone Acosta MD    docusate sodium (Colace) capsule 100 mg, 100 mg, oral, BID PRN, Simone Acosta MD, 100 mg at 01/16/24 1121    gabapentin (Neurontin) capsule 100 mg, 100 mg, oral, q24h, Simone Acosta MD, 100 mg at 01/16/24 2057    glucagon (Glucagen) injection 1 mg, 1 mg, intramuscular, q15 min PRN, Simone Acosta MD    heparin (porcine) injection 3,000-6,000 Units, 3,000-6,000 Units, intravenous, q4h PRN, Simone Acosta MD    heparin 25,000 Units in dextrose 5% 250 mL (100  Units/mL) infusion (premix), 0-4,500 Units/hr, intravenous, Continuous, Simone Acosta MD, Last Rate: 9.3 mL/hr at 01/16/24 2051, 929 Units/hr at 01/16/24 2051    hydrocortisone 2.5 % ointment, , Topical, BID PRN, Simone Acosta MD    insulin glargine (Lantus) injection 6 Units, 6 Units, subcutaneous, Nightly, Donn Quiñonez MD, 6 Units at 01/16/24 2056    insulin lispro (HumaLOG) injection 0-5 Units, 0-5 Units, subcutaneous, TID with meals, Simone Acosta MD, 1 Units at 01/14/24 1725    ipratropium-albuteroL (Duo-Neb) 0.5-2.5 mg/3 mL nebulizer solution 3 mL, 3 mL, nebulization, q6h PRN, Simone Acosta MD    lidocaine (Xylocaine) 10 mg/mL (1 %) injection 50 mg, 5 mL, infiltration, Once, Wilma Redding MD    methocarbamol (Robaxin) tablet 500 mg, 500 mg, oral, q8h PRN, Simone Acosta MD, 500 mg at 01/11/24 1439    metoprolol succinate XL (Toprol-XL) 24 hr tablet 25 mg, 25 mg, oral, Daily, Simone Acosta MD, 25 mg at 01/17/24 0825    pantoprazole (ProtoNix) EC tablet 40 mg, 40 mg, oral, Daily before breakfast, Simone Acosta MD, 40 mg at 01/17/24 0610    polyethylene glycol (Glycolax, Miralax) packet 17 g, 17 g, oral, Daily, Simone Acosta MD, 17 g at 01/13/24 0840    povidone-iodine (Betadine) 7.5 % soap 1 Application, 1 Application, Topical, Daily PRN, Donn Quiñonez MD    sennosides (Senokot) tablet 8.6 mg, 1 tablet, oral, Nightly, Simone Acosta MD, 8.6 mg at 01/16/24 2057    sevelamer carbonate (Renvela) tablet 800 mg, 800 mg, oral, TID with meals, Simone Acosta MD, 800 mg at 01/17/24 0825    traMADol (Ultram) tablet 50 mg, 50 mg, oral, q6h PRN, Simone Acosta MD, 50 mg at 01/16/24 2223    Physical Exam  Physical Exam  Heart S1 S2 RRR, Lungs CTA, + edema      Vital signs in last 24 hours:  Temp:  [35.5 °C (95.9 °F)-36.3 °C (97.3 °F)] 36 °C (96.8 °F)  Heart Rate:  [72-89] 77  Resp:  [1-16] 1  BP: ()/(60-88) 154/79          Labs:  Results from last 7 days   Lab Units 01/17/24  0459   WBC AUTO x10*3/uL 9.7   RBC AUTO x10*6/uL 3.51*   HEMOGLOBIN g/dL 10.6*   HEMATOCRIT % 34.4*     Results from last 7 days   Lab Units 01/17/24  0459   SODIUM mmol/L 131*   POTASSIUM mmol/L 4.7   CHLORIDE mmol/L 88*   CO2 mmol/L 27   BUN mg/dL 49*   CREATININE mg/dL 4.69*   CALCIUM mg/dL 9.4   PHOSPHORUS mg/dL 6.7*            Assessment/Plan   Patient seen and examined while on dialysis, recent events, labs, medications reviewed. Will follow overall management per primary team, and continue regular dialysis.  Of note, dialysis stopped early today and patient sent to cath lab.  Will reassess for additional UF 11/18.    Principal Problem:    Critical limb ischemia of both lower extremities (CMS/HCC)  Active Problems:    Non-pressure chronic ulcer of other part of right foot with fat layer exposed (CMS/HCC)    Critical limb ischemia of right lower extremity (CMS/HCC)    Subclavian vein stenosis, left        Corine Will MD  1/17/2024  3:29 PM

## 2024-01-17 NOTE — PROGRESS NOTES
Patient is not medically cleared for discharge today, will have an Angioplasty today and will need 48 hours of recovery ADOD is Friday vs. Saturday. Patient's wife would like patient to be returned to [facility]. I will continue to follow with a safe discharge plan.
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
## 2024-01-17 NOTE — POST-PROCEDURE NOTE
Physician Transition of Care Summary  Invasive Cardiovascular Lab    Procedure Date: 1/17/2024  Attending:    Annmarie Nguyen - Primary  Resident/Fellow/Other Assistant: Surgeon(s) and Role:     * Darnell Umanzor MD MPH - Assisting     * Raymon Jay MD - Assisting    Indications:   Pre-op Diagnosis     * Critical limb ischemia of both lower extremities (CMS/HCC) [I70.223]    Post-procedure diagnosis:   Post-op Diagnosis     * Critical limb ischemia of both lower extremities (CMS/HCC) [I70.223]    Procedure(s):     * Lower Extremity Angiogram      Procedure Findings:   Occluded AT and PT on right leg    Description of the Procedure:   RLE PT/CPA/LPA, AT/DP/pedal arch PTA  Balloon-related perforation managed with external and internal compression without adverse outcome  Perclose RFA for hemoastasis    Complications:   None    Stents/Implants:       Anticoagulation/Antiplatelet Plan:   ASA/Plavix until able to transition to Plavix/DOAC    Estimated Blood Loss:   10 mL    Anesthesia: Moderate Sedation Anesthesia Staff: No anesthesia staff entered.    Any Specimen(s) Removed:   No specimens collected during this procedure.    Disposition:   CICU for monitoring      Electronically signed by: Darnell Umanzor MD MPH, 1/17/2024 6:49 PM
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
## 2024-01-18 NOTE — PROGRESS NOTES
Chevy Benton   78 louise    @WT@  MRN/Room: [id number]/05/05-A    Subjective: no acute events overnight   This am on 2L NC   Complaining of SOB    Objective:     Meds:   allopurinol, 100 mg, Daily  aspirin, 81 mg, Daily  atorvastatin, 40 mg, Nightly  B complex-vitamin C-folic acid, 1 capsule, Daily  clopidogrel, 75 mg, Daily  gabapentin, 100 mg, q24h  [Held by provider] insulin glargine, 6 Units, Nightly  [Held by provider] insulin lispro, 0-5 Units, TID with meals  metoprolol succinate XL, 25 mg, Daily  pantoprazole, 40 mg, Daily before breakfast  polyethylene glycol, 17 g, Daily  sennosides, 1 tablet, Nightly  sevelamer carbonate, 800 mg, TID with meals         acetaminophen, 650 mg, q4h PRN   Or  acetaminophen, 650 mg, q4h PRN   Or  acetaminophen, 650 mg, q4h PRN  albuterol, 2 puff, q6h PRN  alum-mag hydroxide-simeth, 30 mL, 4x daily PRN  ammonium lactate, , q1h PRN  dextrose, 25 g, q15 min PRN  docusate sodium, 100 mg, BID PRN  glucagon, 1 mg, q15 min PRN  hydrocortisone, , BID PRN  ipratropium-albuteroL, 3 mL, q6h PRN  methocarbamol, 500 mg, q8h PRN  povidone-iodine, 1 Application, Daily PRN  traMADol, 50 mg, q6h PRN        Vitals:    01/18/24 1200   BP: 134/82   Pulse: 68   Resp: 16   Temp: 35.7 °C (96.3 °F)   SpO2: 93%          Intake/Output Summary (Last 24 hours) at 1/18/2024 1412  Last data filed at 1/17/2024 1846  Gross per 24 hour   Intake --   Output 10 ml   Net -10 ml       General appearance: no distress  Eyes: non-icteric  Skin: no apparent rash  Heart: regular  Lungs: CTA bilat no wheezing/crackles  Abdomen: soft, nt/nd  Extremities: +++ edema bilat  Alfonso  Neuro: No FND,asterixis  Access    Blood Labs:  Results for orders placed or performed during the hospital encounter of 01/09/24 (from the past 24 hour(s))   ACTIVATED CLOTTING TIME LOW   Result Value Ref Range    POCT Activated Clotting Time Low Range 358 (H) 89 - 169 sec   ACTIVATED CLOTTING TIME LOW   Result Value Ref Range    POCT  Activated Clotting Time Low Range 399 (H) 89 - 169 sec   POCT GLUCOSE   Result Value Ref Range    POCT Glucose 63 (L) 74 - 99 mg/dL   POCT GLUCOSE   Result Value Ref Range    POCT Glucose 81 74 - 99 mg/dL   POCT GLUCOSE   Result Value Ref Range    POCT Glucose 71 (L) 74 - 99 mg/dL   CBC   Result Value Ref Range    WBC 10.4 4.4 - 11.3 x10*3/uL    nRBC 0.3 (H) 0.0 - 0.0 /100 WBCs    RBC 3.42 (L) 4.50 - 5.90 x10*6/uL    Hemoglobin 10.1 (L) 13.5 - 17.5 g/dL    Hematocrit 31.8 (L) 41.0 - 52.0 %    MCV 93 80 - 100 fL    MCH 29.5 26.0 - 34.0 pg    MCHC 31.8 (L) 32.0 - 36.0 g/dL    RDW 19.7 (H) 11.5 - 14.5 %    Platelets 88 (L) 150 - 450 x10*3/uL   Renal Function Panel   Result Value Ref Range    Glucose 69 (L) 74 - 99 mg/dL    Sodium 132 (L) 136 - 145 mmol/L    Potassium 4.5 3.5 - 5.3 mmol/L    Chloride 91 (L) 98 - 107 mmol/L    Bicarbonate 27 21 - 32 mmol/L    Anion Gap 19 10 - 20 mmol/L    Urea Nitrogen 41 (H) 6 - 23 mg/dL    Creatinine 4.31 (H) 0.50 - 1.30 mg/dL    eGFR 13 (L) >60 mL/min/1.73m*2    Calcium 9.2 8.6 - 10.6 mg/dL    Phosphorus 6.0 (H) 2.5 - 4.9 mg/dL    Albumin 3.1 (L) 3.4 - 5.0 g/dL   Magnesium   Result Value Ref Range    Magnesium 2.06 1.60 - 2.40 mg/dL   B-type natriuretic peptide   Result Value Ref Range    BNP 1,525 (H) 0 - 99 pg/mL   POCT GLUCOSE   Result Value Ref Range    POCT Glucose 111 (H) 74 - 99 mg/dL   POCT GLUCOSE   Result Value Ref Range    POCT Glucose 79 74 - 99 mg/dL   Blood Gas Venous Full Panel   Result Value Ref Range    POCT pH, Venous 7.33 7.33 - 7.43 pH    POCT pCO2, Venous 54 (H) 41 - 51 mm Hg    POCT pO2, Venous 49 (H) 35 - 45 mm Hg    POCT SO2, Venous 70 45 - 75 %    POCT Oxy Hemoglobin, Venous 68.0 45.0 - 75.0 %    POCT Hematocrit Calculated, Venous 31.0 (L) 41.0 - 52.0 %    POCT Sodium, Venous 128 (L) 136 - 145 mmol/L    POCT Potassium, Venous 4.6 3.5 - 5.3 mmol/L    POCT Chloride, Venous 93 (L) 98 - 107 mmol/L    POCT Ionized Calicum, Venous 1.25 1.10 - 1.33 mmol/L    POCT  Glucose, Venous 83 74 - 99 mg/dL    POCT Lactate, Venous 1.2 0.4 - 2.0 mmol/L    POCT Base Excess, Venous 1.8 -2.0 - 3.0 mmol/L    POCT HCO3 Calculated, Venous 28.5 (H) 22.0 - 26.0 mmol/L    POCT Hemoglobin, Venous 10.4 (L) 13.5 - 17.5 g/dL    POCT Anion Gap, Venous 11.0 10.0 - 25.0 mmol/L    Patient Temperature 37.0 degrees Celsius    FiO2 24 %   CBC and Auto Differential   Result Value Ref Range    WBC 8.5 4.4 - 11.3 x10*3/uL    nRBC 0.4 (H) 0.0 - 0.0 /100 WBCs    RBC 3.27 (L) 4.50 - 5.90 x10*6/uL    Hemoglobin 10.0 (L) 13.5 - 17.5 g/dL    Hematocrit 30.8 (L) 41.0 - 52.0 %    MCV 94 80 - 100 fL    MCH 30.6 26.0 - 34.0 pg    MCHC 32.5 32.0 - 36.0 g/dL    RDW 19.9 (H) 11.5 - 14.5 %    Platelets 89 (L) 150 - 450 x10*3/uL    Neutrophils % 86.4 40.0 - 80.0 %    Immature Granulocytes %, Automated 0.5 0.0 - 0.9 %    Lymphocytes % 4.9 13.0 - 44.0 %    Monocytes % 7.2 2.0 - 10.0 %    Eosinophils % 0.9 0.0 - 6.0 %    Basophils % 0.1 0.0 - 2.0 %    Neutrophils Absolute 7.34 (H) 1.60 - 5.50 x10*3/uL    Immature Granulocytes Absolute, Automated 0.04 0.00 - 0.50 x10*3/uL    Lymphocytes Absolute 0.42 (L) 0.80 - 3.00 x10*3/uL    Monocytes Absolute 0.61 0.05 - 0.80 x10*3/uL    Eosinophils Absolute 0.08 0.00 - 0.40 x10*3/uL    Basophils Absolute 0.01 0.00 - 0.10 x10*3/uL   Renal Function Panel   Result Value Ref Range    Glucose 74 74 - 99 mg/dL    Sodium 131 (L) 136 - 145 mmol/L    Potassium 4.4 3.5 - 5.3 mmol/L    Chloride 91 (L) 98 - 107 mmol/L    Bicarbonate 28 21 - 32 mmol/L    Anion Gap 16 10 - 20 mmol/L    Urea Nitrogen 44 (H) 6 - 23 mg/dL    Creatinine 4.44 (H) 0.50 - 1.30 mg/dL    eGFR 13 (L) >60 mL/min/1.73m*2    Calcium 9.3 8.6 - 10.6 mg/dL    Phosphorus 6.5 (H) 2.5 - 4.9 mg/dL    Albumin 3.1 (L) 3.4 - 5.0 g/dL   POCT GLUCOSE   Result Value Ref Range    POCT Glucose 91 74 - 99 mg/dL   POCT GLUCOSE   Result Value Ref Range    POCT Glucose 89 74 - 99 mg/dL   Electrocardiogram, 12-lead PRN ACS symptoms   Result Value Ref  Range    Ventricular Rate 72 BPM    Atrial Rate 150 BPM    QRS Duration 80 ms    QT Interval 400 ms    QTC Calculation(Bazett) 438 ms    R Axis 32 degrees    T Axis -52 degrees    QRS Count 12 beats    Q Onset 227 ms    T Offset 427 ms    QTC Fredericia 425 ms   POCT GLUCOSE   Result Value Ref Range    POCT Glucose 107 (H) 74 - 99 mg/dL              ASSESSMENT:  Chevy Benton is a  78 y.o.  Year old , with PMHx of ESRD on HD TTS via LIJ TDC, T2DM and HFrEF who presented as a direct admite from endovascular clinic for concerns of critical limb ischemia s/p DAVID angioplasty c/b perforation of anterior tibial artery. Admitted to the CICU for further management. Nephrology following for ESRD management.     Patient underwent HD yesterday. On 2L of NC.     RECOMMENDATIONS:  - plan for iUF today for volume removal  - renal MVI  - phos binder TID with meals   - Anemia - at baseline   - will follow           Judy Cazares DO  Nephrology Fellow   Daytime / Weekend Renal Pager [phone]  After 7 pm Emergencies [phone] Pager [phone]

## 2024-01-18 NOTE — PROGRESS NOTES
Physical Therapy                 Therapy Communication Note    Patient Name: Chevy Benton  MRN: [id number]  Today's Date: 1/18/2024     Discipline: Physical Therapy    Missed Visit Reason: Missed Visit Reason: Other (Comment) (Per podiatry note, Pt to undergo surgical intervention (Right lower extremity debridement) tomorrow on 1/19. Will hold evaluation until after procedure.)    Missed Time: hold

## 2024-01-18 NOTE — PROGRESS NOTES
Mr Benton was admitted from the [facility] outpatient clinic on 1/9/2024 for management of RLE CLI RC-VI.  Hospital day 10.     Subjective Data:  He was seen this am in the CICU, appeared lethargic, NAD, AOx3, no complains of foot or leg pain. Reported generally feeling worn out and tired. PT denied feeling SOB, speaking in complete sentences, on 2L NC. Pt s/p peripheral angioplasty yesterday with Dr. Stuart Nguyen. He still have some occasional wheezing and sputum. Pt denied fever, chills, SOB or CP.    Overnight Events:    No significant event reported      Objective Data:  Last Recorded Vitals:  Vitals:    01/18/24 0800 01/18/24 0842 01/18/24 0900 01/18/24 1000   BP:    103/68   BP Location:       Pulse: 69  77 74   Resp: 15  13 15   Temp:  36.2 °C (97.2 °F)     TempSrc:  Temporal     SpO2: (!) 87%  94% 94%   Weight:       Height:           Last Labs:    Lab Results   Component Value Date    WBC 8.5 01/18/2024    HGB 10.0 (L) 01/18/2024    HCT 30.8 (L) 01/18/2024    MCV 94 01/18/2024    PLT 89 (L) 01/18/2024     Lab Results   Component Value Date    GLUCOSE 74 01/18/2024    CALCIUM 9.3 01/18/2024     (L) 01/18/2024    K 4.4 01/18/2024    CO2 28 01/18/2024    CL 91 (L) 01/18/2024    BUN 44 (H) 01/18/2024    CREATININE 4.44 (H) 01/18/2024         TROPHS   Date/Time Value Ref Range Status   10/08/2023 09:00 PM 60 0 - 20 ng/L Final     Comment:     Previous result verified on 10/8/2023 2030 on specimen/case [id number] called with component [facility] for procedure Troponin I, High Sensitivity, Initial with value 58 ng/L.   10/08/2023 07:26 PM 58 0 - 20 ng/L Final     BNP   Date/Time Value Ref Range Status   01/17/2024 10:49 PM 1,525 0 - 99 pg/mL Final     HGBA1C   Date/Time Value Ref Range Status   12/03/2023 07:38 AM 5.3 4.3 - 5.6 % Final     Comment:     American Diabetes Association guidelines indicate that patients with HgbA1c in the range 5.7-6.4% are at increased risk for development of diabetes, and intervention by  lifestyle modification may be beneficial. HgbA1c greater or equal to 6.5% is considered diagnostic of diabetes.   04/25/2022 08:34 PM 6.5 4.3 - 5.6 % Final     Comment:     American Diabetes Association guidelines indicate that patients with HgbA1c in the range 5.7-6.4% are at increased risk for development of diabetes, and intervention by lifestyle modification may be beneficial. HgbA1c greater or equal to 6.5% is considered diagnostic of diabetes.      Last I/O:  I/O last 3 completed shifts:  In: 2000 (20.8 mL/kg) [I.V.:1200 (12.5 mL/kg); Other:800]  Out: 4810 (50.1 mL/kg) [Other:4800; Blood:10]  Weight: 96 kg       Inpatient Medications:  Scheduled medications   Medication Dose Route Frequency    allopurinol  100 mg oral Daily    aspirin  81 mg oral Daily    atorvastatin  40 mg oral Nightly    B complex-vitamin C-folic acid  1 capsule oral Daily    clopidogrel  75 mg oral Daily    gabapentin  100 mg oral q24h    heparin (porcine)  5,000 Units subcutaneous q8h    [Held by provider] insulin glargine  6 Units subcutaneous Nightly    [Held by provider] insulin lispro  0-5 Units subcutaneous TID with meals    metoprolol succinate XL  25 mg oral Daily    pantoprazole  40 mg oral Daily before breakfast    polyethylene glycol  17 g oral Daily    sennosides  1 tablet oral Nightly    sevelamer carbonate  800 mg oral TID with meals     PRN medications   Medication    acetaminophen    Or    acetaminophen    Or    acetaminophen    albuterol    alum-mag hydroxide-simeth    ammonium lactate    dextrose    docusate sodium    glucagon    hydrocortisone    ipratropium-albuteroL    methocarbamol    povidone-iodine    traMADol     Continuous Medications   Medication Dose Last Rate       Physical Exam:  Constitutional: ill appearing elderly man, NAD, lethargic    Head/Neck: Neck supple,  No JVD, trachea midline, no bruits   Respiratory/Thorax: Patent airways, diminished breath sounds throughout, on RA. Lt chest wall HD catheter in  place, On 2L NC, speaking in complete sentences but with mild dyspnea  Cardiovascular: irregular, rate and rhythm, no murmurs, normal S 1 and S 2   Gastrointestinal: obese,, Non-distended, soft, non-tender,+BS,    Skin: Warm and dry,   Extremities: [place], RLE: Rt foot is warm to touch, monophasic Doppler signal of DP/PT and multiphasic signal of distal AT present, no c/o foot pain, Rt calf is soft no s&S compartment. ight TMA site and surrounding tissue with necrotic eschar overlying, No active bleeding or drainage, noted with layer hyperkeratotic skin of the sole, no malodor, no ascending lymphangitis noted. RLE +2 edema. Rt groin access site soft, nontender, no oozing/hematoma, slight ecchymosis, dressing dry and intact  LLE: Skin temperature warm to cool from proximal to distal, skin discoloration of the forefoot and toes 2-5 with sluggish cap refill to all toes (unchanged from prior assessment), no pain reported, no ulcer   DP/PT pulses nonpalpable or Doppler able, +2 edema, foot flaky and skin peeling.    RUE: LUE AVF without thrill. L brachial pulse palpable. Radial Multiphasic. +3 edema entire left arm and hand. Chronic hyperkeratotic skin changes overlying prior access site with dressing. Sensation and motor function intact.  RUE: +3 edema of the arm and hand, palpable radial pulses, motor and sensation intact, cool extremity   Neuro: awake/alert/oriented x3, non-focal, sensation very diminished bilateral foot, motor function intact  Psychological: Appropriate mood and behavior    Assessment/Plan   Chevy Benton is a 78 y.o. male with PMH of ESRD on HD TTS (Lt chest TDC, Hx of LUE Fistula Pseudoaneurysms), T2DM, HTN, HFrEF (TTE 10/23 EF 35-40%), A fib, PTA right lower extremity on 9/20/23 with subsequent TMA on 9/22/23, Non Occlusive DVT (Rt Subclavian), Aortic Root Dilation, MR, SSS s/p pacemaker, who presented as a direct admit from Dr Linder endovascular clinic for concerns of critical limb  ischemia. Pt has not been taking Plavix since hospital discharge in late September due to the frequent hospitalization for SBO. He denied any CP, SOB, cough, fever. Since hospital admission Pt was seen by podiatry, vascular surgery and nephrology team. He had venogram on 1/10/24 by IR and fistulogram by vascular surgery on 1/12/24.    1/10 3V Foot Xray - No erosive changes or osseous destruction. Lobulated morphology of the right midfoot soft tissue stump with stump breakdown/wound dehiscence not excluded.     1/18/2024 - NAD, HDS, speaking in complete sentences but with mild dyspnea on 2L NC 95%. Rt foot is warm to touch, monophasic Doppler signal of DP/PT and multiphasic signal of distal AT present, no c/o foot pain, Rt calf is soft no s&S compartment.       RLE CLTI  RC-VI  Rt TMA site with overlaying necrosis    - 1/17/24 s/p PTA of AT and PT, Reconstruction of planter arch. AT perforation, controlled by balloon tamponade and external tamponade with blood pressure cuff.  - continue with ASA EC 81 mg daily, start Plavix 75 mg daily  - No need to resume Hep gtt as Pt risk of AF related events < 0.5% per day. Start DVT prophylaxis   - TMA site care per podiatry recs (betadine soaked gauze, 4x4, ABD, Kerlix)  - Ammonium lactate lotion or cream for dry legs and feet. Do not apply between toes.   - Truvue EHOB boot to prevent pressure ulcer on the heels. Heel offloading at all times.   - OR tomorrow 1/19/24 with Podiatry for TMA site revision by Dr. Rodriguez  - NPO at MN  - Type and screen tomorrow AM  - Plt 89 declining since admission consider PF4  - Pt will need UF or HD via AVF today   - Remaining care per ICU team.   - Ok to transfer Pt to the floor   - Case and plan discussed with attendings Dr. Stuart Nguyen        Please do not hesitate to contact EVLS team with any questions or concerns.           Code Status:  Full Code      JENIFFER Ann-CNP  Endovascular/Limb Salvage Service   Day: [id number]/Haiku/Night HHVI  [id number]

## 2024-01-18 NOTE — PROGRESS NOTES
Chevy Benton is a 78 y.o. male on day 9 of admission presenting with Critical limb ischemia of both lower extremities (CMS/HCC).    Subjective   Pt was seen resting comfortably in bed and NAD. Pt notes minor pain to the right TMA site however is well managed with pain medication. Pt has no other pedal complaints and denies constitutional symptoms.         Physical Exam    Objective     REVIEW OF SYSTEMS  REVIEW OF SYSTEMS  GENERAL:  Negative for malaise, significant weight loss, fever  HEENT:  No changes in hearing or vision, no nose bleeds or other nasal problems and Negative for frequent or significant headaches  NECK:  Negative for lumps, goiter, pain and significant neck swelling  RESPIRATORY:  Negative for cough, wheezing and shortness of breath  CARDIOVASCULAR:  Negative for chest pain, leg swelling and palpitations  GI:  Negative for abdominal discomfort, blood in stools or black stools and change in bowel habits  :  Negative for dysuria, frequency and incontinence  MUSCULOSKELETAL:  Negative for joint pain or swelling, back pain, and muscle pain.  SKIN:  Dry gangrenous changes to right TMA site  PSYCH:  Negative for sleep disturbance, mood disorder and recent psychosocial stressors  HEMATOLOGY/LYMPHOLOGY:  Negative for prolonged bleeding, bruising easily, and swollen nodes.  ENDOCRINE:  Negative for cold or heat intolerance, polyuria, polydipsia and goiter  NEURO: Negative, denies any burning, tingling or numbness      Objective:   Vasc: DP and PT pulses are nonpalpable bilateral. Skin temperature is warm to cool proximal to distal bilateral.       Neuro:  Light touch is absent to the foot bilateral.  Protective sensation is absent to the foot when tested with the 5.07 SWM bilateral.       Derm: S/P right TMA (DOS 9/22/23). Intact nonmobile necrotic eschar overlying TMA site. The wound itself measures 2.1x3.4 cm. No active bleeding or drainage noted. No purulent drainage noted. No rising  "lymphangitis or lymphadenopathy.     MSK: S/P right open TMA (DOS 9/22/23). 4/5 MSK strength to bilateral lower foot in all four pedal compartments    Last Recorded Vitals  Blood pressure 103/68, pulse 74, temperature 36.2 °C (97.2 °F), temperature source Temporal, resp. rate 15, height 1.778 m (5' 10\"), weight 96 kg (211 lb 10.3 oz), SpO2 94 %.    Intake/Output last 3 Shifts:  I/O last 3 completed shifts:  In: 2000 (20.8 mL/kg) [I.V.:1200 (12.5 mL/kg); Other:800]  Out: 4810 (50.1 mL/kg) [Other:4800; Blood:10]  Weight: 96 kg     Relevant Results           Assessment/Plan   Principal Problem:    Critical limb ischemia of both lower extremities (CMS/HCC)  Active Problems:    Non-pressure chronic ulcer of other part of right foot with fat layer exposed (CMS/HCC)    Critical limb ischemia of right lower extremity (CMS/HCC)    Subclavian vein stenosis, left    # Chronic non pressure ulcer with unspecified severity RLE L97.519  # Disruption of external operation wound T81.31xA  # DM II w diabetic polyneuropathy E11.42  # Atherosclerosis of native arteries of RLE w ulcer I70.235     Plan:  - Patient was seen at bedside and is resting comfortably.   All questions and concerns were answered and addressed to the pt's apparent satisfaction.  - Labs reviewed.  - Imaging reviewed.   - Continue abx therapy per ID/Primary  - Continue pain management per Medicine/Primary  - Appreciate EVLS recs and intervention. Podiatric surgical intervention (Right lower extremity debridement) planned for tomorrow. Discussed procedure in detail with pt. Will discuss procedure and obtain consent from pt's POA via phone call  - NPO after midnight  - Continue anticoag per EVLS  - Podiatry will continue to monitor pt while in house  - Dressings: betadine dsd  - All questions and concerns were answered and addressed to the patient and patient's family apparent satisfaction  - NWB to RLE  - Treatment and plan was discussed with attending Dr. Blas " MERT Rodriguez     Case to be discussed with attending, A&P above reflects a tentative plan. Please await for the final signature from the attending physician on service.     John Sullivan DPM PGY-3  Podiatric Medicine & Surgery  Please Haiku message me with any questions or concerns.        John Sullivan DPM

## 2024-01-18 NOTE — ANESTHESIA PREPROCEDURE EVALUATION
Patient: Chevy Benton    Procedure Information       Date/Time: 01/19/24 1050    Procedure: Debridement Foot (Right)    Location: [facility] OR 07 / [facility]    Surgeons: Roopa Rodriguez DPM            Relevant Problems   Anesthesia (within normal limits)      Cardiovascular   (+) Aortic root dilation (CMS/HCC)   (+) Atherosclerosis of native arteries of left leg with ulceration of heel and midfoot (CMS/HCC)   (+) Atherosclerosis of native artery of right lower extremity with gangrene (CMS/HCC)   (+) Coronary artery disease involving native coronary artery of native heart with angina pectoris (CMS/HCC)   (+) Critical limb ischemia of both lower extremities (CMS/HCC)   (+) Critical limb ischemia of right lower extremity (CMS/HCC)   (+) Embolism and thrombosis of arteries of the lower extremities (CMS/[facility])   (+) Hyperlipidemia LDL goal <70   (+) 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 (CMS/[facility])   (+) Longstanding persistent atrial fibrillation (CMS/HCC)   (+) Non-rheumatic mitral regurgitation   (+) Nonrheumatic mitral (valve) insufficiency   (+) PAD (peripheral artery disease) (CMS/[facility])   (+) Pacemaker ([employer] - VVIR)   (+) Peripheral vascular disorder due to diabetes mellitus (CMS/HCC)   (+) Pulmonary HTN (CMS/HCC)   (+) Sick sinus syndrome (CMS/HCC)   (+) Subclavian vein stenosis, left      Endocrine   (+) DM2 (diabetes mellitus, type 2) (CMS/HCC)   (+) Thyroid enlarged      /Renal   (+) Hepatomegaly, not elsewhere classified   (+) 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 Left ventricular systolic function is moderately decreased with a 35-40% estimated ejection fraction.   2. Spectral Doppler shows an abnormal pattern of left ventricular diastolic filling.   3. There is mildly reduced right ventricular systolic function.   4. The left atrium is severely dilated.   5. The right atrium is severely dilated.   6. Moderately elevated right ventricular systolic pressure.   7. Aortic valve sclerosis.   8. The patient is in atrial fibrillation which may influence the estimate of left ventricular function and transvalvular flows.   9. There is global hypokinesis of the left ventricle with minor regional variations.    Clinical information reviewed:    Allergies  Meds               NPO Detail:  No data recorded     Physical Exam    Airway  Mallampati: IV  TM distance: >3 FB     Cardiovascular    Dental    Pulmonary    Abdominal        Anesthesia Plan    History of general anesthesia?: no  History of complications of general anesthesia?: no    ASA 3     MAC   (Chart review only - 1/18/24 - Gaug CAA)  The patient is not a current smoker.    intravenous induction   Postoperative administration of opioids is intended.  Anesthetic plan and risks discussed with patient.  Use of blood products discussed with patient who.    Plan discussed with CAA.

## 2024-01-18 NOTE — PROGRESS NOTES
Chevy Benton is a 78 y.o. male on day 9 of admission presenting with Critical limb ischemia of both lower extremities (CMS/HCC).    Subjective   Non acute events overnight. Patient Aox2 this AM. Denies CP, SOB, n/v.     Hospital Course:  Chevy Benton is a 78 y.o. male with PMH of ESRD on HD TTS (Lt chest TDC, Hx of LUE Fistula Pseudoaneurysms), DM, HTN, HFrEF (TTE 10/23 EF 35-40%), PTA right lower extremity on 9/20/23 with subsequent TMA right foot on 9/22/23, Non Occlusive DVT (Rt Subclavian), Aortic Root Dilation, MR, chronic afib and SSS s/p pacemaker who presents DAVID angioplasty C/b perforation of AT artery. Patient admitted to CICU for further monitoring. Additionally patient noted to be overloaded on examination, on 2L with diffuse anasarca more pronounced in upper extremities. He is anuric at baseline and is on TTS dialysis. Patient was stable overnight with no signs/symptoms of compartment syndrome. Aspirin was held on admission but resumed this AM after discussing with endovascular and since patient is going to surgery with podiatry tomorrow and since anticoagulation (Eliquis/Heparin gtt) is being held. Plavix was resumed per endovascular recs.     To do:  -Monitor for signs/symptoms of compartment syndrome  -Surgery with podiatry tomorrow  -NPO at midnight  -Continue Aspirin & Plavix per endovascular recs  -Continue to hold Eliquis/Hepoarin gtt  -Hypoglycemic this AM, resume insulin as appropriate if eating  -Ultrafiltration with nephrology this AM to remove 2L given patient is hypervolemic  -Discontinue amlodipine given pt HFrEF  -Consider Isordil/Hydralazine after procedure  -Careful with BP monitoring given radial cuff  -Discuss resuming Eliquis with endovascular after podiatry surgery  -Follow up PF4 ab for c/f HIT (High probability on 4T score)    Objective   24 Hour Vitals  Temp:  [35.8 °C (96.4 °F)-36.2 °C (97.2 °F)] 36.2 °C (97.2 °F)  Heart Rate:  [] 70  Resp:  [13-26] 22  BP:  ()/() 92/75    Temp (24hrs), Av.9 °C (96.7 °F), Min:35.8 °C (96.4 °F), Max:36.2 °C (97.2 °F)     24 hour Intake/Output    Intake/Output Summary (Last 24 hours) at 2024 0944  Last data filed at 2024 1846  Gross per 24 hour   Intake 800 ml   Output 10 ml   Net 790 ml          Labs  CBC  Results from last 72 hours   Lab Units 249   WBC AUTO x10*3/uL 8.5 10.4 9.7   HEMOGLOBIN g/dL 10.0* 10.1* 10.6*   HEMATOCRIT % 30.8* 31.8* 34.4*   PLATELETS AUTO x10*3/uL 89* 88* 102*        BMP  Results from last 72 hours   Lab Units 24   SODIUM mmol/L 131* 132* 131*   POTASSIUM mmol/L 4.4 4.5 4.7   CHLORIDE mmol/L 91* 91* 88*   BUN mg/dL 44* 41* 49*   CREATININE mg/dL 4.44* 4.31* 4.69*   MAGNESIUM mg/dL  --  2.06  --    PHOSPHORUS mg/dL 6.5* 6.0* 6.7*       Medications   Scheduled Medications  allopurinol, 100 mg, oral, Daily  aspirin, 81 mg, oral, Daily  atorvastatin, 40 mg, oral, Nightly  B complex-vitamin C-folic acid, 1 capsule, oral, Daily  clopidogrel, 75 mg, oral, Daily  gabapentin, 100 mg, oral, q24h  [Held by provider] insulin glargine, 6 Units, subcutaneous, Nightly  [Held by provider] insulin lispro, 0-5 Units, subcutaneous, TID with meals  metoprolol succinate XL, 25 mg, oral, Daily  pantoprazole, 40 mg, oral, Daily before breakfast  polyethylene glycol, 17 g, oral, Daily  sennosides, 1 tablet, oral, Nightly  sevelamer carbonate, 800 mg, oral, TID with meals     Continuous Medications    PRN Medications  PRN medications: acetaminophen **OR** acetaminophen **OR** acetaminophen, albuterol, alum-mag hydroxide-simeth, ammonium lactate, dextrose, docusate sodium, glucagon, hydrocortisone, ipratropium-albuteroL, methocarbamol, povidone-iodine, traMADol       Physical Exam  Constitutional: in NAD, Aox2 (self and place)  HEENT: sclerae anicteric, EOM grossly intact  CV: Irregular rhythm, no murmurs noted, RIJ and bilateral  "CFA access site intact and clean, no hematoma,    Pulm: CTAB, no increased WOB, on 4L NC (home baseline)  GI: abd soft, NT, ND  Skin: warm and dry  Ext: Anasarca bilateral upper extremities, LUE extremity fistula, Right midline, Right AT and PT doplerable, no paresthesia/pain/tenderness/erythema in RLE, +3 bilateral peripheral edema up to the knees  Neuro: alert and conversant  Psych: affect appropriate    Last Recorded Vitals  Blood pressure 92/75, pulse 70, temperature 36.2 °C (97.2 °F), temperature source Temporal, resp. rate 22, height 1.778 m (5' 10\"), weight 96 kg (211 lb 10.3 oz), SpO2 95 %.  Intake/Output last 3 Shifts:  I/O last 3 completed shifts:  In: 2000 (20.8 mL/kg) [I.V.:1200 (12.5 mL/kg); Other:800]  Out: 4810 (50.1 mL/kg) [Other:4800; Blood:10]  Weight: 96 kg            Assessment/Plan   Principal Problem:    Critical limb ischemia of both lower extremities (CMS/HCC)  Active Problems:    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 male with PMH of ESRD on HD TTS (Lt chest TDC, Hx of LUE Fistula Pseudoaneurysms), DM, HTN, HFrEF (TTE 10/23 EF 35-40%), PTA right lower extremity on 9/20/23 with subsequent TMA right foot on 9/22/23, Non Occlusive DVT (Rt Subclavian), Aortic Root Dilation, MR, chronic afib and SSS s/p pacemaker who presents DAVID angioplasty C/b perforation of AT artery. Patient admitted to CICU for further monitoring. Additionally patient noted to be overloaded on examination, on 2L with diffuse anasarca more pronounced in upper extremities. He is anuric at baseline and is on TTS dialysis. Patient was stable overnight with no signs/symptoms of compartment syndrome.    Updates 1/18:  -No signs/symptoms of compartment syndrome  -Surgery with podiatry tomorrow  -NPO at midnight  -Continue Aspirin & Plavix per " endovascular recs  -Continue to hold Eliquis/Hepoarin gtt  -Hypoglycemic this AM, resume insulin as appropriate if eating  -Ultrafiltration with nephrology this AM to remove 2L  -Discontinue amlodipine given pt HFrEF  -Consider Isordil/Hydralazine after procedure  -Careful with BP monitoring given radial cuff  -Discuss resuming Eliquis with endovascular after podiatry surgery  -Follow up PF4 ab for c/f HIT (High probability on 4T score)    NEUROLOGIC  ::Aox2 (self and place) unclear baseline  -Delirium precuations     CARDIOVASCULAR  #PVD S/p PTA to RLE c/b perforation of AT  #hx of right tma  #hx of rt subclavian dvt  #mild stenosis of left subclavian vein  #Hfref  #afib  #DM with neuropathy  #HTN  #Hx of right TMA  ::S/p Plavix load  ::Dopplerable pulses in LE  ::Podiatry consulted, will evaluate need for amputation after endovascular intervention   Plan:  -Q1h neuro checks in RLE  -Cont plavix tomorrow and talk about resumption of eliquis tomorrow pending groin site and no further signs of bleeding  -Cont home amlodipine  -Cont home metoprolol succ 25mg  -Uptitrate GDMT as tolerated  -Cont home atorva  -Cont home gabapentin  -Insulin glargine 6 units nightly  -SSI     PULMONARY  #AHRF  #2/2 to ESRD and Hfref  #COVID  ::S/p 10 day course of dex (ended 1/14)  Plan:  -Continue with dialysis regimen  -Wean and titrate oxygen as needed  -Cont IS, pulm hygeine     RENAL/  #ESRD with dialysis TTS  #LUE fistula occlusion s/p venoplasty  Plan:  -Cont with dialysis schedule  -Monitor for signs of need for urgent dialysis  -Cont home renvela  -Cont home nephro caps     GASTROINTESTINAL  Gerd  -Cont home pantoprazole     ENDOCRINE  NTD     MSK  #hx of gout  -cont home allopurinol     HEME/ONC  #Anemia of chronic disease  ::At baseline hgb  ::2/2 to ESRD  Plan:  -CTM     INFECTIOUS DISEASE  #Covid infection s/p dexamethasone  :;Afebrile, no leukocytosis  ::xray with pulmonary edema  Plan:  -Continue to monitor for worsening  signs or sx of pneumonia     F: Carb diet, NPO midnight   E: K > 4, Mg > 2  N: Renal diet  A: PIV, left subclaavian trialysos  DVT ppx: Heparin SubQ  GI ppx: Protonix    Code Status: Full code   Surrogate Medical Decision-maker: Spouse ()       Christopher Hernandez MD

## 2024-01-19 NOTE — PROGRESS NOTES
Patient receives HD on-site at [facility]. SW sent updated HD clinicals via [employer]Trade[employer] at this time. ADOD not until next week. Will require precert to return to [facility] as he is a skilled patient.    Naye Moreno LCSW

## 2024-01-19 NOTE — PROGRESS NOTES
[facility] Attending Shared Visit Note    This is a shared visit. Please see Advanced Practice Provider's encounter note for additional details.      Briefly, 78M from [place] with HTN, HLD, HFrEF (EF 35-40%), chronic AF/SSS s/p PPM, ESRD on HD via TDC, prior TMA, PAD with CLI s/p peripheral intervention on 1/17 c/b arterial perforation (stabilized), plan for podiatric surgery 1/19.    Exam: Diffuse anasarca, TDC in palce. Leg wrapped    Plan:   - OR today for R foot debridement.   - appreciate podiatry recs  - apprecaiate nephrology for aggressive HD  - appreciate endovascular    Dispo: needs aggressive volume removal    Jimmy Glover MD    Objective   MEDICATIONS  Infusions:     Scheduled:  allopurinol, 100 mg, Daily  aspirin, 81 mg, Daily  atorvastatin, 40 mg, Nightly  B complex-vitamin C-folic acid, 1 capsule, Daily  clopidogrel, 75 mg, Daily  gabapentin, 100 mg, q24h  [Held by provider] insulin glargine, 6 Units, Nightly  [Held by provider] insulin lispro, 0-5 Units, TID with meals  metoprolol succinate XL, 25 mg, Daily  pantoprazole, 40 mg, Daily before breakfast  polyethylene glycol, 17 g, Daily  sennosides, 1 tablet, Nightly  sevelamer carbonate, 800 mg, TID with meals      PRN:  acetaminophen, 650 mg, q4h PRN   Or  acetaminophen, 650 mg, q4h PRN   Or  acetaminophen, 650 mg, q4h PRN  albuterol, 2 puff, q6h PRN  alum-mag hydroxide-simeth, 30 mL, 4x daily PRN  ammonium lactate, , q1h PRN  dextrose, 25 g, q15 min PRN  docusate sodium, 100 mg, BID PRN  glucagon, 1 mg, q15 min PRN  hydrocortisone, , BID PRN  ipratropium-albuteroL, 3 mL, q6h PRN  methocarbamol, 500 mg, q8h PRN  povidone-iodine, 1 Application, Daily PRN  traMADol, 50 mg, q6h PRN      Prior to Admission Meds:  Facility-Administered Medications Prior to Admission   Medication Dose Route Frequency Provider Last Rate Last Admin    epoetin dane (Epogen,Procrit) injection 5,000 Units  5,000 Units intravenous Once Aruna Vazquez PA-C          Medications Prior to Admission   Medication Sig Dispense Refill Last Dose    albuterol 90 mcg/actuation inhaler Inhale 2 puffs every 6 hours if needed for shortness of breath.       allopurinol (Zyloprim) 100 mg tablet Take 1 tablet (100 mg) by mouth once daily.       amLODIPine (Norvasc) 10 mg tablet Take 1 tablet (10 mg) by mouth once daily.       apixaban (Eliquis) 2.5 mg tablet Take 1 tablet (2.5 mg) by mouth 2 times a day.       aspirin (Adult Low Dose Aspirin) 81 mg EC tablet Take 1 tablet (81 mg) by mouth once daily.       atorvastatin (Lipitor) 10 mg tablet Take 1 tablet (10 mg) by mouth once daily.       cinacalcet (Sensipar) 30 mg tablet Take 1 tablet (30 mg) by mouth once daily with a meal. Take with food or shortly afer a meal. Swallow tablet whole; do not break or divide. 30 tablet 0     cloNIDine (Catapres) 0.2 mg tablet Take 1 tablet (0.2 mg) by mouth every 12 hours if needed.       clopidogrel (Plavix) 75 mg tablet Take 1 tablet (75 mg) by mouth once daily.       colchicine, gout, 0.6 mg tablet Take 0.5 tablets (0.3 mg) by mouth 2 times a week.       docusate sodium (Colace) 100 mg capsule Take 1 capsule (100 mg) by mouth once daily.       epoetin dane (Epogen,Procrit) 3,000 unit/mL injection Inject 1 mL (3,000 Units) under the skin 3 times a week. 1 mL 11     gabapentin (Neurontin) 100 mg capsule Take 1 capsule (100 mg) by mouth once daily on dialysis days.       ipratropium (Atrovent) 21 mcg (0.03 %) nasal spray Administer 2 sprays into each nostril every 12 hours.       lanthanum (Fosrenol) 750 mg chewable tablet Chew 1 tablet (750 mg) 3 times a day with meals.       lidocaine (ZTlido) 1.8 % adhesive patch,medicated Apply topically. Apply patch 12 hours on 12 hours off       loratadine (Claritin) 10 mg tablet TAKE 1 TABLET (10 MG) BY MOUTH EVERY OTHER DAY IF NEEDED FOR ALLERGIES. 90 tablet 0     menthol-zinc oxide (Calmoseptine - Risamine) 0.44-20.6 % ointment Apply 1 Application topically  once daily. Apply to sacrum 1 g 0     metoprolol succinate XL (Toprol-XL) 25 mg 24 hr tablet TAKE 1 TABLET (25 MG) BY MOUTH DAILY DO NOT CRUSH OR CHEW 90 tablet 0     midodrine (Proamatine) 10 mg tablet Take 1 tablet (10 mg) by mouth 2 times a day.       naphazoline-pheniramine (Naphcon-A) 0.025-0.3 % ophthalmic solution Administer 1 drop into both eyes 4 times a day. 10 mL 0     pantoprazole (ProtoNix) 40 mg EC tablet TAKE 1 TABLET (40 MG) BY MOUTH ONCE DAILY IN THE MORNING. TAKE BEFORE MEALS. DO NOT CRUSH, CHEW, OR SPLIT. 90 tablet 0     polyethylene glycol (Miralax) 17 gram packet Take 17 g by mouth once daily.       traMADol (Ultram) 50 mg tablet Take 0.5 tablets (25 mg) by mouth every 12 hours if needed for severe pain (7 - 10). 30 tablet 1     Triphrocaps 1 mg capsule Take 1 capsule by mouth once daily.          Vitals:    Most Recent Range Past 24hrs   FiO2   No data recorded         1/19/2024     7:35 AM 1/19/2024     5:44 AM 1/19/2024     4:05 AM 1/18/2024    10:44 PM 1/18/2024    10:00 PM 1/18/2024     9:30 PM 1/18/2024     9:00 PM   Vitals   Systolic 90  112 108 94 90 140   Diastolic 60  78 77 48 44 124   Heart Rate 72  75 78 75 81 76   Temp 36.5 °C (97.7 °F)  36.5 °C (97.7 °F)       Resp 17  20 18 14 19 15   Weight (lb)  221.6        BMI  31.8 kg/m2        BSA (m2)  2.23 m2          Wt Readings from Last 5 Encounters:   01/19/24 101 kg (221 lb 9.6 oz)   10/08/23 95 kg (209 lb 7 oz)   10/05/23 98.3 kg (216 lb 11.4 oz)     INTAKE/OUTPUT:  I/O last 3 completed shifts:  In: 840 (8.4 mL/kg) [P.O.:240; I.V.:600 (6 mL/kg)]  Out: - (0 mL/kg)   Weight: 100.5 kg    CHEST: Unlabored, Diminished  CV:  Atrial fibrillation  NEURO:   RASS: Alert and calm  CAM: Positive  LOC: Alert  Cognition: Follows commands, Poor attention/concentration  GCS:      DATA:  CMP:  Recent Labs     01/19/24  0631 01/18/24  0349 01/17/24  2249 01/17/24  0459 01/16/24  0627 01/15/24  0622 01/14/24  0604 01/13/24  0521 10/05/23  1500  10/03/23  0640 10/02/23  0441 10/01/23  0152 09/27/23  0751 09/26/23  0631 09/25/23  1608 09/24/23  0847 09/23/23  0630 09/22/23  0216   * 131* 132* 131* 129* 128* 130* 132*   < > 136   < > 138   < > 134*   < > 136   < > 133*   K 5.1 4.4 4.5 4.7 4.5 4.5 4.2 3.8   < > 4.1   < > 3.8   < > 4.6   < > 4.2   < > 4.6   CL 91* 91* 91* 88* 90* 89* 90* 92*   < > 93*   < > 94*   < > 92*   < > 95*   < > 93*   CO2 28 28 27 27 30 27 28 31   < > 25   < > 27   < > 25   < > 25   < > 25   ANIONGAP 19 16 19 21* 14 17 16 13   < > 22*   < > 21*   < > 22*   < > 20   < > 20   BUN 54* 44* 41* 49* 42* 60* 53* 43*   < > 55*   < > 23   < > 61*   < > 32*   < > 38*   CREATININE 5.34* 4.44* 4.31* 4.69* 4.07* 4.69* 4.93* 4.02*   < > 8.05*   < > 4.58*   < > 9.79*   < > 5.84*   < > 6.54*   EGFR 10* 13* 13* 12* 14* 12* 11* 14*   < > 6*   < > 12*   < >  --   --   --   --   --    MG  --   --  2.06  --   --   --   --   --   --  2.89*  --  2.36  --  2.39  --  2.15  --  2.16    < > = values in this interval not displayed.     Recent Labs     01/19/24  0631 01/18/24  0349 01/17/24  2249 01/17/24  0459 01/16/24  0627 01/15/24  0622 01/14/24  0604 01/13/24  0521 01/09/24 2135 10/08/23  1926 10/07/23  0719 10/02/23  0441 10/01/23  0152 09/30/23  1616 09/30/23  1616 09/25/23  1608   ALBUMIN 3.3* 3.1* 3.1* 3.2* 3.4 3.2* 3.2* 3.2*   < > 3.8 3.1*  3.2*   < > 3.3*   < > 3.3* 3.4   ALT  --   --   --   --   --   --   --   --   --  17 17  --  27  --  27 21   AST  --   --   --   --   --   --   --   --   --  18 18  --  21  --  23 18   BILITOT  --   --   --   --   --   --   --   --   --  0.5 0.4  --  0.7  --  0.6 0.7    < > = values in this interval not displayed.     CBC:  Recent Labs     01/19/24  0631 01/18/24  0349 01/17/24  2249 01/17/24  0459 01/16/24  0627 01/15/24  0622 01/14/24  0604 01/13/24  0521   WBC 8.6 8.5 10.4 9.7 10.0 8.9 9.9 10.4   HGB 10.3* 10.0* 10.1* 10.6* 11.0* 10.5* 10.5* 11.1*   HCT 32.5* 30.8* 31.8* 34.4* 36.0* 33.9* 34.0* 34.8*   PLT  "78* 89* 88* 102* 139* 173 197 216   MCV 99 94 93 98 99 98 97 99     COAG:   Recent Labs     01/17/24  0459 01/16/24  0627 01/15/24  0622 01/14/24  0604 01/13/24  0521 01/11/24  2055 01/11/24  1508 01/10/24  0422 01/09/24  2135 09/20/23  0848 09/20/23  0338   INR  --   --   --   --   --   --  1.3*  --  1.3*  --  1.4*   HAUF 0.4 0.5 0.6 0.6 0.6   < > 0.8   < >  --    < > 0.7    < > = values in this interval not displayed.     ABO:   Recent Labs     01/16/24 0627   ABO B     HEME/ENDO:   Recent Labs     12/03/23  0738 04/25/22  2034   HGBA1C 5.3 6.5*     CARDIAC:   Recent Labs     01/17/24  2249 10/08/23  2100 10/08/23  1926   TROPHS  --  60* 58*   BNP 1,525*  --   --      No results for input(s): \"CHOL\", \"LDLF\", \"LDLCALC\", \"HDL\", \"TRIG\" in the last 84902 hours.  TOX:No results for input(s): \"AMPHETAMINE\", \"BENZO\", \"CANNABINOID\", \"COCAI\", \"FENTANYL\", \"OPIATE\", \"OXYCODONE\", \"PCP\" in the last 55573 hours.    No lab exists for component: \"BARBSCRUR\"  MICRO:   Recent Labs     01/10/24  1013 10/07/23  0719 10/01/23  0152   CRP  --  11.53* 21.44*   PROCAL 0.70*  --   --      No results found for the last 90 days.      LDA:  Midline 01/11/24 Single lumen Right Cephalic vein (Active)   Placement Date/Time: 01/11/24 1717   Hand Hygiene Completed: Yes  Catheter Time Out Checklist Completed: Yes  Size (Fr): 3  Lumen Type: Single lumen  Catheter to Vein Ratio Less Than 50%: Yes  Total Length (cm): 11 cm  External Length (cm): 0 cm  Orie...   Number of days: 7       Hemodialysis Cath Double lumen Left Chest (Active)   No placement date or time found.   Hemodialysis Catheter Type: Double lumen  Placed by: presented on admission  Orientation: Left  Access Location: Chest   Number of days:      NUTRITION: NPO Diet; Effective midnight  EMERGENCY CONTACT: Extended Emergency Contact Information  Primary Emergency Contact: Julia Benton  Address: [address] [place] [address]  Home Phone: " [phone]  Relation: Spouse  Preferred language: English   needed? No  Secondary Emergency Contact: SHAMEKA SUN  Address: [address] [place] [address] [facility]  Home Phone: [phone]  Work Phone: [phone]  Mobile Phone: [phone]  Relation: Child  Preferred language: English   needed? No  CODE STATUS: Full Code  DISPO: Discharge Planning  Living Arrangements: Spouse/significant other  Support Systems: Spouse/significant other, Children, Family members  Assistance Needed: none  Type of Residence: Private residence  Number of Stairs to Enter Residence: 2  Number of Stairs Within Residence: 3  Do you have animals or pets at home?: No  Home or Post Acute Services: None  Patient expects to be discharged to:: back to SNF  Does the patient need discharge transport arranged?: No  FOLLOWUP: No future appointments.

## 2024-01-19 NOTE — PROGRESS NOTES
Physical Therapy                 Therapy Communication Note    Patient Name: Chevy Benton  MRN: [id number]  Today's Date: 1/19/2024     Discipline: Physical Therapy    Missed Visit Reason: Missed Visit Reason: Patient in a medical procedure    Missed Time: Attempt    Comment: Transport arrived as pt was being assessed during the PT evaluation to take pt to OR for a debridement procedure.

## 2024-01-19 NOTE — SIGNIFICANT EVENT
S/P right foot debridement    Please restart all medications including antibiotics, anticoagulants, and/or pain medication as per Medicine/ID.  Restart prior appropriate diet.  NWB to surgical extremity.  May elevate surgical extremity.  Please keep dressing clean, dry and intact with post-op shoegear.  Wound vac in place  May reinforce dressings with ABDs, Kerlix, and light ACE wrap for strikethrough bleeding.  Podiatry to change dressing daily.    Marvin Saul DPM PGY-1  Podiatric Medicine & Surgery  Epic chat

## 2024-01-19 NOTE — ANESTHESIA POSTPROCEDURE EVALUATION
Patient: Chevy Benton    Procedure Summary       Date: 01/19/24 Room / Location: [facility] OR 22 / [facility]    Anesthesia Start: 1016 Anesthesia Stop: 1229    Procedure: Debridement Foot (Right: Foot) Diagnosis:       Non-pressure chronic ulcer of other part of right foot with fat layer exposed (CMS/HCC)      (Non-pressure chronic ulcer of other part of right foot with fat layer exposed (CMS/HCC) [L97.512])    Surgeons: Roopa Rodriguez DPM Responsible Provider: Lorie Croft MD    Anesthesia Type: MAC ASA Status: 3            Anesthesia Type: MAC    Vitals Value Taken Time   BP 93/58 01/19/24 1248   Temp 36.1 °C (97 °F) 01/19/24 1230   Pulse 78 01/19/24 1253   Resp 12 01/19/24 1253   SpO2 98 % 01/19/24 1253   Vitals shown include unvalidated device data.    Anesthesia Post Evaluation    Patient location during evaluation: PACU  Patient participation: complete - patient participated  Level of consciousness: sleepy but conscious  Pain management: adequate  Airway patency: patent  Cardiovascular status: acceptable  Respiratory status: acceptable  Hydration status: acceptable  Postoperative Nausea and Vomiting: none    No notable events documented.

## 2024-01-19 NOTE — OP NOTE
Debridement Foot (R) Operative Note     Date: 2024  OR Location: [facility] OR    Name: Chevy Benton, : 1945, Age: 78 y.o., MRN: [id number], Sex: male    Diagnosis  Pre-op Diagnosis     * Non-pressure chronic ulcer of other part of right foot with fat layer exposed (CMS/HCC) [L97.512] Post-op Diagnosis     * Non-pressure chronic ulcer of other part of right foot with fat layer exposed (CMS/HCC) [L97.512]     Procedures  Debridement Foot  93695 - OK DEBRIDEMENT MUSCLE &/FASCIA 1ST 20 SQ CM/<  09618 x 1unit  97605 x 1 unit      Surgeons      * Roopa Rodriguez - Primary    Resident/Fellow/Other Assistant:  Surgeon(s) and Role:    Procedure Summary  Anesthesia: Consult  ASA: III  Anesthesia Staff: Anesthesiologist: Lorie Croft MD  C-AA: CHOLO Momin  Estimated Blood Loss: 75mL  Intra-op Medications:   Medication Name Total Dose   BUPivacaine HCl (Marcaine) 0.5 % (5 mg/mL) 30 mL, lidocaine (Xylocaine) 20 mg/mL (2 %) 30 mL in sodium chloride 0.9 % 1 mL irrigation 35 mL              Anesthesia Record               Intraprocedure I/O Totals          Intake    Propofol Drip 0.00 mL    The total shown is the total volume documented since Anesthesia Start was filed.    Total Intake 0 mL       Output    Est. Blood Loss 75 mL    Total Output 75 mL       Net    Net Volume -75 mL          Specimen:   ID Type Source Tests Collected by Time   A : POST LAVAGE Tissue DEBRIDEMENT FUNGAL CULTURE/SMEAR, TISSUE/WOUND CULTURE/SMEAR Roopa Rodriguez DPM 2024 1139   B : BONE CULTURE Tissue BONE BIOPSY (DECAL) FUNGAL CULTURE/SMEAR, TISSUE/WOUND CULTURE/SMEAR Roopa Rodriguez DPM 2024 1141        Staff:   Circulator: Noe Chu RN  Relief Circulator: Nancy Su RN  Relief Scrub: Nancy Su RN  Scrub Person: Bridgett David; Rehana Yi         Drains and/or Catheters: * None in log *    Tourniquet Times:         Implants:  Implants       Type Name Action Serial No.      Graft WOUND  MATRIX, INTEGRA, MESHED BILAYER, 2 X 2 IN, 1 SHEET - [id number] Implanted      Implant MICROMATRIX 1000MG - [id number] - [id number] Implanted [id number]              Findings: Intraoperative findings consistent with clinical and radiographic findings.     Indications: Chevy Benton is an 78 y.o. male who is having surgery for Non-pressure chronic ulcer of other part of right foot with fat layer exposed (CMS/HCC) [L97.512].     The patient was seen in the preoperative area. The risks, benefits, complications, treatment options, non-operative alternatives, expected recovery and outcomes were discussed with the patient. The possibilities of reaction to medication, pulmonary aspiration, injury to surrounding structures, bleeding, recurrent infection, the need for additional procedures, failure to diagnose a condition, and creating a complication requiring transfusion or operation were discussed with the patient. The patient concurred with the proposed plan, giving informed consent.  The site of surgery was properly noted/marked if necessary per policy. The patient has been actively warmed in preoperative area. Preoperative antibiotics are not indicated. Venous thrombosis prophylaxis have been ordered including unilateral sequential compression device    Procedure Details:   Attention was directed to the right forefoot where an incision was made lifting the necrotic cap from the previous TMA site. Using sharp excisional debridement, all necrotic and fibrotic tissue was excised from the wound. After debridement bone necrosis was noted to the distal aspects of metatarsals 1-5. The distal aspects of the metatarsals were removed after a sagittal saw was used to perform the osteotomy of the metatarsals in a manner that allowed for the maintenance of a parabola-shape of the metatarsals. It was clear after resection of the bone that the remaining bone left intact was viable and appeared to have no signs of osteomyelitis or  other infection. Clean bone margins of bones 1-5 were taken with a clean bone cutter. Utilizing a rongeur and other instrumentation, all devitalized soft tissue was removed from the area and a bone rasp was used to smooth the remaining surfaces so as to leave no sharp edges at the end of the bones. The infected bone that had been amputated was sent for culture and pathology, and a proximal margin was sent as well. Utilizing misonix, the wound was further debrided and irrigated copiously. After adequate irrigation, a post lavage culture was taken and sent to micro. The wound was partially closed with 2-0 Monocryl deep, and 2-0 prolene retention sutures. The wound was measured to be 8x4x2 cm. Micro matrix was used to pack the wound. A [id number] Integra graft was placed over the wound site and held in place with silk suture. A wound vac was then placed over the wound site and graft, vac was checked for leaks. Dressing placed consisting of enzo, webril, and ace. Patient was then transferred to PACU.     Complications:  None; patient tolerated the procedure well.    Disposition: PACU - hemodynamically stable.  Condition: stable     Additional Details:   Wound measurement post debridement 8x4x2 cm   Micro matrix  5x5 cm Integra graft  Post lavage culture  Bone specimens sent to pathology    Attending Attestation:     Roopa Rodriguez  Phone Number: [phone]

## 2024-01-19 NOTE — NURSING NOTE
Pt transferred from cicu via bed, awake and alert to self,  resp even and unlabored, o2 2l/nc on.  Dressing to right foot intact.  Bilateral heel protectors on.  Dressing to left cw dialysis cath intact.  Arlette midline dressing intact ( mid line to arlette per report from icu) vss stable.  Pt attached to telemetry monitor- alek on tele monitor.  Call light within reach, bed low and locked. Transfer orders released.

## 2024-01-19 NOTE — PROGRESS NOTES
[facility]  [facility] NP/PA CARDIOLOGY PROGRESS NOTE    Chevy Benton/[id number]  Admit Date: 1/9/2024  Hospital Length of Stay: 10   Current Attending: Jimmy Glover MD   Outpatient Cardiologist:     INTERVAL EVENTS / PERTINENT ROS:   Transfer to the floor   S/p Endovascualr Jan 17th right sided angio/intervention  Known dialysis Tues/Thurs/Sat  Appears grossly volume overloaded   From [facility]   Today revision of right foot TMA by podiatry   Currently plavix/asa and can be transitioned to DOAC when ok with surgery -podiatry   Lower platlets - trend     MEDICATIONS  Infusions:  sodium chloride 0.9%, Last Rate: 20 mL/hr (01/19/24 1230)      Scheduled:  allopurinol, 100 mg, Daily  aspirin, 81 mg, Daily  atorvastatin, 40 mg, Nightly  B complex-vitamin C-folic acid, 1 capsule, Daily  clopidogrel, 75 mg, Daily  gabapentin, 100 mg, q24h  [Held by provider] insulin glargine, 6 Units, Nightly  [Held by provider] insulin lispro, 0-5 Units, TID with meals  lidocaine, 0.1 mL, Once  metoprolol succinate XL, 25 mg, Daily  pantoprazole, 40 mg, Daily before breakfast  polyethylene glycol, 17 g, Daily  sennosides, 1 tablet, Nightly  sevelamer carbonate, 800 mg, TID with meals      PRN:   acetaminophen, 650 mg, q4h PRN   Or  acetaminophen, 650 mg, q4h PRN   Or  acetaminophen, 650 mg, q4h PRN  acetaminophen, 650 mg, q4h PRN  albuterol, 2.5 mg, Once PRN  albuterol, 2 puff, q6h PRN  alum-mag hydroxide-simeth, 30 mL, 4x daily PRN  ammonium lactate, , q1h PRN  dextrose, 25 g, q15 min PRN  docusate sodium, 100 mg, BID PRN  glucagon, 1 mg, q15 min PRN  hydrocortisone, , BID PRN  HYDROmorphone, 0.2 mg, q5 min PRN  ipratropium-albuteroL, 3 mL, q6h PRN  methocarbamol, 500 mg, q8h PRN  ondansetron, 4 mg, Once PRN  oxyCODONE, 5 mg, q4h PRN  povidone-iodine, 1 Application, Daily PRN  promethazine (Phenergan) 6.25 mg in sodium chloride 0.9% 50 mL IV, 6.25 mg, Once PRN  traMADol, 50 mg, q6h PRN      Prior to  Admission Meds:  Facility-Administered Medications Prior to Admission   Medication Dose Route Frequency Provider Last Rate Last Admin    epoetin dane (Epogen,Procrit) injection 5,000 Units  5,000 Units intravenous Once Aruna Vazquez PA-C         Medications Prior to Admission   Medication Sig Dispense Refill Last Dose    albuterol 90 mcg/actuation inhaler Inhale 2 puffs every 6 hours if needed for shortness of breath.       allopurinol (Zyloprim) 100 mg tablet Take 1 tablet (100 mg) by mouth once daily.       amLODIPine (Norvasc) 10 mg tablet Take 1 tablet (10 mg) by mouth once daily.       apixaban (Eliquis) 2.5 mg tablet Take 1 tablet (2.5 mg) by mouth 2 times a day.       aspirin (Adult Low Dose Aspirin) 81 mg EC tablet Take 1 tablet (81 mg) by mouth once daily.       atorvastatin (Lipitor) 10 mg tablet Take 1 tablet (10 mg) by mouth once daily.       cinacalcet (Sensipar) 30 mg tablet Take 1 tablet (30 mg) by mouth once daily with a meal. Take with food or shortly afer a meal. Swallow tablet whole; do not break or divide. 30 tablet 0     cloNIDine (Catapres) 0.2 mg tablet Take 1 tablet (0.2 mg) by mouth every 12 hours if needed.       clopidogrel (Plavix) 75 mg tablet Take 1 tablet (75 mg) by mouth once daily.       colchicine, gout, 0.6 mg tablet Take 0.5 tablets (0.3 mg) by mouth 2 times a week.       docusate sodium (Colace) 100 mg capsule Take 1 capsule (100 mg) by mouth once daily.       epoetin dane (Epogen,Procrit) 3,000 unit/mL injection Inject 1 mL (3,000 Units) under the skin 3 times a week. 1 mL 11     gabapentin (Neurontin) 100 mg capsule Take 1 capsule (100 mg) by mouth once daily on dialysis days.       ipratropium (Atrovent) 21 mcg (0.03 %) nasal spray Administer 2 sprays into each nostril every 12 hours.       lanthanum (Fosrenol) 750 mg chewable tablet Chew 1 tablet (750 mg) 3 times a day with meals.       lidocaine (ZTlido) 1.8 % adhesive patch,medicated Apply topically. Apply patch 12 hours  on 12 hours off       loratadine (Claritin) 10 mg tablet TAKE 1 TABLET (10 MG) BY MOUTH EVERY OTHER DAY IF NEEDED FOR ALLERGIES. 90 tablet 0     menthol-zinc oxide (Calmoseptine - Risamine) 0.44-20.6 % ointment Apply 1 Application topically once daily. Apply to sacrum 1 g 0     metoprolol succinate XL (Toprol-XL) 25 mg 24 hr tablet TAKE 1 TABLET (25 MG) BY MOUTH DAILY DO NOT CRUSH OR CHEW 90 tablet 0     midodrine (Proamatine) 10 mg tablet Take 1 tablet (10 mg) by mouth 2 times a day.       naphazoline-pheniramine (Naphcon-A) 0.025-0.3 % ophthalmic solution Administer 1 drop into both eyes 4 times a day. 10 mL 0     pantoprazole (ProtoNix) 40 mg EC tablet TAKE 1 TABLET (40 MG) BY MOUTH ONCE DAILY IN THE MORNING. TAKE BEFORE MEALS. DO NOT CRUSH, CHEW, OR SPLIT. 90 tablet 0     polyethylene glycol (Miralax) 17 gram packet Take 17 g by mouth once daily.       traMADol (Ultram) 50 mg tablet Take 0.5 tablets (25 mg) by mouth every 12 hours if needed for severe pain (7 - 10). 30 tablet 1     Triphrocaps 1 mg capsule Take 1 capsule by mouth once daily.          Vitals/Physical:      1/19/2024     2:15 PM 1/19/2024     2:00 PM 1/19/2024     1:45 PM 1/19/2024     1:30 PM 1/19/2024     1:15 PM 1/19/2024     1:00 PM 1/19/2024    12:45 PM   Vitals   Systolic 120 124 120 118 115 118 123   Diastolic 64 69 72 76 64 72 60   Heart Rate 75 82 75 74 75 81 77   Temp 36 °C (96.8 °F)         Resp 12 13 12 12 14 13 16     Wt Readings from Last 5 Encounters:   01/19/24 101 kg (221 lb 9.6 oz)   10/08/23 95 kg (209 lb 7 oz)   10/05/23 98.3 kg (216 lb 11.4 oz)     INTAKE/OUTPUT:  I/O last 3 completed shifts:  In: 840 (8.4 mL/kg) [P.O.:240; I.V.:600 (6 mL/kg)]  Out: - (0 mL/kg)   Weight: 100.5 kg    Constitutional:       Appearance: Frail. Chronically ill-appearing.   Neck:      Comments: Thick neck  Pulmonary:      Breath sounds: Rhonchi present.      Comments: Nasal cannula   Abdominal:      Palpations: Abdomen is soft.   Musculoskeletal:          General: Tenderness present.      Comments: Both arms swollen - blisters and ecchmotic   + Left arm AVF pulsatile   Legs + 2 3 swelling pitting  Right foot TMA dressing dry and intact  Skin:     General: Skin is warm and dry.   Neurological:      Mental Status: Alert.      Comments: Oriented to self and place            Labs:  CMP:  Recent Labs     01/19/24  0631 01/18/24 0349 01/17/24 2249 01/17/24 0459 01/16/24  0627 10/05/23  1500 10/03/23  0640 10/02/23  0441 10/01/23  0152 09/27/23  0751 09/26/23  0631 09/25/23  1608 09/24/23  0847   * 131* 132* 131* 129*   < > 136   < > 138   < > 134*   < > 136   K 5.1 4.4 4.5 4.7 4.5   < > 4.1   < > 3.8   < > 4.6   < > 4.2   CL 91* 91* 91* 88* 90*   < > 93*   < > 94*   < > 92*   < > 95*   CO2 28 28 27 27 30   < > 25   < > 27   < > 25   < > 25   ANIONGAP 19 16 19 21* 14   < > 22*   < > 21*   < > 22*   < > 20   BUN 54* 44* 41* 49* 42*   < > 55*   < > 23   < > 61*   < > 32*   CREATININE 5.34* 4.44* 4.31* 4.69* 4.07*   < > 8.05*   < > 4.58*   < > 9.79*   < > 5.84*   EGFR 10* 13* 13* 12* 14*   < > 6*   < > 12*   < >  --   --   --    MG  --   --  2.06  --   --   --  2.89*  --  2.36  --  2.39  --  2.15    < > = values in this interval not displayed.     Recent Labs     01/19/24 0631 01/18/24 0349 01/17/24 2249 01/17/24 0459 01/16/24  0627 01/09/24  2135 10/08/23  1926 10/07/23  0719 10/02/23  0441 10/01/23  0152 09/30/23  1616 09/30/23  1616 09/25/23  1608   ALBUMIN 3.3* 3.1* 3.1* 3.2* 3.4   < > 3.8 3.1*  3.2*   < > 3.3*   < > 3.3* 3.4   ALKPHOS  --   --   --   --   --   --  115 110  --  107  --  112 127   ALT  --   --   --   --   --   --  17 17  --  27  --  27 21   AST  --   --   --   --   --   --  18 18  --  21  --  23 18   BILITOT  --   --   --   --   --   --  0.5 0.4  --  0.7  --  0.6 0.7    < > = values in this interval not displayed.     CBC:  Recent Labs     01/19/24  0631 01/18/24  0349 01/17/24  2249 01/17/24  0459 01/16/24  0627   WBC 8.6 8.5 10.4 9.7  10.0   HGB 10.3* 10.0* 10.1* 10.6* 11.0*   HCT 32.5* 30.8* 31.8* 34.4* 36.0*   PLT 78* 89* 88* 102* 139*   MCV 99 94 93 98 99     COAG:   Recent Labs     01/17/24  0459 01/16/24  0627 01/15/24  0622 01/11/24  2055 01/11/24  1508 01/10/24  0422 01/09/24  2135 09/20/23  0848 09/20/23  0338   INR  --   --   --   --  1.3*  --  1.3*  --  1.4*   HAUF 0.4 0.5 0.6   < > 0.8   < >  --    < > 0.7    < > = values in this interval not displayed.     ABO:   Recent Labs     01/16/24  0627   ABO B     HEME/ENDO:   Recent Labs     12/03/23  0738 04/25/22  2034   HGBA1C 5.3 6.5*     CARDIAC:   Recent Labs     01/17/24  2249 10/08/23  2100 10/08/23  1926   TROPHS  --  60* 58*   BNP 1,525*  --   --        MICRO:   Recent Labs     01/10/24  1013 10/07/23  0719 10/01/23  0152   CRP  --  11.53* 21.44*   PROCAL 0.70*  --   --              Assessment/Plan   Chevy Benton is a 78 y.o. male with PMH of ESRD on HD TTS (Lt chest TDC, Hx of LUE Fistula Pseudoaneurysms), DM, HTN, HFrEF (TTE 10/23 EF 35-40%), PTA right lower extremity on 9/20/23 with subsequent TMA right foot on 9/22/23, Non Occlusive DVT (Rt Subclavian), Aortic Root Dilation, MR, chronic afib and SSS s/p pacemaker who presents DAVID angioplasty C/b perforation of AT artery. Patient admitted to CICU for further monitoring. Additionally patient noted to be overloaded on examination, on 2L with diffuse anasarca more pronounced in upper extremities. He is anuric at baseline and is on TTS dialysis. Patient was stable overnight with no signs/symptoms of compartment syndrome.      -Discuss resuming Eliquis with endovascular after podiatry surgery  -Follow up PF4 ab for c/f HIT (High probability on 4T score) - negative     NEUROLOGIC  ::Aox2 (self and place) unclear baseline  -Delirium precuations     CARDIOVASCULAR  #PVD S/p PTA to RLE c/b perforation of AT  #hx of right tma  #hx of rt subclavian dvt  #mild stenosis of left subclavian vein  #Hfref  #afib  #DM with  neuropathy  #HTN  #Hx of right TMA  ::S/p Plavix load  ::1/19 Podiatry today revision of right TMA    Plan:  -Q1h neuro checks in RLE  -Cont plavix & aspirin resumption of eliquis pending ok  -Cont home metoprolol succ 25mg  -Uptitrate GDMT as tolerated consider hydralazine and isordil   -Cont home atorva  -Cont home gabapentin  -Insulin glargine 6 units nightly - watch with poor nutrition   -SSI     PULMONARY  #AHRF  #2/2 to ESRD and Hfref  #COVID  ::S/p 10 day course of dex (ended 1/14)  Plan:  -Continue with dialysis regimen  -Wean and titrate oxygen as needed  -Cont IS, pulm hygiene  - Repeat 2vCXR      RENAL/  #ESRD with dialysis TTS  #LUE fistula occlusion s/p venoplasty  Plan:  -Cont with dialysis schedule - gets 5 days a week at [facility] will discuss with nephrology team - volume overloaded   -Monitor for signs of need for urgent dialysis  -Cont home renvela  -Cont home nephro caps     GASTROINTESTINAL  Gerd  -Cont home pantoprazole     MSK  #hx of gout  -cont home allopurinol     HEME/ONC  #Anemia of chronic disease  ::At baseline hgb  ::2/2 to ESRD  Plan:  -CTM     INFECTIOUS DISEASE  #Covid infection s/p dexamethasone  :;Afebrile, no leukocytosis  ::xray with pulmonary edema  Plan:  -Continue to monitor for worsening signs or sx of pneumonia  - Repeat 2vCXR in the am      F: Carb diet, NPO midnight   E: K > 4, Mg > 2  N: Renal diet  A: PIV, left subclaavian trialysos  DVT ppx: Heparin SubQ  GI ppx: Protonix    Code Status: Full code   Surrogate Medical Decision-maker: Spouse ()              LDA:  Midline 01/11/24 Single lumen Right Cephalic vein (Active)   Placement Date/Time: 01/11/24 0487   Hand Hygiene Completed: Yes  Catheter Time Out Checklist Completed: Yes  Size (Fr): 3  Lumen Type: Single lumen  Catheter to Vein Ratio Less Than 50%: Yes  Total Length (cm): 11 cm  External Length (cm): 0 cm  Orie...   Number of days: 7       Hemodialysis Cath Double lumen Left Chest (Active)   No  placement date or time found.   Hemodialysis Catheter Type: Double lumen  Placed by: presented on admission  Orientation: Left  Access Location: Chest   Number of days:      NUTRITION: NPO Diet; Effective midnight  EMERGENCY CONTACT: Extended Emergency Contact Information  Primary Emergency Contact: Julia Benton  Address: [address] [address]  Home Phone: [phone]  Relation: Spouse  Preferred language: English   needed? No  Secondary Emergency Contact: SHAMEKA BENTON  Address: [address] [id number] [facility]  Home Phone: [phone]  Work Phone: [phone]  Mobile Phone: [phone]  Relation: Child  Preferred language: English   needed? No  CODE STATUS: Full Code  DISPO:   Home: [place] [id number]  AMPAC: Daily Activity - Total Score: 9  Discharge Planning  Living Arrangements: Spouse/significant other  Support Systems: Spouse/significant other, Children, Family members  Assistance Needed: none  Type of Residence: Private residence  Number of Stairs to Enter Residence: 2  Number of Stairs Within Residence: 3  Do you have animals or pets at home?: No  Home or Post Acute Services: None  Patient expects to be discharged to:: back to SNF  Does the patient need discharge transport arranged?: No  FOLLOWUP:   Future Appointments   Date Time Provider Department Center   1/19/2024  4:00 PM HD CHAIR [facility]        Patient seen and discussed with Dr. Jimmy Glover MD      ________________________________________________  JENIFFER Geronimo-CNP

## 2024-01-19 NOTE — NURSING NOTE
Report from Sending RN:    Report From: ABEL Smith  Recent Surgery of Procedure: Yes, 1/19 Rt foot debridement   Baseline Level of Consciousness (LOC): A/O x 2  Oxygen Use: Yes, last pulse ox 95%  Type: NC  Diabetic: Yes, last BS 89  Last BP Med Given Day of Dialysis: yes, see eMAR  Last Pain Med Given: yes, see eMAR  Lab Tests to be Obtained with Dialysis: No  Blood Transfusion to be Given During Dialysis: No  Available IV Access: Yes  Medications to be Administered During Dialysis: No,    Continuous IV Infusion Running: No  Restraints on Currently or in the Last 24 Hours: No  Hand-Off Communication: full code, no isolation precautions, travel by bed

## 2024-01-19 NOTE — PROGRESS NOTES
Occupational Therapy                 Therapy Communication Note    Patient Name: Chevy Benton  MRN: [id number]  Today's Date: 1/19/2024     Discipline: Occupational Therapy    Missed Visit Reason: Patient in a medical procedure (Pt at OR for R LE debridement with podiatry. Will reattempt post op)    Missed Time: 9:56    Stephie Corbin, OT

## 2024-01-19 NOTE — PERIOPERATIVE NURSING NOTE
1230- Arrival to PACU. Patient sedated not following commands, anesthesia at the bed side, blood gases drawn.  1315- Blood gas reported. Anesthesia resident at the bedside. Attempting to educate patient on IS use. Post op xray being done.   1350- Patient appears to be resting comfortably. VS WNL, Patient easily arousable. Oriented to basline self and place. Anesthesia at the bedside, no concerns at this time.   1355- Spoke with podiatry service. Would like to see patient before transferring to inpatient location.   1400- Podiatry service at the bedside. No concerns at this time. Patient is able to be transported to inpatient location.   1410- Report called to RN on T7

## 2024-01-19 NOTE — PROGRESS NOTES
DC Planning:  Went in and met with the pt, confirmed demographics.   Will be returning to [facility].     Chevy Benton is a 78 y.o. male on day 10 of admission presenting with Critical limb ischemia of both lower extremities (CMS/HCC).      JAY BENNETT

## 2024-01-19 NOTE — PROGRESS NOTES
Mr Benton was admitted from the [facility] outpatient clinic on 1/9/2024 for management of RLE CLI RC-VI.  Hospital day 10.     Subjective Data:  He was seen this am at bedside, appeared lethargic, NAD, AOx3, no complains of foot or leg pain. He reported again feeling unwell in general, and worn out. Reported Rt. Arm pain, denied feeling SOB but occasional cough ans wheezing, speaking in complete sentences, on 2L NC. Left arm more swollen than right.       Overnight Events:    No significant event reported      Objective Data:  Last Recorded Vitals:  Vitals:    01/19/24 0405 01/19/24 0544 01/19/24 0735 01/19/24 1000   BP: 112/78  90/60 (!) 190/100   BP Location: Right arm      Patient Position: Lying      Pulse: 75  72 85   Resp: 20  17 18   Temp: 36.5 °C (97.7 °F)  36.5 °C (97.7 °F) 36 °C (96.8 °F)   TempSrc: Temporal   Temporal   SpO2: 99%  96% 95%   Weight:  101 kg (221 lb 9.6 oz)     Height:           Last Labs:    Lab Results   Component Value Date    WBC 8.6 01/19/2024    HGB 10.3 (L) 01/19/2024    HCT 32.5 (L) 01/19/2024    MCV 99 01/19/2024    PLT 78 (L) 01/19/2024     Lab Results   Component Value Date    GLUCOSE 89 01/19/2024    CALCIUM 9.6 01/19/2024     (L) 01/19/2024    K 5.1 01/19/2024    CO2 28 01/19/2024    CL 91 (L) 01/19/2024    BUN 54 (H) 01/19/2024    CREATININE 5.34 (H) 01/19/2024         TROPHS   Date/Time Value Ref Range Status   10/08/2023 09:00 PM 60 0 - 20 ng/L Final     Comment:     Previous result verified on 10/8/2023 2030 on specimen/case [id number] called with component [facility] for procedure Troponin I, High Sensitivity, Initial with value 58 ng/L.   10/08/2023 07:26 PM 58 0 - 20 ng/L Final     BNP   Date/Time Value Ref Range Status   01/17/2024 10:49 PM 1,525 0 - 99 pg/mL Final     HGBA1C   Date/Time Value Ref Range Status   12/03/2023 07:38 AM 5.3 4.3 - 5.6 % Final     Comment:     American Diabetes Association guidelines indicate that patients with HgbA1c in the range 5.7-6.4%  are at increased risk for development of diabetes, and intervention by lifestyle modification may be beneficial. HgbA1c greater or equal to 6.5% is considered diagnostic of diabetes.   04/25/2022 08:34 PM 6.5 4.3 - 5.6 % Final     Comment:     American Diabetes Association guidelines indicate that patients with HgbA1c in the range 5.7-6.4% are at increased risk for development of diabetes, and intervention by lifestyle modification may be beneficial. HgbA1c greater or equal to 6.5% is considered diagnostic of diabetes.      Last I/O:  I/O last 3 completed shifts:  In: 840 (8.4 mL/kg) [P.O.:240; I.V.:600 (6 mL/kg)]  Out: - (0 mL/kg)   Weight: 100.5 kg       Inpatient Medications:  Scheduled medications   Medication Dose Route Frequency    allopurinol  100 mg oral Daily    aspirin  81 mg oral Daily    atorvastatin  40 mg oral Nightly    B complex-vitamin C-folic acid  1 capsule oral Daily    clopidogrel  75 mg oral Daily    gabapentin  100 mg oral q24h    [Held by provider] insulin glargine  6 Units subcutaneous Nightly    [Held by provider] insulin lispro  0-5 Units subcutaneous TID with meals    metoprolol succinate XL  25 mg oral Daily    pantoprazole  40 mg oral Daily before breakfast    polyethylene glycol  17 g oral Daily    sennosides  1 tablet oral Nightly    sevelamer carbonate  800 mg oral TID with meals     PRN medications   Medication    acetaminophen    Or    acetaminophen    Or    acetaminophen    albuterol    alum-mag hydroxide-simeth    ammonium lactate    dextrose    docusate sodium    glucagon    hydrocortisone    ipratropium-albuteroL    methocarbamol    povidone-iodine    traMADol     Continuous Medications   Medication Dose Last Rate       Physical Exam:  Constitutional: ill appearing elderly man, NAD, lethargic    Head/Neck: Neck supple,  No JVD, trachea midline, no bruits   Respiratory/Thorax: Patent airways, diminished breath sounds throughout, on RA. Lt chest wall HD catheter in place, On 2L  NC, speaking in complete sentences but with mild dyspnea  Cardiovascular: irregular, rate and rhythm, no murmurs, normal S 1 and S 2   Gastrointestinal: obese,, Non-distended, soft, non-tender,+BS,    Skin: Warm and dry,   Extremities: [place], RLE: Rt foot is warm to touch, monophasic Doppler signal of DP/PT and multiphasic signal of distal AT present, no c/o foot pain, Rt calf is soft no s&S compartment. Right TMA site with dressing clean and intact, RLE +2 edema. Rt groin access site soft, nontender, no oozing/hematoma, slight ecchymosis, dressing dry and intact  LLE: Skin temperature warm to cool from proximal to distal, skin discoloration of the forefoot and toes 2-5 with sluggish cap refill to all toes (unchanged from prior assessment), no pain reported, no ulcer  DP/PT pulses nonpalpable or Doppler able, +2 edema, foot flaky and skin peeling.    LUE: AVF without thrill. L brachial pulse Doppler-able. Radial Multiphasic. +4 edema entire left arm and hand. Chronic hyperkeratotic skin changes overlying prior access site with dressing. Sensation and motor function intact.  RUE: +3 edema of the arm and hand, palpable radial pulses, motor and sensation intact, cool arm, tenderness with palpation  Neuro: awake/alert/oriented x3, non-focal, sensation very diminished bilateral foot, motor function intact  Psychological: Appropriate mood and behavior    Assessment/Plan   Chevy Benton is a 78 y.o. male with PMH of ESRD on HD TTS (Lt chest TDC, Hx of LUE Fistula Pseudoaneurysms), T2DM, HTN, HFrEF (TTE 10/23 EF 35-40%), A fib, PTA right lower extremity on 9/20/23 with subsequent TMA on 9/22/23, Non Occlusive DVT (Rt Subclavian), Aortic Root Dilation, MR, SSS s/p pacemaker, who presented as a direct admit from Dr Linder endovascular clinic for concerns of critical limb ischemia. Pt has not been taking Plavix since hospital discharge in late September due to the frequent hospitalization for SBO. He denied any CP,  SOB, cough, fever. Since hospital admission Pt was seen by podiatry, vascular surgery and nephrology team. He had venogram on 1/10/24 by IR and fistulogram by vascular surgery on 1/12/24.    1/10 3V Foot Xray - No erosive changes or osseous destruction. Lobulated morphology of the right midfoot soft tissue stump with stump breakdown/wound dehiscence not excluded.     1/19/2024 - NAD, HDS, speaking in complete sentences but noticed mild dyspnea on 2L NC 95%. Rt foot is warm to touch, monophasic Doppler signal of DP/PT and multiphasic signal of distal AT present, no c/o foot pain, Rt calf is soft no s&S compartment.       RLE CLTI  RC-VI  Rt TMA site with overlaying necrosis    - 1/17/24 s/p PTA of AT and PT, Reconstruction of planter arch. AT perforation, controlled by balloon tamponade and external tamponade with blood pressure cuff.  - continue with ASA EC 81 mg and Plavix 75 mg daily  - No need to resume Hep gtt as Pt risk of AF related events < 0.5% per day. Can start DVT prophylaxis when Plt is WNL  - Resume Eliquis for Afib at discharge   - TMA site care per podiatry recs (betadine soaked gauze, 4x4, ABD, Kerlix)  - Ammonium lactate lotion or cream for dry legs and feet. Do not apply between toes.   - Truvue EHOB boot to prevent pressure ulcer on the heels. Heel offloading at all times.   - OR today 1/19/24 with Podiatry for TMA site revision by Dr. Rodriguez  - Plt 78 declining since admission PF4 sent 1/18 result pending  - 1 month FU at [facility] clinic in 1 month post discharge with repeat vascular testing (we will arrange)  - remainder of care per primary   - Case and plan discussed with attending Dr. Stuart Nguyen    Please do not hesitate to contact [facility] team with any questions or concerns.           Code Status:  Full Code      JENIFFER Ann-CNP  Endovascular/Limb Salvage Service   Day: 66422/Haiku/Night HHVI [id number]

## 2024-01-20 NOTE — NURSING NOTE
Report to Receiving RN:    Report To: Yee Casper Report Called: [id number]  Hand-Off Communication: UF tx only, 2 L   Complications During Treatment: No  Ultrafiltration Treatment: Yes  Medications Administered During Dialysis: No  Blood Products Administered During Dialysis: No  Labs Sent During Dialysis: No  Heparin Drip Rate Changes: N/A        Last Updated: 4:54 PM by PRAMOD STERN

## 2024-01-20 NOTE — PROGRESS NOTES
Renal Staff HD Note    Patient seen, examined on dialysis   Tolerating treatment without event  101/71 R UE AVF     Continue treatment per submitted orders   2L

## 2024-01-20 NOTE — PROGRESS NOTES
Physical Therapy                 Therapy Communication Note    Patient Name: Chevy Benton  MRN: [id number]  Today's Date: 1/20/2024     Discipline: Physical Therapy    Missed Visit Reason: Missed Visit Reason: Other (Comment)    Missed Time: Attempt    Comment:  PT eval attempted; pt sleeping on arrival, edu on role of PT. Breakfast at bedside and pt wanting to eat. Will follow and re-attempt at later date.

## 2024-01-20 NOTE — PROGRESS NOTES
[facility]  [facility] NP/PA CARDIOLOGY PROGRESS NOTE    Chevy Benton/[id number]  Admit Date: 1/9/2024  Hospital Length of Stay: 11   Current Attending: Dionicio Todd MD   Outpatient Cardiologist:     INTERVAL EVENTS / PERTINENT ROS:   Transfer to the floor   S/p Endovascualr Jan 17th right sided angio/intervention  Known dialysis Tues/Thurs/Sat  Appears grossly volume overloaded   From [facility]   1/9 revision of right foot TMA by podiatry > VAC change on Monday per podiatry note   Currently plavix/asa and can be transitioned to DOAC when ok with surgery -podiatry   Lower platlets - trend   - Dialysis yesterday and today (less swelling to legs)   - VBG    MEDICATIONS  Infusions:       Scheduled:  allopurinol, 100 mg, Daily  aspirin, 81 mg, Daily  atorvastatin, 40 mg, Nightly  B complex-vitamin C-folic acid, 1 capsule, Daily  clopidogrel, 75 mg, Daily  gabapentin, 100 mg, q24h  [Held by provider] insulin glargine, 6 Units, Nightly  [Held by provider] insulin lispro, 0-5 Units, TID with meals  metoprolol succinate XL, 25 mg, Daily  pantoprazole, 40 mg, Daily before breakfast  polyethylene glycol, 17 g, Daily  sennosides, 1 tablet, Nightly  sevelamer carbonate, 800 mg, TID with meals      PRN:   acetaminophen, 650 mg, q4h PRN   Or  acetaminophen, 650 mg, q4h PRN   Or  acetaminophen, 650 mg, q4h PRN  albuterol, 2 puff, q6h PRN  alum-mag hydroxide-simeth, 30 mL, 4x daily PRN  ammonium lactate, , q1h PRN  dextrose, 25 g, q15 min PRN  docusate sodium, 100 mg, BID PRN  glucagon, 1 mg, q15 min PRN  hydrocortisone, , BID PRN  ipratropium-albuteroL, 3 mL, q6h PRN  methocarbamol, 500 mg, q8h PRN  povidone-iodine, 1 Application, Daily PRN  traMADol, 50 mg, q6h PRN      Prior to Admission Meds:  Facility-Administered Medications Prior to Admission   Medication Dose Route Frequency Provider Last Rate Last Admin    epoetin dane (Epogen,Procrit) injection 5,000 Units  5,000 Units intravenous Once Aruna  TRICIA Vazquez         Medications Prior to Admission   Medication Sig Dispense Refill Last Dose    albuterol 90 mcg/actuation inhaler Inhale 2 puffs every 6 hours if needed for shortness of breath.       allopurinol (Zyloprim) 100 mg tablet Take 1 tablet (100 mg) by mouth once daily.       amLODIPine (Norvasc) 10 mg tablet Take 1 tablet (10 mg) by mouth once daily.       apixaban (Eliquis) 2.5 mg tablet Take 1 tablet (2.5 mg) by mouth 2 times a day.       aspirin (Adult Low Dose Aspirin) 81 mg EC tablet Take 1 tablet (81 mg) by mouth once daily.       atorvastatin (Lipitor) 10 mg tablet Take 1 tablet (10 mg) by mouth once daily.       cinacalcet (Sensipar) 30 mg tablet Take 1 tablet (30 mg) by mouth once daily with a meal. Take with food or shortly afer a meal. Swallow tablet whole; do not break or divide. 30 tablet 0     cloNIDine (Catapres) 0.2 mg tablet Take 1 tablet (0.2 mg) by mouth every 12 hours if needed.       clopidogrel (Plavix) 75 mg tablet Take 1 tablet (75 mg) by mouth once daily.       colchicine, gout, 0.6 mg tablet Take 0.5 tablets (0.3 mg) by mouth 2 times a week.       docusate sodium (Colace) 100 mg capsule Take 1 capsule (100 mg) by mouth once daily.       epoetin dane (Epogen,Procrit) 3,000 unit/mL injection Inject 1 mL (3,000 Units) under the skin 3 times a week. 1 mL 11     gabapentin (Neurontin) 100 mg capsule Take 1 capsule (100 mg) by mouth once daily on dialysis days.       ipratropium (Atrovent) 21 mcg (0.03 %) nasal spray Administer 2 sprays into each nostril every 12 hours.       lanthanum (Fosrenol) 750 mg chewable tablet Chew 1 tablet (750 mg) 3 times a day with meals.       lidocaine (ZTlido) 1.8 % adhesive patch,medicated Apply topically. Apply patch 12 hours on 12 hours off       loratadine (Claritin) 10 mg tablet TAKE 1 TABLET (10 MG) BY MOUTH EVERY OTHER DAY IF NEEDED FOR ALLERGIES. 90 tablet 0     menthol-zinc oxide (Calmoseptine - Risamine) 0.44-20.6 % ointment Apply 1  Application topically once daily. Apply to sacrum 1 g 0     metoprolol succinate XL (Toprol-XL) 25 mg 24 hr tablet TAKE 1 TABLET (25 MG) BY MOUTH DAILY DO NOT CRUSH OR CHEW 90 tablet 0     midodrine (Proamatine) 10 mg tablet Take 1 tablet (10 mg) by mouth 2 times a day.       naphazoline-pheniramine (Naphcon-A) 0.025-0.3 % ophthalmic solution Administer 1 drop into both eyes 4 times a day. 10 mL 0     pantoprazole (ProtoNix) 40 mg EC tablet TAKE 1 TABLET (40 MG) BY MOUTH ONCE DAILY IN THE MORNING. TAKE BEFORE MEALS. DO NOT CRUSH, CHEW, OR SPLIT. 90 tablet 0     polyethylene glycol (Miralax) 17 gram packet Take 17 g by mouth once daily.       traMADol (Ultram) 50 mg tablet Take 0.5 tablets (25 mg) by mouth every 12 hours if needed for severe pain (7 - 10). 30 tablet 1     Triphrocaps 1 mg capsule Take 1 capsule by mouth once daily.          Vitals/Physical:      1/20/2024     1:44 PM 1/20/2024     9:55 AM 1/20/2024     7:44 AM 1/20/2024     5:50 AM 1/20/2024    12:18 AM 1/19/2024    10:10 PM 1/19/2024     9:50 PM   Vitals   Systolic  88  109 105 116 90   Diastolic  54  55 91 76 59   Heart Rate 79 79 80 95 94 100 91   Temp 36.3 °C (97.3 °F)  36.5 °C (97.7 °F) 37.2 °C (99 °F) 36.5 °C (97.7 °F)  36.4 °C (97.5 °F)   Resp   21 18   18     Wt Readings from Last 5 Encounters:   01/19/24 101 kg (221 lb 9.6 oz)   10/08/23 95 kg (209 lb 7 oz)   10/05/23 98.3 kg (216 lb 11.4 oz)     INTAKE/OUTPUT:  I/O last 3 completed shifts:  In: 1914.9 (19.1 mL/kg) [P.O.:800; I.V.:1014.9 (10.1 mL/kg); IV Piggyback:100]  Out: 2075 (20.6 mL/kg) [Other:2000; Blood:75]  Weight: 100.5 kg    Constitutional:       Appearance: Frail. Chronically ill-appearing.   Neck:      Comments: Thick neck  Pulmonary:      Breath sounds: Rhonchi present.      Comments: Nasal cannula   Abdominal:      Palpations: Abdomen is soft.   Musculoskeletal:         General: Tenderness present.      Comments: Both arms swollen - blisters and ecchmotic   + Left arm AVF  pulsatile   Legs + 2 3 swelling pitting  Right foot TMA dressing dry and intact  Skin:     General: Skin is warm and dry.   Neurological:      Mental Status: Alert.      Comments: Oriented to self and place            Labs:  CMP:  Recent Labs     01/20/24  0542 01/19/24  0631 01/18/24 0349 01/17/24 2249 01/17/24 0459 10/05/23  1500 10/03/23  0640 10/02/23  0441 10/01/23  0152 09/27/23  0751 09/26/23  0631 09/25/23  1608 09/24/23  0847    133* 131* 132* 131*   < > 136   < > 138   < > 134*   < > 136   K 4.3 5.1 4.4 4.5 4.7   < > 4.1   < > 3.8   < > 4.6   < > 4.2   CL 94* 91* 91* 91* 88*   < > 93*   < > 94*   < > 92*   < > 95*   CO2 29 28 28 27 27   < > 25   < > 27   < > 25   < > 25   ANIONGAP 17 19 16 19 21*   < > 22*   < > 21*   < > 22*   < > 20   BUN 33* 54* 44* 41* 49*   < > 55*   < > 23   < > 61*   < > 32*   CREATININE 3.59* 5.34* 4.44* 4.31* 4.69*   < > 8.05*   < > 4.58*   < > 9.79*   < > 5.84*   EGFR 17* 10* 13* 13* 12*   < > 6*   < > 12*   < >  --   --   --    MG  --   --   --  2.06  --   --  2.89*  --  2.36  --  2.39  --  2.15    < > = values in this interval not displayed.       Recent Labs     01/20/24  0542 01/19/24 0631 01/18/24 0349 01/17/24 2249 01/17/24 0459 01/09/24  2135 10/08/23  1926 10/07/23  0719 10/02/23  0441 10/01/23  0152 09/30/23  1616 09/30/23  1616 09/25/23  1608   ALBUMIN 2.9* 3.3* 3.1* 3.1* 3.2*   < > 3.8 3.1*  3.2*   < > 3.3*   < > 3.3* 3.4   ALKPHOS  --   --   --   --   --   --  115 110  --  107  --  112 127   ALT  --   --   --   --   --   --  17 17  --  27  --  27 21   AST  --   --   --   --   --   --  18 18  --  21  --  23 18   BILITOT  --   --   --   --   --   --  0.5 0.4  --  0.7  --  0.6 0.7    < > = values in this interval not displayed.       CBC:  Recent Labs     01/20/24  0542 01/19/24  0631 01/18/24  0349 01/17/24  2249 01/17/24  0459   WBC 11.0 8.6 8.5 10.4 9.7   HGB 8.9* 10.3* 10.0* 10.1* 10.6*   HCT 30.1* 32.5* 30.8* 31.8* 34.4*   PLT 80* 78* 89* 88* 102*     99 94 93 98       COAG:   Recent Labs     01/17/24  0459 01/16/24  0627 01/15/24  0622 01/11/24  2055 01/11/24  1508 01/10/24  0422 01/09/24  2135 09/20/23  0848 09/20/23  0338   INR  --   --   --   --  1.3*  --  1.3*  --  1.4*   HAUF 0.4 0.5 0.6   < > 0.8   < >  --    < > 0.7    < > = values in this interval not displayed.       ABO:   Recent Labs     01/16/24  0627   ABO B       HEME/ENDO:   Recent Labs     12/03/23  0738 04/25/22  2034   HGBA1C 5.3 6.5*       CARDIAC:   Recent Labs     01/17/24  2249 10/08/23  2100 10/08/23  1926   TROPHS  --  60* 58*   BNP 1,525*  --   --          MICRO:   Recent Labs     01/10/24  1013 10/07/23  0719 10/01/23  0152   CRP  --  11.53* 21.44*   PROCAL 0.70*  --   --                Assessment/Plan   Chevy Benton is a 78 y.o. male with PMH of ESRD on HD TTS (Lt chest TDC, Hx of LUE Fistula Pseudoaneurysms), DM, HTN, HFrEF (TTE 10/23 EF 35-40%), PTA right lower extremity on 9/20/23 with subsequent TMA right foot on 9/22/23, Non Occlusive DVT (Rt Subclavian), Aortic Root Dilation, MR, chronic afib and SSS s/p pacemaker who presents DAVID angioplasty C/b perforation of AT artery. Patient admitted to CICU for further monitoring. Additionally patient noted to be overloaded on examination, on 2L with diffuse anasarca more pronounced in upper extremities. He is anuric at baseline and is on TTS dialysis. Patient was stable overnight with no signs/symptoms of compartment syndrome.      -Discuss resuming Eliquis with endovascular after podiatry surgery  -Follow up PF4 ab for c/f HIT (High probability on 4T score) - negative     NEUROLOGIC  ::Aox2 (self and place) unclear baseline  -Delirium precuations     CARDIOVASCULAR  #PVD S/p PTA to RLE c/b perforation of AT  #hx of right tma  #hx of rt subclavian dvt  #mild stenosis of left subclavian vein  #Hfref  #afib  #DM with neuropathy  #HTN  #Hx of right TMA  ::S/p Plavix load  ::1/19 Podiatry today revision of right TMA    - 1/20  Per Podiatry post op note VAC change on Monday and no weight bearing to RLE   Plan:  -Q1h neuro checks in RLE  -Cont plavix & aspirin resumption of eliquis pending ok  -Cont home metoprolol succ 25mg  -Uptitrate GDMT as tolerated consider hydralazine and isordil   -Cont home atorva  -Cont home gabapentin  -Insulin glargine 6 units nightly - watch with poor nutrition   -SSI     PULMONARY  #AHRF  #2/2 to ESRD and Hfref  #COVID  ::S/p 10 day course of dex (ended 1/14)  Plan:  -Continue with dialysis regimen  -Wean and titrate oxygen as needed  -Cont IS, pulm hygiene  - Repeat 2vCXR   - Check VBG      RENAL/  #ESRD with dialysis TTS  #LUE fistula occlusion s/p venoplasty  Plan:  -Cont with dialysis schedule - [facility] team - volume overloaded   - 1/20 Had dialysis yesterday and today (legs are less swollen) will check VBG   -Monitor for signs of need for urgent dialysis  -Cont home renvela  -Cont home nephro caps     GASTROINTESTINAL  Gerd  -Cont home pantoprazole     MSK  #hx of gout  -cont home allopurinol     HEME/ONC  #Anemia of chronic disease  ::At baseline hgb  ::2/2 to ESRD  Plan:  -CTM     INFECTIOUS DISEASE  #Covid infection s/p dexamethasone  :;Afebrile, no leukocytosis  ::xray with pulmonary edema  Plan:  -Continue to monitor for worsening signs or sx of pneumonia  - Repeat 2vCXR in the am      F: Carb diet, NPO midnight   E: K > 4, Mg > 2  N: Renal diet  A: PIV, left subclaavian trialysos  DVT ppx: Heparin SubQ  GI ppx: Protonix    Code Status: Full code   Surrogate Medical Decision-maker: Spouse ()              LDA:  Midline 01/11/24 Single lumen Right Cephalic vein (Active)   Placement Date/Time: 01/11/24 1717   Hand Hygiene Completed: Yes  Catheter Time Out Checklist Completed: Yes  Size (Fr): 3  Lumen Type: Single lumen  Catheter to Vein Ratio Less Than 50%: Yes  Total Length (cm): 11 cm  External Length (cm): 0 cm  Orie...   Number of days:  8       Hemodialysis Cath Double lumen Left Chest (Active)   No placement date or time found.   Hemodialysis Catheter Type: Double lumen  Placed by: presented on admission  Orientation: Left  Access Location: Chest   Number of days:      NUTRITION: Adult diet Regular  EMERGENCY CONTACT: Extended Emergency Contact Information  Primary Emergency Contact: Julia Benton  Address: [address] [address]  Home Phone: [phone]  Relation: Spouse  Preferred language: English   needed? No  Secondary Emergency Contact: CORINNESHAMEKA VIKA  Address: [address] [id number] [facility]  Home Phone: [phone]  Work Phone: [phone]  Mobile Phone: [phone]  Relation: Child  Preferred language: English   needed? No  CODE STATUS: Full Code  DISPO:   Home: [place] [address]  AMPAC: Daily Activity - Total Score: 9  Discharge Planning  Living Arrangements: Spouse/significant other  Support Systems: Spouse/significant other, Children, Family members  Assistance Needed: none  Type of Residence: Private residence  Number of Stairs to Enter Residence: 2  Number of Stairs Within Residence: 3  Do you have animals or pets at home?: No  Home or Post Acute Services: None  Patient expects to be discharged to:: back to SNF  Does the patient need discharge transport arranged?: No  FOLLOWUP:   No future appointments.       Patient seen and discussed with Dr. Dionicio Todd MD      ________________________________________________  JENIFFER Geronimo-CNP

## 2024-01-20 NOTE — SIGNIFICANT EVENT
Rapid Response RN Note    Rapid response RN at bedside for RADAR score 7 due to the following VS: T 36.4 °Celsius; HR 91 ; RR 18; BP 90/59; SPO2 91%.     Reviewed above VS with bedside RN. Pt has +3 generalized edema with cool fingertips and has occasion arm movements/twitching making it difficult to obtain accurate BP and SpO2. Recheck VS: HR:  BP: 116/76 SpO2: 95%. No interventions by rapid response team indicated at this time.      Staff to page rapid response for any concerns or acute change in condition/VS. Cezar Han RN.

## 2024-01-20 NOTE — NURSING NOTE
Report from Sending RN:    Report From: Yee ( RN)  Recent Surgery of Procedure: Yes, echo 01/19/24  Baseline Level of Consciousness (LOC): A/O x 4  Oxygen Use: Yes, 2 liter  Type: NC  Diabetic: No, 100  Last BP Med Given Day of Dialysis: Metoprolol 25 mg 0932 am  Last Pain Med Given: none  Lab Tests to be Obtained with Dialysis: No  Blood Transfusion to be Given During Dialysis: No  Available IV Access: Yes  Medications to be Administered During Dialysis: No  Continuous IV Infusion Running: No  Restraints on Currently or in the Last 24 Hours: No  Hand-Off Communication: No acute overnight or morning events; Pt did take morning medications; No labs needed; Pt may go off unit without telemetry; Pt is a full code; Ela Morales RN.

## 2024-01-20 NOTE — PROGRESS NOTES
Mr eBnton was admitted from the [facility] outpatient clinic on 1/9/2024 for management of RLE CLI RC-VI.  Hospital day 11.     Subjective Data:  He was seen this am at bedside, appeared lethargic, NAD, AOx3, no complains of foot or leg pain. Denied feeling SOB on 2L NC. Left arm more swollen than right.       Overnight Events:    No significant event reported      Objective Data:  Last Recorded Vitals:  Vitals:    01/20/24 0550 01/20/24 0744 01/20/24 0955 01/20/24 1344   BP: 109/55  88/54    BP Location: Right arm      Patient Position:       Pulse: 95 80 79 79   Resp: 18 21     Temp: 37.2 °C (99 °F) 36.5 °C (97.7 °F)  36.3 °C (97.3 °F)   TempSrc:    Temporal   SpO2: 98% 96%     Weight:       Height:           Last Labs:    Lab Results   Component Value Date    WBC 11.0 01/20/2024    HGB 8.9 (L) 01/20/2024    HCT 30.1 (L) 01/20/2024     01/20/2024    PLT 80 (L) 01/20/2024     Lab Results   Component Value Date    GLUCOSE 78 01/20/2024    CALCIUM 9.0 01/20/2024     01/20/2024    K 4.3 01/20/2024    CO2 29 01/20/2024    CL 94 (L) 01/20/2024    BUN 33 (H) 01/20/2024    CREATININE 3.59 (H) 01/20/2024         TROPHS   Date/Time Value Ref Range Status   10/08/2023 09:00 PM 60 0 - 20 ng/L Final     Comment:     Previous result verified on 10/8/2023 2030 on specimen/case [id number] called with component [facility] for procedure Troponin I, High Sensitivity, Initial with value 58 ng/L.   10/08/2023 07:26 PM 58 0 - 20 ng/L Final     BNP   Date/Time Value Ref Range Status   01/17/2024 10:49 PM 1,525 0 - 99 pg/mL Final     HGBA1C   Date/Time Value Ref Range Status   12/03/2023 07:38 AM 5.3 4.3 - 5.6 % Final     Comment:     American Diabetes Association guidelines indicate that patients with HgbA1c in the range 5.7-6.4% are at increased risk for development of diabetes, and intervention by lifestyle modification may be beneficial. HgbA1c greater or equal to 6.5% is considered diagnostic of diabetes.   04/25/2022 08:34  PM 6.5 4.3 - 5.6 % Final     Comment:     American Diabetes Association guidelines indicate that patients with HgbA1c in the range 5.7-6.4% are at increased risk for development of diabetes, and intervention by lifestyle modification may be beneficial. HgbA1c greater or equal to 6.5% is considered diagnostic of diabetes.      Last I/O:  I/O last 3 completed shifts:  In: 1914.9 (19.1 mL/kg) [P.O.:800; I.V.:1014.9 (10.1 mL/kg); IV Piggyback:100]  Out: 2075 (20.6 mL/kg) [Other:2000; Blood:75]  Weight: 100.5 kg       Inpatient Medications:  Scheduled medications   Medication Dose Route Frequency    allopurinol  100 mg oral Daily    aspirin  81 mg oral Daily    atorvastatin  40 mg oral Nightly    B complex-vitamin C-folic acid  1 capsule oral Daily    clopidogrel  75 mg oral Daily    gabapentin  100 mg oral q24h    [Held by provider] insulin glargine  6 Units subcutaneous Nightly    [Held by provider] insulin lispro  0-5 Units subcutaneous TID with meals    metoprolol succinate XL  25 mg oral Daily    pantoprazole  40 mg oral Daily before breakfast    polyethylene glycol  17 g oral Daily    sennosides  1 tablet oral Nightly    sevelamer carbonate  800 mg oral TID with meals     PRN medications   Medication    acetaminophen    Or    acetaminophen    Or    acetaminophen    albuterol    alum-mag hydroxide-simeth    ammonium lactate    dextrose    docusate sodium    glucagon    hydrocortisone    ipratropium-albuteroL    methocarbamol    povidone-iodine    traMADol     Continuous Medications   Medication Dose Last Rate       Physical Exam:  Constitutional: ill appearing elderly man, NAD, lethargic    Head/Neck: Neck supple,  No JVD, trachea midline, no bruits   Respiratory/Thorax: Patent airways, diminished breath sounds throughout, on RA. Lt chest wall HD catheter in place, On 2L NC, speaking in complete sentences but with mild dyspnea  Cardiovascular: irregular, rate and rhythm, no murmurs, normal S 1 and S  2   Gastrointestinal: obese,, Non-distended, soft, non-tender,+BS,    Skin: Warm and dry,   Extremities: [place], RLE: Rt foot is warm to touch, monophasic Doppler signal of DP/PT and multiphasic signal of distal AT present, no c/o foot pain, Rt calf is soft no s&S compartment. Right TMA site with dressing clean and intact, RLE +2 edema. Rt groin access site soft, nontender, no oozing/hematoma, slight ecchymosis, dressing dry and intact  LLE: Skin temperature warm to cool from proximal to distal, skin discoloration of the forefoot and toes 2-5 with sluggish cap refill to all toes (unchanged from prior assessment), no pain reported, no ulcer  DP/PT pulses nonpalpable or Doppler able, +2 edema, foot flaky and skin peeling.    LUE: AVF without thrill. L brachial pulse Doppler-able. Radial Multiphasic. +4 edema entire left arm and hand. Chronic hyperkeratotic skin changes overlying prior access site with dressing. Sensation and motor function intact.  RUE: +3 edema of the arm and hand, palpable radial pulses, motor and sensation intact, cool arm, tenderness with palpation  Neuro: awake/alert/oriented x3, non-focal, sensation very diminished bilateral foot, motor function intact  Psychological: Appropriate mood and behavior    Assessment/Plan   Chevy Benton is a 78 y.o. male with PMH of ESRD on HD TTS (Lt chest TDC, Hx of LUE Fistula Pseudoaneurysms), T2DM, HTN, HFrEF (TTE 10/23 EF 35-40%), A fib, PTA right lower extremity on 9/20/23 with subsequent TMA on 9/22/23, Non Occlusive DVT (Rt Subclavian), Aortic Root Dilation, MR, SSS s/p pacemaker, who presented as a direct admit from Dr Linder endovascular clinic for concerns of critical limb ischemia. Pt has not been taking Plavix since hospital discharge in late September due to the frequent hospitalization for SBO. He denied any CP, SOB, cough, fever. Since hospital admission Pt was seen by podiatry, vascular surgery and nephrology team. He had venogram on 1/10/24  by IR and fistulogram by vascular surgery on 1/12/24.    1/10 3V Foot Xray - No erosive changes or osseous destruction. Lobulated morphology of the right midfoot soft tissue stump with stump breakdown/wound dehiscence not excluded.     1/19/2024 - NAD, HDS, speaking in complete sentences but noticed mild dyspnea on 2L NC 95%. Rt foot is warm to touch, monophasic Doppler signal of DP/PT and multiphasic signal of distal AT present, no c/o foot pain, Rt calf is soft no s&S compartment.       RLE CLTI  RC-VI  Rt TMA site with overlaying necrosis    - 1/17/24 s/p PTA of AT and PT, Reconstruction of planter arch. AT perforation, controlled by balloon tamponade and external tamponade with blood pressure cuff.  - continue with ASA EC 81 mg and Plavix 75 mg daily  - No need to resume Hep gtt as Pt risk of AF related events < 0.5% per day. Can start DVT prophylaxis when Plt is WNL  - Resume Eliquis for Afib at discharge   - TMA site care per podiatry recs (betadine soaked gauze, 4x4, ABD, Kerlix)  - Ammonium lactate lotion or cream for dry legs and feet. Do not apply between toes.   - Truvue EHOB boot to prevent pressure ulcer on the heels. Heel offloading at all times.   - OR today 1/19/24 with Podiatry for TMA site revision by Dr. Rodriguez  - Plt 78 declining since admission PF4 sent 1/18 result pending  - 1 month FU at [facility] clinic in 1 month post discharge with repeat vascular testing (we will arrange)  - remainder of care per primary   - Case and plan discussed with attending Dr. Stuart Nguyen    Please do not hesitate to contact [facility] team with any questions or concerns.           Code Status:  Full Code      Roberto Carlos Petersen MD  Endovascular/Limb Salvage Service   [place]

## 2024-01-20 NOTE — NURSING NOTE
Report to Receiving RN:    Report To: ABEL Smith  Time Report Called: 21:35  Hand-Off Communication: pt tolerated tx well, 2L of fluid removed, post vitals 154/95 HR 51, alert and stable post tx  Complications During Treatment: No  Ultrafiltration Treatment: Yes  Medications Administered During Dialysis: No  Blood Products Administered During Dialysis: No  Labs Sent During Dialysis: No  Heparin Drip Rate Changes: N/A    Electronic Signatures:   (SLast Updated: 9:35 PM by SHONDA MCKEON

## 2024-01-20 NOTE — PROGRESS NOTES
Chevy Benton is a 78 y.o. male on day 11 of admission presenting with Critical limb ischemia of both lower extremities (CMS/HCC).    Subjective   Pt was seen resting comfortably in bed and NAD. Pain controlled. Vac in place.          Physical Exam    Objective     REVIEW OF SYSTEMS  REVIEW OF SYSTEMS  GENERAL:  Negative for malaise, significant weight loss, fever  HEENT:  No changes in hearing or vision, no nose bleeds or other nasal problems and Negative for frequent or significant headaches  NECK:  Negative for lumps, goiter, pain and significant neck swelling  RESPIRATORY:  Negative for cough, wheezing and shortness of breath  CARDIOVASCULAR:  Negative for chest pain, leg swelling and palpitations  GI:  Negative for abdominal discomfort, blood in stools or black stools and change in bowel habits  :  Negative for dysuria, frequency and incontinence  MUSCULOSKELETAL:  Negative for joint pain or swelling, back pain, and muscle pain.  SKIN:  Dry gangrenous changes to right TMA site  PSYCH:  Negative for sleep disturbance, mood disorder and recent psychosocial stressors  HEMATOLOGY/LYMPHOLOGY:  Negative for prolonged bleeding, bruising easily, and swollen nodes.  ENDOCRINE:  Negative for cold or heat intolerance, polyuria, polydipsia and goiter  NEURO: Negative, denies any burning, tingling or numbness      Objective:   Vasc: DP and PT pulses are nonpalpable bilateral. Skin temperature is warm to cool proximal to distal bilateral.       Neuro:  Light touch is absent to the foot bilateral.  Protective sensation is absent to the foot when tested with the 5.07 SWM bilateral.       Derm: S/P right TMA (DOS 9/22/23). Post op dressing in place, no strike through, vac in place pulling well     MSK: S/P right open TMA (DOS 9/22/23). 4/5 MSK strength to bilateral lower foot in all four pedal compartments    Last Recorded Vitals  Blood pressure 88/54, pulse 79, temperature 36.5 °C (97.7 °F), resp. rate 21, height 1.778  "m (5' 10\"), weight 101 kg (221 lb 9.6 oz), SpO2 96 %.    Intake/Output last 3 Shifts:  I/O last 3 completed shifts:  In: 1914.9 (19.1 mL/kg) [P.O.:800; I.V.:1014.9 (10.1 mL/kg); IV Piggyback:100]  Out: 2075 (20.6 mL/kg) [Other:2000; Blood:75]  Weight: 100.5 kg        Assessment/Plan   Principal Problem:    Critical limb ischemia of both lower extremities (CMS/HCC)  Active Problems:    Non-pressure chronic ulcer of other part of right foot with fat layer exposed (CMS/HCC)    Critical limb ischemia of right lower extremity (CMS/HCC)    Subclavian vein stenosis, left    # S/p right foot debridement   # Chronic non pressure ulcer with unspecified severity RLE L97.519  # 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  - Imaging reviewed.   - Continue abx therapy per ID/Primary  - Continue pain management per Medicine/Primary  - Continue anticoag per EVLS  - Podiatry will continue to monitor pt while in house  - NWB to RLE. Vac in place, pulling well. Vac will be replaced Monday.    Case to be discussed with attending, A&P above reflects a tentative plan. Please await for the final signature from the attending physician on service.     Patient was seen at bedside and is resting comfortably.  A total of 40 minutes of face-to-face time was spent on this patient for professional services including but not limited to obtaining medially appropriate history, performing physical examination, collecting and explaining pertinent findings to patient, discussing relevant and viable treatment options, making appropriate level of medical decision, and coordinating care and facilitating treatment.  The end of the encounter was spent educating patient and family on treatment plan. All questions and concerns were answered and addressed to the pt's delia Saul PGY-1  Podiatric Medicine & Surgery  Please Haiku message me with any questions " or concerns.

## 2024-01-20 NOTE — HOSPITAL COURSE
Chevy Benton is a 78 y.o. male with PMH of ESRD on HD TTS (Lt chest TDC, Hx of LUE Fistula Pseudoaneurysms), DM, HTN, HFrEF (TTE 10/23 EF 35-40%), PTA right lower extremity on 9/20/23 with subsequent TMA right foot on 9/22/23, Non Occlusive DVT (Rt Subclavian), Aortic Root Dilation, MR, chronic afib and SSS s/p pacemaker who initially presented on 1/9 from his endovascular clinic due to concerns for critical limb ischemia. Patient underwent PVR on 1/9 which showed concerns for severe ischemia warranting potential intervention. Patient started on heparin drip on arrival. Podiatry consulted for dry gangrenous changes to right TMA (following while in house for wound care recs). Vascular surgery consulted for thrombosed LUE AVF for which he had several temp lines in place prior. Underwent fistulogram and venoplasty with vascular surgery on 1/12 and subsequently underwent dialysis through LUE fistula afterwards. Found to have left upper extremity mild subclavian stenosis as well. Patient noted to be volume overloaded on 2L oxygen with significant edema in periphery. Patient underwent peripheral angioplasty on 1/17 which was complicated by AT perforation, which was managed with balloon tamponade and blood pressure cuff. He was subsequently transferred to CICU for further monitoring for compartment syndrome. Of note patient noted to be COVID positive January 2nd, was treated with 10 day course of dex while here while here (ended 1/14). Podiatry took patient for RLE TMA revision on 1/19.  Patient initially did well post-operatively until 1/22 code white during hemodialysis session for hypotension and altered mental status for which patient was brought back to the CICU for closer monitoring. Course in CICU complicated by persistent altered mental status with associated hypercarbia for which patient started on BIPAP. Able to wean BIPAP. Patient intermittently required pressors to sustain BP while in CICU for which  midodrine was titrated up to 20mg TID. Patient eventually started tolerated HD better without need for pressors in 3 consecutive sessions 1/27, 1/30, and 2/1. Mental status and peripheral edema improved with ongoing dialysis. Patient stable for transfer to the floor 2/1 due to no ongoing ICU needs.     Floor course:    Dialyzed per nephrology, increased fluid removal.     Wound care following right TMA with wound vac.  Resumed eliquis for afib. DC asa, continued on plavix (no need for triple therapy).     Urethral discharge and irritation on 2/8, genital swab showed +yeast. Discussed with infectious disease who recommended STI panel. Trichomonas and syphilis negative; gonorrhea and chlamydia still pending. Will pend results and reach out to SNF if anything results positive.     Generalized left leg pain noted on date of discharge, has + DP pulse and limb is warm. Has endovascular follow- up established on 2/27.     Wife updated on date of discharge, prescription for oxycodone sent to SNF.     Discharge weight: 98.7 kg    After all labs and VS were reviewed the decision was made that the patient was medically stable for discharge.  The patient was discharged in satisfactory condition.    More than 60 minutes were spent in coordinating patient discharge.

## 2024-01-21 NOTE — PROGRESS NOTES
Mr Benton was admitted from the [facility] outpatient clinic on 1/9/2024 for management of RLE CLI RC-VI.      Subjective Data:  He was seen this am at bedside, appeared lethargic, NAD, AOx3, no complains of foot or leg pain. Denied feeling SOB on 2L NC. Left arm more swollen than right.       Overnight Events:    No significant event reported      Objective Data:  Last Recorded Vitals:  Vitals:    01/21/24 1016 01/21/24 1109 01/21/24 1121 01/21/24 1522   BP: 110/70 98/63     BP Location:       Pulse: 97   88   Resp: 22   18   Temp: 36.7 °C (98.1 °F) 36.6 °C (97.9 °F)     TempSrc: Temporal      SpO2: 100% (!) 87% 100% 94%   Weight:       Height:           Last Labs:    Lab Results   Component Value Date    WBC 9.1 01/21/2024    HGB 9.2 (L) 01/21/2024    HCT 29.4 (L) 01/21/2024    MCV 99 01/21/2024    PLT 83 (L) 01/21/2024     Lab Results   Component Value Date    GLUCOSE 173 (H) 01/21/2024    CALCIUM 9.3 01/21/2024     (L) 01/21/2024    K 4.3 01/21/2024    CO2 26 01/21/2024    CL 92 (L) 01/21/2024    BUN 40 (H) 01/21/2024    CREATININE 4.68 (H) 01/21/2024         TROPHS   Date/Time Value Ref Range Status   10/08/2023 09:00 PM 60 0 - 20 ng/L Final     Comment:     Previous result verified on 10/8/2023 2030 on specimen/case [id number] called with component [facility] for procedure Troponin I, High Sensitivity, Initial with value 58 ng/L.   10/08/2023 07:26 PM 58 0 - 20 ng/L Final     BNP   Date/Time Value Ref Range Status   01/17/2024 10:49 PM 1,525 0 - 99 pg/mL Final     HGBA1C   Date/Time Value Ref Range Status   12/03/2023 07:38 AM 5.3 4.3 - 5.6 % Final     Comment:     American Diabetes Association guidelines indicate that patients with HgbA1c in the range 5.7-6.4% are at increased risk for development of diabetes, and intervention by lifestyle modification may be beneficial. HgbA1c greater or equal to 6.5% is considered diagnostic of diabetes.   04/25/2022 08:34 PM 6.5 4.3 - 5.6 % Final     Comment:     American  Diabetes Association guidelines indicate that patients with HgbA1c in the range 5.7-6.4% are at increased risk for development of diabetes, and intervention by lifestyle modification may be beneficial. HgbA1c greater or equal to 6.5% is considered diagnostic of diabetes.      Last I/O:  I/O last 3 completed shifts:  In: 2639 (26.3 mL/kg) [P.O.:1220; I.V.:1000 (9.9 mL/kg); Other:419]  Out: 4000 (39.8 mL/kg) [Other:4000]  Weight: 100.5 kg       Inpatient Medications:  Scheduled medications   Medication Dose Route Frequency    allopurinol  100 mg oral Daily    aspirin  81 mg oral Daily    atorvastatin  40 mg oral Nightly    B complex-vitamin C-folic acid  1 capsule oral Daily    cefepime  500 mg intravenous q24h    clopidogrel  75 mg oral Daily    gabapentin  100 mg oral q24h    [Held by provider] insulin glargine  6 Units subcutaneous Nightly    [Held by provider] insulin lispro  0-5 Units subcutaneous TID with meals    metoprolol succinate XL  25 mg oral Daily    pantoprazole  40 mg oral Daily before breakfast    polyethylene glycol  17 g oral Daily    sennosides  1 tablet oral Nightly    sevelamer carbonate  800 mg oral TID with meals    vancomycin  2,000 mg intravenous Once     PRN medications   Medication    acetaminophen    Or    acetaminophen    Or    acetaminophen    albuterol    alum-mag hydroxide-simeth    ammonium lactate    dextrose    docusate sodium    glucagon    hydrocortisone    ipratropium-albuteroL    methocarbamol    povidone-iodine    vancomycin     Continuous Medications   Medication Dose Last Rate       Physical Exam:  Constitutional: ill appearing elderly man, NAD, lethargic    Head/Neck: Neck supple,  No JVD, trachea midline, no bruits   Respiratory/Thorax: Patent airways, diminished breath sounds throughout, on RA. Lt chest wall HD catheter in place, On 2L NC, speaking in complete sentences but with mild dyspnea  Cardiovascular: irregular, rate and rhythm, no murmurs, normal S 1 and S  2   Gastrointestinal: obese,, Non-distended, soft, non-tender,+BS,    Skin: Warm and dry,   Extremities: [place], RLE: Rt foot is warm to touch, monophasic Doppler signal of DP/PT and multiphasic signal of distal AT present, no c/o foot pain,. Right TMA site with dressing clean and intact, RLE +2 edema. Rt groin access site soft, nontender, no oozing/hematoma, slight ecchymosis, dressing dry and intact  LLE: Skin temperature warm to cool from proximal to distal, skin discoloration of the forefoot and toes 2-5 with sluggish cap refill to all toes (unchanged from prior assessment), no pain reported, no ulcer  DP/PT pulses nonpalpable or Doppler able, +2 edema, foot flaky and skin peeling.    LUE: AVF without thrill. L brachial pulse Doppler-able. Radial Multiphasic. +4 edema entire left arm and hand. Chronic hyperkeratotic skin changes overlying prior access site with dressing. Sensation and motor function intact.  RUE: +3 edema of the arm and hand, palpable radial pulses, motor and sensation intact, cool arm, tenderness with palpation  Neuro: awake/alert/oriented x3, non-focal, sensation very diminished bilateral foot, motor function intact  Psychological: Appropriate mood and behavior    Assessment/Plan   Chevy Benton is a 78 y.o. male with PMH of ESRD on HD TTS (Lt chest TDC, Hx of LUE Fistula Pseudoaneurysms), T2DM, HTN, HFrEF (TTE 10/23 EF 35-40%), A fib, PTA right lower extremity on 9/20/23 with subsequent TMA on 9/22/23, Non Occlusive DVT (Rt Subclavian), Aortic Root Dilation, MR, SSS s/p pacemaker, who presented as a direct admit from Dr Linder endovascular clinic for concerns of critical limb ischemia. Pt has not been taking Plavix since hospital discharge in late September due to the frequent hospitalization for SBO. He denied any CP, SOB, cough, fever. Since hospital admission Pt was seen by podiatry, vascular surgery and nephrology team. He had venogram on 1/10/24 by IR and fistulogram by vascular  surgery on 1/12/24.    1/10 3V Foot Xray - No erosive changes or osseous destruction. Lobulated morphology of the right midfoot soft tissue stump with stump breakdown/wound dehiscence not excluded.     1/19/2024 - NAD, HDS, speaking in complete sentences but noticed mild dyspnea on 2L NC 95%. Rt foot is warm to touch, monophasic Doppler signal of DP/PT and multiphasic signal of distal AT present, no c/o foot pain, Rt calf is soft no s&S compartment.     1/19 Podiatry today revision of right TMA    - 1/20 Per Podiatry post op note VAC change on Monday and no weight bearing to RLE       RLE CLTI  RC-VI  Rt TMA site with overlaying necrosis    - 1/17/24 s/p PTA of AT and PT, Reconstruction of planter arch. AT perforation, controlled by balloon tamponade and external tamponade with blood pressure cuff.  - continue with ASA EC 81 mg and Plavix 75 mg daily  - No need to resume Hep gtt as Pt risk of AF related events < 0.5% per day. Can start DVT prophylaxis when Plt is WNL  - Resume Eliquis for Afib at discharge   - TMA site care per podiatry recs (betadine soaked gauze, 4x4, ABD, Kerlix)  - Ammonium lactate lotion or cream for dry legs and feet. Do not apply between toes.   - Truvue EHOB boot to prevent pressure ulcer on the heels. Heel offloading at all times.   - OR today 1/19/24 with Podiatry for TMA site revision by Dr. Rodriguez  - Plt 78 declining since admission PF4 sent 1/18 result pending  - 1 month FU at EVLS clinic in 1 month post discharge with repeat vascular testing (we will arrange)  - remainder of care per primary   - Case and plan discussed with attending Dr. Stuart Nguyen    Please do not hesitate to contact EVLS team with any questions or concerns.       Code Status:  Full Code      Roberto Carlos Petersen MD  Endovascular/Limb Salvage Service   [facility]

## 2024-01-21 NOTE — CARE PLAN
The patient's goals for the shift include      The clinical goals for the shift include pt will remain HDS throughout the shift    Problem: Pain - Adult  Goal: Verbalizes/displays adequate comfort level or baseline comfort level  Outcome: Progressing     Problem: Safety - Adult  Goal: Free from fall injury  Outcome: Progressing     Problem: Discharge Planning  Goal: Discharge to home or other facility with appropriate resources  Outcome: Progressing     Problem: Chronic Conditions and Co-morbidities  Goal: Patient's chronic conditions and co-morbidity symptoms are monitored and maintained or improved  Outcome: Progressing     Problem: Diabetes  Goal: Achieve decreasing blood glucose levels by end of shift  Outcome: Progressing  Goal: Increase stability of blood glucose readings by end of shift  Outcome: Progressing  Goal: Decrease in ketones present in urine by end of shift  Outcome: Progressing  Goal: Maintain electrolyte levels within acceptable range throughout shift  Outcome: Progressing  Goal: Maintain glucose levels >70mg/dl to <250mg/dl throughout shift  Outcome: Progressing  Goal: No changes in neurological exam by end of shift  Outcome: Progressing  Goal: Learn about and adhere to nutrition recommendations by end of shift  Outcome: Progressing  Goal: Vital signs within normal range for age by end of shift  Outcome: Progressing  Goal: Increase self care and/or family involovement by end of shift  Outcome: Progressing  Goal: Receive DSME education by end of shift  Outcome: Progressing     Problem: Skin  Goal: Decreased wound size/increased tissue granulation at next dressing change  Outcome: Progressing  Goal: Participates in plan/prevention/treatment measures  Outcome: Progressing  Goal: Prevent/manage excess moisture  Outcome: Progressing  Goal: Prevent/minimize sheer/friction injuries  Outcome: Progressing  Goal: Promote/optimize nutrition  Outcome: Progressing  Goal: Promote skin healing  Outcome:  Progressing     Problem: Fall/Injury  Goal: Not fall by end of shift  Outcome: Progressing  Goal: Be free from injury by end of the shift  Outcome: Progressing  Goal: Verbalize understanding of personal risk factors for fall in the hospital  Outcome: Progressing  Goal: Verbalize understanding of risk factor reduction measures to prevent injury from fall in the home  Outcome: Progressing  Goal: Use assistive devices by end of the shift  Outcome: Progressing  Goal: Pace activities to prevent fatigue by end of the shift  Outcome: Progressing       Over the shift, the patient did make progress toward the following goals.

## 2024-01-21 NOTE — PROGRESS NOTES
Chevy Benton is a 78 y.o. male on day 12 of admission presenting with Critical limb ischemia of both lower extremities (CMS/HCC).    Subjective   Pt was seen resting comfortably in bed and NAD. Pain controlled. Vac in place pulling well. Discussion with primary earlier about abx need. Pending OR cultures, but intra-op findings suggestive of infection therefore need course of abx.          Physical Exam    Objective     REVIEW OF SYSTEMS  REVIEW OF SYSTEMS  GENERAL:  Negative for malaise, significant weight loss, fever  HEENT:  No changes in hearing or vision, no nose bleeds or other nasal problems and Negative for frequent or significant headaches  NECK:  Negative for lumps, goiter, pain and significant neck swelling  RESPIRATORY:  Negative for cough, wheezing and shortness of breath  CARDIOVASCULAR:  Negative for chest pain, leg swelling and palpitations  GI:  Negative for abdominal discomfort, blood in stools or black stools and change in bowel habits  :  Negative for dysuria, frequency and incontinence  MUSCULOSKELETAL:  Negative for joint pain or swelling, back pain, and muscle pain.  SKIN:  Dry gangrenous changes to right TMA site  PSYCH:  Negative for sleep disturbance, mood disorder and recent psychosocial stressors  HEMATOLOGY/LYMPHOLOGY:  Negative for prolonged bleeding, bruising easily, and swollen nodes.  ENDOCRINE:  Negative for cold or heat intolerance, polyuria, polydipsia and goiter  NEURO: Negative, denies any burning, tingling or numbness      Objective:   Vasc: DP and PT pulses are nonpalpable bilateral. Skin temperature is warm to cool proximal to distal bilateral.       Neuro:  Light touch is absent to the foot bilateral.  Protective sensation is absent to the foot when tested with the 5.07 SWM bilateral.       Derm: S/P right TMA (DOS 9/22/23). Post op dressing in place, no strike through, vac in place pulling well     MSK: S/P right open TMA (DOS 9/22/23). 4/5 MSK strength to bilateral  "lower foot in all four pedal compartments    Last Recorded Vitals  Blood pressure 98/63, pulse 97, temperature 36.6 °C (97.9 °F), resp. rate 22, height 1.778 m (5' 10\"), weight 101 kg (221 lb 9.6 oz), SpO2 100 %.    Intake/Output last 3 Shifts:  I/O last 3 completed shifts:  In: 2639 (26.3 mL/kg) [P.O.:1220; I.V.:1000 (9.9 mL/kg); Other:419]  Out: 4000 (39.8 mL/kg) [Other:4000]  Weight: 100.5 kg        Assessment/Plan   Principal Problem:    Critical limb ischemia of both lower extremities (CMS/HCC)  Active Problems:    Non-pressure chronic ulcer of other part of right foot with fat layer exposed (CMS/HCC)    Critical limb ischemia of right lower extremity (CMS/HCC)    Subclavian vein stenosis, left    # S/p right foot debridement   # Chronic non pressure ulcer with unspecified severity RLE L97.519  # 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  - Imaging reviewed.   - Continue abx therapy per ID/Primary  - Continue pain management per Medicine/Primary  - Continue anticoag per EVLS  - Podiatry will continue to monitor pt while in house  - NWB to RLE. Vac in place, pulling well.   - Vac will be replaced Monday, order placed.    Case to be discussed with attending, A&P above reflects a tentative plan. Please await for the final signature from the attending physician on service.       Marvin Saul PGY-1  Podiatric Medicine & Surgery  Please Haiku message me with any questions or concerns.    "

## 2024-01-21 NOTE — PROGRESS NOTES
"Vancomycin Dosing by Pharmacy- INITIAL    Chevy Benton is a 78 y.o. year old male who Pharmacy has been consulted for vancomycin dosing for cellulitis, skin and soft tissue. Based on the patient's indication and renal status this patient will be dosed based on a goal pre-HD level of 15-25.     Patient currently on iHD.    Visit Vitals  BP 98/63   Pulse 97   Temp 36.6 °C (97.9 °F)   Resp 22        Lab Results   Component Value Date    CREATININE 4.68 (H) 01/21/2024    CREATININE 3.59 (H) 01/20/2024    CREATININE 5.34 (H) 01/19/2024    CREATININE 4.44 (H) 01/18/2024        Patient weight is No results found for: \"PTWEIGHT\"    No results found for: \"CULTURE\"     I/O last 3 completed shifts:  In: 2639 (26.3 mL/kg) [P.O.:1220; I.V.:1000 (9.9 mL/kg); Other:419]  Out: 4000 (39.8 mL/kg) [Other:4000]  Weight: 100.5 kg   [unfilled]    Lab Results   Component Value Date    PATIENTTEMP 37.0 01/21/2024    PATIENTTEMP 37.0 01/20/2024    PATIENTTEMP 37.0 01/19/2024          Assessment/Plan     Patient will be given a loading dose of 2000 mg.  Will re-dose vancomycin after next iHD session  Follow-up level will be ordered after 2nd iHD session unless clinically indicated sooner.  Will continue to monitor renal function daily while on vancomycin and order serum creatinine at least every 48 hours if not already ordered.  Follow for continued vancomycin needs, clinical response, and signs/symptoms of toxicity.       Elisabeth Young, PharmD       "

## 2024-01-22 NOTE — H&P
History Of Present Illness  Chevy Benton is a 78 y.o. male w/ a PMH of ESRD, HFrEF (10/2023 35-40%), Afib c/b SSS (s/p ?[id number] PPM), and PVD s/p 9/2023 R TMA who has been admitted to the CICU for AMS and hypotension during iHD.    Pt initially presented on 1/9 w/ c/f R critical limb ischemia which failed to improve on heparin gtt. He underwent balloon angioplasty on 1/16 that was c/b AT perforation managed w/ intraarterial balloon and external tamponade. On,1/19 he underwent R foot debridement/revision. Tissue cx grew pseudomonas and enterococcus so cefepime and vancomycin were initiated on 1/20. On 1/22, during his daily iHD sessions, he became hypotensive to 69/37, he was minimally responsive; ABG revealed hypercapnia with a component of respiratory acidosis so BiPAP was initiated and he was admitted to the CICU. On arrival, he was somnolent but arousable. Interview limited by BiPAP machine but he was not oriented to situation. He denied pain and discomfort but was later seen waking up and reporting RLE pain to his wife whom, by that time, was at bedside.     A-line inserted, Levophed initiated and uptitrated to 0.04 for MAP 65-70. Central line placed. CRRT initiated to net -40mL/hr.     Past Medical History  Past Medical History:   Diagnosis Date    Acute hypercapnic respiratory failure (CMS/HCC)     Acute on chronic HFrEF (heart failure with reduced ejection fraction) (CMS/[facility])     Diabetes mellitus (CMS/HCC)     ESRD on hemodialysis (CMS/[facility])     History of angioplasty of peripheral vessel 10/26/2023    HTN (hypertension)     LFT elevation 07/20/2021    Small bowel obstruction (CMS/HCC) 10/10/2023    Stage II pressure ulcer (CMS/HCC)        Surgical History  Past Surgical History:   Procedure Laterality Date    INVASIVE VASCULAR PROCEDURE Right 1/17/2024    Procedure: Lower Extremity Angiogram;  Surgeon: Stuart Nguyen MD;  Location: [place] [id number] [facility];  Service: Cardiovascular;  Laterality:  Right;        Social History  He reports that he has never smoked. He has been exposed to tobacco smoke. He has never used smokeless tobacco. No history on file for alcohol use and drug use.    Family History  Family History   Problem Relation Name Age of Onset    Diabetes Mother          Allergies  Penicillins    24 Hour Vitals  Temp:  [36.3 °C (97.3 °F)-37.1 °C (98.8 °F)] 36.4 °C (97.5 °F)  Heart Rate:  [56-93] 82  Resp:  [10-22] 20  BP: ()/(20-95) 95/69  Arterial Line BP 1: ()/(45-62) 122/55  FiO2 (%):  [40 %-100 %] 40 %    Temp (24hrs), Av.7 °C (98 °F), Min:36.3 °C (97.3 °F), Max:37.1 °C (98.8 °F)     24 hour Intake/Output    Intake/Output Summary (Last 24 hours) at 2024 1831  Last data filed at 2024 0710  Gross per 24 hour   Intake 200 ml   Output --   Net 200 ml        Exam:  General: NAD. A+Ox3.  Neuro: CNII-XII grossly intact. 5/5 strength throughout.  ENT: moist mucous membranes. Bipap in place.  Cardiac: RRR. no murmurs. no rubs.  Pulmonary: CTAB. breath sounds appreciated in all lung fields.   Abdomen: non-tender. non-distended. normoactive bowel sounds.  Extremities: full passive ROM. 2+ b/l UE edema. No LE edema.  Skin: no rashes. no ulcers.    Labs  CBC  Results from last 72 hours   Lab Units 24  0758 24  0513 24  0652   WBC AUTO x10*3/uL 9.4 9.8 9.1   HEMOGLOBIN g/dL 8.4* 9.3* 9.2*   HEMATOCRIT % 28.4* 30.0* 29.4*   PLATELETS AUTO x10*3/uL 86* 81* 83*        BMP  Results from last 72 hours   Lab Units 24  0758 24  0513 24  0652   SODIUM mmol/L 129* 129* 134*   POTASSIUM mmol/L 4.9 4.9 4.3   CHLORIDE mmol/L 91* 90* 92*   BUN mg/dL 48* 48* 40*   CREATININE mg/dL 5.28* 5.44* 4.68*   PHOSPHORUS mg/dL 5.7* 5.6* 5.0*       Medications   Scheduled Medications  allopurinol, 100 mg, oral, Daily  aspirin, 81 mg, oral, Daily  atorvastatin, 40 mg, oral, Nightly  B complex-vitamin C-folic acid, 1 capsule, oral, Daily  clopidogrel, 75 mg, oral,  Daily  epoetin dane or biosimilar, 10,000 Units, subcutaneous, Every Mon/Wed/Fri  insulin glargine, 6 Units, subcutaneous, Nightly  insulin lispro, 0-5 Units, subcutaneous, TID with meals  meropenem, 1,000 mg, intravenous, q12h  pantoprazole, 40 mg, oral, Daily before breakfast  polyethylene glycol, 17 g, oral, Daily  sennosides, 1 tablet, oral, Nightly  sevelamer carbonate, 800 mg, oral, TID with meals     Continuous Medications  norepinephrine, 0.01-1 mcg/kg/min, Last Rate: 0.02 mcg/kg/min (01/22/24 1800)  PrismaSol BGK 2/3.5, 2,700 mL/hr, Last Rate: 2,700 mL/hr (01/22/24 1430)     PRN Medications  PRN medications: acetaminophen **OR** acetaminophen **OR** acetaminophen, albuterol, alum-mag hydroxide-simeth, ammonium lactate, dextrose, docusate sodium, glucagon, heparin, heparin, hydrocortisone, ipratropium-albuteroL, oxygen, povidone-iodine, sodium chloride, vancomycin       Assessment/Plan   78M w/ a PMH of ESRD, HFrEF, and PAD s/p 9/2023 TMA and 1/19 debridement d/t distal leg ischemia p/w hypotension and AMS. He has several reasons for AMS but the combination of hypotension and AMS raise concern for septic shock. 0/4 SIRS, however, 1/19 tissue cx is growing pseudomonas as well as enterococcus. Other possible etiologies/ contributing factors for AMS includes delirium (multiple co-morbilities, recent surgery, in pain, recent sedative use- scheduled gabapentin; recent robaxin; tramadol on 1/21- prolonged hospital stay, poor vision at baseline) vs. CO2 narcosis (worsening hypercapnia on ABG, worsening bicarb on RFP, could be worsened by bicarb given during iHD). Lower on the differential is Cefepime toxicity (only received one dose but is on iHD) vs. Uremia (though stable) vs. Hyponatremia (though mild- 129).    Neuro  #AMS  #Acute on Chronic CO2 Narcosis  #Delirium  ?#Cefepime toxicity  #Toxic-Metabolic Encephalopathy d/t Sepsis  - Bipap for hypercapnia (see resp)  - delirium precautions  - hold sedation  medications  - DC cefepime (see ID)  - treat sepsis (see ID)    CV  #PAD s/p TMA and 1/19 debridement  - Podiatry on board  - c/w home ASA 81mg PO every day  - c/w home clopidogrel 75mg PO every day  - c/w home atorvastatin 40mg PO every day     #HFrEF (35-40% 10/2023)  [ ] followup TTE  [ ] consider GDMT when stable    #Afib  #SSS s/p PPM  - hold home metoprolol succinate 25mg PO every day iso shock  - AC on hold iso 1/16 TA perforation    Resp  #Hypercapnic Respiratory Acidosis  - BiPAP    ID  #Possible Septic Shock  #Pseudomonas and Enterobacter on 1/19 tissue cx  - vancomycin, pharmacy to dose (1/21-_____)  - s/p Cefepime x1 on 1/21  - meropenem 1g q12hr (1/22-____)    GI  #GERD  #GI ppx  - pantoprazole 40mg PO every day    #Bowel Regimen  - Miralax every day  - Senna every day     Renal  #ESRD  - Nephrology on board  - CRRT iso shock  - EPO initiated    Endocrine  #DMII  - insulin lantus 6U at bedtime  - SSI    F: CRRT  E: none  N: pending bedside swallow eval  A: [facility] (1/22), R subclavian trialysis. R radial arterial line  DVT ppx: heparin 5000U subcutaneous q8hr  NOK: Julia Chetan [phone]      Mark Navarro MD

## 2024-01-22 NOTE — PROGRESS NOTES
Nephrology Consult Progress Note    Admit Date: 1/9/2024    Interval history:  Pt poorly communicative, transferred to ICU with change of MS and hypotension    CURRENT MEDICATIONS:    Current Facility-Administered Medications:     acetaminophen (Tylenol) tablet 650 mg, 650 mg, oral, q4h PRN, 650 mg at 01/21/24 1325 **OR** acetaminophen (Tylenol) oral liquid 650 mg, 650 mg, nasogastric tube, q4h PRN **OR** acetaminophen (Tylenol) suppository 650 mg, 650 mg, rectal, q4h PRN, Mark Navarro MD    albuterol 90 mcg/actuation inhaler 2 puff, 2 puff, inhalation, q6h PRN, Mark Navarro MD, 2 puff at 01/14/24 0850    allopurinol (Zyloprim) tablet 100 mg, 100 mg, oral, Daily, Mark Navarro MD, 100 mg at 01/21/24 1017    alum-mag hydroxide-simeth (Mylanta) 200-200-20 mg/5 mL oral suspension 30 mL, 30 mL, oral, 4x daily PRN, Mark Navarro MD    ammonium lactate (Lac-Hydrin) 12 % lotion, , Topical, q1h PRN, Mark Navarro MD    aspirin chewable tablet 81 mg, 81 mg, oral, Daily, Mark Navarro MD, 81 mg at 01/21/24 1017    atorvastatin (Lipitor) tablet 40 mg, 40 mg, oral, Nightly, Mark Navarro MD, 40 mg at 01/21/24 2222    B complex-vitamin C-folic acid (Nephrocaps) capsule 1 capsule, 1 capsule, oral, Daily, Mark Navarro MD, 1 capsule at 01/21/24 1029    clopidogrel (Plavix) tablet 75 mg, 75 mg, oral, Daily, Mark Navarro MD, 75 mg at 01/21/24 1017    dextrose 50 % injection 25 g, 25 g, intravenous, q15 min PRN, Mark Navarro MD, 25 g at 01/17/24 2253    docusate sodium (Colace) capsule 100 mg, 100 mg, oral, BID PRN, Mark Navarro MD, 100 mg at 01/16/24 1121    gabapentin (Neurontin) capsule 100 mg, 100 mg, oral, q24h, Mark Navarro MD, 100 mg at 01/21/24 2222    glucagon (Glucagen) injection 1 mg, 1 mg, intramuscular, q15 min PRN, Mark Navarro MD    heparin (porcine) injection 1,200 Units, 1,200 Units, intravenous, PRN, Judy Cazares DO    heparin (porcine) injection 1,200 Units, 1,200 Units, intravenous, PRN, Judy Cazares,  DO    hydrocortisone 2.5 % ointment, , Topical, BID PRN, Mark Navarro MD    insulin glargine (Lantus) injection 6 Units, 6 Units, subcutaneous, Nightly, Mark Navarro MD    insulin lispro (HumaLOG) injection 0-5 Units, 0-5 Units, subcutaneous, TID with meals, Mark Navarro MD    ipratropium-albuteroL (Duo-Neb) 0.5-2.5 mg/3 mL nebulizer solution 3 mL, 3 mL, nebulization, q6h PRN, Mark Navarro MD    meropenem (Merrem) 1,000 mg in sodium chloride 0.9 % 100 mL IV, 1,000 mg, intravenous, q12h, Mark Navarro MD    methocarbamol (Robaxin) tablet 500 mg, 500 mg, oral, q8h PRN, Mark Navarro MD, 500 mg at 01/11/24 1439    metoprolol succinate XL (Toprol-XL) 24 hr tablet 25 mg, 25 mg, oral, Daily, Mark Navarro MD, 25 mg at 01/21/24 1017    norepinephrine (Levophed) 8 mg in dextrose 5% 250 mL (0.032 mg/mL) infusion (premix), 0.01-1 mcg/kg/min, intravenous, Continuous, Mark Navarro MD, Last Rate: 7.88 mL/hr at 01/22/24 1501, 0.04 mcg/kg/min at 01/22/24 1501    norepinephrine in dextrose 5 % (Levophed) infusion  - [employer] Override Pull, , , ,     oxygen (O2) therapy, , inhalation, Continuous PRN - O2/gases, Koko Gordon MD, Rate Change at 01/22/24 1500    pantoprazole (ProtoNix) EC tablet 40 mg, 40 mg, oral, Daily before breakfast, Mark Navarro MD, 40 mg at 01/22/24 0632    polyethylene glycol (Glycolax, Miralax) packet 17 g, 17 g, oral, Daily, Mark Navarro MD, 17 g at 01/18/24 0937    povidone-iodine (Betadine) 7.5 % soap 1 Application, 1 Application, Topical, Daily PRN, John Milioglou, MD, 1 Application at 01/18/24 0400    PrismaSol BGK 2/3.5 CRRT solution, 2,700 mL/hr, CRRT, Continuous, Judy Cazares DO    sennosides (Senokot) tablet 8.6 mg, 1 tablet, oral, Nightly, Mark Navarro MD, 8.6 mg at 01/19/24 2212    sevelamer carbonate (Renvela) tablet 800 mg, 800 mg, oral, TID with meals, Mark Navarro MD, 800 mg at 01/21/24 1751    sodium chloride 0.9 % bolus 1,000 mL, 1,000 mL, CRRT, q10 min, Judy Cazares DO     "sodium chloride 0.9 % bolus 1,000 mL, 1,000 mL, CRRT, PRN, Judy Cazares DO    vancomycin (Vancocin) placeholder, , miscellaneous, Daily PRN, Nicol Lopez, APRN-CNP       Intake/Output Summary (Last 24 hours) at 1/22/2024 1527  Last data filed at 1/22/2024 0710  Gross per 24 hour   Intake 440 ml   Output --   Net 440 ml       PHYSICAL EXAM:  BP 95/69   Pulse 88   Temp 36.6 °C (97.9 °F) (Temporal)   Resp 21   Ht 1.778 m (5' 10\")   Wt 105 kg (231 lb 9.6 oz)   SpO2 100%   BMI 33.23 kg/m²     Intake/Output Summary (Last 24 hours) at 1/22/2024 1527  Last data filed at 1/22/2024 0710  Gross per 24 hour   Intake 440 ml   Output --   Net 440 ml     Gen: non communicative, NAD  Neck: No JVD  Cardiac: RRR  Resp: few rales   Abd: Soft, non tender, +BS, non distended   Ext: No edema   Access: RUE AVF  Neuro: unable to assess   Peripheral Pulses: Capillary refill <2secs, weak peripheral pulses.  Skin: Skin color, texture, turgor normal, no suspicious rashes or lesions.    Labs:  Results for orders placed or performed during the hospital encounter of 01/09/24 (from the past 24 hour(s))   POCT GLUCOSE   Result Value Ref Range    POCT Glucose 199 (H) 74 - 99 mg/dL   CBC   Result Value Ref Range    WBC 9.8 4.4 - 11.3 x10*3/uL    nRBC 0.2 (H) 0.0 - 0.0 /100 WBCs    RBC 2.96 (L) 4.50 - 5.90 x10*6/uL    Hemoglobin 9.3 (L) 13.5 - 17.5 g/dL    Hematocrit 30.0 (L) 41.0 - 52.0 %     (H) 80 - 100 fL    MCH 31.4 26.0 - 34.0 pg    MCHC 31.0 (L) 32.0 - 36.0 g/dL    RDW 19.2 (H) 11.5 - 14.5 %    Platelets 81 (L) 150 - 450 x10*3/uL   Renal Function Panel   Result Value Ref Range    Glucose 172 (H) 74 - 99 mg/dL    Sodium 129 (L) 136 - 145 mmol/L    Potassium 4.9 3.5 - 5.3 mmol/L    Chloride 90 (L) 98 - 107 mmol/L    Bicarbonate 29 21 - 32 mmol/L    Anion Gap 15 10 - 20 mmol/L    Urea Nitrogen 48 (H) 6 - 23 mg/dL    Creatinine 5.44 (H) 0.50 - 1.30 mg/dL    eGFR 10 (L) >60 mL/min/1.73m*2    Calcium 9.4 8.6 - 10.6 mg/dL    " Phosphorus 5.6 (H) 2.5 - 4.9 mg/dL    Albumin 3.0 (L) 3.4 - 5.0 g/dL   POCT GLUCOSE   Result Value Ref Range    POCT Glucose 263 (H) 74 - 99 mg/dL   Blood Gas Arterial Full Panel Unsolicited   Result Value Ref Range    POCT pH, Arterial 7.29 (L) 7.38 - 7.42 pH    POCT pCO2, Arterial 62 (H) 38 - 42 mm Hg    POCT pO2, Arterial 48 (L) 85 - 95 mm Hg    POCT SO2, Arterial 71 (L) 94 - 100 %    POCT Oxy Hemoglobin, Arterial 69.3 (L) 94.0 - 98.0 %    POCT Hematocrit Calculated, Arterial 24.0 (L) 41.0 - 52.0 %    POCT Sodium, Arterial 121 (L) 136 - 145 mmol/L    POCT Potassium, Arterial 5.2 3.5 - 5.3 mmol/L    POCT Chloride, Arterial 93 (L) 98 - 107 mmol/L    POCT Ionized Calcium, Arterial 1.25 1.10 - 1.33 mmol/L    POCT Glucose, Arterial 179 (H) 74 - 99 mg/dL    POCT Lactate, Arterial 1.3 0.4 - 2.0 mmol/L    POCT Base Excess, Arterial 2.6 -2.0 - 3.0 mmol/L    POCT HCO3 Calculated, Arterial 29.8 (H) 22.0 - 26.0 mmol/L    POCT Hemoglobin, Arterial 8.1 (L) 13.5 - 17.5 g/dL    POCT Anion Gap, Arterial 3 (L) 10 - 25 mmo/L    Patient Temperature 37.0 degrees Celsius   Blood Gas Lactic Acid, Venous   Result Value Ref Range    POCT Lactate, Venous 1.1 0.4 - 2.0 mmol/L   CBC and Auto Differential   Result Value Ref Range    WBC 9.4 4.4 - 11.3 x10*3/uL    nRBC 0.0 0.0 - 0.0 /100 WBCs    RBC 2.84 (L) 4.50 - 5.90 x10*6/uL    Hemoglobin 8.4 (L) 13.5 - 17.5 g/dL    Hematocrit 28.4 (L) 41.0 - 52.0 %     80 - 100 fL    MCH 29.6 26.0 - 34.0 pg    MCHC 29.6 (L) 32.0 - 36.0 g/dL    RDW 18.8 (H) 11.5 - 14.5 %    Platelets 86 (L) 150 - 450 x10*3/uL    Neutrophils % 77.8 40.0 - 80.0 %    Immature Granulocytes %, Automated 0.5 0.0 - 0.9 %    Lymphocytes % 8.6 13.0 - 44.0 %    Monocytes % 9.6 2.0 - 10.0 %    Eosinophils % 3.2 0.0 - 6.0 %    Basophils % 0.3 0.0 - 2.0 %    Neutrophils Absolute 7.30 (H) 1.60 - 5.50 x10*3/uL    Immature Granulocytes Absolute, Automated 0.05 0.00 - 0.50 x10*3/uL    Lymphocytes Absolute 0.81 0.80 - 3.00 x10*3/uL     Monocytes Absolute 0.90 (H) 0.05 - 0.80 x10*3/uL    Eosinophils Absolute 0.30 0.00 - 0.40 x10*3/uL    Basophils Absolute 0.03 0.00 - 0.10 x10*3/uL   Coagulation Screen   Result Value Ref Range    Protime 12.2 9.8 - 12.8 seconds    INR 1.1 0.9 - 1.1    aPTT 30 27 - 38 seconds   Comprehensive metabolic panel   Result Value Ref Range    Glucose 170 (H) 74 - 99 mg/dL    Sodium 129 (L) 136 - 145 mmol/L    Potassium 4.9 3.5 - 5.3 mmol/L    Chloride 91 (L) 98 - 107 mmol/L    Bicarbonate 30 21 - 32 mmol/L    Anion Gap 13 10 - 20 mmol/L    Urea Nitrogen 48 (H) 6 - 23 mg/dL    Creatinine 5.28 (H) 0.50 - 1.30 mg/dL    eGFR 10 (L) >60 mL/min/1.73m*2    Calcium 9.0 8.6 - 10.6 mg/dL    Albumin 2.7 (L) 3.4 - 5.0 g/dL    Alkaline Phosphatase 86 33 - 136 U/L    Total Protein 4.9 (L) 6.4 - 8.2 g/dL    AST 15 9 - 39 U/L    Bilirubin, Total 0.6 0.0 - 1.2 mg/dL    ALT <3 (L) 10 - 52 U/L   Ammonia   Result Value Ref Range    Ammonia 48 16 - 53 umol/L   Lactate   Result Value Ref Range    Lactate 0.7 0.4 - 2.0 mmol/L   Phosphorus   Result Value Ref Range    Phosphorus 5.7 (H) 2.5 - 4.9 mg/dL   Electrocardiogram, 12-lead PRN ACS symptoms   Result Value Ref Range    Ventricular Rate 85 BPM    Atrial Rate 28 BPM    QRS Duration 82 ms    QT Interval 340 ms    QTC Calculation(Bazett) 404 ms    R Axis 7 degrees    T Axis 221 degrees    QRS Count 13 beats    Q Onset 225 ms    T Offset 395 ms    QTC Fredericia 381 ms   BLOOD GAS ARTERIAL FULL PANEL   Result Value Ref Range    POCT pH, Arterial 7.28 (L) 7.38 - 7.42 pH    POCT pCO2, Arterial 59 (H) 38 - 42 mm Hg    POCT pO2, Arterial 100 (H) 85 - 95 mm Hg    POCT SO2, Arterial 98 94 - 100 %    POCT Oxy Hemoglobin, Arterial 95.9 94.0 - 98.0 %    POCT Hematocrit Calculated, Arterial 27.0 (L) 41.0 - 52.0 %    POCT Sodium, Arterial 125 (L) 136 - 145 mmol/L    POCT Potassium, Arterial 5.2 3.5 - 5.3 mmol/L    POCT Chloride, Arterial 91 (L) 98 - 107 mmol/L    POCT Ionized Calcium, Arterial 1.28 1.10 -  1.33 mmol/L    POCT Glucose, Arterial 181 (H) 74 - 99 mg/dL    POCT Lactate, Arterial 0.5 0.4 - 2.0 mmol/L    POCT Base Excess, Arterial 0.4 -2.0 - 3.0 mmol/L    POCT HCO3 Calculated, Arterial 27.7 (H) 22.0 - 26.0 mmol/L    POCT Hemoglobin, Arterial 8.9 (L) 13.5 - 17.5 g/dL    POCT Anion Gap, Arterial 12 10 - 25 mmo/L    Patient Temperature 37.0 degrees Celsius    FiO2 40 %   Blood Culture    Specimen: Arterial Line; Blood culture   Result Value Ref Range    Blood Culture Loaded on Instrument - Culture in progress    Blood Culture    Specimen: Arterial Line; Blood culture   Result Value Ref Range    Blood Culture Loaded on Instrument - Culture in progress    POCT GLUCOSE   Result Value Ref Range    POCT Glucose 199 (H) 74 - 99 mg/dL   Blood Gas Arterial   Result Value Ref Range    POCT pH, Arterial 7.32 (L) 7.38 - 7.42 pH    POCT pCO2, Arterial 54 (H) 38 - 42 mm Hg    POCT pO2, Arterial 134 (H) 85 - 95 mm Hg    POCT SO2, Arterial 99 94 - 100 %    POCT Oxy Hemoglobin, Arterial 96.6 94.0 - 98.0 %    POCT Base Excess, Arterial 0.7 -2.0 - 3.0 mmol/L    POCT HCO3 Calculated, Arterial 27.8 (H) 22.0 - 26.0 mmol/L    Patient Temperature 37.0 degrees Celsius    FiO2 100 %        DATA:   Diagnostic tests reviewed for today's visit:    New labs and imaging     Assessment and Plan:  Pt with ESKD, admitted with RLE ischemia, s/p RLE debridement and revascularization on 1/17  - ESKD on HD: Does [facility] dialysis (5days/week for 2.5 hours each) at [facility]/Dr Hannah is nephrologist   Unable to perform IHD this am because of severe hypotension  Currently on pressors   Noted to have edema and hypervolemia   Mild hyponatremia  Anemia, Hb >8, adding epogen  Starting CVVH today for clearance and fluid removal    Will continue to follow.     Signature: Ronaldo Koehler MD

## 2024-01-22 NOTE — CARE PLAN
The patient's goals for the shift include      The clinical goals for the shift include pt will remain HDS during the shift    Over the shift, the patient did not make progress toward the following goals. Barriers to progression include   Problem: Pain - Adult  Goal: Verbalizes/displays adequate comfort level or baseline comfort level  Outcome: Progressing     Problem: Safety - Adult  Goal: Free from fall injury  Outcome: Progressing     Problem: Discharge Planning  Goal: Discharge to home or other facility with appropriate resources  Outcome: Progressing     Problem: Chronic Conditions and Co-morbidities  Goal: Patient's chronic conditions and co-morbidity symptoms are monitored and maintained or improved  Outcome: Progressing     Problem: Diabetes  Goal: Achieve decreasing blood glucose levels by end of shift  Outcome: Progressing  Goal: Increase stability of blood glucose readings by end of shift  Outcome: Progressing  Goal: Decrease in ketones present in urine by end of shift  Outcome: Progressing  Goal: Maintain electrolyte levels within acceptable range throughout shift  Outcome: Progressing  Goal: Maintain glucose levels >70mg/dl to <250mg/dl throughout shift  Outcome: Progressing  Goal: No changes in neurological exam by end of shift  Outcome: Progressing  Goal: Learn about and adhere to nutrition recommendations by end of shift  Outcome: Progressing  Goal: Vital signs within normal range for age by end of shift  Outcome: Progressing  Goal: Increase self care and/or family involovement by end of shift  Outcome: Progressing  Goal: Receive DSME education by end of shift  Outcome: Progressing     Problem: Skin  Goal: Decreased wound size/increased tissue granulation at next dressing change  Outcome: Progressing  Goal: Participates in plan/prevention/treatment measures  Outcome: Progressing  Goal: Prevent/manage excess moisture  Outcome: Progressing  Goal: Prevent/minimize sheer/friction injuries  Outcome:  Progressing  Goal: Promote/optimize nutrition  Outcome: Progressing  Goal: Promote skin healing  Outcome: Progressing     Problem: Fall/Injury  Goal: Not fall by end of shift  Outcome: Progressing  Goal: Be free from injury by end of the shift  Outcome: Progressing  Goal: Verbalize understanding of personal risk factors for fall in the hospital  Outcome: Progressing  Goal: Verbalize understanding of risk factor reduction measures to prevent injury from fall in the home  Outcome: Progressing  Goal: Use assistive devices by end of the shift  Outcome: Progressing  Goal: Pace activities to prevent fatigue by end of the shift  Outcome: Progressing

## 2024-01-22 NOTE — NURSING NOTE
Report from Sending RN:    Report From: Yasmine ( RN)  Recent Surgery of Procedure: No  Baseline Level of Consciousness (LOC): A/O x 3  Oxygen Use: Yes, 2 liters  Type: NC  Diabetic: Yes, 199  Last BP Med Given Day of Dialysis: none  Last Pain Med Given: none  Lab Tests to be Obtained with Dialysis: No  Blood Transfusion to be Given During Dialysis: No  Available IV Access: yes  Medications to be Administered During Dialysis: No  Continuous IV Infusion Running: No  Restraints on Currently or in the Last 24 Hours: No  Hand-Off Communication: no acute overnight or morning events; vss; Pt did not take morning medications; Pt will not need labs; pt is a full code; Ela Villagomez RN.

## 2024-01-22 NOTE — CONSULTS
Wound Care Consult     Visit Date: 1/22/2024      Patient Name: Chevy Benton         MRN: [id number]           YOB: 1945     Reason for Consult: R foot VAC dressing change     Wound History: s/p debridement with Integra placement by Podiatry on 1/19     Wound Assessment:  Wound Incision Foot Dorsal foot;Right (Active)   Wound Image   01/22/24 1439   Site Assessment Red;Pink;Other (Comment) 01/22/24 1439   Janessa-Wound Assessment Maceration;Calloused 01/22/24 1439   Wound Length (cm) 1.5 cm 01/22/24 1439   Wound Width (cm) 13 cm 01/22/24 1439   Wound Surface Area (cm^2) 19.5 cm^2 01/22/24 1439   Wound Depth (cm) 0 cm 01/16/24 1101   Wound Volume (cm^3) 0 cm^3 01/16/24 1101   Closure None 01/22/24 1439   Sutures/Staple Line Approximated 01/17/24 1005   Drainage Description Serosanguineous 01/22/24 1439   Drainage Amount Scant 01/22/24 1439   Dressing Vacuum dressing 01/22/24 1439   Dressing Status Clean;Dry 01/22/24 1439       Wound 01/09/24 Moisture Associated Skin Damage Buttocks Right (Active)   Wound Image   01/16/24 1110   Site Assessment Pink 01/19/24 1531   Janessa-Wound Assessment Dry 01/19/24 1531   Pressure Injury Stage 2 01/18/24 0800   Drainage Description None 01/18/24 0800   Drainage Amount None 01/18/24 0800   Dressing Foam 01/22/24 0100   Dressing Changed Changed 01/20/24 0050   Dressing Status Clean;Dry 01/20/24 2045       Wound 01/10/24 Incision Arm Left;Proximal;Upper;Anterior (Active)   Site Assessment Clean;Dry 01/22/24 0100   Janessa-Wound Assessment Clean;Dry;Ecchymotic 01/18/24 0800   Drainage Amount None 01/19/24 1000   Dressing Gauze;Transparent film 01/18/24 0800   Dressing Status Clean;Dry 01/19/24 1000     Wound Team Summary Assessment: Pt seen resting in ICU bed with wife and son at bedside. R foot incision presents as above, with strips of Integra sutures to wound edges. Scant SS drainage, no odor. No signs of infection or necrosis. Immediate periwound skin slightly  macerated. Skin further out on periphery dry, calloused and peeling. Wound cleaned and prepped. Wound/Integra covered with strip of Mepitel silicone contact layer, then single strip of black foam. Dressing sealed, and reconnected to suction at -125mmHg. Pt tolerated well. Photo  taken for EMR.      Wound Team Plan: Wound Care to continue with 2x/wk wound VAC dressing changes while admitted.      Kimber Melendez RN, CWON  1/22/2024  2:40 PM

## 2024-01-22 NOTE — CONSULTS
"Nutrition Initial Assessment:   Nutrition Assessment    The patient is a 78 y.o. male who is hospital day #14.  Pt initially admitted on 01/09 for concerns of critical limb ischemia. Pt transferred from LT6 to the CICU on 01/17 for further monitoring after undergoing angioplasty and RLE debridement. Transferred back to the vascular institute on 01/19. Pt receiving dialysis inpatient -> changed to daily given volume overload. On 01/22, transferred back to CICU after being lethargic, A&Ox2, and low BPs during dialysis treatment.     PMHx: ESRD on HD TTS (Lt chest TDC, Hx of LUE Fistula Pseudoaneurysms), DM, HTN, HFrEF (TTE 10/23 EF 35-40%), A fib, PTA right lower extremity on 9/20/23 with subsequent TMA right foot on 9/22/23, Non Occlusive DVT (Rt Subclavian), Aortic Root Dilation, MR, SSS s/p pacemaker     Reason for Assessment: Provider consult order (Nutrition assessment/recs)      Nutrition History:  Energy Intake: Fair 50-75 %  Food and Nutrient History: Attempted to meet with pt twice but unable (pt sleeping  getting care by RN). Per health touch review, pt ordering on average two meals a day. Meals are of good size and usually include a food item from each food group. Po intake appears to be variable per records in the flowsheets. Intake increased in the past two days (60-80% of nutr needs). Currently NPO (since 01/22 12pm) pending bedside swallow.  Vitamin/Herbal Supplement Use: none @ home med list    Percent meals eaten (%):  01/21: D 100, L 75, B 50 = 1632 kcal and 63g protein   01/20: D 75, B 75 = 1258 kcal and 34g protein   01/19: D 25 = 262 kcal and 10g protein   01/18: D 50 (no info breakfast &lunch) = 220 kcal and 7g protein   01/14: L 25, B 75     Food Allergies/Intolerances:  None  GI Symptoms: None    Anthropometrics:  Start of admission anthropometrics:  Height: 177.8 cm (5' 10\")  Weight: 90.5 kg (199 lb 8 oz)  BMI (Calculated): 28.63    IBW/kg (Dietitian Calculated): 75.5 kg  Percent of IBW: 139 " %       Wt hx prior to admission    01/23/24 105 kg (231 lb 7.7 oz)   10/08/23 95 kg (209 lb 7 oz)   10/05/23 98.3 kg (216 lb 11.4 oz)       Weight  hx during admission         1/18/2024  0400 1/19/2024  0544 1/22/2024  0632 1/23/2024  0611 1/23/2024  0812    Weight: 96 kg (211 lb 10.3 oz) 101 kg (221 lb 9.6 oz) 105 kg (231 lb 9.6 oz) 105 kg (231 lb 7.7 oz) 105 kg (231 lb 7.7 oz)       Weight Change %:  Weight History / % Weight Change: Limtied wt hx. Possible 5% wt loss in 3 months (not significant). Wt during admission variable likely due to fluids/dialysis.  Significant Weight Loss: No    Nutrition Focused Physical Exam Findings:  defer: Unable to meet with pt     Edema:  Edema: +4 severe  Edema Location: generalized  Physical Findings:  Skin:  (MASD @buttocks; amputation @foot)    Objective Data:    Last BM Date: 01/22/24    Nutrition Significant Labs:    Results from last 7 days   Lab Units 01/23/24  0826 01/23/24  0138 01/22/24  0758 01/22/24  0513 01/22/24  0513   HEMOGLOBIN g/dL  --  8.4* 8.4*  --  9.3*   MCV fL  --  101* 100  --  101*   GLUCOSE mg/dL  --  138* 170*  --  172*   POTASSIUM mmol/L  --  3.9 4.9  --  4.9   SODIUM mmol/L  --  131* 129*  --  129*   PHOSPHORUS mg/dL 3.3 3.9 5.7*  --  5.6*   MAGNESIUM mg/dL 1.90 1.89  --   --   --    CREATININE mg/dL  --  3.46* 5.28*  --  5.44*   BUN mg/dL  --  33* 48*  --  48*   ALT U/L <3* <3* <3*   < >  --    AST U/L 16 14 15   < >  --    ALK PHOS U/L 79 78 86   < >  --     < > = values in this interval not displayed.       Nutrition Specific Medications:  atorvastatin, 40 mg, oral, Nightly  B complex-vitamin C-folic acid, 1 capsule, oral, Daily  insulin glargine, 6 Units, subcutaneous, Nightly  insulin lispro, 0-5 Units, subcutaneous, TID with meals  pantoprazole, 40 mg, oral, Daily before breakfast  polyethylene glycol, 17 g, oral, Daily  sennosides, 1 tablet, oral, Nightly  sevelamer carbonate, 800 mg, oral, TID with meals    I/O:   I/O last 2 completed  shifts:  In: 975.2 (9.3 mL/kg) [I.V.:275.2 (2.6 mL/kg); IV Piggyback:700]  Out: 926 (8.8 mL/kg) [Other:926]  Weight: 105 kg             Estimated Needs:   Total Energy Estimated Needs (kCal): 2050 kCal  Method for Estimating Needs: 27 kcal/kg * IBW    Total Protein Estimated Needs (g): 90 g  Method for Estimating Needs: 1.2 g/kg * IBW    Method for Estimating Needs: per ICU team     Currently no diet orders      Nutrition Diagnosis   Malnutrition Diagnosis  Patient has Malnutrition Diagnosis:  (Unable to determine at the moment. Will need to obtain more information at time of follow up.)    Nutrition Diagnosis  Patient has Nutrition Diagnosis: Yes  Diagnosis Status (1): New  Nutrition Diagnosis 1: Inadequate protein intake  Related to (1): variable intake of meals  As Evidenced by (1): information of PO intake suggest pt meeting <75% of protein intake during admission       Nutrition Interventions/Recommendations     Diet per team/SLP  If pt allowed to have thin liquids please order: Nepro or [employer] renal w/ breakfast   If pt required to stay NPO w/ alternate form of nutrition then recommend the following TF regimen:   Start Nepro at 15 ml/hr and increase by 10 ml/hr q8h until goal rate of 45 ml/hr  Check RFP +Mg ; if lytes are low then replete prior to advancing TF rate   FWF per team   This regimen provides: 1080 ml, 1912 kcal, 87 g protein, 785 ml free water           Nutrition Prescription:  Individualized Nutrition Prescription Provided for : 2050 kcal and 90g protein via oral diet vs. TF        Nutrition Interventions:   Interventions: Medical food supplement, Enteral intake  Enteral Intake: Other (Comment) (Initiate TF)  Goal: TF meets goal of Nepro @45 ml/hr within 48 hours of initiation  Medical Food Supplement: Commercial beverage (KF renal / Nepro)  Goal: Drink 1 ONS per day         Nutrition Monitoring and Evaluation   Food/Nutrient Related History Monitoring  Monitoring and Evaluation Plan: Energy  intake  Criteria: >75% of nutr needs         Biochemical Data, Medical Tests and Procedures  Monitoring and Evaluation Plan: Electrolyte/renal panel  Criteria: lytes wnl            Time Spent/Follow-up Reminder:   Time Spent (min): 45 minutes  Last Date of Nutrition Visit: 01/23/24  Nutrition Follow-Up Needed?: Dietitian to reassess per policy

## 2024-01-22 NOTE — PROGRESS NOTES
Mr Benton was admitted from the [facility] outpatient clinic on 1/9/2024 for management of RLE CLI RC-VI.      Subjective Data:  He was seen today in CICU bed, appeared lethargic, NAD, arousable to tactile stimuli, on Bipap.  He was transferred to CICU from the HD unit this am, after RR call, when he was found to be lethargic with BP 69/39 to 90s/80s, and subsequently 140s/50s, without intervention.   Wife and son at bed side.           Objective Data:  Last Recorded Vitals:  Vitals:    01/22/24 1210 01/22/24 1300 01/22/24 1400 01/22/24 1500   BP:       BP Location:       Pulse: 82 79 82 88   Resp: 11 12 18 21   Temp: 36.6 °C (97.9 °F)      TempSrc: Temporal      SpO2: (!) 84% 100% 100%    Weight:       Height:           Last Labs:    Lab Results   Component Value Date    WBC 9.4 01/22/2024    HGB 8.4 (L) 01/22/2024    HCT 28.4 (L) 01/22/2024     01/22/2024    PLT 86 (L) 01/22/2024     Lab Results   Component Value Date    GLUCOSE 170 (H) 01/22/2024    CALCIUM 9.0 01/22/2024     (L) 01/22/2024    K 4.9 01/22/2024    CO2 30 01/22/2024    CL 91 (L) 01/22/2024    BUN 48 (H) 01/22/2024    CREATININE 5.28 (H) 01/22/2024         TROPHS   Date/Time Value Ref Range Status   10/08/2023 09:00 PM 60 0 - 20 ng/L Final     Comment:     Previous result verified on 10/8/2023 2030 on specimen/case [id number] called with component [facility] for procedure Troponin I, High Sensitivity, Initial with value 58 ng/L.   10/08/2023 07:26 PM 58 0 - 20 ng/L Final     BNP   Date/Time Value Ref Range Status   01/17/2024 10:49 PM 1,525 0 - 99 pg/mL Final     HGBA1C   Date/Time Value Ref Range Status   12/03/2023 07:38 AM 5.3 4.3 - 5.6 % Final     Comment:     American Diabetes Association guidelines indicate that patients with HgbA1c in the range 5.7-6.4% are at increased risk for development of diabetes, and intervention by lifestyle modification may be beneficial. HgbA1c greater or equal to 6.5% is considered diagnostic of diabetes.    04/25/2022 08:34 PM 6.5 4.3 - 5.6 % Final     Comment:     American Diabetes Association guidelines indicate that patients with HgbA1c in the range 5.7-6.4% are at increased risk for development of diabetes, and intervention by lifestyle modification may be beneficial. HgbA1c greater or equal to 6.5% is considered diagnostic of diabetes.      Last I/O:  I/O last 3 completed shifts:  In: 950 (9 mL/kg) [P.O.:950]  Out: 0 (0 mL/kg)   Weight: 105.1 kg       Inpatient Medications:  Scheduled medications   Medication Dose Route Frequency    allopurinol  100 mg oral Daily    aspirin  81 mg oral Daily    atorvastatin  40 mg oral Nightly    B complex-vitamin C-folic acid  1 capsule oral Daily    clopidogrel  75 mg oral Daily    epoetin dane or biosimilar  10,000 Units subcutaneous Once per day on Mon Wed Fri    gabapentin  100 mg oral q24h    insulin glargine  6 Units subcutaneous Nightly    insulin lispro  0-5 Units subcutaneous TID with meals    meropenem  1,000 mg intravenous q12h    metoprolol succinate XL  25 mg oral Daily    norepinephrine in dextrose 5 %        pantoprazole  40 mg oral Daily before breakfast    polyethylene glycol  17 g oral Daily    sennosides  1 tablet oral Nightly    sevelamer carbonate  800 mg oral TID with meals    sodium chloride  1,000 mL CRRT q10 min     PRN medications   Medication    acetaminophen    Or    acetaminophen    Or    acetaminophen    albuterol    alum-mag hydroxide-simeth    ammonium lactate    dextrose    docusate sodium    glucagon    heparin    heparin    hydrocortisone    ipratropium-albuteroL    methocarbamol    norepinephrine in dextrose 5 %    oxygen    povidone-iodine    sodium chloride    vancomycin     Continuous Medications   Medication Dose Last Rate    norepinephrine  0.01-1 mcg/kg/min 0.04 mcg/kg/min (01/22/24 1501)    PrismaSol BGK 2/3.5  2,700 mL/hr         Physical Exam:  Constitutional: ill appearing elderly man, NAD, lethargic, on Bipap   Head/Neck: Neck  supple,  No JVD, trachea midline, no bruits   Respiratory/Thorax: Patent airways, diminished breath sounds throughout, on RA. Lt tunneled HD catheter in place,   Cardiovascular: irregular, rate and rhythm, no murmurs, normal S 1 and S 2   Gastrointestinal: obese, Non-distended, soft, non-tender,+BS,    Skin: Warm and dry,   Extremities: Rt foot dressing in place, with wound vac, leg warm to touch, foot pictures reviewed,  Bilateral  upper arm edema better today, less tense, hands still with significant edema.  Neuro: lethargic     Assessment/Plan   Chevy Benton is a 78 y.o. male with PMH of ESRD on HD TTS (Lt chest TDC, Hx of LUE Fistula Pseudoaneurysms), T2DM, HTN, HFrEF (TTE 10/23 EF 35-40%), A fib, PTA right lower extremity on 9/20/23 with subsequent TMA on 9/22/23, Non Occlusive DVT (Rt Subclavian), Aortic Root Dilation, MR, SSS s/p pacemaker, who presented as a direct admit from Dr Linder endovascular clinic for concerns of critical limb ischemia. Pt has not been taking Plavix since hospital discharge in late September due to the frequent hospitalizations for SBO. He denied any CP, SOB, cough, fever. Since hospital admission Pt was seen by podiatry, vascular surgery and nephrology team. He had diagnostic venogram on 1/10/24 by IR and Left cephalic vein angioplasty  Left subclavian vein angioplasty, with Vascular surgery on 1/12/24, Dr Barrios.  Pt was transferred to CICU for further care due to hypotension and lethargy.    RLE CLTI  RC-VI  Rt TMA site with overlaying necrosis  - 1/10/24 Foot Xray - No evidence of osteomyelitis  - 1/17/24 s/p PTA of AT and PT, Reconstruction of plantar arch with Dr Nguyen. AT perforation, controlled by balloon tamponade and external tamponade with blood pressure cuff.  - 1/19 /24 s/p revision of right TMA with podiatry, with Wound vac placement, changed today 1/22/24   - continue with ASA EC 81 mg and Plavix 75 mg daily  - No need to resume Hep gtt as Pt risk of AF related  events < 0.5% per day. Can start DVT prophylaxis when Plt is WNL  - Resume Eliquis for Afib at discharge   - Ammonium lactate lotion or cream for dry legs and feet. Do not apply between toes.   - Truvue EHOB boot to prevent pressure ulcer on the heels. Heel offloading at all times.   - 1 month FU at EVLS clinic in 1 month post discharge with repeat vascular testing (we will arrange)  - remainder of care per CICU team      Code Status:  Full Code      Aruna Vazquez PA-C  Endovascular/Limb Salvage Service   [place]

## 2024-01-22 NOTE — CARE PLAN
The patient's goals for the shift include  sleep    The clinical goals for the shift include pt will remain HDS during the shift    Over the shift, the patient did make progress toward the following goals.

## 2024-01-22 NOTE — SIGNIFICANT EVENT
Transfer to the CICU    A RRT was called this am in hemodialysis unit.  Johnny Benton is a 78 you male with PMHx of ESRD on HD, PPM, TMA of RLE, non occlusive DVT and critical limb ischemia who had RLE debridement and revascularization of the RLE on 1/17.  He was in the CICU and then was transferred out of intensive care to [facility] on 1/19.  Today on arrival to HD unit BP was 69/37 and he was very lethargic, opening eyes to name but unable to state name, place or year and unable to keep eyes open.   He was previously A&O x2.  His BP improved but mentation remained altered.  Stat labs were drawn; he was place on bipap.  Given increased lethargy will transfer back to CICU to EndoVascular service.    His son Chevy was notified of transfer and EV service called to make aware of clinical status change.

## 2024-01-22 NOTE — NURSING NOTE
Central Line Insertion Practices/2nd Observer Note   Name:  Chevy Benton  Admission Date:  2024  5:09 PM  MRN: [id number]  Attending Provider: Koko Gordon MD  Room/Bed:               Sex: male  : 1945       Age: 78 y.o.    Pre-Procedure Checklist  Emergent Line Insertion: No  Type of Line to be Placed: CVC  Consent Obtained: Yes  Emergency Medication Necessary: No  Patient Identified with 2 Independent Identifiers: Yes  Review of Allergies, Anticoagulation, Relevant Labs, ECG/Telemetry: Yes  Risks/Benefits/Alternatives Discussed with Patient/POA/Legal Representative: Yes  Stop Sign on Door: Yes  Time Out Performed: Yes  Catheter Exchange: No  Positioning and Preparation Checklist  All People, Including Patient, in the Room with Cap and Mask: Yes  Fluoroscopy Used to Identify Vessel and Guide Insertion; Sterile Cover Used: No  Ultrasound Used to Identify Vessel and Guide Insertion; Sterile Cover Used: Yes  Full Barrier Precautions Followed (Mask, Cap, Gown, Gloves): Yes  Hands Washed: Yes  Monitors Attached with Sound Alarms On: Yes  Full Body Sterile Drape (Head-to-Toe) Used to Cover Patient: Yes  [place] Position (For IJ and Subclavian): Yes  CHG Skin Prep Used and Allowed to Air Dry Prior to Skin Procedure: Yes  Procedure Checklist  Blood Aspirated From All Lumens, All Ports Subsequently Flushed: Yes  Catheter Caps Placed On All Lumens; Lumens Clamped: Yes  Maintain Guidewire Control Throughout, Ensuring Guidewire Removal: Yes  Maintain Sterile Field Throughout Insertion: Yes  Catheter Secured: Yes  Confirmatory Test of Venous Placement: Longitudinal ultrasound  Post-Procedure Checklist  Date and Time Written on Dressing: Yes  Sharp and Wire Count and Safe Disposal of all Sharps/Wires: Yes  Sterile Dressing Applied Per Protocol: Yes  X-ray Ordered or ECG Image: Yes    Kinjal Machado RN  Date: 2024  Time: 5:37 PM

## 2024-01-22 NOTE — NURSING NOTE
Report to Receiving RN:    Report To: Aruna ( RN)  Time Report Called: 0758 am  Hand-Off Communication: Pt was came down to the unit lethargic and very hard to arouse; Pt BP 69/39; HR 93; BP was rechecked 90/83; HR 83; pulse ox was applied; unable to read Pt pulse ox; code white was called for further assessment; code white team applied non re breather d/t unable to obtain pulse ox; stat labs were drawn; Pt was unable to receive HD tx due health status decline; Pt was transferred by rapid response team to the CICU  Complications During Treatment: Yes, read above  Ultrafiltration Treatment: No  Medications Administered During Dialysis: No  Blood Products Administered During Dialysis: No  Labs Sent During Dialysis: No  Heparin Drip Rate Changes: No    Electronic Signatures:   (Signed Ela Quiroz RN)   Authored:    (Signed )   Authored:     Last Updated: 7:58 AM by ELA QUIROZ

## 2024-01-22 NOTE — SIGNIFICANT EVENT
NACR RR Note    Called to dialysis unit for rapid response. Rapid called for hypotension, AMS and inability to obtain pulse ox reading.     On my arrival patient was on NRB with pulse ox obtained via ear probe w/ good wave form, sating in the 90s. BP also improved (initially 69/39) to 90s/80s, and subsequently 140s/50s, without intervention.     CBC, CMP, lactate and ABG obtained.     ABG (while on NRB): 7.29/62/48/29, So2 71%    Patient placed on bipap and transferred to CICU

## 2024-01-22 NOTE — PROGRESS NOTES
Occupational Therapy                 Therapy Communication Note    Patient Name: Chevy Benton  MRN: [id number]  Today's Date: 1/22/2024     Discipline: Occupational Therapy    Missed Visit Reason: Missed Visit Reason:  (Code white called at , [facility]    Missed Time: 8:54    Stephie Corbin, OT

## 2024-01-22 NOTE — NURSING NOTE
Pt transported to dialysis via bed, pt sleeping but aroused with rigorous touch and verbal stimuli.  Pt answered yes when asked if he wanted his cell phone sent with him to dialysis, cell phone in bed with pt.  Pt attached to portable o2- 3l/nc for transport.  Telemetry off

## 2024-01-22 NOTE — SIGNIFICANT EVENT
Patient was unavailable to be evaluated today due to having a procedure done in the room.  Labs, other documents, and any imaging were reviewed.  Vac to be replaced today by wound nurses, order placed yesterday.  Patient to be evaluated tomorrow for full consultation and evaluation. Updated plan to follow.    Marvin Saul DPM PGY-1  Podiatric Medicine & Surgery  Per

## 2024-01-23 NOTE — CONSULTS
Vancomycin Dosing by Pharmacy- Cessation of Therapy    Consult to pharmacy for vancomycin dosing has been discontinued by the prescriber, pharmacy will sign off at this time.    Please call pharmacy if there are further questions or re-enter a consult if vancomycin is resumed.     Karen Blank, ChristianD

## 2024-01-23 NOTE — PROGRESS NOTES
Chevy Benton is a 78 y.o. male on day 14 of admission presenting with Critical limb ischemia of both lower extremities (CMS/HCC).    Subjective   Pt alert today. A+Ox3 but not able to explain his medical situation. Reports RLE pain. Denies CP, SOB.    Objective   24 Hour Vitals  Temp:  [35.1 °C (95.2 °F)-36.6 °C (97.9 °F)] 35.3 °C (95.5 °F)  Heart Rate:  [56-89] 72  Resp:  [8-22] 20  BP: (70-95)/(20-70) 83/41  Arterial Line BP 1: ()/(42-62) 83/45  FiO2 (%):  [40 %] 40 %    Temp (24hrs), Av.9 °C (96.6 °F), Min:35.1 °C (95.2 °F), Max:36.6 °C (97.9 °F)     24 hour Intake/Output    Intake/Output Summary (Last 24 hours) at 2024 0754  Last data filed at 2024 0600  Gross per 24 hour   Intake 775.19 ml   Output 926 ml   Net -150.81 ml        Exam:  General: NAD. A+Ox3.  Neuro: CNII-XII grossly intact. 5/5 strength throughout.  ENT: moist mucous membranes. Bipap in place.  Cardiac: RRR. no murmurs. no rubs.  Pulmonary: CTAB. breath sounds appreciated in all lung fields.   Abdomen: non-tender. non-distended. normoactive bowel sounds.  Extremities: full passive ROM. 2+ b/l UE edema. No LE edema.  Skin: no rashes. no ulcers.    Labs  CBC  Results from last 72 hours   Lab Units 24  0138 24  0758 24  0513   WBC AUTO x10*3/uL 8.9 9.4 9.8   HEMOGLOBIN g/dL 8.4* 8.4* 9.3*   HEMATOCRIT % 27.2* 28.4* 30.0*   PLATELETS AUTO x10*3/uL 71* 86* 81*        BMP  Results from last 72 hours   Lab Units 24  0138 24  0758 24  0513   SODIUM mmol/L 131* 129* 129*   POTASSIUM mmol/L 3.9 4.9 4.9   CHLORIDE mmol/L 95* 91* 90*   BUN mg/dL 33* 48* 48*   CREATININE mg/dL 3.46* 5.28* 5.44*   MAGNESIUM mg/dL 1.89  --   --    PHOSPHORUS mg/dL 3.9 5.7* 5.6*       Medications   Scheduled Medications  allopurinol, 100 mg, oral, Daily  aspirin, 81 mg, oral, Daily  atorvastatin, 40 mg, oral, Nightly  B complex-vitamin C-folic acid, 1 capsule, oral, Daily  clopidogrel, 75 mg, oral, Daily  epoetin  dane or biosimilar, 10,000 Units, subcutaneous, Every Mon/Wed/Fri  insulin glargine, 6 Units, subcutaneous, Nightly  insulin lispro, 0-5 Units, subcutaneous, TID with meals  meropenem, 1,000 mg, intravenous, q12h  midodrine, 10 mg, oral, TID with meals  pantoprazole, 40 mg, oral, Daily before breakfast  perflutren lipid microspheres, 0.5-10 mL of dilution, intravenous, Once in imaging  perflutren protein A microsphere, 0.5 mL, intravenous, Once in imaging  polyethylene glycol, 17 g, oral, Daily  sennosides, 1 tablet, oral, Nightly  sevelamer carbonate, 800 mg, oral, TID with meals  sulfur hexafluoride microsphr, 2 mL, intravenous, Once in imaging     Continuous Medications  norepinephrine, 0.01-1 mcg/kg/min, Last Rate: Stopped (01/23/24 0220)  PrismaSol BGK 2/3.5, 2,700 mL/hr, Last Rate: 2,700 mL/hr (01/22/24 1430)     PRN Medications  PRN medications: acetaminophen **OR** acetaminophen **OR** acetaminophen, acetaminophen, albuterol, alum-mag hydroxide-simeth, ammonium lactate, dextrose, docusate sodium, glucagon, heparin, heparin, hydrocortisone, ipratropium-albuteroL, oxygen, povidone-iodine, sodium chloride, vancomycin           Assessment/Plan   78M w/ a PMH of ESRD, HFrEF, and PVD s/p 9/2023 TMA and 1/19 debridement d/t distal leg ischemia p/w hypotension and AMS.     His AMS could be a combination of delirium (multiple co-morbilities, recent surgery, in pain, recent sedative use- scheduled gabapentin; recent robaxin; tramadol on 1/21- prolonged hospital stay, poor vision at baseline) vs. CO2 narcosis (worsening hypercapnia on ABG, worsening bicarb on RFP, could be acutely worsened by bicarb given during iHD) vs. Cefepime toxicity (only received one dose but is on iHD). Mental status has dramatically improved s/p CRRT, BIPAP (though with unchanged PCO2), and cessation of gabapentin+robaxin.     Hypotension perhaps related to ESRD and HFrEF iso volume overload d/t missed iHD sessions (2+ b/l UE swelling; new  hyponatremia; he usually gets iHD 5x/week and has had iHD on 6/11 of his sessions since 1/9 admit). Or his hypotension could be pseudohypotension d/t worsened peripheral edema resulting in inaccurate cuff measurements and severe PVD resulting in inaccurate a-line measurements.     The combination of hypotension and AMS raises concern for septic shock but this is unlikely considering 0/4 SIRS and non-infectious RLE wound. 1/19 tissue cx is growing pseudomonas as well as enterococcus but this represents 1/2 tissue cultures sent and is most likely is growth from the the necrotic tissue sample.     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 However, this is most likely chronic (pCO2 was 50 on ABG in 12/2023 after he was on BiPAP; 60 on VBG in 10/2023; Bicarb was ~28 since 1/9). No PFTs on file.     1/23 updates:  - possibly at baseline mental status  - CRRT continued  - trialed off BiPAP for 1-2 hours; resumed for pH 7.29->7.24; CO2 55->62.  - midodrine 20mg PO TID initiated     Neuro  #AMS  #Acute on Chronic CO2 Narcosis  #Delirium  ?#Cefepime toxicity  :: sedating medications held (gabapentin; robaxin held)  :: cefepime d/cd (see ID)  - Bipap for hypercapnia (see resp)  - delirium precautions     CV  #PAD s/p TMA and 1/19 debridement  - Endovascular Cardiology on board  - Podiatry on board  - c/w home ASA 81mg PO every day  - c/w home clopidogrel 75mg PO every day  - c/w home atorvastatin 40mg PO every day      #HFrEF (35-40% 10/2023)  #Hypotension  :: s/p levophed 0.1-0.4 on 1/22 used on 1/22 (Dcd overnight of 1/22-1/23)  - midodrine 20mg PO TID initiated overnight of 1/22-1/23  [ ] followup TTE  [ ] consider GDMT when stable     #Afib  #SSS s/p PPM  - hold home metoprolol succinate 25mg PO every day iso shock  - AC on hold iso 1/16 TA perforation  [ ] endovascular cardiology recs to resume Eliquis at discharge     Resp  #Hypercapnic Respiratory Acidosis  - BiPAP  [ ] wean  off BiPAP with NC trials     ID  #Pseudomonas and Enterobacter on 1/19 tissue cx  :: s/p Cefepime x1 on 1/21  - s/p meropenem 1g q12hr (1/22-_____)  - vancomycin, pharmacy to dose (1/21-____)     GI  #GERD  #GI ppx  - pantoprazole 40mg PO every day     #Bowel Regimen  - Miralax every day  - Senna every day     #Concern for Dysphagia- coughing with meds  - Speech Language Pathology on board  [ ] followup SLP richard     Renal  #ESRD  #NAGMA  #Fluid overload  #Pulmonary Interstitial Edema, Pleural Effusions  - Nephrology on board  - CRRT for fluid removal initiated on 1/23  - EPO initiated 1/22     Endocrine  #DMII  - insulin lantus 6U at bedtime  - SSI    Rheum  #Gout  - c/w home allopurinol     F: CRRT  E: none  N: pending SLP eval  A: [facility] central line (1/22), R subclavian trialysis. R radial arterial line (1/22).  DVT ppx: heparin 5000U subcutaneous q8hr  NOK: Julia Benton [phone]    Mark Navarro MD

## 2024-01-23 NOTE — PROGRESS NOTES
Chevy Benton is a 78 y.o. male on day 14 of admission presenting with Critical limb ischemia of both lower extremities (CMS/HCC).    Subjective   Pt was seen resting comfortably in bed and NAD. Poorly communicative. S/P right TMA (DOS 9/22/23). S/P right foot debridement to level of muscle/fascia (DOS: 1/19/24). Pt appears to have no other pedal complaints and denies constitutional symptoms.        Physical Exam    Objective     REVIEW OF SYSTEMS  GENERAL:  Negative for malaise, significant weight loss, fever  HEENT:  No changes in hearing or vision, no nose bleeds or other nasal problems and Negative for frequent or significant headaches  NECK:  Negative for lumps, goiter, pain and significant neck swelling  RESPIRATORY:  Negative for cough, wheezing and shortness of breath  CARDIOVASCULAR:  Negative for chest pain, leg swelling and palpitations  GI:  Negative for abdominal discomfort, blood in stools or black stools and change in bowel habits  :  Negative for dysuria, frequency and incontinence  MUSCULOSKELETAL:  S/P right TMA (DOS 9/22/23). S/P right foot debridement to level of muscle/fascia (DOS: 1/19/24).   SKIN:  S/P open right TMA (DOS 9/22/23). S/P right foot debridement to level of muscle/fascia (DOS: 1/19/24).   PSYCH:  Negative for sleep disturbance, mood disorder and recent psychosocial stressors  HEMATOLOGY/LYMPHOLOGY:  Negative for prolonged bleeding, bruising easily, and swollen nodes.  ENDOCRINE:  Negative for cold or heat intolerance, polyuria, polydipsia and goiter  NEURO: Negative, denies any burning, tingling or numbness     Objective:   Vasc: DP and PT pulses are palpable bilateral.  CFT is less than 3 seconds bilateral.  Skin temperature is warm to cool proximal to distal bilateral.      Neuro:  Light touch is intact to the foot bilateral.  Protective sensation is intact to the foot when tested with the 5.07 SWM bilateral.  There is no clonus noted.  The hallux is downgoing bilateral.  "     Derm: S/P open right TMA (DOS 9/22/23). S/P right foot debridement to level of muscle/fascia (DOS: 1/19/24). Wound VAC applied to right open TMA site intact, functional, and suctioning. Output: <50 ml's.     MSK: S/P open right TMA (DOS 9/22/23). S/P right foot debridement to level of muscle/fascia (DOS: 1/19/24). Deferred 2/2 post operative course    Last Recorded Vitals  Blood pressure 78/65, pulse 76, temperature 35 °C (95 °F), resp. rate 25, height 1.778 m (5' 10\"), weight 105 kg (231 lb 7.7 oz), SpO2 99 %.    Intake/Output last 3 Shifts:  I/O last 3 completed shifts:  In: 975.2 (9.3 mL/kg) [I.V.:275.2 (2.6 mL/kg); IV Piggyback:700]  Out: 926 (8.8 mL/kg) [Other:926]  Weight: 105 kg     Relevant Results           Assessment/Plan   Principal Problem:    Critical limb ischemia of both lower extremities (CMS/HCC)  Active Problems:    Non-pressure chronic ulcer of other part of right foot with fat layer exposed (CMS/HCC)    Critical limb ischemia of right lower extremity (CMS/HCC)    Subclavian vein stenosis, left     S/P open right TMA (DOS 9/22/23). S/P right foot debridement to level of muscle/fascia (DOS: 1/19/24).   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   All questions and concerns were answered and addressed to the pt's apparent satisfaction.  - Labs reviewed.  - Imaging reviewed.   - Continue abx therapy per ID/Primary  - Continue pain management per Medicine/Primary  - Appreciate EVLS recs and intervention.  - Wound VAC applied to right tma site is intact, functional, and suctioning. Total Output: <50mL. Last applied: 1/22  - Continue anticoag per EVLS  - Pt is clear for discharge per podiatry. Podiatry will follow from [facility].   - Pt is to follow up with " Roopa Rodriguez    - Dressings: VAC (2-3x week)  - All questions and concerns were answered and addressed to the patient and patient's family apparent satisfaction  - NWB to RLE  - Treatment and plan was discussed with attending Dr. Roopa Rodriguez DPM     Case to be discussed with attending, A&P above reflects a tentative plan. Please await for the final signature from the attending physician on service.     John Sullivan DPM PGY-3  Podiatric Medicine & Surgery  Please Haiku message me with any questions or concerns.        John Sullivan DPM

## 2024-01-23 NOTE — PROGRESS NOTES
"Vancomycin Dosing by Pharmacy- INITIAL    Chevy Benton is a 78 y.o. year old male who Pharmacy has been consulted for vancomycin dosing for cellulitis, skin and soft tissue. Based on the patient's indication and renal status this patient will be dosed based on a goal trough/random level of 15-20.     Patient is currently on CVVH.    Visit Vitals  BP 78/65 (BP Location: Right arm) Comment (BP Location): upperarm   Pulse 88   Temp 35 °C (95 °F)   Resp 16        Lab Results   Component Value Date    CREATININE 2.24 (H) 01/23/2024    CREATININE 3.46 (H) 01/23/2024    CREATININE 5.28 (H) 01/22/2024    CREATININE 5.44 (H) 01/22/2024        Patient weight is No results found for: \"PTWEIGHT\"    No results found for: \"CULTURE\"     I/O last 3 completed shifts:  In: 975.2 (9.3 mL/kg) [I.V.:275.2 (2.6 mL/kg); IV Piggyback:700]  Out: 926 (8.8 mL/kg) [Other:926]  Weight: 105 kg   [unfilled]    Lab Results   Component Value Date    PATIENTTEMP 37.0 01/23/2024    PATIENTTEMP 37.0 01/23/2024    PATIENTTEMP 37.0 01/23/2024          Assessment/Plan     Patient will not be given a loading dose.  Will initiate vancomycin maintenance,  750 mg every 12 hours.  Follow-up level will be ordered on 1/24 PM ~1700, unless clinically indicated sooner.  Will continue to monitor renal function daily while on vancomycin and order serum creatinine at least every 48 hours if not already ordered.  Follow for continued vancomycin needs, clinical response, and signs/symptoms of toxicity.       Marvin Hudson, PharmD       "

## 2024-01-23 NOTE — CARE PLAN
The patient's goals for the shift include      The clinical goals for the shift include pt will remain HDS during the shift

## 2024-01-23 NOTE — PROGRESS NOTES
Nephrology Consult Progress Note    Admit Date: 1/9/2024    Interval history:  Pt remains poorly communicative    CURRENT MEDICATIONS:    Current Facility-Administered Medications:     acetaminophen (Tylenol) tablet 650 mg, 650 mg, oral, q4h PRN, 650 mg at 01/21/24 1325 **OR** acetaminophen (Tylenol) oral liquid 650 mg, 650 mg, nasogastric tube, q4h PRN **OR** acetaminophen (Tylenol) suppository 650 mg, 650 mg, rectal, q4h PRN, Mark Navarro MD    acetaminophen (Tylenol) tablet 975 mg, 975 mg, oral, q6h PRN, Michele Hilton MD, 975 mg at 01/22/24 2120    albuterol 90 mcg/actuation inhaler 2 puff, 2 puff, inhalation, q6h PRN, Mark Navarro MD, 2 puff at 01/14/24 0850    allopurinol (Zyloprim) tablet 100 mg, 100 mg, oral, Daily, Mark Navarro MD, 100 mg at 01/23/24 0850    alum-mag hydroxide-simeth (Mylanta) 200-200-20 mg/5 mL oral suspension 30 mL, 30 mL, oral, 4x daily PRN, Mark Navarro MD    ammonium lactate (Lac-Hydrin) 12 % lotion, , Topical, q1h PRN, Mark Navarro MD    aspirin chewable tablet 81 mg, 81 mg, oral, Daily, Mark Navarro MD, 81 mg at 01/23/24 0850    atorvastatin (Lipitor) tablet 40 mg, 40 mg, oral, Nightly, Mark Navarro MD, 40 mg at 01/22/24 2032    B complex-vitamin C-folic acid (Nephrocaps) capsule 1 capsule, 1 capsule, oral, Daily, Mark Navarro MD, 1 capsule at 01/23/24 0849    clopidogrel (Plavix) tablet 75 mg, 75 mg, oral, Daily, Mark Navarro MD, 75 mg at 01/23/24 0850    dextrose 50 % injection 25 g, 25 g, intravenous, q15 min PRN, Mark Navarro MD, 25 g at 01/17/24 2253    docusate sodium (Colace) capsule 100 mg, 100 mg, oral, BID PRN, Mark Navarro MD, 100 mg at 01/16/24 1121    epoetin dane (Epogen,Procrit) injection 10,000 Units, 10,000 Units, subcutaneous, Every Mon/Wed/Fri, Ronaldo Koehler MD, 10,000 Units at 01/23/24 0027    glucagon (Glucagen) injection 1 mg, 1 mg, intramuscular, q15 min PRN, Mark Navarro MD    heparin (porcine) injection 1,200 Units, 1,200 Units, intravenous, PRN,  Judy Cazares DO    heparin (porcine) injection 1,200 Units, 1,200 Units, intravenous, PRN, Judy Cazares DO    hydrocortisone 2.5 % ointment, , Topical, BID PRN, Mark Navarro MD    insulin glargine (Lantus) injection 6 Units, 6 Units, subcutaneous, Nightly, Mark Navarro MD, 6 Units at 01/22/24 2211    insulin lispro (HumaLOG) injection 0-5 Units, 0-5 Units, subcutaneous, TID with meals, Mark Navarro MD    ipratropium-albuteroL (Duo-Neb) 0.5-2.5 mg/3 mL nebulizer solution 3 mL, 3 mL, nebulization, q6h PRN, Mark Navarro MD    midodrine (Proamatine) tablet 20 mg, 20 mg, oral, TID with meals, Mark Navarro MD, 20 mg at 01/23/24 1235    norepinephrine (Levophed) 8 mg in dextrose 5% 250 mL (0.032 mg/mL) infusion (premix), 0.01-1 mcg/kg/min, intravenous, Continuous, Mark Navarro MD, Stopped at 01/23/24 0220    oxygen (O2) therapy, , inhalation, Continuous PRN - O2/gases, Koko Gordon MD, Rate Change at 01/22/24 1500    pantoprazole (ProtoNix) EC tablet 40 mg, 40 mg, oral, Daily before breakfast, Mark Navarro MD, 40 mg at 01/23/24 0849    perflutren protein A microsphere (Optison) injection 0.5 mL, 0.5 mL, intravenous, Once in imaging, Mark Navarro MD    polyethylene glycol (Glycolax, Miralax) packet 17 g, 17 g, oral, Daily, Mark Navarro MD, 17 g at 01/23/24 0849    povidone-iodine (Betadine) 7.5 % soap 1 Application, 1 Application, Topical, Daily PRN, John Kent MD, 1 Application at 01/18/24 0400    PrismaSol BGK 2/3.5 CRRT solution, 2,700 mL/hr, CRRT, Continuous, Judy Cazares DO, Last Rate: 2,700 mL/hr at 01/22/24 1430, 2,700 mL/hr at 01/22/24 1430    sennosides (Senokot) tablet 8.6 mg, 1 tablet, oral, Nightly, Mark Navarro MD, 8.6 mg at 01/19/24 2212    sevelamer carbonate (Renvela) tablet 800 mg, 800 mg, oral, TID with meals, Mark Navarro MD, 800 mg at 01/23/24 1235    sodium chloride 0.9 % bolus 1,000 mL, 1,000 mL, CRRT, PRN, Judy Cazares DO    sulfur hexafluoride microsphr (Lumason)  "injection 24.28 mg, 2 mL, intravenous, Once in imaging, Mark Navarro MD       Intake/Output Summary (Last 24 hours) at 1/23/2024 1330  Last data filed at 1/23/2024 1300  Gross per 24 hour   Intake 1015.19 ml   Output 1600 ml   Net -584.81 ml         PHYSICAL EXAM:  BP 78/65 (BP Location: Right arm) Comment (BP Location): upperarm  Pulse 70   Temp 36.8 °C (98.2 °F) (Temporal)   Resp 16   Ht 1.778 m (5' 10\")   Wt 105 kg (231 lb 7.7 oz)   SpO2 98%   BMI 33.21 kg/m²     Intake/Output Summary (Last 24 hours) at 1/23/2024 1330  Last data filed at 1/23/2024 1300  Gross per 24 hour   Intake 1015.19 ml   Output 1600 ml   Net -584.81 ml       Gen: non communicative, NAD  Neck: No JVD  Cardiac: RRR  Resp: few rales   Abd: Soft, non tender, +BS, non distended   Ext: trace to +1 edema   Access: RUE AVF  Neuro: moves 4 ext  Peripheral Pulses: Capillary refill <2secs, weak peripheral pulses.  Skin: Skin color, texture, turgor normal, no suspicious rashes or lesions.    Labs:  Results for orders placed or performed during the hospital encounter of 01/09/24 (from the past 24 hour(s))   Blood Gas Arterial   Result Value Ref Range    POCT pH, Arterial 7.32 (L) 7.38 - 7.42 pH    POCT pCO2, Arterial 54 (H) 38 - 42 mm Hg    POCT pO2, Arterial 134 (H) 85 - 95 mm Hg    POCT SO2, Arterial 99 94 - 100 %    POCT Oxy Hemoglobin, Arterial 96.6 94.0 - 98.0 %    POCT Base Excess, Arterial 0.7 -2.0 - 3.0 mmol/L    POCT HCO3 Calculated, Arterial 27.8 (H) 22.0 - 26.0 mmol/L    Patient Temperature 37.0 degrees Celsius    FiO2 100 %   POCT GLUCOSE   Result Value Ref Range    POCT Glucose 136 (H) 74 - 99 mg/dL   POCT GLUCOSE   Result Value Ref Range    POCT Glucose 133 (H) 74 - 99 mg/dL   Blood Gas Arterial Full Panel   Result Value Ref Range    POCT pH, Arterial 7.29 (L) 7.38 - 7.42 pH    POCT pCO2, Arterial 54 (H) 38 - 42 mm Hg    POCT pO2, Arterial 126 (H) 85 - 95 mm Hg    POCT SO2, Arterial 100 94 - 100 %    POCT Oxy Hemoglobin, Arterial " 96.5 94.0 - 98.0 %    POCT Hematocrit Calculated, Arterial 25.0 (L) 41.0 - 52.0 %    POCT Sodium, Arterial 126 (L) 136 - 145 mmol/L    POCT Potassium, Arterial 4.2 3.5 - 5.3 mmol/L    POCT Chloride, Arterial 95 (L) 98 - 107 mmol/L    POCT Ionized Calcium, Arterial 1.27 1.10 - 1.33 mmol/L    POCT Glucose, Arterial 222 (H) 74 - 99 mg/dL    POCT Lactate, Arterial 1.0 0.4 - 2.0 mmol/L    POCT Base Excess, Arterial -0.8 -2.0 - 3.0 mmol/L    POCT HCO3 Calculated, Arterial 26.0 22.0 - 26.0 mmol/L    POCT Hemoglobin, Arterial 8.2 (L) 13.5 - 17.5 g/dL    POCT Anion Gap, Arterial 9 (L) 10 - 25 mmo/L    Patient Temperature 37.0 degrees Celsius    FiO2 30 %   POCT GLUCOSE   Result Value Ref Range    POCT Glucose 164 (H) 74 - 99 mg/dL   CBC and Auto Differential   Result Value Ref Range    WBC 8.9 4.4 - 11.3 x10*3/uL    nRBC 0.0 0.0 - 0.0 /100 WBCs    RBC 2.70 (L) 4.50 - 5.90 x10*6/uL    Hemoglobin 8.4 (L) 13.5 - 17.5 g/dL    Hematocrit 27.2 (L) 41.0 - 52.0 %     (H) 80 - 100 fL    MCH 31.1 26.0 - 34.0 pg    MCHC 30.9 (L) 32.0 - 36.0 g/dL    RDW 18.9 (H) 11.5 - 14.5 %    Platelets 71 (L) 150 - 450 x10*3/uL    Neutrophils % 81.9 40.0 - 80.0 %    Immature Granulocytes %, Automated 0.5 0.0 - 0.9 %    Lymphocytes % 7.0 13.0 - 44.0 %    Monocytes % 8.7 2.0 - 10.0 %    Eosinophils % 1.7 0.0 - 6.0 %    Basophils % 0.2 0.0 - 2.0 %    Neutrophils Absolute 7.27 (H) 1.60 - 5.50 x10*3/uL    Immature Granulocytes Absolute, Automated 0.04 0.00 - 0.50 x10*3/uL    Lymphocytes Absolute 0.62 (L) 0.80 - 3.00 x10*3/uL    Monocytes Absolute 0.77 0.05 - 0.80 x10*3/uL    Eosinophils Absolute 0.15 0.00 - 0.40 x10*3/uL    Basophils Absolute 0.02 0.00 - 0.10 x10*3/uL   Magnesium   Result Value Ref Range    Magnesium 1.89 1.60 - 2.40 mg/dL   Hepatic function panel   Result Value Ref Range    Albumin 2.6 (L) 3.4 - 5.0 g/dL    Bilirubin, Total 0.6 0.0 - 1.2 mg/dL    Bilirubin, Direct 0.2 0.0 - 0.3 mg/dL    Alkaline Phosphatase 78 33 - 136 U/L    ALT  <3 (L) 10 - 52 U/L    AST 14 9 - 39 U/L    Total Protein 4.5 (L) 6.4 - 8.2 g/dL   Calcium, ionized   Result Value Ref Range    POCT Calcium, Ionized 1.31 1.1 - 1.33 mmol/L   Phosphorus   Result Value Ref Range    Phosphorus 3.9 2.5 - 4.9 mg/dL   Basic Metabolic Panel   Result Value Ref Range    Glucose 138 (H) 74 - 99 mg/dL    Sodium 131 (L) 136 - 145 mmol/L    Potassium 3.9 3.5 - 5.3 mmol/L    Chloride 95 (L) 98 - 107 mmol/L    Bicarbonate 26 21 - 32 mmol/L    Anion Gap 14 10 - 20 mmol/L    Urea Nitrogen 33 (H) 6 - 23 mg/dL    Creatinine 3.46 (H) 0.50 - 1.30 mg/dL    eGFR 17 (L) >60 mL/min/1.73m*2    Calcium 9.1 8.6 - 10.6 mg/dL   Blood Gas Arterial Full Panel   Result Value Ref Range    POCT pH, Arterial 7.23 (LL) 7.38 - 7.42 pH    POCT pCO2, Arterial 63 (H) 38 - 42 mm Hg    POCT pO2, Arterial 136 (H) 85 - 95 mm Hg    POCT SO2, Arterial 100 94 - 100 %    POCT Oxy Hemoglobin, Arterial 96.6 94.0 - 98.0 %    POCT Hematocrit Calculated, Arterial 26.0 (L) 41.0 - 52.0 %    POCT Sodium, Arterial 128 (L) 136 - 145 mmol/L    POCT Potassium, Arterial 3.8 3.5 - 5.3 mmol/L    POCT Chloride, Arterial 97 (L) 98 - 107 mmol/L    POCT Ionized Calcium, Arterial 1.35 (H) 1.10 - 1.33 mmol/L    POCT Glucose, Arterial 118 (H) 74 - 99 mg/dL    POCT Lactate, Arterial 0.8 0.4 - 2.0 mmol/L    POCT Base Excess, Arterial -1.6 -2.0 - 3.0 mmol/L    POCT HCO3 Calculated, Arterial 26.4 (H) 22.0 - 26.0 mmol/L    POCT Hemoglobin, Arterial 8.5 (L) 13.5 - 17.5 g/dL    POCT Anion Gap, Arterial 8 (L) 10 - 25 mmo/L    Patient Temperature 37.0 degrees Celsius    FiO2 30 %   POCT GLUCOSE   Result Value Ref Range    POCT Glucose 116 (H) 74 - 99 mg/dL   Blood Gas Arterial Full Panel   Result Value Ref Range    POCT pH, Arterial 7.24 (LL) 7.38 - 7.42 pH    POCT pCO2, Arterial 63 (H) 38 - 42 mm Hg    POCT pO2, Arterial 116 (H) 85 - 95 mm Hg    POCT SO2, Arterial 100 94 - 100 %    POCT Oxy Hemoglobin, Arterial 97.0 94.0 - 98.0 %    POCT Hematocrit Calculated,  Arterial 26.0 (L) 41.0 - 52.0 %    POCT Sodium, Arterial 128 (L) 136 - 145 mmol/L    POCT Potassium, Arterial 3.9 3.5 - 5.3 mmol/L    POCT Chloride, Arterial 97 (L) 98 - 107 mmol/L    POCT Ionized Calcium, Arterial 1.36 (H) 1.10 - 1.33 mmol/L    POCT Glucose, Arterial 123 (H) 74 - 99 mg/dL    POCT Lactate, Arterial 0.7 0.4 - 2.0 mmol/L    POCT Base Excess, Arterial -0.9 -2.0 - 3.0 mmol/L    POCT HCO3 Calculated, Arterial 27.0 (H) 22.0 - 26.0 mmol/L    POCT Hemoglobin, Arterial 8.8 (L) 13.5 - 17.5 g/dL    POCT Anion Gap, Arterial 8 (L) 10 - 25 mmo/L    Patient Temperature 37.0 degrees Celsius    FiO2 30 %   POCT GLUCOSE   Result Value Ref Range    POCT Glucose 105 (H) 74 - 99 mg/dL   Electrocardiogram, 12-lead PRN ACS symptoms   Result Value Ref Range    Ventricular Rate 69 BPM    Atrial Rate 68 BPM    QRS Duration 88 ms    QT Interval 432 ms    QTC Calculation(Bazett) 462 ms    R Axis 12 degrees    T Axis 147 degrees    QRS Count 11 beats    Q Onset 223 ms    T Offset 439 ms    QTC Fredericia 452 ms   Magnesium   Result Value Ref Range    Magnesium 1.90 1.60 - 2.40 mg/dL   Hepatic function panel   Result Value Ref Range    Albumin 2.7 (L) 3.4 - 5.0 g/dL    Bilirubin, Total 0.7 0.0 - 1.2 mg/dL    Bilirubin, Direct 0.2 0.0 - 0.3 mg/dL    Alkaline Phosphatase 79 33 - 136 U/L    ALT <3 (L) 10 - 52 U/L    AST 16 9 - 39 U/L    Total Protein 4.9 (L) 6.4 - 8.2 g/dL   Phosphorus   Result Value Ref Range    Phosphorus 3.3 2.5 - 4.9 mg/dL   Blood Gas Arterial Full Panel   Result Value Ref Range    POCT pH, Arterial 7.29 (L) 7.38 - 7.42 pH    POCT pCO2, Arterial 55 (H) 38 - 42 mm Hg    POCT pO2, Arterial 164 (H) 85 - 95 mm Hg    POCT SO2, Arterial 100 94 - 100 %    POCT Oxy Hemoglobin, Arterial 97.8 94.0 - 98.0 %    POCT Hematocrit Calculated, Arterial 27.0 (L) 41.0 - 52.0 %    POCT Sodium, Arterial 128 (L) 136 - 145 mmol/L    POCT Potassium, Arterial 3.9 3.5 - 5.3 mmol/L    POCT Chloride, Arterial 98 98 - 107 mmol/L    POCT  Ionized Calcium, Arterial 1.34 (H) 1.10 - 1.33 mmol/L    POCT Glucose, Arterial 111 (H) 74 - 99 mg/dL    POCT Lactate, Arterial 1.2 0.4 - 2.0 mmol/L    POCT Base Excess, Arterial -0.6 -2.0 - 3.0 mmol/L    POCT HCO3 Calculated, Arterial 26.4 (H) 22.0 - 26.0 mmol/L    POCT Hemoglobin, Arterial 9.1 (L) 13.5 - 17.5 g/dL    POCT Anion Gap, Arterial 8 (L) 10 - 25 mmo/L    Patient Temperature 37.0 degrees Celsius    FiO2 30 %   Blood Gas Arterial Full Panel   Result Value Ref Range    POCT pH, Arterial 7.24 (LL) 7.38 - 7.42 pH    POCT pCO2, Arterial 62 (H) 38 - 42 mm Hg    POCT pO2, Arterial 161 (H) 85 - 95 mm Hg    POCT SO2, Arterial 99 94 - 100 %    POCT Oxy Hemoglobin, Arterial 97.8 94.0 - 98.0 %    POCT Hematocrit Calculated, Arterial 26.0 (L) 41.0 - 52.0 %    POCT Sodium, Arterial 129 (L) 136 - 145 mmol/L    POCT Potassium, Arterial 3.8 3.5 - 5.3 mmol/L    POCT Chloride, Arterial 99 98 - 107 mmol/L    POCT Ionized Calcium, Arterial 1.36 (H) 1.10 - 1.33 mmol/L    POCT Glucose, Arterial 126 (H) 74 - 99 mg/dL    POCT Lactate, Arterial 1.1 0.4 - 2.0 mmol/L    POCT Base Excess, Arterial -1.2 -2.0 - 3.0 mmol/L    POCT HCO3 Calculated, Arterial 26.6 (H) 22.0 - 26.0 mmol/L    POCT Hemoglobin, Arterial 8.7 (L) 13.5 - 17.5 g/dL    POCT Anion Gap, Arterial 7 (L) 10 - 25 mmo/L    Patient Temperature 37.0 degrees Celsius    FiO2 21 %   Transthoracic Echo (TTE) Complete   Result Value Ref Range    BSA 2.28 m2   POCT GLUCOSE   Result Value Ref Range    POCT Glucose 105 (H) 74 - 99 mg/dL        DATA:   Diagnostic tests reviewed for today's visit:    New labs and imaging     Assessment and Plan:  Pt with ESKD, admitted with RLE ischemia, s/p RLE debridement and revascularization on 1/17  - ESKD on HD: [facility] 2.5 hours each[facility]Dr Hannah is nephrologist   Started CVVH yesterday because of hypotension, tolerating well,  ml/h  Currently off pressors   Anemia, Hb >8, on epogen. Improving  hyponatremia  Still with hypervolemia, reason to maintain CVVH to keep removing fluids till tomorrow    Will continue to follow.     Signature: Ronaldo Koehler MD

## 2024-01-23 NOTE — CARE PLAN
Problem: Pain - Adult  Goal: Verbalizes/displays adequate comfort level or baseline comfort level  Outcome: Progressing     Problem: Safety - Adult  Goal: Free from fall injury  Outcome: Progressing     Problem: Discharge Planning  Goal: Discharge to home or other facility with appropriate resources  Outcome: Progressing     Problem: Chronic Conditions and Co-morbidities  Goal: Patient's chronic conditions and co-morbidity symptoms are monitored and maintained or improved  Outcome: Progressing     Problem: Diabetes  Goal: Achieve decreasing blood glucose levels by end of shift  Outcome: Progressing  Goal: Increase stability of blood glucose readings by end of shift  Outcome: Progressing  Goal: Decrease in ketones present in urine by end of shift  Outcome: Progressing  Goal: Maintain electrolyte levels within acceptable range throughout shift  Outcome: Progressing  Goal: Maintain glucose levels >70mg/dl to <250mg/dl throughout shift  Outcome: Progressing  Goal: No changes in neurological exam by end of shift  Outcome: Progressing  Goal: Learn about and adhere to nutrition recommendations by end of shift  Outcome: Progressing  Goal: Vital signs within normal range for age by end of shift  Outcome: Progressing  Goal: Increase self care and/or family involovement by end of shift  Outcome: Progressing  Goal: Receive DSME education by end of shift  Outcome: Progressing     Problem: Skin  Goal: Decreased wound size/increased tissue granulation at next dressing change  Outcome: Progressing  Flowsheets (Taken 1/23/2024 0812)  Decreased wound size/increased tissue granulation at next dressing change:   Promote sleep for wound healing   Protective dressings over bony prominences  Goal: Participates in plan/prevention/treatment measures  Outcome: Progressing  Flowsheets (Taken 1/23/2024 0812)  Participates in plan/prevention/treatment measures:   Discuss with provider PT/OT consult   Elevate heels  Goal: Prevent/manage excess  moisture  Outcome: Progressing  Flowsheets (Taken 1/23/2024 0812)  Prevent/manage excess moisture:   Cleanse incontinence/protect with barrier cream   Moisturize dry skin   Use wicking fabric (obtain order)   Follow provider orders for dressing changes   Monitor for/manage infection if present  Goal: Prevent/minimize sheer/friction injuries  Outcome: Progressing  Flowsheets (Taken 1/23/2024 0812)  Prevent/minimize sheer/friction injuries:   Use pull sheet   Turn/reposition every 2 hours/use positioning/transfer devices  Goal: Promote/optimize nutrition  Outcome: Progressing  Flowsheets (Taken 1/23/2024 0812)  Promote/optimize nutrition: Discuss with provider if NPO > 2 days  Goal: Promote skin healing  Outcome: Progressing  Flowsheets (Taken 1/23/2024 0812)  Promote skin healing:   Assess skin/pad under line(s)/device(s)   Protective dressings over bony prominences   Turn/reposition every 2 hours/use positioning/transfer devices     Problem: Fall/Injury  Goal: Not fall by end of shift  Outcome: Progressing  Goal: Be free from injury by end of the shift  Outcome: Progressing  Goal: Verbalize understanding of personal risk factors for fall in the hospital  Outcome: Progressing  Goal: Verbalize understanding of risk factor reduction measures to prevent injury from fall in the home  Outcome: Progressing  Goal: Use assistive devices by end of the shift  Outcome: Progressing  Goal: Pace activities to prevent fatigue by end of the shift  Outcome: Progressing

## 2024-01-23 NOTE — PROGRESS NOTES
Speech-Language Pathology  Adult Inpatient Clinical Bedside Swallow Evaluation    Patient Name: Chevy Benton  MRN: [id number]  Today's Date: 1/23/2024   Start Time: 1055  Stop Time: 1129  Time Calculation (min): 34    History of Present Illness:   Chevy Benton is a 78 y.o. male w/ a PMH of ESRD, HFrEF (10/2023 35-40%), Afib c/b SSS (s/p ?[id number] PPM), and PVD s/p 9/2023 R TMA who has been admitted to the CICU for AMS and hypotension during iHD.     Pt initially presented on 1/9 w/ c/f R critical limb ischemia which failed to improve on heparin gtt. He underwent balloon angioplasty on 1/16 that was c/b AT perforation managed w/ intraarterial balloon and external tamponade. On,1/19 he underwent R foot debridement/revision. Tissue cx grew pseudomonas and enterococcus so cefepime and vancomycin were initiated on 1/20. On 1/22, during his daily iHD sessions, he became hypotensive to 69/37, he was minimally responsive; ABG revealed hypercapnia with a component of respiratory acidosis so BiPAP was initiated and he was admitted to the CICU. On arrival, he was somnolent but arousable. Interview limited by BiPAP machine but he was not oriented to situation. He denied pain and discomfort but was later seen waking up and reporting RLE pain to his wife whom, by that time, was at bedside.    Assessment:   Clinical bedside swallow evaluation completed. Pt alert and oriented to self, and knows he's in the hospital only. Perseverates on birthday during orientation questions. Tolerated ice chips and single sips without difficulty. Proceeded with 3 oz of water in continuous sequential swallows with multiple breaks. Attempted again using room temperature water without overt s/s of aspiration. Adequate manipulation of puree with slow mastication of solids with full oral clearance. Pt ready for PO diet without dysphagia concerns, however upon speaking nursing, increase CO2 retention on last ABG. Pt to be placed on BiPAP at  this time. Therefore would recommend deferring beginning PO until pt's respiratory status improves. SLP to follow and re-evaluate once respiratory status is stable. Discussed results and recommendations with RN and MD whom are in agreement with POC.      Recommendations:  NPO    - Frequent, aggressive oral care is strongly recommended to improve infection control as well as reduce dental plaque and bacteria on oropharyngeal surfaces which may increase the risk nosocomial infections, including pneumonia.    - OK for small amounts of ice chips (one at a time, 10x/hour) for pleasure/swallow stimulation, but only after aggressive oral care to avoid colonization of bacteria within oral cavity. Only once off BIPAP.     SLP to continue to follow.     Goal:   Pt. will tolerate least restrictive diet without overt s/s of aspiration with 100% accuracy.         Plan:  SLP Services Indicated: Yes  Frequency: x1 wk  Discussed POC with patient  SLP - OK to Discharge    Pain:   0-10  0 = No pain.     Inpatient Education:  Extensive education provided to patient regarding current swallow function, recommendations/results, and POC.      Consultations/Referrals/Coordination of Services:   N/A

## 2024-01-23 NOTE — PROGRESS NOTES
Mr Benton was admitted from the [facility] outpatient clinic on 1/9/2024 for management of RLE CLI RC-VI.      Subjective Data:  He was seen today in CICU bed, appeared more alert today and communicated well, NAD, on NC. Pt BP still sift 92/68 during assessment. Pt denied foot pain, SOB or CP.           Objective Data:  Last Recorded Vitals:  Vitals:    01/23/24 1000 01/23/24 1100 01/23/24 1120 01/23/24 1137   BP:       BP Location:       Pulse: 86 79     Resp: 19 20 23    Temp:    36.8 °C (98.2 °F)   TempSrc:    Temporal   SpO2: 100% 98%     Weight:       Height:           Last Labs:    Lab Results   Component Value Date    WBC 8.9 01/23/2024    HGB 8.4 (L) 01/23/2024    HCT 27.2 (L) 01/23/2024     (H) 01/23/2024    PLT 71 (L) 01/23/2024     Lab Results   Component Value Date    GLUCOSE 138 (H) 01/23/2024    CALCIUM 9.1 01/23/2024     (L) 01/23/2024    K 3.9 01/23/2024    CO2 26 01/23/2024    CL 95 (L) 01/23/2024    BUN 33 (H) 01/23/2024    CREATININE 3.46 (H) 01/23/2024         TROPHS   Date/Time Value Ref Range Status   10/08/2023 09:00 PM 60 0 - 20 ng/L Final     Comment:     Previous result verified on 10/8/2023 2030 on specimen/case [id number] called with component [facility] for procedure Troponin I, High Sensitivity, Initial with value 58 ng/L.   10/08/2023 07:26 PM 58 0 - 20 ng/L Final     BNP   Date/Time Value Ref Range Status   01/17/2024 10:49 PM 1,525 0 - 99 pg/mL Final     HGBA1C   Date/Time Value Ref Range Status   12/03/2023 07:38 AM 5.3 4.3 - 5.6 % Final     Comment:     American Diabetes Association guidelines indicate that patients with HgbA1c in the range 5.7-6.4% are at increased risk for development of diabetes, and intervention by lifestyle modification may be beneficial. HgbA1c greater or equal to 6.5% is considered diagnostic of diabetes.   04/25/2022 08:34 PM 6.5 4.3 - 5.6 % Final     Comment:     American Diabetes Association guidelines indicate that patients with HgbA1c in the  range 5.7-6.4% are at increased risk for development of diabetes, and intervention by lifestyle modification may be beneficial. HgbA1c greater or equal to 6.5% is considered diagnostic of diabetes.      Last I/O:  I/O last 3 completed shifts:  In: 975.2 (9.3 mL/kg) [I.V.:275.2 (2.6 mL/kg); IV Piggyback:700]  Out: 926 (8.8 mL/kg) [Other:926]  Weight: 105 kg       Inpatient Medications:  Scheduled medications   Medication Dose Route Frequency    allopurinol  100 mg oral Daily    aspirin  81 mg oral Daily    atorvastatin  40 mg oral Nightly    B complex-vitamin C-folic acid  1 capsule oral Daily    clopidogrel  75 mg oral Daily    epoetin dane or biosimilar  10,000 Units subcutaneous Every Mon/Wed/Fri    insulin glargine  6 Units subcutaneous Nightly    insulin lispro  0-5 Units subcutaneous TID with meals    midodrine  20 mg oral TID with meals    pantoprazole  40 mg oral Daily before breakfast    perflutren protein A microsphere  0.5 mL intravenous Once in imaging    polyethylene glycol  17 g oral Daily    sennosides  1 tablet oral Nightly    sevelamer carbonate  800 mg oral TID with meals    sulfur hexafluoride microsphr  2 mL intravenous Once in imaging     PRN medications   Medication    acetaminophen    Or    acetaminophen    Or    acetaminophen    acetaminophen    albuterol    alum-mag hydroxide-simeth    ammonium lactate    dextrose    docusate sodium    glucagon    heparin    heparin    hydrocortisone    ipratropium-albuteroL    oxygen    povidone-iodine    sodium chloride     Continuous Medications   Medication Dose Last Rate    norepinephrine  0.01-1 mcg/kg/min Stopped (01/23/24 0220)    PrismaSol BGK 2/3.5  2,700 mL/hr 2,700 mL/hr (01/22/24 1430)       Physical Exam:  Constitutional: ill appearing elderly man, NAD, lethargic         Head/Neck: Neck supple, No JVD, trachea midline, no bruits             Respiratory/Thorax: Patent airways, diminished breath sounds throughout, Lt chest wall HD catheter in  place, On 2L NC, speaking in complete sentences   Cardiovascular: irregular, rate and rhythm, no murmurs, normal S 1 and S 2              Gastrointestinal: obese,, Non-distended, soft, non-tender,+BS,                      Skin: Warm and dry,      Extremities: [place], RLE: Rt foot is warm to touch, unable to Doppler signal of DP/PT/AT due to podiatry dressing, no c/o foot pain, Right TMA site with wound VAC and dressing clean and intact, RLE +2 edema. Rt groin access site soft, nontender, no oozing/hematoma, slight ecchymosis  LLE: Skin temperature warm to cool from proximal to distal, skin discoloration of the forefoot and toes 2-5 with sluggish cap refill to all toes (unchanged from prior assessment), no pain reported, no ulcer  DP/PT pulses nonpalpable or Doppler able, +2 edema, foot flaky and skin peeling.    LUE: AVF without thrill. +2 edema entire left arm and hand. Chronic hyperkeratotic skin changes overlying prior access site with dressing. Sensation and motor function intact.  RUE: +3 edema of the arm and hand, palpable radial pulses, motor and sensation intact, cool arm, tenderness with palpation  Neuro: awake/alert/oriented x3, non-focal, sensation very diminished bilateral foot, motor function intact  Psychological: Appropriate mood and behavior    Assessment/Plan   Chevy Benton is a 78 y.o. male with PMH of ESRD on HD TTS (Lt chest TDC, Hx of LUE Fistula Pseudoaneurysms), T2DM, HTN, HFrEF (TTE 10/23 EF 35-40%), A fib, PTA right lower extremity on 9/20/23 with subsequent TMA on 9/22/23, Non Occlusive DVT (Rt Subclavian), Aortic Root Dilation, MR, SSS s/p pacemaker, who presented as a direct admit from Dr Linder endovascular clinic for concerns of critical limb ischemia. Pt has not been taking Plavix since hospital discharge in late September due to the frequent hospitalizations for SBO. He denied any CP, SOB, cough, fever. Since hospital admission Pt was seen by podiatry, vascular surgery and  nephrology team. He had diagnostic venogram on 1/10/24 by IR and Left cephalic vein angioplasty  Left subclavian vein angioplasty, with Vascular surgery on 1/12/24, Dr Barrios.  Pt was transferred to CICU on 1/22/24 AM for further care due to hypotension and lethargy.        RLE CLTI  RC-VI  Rt TMA now s/p I&D  - 1/10/24 Foot Xray - No evidence of osteomyelitis  - 1/17/24 s/p PTA of AT and PT, Reconstruction of plantar arch with Dr Nguyen.   - 1/19 /24 s/p revision of right TMA with podiatry, with Wound vac placement, changed on 1/22/24  - continue with ASA EC 81 mg and Plavix 75 mg daily  - No need to resume Hep gtt as Pt risk of AF related events < 0.5% per day. Can consider DVT prophylaxis when Plt is WNL. Plt 71 today  - Resume Eliquis for Afib at discharge   - Ammonium lactate lotion or cream for dry legs and feet. Do not apply between toes.   - Truvue EHOB boot to prevent pressure ulcer on the heels. Heel offloading at all times.   - 1 month FU at [facility] clinic in 1 month post discharge with repeat vascular testing (we will arrange)  - remainder of care per CICU team    Endovascular team will sign off, please do not hesitate to contact us with any questions.          JENIFFER Ann-CNP  Endovascular/Limb Salvage Service   Day: [id number]/Haiku/Night HHVI [id number]

## 2024-01-23 NOTE — PROCEDURES
The patient was prepped and draped in the usual sterile manner using chlorhexidine scrub. 1% lidocaine was used to numb the region. The right radial artery was palpated and successfully cannulated on the first pass under ultrasound guidance. Pulsatile, arterial blood was visualized and the artery was then threaded using the Seldinger technique and a catheter was then sutured into place. Good wave-form was obtained. The patient tolerated the procedure well without any immediate complications. The area was cleaned and Tegaderm was applied.

## 2024-01-23 NOTE — PROGRESS NOTES
Pharmacy to Dose Vancomycin:  Chevy Benton is a 78 y.o. male ordered pharmacy to dose vancomycin for complicated skin and soft tissue infection. Today is day 2 of therapy.  Goal: Trough 15-20mg/L  Results from last 7 days   Lab Units 01/22/24  0758 01/22/24  0513 01/21/24  0652   BUN mg/dL 48* 48* 40*   CREATININE mg/dL 5.28* 5.44* 4.68*   WBC AUTO x10*3/uL 9.4 9.8 9.1      Staph/MRSA Screen Culture   Date/Time Value Ref Range Status   09/22/2023 04:21 PM NO Staphylococcus aureus ISOLATED.  Final     Blood Culture   Date/Time Value Ref Range Status   01/22/2024 10:58 AM Loaded on Instrument - Culture in progress  Preliminary   01/22/2024 10:58 AM Loaded on Instrument - Culture in progress  Preliminary     Tissue/Wound Culture/Smear   Date/Time Value Ref Range Status   01/19/2024 11:41 AM (2+) Few Enterobacter cloacae complex (A)  Final     Comment:     SECOND and THIRD generation cephalosporin results are not reported as resistance may develop during therapy with these agents.   01/19/2024 11:41 AM (1+) Rare Pseudomonas aeruginosa (A)  Final     Gram Stain   Date/Time Value Ref Range Status   01/19/2024 11:41 AM (2+) Few Polymorphonuclear leukocytes  Final   01/19/2024 11:41 AM No organisms seen  Final      Renal function dependent on CVVH which started today at 1428. Patient received vanco 2G x 1 dose yesterday at 1430.    Plan:  Give vanco 1.5G now.  Follow-up level ordered for 1/23 @ 1700 (24hr lvl) unless clinically indicated sooner.  Pharmacy will continue daily vancomycin monitoring. Please contact the pharmacy at extension [phone] with any questions.    Thank you,  Marvin Soria [facility]

## 2024-01-23 NOTE — PROCEDURES
A time-out was performed. The patient's right neck and right subclavian region was prepped and draped in sterile fashion using chlorhexidine scrub. Anesthesia was achieved with 3cc 1% lidocaine. The right internal jugular vein was accessed used a micropuncture needle. Venous blood was withdrawn, the syringe was disconnected and the micropuncture wire was advanced through the needle, the needle was removed while maintaining the micropuncture wire within the vessel, then the micropuncture sheath was advanced into the vein over the wire. Venous position was confirmed with ultrasound visualization of guidewire and manometry. A larger guidewire was advanced through the sheath. The guidewire was unable to be advanced beyond 5 cm, therefore all equipment was removed and venous pressure was held for 5 minutes.    An attempt was then made on the right subclavian vein. Anesthesia was achieved with 3cc 1% lidocaine. The right subclavian vein was accessed used a micropuncture needle. Venous blood was withdrawn, the syringe was disconnected and the micropuncture wire was advanced through the needle, the needle was removed while maintaining the micropuncture wire within the vessel, then the micropuncture sheath was advanced into the vein over the wire. Venous position was confirmed with ultrasound visualization of guidewire and manometry. A larger guidewire was advanced through the sheath. The guidewire was unable to be advanced beyond 15cm. All equipment was withdrawn and venous pressure was held for 5 minutes.

## 2024-01-23 NOTE — PROGRESS NOTES
Occupational Therapy                 Therapy Communication Note    Patient Name: Chevy Benton  MRN: [id number]  Today's Date: 1/23/2024     Discipline: Occupational Therapy    Missed Visit Reason: Missed Visit Reason: Patient placed on medical hold (Pt with increased O2 needs due to ABG reading. Defer OT at this time.)    Missed Time: Attempt

## 2024-01-24 NOTE — CARE PLAN
The patient's goals for the shift include      The clinical goals for the shift include pt will have a MAP above 60 throughout this shift

## 2024-01-24 NOTE — PROGRESS NOTES
Occupational Therapy    Evaluation/Treatment    Patient Name: Chevy Benton  MRN: [id number]  : 1945  Today's Date: 24  Time Calculation  Start Time: 1026  Stop Time: 1058  Time Calculation (min): 32 min       Assessment:  OT Assessment: pt presensts with deficits in ROM, strength, balance, endurance and cognition impeding occupational performance  Prognosis: Fair  Evaluation/Treatment Tolerance:  (Limited by pain at times, cognition - simple command follow)  Medical Staff Made Aware: Yes  End of Session Communication: Bedside nurse  End of Session Patient Position: Bed, 3 rail up, Alarm off, not on at start of session  OT Assessment Results: Decreased ADL status, Decreased upper extremity strength, Decreased safe judgment during ADL, Decreased cognition, Decreased endurance, Decreased functional mobility, Decreased gross motor control, Decreased IADLs, Decreased trunk control for functional activities  Prognosis: Fair  Evaluation/Treatment Tolerance:  (Limited by pain at times, cognition - simple command follow)  Medical Staff Made Aware: Yes  Barriers to Participation: Comorbidities, Ability to acquire knowledge  Plan:  Treatment Interventions: ADL retraining, Functional transfer training, UE strengthening/ROM, Endurance training, Cognitive reorientation, Patient/family training, Equipment evaluation/education, Compensatory technique education, Fine motor coordination activities, Neuromuscular reeducation  OT Frequency: 2 times per week  OT Discharge Recommendations: Moderate intensity level of continued care  OT Recommended Transfer Status: Dependent, Assist of 2  OT - OK to Discharge: Yes  Treatment Interventions: ADL retraining, Functional transfer training, UE strengthening/ROM, Endurance training, Cognitive reorientation, Patient/family training, Equipment evaluation/education, Compensatory technique education, Fine motor coordination activities, Neuromuscular reeducation    Subjective    Current Problem:  1. Critical limb ischemia of both lower extremities (CMS/HCC)  Case Request Cath Lab: Lower Extremity Angiogram    Case Request Cath Lab: Lower Extremity Angiogram    Invasive vascular procedure    Invasive vascular procedure      2. Swelling  Upper extremity venous duplex bilateral    Upper extremity venous duplex bilateral    CANCELED: Upper extremity venous duplex bilateral    CANCELED: Upper extremity venous duplex bilateral      3. DVT of axillary vein, chronic, bilateral (CMS/HCC)  Upper extremity venous duplex bilateral    Upper extremity venous duplex bilateral      4. Critical limb ischemia of right lower extremity (CMS/HCC)  CANCELED: Case Request Cath Lab: Lower Extremity Angiogram    CANCELED: Case Request Cath Lab: Lower Extremity Angiogram    CANCELED: Invasive vascular procedure    CANCELED: Invasive vascular procedure      5. Generalized edema  Upper extremity venous duplex bilateral      6. Subclavian vein stenosis, left  Case Request Operating Room: Left upper extremity FISTULOGRAM with possible intervention    Case Request Operating Room: Left upper extremity FISTULOGRAM with possible intervention      7. Non-pressure chronic ulcer of other part of right foot with fat layer exposed (CMS/HCC)  Case Request Operating Room: Debridement Foot    Case Request Operating Room: Debridement Foot    Fungal Culture/Smear    Fungal Culture/Smear    Fungal Culture/Smear    Fungal Culture/Smear    Tissue/Wound Culture/Smear    Tissue/Wound Culture/Smear    Tissue/Wound Culture/Smear    Tissue/Wound Culture/Smear      8. Atherosclerosis of native arteries of right leg with ulceration of other part of foot (CMS/HCC)  Invasive vascular procedure      9. Shock (CMS/HCC)  Transthoracic Echo (TTE) Complete    Transthoracic Echo (TTE) Complete      10. Critical limb ischemia of left lower extremity (CMS/HCC)  Vascular US Ankle Brachial Index (RAJ) Without Exercise      11. Toe ulcer (CMS/HCC)   Vascular US Ankle Brachial Index (RAJ) Without Exercise        General:   OT Received On: 24  General  Reason for Referral: Direct admit from endovascular clinic for concerns of critical limb ischemia. s/p RLE debridement and revascularization on . Transferred to CICU on  due to hypotension and lethargy. Started CVVH   Past Medical History Relevant to Rehab: ESRD on HD TTS (Lt chest TDC, Hx of LUE Fistula Pseudoaneurysms), T2DM, HTN, HFrEF (TTE 10/23 EF 35-40%), A fib, PTA right lower extremity on 23 with subsequent TMA on 23, Non Occlusive DVT, Aortic Root Dilation, MR, SSS s/p pacemaker  Missed Visit: No  Family/Caregiver Present: No  Co-Treatment: PT  Co-Treatment Reason: Two skilled therapists required to safely mobilize medically complex pt  Prior to Session Communication: Bedside nurse  Patient Position Received: Bed, 3 rail up  General Comment: Pt supine in bed upon arrival, agreeable to OT though significantly confused. Conversationally perseverative. Reoriented throughout session, limited carryover. Tolerates EOB activity x10 minutes this date. (HD cath, CVVH, midline, A-line, 3 L/min via NC, wound vac to  R LE)  Precautions:  LE Weight Bearing Status:  (NWB R LE)  Medical Precautions: Fall precautions  Vital Signs:  Heart Rate:  (pre 96, post 92)  Heart Rate Source: Monitor  Resp:  (pre 14, post 19)  SpO2:  (pre 94%, post 99%)  BP:  (pre 129/59 (80), post 127/57 (80))  BP Method: Arterial line  Patient Position: Lying  Pain:  Pain Assessment  Pain Assessment: CPOT (Critical Care Pain Observation Tool) (pain in bottom with position changes)    Objective   Cognition:  Overall Cognitive Status: Impaired  Orientation Level: Disoriented to situation, Disoriented to time, Disoriented to place (oriented to name, month/day of )  Attention: Exceptions to WFL  Sustained Attention: Impaired  Safety/Judgement: Exceptions to WFL  Routine Tasks: Moderate  Insight: Severe     Confusion  Assessment Method (CAM)  Acute Onset and Fluctuating Course (1A): Yes  Acute Onset and Fluctuating Course (1B): Yes  Inattention (2): Yes  Disorganized Thinking (3): Yes  Rate Patient's Level of Consciousness (4): Alert (Normal), No  Delirium Present: Yes     Home Living:  Home Layout: One level  Home Living Comments: Pt admitted from facility. Per chart review, pt was living at home w/ spouse prior to hospital admission fall of 2023.  Prior Function:  Level of [place]: Needs assistance with ADLs  Prior Function Comments: Pt unreliable historian d/t cognitive deficits, unable to obtain PLOF.    ADL:  Eating Assistance: Not performed (NPO)  Grooming Assistance: Maximal  Grooming Deficit: Wash/dry face  Bathing Assistance: Total  UE Dressing Assistance: Total  LE Dressing Assistance: Total  Toileting Assistance with Device: Total  Toileting Deficit: Incontinent  Activities of Daily Living: Grooming  Grooming Level of Assistance: Maximum assistance  Grooming Where Assessed: Bed level  Grooming Comments: hand over hand assist to promote participation in simple face washing    LE Dressing  LE Dressing: Yes  Sock Level of Assistance: Dependent  LE Dressing Where Assessed: Bed level    Toileting  Toileting Level of Assistance: Dependent  Where Assessed: Bed level  Toileting Comments: bed level hygiene with emphasis on sidelying mobility  Activity Tolerance:  Endurance:  (Tolerates 20-30 min exercise with rest breaks)    Bed Mobility/Transfers: Bed Mobility  Bed Mobility: Yes  Bed Mobility 1  Bed Mobility 1: Rolling right, Rolling left  Level of Assistance 1: Dependent  Bed Mobility Comments 1: use of slidesheet  Bed Mobility 2  Bed Mobility  2: Supine to sitting, Sitting to supine  Level of Assistance 2: Dependent (x2)  Bed Mobility Comments 2: HOB elevated, use of slidesheet         Sitting Balance:  Static Sitting Balance  Static Sitting-Balance Support: Feet supported  Static Sitting-Level of Assistance: Minimum  assistance, Contact guard  Static Sitting-Comment/Number of Minutes: x10 min during postural training    Therapy/Activity: Therapeutic Activity  Therapeutic Activity Performed: Yes  Therapeutic Activity 1: Facilitated functional mobility training with step by step cues to initiate and sequence all transitions. Pt initiate LE movement and use of rail when transitioning to EOB; however unable to complete 25% of task. Rolling R/L with use of slidesheet and cues for LE/UE positioning and use of bedrail.  Therapeutic Activity 2: Postural training at EOB during unsupported sitting balance with cues for midline trunk d/t R lateral lean and intermittent min A.     Vision:Vision - Basic Assessment  Current Vision: Wears glasses all the time  Sensation:  Sensation Comment: Requires further assessment, limited by cognition.  Strength:  Strength Comments: R UE > L UE though both with significant deficits    Perception:  Inattention/Neglect: Cues to maintain midline in sitting  Initiation: Cues to initiate tasks  Perseveration: Perseverates during conversation    Hand Function:  Hand Function  Gross Grasp: Functional  Extremities: RUE   RUE : Exceptions to WFL  RUE Strength  RUE Overall Strength: Deficits (fluctuates during function. MMT R shoulder flexion 2-/5, elbow flexion/extension 3/5, gross grasp 4/5) and LUE   LUE: Exceptions to WFL  LUE Strength  LUE Overall Strength: Deficits (fluctuates during function. MMT L shoulder flexion 2-/5, elbow flexion 2-/5, gross grasp 4/5)    Outcome Measures: [facility] Daily Activity  Putting on and taking off regular lower body clothing: Total  Bathing (including washing, rinsing, drying): Total  Putting on and taking off regular upper body clothing: Total  Toileting, which includes using toilet, bedpan or urinal: Total  Taking care of personal grooming such as brushing teeth: Total  Eating Meals: Total  Daily Activity - Total Score: 6        Education Documentation  Precautions, taught by  Shaina Mitchell OT at 1/24/2024 12:24 PM.  Learner: Patient  Readiness: Acceptance  Method: Explanation  Response: No Evidence of Learning, Needs Reinforcement    Body Mechanics, taught by Shaina Mitchell OT at 1/24/2024 12:24 PM.  Learner: Patient  Readiness: Acceptance  Method: Explanation  Response: No Evidence of Learning, Needs Reinforcement    ADL Training, taught by Shaina Mitchell OT at 1/24/2024 12:24 PM.  Learner: Patient  Readiness: Acceptance  Method: Explanation  Response: No Evidence of Learning, Needs Reinforcement    Education Comments  No comments found.      Goals:  Encounter Problems       Encounter Problems (Active)       ADLs       Patient with complete upper body dressing with minimal assist  level of assistance  (Progressing)       Start:  01/24/24    Expected End:  02/14/24            Patient will complete daily grooming tasks with stand by level of assistance and PRN adaptive equipment while edge of bed . (Progressing)       Start:  01/24/24    Expected End:  02/14/24               COGNITION/SAFETY       Patient will score WFL on standardized cognitive assessment with without cues and within reasonable time frame (Progressing)       Start:  01/24/24    Expected End:  02/14/24            Patient will follow 100% Simple commands to allow improved ADL performance. (Progressing)       Start:  01/24/24    Expected End:  02/14/24            Patient will demonstrated orientation x 3 with verbal cues. (Progressing)       Start:  01/24/24    Expected End:  02/14/24       ORIENTATION            EXERCISE/STRENGTHENING       Patient with increase BUE to 3+/5 strength. (Progressing)       Start:  01/24/24    Expected End:  02/14/24               TRANSFERS       Patient will perform bed mobility moderate x2 level of assistance and bed rails and draw sheet in order to improve safety and independence with mobility (Progressing)       Start:  01/24/24    Expected End:  02/14/24            Patient will  complete lateral functional transfer to chair with slideboard PRN and moderate assist x2. (Progressing)       Start:  01/24/24    Expected End:  02/14/24                 BUFFY QUISPE, OT

## 2024-01-24 NOTE — CARE PLAN
Problem: Pain - Adult  Goal: Verbalizes/displays adequate comfort level or baseline comfort level  1/24/2024 0808 by Elisabeth Marie RN  Outcome: Progressing  1/24/2024 0807 by Elisabeth Marie RN  Outcome: Progressing     Problem: Safety - Adult  Goal: Free from fall injury  1/24/2024 0808 by Elisabeth Marie RN  Outcome: Progressing  1/24/2024 0807 by Elisabeth Marie RN  Outcome: Progressing     Problem: Discharge Planning  Goal: Discharge to home or other facility with appropriate resources  1/24/2024 0808 by Elisabeth Marie RN  Outcome: Progressing  1/24/2024 0807 by Elisabeth Marie RN  Outcome: Progressing     Problem: Chronic Conditions and Co-morbidities  Goal: Patient's chronic conditions and co-morbidity symptoms are monitored and maintained or improved  1/24/2024 0808 by Elisabeth Marie RN  Outcome: Progressing  1/24/2024 0807 by Elisabeth Marie RN  Outcome: Progressing     Problem: Diabetes  Goal: Achieve decreasing blood glucose levels by end of shift  1/24/2024 0808 by Elisabeth Marie RN  Outcome: Progressing  1/24/2024 0807 by Elisabeth Marie RN  Outcome: Progressing  Goal: Increase stability of blood glucose readings by end of shift  1/24/2024 0808 by Elisabeth Marie RN  Outcome: Progressing  1/24/2024 0807 by Elisabeth Marie RN  Outcome: Progressing  Goal: Decrease in ketones present in urine by end of shift  1/24/2024 0808 by Elisabeth Marie RN  Outcome: Progressing  1/24/2024 0807 by Elisabeth Marie RN  Outcome: Progressing  Goal: Maintain electrolyte levels within acceptable range throughout shift  1/24/2024 0808 by Elisabeth Marie RN  Outcome: Progressing  1/24/2024 0807 by Elisabeth Marie RN  Outcome: Progressing  Goal: Maintain glucose levels >70mg/dl to <250mg/dl throughout shift  1/24/2024 0808 by Elisabeth Marie RN  Outcome: Progressing  1/24/2024 0807 by Elisabeth Marie RN  Outcome: Progressing  Goal: No changes in neurological exam by end of shift  1/24/2024 0808 by Elisabeth Marie RN  Outcome:  Progressing  1/24/2024 0807 by Elisabeth Marie RN  Outcome: Progressing  Goal: Learn about and adhere to nutrition recommendations by end of shift  1/24/2024 0808 by Elisabeth Marie RN  Outcome: Progressing  1/24/2024 0807 by Elisabeth Marie RN  Outcome: Progressing  Goal: Vital signs within normal range for age by end of shift  1/24/2024 0808 by Elisabeth Marie RN  Outcome: Progressing  1/24/2024 0807 by Elisabeth Marie RN  Outcome: Progressing  Goal: Increase self care and/or family involovement by end of shift  1/24/2024 0808 by Elisabeth Marie RN  Outcome: Progressing  1/24/2024 0807 by Elisabeth Marie RN  Outcome: Progressing  Goal: Receive DSME education by end of shift  1/24/2024 0808 by Elisabeth Marie RN  Outcome: Progressing  1/24/2024 0807 by Elisabeth Marie RN  Outcome: Progressing     Problem: Skin  Goal: Decreased wound size/increased tissue granulation at next dressing change  1/24/2024 0808 by Elisabeth Marie RN  Outcome: Progressing  Flowsheets (Taken 1/24/2024 0808)  Decreased wound size/increased tissue granulation at next dressing change:   Promote sleep for wound healing   Utilize specialty bed per algorithm   Protective dressings over bony prominences  1/24/2024 0807 by Elisabeth Marie RN  Outcome: Progressing  Goal: Participates in plan/prevention/treatment measures  1/24/2024 0808 by Elisabeth Marie RN  Outcome: Progressing  Flowsheets (Taken 1/24/2024 0808)  Participates in plan/prevention/treatment measures:   Discuss with provider PT/OT consult   Elevate heels  1/24/2024 0807 by Elisabeth Marie RN  Outcome: Progressing  Goal: Prevent/manage excess moisture  1/24/2024 0808 by Elisabeth Marie RN  Outcome: Progressing  Flowsheets (Taken 1/24/2024 0808)  Prevent/manage excess moisture:   Moisturize dry skin   Monitor for/manage infection if present  1/24/2024 0807 by Elisabeth Marie RN  Outcome: Progressing  Goal: Prevent/minimize sheer/friction injuries  1/24/2024 0808 by Elisabeth Marie RN  Outcome:  Progressing  Flowsheets (Taken 1/24/2024 0808)  Prevent/minimize sheer/friction injuries:   Use pull sheet   Turn/reposition every 2 hours/use positioning/transfer devices  1/24/2024 0807 by Elisabeth Marie RN  Outcome: Progressing  Goal: Promote/optimize nutrition  1/24/2024 0808 by Elisabeth Marie RN  Outcome: Progressing  Flowsheets (Taken 1/24/2024 0808)  Promote/optimize nutrition:   Monitor/record intake including meals   Consume > 50% meals/supplements   Offer water/supplements/favorite foods   Discuss with provider if NPO > 2 days  1/24/2024 0807 by Elisabeth Marie RN  Outcome: Progressing  Goal: Promote skin healing  1/24/2024 0808 by Elisabeth Marie RN  Outcome: Progressing  Flowsheets (Taken 1/24/2024 0808)  Promote skin healing:   Assess skin/pad under line(s)/device(s)   Turn/reposition every 2 hours/use positioning/transfer devices  1/24/2024 0807 by Elisabeth Marie RN  Outcome: Progressing     Problem: Fall/Injury  Goal: Not fall by end of shift  1/24/2024 0808 by Elisabeth Marie RN  Outcome: Progressing  1/24/2024 0807 by Elisabeth Marie RN  Outcome: Progressing  Goal: Be free from injury by end of the shift  1/24/2024 0808 by Elisabeth Marie RN  Outcome: Progressing  1/24/2024 0807 by Elisabeth Marie RN  Outcome: Progressing  Goal: Verbalize understanding of personal risk factors for fall in the hospital  1/24/2024 0808 by Elisabeth Marie RN  Outcome: Progressing  1/24/2024 0807 by Elisabeth Marie RN  Outcome: Progressing  Goal: Verbalize understanding of risk factor reduction measures to prevent injury from fall in the home  1/24/2024 0808 by Elisabeth Marie RN  Outcome: Progressing  1/24/2024 0807 by Elisabeth Marie RN  Outcome: Progressing  Goal: Use assistive devices by end of the shift  1/24/2024 0808 by Elisabeth Marie RN  Outcome: Progressing  1/24/2024 0807 by Elisabeth Marie RN  Outcome: Progressing  Goal: Pace activities to prevent fatigue by end of the shift  1/24/2024 0808 by Elisabeth Marie  RN  Outcome: Progressing  1/24/2024 0807 by Elisabeth Marie RN  Outcome: Progressing

## 2024-01-24 NOTE — PROGRESS NOTES
Nephrology Consult Progress Note    Admit Date: 1/9/2024    Interval history:  Pt remains poorly communicative    CURRENT MEDICATIONS:    Current Facility-Administered Medications:     acetaminophen (Tylenol) tablet 650 mg, 650 mg, oral, q4h PRN, 650 mg at 01/21/24 1325 **OR** acetaminophen (Tylenol) oral liquid 650 mg, 650 mg, nasogastric tube, q4h PRN **OR** acetaminophen (Tylenol) suppository 650 mg, 650 mg, rectal, q4h PRN, Mark Navarro MD    acetaminophen (Tylenol) tablet 975 mg, 975 mg, oral, q6h PRN, Michele Hilton MD, 975 mg at 01/22/24 2120    albuterol 90 mcg/actuation inhaler 2 puff, 2 puff, inhalation, q6h PRN, Mark Navarro MD, 2 puff at 01/14/24 0850    allopurinol (Zyloprim) tablet 100 mg, 100 mg, oral, Daily, Mark Navarro MD, 100 mg at 01/24/24 0834    alum-mag hydroxide-simeth (Mylanta) 200-200-20 mg/5 mL oral suspension 30 mL, 30 mL, oral, 4x daily PRN, Mark Navarro MD    ammonium lactate (Lac-Hydrin) 12 % lotion, , Topical, q1h PRN, Mark Navarro MD    aspirin chewable tablet 81 mg, 81 mg, oral, Daily, Mark Navarro MD, 81 mg at 01/24/24 0834    atorvastatin (Lipitor) tablet 40 mg, 40 mg, oral, Nightly, Mark Navarro MD, 40 mg at 01/23/24 2116    B complex-vitamin C-folic acid (Nephrocaps) capsule 1 capsule, 1 capsule, oral, Daily, Mark Navarro MD, 1 capsule at 01/24/24 0834    clopidogrel (Plavix) tablet 75 mg, 75 mg, oral, Daily, Mark Navarro MD, 75 mg at 01/24/24 0834    dextrose 50 % injection 25 g, 25 g, intravenous, q15 min PRN, Mark Navarro MD, 25 g at 01/17/24 2253    docusate sodium (Colace) capsule 100 mg, 100 mg, oral, BID PRN, Mark Navarro MD, 100 mg at 01/16/24 1121    epoetin dane (Epogen,Procrit) injection 10,000 Units, 10,000 Units, subcutaneous, Every Mon/Wed/Fri, Ronaldo Koehler MD, 10,000 Units at 01/23/24 0027    glucagon (Glucagen) injection 1 mg, 1 mg, intramuscular, q15 min PRN, Mark Navarro MD    heparin (porcine) injection 1,200 Units, 1,200 Units, intravenous, PRN,  Judy Cazares DO    heparin (porcine) injection 1,200 Units, 1,200 Units, intravenous, PRN, Judy Cazares DO    hydrocortisone 2.5 % ointment, , Topical, BID PRN, Mark Navarro MD    insulin glargine (Lantus) injection 6 Units, 6 Units, subcutaneous, Nightly, Mark Navarro MD, 6 Units at 01/23/24 2116    insulin lispro (HumaLOG) injection 0-5 Units, 0-5 Units, subcutaneous, TID with meals, Mark Navarro MD    ipratropium-albuteroL (Duo-Neb) 0.5-2.5 mg/3 mL nebulizer solution 3 mL, 3 mL, nebulization, q6h PRN, Mark Navarro MD    midodrine (Proamatine) tablet 20 mg, 20 mg, oral, TID with meals, Mark Navarro MD, 20 mg at 01/24/24 1200    norepinephrine (Levophed) 8 mg in dextrose 5% 250 mL (0.032 mg/mL) infusion (premix), 0.01-1 mcg/kg/min, intravenous, Continuous, Mark Navarro MD, Stopped at 01/23/24 0220    oxygen (O2) therapy, , inhalation, Continuous PRN - O2/gases, Koko Gordon MD, 3 L/min at 01/24/24 0900    pantoprazole (ProtoNix) EC tablet 40 mg, 40 mg, oral, Daily before breakfast, Mark Navarro MD, 40 mg at 01/24/24 0834    perflutren protein A microsphere (Optison) injection 0.5 mL, 0.5 mL, intravenous, Once in imaging, Mark Navarro MD    polyethylene glycol (Glycolax, Miralax) packet 17 g, 17 g, oral, Daily, Mark Navarro MD, 17 g at 01/24/24 0900    potassium phosphates 21 mmol in dextrose 5 % in water (D5W) 250 mL IV, 21 mmol, intravenous, Once, Eric Urias MD, Last Rate: 42.8 mL/hr at 01/24/24 1248, 21 mmol at 01/24/24 1248    povidone-iodine (Betadine) 7.5 % soap 1 Application, 1 Application, Topical, Daily PRN, John Kent MD, 1 Application at 01/18/24 0400    sennosides (Senokot) tablet 8.6 mg, 1 tablet, oral, Nightly, Mark Navarro MD, 8.6 mg at 01/23/24 2116    [Held by provider] sevelamer carbonate (Renvela) tablet 800 mg, 800 mg, oral, TID with meals, Mark Navarro MD, 800 mg at 01/24/24 0834    sodium chloride 0.9 % bolus 1,000 mL, 1,000 mL, CRRT, PRN, Judy Cazares DO     "sulfur hexafluoride microsphr (Lumason) injection 24.28 mg, 2 mL, intravenous, Once in imaging, Mark Navarro MD       Intake/Output Summary (Last 24 hours) at 1/24/2024 1417  Last data filed at 1/24/2024 1259  Gross per 24 hour   Intake 790 ml   Output 3138 ml   Net -2348 ml         PHYSICAL EXAM:  BP 78/65 (BP Location: Right arm) Comment (BP Location): upperarm  Pulse 100   Temp 36.4 °C (97.5 °F) (Temporal)   Resp 18   Ht 1.778 m (5' 10\")   Wt 105 kg (231 lb 7.7 oz)   SpO2 97%   BMI 33.21 kg/m²     Intake/Output Summary (Last 24 hours) at 1/24/2024 1417  Last data filed at 1/24/2024 1259  Gross per 24 hour   Intake 790 ml   Output 3138 ml   Net -2348 ml       Gen: non communicative, NAD  Neck: No JVD  Cardiac: RRR  Resp: few rales   Abd: Soft, non tender, +BS, non distended   Ext: trace to +1 edema   Access: RUE AVF  Neuro: moves 4 ext  Peripheral Pulses: Capillary refill <2secs, weak peripheral pulses.  Skin: Skin color, texture, turgor normal, no suspicious rashes or lesions.    Labs:  Results for orders placed or performed during the hospital encounter of 01/09/24 (from the past 24 hour(s))   Lactate   Result Value Ref Range    Lactate 2.4 (H) 0.4 - 2.0 mmol/L   CBC and Auto Differential   Result Value Ref Range    WBC 9.0 4.4 - 11.3 x10*3/uL    nRBC 0.2 (H) 0.0 - 0.0 /100 WBCs    RBC 2.85 (L) 4.50 - 5.90 x10*6/uL    Hemoglobin 8.4 (L) 13.5 - 17.5 g/dL    Hematocrit 26.8 (L) 41.0 - 52.0 %    MCV 94 80 - 100 fL    MCH 29.5 26.0 - 34.0 pg    MCHC 31.3 (L) 32.0 - 36.0 g/dL    RDW 18.1 (H) 11.5 - 14.5 %    Platelets 80 (L) 150 - 450 x10*3/uL    Neutrophils % 86.1 40.0 - 80.0 %    Immature Granulocytes %, Automated 0.4 0.0 - 0.9 %    Lymphocytes % 5.6 13.0 - 44.0 %    Monocytes % 6.6 2.0 - 10.0 %    Eosinophils % 1.1 0.0 - 6.0 %    Basophils % 0.2 0.0 - 2.0 %    Neutrophils Absolute 7.73 (H) 1.60 - 5.50 x10*3/uL    Immature Granulocytes Absolute, Automated 0.04 0.00 - 0.50 x10*3/uL    Lymphocytes Absolute " 0.50 (L) 0.80 - 3.00 x10*3/uL    Monocytes Absolute 0.59 0.05 - 0.80 x10*3/uL    Eosinophils Absolute 0.10 0.00 - 0.40 x10*3/uL    Basophils Absolute 0.02 0.00 - 0.10 x10*3/uL   POCT GLUCOSE   Result Value Ref Range    POCT Glucose 132 (H) 74 - 99 mg/dL   POCT GLUCOSE   Result Value Ref Range    POCT Glucose 117 (H) 74 - 99 mg/dL   POCT GLUCOSE   Result Value Ref Range    POCT Glucose 121 (H) 74 - 99 mg/dL   Blood Gas Arterial Full Panel   Result Value Ref Range    POCT pH, Arterial 7.34 (L) 7.38 - 7.42 pH    POCT pCO2, Arterial 47 (H) 38 - 42 mm Hg    POCT pO2, Arterial 92 85 - 95 mm Hg    POCT SO2, Arterial 98 94 - 100 %    POCT Oxy Hemoglobin, Arterial 95.7 94.0 - 98.0 %    POCT Hematocrit Calculated, Arterial 28.0 (L) 41.0 - 52.0 %    POCT Sodium, Arterial 131 (L) 136 - 145 mmol/L    POCT Potassium, Arterial 3.5 3.5 - 5.3 mmol/L    POCT Chloride, Arterial 97 (L) 98 - 107 mmol/L    POCT Ionized Calcium, Arterial 1.35 (H) 1.10 - 1.33 mmol/L    POCT Glucose, Arterial 120 (H) 74 - 99 mg/dL    POCT Lactate, Arterial 1.8 0.4 - 2.0 mmol/L    POCT Base Excess, Arterial -0.6 -2.0 - 3.0 mmol/L    POCT HCO3 Calculated, Arterial 25.4 22.0 - 26.0 mmol/L    POCT Hemoglobin, Arterial 9.4 (L) 13.5 - 17.5 g/dL    POCT Anion Gap, Arterial 12 10 - 25 mmo/L    Patient Temperature 37.0 degrees Celsius    FiO2 30 %   BUN   Result Value Ref Range    Urea Nitrogen 15 6 - 23 mg/dL   Creatinine, Serum   Result Value Ref Range    Creatinine 1.62 (H) 0.50 - 1.30 mg/dL    eGFR 43 (L) >60 mL/min/1.73m*2   Electrolyte panel   Result Value Ref Range    Sodium 134 (L) 136 - 145 mmol/L    Potassium 3.5 3.5 - 5.3 mmol/L    Chloride 99 98 - 107 mmol/L    Bicarbonate 25 21 - 32 mmol/L    Anion Gap 14 10 - 20 mmol/L   Calcium, ionized   Result Value Ref Range    POCT Calcium, Ionized 1.29 1.1 - 1.33 mmol/L   Phosphorus   Result Value Ref Range    Phosphorus 2.2 (L) 2.5 - 4.9 mg/dL   POCT GLUCOSE   Result Value Ref Range    POCT Glucose 80 74 - 99  mg/dL   Lactate   Result Value Ref Range    Lactate 1.7 0.4 - 2.0 mmol/L   Magnesium   Result Value Ref Range    Magnesium 1.84 1.60 - 2.40 mg/dL   Hepatic function panel   Result Value Ref Range    Albumin 2.8 (L) 3.4 - 5.0 g/dL    Bilirubin, Total 0.7 0.0 - 1.2 mg/dL    Bilirubin, Direct 0.3 0.0 - 0.3 mg/dL    Alkaline Phosphatase 83 33 - 136 U/L    ALT 4 (L) 10 - 52 U/L    AST 28 9 - 39 U/L    Total Protein 5.3 (L) 6.4 - 8.2 g/dL   CBC and Auto Differential   Result Value Ref Range    WBC 9.1 4.4 - 11.3 x10*3/uL    nRBC 0.3 (H) 0.0 - 0.0 /100 WBCs    RBC 3.00 (L) 4.50 - 5.90 x10*6/uL    Hemoglobin 9.3 (L) 13.5 - 17.5 g/dL    Hematocrit 29.6 (L) 41.0 - 52.0 %    MCV 99 80 - 100 fL    MCH 31.0 26.0 - 34.0 pg    MCHC 31.4 (L) 32.0 - 36.0 g/dL    RDW 18.4 (H) 11.5 - 14.5 %    Platelets 82 (L) 150 - 450 x10*3/uL    Neutrophils % 87.2 40.0 - 80.0 %    Immature Granulocytes %, Automated 0.4 0.0 - 0.9 %    Lymphocytes % 4.0 13.0 - 44.0 %    Monocytes % 6.9 2.0 - 10.0 %    Eosinophils % 1.2 0.0 - 6.0 %    Basophils % 0.3 0.0 - 2.0 %    Neutrophils Absolute 7.93 (H) 1.60 - 5.50 x10*3/uL    Immature Granulocytes Absolute, Automated 0.04 0.00 - 0.50 x10*3/uL    Lymphocytes Absolute 0.36 (L) 0.80 - 3.00 x10*3/uL    Monocytes Absolute 0.63 0.05 - 0.80 x10*3/uL    Eosinophils Absolute 0.11 0.00 - 0.40 x10*3/uL    Basophils Absolute 0.03 0.00 - 0.10 x10*3/uL   B-type natriuretic peptide   Result Value Ref Range     (H) 0 - 99 pg/mL   Basic Metabolic Panel   Result Value Ref Range    Glucose 94 74 - 99 mg/dL    Sodium 134 (L) 136 - 145 mmol/L    Potassium 3.3 (L) 3.5 - 5.3 mmol/L    Chloride 98 98 - 107 mmol/L    Bicarbonate 24 21 - 32 mmol/L    Anion Gap 15 10 - 20 mmol/L    Urea Nitrogen 15 6 - 23 mg/dL    Creatinine 1.53 (H) 0.50 - 1.30 mg/dL    eGFR 46 (L) >60 mL/min/1.73m*2    Calcium 9.6 8.6 - 10.6 mg/dL   POCT GLUCOSE   Result Value Ref Range    POCT Glucose 84 74 - 99 mg/dL   POCT GLUCOSE   Result Value Ref Range     POCT Glucose 105 (H) 74 - 99 mg/dL   Blood Gas Arterial Full Panel   Result Value Ref Range    POCT pH, Arterial 7.39 7.38 - 7.42 pH    POCT pCO2, Arterial 43 (H) 38 - 42 mm Hg    POCT pO2, Arterial 76 (L) 85 - 95 mm Hg    POCT SO2, Arterial 97 94 - 100 %    POCT Oxy Hemoglobin, Arterial 94.1 94.0 - 98.0 %    POCT Hematocrit Calculated, Arterial 28.0 (L) 41.0 - 52.0 %    POCT Sodium, Arterial 129 (L) 136 - 145 mmol/L    POCT Potassium, Arterial 3.6 3.5 - 5.3 mmol/L    POCT Chloride, Arterial 98 98 - 107 mmol/L    POCT Ionized Calcium, Arterial 1.33 1.10 - 1.33 mmol/L    POCT Glucose, Arterial 126 (H) 74 - 99 mg/dL    POCT Lactate, Arterial 1.3 0.4 - 2.0 mmol/L    POCT Base Excess, Arterial 0.9 -2.0 - 3.0 mmol/L    POCT HCO3 Calculated, Arterial 26.0 22.0 - 26.0 mmol/L    POCT Hemoglobin, Arterial 9.2 (L) 13.5 - 17.5 g/dL    POCT Anion Gap, Arterial 9 (L) 10 - 25 mmo/L    Patient Temperature 37.0 degrees Celsius    FiO2 30 %        DATA:   Diagnostic tests reviewed for today's visit:    New labs and imaging     Assessment and Plan:  Pt with ESKD, admitted with RLE ischemia, s/p RLE debridement and revascularization on 1/17  - ESKD on HD: [facility] dialysis (5days/week for 2.5 hours each) at [facility]/Dr Hannah is nephrologist   Started CVVH on 1/22 till last night tolerating UF well, still hypervolemic  Remains off pressors   Anemia, Hb >8, on epogen. Improving hyponatremia  Will plan for IUF today and IHD tomorrow     Will continue to follow.     Signature: Ronaldo Koehler MD

## 2024-01-24 NOTE — CONSULTS
Vancomycin Dosing by Pharmacy- Cessation of Therapy    Consult to pharmacy for vancomycin dosing has been discontinued by the prescriber, pharmacy will sign off at this time.    Please call pharmacy if there are further questions or re-enter a consult if vancomycin is resumed.     Justina Siegel, PharmD

## 2024-01-24 NOTE — PROGRESS NOTES
Physical Therapy    Physical Therapy Evaluation & Treatment    Patient Name: Chevy Benton  MRN: [id number]  Today's Date: 1/24/2024   Time Calculation  Start Time: 1027  Stop Time: 1056  Time Calculation (min): 29 min    Assessment/Plan   PT Assessment  PT Assessment Results: Decreased strength, Decreased range of motion, Decreased endurance, Impaired balance, Decreased mobility, Decreased cognition, Impaired judgement, Decreased safety awareness, Pain, Decreased skin integrity  Rehab Prognosis: Fair  End of Session Communication: Bedside nurse  End of Session Patient Position: Bed, 3 rail up, Alarm off, not on at start of session   IP OR SWING BED PT PLAN  Inpatient or Swing Bed: Inpatient  PT Plan  Treatment/Interventions: Bed mobility, Transfer training, Balance training, Strengthening, Endurance training, Range of motion, Therapeutic exercise, Therapeutic activity, Positioning, Postural re-education  PT Plan: Skilled PT  PT Frequency: 3 times per week  PT Discharge Recommendations: Moderate intensity level of continued care  PT Recommended Transfer Status: Total assist  PT - OK to Discharge: Yes    Subjective     General Visit Information:  General  Reason for Referral: R CLI s/p balloon angioplasty on 1/16 c/b AT perforation managed with intraarterial balloon and external tamponade; on 1/19 pt underwent R foot debridement/revision; transfer to ICU for AMS and hypotension during iHD  Past Medical History Relevant to Rehab: ESRD on HD TTS (Lt chest TDC, Hx of LUE Fistula Pseudoaneurysms), T2DM, HTN, HFrEF (TTE 10/23 EF 35-40%), A fib, PTA right lower extremity on 9/20/23 with subsequent TMA on 9/22/23, Non Occlusive DVT, Aortic Root Dilation, MR, SSS s/p pacemaker  Family/Caregiver Present: No  Co-Treatment: OT  Co-Treatment Reason: Two skilled therapists required to safely mobilize medically complex pt  Prior to Session Communication: Bedside nurse  Patient Position Received: Bed, 3 rail up, Alarm off, not  on at start of session  General Comment: CVVH, Trialysis line, 3 L O2 NC. Pt confused throughout session and needing maximal verbal, visual, and tactile cues to follow 50% commands.    Home Living:  Home Living  Home Living Comments: Pt admitted from facility. Per chart review, pt was living at home w/ spouse prior to hospital admission fall of 2023.    Prior Level of Function:  Prior Function Per Pt/Caregiver Report  Prior Function Comments: Unable to determine 2/2 pt a poor historian 2/2 impaired cognition and CAM-ICU (+)    Precautions:  Precautions  LE Weight Bearing Status:  (R LE NWB per podiatry note)  Medical Precautions: Cardiac precautions, Fall precautions, Oxygen therapy device and L/min    Vital Signs:  Vital Signs  Heart Rate:  (PRE: 91; POST: 98)  SpO2:  (SpO2 >/= 93% throughout session)  BP:  (PRE: 126/60; POST: 126/55; BPs stable throughout)    Objective     Pain:  Pain Assessment  Pain Assessment: 0-10  Pain Score:  (Pt reporting pain on bottom but unrated. Pt c/o pain with touch/movement of LEs.)    Cognition:  Cognition  Overall Cognitive Status: Impaired  Arousal/Alertness: Inconsistent responses to stimuli  Orientation Level:  (oriented to name and birth month and date, able to identify birth year with choices and increased time/repetition. disoriented to place and year. Pt able to identify correct month with choices. Reorientation provided.)  Following Commands:  (pt followed ~50% one step commands with maximal repetition, visual demonstration, therapist initiation, and increased time.)  Sustained Attention: Impaired  Insight: Severe    General Assessments:   Activity Tolerance  Early Mobility/Exercise Safety Screen: Proceed with mobilization - No exclusion criteria met    Sensation  Light Touch: Not tested (as pt with impaired cognition. Pt stated yes when asked if he could feel light touch on L LE.)    Strength  Strength Comments: L LE grossly weaker than R LE with seated exercise at EOB;  however, limited assessment as pt with impaired command following, impaired attention, and impaired cognition  Strength  Strength Comments: L LE grossly weaker than R LE with seated exercise at EOB; however, limited assessment as pt with impaired command following, impaired attention, and impaired cognition    Static Sitting Balance  Static Sitting-Balance Support: Bilateral upper extremity supported, Feet supported  Static Sitting-Level of Assistance:  (CGA-minAx1 with lateral lean towards the R)  Dynamic Sitting Balance  Dynamic Sitting-Balance Support: Bilateral upper extremity supported, Feet supported  Dynamic Sitting-Balance:  (Sherri-modAx1 with R lateral and posterior lean)    Static Standing Balance  Static Standing-Balance Support:  (N/A)  Dynamic Standing Balance  Dynamic Standing-Balance Support:  (N/A)    Functional Assessments:  Bed Mobility  Bed Mobility: Yes  Bed Mobility 1  Bed Mobility 1: Rolling right, Rolling left  Level of Assistance 1: Dependent (x1; maximal cues for sequencing, initation of reaching for contralateral UE towards railing, pt with limited ability to assist with LEs/hips)  Bed Mobility Comments 1: draw sheet  Bed Mobility 2  Bed Mobility  2: Supine to sitting, Sitting to supine  Level of Assistance 2: Dependent (x2 person; pt able to assist with moving B LEs very minimally towards EOB with supine->sit transfer but otherwise minimal effort to engage in task)  Bed Mobility Comments 2: use of draw sheet    Transfers  Transfer: No (not safe to progress 2/2 NWB R LE, significant weakness sitting EOB in LEs and significant cognitive impairment at this time)    Extremity/Trunk Assessments:  RLE   RLE :  (hip and knee WFL, R ankle not assessed formally 2/2 wound vac and pt c/o pain with touch)  LLE   LLE : Within Functional Limits (but painful)    Treatments:     Therapeutic Activity  Therapeutic Activity Performed: Yes  Therapeutic Activity 1: Pt sat EOB total of 10 minutes with CGA for  50% of the time and minAx1 for 50% of the time. Pt presented with R lateral lean and occasional propping on R elbow, no attempt to correct lean to verbal cueing. Pt with forward flexed posture, but able to lift head and retract shoulders to cueing, but unable to sustain for >20 seconds.  Therapeutic Activity 2: Pt performed B LE LAQs x 5 reps with assist for L LE (through partial ROM 2/2 weakness). Required minAx1 for activity. Also performed x 1 lateral lean towards the L onto L forearm with maxAx2 to achieve lean successfully,    Outcome Measures:  [facility] Basic Mobility  Turning from your back to your side while in a flat bed without using bedrails: Total  Moving from lying on your back to sitting on the side of a flat bed without using bedrails: Total  Moving to and from bed to chair (including a wheelchair): Total  Standing up from a chair using your arms (e.g. wheelchair or bedside chair): Total  To walk in hospital room: Total  Climbing 3-5 steps with railing: Total  Basic Mobility - Total Score: 6    Confusion Assessment Method-ICU (CAM-ICU)  Feature 1: Acute Onset or Fluctuating Course: Positive  Feature 2: Inattention: Positive  Feature 3: Altered Level of Consciousness: Positive  Feature 4: Disorganized Thinking: Positive  Overall CAM-ICU: Positive    FSS-ICU  Ambulation: Unable to attempt due to weakness  Rolling: Total assistance (performs 25% or requires another person)  Sitting: Minimal assistance (performs 75% or more of task)  Transfer Sit-to-Stand: Unable to perform  Transfer Supine-to-Sit: Total assistance (performs 25% or requires another person)  Total Score: 6    ICU Mobility Screen  Early Mobility/Exercise Safety Screen: Proceed with mobilization - No exclusion criteria met  E = Exercise and Early Mobility  Early Mobility/Exercise Safety Screen: Proceed with mobilization - No exclusion criteria met  Current Activity: Sitting at edge of bed    Encounter Problems       Encounter Problems (Active)        Balance       Pt will be able to sit at EOB and perform dynamic B LE/UE tasks x 10 minutes without LOB or elbow propping with supervision only for improved postural control (Progressing)       Start:  01/24/24    Expected End:  02/07/24               Safety       Pt will be able to follow 100% one step commands with minimal to no repetition to task required for improved participation in therapy (Progressing)       Start:  01/24/24    Expected End:  02/07/24               Transfers       Transfer from bed to chair and chair to bed with modAx1 with FWW  (Progressing)       Start:  01/24/24    Expected End:  02/07/24            Patient to transfer to and from sit to supine with modAx1 (Progressing)       Start:  01/24/24    Expected End:  02/07/24            Patient will roll with minAx1 (Progressing)       Start:  01/24/24    Expected End:  02/07/24            Patient will transfer sit to and from stand with modAx1 and FWW  (Progressing)       Start:  01/24/24    Expected End:  02/07/24                   Education Documentation  Mobility Training, taught by Viviane Bonilla PT at 1/24/2024  5:30 PM.  Learner: Patient  Readiness: Nonacceptance  Method: Explanation  Response: Needs Reinforcement, No Evidence of Learning    Education Comments  No comments found.    Signed by Viviane Bonilla DPT

## 2024-01-24 NOTE — PROGRESS NOTES
"Chevy Benton is a 78 y.o. male on day 15 of admission presenting with Critical limb ischemia of both lower extremities (CMS/HCC).    Subjective     NAEON.     Subjective complaints remain difficult to assess in the setting of ongoing altered mental status. Patient hemodynamically stable and has been tolerating CRRT.     Trialing off Bipap and on nasal cannula during rounds. Patient appears to be tolerating NC well.     Patient stable to transfer to floor once accurate peripheral blood pressure readings can be obtained that are within goal range        Objective     General: NAD. Patient is alert and oriented to name, is intermittently oriented to person and place with prompting. Not oriented to situation. Displays occasional wondering speech. Patient is largely able to follow simple commands.   Neuro: Strength and sensation appear grossly intact.   ENT: moist mucous membranes.   Cardiac: rate controlled afib. no murmurs. no rubs.  Pulmonary: CTAB. breath sounds appreciated in all lung fields. Normal work of breathing on nasal cannula   Abdomen: non-tender. non-distended. normoactive bowel sounds.  Extremities: full passive ROM. 2+ b/l UE edema. No LE edema.  Skin: no rashes. no ulcers.    Last Recorded Vitals  Blood pressure 78/65, pulse 96, temperature 35.8 °C (96.4 °F), temperature source Temporal, resp. rate 16, height 1.778 m (5' 10\"), weight 105 kg (231 lb 7.7 oz), SpO2 97 %.  Intake/Output last 3 Shifts:  I/O last 3 completed shifts:  In: 1265.2 (12 mL/kg) [P.O.:240; I.V.:75.2 (0.7 mL/kg); IV Piggyback:950]  Out: 4056 (38.6 mL/kg) [Other:4056]  Weight: 105 kg     Relevant Results    Scheduled medications  heparin, , ,   allopurinol, 100 mg, oral, Daily  aspirin, 81 mg, oral, Daily  atorvastatin, 40 mg, oral, Nightly  B complex-vitamin C-folic acid, 1 capsule, oral, Daily  clopidogrel, 75 mg, oral, Daily  epoetin dane or biosimilar, 10,000 Units, subcutaneous, Every Mon/Wed/Fri  insulin glargine, 6 " Units, subcutaneous, Nightly  insulin lispro, 0-5 Units, subcutaneous, TID with meals  magnesium sulfate, 2 g, intravenous, Once  meropenem, 1 g, intravenous, q12h  midodrine, 20 mg, oral, TID with meals  pantoprazole, 40 mg, oral, Daily before breakfast  perflutren protein A microsphere, 0.5 mL, intravenous, Once in imaging  polyethylene glycol, 17 g, oral, Daily  potassium phosphate, 21 mmol, intravenous, Once  sennosides, 1 tablet, oral, Nightly  [Held by provider] sevelamer carbonate, 800 mg, oral, TID with meals  sulfur hexafluoride microsphr, 2 mL, intravenous, Once in imaging  vancomycin, 750 mg, intravenous, q12h      Continuous medications  norepinephrine, 0.01-1 mcg/kg/min, Last Rate: Stopped (01/23/24 0220)  PrismaSol BGK 2/3.5, 2,700 mL/hr, Last Rate: 2,700 mL/hr (01/22/24 1430)      PRN medications  PRN medications: heparin, acetaminophen **OR** acetaminophen **OR** acetaminophen, acetaminophen, albuterol, alum-mag hydroxide-simeth, ammonium lactate, dextrose, docusate sodium, glucagon, heparin, heparin, hydrocortisone, ipratropium-albuteroL, oxygen, povidone-iodine, sodium chloride    Results for orders placed or performed during the hospital encounter of 01/09/24 (from the past 24 hour(s))   POCT GLUCOSE   Result Value Ref Range    POCT Glucose 105 (H) 74 - 99 mg/dL   BUN   Result Value Ref Range    Urea Nitrogen 21 6 - 23 mg/dL   Creatinine, Serum   Result Value Ref Range    Creatinine 2.24 (H) 0.50 - 1.30 mg/dL    eGFR 29 (L) >60 mL/min/1.73m*2   Electrolyte panel   Result Value Ref Range    Sodium 131 (L) 136 - 145 mmol/L    Potassium 4.0 3.5 - 5.3 mmol/L    Chloride 96 (L) 98 - 107 mmol/L    Bicarbonate 24 21 - 32 mmol/L    Anion Gap 15 10 - 20 mmol/L   Calcium, ionized   Result Value Ref Range    POCT Calcium, Ionized 1.37 (H) 1.1 - 1.33 mmol/L   Phosphorus   Result Value Ref Range    Phosphorus 3.2 2.5 - 4.9 mg/dL   Blood Gas Arterial Full Panel   Result Value Ref Range    POCT pH, Arterial 7.26  (L) 7.38 - 7.42 pH    POCT pCO2, Arterial 55 (H) 38 - 42 mm Hg    POCT pO2, Arterial 124 (H) 85 - 95 mm Hg    POCT SO2, Arterial 99 94 - 100 %    POCT Oxy Hemoglobin, Arterial 97.1 94.0 - 98.0 %    POCT Hematocrit Calculated, Arterial 27.0 (L) 41.0 - 52.0 %    POCT Sodium, Arterial 127 (L) 136 - 145 mmol/L    POCT Potassium, Arterial 4.1 3.5 - 5.3 mmol/L    POCT Chloride, Arterial 98 98 - 107 mmol/L    POCT Ionized Calcium, Arterial 1.37 (H) 1.10 - 1.33 mmol/L    POCT Glucose, Arterial 195 (H) 74 - 99 mg/dL    POCT Lactate, Arterial 2.6 (H) 0.4 - 2.0 mmol/L    POCT Base Excess, Arterial -2.6 (L) -2.0 - 3.0 mmol/L    POCT HCO3 Calculated, Arterial 24.7 22.0 - 26.0 mmol/L    POCT Hemoglobin, Arterial 9.1 (L) 13.5 - 17.5 g/dL    POCT Anion Gap, Arterial 8 (L) 10 - 25 mmo/L    Patient Temperature 37.0 degrees Celsius    FiO2 30 %   Lactate   Result Value Ref Range    Lactate 2.4 (H) 0.4 - 2.0 mmol/L   CBC and Auto Differential   Result Value Ref Range    WBC 9.0 4.4 - 11.3 x10*3/uL    nRBC 0.2 (H) 0.0 - 0.0 /100 WBCs    RBC 2.85 (L) 4.50 - 5.90 x10*6/uL    Hemoglobin 8.4 (L) 13.5 - 17.5 g/dL    Hematocrit 26.8 (L) 41.0 - 52.0 %    MCV 94 80 - 100 fL    MCH 29.5 26.0 - 34.0 pg    MCHC 31.3 (L) 32.0 - 36.0 g/dL    RDW 18.1 (H) 11.5 - 14.5 %    Platelets 80 (L) 150 - 450 x10*3/uL    Neutrophils % 86.1 40.0 - 80.0 %    Immature Granulocytes %, Automated 0.4 0.0 - 0.9 %    Lymphocytes % 5.6 13.0 - 44.0 %    Monocytes % 6.6 2.0 - 10.0 %    Eosinophils % 1.1 0.0 - 6.0 %    Basophils % 0.2 0.0 - 2.0 %    Neutrophils Absolute 7.73 (H) 1.60 - 5.50 x10*3/uL    Immature Granulocytes Absolute, Automated 0.04 0.00 - 0.50 x10*3/uL    Lymphocytes Absolute 0.50 (L) 0.80 - 3.00 x10*3/uL    Monocytes Absolute 0.59 0.05 - 0.80 x10*3/uL    Eosinophils Absolute 0.10 0.00 - 0.40 x10*3/uL    Basophils Absolute 0.02 0.00 - 0.10 x10*3/uL   POCT GLUCOSE   Result Value Ref Range    POCT Glucose 132 (H) 74 - 99 mg/dL   POCT GLUCOSE   Result Value  Ref Range    POCT Glucose 117 (H) 74 - 99 mg/dL   POCT GLUCOSE   Result Value Ref Range    POCT Glucose 121 (H) 74 - 99 mg/dL   Blood Gas Arterial Full Panel   Result Value Ref Range    POCT pH, Arterial 7.34 (L) 7.38 - 7.42 pH    POCT pCO2, Arterial 47 (H) 38 - 42 mm Hg    POCT pO2, Arterial 92 85 - 95 mm Hg    POCT SO2, Arterial 98 94 - 100 %    POCT Oxy Hemoglobin, Arterial 95.7 94.0 - 98.0 %    POCT Hematocrit Calculated, Arterial 28.0 (L) 41.0 - 52.0 %    POCT Sodium, Arterial 131 (L) 136 - 145 mmol/L    POCT Potassium, Arterial 3.5 3.5 - 5.3 mmol/L    POCT Chloride, Arterial 97 (L) 98 - 107 mmol/L    POCT Ionized Calcium, Arterial 1.35 (H) 1.10 - 1.33 mmol/L    POCT Glucose, Arterial 120 (H) 74 - 99 mg/dL    POCT Lactate, Arterial 1.8 0.4 - 2.0 mmol/L    POCT Base Excess, Arterial -0.6 -2.0 - 3.0 mmol/L    POCT HCO3 Calculated, Arterial 25.4 22.0 - 26.0 mmol/L    POCT Hemoglobin, Arterial 9.4 (L) 13.5 - 17.5 g/dL    POCT Anion Gap, Arterial 12 10 - 25 mmo/L    Patient Temperature 37.0 degrees Celsius    FiO2 30 %   BUN   Result Value Ref Range    Urea Nitrogen 15 6 - 23 mg/dL   Creatinine, Serum   Result Value Ref Range    Creatinine 1.62 (H) 0.50 - 1.30 mg/dL    eGFR 43 (L) >60 mL/min/1.73m*2   Electrolyte panel   Result Value Ref Range    Sodium 134 (L) 136 - 145 mmol/L    Potassium 3.5 3.5 - 5.3 mmol/L    Chloride 99 98 - 107 mmol/L    Bicarbonate 25 21 - 32 mmol/L    Anion Gap 14 10 - 20 mmol/L   Calcium, ionized   Result Value Ref Range    POCT Calcium, Ionized 1.29 1.1 - 1.33 mmol/L   Phosphorus   Result Value Ref Range    Phosphorus 2.2 (L) 2.5 - 4.9 mg/dL   POCT GLUCOSE   Result Value Ref Range    POCT Glucose 80 74 - 99 mg/dL   Lactate   Result Value Ref Range    Lactate 1.7 0.4 - 2.0 mmol/L   Magnesium   Result Value Ref Range    Magnesium 1.84 1.60 - 2.40 mg/dL   Hepatic function panel   Result Value Ref Range    Albumin 2.8 (L) 3.4 - 5.0 g/dL    Bilirubin, Total 0.7 0.0 - 1.2 mg/dL    Bilirubin,  Direct 0.3 0.0 - 0.3 mg/dL    Alkaline Phosphatase 83 33 - 136 U/L    ALT 4 (L) 10 - 52 U/L    AST 28 9 - 39 U/L    Total Protein 5.3 (L) 6.4 - 8.2 g/dL   CBC and Auto Differential   Result Value Ref Range    WBC 9.1 4.4 - 11.3 x10*3/uL    nRBC 0.3 (H) 0.0 - 0.0 /100 WBCs    RBC 3.00 (L) 4.50 - 5.90 x10*6/uL    Hemoglobin 9.3 (L) 13.5 - 17.5 g/dL    Hematocrit 29.6 (L) 41.0 - 52.0 %    MCV 99 80 - 100 fL    MCH 31.0 26.0 - 34.0 pg    MCHC 31.4 (L) 32.0 - 36.0 g/dL    RDW 18.4 (H) 11.5 - 14.5 %    Platelets 82 (L) 150 - 450 x10*3/uL    Neutrophils % 87.2 40.0 - 80.0 %    Immature Granulocytes %, Automated 0.4 0.0 - 0.9 %    Lymphocytes % 4.0 13.0 - 44.0 %    Monocytes % 6.9 2.0 - 10.0 %    Eosinophils % 1.2 0.0 - 6.0 %    Basophils % 0.3 0.0 - 2.0 %    Neutrophils Absolute 7.93 (H) 1.60 - 5.50 x10*3/uL    Immature Granulocytes Absolute, Automated 0.04 0.00 - 0.50 x10*3/uL    Lymphocytes Absolute 0.36 (L) 0.80 - 3.00 x10*3/uL    Monocytes Absolute 0.63 0.05 - 0.80 x10*3/uL    Eosinophils Absolute 0.11 0.00 - 0.40 x10*3/uL    Basophils Absolute 0.03 0.00 - 0.10 x10*3/uL   B-type natriuretic peptide   Result Value Ref Range     (H) 0 - 99 pg/mL   Basic Metabolic Panel   Result Value Ref Range    Glucose 94 74 - 99 mg/dL    Sodium 134 (L) 136 - 145 mmol/L    Potassium 3.3 (L) 3.5 - 5.3 mmol/L    Chloride 98 98 - 107 mmol/L    Bicarbonate 24 21 - 32 mmol/L    Anion Gap 15 10 - 20 mmol/L    Urea Nitrogen 15 6 - 23 mg/dL    Creatinine 1.53 (H) 0.50 - 1.30 mg/dL    eGFR 46 (L) >60 mL/min/1.73m*2    Calcium 9.6 8.6 - 10.6 mg/dL   POCT GLUCOSE   Result Value Ref Range    POCT Glucose 84 74 - 99 mg/dL   POCT GLUCOSE   Result Value Ref Range    POCT Glucose 105 (H) 74 - 99 mg/dL     XR chest 1 view    Result Date: 1/23/2024  Interpreted By:  Jori Garcia and Ritchie Brandon STUDY: XR CHEST 1 VIEW;  1/23/2024 9:06 am   INDICATION: Signs/Symptoms:pneumothorax r/o. dyspnea..   COMPARISON: Chest x-ray 01/22/2024    ACCESSION NUMBER(S): [id number]   ORDERING CLINICIAN: DONNA SOLIMAN   FINDINGS: AP radiograph of the chest was provided.   Similar position of the left internal jugular approach central venous catheter with the distal tip projecting over the expected location of the distal SVC. There is a right-sided pacemaker/AICD with leads in similar position over the expected location of the right atrium/right ventricle.   CARDIOMEDIASTINAL SILHOUETTE: Cardiomediastinal silhouette is stable in size and configuration.   LUNGS: Similar low lung volumes/expiratory radiograph with bronchovascular crowding. Similar linear areas of bibasilar atelectasis. Improved bilateral pleural effusions. The previously described trace focal lucency projecting over the right lower lung field is no longer evident on current examination. No pneumothorax.   ABDOMEN: No remarkable upper abdominal findings.   BONES: No acute osseous changes.       1. Similar low lung volumes with improved bilateral pleural effusions. Unchanged bibasilar atelectasis. 2. No evidence of pneumothorax. 3. Medical devices as described above.   I personally reviewed the images/study and I agree with the findings as stated by resident Marvin Cueva. This study was interpreted at [facility], [place], [place].   MACRO: None   Signed by: Jori Garcia 1/23/2024 5:11 PM Dictation workstation:   [id number]    Transthoracic Echo (TTE) Complete    Result Date: 1/23/2024   [facility], [address] [place] [address]                Tel [phone] and Fax [phone] TRANSTHORACIC ECHOCARDIOGRAM REPORT  Patient Name:      SHAMEKA SUN  Reading Physician:    [id number] Mis Hoffman MD Study Date:        1/23/2024            Ordering Provider:    [id number] CHUCKY BLAKCBURN MRN/PID:           [id number]              Fellow: Accession#:        [id number]         Nurse: Date of Birth/Age: 1945 / 78 years Sonographer:          Roxana Pace RDCS Gender:            M                    Additional Staff: Height:            177.80 cm            Admit Date:           1/9/2024 Weight:            104.78 kg            Admission Status:     Inpatient -                                                               Routine BSA:               2.22 m2              Encounter#:           [id number]                                         Department Location:  [facility]                                                               Invasive Blood Pressure: 78 /65 mmHg Study Type:    TRANSTHORACIC ECHO (TTE) COMPLETE Diagnosis/ICD: Shock, unspecified-R57.9 Indication:    shock CPT Code:      Echo Complete w Full Doppler-[id number] Patient History: Pertinent History: ESRD, HFrEF (35-40), AFib, PVD, AMS & Hypotension during HD,                    AO Root Dilitation, CAD, SSS, PHTN. Study Detail: The following Echo studies were performed: 2D, M-Mode, Doppler and               color flow. Technically challenging study due to poor acoustic               windows, prominent lung artifact, patient lying in supine               position, body habitus and coughing up phlegm. Definity used as a               contrast agent for endocardial border definition. Total contrast               used for this procedure was 1 mL via IV push. Unable to obtain               subcostal and suprasternal notch view.  PHYSICIAN INTERPRETATION: Left Ventricle: The left ventricular systolic function is mildly decreased, with an estimated ejection fraction of 40-45%. The patient is in atrial fibrillation which may influence the estimate of left ventricular function and transvalvular flows. There is global hypokinesis of the left ventricle with minor regional variations. The left ventricular cavity  size is normal. There is left ventricular concentric remodeling. Left ventricular diastolic filling was indeterminate. Mildly increased LV wall thickness with proximal septal bulge up to 1.8 cm. Left Atrium: The left atrium is severely dilated. Right Ventricle: The right ventricle was not well visualized. Unable to determine right ventricular systolic function. A device is visualized in the right ventricle. Right Atrium: The right atrium is mildly dilated. There is a device visualized in the right atrium. Aortic Valve: The aortic valve is probably trileaflet. There is mild aortic valve cusp calcification. There is trivial aortic valve regurgitation. The peak instantaneous gradient of the aortic valve is 15.7 mmHg. The mean gradient of the aortic valve is 7.0 mmHg. Mitral Valve: The mitral valve is normal in structure. There is mild mitral annular calcification. There is mild mitral valve regurgitation. Tricuspid Valve: The tricuspid valve was not well visualized. There is trace to mild tricuspid regurgitation. The Doppler estimated RVSP is within normal limits at 35.5 mmHg. Pulmonic Valve: The pulmonic valve is structurally normal. There is physiologic pulmonic valve regurgitation. Pericardium: There is a trivial pericardial effusion. Aorta: The aortic root is abnormal. There is mild dilatation of the aortic root. In comparison to the previous echocardiogram(s): Compared wit the prior exam from 10/2/2023 the LV appears slightly more dynamic today with LVEF 40-45% ( instead prior prior exam with LVEF 35-40%). There is still only mild MR. Note that the prior RVSP was moderately elevated at 64.8mmHg and is now normal or borderline at 35.5mmHg.  CONCLUSIONS:  1. Left ventricular systolic function is mildly decreased with a 40-45% estimated ejection fraction.  2. Poorly visualized anatomical structures due to suboptimal image quality.  3. Mildly increased LV wall thickness with proximal septal bulge up to 1.8 cm.  4. The  left atrium is severely dilated.  5. RVSP within normal limits.  6. Compared wit the prior exam from 10/2/2023 the LV appears slightly more dynamic today with LVEF 40-45% ( instead prior prior exam with LVEF 35-40%). There is still only mild MR. Note that the prior RVSP was moderately elevated at 64.8mmHg and is now normal or borderline at 35.5mmHg.  7. The patient is in atrial fibrillation which may influence the estimate of left ventricular function and transvalvular flows.  8. There is global hypokinesis of the left ventricle with minor regional variations. QUANTITATIVE DATA SUMMARY: 2D MEASUREMENTS:                          Normal Ranges: Ao Root d:     3.70 cm   (2.0-3.7cm) LAs:           4.30 cm   (2.7-4.0cm) IVSd:          1.40 cm   (0.6-1.1cm) LVPWd:         1.30 cm   (0.6-1.1cm) LVIDd:         3.70 cm   (3.9-5.9cm) LVIDs:         3.10 cm LV Mass Index: 79.6 g/m2 LV % FS        16.2 % LA VOLUME:                               Normal Ranges: LA Vol A4C:        110.7 ml   (22+/-6mL/m2) LA Vol A2C:        149.4 ml LA Vol BP:         129.0 ml LA Vol Index A4C:  49.9ml/m2 LA Vol Index A2C:  67.3 ml/m2 LA Vol Index BP:   58.1 ml/m2 LA Area A4C:       30.3 cm2 LA Area A2C:       35.3 cm2 LA Major Axis A4C: 7.0 cm LA Major Axis A2C: 7.1 cm LA Volume Index:   58.1 ml/m2 RA VOLUME BY A/L METHOD:                       Normal Ranges: RA Area A4C: 19.9 cm2 AORTA MEASUREMENTS:                      Normal Ranges: Ao Sinus, d: 3.70 cm (2.1-3.5cm) LV SYSTOLIC FUNCTION BY 2D PLANIMETRY (MOD):                     Normal Ranges: EF-A4C View: 35.0 % (>=55%) EF-A2C View: 41.2 % EF-Biplane:  37.7 % LV DIASTOLIC FUNCTION:                           Normal Ranges: MV Peak E:    0.86 m/s    (0.7-1.2 m/s) MV e'         0.09 m/s    (>8.0) MV lateral e' 0.10 m/s MV medial e'  0.08 m/s MV A Dur:     193.00 msec E/e' Ratio:   9.57        (<8.0) a'            0.02 m/s MITRAL VALVE:                 Normal Ranges: MV DT: 193 msec (150-240msec)  AORTIC VALVE:                                    Normal Ranges: AoV Vmax:                1.98 m/s  (<=1.7m/s) AoV Peak PG:             15.7 mmHg (<20mmHg) AoV Mean P.0 mmHg  (1.7-11.5mmHg) LVOT Max Dann:            0.91 m/s  (<=1.1m/s) AoV VTI:                 36.20 cm  (18-25cm) LVOT VTI:                17.20 cm LVOT Diameter:           1.90 cm   (1.8-2.4cm) AoV Area, VTI:           1.35 cm2  (2.5-5.5cm2) AoV Area,Vmax:           1.31 cm2  (2.5-4.5cm2) AoV Dimensionless Index: 0.48  RIGHT VENTRICLE: RV Basal 5.30 cm RV Major 8.9 cm RV s'    0.04 m/s TRICUSPID VALVE/RVSP:                             Normal Ranges: Peak TR Velocity: 2.85 m/s RV Syst Pressure: 35.5 mmHg (< 30mmHg) PULMONIC VALVE:                         Normal Ranges: PV Accel Time: 103 msec (>120ms) PV Max Dann:    1.0 m/s  (0.6-0.9m/s) PV Max PG:     3.6 mmHg  [id number] Mis Hoffman MD Electronically signed on 2024 at 3:42:15 PM  ** Final **     Electrocardiogram, 12-lead PRN ACS symptoms    Result Date: 2024  Atrial fibrillation with occasional ventricular-paced complexes Nonspecific T wave abnormality Abnormal ECG When compared with ECG of 2024 08:27, Vent. rate has decreased BY  16 BPM    XR chest 1 view    Result Date: 2024  Interpreted By:  Jori Garcia and Dervishi Mario STUDY: XR CHEST 1 VIEW;  2024 8:21 pm   INDICATION: Signs/Symptoms:aborted central line, r/o pneumothorax.   COMPARISON: Chest radiograph: 2020   ACCESSION NUMBER(S): [id number]   ORDERING CLINICIAN: SALVADOR OLIVARES   FINDINGS: AP radiograph of the chest was provided.   A right-sided ICD device is again noted. Right IJ central venous catheter with the tip projecting over the distal SVC.   CARDIOMEDIASTINAL SILHOUETTE: Cardiomediastinal silhouette is stable in size and configuration enlarged.   LUNGS: There is persistent low lung volumes with associated bronchovascular crowding. There is re-demonstration of blunting of bilateral right  worse than left costophrenic angles suggesting pleural effusions with associated atelectasis. A trace focal lucency is seen projecting over the right lower lung field. No pneumothorax of the left lung.   ABDOMEN: No remarkable upper abdominal findings.   BONES: No acute osseous changes.       1.  Large right and small left pleural effusions with associated atelectasis. 2. Low lung volumes with associated bronchovascular crowding. 3. Trace focal lucency projecting over the right lower lung field which may be artifactual however a trace pneumothorax is not excluded. Repeat radiograph can be obtained as clinically indicated. 4. Medical devices as above.   I personally reviewed the images/study and I agree with the findings as stated. This study was interpreted at [facility], [place], [place].   MACRO: NONE.   Signed by: Jori Garcia 1/23/2024 9:01 AM Dictation workstation:   [id number]                Assessment/Plan   Principal Problem:    Critical limb ischemia of both lower extremities (CMS/HCC)  Active Problems:    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 AMS likely multifactorial in setting of recent surgery, sedative use, prolonged hospital stay, with possible element of hypercapnia. AMS has been gradually improving. Hypotension felt likely in setting of ESRD. Difficult to accurately access BP peripherally due to edema or centrally with a-line due to PVD.  Low concern for septic shock as cause of hypotension given lack of other features: no fever or leukocytosis. Will continue to remove excess fluid with renal replacement therapy (nephrology following)  and wean Bipap to nasal cannula as able. Swallow eval ordered given patient currently stable off bipap to triage PO access for medications and nutrition.      1/24 Updates:  - nephrology following, given adequate BP on CRRT, will trial UF with 2L removal   - correlate a-line BP reading with BP cuff/peripherally obtained BP as able, likely represents barrier to transfer to floor if reliable Bps cannot be obtained given history of hypotension during dialysis   - SLP eval/nutrition ordered now that patient is off bipap and tolerating NC well   - TTE 1/23/24: LVEF 40-45%, no other new acute findings present   - CXR 1/23/24: interval improvement in bilateral pleural effusions with fluid removal via RRT   - will adjust midodrine dosing as able, goal MAP >65, may need titration to sustain adequate MAPs during dialysis   - stopped antibiotics: vanc and aliya on 1/24/24   - repleting lytes as needed   - add GDMT as BP allows (will work to titrate midodrine and make sure patient tolerating dialysis first)   - off Bipap, tolerating NC well, ABG obtained several hours after switch from Bipap to NC was stable     Neuro  #AMS  #Acute on Chronic CO2 Narcosis  #Delirium  #C/f Cefepime toxicity  :: sedating medications held (gabapentin; robaxin held)  :: cefepime d/cd (see ID)  - Bipap as needed for hypercapnia (see resp)  - delirium precautions     CV  #PAD s/p TMA and 1/19 debridement  - Endovascular Cardiology on board  - Podiatry following peripherally at this point in time, patient will follow-up outpatient with Dr Rodriguez   - c/w home ASA 81mg PO every day  - c/w home clopidogrel 75mg PO every day  - c/w home atorvastatin 40mg PO every day      #HFrEF (35-40% 10/2023)  #Hypotension  :: s/p levophed 0.1-0.4 on 1/22 used on 1/22 (Dcd overnight of 1/22-1/23)  - midodrine 20mg PO TID initiated overnight of 1/22-1/23 for soft Bps, will titrate as able to sustain goal MAPs >65, pt has history of becoming hypotensive during dialysis    :: TTE 1/23/24 with LVEF to 40-45%, mildly improved from previous in 2023, no other new acute findings   - consider GDMT when BP stable and tolerating well during dialysis      #Afib  #SSS s/p PPM  - hold home metoprolol succinate 25mg PO every day iso shock, can cosnider resuming if BP remains at goal with dialysis   - AC on hold iso 1/16 TA perforation  - endovascular cardiology recs to resume Eliquis at discharge     Resp  #Hypercapnic Respiratory Acidosis  - BiPAP for hypercapnia as needed   - Patient transitioned to NC on 1/24, post transition ABG stable and wnl, no evidence of evolving hypercarbia      ID  #Pseudomonas and Enterobacter on 1/19 tissue cx  :: s/p Cefepime x1 on 1/21  - s/p meropenem 1g q12hr (1/22-24)  - vancomycin, pharmacy to dose (1/21-24)     GI  #GERD  #GI ppx  - pantoprazole 40mg PO day     #Bowel Regimen  - Miralax every day  - Senna every day      #Concern for Dysphagia- coughing with meds  - Speech Language Pathology on board  - follow up SLP eval recs     Renal  #ESRD  #NAGMA  #Fluid overload  #Pulmonary Interstitial Edema, Pleural Effusions  - Nephrology on board  - CRRT for fluid removal initiated on 1/23  - EPO initiated 1/22  - Starting UF 1/24 with goal 2L fluid removal      Endocrine  #DMII  - insulin lantus 6U at bedtime  - SSI     Rheum  #Gout  - c/w home allopurinol     F: PRN   E: PRN  N: pending SLP eval  A: [facility] central line (1/22), R subclavian trialysis. R radial arterial line (1/22).  DVT ppx: per endovascular, will restart eliquis for afib at dc, will resume ppx once platelets wnl (still low as of 1/24)   NOK: Julia Benton [phone]             Eric Urias MD

## 2024-01-24 NOTE — PROGRESS NOTES
Social Work Transitional Care Note:  -Patient discussed during CICU 2pm interdisciplinary rounds.   -ICU Treatment Plan: Volume overload, dialysis, encephalopathy, wound care, significant PVD, hypotension and AMS  - Planned Disposition: Patient has been accepted at [facility] They have the dialysis auth. We will need the facility authorization when he is ready to be dc- confirm at the time of dc that the dialysis auth is still good    - Potential Barriers: none noted at this time. SW will continue to follow  -Anticipated Date of Discharge:  1/30  Alma GARCIA, LSW

## 2024-01-25 NOTE — NURSING NOTE
12:30:  Request for feeding tube insertion received. Patient is assessed, Cortrak system is used, but unable to pass the tube into the stomach. RN is informed.

## 2024-01-25 NOTE — PROGRESS NOTES
-Patient discussed during CICU interdisciplinary morning rounds.   -ICU Treatment Plan: Fluid overload, Rehab reports he is delirium, dialysis and hypotensive  - Payer:  [employer]    -Support system:  Primary contacted is his wife    - Planned Disposition: The patient has been accepted at [facility]    -Potential Barriers: None noted at this time  - Anticipated date of discharge: 1/30  RADHA Mccord, LSW

## 2024-01-25 NOTE — CARE PLAN
The patient's goals for the shift include      The clinical goals for the shift include pt will maintain a MAP above 60 throughout this shift

## 2024-01-25 NOTE — PROGRESS NOTES
"Chevy Benton is a 78 y.o. male on day 16 of admission presenting with Critical limb ischemia of both lower extremities (CMS/HCC).    Subjective     No acute events overnight.     Overall tolerated isolated UF with 2L removal well, Levo 0.02 was restarted for MAPs that were just below goal (goal was 65, maps were to low 60s). Otherwise without issues.     On ABGs patient remains without hypercarbia seen previous during admission. Tolerating minimal supplemental O2 by nasal cannula.     Continues to have overall waxing and waning mental status.          Objective     General: NAD. Patient is alert and oriented to name. Not oriented to place, time, or situation consistently. Displays occasional wondering speech. Patient is able to follow simple commands with all extremities.   Neuro: Strength and sensation appear grossly intact.   ENT: moist mucous membranes.   Cardiac: rate controlled afib. no murmurs. no rubs.  Pulmonary: CTAB. breath sounds appreciated in all lung fields. Normal work of breathing on nasal cannula   Abdomen: non-tender. non-distended. normoactive bowel sounds.  Extremities: full passive ROM. 2+ b/l UE edema. Trace LE edema. RLE wrapped (from TMA revision this admission).   Skin: no rashes. no ulcers.       Last Recorded Vitals  Blood pressure 78/65, pulse 99, temperature 36.2 °C (97.2 °F), temperature source Temporal, resp. rate 14, height 1.778 m (5' 10\"), weight 105 kg (231 lb 7.7 oz), SpO2 96 %.  Intake/Output last 3 Shifts:  I/O last 3 completed shifts:  In: 2556 (24.3 mL/kg) [P.O.:240; I.V.:1066 (10.2 mL/kg); Other:600; IV Piggyback:650]  Out: 5211 (49.6 mL/kg) [Other:5211]  Weight: 105 kg     Relevant Results    Scheduled medications  allopurinol, 100 mg, oral, Daily  aspirin, 81 mg, oral, Daily  atorvastatin, 40 mg, oral, Nightly  B complex-vitamin C-folic acid, 1 capsule, oral, Daily  clopidogrel, 75 mg, oral, Daily  epoetin dane or biosimilar, 10,000 Units, subcutaneous, Every " Mon/Wed/Fri  insulin glargine, 6 Units, subcutaneous, Nightly  insulin lispro, 0-5 Units, subcutaneous, TID with meals  melatonin, 5 mg, oral, Nightly  midodrine, 20 mg, oral, TID with meals  pantoprazole, 40 mg, oral, Daily before breakfast  perflutren protein A microsphere, 0.5 mL, intravenous, Once in imaging  polyethylene glycol, 17 g, oral, Daily  sennosides, 1 tablet, oral, Nightly  [Held by provider] sevelamer carbonate, 800 mg, oral, TID with meals  sulfur hexafluoride microsphr, 2 mL, intravenous, Once in imaging      Continuous medications  norepinephrine, 0.01-1 mcg/kg/min, Last Rate: 0.02 mcg/kg/min (01/25/24 0500)      PRN medications  PRN medications: acetaminophen **OR** acetaminophen **OR** acetaminophen, acetaminophen, albuterol, alum-mag hydroxide-simeth, ammonium lactate, dextrose, docusate sodium, glucagon, heparin, heparin, hydrocortisone, ipratropium-albuteroL, oxygen, povidone-iodine, sodium chloride    Results for orders placed or performed during the hospital encounter of 01/09/24 (from the past 24 hour(s))   Blood Gas Arterial Full Panel   Result Value Ref Range    POCT pH, Arterial 7.39 7.38 - 7.42 pH    POCT pCO2, Arterial 43 (H) 38 - 42 mm Hg    POCT pO2, Arterial 76 (L) 85 - 95 mm Hg    POCT SO2, Arterial 97 94 - 100 %    POCT Oxy Hemoglobin, Arterial 94.1 94.0 - 98.0 %    POCT Hematocrit Calculated, Arterial 28.0 (L) 41.0 - 52.0 %    POCT Sodium, Arterial 129 (L) 136 - 145 mmol/L    POCT Potassium, Arterial 3.6 3.5 - 5.3 mmol/L    POCT Chloride, Arterial 98 98 - 107 mmol/L    POCT Ionized Calcium, Arterial 1.33 1.10 - 1.33 mmol/L    POCT Glucose, Arterial 126 (H) 74 - 99 mg/dL    POCT Lactate, Arterial 1.3 0.4 - 2.0 mmol/L    POCT Base Excess, Arterial 0.9 -2.0 - 3.0 mmol/L    POCT HCO3 Calculated, Arterial 26.0 22.0 - 26.0 mmol/L    POCT Hemoglobin, Arterial 9.2 (L) 13.5 - 17.5 g/dL    POCT Anion Gap, Arterial 9 (L) 10 - 25 mmo/L    Patient Temperature 37.0 degrees Celsius    FiO2 30  %   POCT GLUCOSE   Result Value Ref Range    POCT Glucose 119 (H) 74 - 99 mg/dL   Renal Function Panel   Result Value Ref Range    Glucose 146 (H) 74 - 99 mg/dL    Sodium 132 (L) 136 - 145 mmol/L    Potassium 4.7 3.5 - 5.3 mmol/L    Chloride 97 (L) 98 - 107 mmol/L    Bicarbonate 23 21 - 32 mmol/L    Anion Gap 17 10 - 20 mmol/L    Urea Nitrogen 15 6 - 23 mg/dL    Creatinine 1.62 (H) 0.50 - 1.30 mg/dL    eGFR 43 (L) >60 mL/min/1.73m*2    Calcium 9.9 8.6 - 10.6 mg/dL    Phosphorus 4.2 2.5 - 4.9 mg/dL    Albumin 3.0 (L) 3.4 - 5.0 g/dL   Magnesium   Result Value Ref Range    Magnesium 2.24 1.60 - 2.40 mg/dL   POCT GLUCOSE   Result Value Ref Range    POCT Glucose 120 (H) 74 - 99 mg/dL   POCT GLUCOSE   Result Value Ref Range    POCT Glucose 131 (H) 74 - 99 mg/dL   Renal Function Panel   Result Value Ref Range    Glucose 128 (H) 74 - 99 mg/dL    Sodium 135 (L) 136 - 145 mmol/L    Potassium 4.1 3.5 - 5.3 mmol/L    Chloride 98 98 - 107 mmol/L    Bicarbonate 27 21 - 32 mmol/L    Anion Gap 14 10 - 20 mmol/L    Urea Nitrogen 18 6 - 23 mg/dL    Creatinine 1.94 (H) 0.50 - 1.30 mg/dL    eGFR 35 (L) >60 mL/min/1.73m*2    Calcium 9.6 8.6 - 10.6 mg/dL    Phosphorus 3.7 2.5 - 4.9 mg/dL    Albumin 2.7 (L) 3.4 - 5.0 g/dL   Magnesium   Result Value Ref Range    Magnesium 2.14 1.60 - 2.40 mg/dL   CBC and Auto Differential   Result Value Ref Range    WBC 8.3 4.4 - 11.3 x10*3/uL    nRBC 0.0 0.0 - 0.0 /100 WBCs    RBC 2.93 (L) 4.50 - 5.90 x10*6/uL    Hemoglobin 8.5 (L) 13.5 - 17.5 g/dL    Hematocrit 26.5 (L) 41.0 - 52.0 %    MCV 90 80 - 100 fL    MCH 29.0 26.0 - 34.0 pg    MCHC 32.1 32.0 - 36.0 g/dL    RDW 17.7 (H) 11.5 - 14.5 %    Platelets 100 (L) 150 - 450 x10*3/uL    Neutrophils % 84.4 40.0 - 80.0 %    Immature Granulocytes %, Automated 0.5 0.0 - 0.9 %    Lymphocytes % 7.2 13.0 - 44.0 %    Monocytes % 7.3 2.0 - 10.0 %    Eosinophils % 0.5 0.0 - 6.0 %    Basophils % 0.1 0.0 - 2.0 %    Neutrophils Absolute 7.01 (H) 1.60 - 5.50 x10*3/uL     Immature Granulocytes Absolute, Automated 0.04 0.00 - 0.50 x10*3/uL    Lymphocytes Absolute 0.60 (L) 0.80 - 3.00 x10*3/uL    Monocytes Absolute 0.61 0.05 - 0.80 x10*3/uL    Eosinophils Absolute 0.04 0.00 - 0.40 x10*3/uL    Basophils Absolute 0.01 0.00 - 0.10 x10*3/uL   Blood Gas Arterial Full Panel   Result Value Ref Range    POCT pH, Arterial 7.38 7.38 - 7.42 pH    POCT pCO2, Arterial 43 (H) 38 - 42 mm Hg    POCT pO2, Arterial 130 (H) 85 - 95 mm Hg    POCT SO2, Arterial 100 94 - 100 %    POCT Oxy Hemoglobin, Arterial 96.6 94.0 - 98.0 %    POCT Hematocrit Calculated, Arterial 27.0 (L) 41.0 - 52.0 %    POCT Sodium, Arterial 130 (L) 136 - 145 mmol/L    POCT Potassium, Arterial 4.1 3.5 - 5.3 mmol/L    POCT Chloride, Arterial 97 (L) 98 - 107 mmol/L    POCT Ionized Calcium, Arterial 1.34 (H) 1.10 - 1.33 mmol/L    POCT Glucose, Arterial 133 (H) 74 - 99 mg/dL    POCT Lactate, Arterial 0.9 0.4 - 2.0 mmol/L    POCT Base Excess, Arterial 0.2 -2.0 - 3.0 mmol/L    POCT HCO3 Calculated, Arterial 25.4 22.0 - 26.0 mmol/L    POCT Hemoglobin, Arterial 9.1 (L) 13.5 - 17.5 g/dL    POCT Anion Gap, Arterial 12 10 - 25 mmo/L    Patient Temperature 37.0 degrees Celsius    FiO2 21 %   POCT GLUCOSE   Result Value Ref Range    POCT Glucose 113 (H) 74 - 99 mg/dL   POCT GLUCOSE   Result Value Ref Range    POCT Glucose 119 (H) 74 - 99 mg/dL   Electrocardiogram, 12-lead PRN ACS symptoms   Result Value Ref Range    Ventricular Rate 96 BPM    Atrial Rate 136 BPM    QRS Duration 84 ms    QT Interval 354 ms    QTC Calculation(Bazett) 447 ms    R Axis 33 degrees    T Axis 261 degrees    QRS Count 16 beats    Q Onset 225 ms    T Offset 402 ms    QTC Fredericia 414 ms   POCT GLUCOSE   Result Value Ref Range    POCT Glucose 95 74 - 99 mg/dL              Assessment/Plan   Principal Problem:    Critical limb ischemia of both lower extremities (CMS/HCC)  Active Problems:    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 AMS likely multifactorial in setting of recent surgery, sedative use, prolonged hospital stay, with possible element of hypercapnia. AMS has been gradually improving. Hypotension felt likely in setting of ESRD. Difficult to accurately access BP peripherally due to edema or centrally with a-line due to PVD.  Low concern for septic shock as cause of hypotension given lack of other features: no fever or leukocytosis. Will continue to remove excess fluid with renal replacement therapy (nephrology following). Patient weaned off BIPAP by 1/24/24 and has been tolerating NC since.       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  #Delirium  #C/f Cefepime toxicity  :: sedating medications held (gabapentin; robaxin held)  :: cefepime d/cd (see ID)  - delirium precautions  - can consider imaging if mental status does not improve      CV  #PAD s/p TMA and 1/19 debridement  - Endovascular Cardiology on board  - Podiatry following peripherally at this point in time, patient will follow-up outpatient with Dr Rodriguez   - c/w home ASA 81mg PO every day  - c/w home clopidogrel 75mg PO every day  - c/w home atorvastatin 40mg PO every day      #HFrEF (35-40% 10/2023)  #Hypotension  :: intermittent levo  as needed for goal MAP >60 if becomes hypotensive during dialysis   - midodrine 20mg PO TID initiated overnight of 1/22-1/23 for soft Bps, will titrate as able to sustain goal MAPs >60, pt has history of becoming hypotensive during dialysis   :: TTE 1/23/24 with LVEF to 40-45%, mildly improved from previous in 2023, no other new acute findings   - consider GDMT when BP stable and tolerating well during dialysis      #Afib  #SSS s/p PPM  - hold home metoprolol succinate 25mg PO every day iso shock, can consider resuming if BP remains at goal with dialysis   - AC on hold iso 1/16 TA perforation  - endovascular cardiology recs to resume Eliquis at discharge     Resp  #Hypercapnic Respiratory Acidosis  - BiPAP for hypercapnia as needed   - Patient transitioned to NC on 1/24, post transition ABG stable and wnl, no evidence of evolving hypercarbia      ID  #Pseudomonas and Enterobacter on 1/19 tissue cx  :: s/p Cefepime x1 on 1/21  - s/p meropenem 1g q12hr (1/22-24)  - vancomycin, pharmacy to dose (1/21-24)     GI  #GERD  #GI ppx  - pantoprazole 40mg PO day     #Bowel Regimen  - Miralax every day  - Senna every day      #Concern for Dysphagia- coughing with meds  - Speech Language Pathology on board  - SLP following   - NG placed 1/25      Renal  #ESRD  #NAGMA  #Fluid overload  #Pulmonary Interstitial Edema, Pleural Effusions  - Nephrology on board  - CRRT for fluid removal initiated on 1/23  - EPO initiated 1/22  - Starting UF 1/24 with goal 2L fluid removal   - Plan for HD 1/25      Endocrine  #DMII  - insulin lantus 6U at bedtime  - SSI     Rheum  #Gout  - c/w home allopurinol     F: PRN, FWF with tube feeds   E: PRN  N: tube feeds  A: [facility] central line (1/22),  R radial arterial line (1/22).  DVT ppx: per endovascular, will restart eliquis for afib at dc, will resume ppx once platelets wnl (still low as of 1/25)   NOK: Julia Benton [phone]                   Eric Urias MD

## 2024-01-25 NOTE — PROGRESS NOTES
Nephrology Consult Progress Note    Admit Date: 1/9/2024    Interval history:  Pt remains poorly communicative    CURRENT MEDICATIONS:    Current Facility-Administered Medications:     acetaminophen (Tylenol) tablet 650 mg, 650 mg, oral, q4h PRN, 650 mg at 01/24/24 1837 **OR** acetaminophen (Tylenol) oral liquid 650 mg, 650 mg, nasogastric tube, q4h PRN **OR** acetaminophen (Tylenol) suppository 650 mg, 650 mg, rectal, q4h PRN, Mark Navarro MD    acetaminophen (Tylenol) tablet 975 mg, 975 mg, oral, q6h PRN, Michele Hilton MD, 975 mg at 01/22/24 2120    albuterol 90 mcg/actuation inhaler 2 puff, 2 puff, inhalation, q6h PRN, Mark Navarro MD, 2 puff at 01/14/24 0850    allopurinol (Zyloprim) tablet 100 mg, 100 mg, oral, Daily, Mark Navarro MD, 100 mg at 01/25/24 0809    alum-mag hydroxide-simeth (Mylanta) 200-200-20 mg/5 mL oral suspension 30 mL, 30 mL, oral, 4x daily PRN, Mark Navarro MD    ammonium lactate (Lac-Hydrin) 12 % lotion, , Topical, q1h PRN, Mark Navarro MD    aspirin chewable tablet 81 mg, 81 mg, oral, Daily, Mark Navarro MD, 81 mg at 01/25/24 0803    atorvastatin (Lipitor) tablet 40 mg, 40 mg, oral, Nightly, Mark Navarro MD, 40 mg at 01/24/24 2043    B complex-vitamin C-folic acid (Nephrocaps) capsule 1 capsule, 1 capsule, oral, Daily, Mark Navarro MD, 1 capsule at 01/25/24 0809    clopidogrel (Plavix) tablet 75 mg, 75 mg, oral, Daily, Mark Navarro MD, 75 mg at 01/25/24 0803    dextrose 50 % injection 25 g, 25 g, intravenous, q15 min PRN, Mark Navarro MD, 25 g at 01/17/24 2253    docusate sodium (Colace) capsule 100 mg, 100 mg, oral, BID PRN, Mark Navarro MD, 100 mg at 01/16/24 1121    epoetin dane (Epogen,Procrit) injection 10,000 Units, 10,000 Units, subcutaneous, Every Mon/Wed/Fri, Ronaldo Koehler MD, 10,000 Units at 01/24/24 1630    glucagon (Glucagen) injection 1 mg, 1 mg, intramuscular, q15 min PRN, Mark Navarro MD    heparin (porcine) injection 1,200 Units, 1,200 Units, intravenous, PRN,  Judy Cazares DO    heparin (porcine) injection 1,200 Units, 1,200 Units, intravenous, PRN, Judy Cazares DO    hydrocortisone 2.5 % ointment, , Topical, BID PRN, Mark Navarro MD    insulin glargine (Lantus) injection 6 Units, 6 Units, subcutaneous, Nightly, Mark Navarro MD, 6 Units at 01/24/24 2043    insulin lispro (HumaLOG) injection 0-5 Units, 0-5 Units, subcutaneous, TID with meals, Mark Navarro MD    ipratropium-albuteroL (Duo-Neb) 0.5-2.5 mg/3 mL nebulizer solution 3 mL, 3 mL, nebulization, q6h PRN, Mark Navarro MD    melatonin tablet 5 mg, 5 mg, oral, Nightly, Laura Joaquin MD    midodrine (Proamatine) tablet 20 mg, 20 mg, oral, TID with meals, Mark Navarro MD, 20 mg at 01/25/24 1134    norepinephrine (Levophed) 8 mg in dextrose 5% 250 mL (0.032 mg/mL) infusion (premix), 0.01-1 mcg/kg/min, intravenous, Continuous, Laura Joaquin MD, Stopped at 01/25/24 1234    oxygen (O2) therapy, , inhalation, Continuous PRN - O2/gases, Koko Gordon MD, 3 L/min at 01/24/24 0900    pantoprazole (ProtoNix) EC tablet 40 mg, 40 mg, oral, Daily before breakfast, Mark Navarro MD, 40 mg at 01/25/24 0803    perflutren protein A microsphere (Optison) injection 0.5 mL, 0.5 mL, intravenous, Once in imaging, Mark Navarro MD    polyethylene glycol (Glycolax, Miralax) packet 17 g, 17 g, oral, Daily, Mark Navarro MD, 17 g at 01/25/24 0803    povidone-iodine (Betadine) 7.5 % soap 1 Application, 1 Application, Topical, Daily PRN, John Kent MD, 1 Application at 01/18/24 0400    sennosides (Senokot) tablet 8.6 mg, 1 tablet, oral, Nightly, Mark Navarro MD, 8.6 mg at 01/24/24 2043    [Held by provider] sevelamer carbonate (Renvela) tablet 800 mg, 800 mg, oral, TID with meals, Mark Navarro MD, 800 mg at 01/24/24 0834    sodium chloride 0.9 % bolus 1,000 mL, 1,000 mL, CRRT, PRN, Judy Cazares DO    sulfur hexafluoride microsphr (Lumason) injection 24.28 mg, 2 mL, intravenous, Once in imaging, Mark Navarro MD  "      Intake/Output Summary (Last 24 hours) at 1/25/2024 1515  Last data filed at 1/25/2024 0600  Gross per 24 hour   Intake 1865.96 ml   Output 2600 ml   Net -734.04 ml         PHYSICAL EXAM:  BP 78/65 (BP Location: Right arm) Comment (BP Location): upperarm  Pulse 95   Temp 36.2 °C (97.2 °F) (Temporal)   Resp 11   Ht 1.778 m (5' 10\")   Wt 105 kg (231 lb 7.7 oz)   SpO2 100%   BMI 33.21 kg/m²     Intake/Output Summary (Last 24 hours) at 1/25/2024 1515  Last data filed at 1/25/2024 0600  Gross per 24 hour   Intake 1865.96 ml   Output 2600 ml   Net -734.04 ml       Gen: non communicative, NAD  Neck: No JVD  Cardiac: RRR  Resp: few rales   Abd: Soft, non tender, +BS, non distended   Ext: trace to +1 edema   Access: RUE AVF  Neuro: moves 4 ext  Peripheral Pulses: Capillary refill <2secs, weak peripheral pulses.  Skin: Skin color, texture, turgor normal, no suspicious rashes or lesions.    Labs:  Results for orders placed or performed during the hospital encounter of 01/09/24 (from the past 24 hour(s))   POCT GLUCOSE   Result Value Ref Range    POCT Glucose 119 (H) 74 - 99 mg/dL   Renal Function Panel   Result Value Ref Range    Glucose 146 (H) 74 - 99 mg/dL    Sodium 132 (L) 136 - 145 mmol/L    Potassium 4.7 3.5 - 5.3 mmol/L    Chloride 97 (L) 98 - 107 mmol/L    Bicarbonate 23 21 - 32 mmol/L    Anion Gap 17 10 - 20 mmol/L    Urea Nitrogen 15 6 - 23 mg/dL    Creatinine 1.62 (H) 0.50 - 1.30 mg/dL    eGFR 43 (L) >60 mL/min/1.73m*2    Calcium 9.9 8.6 - 10.6 mg/dL    Phosphorus 4.2 2.5 - 4.9 mg/dL    Albumin 3.0 (L) 3.4 - 5.0 g/dL   Magnesium   Result Value Ref Range    Magnesium 2.24 1.60 - 2.40 mg/dL   POCT GLUCOSE   Result Value Ref Range    POCT Glucose 120 (H) 74 - 99 mg/dL   POCT GLUCOSE   Result Value Ref Range    POCT Glucose 131 (H) 74 - 99 mg/dL   Renal Function Panel   Result Value Ref Range    Glucose 128 (H) 74 - 99 mg/dL    Sodium 135 (L) 136 - 145 mmol/L    Potassium 4.1 3.5 - 5.3 mmol/L    Chloride 98 " 98 - 107 mmol/L    Bicarbonate 27 21 - 32 mmol/L    Anion Gap 14 10 - 20 mmol/L    Urea Nitrogen 18 6 - 23 mg/dL    Creatinine 1.94 (H) 0.50 - 1.30 mg/dL    eGFR 35 (L) >60 mL/min/1.73m*2    Calcium 9.6 8.6 - 10.6 mg/dL    Phosphorus 3.7 2.5 - 4.9 mg/dL    Albumin 2.7 (L) 3.4 - 5.0 g/dL   Magnesium   Result Value Ref Range    Magnesium 2.14 1.60 - 2.40 mg/dL   CBC and Auto Differential   Result Value Ref Range    WBC 8.3 4.4 - 11.3 x10*3/uL    nRBC 0.0 0.0 - 0.0 /100 WBCs    RBC 2.93 (L) 4.50 - 5.90 x10*6/uL    Hemoglobin 8.5 (L) 13.5 - 17.5 g/dL    Hematocrit 26.5 (L) 41.0 - 52.0 %    MCV 90 80 - 100 fL    MCH 29.0 26.0 - 34.0 pg    MCHC 32.1 32.0 - 36.0 g/dL    RDW 17.7 (H) 11.5 - 14.5 %    Platelets 100 (L) 150 - 450 x10*3/uL    Neutrophils % 84.4 40.0 - 80.0 %    Immature Granulocytes %, Automated 0.5 0.0 - 0.9 %    Lymphocytes % 7.2 13.0 - 44.0 %    Monocytes % 7.3 2.0 - 10.0 %    Eosinophils % 0.5 0.0 - 6.0 %    Basophils % 0.1 0.0 - 2.0 %    Neutrophils Absolute 7.01 (H) 1.60 - 5.50 x10*3/uL    Immature Granulocytes Absolute, Automated 0.04 0.00 - 0.50 x10*3/uL    Lymphocytes Absolute 0.60 (L) 0.80 - 3.00 x10*3/uL    Monocytes Absolute 0.61 0.05 - 0.80 x10*3/uL    Eosinophils Absolute 0.04 0.00 - 0.40 x10*3/uL    Basophils Absolute 0.01 0.00 - 0.10 x10*3/uL   Blood Gas Arterial Full Panel   Result Value Ref Range    POCT pH, Arterial 7.38 7.38 - 7.42 pH    POCT pCO2, Arterial 43 (H) 38 - 42 mm Hg    POCT pO2, Arterial 130 (H) 85 - 95 mm Hg    POCT SO2, Arterial 100 94 - 100 %    POCT Oxy Hemoglobin, Arterial 96.6 94.0 - 98.0 %    POCT Hematocrit Calculated, Arterial 27.0 (L) 41.0 - 52.0 %    POCT Sodium, Arterial 130 (L) 136 - 145 mmol/L    POCT Potassium, Arterial 4.1 3.5 - 5.3 mmol/L    POCT Chloride, Arterial 97 (L) 98 - 107 mmol/L    POCT Ionized Calcium, Arterial 1.34 (H) 1.10 - 1.33 mmol/L    POCT Glucose, Arterial 133 (H) 74 - 99 mg/dL    POCT Lactate, Arterial 0.9 0.4 - 2.0 mmol/L    POCT Base Excess,  Arterial 0.2 -2.0 - 3.0 mmol/L    POCT HCO3 Calculated, Arterial 25.4 22.0 - 26.0 mmol/L    POCT Hemoglobin, Arterial 9.1 (L) 13.5 - 17.5 g/dL    POCT Anion Gap, Arterial 12 10 - 25 mmo/L    Patient Temperature 37.0 degrees Celsius    FiO2 21 %   POCT GLUCOSE   Result Value Ref Range    POCT Glucose 113 (H) 74 - 99 mg/dL   POCT GLUCOSE   Result Value Ref Range    POCT Glucose 119 (H) 74 - 99 mg/dL   Electrocardiogram, 12-lead PRN ACS symptoms   Result Value Ref Range    Ventricular Rate 96 BPM    Atrial Rate 136 BPM    QRS Duration 84 ms    QT Interval 354 ms    QTC Calculation(Bazett) 447 ms    R Axis 33 degrees    T Axis 261 degrees    QRS Count 16 beats    Q Onset 225 ms    T Offset 402 ms    QTC Fredericia 414 ms   POCT GLUCOSE   Result Value Ref Range    POCT Glucose 95 74 - 99 mg/dL        DATA:   Diagnostic tests reviewed for today's visit:    New labs and imaging     Assessment and Plan:  Pt with ESKD, admitted with RLE ischemia, s/p RLE debridement and revascularization on 1/17  - ESKD on HD: [facility] dialysis (5days/week for 2.5 hours each) at [facility]/Dr Hannah is nephrologist   [facility] from 1/22 till 1/23, IUF yesterday tolerating well 3.2L UF  Remains off pressors   Anemia, Hb >8, on epogen  Routine IHD today    Will continue to follow.     Signature: Ronaldo Koehler MD

## 2024-01-25 NOTE — PROGRESS NOTES
Speech-Language Pathology  Adult Inpatient Swallow Treatment    Patient Name: Chevy Benton  MRN: [id number]  Today's Date: 1/25/2024   Start Time: 945  Stop Time: 954  Time Calculation (min): 9    Impression:   Re-evaluation of swallow function completed. Pt seen to assess diet tolerance. Pt less conversant and interactive than initial evaluation. Exhibits difficulty responding to simple orientation questions. Pt on 4 L NC. Pt requires verbal and tactile cueing to remain awake. Open mouth posture evident. Slow manipulation of single ice chip without overt s/s of difficulty. Continue to verbally and tactile stimulate pt to participate with SLP.  Pt exhibits increase difficulty drawing water through the straw with decrease bilabial closure. Defer further PO due to decrease cognition and ability to safely and efficiently consume PO. Recommend consideration of short term means of nutrition/hydration. SLP will continue to follow and re-evaluate swallow function prior to any further PO recommendations.      Recommendations:  NPO    - Frequent, aggressive oral care is strongly recommended to improve infection control as well as reduce dental plaque and bacteria on oropharyngeal surfaces which may increase the risk nosocomial infections, including pneumonia.    - OK for small amounts of ice chips (one at a time, 5-7x/hour) for pleasure/swallow stimulation, but only after aggressive oral care to avoid colonization of bacteria within oral cavity.    SLP will continue to follow and re-evaluate.     Goal:   Pt. will tolerate least restrictive diet without overt s/s of aspiration with 100% accuracy.         Plan:  SLP Services Indicated: Yes  Frequency: 2x week  Duration: 4 weeks  SLP Discharge: To be determined based on progress and continued needs.   Discussed POC with patient  SLP - OK to Discharge    Pain:   0-10  0 = No pain.     Inpatient Education:  Extensive education provided to patient regarding current swallow  function, recommendations/results, and POC.      Consultations/Referrals/Coordination of Services:   N/A

## 2024-01-26 NOTE — CARE PLAN
The patient's goals for the shift include      The clinical goals for the shift include maintain a map above 60 throughout shift

## 2024-01-26 NOTE — PROGRESS NOTES
"Chevy Benton is a 78 y.o. male on day 17 of admission presenting with Critical limb ischemia of both lower extremities (CMS/HCC).    Subjective     NAEON.     Patient intermittently requiring Levo overnight for MAPs not at goal >60. However in general patient's BP improved during the day from softer pressures earlier in hospitalization. Tolerating HD per nephrology well last several days. Mentation seems to be marginally improving when patient awake, alert and oriented to person and place, more appropriate answers to simple questions.     CT head 1/25/24 wnl. No acute findings.        Objective     Physical Exam    General: NAD. Patient is alert and oriented to name and place (improvement from only oriented to person during eval yesterday 1/25). Increased frequency of appropriate answers to directed questions. Remains following commands with upper and lower extremities.   Neuro: Strength and sensation appear grossly intact.   ENT: moist mucous membranes.   Cardiac: rate controlled afib. no murmurs. no rubs.  Pulmonary: CTAB. breath sounds appreciated in all lung fields. Normal work of breathing on minimal nasal cannula   Abdomen: non-tender. non-distended. normoactive bowel sounds.  Extremities: full passive ROM. 2+ b/l UE edema. Trace LE edema. RLE wrapped (from TMA revision this admission). Arms marginally less distended from previous examination.   Skin: no rashes. no ulcers.    Last Recorded Vitals  Blood pressure 78/65, pulse 98, temperature 36.2 °C (97.2 °F), temperature source Temporal, resp. rate (!) 28, height 1.778 m (5' 10\"), weight 105 kg (231 lb 7.7 oz), SpO2 99 %.  Intake/Output last 3 Shifts:  I/O last 3 completed shifts:  In: 1195.5 (11.4 mL/kg) [I.V.:465.5 (4.4 mL/kg); Other:600; NG/GT:130]  Out: 2600 (24.8 mL/kg) [Other:2600]  Weight: 105 kg     Relevant Results           This patient currently has cardiac telemetry ordered; if you would like to modify or discontinue the telemetry order, " click here to go to the orders activity to modify/discontinue the order.    Scheduled medications  allopurinol, 100 mg, oral, Daily  aspirin, 81 mg, oral, Daily  atorvastatin, 40 mg, oral, Nightly  B complex-vitamin C-folic acid, 1 capsule, oral, Daily  clopidogrel, 75 mg, oral, Daily  epoetin dane or biosimilar, 10,000 Units, subcutaneous, Every Mon/Wed/Fri  insulin glargine, 6 Units, subcutaneous, Nightly  insulin lispro, 0-5 Units, subcutaneous, TID with meals  melatonin, 5 mg, oral, Nightly  midodrine, 20 mg, oral, TID with meals  pantoprazole, 40 mg, intravenous, Daily  perflutren protein A microsphere, 0.5 mL, intravenous, Once in imaging  polyethylene glycol, 17 g, oral, Daily  sennosides, 1 tablet, oral, Nightly  [Held by provider] sevelamer carbonate, 800 mg, oral, TID with meals  sulfur hexafluoride microsphr, 2 mL, intravenous, Once in imaging      Continuous medications  norepinephrine, 0.01-1 mcg/kg/min, Last Rate: Stopped (01/26/24 0800)      PRN medications  PRN medications: acetaminophen **OR** acetaminophen **OR** acetaminophen, acetaminophen, albuterol, alum-mag hydroxide-simeth, ammonium lactate, dextrose, docusate sodium, glucagon, heparin, heparin, hydrocortisone, ipratropium-albuteroL, oxygen, povidone-iodine, sodium chloride      Results for orders placed or performed during the hospital encounter of 01/09/24 (from the past 24 hour(s))   POCT GLUCOSE   Result Value Ref Range    POCT Glucose 95 74 - 99 mg/dL   POCT GLUCOSE   Result Value Ref Range    POCT Glucose 82 74 - 99 mg/dL   Renal Function Panel   Result Value Ref Range    Glucose 93 74 - 99 mg/dL    Sodium 136 136 - 145 mmol/L    Potassium 4.1 3.5 - 5.3 mmol/L    Chloride 100 98 - 107 mmol/L    Bicarbonate 28 21 - 32 mmol/L    Anion Gap 12 10 - 20 mmol/L    Urea Nitrogen 12 6 - 23 mg/dL    Creatinine 1.64 (H) 0.50 - 1.30 mg/dL    eGFR 42 (L) >60 mL/min/1.73m*2    Calcium 9.5 8.6 - 10.6 mg/dL    Phosphorus 2.7 2.5 - 4.9 mg/dL    Albumin  2.8 (L) 3.4 - 5.0 g/dL   Magnesium   Result Value Ref Range    Magnesium 2.12 1.60 - 2.40 mg/dL   POCT GLUCOSE   Result Value Ref Range    POCT Glucose 77 74 - 99 mg/dL   POCT GLUCOSE   Result Value Ref Range    POCT Glucose 103 (H) 74 - 99 mg/dL   CBC and Auto Differential   Result Value Ref Range    WBC 9.8 4.4 - 11.3 x10*3/uL    nRBC 0.3 (H) 0.0 - 0.0 /100 WBCs    RBC 2.80 (L) 4.50 - 5.90 x10*6/uL    Hemoglobin 8.7 (L) 13.5 - 17.5 g/dL    Hematocrit 25.9 (L) 41.0 - 52.0 %    MCV 93 80 - 100 fL    MCH 31.1 26.0 - 34.0 pg    MCHC 33.6 32.0 - 36.0 g/dL    RDW 18.4 (H) 11.5 - 14.5 %    Platelets 98 (L) 150 - 450 x10*3/uL    Neutrophils % 81.0 40.0 - 80.0 %    Immature Granulocytes %, Automated 0.6 0.0 - 0.9 %    Lymphocytes % 9.0 13.0 - 44.0 %    Monocytes % 8.0 2.0 - 10.0 %    Eosinophils % 1.1 0.0 - 6.0 %    Basophils % 0.3 0.0 - 2.0 %    Neutrophils Absolute 7.95 (H) 1.60 - 5.50 x10*3/uL    Immature Granulocytes Absolute, Automated 0.06 0.00 - 0.50 x10*3/uL    Lymphocytes Absolute 0.88 0.80 - 3.00 x10*3/uL    Monocytes Absolute 0.78 0.05 - 0.80 x10*3/uL    Eosinophils Absolute 0.11 0.00 - 0.40 x10*3/uL    Basophils Absolute 0.03 0.00 - 0.10 x10*3/uL   Renal Function Panel   Result Value Ref Range    Glucose 121 (H) 74 - 99 mg/dL    Sodium 137 136 - 145 mmol/L    Potassium 3.8 3.5 - 5.3 mmol/L    Chloride 101 98 - 107 mmol/L    Bicarbonate 27 21 - 32 mmol/L    Anion Gap 13 10 - 20 mmol/L    Urea Nitrogen 16 6 - 23 mg/dL    Creatinine 1.96 (H) 0.50 - 1.30 mg/dL    eGFR 34 (L) >60 mL/min/1.73m*2    Calcium 9.7 8.6 - 10.6 mg/dL    Phosphorus 2.9 2.5 - 4.9 mg/dL    Albumin 2.6 (L) 3.4 - 5.0 g/dL   Magnesium   Result Value Ref Range    Magnesium 2.15 1.60 - 2.40 mg/dL   Folate   Result Value Ref Range    Folate, Serum >24.0 >5.0 ng/mL   Vitamin B12   Result Value Ref Range    Vitamin B12 1,438 (H) 211 - 911 pg/mL   Phosphorus   Result Value Ref Range    Phosphorus 2.9 2.5 - 4.9 mg/dL   POCT GLUCOSE   Result Value Ref  Range    POCT Glucose 111 (H) 74 - 99 mg/dL   POCT GLUCOSE   Result Value Ref Range    POCT Glucose 108 (H) 74 - 99 mg/dL       CT head wo IV contrast    Result Date: 1/26/2024  Interpreted By:  Marilyn Torres and Dervishi Mario STUDY: CT HEAD WO IV CONTRAST;  1/25/2024 10:08 pm   INDICATION: Signs/Symptoms:AMS CICU patient.   COMPARISON: None.   ACCESSION NUMBER(S): [id number]   ORDERING CLINICIAN: ALVIN AMBRIZ   TECHNIQUE: Noncontrast axial CT scan of head was performed. Angled reformats in brain and bone windows were generated. The images were reviewed in bone, brain, blood and soft tissue windows.   FINDINGS: CSF Spaces: The ventricles, sulci and basal cisterns are concordant with the degree of parenchymal atrophy. There is no extraaxial fluid collection.   Parenchyma: There are nonspecific patchy, confluent periventricular and subcortical white matter hypodensities which are likely sequela of chronic microvascular ischemic changes. Otherwise, the grey-white differentiation is intact. There is no mass effect or midline shift. There is no intracranial hemorrhage.   Calvarium: The calvarium is unremarkable.   Paranasal sinuses and mastoids: Visualized paranasal sinuses and mastoids are clear.       1.  No evidence of acute intracranial hemorrhage or mass effect. 2. Nonspecific periventricular and subcortical white matter hypodensities likely sequela of chronic microvascular ischemic changes     I personally reviewed the images/study and I agree with the findings as stated by Resident Irwin Ingram MD. This study was interpreted at [place], [place].   MACRO: None   Signed by: Marilyn Torres 1/26/2024 7:23 AM Dictation workstation:   [id number]    XR abdomen 1 view    Result Date: 1/25/2024  Interpreted By:  Jori Garcia and Ritchie Brandon STUDY: XR ABDOMEN 1 VIEW;  1/25/2024 3:05 pm   INDICATION: Signs/Symptoms:CICU confirm NG placement.   COMPARISON: Chest  x-ray 01/23/2024   ACCESSION NUMBER(S): [id number]   ORDERING CLINICIAN: ALVIN AMBRIZ   FINDINGS: Enteric tube courses midline below the level of the diaphragm with the tip located in the gastric body. The side-hole is below the GE junction.   Multiple gaseous distended loops of small and large bowel. For example there is a lucent area of gaseous ascending colon in the right upper quadrant measuring 5.2 cm. There is limited evaluation of pneumoperitoneum on supine imaging, however there does not appear to be any evidence of free air surrounding this gaseous distended loop of ascending/Rigler's sign. Right diaphragm is distinctly visualized separate from this loop of bowel without evidence of subdiaphragmatic free air.   Additional mildly prominent loops of small bowel scattered throughout the bilateral hemiabdomen, for example largest loop of small bowel can be seen in the left upper quadrant measuring 3.4 cm.   Visualized lungs are clear.   Osseous structures demonstrate no acute bony changes.       1. Enteric tube as described above with the distal tip projecting over the gastric body. 2. Multiple mildly distended loops of small and large bowel as described above. Recommend attention on follow-up examinations as could reflect early ileus. 3. No definitive evidence of free air on current supine radiograph as described above.   MACRO: None   Signed by: Jori Garcia 1/25/2024 3:48 PM Dictation workstation:   [id number]    Electrocardiogram, 12-lead PRN ACS symptoms    Result Date: 1/25/2024  Atrial fibrillation with premature ventricular or aberrantly conducted complexes Low voltage QRS Nonspecific T wave abnormality Abnormal ECG When compared with ECG of 23-JAN-2024 04:46, Atrial fibrillation has replaced Electronic ventricular pacemaker              Assessment/Plan   Principal Problem:    Critical limb ischemia of both lower extremities (CMS/HCC)  Active Problems:    Non-pressure chronic ulcer of other part  of right foot with fat layer exposed (CMS/HCC)    Critical limb ischemia of right lower extremity (CMS/HCC)    Subclavian vein stenosis, left    78M w/ a PMH of ESRD, HFrEF, and PVD s/p 9/2023 TMA and 1/19 debridement d/t distal leg ischemia presenting to CICU on 1/22/24 for hypotension and AMS that occurred during hemodialysis. AMS likely multifactorial in setting of recent surgery, sedative use, prolonged hospital stay, with possible element of hypercapnia. AMS has been gradually improving. Hypotension felt likely in setting of ESRD. Difficult to accurately access BP peripherally due to edema or centrally with a-line due to PVD.  Low concern for septic shock as cause of hypotension given lack of other features: no fever or leukocytosis. Will continue to remove excess fluid with renal replacement therapy (nephrology following). Patient weaned off BIPAP by 1/24/24 and has been tolerating NC since. Mentation gradually improving with ongoing dialysis.      1/26 Updates:  - nephrology following for dialysis   - still working to correlate peripheral cuff pressures with A-line as fluid removed and peripheral edema improves   - NG tube for nutrition given failed 1/25 swallow eval   - will adjust midodrine dosing as able, goal MAP >60, may need titration to sustain adequate MAPs during dialysis   - repleting lytes as needed   - CT head 1/25 wnl   - Mentation improved 1/26 from previous eval   - will get LUE ultrasound given L > R swelling to rule out DVT         Neuro  #AMS  #Acute on Chronic CO2 Narcosis  #Delirium  #C/f Cefepime toxicity  :: sedating medications held (gabapentin; robaxin held)  :: cefepime d/cd (see ID)  - delirium precautions  - CT head 1/26 wnl, no acute intracranial findings   - continue to monitor      CV  #PAD s/p TMA and 1/19 debridement  - Endovascular Cardiology on board  - Podiatry following peripherally at this point in time, patient will follow-up outpatient with Dr Rodriguez   - c/w home ASA 81mg  PO every day  - c/w home clopidogrel 75mg PO every day  - c/w home atorvastatin 40mg PO every day      #HFrEF (35-40% 10/2023)  #Hypotension  :: intermittent levo as needed for goal MAP >60 if becomes hypotensive during dialysis   - midodrine 20mg PO TID initiated overnight of 1/22-1/23 for soft Bps, will titrate as able to sustain goal MAPs >60, pt has history of becoming hypotensive during dialysis   :: TTE 1/23/24 with LVEF to 40-45%, mildly improved from previous in 2023, no other new acute findings   - consider GDMT when BP stable and tolerating well during dialysis      #Afib  #SSS s/p PPM  - hold home metoprolol succinate 25mg PO every day iso shock, can consider resuming if BP remains at goal with dialysis   - AC on hold iso 1/16 TA perforation  - endovascular cardiology recs to resume Eliquis at discharge     Resp  #Hypercapnic Respiratory Acidosis  - BiPAP for hypercapnia as needed   - Patient transitioned to NC on 1/24, post transition ABG stable and wnl, no evidence of evolving hypercarbia      ID  #Pseudomonas and Enterobacter on 1/19 tissue cx  :: s/p Cefepime x1 on 1/21  - s/p meropenem 1g q12hr (1/22-24)  - vancomycin, pharmacy to dose (1/21-24)     GI  #GERD  #GI ppx  - pantoprazole 40mg IV daily     #Bowel Regimen  - Miralax every day  - Senna every day      #Concern for Dysphagia- coughing with meds  - Speech Language Pathology on board  - SLP following   - NG placed 1/25   - Will continue to engage SLP for futher eval      Renal  #ESRD  #NAGMA  #Fluid overload  #Pulmonary Interstitial Edema, Pleural Effusions  - Nephrology on board  - CRRT for fluid removal initiated on 1/23  - EPO initiated 1/22  - Starting UF 1/24 with goal 2L fluid removal   - HD 1/25   - Ongoing Dialysis per nephro recommendations      Endocrine  #DMII  - insulin lantus 6U at bedtime  - SSI     Rheum  #Gout  - c/w home allopurinol    MSK  #RLE TMA Revision   - podiatry continuing to follow, appreciate recs      F: PRN, FWF  with tube feeds   E: PRN  N: tube feeds  A: LIJ central line (1/22),  R radial arterial line (1/22).  DVT ppx: per endovascular, will restart eliquis for afib at dc, will resume ppx once platelets wnl (still low as of 1/26)   NOK: Julia Benton [phone]                   Eric Urias MD

## 2024-01-26 NOTE — PROGRESS NOTES
-Patient discussed during CICU interdisciplinary morning rounds.   -ICU Treatment Plan:  Patient is confused, s/p NG tube feed, Dialysis, ; [facility] from 1/22 till 1/23  - Payer:  [employer] Medicare  ( he will need a Precert)      -Support system:  Spouse is listed as NOK and his son is also listed.- I spoke with Mrs. Benton today. She had no questions or concerns for SW   - Planned Disposition: 5days/week for 2.5 hours each) at [facility]/Dr Hannah is nephrologist,   -Potential Barriers: None noted at this time  - Anticipated date of discharge: 2/1  Alma Black, RADHA, LSW

## 2024-01-26 NOTE — PROCEDURES
PODIATRY BEDSIDE PROCEDURE NOTE    Procedure Location: Bedside. Pre-procedure verification and time out performed.    Date/Time of Procedure: 1/26    Pre-Procedure Diagnosis: Chronic non pressure ulcer with unspecified severity RLE L97.519   Post-Procedure Diagnosis: Chronic non pressure ulcer with unspecified severity RLE L97.519     Procedure Name: 11043 x 1 unit, 11046 x 2 units    Findings: Intraoperative findings consistent with clinical and radiographic findings.     Procedure performed by: Teena Rodriguez DPM  Assistants: Marvin Saul PGY-1    EBL: 0  Specimen: NA     Informed Consent: Administrative consent obtained.    Procedure Details:  An appropriate timeout was performed all in the room were in agreement. The patient verbally consented to the bedside incision and drainage procedure.    The area was prepped appropriately. Local anesthesia was obtained.. Utilizing a #15 blade, the right foot incsion site had sutures and integra graft removed. It was noted that the plantar flap had distal flap necrosis, the necrosis was down to the level of muscle. the flap was incised and excisionally debrided.  No drainage was noted, healthy bleeding was noted. Final post debridement measurements are 12.2 x 5.6x 0.8cm down to and including muscle and fascia    A dry, sterile dressing was applied consisting of Betadine 4x4, 4x4s, ABD, Kerlix, and an ace wrap. The patient tolerated the procedure and anesthesia well and without complication.    Complications: None  Line/Drain: NA  Patient Position: Supine  Site Prep: Betadine  Anesthesia: NA    Marvin Saul DPM PGY-1  Podiatric Medicine & Surgery  Please Haiku message me with any questions or concerns.

## 2024-01-26 NOTE — CARE PLAN
Problem: Pain - Adult  Goal: Verbalizes/displays adequate comfort level or baseline comfort level  Outcome: Progressing  Flowsheets (Taken 1/26/2024 1718)  Verbalizes/displays adequate comfort level or baseline comfort level:   Assess pain using appropriate pain scale   Administer analgesics based on type and severity of pain and evaluate response   Encourage patient to monitor pain and request assistance   Implement non-pharmacological measures as appropriate and evaluate response   Consider cultural and social influences on pain and pain management     Problem: Safety - Adult  Goal: Free from fall injury  Outcome: Progressing  Flowsheets (Taken 1/26/2024 1718)  Free from fall injury:   Based on caregiver fall risk screen, instruct family/caregiver to ask for assistance with transferring infant if caregiver noted to have fall risk factors   Instruct family/caregiver on patient safety     Problem: Discharge Planning  Goal: Discharge to home or other facility with appropriate resources  Outcome: Progressing  Flowsheets (Taken 1/26/2024 1718)  Discharge to home or other facility with appropriate resources:   Identify barriers to discharge with patient and caregiver   Identify discharge learning needs (meds, wound care, etc)   Arrange for interpreters to assist at discharge as needed   Arrange for needed discharge resources and transportation as appropriate   Refer to discharge planning if patient needs post-hospital services based on physician order or complex needs related to functional status, cognitive ability or social support system     Problem: Chronic Conditions and Co-morbidities  Goal: Patient's chronic conditions and co-morbidity symptoms are monitored and maintained or improved  Outcome: Progressing  Flowsheets (Taken 1/26/2024 1718)  Care Plan - Patient's Chronic Conditions and Co-Morbidity Symptoms are Monitored and Maintained or Improved:   Collaborate with multidisciplinary team to address chronic and  comorbid conditions and prevent exacerbation or deterioration   Monitor and assess patient's chronic conditions and comorbid symptoms for stability, deterioration, or improvement   Update acute care plan with appropriate goals if chronic or comorbid symptoms are exacerbated and prevent overall improvement and discharge     Problem: Skin  Goal: Decreased wound size/increased tissue granulation at next dressing change  Outcome: Progressing  Flowsheets (Taken 1/24/2024 0808 by Elisabeth Marie, RN)  Decreased wound size/increased tissue granulation at next dressing change:   Promote sleep for wound healing   Utilize specialty bed per algorithm   Protective dressings over bony prominences  Goal: Participates in plan/prevention/treatment measures  Outcome: Progressing  Flowsheets (Taken 1/24/2024 0808 by Elisabeth Marie RN)  Participates in plan/prevention/treatment measures:   Discuss with provider PT/OT consult   Elevate heels  Goal: Prevent/manage excess moisture  Outcome: Progressing  Flowsheets (Taken 1/24/2024 0808 by Elisabeth Marie, RN)  Prevent/manage excess moisture:   Moisturize dry skin   Monitor for/manage infection if present  Goal: Prevent/minimize sheer/friction injuries  Outcome: Progressing  Flowsheets (Taken 1/24/2024 0808 by Elisabeth Marie RN)  Prevent/minimize sheer/friction injuries:   Use pull sheet   Turn/reposition every 2 hours/use positioning/transfer devices     Problem: Fall/Injury  Goal: Not fall by end of shift  Outcome: Progressing  Goal: Be free from injury by end of the shift  Outcome: Progressing  Goal: Verbalize understanding of personal risk factors for fall in the hospital  Outcome: Progressing  Goal: Verbalize understanding of risk factor reduction measures to prevent injury from fall in the home  Outcome: Progressing    The clinical goals for the shift include Patient will maintain MAP > 65 without requiring levo gtt during this shift.

## 2024-01-26 NOTE — PROGRESS NOTES
Physical Therapy    Physical Therapy Treatment    Patient Name: Chevy Benton  MRN: [id number]  Today's Date: 1/26/2024  Time Calculation  Start Time: 1037  Stop Time: 1109  Time Calculation (min): 32 min     Assessment/Plan   PT Assessment  End of Session Communication: Bedside nurse  End of Session Patient Position: Bed, 3 rail up, Alarm off, not on at start of session (B mitts donned)  PT Plan  Inpatient/Swing Bed or Outpatient: Inpatient  PT Plan  Treatment/Interventions: Bed mobility, Transfer training, Balance training, Strengthening, Endurance training, Range of motion, Therapeutic exercise, Therapeutic activity, Positioning, Postural re-education  PT Plan: Skilled PT  PT Frequency: 3 times per week  PT Discharge Recommendations: Moderate intensity level of continued care  PT Recommended Transfer Status: Total assist  PT - OK to Discharge: Yes    General Visit Information:   PT  Visit  PT Received On: 01/26/24  Response to Previous Treatment: Patient with no complaints from previous session.  General  Family/Caregiver Present: No  Co-Treatment: OT  Co-Treatment Reason: Two skilled therapists required to safely mobilize medically complex pt  Prior to Session Communication: Bedside nurse  Patient Position Received: Bed, 3 rail up, Alarm off, not on at start of session (B mitts donned)  General Comment: Pt with minimal verbal responses to questions and pt with delayed processing to all questions/commands. Fatigued quickly with EOB activity and resistant to more activity at end of session. Dobhoff, Wound vac on R foot, on RA, Arterial line.    Subjective     Precautions:  Precautions  Medical Precautions: Cardiac precautions, Fall precautions    Vital Signs:  Vital Signs  Heart Rate:  (PRE: 88; POST: 90)  SpO2:  (SpO2 >/= 92% throughout)  BP:  (BP stable throughout entire session)    Objective     Pain:  Pain Assessment  Pain Assessment: 0-10  Pain Score:  (pt not complaining of pain until therapist moving B  LEs/UEs and with activity at EOB. Unrated. RN aware.)    Cognition:  Cognition  Overall Cognitive Status: Impaired  Arousal/Alertness: Delayed responses to stimuli  Orientation Level:  (pt oriented to name. Did not answer any other orientation questions despite multiple attempts. Reorientation provided.)  Following Commands:  (pt followed ~25-50% one step commands with maximal verbal and tactile cues and increased processing time)    Activity Tolerance:  Activity Tolerance  Early Mobility/Exercise Safety Screen: Proceed with mobilization - No exclusion criteria met    Treatments:  Therapeutic Exercise  Therapeutic Exercise Performed: Yes  Therapeutic Exercise Activity 1: PROM L ankle DF x 10 and B hip ABD x 10.  Therapeutic Exercise Activity 2: Sustained stretch for L ankle DF x 30 seconds.  Therapeutic Exercise Activity 3: Pt performed across-body reaches with R UEwith maxAx2 to assist with heavy R lateral lean and tone.    Therapeutic Activity  Therapeutic Activity Performed: Yes  Therapeutic Activity 1: Pt sat EOB total 10 minutes with CGA for majority of duration, but after performing dynamic postural activity at EOB, pt requiring depAx1 for posterior support as pt leaning heavily backwards and not attempting to lean anteriorly to cueing. Posture: R lateral lean with inability to come to midline despite cueing.    Bed Mobility  Bed Mobility: Yes  Bed Mobility 1  Bed Mobility 1: Supine to sitting  Level of Assistance 1: Dependent  Bed Mobility Comments 1: x2 person; HOB elevated; pt unable to assist with transfer despite increased time and cues given  Bed Mobility 2  Bed Mobility  2: Sitting to supine  Level of Assistance 2: Dependent (x2 person; cues for sequencing)  Bed Mobility Comments 2: HOB flat    Transfers  Transfer: No (pt with impaired postural control and impaired cognition)    Outcome Measures:  [facility] Basic Mobility  Turning from your back to your side while in a flat bed without using bedrails:  Total  Moving from lying on your back to sitting on the side of a flat bed without using bedrails: Total  Moving to and from bed to chair (including a wheelchair): Total  Standing up from a chair using your arms (e.g. wheelchair or bedside chair): Total  To walk in hospital room: Total  Climbing 3-5 steps with railing: Total  Basic Mobility - Total Score: 6    Confusion Assessment Method-ICU (CAM-ICU)  Feature 1: Acute Onset or Fluctuating Course: Positive  Feature 2: Inattention: Positive  Feature 3: Altered Level of Consciousness: Positive  Overall CAM-ICU: Positive    FSS-ICU  Ambulation: Unable to attempt due to weakness  Rolling: Total assistance (performs 25% or requires another person)  Sitting: Minimal assistance (performs 75% or more of task)  Transfer Sit-to-Stand: Unable to perform  Transfer Supine-to-Sit: Total assistance (performs 25% or requires another person)  Total Score: 6    ICU Mobility Screen  Early Mobility/Exercise Safety Screen: Proceed with mobilization - No exclusion criteria met  E = Exercise and Early Mobility  Early Mobility/Exercise Safety Screen: Proceed with mobilization - No exclusion criteria met  Current Activity: Sitting at edge of bed    Education Documentation  Mobility Training, taught by Viviane Bonilla PT at 1/26/2024 12:53 PM.  Learner: Patient  Readiness: Nonacceptance  Method: Explanation  Response: Needs Reinforcement    Education Comments  No comments found.      Encounter Problems       Encounter Problems (Active)       Balance       Pt will be able to sit at EOB and perform dynamic B LE/UE tasks x 10 minutes without LOB or elbow propping with supervision only for improved postural control (Progressing)       Start:  01/24/24    Expected End:  02/07/24               Safety       Pt will be able to follow 100% one step commands with minimal to no repetition to task required for improved participation in therapy (Not Progressing)       Start:  01/24/24    Expected End:   02/07/24               Transfers       Transfer from bed to chair and chair to bed with modAx1 with FWW  (Progressing)       Start:  01/24/24    Expected End:  02/07/24            Patient to transfer to and from sit to supine with modAx1 (Progressing)       Start:  01/24/24    Expected End:  02/07/24            Patient will roll with minAx1 (Progressing)       Start:  01/24/24    Expected End:  02/07/24            Patient will transfer sit to and from stand with modAx1 and FWW  (Progressing)       Start:  01/24/24    Expected End:  02/07/24                 Signed by Viviane Bonilla DPT

## 2024-01-26 NOTE — PROGRESS NOTES
Occupational Therapy    Occupational Therapy Treatment    Name: Chevy Benton  MRN: [id number]  : 1945  Date: 24  Time Calculation  Start Time: 1036  Stop Time: 1108  Time Calculation (min): 32 min    Assessment:  OT Assessment: Pt continues to demo deficits in ROM, strength, balance, endurance and cognition impeding occupational performance.  Prognosis: Fair  Medical Staff Made Aware: Yes  End of Session Communication: Bedside nurse  End of Session Patient Position: Bed, 3 rail up, Alarm off, not on at start of session (B soft mitts in place)  Plan:  Treatment Interventions: ADL retraining, Functional transfer training, UE strengthening/ROM, Endurance training, Cognitive reorientation, Neuromuscular reeducation  OT Frequency: 2 times per week  OT Discharge Recommendations: Moderate intensity level of continued care  OT Recommended Transfer Status: Dependent, Assist of 2  OT - OK to Discharge: Yes    Subjective   General:  OT Last Visit  OT Received On: 24  Co-Treatment: PT  Co-Treatment Reason: Two skilled therapists required to safely mobilize medically complex pt  Prior to Session Communication: Bedside nurse  Patient Position Received: Bed, 3 rail up, Alarm off, not on at start of session (B soft mitts in place)  General Comment: Pt confused throughout. Minimally verbal. He fatigues easily with little activity. Needs extensive assist for all apects of bed mobility.   Vitals:  Vital Signs  Heart Rate: 88 (101)  Resp: 20 (14)  SpO2: 97 % (96)  BP: 121/61 (122/54)  MAP (mmHg): 80 (74)  BP Method: Arterial line  Lines/Tubes/Drains:  Arterial Line 24 Right Radial (Active)   Number of days: 4       Midline 24 Single lumen Right Cephalic vein (Active)   Number of days: 14       NG/OG/Feeding Tube NG - Alsen sump 16 Fr Left nostril (Active)   Number of days: 0       Hemodialysis Cath Double lumen Left Chest (Active)   Number of days:        Cognition:  Overall Cognitive Status:  Impaired  Cognition Comments: Increased time to process commands. Minimal active command following.    Pain Assessment:  Pain Assessment  Pain Assessment:  (Intermittent c/o pain with initial mobility. Does not rate.)     Objective        Bed Mobility/Transfers:   Bed Mobility  Bed Mobility: Yes  Bed Mobility 1  Bed Mobility 1: Supine to sitting, Sitting to supine  Level of Assistance 1: Dependent  Bed Mobility Comments 1: x2. HOB raised and use of draw sheet.    Transfers  Transfer: No         Therapy/Activity:   Therapeutic Exercise  Therapeutic Exercise Performed: Yes  Therapeutic Exercise Activity 1: AAROM with passive stretch at end range for scap retraction and neck ext. AAROM with passive stretch for BUE motions, all joints.  Therapeutic Activity  Therapeutic Activity Performed: Yes  Therapeutic Activity 1: Pt tolerated approx 10min EOB sit. Initially needed MIN A-CGA decreasing to MAX A with fatigue. Observed R lateral lean, likely increased R side tone. Retropulsive as he fatigued.  Balance/Neuromuscular Re-Education  Balance/Neuromuscular Re-Education Activity Performed: Yes  Balance/Neuromuscular Re-Education Activity 1: Manual assist for RUE reaching across midline to decrease tone and facilitate core engagement. MAX A for L lateral trunk flex. Observed muscle weakness and some R sided tone.         Outcome Measures:  [facility] Daily Activity  Putting on and taking off regular lower body clothing: Total  Bathing (including washing, rinsing, drying): Total  Putting on and taking off regular upper body clothing: Total  Toileting, which includes using toilet, bedpan or urinal: Total  Taking care of personal grooming such as brushing teeth: Total  Eating Meals: Total  Daily Activity - Total Score: 6     Education Documentation  Precautions, taught by Marilyn Vogel OT at 1/26/2024 11:33 AM.  Learner: Patient  Readiness: Acceptance  Method: Explanation, Demonstration  Response: Needs Reinforcement    Body  Mechanics, taught by Marilyn Vogel OT at 1/26/2024 11:33 AM.  Learner: Patient  Readiness: Acceptance  Method: Explanation, Demonstration  Response: Needs Reinforcement    ADL Training, taught by Marilyn Vogel OT at 1/26/2024 11:33 AM.  Learner: Patient  Readiness: Acceptance  Method: Explanation, Demonstration  Response: Needs Reinforcement    Education Comments  No comments found.        Goals:  Encounter Problems       Encounter Problems (Active)       ADLs       Patient with complete upper body dressing with minimal assist  level of assistance  (Progressing)       Start:  01/24/24    Expected End:  02/14/24            Patient will complete daily grooming tasks with stand by level of assistance and PRN adaptive equipment while edge of bed . (Progressing)       Start:  01/24/24    Expected End:  02/14/24                       COGNITION/SAFETY       Patient will score WFL on standardized cognitive assessment with without cues and within reasonable time frame (Progressing)       Start:  01/24/24    Expected End:  02/14/24            Patient will follow 100% Simple commands to allow improved ADL performance. (Progressing)       Start:  01/24/24    Expected End:  02/14/24            Patient will demonstrated orientation x 3 with verbal cues. (Progressing)       Start:  01/24/24    Expected End:  02/14/24       ORIENTATION            EXERCISE/STRENGTHENING       Patient with increase BUE to 3+/5 strength. (Progressing)       Start:  01/24/24    Expected End:  02/14/24                       TRANSFERS       Patient will perform bed mobility moderate x2 level of assistance and bed rails and draw sheet in order to improve safety and independence with mobility (Progressing)       Start:  01/24/24    Expected End:  02/14/24            Patient will complete lateral functional transfer to chair with slideboard PRN and moderate assist x2. (Progressing)       Start:  01/24/24    Expected End:  02/14/24                              01/26/24 at 11:34 AM   Marilyn Vogel, OT   [phone]

## 2024-01-26 NOTE — PROGRESS NOTES
Chevy Benton is a 78 y.o. male on day 17 of admission presenting with Critical limb ischemia of both lower extremities (CMS/HCC).    Subjective   Patient in bed NAD, tolerating vac well. Vac pulling fine. Discussion with wound nurse on epic message over vac change and concern for maceration/necrosis of flaps. Pic is in media tab.        Physical Exam    Objective     REVIEW OF SYSTEMS  GENERAL:  Negative for malaise, significant weight loss, fever  HEENT:  No changes in hearing or vision, no nose bleeds or other nasal problems and Negative for frequent or significant headaches  NECK:  Negative for lumps, goiter, pain and significant neck swelling  RESPIRATORY:  Negative for cough, wheezing and shortness of breath  CARDIOVASCULAR:  Negative for chest pain, leg swelling and palpitations  GI:  Negative for abdominal discomfort, blood in stools or black stools and change in bowel habits  :  Negative for dysuria, frequency and incontinence  MUSCULOSKELETAL:  S/P right TMA (DOS 9/22/23). S/P right foot debridement to level of muscle/fascia (DOS: 1/19/24).   SKIN:  S/P open right TMA (DOS 9/22/23). S/P right foot debridement to level of muscle/fascia (DOS: 1/19/24).   PSYCH:  Negative for sleep disturbance, mood disorder and recent psychosocial stressors  HEMATOLOGY/LYMPHOLOGY:  Negative for prolonged bleeding, bruising easily, and swollen nodes.  ENDOCRINE:  Negative for cold or heat intolerance, polyuria, polydipsia and goiter  NEURO: Negative, denies any burning, tingling or numbness     Objective:   Vasc: DP and PT pulses are palpable bilateral.  CFT is less than 3 seconds bilateral.  Skin temperature is warm to cool proximal to distal bilateral.      Neuro:  Light touch is intact to the foot bilateral.  Protective sensation is intact to the foot when tested with the 5.07 SWM bilateral.  There is no clonus noted.  The hallux is downgoing bilateral.      Derm: S/P open right TMA (DOS 9/22/23). S/P right foot  "debridement to level of muscle/fascia (DOS: 1/19/24). Wound VAC applied to right open TMA site intact, functional, and suctioning. Output: <50 ml's.     MSK: S/P open right TMA (DOS 9/22/23). S/P right foot debridement to level of muscle/fascia (DOS: 1/19/24). Deferred 2/2 post operative course    Last Recorded Vitals  Blood pressure 121/61, pulse 94, temperature 35.8 °C (96.4 °F), temperature source Temporal, resp. rate 19, height 1.778 m (5' 10\"), weight 105 kg (231 lb 7.7 oz), SpO2 98 %.    Intake/Output last 3 Shifts:  I/O last 3 completed shifts:  In: 1195.5 (11.4 mL/kg) [I.V.:465.5 (4.4 mL/kg); Other:600; NG/GT:130]  Out: 2600 (24.8 mL/kg) [Other:2600]  Weight: 105 kg     Assessment/Plan   Principal Problem:    Critical limb ischemia of both lower extremities (CMS/HCC)  Active Problems:    Non-pressure chronic ulcer of other part of right foot with fat layer exposed (CMS/HCC)    Critical limb ischemia of right lower extremity (CMS/HCC)    Subclavian vein stenosis, left     S/P open right TMA (DOS 9/22/23). S/P right foot debridement to level of muscle/fascia (DOS: 1/19/24).   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  - Imaging reviewed.   - Continue abx therapy per ID/Primary  - Continue pain management per Medicine/Primary  - Appreciate EVLS recs and intervention.  - Wound vac taken down today for evaluation of flaps due to concern for necrosis. Vac to be discontinued over the weekend, will place vac on Monday.  - Integra graft and sutures removed today due to concern of necrosis of flaps.   - Bedside sharp excisional debridement down to muscle done. Please see procedure note for further information.   - Betadine wet to dry placed   - Nursing can reinforce as needed.  - Will continue to follow while in " house.      - Patient was seen at bedside and is resting comfortably.  A total of 60 minutes of face-to-face time was spent on this patient for professional services including but not limited to obtaining medially appropriate history, performing physical examination, collecting and explaining pertinent findings to patient, discussing relevant and viable treatment options, making appropriate level of medical decision, and coordinating care and facilitating treatment.  The end of the encounter was spent educating patient and family on treatment plan. All questions and concerns were answered and addressed to the pt's apparent satisfaction.       Marvin Saul DPM

## 2024-01-27 NOTE — PROGRESS NOTES
"Chevy Benton is a 78 y.o. male on day 18 of admission presenting with Critical limb ischemia of both lower extremities (CMS/HCC).    Subjective     NAEON.     Patient had been off IV hemodynamic support for over 24 hours as of AM evaluation.     No HD yesterday. Dialysis team setting up for dialysis session this morning.     Patient mentation stable from previous eval: drowsy in morning, alert and oriented to person, following commands.          Objective     Physical Exam       General: NAD. Patient is alert and oriented to name in the AM. Alert. Following simple commands.   Neuro: Strength and sensation appear grossly intact.   ENT: moist mucous membranes.   Cardiac: rate controlled afib. no murmurs. no rubs.  Pulmonary: CTAB. breath sounds appreciated in all lung fields. Normal work of breathing on minimal nasal cannula   Abdomen: non-tender. non-distended. normoactive bowel sounds.  Extremities: full passive ROM. 2+ b/l UE edema. Trace LE edema. RLE wrapped (from TMA revision this admission). Arms marginally less distended from previous examination.   Skin: no rashes. no ulcers.      Last Recorded Vitals  Blood pressure 121/61, pulse (!) 116, temperature 36.4 °C (97.5 °F), temperature source Temporal, resp. rate 22, height 1.778 m (5' 10\"), weight 105 kg (231 lb 7.7 oz), SpO2 92 %.  Intake/Output last 3 Shifts:  I/O last 3 completed shifts:  In: 1925.3 (18.3 mL/kg) [I.V.:65.3 (0.6 mL/kg); NG/GT:1605; IV Piggyback:255]  Out: - (0 mL/kg)   Weight: 105 kg     Relevant Results    Scheduled medications  allopurinol, 100 mg, oral, Daily  aspirin, 81 mg, oral, Daily  atorvastatin, 40 mg, oral, Nightly  B complex-vitamin C-folic acid, 1 capsule, oral, Daily  clopidogrel, 75 mg, oral, Daily  epoetin dane or biosimilar, 10,000 Units, subcutaneous, Every Mon/Wed/Fri  heparin (porcine), 5,000 Units, subcutaneous, q8h  insulin glargine, 6 Units, subcutaneous, Nightly  insulin lispro, 0-5 Units, subcutaneous, TID with " meals  melatonin, 5 mg, oral, Nightly  midodrine, 20 mg, oral, q8h  pantoprazole, 40 mg, intravenous, Daily  perflutren protein A microsphere, 0.5 mL, intravenous, Once in imaging  polyethylene glycol, 17 g, oral, Daily  sennosides, 1 tablet, oral, Nightly  [Held by provider] sevelamer carbonate, 800 mg, oral, TID with meals  sulfur hexafluoride microsphr, 2 mL, intravenous, Once in imaging      Continuous medications  norepinephrine, 0.01-1 mcg/kg/min, Last Rate: 0.16 mcg/kg/min (01/27/24 1100)      PRN medications  PRN medications: acetaminophen **OR** acetaminophen **OR** acetaminophen, acetaminophen, albuterol, alum-mag hydroxide-simeth, ammonium lactate, dextrose, docusate sodium, glucagon, heparin, heparin, hydrocortisone, ipratropium-albuteroL, oxygen, povidone-iodine, sodium chloride    Results for orders placed or performed during the hospital encounter of 01/09/24 (from the past 24 hour(s))   POCT GLUCOSE   Result Value Ref Range    POCT Glucose 236 (H) 74 - 99 mg/dL   POCT GLUCOSE   Result Value Ref Range    POCT Glucose 175 (H) 74 - 99 mg/dL   Renal Function Panel   Result Value Ref Range    Glucose 196 (H) 74 - 99 mg/dL    Sodium 137 136 - 145 mmol/L    Potassium 3.6 3.5 - 5.3 mmol/L    Chloride 100 98 - 107 mmol/L    Bicarbonate 28 21 - 32 mmol/L    Anion Gap 13 10 - 20 mmol/L    Urea Nitrogen 20 6 - 23 mg/dL    Creatinine 2.36 (H) 0.50 - 1.30 mg/dL    eGFR 27 (L) >60 mL/min/1.73m*2    Calcium 9.5 8.6 - 10.6 mg/dL    Phosphorus 2.7 2.5 - 4.9 mg/dL    Albumin 2.5 (L) 3.4 - 5.0 g/dL   Magnesium   Result Value Ref Range    Magnesium 2.18 1.60 - 2.40 mg/dL   CBC and Auto Differential   Result Value Ref Range    WBC 10.2 4.4 - 11.3 x10*3/uL    nRBC 0.8 (H) 0.0 - 0.0 /100 WBCs    RBC 2.41 (L) 4.50 - 5.90 x10*6/uL    Hemoglobin 7.1 (L) 13.5 - 17.5 g/dL    Hematocrit 22.1 (L) 41.0 - 52.0 %    MCV 92 80 - 100 fL    MCH 29.5 26.0 - 34.0 pg    MCHC 32.1 32.0 - 36.0 g/dL    RDW 18.3 (H) 11.5 - 14.5 %    Platelets  112 (L) 150 - 450 x10*3/uL    Neutrophils % 81.1 40.0 - 80.0 %    Immature Granulocytes %, Automated 0.5 0.0 - 0.9 %    Lymphocytes % 7.4 13.0 - 44.0 %    Monocytes % 8.0 2.0 - 10.0 %    Eosinophils % 2.7 0.0 - 6.0 %    Basophils % 0.3 0.0 - 2.0 %    Neutrophils Absolute 8.26 (H) 1.60 - 5.50 x10*3/uL    Immature Granulocytes Absolute, Automated 0.05 0.00 - 0.50 x10*3/uL    Lymphocytes Absolute 0.75 (L) 0.80 - 3.00 x10*3/uL    Monocytes Absolute 0.81 (H) 0.05 - 0.80 x10*3/uL    Eosinophils Absolute 0.27 0.00 - 0.40 x10*3/uL    Basophils Absolute 0.03 0.00 - 0.10 x10*3/uL   Renal Function Panel   Result Value Ref Range    Glucose 221 (H) 74 - 99 mg/dL    Sodium 133 (L) 136 - 145 mmol/L    Potassium 4.1 3.5 - 5.3 mmol/L    Chloride 98 98 - 107 mmol/L    Bicarbonate 28 21 - 32 mmol/L    Anion Gap 11 10 - 20 mmol/L    Urea Nitrogen 25 (H) 6 - 23 mg/dL    Creatinine 2.92 (H) 0.50 - 1.30 mg/dL    eGFR 21 (L) >60 mL/min/1.73m*2    Calcium 9.1 8.6 - 10.6 mg/dL    Phosphorus 3.5 2.5 - 4.9 mg/dL    Albumin 2.5 (L) 3.4 - 5.0 g/dL   Magnesium   Result Value Ref Range    Magnesium 2.27 1.60 - 2.40 mg/dL   POCT GLUCOSE   Result Value Ref Range    POCT Glucose 189 (H) 74 - 99 mg/dL   Blood Gas Arterial Full Panel   Result Value Ref Range    POCT pH, Arterial 7.45 (H) 7.38 - 7.42 pH    POCT pCO2, Arterial 42 38 - 42 mm Hg    POCT pO2, Arterial 123 (H) 85 - 95 mm Hg    POCT SO2, Arterial 99 94 - 100 %    POCT Oxy Hemoglobin, Arterial 97.2 94.0 - 98.0 %    POCT Hematocrit Calculated, Arterial 28.0 (L) 41.0 - 52.0 %    POCT Sodium, Arterial 131 (L) 136 - 145 mmol/L    POCT Potassium, Arterial 3.7 3.5 - 5.3 mmol/L    POCT Chloride, Arterial 100 98 - 107 mmol/L    POCT Ionized Calcium, Arterial 1.19 1.10 - 1.33 mmol/L    POCT Glucose, Arterial 196 (H) 74 - 99 mg/dL    POCT Lactate, Arterial 0.9 0.4 - 2.0 mmol/L    POCT Base Excess, Arterial 4.7 (H) -2.0 - 3.0 mmol/L    POCT HCO3 Calculated, Arterial 29.2 (H) 22.0 - 26.0 mmol/L    POCT  Hemoglobin, Arterial 9.2 (L) 13.5 - 17.5 g/dL    POCT Anion Gap, Arterial 6 (L) 10 - 25 mmo/L    Patient Temperature 37.0 degrees Celsius    FiO2 32 %     Vascular US upper extremity venous duplex left    Result Date: 1/26/2024  Preliminary Cardiology Report           [place] [id number]   Tel [phone] and Fax [phone]       Preliminary Vascular Lab Report  VASC US UPPER EXTREMITY VENOUS DUPLEX LEFT  Patient Name:      SHAMEKA Mendez Physician: [id number] Jovanna Francis MD Study Date:        1/26/2024           Ordering           [id number] CHUCKY BLACKBURN                                        Physician: MRN/PID:           [id number]            Technologist:      Elisabeth Betts T Accession#:        [id number]        Technologist 2: Date of Birth/Age: 1945           Encounter#:        [id number] Gender:            M Admission Status:  Inpatient           Location           [facility]                                        Performed:  Diagnosis/ICD: Left arm swelling-M79.89 Indication:    Limb swelling Procedure/CPT: 29898 Peripheral venous duplex scan for DVT Limited  PRELIMINARY CONCLUSIONS: Right Upper Venous: Cannot rule out thrombus of non-visualized subclavian vein due to dressings and line placement. Left Upper Venous: The left basilic vein was not visualized. Left brachiocephalic AVF is noted. Visualized in segments due to bandages from recent dialysis access. Remainder of visualized vessels show no evidence of acute deep vein thrombus. Cannot rule out thrombus of non-visualized mid subclavian and distal subclavian veins due to dressings and catheter placement.  Additional Findings: Technically difficult due to patient positioning and limited mobility.  Imaging & Doppler Findings:  Left                Compress Thrombus        Flow Internal Jugular      Yes      None    Spontaneous/Phasic Subclavian Proximal   Yes      None   Spontaneous/Phasic Axillary              Yes      None   Spontaneous/Phasic Brachial              Yes      None Cephalic              Yes      None VASCULAR PRELIMINARY REPORT completed by Elisabeth Betts RVT on 1/26/2024 at 3:21:24 PM  ** Final **     CT head wo IV contrast    Result Date: 1/26/2024  Interpreted By:  Marilyn Torres,  and Nataly Gayle STUDY: CT HEAD WO IV CONTRAST;  1/25/2024 10:08 pm   INDICATION: Signs/Symptoms:AMS CICU patient.   COMPARISON: None.   ACCESSION NUMBER(S): [id number]   ORDERING CLINICIAN: ALVIN AMBRIZ   TECHNIQUE: Noncontrast axial CT scan of head was performed. Angled reformats in brain and bone windows were generated. The images were reviewed in bone, brain, blood and soft tissue windows.   FINDINGS: CSF Spaces: The ventricles, sulci and basal cisterns are concordant with the degree of parenchymal atrophy. There is no extraaxial fluid collection.   Parenchyma: There are nonspecific patchy, confluent periventricular and subcortical white matter hypodensities which are likely sequela of chronic microvascular ischemic changes. Otherwise, the grey-white differentiation is intact. There is no mass effect or midline shift. There is no intracranial hemorrhage.   Calvarium: The calvarium is unremarkable.   Paranasal sinuses and mastoids: Visualized paranasal sinuses and mastoids are clear.       1.  No evidence of acute intracranial hemorrhage or mass effect. 2. Nonspecific periventricular and subcortical white matter hypodensities likely sequela of chronic microvascular ischemic changes     I personally reviewed the images/study and I agree with the findings as stated by Resident Irwin Ingram MD. This study was interpreted at [place], [place].   MACRO: None   Signed by: Marilyn Torres 1/26/2024 7:23 AM Dictation workstation:   [id number]    XR abdomen 1 view    Result Date:  1/25/2024  Interpreted By:  Jori Garcia and Ritchie Brandon STUDY: XR ABDOMEN 1 VIEW;  1/25/2024 3:05 pm   INDICATION: Signs/Symptoms:CICU confirm NG placement.   COMPARISON: Chest x-ray 01/23/2024   ACCESSION NUMBER(S): [id number]   ORDERING CLINICIAN: ALVIN AMBRIZ   FINDINGS: Enteric tube courses midline below the level of the diaphragm with the tip located in the gastric body. The side-hole is below the GE junction.   Multiple gaseous distended loops of small and large bowel. For example there is a lucent area of gaseous ascending colon in the right upper quadrant measuring 5.2 cm. There is limited evaluation of pneumoperitoneum on supine imaging, however there does not appear to be any evidence of free air surrounding this gaseous distended loop of ascending/Rigler's sign. Right diaphragm is distinctly visualized separate from this loop of bowel without evidence of subdiaphragmatic free air.   Additional mildly prominent loops of small bowel scattered throughout the bilateral hemiabdomen, for example largest loop of small bowel can be seen in the left upper quadrant measuring 3.4 cm.   Visualized lungs are clear.   Osseous structures demonstrate no acute bony changes.       1. Enteric tube as described above with the distal tip projecting over the gastric body. 2. Multiple mildly distended loops of small and large bowel as described above. Recommend attention on follow-up examinations as could reflect early ileus. 3. No definitive evidence of free air on current supine radiograph as described above.   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 AMS likely multifactorial in setting of recent surgery, sedative use, prolonged hospital stay, with possible element of hypercapnia. AMS has been gradually improving. Hypotension felt likely in setting of ESRD. Difficult to accurately access BP peripherally due to edema or centrally with a-line due to PVD.  Low concern for septic shock as cause of hypotension given lack of other features: no fever or leukocytosis. Will continue to remove excess fluid with renal replacement therapy (nephrology following). Patient weaned off BIPAP by 1/24/24 and has been tolerating NC since. Mentation gradually improving with ongoing dialysis. Goal to improve peripheral blood pressure readings as upper extremity fluid is removed to facilitate patient transfer to floor, presuming he is not requiring pressor support during HD.      1/27/24 Updates:  - nephrology following for dialysis (getting dialysis 1/27 AM)   - patient required pressor support during dialysis on 1/27   - still working to correlate peripheral cuff pressures with A-line as fluid removed and peripheral edema improves   - NG tube for nutrition given failed 1/25 swallow eval (will update swallow eval beginning of next week)   - will adjust midodrine dosing as able, goal MAP >60, may need titration to sustain adequate MAPs during dialysis   - repleting lytes as needed   - CT head 1/25 wnl   - LUE extremity US without definitive DVT  - resuming DVT ppx now that platelets improved, stable >100k         Neuro  #AMS  #Acute on Chronic CO2 Narcosis  #Delirium  #C/f Cefepime toxicity  :: sedating medications held (gabapentin; robaxin held)  :: cefepime d/cd (see ID)  - delirium precautions  - CT head 1/26 wnl, no acute intracranial findings   - continue to monitor   - if no interval  improvements in mentation despite dialysis, would consider neurology consult for further evaluation (patient has had CT head, no MRI or EEG)      CV  #PAD s/p TMA and 1/19 debridement  - Endovascular Cardiology on board  - Podiatry following peripherally at this point in time, patient will follow-up outpatient with Dr Rodriguez   - c/w home ASA 81mg PO every day  - c/w home clopidogrel 75mg PO every day  - c/w home atorvastatin 40mg PO every day      #HFrEF (35-40% 10/2023)  #Hypotension  :: intermittent levo as needed for goal MAP >60 if becomes hypotensive during dialysis   - midodrine 20mg PO TID initiated overnight of 1/22-1/23 for soft Bps, will titrate as able to sustain goal MAPs >60, pt has history of becoming hypotensive during dialysis   :: TTE 1/23/24 with LVEF to 40-45%, mildly improved from previous in 2023, no other new acute findings   - consider GDMT when BP stable and tolerating well during dialysis      #Afib  #SSS s/p PPM  - hold home metoprolol succinate 25mg PO every day iso shock, can consider resuming if BP remains at goal with dialysis   - AC on hold iso 1/16 TA perforation  - endovascular cardiology recs to resume Eliquis at discharge  - DVT ppx resumed 1/27, was being held per vascular recs due to previous thrombocytopenia, resuming iso platelet count stable >100k      Resp  #Hypercapnic Respiratory Acidosis  - BiPAP for hypercapnia as needed   - Patient transitioned to NC on 1/24, post transition ABG stable and wnl, no evidence of evolving hypercarbia      ID  #Pseudomonas and Enterobacter on 1/19 tissue cx  :: s/p Cefepime x1 on 1/21  - s/p meropenem 1g q12hr (1/22-24)  - vancomycin, pharmacy to dose (1/21-24)     GI  #GERD  #GI ppx  - pantoprazole 40mg IV daily     #Bowel Regimen  - Miralax every day  - Senna every day      #Concern for Dysphagia- coughing with meds  - Speech Language Pathology on board  - SLP following   - NG placed 1/25   - Will continue to engage SLP for futher eval       Renal  #ESRD  #NAGMA  #Fluid overload  #Pulmonary Interstitial Edema, Pleural Effusions  - Nephrology on board  - EPO initiated 1/22  - Ongoing Dialysis per nephro recommendations      Endocrine  #DMII  - insulin lantus 6U at bedtime  - SSI     Rheum  #Gout  - c/w home allopurinol     MSK  #RLE TMA Revision   - podiatry continuing to follow, appreciate recs      F: PRN, FWF with tube feeds   E: PRN  N: tube feeds  A: LIJ central line (1/22),  R radial arterial line (1/22).  DVT ppx: subcutaneous heparin   NOK: Julia Benton [phone]      Eric Urias MD

## 2024-01-27 NOTE — CARE PLAN
The clinical goals for the shift include keeping the patient safe while in bilateral mitts.    Over the shift, the patient did meet this goal. Barriers to progression include the patient being confused. Recommendations to address these barriers include frequent reorienting the patient and checking his skin integrity.

## 2024-01-27 NOTE — CARE PLAN
The patient's goals for the shift include      The clinical goals for the shift include Patient will maintain MAP > 65 without requiring levo gtt during this shift.

## 2024-01-27 NOTE — PROCEDURES
I evaluated the patient during dialysis. No complaints.    BP: 90/49  BFR: 300  Anticipated fluid removal: 2L  Vascular access: left internal jugular catheter    Plan: Continue TTS dialysis schedule.  Van

## 2024-01-27 NOTE — PROGRESS NOTES
Nephrology Consult Progress Note    Admit Date: 1/9/2024    Interval history:  Pt remains lethargic, slightly drowsy and unable to communicate properly    CURRENT MEDICATIONS:    Current Facility-Administered Medications:     acetaminophen (Tylenol) tablet 650 mg, 650 mg, oral, q4h PRN, 650 mg at 01/24/24 1837 **OR** acetaminophen (Tylenol) oral liquid 650 mg, 650 mg, nasogastric tube, q4h PRN, 650 mg at 01/27/24 1206 **OR** acetaminophen (Tylenol) suppository 650 mg, 650 mg, rectal, q4h PRN, Mark Navarro MD    acetaminophen (Tylenol) tablet 975 mg, 975 mg, oral, q6h PRN, Michele Hilton MD, 975 mg at 01/22/24 2120    albuterol 90 mcg/actuation inhaler 2 puff, 2 puff, inhalation, q6h PRN, Mark Navarro MD, 2 puff at 01/14/24 0850    allopurinol (Zyloprim) tablet 100 mg, 100 mg, oral, Daily, Mark Navarro MD, 100 mg at 01/27/24 0806    alum-mag hydroxide-simeth (Mylanta) 200-200-20 mg/5 mL oral suspension 30 mL, 30 mL, oral, 4x daily PRN, Mark Navarro MD    ammonium lactate (Lac-Hydrin) 12 % lotion, , Topical, q1h PRN, Mark Navarro MD    aspirin chewable tablet 81 mg, 81 mg, oral, Daily, Mark Navarro MD, 81 mg at 01/27/24 0805    atorvastatin (Lipitor) tablet 40 mg, 40 mg, oral, Nightly, Mark Navarro MD, 40 mg at 01/26/24 2057    B complex-vitamin C-folic acid (Nephrocaps) capsule 1 capsule, 1 capsule, oral, Daily, Mark Navarro MD, 1 capsule at 01/27/24 0806    clopidogrel (Plavix) tablet 75 mg, 75 mg, oral, Daily, Mark Navarro MD, 75 mg at 01/27/24 0805    dextrose 50 % injection 25 g, 25 g, intravenous, q15 min PRN, Mark Navarro MD, 25 g at 01/26/24 1144    docusate sodium (Colace) capsule 100 mg, 100 mg, oral, BID PRN, Mark Navarro MD, 100 mg at 01/16/24 1121    epoetin dane (Epogen,Procrit) injection 10,000 Units, 10,000 Units, subcutaneous, Every Mon/Wed/Fri, Ronaldo Koehler MD, 10,000 Units at 01/26/24 1632    glucagon (Glucagen) injection 1 mg, 1 mg, intramuscular, q15 min PRN, Mark Navarro MD    heparin  (porcine) injection 1,200 Units, 1,200 Units, intravenous, PRN, Judy Wardt, DO    heparin (porcine) injection 1,200 Units, 1,200 Units, intravenous, PRN, Judy Cazares, DO    heparin (porcine) injection 5,000 Units, 5,000 Units, subcutaneous, q8h, Eric Urias MD, 5,000 Units at 01/27/24 1017    hydrocortisone 2.5 % ointment, , Topical, BID PRN, Mark Navarro MD    insulin glargine (Lantus) injection 6 Units, 6 Units, subcutaneous, Nightly, Mark Navarro MD, 6 Units at 01/26/24 2058    insulin lispro (HumaLOG) injection 0-5 Units, 0-5 Units, subcutaneous, TID with meals, Mark Navarro MD, 1 Units at 01/27/24 0936    ipratropium-albuteroL (Duo-Neb) 0.5-2.5 mg/3 mL nebulizer solution 3 mL, 3 mL, nebulization, q6h PRN, Mark Navarro MD    melatonin tablet 5 mg, 5 mg, oral, Nightly, Laura Joaquin MD, 5 mg at 01/26/24 2057    midodrine (Proamatine) tablet 20 mg, 20 mg, oral, q8h, Eric Urias MD, 20 mg at 01/27/24 0805    norepinephrine (Levophed) 8 mg in dextrose 5% 250 mL (0.032 mg/mL) infusion (premix), 0.01-1 mcg/kg/min, intravenous, Continuous, Laura Joaquin MD, Last Rate: 3.94 mL/hr at 01/27/24 1544, 0.02 mcg/kg/min at 01/27/24 1544    oxygen (O2) therapy, , inhalation, Continuous PRN - O2/gases, Koko Gordon MD, 3 L/min at 01/24/24 0900    pantoprazole (ProtoNix) injection 40 mg, 40 mg, intravenous, Daily, Eric Urias MD, 40 mg at 01/27/24 0805    perflutren protein A microsphere (Optison) injection 0.5 mL, 0.5 mL, intravenous, Once in imaging, Mark Navarro MD    polyethylene glycol (Glycolax, Miralax) packet 17 g, 17 g, oral, Daily, Mark Navarro MD, 17 g at 01/27/24 0807    povidone-iodine (Betadine) 7.5 % soap 1 Application, 1 Application, Topical, Daily PRN, John Kent MD, 1 Application at 01/18/24 0400    sennosides (Senokot) tablet 8.6 mg, 1 tablet, oral, Nightly, Mark Navarro MD, 8.6 mg at 01/26/24 2057    [Held by provider] sevelamer carbonate (Renvela) tablet 800 mg, 800 mg,  "oral, TID with meals, Mark Navarro MD, 800 mg at 01/24/24 0834    sodium chloride 0.9 % bolus 1,000 mL, 1,000 mL, CRRT, PRN, Judy Cazares DO    sulfur hexafluoride microsphr (Lumason) injection 24.28 mg, 2 mL, intravenous, Once in imaging, Mark Navarro MD       Intake/Output Summary (Last 24 hours) at 1/27/2024 1609  Last data filed at 1/27/2024 1400  Gross per 24 hour   Intake 2474.36 ml   Output --   Net 2474.36 ml         PHYSICAL EXAM:  /61 Comment: 122/54  Pulse 97   Temp 36.4 °C (97.5 °F) (Temporal)   Resp 20   Ht 1.778 m (5' 10\")   Wt 105 kg (231 lb 7.7 oz)   SpO2 92%   BMI 33.21 kg/m²     Intake/Output Summary (Last 24 hours) at 1/27/2024 1609  Last data filed at 1/27/2024 1400  Gross per 24 hour   Intake 2474.36 ml   Output --   Net 2474.36 ml       Gen: non communicative, NAD  Neck: No JVD  Cardiac: RRR  Resp: few rales   Abd: Soft, non tender, +BS, non distended   Ext: grade 1 edema   Access: RUE AVF  Neuro: moves 4 ext  Peripheral Pulses: Capillary refill <2secs, weak peripheral pulses.      Labs:  Results for orders placed or performed during the hospital encounter of 01/09/24 (from the past 24 hour(s))   POCT GLUCOSE   Result Value Ref Range    POCT Glucose 175 (H) 74 - 99 mg/dL   Renal Function Panel   Result Value Ref Range    Glucose 196 (H) 74 - 99 mg/dL    Sodium 137 136 - 145 mmol/L    Potassium 3.6 3.5 - 5.3 mmol/L    Chloride 100 98 - 107 mmol/L    Bicarbonate 28 21 - 32 mmol/L    Anion Gap 13 10 - 20 mmol/L    Urea Nitrogen 20 6 - 23 mg/dL    Creatinine 2.36 (H) 0.50 - 1.30 mg/dL    eGFR 27 (L) >60 mL/min/1.73m*2    Calcium 9.5 8.6 - 10.6 mg/dL    Phosphorus 2.7 2.5 - 4.9 mg/dL    Albumin 2.5 (L) 3.4 - 5.0 g/dL   Magnesium   Result Value Ref Range    Magnesium 2.18 1.60 - 2.40 mg/dL   CBC and Auto Differential   Result Value Ref Range    WBC 10.2 4.4 - 11.3 x10*3/uL    nRBC 0.8 (H) 0.0 - 0.0 /100 WBCs    RBC 2.41 (L) 4.50 - 5.90 x10*6/uL    Hemoglobin 7.1 (L) 13.5 - 17.5 g/dL "    Hematocrit 22.1 (L) 41.0 - 52.0 %    MCV 92 80 - 100 fL    MCH 29.5 26.0 - 34.0 pg    MCHC 32.1 32.0 - 36.0 g/dL    RDW 18.3 (H) 11.5 - 14.5 %    Platelets 112 (L) 150 - 450 x10*3/uL    Neutrophils % 81.1 40.0 - 80.0 %    Immature Granulocytes %, Automated 0.5 0.0 - 0.9 %    Lymphocytes % 7.4 13.0 - 44.0 %    Monocytes % 8.0 2.0 - 10.0 %    Eosinophils % 2.7 0.0 - 6.0 %    Basophils % 0.3 0.0 - 2.0 %    Neutrophils Absolute 8.26 (H) 1.60 - 5.50 x10*3/uL    Immature Granulocytes Absolute, Automated 0.05 0.00 - 0.50 x10*3/uL    Lymphocytes Absolute 0.75 (L) 0.80 - 3.00 x10*3/uL    Monocytes Absolute 0.81 (H) 0.05 - 0.80 x10*3/uL    Eosinophils Absolute 0.27 0.00 - 0.40 x10*3/uL    Basophils Absolute 0.03 0.00 - 0.10 x10*3/uL   Renal Function Panel   Result Value Ref Range    Glucose 221 (H) 74 - 99 mg/dL    Sodium 133 (L) 136 - 145 mmol/L    Potassium 4.1 3.5 - 5.3 mmol/L    Chloride 98 98 - 107 mmol/L    Bicarbonate 28 21 - 32 mmol/L    Anion Gap 11 10 - 20 mmol/L    Urea Nitrogen 25 (H) 6 - 23 mg/dL    Creatinine 2.92 (H) 0.50 - 1.30 mg/dL    eGFR 21 (L) >60 mL/min/1.73m*2    Calcium 9.1 8.6 - 10.6 mg/dL    Phosphorus 3.5 2.5 - 4.9 mg/dL    Albumin 2.5 (L) 3.4 - 5.0 g/dL   Magnesium   Result Value Ref Range    Magnesium 2.27 1.60 - 2.40 mg/dL   POCT GLUCOSE   Result Value Ref Range    POCT Glucose 189 (H) 74 - 99 mg/dL   Blood Gas Arterial Full Panel   Result Value Ref Range    POCT pH, Arterial 7.45 (H) 7.38 - 7.42 pH    POCT pCO2, Arterial 42 38 - 42 mm Hg    POCT pO2, Arterial 123 (H) 85 - 95 mm Hg    POCT SO2, Arterial 99 94 - 100 %    POCT Oxy Hemoglobin, Arterial 97.2 94.0 - 98.0 %    POCT Hematocrit Calculated, Arterial 28.0 (L) 41.0 - 52.0 %    POCT Sodium, Arterial 131 (L) 136 - 145 mmol/L    POCT Potassium, Arterial 3.7 3.5 - 5.3 mmol/L    POCT Chloride, Arterial 100 98 - 107 mmol/L    POCT Ionized Calcium, Arterial 1.19 1.10 - 1.33 mmol/L    POCT Glucose, Arterial 196 (H) 74 - 99 mg/dL    POCT Lactate,  Arterial 0.9 0.4 - 2.0 mmol/L    POCT Base Excess, Arterial 4.7 (H) -2.0 - 3.0 mmol/L    POCT HCO3 Calculated, Arterial 29.2 (H) 22.0 - 26.0 mmol/L    POCT Hemoglobin, Arterial 9.2 (L) 13.5 - 17.5 g/dL    POCT Anion Gap, Arterial 6 (L) 10 - 25 mmo/L    Patient Temperature 37.0 degrees Celsius    FiO2 32 %   POCT GLUCOSE   Result Value Ref Range    POCT Glucose 88 74 - 99 mg/dL   CBC and Auto Differential   Result Value Ref Range    WBC 9.5 4.4 - 11.3 x10*3/uL    nRBC 1.0 (H) 0.0 - 0.0 /100 WBCs    RBC 2.44 (L) 4.50 - 5.90 x10*6/uL    Hemoglobin 7.2 (L) 13.5 - 17.5 g/dL    Hematocrit 24.2 (L) 41.0 - 52.0 %    MCV 99 80 - 100 fL    MCH 29.5 26.0 - 34.0 pg    MCHC 29.8 (L) 32.0 - 36.0 g/dL    RDW 18.8 (H) 11.5 - 14.5 %    Platelets 79 (L) 150 - 450 x10*3/uL    Neutrophils % 81.5 40.0 - 80.0 %    Immature Granulocytes %, Automated 0.6 0.0 - 0.9 %    Lymphocytes % 7.4 13.0 - 44.0 %    Monocytes % 7.3 2.0 - 10.0 %    Eosinophils % 2.9 0.0 - 6.0 %    Basophils % 0.3 0.0 - 2.0 %    Neutrophils Absolute 7.76 (H) 1.60 - 5.50 x10*3/uL    Immature Granulocytes Absolute, Automated 0.06 0.00 - 0.50 x10*3/uL    Lymphocytes Absolute 0.71 (L) 0.80 - 3.00 x10*3/uL    Monocytes Absolute 0.70 0.05 - 0.80 x10*3/uL    Eosinophils Absolute 0.28 0.00 - 0.40 x10*3/uL    Basophils Absolute 0.03 0.00 - 0.10 x10*3/uL   Blood Gas Arterial Full Panel   Result Value Ref Range    POCT pH, Arterial 7.42 7.38 - 7.42 pH    POCT pCO2, Arterial 45 (H) 38 - 42 mm Hg    POCT pO2, Arterial 146 (H) 85 - 95 mm Hg    POCT SO2, Arterial 100 94 - 100 %    POCT Oxy Hemoglobin, Arterial 97.5 94.0 - 98.0 %    POCT Hematocrit Calculated, Arterial 25.0 (L) 41.0 - 52.0 %    POCT Sodium, Arterial 131 (L) 136 - 145 mmol/L    POCT Potassium, Arterial 3.4 (L) 3.5 - 5.3 mmol/L    POCT Chloride, Arterial 99 98 - 107 mmol/L    POCT Ionized Calcium, Arterial 1.27 1.10 - 1.33 mmol/L    POCT Glucose, Arterial 253 (H) 74 - 99 mg/dL    POCT Lactate, Arterial 1.4 0.4 - 2.0  mmol/L    POCT Base Excess, Arterial 4.2 (H) -2.0 - 3.0 mmol/L    POCT HCO3 Calculated, Arterial 29.2 (H) 22.0 - 26.0 mmol/L    POCT Hemoglobin, Arterial 8.4 (L) 13.5 - 17.5 g/dL    POCT Anion Gap, Arterial 6 (L) 10 - 25 mmo/L    Patient Temperature 37.0 degrees Celsius    FiO2 28 %      Vascular US upper extremity venous duplex left    Result Date: 1/27/2024            [place] [address]   Tel [phone] and Fax [phone]  Vascular Lab Report VASC US UPPER EXTREMITY VENOUS DUPLEX LEFT  Patient Name:      SHAMEKA Mendez Physician: [id number] Jovanna Francis MD Study Date:        1/26/2024           Ordering           [id number] CHUCKY BLACKBURN                                        Physician: MRN/PID:           [id number]            Technologist:      Elisabeth Betts [facility] Accession#:        [id number]        Technologist 2: Date of Birth/Age: 1945 / 78      Encounter#:        [id number]                    years Gender:            M Admission Status:  Inpatient           Location           [facility]                                        Performed:  Diagnosis/ICD: Left arm swelling-M79.89 Indication:    Limb swelling CPT Codes:     32768 Peripheral venous duplex scan for DVT Limited  CONCLUSIONS: Right Upper Venous: Unable to visualize subclavian vein due to lines and bandages. Left Upper Venous: Technically limited exam; negative for acute deep vein thrombus in visualized veins. Unable to rule out deep vein thrombus in non-visualized mid and distal subclavian vein. The left basilic vein was not visualized. Left brachiocephalic arteriovenous fistula is noted. Visualized in segments due to bandages from recent dialysis access.  Additional Findings: Technically difficult due to patient positioning and limited mobility.  Imaging & Doppler Findings:  Left                Compress Thrombus         Flow Internal Jugular      Yes      None   Spontaneous/Phasic Subclavian Proximal   Yes      None   Spontaneous/Phasic Axillary              Yes      None   Spontaneous/Phasic Brachial              Yes      None Cephalic              Yes      None  [id number] Jovanna Francis MD Electronically signed by [id number] Jovanna Francis MD on 1/27/2024 at 2:56:51 PM  ** Final **     CT head wo IV contrast    Result Date: 1/26/2024  Interpreted By:  Marilyn Torres and Dervishi Mario STUDY: CT HEAD WO IV CONTRAST;  1/25/2024 10:08 pm   INDICATION: Signs/Symptoms:AMS CICU patient.   COMPARISON: None.   ACCESSION NUMBER(S): [id number]   ORDERING CLINICIAN: ALVIN AMBRIZ   TECHNIQUE: Noncontrast axial CT scan of head was performed. Angled reformats in brain and bone windows were generated. The images were reviewed in bone, brain, blood and soft tissue windows.   FINDINGS: CSF Spaces: The ventricles, sulci and basal cisterns are concordant with the degree of parenchymal atrophy. There is no extraaxial fluid collection.   Parenchyma: There are nonspecific patchy, confluent periventricular and subcortical white matter hypodensities which are likely sequela of chronic microvascular ischemic changes. Otherwise, the grey-white differentiation is intact. There is no mass effect or midline shift. There is no intracranial hemorrhage.   Calvarium: The calvarium is unremarkable.   Paranasal sinuses and mastoids: Visualized paranasal sinuses and mastoids are clear.       1.  No evidence of acute intracranial hemorrhage or mass effect. 2. Nonspecific periventricular and subcortical white matter hypodensities likely sequela of chronic microvascular ischemic changes     I personally reviewed the images/study and I agree with the findings as stated by Resident Irwin Ingram MD. This study was interpreted at [facility], [place], [place].   MACRO: None   Signed by: Marilyn Torres 1/26/2024 7:23 AM Dictation workstation:    [id number]      Assessment and Plan:  Pt with ESKD, admitted with RLE ischemia, s/p RLE debridement and revascularization on 1/17  - ESKD on HD: [facility]   [facility] from 1/22 till 1/23  On 3lpm NC   Remains off pressors   Anemia, Hb >8, on epogen  Routine IHD today with 2L off as tolerated    Will continue to follow.     Lis Alonso MD   Nephrology Fellow

## 2024-01-27 NOTE — PROGRESS NOTES
Chevy Benton is a 78 y.o. male on day 18 of admission presenting with Critical limb ischemia of both lower extremities (CMS/HCC).    Subjective   Pt resting comfortably in bed. Podiatry was paged for prominent strikethrough of dressing. Upon breakdown of dressing no active bleeding was noted. Dressings consisted of surgicell, gauze, aquacel, kerlix, and ACE wrap.        Physical Exam    Objective     REVIEW OF SYSTEMS  GENERAL:  Negative for malaise, significant weight loss, fever  HEENT:  No changes in hearing or vision, no nose bleeds or other nasal problems and Negative for frequent or significant headaches  NECK:  Negative for lumps, goiter, pain and significant neck swelling  RESPIRATORY:  Negative for cough, wheezing and shortness of breath  CARDIOVASCULAR:  Negative for chest pain, leg swelling and palpitations  GI:  Negative for abdominal discomfort, blood in stools or black stools and change in bowel habits  :  Negative for dysuria, frequency and incontinence  MUSCULOSKELETAL:  S/P right TMA (DOS 9/22/23). S/P right foot debridement to level of muscle/fascia (DOS: 1/19/24).   SKIN:  S/P open right TMA (DOS 9/22/23). S/P right foot debridement to level of muscle/fascia (DOS: 1/19/24).   PSYCH:  Negative for sleep disturbance, mood disorder and recent psychosocial stressors  HEMATOLOGY/LYMPHOLOGY:  Negative for prolonged bleeding, bruising easily, and swollen nodes.  ENDOCRINE:  Negative for cold or heat intolerance, polyuria, polydipsia and goiter  NEURO: Negative, denies any burning, tingling or numbness      Objective:   Vasc: DP and PT pulses are palpable bilateral.  CFT is less than 3 seconds bilateral.  Skin temperature is warm to cool proximal to distal bilateral.       Neuro:  Light touch is intact to the foot bilateral.  Protective sensation is intact to the foot when tested with the 5.07 SWM bilateral.  There is no clonus noted.  The hallux is downgoing bilateral.       Derm: S/P open right  "TMA (DOS 9/22/23). S/P right foot debridement to level of muscle/fascia (DOS: 1/19/24). Wound VAC applied to right open TMA site intact, functional, and suctioning. Output: <50 ml's.      MSK: S/P open right TMA (DOS 9/22/23). S/P right foot debridement to level of muscle/fascia (DOS: 1/19/24). Deferred 2/2 post operative course    Last Recorded Vitals  Blood pressure 121/61, pulse 89, temperature 36 °C (96.8 °F), resp. rate 13, height 1.778 m (5' 10\"), weight 105 kg (231 lb 7.7 oz), SpO2 98 %.    Intake/Output last 3 Shifts:  I/O last 3 completed shifts:  In: 1195.3 (11.4 mL/kg) [I.V.:65.3 (0.6 mL/kg); NG/GT:1130]  Out: - (0 mL/kg)   Weight: 105 kg     Relevant Results           Assessment/Plan   Principal Problem:    Critical limb ischemia of both lower extremities (CMS/HCC)  Active Problems:    Non-pressure chronic ulcer of other part of right foot with fat layer exposed (CMS/HCC)    Critical limb ischemia of right lower extremity (CMS/HCC)    Subclavian vein stenosis, left    S/P open right TMA (DOS 9/22/23). S/P right foot debridement to level of muscle/fascia (DOS: 1/19/24).   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  - Imaging reviewed.   - Continue abx therapy per ID/Primary  - Continue pain management per Medicine/Primary  - Appreciate EVLS recs and intervention.  - Dressings changed. Surgicell, gauze, aquacel, kerlix, ace. No active bleeding noted.  - Nursing can reinforce as needed.  - Podiatry will continue to monitor pt while in house       Case to be discussed with attending, A&P above reflects a tentative plan. Please await for the final signature from the attending physician on service.     John Sullivan, DPM PGY-3  Podiatric Medicine & Surgery  Please Haiku message me with any questions or " concerns.      John Sullivan DPM

## 2024-01-28 NOTE — PROGRESS NOTES
Chevy Benton is a 78 y.o. male on day 19 of admission presenting with Critical limb ischemia of both lower extremities (CMS/HCC).    Subjective   NAEON. Pt required up to 0.2 of levophed yesterday during HD. Now on levophed 0.02. Remains to be in AAOx1, unchanged from recent examination.        Objective   Weight: 90.5 kg (199 lb 8 oz) (01/09/24 1946)    Daily Weight  01/28/24 : 89.4 kg (197 lb 1.5 oz)      Last Recorded Vitals  Heart Rate:  []   Temp:  [35.8 °C (96.4 °F)-36.6 °C (97.9 °F)]   Resp:  [12-24]   Weight:  [89.4 kg (197 lb 1.5 oz)]   SpO2:  [91 %-100 %]      Physical Exam  General: NAD. Patient is alert and oriented to name in the AM. Alert. Following simple commands.   Neuro: Strength and sensation appear grossly intact.   ENT: moist mucous membranes.   Cardiac: rate controlled afib. no murmurs. no rubs.  Pulmonary: CTAB. breath sounds appreciated in all lung fields. Normal work of breathing on minimal nasal cannula   Abdomen: non-tender. non-distended. normoactive bowel sounds.  Extremities: full passive ROM. 2+ b/l UE edema (R side mildly reduced from prior) Trace LE edema. RLE wrapped (from TMA revision this admission). Arms marginally less distended from previous examination.   Skin: no rashes. no ulcers.    Intake/Output last 3 Shifts:  I/O last 3 completed shifts:  In: 3637.1 (40.7 mL/kg) [I.V.:987.1 (11 mL/kg); NG/GT:2395; IV Piggyback:255]  Out: - (0 mL/kg)   Weight: 89.4 kg     Medications  Scheduled medications  allopurinol, 100 mg, oral, Daily  aspirin, 81 mg, oral, Daily  atorvastatin, 40 mg, oral, Nightly  B complex-vitamin C-folic acid, 1 capsule, oral, Daily  clopidogrel, 75 mg, oral, Daily  epoetin dane or biosimilar, 10,000 Units, subcutaneous, Every Mon/Wed/Fri  heparin (porcine), 5,000 Units, subcutaneous, q8h  insulin glargine, 6 Units, subcutaneous, Nightly  insulin lispro, 0-5 Units, subcutaneous, TID with meals  magnesium sulfate, 1 g, intravenous, Once  melatonin, 5  mg, oral, Nightly  midodrine, 20 mg, oral, q8h  pantoprazole, 40 mg, intravenous, Daily  perflutren protein A microsphere, 0.5 mL, intravenous, Once in imaging  polyethylene glycol, 17 g, oral, Daily  sennosides, 1 tablet, oral, Nightly  [Held by provider] sevelamer carbonate, 800 mg, oral, TID with meals  sulfur hexafluoride microsphr, 2 mL, intravenous, Once in imaging      Continuous medications  norepinephrine, 0.01-1 mcg/kg/min, Last Rate: 0.03 mcg/kg/min (01/28/24 0846)      PRN medications  PRN medications: acetaminophen **OR** acetaminophen **OR** acetaminophen, acetaminophen, albuterol, alum-mag hydroxide-simeth, ammonium lactate, dextrose, docusate sodium, glucagon, heparin, heparin, hydrocortisone, ipratropium-albuteroL, oxygen, povidone-iodine, sodium chloride       Relevant Results    Labs  Results from last 7 days   Lab Units 01/28/24  0205 01/27/24  1529 01/27/24  0413   SODIUM mmol/L 133* 133* 133*   POTASSIUM mmol/L 4.0 3.4* 4.1   CHLORIDE mmol/L 97* 97* 98   CO2 mmol/L 29 25 28   BUN mg/dL 23 17 25*   CREATININE mg/dL 2.22* 1.88* 2.92*   CALCIUM mg/dL 9.3 9.1 9.1      Results from last 7 days   Lab Units 01/28/24  0205 01/27/24  1529 01/27/24  0413   HEMOGLOBIN g/dL 7.0* 7.2* 7.1*   WBC AUTO x10*3/uL 9.6 9.5 10.2   PLATELETS AUTO x10*3/uL 79* 79* 112*   HEMATOCRIT % 21.2* 24.2* 22.1*     Results from last 7 days   Lab Units 01/28/24  0205 01/27/24  1529 01/24/24  0441 01/23/24  0826 01/23/24  0138   ALK PHOS U/L 150* 153* 83 79 78   BILIRUBIN TOTAL mg/dL 0.5 0.6 0.7 0.7 0.6   BILIRUBIN DIRECT mg/dL  --   --  0.3 0.2 0.2   PROTEIN TOTAL g/dL 4.9* 4.9* 5.3* 4.9* 4.5*   ALT U/L 6* 6* 4* <3* <3*   AST U/L 23 27 28 16 14      Results from last 7 days   Lab Units 01/22/24  0758   APTT seconds 30   INR  1.1          Vascular US upper extremity venous duplex left     Result Date: 1/26/2024  Preliminary Cardiology Report           [place] [address]   Tel  [phone] and Fax [phone]       Preliminary Vascular Lab Report  VASC US UPPER EXTREMITY VENOUS DUPLEX LEFT  Patient Name:      SHAMEKA SANDY SUN Reading Physician: [id number] Jovanna Francis MD Study Date:        1/26/2024           Ordering           [id number] CHUCKY BLACKBURN                                        Physician: MRN/PID:           [id number]            Technologist:      Elisabeth Betts RVT Accession#:        [id number]        Technologist 2: Date of Birth/Age: 1945           Encounter#:        [id number] Gender:            M Admission Status:  Inpatient           Location           [facility]                                        Performed:  Diagnosis/ICD: Left arm swelling-M79.89 Indication:    Limb swelling Procedure/CPT: 05775 Peripheral venous duplex scan for DVT Limited  PRELIMINARY CONCLUSIONS: Right Upper Venous: Cannot rule out thrombus of non-visualized subclavian vein due to dressings and line placement. Left Upper Venous: The left basilic vein was not visualized. Left brachiocephalic AVF is noted. Visualized in segments due to bandages from recent dialysis access. Remainder of visualized vessels show no evidence of acute deep vein thrombus. Cannot rule out thrombus of non-visualized mid subclavian and distal subclavian veins due to dressings and catheter placement.  Additional Findings: Technically difficult due to patient positioning and limited mobility.  Imaging & Doppler Findings:  Left                Compress Thrombus        Flow Internal Jugular      Yes      None   Spontaneous/Phasic Subclavian Proximal   Yes      None   Spontaneous/Phasic Axillary              Yes      None   Spontaneous/Phasic Brachial              Yes      None Cephalic              Yes      None VASCULAR PRELIMINARY REPORT completed by Elisabeth Betts RVT on 1/26/2024 at 3:21:24 PM  ** Final **      CT head wo IV contrast     Result Date:  1/26/2024  Interpreted By:  Marilyn Torres and Dervishi Mario STUDY: CT HEAD WO IV CONTRAST;  1/25/2024 10:08 pm   INDICATION: Signs/Symptoms:AMS CICU patient.   COMPARISON: None.   ACCESSION NUMBER(S): [id number]   ORDERING CLINICIAN: ALVIN AMBRIZ   TECHNIQUE: Noncontrast axial CT scan of head was performed. Angled reformats in brain and bone windows were generated. The images were reviewed in bone, brain, blood and soft tissue windows.   FINDINGS: CSF Spaces: The ventricles, sulci and basal cisterns are concordant with the degree of parenchymal atrophy. There is no extraaxial fluid collection.   Parenchyma: There are nonspecific patchy, confluent periventricular and subcortical white matter hypodensities which are likely sequela of chronic microvascular ischemic changes. Otherwise, the grey-white differentiation is intact. There is no mass effect or midline shift. There is no intracranial hemorrhage.   Calvarium: The calvarium is unremarkable.   Paranasal sinuses and mastoids: Visualized paranasal sinuses and mastoids are clear.        1.  No evidence of acute intracranial hemorrhage or mass effect. 2. Nonspecific periventricular and subcortical white matter hypodensities likely sequela of chronic microvascular ischemic changes     I personally reviewed the images/study and I agree with the findings as stated by Resident Irwin Ingram MD. This study was interpreted at [place], [place].   MACRO: None   Signed by: Marilyn Torres 1/26/2024 7:23 AM Dictation workstation:   [id number]     XR abdomen 1 view     Result Date: 1/25/2024  Interpreted By:  Jori Garcia and Ritchie Brandon STUDY: XR ABDOMEN 1 VIEW;  1/25/2024 3:05 pm   INDICATION: Signs/Symptoms:CICU confirm NG placement.   COMPARISON: Chest x-ray 01/23/2024   ACCESSION NUMBER(S): [id number]   ORDERING CLINICIAN: ALVIN AMBRIZ   FINDINGS: Enteric tube courses midline below the level of the diaphragm  with the tip located in the gastric body. The side-hole is below the GE junction.   Multiple gaseous distended loops of small and large bowel. For example there is a lucent area of gaseous ascending colon in the right upper quadrant measuring 5.2 cm. There is limited evaluation of pneumoperitoneum on supine imaging, however there does not appear to be any evidence of free air surrounding this gaseous distended loop of ascending/Rigler's sign. Right diaphragm is distinctly visualized separate from this loop of bowel without evidence of subdiaphragmatic free air.   Additional mildly prominent loops of small bowel scattered throughout the bilateral hemiabdomen, for example largest loop of small bowel can be seen in the left upper quadrant measuring 3.4 cm.   Visualized lungs are clear.   Osseous structures demonstrate no acute bony changes.        1. Enteric tube as described above with the distal tip projecting over the gastric body. 2. Multiple mildly distended loops of small and large bowel as described above. Recommend attention on follow-up examinations as could reflect early ileus. 3. No definitive evidence of free air on current supine radiograph as described above.   MACRO: None   Signed by: Jori Garcia 1/25/2024 3:48 PM Dictation workstation:   [id number]        Assessment/Plan   Principal Problem:    Critical limb ischemia of both lower extremities (CMS/HCC)  Active Problems:    Non-pressure chronic ulcer of other part of right foot with fat layer exposed (CMS/HCC)    Critical limb ischemia of right lower extremity (CMS/HCC)    Subclavian vein stenosis, left      78M w/ a PMH of ESRD, HFrEF, and PVD s/p 9/2023 TMA and 1/19 debridement d/t distal leg ischemia presenting to CICU on 1/22/24 for hypotension and AMS that occurred during hemodialysis. AMS likely multifactorial in setting of recent surgery, sedative use, prolonged hospital stay, with possible element of hypercapnia. AMS has been gradually  improving. Hypotension felt likely in setting of ESRD. Difficult to accurately access BP peripherally due to edema or centrally with a-line due to PVD.  Low concern for septic shock as cause of hypotension given lack of other features: no fever or leukocytosis. Will continue to remove excess fluid with renal replacement therapy (nephrology following). Patient weaned off BIPAP by 1/24/24 and has been tolerating NC since. Mentation mildly improving with ongoing dialysis. Goal to improve peripheral blood pressure readings as upper extremity fluid is removed to facilitate patient transfer to floor, presuming he is not requiring pressor support during HD. Plan to rule out metabolic causes of AMS with cortisol level.    1/28/24 Updates  -ordered random cortisol, TSH, ACTH and morning cortisol ordered for 1/29 AM  -on levophed 0.03, midodrine 20 TID, maintain MAPs >60  -Pt required up to 0.2 of levophed 1/27 during HD  -continuing to correlate peripheral cuff pressures with A-line as fluid removed and peripheral edema improves, pt R wrist pressure was +-5mmhg from A line   -FWF changed from 250 q6 to 150 q8 iso mild hyponatremia      Neuro  #AMS  #Acute on Chronic CO2 Narcosis  #Delirium  #C/f Cefepime toxicity  :: sedating medications held (gabapentin; robaxin held)  :: cefepime d/cd (see ID)  - delirium precautions  - CT head 1/26 wnl, no acute intracranial findings   -ordered random cortisol, TSH, ACTH and morning cortisol ordered for 1/29 AM  - if no interval improvements in mentation despite dialysis, would consider neurology consult for further evaluation (patient has had CT head, no MRI or EEG)       CV  #PAD s/p TMA and 1/19 debridement  - Endovascular Cardiology on board  - Podiatry following peripherally at this point in time, patient will follow-up outpatient with Dr Rodriguez   - c/w home ASA 81mg PO every day  - c/w home clopidogrel 75mg PO every day  - c/w home atorvastatin 40mg PO every day      #HFrEF (35-40%  10/2023)  #Hypotension  :: intermittent levo as needed for goal MAP >60 if becomes hypotensive during dialysis   - midodrine 20mg PO TID initiated overnight of 1/22-1/23 for soft Bps, will titrate as able to sustain goal MAPs >60, pt has history of becoming hypotensive during dialysis   :: TTE 1/23/24 with LVEF to 40-45%, mildly improved from previous in 2023, no other new acute findings   - consider GDMT when BP stable and tolerating well during dialysis      #Afib  #SSS s/p PPM  - hold home metoprolol succinate 25mg PO every day iso shock, can consider resuming if BP remains at goal with dialysis   - AC on hold iso 1/16 TA perforation  - endovascular cardiology recs to resume Eliquis at discharge  - DVT ppx resumed 1/27, was being held per vascular recs due to previous thrombocytopenia, resuming iso platelet count stable >100k      Resp  #Hypercapnic Respiratory Acidosis  - BiPAP for hypercapnia as needed   - Patient transitioned to NC on 1/24, post transition ABG stable and wnl, no evidence of evolving hypercarbia      ID  #Pseudomonas and Enterobacter on 1/19 tissue cx  :: s/p Cefepime x1 on 1/21  - s/p meropenem 1g q12hr (1/22-24)  - vancomycin, pharmacy to dose (1/21-24)     GI  #GERD  #GI ppx  - pantoprazole 40mg IV daily     #Bowel Regimen  - Miralax every day  - Senna every day      #Concern for Dysphagia- coughing with meds  - Speech Language Pathology on board  - SLP following   - NG placed 1/25   - Will continue to engage SLP for futher eval      Renal  #ESRD  #NAGMA  #Fluid overload  #Pulmonary Interstitial Edema, Pleural Effusions  - Nephrology on board  - EPO initiated 1/22  - Ongoing Dialysis per nephro recommendations, last dialysis session 1/27 3.5 hrs, fluid removed not charted but removed 1.7kg, target 2L     Endocrine  #DMII  - insulin lantus 6U at bedtime  - SSI     Rheum  #Gout  - c/w home allopurinol     MSK  #RLE TMA Revision   - podiatry continuing to follow, appreciate recs        F: PRN,  FWF with tube feeds   E: PRN  N: tube feeds  A: LIJ central line (1/22),  R radial arterial line (1/22).  DVT ppx: subcutaneous heparin     Full code  NOK: Julia Benton [phone]       Nancy Rodriguez MD  Internal Medicine PGY-1

## 2024-01-28 NOTE — CARE PLAN
Problem: Pain - Adult  Goal: Verbalizes/displays adequate comfort level or baseline comfort level  Outcome: Progressing     Problem: Safety - Adult  Goal: Free from fall injury  Outcome: Progressing     Problem: Discharge Planning  Goal: Discharge to home or other facility with appropriate resources  Outcome: Progressing     Problem: Chronic Conditions and Co-morbidities  Goal: Patient's chronic conditions and co-morbidity symptoms are monitored and maintained or improved  Outcome: Progressing     Problem: Diabetes  Goal: Achieve decreasing blood glucose levels by end of shift  Outcome: Progressing  Goal: Increase stability of blood glucose readings by end of shift  Outcome: Progressing  Goal: Decrease in ketones present in urine by end of shift  Outcome: Progressing  Goal: Maintain electrolyte levels within acceptable range throughout shift  Outcome: Progressing  Goal: Maintain glucose levels >70mg/dl to <250mg/dl throughout shift  Outcome: Progressing  Goal: No changes in neurological exam by end of shift  Outcome: Progressing  Goal: Learn about and adhere to nutrition recommendations by end of shift  Outcome: Progressing  Goal: Vital signs within normal range for age by end of shift  Outcome: Progressing  Goal: Increase self care and/or family involovement by end of shift  Outcome: Progressing  Goal: Receive DSME education by end of shift  Outcome: Progressing     Problem: Skin  Goal: Decreased wound size/increased tissue granulation at next dressing change  Outcome: Progressing  Flowsheets (Taken 1/28/2024 0812)  Decreased wound size/increased tissue granulation at next dressing change:   Protective dressings over bony prominences   Utilize specialty bed per algorithm  Goal: Participates in plan/prevention/treatment measures  Outcome: Progressing  Flowsheets (Taken 1/28/2024 0812)  Participates in plan/prevention/treatment measures:   Discuss with provider PT/OT consult   Elevate heels  Goal: Prevent/manage  excess moisture  Outcome: Progressing  Flowsheets (Taken 1/28/2024 0812)  Prevent/manage excess moisture:   Cleanse incontinence/protect with barrier cream   Follow provider orders for dressing changes   Monitor for/manage infection if present  Goal: Prevent/minimize sheer/friction injuries  Outcome: Progressing  Flowsheets (Taken 1/28/2024 0812)  Prevent/minimize sheer/friction injuries:   Turn/reposition every 2 hours/use positioning/transfer devices   Use pull sheet   Utilize specialty bed per algorithm  Goal: Promote/optimize nutrition  Outcome: Progressing  Flowsheets (Taken 1/28/2024 0812)  Promote/optimize nutrition:   Consume > 50% meals/supplements   Monitor/record intake including meals  Goal: Promote skin healing  Outcome: Progressing  Flowsheets (Taken 1/28/2024 0812)  Promote skin healing:   Assess skin/pad under line(s)/device(s)   Turn/reposition every 2 hours/use positioning/transfer devices     Problem: Fall/Injury  Goal: Not fall by end of shift  Outcome: Progressing  Goal: Be free from injury by end of the shift  Outcome: Progressing  Goal: Verbalize understanding of personal risk factors for fall in the hospital  Outcome: Progressing  Goal: Verbalize understanding of risk factor reduction measures to prevent injury from fall in the home  Outcome: Progressing  Goal: Use assistive devices by end of the shift  Outcome: Progressing  Goal: Pace activities to prevent fatigue by end of the shift  Outcome: Progressing

## 2024-01-28 NOTE — PROGRESS NOTES
Nephrology Consult Progress Note    Admit Date: 1/9/2024    Interval history:  Pt remains lethargic, slightly drowsy however replying in yes or no    CURRENT MEDICATIONS:    Current Facility-Administered Medications:     acetaminophen (Tylenol) tablet 650 mg, 650 mg, oral, q4h PRN, 650 mg at 01/24/24 1837 **OR** acetaminophen (Tylenol) oral liquid 650 mg, 650 mg, nasogastric tube, q4h PRN, 650 mg at 01/27/24 1652 **OR** acetaminophen (Tylenol) suppository 650 mg, 650 mg, rectal, q4h PRN, Mark Navarro MD    acetaminophen (Tylenol) tablet 975 mg, 975 mg, oral, q6h PRN, Michele Hilton MD, 975 mg at 01/22/24 2120    albuterol 90 mcg/actuation inhaler 2 puff, 2 puff, inhalation, q6h PRN, Mark Navarro MD, 2 puff at 01/14/24 0850    allopurinol (Zyloprim) tablet 100 mg, 100 mg, oral, Daily, Mark Navarro MD, 100 mg at 01/28/24 0825    alum-mag hydroxide-simeth (Mylanta) 200-200-20 mg/5 mL oral suspension 30 mL, 30 mL, oral, 4x daily PRN, Mark Navarro MD    ammonium lactate (Lac-Hydrin) 12 % lotion, , Topical, q1h PRN, Mark Navarro MD    aspirin chewable tablet 81 mg, 81 mg, oral, Daily, Mark Navarro MD, 81 mg at 01/28/24 0825    atorvastatin (Lipitor) tablet 40 mg, 40 mg, oral, Nightly, Mark Navarro MD, 40 mg at 01/27/24 2111    B complex-vitamin C-folic acid (Nephrocaps) capsule 1 capsule, 1 capsule, oral, Daily, Mark Navarro MD, 1 capsule at 01/28/24 0825    clopidogrel (Plavix) tablet 75 mg, 75 mg, oral, Daily, Mark Navarro MD, 75 mg at 01/28/24 0825    dextrose 50 % injection 25 g, 25 g, intravenous, q15 min PRN, Mark Navarro MD, 25 g at 01/26/24 1144    docusate sodium (Colace) capsule 100 mg, 100 mg, oral, BID PRN, Mark Navarro MD, 100 mg at 01/16/24 1121    epoetin dane (Epogen,Procrit) injection 10,000 Units, 10,000 Units, subcutaneous, Every Mon/Wed/Fri, Ronaldo Koehler MD, 10,000 Units at 01/26/24 1632    glucagon (Glucagen) injection 1 mg, 1 mg, intramuscular, q15 min PRN, Mark Navarro MD    heparin  (porcine) injection 1,200 Units, 1,200 Units, intravenous, PRN, Judy Wardt, DO    heparin (porcine) injection 1,200 Units, 1,200 Units, intravenous, PRN, Judy Cazares, DO    heparin (porcine) injection 5,000 Units, 5,000 Units, subcutaneous, q8h, Eric Urias MD, 5,000 Units at 01/28/24 0825    hydrocortisone 2.5 % ointment, , Topical, BID PRN, Mark Navarro MD    insulin glargine (Lantus) injection 6 Units, 6 Units, subcutaneous, Nightly, Mark Navarro MD, 6 Units at 01/27/24 2112    insulin lispro (HumaLOG) injection 0-5 Units, 0-5 Units, subcutaneous, TID with meals, Mark Navarro MD, 1 Units at 01/28/24 1200    ipratropium-albuteroL (Duo-Neb) 0.5-2.5 mg/3 mL nebulizer solution 3 mL, 3 mL, nebulization, q6h PRN, Mark Navarro MD    magnesium sulfate in D5W IV 1 g, 1 g, intravenous, Once, Brandon Atwood MD    melatonin tablet 5 mg, 5 mg, oral, Nightly, Laura Joaquin MD, 5 mg at 01/27/24 2111    midodrine (Proamatine) tablet 20 mg, 20 mg, oral, q8h, Eric Urias MD, 20 mg at 01/28/24 0825    norepinephrine (Levophed) 8 mg in dextrose 5% 250 mL (0.032 mg/mL) infusion (premix), 0.01-1 mcg/kg/min, intravenous, Continuous, Laura Joaquin MD, Last Rate: 5.91 mL/hr at 01/28/24 1333, 0.03 mcg/kg/min at 01/28/24 1333    oxygen (O2) therapy, , inhalation, Continuous PRN - O2/gases, Koko Gordon MD, 3 L/min at 01/24/24 0900    pantoprazole (ProtoNix) injection 40 mg, 40 mg, intravenous, Daily, Eric Urias MD, 40 mg at 01/28/24 0825    perflutren protein A microsphere (Optison) injection 0.5 mL, 0.5 mL, intravenous, Once in imaging, Mark Navarro MD    polyethylene glycol (Glycolax, Miralax) packet 17 g, 17 g, oral, Daily, Mark Navarro MD, 17 g at 01/28/24 0900    potassium phosphates 15 mmol in dextrose 5 % in water (D5W) 250 mL IV, 15 mmol, intravenous, Once, Nancy Rodriguez MD, Last Rate: 63.8 mL/hr at 01/28/24 1320, 15 mmol at 01/28/24 1320    povidone-iodine (Betadine) 7.5 % soap 1 Application,  "1 Application, Topical, Daily PRN, John Kent MD, 1 Application at 01/18/24 0400    sennosides (Senokot) tablet 8.6 mg, 1 tablet, oral, Nightly, Mark Navarro MD, 8.6 mg at 01/27/24 2111    [Held by provider] sevelamer carbonate (Renvela) tablet 800 mg, 800 mg, oral, TID with meals, Mark Navarro MD, 800 mg at 01/24/24 0834    sodium chloride 0.9 % bolus 1,000 mL, 1,000 mL, CRRT, PRN, Judy Cazares DO    sulfur hexafluoride microsphr (Lumason) injection 24.28 mg, 2 mL, intravenous, Once in imaging, Mark Navarro MD       Intake/Output Summary (Last 24 hours) at 1/28/2024 1343  Last data filed at 1/28/2024 1333  Gross per 24 hour   Intake 1613.51 ml   Output --   Net 1613.51 ml         PHYSICAL EXAM:  /61 Comment: 122/54  Pulse 86   Temp 35.8 °C (96.4 °F) (Temporal)   Resp 21   Ht 1.778 m (5' 10\")   Wt 89.4 kg (197 lb 1.5 oz)   SpO2 96%   BMI 28.28 kg/m²     Intake/Output Summary (Last 24 hours) at 1/28/2024 1343  Last data filed at 1/28/2024 1333  Gross per 24 hour   Intake 1613.51 ml   Output --   Net 1613.51 ml       Gen: non communicative, NAD  Neck: No JVD  Cardiac: RRR  Resp: few rales   Abd: Soft, non tender, +BS, non distended   Ext: grade 1 edema   Access: RUE AVF  Neuro: moves 4 ext  Peripheral Pulses: Capillary refill <2secs, weak peripheral pulses.      Labs:  Results for orders placed or performed during the hospital encounter of 01/09/24 (from the past 24 hour(s))   CBC and Auto Differential   Result Value Ref Range    WBC 9.5 4.4 - 11.3 x10*3/uL    nRBC 1.0 (H) 0.0 - 0.0 /100 WBCs    RBC 2.44 (L) 4.50 - 5.90 x10*6/uL    Hemoglobin 7.2 (L) 13.5 - 17.5 g/dL    Hematocrit 24.2 (L) 41.0 - 52.0 %    MCV 99 80 - 100 fL    MCH 29.5 26.0 - 34.0 pg    MCHC 29.8 (L) 32.0 - 36.0 g/dL    RDW 18.8 (H) 11.5 - 14.5 %    Platelets 79 (L) 150 - 450 x10*3/uL    Neutrophils % 81.5 40.0 - 80.0 %    Immature Granulocytes %, Automated 0.6 0.0 - 0.9 %    Lymphocytes % 7.4 13.0 - 44.0 %    Monocytes % " 7.3 2.0 - 10.0 %    Eosinophils % 2.9 0.0 - 6.0 %    Basophils % 0.3 0.0 - 2.0 %    Neutrophils Absolute 7.76 (H) 1.60 - 5.50 x10*3/uL    Immature Granulocytes Absolute, Automated 0.06 0.00 - 0.50 x10*3/uL    Lymphocytes Absolute 0.71 (L) 0.80 - 3.00 x10*3/uL    Monocytes Absolute 0.70 0.05 - 0.80 x10*3/uL    Eosinophils Absolute 0.28 0.00 - 0.40 x10*3/uL    Basophils Absolute 0.03 0.00 - 0.10 x10*3/uL   Comprehensive metabolic panel   Result Value Ref Range    Glucose 234 (H) 74 - 99 mg/dL    Sodium 133 (L) 136 - 145 mmol/L    Potassium 3.4 (L) 3.5 - 5.3 mmol/L    Chloride 97 (L) 98 - 107 mmol/L    Bicarbonate 25 21 - 32 mmol/L    Anion Gap 14 10 - 20 mmol/L    Urea Nitrogen 17 6 - 23 mg/dL    Creatinine 1.88 (H) 0.50 - 1.30 mg/dL    eGFR 36 (L) >60 mL/min/1.73m*2    Calcium 9.1 8.6 - 10.6 mg/dL    Albumin 2.6 (L) 3.4 - 5.0 g/dL    Alkaline Phosphatase 153 (H) 33 - 136 U/L    Total Protein 4.9 (L) 6.4 - 8.2 g/dL    AST 27 9 - 39 U/L    Bilirubin, Total 0.6 0.0 - 1.2 mg/dL    ALT 6 (L) 10 - 52 U/L   Magnesium   Result Value Ref Range    Magnesium 1.99 1.60 - 2.40 mg/dL   Phosphorus   Result Value Ref Range    Phosphorus 2.2 (L) 2.5 - 4.9 mg/dL   Blood Gas Arterial Full Panel   Result Value Ref Range    POCT pH, Arterial 7.42 7.38 - 7.42 pH    POCT pCO2, Arterial 45 (H) 38 - 42 mm Hg    POCT pO2, Arterial 146 (H) 85 - 95 mm Hg    POCT SO2, Arterial 100 94 - 100 %    POCT Oxy Hemoglobin, Arterial 97.5 94.0 - 98.0 %    POCT Hematocrit Calculated, Arterial 25.0 (L) 41.0 - 52.0 %    POCT Sodium, Arterial 131 (L) 136 - 145 mmol/L    POCT Potassium, Arterial 3.4 (L) 3.5 - 5.3 mmol/L    POCT Chloride, Arterial 99 98 - 107 mmol/L    POCT Ionized Calcium, Arterial 1.27 1.10 - 1.33 mmol/L    POCT Glucose, Arterial 253 (H) 74 - 99 mg/dL    POCT Lactate, Arterial 1.4 0.4 - 2.0 mmol/L    POCT Base Excess, Arterial 4.2 (H) -2.0 - 3.0 mmol/L    POCT HCO3 Calculated, Arterial 29.2 (H) 22.0 - 26.0 mmol/L    POCT Hemoglobin, Arterial  8.4 (L) 13.5 - 17.5 g/dL    POCT Anion Gap, Arterial 6 (L) 10 - 25 mmo/L    Patient Temperature 37.0 degrees Celsius    FiO2 28 %   POCT GLUCOSE   Result Value Ref Range    POCT Glucose 196 (H) 74 - 99 mg/dL   POCT GLUCOSE   Result Value Ref Range    POCT Glucose 116 (H) 74 - 99 mg/dL   POCT GLUCOSE   Result Value Ref Range    POCT Glucose 176 (H) 74 - 99 mg/dL   CBC and Auto Differential   Result Value Ref Range    WBC 9.6 4.4 - 11.3 x10*3/uL    nRBC 1.0 (H) 0.0 - 0.0 /100 WBCs    RBC 2.29 (L) 4.50 - 5.90 x10*6/uL    Hemoglobin 7.0 (L) 13.5 - 17.5 g/dL    Hematocrit 21.2 (L) 41.0 - 52.0 %    MCV 93 80 - 100 fL    MCH 30.6 26.0 - 34.0 pg    MCHC 33.0 32.0 - 36.0 g/dL    RDW 18.6 (H) 11.5 - 14.5 %    Platelets 79 (L) 150 - 450 x10*3/uL    Neutrophils % 77.2 40.0 - 80.0 %    Immature Granulocytes %, Automated 0.8 0.0 - 0.9 %    Lymphocytes % 9.6 13.0 - 44.0 %    Monocytes % 8.2 2.0 - 10.0 %    Eosinophils % 3.9 0.0 - 6.0 %    Basophils % 0.3 0.0 - 2.0 %    Neutrophils Absolute 7.41 (H) 1.60 - 5.50 x10*3/uL    Immature Granulocytes Absolute, Automated 0.08 0.00 - 0.50 x10*3/uL    Lymphocytes Absolute 0.92 0.80 - 3.00 x10*3/uL    Monocytes Absolute 0.79 0.05 - 0.80 x10*3/uL    Eosinophils Absolute 0.37 0.00 - 0.40 x10*3/uL    Basophils Absolute 0.03 0.00 - 0.10 x10*3/uL   Comprehensive metabolic panel   Result Value Ref Range    Glucose 205 (H) 74 - 99 mg/dL    Sodium 133 (L) 136 - 145 mmol/L    Potassium 4.0 3.5 - 5.3 mmol/L    Chloride 97 (L) 98 - 107 mmol/L    Bicarbonate 29 21 - 32 mmol/L    Anion Gap 11 10 - 20 mmol/L    Urea Nitrogen 23 6 - 23 mg/dL    Creatinine 2.22 (H) 0.50 - 1.30 mg/dL    eGFR 29 (L) >60 mL/min/1.73m*2    Calcium 9.3 8.6 - 10.6 mg/dL    Albumin 2.5 (L) 3.4 - 5.0 g/dL    Alkaline Phosphatase 150 (H) 33 - 136 U/L    Total Protein 4.9 (L) 6.4 - 8.2 g/dL    AST 23 9 - 39 U/L    Bilirubin, Total 0.5 0.0 - 1.2 mg/dL    ALT 6 (L) 10 - 52 U/L   Magnesium   Result Value Ref Range    Magnesium 2.09 1.60  - 2.40 mg/dL   Phosphorus   Result Value Ref Range    Phosphorus 1.8 (L) 2.5 - 4.9 mg/dL   POCT GLUCOSE   Result Value Ref Range    POCT Glucose 206 (H) 74 - 99 mg/dL   TSH with reflex to Free T4 if abnormal   Result Value Ref Range    Thyroid Stimulating Hormone 2.00 0.44 - 3.98 mIU/L   Cortisol AM   Result Value Ref Range    Cortisol  A.M. 22.2 (H) 5.0 - 20.0 ug/dL   POCT GLUCOSE   Result Value Ref Range    POCT Glucose 158 (H) 74 - 99 mg/dL      Vascular US upper extremity venous duplex left    Result Date: 1/27/2024            [place] [address]   Tel [phone] and Fax [phone]  Vascular Lab Report VASC US UPPER EXTREMITY VENOUS DUPLEX LEFT  Patient Name:      SHAMEKA Mendez Physician: [id number] Jovanna Francis MD Study Date:        1/26/2024           Ordering           [id number] CHUCKY BLACKBURN                                        Physician: MRN/PID:           [id number]            Technologist:      Elisabeth Betts RVT Accession#:        [id number]        Technologist 2: Date of Birth/Age: 1945 / 78      Encounter#:        [id number]                    years Gender:            M Admission Status:  Inpatient           Location           [facility]                                        Performed:  Diagnosis/ICD: Left arm swelling-M79.89 Indication:    Limb swelling CPT Codes:     58299 Peripheral venous duplex scan for DVT Limited  CONCLUSIONS: Right Upper Venous: Unable to visualize subclavian vein due to lines and bandages. Left Upper Venous: Technically limited exam; negative for acute deep vein thrombus in visualized veins. Unable to rule out deep vein thrombus in non-visualized mid and distal subclavian vein. The left basilic vein was not visualized. Left brachiocephalic arteriovenous fistula is noted. Visualized in segments due to bandages from recent dialysis access.   Additional Findings: Technically difficult due to patient positioning and limited mobility.  Imaging & Doppler Findings:  Left                Compress Thrombus        Flow Internal Jugular      Yes      None   Spontaneous/Phasic Subclavian Proximal   Yes      None   Spontaneous/Phasic Axillary              Yes      None   Spontaneous/Phasic Brachial              Yes      None Cephalic              Yes      None  [id number] Jovanna Francis MD Electronically signed by [id number] Jovanna Francis MD on 1/27/2024 at 2:56:51 PM  ** Final **     CT head wo IV contrast    Result Date: 1/26/2024  Interpreted By:  Marilyn Torres and Dervishi Mario STUDY: CT HEAD WO IV CONTRAST;  1/25/2024 10:08 pm   INDICATION: Signs/Symptoms:AMS CICU patient.   COMPARISON: None.   ACCESSION NUMBER(S): [id number]   ORDERING CLINICIAN: ALVIN AMBRIZ   TECHNIQUE: Noncontrast axial CT scan of head was performed. Angled reformats in brain and bone windows were generated. The images were reviewed in bone, brain, blood and soft tissue windows.   FINDINGS: CSF Spaces: The ventricles, sulci and basal cisterns are concordant with the degree of parenchymal atrophy. There is no extraaxial fluid collection.   Parenchyma: There are nonspecific patchy, confluent periventricular and subcortical white matter hypodensities which are likely sequela of chronic microvascular ischemic changes. Otherwise, the grey-white differentiation is intact. There is no mass effect or midline shift. There is no intracranial hemorrhage.   Calvarium: The calvarium is unremarkable.   Paranasal sinuses and mastoids: Visualized paranasal sinuses and mastoids are clear.       1.  No evidence of acute intracranial hemorrhage or mass effect. 2. Nonspecific periventricular and subcortical white matter hypodensities likely sequela of chronic microvascular ischemic changes     I personally reviewed the images/study and I agree with the findings as stated by Resident Irwin Ingram MD. This  study was interpreted at [facility], [place], [place].   MACRO: None   Signed by: Marilyn Torres 1/26/2024 7:23 AM Dictation workstation:   [id number]      Assessment and Plan:  Pt with ESKD, admitted with RLE ischemia, s/p RLE debridement and revascularization on 1/17  - ESKD on HD: [facility]5 [facility]Dr Hannah is [facility]rolog[facility]   CVVH from 1/22 till 1/23  On 2-3 lpm NC   On levophed low dose 0.03  Anemia, Hb >8, on epogen  May evaluate for fluid removal tomorrow    Will continue to follow.     Lis Alonso MD   Nephrology Fellow

## 2024-01-29 NOTE — PROGRESS NOTES
Speech-Language Pathology  Adult Inpatient Clinical Bedside Swallow Evaluation    Patient Name: Chevy Benton  MRN: [id number]  Today's Date: 1/29/2024   Start Time: 1102  Stop Time: 1137  Time Calculation (min): 25    History of Present Illness:   Chevy Benton is a 78 y.o. male w/ a PMH of ESRD, HFrEF (10/2023 35-40%), Afib c/b SSS (s/p ?1057-1155 PPM), and PVD s/p 9/2023 R TMA who has been admitted to the CICU for AMS and hypotension during iHD.     Assessment:   Re-evaluation of swallow function completed. Pt alert from previous exam. Oral motor musculature; pt demonstrate functional for ROM and strength, voice clear. Trial of ice chips x1 and sips of water from spoon and straw tolerates without overt s/s of aspiration. Proceed to 3 oz water challenge, pt consume 3 oz water with continuous sequential sips from straw with post prandial cough upon completion.  This indicates a suspected pharyngeal dysphagia. Deferred further PO trials due to placing patient at high risk for aspiration. Recommendation for FEES to assess further swallow function prior to PO recommendations. Recommendations and results discussed with RN and MD.        Recommendations:  NPO     -Frequent, aggressive oral care is strongly recommended to improve infection control as well as reduce dental plaque and bacteria on oropharyngeal surfaces which may increase the risk nosocomial infections, including pneumonia.    -OK for small amounts of ice chips (one at a time, 10x/hour) for pleasure/swallow stimulation, but only after aggressive oral care to avoid colonization of bacteria within oral cavity.      Goal:   Pt. will tolerate least restrictive diet without overt s/s of aspiration with 100% accuracy.          Plan:  SLP Services Indicated: Yes  Frequency: 2x week  Discussed POC with patient  SLP - OK to Discharge    Pain:   0-10  0 = No pain.     Inpatient Education:  Extensive education provided to patient regarding current swallow  function, recommendations/results, and POC.      Consultations/Referrals/Coordination of Services:   N/A

## 2024-01-29 NOTE — PROGRESS NOTES
"Chevy Benton is a 78 y.o. male on day 20 of admission presenting with Critical limb ischemia of both lower extremities (CMS/HCC).    Subjective     NAEON. Patient remains A&O x1 on morning evaluation. AM cortisol levels mildly elevated. Nephrology to eval for fluid removal. Patient required levo for pressure support during most recent dialysis 1/27. 1u pRBC given for morning hgb of 6.8.          Objective     General: NAD. Patient is alert and oriented to name, place in the AM. Alert. Following simple commands.   Neuro: Strength and sensation appear grossly intact.   ENT: moist mucous membranes.   Cardiac: rate controlled afib. no murmurs. no rubs.  Pulmonary: CTAB. breath sounds appreciated in all lung fields. Normal work of breathing on minimal nasal cannula   Abdomen: non-tender. non-distended. normoactive bowel sounds.  Extremities: full passive ROM. 2+ b/l UE edema (R side mildly reduced from prior) Trace LE edema. RLE wrapped (from TMA revision this admission).   Skin: no rashes. no ulcers.    Last Recorded Vitals  Blood pressure 121/61, pulse 87, temperature 36.7 °C (98.1 °F), temperature source Temporal, resp. rate 15, height 1.778 m (5' 10\"), weight 90 kg (198 lb 6.6 oz), SpO2 95 %.  Intake/Output last 3 Shifts:  I/O last 3 completed shifts:  In: 3166.5 (35.2 mL/kg) [I.V.:136.5 (1.5 mL/kg); NG/GT:2780; IV Piggyback:250]  Out: - (0 mL/kg)   Weight: 90 kg     Relevant Results    Scheduled medications  allopurinol, 100 mg, oral, Daily  aspirin, 81 mg, oral, Daily  atorvastatin, 40 mg, oral, Nightly  B complex-vitamin C-folic acid, 1 capsule, oral, Daily  clopidogrel, 75 mg, oral, Daily  epoetin dane or biosimilar, 10,000 Units, subcutaneous, Every Mon/Wed/Fri  heparin (porcine), 5,000 Units, subcutaneous, q8h  insulin glargine, 6 Units, subcutaneous, Nightly  insulin lispro, 0-5 Units, subcutaneous, TID with meals  magnesium sulfate, 1 g, intravenous, Once  melatonin, 5 mg, oral, Nightly  midodrine, 20 " mg, oral, q8h  pantoprazole, 40 mg, intravenous, Daily  perflutren protein A microsphere, 0.5 mL, intravenous, Once in imaging  polyethylene glycol, 17 g, oral, Daily  sennosides, 1 tablet, oral, Nightly  [Held by provider] sevelamer carbonate, 800 mg, oral, TID with meals  sulfur hexafluoride microsphr, 2 mL, intravenous, Once in imaging      Continuous medications  norepinephrine, 0.01-1 mcg/kg/min, Last Rate: Stopped (01/28/24 1745)      PRN medications  PRN medications: acetaminophen **OR** acetaminophen **OR** acetaminophen, acetaminophen, albuterol, alum-mag hydroxide-simeth, ammonium lactate, dextrose, docusate sodium, glucagon, heparin, heparin, hydrocortisone, ipratropium-albuteroL, oxygen, povidone-iodine, sodium chloride    Results for orders placed or performed during the hospital encounter of 01/09/24 (from the past 24 hour(s))   POCT GLUCOSE   Result Value Ref Range    POCT Glucose 206 (H) 74 - 99 mg/dL   TSH with reflex to Free T4 if abnormal   Result Value Ref Range    Thyroid Stimulating Hormone 2.00 0.44 - 3.98 mIU/L   Cortisol AM   Result Value Ref Range    Cortisol  A.M. 22.2 (H) 5.0 - 20.0 ug/dL   POCT GLUCOSE   Result Value Ref Range    POCT Glucose 158 (H) 74 - 99 mg/dL   POCT GLUCOSE   Result Value Ref Range    POCT Glucose 200 (H) 74 - 99 mg/dL   POCT GLUCOSE   Result Value Ref Range    POCT Glucose 163 (H) 74 - 99 mg/dL   Cortisol AM   Result Value Ref Range    Cortisol  A.M. 21.0 (H) 5.0 - 20.0 ug/dL   BUN   Result Value Ref Range    Urea Nitrogen 37 (H) 6 - 23 mg/dL   Creatinine, Serum   Result Value Ref Range    Creatinine 2.93 (H) 0.50 - 1.30 mg/dL    eGFR 21 (L) >60 mL/min/1.73m*2   Electrolyte panel   Result Value Ref Range    Sodium 131 (L) 136 - 145 mmol/L    Potassium 4.4 3.5 - 5.3 mmol/L    Chloride 95 (L) 98 - 107 mmol/L    Bicarbonate 29 21 - 32 mmol/L    Anion Gap 11 10 - 20 mmol/L   CBC and Auto Differential   Result Value Ref Range    WBC 8.5 4.4 - 11.3 x10*3/uL    nRBC 0.7  (H) 0.0 - 0.0 /100 WBCs    RBC 2.24 (L) 4.50 - 5.90 x10*6/uL    Hemoglobin 6.6 (L) 13.5 - 17.5 g/dL    Hematocrit 20.8 (L) 41.0 - 52.0 %    MCV 93 80 - 100 fL    MCH 29.5 26.0 - 34.0 pg    MCHC 31.7 (L) 32.0 - 36.0 g/dL    RDW 18.9 (H) 11.5 - 14.5 %    Platelets 91 (L) 150 - 450 x10*3/uL    Neutrophils % 72.7 40.0 - 80.0 %    Immature Granulocytes %, Automated 0.7 0.0 - 0.9 %    Lymphocytes % 12.5 13.0 - 44.0 %    Monocytes % 8.8 2.0 - 10.0 %    Eosinophils % 4.9 0.0 - 6.0 %    Basophils % 0.4 0.0 - 2.0 %    Neutrophils Absolute 6.18 (H) 1.60 - 5.50 x10*3/uL    Immature Granulocytes Absolute, Automated 0.06 0.00 - 0.50 x10*3/uL    Lymphocytes Absolute 1.06 0.80 - 3.00 x10*3/uL    Monocytes Absolute 0.75 0.05 - 0.80 x10*3/uL    Eosinophils Absolute 0.42 (H) 0.00 - 0.40 x10*3/uL    Basophils Absolute 0.03 0.00 - 0.10 x10*3/uL              Assessment/Plan   Principal Problem:    Critical limb ischemia of both lower extremities (CMS/HCC)  Active Problems:    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 AMS likely multifactorial in setting of recent surgery, sedative use, prolonged hospital stay, with possible element of hypercapnia. AMS has been gradually improving. Hypotension felt likely in setting of ESRD. Difficult to accurately access BP peripherally due to edema or centrally with a-line due to PVD.  Low concern for septic shock as cause of hypotension given lack of other features: no fever or leukocytosis. Will continue to remove excess fluid with renal replacement therapy (nephrology following). Patient weaned off BIPAP by 1/24/24 and has been tolerating NC since. Mentation mildly improving  with ongoing dialysis. Goal to improve peripheral blood pressure readings as upper extremity fluid is removed to facilitate patient transfer to floor, presuming he is not requiring pressor support during HD. AM cortisol mildly elevated.      1/29/24 Updates  -Exploring metabolic causes of AMS, thus far: AM cortisol (mildly elevated), TSH wnl, no deficit of folate or B12   -on levophed 0.03, midodrine 20 TID, maintain MAPs >60  -continuing to correlate peripheral cuff pressures with A-line as fluid removed and peripheral edema improves, pt R wrist pressure was +-5mmhg from A line   -Nephro to eval for fluid removal with HD today   -1u pRBC given for AM Hgb of 6.8.   -Re-engage SLP pending mental status      Neuro  #AMS  #Acute on Chronic CO2 Narcosis  #Delirium  #C/f Cefepime toxicity  :: sedating medications held (gabapentin; robaxin held)  :: cefepime d/cd (see ID)  - delirium precautions  - CT head 1/26 wnl, no acute intracranial findings   - AM cortisol very mildly elevated, TSH wnl, no deficit of B12 or folate   - if no interval improvements in mentation despite dialysis, would consider neurology consult for further evaluation (patient has had CT head which was wnl, no MRI or EEG)       CV  #PAD s/p TMA and 1/19 debridement  - Endovascular Cardiology on board  - Podiatry following peripherally at this point in time, patient will follow-up outpatient with Dr Rodriguez   - c/w home ASA 81mg PO every day  - c/w home clopidogrel 75mg PO every day  - c/w home atorvastatin 40mg PO every day      #HFrEF (35-40% 10/2023)  #Hypotension  :: intermittent levo as needed for goal MAP >60 if becomes hypotensive during dialysis   - midodrine 20mg PO TID initiated overnight of 1/22-1/23 for soft Bps, will titrate as able to sustain goal MAPs >60, pt has history of becoming hypotensive during dialysis   :: TTE 1/23/24 with LVEF to 40-45%, mildly improved from previous in 2023, no other new acute findings   - consider GDMT when BP  stable and tolerating well during dialysis      #Afib  #SSS s/p PPM  - hold home metoprolol succinate 25mg PO every day iso shock, can consider resuming if BP remains at goal with dialysis   - AC on hold iso 1/16 TA perforation  - endovascular cardiology recs to resume Eliquis at discharge  - DVT ppx resumed 1/27, was being held per vascular recs due to previous thrombocytopenia, resuming iso platelet count stable >100k      Resp  #Hypercapnic Respiratory Acidosis  - BiPAP for hypercapnia as needed   - Patient transitioned to NC on 1/24, post transition ABG stable and wnl, no evidence of evolving hypercarbia      ID  #Pseudomonas and Enterobacter on 1/19 tissue cx  :: s/p Cefepime x1 on 1/21  - s/p meropenem 1g q12hr (1/22-24)  - vancomycin, pharmacy to dose (1/21-24)     GI  #GERD  #GI ppx  - pantoprazole 40mg IV daily     #Bowel Regimen  - Miralax every day  - Senna every day      #Concern for Dysphagia- coughing with meds  - Speech Language Pathology on board  - SLP following   - NG placed 1/25   - Will continue to engage SLP for futher eval as mental status improves      Renal  #ESRD  #NAGMA  #Fluid overload  #Pulmonary Interstitial Edema, Pleural Effusions  - Nephrology on board  - EPO initiated 1/22  - Ongoing Dialysis per nephro recommendations, last dialysis session 1/27 3.5 hrs, fluid removed not charted but removed 1.7kg, target 2L. Patient required levo for pressure support during session.      Endocrine  #DMII  - insulin lantus 6U at bedtime  - SSI     Rheum  #Gout  - c/w home allopurinol     MSK  #RLE TMA Revision   - podiatry continuing to follow, appreciate recs         F: PRN, FWF with tube feeds   E: PRN  N: tube feeds  A: LIJ central line (1/22),  R radial arterial line (1/22).  DVT ppx: subcutaneous heparin      Full code  NOK: Julia Benton [phone]         Eric Urias MD      Attending Note:  I have reviewed and evaluated the most recent data and results, personally examined the  patient, and formulated the plan of care as presented above. This patient was critically ill and required continued critical care treatment. I agree with the resident/fellow's medical decision making as documented in the note with the exception/addition of the following:    Chevy Benton is a 78 y.o. male on day 20 of admission presenting with Critical limb ischemia of both lower extremities (CMS/HCC).    HFrEF 40% with underlying AF/SSS s/p PPM. No prior LHC but likely CAD present d/t known PAD. ESRD on HD - difficulty with hemodynamics with HD. AMS now improving.    Plan:  - reattempt HD tmw  - ongoing ICU until able to tolerate HD without levophed support    Teaching and any separately billable procedures are not included in the time calculation.  Billing Provider Critical Care Time: 30 minutes    Stuart Nguyen MD

## 2024-01-29 NOTE — PROGRESS NOTES
Speech-Language Pathology  Adult Inpatient Fiberoptic Endoscopic Evaluation of Swallowing (FEES)    Patient Name: Chevy Benton  MRN: [id number]  Today's Date: 1/29/2024   Start Time: 1345  Stop Time: 1424  Time Calculation (min): 39    Initial FEES: Yes    Respiratory Status: R/A    History of Present Illness:   Chvey Benton is a 78 y.o. male w/ a PMH of ESRD, HFrEF (10/2023 35-40%), Afib c/b SSS (s/p ?6602-3732 PPM), and PVD s/p 9/2023 R TMA who has been admitted to the CICU for AMS and hypotension during iHD     Impression:   Fiberoptic Endoscopic Evaluation of Swallowing completed. Verbal consent obtained. Flexible laryngeal endoscope passed through the R naris without difficulty. NG tube present in the L naris. Adequate velopharyngeal port. TVF mobility functional for speech and non speech tasks. Mild serous secretions throughout the pharyngeal lumen. Irregular edge of the epiglottis, however did not affect the function.     Mild oral pharyngeal dysphagia. Adequate bolus control and containment initially. Functional mastication of solids again initially, however as study continues fatigue evident with prolong mastication and decrease bolus control and sensation. Timely initiation of pharyngeal swallow during the initial part of the exam. Intermittent post swallow residue on the epiglottic rim and trace on the ventricular folds. NG tube interfere with swallow function and remove by RN. Improve efficiency of swallow for thin and mildly thick liquids without penetration or aspiration occurring. Increase fatigue of swallow evident with post swallow residue of solids within the valleculae down the lateral channels to the piriforms. Spillage of solids over the lateral channels into the laryngeal vestibule to the level of the petiole with suspected aspiration due to fatigue, decrease sensation and impair cognition.  No penetration or aspiration with further trials of puree and thin liquids. Pt ready for a  modified diet. Discussion with RN and MD for results and recommendations.      Recommendations:  Level 5 minced/moist with thin liquids    Upright for all PO intake  Remain upright for 20-30 min after eating  Small bites/sips  Feed only when alert  Straws OK.   Assistance during all meals.     Goal:   Pt will tolerate least restrictive diet with no overt clinical s/s aspiration 100% of the time.   Pt will utilize safe swallow strategies during meals with 100% accuracy (1/29/2024)       Plan:  SLP Services Indicated: Yes  Frequency: 2x week  Discussed POC with patient  SLP - OK to Discharge    Pain:   0-10  0 = No pain.     Inpatient Education:  Extensive education provided to patient regarding current swallow function, recommendations/results, and POC.      Consultations/Referrals/Coordination of Services:   N/A

## 2024-01-29 NOTE — CARE PLAN
Problem: Skin  Goal: Decreased wound size/increased tissue granulation at next dressing change  Outcome: Progressing  Flowsheets (Taken 1/28/2024 2040 by Pinky Martinez RN)  Decreased wound size/increased tissue granulation at next dressing change:   Promote sleep for wound healing   Protective dressings over bony prominences  Goal: Participates in plan/prevention/treatment measures  Outcome: Progressing  Flowsheets (Taken 1/28/2024 2040 by Pinky Martinez RN)  Participates in plan/prevention/treatment measures: Elevate heels  Goal: Prevent/manage excess moisture  Outcome: Progressing  Flowsheets (Taken 1/28/2024 2040 by Pinky Martinez RN)  Prevent/manage excess moisture:   Monitor for/manage infection if present   Cleanse incontinence/protect with barrier cream   Follow provider orders for dressing changes   Moisturize dry skin  Goal: Prevent/minimize sheer/friction injuries  Outcome: Progressing  Flowsheets (Taken 1/28/2024 2040 by Pinky Martinez RN)  Prevent/minimize sheer/friction injuries:   Use pull sheet   HOB 30 degrees or less   Turn/reposition every 2 hours/use positioning/transfer devices  Goal: Promote/optimize nutrition  Outcome: Progressing  Flowsheets (Taken 1/28/2024 2040 by Pinky Martinez RN)  Promote/optimize nutrition:   Assist with feeding   Monitor/record intake including meals  Goal: Promote skin healing  Outcome: Progressing  Flowsheets (Taken 1/28/2024 2040 by Pinky Martinez RN)  Promote skin healing:   Turn/reposition every 2 hours/use positioning/transfer devices   Rotate device position/do not position patient on device   Assess skin/pad under line(s)/device(s)   Protective dressings over bony prominences   The patient's goals for the shift include      The clinical goals for the shift include the pt will remain HDS and maintain current blood pressure    Over the shift, the patient was give a unit of blood and HD schedule for t/th/sat. The patient also was given a swallow evaluation in hopes to  advance diet and remove ETT

## 2024-01-30 NOTE — PROGRESS NOTES
Social Work Transitional Care Note: -Chart review:   Chart Review: ESRD - HD on TTS, tube feed, AMS, hypotension, currently on NC, patient SLP - reevaluation with improvement, diet advanced to small bites, pureed meds  - Support to Pt/family: Spouse is involved   - Payor : Patient has [employer] he will need a [employer]  - Planned Disposition: Return to [facility] when medically ready. He also gets his dialysis there. Updates were sent today from 1/24 - present   - Barriers to Discharge: None noted at this time. SW will continue to follow and assist as needed  -Anticipated Date of Discharge:  2/5  Alma Black, ALVAROA, LSW

## 2024-01-30 NOTE — CARE PLAN
Problem: Skin  Goal: Decreased wound size/increased tissue granulation at next dressing change  Outcome: Progressing  Flowsheets (Taken 1/28/2024 2040 by Pinky Martinez RN)  Decreased wound size/increased tissue granulation at next dressing change:   Promote sleep for wound healing   Protective dressings over bony prominences  Goal: Participates in plan/prevention/treatment measures  Outcome: Progressing  Flowsheets (Taken 1/28/2024 2040 by Pinky Martinez RN)  Participates in plan/prevention/treatment measures: Elevate heels  Goal: Prevent/manage excess moisture  Outcome: Progressing  Flowsheets (Taken 1/28/2024 2040 by Pinky Martinez RN)  Prevent/manage excess moisture:   Monitor for/manage infection if present   Cleanse incontinence/protect with barrier cream   Follow provider orders for dressing changes   Moisturize dry skin  Goal: Prevent/minimize sheer/friction injuries  Outcome: Progressing  Flowsheets (Taken 1/28/2024 2040 by Pinky Martinez RN)  Prevent/minimize sheer/friction injuries:   Use pull sheet   HOB 30 degrees or less   Turn/reposition every 2 hours/use positioning/transfer devices  Goal: Promote/optimize nutrition  Outcome: Progressing  Flowsheets (Taken 1/28/2024 2040 by Pinky Martinez RN)  Promote/optimize nutrition:   Assist with feeding   Monitor/record intake including meals  Goal: Promote skin healing  Outcome: Progressing  Flowsheets (Taken 1/28/2024 2040 by Pinky Martinez RN)  Promote skin healing:   Turn/reposition every 2 hours/use positioning/transfer devices   Rotate device position/do not position patient on device   Assess skin/pad under line(s)/device(s)   Protective dressings over bony prominences   The patient's goals for the shift include maintain a safe environment   The clinical goals for the shift include pt will remain HDS during dialysis    Over the shift, the patient was ordered albumin to be administered for HD session to try maintain hemodynamic stability. It was advocated that the  patient receive HD at a slow rate to maintain hemodynamic stability.

## 2024-01-30 NOTE — PROGRESS NOTES
"Chevy Benton is a 78 y.o. male on day 21 of admission presenting with Critical limb ischemia of both lower extremities (CMS/HCC).    Subjective     NAEON. Dialysis today. Will monitor patient BP to see it he requires any intravenous hemodynamic support to maintain goal maps during session.          Objective     Physical Exam    General: NAD. Patient is alert and oriented to name, place in the AM. Alert. Following simple commands.   Neuro: Strength and sensation appear grossly intact.   ENT: moist mucous membranes.   Cardiac: rate controlled afib. no murmurs. no rubs.  Pulmonary: CTAB. breath sounds appreciated in all lung fields. Normal work of breathing on minimal nasal cannula   Abdomen: non-tender. non-distended. normoactive bowel sounds.  Extremities: full passive ROM. 2+ b/l UE edema (R side mildly reduced from prior) Trace LE edema. RLE wrapped (from TMA revision this admission).   Skin: no rashes. no ulcers.    Last Recorded Vitals  Blood pressure 138/67, pulse 73, temperature 36.3 °C (97.3 °F), resp. rate 20, height 1.778 m (5' 10\"), weight 90 kg (198 lb 6.6 oz), SpO2 100 %.  Intake/Output last 3 Shifts:  I/O last 3 completed shifts:  In: 2899 (32.2 mL/kg) [I.V.:64 (0.7 mL/kg); Blood:450; NG/GT:2135; IV Piggyback:250]  Out: - (0 mL/kg)   Weight: 90 kg     Relevant Results    Scheduled medications  allopurinol, 100 mg, oral, Daily  aspirin, 81 mg, oral, Daily  atorvastatin, 40 mg, oral, Nightly  B complex-vitamin C-folic acid, 1 capsule, oral, Daily  clopidogrel, 75 mg, oral, Daily  epoetin dane or biosimilar, 10,000 Units, subcutaneous, Every Mon/Wed/Fri  gabapentin, 100 mg, oral, Daily  heparin (porcine), 5,000 Units, subcutaneous, q8h  insulin glargine, 6 Units, subcutaneous, Nightly  insulin lispro, 0-5 Units, subcutaneous, TID with meals  magnesium sulfate, 1 g, intravenous, Once  melatonin, 5 mg, oral, Nightly  midodrine, 20 mg, oral, q8h  pantoprazole, 40 mg, intravenous, Daily  perflutren " protein A microsphere, 0.5 mL, intravenous, Once in imaging  polyethylene glycol, 17 g, oral, Daily  sennosides, 1 tablet, oral, Nightly  [Held by provider] sevelamer carbonate, 800 mg, oral, TID with meals  sulfur hexafluoride microsphr, 2 mL, intravenous, Once in imaging      Continuous medications  norepinephrine, 0.01-1 mcg/kg/min, Last Rate: Stopped (01/28/24 1745)      PRN medications  PRN medications: acetaminophen **OR** acetaminophen **OR** acetaminophen, acetaminophen, albuterol, alum-mag hydroxide-simeth, ammonium lactate, dextrose, docusate sodium, glucagon, heparin, heparin, hydrocortisone, ipratropium-albuteroL, oxygen, povidone-iodine, sodium chloride    Results for orders placed or performed during the hospital encounter of 01/09/24 (from the past 24 hour(s))   POCT GLUCOSE   Result Value Ref Range    POCT Glucose 164 (H) 74 - 99 mg/dL   Prepare RBC: 1 Units   Result Value Ref Range    PRODUCT CODE A5506O16     Unit Number [id number]     Unit ABO B     Unit RH POS     XM INTEP COMP     Dispense Status TR     Blood Expiration Date February 17, 2024 23:59 EST     PRODUCT BLOOD TYPE 7300     UNIT VOLUME 350    Type and screen   Result Value Ref Range    ABO TYPE B     Rh TYPE POS     ANTIBODY SCREEN NEG    POCT GLUCOSE   Result Value Ref Range    POCT Glucose 135 (H) 74 - 99 mg/dL   POCT GLUCOSE   Result Value Ref Range    POCT Glucose 167 (H) 74 - 99 mg/dL   Renal Function Panel   Result Value Ref Range    Glucose 168 (H) 74 - 99 mg/dL    Sodium 128 (L) 136 - 145 mmol/L    Potassium 4.6 3.5 - 5.3 mmol/L    Chloride 92 (L) 98 - 107 mmol/L    Bicarbonate 27 21 - 32 mmol/L    Anion Gap 14 10 - 20 mmol/L    Urea Nitrogen 43 (H) 6 - 23 mg/dL    Creatinine 3.41 (H) 0.50 - 1.30 mg/dL    eGFR 18 (L) >60 mL/min/1.73m*2    Calcium 9.6 8.6 - 10.6 mg/dL    Phosphorus 2.5 2.5 - 4.9 mg/dL    Albumin 2.6 (L) 3.4 - 5.0 g/dL   Magnesium   Result Value Ref Range    Magnesium 2.17 1.60 - 2.40 mg/dL   CBC and Auto  Differential   Result Value Ref Range    WBC 9.5 4.4 - 11.3 x10*3/uL    nRBC 0.6 (H) 0.0 - 0.0 /100 WBCs    RBC 2.66 (L) 4.50 - 5.90 x10*6/uL    Hemoglobin 8.0 (L) 13.5 - 17.5 g/dL    Hematocrit 25.6 (L) 41.0 - 52.0 %    MCV 96 80 - 100 fL    MCH 30.1 26.0 - 34.0 pg    MCHC 31.3 (L) 32.0 - 36.0 g/dL    RDW 19.0 (H) 11.5 - 14.5 %    Platelets 112 (L) 150 - 450 x10*3/uL    Neutrophils % 76.0 40.0 - 80.0 %    Immature Granulocytes %, Automated 0.7 0.0 - 0.9 %    Lymphocytes % 10.3 13.0 - 44.0 %    Monocytes % 8.9 2.0 - 10.0 %    Eosinophils % 3.6 0.0 - 6.0 %    Basophils % 0.5 0.0 - 2.0 %    Neutrophils Absolute 7.19 (H) 1.60 - 5.50 x10*3/uL    Immature Granulocytes Absolute, Automated 0.07 0.00 - 0.50 x10*3/uL    Lymphocytes Absolute 0.97 0.80 - 3.00 x10*3/uL    Monocytes Absolute 0.84 (H) 0.05 - 0.80 x10*3/uL    Eosinophils Absolute 0.34 0.00 - 0.40 x10*3/uL    Basophils Absolute 0.05 0.00 - 0.10 x10*3/uL   Renal Function Panel   Result Value Ref Range    Glucose 112 (H) 74 - 99 mg/dL    Sodium 129 (L) 136 - 145 mmol/L    Potassium 5.0 3.5 - 5.3 mmol/L    Chloride 94 (L) 98 - 107 mmol/L    Bicarbonate 25 21 - 32 mmol/L    Anion Gap 15 10 - 20 mmol/L    Urea Nitrogen 46 (H) 6 - 23 mg/dL    Creatinine 3.43 (H) 0.50 - 1.30 mg/dL    eGFR 17 (L) >60 mL/min/1.73m*2    Calcium 9.3 8.6 - 10.6 mg/dL    Phosphorus 2.6 2.5 - 4.9 mg/dL    Albumin 2.3 (L) 3.4 - 5.0 g/dL   CBC and Auto Differential   Result Value Ref Range    WBC 8.1 4.4 - 11.3 x10*3/uL    nRBC 0.7 (H) 0.0 - 0.0 /100 WBCs    RBC 2.53 (L) 4.50 - 5.90 x10*6/uL    Hemoglobin 7.5 (L) 13.5 - 17.5 g/dL    Hematocrit 25.3 (L) 41.0 - 52.0 %     80 - 100 fL    MCH 29.6 26.0 - 34.0 pg    MCHC 29.6 (L) 32.0 - 36.0 g/dL    RDW 20.1 (H) 11.5 - 14.5 %    Platelets 119 (L) 150 - 450 x10*3/uL    Neutrophils % 68.7 40.0 - 80.0 %    Immature Granulocytes %, Automated 0.7 0.0 - 0.9 %    Lymphocytes % 14.3 13.0 - 44.0 %    Monocytes % 11.2 2.0 - 10.0 %    Eosinophils % 4.4 0.0  - 6.0 %    Basophils % 0.7 0.0 - 2.0 %    Neutrophils Absolute 5.59 (H) 1.60 - 5.50 x10*3/uL    Immature Granulocytes Absolute, Automated 0.06 0.00 - 0.50 x10*3/uL    Lymphocytes Absolute 1.16 0.80 - 3.00 x10*3/uL    Monocytes Absolute 0.91 (H) 0.05 - 0.80 x10*3/uL    Eosinophils Absolute 0.36 0.00 - 0.40 x10*3/uL    Basophils Absolute 0.06 0.00 - 0.10 x10*3/uL   Magnesium   Result Value Ref Range    Magnesium 2.22 1.60 - 2.40 mg/dL   CALCIUM, IONIZED   Result Value Ref Range    POCT Calcium, Ionized 1.30 1.1 - 1.33 mmol/L            Assessment/Plan   Principal Problem:    Critical limb ischemia of both lower extremities (CMS/HCC)  Active Problems:    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 AMS likely multifactorial in setting of recent surgery, sedative use, prolonged hospital stay, with possible element of hypercapnia. AMS has been gradually improving. Hypotension felt likely in setting of ESRD. Difficult to accurately access BP peripherally due to edema or centrally with a-line due to PVD.  Low concern for septic shock as cause of hypotension given lack of other features: no fever or leukocytosis. Will continue to remove excess fluid with renal replacement therapy (nephrology following). Patient weaned off BIPAP by 1/24/24 and has been tolerating NC since. Mentation mildly improving with ongoing dialysis. Peripheral cuff blood pressures now correlating better with arterial line readings. Nephro planning TTS dialysis. Will monitor today to see whether or not patient requires hemodynamic support during session.      1/30/24 Updates    - Nephro planning for HD today. Will monitor BP during  session to see if patient can maintain goal MAP >60 without additional hemodynamic support   - Diet advanced per SLP recommendation in setting of improved mentation   - At some point in future, patient may warrant further outpatient evaluation for HFrEF (ECHO 40-45%), no history of cardiac ischemic evaluation, especially in patient with history of extensive vascular complications      Neuro  #AMS  #Acute on Chronic CO2 Narcosis  #Delirium  #C/f Cefepime toxicity  :: sedating medications held (gabapentin; robaxin held)  :: cefepime d/cd (see ID)  - delirium precautions  - CT head 1/26 wnl, no acute intracranial findings   - AM cortisol very mildly elevated, TSH wnl, no deficit of B12 or folate   - if no interval improvements in mentation despite dialysis, would consider neurology consult for further evaluation (patient has had CT head which was wnl, no MRI or EEG)       CV  #PAD s/p TMA and 1/19 debridement  - Endovascular Cardiology on board  - Podiatry following peripherally at this point in time, patient will follow-up outpatient with Dr Rodriguez   - c/w home ASA 81mg PO every day  - c/w home clopidogrel 75mg PO every day  - c/w home atorvastatin 40mg PO every day      #HFrEF (35-40% 10/2023)  #Hypotension  :: intermittent levo as needed for goal MAP >60 if becomes hypotensive during dialysis   - midodrine 20mg PO TID initiated overnight of 1/22-1/23 for soft Bps, will titrate as able to sustain goal MAPs >60, pt has history of becoming hypotensive during dialysis   :: TTE 1/23/24 with LVEF to 40-45%, mildly improved from previous in 2023, no other new acute findings   - consider GDMT when BP stable and tolerating well during dialysis      #Afib  #SSS s/p PPM  - hold home metoprolol succinate 25mg PO every day iso shock, can consider resuming if BP remains at goal with dialysis   - AC on hold iso 1/16 TA perforation  - endovascular cardiology recs to resume Eliquis at discharge  - DVT ppx resumed 1/27, was being held  per vascular recs due to previous thrombocytopenia, resuming iso platelet count stable >100k      Resp  #Hypercapnic Respiratory Acidosis  - BiPAP for hypercapnia as needed   - Patient transitioned to NC on 1/24, post transition ABG stable and wnl, no evidence of evolving hypercarbia      ID  #Pseudomonas and Enterobacter on 1/19 tissue cx  :: s/p Cefepime x1 on 1/21  - s/p meropenem 1g q12hr (1/22-24)  - vancomycin, pharmacy to dose (1/21-24)     GI  #GERD  #GI ppx  - pantoprazole 40mg IV daily     #Bowel Regimen  - Miralax every day  - Senna every day      #Concern for Dysphagia- coughing with meds  - Speech Language Pathology on board  - SLP following   - NG placed 1/25   - SLP re-evaluation 1/30, improved performance on swallow examination, diet advance to small bites, pureed meds      Renal  #ESRD  #NAGMA  #Fluid overload  #Pulmonary Interstitial Edema, Pleural Effusions  - Nephrology on board  - EPO initiated 1/22  - Ongoing Dialysis per nephro recommendations, planning TTS      Endocrine  #DMII  - insulin lantus 6U at bedtime  - SSI     Rheum  #Gout  - c/w home allopurinol     MSK  #RLE TMA Revision   - podiatry continuing to follow, appreciate recs         F: PRN, FWF with tube feeds   E: PRN  N: tube feeds  A: LIJ central line (1/22),  R radial arterial line (1/22).  DVT ppx: subcutaneous heparin      Full code  NOK: Julia Benton [phone]    Eric Urias MD      Attending Note:  I have reviewed and evaluated the most recent data and results, personally examined the patient, and formulated the plan of care as presented above. This patient was critically ill and required continued critical care treatment. I agree with the resident/fellow's medical decision making as documented in the note with the exception/addition of the following:    Chevy Benton is a 78 y.o. male on day 21 of admission presenting with Critical limb ischemia of both lower extremities (CMS/HCC).    HFrEF 40% with  underlying AF/SSS s/p PPM. No prior LHC but likely CAD present d/t known PAD. ESRD on HD - difficulty with hemodynamics with HD. AMS now improving. S/p pRBC yesterday.    Plan:  HD today to ensure tolerating without any pressors.  If stable can consider transfer to floor later evening or tmw.    Teaching and any separately billable procedures are not included in the time calculation.  Billing Provider Critical Care Time: 30 minutes    Stuart Nguyen MD

## 2024-01-30 NOTE — CONSULTS
Wound Care Consult     Visit Date: 1/30/2024      Patient Name: Chevy Benton         MRN: [id number]           YOB: 1945     Reason for Consult: reapply wound vac         Wound History: Patient is a 77 yo male who presented as a direct admit from Dr Linder endovascular clinic for concerns of critical limb ischemia.       Pertinent Labs:   Albumin   Date Value Ref Range Status   01/30/2024 2.3 (L) 3.4 - 5.0 g/dL Final       Wound Assessment:  Wound Incision Foot Dorsal foot;Right (Active)   Wound Image   01/30/24 1411   Site Assessment Unable to assess 01/30/24 1600   Janessa-Wound Assessment Dry;Edematous;Pink 01/30/24 1411   Wound Length (cm) 3.8 cm 01/30/24 1411   Wound Width (cm) 10 cm 01/30/24 1411   Wound Surface Area (cm^2) 38 cm^2 01/30/24 1411   Wound Depth (cm) 0.6 cm 01/30/24 1411   Wound Volume (cm^3) 22.8 cm^3 01/30/24 1411   Closure None 01/22/24 1439   Sutures/Staple Line Approximated 01/17/24 1005   Drainage Description None 01/30/24 0400   Drainage Amount Moderate 01/30/24 1411   Dressing Vacuum dressing 01/30/24 1411   Dressing Status Clean;Dry 01/30/24 0400       Wound 01/09/24 Moisture Associated Skin Damage Buttocks Right (Active)   Wound Image   01/16/24 1110   Site Assessment Clean;Dry 01/30/24 1600   Janessa-Wound Assessment Clean;Dry 01/30/24 0400   Pressure Injury Stage 2 01/28/24 2000   Drainage Description Serous 01/30/24 0400   Drainage Amount Small 01/30/24 0400   Dressing Foam 01/30/24 0400   Dressing Changed Changed 01/29/24 2000   Dressing Status Clean;Dry 01/30/24 0400       Wound 01/22/24 Skin Tear Arm Right;Ventral;Mid (Active)   Site Assessment Clean;Dry 01/30/24 1600   Janessa-Wound Assessment Clean;Dry 01/30/24 0400   Drainage Description Serous 01/30/24 0400   Drainage Amount Small 01/30/24 0400   Dressing Foam 01/30/24 0400   Dressing Changed Changed 01/29/24 2000   Dressing Status Clean;Dry 01/30/24 0400       Wound Team Summary Assessment:   Wound  location:right foot   size:3.8 x 10 x 0.6 cm                             undermining: no     tracking: no                                       Wound type: surgical - debrided   Wound bed: red with yellow tissue - blackened areas of dried drainage    Draining: old sanguinous drainage on dressing   Periwound skin: warm,dry pink skin   Therapeutic surface: [facility]     Wound vac applied to right foot wound.  (1) Adaptic (1) Black foam   Low continuous  pressure 125mmHg.  Change 2x/week. Next wound vac change on Friday         Wound Team Plan: Wound/ Osotmy team will follow      Magi MORTON   1/30/2024  5:34 PM

## 2024-01-30 NOTE — PROGRESS NOTES
Speech-Language Pathology                 Therapy Communication Note    Patient Name: Chevy Benton  MRN: [id number]  Today's Date: 1/30/2024     Discipline: Speech Language Pathology    Missed Visit Reason:  Pt receiving wound care. SLP will continue to follow.     Missed Time: Attempt    C

## 2024-01-30 NOTE — PROGRESS NOTES
Nephrology Consult Progress Note    Admit Date: 1/9/2024    Interval history:  He is much more awake, denies SOB    CURRENT MEDICATIONS:    Current Facility-Administered Medications:     acetaminophen (Tylenol) tablet 650 mg, 650 mg, oral, q4h PRN, 650 mg at 01/29/24 1806 **OR** acetaminophen (Tylenol) oral liquid 650 mg, 650 mg, nasogastric tube, q4h PRN, 650 mg at 01/29/24 1145 **OR** acetaminophen (Tylenol) suppository 650 mg, 650 mg, rectal, q4h PRN, Mark Navarro MD    acetaminophen (Tylenol) tablet 975 mg, 975 mg, oral, q6h PRN, Michele Hilton MD, 975 mg at 01/28/24 2007    albumin human 25 % solution 25 g, 25 g, intravenous, Once, Eric Urias MD    albuterol 90 mcg/actuation inhaler 2 puff, 2 puff, inhalation, q6h PRN, Mark Navarro MD, 2 puff at 01/14/24 0850    allopurinol (Zyloprim) tablet 100 mg, 100 mg, oral, Daily, Mark Navarro MD, 100 mg at 01/30/24 0844    alum-mag hydroxide-simeth (Mylanta) 200-200-20 mg/5 mL oral suspension 30 mL, 30 mL, oral, 4x daily PRN, Mark Navraro MD    ammonium lactate (Lac-Hydrin) 12 % lotion, , Topical, q1h PRN, Mark Navarro MD    aspirin chewable tablet 81 mg, 81 mg, oral, Daily, Mark Navarro MD, 81 mg at 01/30/24 0844    atorvastatin (Lipitor) tablet 40 mg, 40 mg, oral, Nightly, Mark Navarro MD, 40 mg at 01/29/24 2047    B complex-vitamin C-folic acid (Nephrocaps) capsule 1 capsule, 1 capsule, oral, Daily, Mark Navarro MD, 1 capsule at 01/30/24 0844    clopidogrel (Plavix) tablet 75 mg, 75 mg, oral, Daily, Mark Navarro MD, 75 mg at 01/30/24 0844    dextrose 50 % injection 25 g, 25 g, intravenous, q15 min PRN, Mark Navarro MD, 25 g at 01/26/24 1144    docusate sodium (Colace) capsule 100 mg, 100 mg, oral, BID PRN, Mark Navarro MD, 100 mg at 01/16/24 1121    emollient combination no.92 (Lubriderm) cream lotion 1 Application, 1 Application, Topical, Daily PRN, Eric Urias MD    epoetin dane (Epogen,Procrit) injection 10,000 Units, 10,000 Units, subcutaneous,  Every Mon/Wed/Fri, Ronaldo Koehler MD, 10,000 Units at 01/26/24 1632    gabapentin (Neurontin) capsule 100 mg, 100 mg, oral, Daily, Eric Urias MD, 100 mg at 01/30/24 0844    glucagon (Glucagen) injection 1 mg, 1 mg, intramuscular, q15 min PRN, Mark Navarro MD    heparin (porcine) injection 1,200 Units, 1,200 Units, intravenous, PRN, Judy Wardt, DO    heparin (porcine) injection 1,200 Units, 1,200 Units, intravenous, PRN, Judy Wardt, DO    heparin (porcine) injection 5,000 Units, 5,000 Units, subcutaneous, q8h, Eric Urias MD, 5,000 Units at 01/30/24 0843    hydrocortisone 2.5 % ointment, , Topical, BID PRN, Mark Navarro MD    insulin glargine (Lantus) injection 6 Units, 6 Units, subcutaneous, Nightly, Mark Navarro MD, 6 Units at 01/29/24 2100    insulin lispro (HumaLOG) injection 0-5 Units, 0-5 Units, subcutaneous, TID with meals, Mark Navarro MD, 1 Units at 01/30/24 1230    ipratropium-albuteroL (Duo-Neb) 0.5-2.5 mg/3 mL nebulizer solution 3 mL, 3 mL, nebulization, q6h PRN, Mark Navarro MD    magnesium sulfate in D5W IV 1 g, 1 g, intravenous, Once, Brandon Atwood MD    melatonin tablet 5 mg, 5 mg, oral, Nightly, Laura Joaquin MD, 5 mg at 01/29/24 2047    midodrine (Proamatine) tablet 20 mg, 20 mg, oral, q8h, Eric Urias MD, 20 mg at 01/30/24 0744    norepinephrine (Levophed) 8 mg in dextrose 5% 250 mL (0.032 mg/mL) infusion (premix), 0.01-1 mcg/kg/min, intravenous, Continuous, Laura Joaquin MD, Stopped at 01/28/24 1745    oxygen (O2) therapy, , inhalation, Continuous PRN - O2/gases, Koko Gordon MD, 2 L/min at 01/28/24 2000    pantoprazole (ProtoNix) injection 40 mg, 40 mg, intravenous, Daily, Eric Urias MD, 40 mg at 01/30/24 0843    perflutren protein A microsphere (Optison) injection 0.5 mL, 0.5 mL, intravenous, Once in imaging, Mark Navarro MD    polyethylene glycol (Glycolax, Miralax) packet 17 g, 17 g, oral, Daily, Mark Navarro MD, 17 g at 01/30/24 0900     "povidone-iodine (Betadine) 7.5 % soap 1 Application, 1 Application, Topical, Daily PRN, John Kent MD, 1 Application at 01/18/24 0400    sennosides (Senokot) tablet 8.6 mg, 1 tablet, oral, Nightly, Mark Navarro MD, 8.6 mg at 01/29/24 2047    [Held by provider] sevelamer carbonate (Renvela) tablet 800 mg, 800 mg, oral, TID with meals, Mark Navarro MD, 800 mg at 01/24/24 0834    sodium chloride 0.9 % bolus 1,000 mL, 1,000 mL, CRRT, PRN, Judy Cazares DO    sulfur hexafluoride microsphr (Lumason) injection 24.28 mg, 2 mL, intravenous, Once in imaging, Mark Navarro MD    white petrolatum (Aquaphor) ointment, , Topical, q1h PRN, Eric Urias MD       Intake/Output Summary (Last 24 hours) at 1/30/2024 1424  Last data filed at 1/29/2024 1720  Gross per 24 hour   Intake 450 ml   Output --   Net 450 ml         PHYSICAL EXAM:  /67   Pulse 71   Temp 36 °C (96.8 °F) (Temporal)   Resp 18   Ht 1.778 m (5' 10\")   Wt 90 kg (198 lb 6.6 oz)   SpO2 99%   BMI 28.47 kg/m²     Intake/Output Summary (Last 24 hours) at 1/30/2024 1424  Last data filed at 1/29/2024 1720  Gross per 24 hour   Intake 450 ml   Output --   Net 450 ml       Gen: awake, alert, NAD  Neck: No JVD  Cardiac: RRR  Resp: few rales   Abd: Soft, non tender, +BS, non distended   Ext: trace to +1 edema   Access: RUE AVF  Neuro: moves 4 ext  Peripheral Pulses: Capillary refill <2secs, weak peripheral pulses.  Skin: Skin color, texture, turgor normal, no suspicious rashes or lesions.    Labs:  Results for orders placed or performed during the hospital encounter of 01/09/24 (from the past 24 hour(s))   POCT GLUCOSE   Result Value Ref Range    POCT Glucose 167 (H) 74 - 99 mg/dL   Renal Function Panel   Result Value Ref Range    Glucose 168 (H) 74 - 99 mg/dL    Sodium 128 (L) 136 - 145 mmol/L    Potassium 4.6 3.5 - 5.3 mmol/L    Chloride 92 (L) 98 - 107 mmol/L    Bicarbonate 27 21 - 32 mmol/L    Anion Gap 14 10 - 20 mmol/L    Urea Nitrogen 43 (H) 6 - " 23 mg/dL    Creatinine 3.41 (H) 0.50 - 1.30 mg/dL    eGFR 18 (L) >60 mL/min/1.73m*2    Calcium 9.6 8.6 - 10.6 mg/dL    Phosphorus 2.5 2.5 - 4.9 mg/dL    Albumin 2.6 (L) 3.4 - 5.0 g/dL   Magnesium   Result Value Ref Range    Magnesium 2.17 1.60 - 2.40 mg/dL   CBC and Auto Differential   Result Value Ref Range    WBC 9.5 4.4 - 11.3 x10*3/uL    nRBC 0.6 (H) 0.0 - 0.0 /100 WBCs    RBC 2.66 (L) 4.50 - 5.90 x10*6/uL    Hemoglobin 8.0 (L) 13.5 - 17.5 g/dL    Hematocrit 25.6 (L) 41.0 - 52.0 %    MCV 96 80 - 100 fL    MCH 30.1 26.0 - 34.0 pg    MCHC 31.3 (L) 32.0 - 36.0 g/dL    RDW 19.0 (H) 11.5 - 14.5 %    Platelets 112 (L) 150 - 450 x10*3/uL    Neutrophils % 76.0 40.0 - 80.0 %    Immature Granulocytes %, Automated 0.7 0.0 - 0.9 %    Lymphocytes % 10.3 13.0 - 44.0 %    Monocytes % 8.9 2.0 - 10.0 %    Eosinophils % 3.6 0.0 - 6.0 %    Basophils % 0.5 0.0 - 2.0 %    Neutrophils Absolute 7.19 (H) 1.60 - 5.50 x10*3/uL    Immature Granulocytes Absolute, Automated 0.07 0.00 - 0.50 x10*3/uL    Lymphocytes Absolute 0.97 0.80 - 3.00 x10*3/uL    Monocytes Absolute 0.84 (H) 0.05 - 0.80 x10*3/uL    Eosinophils Absolute 0.34 0.00 - 0.40 x10*3/uL    Basophils Absolute 0.05 0.00 - 0.10 x10*3/uL   Renal Function Panel   Result Value Ref Range    Glucose 112 (H) 74 - 99 mg/dL    Sodium 129 (L) 136 - 145 mmol/L    Potassium 5.0 3.5 - 5.3 mmol/L    Chloride 94 (L) 98 - 107 mmol/L    Bicarbonate 25 21 - 32 mmol/L    Anion Gap 15 10 - 20 mmol/L    Urea Nitrogen 46 (H) 6 - 23 mg/dL    Creatinine 3.43 (H) 0.50 - 1.30 mg/dL    eGFR 17 (L) >60 mL/min/1.73m*2    Calcium 9.3 8.6 - 10.6 mg/dL    Phosphorus 2.6 2.5 - 4.9 mg/dL    Albumin 2.3 (L) 3.4 - 5.0 g/dL   CBC and Auto Differential   Result Value Ref Range    WBC 8.1 4.4 - 11.3 x10*3/uL    nRBC 0.7 (H) 0.0 - 0.0 /100 WBCs    RBC 2.53 (L) 4.50 - 5.90 x10*6/uL    Hemoglobin 7.5 (L) 13.5 - 17.5 g/dL    Hematocrit 25.3 (L) 41.0 - 52.0 %     80 - 100 fL    MCH 29.6 26.0 - 34.0 pg    MCHC 29.6  (L) 32.0 - 36.0 g/dL    RDW 20.1 (H) 11.5 - 14.5 %    Platelets 119 (L) 150 - 450 x10*3/uL    Neutrophils % 68.7 40.0 - 80.0 %    Immature Granulocytes %, Automated 0.7 0.0 - 0.9 %    Lymphocytes % 14.3 13.0 - 44.0 %    Monocytes % 11.2 2.0 - 10.0 %    Eosinophils % 4.4 0.0 - 6.0 %    Basophils % 0.7 0.0 - 2.0 %    Neutrophils Absolute 5.59 (H) 1.60 - 5.50 x10*3/uL    Immature Granulocytes Absolute, Automated 0.06 0.00 - 0.50 x10*3/uL    Lymphocytes Absolute 1.16 0.80 - 3.00 x10*3/uL    Monocytes Absolute 0.91 (H) 0.05 - 0.80 x10*3/uL    Eosinophils Absolute 0.36 0.00 - 0.40 x10*3/uL    Basophils Absolute 0.06 0.00 - 0.10 x10*3/uL   Magnesium   Result Value Ref Range    Magnesium 2.22 1.60 - 2.40 mg/dL   CALCIUM, IONIZED   Result Value Ref Range    POCT Calcium, Ionized 1.30 1.1 - 1.33 mmol/L   POCT GLUCOSE   Result Value Ref Range    POCT Glucose 101 (H) 74 - 99 mg/dL   POCT GLUCOSE   Result Value Ref Range    POCT Glucose 151 (H) 74 - 99 mg/dL        DATA:   Diagnostic tests reviewed for today's visit:    New labs and imaging     Assessment and Plan:  Pt with ESKD, admitted with RLE ischemia, s/p RLE debridement and revascularization on 1/17  - ESKD on HD: NxStage dialysis (5days/week for 2.5 hours each) at [facility] [place]/Dr Hannah is nephrologist   CVVH from 1/22 till 1/23, IUF on 1/28  Remains off pressors   Anemia, Hb >7, on epogen  Routine IHD today    Will continue to follow.     Signature: Ronaldo Koehler MD

## 2024-01-31 NOTE — PROGRESS NOTES
Speech-Language Pathology  Adult Inpatient Swallow Treatment    Patient Name: Chevy Benton  MRN: [id number]  Today's Date: 1/31/2024   Start Time: 910  Stop Time: 936  Time Calculation (min): 26    Impression:   Dysphagia therapy completed. Breakfast tray in pt's room. Pt given two trials of dry scrambled eggs (not minced and moist). Extended chewing and throat clear and coughing upon swallowing with each trial therefore discontinued trials. Instructed pt to complete effortful swallows with each spoonful of puree. Questioned if pt performed effortful swallow with each spoonful. Need to continue to provide verbal and tactile cueing to ensure follow through of effortful swallow. Pt completed orange juice and orange sherbet without overt s/s of aspiration. SLP will continue to follow to ensure diet tolerance to assess readiness for diet advancement. Discussed results and recommendations with RN.      Recommendations:  Level 5 minced/moist with thin liquids     Upright for all PO intake  Remain upright for 20-30 min after eating  Small bites/sips  Feed only when alert  Straws OK.   Assistance during all meals.      Goal:   Pt will tolerate least restrictive diet with no overt clinical s/s aspiration 100% of the time.   Pt will utilize safe swallow strategies during meals with 100% accuracy (1/29/2024)       Plan:  SLP Services Indicated: Yes  Frequency: 2x week  Discussed POC with patient  SLP - OK to Discharge    Pain:   0-10  0 = No pain.     Inpatient Education:  Extensive education provided to patient regarding current swallow function, recommendations/results, and POC.      Consultations/Referrals/Coordination of Services:   N/A

## 2024-01-31 NOTE — PROGRESS NOTES
"Chevy Benton is a 78 y.o. male on day 22 of admission presenting with Critical limb ischemia of both lower extremities (CMS/HCC).    Subjective     NAEON. Patient tolerated dialysis well yesterday (1/30) evening with 2.6L of fluid removal. No pressors required. Remains hemodynamically and clinically stable. Transfer orders to floor placed.        Objective     Physical Exam    General: NAD. Patient is alert and oriented to name, place in the AM. Alert. Following simple commands.   Neuro: Strength and sensation appear grossly intact.   ENT: moist mucous membranes.   Cardiac: rate controlled afib. no murmurs. no rubs.  Pulmonary: CTAB. breath sounds appreciated in all lung fields. Normal work of breathing on minimal nasal cannula   Abdomen: non-tender. non-distended. normoactive bowel sounds.  Extremities: full passive ROM. 2+ b/l UE edema (R side mildly reduced from prior) Trace LE edema. RLE wrapped (from TMA revision this admission).   Skin: no rashes. no ulcers.    Last Recorded Vitals  Blood pressure 138/67, pulse 78, temperature 36 °C (96.8 °F), resp. rate 16, height 1.778 m (5' 10\"), weight 90 kg (198 lb 6.6 oz), SpO2 100 %.  Intake/Output last 3 Shifts:  I/O last 3 completed shifts:  In: 1100 (12.2 mL/kg) [I.V.:600 (6.7 mL/kg); Other:400; IV Piggyback:100]  Out: 2600 (28.9 mL/kg) [Other:2600]  Weight: 90 kg     Relevant Results    Scheduled medications  allopurinol, 100 mg, oral, Daily  aspirin, 81 mg, oral, Daily  atorvastatin, 40 mg, oral, Nightly  B complex-vitamin C-folic acid, 1 capsule, oral, Daily  clopidogrel, 75 mg, oral, Daily  epoetin dane or biosimilar, 10,000 Units, subcutaneous, Every Mon/Wed/Fri  gabapentin, 100 mg, oral, Daily  heparin (porcine), 5,000 Units, subcutaneous, q8h  insulin glargine, 6 Units, subcutaneous, Nightly  insulin lispro, 0-5 Units, subcutaneous, TID with meals  magnesium sulfate, 1 g, intravenous, Once  melatonin, 5 mg, oral, Nightly  midodrine, 20 mg, oral, " q8h  pantoprazole, 40 mg, intravenous, Daily  perflutren protein A microsphere, 0.5 mL, intravenous, Once in imaging  polyethylene glycol, 17 g, oral, Daily  sennosides, 1 tablet, oral, Nightly  [Held by provider] sevelamer carbonate, 800 mg, oral, TID with meals  sulfur hexafluoride microsphr, 2 mL, intravenous, Once in imaging      Continuous medications  norepinephrine, 0.01-1 mcg/kg/min, Last Rate: Stopped (01/28/24 1745)      PRN medications  PRN medications: acetaminophen **OR** acetaminophen **OR** acetaminophen, acetaminophen, albuterol, alum-mag hydroxide-simeth, ammonium lactate, dextrose, docusate sodium, emollient combination no.92, glucagon, heparin, heparin, hydrocortisone, ipratropium-albuteroL, povidone-iodine, sodium chloride, white petrolatum    Results for orders placed or performed during the hospital encounter of 01/09/24 (from the past 24 hour(s))   POCT GLUCOSE   Result Value Ref Range    POCT Glucose 101 (H) 74 - 99 mg/dL   POCT GLUCOSE   Result Value Ref Range    POCT Glucose 151 (H) 74 - 99 mg/dL   POCT GLUCOSE   Result Value Ref Range    POCT Glucose 142 (H) 74 - 99 mg/dL   Renal Function Panel   Result Value Ref Range    Glucose 147 (H) 74 - 99 mg/dL    Sodium 135 (L) 136 - 145 mmol/L    Potassium 4.2 3.5 - 5.3 mmol/L    Chloride 99 98 - 107 mmol/L    Bicarbonate 28 21 - 32 mmol/L    Anion Gap 12 10 - 20 mmol/L    Urea Nitrogen 21 6 - 23 mg/dL    Creatinine 1.91 (H) 0.50 - 1.30 mg/dL    eGFR 35 (L) >60 mL/min/1.73m*2    Calcium 8.2 (L) 8.6 - 10.6 mg/dL    Phosphorus 2.0 (L) 2.5 - 4.9 mg/dL    Albumin 2.3 (L) 3.4 - 5.0 g/dL   Magnesium   Result Value Ref Range    Magnesium 1.79 1.60 - 2.40 mg/dL   CBC and Auto Differential   Result Value Ref Range    WBC 6.0 4.4 - 11.3 x10*3/uL    nRBC 0.3 (H) 0.0 - 0.0 /100 WBCs    RBC 2.25 (L) 4.50 - 5.90 x10*6/uL    Hemoglobin 7.0 (L) 13.5 - 17.5 g/dL    Hematocrit 20.7 (L) 41.0 - 52.0 %    MCV 92 80 - 100 fL    MCH 31.1 26.0 - 34.0 pg    MCHC 33.8 32.0  - 36.0 g/dL    RDW 19.7 (H) 11.5 - 14.5 %    Platelets 110 (L) 150 - 450 x10*3/uL    Neutrophils % 70.0 40.0 - 80.0 %    Immature Granulocytes %, Automated 0.7 0.0 - 0.9 %    Lymphocytes % 13.6 13.0 - 44.0 %    Monocytes % 10.9 2.0 - 10.0 %    Eosinophils % 4.0 0.0 - 6.0 %    Basophils % 0.8 0.0 - 2.0 %    Neutrophils Absolute 4.17 1.60 - 5.50 x10*3/uL    Immature Granulocytes Absolute, Automated 0.04 0.00 - 0.50 x10*3/uL    Lymphocytes Absolute 0.81 0.80 - 3.00 x10*3/uL    Monocytes Absolute 0.65 0.05 - 0.80 x10*3/uL    Eosinophils Absolute 0.24 0.00 - 0.40 x10*3/uL    Basophils Absolute 0.05 0.00 - 0.10 x10*3/uL            Assessment/Plan   Principal Problem:    Critical limb ischemia of both lower extremities (CMS/HCC)  Active Problems:    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 AMS likely multifactorial in setting of recent surgery, sedative use, prolonged hospital stay, with possible element of hypercapnia. AMS has been gradually improving. Hypotension felt likely in setting of ESRD. Difficult to accurately access BP peripherally due to edema or centrally with a-line due to PVD.  Low concern for septic shock as cause of hypotension given lack of other features: no fever or leukocytosis. Will continue to remove excess fluid with renal replacement therapy (nephrology following). Patient weaned off BIPAP by 1/24/24 and has been tolerating NC since. Mentation mildly improving with ongoing dialysis. Peripheral cuff blood pressures now correlating better with arterial line readings. Nephro planning TTS dialysis. Patient did not require pressors during dialysis 1/30. No active ICU needs.  Transfer orders to floor placed.     1/31/24 Updates     - TTS hemodialysis per nephro   - patient tolerated 1/30 session well without pressors requirements, given midodrine prior to start of session and albumin at beginning of session to optimize hemodynamic status   - wound care team following patient's RLE TMA revision site   - SLP following, were not able to evaluate yesterday as came by during wound team care, will continue to follow. As patient mentation significantly improved, anticipate continue advancement of diet      Neuro  #AMS  #Acute on Chronic CO2 Narcosis  #Delirium  #C/f Cefepime toxicity  :: sedating medications held (gabapentin; robaxin held)  :: cefepime d/cd (see ID)  - delirium precautions  - CT head 1/26 wnl, no acute intracranial findings   - continue to monitor, mentation improved significantly since presentation      CV  #PAD s/p TMA and 1/19 debridement  - Endovascular Cardiology on board  - Podiatry following peripherally at this point in time, patient will follow-up outpatient with Dr Rodriguez   - c/w home ASA 81mg PO every day  - c/w home clopidogrel 75mg PO every day  - c/w home atorvastatin 40mg PO every day      #HFrEF (35-40% 10/2023)  #Hypotension  :: intermittent levo as needed for goal MAP >60 if becomes hypotensive during dialysis   - midodrine 20mg PO TID initiated overnight of 1/22-1/23 for soft Bps, will titrate as able to sustain goal MAPs >60, pt has history of becoming hypotensive during dialysis   :: TTE 1/23/24 with LVEF to 40-45%, mildly improved from previous in 2023, no other new acute findings   - consider GDMT when BP stable and tolerating well during dialysis      #Afib  #SSS s/p PPM  - holdling home metoprolol succinate 25mg daily due to soft blood pressures, patient has been in afib however rate controlled   - AC on hold iso 1/16 TA perforation  - endovascular cardiology recs to resume Eliquis at discharge  - DVT ppx resumed 1/27, was being held per vascular recs  due to previous thrombocytopenia, resuming iso platelet count stable >100k      Resp  #Hypercapnic Respiratory Acidosis  - BiPAP for hypercapnia as needed   - Patient transitioned to NC on 1/24, post transition ABG stable and wnl, no evidence of evolving hypercarbia      ID  #Pseudomonas and Enterobacter on 1/19 tissue cx  :: s/p Cefepime x1 on 1/21  - s/p meropenem 1g q12hr (1/22-24)  - vancomycin, pharmacy to dose (1/21-24)     GI  #GERD  #GI ppx  - pantoprazole 40mg IV daily     #Bowel Regimen  - Miralax every day  - Senna every day      #Concern for Dysphagia- coughing with meds  - Speech Language Pathology on board  - SLP following   - NG placed 1/25   - SLP re-evaluation 1/30, improved performance on swallow examination, diet advance to small bites, pureed meds, they will continue to follow       Renal  #ESRD  #NAGMA  #Fluid overload  #Pulmonary Interstitial Edema, Pleural Effusions  - Nephrology on board  - EPO initiated 1/22  - Ongoing Dialysis per nephro recommendations, planning TTS   - dialysis should be run through fistula, not central access that patient has had for intermittent levo     Heme/Onc  #Anemia   #Thrombocytopenia  ::suspect in setting of ESRD, chronic illness, some contribution from recent RLE TMA revision, occasional oozing of wound   - s/p 1U pRBC tranfusion 1/29/24   - hgb goal >7, platelet goal >100k   - no clinical evidence or symptoms of active bleeding otherwise   - nephro managing epo regimen      Endocrine  #DMII  - insulin lantus 6U at bedtime  - SSI     Rheum  #Gout  - c/w home allopurinol     MSK  #RLE TMA Revision   - podiatry, wound team continuing to follow, appreciate recs         F: PRN  E: PRN  N: food 5, thin 0  A: [facility]J central line (1/22),  R radial arterial line (1/22).  DVT ppx: subcutaneous heparin      Full code  NOK: Julia Benton [phone]                  Eric Urias MD

## 2024-01-31 NOTE — PROGRESS NOTES
Physical Therapy    Physical Therapy Treatment    Patient Name: Chevy Benton  MRN: [id number]  Today's Date: 1/31/2024  Time Calculation  Start Time: 1038  Stop Time: 1106  Time Calculation (min): 28 min     Assessment/Plan   PT Assessment  End of Session Communication: Bedside nurse  End of Session Patient Position: Bed, 3 rail up, Alarm off, not on at start of session  PT Plan  Inpatient/Swing Bed or Outpatient: Inpatient  PT Plan  Treatment/Interventions: Bed mobility, Transfer training, Balance training, Strengthening, Endurance training, Range of motion, Therapeutic exercise, Therapeutic activity, Positioning, Postural re-education  PT Plan: Skilled PT  PT Frequency: 3 times per week  PT Discharge Recommendations: Moderate intensity level of continued care  PT Recommended Transfer Status: Total assist  PT - OK to Discharge: Yes    General Visit Information:   PT  Visit  PT Received On: 01/31/24  General  Co-Treatment: OT  Co-Treatment Reason: Two skilled therapists required to safely mobilize medically complex pt  Prior to Session Communication: Bedside nurse  Patient Position Received: Bed, 3 rail up, Alarm off, not on at start of session  General Comment: Pt less confused than prior session. (Wound vac R LE, Arterial line.)    Subjective     Precautions:  Precautions  LE Weight Bearing Status:  (R LE NWBing)  Medical Precautions: Cardiac precautions, Fall precautions    Vital Signs:  Vital Signs  Heart Rate:  (PRE: 78; POST: 79)  SpO2:  (PRE: 100%; POST: 100%)  BP:  (PRE: 104/48. POST: 104/42)    Objective     Pain:  Pain Assessment  Pain Assessment: 0-10  Pain Score:  (Pt having pain with movement of all extremities)    Cognition:  Cognition  Overall Cognitive Status: Impaired  Arousal/Alertness: Delayed responses to stimuli  Orientation Level:  (oriented to person, place, January; needed choices for correct year- stated 2026. Reorientation provided.)  Following Commands:  (followed ~50-75% one step  commands with repetition and increased processing times at times)    Activity Tolerance:  Activity Tolerance  Early Mobility/Exercise Safety Screen: Proceed with mobilization - No exclusion criteria met    Treatments:  Therapeutic Activity  Therapeutic Activity Performed: Yes  Therapeutic Activity 1: Pt sat EOB total of 15 minutes with L lateral lean persistent throughout duration. MinAx1 for majority of duration, but able to achieve CGA with cues. Only able to stay in midline position ~45 seconds at a time then needing therapist assistance. Forward flexed posture.  Therapeutic Activity 2: Pt performed lateral lean onto forearm each direction with sustained lean x 20 seconds with therapist providing gentle pec stretch. Leaning towards the R, pt required modAx1 to get down, but CGA to push back up. Leaning towards the L, pt required modAx1 to lean down, but maxAx1 to midline.    Bed Mobility  Bed Mobility: Yes  Bed Mobility 1  Bed Mobility 1: Supine to sitting  Level of Assistance 1: Dependent (x2 person; cues for sequencing)  Bed Mobility Comments 1: HOB elevated; increased time to complete transfer, therapist guiding R UE to bedrail and able to hold onto R UE  Bed Mobility 2  Bed Mobility  2: Sitting to supine  Level of Assistance 2: Dependent (x2 person; cues for sequencing)  Bed Mobility Comments 2: HOB flat    Transfers  Transfer:  (unable to complete stand at this time as poor postural control at EOB)    Outcome Measures:  [facility] Basic Mobility  Turning from your back to your side while in a flat bed without using bedrails: Total  Moving from lying on your back to sitting on the side of a flat bed without using bedrails: Total  Moving to and from bed to chair (including a wheelchair): Total  Standing up from a chair using your arms (e.g. wheelchair or bedside chair): Total  To walk in hospital room: Total  Climbing 3-5 steps with railing: Total  Basic Mobility - Total Score: 6    Confusion Assessment Method-ICU  (CAM-ICU)  Feature 1: Acute Onset or Fluctuating Course: Negative  Overall CAM-ICU: Negative    FSS-ICU  Ambulation: Unable to attempt due to weakness  Rolling: Total assistance (performs 25% or requires another person)  Sitting: Minimal assistance (performs 75% or more of task)  Transfer Sit-to-Stand: Unable to perform  Transfer Supine-to-Sit: Total assistance (performs 25% or requires another person)  Total Score: 6    ICU Mobility Screen  Early Mobility/Exercise Safety Screen: Proceed with mobilization - No exclusion criteria met  E = Exercise and Early Mobility  Early Mobility/Exercise Safety Screen: Proceed with mobilization - No exclusion criteria met  Current Activity: Sitting at edge of bed    Education Documentation  Mobility Training, taught by Viviane Bonilla PT at 1/31/2024  3:01 PM.  Learner: Patient  Readiness: Acceptance  Method: Explanation  Response: Needs Reinforcement    Education Comments  No comments found.      Encounter Problems       Encounter Problems (Active)       Balance       Pt will be able to sit at EOB and perform dynamic B LE/UE tasks x 10 minutes without LOB or elbow propping with supervision only for improved postural control (Progressing)       Start:  01/24/24    Expected End:  02/07/24               Pain - Adult          Safety       Pt will be able to follow 100% one step commands with minimal to no repetition to task required for improved participation in therapy (Progressing)       Start:  01/24/24    Expected End:  02/07/24               Transfers       Transfer from bed to chair and chair to bed with modAx1 with FWW  (Progressing)       Start:  01/24/24    Expected End:  02/07/24            Patient to transfer to and from sit to supine with modAx1 (Progressing)       Start:  01/24/24    Expected End:  02/07/24            Patient will roll with minAx1 (Progressing)       Start:  01/24/24    Expected End:  02/07/24            Patient will transfer sit to and from stand  with modAx1 and FWW  (Progressing)       Start:  01/24/24    Expected End:  02/07/24                 Signed by Viviane Bonilla DPT

## 2024-01-31 NOTE — CARE PLAN
Problem: Pain - Adult  Goal: Verbalizes/displays adequate comfort level or baseline comfort level  Outcome: Progressing  Flowsheets (Taken 1/26/2024 1718)  Verbalizes/displays adequate comfort level or baseline comfort level:   Assess pain using appropriate pain scale   Administer analgesics based on type and severity of pain and evaluate response   Encourage patient to monitor pain and request assistance   Implement non-pharmacological measures as appropriate and evaluate response   Consider cultural and social influences on pain and pain management     Problem: Safety - Adult  Goal: Free from fall injury  Outcome: Progressing  Flowsheets (Taken 1/26/2024 1718)  Free from fall injury:   Based on caregiver fall risk screen, instruct family/caregiver to ask for assistance with transferring infant if caregiver noted to have fall risk factors   Instruct family/caregiver on patient safety     Problem: Discharge Planning  Goal: Discharge to home or other facility with appropriate resources  Outcome: Progressing  Flowsheets (Taken 1/26/2024 1718)  Discharge to home or other facility with appropriate resources:   Identify barriers to discharge with patient and caregiver   Identify discharge learning needs (meds, wound care, etc)   Arrange for interpreters to assist at discharge as needed   Arrange for needed discharge resources and transportation as appropriate   Refer to discharge planning if patient needs post-hospital services based on physician order or complex needs related to functional status, cognitive ability or social support system     Problem: Chronic Conditions and Co-morbidities  Goal: Patient's chronic conditions and co-morbidity symptoms are monitored and maintained or improved  Outcome: Progressing  Flowsheets (Taken 1/26/2024 1718)  Care Plan - Patient's Chronic Conditions and Co-Morbidity Symptoms are Monitored and Maintained or Improved:   Collaborate with multidisciplinary team to address chronic and  comorbid conditions and prevent exacerbation or deterioration   Monitor and assess patient's chronic conditions and comorbid symptoms for stability, deterioration, or improvement   Update acute care plan with appropriate goals if chronic or comorbid symptoms are exacerbated and prevent overall improvement and discharge     Problem: Skin  Goal: Decreased wound size/increased tissue granulation at next dressing change  Outcome: Progressing  Flowsheets (Taken 1/28/2024 2040 by Pinky Martinez, RN)  Decreased wound size/increased tissue granulation at next dressing change:   Promote sleep for wound healing   Protective dressings over bony prominences  Goal: Participates in plan/prevention/treatment measures  Outcome: Progressing  Flowsheets (Taken 1/28/2024 2040 by Pinky Martinez, RN)  Participates in plan/prevention/treatment measures: Elevate heels  Goal: Prevent/manage excess moisture  Outcome: Progressing  Flowsheets (Taken 1/28/2024 2040 by Pinky Martinez, RN)  Prevent/manage excess moisture:   Monitor for/manage infection if present   Cleanse incontinence/protect with barrier cream   Follow provider orders for dressing changes   Moisturize dry skin  Goal: Prevent/minimize sheer/friction injuries  Outcome: Progressing  Flowsheets (Taken 1/28/2024 2040 by Pinky Martinez RN)  Prevent/minimize sheer/friction injuries:   Use pull sheet   HOB 30 degrees or less   Turn/reposition every 2 hours/use positioning/transfer devices     Problem: Fall/Injury  Goal: Not fall by end of shift  Outcome: Progressing  Goal: Be free from injury by end of the shift  Outcome: Progressing  Goal: Verbalize understanding of personal risk factors for fall in the hospital  Outcome: Progressing  Goal: Verbalize understanding of risk factor reduction measures to prevent injury from fall in the home  Outcome: Progressing  Goal: Use assistive devices by end of the shift  Outcome: Progressing    The clinical goals for the shift include Patient will remain  safe and free of injury this shift.

## 2024-01-31 NOTE — PROGRESS NOTES
Occupational Therapy    Occupational Therapy Treatment    Name: Chevy Benton  MRN: [id number]  : 1945  Date: 24  Time Calculation  Start Time: 1039  Stop Time: 1107  Time Calculation (min): 28 min    Assessment:  OT Assessment: Patient requiring assist x2 for safe OOB activity with MIN A overall for sitting balance with L lateral lean and kyphotic posture noted. Cues for encouragement for engagement in ADLs at EOB. Continue MOD OT intensity discharge recommendation.  End of Session Communication: Bedside nurse  End of Session Patient Position: Bed, 3 rail up, Alarm off, not on at start of session  Plan:  Treatment Interventions: ADL retraining, Functional transfer training, UE strengthening/ROM, Endurance training, Cognitive reorientation, Neuromuscular reeducation  OT Frequency: 2 times per week  OT Discharge Recommendations: Moderate intensity level of continued care  OT Recommended Transfer Status: Dependent, Assist of 2  OT - OK to Discharge: Yes    Subjective   Previous Visit Info:  OT Last Visit  OT Received On: 24  General:  General  Family/Caregiver Present: No  Co-Treatment: PT  Co-Treatment Reason: co-treatment with PT to maximize patient/caregiver safety with OOB activity and skilled cognitive cueing.  Prior to Session Communication: Bedside nurse  Patient Position Received: Bed, 3 rail up, Alarm off, not on at start of session  Preferred Learning Style: auditory, verbal, visual, written  General Comment: Patient with cognitive improvements compared to previous therapy session. (Wound vac on RLE)  Precautions:  LE Weight Bearing Status:  (NWB RLE)  Medical Precautions: Cardiac precautions, Fall precautions  Vitals:  Vital Signs  Heart Rate: 80  SpO2: 100 %  BP: (!) 108/48 (post: 105/43)  MAP (mmHg): 68 (post: 63)  Pain Assessment:  Pain Assessment  Pain Assessment: 0-10  Pain Score:  (did not rate pain level)  Pain Location: Generalized (with movement in all extremities.)  Pain  Interventions: Repositioned, Distraction, Emotional support  Response to Interventions: tolerated therapy session     Objective   Activities of Daily Living: Grooming  Grooming Level of Assistance: Maximum assistance  Grooming Where Assessed: Edge of bed  Grooming Comments: MIN A support for sitting balance and MAX A to complete oral care with min cues for encouragement as pt askinmg for this OT to complete oral care for him. Assist for RUE support, grasp and movement of toothbrush.     Bed Mobility/Transfers: Bed Mobility  Bed Mobility: Yes  Bed Mobility 1  Bed Mobility 1: Supine to sitting, Sitting to supine  Level of Assistance 1: Dependent, Minimal verbal cues, Minimal tactile cues (x2)  Bed Mobility Comments 1: HOB elevated for supine to sit with increased time to complete with pt ability to hold bedrail with RUE with guiding assist provided and pt completing less than 25% of task. HOB flat for sit to supine.    Transfers  Transfer:  (Unsafe to progress to standing attempt 2/2 decreased trunk control)    Sitting Balance:  Static Sitting Balance  Static Sitting-Comment/Number of Minutes: Sitting EOB for ~15 min with MIN A overall while leaning on LUE for support. Intermittent CGA with decreased ability to sustain midline positioning with kyphotic posture.  Dynamic Sitting Balance  Dynamic Sitting-Comments: MIN A       Cognitive Skill Development:  Cognitive Skill Development  Cognitive Skill Development Activity 1: Reviewed orientation and educated pt on use of orientation signs for increased carryover.  Cognitive Skill Development Activity 2: Simple cueing and increased time for processing and responses noted.  Vision:   Glasses donned during session as pt reporting he could not see orientation information on wall.     Outcome Measures:  [facility] Daily Activity  Putting on and taking off regular lower body clothing: Total  Bathing (including washing, rinsing, drying): Total  Putting on and taking off regular upper  body clothing: Total  Toileting, which includes using toilet, bedpan or urinal: Total  Taking care of personal grooming such as brushing teeth: A lot  Eating Meals: Total  Daily Activity - Total Score: 7    Confusion Assessment Method-ICU (CAM-ICU)  Feature 1: Acute Onset or Fluctuating Course: Negative  Feature 2: Inattention: Positive  Feature 3: Altered Level of Consciousness: Negative  Feature 4: Disorganized Thinking: Negative  Overall CAM-ICU: Negative    Education Documentation  Body Mechanics, taught by Nevaeh Salcido OT at 1/31/2024  3:20 PM.  Learner: Patient  Readiness: Acceptance  Method: Explanation  Response: Verbalizes Understanding, Needs Reinforcement    ADL Training, taught by Nevaeh Salcido OT at 1/31/2024  3:20 PM.  Learner: Patient  Readiness: Acceptance  Method: Explanation  Response: Verbalizes Understanding, Needs Reinforcement    Education Comments  No comments found.      Goals:  Encounter Problems       Encounter Problems (Active)       ADLs       Patient with complete upper body dressing with minimal assist  level of assistance  (Progressing)       Start:  01/24/24    Expected End:  02/14/24            Patient will complete daily grooming tasks with stand by level of assistance and PRN adaptive equipment while edge of bed . (Progressing)       Start:  01/24/24    Expected End:  02/14/24                 COGNITION/SAFETY       Patient will score WFL on standardized cognitive assessment with without cues and within reasonable time frame (Progressing)       Start:  01/24/24    Expected End:  02/14/24            Patient will follow 100% Simple commands to allow improved ADL performance. (Progressing)       Start:  01/24/24    Expected End:  02/14/24            Patient will demonstrated orientation x 3 with verbal cues. (Progressing)       Start:  01/24/24    Expected End:  02/14/24       ORIENTATION            EXERCISE/STRENGTHENING       Patient with increase BUE to 3+/5 strength.  (Progressing)       Start:  01/24/24    Expected End:  02/14/24                 TRANSFERS       Patient will perform bed mobility moderate x2 level of assistance and bed rails and draw sheet in order to improve safety and independence with mobility (Progressing)       Start:  01/24/24    Expected End:  02/14/24            Patient will complete lateral functional transfer to chair with slideboard PRN and moderate assist x2. (Not Progressing)       Start:  01/24/24    Expected End:  02/14/24                 Nevaeh Salcido, OT

## 2024-01-31 NOTE — PROGRESS NOTES
"Nutrition Follow Up Assessment:   Nutrition Assessment    The patient is a 78 y.o. male who is hospital day #22.  Pt initially admitted to CICU for hypotension and AMS @hemodialysis. AMS slowly improving. Pt has continued to receive dialysis while in the CICU for removal of excess fluid. Plan for dialysis: TTS. Now on NC. SLP continues to recommend minced/moist w/ thin liquids. Orders to transfer to [facility].     PMHx: ESRD, HFrEF, and PVD s/p 9/2023 TMA and 1/19 debridement d/t distal leg ischemia     Nutrition History:  Energy Intake: Good > 75 %  Food and Nutrient History: NGT placed on 01/25. TF started on 01/25 and reached goal rate on 01/26. TF stopped on 01/29 and NGT removed. Per health touch review, pt has not missed a meal order. Flowsheets records show pt ate 100% of breakfast and lunch on 01/30 = 1650 kcal and 75g protein. Met with pt today but unable to obtain much information. Pt reports he has a pretty good appetite. When asked if he has been eating everything that he orders pt states that \"it changed today\". Breakfast tray at bedside - note pt ocnsumed OJ, oatmeal, and orange sherbet. Scrambled eggs left untouched - trialed with SLP but unable to advance diet. Pt waiting for lunch. Reports he drinks 1 Boost at home - unsure which flavors he likes. Believes he likes chocolate flavor. Pt initially agreeable to getting ONS while at [facility] but later states he \"appreciates it but he is not interested\".    Food Allergies/Intolerances:  None  Oral Problems: Swallowing difficulty         Anthropometrics:  Start of admission anthropometrics:  Height: 177.8 cm (5' 10\")  Weight: 90.5 kg (199 lb 8 oz)  BMI (Calculated): 28.63    IBW/kg (Dietitian Calculated): 75.5 kg  Percent of IBW: 139 %       Date Weight during admission    01/29/24  90 kg (198 lb 6.6 oz)   01/28/24  89.4 kg (197 lb 1.5 oz)   01/22/24  105 kg (231 lb 9.6 oz)   01/19/24  101 kg (221 lb 9.6 oz)   01/18/24 96 kg    01/09/24 90.5 kg - admit wt  "     Weight Change %:  Weight History / % Weight Change: Wt back to admit wt - fluid removal through dialysis/diuresis. Pt with non significant wt loss PTA.    Nutrition Focused Physical Exam Findings:  defer: .    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)    Objective Data:    Last BM Date: 01/31/24    Nutrition Significant Labs:    Results from last 7 days   Lab Units 01/31/24  0130 01/30/24  0234 01/29/24  1810 01/29/24  0247 01/28/24  0205   HEMOGLOBIN g/dL 7.0* 7.5* 8.0*   < > 7.0*   MCV fL 92 100 96   < > 93   GLUCOSE mg/dL 147* 112* 168*  --  205*   POTASSIUM mmol/L 4.2 5.0 4.6   < > 4.0   SODIUM mmol/L 135* 129* 128*   < > 133*   PHOSPHORUS mg/dL 2.0* 2.6 2.5   < > 1.8*   MAGNESIUM mg/dL 1.79 2.22 2.17   < > 2.09   CREATININE mg/dL 1.91* 3.43* 3.41*   < > 2.22*   BUN mg/dL 21 46* 43*   < > 23   ALT U/L  --   --   --   --  6*   AST U/L  --   --   --   --  23   ALK PHOS U/L  --   --   --   --  150*    < > = values in this interval not displayed.       Nutrition Specific Medications:  atorvastatin, 40 mg, oral, Nightly  B complex-vitamin C-folic acid, 1 capsule, oral, Daily  insulin glargine, 6 Units, subcutaneous, Nightly  insulin lispro, 0-5 Units, subcutaneous, TID with meals  pantoprazole, 40 mg, intravenous, Daily  polyethylene glycol, 17 g, oral, Daily  sennosides, 1 tablet, oral, Nightly  [Held by provider] sevelamer carbonate, 800 mg, oral, TID with meals  sodium phosphate, 15 mmol, intravenous, Once    I/O:   I/O last 2 completed shifts:  In: 1100 (12.2 mL/kg) [I.V.:600 (6.7 mL/kg); Other:400; IV Piggyback:100]  Out: 2600 (28.9 mL/kg) [Other:2600]  Weight: 90 kg     Dietary Orders (From admission, onward)       Start     Ordered    01/29/24 1505  Adult diet Regular; Minced/moist food 5; Thin 0  Diet effective now        Comments: Meds crushed in puree   Question Answer Comment   Diet type Regular    Texture Minced/moist food 5     Fluid consistency Thin 0        01/29/24 1505                     Estimated Needs:   Total Energy Estimated Needs (kCal): 2050 kCal  Method for Estimating Needs: 27 kcal/kg * IBW    Total Protein Estimated Needs (g): 90 g  Method for Estimating Needs: 1.2 g/kg * IBW    Method for Estimating Needs: per ICU team          Nutrition Diagnosis   Malnutrition Diagnosis  Patient has Malnutrition Diagnosis: No    Nutrition Diagnosis  Nutrition Diagnosis: Diagnosis 2  Diagnosis Status (2): New  Nutrition Diagnosis 2: Swallowing difficulity  Related to (2): AMS  As Evidenced by (2): need for minced/moist foods       Nutrition Interventions/Recommendations   Individualized Nutrition Prescription Provided for : 2050 kcal and 90g protein via oral diet vs. TF    Diet per SLP   If pt's PO intake decreases or if pt agreeable can order Nepro or KF Renal @breakfast.   Chocolate preferred   If able, obtain weights after dialysis session               Nutrition Monitoring and Evaluation   Food/Nutrient Related History Monitoring  Monitoring and Evaluation Plan: Energy intake  Criteria: >75% of nutr needs         Biochemical Data, Medical Tests and Procedures  Monitoring and Evaluation Plan: Electrolyte/renal panel  Criteria: lytes wnl            Time Spent/Follow-up Reminder:   Time Spent (min): 45 minutes  Last Date of Nutrition Visit: 01/31/24  Nutrition Follow-Up Needed?: Dietitian to reassess per policy

## 2024-02-01 PROBLEM — Z99.2 ESRD (END STAGE RENAL DISEASE) ON DIALYSIS (MULTI): Status: ACTIVE | Noted: 2022-04-30

## 2024-02-01 NOTE — PROGRESS NOTES
"Chevy Benton is a 78 y.o. male on day 23 of admission presenting with Critical limb ischemia of both lower extremities (CMS/HCC).    Subjective     NAEON. Patient planned for dialysis today.     Initial plans yesterday 1/31 to transfer patient to floor however arterial line was demonstrating borderline MAPs (goal >60) with peripheral cuff pressures not correlating with arterial line as they had been in previous days. Due to uncertainty that peripheral cuff pressures were reliable with maliha pressures variable, decision made to hold patient in ICU setting further monitor while attempting to establish cuff pressure baseline.       Objective     Physical Exam    General: NAD. Patient is alert and oriented to name, place in the AM. Alert. Following simple commands.   Neuro: Strength and sensation appear grossly intact.   ENT: moist mucous membranes.   Cardiac: rate controlled afib. no murmurs. no rubs.  Pulmonary: CTAB. breath sounds appreciated in all lung fields. Normal work of breathing on minimal nasal cannula   Abdomen: non-tender. non-distended. normoactive bowel sounds.  Extremities: full passive ROM. 2+ b/l UE edema (R side mildly reduced from prior) Trace LE edema. RLE wrapped (from TMA revision this admission).   Skin: no rashes. no ulcers.       Last Recorded Vitals  Blood pressure (!) 125/107, pulse 78, temperature 36.1 °C (97 °F), resp. rate 18, height 1.778 m (5' 10\"), weight 98.4 kg (216 lb 14.9 oz), SpO2 100 %.  Intake/Output last 3 Shifts:  I/O last 3 completed shifts:  In: 1650 (16.8 mL/kg) [P.O.:700; I.V.:200 (2 mL/kg); Other:400; IV Piggyback:350]  Out: 2600 (26.4 mL/kg) [Other:2600]  Weight: 98.4 kg     Relevant Results    Scheduled medications  allopurinol, 100 mg, oral, Daily  aspirin, 81 mg, oral, Daily  atorvastatin, 40 mg, oral, Nightly  B complex-vitamin C-folic acid, 1 capsule, oral, Daily  clopidogrel, 75 mg, oral, Daily  epoetin dane or biosimilar, 10,000 Units, subcutaneous, Every " Mon/Wed/Fri  gabapentin, 100 mg, oral, Daily  heparin (porcine), 5,000 Units, subcutaneous, q8h  insulin glargine, 6 Units, subcutaneous, Nightly  insulin lispro, 0-5 Units, subcutaneous, TID with meals  melatonin, 5 mg, oral, Nightly  midodrine, 20 mg, oral, q8h  pantoprazole, 40 mg, intravenous, Daily  perflutren protein A microsphere, 0.5 mL, intravenous, Once in imaging  polyethylene glycol, 17 g, oral, Daily  sennosides, 1 tablet, oral, Nightly  [Held by provider] sevelamer carbonate, 800 mg, oral, TID with meals  sulfur hexafluoride microsphr, 2 mL, intravenous, Once in imaging      Continuous medications     PRN medications  PRN medications: acetaminophen **OR** acetaminophen **OR** acetaminophen, acetaminophen, albuterol, alum-mag hydroxide-simeth, ammonium lactate, dextrose, docusate sodium, emollient combination no.92, glucagon, heparin, heparin, hydrocortisone, ipratropium-albuteroL, povidone-iodine, white petrolatum    Results for orders placed or performed during the hospital encounter of 01/09/24 (from the past 24 hour(s))   POCT GLUCOSE   Result Value Ref Range    POCT Glucose 94 74 - 99 mg/dL   POCT GLUCOSE   Result Value Ref Range    POCT Glucose 128 (H) 74 - 99 mg/dL   POCT GLUCOSE   Result Value Ref Range    POCT Glucose 119 (H) 74 - 99 mg/dL   CBC and Auto Differential   Result Value Ref Range    WBC 6.8 4.4 - 11.3 x10*3/uL    nRBC 0.6 (H) 0.0 - 0.0 /100 WBCs    RBC 2.51 (L) 4.50 - 5.90 x10*6/uL    Hemoglobin 7.6 (L) 13.5 - 17.5 g/dL    Hematocrit 22.6 (L) 41.0 - 52.0 %    MCV 90 80 - 100 fL    MCH 30.3 26.0 - 34.0 pg    MCHC 33.6 32.0 - 36.0 g/dL    RDW 19.2 (H) 11.5 - 14.5 %    Platelets 163 150 - 450 x10*3/uL    Neutrophils % 65.8 40.0 - 80.0 %    Immature Granulocytes %, Automated 0.4 0.0 - 0.9 %    Lymphocytes % 15.0 13.0 - 44.0 %    Monocytes % 12.8 2.0 - 10.0 %    Eosinophils % 5.0 0.0 - 6.0 %    Basophils % 1.0 0.0 - 2.0 %    Neutrophils Absolute 4.49 1.60 - 5.50 x10*3/uL    Immature  Granulocytes Absolute, Automated 0.03 0.00 - 0.50 x10*3/uL    Lymphocytes Absolute 1.02 0.80 - 3.00 x10*3/uL    Monocytes Absolute 0.87 (H) 0.05 - 0.80 x10*3/uL    Eosinophils Absolute 0.34 0.00 - 0.40 x10*3/uL    Basophils Absolute 0.07 0.00 - 0.10 x10*3/uL   Renal Function Panel   Result Value Ref Range    Glucose 68 (L) 74 - 99 mg/dL    Sodium 136 136 - 145 mmol/L    Potassium 3.2 (L) 3.5 - 5.3 mmol/L    Chloride 111 (H) 98 - 107 mmol/L    Bicarbonate 16 (L) 21 - 32 mmol/L    Anion Gap 12 10 - 20 mmol/L    Urea Nitrogen 22 6 - 23 mg/dL    Creatinine 1.89 (H) 0.50 - 1.30 mg/dL    eGFR 36 (L) >60 mL/min/1.73m*2    Calcium 6.4 (L) 8.6 - 10.6 mg/dL    Phosphorus 2.4 (L) 2.5 - 4.9 mg/dL    Albumin 1.8 (L) 3.4 - 5.0 g/dL   Magnesium   Result Value Ref Range    Magnesium 1.50 (L) 1.60 - 2.40 mg/dL   Hepatitis B surface antibody   Result Value Ref Range    Hepatitis B Surface AB <3.1 <10.0 mIU/mL   Hepatitis B surface antigen   Result Value Ref Range    Hepatitis B Surface AG Nonreactive Nonreactive            Assessment/Plan   Principal Problem:    Critical limb ischemia of both lower extremities (CMS/HCC)  Active Problems:    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 AMS likely multifactorial in setting of recent surgery, sedative use, prolonged hospital stay, with possible element of hypercapnia. AMS has been gradually improving. Hypotension felt likely in setting of ESRD. Difficult to accurately access BP peripherally due to edema or centrally with a-line due to PVD.  Low concern for septic shock as cause of hypotension given lack of other features: no fever or leukocytosis. Will continue to  remove excess fluid with renal replacement therapy (nephrology following). Patient weaned off BIPAP by 1/24/24 and has been tolerating NC since. Mentation mildly improving with ongoing dialysis. Nephro planning TTS dialysis. Did not require pressors to maintain adequate MAPs during Tuesday, 1/30 dialysis session. Per nephro: run dialysis via LUE fistula (patient fistulogram and ballooning of stenoses with vascular surgery 1/12), do not run via tunneled dialysis line (which was placed prior to this current hospitalization due to LUE fistula complications). Plan to transfer patient out of CICU if no ongoing pressor requirement and peripheral cuff pressors consistent.      2/1/24 Updates     - Dialysis today, TTS hemodialysis per nephro, use LUE fistula   - wound care team following patient's RLE TMA revision site   - per podiatry, patient's RLE remains NWB at this time   - trend peripheral cuff pressures: if cuff pressure demonstrate minimal variability, arterial line can be pulled to facilitate patient floor transfer    Neuro  #AMS  #Acute on Chronic CO2 Narcosis  #Delirium  #C/f Cefepime toxicity  :: sedating medications held (gabapentin; robaxin held)  :: cefepime d/cd (see ID)  - delirium precautions  - CT head 1/26 wnl, no acute intracranial findings   - minimize opioid medication for pain as patient with no renal clearance and they have profound sedating effect on patient, which tends to worsen his BP   - continue to monitor, mentation improved significantly since presentation      CV  #PAD s/p TMA and 1/19 debridement  - Endovascular Cardiology on board  - Podiatry following peripherally at this point in time, patient will follow-up outpatient with Dr Rodriguez   - c/w home ASA 81mg PO every day  - c/w home clopidogrel 75mg PO every day  - c/w home atorvastatin 40mg PO every day      #HFrEF (35-40% 10/2023)  #Hypotension  :: intermittent levo as needed for goal MAP >60 if becomes hypotensive during dialysis   -  midodrine 20mg PO TID initiated overnight of 1/22-1/23 for soft Bps, will titrate as able to sustain goal MAPs >60, pt has history of becoming hypotensive during dialysis   :: TTE 1/23/24 with LVEF to 40-45%, mildly improved from previous in 2023, no other new acute findings   - consider GDMT when BP stable and tolerating well during dialysis   - consideration should be given to further ischemic workup in the outpatient setting due to extensive peripheral disease, risk factors, and no evidence of previous ischemic cardiac workup       #Afib  #SSS s/p PPM  - holdling home metoprolol succinate 25mg daily due to soft blood pressures, patient has been in afib however rate controlled   - AC on hold iso 1/16 TA perforation  - endovascular cardiology recs to resume Eliquis at discharge  - DVT ppx resumed 1/27, was being held per vascular recs due to previous thrombocytopenia, resuming iso platelet count stable >100k      Resp  #Hypercapnic Respiratory Acidosis  - BiPAP for hypercapnia as needed   - Patient transitioned to NC on 1/24, post transition ABG stable and wnl, no evidence of evolving hypercarbia      ID  #Pseudomonas and Enterobacter on 1/19 tissue cx  :: s/p Cefepime x1 on 1/21  - s/p meropenem 1g q12hr (1/22-24)  - vancomycin, pharmacy to dose (1/21-24)     GI  #GERD  #GI ppx  - pantoprazole 40mg IV daily     #Bowel Regimen  - Miralax every day  - Senna every day      #Concern for Dysphagia- coughing with meds  - Speech Language Pathology on board  - SLP following   - NG placed 1/25   - SLP re-evaluation 1/30, improved performance on swallow examination, diet advance to small bites, pureed meds, they will continue to follow       Renal  #ESRD  #NAGMA  #Fluid overload  #Pulmonary Interstitial Edema, Pleural Effusions  - Nephrology on board  - EPO initiated 1/22  - Ongoing Dialysis per nephro recommendations, planning TTS   - dialysis should be run through fistula, not tunneled dialysis line (placed prior to this  hospitalization in setting of fistula complications, however patient is now s/p fistulogram and ballooning with vascular surgery this admission on 1/12)      Heme/Onc  #Anemia   #Thrombocytopenia  ::suspect in setting of ESRD, chronic illness, some contribution from recent RLE TMA revision, occasional oozing of wound   - s/p 1U pRBC tranfusion 1/29/24   - hgb goal >7, platelet goal >100k   - no clinical evidence or symptoms of active bleeding otherwise   - nephro managing epo regimen      Endocrine  #DMII  - insulin lantus 6U at bedtime  - SSI     Rheum  #Gout  - c/w home allopurinol     MSK  #RLE TMA Revision   - podiatry, wound team continuing to follow, appreciate recs        F: PRN  E: PRN  N: food 5, thin 0  A: LIJ tunneled central line (1/22),  R radial arterial line (1/22).  DVT ppx: subcutaneous heparin      Full code  NOK: Julia Daviss [phone]          Eric Urias MD

## 2024-02-01 NOTE — PROGRESS NOTES
Nephrology Consult Progress Note    Admit Date: 1/9/2024    Interval history:  No complaints, denies SOB    CURRENT MEDICATIONS:    Current Facility-Administered Medications:     [MAR Hold] acetaminophen (Tylenol) tablet 650 mg, 650 mg, oral, q4h PRN, 650 mg at 01/31/24 0126 **OR** [MAR Hold] acetaminophen (Tylenol) oral liquid 650 mg, 650 mg, nasogastric tube, q4h PRN, 650 mg at 01/29/24 1145 **OR** [MAR Hold] acetaminophen (Tylenol) suppository 650 mg, 650 mg, rectal, q4h PRN, Mark Navarro MD    [MAR Hold] acetaminophen (Tylenol) tablet 975 mg, 975 mg, oral, q6h PRN, Michele Hilton MD, 975 mg at 02/01/24 1107    [MAR Hold] albuterol 90 mcg/actuation inhaler 2 puff, 2 puff, inhalation, q6h PRN, Mark Navarro MD, 2 puff at 01/14/24 0850    [MAR Hold] allopurinol (Zyloprim) tablet 100 mg, 100 mg, oral, Daily, Mark Navarro MD, 100 mg at 02/01/24 1100    [MAR Hold] alum-mag hydroxide-simeth (Mylanta) 200-200-20 mg/5 mL oral suspension 30 mL, 30 mL, oral, 4x daily PRN, Mark Navarro MD    [MAR Hold] ammonium lactate (Lac-Hydrin) 12 % lotion, , Topical, q1h PRN, Mark Navarro MD    [MAR Hold] aspirin chewable tablet 81 mg, 81 mg, oral, Daily, Mark Navarro MD, 81 mg at 02/01/24 1100    [MAR Hold] atorvastatin (Lipitor) tablet 40 mg, 40 mg, oral, Nightly, Mark Navarro MD, 40 mg at 01/31/24 2202    [MAR Hold] B complex-vitamin C-folic acid (Nephrocaps) capsule 1 capsule, 1 capsule, oral, Daily, Mark Navarro MD, 1 capsule at 01/31/24 0833    [MAR Hold] clopidogrel (Plavix) tablet 75 mg, 75 mg, oral, Daily, Mark Navarro MD, 75 mg at 02/01/24 1100    [MAR Hold] dextrose 50 % injection 25 g, 25 g, intravenous, q15 min PRN, Mark Navarro MD, 25 g at 01/26/24 1144    [MAR Hold] docusate sodium (Colace) capsule 100 mg, 100 mg, oral, BID PRN, Mark Navarro MD, 100 mg at 01/16/24 1121    [MAR Hold] emollient combination no.92 (Lubriderm) cream lotion 1 Application, 1 Application, Topical, Daily PRN, Eric Urias MD    [MAR  Hold] epoetin dane (Epogen,Procrit) injection 10,000 Units, 10,000 Units, subcutaneous, Every Mon/Wed/Fri, Ronaldo Koehler MD, 10,000 Units at 01/31/24 1718    [MAR Hold] gabapentin (Neurontin) capsule 100 mg, 100 mg, oral, Daily, Eric Urias MD, 100 mg at 02/01/24 1100    [MAR Hold] glucagon (Glucagen) injection 1 mg, 1 mg, intramuscular, q15 min PRN, Mark Navarro MD    [MAR Hold] heparin (porcine) injection 1,200 Units, 1,200 Units, intravenous, PRN, Judy Cazares, DO    [MAR Hold] heparin (porcine) injection 1,200 Units, 1,200 Units, intravenous, PRN, Judy Cazares DO    [MAR Hold] heparin (porcine) injection 5,000 Units, 5,000 Units, subcutaneous, q8h, Eric Urias MD, 5,000 Units at 02/01/24 1059    [MAR Hold] hydrocortisone 2.5 % ointment, , Topical, BID PRN, Mark Navarro MD    [MAR Hold] insulin glargine (Lantus) injection 6 Units, 6 Units, subcutaneous, Nightly, Mark Navarro MD, 6 Units at 02/01/24 0029    [MAR Hold] insulin lispro (HumaLOG) injection 0-5 Units, 0-5 Units, subcutaneous, TID with meals, Mark Navarro MD, 1 Units at 01/30/24 1230    [MAR Hold] ipratropium-albuteroL (Duo-Neb) 0.5-2.5 mg/3 mL nebulizer solution 3 mL, 3 mL, nebulization, q6h PRN, Mark Navarro MD    [MAR Hold] melatonin tablet 5 mg, 5 mg, oral, Nightly, Laura Joaquin MD, 5 mg at 01/31/24 2203    [MAR Hold] midodrine (Proamatine) tablet 20 mg, 20 mg, oral, q8h, Eric Urias MD, 20 mg at 02/01/24 1100    [MAR Hold] pantoprazole (ProtoNix) EC tablet 40 mg, 40 mg, oral, Daily before breakfast, Eric Urias MD    [MAR Hold] perflutren protein A microsphere (Optison) injection 0.5 mL, 0.5 mL, intravenous, Once in imaging, Mark Navarro MD    [MAR Hold] polyethylene glycol (Glycolax, Miralax) packet 17 g, 17 g, oral, Daily, Mark Navarro MD, 17 g at 02/01/24 1100    povidone-iodine (Betadine) 7.5 % soap 1 Application, 1 Application, Topical, Daily PRN, John Kent MD, 1 Application at 01/18/24 0400    [MAR  "Hold] sennosides (Senokot) tablet 8.6 mg, 1 tablet, oral, Nightly, Mark Navarro MD, 8.6 mg at 01/31/24 2203    [Held by provider] sevelamer carbonate (Renvela) tablet 800 mg, 800 mg, oral, TID with meals, Mark Navarro MD, 800 mg at 01/24/24 0834    [MAR Hold] sulfur hexafluoride microsphr (Lumason) injection 24.28 mg, 2 mL, intravenous, Once in imaging, Mark Navarro MD    [MAR Hold] white petrolatum (Aquaphor) ointment, , Topical, q1h PRN, Eric Urias MD       Intake/Output Summary (Last 24 hours) at 2/1/2024 1424  Last data filed at 2/1/2024 0958  Gross per 24 hour   Intake 2050 ml   Output 3740 ml   Net -1690 ml         PHYSICAL EXAM:  /79 (BP Location: Right arm)   Pulse 78   Temp 36.6 °C (97.9 °F) (Temporal)   Resp 12   Ht 1.778 m (5' 10\")   Wt 98.4 kg (216 lb 14.9 oz)   SpO2 100%   BMI 31.13 kg/m²     Intake/Output Summary (Last 24 hours) at 2/1/2024 1424  Last data filed at 2/1/2024 0958  Gross per 24 hour   Intake 2050 ml   Output 3740 ml   Net -1690 ml       Gen: awake, alert, NAD  Neck: No JVD  Cardiac: RRR  Resp: few rales   Abd: Soft, non tender, +BS, non distended   Ext: trace edema   Access: RUE AVF  Neuro: moves 4 ext  Peripheral Pulses: Capillary refill <2secs, weak peripheral pulses.  Skin: Skin color, texture, turgor normal, no suspicious rashes or lesions.    Labs:  Results for orders placed or performed during the hospital encounter of 01/09/24 (from the past 24 hour(s))   POCT GLUCOSE   Result Value Ref Range    POCT Glucose 119 (H) 74 - 99 mg/dL   CBC and Auto Differential   Result Value Ref Range    WBC 6.8 4.4 - 11.3 x10*3/uL    nRBC 0.6 (H) 0.0 - 0.0 /100 WBCs    RBC 2.51 (L) 4.50 - 5.90 x10*6/uL    Hemoglobin 7.6 (L) 13.5 - 17.5 g/dL    Hematocrit 22.6 (L) 41.0 - 52.0 %    MCV 90 80 - 100 fL    MCH 30.3 26.0 - 34.0 pg    MCHC 33.6 32.0 - 36.0 g/dL    RDW 19.2 (H) 11.5 - 14.5 %    Platelets 163 150 - 450 x10*3/uL    Neutrophils % 65.8 40.0 - 80.0 %    Immature " Granulocytes %, Automated 0.4 0.0 - 0.9 %    Lymphocytes % 15.0 13.0 - 44.0 %    Monocytes % 12.8 2.0 - 10.0 %    Eosinophils % 5.0 0.0 - 6.0 %    Basophils % 1.0 0.0 - 2.0 %    Neutrophils Absolute 4.49 1.60 - 5.50 x10*3/uL    Immature Granulocytes Absolute, Automated 0.03 0.00 - 0.50 x10*3/uL    Lymphocytes Absolute 1.02 0.80 - 3.00 x10*3/uL    Monocytes Absolute 0.87 (H) 0.05 - 0.80 x10*3/uL    Eosinophils Absolute 0.34 0.00 - 0.40 x10*3/uL    Basophils Absolute 0.07 0.00 - 0.10 x10*3/uL   Renal Function Panel   Result Value Ref Range    Glucose 68 (L) 74 - 99 mg/dL    Sodium 136 136 - 145 mmol/L    Potassium 3.2 (L) 3.5 - 5.3 mmol/L    Chloride 111 (H) 98 - 107 mmol/L    Bicarbonate 16 (L) 21 - 32 mmol/L    Anion Gap 12 10 - 20 mmol/L    Urea Nitrogen 22 6 - 23 mg/dL    Creatinine 1.89 (H) 0.50 - 1.30 mg/dL    eGFR 36 (L) >60 mL/min/1.73m*2    Calcium 6.4 (L) 8.6 - 10.6 mg/dL    Phosphorus 2.4 (L) 2.5 - 4.9 mg/dL    Albumin 1.8 (L) 3.4 - 5.0 g/dL   Magnesium   Result Value Ref Range    Magnesium 1.50 (L) 1.60 - 2.40 mg/dL   Hepatitis B surface antibody   Result Value Ref Range    Hepatitis B Surface AB <3.1 <10.0 mIU/mL   Hepatitis B surface antigen   Result Value Ref Range    Hepatitis B Surface AG Nonreactive Nonreactive   POCT GLUCOSE   Result Value Ref Range    POCT Glucose 93 74 - 99 mg/dL   POCT GLUCOSE   Result Value Ref Range    POCT Glucose 77 74 - 99 mg/dL        DATA:   Diagnostic tests reviewed for today's visit:    New labs and imaging     Assessment and Plan:  Pt with ESKD, admitted with RLE ischemia, s/p RLE debridement and revascularization on 1/17  - ESKD on HD: [facility] dialysis (5days/week for 2.5 hours each) at [facility]/Dr Hannah is nephrologist   CV[facility] from 1/22 till 1/23, IUF on 1/28 and HD on 1/30  BP controlled off pressors  At his HD ctr using LIJ TDC, apparently not using AVF because of aneurysmatic lesions per nursing report at his ctr  Anemia, Hb >7, on epogen  Routine IHD  today    Will continue to follow.     Signature: Ronaldo Koehler MD

## 2024-02-01 NOTE — SIGNIFICANT EVENT
HOSPITAL COURSE FROM CICU TRANSFER NOTE  Chevy Benton is a 78 y.o. male with PMH of ESRD on HD TTS (Lt chest TDC, Hx of LUE Fistula Pseudoaneurysms), DM, HTN, HFrEF (TTE 10/23 EF 35-40%), PTA right lower extremity on 9/20/23 with subsequent TMA right foot on 9/22/23, Non Occlusive DVT (Rt Subclavian), Aortic Root Dilation, MR, chronic afib and SSS s/p pacemaker who initially presented on 1/9 from his endovascular clinic due to concerns for critical limb ischemia. Patient underwent PVR on 1/9 which showed concerns for severe ischemia warranting potential intervention. Patient started on heparin drip on arrival. Podiatry consulted for dry gangrenous changes to right TMA (following while in house for wound care recs). Vascular surgery consulted for thrombosed LUE AVF for which he had several temp lines in place prior. Underwent fistulogram and venoplasty with vascular surgery on 1/12 and subsequently underwent dialysis through LUE fistula afterwards. Found to have left upper extremity mild subclavian stenosis as well. Patient noted to be volume overloaded on 2L oxygen with significant edema in periphery. Patient underwent peripheral angioplasty on 1/17 which was complicated by AT perforation, which was managed with balloon tamponade and blood pressure cuff. He was subsequently transferred to CICU for further monitoring for compartment syndrome. Of note patient noted to be COVID positive January 2nd, was treated with 10 day course of dex while here while here (ended 1/14). Podiatry took patient for RLE TMA revision on 1/19.  Patient initially did well post-operatively until 1/22 code white during hemodialysis session for hypotension and altered mental status for which patient was brought back to the CICU for closer monitoring. Course in CICU complicated by persistent altered mental status with associated hypercarbia for which patient started on BIPAP. Able to wean BIPAP. Patient intermittently required pressors  to sustain BP while in CICU for which midodrine was titrated up to 20mg TID. Patient eventually started tolerated HD better without need for pressors in 3 consecutive sessions 1/27, 1/30, and 2/1. Mental status and peripheral edema improved with ongoing dialysis. Patient stable for transfer to the floor 2/1 due to no ongoing ICU needs.       Transfer to do list  - TTS dialysis, nephrology following. PATIENT TO RECEIVE DIALYSIS VIA LUE FISTULA. His fistula is ok to use s/p vascular surgery interventions on 1/12  - L tunnelled line placed prior to this hospitalization due to previous LUE fistula complications. This line may be removed by IR as the patient LUE fistula is now working  - Patient currently on TID midodrine to optimize BP during dialysis. This can be weaned as tolerated  - RLE is currently non-weight bearing per podiatry. Podiatry and wound care have been following for ongoing wound care s/p his RLE TMA revision   - Patient will require aggressive PT/OT as appropriate (continue to follow with podiatry for weight bearing advancement)   - SLP and nutrition have been following for optimization of swallowing and diet advancement   - patient will require outpatient follow-up with podiatry (Dr. Rodriguez) and endovascular cardiology after discharge  - anticipate patient will require prolonged rehab course in the setting of decompensation, limitations due to RLE      Pt arrived to T7. Assessment complete, vitals stable. Orders reviewed. Pt complaining of significant right leg pain, mostly in the calf area. He has been receiving one time doses of oxycodone, ordered 2.5mg oxycodone once and ordered his tylenol as scheduled instead of PRN. Added lidocaine patch. IR order placed to remove old tunneled line. Orders discussed with RN. To be staffed formally in the AM.

## 2024-02-01 NOTE — CARE PLAN
Problem: Pain - Adult  Goal: Verbalizes/displays adequate comfort level or baseline comfort level  Outcome: Progressing     Problem: Safety - Adult  Goal: Free from fall injury  Outcome: Progressing     Problem: Discharge Planning  Goal: Discharge to home or other facility with appropriate resources  Outcome: Progressing     Problem: Chronic Conditions and Co-morbidities  Goal: Patient's chronic conditions and co-morbidity symptoms are monitored and maintained or improved  Outcome: Progressing     Problem: Skin  Goal: Decreased wound size/increased tissue granulation at next dressing change  2/1/2024 1146 by Edmund Wade RN  Outcome: Progressing  2/1/2024 1134 by Edmund Wade RN  Outcome: Progressing  Goal: Participates in plan/prevention/treatment measures  Outcome: Progressing  Goal: Prevent/manage excess moisture  2/1/2024 1146 by Edmnud Wade RN  Outcome: Progressing  Flowsheets (Taken 2/1/2024 1146)  Prevent/manage excess moisture: Cleanse incontinence/protect with barrier cream  2/1/2024 1134 by Edmund Wade RN  Outcome: Progressing  Goal: Prevent/minimize sheer/friction injuries  2/1/2024 1146 by Edmund Wade RN  Outcome: Progressing  2/1/2024 1134 by Edmund Wade RN  Outcome: Progressing  Goal: Promote/optimize nutrition  2/1/2024 1146 by Edmund Wade RN  Outcome: Progressing  2/1/2024 1134 by Edmund Wade RN  Outcome: Progressing  Goal: Promote skin healing  Outcome: Progressing   The patient's goals for the shift include      The clinical goals for the shift include Pt will have stable hemodynamics during dialysis.

## 2024-02-01 NOTE — PROGRESS NOTES
BONNY met with the patient today. His mental status has improved significantly.  Patient is on dialysis. [facility] has been updated. Bonny advised that the patient will be going to the floor today. BONNY is not aware of an EDOD.He will need Precert to return to [facility].   JEAN-PIERRE Mccord

## 2024-02-01 NOTE — CARE PLAN
Problem: Pain - Adult  Goal: Verbalizes/displays adequate comfort level or baseline comfort level  Outcome: Progressing     Problem: Safety - Adult  Goal: Free from fall injury  Outcome: Progressing     Problem: Discharge Planning  Goal: Discharge to home or other facility with appropriate resources  Outcome: Progressing     Problem: Chronic Conditions and Co-morbidities  Goal: Patient's chronic conditions and co-morbidity symptoms are monitored and maintained or improved  Outcome: Progressing     Problem: Skin  Goal: Decreased wound size/increased tissue granulation at next dressing change  Outcome: Progressing  Goal: Participates in plan/prevention/treatment measures  Outcome: Progressing  Goal: Prevent/manage excess moisture  Outcome: Progressing  Goal: Prevent/minimize sheer/friction injuries  Outcome: Progressing  Goal: Promote/optimize nutrition  Outcome: Progressing   The patient's goals for the shift include      The clinical goals for the shift include Patient will maintain MAP >65 until end of shift 2/1/24 5363

## 2024-02-01 NOTE — SIGNIFICANT EVENT
Patient to be transferred out of CICU, please see below for updated hospital course and to-do list    Hospital Course    Chevy Benton is a 78 y.o. male with PMH of ESRD on HD TTS (Lt chest TDC, Hx of LUE Fistula Pseudoaneurysms), DM, HTN, HFrEF (TTE 10/23 EF 35-40%), PTA right lower extremity on 9/20/23 with subsequent TMA right foot on 9/22/23, Non Occlusive DVT (Rt Subclavian), Aortic Root Dilation, MR, chronic afib and SSS s/p pacemaker who initially presented on 1/9 from his endovascular clinic due to concerns for critical limb ischemia. Patient underwent PVR on 1/9 which showed concerns for severe ischemia warranting potential intervention. Patient started on heparin drip on arrival. Podiatry consulted for dry gangrenous changes to right TMA (following while in house for wound care recs). Vascular surgery consulted for thrombosed LUE AVF for which he had several temp lines in place prior. Underwent fistulogram and venoplasty with vascular surgery on 1/12 and subsequently underwent dialysis through LUE fistula afterwards. Found to have left upper extremity mild subclavian stenosis as well. Patient noted to be volume overloaded on 2L oxygen with significant edema in periphery. Patient underwent peripheral angioplasty on 1/17 which was complicated by AT perforation, which was managed with balloon tamponade and blood pressure cuff. He was subsequently transferred to CICU for further monitoring for compartment syndrome. Of note patient noted to be COVID positive January 2nd, was treated with 10 day course of dex while here while here (ended 1/14). Podiatry took patient for RLE TMA revision on 1/19.  Patient initially did well post-operatively until 1/22 code white during hemodialysis session for hypotension and altered mental status for which patient was brought back to the CICU for closer monitoring. Course in CICU complicated by persistent altered mental status with associated hypercarbia for which patient  started on BIPAP. Able to wean BIPAP. Patient intermittently required pressors to sustain BP while in CICU for which midodrine was titrated up to 20mg TID. Patient eventually started tolerated HD better without need for pressors in 3 consecutive sessions 1/27, 1/30, and 2/1. Mental status and peripheral edema improved with ongoing dialysis. Patient stable for transfer to the floor 2/1 due to no ongoing ICU needs.     Transfer to do list    - TTS dialysis, nephrology following. PATIENT TO RECEIVE DIALYSIS VIA LUE FISTULA. His fistula is ok to use s/p vascular surgery interventions on 1/12  - L tunnelled line placed prior to this hospitalization due to previous LUE fistula complications. This line may be removed by IR as the patient LUE fistula is now working  - Patient currently on TID midodrine to optimize BP during dialysis. This can be weaned as tolerated  - RLE is currently non-weight bearing per podiatry. Podiatry and wound care have been following for ongoing wound care s/p his RLE TMA revision   - Patient will require aggressive PT/OT as appropriate (continue to follow with podiatry for weight bearing advancement)   - SLP and nutrition have been following for optimization of swallowing and diet advancement   - patient will require outpatient follow-up with podiatry (Dr. Rodriguez) and endovascular cardiology after discharge  - anticipate patient will require prolonged rehab course in the setting of decompensation, limitations due to RLE

## 2024-02-01 NOTE — CARE PLAN
The patient's goals for the shift include      The clinical goals for the shift include Patient will maintain MAP >65 until end of shift 2/1/24 0730    Over the shift, the patient did not make progress toward the following goals. Barriers to progression include consistently taking oral medication to improve BP in a timely manner. Recommendations to address these barriers include recheduling meds to earlier timeframe while pt is awake.

## 2024-02-02 NOTE — PROGRESS NOTES
"Chevy Benton is a 78 y.o. male on day 24 of admission presenting with Critical limb ischemia of both lower extremities (CMS/HCC).    Subjective   Returns to service from CICU  Hospital stay 25 days  Followed by podiatry for right foot TMA revision with VAC  Known dialysis patient utilizing left AVF  IR to remove left tunnel dialysis catheter today     Objective     Physical Exam  Constitutional:       Appearance: He is ill-appearing.   HENT:      Mouth/Throat:      Mouth: Mucous membranes are moist.   Cardiovascular:      Rate and Rhythm: Normal rate and regular rhythm.   Pulmonary:      Effort: Pulmonary effort is normal.      Comments: Nasal cannula   Abdominal:      Palpations: Abdomen is soft.   Musculoskeletal:      Comments: Left arm AVF - thrill noted    Skin:     General: Skin is warm and dry.      Comments: Right vac wound for right foot      Neurological:      General: No focal deficit present.      Mental Status: He is alert.         Last Recorded Vitals  Blood pressure 131/69, pulse 76, temperature 36.4 °C (97.5 °F), resp. rate 18, height 1.778 m (5' 10\"), weight 89.3 kg (196 lb 13.9 oz), SpO2 100 %.  Intake/Output last 3 Shifts:  I/O last 3 completed shifts:  In: 1800 (20.2 mL/kg) [P.O.:200; I.V.:800 (9 mL/kg); Other:800]  Out: 3740 (41.9 mL/kg) [Other:3740]  Weight: 89.3 kg     Relevant Results  Scheduled medications  acetaminophen, 975 mg, oral, q6h  allopurinol, 100 mg, oral, Daily  aspirin, 81 mg, oral, Daily  atorvastatin, 40 mg, oral, Nightly  B complex-vitamin C-folic acid, 1 capsule, oral, Daily  clopidogrel, 75 mg, oral, Daily  epoetin dane or biosimilar, 10,000 Units, subcutaneous, Every Mon/Wed/Fri  gabapentin, 100 mg, oral, Daily  heparin (porcine), 5,000 Units, subcutaneous, q8h  insulin glargine, 6 Units, subcutaneous, Nightly  insulin lispro, 0-5 Units, subcutaneous, TID with meals  lidocaine, 1 patch, transdermal, Daily  melatonin, 5 mg, oral, Nightly  midodrine, 20 mg, oral, " q8h  pantoprazole, 40 mg, oral, Daily before breakfast  perflutren protein A microsphere, 0.5 mL, intravenous, Once in imaging  polyethylene glycol, 17 g, oral, Daily  sennosides, 1 tablet, oral, Nightly  [Held by provider] sevelamer carbonate, 800 mg, oral, TID with meals  sulfur hexafluoride microsphr, 2 mL, intravenous, Once in imaging      Continuous medications     PRN medications  PRN medications: albuterol, alum-mag hydroxide-simeth, ammonium lactate, dextrose, docusate sodium, emollient combination no.92, glucagon, heparin, hydrocortisone, ipratropium-albuteroL, povidone-iodine, white petrolatum     Results for orders placed or performed during the hospital encounter of 01/09/24 (from the past 24 hour(s))   POCT GLUCOSE   Result Value Ref Range    POCT Glucose 172 (H) 74 - 99 mg/dL   POCT GLUCOSE   Result Value Ref Range    POCT Glucose 76 74 - 99 mg/dL   CBC   Result Value Ref Range    WBC 6.4 4.4 - 11.3 x10*3/uL    nRBC 0.5 (H) 0.0 - 0.0 /100 WBCs    RBC 2.59 (L) 4.50 - 5.90 x10*6/uL    Hemoglobin 7.8 (L) 13.5 - 17.5 g/dL    Hematocrit 27.4 (L) 41.0 - 52.0 %     (H) 80 - 100 fL    MCH 30.1 26.0 - 34.0 pg    MCHC 28.5 (L) 32.0 - 36.0 g/dL    RDW 21.1 (H) 11.5 - 14.5 %    Platelets 195 150 - 450 x10*3/uL   Renal Function Panel   Result Value Ref Range    Glucose 100 (H) 74 - 99 mg/dL    Sodium 131 (L) 136 - 145 mmol/L    Potassium 5.1 3.5 - 5.3 mmol/L    Chloride 96 (L) 98 - 107 mmol/L    Bicarbonate 25 21 - 32 mmol/L    Anion Gap 15 10 - 20 mmol/L    Urea Nitrogen 22 6 - 23 mg/dL    Creatinine 2.36 (H) 0.50 - 1.30 mg/dL    eGFR 27 (L) >60 mL/min/1.73m*2    Calcium 9.6 8.6 - 10.6 mg/dL    Phosphorus 3.0 2.5 - 4.9 mg/dL    Albumin 2.5 (L) 3.4 - 5.0 g/dL   POCT GLUCOSE   Result Value Ref Range    POCT Glucose 103 (H) 74 - 99 mg/dL   POCT GLUCOSE   Result Value Ref Range    POCT Glucose 83 74 - 99 mg/dL            Assessment/Plan   Principal Problem:    Critical limb ischemia of both lower extremities  (CMS/[facility])  Active Problems:    ESRD (end stage renal disease) on dialysis (CMS/HCC)    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 male with PMH of ESRD on HD TTS (Lt chest TDC, Hx of LUE Fistula Pseudoaneurysms), DM, HTN, HFrEF (TTE 10/23 EF 35-40%), PTA right lower extremity on 9/20/23 with subsequent TMA right foot on 9/22/23, Non Occlusive DVT (Rt Subclavian), Aortic Root Dilation, MR, chronic afib and SSS s/p pacemaker who presents DAVID angioplasty C/b perforation of AT artery. Patient admitted to CICU for further monitoring. Additionally patient noted to be overloaded on examination, on 2L with diffuse anasarca more pronounced in upper extremities. He is anuric at baseline and is on TTS dialysis. Patient was stable overnight with no signs/symptoms of compartment syndrome.        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%   #afib  #DM with neuropathy  #HTN  #Hx of right TMA  ::S/p Plavix load  ::1/19 Podiatry  revision of right TMA    Podiatry following for surgical revision and VAC to right foot    Plan:  -Cont plavix & aspirin resumption of eliquis pending ok surgical podiatry team   -Cont home metoprolol succ 25mg  -Uptitrate GDMT as tolerated consider hydralazine and isordil limited 2/2 hypotension on midodrine   -Cont home atorva  -Cont home gabapentin  -Insulin glargine 6 units nightly    -SSI     PULMONARY  #AHRF  #2/2 to ESRD and Hfref  #COVID  ::S/p 10 day course of dex (ended 1/14)  Plan:  -Continue with dialysis regimen  -Wean and titrate oxygen as needed  -Cont IS, pulm hygiene        RENAL/  #ESRD with dialysis TTS  #LUE fistula  occlusion s/p venoplasty  Plan:  -Cont with dialysis schedule - via left AVF remove left dialysis tunnel catheter   -Cont home renvela  -Cont home nephro caps     GASTROINTESTINAL  Gerd  -Cont home pantoprazole     MSK  #hx of gout  -cont home allopurinol     HEME/ONC  #Anemia of chronic disease  ::At baseline hgb  ::2/2 to ESRD  Plan:  -CTM     INFECTIOUS DISEASE  #Covid infection s/p dexamethasone  :;Afebrile, no leukocytosis  ::xray with pulmonary edema  Plan:  -Continue to monitor for worsening signs or sx of pneumonia       F: Carb diet, NPO midnight   E: K > 4, Mg > 2  N: Renal diet  A: PIV, left subclaavian trialysos  DVT ppx: Heparin SubQ  GI ppx: Protonix    Disposition   Rehab/SNF on dialysis     Code Status: Full code   Surrogate Medical Decision-maker: Spouse ()      Seen with Dr Piedad Lopez, APRN-CNP

## 2024-02-02 NOTE — PROGRESS NOTES
Wound Care Progress Note     Visit Date: 2/2/2024      Patient Name: Chevy Benton         MRN: [id number]                Reason for Visit: Wound VAC dressing change         Wound History: Patient is a 79 yo male who presented as a direct admit from Dr Linder endovascular clinic for concerns of critical limb ischemia.       Wound Assessment:         Wound location: Right TMA site   size: 3.5 cm x 11 cm x 0.4 cm                             undermining: na     tracking: na                                        Wound type: surgical incision   Wound bed: red moist granulation tissue, bone exposure and surgicell    Draining: serosanguinous exudate   Periwound skin: clean, dry and intact     Wound Vac applied to Right TMA .  (1) Adaptic    (2) black foam  Pressure; -125  Plan:  Change wound Vac Tuesday 2/6   If patient is discharged prior to next dressing change, please disconnect VAC machine, remove foam dressing, and place machine in the soiled utility room on the unit. Call [phone] for pickup immediately upon removal.   Pack wound with wet-to moist NS kerlix and cover with a dry sterile dressing    Wound Team Plan: Next wound vac dressing change is 2/6     Yonis Mondragon RN-BC, CWON  2/2/2024  2:52 PM

## 2024-02-02 NOTE — POST-PROCEDURE NOTE
INTERVENTIONAL RADIOLOGY ADVANCED PRACTICE PROCEDURE  [facility]    The existing skin site over the tunneled catheter insertion site was prepped with antiseptic, dried, and draped with maximal sterile manner.  The skin and subcutaneous tissues at the original catheter site were anesthetized with 1% lidocaine.   Using blunt dissection, the catheter cuff was externalized and subsequently the catheter was completely removed.   Hemostasis was obtained using manual compression at the internal jugular vein access site.  The incision site was covered with a sterile dressing and tegaderm was subsequently applied.  There were no immediate or post-procedural complications.

## 2024-02-02 NOTE — PROGRESS NOTES
"Chevy Benton is a 78 y.o. male on day 24 of admission presenting with Critical limb ischemia of both lower extremities (CMS/HCC).    Subjective   Pt resting in bed. Denies sob, n/v/d, fever, cough, chills, pain, chest pains, heart flutters, constipation       Objective     Physical Exam  Vitals and nursing note reviewed.   Constitutional:       Appearance: He is ill-appearing.   Cardiovascular:      Rate and Rhythm: Rhythm irregular.      Comments: AFIB -73  Pulmonary:      Comments: Magdiel lung sounds clear anterior  Abdominal:      General: There is distension.      Comments: Non-tender with palpation   Genitourinary:     Comments: anuric  Musculoskeletal:      Right lower leg: Edema present.      Left lower leg: Edema present.      Comments: Global edema..pitting ble/magdiel upper ext  Rt foot with wound vac s/p rt tma   Skin:     General: Skin is warm.   Neurological:      Mental Status: He is alert and oriented to person, place, and time.   Psychiatric:         Mood and Affect: Mood normal.         Behavior: Behavior normal.         Last Recorded Vitals  Blood pressure 131/69, pulse 76, temperature 36.4 °C (97.5 °F), resp. rate 18, height 1.778 m (5' 10\"), weight 89.3 kg (196 lb 13.9 oz), SpO2 100 %.  Intake/Output last 3 Shifts:  I/O last 3 completed shifts:  In: 1800 (20.2 mL/kg) [P.O.:200; I.V.:800 (9 mL/kg); Other:800]  Out: 3740 (41.9 mL/kg) [Other:3740]  Weight: 89.3 kg     Relevant Results  Scheduled medications  acetaminophen, 975 mg, oral, q6h  allopurinol, 100 mg, oral, Daily  aspirin, 81 mg, oral, Daily  atorvastatin, 40 mg, oral, Nightly  B complex-vitamin C-folic acid, 1 capsule, oral, Daily  clopidogrel, 75 mg, oral, Daily  [START ON 2/3/2024] epoetin dane or biosimilar, 12,000 Units, intravenous, Once per day on Tue Thu Sat  gabapentin, 100 mg, oral, Daily  heparin (porcine), 5,000 Units, subcutaneous, q8h  insulin glargine, 6 Units, subcutaneous, Nightly  insulin lispro, 0-5 Units, subcutaneous, " TID with meals  lidocaine, 1 patch, transdermal, Daily  melatonin, 5 mg, oral, Nightly  midodrine, 20 mg, oral, q8h  pantoprazole, 40 mg, oral, Daily before breakfast  perflutren protein A microsphere, 0.5 mL, intravenous, Once in imaging  polyethylene glycol, 17 g, oral, Daily  sennosides, 1 tablet, oral, Nightly  [Held by provider] sevelamer carbonate, 800 mg, oral, TID with meals  sulfur hexafluoride microsphr, 2 mL, intravenous, Once in imaging      Continuous medications     PRN medications  PRN medications: albuterol, alum-mag hydroxide-simeth, ammonium lactate, dextrose, docusate sodium, emollient combination no.92, glucagon, heparin, hydrocortisone, ipratropium-albuteroL, povidone-iodine, white petrolatum   Results for orders placed or performed during the hospital encounter of 01/09/24 (from the past 24 hour(s))   POCT GLUCOSE   Result Value Ref Range    POCT Glucose 172 (H) 74 - 99 mg/dL   POCT GLUCOSE   Result Value Ref Range    POCT Glucose 76 74 - 99 mg/dL   CBC   Result Value Ref Range    WBC 6.4 4.4 - 11.3 x10*3/uL    nRBC 0.5 (H) 0.0 - 0.0 /100 WBCs    RBC 2.59 (L) 4.50 - 5.90 x10*6/uL    Hemoglobin 7.8 (L) 13.5 - 17.5 g/dL    Hematocrit 27.4 (L) 41.0 - 52.0 %     (H) 80 - 100 fL    MCH 30.1 26.0 - 34.0 pg    MCHC 28.5 (L) 32.0 - 36.0 g/dL    RDW 21.1 (H) 11.5 - 14.5 %    Platelets 195 150 - 450 x10*3/uL   Renal Function Panel   Result Value Ref Range    Glucose 100 (H) 74 - 99 mg/dL    Sodium 131 (L) 136 - 145 mmol/L    Potassium 5.1 3.5 - 5.3 mmol/L    Chloride 96 (L) 98 - 107 mmol/L    Bicarbonate 25 21 - 32 mmol/L    Anion Gap 15 10 - 20 mmol/L    Urea Nitrogen 22 6 - 23 mg/dL    Creatinine 2.36 (H) 0.50 - 1.30 mg/dL    eGFR 27 (L) >60 mL/min/1.73m*2    Calcium 9.6 8.6 - 10.6 mg/dL    Phosphorus 3.0 2.5 - 4.9 mg/dL    Albumin 2.5 (L) 3.4 - 5.0 g/dL   POCT GLUCOSE   Result Value Ref Range    POCT Glucose 103 (H) 74 - 99 mg/dL   POCT GLUCOSE   Result Value Ref Range    POCT Glucose 83 74 -  99 mg/dL                     Assessment/Plan   Principal Problem:    Critical limb ischemia of both lower extremities (CMS/HCC)  Active Problems:    ESRD (end stage renal disease) on dialysis (CMS/HCC)    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  K+=5.1     Outpatient Dialysis schedule:   [facility] dialysis (5days/week for 2.5 hours each) at [facility]/Dr Hannah ... Will dialyze TTS while in house     Access: lt fist with +thrill/bruit- no issues - able to achieve   Lt cath in place     Anemia of ESRD: 2/2-increase epogen 12,000 units on dialysis days... current hgb 7.8... 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 current phos level 3.0... will cont to monitor. B complex-vitamin C-folic acid (Nephrocaps) capsule 1 capsule daily     Plan HD tomorrow with UF as tolerated     Renal diet      Please obtain daily standing wt (if possible)     Medication to be adjusted for ESRD      Patient to continue regular HD schedule while inpatient and to follow with the outpatient nephrologist at discharge           JENIFFER Bates-CNP

## 2024-02-02 NOTE — CARE PLAN
The patient's goals for the shift include      The clinical goals for the shift include Pt will be HDS    Problem: Pain - Adult  Goal: Verbalizes/displays adequate comfort level or baseline comfort level  Outcome: Progressing     Problem: Safety - Adult  Goal: Free from fall injury  Outcome: Progressing     Problem: Discharge Planning  Goal: Discharge to home or other facility with appropriate resources  Outcome: Progressing     Problem: Chronic Conditions and Co-morbidities  Goal: Patient's chronic conditions and co-morbidity symptoms are monitored and maintained or improved  Outcome: Progressing     Problem: Diabetes  Goal: Achieve decreasing blood glucose levels by end of shift  Outcome: Progressing  Goal: Increase stability of blood glucose readings by end of shift  Outcome: Progressing  Goal: Decrease in ketones present in urine by end of shift  Outcome: Progressing  Goal: Maintain electrolyte levels within acceptable range throughout shift  Outcome: Progressing  Goal: Maintain glucose levels >70mg/dl to <250mg/dl throughout shift  Outcome: Progressing  Goal: No changes in neurological exam by end of shift  Outcome: Progressing  Goal: Learn about and adhere to nutrition recommendations by end of shift  Outcome: Progressing  Goal: Vital signs within normal range for age by end of shift  Outcome: Progressing  Goal: Increase self care and/or family involovement by end of shift  Outcome: Progressing  Goal: Receive DSME education by end of shift  Outcome: Progressing     Problem: Skin  Goal: Decreased wound size/increased tissue granulation at next dressing change  Outcome: Progressing  Goal: Participates in plan/prevention/treatment measures  Outcome: Progressing  Goal: Prevent/manage excess moisture  Outcome: Progressing  Goal: Prevent/minimize sheer/friction injuries  Outcome: Progressing  Flowsheets (Taken 2/2/2024 0009)  Prevent/minimize sheer/friction injuries:   HOB 30 degrees or less   Use pull sheet    Turn/reposition every 2 hours/use positioning/transfer devices  Goal: Promote/optimize nutrition  Outcome: Progressing  Goal: Promote skin healing  Outcome: Progressing     Problem: Fall/Injury  Goal: Not fall by end of shift  Outcome: Progressing  Goal: Be free from injury by end of the shift  Outcome: Progressing  Goal: Verbalize understanding of personal risk factors for fall in the hospital  Outcome: Progressing  Goal: Verbalize understanding of risk factor reduction measures to prevent injury from fall in the home  Outcome: Progressing  Goal: Use assistive devices by end of the shift  Outcome: Progressing  Goal: Pace activities to prevent fatigue by end of the shift  Outcome: Progressing

## 2024-02-03 NOTE — PROGRESS NOTES
"Chevy Benton is a 78 y.o. male on day 25 of admission presenting with Critical limb ischemia of both lower extremities (CMS/HCC).    Subjective   Returns to service from CICU  Hospital stay 26 days  Followed by podiatry for right foot TMA revision with VAC  Known dialysis patient utilizing left AVF  IR to removed left tunnel dialysis catheter yesterday  Dialysis today     Objective     Physical Exam  Constitutional:       Appearance: He is ill-appearing.   HENT:      Mouth/Throat:      Mouth: Mucous membranes are moist.   Cardiovascular:      Rate and Rhythm: Normal rate and regular rhythm.   Pulmonary:      Effort: Pulmonary effort is normal.      Comments: Nasal cannula   Abdominal:      Palpations: Abdomen is soft.   Musculoskeletal:      Comments: Left arm AVF - thrill noted    Skin:     General: Skin is warm and dry.      Comments: Right vac wound for right foot      Neurological:      General: No focal deficit present.      Mental Status: He is alert.         Last Recorded Vitals  Blood pressure 141/76, pulse 69, temperature 36.4 °C (97.5 °F), resp. rate 18, height 1.778 m (5' 10\"), weight 93.3 kg (205 lb 11 oz), SpO2 100 %.  Intake/Output last 3 Shifts:  I/O last 3 completed shifts:  In: 40 (0.4 mL/kg) [P.O.:40]  Out: 0 (0 mL/kg)   Weight: 93.3 kg     Relevant Results  Scheduled medications  acetaminophen, 975 mg, oral, q6h  allopurinol, 100 mg, oral, Daily  aspirin, 81 mg, oral, Daily  atorvastatin, 40 mg, oral, Nightly  B complex-vitamin C-folic acid, 1 capsule, oral, Daily  clopidogrel, 75 mg, oral, Daily  epoetin dane or biosimilar, 12,000 Units, intravenous, Once per day on Tue Thu Sat  gabapentin, 100 mg, oral, Daily  heparin (porcine), 5,000 Units, subcutaneous, q8h  insulin glargine, 6 Units, subcutaneous, Nightly  insulin lispro, 0-5 Units, subcutaneous, TID with meals  lidocaine, 1 patch, transdermal, Daily  melatonin, 5 mg, oral, Nightly  midodrine, 20 mg, oral, q8h  pantoprazole, 40 mg, " oral, Daily before breakfast  perflutren protein A microsphere, 0.5 mL, intravenous, Once in imaging  polyethylene glycol, 17 g, oral, Daily  sennosides, 1 tablet, oral, Nightly  [Held by provider] sevelamer carbonate, 800 mg, oral, TID with meals  sulfur hexafluoride microsphr, 2 mL, intravenous, Once in imaging      Continuous medications     PRN medications  PRN medications: albuterol, alum-mag hydroxide-simeth, ammonium lactate, dextrose, docusate sodium, emollient combination no.92, glucagon, heparin, hydrocortisone, ipratropium-albuteroL, povidone-iodine, white petrolatum     Results for orders placed or performed during the hospital encounter of 01/09/24 (from the past 24 hour(s))   POCT GLUCOSE   Result Value Ref Range    POCT Glucose 110 (H) 74 - 99 mg/dL   POCT GLUCOSE   Result Value Ref Range    POCT Glucose 122 (H) 74 - 99 mg/dL   CBC   Result Value Ref Range    WBC 7.5 4.4 - 11.3 x10*3/uL    nRBC 0.3 (H) 0.0 - 0.0 /100 WBCs    RBC 2.66 (L) 4.50 - 5.90 x10*6/uL    Hemoglobin 7.8 (L) 13.5 - 17.5 g/dL    Hematocrit 26.8 (L) 41.0 - 52.0 %     (H) 80 - 100 fL    MCH 29.3 26.0 - 34.0 pg    MCHC 29.1 (L) 32.0 - 36.0 g/dL    RDW 20.7 (H) 11.5 - 14.5 %    Platelets 243 150 - 450 x10*3/uL   Renal Function Panel   Result Value Ref Range    Glucose 111 (H) 74 - 99 mg/dL    Sodium 130 (L) 136 - 145 mmol/L    Potassium 5.7 (H) 3.5 - 5.3 mmol/L    Chloride 94 (L) 98 - 107 mmol/L    Bicarbonate 25 21 - 32 mmol/L    Anion Gap 17 10 - 20 mmol/L    Urea Nitrogen 33 (H) 6 - 23 mg/dL    Creatinine 3.34 (H) 0.50 - 1.30 mg/dL    eGFR 18 (L) >60 mL/min/1.73m*2    Calcium 9.6 8.6 - 10.6 mg/dL    Phosphorus 4.2 2.5 - 4.9 mg/dL    Albumin 2.4 (L) 3.4 - 5.0 g/dL   POCT GLUCOSE   Result Value Ref Range    POCT Glucose 107 (H) 74 - 99 mg/dL   POCT GLUCOSE   Result Value Ref Range    POCT Glucose 118 (H) 74 - 99 mg/dL            Assessment/Plan   Principal Problem:    Critical limb ischemia of both lower extremities  (CMS/[facility])  Active Problems:    ESRD (end stage renal disease) on dialysis (CMS/HCC)    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 male with PMH of ESRD on HD TTS (Lt chest TDC, Hx of LUE Fistula Pseudoaneurysms), DM, HTN, HFrEF (TTE 10/23 EF 35-40%), PTA right lower extremity on 9/20/23 with subsequent TMA right foot on 9/22/23, Non Occlusive DVT (Rt Subclavian), Aortic Root Dilation, MR, chronic afib and SSS s/p pacemaker who presents DAVID angioplasty C/b perforation of AT artery. Patient admitted to CICU for further monitoring. Additionally patient noted to be overloaded on examination, on 2L with diffuse anasarca more pronounced in upper extremities. He is anuric at baseline and is on TTS dialysis. Patient was stable overnight with no signs/symptoms of compartment syndrome.        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%   #afib  #DM with neuropathy  #HTN  #Hx of right TMA  ::S/p Plavix load  ::1/19 Podiatry  revision of right TMA    Podiatry following for surgical revision and VAC to right foot    Plan:  -Cont plavix & aspirin resumption of eliquis pending ok surgical podiatry team   -Cont home metoprolol succ 25mg  -Uptitrate GDMT as tolerated consider hydralazine and isordil limited 2/2 hypotension on midodrine   -Cont home atorva  -Cont home gabapentin  -Insulin glargine 6 units nightly    -SSI     PULMONARY  #AHRF  #2/2 to ESRD and Hfref  #COVID  ::S/p 10 day course of dex (ended 1/14)  Plan:  -Continue with dialysis regimen  -Wean and titrate oxygen as needed  -Cont IS, pulm hygiene        RENAL/  #ESRD with dialysis TTS  #LUE fistula  occlusion s/p venoplasty  Plan:  -Cont with dialysis schedule - via left AVF   - 2/2 removed left dialysis tunnel catheter by IR   -Cont home renvela  -Cont home nephro caps     GASTROINTESTINAL  Gerd  -Cont home pantoprazole     MSK  #hx of gout  -cont home allopurinol     HEME/ONC  #Anemia of chronic disease  ::At baseline hgb  ::2/2 to ESRD  Plan:  -CTM     INFECTIOUS DISEASE  #Covid infection s/p dexamethasone  :;Afebrile, no leukocytosis  ::xray with pulmonary edema  Plan:  -Continue to monitor for worsening signs or sx of pneumonia       F: Carb diet, NPO midnight   E: K > 4, Mg > 2  N: Renal diet  A: PIV, left subclaavian trialysos  DVT ppx: Heparin SubQ  GI ppx: Protonix    Disposition   Rehab/SNF on dialysis     Code Status: Full code   Surrogate Medical Decision-maker: Spouse ()      Seen with Dr Piedad Lopez, APRN-CNP

## 2024-02-03 NOTE — NURSING NOTE
Report from Sending RN:    Report From: Caren  Recent Surgery of Procedure: No  Baseline Level of Consciousness (LOC): A and O x 3  Oxygen Use: Yes  Type: 2  Diabetic: No  Last BP Med Given Day of Dialysis: none, Mododrine at 6  Last Pain Med Given: 6 AM  Lab Tests to be Obtained with Dialysis: No  Blood Transfusion to be Given During Dialysis: No  Available IV Access: Yes  Medications to be Administered During Dialysis: No  Continuous IV Infusion Running: No  Restraints on Currently or in the Last 24 Hours: No  Hand-Off Communication: Would prefer to come second shift.

## 2024-02-03 NOTE — PROGRESS NOTES
Nephrology Follow-up Note   Patient ID: Chevy Benton is a 78 y.o. male.  Seen and examined during hemodialysis. Labs and events reviewed.     Subjective:   Feels OK, no c/o CP/SOB/F/C/Abd pain  No c/o related to HD     Patient Active Problem List   Diagnosis    PAD (peripheral artery disease) (CMS/HCC)    Anemia in other chronic diseases classified elsewhere    Aortic root dilation (CMS/HCC)    Bimalleolar ankle fracture    Coronary artery disease involving native coronary artery of native heart with angina pectoris (CMS/HCC)    DM2 (diabetes mellitus, type 2) (CMS/HCC)    ESRD (end stage renal disease) on dialysis (CMS/HCC)    Thyroid enlarged    Thoracic aortic ectasia (CMS/HCC)    Sick sinus syndrome (CMS/HCC)    Pulmonary HTN (CMS/HCC)    Polyosteoarthritis, unspecified    Pneumatosis intestinalis    Peripheral vascular disorder due to diabetes mellitus (CMS/HCC)    Open wound of right foot    Gangrene of right foot (CMS/HCC)    Obesity, Class I, BMI 30-34.9    Nonrheumatic mitral (valve) insufficiency    Non-rheumatic mitral regurgitation    Skin ulcer of right heel with fat layer exposed (CMS/HCC)    Non-pressure chronic ulcer of other part of right foot with fat layer exposed (CMS/HCC)    Atherosclerosis of native artery of right lower extremity with gangrene (CMS/HCC)    Atherosclerosis of native arteries of left leg with ulceration of heel and midfoot (CMS/HCC)    Malnutrition of mild degree (CMS/HCC)    Longstanding persistent atrial fibrillation (CMS/HCC)    Elevated prostate specific antigen (PSA)    Ischemic ulcer of foot due to atherosclerosis of lower extremity (CMS/HCC)    Hyperlipidemia LDL goal <70    Hepatomegaly, not elsewhere classified    Gout    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)    Pacemaker    Critical limb ischemia with history of revascularization of same extremity (CMS/HCC)    Stage II pressure ulcer (CMS/HCC)    Amputation of toe of right foot  (CMS/[facility])    Weakness    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 (CMS/[facility])    COVID    PNA (pneumonia)    Critical limb ischemia of both lower extremities (CMS/[facility])    Critical limb ischemia of right lower extremity (CMS/[facility])    Subclavian vein stenosis, left       Scheduled medications:  acetaminophen, 975 mg, oral, q6h  allopurinol, 100 mg, oral, Daily  aspirin, 81 mg, oral, Daily  atorvastatin, 40 mg, oral, Nightly  B complex-vitamin C-folic acid, 1 capsule, oral, Daily  clopidogrel, 75 mg, oral, Daily  epoetin dane or biosimilar, 12,000 Units, intravenous, Once per day on Tue Thu Sat  gabapentin, 100 mg, oral, Daily  heparin (porcine), 5,000 Units, subcutaneous, q8h  insulin glargine, 6 Units, subcutaneous, Nightly  insulin lispro, 0-5 Units, subcutaneous, TID with meals  lidocaine, 1 patch, transdermal, Daily  melatonin, 5 mg, oral, Nightly  midodrine, 20 mg, oral, q8h  pantoprazole, 40 mg, oral, Daily before breakfast  perflutren protein A microsphere, 0.5 mL, intravenous, Once in imaging  polyethylene glycol, 17 g, oral, Daily  sennosides, 1 tablet, oral, Nightly  [Held by provider] sevelamer carbonate, 800 mg, oral, TID with meals  sulfur hexafluoride microsphr, 2 mL, intravenous, Once in imaging         PRN medications: albuterol, alum-mag hydroxide-simeth, ammonium lactate, dextrose, docusate sodium, emollient combination no.92, glucagon, heparin, hydrocortisone, ipratropium-albuteroL, povidone-iodine, white petrolatum     Heart Rate:  [69-85]   Temp:  [35.9 °C (96.6 °F)-36.6 °C (97.9 °F)]   Resp:  [18]   BP: (105-141)/(54-76)   Weight:  [93.3 kg (205 lb 11 oz)]   SpO2:  [99 %-100 %]    Weight: 90.5 kg (199 lb 8 oz)   Gen: alert, NAD  HEENT: NC/AT  Neck: supple, no JVD   Pulm: clear ant b/l   CVS: RRR, no rub  Abd: S/NT/ND  LE: +edema , no cyanosis   Neuro: no asterixis   Dialysis acces:  CHANTEL     Lab  Results   Component Value Date    WBC 7.5 02/03/2024    HGB 7.8 (L) 02/03/2024    HCT 26.8 (L) 02/03/2024     (H) 02/03/2024     02/03/2024     Lab Results   Component Value Date    GLUCOSE 111 (H) 02/03/2024    CALCIUM 9.6 02/03/2024     (L) 02/03/2024    K 5.7 (H) 02/03/2024    CO2 25 02/03/2024    CL 94 (L) 02/03/2024    BUN 33 (H) 02/03/2024    CREATININE 3.34 (H) 02/03/2024     Results from last 72 hours   Lab Units 02/03/24  0625   ALBUMIN g/dL 2.4*   GLUCOSE mg/dL 111*   HEMOGLOBIN g/dL 7.8*   WBC AUTO x10*3/uL 7.5      Results from last 72 hours   Lab Units 02/03/24  0625   SODIUM mmol/L 130*   POTASSIUM mmol/L 5.7*   CO2 mmol/L 25   BUN mg/dL 33*   CREATININE mg/dL 3.34*   PHOSPHORUS mg/dL 4.2   CALCIUM mg/dL 9.6        Assessment   ESRD-HD admitted with limb ischemia s/p right TMA    Plan   Plan HD per submitted orders, UF 3L  as tolerated  MBD - Phosphorus binder: not needed  Anemia of CKD: EPO to be given x 3 per week   Access: no issues   BP: acceptable during treatment   Renal Diet   Daily renal MVI   Continue 3 x per week hemodialysis    Mariaelena Mccloud MD MPH

## 2024-02-03 NOTE — CARE PLAN
The patient's goals for the shift include      The clinical goals for the shift include pt will remain HDS throughout the shift    Over the shift, the patient did make progress toward the following goals.     Problem: Pain - Adult  Goal: Verbalizes/displays adequate comfort level or baseline comfort level  2/3/2024 0646 by Monica Miller RN  Outcome: Progressing  2/3/2024 0525 by Monica Miller RN  Outcome: Progressing     Problem: Safety - Adult  Goal: Free from fall injury  2/3/2024 0646 by Monica Miller RN  Outcome: Progressing  2/3/2024 0525 by Monica Miller RN  Outcome: Progressing     Problem: Discharge Planning  Goal: Discharge to home or other facility with appropriate resources  2/3/2024 0646 by Monica Miller RN  Outcome: Progressing  2/3/2024 0525 by Monica Miller RN  Outcome: Progressing     Problem: Chronic Conditions and Co-morbidities  Goal: Patient's chronic conditions and co-morbidity symptoms are monitored and maintained or improved  2/3/2024 0646 by Monica Miller RN  Outcome: Progressing  2/3/2024 0525 by Monica Miller RN  Outcome: Progressing     Problem: Diabetes  Goal: Achieve decreasing blood glucose levels by end of shift  2/3/2024 0646 by Monica Miller RN  Outcome: Progressing  2/3/2024 0525 by Monica Miller RN  Outcome: Progressing  Goal: Increase stability of blood glucose readings by end of shift  2/3/2024 0646 by Monica Miller RN  Outcome: Progressing  2/3/2024 0525 by Monica Miller RN  Outcome: Progressing  Goal: Decrease in ketones present in urine by end of shift  2/3/2024 0646 by Monica Miller RN  Outcome: Progressing  2/3/2024 0525 by Monica Miller RN  Outcome: Progressing  Goal: Maintain electrolyte levels within acceptable range throughout shift  2/3/2024 0646 by Monica Miller RN  Outcome: Progressing  2/3/2024 0525 by Monica Miller RN  Outcome: Progressing  Goal: Maintain glucose levels >70mg/dl to  <250mg/dl throughout shift  2/3/2024 0646 by Monica Miller RN  Outcome: Progressing  2/3/2024 0525 by Monica Miller RN  Outcome: Progressing  Goal: No changes in neurological exam by end of shift  2/3/2024 0646 by Mnoica Miller RN  Outcome: Progressing  2/3/2024 0525 by Monica Miller RN  Outcome: Progressing  Goal: Learn about and adhere to nutrition recommendations by end of shift  2/3/2024 0646 by Monica Miller RN  Outcome: Progressing  2/3/2024 0525 by Monica Miller RN  Outcome: Progressing  Goal: Vital signs within normal range for age by end of shift  2/3/2024 0646 by Monica Miller RN  Outcome: Progressing  2/3/2024 0525 by Monica Miller RN  Outcome: Progressing  Goal: Increase self care and/or family involovement by end of shift  2/3/2024 0646 by Monica Miller RN  Outcome: Progressing  2/3/2024 0525 by Monica Miller RN  Outcome: Progressing  Goal: Receive DSME education by end of shift  2/3/2024 0646 by Monica Miller RN  Outcome: Progressing  2/3/2024 0525 by Monica Miller RN  Outcome: Progressing     Problem: Skin  Goal: Decreased wound size/increased tissue granulation at next dressing change  2/3/2024 0646 by Monica Miller RN  Outcome: Progressing  2/3/2024 0525 by Monica Miller RN  Outcome: Progressing  Goal: Participates in plan/prevention/treatment measures  2/3/2024 0646 by Monica Miller RN  Outcome: Progressing  2/3/2024 0525 by Monica Miller RN  Outcome: Progressing  Goal: Prevent/manage excess moisture  2/3/2024 0646 by Monica Miller RN  Outcome: Progressing  2/3/2024 0525 by Monica Miller RN  Outcome: Progressing  Goal: Prevent/minimize sheer/friction injuries  2/3/2024 0646 by Monica Miller RN  Outcome: Progressing  2/3/2024 0525 by Monica Miller RN  Outcome: Progressing  Goal: Promote/optimize nutrition  2/3/2024 0646 by Monica Miller RN  Outcome: Progressing  2/3/2024 0525 by Monica NELSON  ABEL Miller  Outcome: Progressing  Goal: Promote skin healing  2/3/2024 0646 by Monica Miller RN  Outcome: Progressing  2/3/2024 0525 by Monica Miller RN  Outcome: Progressing     Problem: Fall/Injury  Goal: Not fall by end of shift  2/3/2024 0646 by Monica Miller RN  Outcome: Progressing  2/3/2024 0525 by Monica Miller RN  Outcome: Progressing  Goal: Be free from injury by end of the shift  2/3/2024 0646 by Monica Miller RN  Outcome: Progressing  2/3/2024 0525 by Monica Miller RN  Outcome: Progressing  Goal: Verbalize understanding of personal risk factors for fall in the hospital  2/3/2024 0646 by Monica Miller RN  Outcome: Progressing  2/3/2024 0525 by Monica Miller RN  Outcome: Progressing  Goal: Verbalize understanding of risk factor reduction measures to prevent injury from fall in the home  2/3/2024 0646 by Monica Miller RN  Outcome: Progressing  2/3/2024 0525 by Monica Miller RN  Outcome: Progressing  Goal: Use assistive devices by end of the shift  2/3/2024 0646 by Monica Miller RN  Outcome: Progressing  2/3/2024 0525 by Monica Miller RN  Outcome: Progressing  Goal: Pace activities to prevent fatigue by end of the shift  2/3/2024 0646 by Monica Miller RN  Outcome: Progressing  2/3/2024 0525 by Monica Miller RN  Outcome: Progressing     Problem: Pain  Goal: Takes deep breaths with improved pain control throughout the shift  2/3/2024 0646 by Monica Miller RN  Outcome: Progressing  2/3/2024 0525 by Monica Miller RN  Outcome: Progressing  Goal: Turns in bed with improved pain control throughout the shift  2/3/2024 0646 by Monica Miller RN  Outcome: Progressing  2/3/2024 0525 by Monica Miller RN  Outcome: Progressing  Goal: Walks with improved pain control throughout the shift  2/3/2024 0646 by Monica Miller RN  Outcome: Progressing  2/3/2024 0525 by Monica Miller, RN  Outcome: Progressing  Goal: Performs ADL's  with improved pain control throughout shift  2/3/2024 0646 by Monica Miller RN  Outcome: Progressing  2/3/2024 0525 by Monica Miller RN  Outcome: Progressing  Goal: Participates in PT with improved pain control throughout the shift  2/3/2024 0646 by Monica Miller RN  Outcome: Progressing  2/3/2024 0525 by Monica Miller RN  Outcome: Progressing  Goal: Free from opioid side effects throughout the shift  2/3/2024 0646 by Monica Miller RN  Outcome: Progressing  2/3/2024 0525 by Monica Miller RN  Outcome: Progressing  Goal: Free from acute confusion related to pain meds throughout the shift  2/3/2024 0646 by Monica Miller RN  Outcome: Progressing  2/3/2024 0525 by Monica Miller RN  Outcome: Progressing

## 2024-02-03 NOTE — NURSING NOTE
Report to Receiving RN:    Report To: Isidoro Fabian  Time Report Called: 17:33  Hand-Off Communication: No acute change from RN to RN report.  Patient tolerated treatment well with 3L fluid removed.  Last /59, HR 88.  No complaint of pain or discomfort.    Complications During Treatment: No  Ultrafiltration Treatment: Yes, 3L  Medications Administered During Dialysis: No  Blood Products Administered During Dialysis: No  Labs Sent During Dialysis: No  Heparin Drip Rate Changes: No    Electronic Signatures:   (Signed 2/3/24)   Authored: Ike Lai RN       Last Updated: 5:34 PM by IKE LAI

## 2024-02-04 NOTE — PROGRESS NOTES
"Chevy Benton is a 78 y.o. male on day 26 of admission presenting with Critical limb ischemia of both lower extremities (CMS/HCC).    Subjective   Returns to service from CICU  Hospital stay 27 days  Followed by podiatry for right foot TMA revision with VAC  Known dialysis patient utilizing left AVF  Needs new PT/OT notes for rehab - medically ready     Objective     Physical Exam  Constitutional:       Appearance: He is ill-appearing.   HENT:      Mouth/Throat:      Mouth: Mucous membranes are moist.   Cardiovascular:      Rate and Rhythm: Normal rate and regular rhythm.   Pulmonary:      Effort: Pulmonary effort is normal.      Comments: Nasal cannula   Abdominal:      Palpations: Abdomen is soft.   Musculoskeletal:      Comments: Left arm AVF - thrill noted    Skin:     General: Skin is warm and dry.      Comments: Right vac wound for right foot      Neurological:      General: No focal deficit present.      Mental Status: He is alert.         Last Recorded Vitals  Blood pressure 152/64, pulse 74, temperature 36.2 °C (97.2 °F), resp. rate 19, height 1.778 m (5' 10\"), weight 87.9 kg (193 lb 12.6 oz), SpO2 100 %.  Intake/Output last 3 Shifts:  I/O last 3 completed shifts:  In: 1380 (15.7 mL/kg) [P.O.:180; I.V.:400 (4.6 mL/kg); Other:800]  Out: 6801 (77.4 mL/kg) [Other:6800; Stool:1]  Weight: 87.9 kg     Relevant Results  Scheduled medications  acetaminophen, 975 mg, oral, q6h  allopurinol, 100 mg, oral, Daily  aspirin, 81 mg, oral, Daily  atorvastatin, 40 mg, oral, Nightly  B complex-vitamin C-folic acid, 1 capsule, oral, Daily  clopidogrel, 75 mg, oral, Daily  epoetin dane or biosimilar, 12,000 Units, intravenous, Once per day on Tue Thu Sat  gabapentin, 100 mg, oral, Daily  heparin (porcine), 5,000 Units, subcutaneous, q8h  insulin glargine, 6 Units, subcutaneous, Nightly  insulin lispro, 0-5 Units, subcutaneous, TID with meals  lidocaine, 1 patch, transdermal, Daily  melatonin, 5 mg, oral, " Nightly  midodrine, 20 mg, oral, q8h  pantoprazole, 40 mg, oral, Daily before breakfast  perflutren protein A microsphere, 0.5 mL, intravenous, Once in imaging  polyethylene glycol, 17 g, oral, Daily  sennosides, 1 tablet, oral, Nightly  [Held by provider] sevelamer carbonate, 800 mg, oral, TID with meals  sulfur hexafluoride microsphr, 2 mL, intravenous, Once in imaging      Continuous medications     PRN medications  PRN medications: albuterol, alum-mag hydroxide-simeth, ammonium lactate, dextrose, docusate sodium, emollient combination no.92, glucagon, heparin, hydrocortisone, ipratropium-albuteroL, povidone-iodine, white petrolatum     Results for orders placed or performed during the hospital encounter of 01/09/24 (from the past 24 hour(s))   POCT GLUCOSE   Result Value Ref Range    POCT Glucose 114 (H) 74 - 99 mg/dL   CBC   Result Value Ref Range    WBC 6.1 4.4 - 11.3 x10*3/uL    nRBC 0.3 (H) 0.0 - 0.0 /100 WBCs    RBC 2.80 (L) 4.50 - 5.90 x10*6/uL    Hemoglobin 8.4 (L) 13.5 - 17.5 g/dL    Hematocrit 28.3 (L) 41.0 - 52.0 %     (H) 80 - 100 fL    MCH 30.0 26.0 - 34.0 pg    MCHC 29.7 (L) 32.0 - 36.0 g/dL    RDW 20.9 (H) 11.5 - 14.5 %    Platelets 263 150 - 450 x10*3/uL   Renal Function Panel   Result Value Ref Range    Glucose 79 74 - 99 mg/dL    Sodium 130 (L) 136 - 145 mmol/L    Potassium 6.7 (HH) 3.5 - 5.3 mmol/L    Chloride 95 (L) 98 - 107 mmol/L    Bicarbonate 27 21 - 32 mmol/L    Anion Gap 15 mmol/L    Urea Nitrogen 24 (H) 6 - 23 mg/dL    Creatinine 2.76 (H) 0.50 - 1.30 mg/dL    eGFR 23 (L) >60 mL/min/1.73m*2    Calcium 9.4 8.6 - 10.6 mg/dL    Phosphorus 3.3 2.5 - 4.9 mg/dL    Albumin 2.7 (L) 3.4 - 5.0 g/dL   POCT GLUCOSE   Result Value Ref Range    POCT Glucose 83 74 - 99 mg/dL   POCT GLUCOSE   Result Value Ref Range    POCT Glucose 122 (H) 74 - 99 mg/dL            Assessment/Plan   Principal Problem:    Critical limb ischemia of both lower extremities (CMS/HCC)  Active Problems:    ESRD (end stage  renal disease) on dialysis (CMS/HCC)    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 male with PMH of ESRD on HD TTS (Lt chest TDC, Hx of LUE Fistula Pseudoaneurysms), DM, HTN, HFrEF (TTE 10/23 EF 35-40%), PTA right lower extremity on 9/20/23 with subsequent TMA right foot on 9/22/23, Non Occlusive DVT (Rt Subclavian), Aortic Root Dilation, MR, chronic afib and SSS s/p pacemaker who presents DAVID angioplasty C/b perforation of AT artery. Patient admitted to CICU for further monitoring. Additionally patient noted to be overloaded on examination, on 2L with diffuse anasarca more pronounced in upper extremities. He is anuric at baseline and is on TTS dialysis. Patient was stable overnight with no signs/symptoms of compartment syndrome.        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%   #afib  #DM with neuropathy  #HTN  #Hx of right TMA  ::S/p Plavix load  ::1/19 Podiatry  revision of right TMA    Podiatry following for surgical revision and VAC to right foot    Plan:  -Cont plavix & aspirin resumption of eliquis pending ok surgical podiatry team   -Cont home metoprolol succ 25mg  -Uptitrate GDMT as tolerated consider hydralazine and isordil limited 2/2 hypotension on midodrine   -Cont home atorva  -Cont home gabapentin  -Insulin glargine 6 units nightly    -SSI     PULMONARY  #AHRF  #2/2 to ESRD and Hfref  #COVID  ::S/p 10 day course of dex (ended 1/14)  Plan:  -Continue with dialysis regimen  -Wean and titrate oxygen as needed  -Cont IS, pulm hygiene        RENAL/  #ESRD with dialysis TTS  #LUE fistula occlusion s/p venoplasty  Plan:  -Cont with  dialysis schedule - via left AVF   - 2/2 removed left dialysis tunnel catheter by IR   -Cont home renvela  -Cont home nephro caps     GASTROINTESTINAL  Gerd  -Cont home pantoprazole     MSK  #hx of gout  -cont home allopurinol     HEME/ONC  #Anemia of chronic disease  ::At baseline hgb  ::2/2 to ESRD  Plan:  -CTM     INFECTIOUS DISEASE  #Covid infection s/p dexamethasone  :;Afebrile, no leukocytosis  ::xray with pulmonary edema  Plan:  -Continue to monitor for worsening signs or sx of pneumonia       F: Carb diet, NPO midnight   E: K > 4, Mg > 2  N: Renal diet  A: PIV, left subclaavian trialysos  DVT ppx: Heparin SubQ  GI ppx: Protonix    Disposition   Rehab/SNF on dialysis   2/4 Medically cleared needs new PT/OT notes for SNF return when can be arranged      Code Status: Full code   Surrogate Medical Decision-maker: Spouse ()      Seen with Dr Piedad Lopez, APRN-CNP

## 2024-02-05 NOTE — PROGRESS NOTES
"Chevy Benton is a 78 y.o. male on day 27 of admission presenting with Critical limb ischemia of both lower extremities (CMS/HCC).    Subjective   - complaint of foot pain today, 0.2mg dilaudid once, 10/10 pain overnight that was not relieved with tylenol  - after dilaudid, pain resolved  - K 5.3 (6.7 hemolyzed)  - HD tomorrow  - pending placement     Objective     Physical Exam  Constitutional:       Appearance: He is ill-appearing.   HENT:      Mouth/Throat:      Mouth: Mucous membranes are moist.   Cardiovascular:      Rate and Rhythm: Normal rate and regular rhythm.   Pulmonary:      Effort: Pulmonary effort is normal.      Comments: Nasal cannula   Abdominal:      Palpations: Abdomen is soft.   Musculoskeletal:      Comments: Left arm AVF - thrill noted    Skin:     General: Skin is warm and dry.      Comments: Right vac wound for right foot      Neurological:      General: No focal deficit present.      Mental Status: He is alert.         Last Recorded Vitals  Blood pressure (!) 120/42, pulse 79, temperature 36.6 °C (97.9 °F), resp. rate 18, height 1.778 m (5' 10\"), weight 92.6 kg (204 lb 2.3 oz), SpO2 100 %.  Intake/Output last 3 Shifts:  I/O last 3 completed shifts:  In: 780 (8.4 mL/kg) [P.O.:780]  Out: 0 (0 mL/kg)   Weight: 92.6 kg     Relevant Results  Scheduled medications  acetaminophen, 975 mg, oral, q6h  allopurinol, 100 mg, oral, Daily  aspirin, 81 mg, oral, Daily  atorvastatin, 40 mg, oral, Nightly  B complex-vitamin C-folic acid, 1 capsule, oral, Daily  clopidogrel, 75 mg, oral, Daily  epoetin dane or biosimilar, 12,000 Units, intravenous, Once per day on Tue Thu Sat  gabapentin, 100 mg, oral, Daily  heparin (porcine), 5,000 Units, subcutaneous, q8h  insulin glargine, 6 Units, subcutaneous, Nightly  insulin lispro, 0-5 Units, subcutaneous, TID with meals  lidocaine, 1 patch, transdermal, Daily  melatonin, 5 mg, oral, Nightly  midodrine, 20 mg, oral, q8h  oxyCODONE, 5 mg, oral, " Once  pantoprazole, 40 mg, oral, Daily before breakfast  perflutren protein A microsphere, 0.5 mL, intravenous, Once in imaging  polyethylene glycol, 17 g, oral, Daily  sennosides, 1 tablet, oral, Nightly  [Held by provider] sevelamer carbonate, 800 mg, oral, TID with meals  sulfur hexafluoride microsphr, 2 mL, intravenous, Once in imaging      Continuous medications     PRN medications  PRN medications: albuterol, alum-mag hydroxide-simeth, ammonium lactate, dextrose, docusate sodium, emollient combination no.92, glucagon, heparin, hydrocortisone, ipratropium-albuteroL, povidone-iodine, white petrolatum     Results for orders placed or performed during the hospital encounter of 01/09/24 (from the past 24 hour(s))   POCT GLUCOSE   Result Value Ref Range    POCT Glucose 147 (H) 74 - 99 mg/dL   POCT GLUCOSE   Result Value Ref Range    POCT Glucose 185 (H) 74 - 99 mg/dL   POCT GLUCOSE   Result Value Ref Range    POCT Glucose 101 (H) 74 - 99 mg/dL   Renal Function Panel   Result Value Ref Range    Glucose 144 (H) 74 - 99 mg/dL    Sodium 129 (L) 136 - 145 mmol/L    Potassium 5.3 3.5 - 5.3 mmol/L    Chloride 92 (L) 98 - 107 mmol/L    Bicarbonate 26 21 - 32 mmol/L    Anion Gap 16 10 - 20 mmol/L    Urea Nitrogen 36 (H) 6 - 23 mg/dL    Creatinine 3.69 (H) 0.50 - 1.30 mg/dL    eGFR 16 (L) >60 mL/min/1.73m*2    Calcium 9.9 8.6 - 10.6 mg/dL    Phosphorus 4.3 2.5 - 4.9 mg/dL    Albumin 2.6 (L) 3.4 - 5.0 g/dL            Assessment/Plan   Principal Problem:    Critical limb ischemia of both lower extremities (CMS/HCC)  Active Problems:    ESRD (end stage renal disease) on dialysis (CMS/HCC)    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 male with PMH of ESRD on HD TTS (Lt chest TDC, Hx of LUE Fistula Pseudoaneurysms), DM, HTN, HFrEF (TTE 10/23 EF 35-40%), PTA  right lower extremity on 9/20/23 with subsequent TMA right foot on 9/22/23, Non Occlusive DVT (Rt Subclavian), Aortic Root Dilation, MR, chronic afib and SSS s/p pacemaker who presents DAVID angioplasty C/b perforation of AT artery. Patient admitted to CICU for further monitoring. Additionally patient noted to be overloaded on examination, on 2L with diffuse anasarca more pronounced in upper extremities. He is anuric at baseline and is on TTS dialysis. Patient was stable overnight with no signs/symptoms of compartment syndrome.       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%   afib  HTN  - TTE 1/23/24 with LVEF to 40-45%, mildly improved from previous in 2023, no other new acute findings    -Cont home metoprolol succ 25mg  -Uptitrate GDMT as tolerated consider hydralazine and isordil when able, currently limited 2/2 hypotension on midodrine  - consideration should be given to further ischemic workup in the outpatient setting due to extensive peripheral disease, risk factors, and no evidence of previous ischemic cardiac workup        AHRF  2/2 to ESRD and Hfref  Covid infection s/p dexamethasone  - Afebrile, no leukocytosis  - xray with pulmonary edema  - Continue to monitor for worsening signs or sx of pneumonia  - S/p 10 day course of dex (ended 1/14)  - Continue with dialysis regimen  - Wean and titrate oxygen as needed  - Cont IS, pulm hygiene     #ESRD with dialysis TTS  #LUE fistula occlusion s/p venoplasty  Plan:  -Cont  with dialysis schedule - via left AVF   - 2/2 removed left dialysis tunnel catheter by IR   -Cont home renvela  -Cont home nephro caps     Gerd  -Cont home pantoprazole     hx of gout  -cont home allopurinol     Anemia of chronic disease  ::At baseline hgb  ::2/2 to ESRD  Plan:  -CTM    DM with neuropathy  -Cont home gabapentin  -Insulin glargine 6 units nightly    -SSI    F: Carb diet, NPO midnight   E: K > 4, Mg > 2  N: Renal diet  A: PIV, left subclaavian trialysos  DVT ppx: Heparin SubQ  GI ppx: Protonix    Disposition   Rehab/SNF on dialysis   2/4 Medically cleared needs new PT/OT notes for SNF return when can be arranged , pending placement     Code Status: Full code   Surrogate Medical Decision-maker: Spouse ()      Seen with Dr. Perez Coker, APRN-CNP, DNP

## 2024-02-05 NOTE — PROGRESS NOTES
Physical Therapy                 Therapy Communication Note    Patient Name: Chevy Benton  MRN: [id number]  Today's Date: 2/5/2024     Discipline: Physical Therapy    Missed Visit Reason: Missed Visit Reason: Patient placed on medical hold    Missed Time: Attempt    Comment: Will hold PT session today due high potassium level 6.7

## 2024-02-05 NOTE — PROGRESS NOTES
2/5/2024 Care coordination  Primary Team said pt will be medically ready for discharge.  Will need PT/OT updates for precert to return to [facility].  Therapies on hold d/t  K+ at 6.7.  awaiting lab redraw

## 2024-02-05 NOTE — PROGRESS NOTES
Occupational Therapy    Communication Note    Missed Visit: Yes  Missed Visit Reason: Patient placed on medical hold (Potassium 6.7, per RN lab still needs to be drawn for today.)      02/05/24 at 12:27 PM   Joyce Herbert OT   Rehab Office: [phone]

## 2024-02-05 NOTE — CARE PLAN
The patient's goals for the shift include      The clinical goals for the shift include Pt remain HDS throughout the shift    Over the shift, the patient did make progress toward the following goals.     Problem: Pain - Adult  Goal: Verbalizes/displays adequate comfort level or baseline comfort level  Outcome: Progressing     Problem: Safety - Adult  Goal: Free from fall injury  Outcome: Progressing     Problem: Discharge Planning  Goal: Discharge to home or other facility with appropriate resources  Outcome: Progressing     Problem: Chronic Conditions and Co-morbidities  Goal: Patient's chronic conditions and co-morbidity symptoms are monitored and maintained or improved  Outcome: Progressing     Problem: Diabetes  Goal: Achieve decreasing blood glucose levels by end of shift  Outcome: Progressing  Goal: Increase stability of blood glucose readings by end of shift  Outcome: Progressing  Goal: Decrease in ketones present in urine by end of shift  Outcome: Progressing  Goal: Maintain electrolyte levels within acceptable range throughout shift  Outcome: Progressing  Goal: Maintain glucose levels >70mg/dl to <250mg/dl throughout shift  Outcome: Progressing  Goal: No changes in neurological exam by end of shift  Outcome: Progressing  Goal: Learn about and adhere to nutrition recommendations by end of shift  Outcome: Progressing  Goal: Vital signs within normal range for age by end of shift  Outcome: Progressing  Goal: Increase self care and/or family involovement by end of shift  Outcome: Progressing  Goal: Receive DSME education by end of shift  Outcome: Progressing     Problem: Skin  Goal: Decreased wound size/increased tissue granulation at next dressing change  Outcome: Progressing  Goal: Participates in plan/prevention/treatment measures  Outcome: Progressing  Goal: Prevent/manage excess moisture  Outcome: Progressing  Goal: Prevent/minimize sheer/friction injuries  Outcome: Progressing  Goal: Promote/optimize  nutrition  Outcome: Progressing  Goal: Promote skin healing  Outcome: Progressing     Problem: Fall/Injury  Goal: Not fall by end of shift  Outcome: Progressing  Goal: Be free from injury by end of the shift  Outcome: Progressing  Goal: Verbalize understanding of personal risk factors for fall in the hospital  Outcome: Progressing  Goal: Verbalize understanding of risk factor reduction measures to prevent injury from fall in the home  Outcome: Progressing  Goal: Use assistive devices by end of the shift  Outcome: Progressing  Goal: Pace activities to prevent fatigue by end of the shift  Outcome: Progressing     Problem: Pain  Goal: Takes deep breaths with improved pain control throughout the shift  Outcome: Progressing  Goal: Turns in bed with improved pain control throughout the shift  Outcome: Progressing  Goal: Walks with improved pain control throughout the shift  Outcome: Progressing  Goal: Performs ADL's with improved pain control throughout shift  Outcome: Progressing  Goal: Participates in PT with improved pain control throughout the shift  Outcome: Progressing  Goal: Free from opioid side effects throughout the shift  Outcome: Progressing  Goal: Free from acute confusion related to pain meds throughout the shift  Outcome: Progressing

## 2024-02-05 NOTE — PROGRESS NOTES
"Chevy Benton is a 78 y.o. male on day 27 of admission presenting with Critical limb ischemia of both lower extremities (CMS/HCC).    Subjective   Pt resting in bed. Denies sob, n/v/d, fever, cough, chills, pain, chest pains, heart flutters, constipation       Objective     Physical Exam  Vitals and nursing note reviewed.   Constitutional:       Appearance: He is ill-appearing.   Cardiovascular:      Rate and Rhythm: Rhythm irregular.      Comments: AFIB -73  Pulmonary:      Comments: Magdiel lung sounds clear anterior  O2 2L nc  Abdominal:      General: There is distension.      Comments: Non-tender with palpation   Genitourinary:     Comments: anuric  Musculoskeletal:      Right lower leg: Edema present.      Left lower leg: Edema present.      Comments: Global edema..pitting ble/magdiel upper ext  Rt foot with wound vac s/p rt tma   Skin:     General: Skin is warm.   Neurological:      Mental Status: He is alert and oriented to person, place, and time.   Psychiatric:         Mood and Affect: Mood normal.         Behavior: Behavior normal.         Last Recorded Vitals  Blood pressure (!) 120/42, pulse 79, temperature 36.6 °C (97.9 °F), resp. rate 18, height 1.778 m (5' 10\"), weight 92.6 kg (204 lb 2.3 oz), SpO2 100 %.  Intake/Output last 3 Shifts:  I/O last 3 completed shifts:  In: 780 (8.4 mL/kg) [P.O.:780]  Out: 0 (0 mL/kg)   Weight: 92.6 kg     Relevant Results  Scheduled medications  acetaminophen, 975 mg, oral, q6h  allopurinol, 100 mg, oral, Daily  aspirin, 81 mg, oral, Daily  atorvastatin, 40 mg, oral, Nightly  B complex-vitamin C-folic acid, 1 capsule, oral, Daily  clopidogrel, 75 mg, oral, Daily  epoetin dane or biosimilar, 12,000 Units, intravenous, Once per day on Tue Thu Sat  gabapentin, 100 mg, oral, Daily  heparin (porcine), 5,000 Units, subcutaneous, q8h  insulin glargine, 6 Units, subcutaneous, Nightly  insulin lispro, 0-5 Units, subcutaneous, TID with meals  lidocaine, 1 patch, transdermal, " Daily  melatonin, 5 mg, oral, Nightly  midodrine, 20 mg, oral, q8h  oxyCODONE, 5 mg, oral, Once  pantoprazole, 40 mg, oral, Daily before breakfast  perflutren protein A microsphere, 0.5 mL, intravenous, Once in imaging  polyethylene glycol, 17 g, oral, Daily  sennosides, 1 tablet, oral, Nightly  [Held by provider] sevelamer carbonate, 800 mg, oral, TID with meals  sulfur hexafluoride microsphr, 2 mL, intravenous, Once in imaging      Continuous medications     PRN medications  PRN medications: albuterol, alum-mag hydroxide-simeth, ammonium lactate, dextrose, docusate sodium, emollient combination no.92, glucagon, heparin, hydrocortisone, ipratropium-albuteroL, povidone-iodine, white petrolatum   Results for orders placed or performed during the hospital encounter of 01/09/24 (from the past 24 hour(s))   POCT GLUCOSE   Result Value Ref Range    POCT Glucose 147 (H) 74 - 99 mg/dL   POCT GLUCOSE   Result Value Ref Range    POCT Glucose 185 (H) 74 - 99 mg/dL   POCT GLUCOSE   Result Value Ref Range    POCT Glucose 101 (H) 74 - 99 mg/dL   Renal Function Panel   Result Value Ref Range    Glucose 144 (H) 74 - 99 mg/dL    Sodium 129 (L) 136 - 145 mmol/L    Potassium 5.3 3.5 - 5.3 mmol/L    Chloride 92 (L) 98 - 107 mmol/L    Bicarbonate 26 21 - 32 mmol/L    Anion Gap 16 10 - 20 mmol/L    Urea Nitrogen 36 (H) 6 - 23 mg/dL    Creatinine 3.69 (H) 0.50 - 1.30 mg/dL    eGFR 16 (L) >60 mL/min/1.73m*2    Calcium 9.9 8.6 - 10.6 mg/dL    Phosphorus 4.3 2.5 - 4.9 mg/dL    Albumin 2.6 (L) 3.4 - 5.0 g/dL                     Assessment/Plan   Principal Problem:    Critical limb ischemia of both lower extremities (CMS/HCC)  Active Problems:    ESRD (end stage renal disease) on dialysis (CMS/HCC)    Non-pressure chronic ulcer of other part of right foot with fat layer exposed (CMS/HCC)    Critical limb ischemia of right lower extremity (CMS/HCC)    Subclavian vein stenosis, left    Tolerated hemodialysis saturday with net fluid loss  3400cc    Soft bp's with tx, stable, hypervolemic on exam and has stable electrolytes .  K+=5.3     Outpatient Dialysis schedule:   [facility] dialysis (5days/week for 2.5 hours each) at [facility]/Dr Hannah ... Will dialyze TTS while in house     Access: lt fist with +thrill/bruit- no issues - able to achieve   Lt cath in place removed 2/2     Anemia of ESRD: 2/2-increase epogen 12,000 units on dialysis days... current hgb 8.4... 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 current phos level 4.3.. will cont to monitor. B complex-vitamin C-folic acid (Nephrocaps) capsule 1 capsule daily     Plan HD tomorrow with UF as tolerated     Renal diet      Please obtain daily standing wt (if possible)     Medication to be adjusted for ESRD      Patient to continue regular HD schedule while inpatient and to follow with the outpatient nephrologist at discharge           JENIFFER Bates-CNP

## 2024-02-06 NOTE — PROGRESS NOTES
2/6/2024 Care coordination  Per primary service, medically ready to return to [facility].  DSC submitted precert.  Awaiting response.

## 2024-02-06 NOTE — PROGRESS NOTES
Speech-Language Pathology  Adult Inpatient Swallow Treatment    Patient Name: hCevy Benton  MRN: [id number]  Today's Date: 2/6/2024   Start Time: 1100  Stop Time: 1115  Time Calculation (min): 15    Impression:   Dysphagia therapy completed. Medical team advanced pt's diet to regular/thin once moved to regular floor. Pt was given sips of water/apple juice 240 cc through straw and a package of geovany crackers given in small bites x8. Pt demonstrated min extended mastication with geovany crackers with full oral clearance. Pt demonstrated no overt s/s of aspiration or difficulty. Pt is safe to continue regular diet with thin liquids. If there are any changes in medical condition or increased difficulty with swallowing, please make patient NPO and re-consult SLP. Discussed results and recommendations with RN and MD.     Recommendations:  Regular/Thin     Upright for all PO intake  Remain upright for 20-30 min after eating  Small bites/sips  Feed only when alert  Straws OK.   Assistance during all meals.      Goal:   Pt will tolerate least restrictive diet with no overt clinical s/s aspiration 100% of the time. (Achieved 2/6/2024)  Pt will utilize safe swallow strategies during meals with 100% accuracy (1/29/2024) (Achieved 2/6/2024)       Plan:  SLP Services Indicated: No  Frequency: N/A  Discussed POC with patient  SLP - OK to Discharge    Pain:   0-10  0 = No pain.     Inpatient Education:  Extensive education provided to patient regarding current swallow function, recommendations/results, and POC.      Consultations/Referrals/Coordination of Services:   N/A    Shayna Naidu, Student SLP    Supervising SLP was present, actively participated, and made all clinical decisions during session.  Mis Leija M.S.CCC/SLP

## 2024-02-06 NOTE — PROGRESS NOTES
Occupational Therapy    Evaluation/Reassessment    Patient Name: Chevy Benton  MRN: [id number]  Today's Date: 2/6/2024  Time Calculation  Start Time: 1202  Stop Time: 1226  Time Calculation (min): 24 min    Assessment  IP OT Assessment  OT Assessment: continue MOD intensity recommendation for skilled OT intervention  Evaluation/Treatment Tolerance: Patient limited by fatigue  Medical Staff Made Aware: Yes  End of Session Communication: Bedside nurse  End of Session Patient Position: Bed, 3 rail up, Alarm off, not on at start of session  Plan:  Treatment Interventions: ADL retraining, Functional transfer training, Endurance training, Cognitive reorientation, Patient/family training, Equipment evaluation/education, Compensatory technique education, Neuromuscular reeducation  OT Frequency: 2 times per week  OT Discharge Recommendations: Moderate intensity level of continued care  OT Recommended Transfer Status: Maximum assist  OT - OK to Discharge: Yes    Subjective   Current Problem:  1. Critical limb ischemia of both lower extremities (CMS/HCC)  Case Request Cath Lab: Lower Extremity Angiogram    Case Request Cath Lab: Lower Extremity Angiogram    Invasive vascular procedure    Invasive vascular procedure      2. Swelling  Upper extremity venous duplex bilateral    Upper extremity venous duplex bilateral    CANCELED: Upper extremity venous duplex bilateral    CANCELED: Upper extremity venous duplex bilateral      3. DVT of axillary vein, chronic, bilateral (CMS/HCC)  Upper extremity venous duplex bilateral    Upper extremity venous duplex bilateral      4. Critical limb ischemia of right lower extremity (CMS/HCC)  CANCELED: Case Request Cath Lab: Lower Extremity Angiogram    CANCELED: Case Request Cath Lab: Lower Extremity Angiogram    CANCELED: Invasive vascular procedure    CANCELED: Invasive vascular procedure      5. Generalized edema  Upper extremity venous duplex bilateral      6. Subclavian vein stenosis,  left  Case Request Operating Room: Left upper extremity FISTULOGRAM with possible intervention    Case Request Operating Room: Left upper extremity FISTULOGRAM with possible intervention      7. Non-pressure chronic ulcer of other part of right foot with fat layer exposed (CMS/HCC)  Case Request Operating Room: Debridement Foot    Case Request Operating Room: Debridement Foot    Fungal Culture/Smear    Fungal Culture/Smear    Fungal Culture/Smear    Fungal Culture/Smear    Tissue/Wound Culture/Smear    Tissue/Wound Culture/Smear    Tissue/Wound Culture/Smear    Tissue/Wound Culture/Smear      8. Atherosclerosis of native arteries of right leg with ulceration of other part of foot (CMS/HCC)  Invasive vascular procedure      9. Shock (CMS/HCC)  Transthoracic Echo (TTE) Complete    Transthoracic Echo (TTE) Complete      10. Critical limb ischemia of left lower extremity (CMS/HCC)  Vascular US Ankle Brachial Index (RAJ) Without Exercise      11. Toe ulcer (CMS/[facility])  Vascular US Ankle Brachial Index (RAJ) Without Exercise      12. Longstanding persistent atrial fibrillation (CMS/[facility])  Referral to Valve and Structural Heart Program      13. Swelling of arm  Vascular US upper extremity venous duplex left    Vascular US upper extremity venous duplex left        General:  General  Reason for Referral: OT for ADL and safety reassessment; new orders received 2/5/24 (Direct admit from endovascular clinic for concerns of critical limb ischemia. s/p RLE debridement and revascularization on 1/17. Transferred to CICU on 1/22 due to hypotension and lethargy. Started [facility] 1/23)  Referred By: JENIFFER Geronimo-CNP  Past Medical History Relevant to Rehab: ESRD on HD TTS (Lt chest TDC, Hx of LUE Fistula Pseudoaneurysms), T2DM, HTN, HFrEF (TTE 10/23 EF 35-40%), A fib, PTA right lower extremity on 9/20/23 with subsequent TMA on 9/22/23, Non Occlusive DVT, Aortic Root Dilation, MR, SSS s/p pacemaker  Family/Caregiver Present: No  Prior  to Session Communication: Bedside nurse, PCT/NA/CTA  Patient Position Received: Bed, 4 rail up, Alarm on  Preferred Learning Style: visual, verbal  General Comment: Pt appropriately acknowledged therapist in room. Pt appears to remain confused.  Pt was reoriented throughout session.  Limited initiation noted.  Delayed responses.  Pt my be fatigued from recent activity with PT.  Precautions:  Hearing/Visual Limitations: Appear to be WFL  LE Weight Bearing Status: Right Non-Weight Bearing (per chart)  Medical Precautions: Fall precautions, Oxygen therapy device and L/min, Swallowing precautions (2L O2 via NC; suction for secretions at pt side)  Precautions Comment: wound vac RLE     Pain:  Pain Assessment  Pain Assessment: 0-10  Pain Score: 10 - Worst possible pain  Pain Type: Surgical pain, Acute pain  Pain Location: Foot  Pain Orientation: Right    Objective   Cognition:  Overall Cognitive Status:  (alert)  Arousal/Alertness: Generalized responses  Orientation Level: Disoriented to place, Disoriented to person  Following Commands: Follows one step commands with increased time (decreased initation)  Routine Tasks: Minimal  Insight: Mild  Task Initiation: Initiates with cues  Planning: Reduced planning skills       [place] Agitation Sedation Scale  [place] Agitation Sedation Scale (RASS): Alert and calm  Home Living:  Home Living Comments: Per chart review, pt from SNF since fall 2023   Prior Function:  Level of [place]: Needs assistance with ADLs  Prior Function Comments: Pt has needed assistance over past few months; pt living with spouse prior to fall 2023 at home.  PLOF unknown    ADL:  Grooming Assistance: Maximal  Bathing Assistance: Maximal  UE Dressing Assistance: Maximal  LE Dressing Assistance: Total  Toileting Assistance with Device: Total  ADL Comments: anticipated or simulated bedlevel d/t pt decreased activity tolerance and decline to sit up on EOB     Bed Mobility/Transfers: Bed Mobility  Bed  Mobility: No  Bed Mobility 1  Bed Mobility Comments 1: pt declined sitting EOB d/t recent activity with PT and sitting EOB.    Transfers  Transfer: No    Ambulation/Gait Training:  Ambulation/Gait Training  Ambulation/Gait Training Performed: No     Hand Function  Gross Grasp: Functional        Outcome Measures: [facility] Daily Activity  Putting on and taking off regular lower body clothing: Total  Bathing (including washing, rinsing, drying): A lot  Putting on and taking off regular upper body clothing: A lot  Toileting, which includes using toilet, bedpan or urinal: Total  Taking care of personal grooming such as brushing teeth: A lot  Eating Meals: A lot  Daily Activity - Total Score: 10      Education Documentation  No documentation found.  Education Comments  No comments found.      Goals:   Encounter Problems       Encounter Problems (Active)       ADLs       Patient with complete upper body dressing with minimal assist  level of assistance  (Progressing)       Start:  01/24/24    Expected End:  02/14/24            Patient will complete daily grooming tasks with stand by level of assistance and PRN adaptive equipment while edge of bed . (Progressing)       Start:  01/24/24    Expected End:  02/14/24                 COGNITION/SAFETY       Patient will score WFL on standardized cognitive assessment with without cues and within reasonable time frame (Progressing)       Start:  01/24/24    Expected End:  02/14/24            Patient will follow 100% Simple commands to allow improved ADL performance. (Progressing)       Start:  01/24/24    Expected End:  02/14/24            Patient will demonstrated orientation x 3 with verbal cues. (Progressing)       Start:  01/24/24    Expected End:  02/14/24       ORIENTATION            EXERCISE/STRENGTHENING       Patient with increase BUE to 3+/5 strength. (Progressing)       Start:  01/24/24    Expected End:  02/14/24                 TRANSFERS       Patient will perform bed  mobility moderate x2 level of assistance and bed rails and draw sheet in order to improve safety and independence with mobility (Progressing)       Start:  01/24/24    Expected End:  02/14/24            Patient will complete lateral functional transfer to chair with slideboard PRN and moderate assist x2. (Not Progressing)       Start:  01/24/24    Expected End:  02/14/24

## 2024-02-06 NOTE — PROGRESS NOTES
Wound Care Progress Note     Visit Date: 2/6/2024      Patient Name: Chevy Benton         MRN: [id number]                Reason for Visit: Wound Vac dressing change         Wound History: Patient is a 79 yo male who presented as a direct admit from Dr Linder endovascular clinic for concerns of critical limb ischemia.            Wound Assessment:  Wound Incision Foot Dorsal foot;Right (Active)   No Date First Assessed or Time First Assessed found.   Primary Wound Type: Incision  Location: Foot  Wound Location Orientation: Dorsal foot;Right  Wound Outcome: Amputation      Assessments 2/6/2024  6:06 PM   Wound Image     Site Assessment Brown;Granulation;Pink;Red   Janessa-Wound Assessment Clean;Intact   Shape Linear   Wound Length (cm) 3 cm   Wound Width (cm) 11.5 cm   Wound Surface Area (cm^2) 34.5 cm^2   Wound Depth (cm) 0.5 cm   Wound Volume (cm^3) 17.25 cm^3   State of Healing Early/partial granulation   Margins Well-defined edges   Closure Dehisced   Drainage Description Serosanguineous   Drainage Amount Small   Dressing Vacuum dressing   Dressing Status Clean;Dry       Active Orders   Date Order Priority Status Authorizing Provider   01/17/24 1132 Wound Care Routine Active DEWIN Morales     - Wound Complexity:    Simple   01/11/24 0921 Apply dressing Routine Active EDWIN Morales       Inactive Orders   Date Order Priority Status Authorizing Provider   02/01/24 0958 Inpatient Consult to Wound and Ostomy Nurse Routine Completed Stuart Nguyen MD     - Reason for Consult?:    Wound Vac   01/30/24 1016 Inpatient Consult to Wound and Ostomy Nurse Routine Completed Stuart Nguyen MD     - Reason for Consult?:    Wound Vac   01/21/24 1214 Inpatient Consult to Wound and Ostomy Nurse Routine Completed Koko Gordon MD     - Reason for Consult?:    Wound     - Reason for Consult?:    Wound Vac   01/18/24 1022 Apply dressing Routine Completed Alfred Randall MD     Wound Vac applied to Right TMA site .   (1)  Adaptic    (1) black foam  Pressure; -125  Plan:  Change wound Vac Friday 2/9  If patient is discharged prior to next dressing change, please disconnect VAC machine, remove foam dressing, and place machine in the soiled utility room on the unit. Call [phone] for pickup immediately upon removal.   Pack wound with wet-to moist NS kerlix and cover with a dry sterile dressing    Wound Team Plan:  Spoke with podiatry resident Dr. Saul. Plan to change wound vac dressing change 2/8.      Yonis Mondragon RN-BC, CWON  2/6/2024  6:45 PM

## 2024-02-06 NOTE — NURSING NOTE
Report from Sending RN:    Report From: Maria Ines ROMAN  Recent Surgery of Procedure: No  Baseline Level of Consciousness (LOC): A&OX4  Oxygen Use: Yes  Type: pulse ox 100 % on 2l  Diabetic: Yes, blood sugar 144  Last BP Med Given Day of Dialysis: none  Last Pain Med Given: 0900 - neurontin 100 mg po, 0602 - tylenol 975 mg po  Lab Tests to be Obtained with Dialysis: No  Blood Transfusion to be Given During Dialysis: No  Available IV Access: Yes  Medications to be Administered During Dialysis: Yes, midodrine 20 mg po  Continuous IV Infusion Running: No  Restraints on Currently or in the Last 24 Hours: No  Hand-Off Communication: Full Code, admitted for B/L LE critical limb ischemia, hx ESRD, HF, HTN, DM

## 2024-02-06 NOTE — PROGRESS NOTES
Physical Therapy    Physical Therapy Treatment    Patient Name: Chevy Benton  MRN: [id number]  Today's Date: 2/6/2024  Time Calculation  Start Time: 1116 (Simultaneous filing. User may not have seen previous data.)  Stop Time: 1159 (Simultaneous filing. User may not have seen previous data.)  Time Calculation (min): 43 min (Simultaneous filing. User may not have seen previous data.)       Assessment/Plan   PT Assessment  PT Assessment Results: Decreased strength, Decreased range of motion, Decreased endurance, Impaired balance, Decreased mobility, Decreased coordination, Impaired tone, Orthopedic restrictions, Pain  Rehab Prognosis: Good  Barriers to Discharge: none  Evaluation/Treatment Tolerance: Patient limited by fatigue, Patient limited by pain  Medical Staff Made Aware: Yes  Strengths: Attitude of self  Barriers to Participation: Comorbidities  End of Session Communication: Bedside nurse  Assessment Comment: The pt performed supine therapeutic exercises with max difficulty due to decreased strength and increased pain with movement, but good potential for progression. The pt able to sit EOB without complications.  End of Session Patient Position: Bed, 4 rail up, Alarm on  PT Plan  Inpatient/Swing Bed or Outpatient: Inpatient  PT Plan  Treatment/Interventions: Bed mobility, Transfer training, Balance training, Strengthening, Endurance training, Range of motion, Therapeutic exercise, Therapeutic activity, Home exercise program  PT Plan: Skilled PT  PT Frequency: 3 times per week  PT Discharge Recommendations: Moderate intensity level of continued care  Equipment Recommended upon Discharge:  (none)  PT Recommended Transfer Status: Assist x2  PT - OK to Discharge: Yes      General Visit Information:   PT  Visit  PT Received On: 02/06/24  Response to Previous Treatment: Patient reporting fatigue but able to participate.  General  Prior to Session Communication: Bedside nurse  Patient Position Received: Bed, 4  rail up, Alarm on  Preferred Learning Style: verbal, visual, written  General Comment: The pt was pleasant, cooperative and willing to participate in therapy.    Subjective   Precautions:  Precautions  Hearing/Visual Limitations: Hearing and vision WF  Medical Precautions: Fall precautions, Oxygen therapy device and L/min  Precautions Comment: Pt in compliance with precautions throughout PT session.  Vital Signs:  Vital Signs  Heart Rate: 74  Heart Rate Source:  (pulse ox on finger)  SpO2: 98 % (on 2L O2)    Objective   Pain:  Pain Assessment  Pain Assessment: 0-10  Pain Score: 7  Pain Type: Acute pain, Surgical pain  Pain Location: Foot  Pain Orientation: Right  Cognition:  Cognition  Overall Cognitive Status: Within Functional Limits  Orientation Level: Oriented X4  Postural Control:  Postural Control  Postural Control: Impaired  Posture Comment: Pt presented with impaired sitting posture.  Static Sitting Balance  Static Sitting-Balance Support: Bilateral upper extremity supported, Feet supported  Static Sitting-Level of Assistance: Moderate assistance  Static Sitting-Comment/Number of Minutes: retropulsive  Dynamic Sitting Balance  Dynamic Sitting-Balance Support: Bilateral upper extremity supported, Feet supported  Dynamic Sitting-Comments: moderate assistance with a backward lean  Extremity/Trunk Assessments:        RUE   RUE : Exceptions to [facility]  RUE AROM (degrees)  RUE AROM Comment: decreases shoulder, elbow, wrist, and fingers flexion  RUE Strength  R Shoulder Flexion: 3-/5  R Elbow Flexion: 3/5  R Elbow Extension: 3/5  R Wrist Flexion: 3+/5  R Wrist Extension: 3+/5  LUE   LUE: Exceptions to [facility]  LUE AROM (degrees)  LUE AROM Comment: decreased shoulder, elbow, wrist, and fingers flexion  LUE Strength  L Shoulder Flexion: 2+/5  L Elbow Flexion: 3-/5  L Elbow Extension: 3-/5  L Wrist Flexion: 3-/5  L Wrist Extension: 3-/5  RLE   RLE : Exceptions to [facility]  AROM RLE (degrees)  RLE AROM Comment: decreased hip,  knee, and ankle ROM  Strength RLE  R Hip Flexion: 2+/5  R Knee Flexion: 3-/5  R Knee Extension: 3-/5  R Ankle Dorsiflexion: 1/5  R Ankle Plantar Flexion: 1/5  LLE   LLE : Exceptions to WFL  AROM LLE (degrees)  LLE AROM Comment: decreased hip and knee flexion  Strength LLE  L Hip Flexion: 3-/5  L Knee Flexion: 3-/5  L Knee Extension: 3-/5  L Ankle Dorsiflexion: 3-/5  L Ankle Plantar Flexion: 3-/5  Activity Tolerance:  Activity Tolerance  Endurance: Decreased tolerance for upright activites  Treatments:  Therapeutic Exercise  Therapeutic Exercise Performed: Yes  Therapeutic Exercise Activity 1: ankle pumps x 15  Therapeutic Exercise Activity 2: heel slides x 15  Therapeutic Exercise Activity 3: SAQ x 15  Therapeutic Exercise Activity 4: quad sets x 15  Therapeutic Exercise Activity 5: Hip ABD x 15    Balance/Neuromuscular Re-Education  Balance/Neuromuscular Re-Education Activity Performed: Yes  Balance/Neuromuscular Re-Education Activity 1: moderate assist static sitting balance using Magdiel UE and LE support  Balance/Neuromuscular Re-Education Activity 2: moderate assist dynamic sitting balance using Magdiel UE and LE support    Bed Mobility  Bed Mobility: Yes  Bed Mobility 1  Bed Mobility 1: Supine to sitting  Level of Assistance 1: Dependent  Bed Mobility Comments 1: HOB elevated using draw sheet  Bed Mobility 2  Bed Mobility  2: Sitting to supine  Level of Assistance 2: Dependent  Bed Mobility Comments 2: HOB elevated using draw sheet    Outcome Measures:  [facility] Basic Mobility  Turning from your back to your side while in a flat bed without using bedrails: Total  Moving from lying on your back to sitting on the side of a flat bed without using bedrails: Total  Moving to and from bed to chair (including a wheelchair): Total  Standing up from a chair using your arms (e.g. wheelchair or bedside chair): Total  To walk in hospital room: Total  Climbing 3-5 steps with railing: Total  Basic Mobility - Total Score:  6    Education Documentation  Precautions, taught by Alvarado Aguirre PT at 2/6/2024  1:21 PM.  Learner: Patient  Readiness: Acceptance  Method: Explanation, Demonstration  Response: Verbalizes Understanding, Demonstrated Understanding    Body Mechanics, taught by Alvarado Aguirre PT at 2/6/2024  1:21 PM.  Learner: Patient  Readiness: Acceptance  Method: Explanation, Demonstration  Response: Verbalizes Understanding, Demonstrated Understanding    Mobility Training, taught by Alvarado Aguirre PT at 2/6/2024  1:21 PM.  Learner: Patient  Readiness: Acceptance  Method: Explanation, Demonstration  Response: Verbalizes Understanding, Demonstrated Understanding    Education Comments  No comments found.      OP EDUCATION:  Outpatient Education  Individual(s) Educated: Patient  Education Provided: Body Mechanics, Fall Risk, Home Exercise Program, Home Safety, Post-Op Precautions, Posture  Patient Response to Education: Patient/Caregiver Verbalized Understanding of Information    Encounter Problems       Encounter Problems (Active)       Balance       Pt will be able to sit at EOB and perform dynamic B LE/UE tasks x 10 minutes without LOB or elbow propping with supervision only for improved postural control (Progressing)       Start:  01/24/24    Expected End:  02/07/24               Pain - Adult          Safety       Pt will be able to follow 100% one step commands with minimal to no repetition to task required for improved participation in therapy (Progressing)       Start:  01/24/24    Expected End:  02/07/24               Transfers       Transfer from bed to chair and chair to bed with modAx1 with FWW  (Progressing)       Start:  01/24/24    Expected End:  02/07/24            Patient to transfer to and from sit to supine with modAx1 (Progressing)       Start:  01/24/24    Expected End:  02/07/24            Patient will roll with minAx1 (Progressing)       Start:  01/24/24    Expected End:  02/07/24             Patient will transfer sit to and from stand with modAx1 and FWW  (Progressing)       Start:  01/24/24    Expected End:  02/07/24

## 2024-02-06 NOTE — PROGRESS NOTES
"Chevy Benton is a 78 y.o. male on day 28 of admission presenting with Critical limb ischemia of both lower extremities (CMS/HCC).    Subjective   - pain resolve   - K pending  5.3 (6.7 hemolyzed)  - HD today  - pending placement     Objective     Physical Exam  Constitutional:       Appearance: He is ill-appearing.   HENT:      Mouth/Throat:      Mouth: Mucous membranes are moist.   Cardiovascular:      Rate and Rhythm: Normal rate and regular rhythm.   Pulmonary:      Effort: Pulmonary effort is normal.      Comments: Nasal cannula   Abdominal:      Palpations: Abdomen is soft.   Musculoskeletal:      Comments: Left arm AVF - thrill noted    Skin:     General: Skin is warm and dry.      Comments: Right vac wound for right foot      Neurological:      General: No focal deficit present.      Mental Status: He is alert.         Last Recorded Vitals  Blood pressure 102/66, pulse 82, temperature 36.2 °C (97.2 °F), resp. rate 18, height 1.778 m (5' 10\"), weight 91.1 kg (200 lb 13.4 oz), SpO2 100 %.  Intake/Output last 3 Shifts:  I/O last 3 completed shifts:  In: 460 (5 mL/kg) [P.O.:460]  Out: 0 (0 mL/kg)   Weight: 91.1 kg     Relevant Results  Scheduled medications  acetaminophen, 975 mg, oral, q6h  allopurinol, 100 mg, oral, Daily  apixaban, 2.5 mg, oral, q12h  atorvastatin, 40 mg, oral, Nightly  B complex-vitamin C-folic acid, 1 capsule, oral, Daily  clopidogrel, 75 mg, oral, Daily  epoetin dane or biosimilar, 12,000 Units, intravenous, Once per day on Tue Thu Sat  gabapentin, 100 mg, oral, Daily  insulin glargine, 6 Units, subcutaneous, Nightly  insulin lispro, 0-5 Units, subcutaneous, TID with meals  lidocaine, 1 patch, transdermal, Daily  melatonin, 5 mg, oral, Nightly  midodrine, 20 mg, oral, q8h  pantoprazole, 40 mg, oral, Daily before breakfast  perflutren protein A microsphere, 0.5 mL, intravenous, Once in imaging  polyethylene glycol, 17 g, oral, Daily  sennosides, 1 tablet, oral, Nightly  [Held by " provider] sevelamer carbonate, 800 mg, oral, TID with meals  sulfur hexafluoride microsphr, 2 mL, intravenous, Once in imaging      Continuous medications     PRN medications  PRN medications: albuterol, alum-mag hydroxide-simeth, ammonium lactate, dextrose, docusate sodium, emollient combination no.92, glucagon, heparin, hydrocortisone, ipratropium-albuteroL, povidone-iodine, white petrolatum     Results for orders placed or performed during the hospital encounter of 01/09/24 (from the past 24 hour(s))   Renal Function Panel   Result Value Ref Range    Glucose 144 (H) 74 - 99 mg/dL    Sodium 129 (L) 136 - 145 mmol/L    Potassium 5.3 3.5 - 5.3 mmol/L    Chloride 92 (L) 98 - 107 mmol/L    Bicarbonate 26 21 - 32 mmol/L    Anion Gap 16 10 - 20 mmol/L    Urea Nitrogen 36 (H) 6 - 23 mg/dL    Creatinine 3.69 (H) 0.50 - 1.30 mg/dL    eGFR 16 (L) >60 mL/min/1.73m*2    Calcium 9.9 8.6 - 10.6 mg/dL    Phosphorus 4.3 2.5 - 4.9 mg/dL    Albumin 2.6 (L) 3.4 - 5.0 g/dL   CBC   Result Value Ref Range    WBC 6.8 4.4 - 11.3 x10*3/uL    nRBC 0.3 (H) 0.0 - 0.0 /100 WBCs    RBC 2.57 (L) 4.50 - 5.90 x10*6/uL    Hemoglobin 7.9 (L) 13.5 - 17.5 g/dL    Hematocrit 26.1 (L) 41.0 - 52.0 %     (H) 80 - 100 fL    MCH 30.7 26.0 - 34.0 pg    MCHC 30.3 (L) 32.0 - 36.0 g/dL    RDW 21.2 (H) 11.5 - 14.5 %    Platelets 309 150 - 450 x10*3/uL   POCT GLUCOSE   Result Value Ref Range    POCT Glucose 167 (H) 74 - 99 mg/dL   POCT GLUCOSE   Result Value Ref Range    POCT Glucose 124 (H) 74 - 99 mg/dL            Assessment/Plan   Principal Problem:    Critical limb ischemia of both lower extremities (CMS/HCC)  Active Problems:    ESRD (end stage renal disease) on dialysis (CMS/HCC)    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 male with PMH of ESRD on HD TTS (Lt chest TDC, Hx of  LUE Fistula Pseudoaneurysms), DM, HTN, HFrEF (TTE 10/23 EF 35-40%), PTA right lower extremity on 9/20/23 with subsequent TMA right foot on 9/22/23, Non Occlusive DVT (Rt Subclavian), Aortic Root Dilation, MR, chronic afib and SSS s/p pacemaker who presents DAVID angioplasty C/b perforation of AT artery. Patient admitted to CICU for further monitoring. Additionally patient noted to be overloaded on examination, on 2L with diffuse anasarca more pronounced in upper extremities. He is anuric at baseline and is on TTS dialysis. Patient was stable overnight with no signs/symptoms of compartment syndrome.       PVD S/p PTA to RLE c/b perforation of AT  hx of right tma  hx of rt subclavian dvt  mild stenosis of left subclavian vein  Hx of right TMA  ::1/19 Podiatry  revision of right TMA    Podiatry following for surgical revision and VAC to right foot    Plan:  - Cont plavix   - stop ASA  - Resumed eliquis 2.5mg BID d/w podiatry and endovascular  - Cont home atorva      Chronic Hfref EF 35 - 40%   afib  HTN  - TTE 1/23/24 with LVEF to 40-45%, mildly improved from previous in 2023, no other new acute findings    -Cont home metoprolol succ 25mg  -Uptitrate GDMT as tolerated consider hydralazine and isordil when able, currently limited 2/2 hypotension on midodrine  - consideration should be given to further ischemic workup in the outpatient setting due to extensive peripheral disease, risk factors, and no evidence of previous ischemic cardiac workup      - eliquis as above     AHRF  2/2 to ESRD and Hfref  Covid infection s/p dexamethasone  - Afebrile, no leukocytosis  - xray with pulmonary edema  - Continue to monitor for worsening signs or sx of pneumonia  - S/p 10 day course of dex (ended 1/14)  - Continue with dialysis regimen  - Wean and titrate oxygen as needed  - Cont IS, pulm hygiene     #ESRD with dialysis TTS  #LUE fistula occlusion s/p venoplasty  Plan:  -Cont with dialysis schedule - via left AVF   - 2/2 removed left  dialysis tunnel catheter by IR   - Cont home renvela  - Cont home nephro caps  - HD today     Gerd  -Cont home pantoprazole     hx of gout  -cont home allopurinol     Anemia of chronic disease  ::At baseline hgb  ::2/2 to ESRD  Plan:  - CTM    DM with neuropathy  -Cont home gabapentin  -Insulin glargine 6 units nightly    -SSI    F: Carb diet, NPO midnight   E: K > 4, Mg > 2  N: Renal diet  A: PIV, left subclaavian trialysos  DVT ppx: Heparin SubQ  GI ppx: Protonix    Disposition   Rehab/SNF on dialysis   2/4 Medically cleared needs new PT/OT notes for SNF return when can be arranged , pending placement     Code Status: Full code   Surrogate Medical Decision-maker: Spouse ()      Seen and discussed with Dr. Perez Coker, APRN-CNP, DNP

## 2024-02-06 NOTE — PROGRESS NOTES
Nephrology Follow-up Note   Patient ID: Chevy Benton is a 78 y.o. male.  Seen and examined during hemodialysis. Labs and events reviewed.     Subjective:   Feels OK, no c/o CP/SOB/F/C/Abd pain  No c/o related to HD     Patient Active Problem List   Diagnosis    PAD (peripheral artery disease) (CMS/HCC)    Anemia in other chronic diseases classified elsewhere    Aortic root dilation (CMS/HCC)    Bimalleolar ankle fracture    Coronary artery disease involving native coronary artery of native heart with angina pectoris (CMS/HCC)    DM2 (diabetes mellitus, type 2) (CMS/HCC)    ESRD (end stage renal disease) on dialysis (CMS/HCC)    Thyroid enlarged    Thoracic aortic ectasia (CMS/HCC)    Sick sinus syndrome (CMS/HCC)    Pulmonary HTN (CMS/HCC)    Polyosteoarthritis, unspecified    Pneumatosis intestinalis    Peripheral vascular disorder due to diabetes mellitus (CMS/HCC)    Open wound of right foot    Gangrene of right foot (CMS/HCC)    Obesity, Class I, BMI 30-34.9    Nonrheumatic mitral (valve) insufficiency    Non-rheumatic mitral regurgitation    Skin ulcer of right heel with fat layer exposed (CMS/HCC)    Non-pressure chronic ulcer of other part of right foot with fat layer exposed (CMS/HCC)    Atherosclerosis of native artery of right lower extremity with gangrene (CMS/HCC)    Atherosclerosis of native arteries of left leg with ulceration of heel and midfoot (CMS/HCC)    Malnutrition of mild degree (CMS/HCC)    Longstanding persistent atrial fibrillation (CMS/HCC)    Elevated prostate specific antigen (PSA)    Ischemic ulcer of foot due to atherosclerosis of lower extremity (CMS/HCC)    Hyperlipidemia LDL goal <70    Hepatomegaly, not elsewhere classified    Gout    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)    Pacemaker    Critical limb ischemia with history of revascularization of same extremity (CMS/HCC)    Stage II pressure ulcer (CMS/HCC)    Amputation of toe of right foot  (CMS/[facility])    Weakness    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 (CMS/[facility])    COVID    PNA (pneumonia)    Critical limb ischemia of both lower extremities (CMS/[facility])    Critical limb ischemia of right lower extremity (CMS/[facility])    Subclavian vein stenosis, left       Scheduled medications:  acetaminophen, 975 mg, oral, q6h  allopurinol, 100 mg, oral, Daily  apixaban, 2.5 mg, oral, q12h  atorvastatin, 40 mg, oral, Nightly  B complex-vitamin C-folic acid, 1 capsule, oral, Daily  clopidogrel, 75 mg, oral, Daily  epoetin dane or biosimilar, 12,000 Units, intravenous, Once per day on Tue Thu Sat  gabapentin, 100 mg, oral, Daily  insulin glargine, 6 Units, subcutaneous, Nightly  insulin lispro, 0-5 Units, subcutaneous, TID with meals  lidocaine, 1 patch, transdermal, Daily  melatonin, 5 mg, oral, Nightly  midodrine, 20 mg, oral, q8h  pantoprazole, 40 mg, oral, Daily before breakfast  perflutren protein A microsphere, 0.5 mL, intravenous, Once in imaging  polyethylene glycol, 17 g, oral, Daily  sennosides, 1 tablet, oral, Nightly  [Held by provider] sevelamer carbonate, 800 mg, oral, TID with meals  sulfur hexafluoride microsphr, 2 mL, intravenous, Once in imaging       PRN medications: albuterol, alum-mag hydroxide-simeth, ammonium lactate, dextrose, docusate sodium, emollient combination no.92, glucagon, heparin, hydrocortisone, ipratropium-albuteroL, povidone-iodine, white petrolatum     Heart Rate:  [71-97]   Temp:  [36.2 °C (97.2 °F)-36.6 °C (97.9 °F)]   Resp:  [17-19]   BP: (102-178)/(53-72)   Weight:  [91.1 kg (200 lb 13.4 oz)]   SpO2:  [90 %-100 %]    Weight: 90.5 kg (199 lb 8 oz)   Gen: alert, NAD  HEENT: NC/AT  Pulm: clear ant b/l   CVS: RRR, no rub  Abd: S/NT/ND  LE: no edema , no cyanosis   Neuro: no asterixis   Dialysis acces:  NAVEENF     Lab Results   Component Value Date    WBC 7.9 02/06/2024    HGB 8.1 (L)  02/06/2024    HCT 26.2 (L) 02/06/2024     (H) 02/06/2024     02/06/2024     Lab Results   Component Value Date    GLUCOSE 175 (H) 02/06/2024    CALCIUM 9.8 02/06/2024     (L) 02/06/2024    K 5.4 (H) 02/06/2024    CO2 28 02/06/2024    CL 89 (L) 02/06/2024    BUN 46 (H) 02/06/2024    CREATININE 4.53 (H) 02/06/2024     Results from last 72 hours   Lab Units 02/06/24  1307   ALBUMIN g/dL 2.3*   GLUCOSE mg/dL 175*   HEMOGLOBIN g/dL 8.1*   WBC AUTO x10*3/uL 7.9      Results from last 72 hours   Lab Units 02/06/24  1307   SODIUM mmol/L 127*   POTASSIUM mmol/L 5.4*   CO2 mmol/L 28   BUN mg/dL 46*   CREATININE mg/dL 4.53*   PHOSPHORUS mg/dL 4.5   CALCIUM mg/dL 9.8        Assessment   ESRD-HD admitted with PAD S/p PTA to RLE c/b perforation of AT; awaiting placement   Mild high K pre HD this AM   Plan   Plan HD per submitted orders, UF 2L  as tolerated  MBD - Phosphorus binder: continue with Sevelamer q AC  Anemia of CKD: EPO to be given x 3 per week   Access: no issues   BP: acceptable during treatment   Renal Diet   Daily renal MVI   Continue 3 x per week hemodialysis;     Mariaelena Mccloud MD MPH

## 2024-02-07 NOTE — CONSULTS
"Nutrition Follow Up Assessment:   Nutrition Assessment    Reason for Assessment: Dietitian discretion (nutrition follow up)    Patient is a 78 y.o. male on day 29 of admission presenting with Critical limb ischemia of both lower extremities     Nutrition History:  Energy Intake:  unable to assess - limited records and pt not able to provide adequate information.  Food and Nutrient History: Pt has been on regular diet for 5 days. Reports that he is eating well but not able to state how much he has been eating or able to remember what he had for lunch today. Stated that he is drinking the Nepro supplement.       Anthropometrics:  Height: 177.8 cm (5' 10\")   Weight: 94.9 kg (209 lb 3.5 oz)   BMI (Calculated): 30.02  IBW/kg (Dietitian Calculated): 75.5 kg  Percent of IBW: 126 %       Weight History:   Weight         2/4/2024  0621 2/5/2024  0500 2/6/2024  0500 2/6/2024  0600 2/7/2024  0300    Weight: 87.9 kg (193 lb 12.6 oz) 92.6 kg (204 lb 2.3 oz) 91.1 kg (200 lb 13.4 oz) 91.1 kg (200 lb 13.4 oz) 94.9 kg (209 lb 3.5 oz)           Recorded weights continue to fluctuate making it difficult to assess for weight changes. Current weight is 3.5kg higher than admission weight, but down from highest weight of 105kg.      Nutrition Significant Labs:  CBC Trend:   Results from last 7 days   Lab Units 02/06/24  1307 02/05/24  1418 02/04/24  0716 02/03/24  0625   WBC AUTO x10*3/uL 7.9 6.8 6.1 7.5   RBC AUTO x10*6/uL 2.60* 2.57* 2.80* 2.66*   HEMOGLOBIN g/dL 8.1* 7.9* 8.4* 7.8*   HEMATOCRIT % 26.2* 26.1* 28.3* 26.8*   MCV fL 101* 102* 101* 101*   PLATELETS AUTO x10*3/uL 331 309 263 243    , BG POCT trend:   Results from last 7 days   Lab Units 02/07/24  1232 02/07/24  0829 02/06/24  1952 02/06/24  1739 02/06/24  0812   POCT GLUCOSE mg/dL 155* 120* 161* 132* 124*    , Renal Lab Trend:   Results from last 7 days   Lab Units 02/06/24  1307 02/05/24  1302 02/04/24  0716 02/03/24  0625 02/02/24  0746 02/01/24  0515   POTASSIUM mmol/L " 5.4* 5.3 6.7* 5.7*   < > 3.2*   PHOSPHORUS mg/dL 4.5 4.3 3.3 4.2   < > 2.4*   SODIUM mmol/L 127* 129* 130* 130*   < > 136   MAGNESIUM mg/dL  --   --   --   --   --  1.50*   EGFR mL/min/1.73m*2 12* 16* 23* 18*   < > 36*   BUN mg/dL 46* 36* 24* 33*   < > 22   CREATININE mg/dL 4.53* 3.69* 2.76* 3.34*   < > 1.89*    < > = values in this interval not displayed.        Nutrition Specific Medications:  Neprocaps, lantus, humalog,     I/O:   Last BM Date: 02/06/24; Stool Appearance: Soft (02/06/24 1000)        Dietary Orders (From admission, onward)       Start     Ordered    02/02/24 1346  Adult diet Regular  Diet effective now        Question:  Diet type  Answer:  Regular    02/02/24 1345    01/31/24 1613  Oral nutritional supplements  Until discontinued        Question Answer Comment   Deliver with Breakfast    Select supplement: Nepro        01/31/24 1612                     Estimated Needs:   Total Energy Estimated Needs (kCal): 2050 kCal  Method for Estimating Needs: 27 kcal/kg * IBW  Total Protein Estimated Needs (g): 90 g  Method for Estimating Needs: 1.2 g/kg * IBW  Total Fluid Estimated Needs (mL):  (per team)  Method for Estimating Needs: per ICU team        Nutrition Diagnosis   Malnutrition Diagnosis  Patient has Malnutrition Diagnosis: No    Diagnosis Status (2): Resolved  Nutrition Diagnosis 2: Swallowing difficulity  Related to (2): AMS  As Evidenced by (2): need for minced/moist foods    Diagnosis Status (3): New  Nutrition Diagnosis 3: Increased nutrient needs  Related to (3): wound healing  As Evidenced by (3): revised TMA with wound vac.       Nutrition Interventions/Recommendations         Nutrition Prescription:  Individualized Nutrition Prescription Provided for : Continue regular diet with Nepro once a day to provide 420 calories and 19 gm pro.  Assist with meals as needed and record % eaten in flow sheets        Nutrition Interventions:   Food and/or Nutrient Delivery Interventions  Interventions:  Meals and snacks, Medical food supplement  Goal: consume >75% of meals; Consume 1 Nepro per day    Nutrition Education:   None at this time.        Nutrition Monitoring and Evaluation   Food/Nutrient Related History Monitoring  Monitoring and Evaluation Plan: Energy intake, Amount of food  Criteria: intake adequate to meet needs    Body Composition/Growth/Weight History  Monitoring and Evaluation Plan: Weight  Criteria: stable weight    Biochemical Data, Medical Tests and Procedures  Monitoring and Evaluation Plan: Electrolyte/renal panel  Criteria: Labs WNL    Time Spent/Follow-up Reminder:   Time Spent (min): 45 minutes  Last Date of Nutrition Visit: 02/07/24  Nutrition Follow-Up Needed?: Dietitian to reassess per policy

## 2024-02-07 NOTE — PROGRESS NOTES
Physical Therapy    Physical Therapy Treatment    Patient Name: Chevy Benton  MRN: [id number]  Today's Date: 2/7/2024  Time Calculation  Start Time: 1413  Stop Time: 1443  Time Calculation (min): 30 min       Assessment/Plan   PT Assessment  PT Assessment Results: Decreased strength, Decreased range of motion, Decreased endurance, Impaired balance, Decreased mobility, Decreased coordination, Impaired tone, Orthopedic restrictions, Pain  Rehab Prognosis: Good  Barriers to Discharge: none  Evaluation/Treatment Tolerance: Patient limited by fatigue, Patient limited by pain (having a BM)  Medical Staff Made Aware: Yes  Strengths: Attitude of self, Coping skills  Barriers to Participation:  (pain)  End of Session Communication: Bedside nurse  Assessment Comment: The pt performed supine therapeutic exercises with L LE before having a BM with moderate difficulty due to decreased strength.  End of Session Patient Position: Bed, 4 rail up, Alarm off, not on at start of session  PT Plan  Inpatient/Swing Bed or Outpatient: Inpatient  PT Plan  Treatment/Interventions: Strengthening, Bed mobility  PT Plan: Skilled PT  PT Frequency: 3 times per week  PT Discharge Recommendations: Moderate intensity level of continued care  Equipment Recommended upon Discharge:  (none)  PT Recommended Transfer Status: Assist x2  PT - OK to Discharge: Yes      General Visit Information:   PT  Visit  PT Received On: 02/07/24  Response to Previous Treatment: Patient reporting fatigue but able to participate.  General  Prior to Session Communication: Bedside nurse  Patient Position Received: Bed, 4 rail up, Alarm on  Preferred Learning Style: verbal, visual, written  General Comment: The pt was pleasant, cooperative and willing to participate in therapy. The PT session interrupted due to pt having a BM.    Subjective   Precautions:  Precautions  Hearing/Visual Limitations: Hearing and vision WFL  LE Weight Bearing Status: Right Non-Weight  Bearing  Medical Precautions: Fall precautions, Oxygen therapy device and L/min  Precautions Comment: Pt in compliance with precautions throughout PT session.  Vital Signs:  Vital Signs  Heart Rate: 94  Heart Rate Source:  (pulse ox on finger)  SpO2: 97 % (2L O2)  Patient Position: Lying    Objective   Pain:  Pain Assessment  Pain Assessment: 0-10  Pain Score: 7  Pain Type: Acute pain  Pain Location: Buttocks  Pain Orientation: Right, Left  Multiple Pain Sites: Two  Pain 2  Pain Score 2: 7  Pain Type 2: Acute pain, Surgical pain  Pain Location 2: Leg  Pain Orientation 2: Right  Cognition:  Cognition  Following Commands: Follows one step commands with increased time  Postural Control:     Extremity/Trunk Assessments:  RUE   RUE : Exceptions to [facility]  RUE AROM (degrees)  RUE AROM Comment: decreased shoulder, elbow, wrist, and fingers flexion  RUE Strength  R Shoulder Flexion: 3-/5  R Elbow Flexion: 3/5  R Elbow Extension: 3/5  R Wrist Flexion: 3/5  R Wrist Extension: 3/5  LUE   LUE: Exceptions to [facility]  LUE AROM (degrees)  LUE AROM Comment: decreased shoulder, elbow, wrist, and fingers flexion  LUE Strength  L Shoulder Flexion: 2+/5  L Elbow Flexion: 3-/5  L Elbow Extension: 3-/5  L Wrist Flexion: 3-/5  L Wrist Extension: 3-/5  RLE   RLE : Exceptions to [facility]  AROM RLE (degrees)  RLE AROM Comment: decreased hip, knee, and ankle ROM  Strength RLE  R Hip Flexion: 2+/5  R Knee Flexion: 3-/5  R Knee Extension: 3-/5  R Ankle Dorsiflexion: 1/5  R Ankle Plantar Flexion: 1/5  LLE   LLE : Exceptions to [facility]  AROM LLE (degrees)  LLE AROM Comment: decreased hip, knee, and ankle ROM  Strength LLE  L Hip Flexion: 3-/5  L Knee Flexion: 3-/5  L Knee Extension: 3-/5  L Ankle Dorsiflexion: 3-/5  L Ankle Plantar Flexion: 3-/5  Activity Tolerance:  Activity Tolerance  Endurance: Tolerates 10 - 20 min exercise with multiple rests  Treatments:  Therapeutic Exercise  Therapeutic Exercise Performed: Yes  Therapeutic Exercise Activity 1: ankle  pumps x 15  Therapeutic Exercise Activity 2: heel slides x 15  Therapeutic Exercise Activity 3: quad sets x 15  Therapeutic Exercise Activity 4: Hip ABD x 15  Therapeutic Exercise Activity 5: glute sets x 15    Bed Mobility  Bed Mobility: Yes  Bed Mobility 1  Bed Mobility 1: Rolling right  Level of Assistance 1: Dependent  Bed Mobility Comments 1: HOB flat    Outcome Measures:  [facility] Basic Mobility  Turning from your back to your side while in a flat bed without using bedrails: Total  Moving from lying on your back to sitting on the side of a flat bed without using bedrails: Total  Moving to and from bed to chair (including a wheelchair): Total  Standing up from a chair using your arms (e.g. wheelchair or bedside chair): Total  To walk in hospital room: Total  Climbing 3-5 steps with railing: Total  Basic Mobility - Total Score: 6    Education Documentation  Precautions, taught by Alvarado Aguirre PT at 2/7/2024  3:04 PM.  Learner: Patient  Readiness: Acceptance  Method: Explanation, Demonstration  Response: Verbalizes Understanding, Demonstrated Understanding    Body Mechanics, taught by Alvarado Aguirre PT at 2/7/2024  3:04 PM.  Learner: Patient  Readiness: Acceptance  Method: Explanation, Demonstration  Response: Verbalizes Understanding, Demonstrated Understanding    Mobility Training, taught by Alvarado Aguirre PT at 2/7/2024  3:04 PM.  Learner: Patient  Readiness: Acceptance  Method: Explanation, Demonstration  Response: Verbalizes Understanding, Demonstrated Understanding    Education Comments  No comments found.      OP EDUCATION:  Outpatient Education  Individual(s) Educated: Patient  Education Provided: Home Exercise Program  Patient Response to Education: Patient/Caregiver Verbalized Understanding of Information, Patient/Caregiver Performed Return Demonstration of Exercises/Activities    Encounter Problems       Encounter Problems (Active)       Balance       Pt will be able to sit at EOB and  perform dynamic B LE/UE tasks x 10 minutes without LOB or elbow propping with supervision only for improved postural control (Progressing)       Start:  01/24/24    Expected End:  02/21/24               Pain - Adult          Safety       Pt will be able to follow 100% one step commands with minimal to no repetition to task required for improved participation in therapy (Progressing)       Start:  01/24/24    Expected End:  02/21/24               Transfers       Transfer from bed to chair and chair to bed with modAx1 with FWW  (Progressing)       Start:  01/24/24    Expected End:  02/21/24            Patient to transfer to and from sit to supine with modAx1 (Progressing)       Start:  01/24/24    Expected End:  02/21/24            Patient will roll with minAx1 (Progressing)       Start:  01/24/24    Expected End:  02/21/24            Patient will transfer sit to and from stand with modAx1 and FWW  (Progressing)       Start:  01/24/24    Expected End:  02/21/24

## 2024-02-07 NOTE — PROGRESS NOTES
"Chevy Benton is a 78 y.o. male on day 29 of admission presenting with Critical limb ischemia of both lower extremities (CMS/HCC).    Subjective   Pt resting in bed. Denies sob, n/v/d, fever, cough, chills, pain, chest pains, heart flutters, constipation       Objective     Physical Exam  Vitals and nursing note reviewed.   Constitutional:       Appearance: He is ill-appearing.   Cardiovascular:      Rate and Rhythm: Rhythm irregular.      Comments: AFIB -97  Pulmonary:      Comments: Magdiel lung sounds clear anterior  O2 2L nc  Abdominal:      General: There is distension.      Comments: Non-tender with palpation   Genitourinary:     Comments: anuric  Musculoskeletal:      Right lower leg: Edema present.      Left lower leg: Edema present.      Comments: Global edema..pitting ble/magdiel upper ext  Rt foot with wound vac s/p rt tma   Skin:     General: Skin is warm.      Comments: Magdiel hands skin peeling    Neurological:      Mental Status: He is alert and oriented to person, place, and time.      Comments: Pt seemed a little confused today a/ox 1-2..( knew name, did not know yr, hosp)..will make primary team aware   Psychiatric:         Mood and Affect: Mood normal.         Behavior: Behavior normal.         Last Recorded Vitals  Blood pressure 155/52, pulse 100, temperature 37.1 °C (98.8 °F), temperature source Temporal, resp. rate 19, height 1.778 m (5' 10\"), weight 94.9 kg (209 lb 3.5 oz), SpO2 98 %.  Intake/Output last 3 Shifts:  I/O last 3 completed shifts:  In: 1600 (16.9 mL/kg) [I.V.:800 (8.4 mL/kg); Other:800]  Out: 4800 (50.6 mL/kg) [Other:4800]  Weight: 94.9 kg     Relevant Results  Scheduled medications  acetaminophen, 975 mg, oral, q6h  allopurinol, 100 mg, oral, Daily  apixaban, 2.5 mg, oral, q12h  atorvastatin, 40 mg, oral, Nightly  B complex-vitamin C-folic acid, 1 capsule, oral, Daily  clopidogrel, 75 mg, oral, Daily  epoetin dane or biosimilar, 12,000 Units, intravenous, Once per day on Tue Thu " Sat  gabapentin, 100 mg, oral, Daily  insulin glargine, 6 Units, subcutaneous, Nightly  insulin lispro, 0-5 Units, subcutaneous, TID with meals  lidocaine, 1 patch, transdermal, Daily  melatonin, 5 mg, oral, Nightly  midodrine, 20 mg, oral, q8h  pantoprazole, 40 mg, oral, Daily before breakfast  perflutren protein A microsphere, 0.5 mL, intravenous, Once in imaging  polyethylene glycol, 17 g, oral, Daily  sennosides, 1 tablet, oral, Nightly  [Held by provider] sevelamer carbonate, 800 mg, oral, TID with meals  sulfur hexafluoride microsphr, 2 mL, intravenous, Once in imaging      Continuous medications     PRN medications  PRN medications: albuterol, alum-mag hydroxide-simeth, ammonium lactate, dextrose, docusate sodium, emollient combination no.92, glucagon, heparin, hydrocortisone, ipratropium-albuteroL, povidone-iodine, white petrolatum   Results for orders placed or performed during the hospital encounter of 01/09/24 (from the past 24 hour(s))   POCT GLUCOSE   Result Value Ref Range    POCT Glucose 132 (H) 74 - 99 mg/dL   POCT GLUCOSE   Result Value Ref Range    POCT Glucose 161 (H) 74 - 99 mg/dL   POCT GLUCOSE   Result Value Ref Range    POCT Glucose 120 (H) 74 - 99 mg/dL   POCT GLUCOSE   Result Value Ref Range    POCT Glucose 155 (H) 74 - 99 mg/dL                     Assessment/Plan   Principal Problem:    Critical limb ischemia of both lower extremities (CMS/HCC)  Active Problems:    ESRD (end stage renal disease) on dialysis (CMS/[facility])    Non-pressure chronic ulcer of other part of right foot with fat layer exposed (CMS/HCC)    Critical limb ischemia of right lower extremity (CMS/HCC)    Subclavian vein stenosis, left    Tolerated hemodialysis yesterday with net fluid loss 2000cc    Bp stable with tx, hypervolemic on exam and has stable electrolytes .  K+=5.4 (drawn pre-tx from 2/6)..awaiting repeat results     Outpatient Dialysis schedule:   [facility] dialysis (5days/week for 2.5 hours each) at [facility]/  [place] ... Will dialyze TTS while in house     Access: lt fist with +thrill/bruit- no issues - able to achieve   Lt cath in place removed 2/2     Anemia of ESRD: 2/2-increase epogen 12,000 units on dialysis days... current hgb 8.1... 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 current phos level 4.5.. will cont to monitor. B complex-vitamin C-folic acid (Nephrocaps) capsule 1 capsule daily     Plan HD tomorrow with UF as tolerated     Renal diet      Please obtain daily standing wt (if possible)     Medication to be adjusted for ESRD      Patient to continue regular HD schedule while inpatient and to follow with the outpatient nephrologist at discharge           JENIFFER Bates-CNP

## 2024-02-08 NOTE — PROGRESS NOTES
2/8/2024 Care coordination  Precert obtained and expires 2/10/2024.  Team  hoping to discharge back to [facility] tomorrow.

## 2024-02-08 NOTE — NURSING NOTE
Report from Sending RN:    Report From: Jacki ( ABEL)  Recent Surgery of Procedure: No  Baseline Level of Consciousness (LOC): A/ O x 4  Oxygen Use: Yes, 2 liters   Type: NC  Diabetic: No  Last BP Med Given Day of Dialysis: none  Last Pain Med Given: none  Lab Tests to be Obtained with Dialysis: Yes, CBC, RFP  Blood Transfusion to be Given During Dialysis: No  Available IV Access: Yes  Medications to be Administered During Dialysis: No  Continuous IV Infusion Running: No  Restraints on Currently or in the Last 24 Hours: No  Hand-Off Communication: No acute overnight or morning events; vss; Pt did not take morning medications; Pt will need labs; pt is a full code; Ela Morales RN.

## 2024-02-08 NOTE — CONSULTS
Wound Care Consult     Visit Date: 2/7/2024      Patient Name: Chevy Benton         MRN: [id number]           YOB: 1945     Reason for Consult: sacrum/buttocks        Pertinent Labs:   Wound 01/09/24 Moisture Associated Skin Damage Buttocks Right (Active)   Wound Image   01/16/24 1110   Site Assessment Clean;Dry 02/07/24 1000   Janessa-Wound Assessment Clean;Dry;Intact 01/31/24 0800        Drainage Description None 02/07/24 1000   Drainage Amount None 02/07/24 1000   Dressing Foam 02/07/24 1000   Dressing Changed Changed 02/06/24 1000   Dressing Status Clean;Dry 02/07/24 1000       Wound 01/22/24 Skin Tear Arm Right;Ventral;Mid (Active)   Site Assessment Clean;Dry 02/07/24 1000   Janessa-Wound Assessment Clean;Dry 02/07/24 1000   Drainage Description None 02/07/24 1000   Drainage Amount None 02/07/24 1000   Dressing Foam 02/07/24 1000   Dressing Changed Changed 02/06/24 1000   Dressing Status Clean;Dry 02/07/24 1000     Buttocks with spots of hypopigmented tissue.  Patient incontinent of stool and some blanchable erythema present and partial thickness skin loss from moisture.  Skin breakdown suspected related to moisture/incontinence     Recommendations:      Buttocks: Clean with cleansing cloths. Apply Critic Aid barrier cream BID and after every episode of incontinence.   Turn and position every 2 hours. EHOB Mattress and only one layer of linen. ( One sheet, one draw sheet and one incontinence pad) No briefs while in bed please.      Provider: If you agree with recommendations, please write orders for care.    Wound Team Plan: wound care to follow with foot while inpatient.  Wound care may be reconsulted if wound worsens or new wounds of concern appear     Caryl Pagan, MSN, RN, CWCN, COCN   2/7/2024  7:32 PM

## 2024-02-08 NOTE — PROGRESS NOTES
"Chevy Benton is a 78 y.o. male on day 30 of admission presenting with Critical limb ischemia of both lower extremities (CMS/HCC).    Subjective   - per nursing, patient waxing/waning mental status overnight--> alert and oriented X4 when seen this afternoon after dialysis  - HD today, removed 2L  - patient has pre-cert for facility  - per nephrology, would like patient to have additional UF session tomorrow before discharge to facility  - patient reporting pain in his \"prostate\" on exam, but upon further assessment, pain was located in his penis while meatus drainage (white/ yellowish)--> genital culture/ syphilis test sent  - WBC WNL, afebrile    Objective     Physical Exam  Constitutional:       Appearance: Normal appearance.   HENT:      Mouth/Throat:      Mouth: Mucous membranes are moist.   Eyes:      Extraocular Movements: Extraocular movements intact.   Cardiovascular:      Rate and Rhythm: Tachycardia present. Rhythm irregular.   Pulmonary:      Effort: Pulmonary effort is normal.      Comments: Nasal cannula   Abdominal:      Palpations: Abdomen is soft.   Genitourinary:     Penis: Discharge present.       Comments: White, yellowish discharge  Musculoskeletal:         General: Swelling present.      Right lower leg: Edema present.      Left lower leg: Edema present.      Comments: Left arm AVF - thrill noted    Skin:     General: Skin is warm and dry.      Comments: Right vac wound for right foot      Neurological:      General: No focal deficit present.      Mental Status: He is alert and oriented to person, place, and time.       Last Recorded Vitals  Blood pressure 104/51, pulse 93, temperature 36.2 °C (97.2 °F), resp. rate 19, height 1.778 m (5' 10\"), weight 92 kg (202 lb 13.2 oz), SpO2 98 %.  Intake/Output last 3 Shifts:  I/O last 3 completed shifts:  In: 880 (9.6 mL/kg) [P.O.:480; I.V.:400 (4.3 mL/kg)]  Out: 0 (0 mL/kg)   Weight: 92 kg     Relevant Results  Scheduled medications  acetaminophen, " 975 mg, oral, q6h  allopurinol, 100 mg, oral, Daily  apixaban, 2.5 mg, oral, q12h  atorvastatin, 40 mg, oral, Nightly  B complex-vitamin C-folic acid, 1 capsule, oral, Daily  clopidogrel, 75 mg, oral, Daily  epoetin dane or biosimilar, 12,000 Units, intravenous, Once per day on Tue Thu Sat  gabapentin, 100 mg, oral, Daily  insulin glargine, 6 Units, subcutaneous, Nightly  insulin lispro, 0-5 Units, subcutaneous, TID with meals  lidocaine, 1 patch, transdermal, Daily  melatonin, 5 mg, oral, Nightly  midodrine, 20 mg, oral, q8h  pantoprazole, 40 mg, oral, Daily before breakfast  perflutren protein A microsphere, 0.5 mL, intravenous, Once in imaging  polyethylene glycol, 17 g, oral, Daily  sennosides, 1 tablet, oral, Nightly  [Held by provider] sevelamer carbonate, 800 mg, oral, TID with meals  sulfur hexafluoride microsphr, 2 mL, intravenous, Once in imaging      Continuous medications     PRN medications  PRN medications: albuterol, alum-mag hydroxide-simeth, ammonium lactate, dextrose, docusate sodium, emollient combination no.92, glucagon, heparin, hydrocortisone, ipratropium-albuteroL, povidone-iodine, white petrolatum     Results for orders placed or performed during the hospital encounter of 01/09/24 (from the past 24 hour(s))   POCT GLUCOSE   Result Value Ref Range    POCT Glucose 156 (H) 74 - 99 mg/dL   POCT GLUCOSE   Result Value Ref Range    POCT Glucose 223 (H) 74 - 99 mg/dL   CBC   Result Value Ref Range    WBC 11.0 4.4 - 11.3 x10*3/uL    nRBC 0.3 (H) 0.0 - 0.0 /100 WBCs    RBC 2.56 (L) 4.50 - 5.90 x10*6/uL    Hemoglobin 7.9 (L) 13.5 - 17.5 g/dL    Hematocrit 25.9 (L) 41.0 - 52.0 %     (H) 80 - 100 fL    MCH 30.9 26.0 - 34.0 pg    MCHC 30.5 (L) 32.0 - 36.0 g/dL    RDW 21.5 (H) 11.5 - 14.5 %    Platelets 330 150 - 450 x10*3/uL   Renal Function Panel   Result Value Ref Range    Glucose 157 (H) 74 - 99 mg/dL    Sodium 131 (L) 136 - 145 mmol/L    Potassium 4.7 3.5 - 5.3 mmol/L    Chloride 92 (L) 98 -  107 mmol/L    Bicarbonate 28 21 - 32 mmol/L    Anion Gap 16 10 - 20 mmol/L    Urea Nitrogen 35 (H) 6 - 23 mg/dL    Creatinine 3.73 (H) 0.50 - 1.30 mg/dL    eGFR 16 (L) >60 mL/min/1.73m*2    Calcium 10.1 8.6 - 10.6 mg/dL    Phosphorus 4.0 2.5 - 4.9 mg/dL    Albumin 2.3 (L) 3.4 - 5.0 g/dL   POCT GLUCOSE   Result Value Ref Range    POCT Glucose 90 74 - 99 mg/dL      Assessment/Plan   Principal Problem:    Critical limb ischemia of both lower extremities (CMS/HCC)  Active Problems:    ESRD (end stage renal disease) on dialysis (CMS/HCC)    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 male with PMH of ESRD on HD TTS (Lt chest TDC, Hx of LUE Fistula Pseudoaneurysms), DM, HTN, HFrEF (TTE 10/23 EF 35-40%), PTA right lower extremity on 9/20/23 with subsequent TMA right foot on 9/22/23, Non Occlusive DVT (Rt Subclavian), Aortic Root Dilation, MR, chronic afib and SSS s/p pacemaker who presents DAVID angioplasty C/b perforation of AT artery. Patient admitted to CICU for further monitoring. Additionally patient noted to be overloaded on examination, on 2L with diffuse anasarca more pronounced in upper extremities. He is anuric at baseline and is on TTS dialysis. Patient was stable overnight with no signs/symptoms of compartment syndrome.     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%   - TTE 1/23/24 with LVEF to 40-45%, mildly improved from previous in 2023, no other new acute  findings    - has not been receiving metoprolol since admit  -Uptitrate GDMT as tolerated--> consider hydralazine and isordil if needed, currently limited 2/2 hypotension on midodrine  - consideration should be given to further ischemic workup in the outpatient setting due to extensive peripheral disease, risk factors, and no evidence of previous ischemic cardiac workup        HTN  Hypotension  - systolic range last 24 hours 104- 155  - has needed midodrine 20 mg [id number] with dialysis for hypotension  - systolic range has been wide ranging with swings from 80 to 170s over last few days  - will consider decreasing midodrine today to 10 mg    Atrial fibrillation  - currently afib low 100s  - metoprolol succinate has been held since admit  - eliquis as above     AHRF  2/2 to ESRD and HFrEf  Covid infection s/p dexamethasone  - Afebrile, no leukocytosis  - xray with pulmonary edema  - Continue to monitor for worsening signs or sx of pneumonia  - S/p 10 day course of dex (ended 1/14)  - Continue with dialysis regimen  - Wean and titrate oxygen as needed  - Cont IS, pulm hygiene     ESRD with dialysis TTS  LUE fistula occlusion s/p venoplasty  Plan:  -Cont with dialysis schedule - via left AVF   - 2/2 removed left dialysis tunnel catheter by IR   -  home renvela on hold due to phos of 4.5 per nephrology  - Cont home nephro caps  - HD today with 2L removed  - plan for extra UF session tomorrow prior to discharge to facility    Urethral drainage  - patient reporting penile discomfort today with white/ yellowish meatus drainage  - genital swab, syphillis test sent  - patient is anuric  - afebrile, WBC WNL    Gerd  -Cont home pantoprazole     hx of gout  -cont home allopurinol     Anemia of chronic disease  ::At baseline hgb  ::2/2 to ESRD  - continue daily CBC    DM with neuropathy  - A1c 5.3% 12/2023  -Cont home gabapentin  -Insulin glargine 6 units nightly    -SSI    Disposition   Rehab/SNF on dialysis     Patient pre-  cert obtained at facility, consider DC tomorrow after additional UF session     Code Status: Full code   Surrogate Medical Decision-maker: Spouse ()      Seen and discussed with Dr. Perez Small, APRN-CNP

## 2024-02-08 NOTE — PROGRESS NOTES
Subjective     Interval History: Chevy Benton    Patient seen on dialysis,         Current Facility-Administered Medications:     acetaminophen (Tylenol) tablet 975 mg, 975 mg, oral, q6h, JENIFFER Morales-CNP, 975 mg at 02/08/24 0657    albuterol 90 mcg/actuation inhaler 2 puff, 2 puff, inhalation, q6h PRN, EDWIN Morales, 2 puff at 01/14/24 0850    allopurinol (Zyloprim) tablet 100 mg, 100 mg, oral, Daily, JENIFFER Morales-CNP, 100 mg at 02/07/24 0947    alum-mag hydroxide-simeth (Mylanta) 200-200-20 mg/5 mL oral suspension 30 mL, 30 mL, oral, 4x daily PRN, EDWIN Morales    ammonium lactate (Lac-Hydrin) 12 % lotion, , Topical, q1h PRN, EDWIN Morales    apixaban (Eliquis) tablet 2.5 mg, 2.5 mg, oral, q12h, Shadi Coker, JENIFFER-CNP, DNP, 2.5 mg at 02/08/24 0530    atorvastatin (Lipitor) tablet 40 mg, 40 mg, oral, Nightly, JENIFFER Morales-CNP, 40 mg at 02/06/24 2002    B complex-vitamin C-folic acid (Nephrocaps) capsule 1 capsule, 1 capsule, oral, Daily, EDWIN Morales, 1 capsule at 02/07/24 0947    clopidogrel (Plavix) tablet 75 mg, 75 mg, oral, Daily, JENIFFER Morales-CNP, 75 mg at 02/07/24 0947    dextrose 50 % injection 25 g, 25 g, intravenous, q15 min PRN, JENIFFER Morales-CNP, 25 g at 01/26/24 1144    docusate sodium (Colace) capsule 100 mg, 100 mg, oral, BID PRN, JENIFFER Morales-CNP, 100 mg at 01/16/24 1121    emollient combination no.92 (Lubriderm) cream lotion 1 Application, 1 Application, Topical, Daily PRN, EDWIN Morales    epoetin dane (Epogen,Procrit) injection 12,000 Units, 12,000 Units, intravenous, Once per day on Tue Thu SatPortia APRN-CNP, 12,000 Units at 02/06/24 2200    gabapentin (Neurontin) capsule 100 mg, 100 mg, oral, Daily, EDWIN Morales, 100 mg at 02/07/24 0947    glucagon (Glucagen) injection 1 mg, 1 mg, intramuscular, q15 min PRN, EDWIN Morales    heparin (porcine)  injection 1,200 Units, 1,200 Units, intravenous, PRN, EDWIN Morales    hydrocortisone 2.5 % ointment, , Topical, BID PRN, EDWIN Morales    insulin glargine (Lantus) injection 6 Units, 6 Units, subcutaneous, Nightly, EDWIN Morales, 6 Units at 02/07/24 2212    insulin lispro (HumaLOG) injection 0-5 Units, 0-5 Units, subcutaneous, TID with meals, EDWIN Morales, 1 Units at 02/07/24 1704    ipratropium-albuteroL (Duo-Neb) 0.5-2.5 mg/3 mL nebulizer solution 3 mL, 3 mL, nebulization, q6h PRN, EDWIN Morales, 3 mL at 02/05/24 2052    lidocaine 4 % patch 1 patch, 1 patch, transdermal, Daily, EDWIN Morales, 1 patch at 02/07/24 0947    melatonin tablet 5 mg, 5 mg, oral, Nightly, EDWIN Morales, 5 mg at 02/06/24 2002    midodrine (Proamatine) tablet 20 mg, 20 mg, oral, q8h, EDWIN Morales, 20 mg at 02/07/24 1234    pantoprazole (ProtoNix) EC tablet 40 mg, 40 mg, oral, Daily before breakfast, EDWIN Morales, 40 mg at 02/07/24 0600    perflutren protein A microsphere (Optison) injection 0.5 mL, 0.5 mL, intravenous, Once in imaging, EDWIN Morales    polyethylene glycol (Glycolax, Miralax) packet 17 g, 17 g, oral, Daily, EDWIN Morales, 17 g at 02/05/24 0811    povidone-iodine (Betadine) 7.5 % soap 1 Application, 1 Application, Topical, Daily PRN, EDWIN Morales, 1 Application at 01/18/24 0400    sennosides (Senokot) tablet 8.6 mg, 1 tablet, oral, Nightly, EDWIN Morales, 8.6 mg at 02/06/24 2003    [Held by provider] sevelamer carbonate (Renvela) tablet 800 mg, 800 mg, oral, TID with meals, Mark Navarro MD, 800 mg at 01/24/24 0834    sulfur hexafluoride microsphr (Lumason) injection 24.28 mg, 2 mL, intravenous, Once in imaging, EDWIN Morales    white petrolatum (Aquaphor) ointment, , Topical, q1h PRN, EDWIN Morales    Physical Exam  Physical Exam  Heart S1 S2 RRR, Lungs CTA,  + edema      Vital signs in last 24 hours:  Temp:  [36 °C (96.8 °F)-37.3 °C (99.1 °F)] 36 °C (96.8 °F)  Heart Rate:  [] 92  Resp:  [17-20] 18  BP: (112-155)/(44-74) 122/53         Labs:  Results from last 7 days   Lab Units 02/08/24  0638   WBC AUTO x10*3/uL 11.0   RBC AUTO x10*6/uL 2.56*   HEMOGLOBIN g/dL 7.9*   HEMATOCRIT % 25.9*     Results from last 7 days   Lab Units 02/08/24  0638   SODIUM mmol/L 131*   POTASSIUM mmol/L 4.7   CHLORIDE mmol/L 92*   CO2 mmol/L 28   BUN mg/dL 35*   CREATININE mg/dL 3.73*   CALCIUM mg/dL 10.1   PHOSPHORUS mg/dL 4.0            Assessment/Plan   Patient seen and examined while on dialysis, recent events, labs, medications reviewed. Will follow overall management per primary team, and continue regular dialysis.  Will plan additional UF 2/9 to improve volume status.    Principal Problem:    Critical limb ischemia of both lower extremities (CMS/HCC)  Active Problems:    ESRD (end stage renal disease) on dialysis (CMS/HCC)    Non-pressure chronic ulcer of other part of right foot with fat layer exposed (CMS/HCC)    Critical limb ischemia of right lower extremity (CMS/HCC)    Subclavian vein stenosis, left        Corine Will MD  2/8/2024  9:03 AM

## 2024-02-09 NOTE — PROGRESS NOTES
Occupational Therapy    Occupational Therapy    Occupational Therapy Treatment    Name: Chevy Benton  MRN: [id number]  : 1945  Date: 24  Time Calculation  Start Time: 1452  Stop Time: 1508  Time Calculation (min): 16 min    Assessment:  End of Session Communication: Bedside nurse  End of Session Patient Position: Bed, 4 rail up, Alarm off, not on at start of session  Plan:  Treatment Interventions: ADL retraining, Functional transfer training, Endurance training, Cognitive reorientation, Patient/family training, Equipment evaluation/education, Compensatory technique education, Neuromuscular reeducation  OT Frequency: 2 times per week  OT Discharge Recommendations: Moderate intensity level of continued care  Equipment Recommended upon Discharge:  (none)  OT Recommended Transfer Status: Maximum assist  OT - OK to Discharge: Yes    Subjective   General:  OT Last Visit  OT Received On: 24  Prior to Session Communication: Bedside nurse  Patient Position Received: Bed, 4 rail up, Alarm off, not on at start of session  Family/Caregiver Present: No  General Comment: RN cleared pt for OT. Pt lethargic in supine throughout session, cooperative however required frequent cues for increased alertness during ther-ex in supine. Noted increased tremors at end of session, RN was made aware of.   Precautions:  LE Weight Bearing Status: Right Non-Weight Bearing  Medical Precautions: Fall precautions, Oxygen therapy device and L/min  Vitals:  Vital Signs  Heart Rate: 88  Lines/Tubes/Drains:  Midline 24 Single lumen Right Cephalic vein (Active)   Number of days: 28       Cognition:  Arousal/Alertness: Inconsistent responses to stimuli  Orientation Level: Disoriented to situation, Disoriented to time, Disoriented to place (stating month as March)  Following Commands:  (Pt followed limited commands with cues and repetitions, limited by decreased alertness)    Pain Assessment:  Pain Assessment  Pain  Assessment: 0-10  Pain Score:  (unrated, grimacing at times during UE PROM)     Objective   Activities of Daily Living:         Bed Mobility/Transfers:   Bed Mobility  Bed Mobility: No       Therapy/Activity:   Therapeutic Exercise  Therapeutic Exercise Performed: Yes  Therapeutic Exercise Activity 1: In supine pt participated in L UE AAROM/PROM in shld flex/abd/add,IR/ER/forearm pro/sup, digits flex/ext, unable to complete wrist ROM d/t pt being resistive vs stiffness. Also completed R UE initially AROM then AAROM as pt required cues for initiation in R shld flex/ext/abd/add/IR/ER/forearm pro/sup, wrist flext/ext/digits flex/ext, 7-10 reps each.        Outcome Measures:  [facility] Daily Activity  Putting on and taking off regular lower body clothing: Total  Bathing (including washing, rinsing, drying): Total  Putting on and taking off regular upper body clothing: Total  Toileting, which includes using toilet, bedpan or urinal: Total  Taking care of personal grooming such as brushing teeth: Total  Eating Meals: Total  Daily Activity - Total Score: 6     Education Documentation  No documentation found.  Education Comments  No comments found.        Goals:  Encounter Problems       Encounter Problems (Active)       ADLs       Patient with complete upper body dressing with minimal assist  level of assistance  (Progressing)       Start:  01/24/24    Expected End:  02/14/24            Patient will complete daily grooming tasks with stand by level of assistance and PRN adaptive equipment while edge of bed . (Progressing)       Start:  01/24/24    Expected End:  02/14/24                       COGNITION/SAFETY       Patient will score WFL on standardized cognitive assessment with without cues and within reasonable time frame (Progressing)       Start:  01/24/24    Expected End:  02/14/24            Patient will follow 100% Simple commands to allow improved ADL performance. (Progressing)       Start:  01/24/24    Expected End:   02/14/24            Patient will demonstrated orientation x 3 with verbal cues. (Progressing)       Start:  01/24/24    Expected End:  02/14/24       ORIENTATION            EXERCISE/STRENGTHENING       Patient with increase BUE to 3+/5 strength. (Progressing)       Start:  01/24/24    Expected End:  02/14/24                 TRANSFERS       Patient will perform bed mobility moderate x2 level of assistance and bed rails and draw sheet in order to improve safety and independence with mobility (Progressing)       Start:  01/24/24    Expected End:  02/14/24            Patient will complete lateral functional transfer to chair with slideboard PRN and moderate assist x2. (Progressing)       Start:  01/24/24    Expected End:  02/14/24 02/09/24 at 3:36 PM   Joyce Herbert, OT   [phone]

## 2024-02-09 NOTE — NURSING NOTE
Report from Sending RN:    Report From: Lele  Recent Surgery of Procedure: No  Baseline Level of Consciousness (LOC): A and O x 3  Oxygen Use: Yes  Type: 2 LPM  Diabetic: Yes  Last BP Med Given Day of Dialysis: Midodrine  Last Pain Med Given: None  Lab Tests to be Obtained with Dialysis: Yes  Blood Transfusion to be Given During Dialysis: N/A  Available IV Access: Yes  Medications to be Administered During Dialysis: No  Continuous IV Infusion Running: No  Restraints on Currently or in the Last 24 Hours: No  Hand-Off Communication: Stable to come

## 2024-02-09 NOTE — PROGRESS NOTES
"Chevy Benton is a 78 y.o. male on day 31 of admission presenting with Critical limb ischemia of both lower extremities (CMS/HCC).    Subjective   - UF  today  - patient has pre-cert for facility, plan to dc tomorrow after HD if stable   - patient reporting pain in his \"prostate\" on exam, but upon further assessment, pain was located in his penis while meatus drainage (white/ yellowish)--> genital culture/ syphilis test sent, negative so far except yeast  - consult urology for penile pain  - WBC WNL, afebrile    Objective     Physical Exam  Constitutional:       Appearance: Normal appearance.   HENT:      Mouth/Throat:      Mouth: Mucous membranes are moist.   Eyes:      Extraocular Movements: Extraocular movements intact.   Cardiovascular:      Rate and Rhythm: Tachycardia present. Rhythm irregular.   Pulmonary:      Effort: Pulmonary effort is normal.      Comments: Nasal cannula   Abdominal:      Palpations: Abdomen is soft.   Genitourinary:     Penis: Discharge present.       Comments: White, yellowish discharge  Musculoskeletal:         General: Swelling present.      Right lower leg: Edema present.      Left lower leg: Edema present.      Comments: Left arm AVF - thrill noted    Skin:     General: Skin is warm and dry.      Comments: Right vac wound for right foot      Neurological:      General: No focal deficit present.      Mental Status: He is alert and oriented to person, place, and time.       Last Recorded Vitals  Blood pressure 128/54, pulse 80, temperature 36.6 °C (97.9 °F), resp. rate 18, height 1.778 m (5' 10\"), weight 91.9 kg (202 lb 9.6 oz), SpO2 100 %.  Intake/Output last 3 Shifts:  I/O last 3 completed shifts:  In: 1000 (10.9 mL/kg) [I.V.:600 (6.5 mL/kg); Other:400]  Out: 2000 (21.8 mL/kg) [Other:2000]  Weight: 91.9 kg     Relevant Results  Scheduled medications  acetaminophen, 975 mg, oral, q6h  allopurinol, 100 mg, oral, Daily  apixaban, 2.5 mg, oral, q12h  atorvastatin, 40 mg, oral, " Nightly  B complex-vitamin C-folic acid, 1 capsule, oral, Daily  clopidogrel, 75 mg, oral, Daily  epoetin dane or biosimilar, 12,000 Units, intravenous, Once per day on Tue Thu Sat  gabapentin, 100 mg, oral, Daily  insulin glargine, 6 Units, subcutaneous, Nightly  insulin lispro, 0-5 Units, subcutaneous, TID with meals  lidocaine, 1 patch, transdermal, Daily  melatonin, 5 mg, oral, Nightly  midodrine, 15 mg, oral, q8h  pantoprazole, 40 mg, oral, Daily before breakfast  polyethylene glycol, 17 g, oral, Daily  sennosides, 1 tablet, oral, Nightly  [Held by provider] sevelamer carbonate, 800 mg, oral, TID with meals  sulfur hexafluoride microsphr, 2 mL, intravenous, Once in imaging      Continuous medications     PRN medications  PRN medications: albuterol, alum-mag hydroxide-simeth, ammonium lactate, dextrose, docusate sodium, emollient combination no.92, glucagon, heparin, hydrocortisone, ipratropium-albuteroL, oxyCODONE, povidone-iodine, white petrolatum     Results for orders placed or performed during the hospital encounter of 01/09/24 (from the past 24 hour(s))   POCT GLUCOSE   Result Value Ref Range    POCT Glucose 133 (H) 74 - 99 mg/dL   POCT GLUCOSE   Result Value Ref Range    POCT Glucose 107 (H) 74 - 99 mg/dL   POCT GLUCOSE   Result Value Ref Range    POCT Glucose 180 (H) 74 - 99 mg/dL   CBC   Result Value Ref Range    WBC 9.8 4.4 - 11.3 x10*3/uL    nRBC 0.5 (H) 0.0 - 0.0 /100 WBCs    RBC 2.46 (L) 4.50 - 5.90 x10*6/uL    Hemoglobin 7.6 (L) 13.5 - 17.5 g/dL    Hematocrit 24.5 (L) 41.0 - 52.0 %     80 - 100 fL    MCH 30.9 26.0 - 34.0 pg    MCHC 31.0 (L) 32.0 - 36.0 g/dL    RDW 21.6 (H) 11.5 - 14.5 %    Platelets 332 150 - 450 x10*3/uL   Renal Function Panel   Result Value Ref Range    Glucose 101 (H) 74 - 99 mg/dL    Sodium 137 136 - 145 mmol/L    Potassium 4.1 3.5 - 5.3 mmol/L    Chloride 97 (L) 98 - 107 mmol/L    Bicarbonate 30 21 - 32 mmol/L    Anion Gap 14 10 - 20 mmol/L    Urea Nitrogen 25 (H) 6 -  23 mg/dL    Creatinine 3.15 (H) 0.50 - 1.30 mg/dL    eGFR 19 (L) >60 mL/min/1.73m*2    Calcium 10.0 8.6 - 10.6 mg/dL    Phosphorus 3.9 2.5 - 4.9 mg/dL    Albumin 2.2 (L) 3.4 - 5.0 g/dL   POCT GLUCOSE   Result Value Ref Range    POCT Glucose 101 (H) 74 - 99 mg/dL      Assessment/Plan   Principal Problem:    Critical limb ischemia of both lower extremities (CMS/HCC)  Active Problems:    ESRD (end stage renal disease) on dialysis (CMS/HCC)    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 male with PMH of ESRD on HD TTS (Lt chest TDC, Hx of LUE Fistula Pseudoaneurysms), DM, HTN, HFrEF (TTE 10/23 EF 35-40%), PTA right lower extremity on 9/20/23 with subsequent TMA right foot on 9/22/23, Non Occlusive DVT (Rt Subclavian), Aortic Root Dilation, MR, chronic afib and SSS s/p pacemaker who presents DAVID angioplasty C/b perforation of AT artery. Patient admitted to CICU for further monitoring. Additionally patient noted to be overloaded on examination, on 2L with diffuse anasarca more pronounced in upper extremities. He is anuric at baseline and is on TTS dialysis. Patient was stable overnight with no signs/symptoms of compartment syndrome.     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%   - TTE 1/23/24 with LVEF to 40-45%, mildly improved from previous in 2023, no other new acute findings    - has not been receiving metoprolol since admit  -Uptitrate GDMT as tolerated--> consider  hydralazine and isordil if needed, currently limited 2/2 hypotension on midodrine  - consideration should be given to further ischemic workup in the outpatient setting due to extensive peripheral disease, risk factors, and no evidence of previous ischemic cardiac workup        HTN  Hypotension  - systolic range last 24 hours 100-128  - has needed midodrine 20 mg e2kgoco with dialysis for hypotension  - systolic range has been wide ranging with swings from 80 to 170s over last few days  - will consider decreasing midodrine to 10 mg    Atrial fibrillation  - currently afib low 100s  - metoprolol succinate has been held since admit  - eliquis as above     AHRF  2/2 to ESRD and HFrEf  Covid infection s/p dexamethasone  - Afebrile, no leukocytosis  - xray with pulmonary edema  - Continue to monitor for worsening signs or sx of pneumonia  - S/p 10 day course of dex (ended 1/14)  - Continue with dialysis regimen  - Wean and titrate oxygen as needed  - Cont IS, pulm hygiene     ESRD with dialysis TTS  LUE fistula occlusion s/p venoplasty  Plan:  -Cont with dialysis schedule - via left AVF   - 2/2 removed left dialysis tunnel catheter by IR   -  home renvela on hold due to phos of 4.5 per nephrology  - Cont home nephro caps  - HD today with 2L removed  - plan for extra UF session tomorrow prior to discharge to facility    Urethral drainage/penile pain  - patient reporting penile discomfort today with white/ yellowish meatus drainage  - described as feeling of have to pee  - genital swab, syphillis test sent-negative except yeast  - patient is anuric  - bladder scan 7ml   - afebrile, WBC WNL  - consult urology for guidance, appreciate recs     Gerd  -Cont home pantoprazole     hx of gout  -cont home allopurinol     Anemia of chronic disease  ::At baseline hgb  ::2/2 to ESRD  - continue daily CBC    DM with neuropathy  - A1c 5.3% 12/2023  -Cont home gabapentin  -Insulin glargine 6 units nightly    -SSI    Disposition    Rehab/SNF on dialysis     Patient pre- cert obtained at facility, likely dc tomorrow after HD    Code Status: Full code   Surrogate Medical Decision-maker: Spouse ()      Seen and discussed with Dr. Perez Coker, APRN-CNP, DNP

## 2024-02-09 NOTE — CONSULTS
Wound Care Consult     Visit Date: 2/9/2024      Patient Name: Chevy Benton         MRN: [id number]           YOB: 1945     Reason for Consult: wound vac dressing change        Wound Team Summary Assessment:   Wound Vac applied to right foot TMA.  (1)  Mepitel   (1)  black foam  Pressure: 125mmHg continuous suction.  Plan:  Change wound Vac twice a week.  If patient is discharged prior to next dressing change, please disconnect VAC machine, remove foam dressing, and place machine in the soiled utility room on the unit.    Pack wound with wet-to moist NS kerlix and cover with a dry sterile dressing. Patient cannot leave hospital with VAC in place.        Manasa Haynes RN  2/9/2024  3:20 PM

## 2024-02-09 NOTE — NURSING NOTE
Report to Receiving RN:    Report To: ABEL Moreno  Time Report Called: 10:18  Hand-Off Communication: No acute change from RN to RN report.  Patient tolerated treatment well with 1.5L fluid removed.  Last /69.  Complaint of prostate pain and given pain med.  Complications During Treatment: No  Ultrafiltration Treatment: No  Medications Administered During Dialysis: 975mg Tylenol, 5mg Oxycodone  Blood Products Administered During Dialysis: No  Labs Sent During Dialysis: No  Heparin Drip Rate Changes: No    Electronic Signatures:   (Signed 2/9/24)   Authored: Ike Lai RN     Last Updated: 10:20 AM by IKE LAI

## 2024-02-09 NOTE — PROGRESS NOTES
2/9/2024 Care coordination  Per the team, plan for discharge to [facility] tomorrow after HD.  report  number. [phone] .  Pickup confirmed  for  3p   The BLS Vehicle you requested for Chevy GABRIEL in unit/room LT [id number] on 02/10/2024 is scheduled to arrive at 3:00pm EST! [facility] is handling this ride and you can contact them at [phone]..  Spouse aware of plan..   Weekend TCC to send final orders.

## 2024-02-10 NOTE — PROGRESS NOTES
Chevy Benton is a 78 y.o. male on day 32 of admission presenting with Critical limb ischemia of both lower extremities (CMS/HCC).      Subjective   Seen on HD, c/o LE pain       Objective   Vitals 24HR  Heart Rate:  []   Temp:  [35.8 °C (96.4 °F)-37 °C (98.6 °F)]   Resp:  [16-19]   BP: (117-143)/(49-68)   Weight:  [98.7 kg (217 lb 9.5 oz)]   SpO2:  [97 %-100 %]     Intake/Output last 3 Shifts:    Intake/Output Summary (Last 24 hours) at 2/10/2024 0950  Last data filed at 2/10/2024 0900  Gross per 24 hour   Intake 800 ml   Output 1500 ml   Net -700 ml       Physical Exam  No distress  HEENT:  moist, no pallor   edema jacqueline UE +  Breath sounds jacqueline equal, clear  S1 S2 regular, normal, no rub or murmur  Abdomen soft  AAO x3, non focal    Assessment/Plan    78 Year old with h/o ESRD on HD admitted with CLI s/p PTA c/b perforation of AT   Nephrology following for ESRD    #ESRD: Tolerating HD per submitted orders    # Anemia: HgB   Hemoglobin   Date Value Ref Range Status   02/10/2024 8.1 (L) 13.5 - 17.5 g/dL Final    continue Epo 12,000 units IV QHD    # MBD: Ca   Calcium   Date Value Ref Range Status   02/10/2024 10.4 8.6 - 10.6 mg/dL Final     Phosphorus   Date Value Ref Range Status   02/10/2024 4.4 2.5 - 4.9 mg/dL Final     Comment:     The performance characteristics of phosphorus testing in heparinized plasma have been validated by the individual  laboratory site where testing is performed. Testing on heparinized plasma is not approved by the FDA; however, such approval is not necessary.    Sevelamer carbonate on hold, Continue daily renal MVI.    #Hypertension:  Bp control acceptable continue current anti-hypertensive medications    #Volume status: Clinically euvolemic    Will continue to follow

## 2024-02-10 NOTE — DISCHARGE SUMMARY
Discharge Diagnosis  Critical limb ischemia of both lower extremities (CMS/HCC)    Issues Requiring Follow-Up  Pending chlamydia and gonorrhea swab results  Endovascular follow- up on 2/27  Heart failure and podiatry appointments requested    Test Results Pending At Discharge  Pending Labs       Order Current Status    Extra Tubes Collected (01/11/24 1513)    SST TOP Collected (01/11/24 1513)    Chlamydia trachomatis culture In process    Neisseria gonorrhoeae, Culture In process    Genital Culture Preliminary result            Hospital Course  Chevy Benton is a 78 y.o. male with PMH of ESRD on HD TTS (Lt chest TDC, Hx of LUE Fistula Pseudoaneurysms), DM, HTN, HFrEF (TTE 10/23 EF 35-40%), PTA right lower extremity on 9/20/23 with subsequent TMA right foot on 9/22/23, Non Occlusive DVT (Rt Subclavian), Aortic Root Dilation, MR, chronic afib and SSS s/p pacemaker who initially presented on 1/9 from his endovascular clinic due to concerns for critical limb ischemia. Patient underwent PVR on 1/9 which showed concerns for severe ischemia warranting potential intervention. Patient started on heparin drip on arrival. Podiatry consulted for dry gangrenous changes to right TMA (following while in house for wound care recs). Vascular surgery consulted for thrombosed LUE AVF for which he had several temp lines in place prior. Underwent fistulogram and venoplasty with vascular surgery on 1/12 and subsequently underwent dialysis through LUE fistula afterwards. Found to have left upper extremity mild subclavian stenosis as well. Patient noted to be volume overloaded on 2L oxygen with significant edema in periphery. Patient underwent peripheral angioplasty on 1/17 which was complicated by AT perforation, which was managed with balloon tamponade and blood pressure cuff. He was subsequently transferred to CICU for further monitoring for compartment syndrome. Of note patient noted to be COVID positive January 2nd, was treated  with 10 day course of dex while here while here (ended 1/14). Podiatry took patient for RLE TMA revision on 1/19.  Patient initially did well post-operatively until 1/22 code white during hemodialysis session for hypotension and altered mental status for which patient was brought back to the CICU for closer monitoring. Course in CICU complicated by persistent altered mental status with associated hypercarbia for which patient started on BIPAP. Able to wean BIPAP. Patient intermittently required pressors to sustain BP while in CICU for which midodrine was titrated up to 20mg TID. Patient eventually started tolerated HD better without need for pressors in 3 consecutive sessions 1/27, 1/30, and 2/1. Mental status and peripheral edema improved with ongoing dialysis. Patient stable for transfer to the floor 2/1 due to no ongoing ICU needs.     Floor course:    Dialyzed per nephrology, increased fluid removal.     Wound care following right TMA with wound vac.  Resumed eliquis for afib. DC asa, continued on plavix (no need for triple therapy).     Urethral discharge and irritation on 2/8, genital swab showed +yeast. Discussed with infectious disease who recommended STI panel. Trichomonas and syphilis negative; gonorrhea and chlamydia still pending. Will pend results and reach out to SNF if anything results positive.     Generalized left leg pain noted on date of discharge, has + DP pulse and limb is warm. Has endovascular follow- up established on 2/27.     Wife updated on date of discharge, prescription for oxycodone sent to SNF.     Discharge weight: 98.7 kg    After all labs and VS were reviewed the decision was made that the patient was medically stable for discharge.  The patient was discharged in satisfactory condition.    More than 60 minutes were spent in coordinating patient discharge.      Pertinent Physical Exam At Time of Discharge  Physical Exam  Constitutional:       Appearance: He is ill-appearing.   HENT:       Mouth/Throat:      Mouth: Mucous membranes are moist.   Eyes:      Extraocular Movements: Extraocular movements intact.   Cardiovascular:      Rate and Rhythm: Normal rate. Rhythm irregular.      Pulses:           Dorsalis pedis pulses are detected w/ Doppler on the left side.   Pulmonary:      Breath sounds: Normal breath sounds.   Genitourinary:     Penis: Discharge present.    Musculoskeletal:         General: Normal range of motion.   Skin:     Comments: Right foot wound vac in place   Neurological:      Comments: withdrawn         Home Medications     Medication List      START taking these medications     acetaminophen 325 mg tablet; Commonly known as: Tylenol; Take 3 tablets   (975 mg) by mouth every 6 hours.   dextrose 50 % injection; Infuse 50 mL (25 g) into a venous catheter   every 15 minutes if needed (For blood glucose less than or equal to 40   mg/dL).   epoetin dane 10,000 unit/mL injection; Commonly known as:   Epogen,Procrit; Inject 1.2 mL (12,000 Units) under the skin 3 times a   week.; Start taking on: February 12, 2024; Replaces: epoetin dane 3,000   unit/mL injection   glucagon 1 mg injection; Commonly known as: Glucagen; Inject 1 mg into   the muscle every 15 minutes if needed for low blood sugar - see comments   (For blood glucose less than or equal to 70 mg/dL and no IV access).   insulin glargine 100 unit/mL injection; Commonly known as: Lantus;   Inject 6 Units under the skin once daily at bedtime. Take as directed per   insulin instructions.   insulin lispro 100 unit/mL injection; Commonly known as: HumaLOG; Inject   0-0.05 mL (0-5 Units) under the skin 3 times a day with meals. Take as   directed per insulin instructions.   ipratropium-albuteroL 0.5-2.5 mg/3 mL nebulizer solution; Commonly known   as: Duo-Neb; Take 3 mL by nebulization every 6 hours if needed for   wheezing or shortness of breath.   lidocaine 4 % patch; Place 1 patch over 12 hours on the skin once daily.   Remove &  discard patch within 12 hours or as directed by MD.; Replaces:   ZTlido 1.8 % adhesive patch,medicated   melatonin 5 mg tablet; Take 1 tablet (5 mg) by mouth once daily at   bedtime.   oxyCODONE 5 mg immediate release tablet; Commonly known as: Roxicodone;   Take 1 tablet (5 mg) by mouth every 6 hours if needed for severe pain (7 -   10) for up to 7 days.   povidone-iodine 7.5 % solution; Commonly known as: Betadine; Apply 1   Application topically once daily as needed for wound care.   sennosides 8.6 mg tablet; Commonly known as: Senokot; Take 1 tablet (8.6   mg) by mouth once daily at bedtime.     CHANGE how you take these medications     atorvastatin 40 mg tablet; Commonly known as: Lipitor; Take 1 tablet (40   mg) by mouth once daily at bedtime.; What changed: medication strength,   how much to take, when to take this   gabapentin 100 mg capsule; Commonly known as: Neurontin; Take 1 capsule   (100 mg) by mouth once daily.; What changed: when to take this   midodrine 5 mg tablet; Commonly known as: Proamatine; Take 3 tablets (15   mg) by mouth every 8 hours.; What changed: medication strength, how much   to take, when to take this     CONTINUE taking these medications     albuterol 90 mcg/actuation inhaler   allopurinol 100 mg tablet; Commonly known as: Zyloprim   apixaban 2.5 mg tablet; Commonly known as: Eliquis   clopidogrel 75 mg tablet; Commonly known as: Plavix   loratadine 10 mg tablet; Commonly known as: Claritin; TAKE 1 TABLET (10   MG) BY MOUTH EVERY OTHER DAY IF NEEDED FOR ALLERGIES.   Miralax 17 gram packet; Generic drug: polyethylene glycol   pantoprazole 40 mg EC tablet; Commonly known as: ProtoNix; Take 1 tablet   (40 mg) by mouth once daily in the morning. Take before meals. Do not   crush, chew, or split. Do not start before February 11, 2024.; Start   taking on: February 11, 2024   Triphrocaps 1 mg capsule; Generic drug: B complex-vitamin C-folic acid     STOP taking these medications     Adult  Low Dose Aspirin 81 mg EC tablet; Generic drug: aspirin   amLODIPine 10 mg tablet; Commonly known as: Norvasc   cinacalcet 30 mg tablet; Commonly known as: Sensipar   cloNIDine 0.2 mg tablet; Commonly known as: Catapres   colchicine 0.6 mg tablet   docusate sodium 100 mg capsule; Commonly known as: Colace   epoetin dane 3,000 unit/mL injection; Commonly known as: Epogen,Procrit;   Replaced by: epoetin dane 10,000 unit/mL injection   ipratropium 21 mcg (0.03 %) nasal spray; Commonly known as: Atrovent   lanthanum 750 mg chewable tablet; Commonly known as: Fosrenol   menthol-zinc oxide 0.44-20.6 % ointment; Commonly known as: Calmoseptine   - Risamine   metoprolol succinate XL 25 mg 24 hr tablet; Commonly known as: Toprol-XL   naphazoline-pheniramine 0.025-0.3 % ophthalmic solution; Commonly known   as: Naphcon-A   traMADol 50 mg tablet; Commonly known as: Ultram   ZTlido 1.8 % adhesive patch,medicated; Generic drug: lidocaine; Replaced   by: lidocaine 4 % patch       Outpatient Follow-Up  Future Appointments   Date Time Provider Department Center   2/27/2024  1:00 PM [id number]   2/27/2024  2:00 PM Homar Linder DO [id number] Academic       JENIFFER Spangler-CNP

## 2024-02-10 NOTE — NURSING NOTE
Report received.  Patient going off the unit for dialysis.  CHG bath completed per night shift nurse.  Telemetry reviewed.     1402- Unable to collect Aptima sample for patient since he is anuric.  CNP notified, order received to discontinue collection order.     1544- Report given to nurse at [facility].   1714- Patient discharged to [facility] at this time.  Wound vac removed, wet to dry dressing applied.  Discharge paperwork provided.

## 2024-02-10 NOTE — NURSING NOTE
.Report to Receiving RN:    Report To: ABEL Marte  Time Report Called: 1145  Hand-Off Communication: Tolerated HD well. Fluid removal 1 Liter /56 HR 93   Complications During Treatment: No  Ultrafiltration Treatment: No  Medications Administered During Dialysis: Yes, tylenol 975 mg and oxycodone 5 mg  Blood Products Administered During Dialysis: No  Labs Sent During Dialysis: No  Heparin Drip Rate Changes: No      Last Updated: 11:45 AM by NOELLE GREWAL

## 2024-02-10 NOTE — CARE PLAN
Problem: Pain - Adult  Goal: Verbalizes/displays adequate comfort level or baseline comfort level  Outcome: Progressing     Problem: Safety - Adult  Goal: Free from fall injury  Outcome: Progressing     Problem: Discharge Planning  Goal: Discharge to home or other facility with appropriate resources  Outcome: Progressing     Problem: Chronic Conditions and Co-morbidities  Goal: Patient's chronic conditions and co-morbidity symptoms are monitored and maintained or improved  Outcome: Progressing     Problem: Diabetes  Goal: Achieve decreasing blood glucose levels by end of shift  Outcome: Progressing  Goal: Increase stability of blood glucose readings by end of shift  Outcome: Progressing  Goal: Decrease in ketones present in urine by end of shift  Outcome: Progressing  Goal: Maintain electrolyte levels within acceptable range throughout shift  Outcome: Progressing  Goal: Maintain glucose levels >70mg/dl to <250mg/dl throughout shift  Outcome: Progressing  Goal: No changes in neurological exam by end of shift  Outcome: Progressing  Goal: Learn about and adhere to nutrition recommendations by end of shift  Outcome: Progressing  Goal: Vital signs within normal range for age by end of shift  Outcome: Progressing  Goal: Increase self care and/or family involovement by end of shift  Outcome: Progressing  Goal: Receive DSME education by end of shift  Outcome: Progressing     Problem: Skin  Goal: Decreased wound size/increased tissue granulation at next dressing change  Outcome: Progressing  Flowsheets (Taken 1/28/2024 2040 by Pinky Martinez RN)  Decreased wound size/increased tissue granulation at next dressing change:   Promote sleep for wound healing   Protective dressings over bony prominences  Goal: Participates in plan/prevention/treatment measures  Outcome: Progressing  Flowsheets (Taken 1/28/2024 2040 by Pinky Martinez RN)  Participates in plan/prevention/treatment measures: Elevate heels  Goal: Prevent/manage excess  moisture  Outcome: Progressing  Flowsheets (Taken 2/1/2024 1146 by Edmund Wade RN)  Prevent/manage excess moisture: Cleanse incontinence/protect with barrier cream  Goal: Prevent/minimize sheer/friction injuries  Outcome: Progressing  Goal: Promote/optimize nutrition  Outcome: Progressing  Flowsheets (Taken 1/28/2024 2040 by Pinky Martinez, RN)  Promote/optimize nutrition:   Assist with feeding   Monitor/record intake including meals  Goal: Promote skin healing  Outcome: Progressing  Flowsheets (Taken 1/28/2024 2040 by Pinky Martinez RN)  Promote skin healing:   Turn/reposition every 2 hours/use positioning/transfer devices   Rotate device position/do not position patient on device   Assess skin/pad under line(s)/device(s)   Protective dressings over bony prominences     Problem: Fall/Injury  Goal: Not fall by end of shift  Outcome: Progressing  Goal: Be free from injury by end of the shift  Outcome: Progressing  Goal: Verbalize understanding of personal risk factors for fall in the hospital  Outcome: Progressing  Goal: Verbalize understanding of risk factor reduction measures to prevent injury from fall in the home  Outcome: Progressing  Goal: Use assistive devices by end of the shift  Outcome: Progressing  Goal: Pace activities to prevent fatigue by end of the shift  Outcome: Progressing     Problem: Pain  Goal: Takes deep breaths with improved pain control throughout the shift  Outcome: Progressing  Goal: Turns in bed with improved pain control throughout the shift  Outcome: Progressing  Goal: Walks with improved pain control throughout the shift  Outcome: Progressing  Goal: Performs ADL's with improved pain control throughout shift  Outcome: Progressing  Goal: Participates in PT with improved pain control throughout the shift  Outcome: Progressing  Goal: Free from opioid side effects throughout the shift  Outcome: Progressing  Goal: Free from acute confusion related to pain meds throughout the shift  Outcome:  Progressing   The patient's goals for the shift include remaining comfortable during the shift.     The clinical goals for the shift include Patient will remain hemodynamically stable during this shift.    Over the shift, the patient did not make progress toward the following goals. Barriers to progression include positioning, pain, and hemodynamic stability. Recommendations to address these barriers include monitoring vital signs, turning q 2 hours, treating pain.

## 2024-02-10 NOTE — PROGRESS NOTES
2/10/24 1119 Transitional Care Coordinator Notes:    Final goldenrod sent to [facility]. Discharge confirmed for 3 pm.                    Assessment/Plan   Principal Problem:    Critical limb ischemia of both lower extremities (CMS/HCC)  Active Problems:    ESRD (end stage renal disease) on dialysis (CMS/HCC)    Non-pressure chronic ulcer of other part of right foot with fat layer exposed (CMS/HCC)    Critical limb ischemia of right lower extremity (CMS/HCC)    Subclavian vein stenosis, left    Discharge Plans: discharge to SNF            Meghana Lamas RN

## 2024-02-10 NOTE — NURSING NOTE
Report from Sending RN:    Report From: ABEL Archer  Recent Surgery of Procedure: No  Baseline Level of Consciousness (LOC): x2-3  Oxygen Use: Yes, 3L  Type: nasal cannula  Diabetic: Yes,   Last BP Med Given Day of Dialysis:   Last Pain Med Given:   Lab Tests to be Obtained with Dialysis: Yes  Blood Transfusion to be Given During Dialysis: No  Available IV Access: Yes  Medications to be Administered During Dialysis: No  Continuous IV Infusion Running: No  Restraints on Currently or in the Last 24 Hours: No  Hand-Off Communication:   Patient complaint no pain.  Will come in bed.

## 2024-02-10 NOTE — DISCHARGE INSTRUCTIONS
*****[facility] Instructions*****    Patient has been receiving dialysis Tuesday, Thursday, and Saturday while in hospital through his LUE fistula. He can return to his previous dialysis schedule at [facility], which our records show was 5 days a week and followed by Dr Hannah (nephrologist).     On date of discharge, he underwent HD with 1L removed. On date of discharge, patient reporting urethral discomfort with white drainage. Previous urethral swab + for yeast but patient negative for trichomonas or syphilis. Pending gonorrhea and chlamydia results.  provider will reach out to [facility] if any positive results.     He has follow- up appointments scheduled with endovascular/ Dr. Linder on 2/27/24. A follow- up appointment with podiatry has been requested, the goal is for him to be seen within 1 week by the  podiatry team.     Follow- up cardiology appointments have been requested for Mr. Benton as well to establish for heart failure management.

## 2024-02-12 NOTE — LETTER
Patient: Chevy Benton  : 1945    Encounter Date: 2024    PROGRESS NOTE    Subjective  Chief complaint: Chevy Benton is a 78 y.o. male who is an acute skilled patient being seen and evaluated for weakness    HPI:  HPI  This patient was admitted to SNF for therapy due to generalized weakness and for medical management after recent hospitalization revision of TMA.  Therapy to evaluate and establish plan of care and goals.  Patient seen and examined at bedside in no apparent distress.  Nursing staff voices no new concerns today.  Patient denies nausea vomiting fever chills.     Objective  Vital signs: 101/78, 98.1, 82, 18, blood sugar 186, 95%    Physical Exam  Constitutional:       General: He is not in acute distress.  Eyes:      Extraocular Movements: Extraocular movements intact.   Cardiovascular:      Rate and Rhythm: Normal rate. Rhythm irregular.   Pulmonary:      Effort: Pulmonary effort is normal.      Comments: O2  DIM  Abdominal:      General: Bowel sounds are normal.      Palpations: Abdomen is soft.   Musculoskeletal:      Right upper arm: Swelling present.      Left upper arm: Swelling present.      Cervical back: Neck supple.      Left lower leg: No edema.   Neurological:      Mental Status: He is alert.   Psychiatric:         Mood and Affect: Mood normal.         Behavior: Behavior is cooperative.         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  Remove extra fluid in dialysis  HD per nephrology         Weakness - Primary     Therapy to evaluate and establish plan of care          Medications, treatments, and labs reviewed  Continue medications and treatments as listed in EMR      Scribe Attestation  I, Erickson San   attest that this documentation has been prepared under the direction and in the presence of EDWIN Aguilar    Provider Attestation - Scribe documentation  All medical record entries made by the Scribe were at my direction and personally dictated by me. I have reviewed the chart and agree that the record accurately reflects my personal performance of the history, physical exam, discussion and plan.   EDWIN Aguilar        Electronically Signed By: EDWIN Aguilar   2/16/24  8:54 AM   - pt has hx of CAD with 3 PAOLA  - not on ASA or Statins  *** I tried to reach the pharmacy on this phone number [phone] but no response morning team to confirm   - would hold statins anyways for elevated LFTs

## 2024-02-13 PROBLEM — K21.9 GERD (GASTROESOPHAGEAL REFLUX DISEASE): Status: ACTIVE | Noted: 2024-01-01

## 2024-02-13 NOTE — PROGRESS NOTES
PROGRESS NOTE    Subjective   Chief complaint: Chevy Benton is a 78 y.o. male who is an acute skilled patient being seen and evaluated for weakness    HPI:  HPI  This patient was admitted to SNF for therapy due to generalized weakness and for medical management after recent hospitalization revision of TMA.  Therapy to evaluate and establish plan of care and goals.  Patient seen and examined at bedside in no apparent distress.  Nursing staff voices no new concerns today.  Patient denies nausea vomiting fever chills.     Objective   Vital signs: 101/78, 98.1, 82, 18, blood sugar 186, 95%    Physical Exam  Constitutional:       General: He is not in acute distress.  Eyes:      Extraocular Movements: Extraocular movements intact.   Cardiovascular:      Rate and Rhythm: Normal rate. Rhythm irregular.   Pulmonary:      Effort: Pulmonary effort is normal.      Comments: O2  DIM  Abdominal:      General: Bowel sounds are normal.      Palpations: Abdomen is soft.   Musculoskeletal:      Right upper arm: Swelling present.      Left upper arm: Swelling present.      Cervical back: Neck supple.      Left lower leg: No edema.   Neurological:      Mental Status: He is alert.   Psychiatric:         Mood and Affect: Mood normal.         Behavior: Behavior is cooperative.         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  Remove extra fluid in dialysis  HD per nephrology         Weakness -  Primary     Therapy to evaluate and establish plan of care          Medications, treatments, and labs reviewed  Continue medications and treatments as listed in EMR      Scribe Attestation  I, Erickson San   attest that this documentation has been prepared under the direction and in the presence of EDWIN Aguilar    Provider Attestation - Scribe documentation  All medical record entries made by the Scribe were at my direction and personally dictated by me. I have reviewed the chart and agree that the record accurately reflects my personal performance of the history, physical exam, discussion and plan.   EDWIN Aguilar

## 2024-02-13 NOTE — ASSESSMENT & PLAN NOTE
Monitor blood pressure  Midodrine  CHF, stable, monitor for shortness of breath.  Remove extra fluid in dialysis  HD per nephrology

## 2024-02-13 NOTE — ASSESSMENT & PLAN NOTE
Status post revision  Follow-up with podiatry outpatient  Follow-up with vascular outpatient  Wound care as listed until wound VAC available  Pain control  Therapy  DVT prophylaxis, clopidogrel

## 2024-02-13 NOTE — PROGRESS NOTES
HISTORY & PHYSICAL    Subjective   Chief complaint: Chevy Benton is a 78 y.o. male who is being seen and evaluated for multiple medical problems.  Patient admitted to SNF for therapy due to weakness after recent hospitalization.    HPI:  HPI  Patient presented to the ED from endovascular clinic due to concerns for critical limb ischemia.  Patient underwent PVR which showed concerns for severe ischemia warranting potential intervention.  Podiatry consulted on patient for dry gangrenous changes to right TMA.  Vascular surgery also consulted on patient for thrombosed left upper extremity aVF.  Patient had been COVID-positive prior to admission and completed course of dexamethasone while in hospital.  Patient underwent right lower extremity TMA revision on 1/19/2024.  Patient had an episode of hypotension and AMS during hemodialysis session that was to be associated with hypercarbia, patient started on BiPAP.  Patient's blood pressure medications were titrated.  Patient was noted to have urethral discharge and irritation on 2/8/2024, STI panel is pending.  Patient deemed stable for discharge to SNF for continued medical management and therapy.  Patient was seen and examined at bedside, appears to be in no apparent distress.  Denies chest pain or shortness of breath.  Afebrile.    Past Medical History:   Diagnosis Date    Acute hypercapnic respiratory failure (CMS/HCC)     Acute on chronic HFrEF (heart failure with reduced ejection fraction) (CMS/HCC)     Diabetes mellitus (CMS/HCC)     ESRD on hemodialysis (CMS/HCC)     History of angioplasty of peripheral vessel 10/26/2023    HTN (hypertension)     LFT elevation 07/20/2021    Small bowel obstruction (CMS/HCC) 10/10/2023    Stage II pressure ulcer (CMS/HCC)        Past Surgical History:   Procedure Laterality Date    INVASIVE VASCULAR PROCEDURE Right 1/17/2024    Procedure: Lower Extremity Angiogram;  Surgeon: Stuart Nguyen MD;  Location: [place] [address] [facility];   Service: Cardiovascular;  Laterality: Right;       Family History   Problem Relation Name Age of Onset    Diabetes Mother         Social History     Socioeconomic History    Marital status:      Spouse name: Not on file    Number of children: Not on file    Years of education: Not on file    Highest education level: Not on file   Occupational History    Not on file   Tobacco Use    Smoking status: Never     Passive exposure: Past    Smokeless tobacco: Never   Substance and Sexual Activity    Alcohol use: Not on file    Drug use: Not on file    Sexual activity: Not on file   Other Topics Concern    Not on file   Social History Narrative    Not on file     Social Determinants of Health     Financial Resource Strain: Low Risk  (1/9/2024)    Overall Financial Resource Strain (CARDIA)     Difficulty of Paying Living Expenses: Not hard at all   Food Insecurity: Not on file   Transportation Needs: No Transportation Needs (1/9/2024)    PRAPARE - Transportation     Lack of Transportation (Medical): No     Lack of Transportation (Non-Medical): No   Physical Activity: Not on file   Stress: Not on file   Social Connections: Not on file   Intimate Partner Violence: Not on file   Housing Stability: Low Risk  (1/9/2024)    Housing Stability Vital Sign     Unable to Pay for Housing in the Last Year: No     Number of Places Lived in the Last Year: 1     Unstable Housing in the Last Year: No       Vital signs: 106/62, 97.8, 76, 18, blood sugar 145, 96%    Objective   Physical Exam  Constitutional:       General: He is not in acute distress.  Eyes:      Extraocular Movements: Extraocular movements intact.   Cardiovascular:      Rate and Rhythm: Normal rate and regular rhythm.   Pulmonary:      Effort: Pulmonary effort is normal.      Breath sounds: Normal breath sounds.   Abdominal:      General: Bowel sounds are normal.      Palpations: Abdomen is soft.   Musculoskeletal:      Cervical back: Neck supple.      Right lower leg:  No edema.      Left lower leg: No edema.   Neurological:      Mental Status: He is alert.   Psychiatric:         Mood and Affect: Mood normal.         Behavior: Behavior is cooperative.         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  Remove extra fluid in dialysis  HD per nephrology         COVID     Completed dexamethasone in hospital  Recovered         GERD (gastroesophageal reflux disease)     PPI  Monitor GI symptoms          Hospital records reviewed  Medications, treatments, and labs reviewed  Continue medications and treatments as listed in EMR  Discussed with nursing and therapy      Scribe Attestation  XIN, Carrington Sanibe   attest that this documentation has been prepared under the direction and in the presence of Miley Guerra MD    Provider Attestation - Scribe documentation  All medical record entries made by the Scribe were at my direction and personally dictated by me. I have reviewed the chart and agree that the record accurately reflects my personal performance of the history, physical exam, discussion and plan.   Miley Guerra MD

## 2024-02-13 NOTE — LETTER
Patient: Chevy Benton  : 1945    Encounter Date: 2024    HISTORY & PHYSICAL    Subjective  Chief complaint: Chevy Benton is a 78 y.o. male who is being seen and evaluated for multiple medical problems.  Patient admitted to SNF for therapy due to weakness after recent hospitalization.    HPI:  HPI  Patient presented to the ED from endovascular clinic due to concerns for critical limb ischemia.  Patient underwent PVR which showed concerns for severe ischemia warranting potential intervention.  Podiatry consulted on patient for dry gangrenous changes to right TMA.  Vascular surgery also consulted on patient for thrombosed left upper extremity aVF.  Patient had been COVID-positive prior to admission and completed course of dexamethasone while in hospital.  Patient underwent right lower extremity TMA revision on 2024.  Patient had an episode of hypotension and AMS during hemodialysis session that was to be associated with hypercarbia, patient started on BiPAP.  Patient's blood pressure medications were titrated.  Patient was noted to have urethral discharge and irritation on 2024, STI panel is pending.  Patient deemed stable for discharge to SNF for continued medical management and therapy.  Patient was seen and examined at bedside, appears to be in no apparent distress.  Denies chest pain or shortness of breath.  Afebrile.    Past Medical History:   Diagnosis Date   • Acute hypercapnic respiratory failure (CMS/HCC)    • Acute on chronic HFrEF (heart failure with reduced ejection fraction) (CMS/HCC)    • Diabetes mellitus (CMS/HCC)    • ESRD on hemodialysis (CMS/HCC)    • History of angioplasty of peripheral vessel 10/26/2023   • HTN (hypertension)    • LFT elevation 2021   • Small bowel obstruction (CMS/HCC) 10/10/2023   • Stage II pressure ulcer (CMS/HCC)        Past Surgical History:   Procedure Laterality Date   • INVASIVE VASCULAR PROCEDURE Right 2024    Procedure: Lower  Extremity Angiogram;  Surgeon: Stuart Nguyen MD;  Location: [place] [address] [facility];  Service: Cardiovascular;  Laterality: Right;       Family History   Problem Relation Name Age of Onset   • Diabetes Mother         Social History     Socioeconomic History   • Marital status:      Spouse name: Not on file   • Number of children: Not on file   • Years of education: Not on file   • Highest education level: Not on file   Occupational History   • Not on file   Tobacco Use   • Smoking status: Never     Passive exposure: Past   • Smokeless tobacco: Never   Substance and Sexual Activity   • Alcohol use: Not on file   • Drug use: Not on file   • Sexual activity: Not on file   Other Topics Concern   • Not on file   Social History Narrative   • Not on file     Social Determinants of Health     Financial Resource Strain: Low Risk  (1/9/2024)    Overall Financial Resource Strain (CARDIA)    • Difficulty of Paying Living Expenses: Not hard at all   Food Insecurity: Not on file   Transportation Needs: No Transportation Needs (1/9/2024)    PRAPARE - Transportation    • Lack of Transportation (Medical): No    • Lack of Transportation (Non-Medical): No   Physical Activity: Not on file   Stress: Not on file   Social Connections: Not on file   Intimate Partner Violence: Not on file   Housing Stability: Low Risk  (1/9/2024)    Housing Stability Vital Sign    • Unable to Pay for Housing in the Last Year: No    • Number of Places Lived in the Last Year: 1    • Unstable Housing in the Last Year: No       Vital signs: 106/62, 97.8, 76, 18, blood sugar 145, 96%    Objective  Physical Exam  Constitutional:       General: He is not in acute distress.  Eyes:      Extraocular Movements: Extraocular movements intact.   Cardiovascular:      Rate and Rhythm: Normal rate and regular rhythm.   Pulmonary:      Effort: Pulmonary effort is normal.      Breath sounds: Normal breath sounds.   Abdominal:      General: Bowel sounds are normal.       Palpations: Abdomen is soft.   Musculoskeletal:      Cervical back: Neck supple.      Right lower leg: No edema.      Left lower leg: No edema.   Neurological:      Mental Status: He is alert.   Psychiatric:         Mood and Affect: Mood normal.         Behavior: Behavior is cooperative.         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  Remove extra fluid in dialysis  HD per nephrology         COVID     Completed dexamethasone in hospital  Recovered         GERD (gastroesophageal reflux disease)     PPI  Monitor GI symptoms          Hospital records reviewed  Medications, treatments, and labs reviewed  Continue medications and treatments as listed in EMR  Discussed with nursing and therapy      Scribe Attestation  IDarline Scribe   attest that this documentation has been prepared under the direction and in the presence of Miley Guerra MD    Provider Attestation - Scribe documentation  All medical record entries made by the Scribe were at my direction and personally dictated by me. I have reviewed the chart and agree that the record accurately reflects my personal performance of the history, physical exam, discussion and plan.   Miley Guerra MD      Electronically Signed By: Miley Guerra MD   2/13/24  4:53 PM

## 2024-02-14 NOTE — LETTER
Patient: Chevy Benton  : 1945    Encounter Date: 2024    PROGRESS NOTE    Subjective  Chief complaint: Chevy Benton is a 78 y.o. male who is an acute skilled patient being seen and evaluated for weakness    HPI:  HPI  Patient recently evaluated by PT to establish plan of care and goals following hospitalization for status post revision amputation of toe of the right foot.  Patient is recent COVID recovered.  Patient was seen and examined at bedside, appears to be in no acute distress.  Denies chest pain or shortness of breath.  Afebrile.  Nursing staff voicing no new concerns at this time.    Objective  Vital signs: 101/58, 97.9, 78, 18, blood sugar 130 95%    Physical Exam  Constitutional:       General: He is not in acute distress.  Eyes:      Extraocular Movements: Extraocular movements intact.   Cardiovascular:      Rate and Rhythm: Normal rate. Rhythm irregular.   Pulmonary:      Effort: Pulmonary effort is normal.      Breath sounds: Normal breath sounds.      Comments: O2  Abdominal:      General: Bowel sounds are normal.      Palpations: Abdomen is soft.   Musculoskeletal:      Cervical back: Neck supple.      Right lower leg: Edema present.      Left lower leg: Edema present.      Comments: Left BKA   Neurological:      Mental Status: He is alert.      Motor: Weakness present.   Psychiatric:         Mood and Affect: Mood normal.         Behavior: Behavior is cooperative.         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, with long-term current use of insulin (CMS/[facility])     Insulin  Gabapentin   Monitor blood sugar  FBG at goal           Weakness - Primary     PT evaluated to establish plan of care and goals          Medications, treatments, and labs reviewed  Continue medications and treatments as listed in EMR      Scribe Attestation  IDarline Scribe   attest that this documentation has been prepared under the direction and in the presence of EDWIN Aguilar    Provider Attestation - Scribe documentation  All medical record entries made by the Scribe were at my direction and personally dictated by me. I have reviewed the chart and agree that the record accurately reflects my personal performance of the history, physical exam, discussion and plan.   EDWIN Aguilar        Electronically Signed By: EDWIN Aguilar   3/8/24 12:27 PM

## 2024-02-15 NOTE — LETTER
Patient: Chevy Benton  : 1945    Encounter Date: 02/15/2024    PROGRESS NOTE    Subjective  Chief complaint: Chevy Benton is a 78 y.o. male who is an acute skilled patient being seen and evaluated for weakness    HPI:  HPI  Therapy has been working with the patient to improve strength and endurance with ADLs, transfers, and mobility.  Patient continues to work toward goals.  Patient was seen and examined at bedside, appears to be in no acute distress.  Patient is stable and has no new complaints.  Nursing staff voices no new concerns today.    Objective  Vital signs: 101/58, 97.9, 78, 18, blood sugar 153, 95%    Physical Exam  Constitutional:       General: He is not in acute distress.  Eyes:      Extraocular Movements: Extraocular movements intact.   Cardiovascular:      Rate and Rhythm: Normal rate. Rhythm irregular.   Pulmonary:      Effort: Pulmonary effort is normal.      Breath sounds: Normal breath sounds.      Comments: O2  Abdominal:      General: Bowel sounds are normal.      Palpations: Abdomen is soft.   Musculoskeletal:      Cervical back: Neck supple.      Right lower leg: No edema.      Left lower leg: No edema.   Neurological:      Mental Status: He is alert.      Motor: Weakness present.   Psychiatric:         Mood and Affect: Mood normal.         Behavior: Behavior is cooperative.         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  Remove  extra fluid in dialysis  HD per nephrology         A-fib (CMS/[facility])     Apixaban  Bleeding precautions  Monitor heart rate          Medications, treatments, and labs reviewed  Continue medications and treatments as listed in EMR      Scribe Attestation  I, Erickson San   attest that this documentation has been prepared under the direction and in the presence of EDWIN Aguilar    Provider Attestation - Scribe documentation  All medical record entries made by the Scribe were at my direction and personally dictated by me. I have reviewed the chart and agree that the record accurately reflects my personal performance of the history, physical exam, discussion and plan.   EDWIN Aguilar        Electronically Signed By: EDWIN Aguilar   3/8/24 12:29 PM

## 2024-02-16 NOTE — PROGRESS NOTES
PROGRESS NOTE    Subjective   Chief complaint: Chevy Benton is a 78 y.o. male who is an acute skilled patient being seen and evaluated for weakness    HPI:  HPI  Patient presents for f/u therapy and general medical care.  Patient seen and examined at bedside.  Therapy has been working with the patient to improve strength, endurance, ADLs, and transfers d/t generalized weakness.  Patient has no acute concerns today.  Patient does continue on HD per nephrology for diagnosis of ESRD.  Patient tolerating well.  Patient denies chest pain or shortness of breath.  Afebrile.    Objective   Vital signs: 99/52, 97.8, 76, 18, blood sugar 139, 96%    Physical Exam  Constitutional:       General: He is not in acute distress.  Eyes:      Extraocular Movements: Extraocular movements intact.   Cardiovascular:      Rate and Rhythm: Normal rate and regular rhythm.   Pulmonary:      Effort: Pulmonary effort is normal.      Breath sounds: Normal breath sounds.   Abdominal:      General: Bowel sounds are normal.      Palpations: Abdomen is soft.   Musculoskeletal:      Cervical back: Neck supple.      Right lower leg: No edema.      Left lower leg: No edema.      Comments: Right upper extremity dialysis port  Left BKA   Neurological:      Mental Status: He is alert.   Psychiatric:         Mood and Affect: Mood normal.         Behavior: Behavior is cooperative.         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  Remove extra fluid in dialysis  HD per nephrology         GERD (gastroesophageal reflux disease)      PPI  Monitor GI symptoms          Medications, treatments, and labs reviewed  Continue medications and treatments as listed in EMR      Scribe Attestation  I, Erickson San   attest that this documentation has been prepared under the direction and in the presence of Miley Guerra MD    Provider Attestation - Scribe documentation  All medical record entries made by the Scribe were at my direction and personally dictated by me. I have reviewed the chart and agree that the record accurately reflects my personal performance of the history, physical exam, discussion and plan.   Miley Guerra MD

## 2024-02-16 NOTE — Clinical Note
Closure device placed in the vein. Site closed by Tegaderm. Multiple d-stats applied to pseudoaneurysm and tegaderm applied

## 2024-02-16 NOTE — Clinical Note
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

## 2024-02-16 NOTE — LETTER
Patient: Chevy Benton  : 1945    Encounter Date: 2024    PROGRESS NOTE    Subjective  Chief complaint: Chevy Benton is a 78 y.o. male who is an acute skilled patient being seen and evaluated for weakness    HPI:  HPI  Patient presents for f/u therapy and general medical care.  Patient seen and examined at bedside.  Therapy has been working with the patient to improve strength, endurance, ADLs, and transfers d/t generalized weakness.  Patient has no acute concerns today.  Patient does continue on HD per nephrology for diagnosis of ESRD.  Patient tolerating well.  Patient denies chest pain or shortness of breath.  Afebrile.    Objective  Vital signs: 99/52, 97.8, 76, 18, blood sugar 139, 96%    Physical Exam  Constitutional:       General: He is not in acute distress.  Eyes:      Extraocular Movements: Extraocular movements intact.   Cardiovascular:      Rate and Rhythm: Normal rate and regular rhythm.   Pulmonary:      Effort: Pulmonary effort is normal.      Breath sounds: Normal breath sounds.   Abdominal:      General: Bowel sounds are normal.      Palpations: Abdomen is soft.   Musculoskeletal:      Cervical back: Neck supple.      Right lower leg: No edema.      Left lower leg: No edema.      Comments: Right upper extremity dialysis port  Left BKA   Neurological:      Mental Status: He is alert.   Psychiatric:         Mood and Affect: Mood normal.         Behavior: Behavior is cooperative.         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  Remove extra fluid in  dialysis  HD per nephrology         GERD (gastroesophageal reflux disease)     PPI  Monitor GI symptoms          Medications, treatments, and labs reviewed  Continue medications and treatments as listed in EMR      Scribe Attestation  I, Erickson San   attest that this documentation has been prepared under the direction and in the presence of Miley Guerra MD    Provider Attestation - Scribe documentation  All medical record entries made by the Scribe were at my direction and personally dictated by me. I have reviewed the chart and agree that the record accurately reflects my personal performance of the history, physical exam, discussion and plan.   Miley Guerra MD        Electronically Signed By: Miley Guerra MD   2/16/24  1:55 PM

## 2024-02-17 PROBLEM — I95.9 HYPOTENSION: Status: ACTIVE | Noted: 2024-01-01

## 2024-02-17 PROBLEM — N48.1 BALANITIS: Status: ACTIVE | Noted: 2024-01-01

## 2024-02-17 PROBLEM — N18.9 CHRONIC RENAL FAILURE: Status: ACTIVE | Noted: 2024-01-01

## 2024-02-17 PROBLEM — L97.909 ULCER OF LOWER EXTREMITY (MULTI): Status: ACTIVE | Noted: 2024-01-01

## 2024-02-17 NOTE — CONSULTS
Reason For Consult  ESKD with life-threatening hyperkalemia    History Of Present Illness  Chevy Benton is a 78 y.o. male presenting with life-threatening hyperkalemia,  Patient presented from nursing facility with elevated potassium level of 9.0 with no signs or symptoms of an congestive heart failure, chest pain, or shortness of breath.  He had no EKG changes, had hyponatremia noted this in context.  Patient complains of pain in his penis of more than 2 weeks duration.  No history of fever/chills/shortness of breath/rash/nausea vomiting/hematemesis or hematochezia.  History of hypercapnic respiratory failure, acute on chronic congestive heart failure with reduced ejection fraction, diabetes mellitus, history of).  Results, history of small bowel obstruction with stage II pressure ulcer noted.  It was not known to me his dialysis schedule, but repeat potassium level once reviewed potassium being only 3.6, discussed with the doctor possible addisonian crisis at the beginning when we were looking at the labs of hypokalemia and hyponatremia.  Patient was scheduled for dialysis in house today we will repeat labs ASAP ordered by me.     Past Medical History  He has a past medical history of Acute hypercapnic respiratory failure (CMS/HCC), Acute on chronic HFrEF (heart failure with reduced ejection fraction) (CMS/HCC), Diabetes mellitus (CMS/HCC), ESRD on hemodialysis (CMS/HCC), History of angioplasty of peripheral vessel (10/26/2023), HTN (hypertension), LFT elevation (07/20/2021), Small bowel obstruction (CMS/HCC) (10/10/2023), and Stage II pressure ulcer (CMS/HCC).    Surgical History  He has a past surgical history that includes Invasive Vascular Procedure (Right, 1/17/2024).     Social History  He reports that he has never smoked. He has been exposed to tobacco smoke. He has never used smokeless tobacco. No history on file for alcohol use and drug use.    Family History  Family History   Problem Relation Name  Age of Onset    Diabetes Mother          Allergies  Penicillins    Review of Systems  All systems were reviewed     Physical Exam  General appearance: Arousable hard of hearing  Head and ENT: Normocephalic, atraumatic, supple neck, no JVD  Lungs: CTA  Heart: Irregular/irregular rhythm  Abdomen; soft no tenderness  Extremities: Positive bruit and thrill over left upper arm AV fistula used for hemodialysis  Neurologic : Physiologic.        I&O 24HR  No intake or output data in the 24 hours ending 02/17/24 1128    Vitals 24HR  Heart Rate:  []   Temperature:  [36.8 °C (98.2 °F)]   Respirations:  [16-25]   BP: ()/()   Pulse Ox:  [94 %-100 %]         Relevant Results  Results from last 7 days   Lab Units 02/17/24 0944 02/17/24 0039 02/16/24  0700   SODIUM mmol/L 131*  131* 128* 126*   POTASSIUM mmol/L 3.3*  3.3* 3.6 9.0*   CHLORIDE mmol/L 90*  90* 88* 89*   CO2 mmol/L 32  32 31 30   BUN mg/dL 39*  39* 34* 40*   CREATININE mg/dL 2.63*  2.63* 2.37* 2.92*   GLUCOSE mg/dL 129*  129* 172* 152*   CALCIUM mg/dL 9.9  9.9 10.0 9.3      Results from last 7 days   Lab Units 02/17/24 0944 02/17/24 0039 02/16/24  0700   WBC AUTO x10*3/uL 14.0* 13.2* 2.3*   HEMOGLOBIN g/dL 8.3* 8.2* 7.8*   HEMATOCRIT % 28.0* 27.2* 26.8*   PLATELETS AUTO x10*3/uL 431 456* 369    Electrocardiogram, 12-lead PRN ACS symptoms    Result Date: 2/6/2024  Atrial fibrillation with premature ventricular or aberrantly conducted complexes Low voltage QRS Nonspecific T wave abnormality Abnormal ECG When compared with ECG of 23-JAN-2024 04:46, No significant change since Confirmed by Edwin May ([id number]) on 2/6/2024 3:23:42 PM    Electrocardiogram, 12-lead PRN ACS symptoms    Result Date: 2/3/2024  Atrial fibrillation with occasional ventricular-paced complexes Nonspecific T wave abnormality Abnormal ECG When compared with ECG of 22-JAN-2024 08:27, Vent. rate has decreased BY  16 BPM Confirmed by Edwin May ([id number]) on 2/3/2024 11:11:22  PM    IR CVC removal    Result Date: 2/2/2024  Interpreted By:  Noe Zaldivar, STUDY: IR CVC REMOVAL;  2/2/2024 3:20 pm   INDICATION: Signs/Symptoms:removal of tunneled line.   COMPARISON: None.   ACCESSION NUMBER(S): [id number]   ORDERING CLINICIAN: LUKE HWANG   TECHNIQUE: INTERVENTIONALIST(S): Noe Zaldivar CNP   CONSENT: The patient/patient's POA/next of kin was informed of the nature of the proposed procedure. The purposes, alternatives, risks, and benefits were explained and discussed. All questions were answered and consent was obtained.     MEDICATION/CONTRAST: No additional   TIME OUT: A time out was performed immediately prior to procedure start with the interventional team, correctly identifying the patient name, date of birth, MRN, procedure, anatomy (including marking of site and side), patient position, procedure consent form, relevant laboratory and imaging test results, antibiotic administration, safety precautions, and procedure-specific equipment needs.   COMPLICATIONS: No immediate adverse events identified.   FINDINGS: The existing skin site over the tunneled catheter insertion site was prepped and draped with maximal sterile manner.   The skin and subcutaneous tissues at the original catheter site were anesthetized with 1% lidocaine with epinephrine.  The subcutaneous tissues surrounding the catheter were anesthetized with 1% lidocaine. Using blunt dissection, the catheter cuff was externalized and the catheter leading to the jugular vein was completely removed. Hemostasis was obtained using manual compression at the internal jugular vein access site.   The incision site was covered with a sterile dressing and tegaderm was subsequently applied.   There were no immediate or post-procedural complications.       Uneventful removal of a left chest central venous catheter.   I was present for and/or performed the critical portions of the procedure and immediately available throughout the  entire procedure.   Performed and dictated at [facility].   Signed by: Noe Zaldivar 2/2/2024 4:39 PM Dictation workstation:   [id number]    Electrocardiogram, 12-lead PRN ACS symptoms    Result Date: 1/29/2024  Atrial fibrillation with occasional ventricular-paced complexes T wave abnormality, consider lateral ischemia Abnormal ECG When compared with ECG of 22-JAN-2024 08:26, Vent. rate has increased BY   4 BPM Confirmed by Edwin May ([id number]) on 1/29/2024 4:59:47 PM    Vascular US upper extremity venous duplex left    Result Date: 1/27/2024            [place] [address]   Tel [phone] and Fax [phone]  Vascular Lab Report VASC US UPPER EXTREMITY VENOUS DUPLEX LEFT  Patient Name:      SHAMEKA Mendez Physician: [id number] Jovanna Francis MD Study Date:        1/26/2024           Ordering           [id number] CHUCKY BLACKBURN                                        Physician: MRN/PID:           [id number]            Technologist:      Elisabeth Betts T Accession#:        [id number]        Technologist 2: Date of Birth/Age: 1945 / 78      Encounter#:        [id number]                    years Gender:            M Admission Status:  Inpatient           Location           [facility]                                        Performed:  Diagnosis/ICD: Left arm swelling-M79.89 Indication:    Limb swelling CPT Codes:     32631 Peripheral venous duplex scan for DVT Limited  CONCLUSIONS: Right Upper Venous: Unable to visualize subclavian vein due to lines and bandages. Left Upper Venous: Technically limited exam; negative for acute deep vein thrombus in visualized veins. Unable to rule out deep vein thrombus in non-visualized mid and distal subclavian vein. The left basilic vein was not visualized. Left brachiocephalic arteriovenous fistula is noted.  Visualized in segments due to bandages from recent dialysis access.  Additional Findings: Technically difficult due to patient positioning and limited mobility.  Imaging & Doppler Findings:  Left                Compress Thrombus        Flow Internal Jugular      Yes      None   Spontaneous/Phasic Subclavian Proximal   Yes      None   Spontaneous/Phasic Axillary              Yes      None   Spontaneous/Phasic Brachial              Yes      None Cephalic              Yes      None  [id number] Jovanna Francis MD Electronically signed by [id number] Jovanna Francis MD on 1/27/2024 at 2:56:51 PM  ** Final **     CT head wo IV contrast    Result Date: 1/26/2024  Interpreted By:  Marilyn Torres and Dervishi Mario STUDY: CT HEAD WO IV CONTRAST;  1/25/2024 10:08 pm   INDICATION: Signs/Symptoms:AMS CICU patient.   COMPARISON: None.   ACCESSION NUMBER(S): [id number]   ORDERING CLINICIAN: ALVIN AMBRIZ   TECHNIQUE: Noncontrast axial CT scan of head was performed. Angled reformats in brain and bone windows were generated. The images were reviewed in bone, brain, blood and soft tissue windows.   FINDINGS: CSF Spaces: The ventricles, sulci and basal cisterns are concordant with the degree of parenchymal atrophy. There is no extraaxial fluid collection.   Parenchyma: There are nonspecific patchy, confluent periventricular and subcortical white matter hypodensities which are likely sequela of chronic microvascular ischemic changes. Otherwise, the grey-white differentiation is intact. There is no mass effect or midline shift. There is no intracranial hemorrhage.   Calvarium: The calvarium is unremarkable.   Paranasal sinuses and mastoids: Visualized paranasal sinuses and mastoids are clear.       1.  No evidence of acute intracranial hemorrhage or mass effect. 2. Nonspecific periventricular and subcortical white matter hypodensities likely sequela of chronic microvascular ischemic changes     I personally reviewed the images/study and I agree  with the findings as stated by Resident Irwin Ingram MD. This study was interpreted at [facility], [place], [place].   MACRO: None   Signed by: Marilyn Torres 1/26/2024 7:23 AM Dictation workstation:   [id number]    XR abdomen 1 view    Result Date: 1/25/2024  Interpreted By:  Jori Garcia and Ritchie Brandon STUDY: XR ABDOMEN 1 VIEW;  1/25/2024 3:05 pm   INDICATION: Signs/Symptoms:CICU confirm NG placement.   COMPARISON: Chest x-ray 01/23/2024   ACCESSION NUMBER(S): [id number]   ORDERING CLINICIAN: ALVIN AMBRIZ   FINDINGS: Enteric tube courses midline below the level of the diaphragm with the tip located in the gastric body. The side-hole is below the GE junction.   Multiple gaseous distended loops of small and large bowel. For example there is a lucent area of gaseous ascending colon in the right upper quadrant measuring 5.2 cm. There is limited evaluation of pneumoperitoneum on supine imaging, however there does not appear to be any evidence of free air surrounding this gaseous distended loop of ascending/Rigler's sign. Right diaphragm is distinctly visualized separate from this loop of bowel without evidence of subdiaphragmatic free air.   Additional mildly prominent loops of small bowel scattered throughout the bilateral hemiabdomen, for example largest loop of small bowel can be seen in the left upper quadrant measuring 3.4 cm.   Visualized lungs are clear.   Osseous structures demonstrate no acute bony changes.       1. Enteric tube as described above with the distal tip projecting over the gastric body. 2. Multiple mildly distended loops of small and large bowel as described above. Recommend attention on follow-up examinations as could reflect early ileus. 3. No definitive evidence of free air on current supine radiograph as described above.   MACRO: None   Signed by: Jori Garcia 1/25/2024 3:48 PM Dictation workstation:   [id number]    XR chest 1 view    Result  Date: 1/23/2024  Interpreted By:  Jori Garcia and Ritchie Brandon STUDY: XR CHEST 1 VIEW;  1/23/2024 9:06 am   INDICATION: Signs/Symptoms:pneumothorax r/o. dyspnea..   COMPARISON: Chest x-ray 01/22/2024   ACCESSION NUMBER(S): [id number]   ORDERING CLINICIAN: DONNA SOLIMAN   FINDINGS: AP radiograph of the chest was provided.   Similar position of the left internal jugular approach central venous catheter with the distal tip projecting over the expected location of the distal SVC. There is a right-sided pacemaker/AICD with leads in similar position over the expected location of the right atrium/right ventricle.   CARDIOMEDIASTINAL SILHOUETTE: Cardiomediastinal silhouette is stable in size and configuration.   LUNGS: Similar low lung volumes/expiratory radiograph with bronchovascular crowding. Similar linear areas of bibasilar atelectasis. Improved bilateral pleural effusions. The previously described trace focal lucency projecting over the right lower lung field is no longer evident on current examination. No pneumothorax.   ABDOMEN: No remarkable upper abdominal findings.   BONES: No acute osseous changes.       1. Similar low lung volumes with improved bilateral pleural effusions. Unchanged bibasilar atelectasis. 2. No evidence of pneumothorax. 3. Medical devices as described above.   I personally reviewed the images/study and I agree with the findings as stated by resident Marvin Cueva. This study was interpreted at [place], [place].   MACRO: None   Signed by: Jori Garcia 1/23/2024 5:11 PM Dictation workstation:   [id number]    Transthoracic Echo (TTE) Complete    Result Date: 1/23/2024   [facility], [address] [place] [address]                Tel [phone] and Fax [phone] TRANSTHORACIC ECHOCARDIOGRAM REPORT  Patient Name:      SHAMEKA SUN  Reading Physician:    [id number] Mis Hoffman                                                                MD Study Date:        1/23/2024            Ordering Provider:    [id number] CHUCKY BLACKBURN MRN/PID:           [id number]             Fellow: Accession#:        [id number]         Nurse: Date of Birth/Age: 1945 / 78 years Sonographer:          Roxana Pace RDCS Gender:            M                    Additional Staff: Height:            177.80 cm            Admit Date:           1/9/2024 Weight:            104.78 kg            Admission Status:     Inpatient -                                                               Routine BSA:               2.22 m2              Encounter#:           [id number]                                         Department Location:  [facility] Non                                                               Invasive Blood Pressure: 78 /65 mmHg Study Type:    TRANSTHORACIC ECHO (TTE) COMPLETE Diagnosis/ICD: Shock, unspecified-R57.9 Indication:    shock CPT Code:      Echo Complete w Full Doppler-63054 Patient History: Pertinent History: ESRD, HFrEF (35-40), AFib, PVD, AMS & Hypotension during HD,                    AO Root Dilitation, CAD, SSS, PHTN. Study Detail: The following Echo studies were performed: 2D, M-Mode, Doppler and               color flow. Technically challenging study due to poor acoustic               windows, prominent lung artifact, patient lying in supine               position, body habitus and coughing up phlegm. Definity used as a               contrast agent for endocardial border definition. Total contrast               used for this procedure was 1 mL via IV push. Unable to obtain               subcostal and suprasternal notch view.  PHYSICIAN INTERPRETATION: Left Ventricle: The left ventricular systolic function is mildly decreased, with an estimated ejection fraction of 40-45%. The patient is in atrial  fibrillation which may influence the estimate of left ventricular function and transvalvular flows. There is global hypokinesis of the left ventricle with minor regional variations. The left ventricular cavity size is normal. There is left ventricular concentric remodeling. Left ventricular diastolic filling was indeterminate. Mildly increased LV wall thickness with proximal septal bulge up to 1.8 cm. Left Atrium: The left atrium is severely dilated. Right Ventricle: The right ventricle was not well visualized. Unable to determine right ventricular systolic function. A device is visualized in the right ventricle. Right Atrium: The right atrium is mildly dilated. There is a device visualized in the right atrium. Aortic Valve: The aortic valve is probably trileaflet. There is mild aortic valve cusp calcification. There is trivial aortic valve regurgitation. The peak instantaneous gradient of the aortic valve is 15.7 mmHg. The mean gradient of the aortic valve is 7.0 mmHg. Mitral Valve: The mitral valve is normal in structure. There is mild mitral annular calcification. There is mild mitral valve regurgitation. Tricuspid Valve: The tricuspid valve was not well visualized. There is trace to mild tricuspid regurgitation. The Doppler estimated RVSP is within normal limits at 35.5 mmHg. Pulmonic Valve: The pulmonic valve is structurally normal. There is physiologic pulmonic valve regurgitation. Pericardium: There is a trivial pericardial effusion. Aorta: The aortic root is abnormal. There is mild dilatation of the aortic root. In comparison to the previous echocardiogram(s): Compared wit the prior exam from 10/2/2023 the LV appears slightly more dynamic today with LVEF 40-45% ( instead prior prior exam with LVEF 35-40%). There is still only mild MR. Note that the prior RVSP was moderately elevated at 64.8mmHg and is now normal or borderline at 35.5mmHg.  CONCLUSIONS:  1. Left ventricular systolic function is mildly  decreased with a 40-45% estimated ejection fraction.  2. Poorly visualized anatomical structures due to suboptimal image quality.  3. Mildly increased LV wall thickness with proximal septal bulge up to 1.8 cm.  4. The left atrium is severely dilated.  5. RVSP within normal limits.  6. Compared wit the prior exam from 10/2/2023 the LV appears slightly more dynamic today with LVEF 40-45% ( instead prior prior exam with LVEF 35-40%). There is still only mild MR. Note that the prior RVSP was moderately elevated at 64.8mmHg and is now normal or borderline at 35.5mmHg.  7. The patient is in atrial fibrillation which may influence the estimate of left ventricular function and transvalvular flows.  8. There is global hypokinesis of the left ventricle with minor regional variations. QUANTITATIVE DATA SUMMARY: 2D MEASUREMENTS:                          Normal Ranges: Ao Root d:     3.70 cm   (2.0-3.7cm) LAs:           4.30 cm   (2.7-4.0cm) IVSd:          1.40 cm   (0.6-1.1cm) LVPWd:         1.30 cm   (0.6-1.1cm) LVIDd:         3.70 cm   (3.9-5.9cm) LVIDs:         3.10 cm LV Mass Index: 79.6 g/m2 LV % FS        16.2 % LA VOLUME:                               Normal Ranges: LA Vol A4C:        110.7 ml   (22+/-6mL/m2) LA Vol A2C:        149.4 ml LA Vol BP:         129.0 ml LA Vol Index A4C:  49.9ml/m2 LA Vol Index A2C:  67.3 ml/m2 LA Vol Index BP:   58.1 ml/m2 LA Area A4C:       30.3 cm2 LA Area A2C:       35.3 cm2 LA Major Axis A4C: 7.0 cm LA Major Axis A2C: 7.1 cm LA Volume Index:   58.1 ml/m2 RA VOLUME BY A/L METHOD:                       Normal Ranges: RA Area A4C: 19.9 cm2 AORTA MEASUREMENTS:                      Normal Ranges: Ao Sinus, d: 3.70 cm (2.1-3.5cm) LV SYSTOLIC FUNCTION BY 2D PLANIMETRY (MOD):                     Normal Ranges: EF-A4C View: 35.0 % (>=55%) EF-A2C View: 41.2 % EF-Biplane:  37.7 % LV DIASTOLIC FUNCTION:                           Normal Ranges: MV Peak E:    0.86 m/s    (0.7-1.2 m/s) MV e'          0.09 m/s    (>8.0) MV lateral e' 0.10 m/s MV medial e'  0.08 m/s MV A Dur:     193.00 msec E/e' Ratio:   9.57        (<8.0) a'            0.02 m/s MITRAL VALVE:                 Normal Ranges: MV DT: 193 msec (150-240msec) AORTIC VALVE:                                    Normal Ranges: AoV Vmax:                1.98 m/s  (<=1.7m/s) AoV Peak PG:             15.7 mmHg (<20mmHg) AoV Mean P.0 mmHg  (1.7-11.5mmHg) LVOT Max Dann:            0.91 m/s  (<=1.1m/s) AoV VTI:                 36.20 cm  (18-25cm) LVOT VTI:                17.20 cm LVOT Diameter:           1.90 cm   (1.8-2.4cm) AoV Area, VTI:           1.35 cm2  (2.5-5.5cm2) AoV Area,Vmax:           1.31 cm2  (2.5-4.5cm2) AoV Dimensionless Index: 0.48  RIGHT VENTRICLE: RV Basal 5.30 cm RV Major 8.9 cm RV s'    0.04 m/s TRICUSPID VALVE/RVSP:                             Normal Ranges: Peak TR Velocity: 2.85 m/s RV Syst Pressure: 35.5 mmHg (< 30mmHg) PULMONIC VALVE:                         Normal Ranges: PV Accel Time: 103 msec (>120ms) PV Max Dann:    1.0 m/s  (0.6-0.9m/s) PV Max PG:     3.6 mmHg  [id number] Mis Hoffman MD Electronically signed on 2024 at 3:42:15 PM  ** Final **     XR chest 1 view    Result Date: 2024  Interpreted By:  Jori Garcia and Dervishi Mario STUDY: XR CHEST 1 VIEW;  2024 8:21 pm   INDICATION: Signs/Symptoms:aborted central line, r/o pneumothorax.   COMPARISON: Chest radiograph: 2020   ACCESSION NUMBER(S): [id number]   ORDERING CLINICIAN: SALVADOR OLIVARES   FINDINGS: AP radiograph of the chest was provided.   A right-sided ICD device is again noted. Right IJ central venous catheter with the tip projecting over the distal SVC.   CARDIOMEDIASTINAL SILHOUETTE: Cardiomediastinal silhouette is stable in size and configuration enlarged.   LUNGS: There is persistent low lung volumes with associated bronchovascular crowding. There is re-demonstration of blunting of bilateral right worse than left costophrenic angles  suggesting pleural effusions with associated atelectasis. A trace focal lucency is seen projecting over the right lower lung field. No pneumothorax of the left lung.   ABDOMEN: No remarkable upper abdominal findings.   BONES: No acute osseous changes.       1.  Large right and small left pleural effusions with associated atelectasis. 2. Low lung volumes with associated bronchovascular crowding. 3. Trace focal lucency projecting over the right lower lung field which may be artifactual however a trace pneumothorax is not excluded. Repeat radiograph can be obtained as clinically indicated. 4. Medical devices as above.   I personally reviewed the images/study and I agree with the findings as stated. This study was interpreted at [place], [place].   MACRO: NONE.   Signed by: Jori Garcia 1/23/2024 9:01 AM Dictation workstation:   [id number]    XR chest 2 views    Result Date: 1/21/2024  Interpreted By:  Kirk Vanessa, STUDY: XR CHEST 2 VIEWS;  1/21/2024 9:19 am   INDICATION: Signs/Symptoms:s/p dialysis.   COMPARISON: Chest radiograph 01/16/2024 and chest CT 10/08/2023   ACCESSION NUMBER(S): [id number]   ORDERING CLINICIAN: GIBRAN ARMIJO   FINDINGS: PA and lateral radiographs of the chest, including dual energy subtraction images are available for interpretation. The evaluation is somewhat limited by patient rotation and suboptimal positioning. Stable positioning of a right subclavian approach dual chamber cardiac pacemaker. Left subclavian approach hemodialysis catheter tip overlies mid to lower SVC, unchanged.   CARDIOMEDIASTINAL SILHOUETTE: The cardiomediastinal silhouette is grossly stable in size and configuration.   LUNGS: Interval slight worsening of mild diffuse pulmonary edema. Small bibasilar pleural effusions, overall improved from prior. Interval worsening of right basilar opacification. No pneumothorax is seen.   ABDOMEN: No remarkable upper abdominal findings.    BONES: No acute osseous abnormality.       1. Limited examination with interval slight worsening of mild diffuse pulmonary edema. Correlate with patient's volume status. 2. Interval worsening of right basilar opacification, which may represent right middle/lower lobe atelectasis. Correlate with concern for central mucous plugging. 3. Small bibasilar pleural effusions have improved as compared to prior study.   Signed by: Kirk Vanessa 1/21/2024 10:58 AM Dictation workstation:   [id number]    XR foot right 1-2 views    Result Date: 1/19/2024  Interpreted By:  Liz Ruiz and Ohs Zachary STUDY: Right foot, 3 views.   INDICATION: Signs/Symptoms:post op tma   COMPARISON: Right foot radiograph 01/09/2024..   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 Sharp margination of the metatarsal stumps. Unremarkable appearance of the soft tissues of the stump with overlying wound VAC noted. Prominent vascular calcifications of the lower leg and ankle.   Remote appearing healed Douglas B distal fibular fracture with unchanged cortical irregularity.       1. 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 2. Additional findings as above.   I personally reviewed the images/study and I agree with the findings as stated by Dr. Aly Brand. This study was interpreted at [place], [place].   MACRO: None.   Signed by: Liz Ruiz 1/19/2024 5:25 PM Dictation workstation:   [id number]          Assessment/Plan   1.  ESKD on hemodialysis  Will dialyze patient today on 4K bath, with minimal fluid removal because of hypotension  2.  Diabetes mellitus, will continue current meds  3.  Balanitis, will continue to biotics vancomycin and cefepime  4.   Anemia of CKD, will follow with HILDA and iron supplementation as needed  5.  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          I spent 54 minutes in the professional and overall care of this patient.      Rere Matson MD

## 2024-02-17 NOTE — PROGRESS NOTES
"Vancomycin Dosing by Pharmacy- INITIAL    Chevy Benton is a 78 y.o. male for whom Pharmacy has been consulted to dose vancomycin for other - sepsis . Based on the patient's indication and renal status this patient will be dosed based on a goal trough/random level of 15-20.     Patient is on HD.    Visit Vitals  /72   Pulse 95   Temp 36.8 °C (98.2 °F) (Temporal)   Resp (!) 24        Lab Results   Component Value Date    CREATININE 2.63 (H) 02/17/2024    CREATININE 2.63 (H) 02/17/2024    CREATININE 2.37 (H) 02/17/2024    CREATININE 2.92 (H) 02/16/2024        Patient weight is No results found for: \"PTWEIGHT\"    No results found for: \"CULTURE\"     No intake/output data recorded.  [unfilled]    Lab Results   Component Value Date    PATIENTTEMP 37.0 01/27/2024    PATIENTTEMP 37.0 01/27/2024    PATIENTTEMP 37.0 01/25/2024          Assessment/Plan     Patient has already been given a loading dose of 2000 mg.  Follow-up level will be ordered prior to 2nd HD session unless clinically indicated sooner.  Will continue to monitor renal function daily while on vancomycin and order serum creatinine at least every 48 hours if not already ordered.  Follow for continued vancomycin needs, clinical response, and signs/symptoms of toxicity.       Romana A Kuchta, PharmD       "

## 2024-02-17 NOTE — ED PROVIDER NOTES
CC: high k     HPI:  Patient is a 78-year-old male with past medical history as noted below who is presenting to the emergency department for high potassium.  This lab was drawn earlier today.  Potassium is 9.0 and the patient has no noted EKG changes, no noted symptoms.  Patient does additionally report that he is having pain in his penis that has been ongoing over the course the past 2 weeks.  Patient reports that he does not frequently retract or clean below foreskin.  Patient with no other medical complaints at this time.  Patient is accompanied by son who augments history.  Patient reported that he last received a full session of dialysis today again making this hyperkalemia less likely.    Records Reviewed:  Recent available ED and inpatient notes reviewed in EMR.    PMHx/PSHx:  Per HPI.   - has a past medical history of Acute hypercapnic respiratory failure (CMS/[facility]), Acute on chronic HFrEF (heart failure with reduced ejection fraction) (CMS/[facility]ly [facility]), Diabetes mellitus (CMS/HCC), ESRD on hemodialysis (CMS/Formerly [facility]), History of angioplasty of peripheral vessel (10/26/2023), HTN (hypertension), LFT elevation (07/20/2021), Small bowel obstruction (CMS/HCC) (10/10/2023), and Stage II pressure ulcer (CMS/[facility]).  - has a past surgical history that includes Invasive Vascular Procedure (Right, 1/17/2024).    Medications:  Reviewed in EMR. See EMR for complete list of medications and doses.    Allergies:  Penicillins    Social History:  - Tobacco:  reports that he has never smoked. He has been exposed to tobacco smoke. He has never used smokeless tobacco.   - Alcohol:  has no history on file for alcohol use.   - Illicit Drugs:  has no history on file for drug use.     ROS:  Per HPI.       ???????????????????????????????????????????????????????????????  Triage Vitals:  T 36.8 °C (98.2 °F)  HR 89  BP 91/57  RR 16  O2 97 %      Physical Exam  Vitals and nursing note reviewed.   Constitutional:       Appearance: Normal  appearance.   HENT:      Head: Normocephalic and atraumatic.      Nose: Nose normal.      Mouth/Throat:      Pharynx: Oropharynx is clear.   Eyes:      Extraocular Movements: Extraocular movements intact.      Pupils: Pupils are equal, round, and reactive to light.   Cardiovascular:      Rate and Rhythm: Normal rate and regular rhythm.      Pulses: Normal pulses.   Pulmonary:      Effort: Pulmonary effort is normal.      Breath sounds: Normal breath sounds.   Abdominal:      General: There is no distension.      Tenderness: There is no abdominal tenderness. There is no right CVA tenderness, left CVA tenderness or guarding.   Musculoskeletal:         General: No swelling or deformity. Normal range of motion.      Cervical back: Normal range of motion.      Right lower leg: No edema.      Left lower leg: No edema.      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   Skin:     General: Skin is warm and dry.      Capillary Refill: Capillary refill takes less than 2 seconds.   Neurological:      General: No focal deficit present.      Mental Status: He is alert and oriented to person, place, and time.   Psychiatric:         Mood and Affect: Mood normal.         Behavior: Behavior normal.       ???????????????????????????????????????????????????????????????  EKG:  EKG obtained at 23 24 it is noted to be irregularly irregular with a rate of 96 bpm, no discernible DE interval, QRS duration 82, QTc of 419, occasional PVC noted, no significant T wave inversion, no significant ST elevation.  Overall interpretation is atrial fibrillation, 1 PVC, no other signs of ischemia or infarction.    Assessment and Plan:  Patient is a 78-year-old male with past medical history as noted above who is presenting the emergency department with chief concern of hyperkalemia.  Potassium is 9 that was drawn at 7 AM, patient has since undergone full cycle of dialysis, this is likely a laboratory  error as persistently elevated potassium at this level would not be compatible with life, EKG is without any significant changes, no peaked T waves, no QTc or QRS prolongation.  This also makes potassium of this level less likely.  Labs were obtained and potassium was noted to be 3.6, magnesium 1.90.  Given physiologic normal potassium and magnesium no action necessary.  Patient is advised to continue dialysis as scheduled.  For patient's balanitis topical antibiotics are prescribed to patient's pharmacy.  Patient notified of normal potassium and is discharged from the emergency department stable condition.    ED Course:  ED Course as of 02/17/24 0127   Sat Feb 17, 2024   0126 POTASSIUM: 3.6 [BN]   0126 MAGNESIUM: 1.90 [BN]      ED Course User Index  [BN] Tristan Small MD         Diagnoses as of 02/17/24 0127   Balanitis        Social Determinants Limiting Care:      Disposition:  Discharge    Tristan Small MD       Procedures ? SmartLinks last updated 2/16/2024 11:41 PM        Tristan Small MD  Resident  02/17/24 0131

## 2024-02-17 NOTE — PROGRESS NOTES
"Pharmacy Medication History Review    Chevy Benton is a 78 y.o. male admitted for Balanitis. Pharmacy reviewed the patient's [id number] medications and allergies for accuracy.  Prior to Admission Medications   Prescriptions Last Dose Informant     Triphrocaps 1 mg capsule 2/16/2024      Sig: Take 1 capsule by mouth once daily.   acetaminophen (Tylenol) 325 mg tablet 2/16/2024      Sig: Take 3 tablets (975 mg) by mouth every 6 hours.   albuterol 90 mcg/actuation inhaler       Sig: Inhale 2 puffs every 6 hours if needed for shortness of breath.   allopurinol (Zyloprim) 100 mg tablet 2/16/2024      Sig: Take 1 tablet (100 mg) by mouth once daily.   apixaban (Eliquis) 2.5 mg tablet 2/16/2024      Sig: Take 1 tablet (2.5 mg) by mouth 2 times a day.   atorvastatin (Lipitor) 40 mg tablet 2/15/2024 at pm      Sig: Take 1 tablet (40 mg) by mouth once daily at bedtime.   clopidogrel (Plavix) 75 mg tablet 2/16/2024      Sig: Take 1 tablet (75 mg) by mouth once daily.   collagenase (SantyL) 250 unit/gram ointment       Sig: Apply 1 Application topically once daily as needed. \"MIGUEL\" to Right foot   dextrose 50 % injection       Sig: Infuse 50 mL (25 g) into a venous catheter every 15 minutes if needed (For blood glucose less than or equal to 40 mg/dL).   gabapentin (Neurontin) 100 mg capsule 2/16/2024 at am      Sig: Take 1 capsule (100 mg) by mouth once daily.   glucagon (Glucagen) 1 mg injection       Sig: Inject 1 mg into the muscle every 15 minutes if needed for low blood sugar - see comments (For blood glucose less than or equal to 70 mg/dL and no IV access).   insulin glargine (Lantus) 100 unit/mL injection 2/15/2024 at pm      Sig: Inject 6 Units under the skin once daily at bedtime. Take as directed per insulin instructions.   insulin lispro (HumaLOG) 100 unit/mL injection       Sig: Inject 0-0.05 mL (0-5 Units) under the skin 3 times a day with meals. Take as directed per insulin instructions. "   ipratropium-albuteroL (Duo-Neb) 0.5-2.5 mg/3 mL nebulizer solution 2/16/2024      Sig: Take 3 mL by nebulization every 6 hours if needed for wheezing or shortness of breath.   lidocaine 4 % patch       Sig: Place 1 patch over 12 hours on the skin once daily. Remove & discard patch within 12 hours or as directed by MD.   loratadine (Claritin) 10 mg tablet       Sig: TAKE 1 TABLET (10 MG) BY MOUTH EVERY OTHER DAY IF NEEDED FOR ALLERGIES.   melatonin 5 mg tablet 2/15/2024 at pm      Sig: Take 1 tablet (5 mg) by mouth once daily at bedtime.   midodrine (Proamatine) 5 mg tablet 2/16/2024      Sig: Take 3 tablets (15 mg) by mouth every 8 hours.   oxyCODONE (Roxicodone) 5 mg immediate release tablet       Sig: Take 1 tablet (5 mg) by mouth every 6 hours if needed for severe pain (7 - 10) for up to 7 days.   pantoprazole (ProtoNix) 40 mg EC tablet 2/16/2024      Sig: Take 1 tablet (40 mg) by mouth once daily in the morning. Take before meals. Do not crush, chew, or split. Do not start before February 11, 2024.   polyethylene glycol (Miralax) 17 gram packet 2/16/2024      Sig: Take 17 g by mouth once daily.   povidone-iodine (Betadine) 7.5 % solution       Sig: Apply 1 Application topically once daily as needed for wound care.   sennosides (Senokot) 8.6 mg tablet       Sig: Take 1 tablet (8.6 mg) by mouth once daily at bedtime.      Facility-Administered Medications: None      Patient came [facility]  Came with a med list. Patient is on both Plavix and Eliquis         Chevy Benton is a 78 y.o. year old male patient with   referred for  .      The list below reflectives the updated PTA list. Please review each medication in order reconciliation for additional clarification and justification.  The list below reflectives the updated allergy list. Please review each documented allergy for additional clarification and justification.  Allergies  Reviewed by Jonathon Chicnhilla MD on 2/17/2024        Severity Reactions  Comments    Penicillins Medium Hives             Below are additional concerns with the patient's PTA list.      Elizabeth Golden, PATSYhT

## 2024-02-17 NOTE — ED PROCEDURE NOTE
Procedure  Central Line    Performed by: Jonathon Chinchilla MD  Authorized by: Goyo Bean DO    Consent:     Consent obtained:  Verbal    Consent given by:  Patient    Risks, benefits, and alternatives were discussed: yes      Risks discussed:  Incorrect placement and arterial puncture    Alternatives discussed:  Delayed treatment  Universal protocol:     Procedure explained and questions answered to patient or proxy's satisfaction: yes      Test results available: yes      Site/side marked: yes      Immediately prior to procedure, a time out was called: yes      Patient identity confirmed:  Verbally with patient and arm band  Pre-procedure details:     Indication(s): central venous access and insufficient peripheral access      Skin preparation:  Chlorhexidine    Skin preparation agent: Skin preparation agent completely dried prior to procedure    Sedation:     Sedation type:  None  Anesthesia:     Anesthesia method:  Local infiltration    Local anesthetic:  Lidocaine 1% w/o epi  Procedure details:     Location:  R femoral    Procedural supplies:  Triple lumen    Catheter size:  7 Fr    Landmarks identified: yes      Ultrasound guidance: yes      Sterile ultrasound techniques: Sterile gel and sterile probe covers were used      Number of attempts:  1    Successful placement: yes    Post-procedure details:     Post-procedure:  Dressing applied    Assessment:  Blood return through all ports and free fluid flow    Procedure completion:  Tolerated               Jonathon Chinchilla MD  02/17/24 0954

## 2024-02-17 NOTE — H&P
History Of Present Illness  Chevy Benton is a 78 y.o. male with past medical history as noted below who is presenting to the emergency department for high potassium. This lab was drawn earlier today. Potassium is 9.0 and the patient has no noted EKG changes, no noted symptoms. Patient does additionally report that he is having pain in his penis that has been ongoing over the course the past 2 weeks. Patient reports that he does not frequently retract or clean below foreskin. Patient with no other medical complaints at this time. Patient is accompanied by son who augments history. Patient reported that he last received a full session of dialysis again making this hyperkalemia less likely.      Past Medical History  He has a past medical history of Acute hypercapnic respiratory failure (CMS/[facility]), Acute on chronic HFrEF (heart failure with reduced ejection fraction) (CMS/[facility]), Diabetes mellitus (CMS/[facility]), ESRD on hemodialysis (CMS/[facility]), History of angioplasty of peripheral vessel (10/26/2023), HTN (hypertension), LFT elevation (07/20/2021), Small bowel obstruction (CMS/HCC) (10/10/2023), and Stage II pressure ulcer (CMS/[facility]).    Surgical History  He has a past surgical history that includes Invasive Vascular Procedure (Right, 1/17/2024).     Social History  He reports that he has never smoked. He has been exposed to tobacco smoke. He has never used smokeless tobacco. No history on file for alcohol use and drug use.    Family History  Family History   Problem Relation Name Age of Onset    Diabetes Mother          Allergies  Penicillins    Review of Systems   Constitutional:  Negative for activity change, appetite change, chills and fever.   HENT:  Negative for congestion.    Eyes:  Negative for pain and discharge.   Respiratory:  Negative for cough, chest tightness and shortness of breath.    Cardiovascular:  Negative for chest pain, palpitations and leg swelling.   Gastrointestinal:  Negative for abdominal pain,  constipation, diarrhea, nausea and vomiting.   Endocrine: Negative for cold intolerance and heat intolerance.   Genitourinary:  Negative for difficulty urinating and dysuria.   Musculoskeletal:  Negative for back pain, myalgias and neck pain.   Skin:  Negative for color change and rash.   Neurological:  Negative for dizziness, seizures, speech difficulty, numbness and headaches.   Psychiatric/Behavioral:  Negative for agitation, behavioral problems and confusion.         Physical Exam   Constitutional:       Appearance: Normal appearance.   HENT:      Head: Normocephalic and atraumatic.      Nose: Nose normal.      Mouth/Throat:      Pharynx: Oropharynx is clear.   Eyes:      Extraocular Movements: Extraocular movements intact.      Pupils: Pupils are equal, round, and reactive to light.   Cardiovascular:      Rate and Rhythm: Normal rate and regular rhythm.      Pulses: Normal pulses.   Pulmonary:      Effort: Pulmonary effort is normal.      Breath sounds: Normal breath sounds.   Abdominal:      General: There is no distension.      Tenderness: There is no abdominal tenderness. There is no right CVA tenderness, left CVA tenderness or guarding.   Musculoskeletal:         General: No swelling or deformity. Normal range of motion.      Cervical back: Normal range of motion.      Right lower leg: No edema.      Left lower leg: No edema.      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   Skin:     General: Skin is warm and dry.      Capillary Refill: Capillary refill takes less than 2 seconds.   Neurological:      General: No focal deficit present.      Mental Status: He is alert and oriented to person, place, and time.   Psychiatric:         Mood and Affect: Mood normal.         Behavior: Behavior normal.   Last Recorded Vitals  BP (!) 104/91   Pulse 94   Temp 36.8 °C (98.2 °F) (Temporal)   Resp (!) 23   SpO2 95%     Relevant Results      Results for orders  placed or performed during the hospital encounter of 02/16/24 (from the past 24 hour(s))   CBC and Auto Differential   Result Value Ref Range    WBC 13.2 (H) 4.4 - 11.3 x10*3/uL    nRBC 0.3 (H) 0.0 - 0.0 /100 WBCs    RBC 2.71 (L) 4.50 - 5.90 x10*6/uL    Hemoglobin 8.2 (L) 13.5 - 17.5 g/dL    Hematocrit 27.2 (L) 41.0 - 52.0 %     80 - 100 fL    MCH 30.3 26.0 - 34.0 pg    MCHC 30.1 (L) 32.0 - 36.0 g/dL    RDW 21.6 (H) 11.5 - 14.5 %    Platelets 456 (H) 150 - 450 x10*3/uL    Neutrophils % 73.4 40.0 - 80.0 %    Immature Granulocytes %, Automated 1.7 (H) 0.0 - 0.9 %    Lymphocytes % 10.7 13.0 - 44.0 %    Monocytes % 9.7 2.0 - 10.0 %    Eosinophils % 3.8 0.0 - 6.0 %    Basophils % 0.7 0.0 - 2.0 %    Neutrophils Absolute 9.68 (H) 1.60 - 5.50 x10*3/uL    Immature Granulocytes Absolute, Automated 0.23 0.00 - 0.50 x10*3/uL    Lymphocytes Absolute 1.41 0.80 - 3.00 x10*3/uL    Monocytes Absolute 1.28 (H) 0.05 - 0.80 x10*3/uL    Eosinophils Absolute 0.50 (H) 0.00 - 0.40 x10*3/uL    Basophils Absolute 0.09 0.00 - 0.10 x10*3/uL   Comprehensive metabolic panel   Result Value Ref Range    Glucose 172 (H) 74 - 99 mg/dL    Sodium 128 (L) 136 - 145 mmol/L    Potassium 3.6 3.5 - 5.3 mmol/L    Chloride 88 (L) 98 - 107 mmol/L    Bicarbonate 31 21 - 32 mmol/L    Anion Gap 13 10 - 20 mmol/L    Urea Nitrogen 34 (H) 6 - 23 mg/dL    Creatinine 2.37 (H) 0.50 - 1.30 mg/dL    eGFR 27 (L) >60 mL/min/1.73m*2    Calcium 10.0 8.6 - 10.3 mg/dL    Albumin 2.7 (L) 3.4 - 5.0 g/dL    Alkaline Phosphatase 181 (H) 33 - 136 U/L    Total Protein 5.9 (L) 6.4 - 8.2 g/dL    AST 37 9 - 39 U/L    Bilirubin, Total 0.5 0.0 - 1.2 mg/dL    ALT 19 10 - 52 U/L   Magnesium   Result Value Ref Range    Magnesium 1.90 1.60 - 2.40 mg/dL   Morphology   Result Value Ref Range    RBC Morphology See Below     Polychromasia Mild     Ovalocytes Few     Teardrop Cells Few     Manas Cells Few    CBC and Auto Differential   Result Value Ref Range    WBC 14.0 (H) 4.4 - 11.3  x10*3/uL    nRBC 0.2 (H) 0.0 - 0.0 /100 WBCs    RBC 2.84 (L) 4.50 - 5.90 x10*6/uL    Hemoglobin 8.3 (L) 13.5 - 17.5 g/dL    Hematocrit 28.0 (L) 41.0 - 52.0 %    MCV 99 80 - 100 fL    MCH 29.2 26.0 - 34.0 pg    MCHC 29.6 (L) 32.0 - 36.0 g/dL    RDW 20.7 (H) 11.5 - 14.5 %    Platelets 431 150 - 450 x10*3/uL    Neutrophils %      Immature Granulocytes %, Automated      Lymphocytes %      Monocytes %      Eosinophils %      Basophils %      Neutrophils Absolute      Lymphocytes Absolute      Monocytes Absolute      Eosinophils Absolute      Basophils Absolute     Comprehensive Metabolic Panel   Result Value Ref Range    Glucose 129 (H) 74 - 99 mg/dL    Sodium 131 (L) 136 - 145 mmol/L    Potassium 3.3 (L) 3.5 - 5.3 mmol/L    Chloride 90 (L) 98 - 107 mmol/L    Bicarbonate 32 21 - 32 mmol/L    Anion Gap 12 10 - 20 mmol/L    Urea Nitrogen 39 (H) 6 - 23 mg/dL    Creatinine 2.63 (H) 0.50 - 1.30 mg/dL    eGFR 24 (L) >60 mL/min/1.73m*2    Calcium 9.9 8.6 - 10.3 mg/dL    Albumin 2.7 (L) 3.4 - 5.0 g/dL    Alkaline Phosphatase 164 (H) 33 - 136 U/L    Total Protein 5.7 (L) 6.4 - 8.2 g/dL    AST 19 9 - 39 U/L    Bilirubin, Total 0.5 0.0 - 1.2 mg/dL    ALT 16 10 - 52 U/L   Lactate   Result Value Ref Range    Lactate 1.0 0.4 - 2.0 mmol/L   Basic metabolic panel   Result Value Ref Range    Glucose 129 (H) 74 - 99 mg/dL    Sodium 131 (L) 136 - 145 mmol/L    Potassium 3.3 (L) 3.5 - 5.3 mmol/L    Chloride 90 (L) 98 - 107 mmol/L    Bicarbonate 32 21 - 32 mmol/L    Anion Gap 12 10 - 20 mmol/L    Urea Nitrogen 39 (H) 6 - 23 mg/dL    Creatinine 2.63 (H) 0.50 - 1.30 mg/dL    eGFR 24 (L) >60 mL/min/1.73m*2    Calcium 9.9 8.6 - 10.3 mg/dL     Electrocardiogram, 12-lead PRN ACS symptoms    Result Date: 2/6/2024  Atrial fibrillation with premature ventricular or aberrantly conducted complexes Low voltage QRS Nonspecific T wave abnormality Abnormal ECG When compared with ECG of 23-JAN-2024 04:46, No significant change since Confirmed by Lalo,  Edwin ([id number]) on 2/6/2024 3:23:42 PM    Electrocardiogram, 12-lead PRN ACS symptoms    Result Date: 2/3/2024  Atrial fibrillation with occasional ventricular-paced complexes Nonspecific T wave abnormality Abnormal ECG When compared with ECG of 22-JAN-2024 08:27, Vent. rate has decreased BY  16 BPM Confirmed by Edwin May ([id number]) on 2/3/2024 11:11:22 PM    IR CVC removal    Result Date: 2/2/2024  Interpreted By:  Noe Zaldivar, STUDY: IR CVC REMOVAL;  2/2/2024 3:20 pm   INDICATION: Signs/Symptoms:removal of tunneled line.   COMPARISON: None.   ACCESSION NUMBER(S): [id number]   ORDERING CLINICIAN: LUKE HWANG   TECHNIQUE: INTERVENTIONALIST(S): Noe Zaldivar CNP   CONSENT: The patient/patient's POA/next of kin was informed of the nature of the proposed procedure. The purposes, alternatives, risks, and benefits were explained and discussed. All questions were answered and consent was obtained.     MEDICATION/CONTRAST: No additional   TIME OUT: A time out was performed immediately prior to procedure start with the interventional team, correctly identifying the patient name, date of birth, MRN, procedure, anatomy (including marking of site and side), patient position, procedure consent form, relevant laboratory and imaging test results, antibiotic administration, safety precautions, and procedure-specific equipment needs.   COMPLICATIONS: No immediate adverse events identified.   FINDINGS: The existing skin site over the tunneled catheter insertion site was prepped and draped with maximal sterile manner.   The skin and subcutaneous tissues at the original catheter site were anesthetized with 1% lidocaine with epinephrine.  The subcutaneous tissues surrounding the catheter were anesthetized with 1% lidocaine. Using blunt dissection, the catheter cuff was externalized and the catheter leading to the jugular vein was completely removed. Hemostasis was obtained using manual compression at the internal jugular vein  access site.   The incision site was covered with a sterile dressing and tegaderm was subsequently applied.   There were no immediate or post-procedural complications.       Uneventful removal of a left chest central venous catheter.   I was present for and/or performed the critical portions of the procedure and immediately available throughout the entire procedure.   Performed and dictated at [facility].   Signed by: Noe Zaldivar 2/2/2024 4:39 PM Dictation workstation:   [id number]    Electrocardiogram, 12-lead PRN ACS symptoms    Result Date: 1/29/2024  Atrial fibrillation with occasional ventricular-paced complexes T wave abnormality, consider lateral ischemia Abnormal ECG When compared with ECG of 22-JAN-2024 08:26, Vent. rate has increased BY   4 BPM Confirmed by Edwin May ([id number]) on 1/29/2024 4:59:47 PM    Vascular US upper extremity venous duplex left    Result Date: 1/27/2024            [place] [address]   Tel [phone] and Fax [phone]  Vascular Lab Report VASC US UPPER EXTREMITY VENOUS DUPLEX LEFT  Patient Name:      SHAMEKA Mendez Physician: [id number] Jovanna Francis MD Study Date:        1/26/2024           Ordering           [id number] CHUCKY BLACKBURN                                        Physician: MRN/PID:           [id number]            Technologist:      Elisabeth Betts RVT Accession#:        [id number]        Technologist 2: Date of Birth/Age: 1945 / 78      Encounter#:        [id number]                    years Gender:            M Admission Status:  Inpatient           Location           [facility]                                        Performed:  Diagnosis/ICD: Left arm swelling-M79.89 Indication:    Limb swelling CPT Codes:     04950 Peripheral venous duplex scan for DVT Limited  CONCLUSIONS: Right Upper Venous:  Unable to visualize subclavian vein due to lines and bandages. Left Upper Venous: Technically limited exam; negative for acute deep vein thrombus in visualized veins. Unable to rule out deep vein thrombus in non-visualized mid and distal subclavian vein. The left basilic vein was not visualized. Left brachiocephalic arteriovenous fistula is noted. Visualized in segments due to bandages from recent dialysis access.  Additional Findings: Technically difficult due to patient positioning and limited mobility.  Imaging & Doppler Findings:  Left                Compress Thrombus        Flow Internal Jugular      Yes      None   Spontaneous/Phasic Subclavian Proximal   Yes      None   Spontaneous/Phasic Axillary              Yes      None   Spontaneous/Phasic Brachial              Yes      None Cephalic              Yes      None  [id number] Jovanna Francis MD Electronically signed by [id number] Jovanna Francis MD on 1/27/2024 at 2:56:51 PM  ** Final **     CT head wo IV contrast    Result Date: 1/26/2024  Interpreted By:  Marilyn Torres and Dervishi Mario STUDY: CT HEAD WO IV CONTRAST;  1/25/2024 10:08 pm   INDICATION: Signs/Symptoms:AMS CICU patient.   COMPARISON: None.   ACCESSION NUMBER(S): [id number]   ORDERING CLINICIAN: ALVIN AMBRIZ   TECHNIQUE: Noncontrast axial CT scan of head was performed. Angled reformats in brain and bone windows were generated. The images were reviewed in bone, brain, blood and soft tissue windows.   FINDINGS: CSF Spaces: The ventricles, sulci and basal cisterns are concordant with the degree of parenchymal atrophy. There is no extraaxial fluid collection.   Parenchyma: There are nonspecific patchy, confluent periventricular and subcortical white matter hypodensities which are likely sequela of chronic microvascular ischemic changes. Otherwise, the grey-white differentiation is intact. There is no mass effect or midline shift. There is no intracranial hemorrhage.   Calvarium: The calvarium is  unremarkable.   Paranasal sinuses and mastoids: Visualized paranasal sinuses and mastoids are clear.       1.  No evidence of acute intracranial hemorrhage or mass effect. 2. Nonspecific periventricular and subcortical white matter hypodensities likely sequela of chronic microvascular ischemic changes     I personally reviewed the images/study and I agree with the findings as stated by Resident Irwin Ingram MD. This study was interpreted at [facility], [place], [place].   MACRO: None   Signed by: Marilyn Torres 1/26/2024 7:23 AM Dictation workstation:   [id number]    XR abdomen 1 view    Result Date: 1/25/2024  Interpreted By:  Jori Garcia and Ritchie Brandon STUDY: XR ABDOMEN 1 VIEW;  1/25/2024 3:05 pm   INDICATION: Signs/Symptoms:CICU confirm NG placement.   COMPARISON: Chest x-ray 01/23/2024   ACCESSION NUMBER(S): [id number]   ORDERING CLINICIAN: ALVIN AMBRIZ   FINDINGS: Enteric tube courses midline below the level of the diaphragm with the tip located in the gastric body. The side-hole is below the GE junction.   Multiple gaseous distended loops of small and large bowel. For example there is a lucent area of gaseous ascending colon in the right upper quadrant measuring 5.2 cm. There is limited evaluation of pneumoperitoneum on supine imaging, however there does not appear to be any evidence of free air surrounding this gaseous distended loop of ascending/Rigler's sign. Right diaphragm is distinctly visualized separate from this loop of bowel without evidence of subdiaphragmatic free air.   Additional mildly prominent loops of small bowel scattered throughout the bilateral hemiabdomen, for example largest loop of small bowel can be seen in the left upper quadrant measuring 3.4 cm.   Visualized lungs are clear.   Osseous structures demonstrate no acute bony changes.       1. Enteric tube as described above with the distal tip projecting over the gastric body. 2. Multiple  mildly distended loops of small and large bowel as described above. Recommend attention on follow-up examinations as could reflect early ileus. 3. No definitive evidence of free air on current supine radiograph as described above.   MACRO: None   Signed by: Jori Garcia 1/25/2024 3:48 PM Dictation workstation:   [id number]    XR chest 1 view    Result Date: 1/23/2024  Interpreted By:  Jori Garcia and Ritchie Brandon STUDY: XR CHEST 1 VIEW;  1/23/2024 9:06 am   INDICATION: Signs/Symptoms:pneumothorax r/o. dyspnea..   COMPARISON: Chest x-ray 01/22/2024   ACCESSION NUMBER(S): [id number]   ORDERING CLINICIAN: DONNA SOLIMAN   FINDINGS: AP radiograph of the chest was provided.   Similar position of the left internal jugular approach central venous catheter with the distal tip projecting over the expected location of the distal SVC. There is a right-sided pacemaker/AICD with leads in similar position over the expected location of the right atrium/right ventricle.   CARDIOMEDIASTINAL SILHOUETTE: Cardiomediastinal silhouette is stable in size and configuration.   LUNGS: Similar low lung volumes/expiratory radiograph with bronchovascular crowding. Similar linear areas of bibasilar atelectasis. Improved bilateral pleural effusions. The previously described trace focal lucency projecting over the right lower lung field is no longer evident on current examination. No pneumothorax.   ABDOMEN: No remarkable upper abdominal findings.   BONES: No acute osseous changes.       1. Similar low lung volumes with improved bilateral pleural effusions. Unchanged bibasilar atelectasis. 2. No evidence of pneumothorax. 3. Medical devices as described above.   I personally reviewed the images/study and I agree with the findings as stated by resident Marvin Cueva. This study was interpreted at [facility], [place], [place].   MACRO: None   Signed by: Jori Garcia 1/23/2024 5:11 PM Dictation  workstation:   [id number]    Transthoracic Echo (TTE) Complete    Result Date: 1/23/2024   [facility], [address] [place] [address]                Tel [phone] and Fax [phone] TRANSTHORACIC ECHOCARDIOGRAM REPORT  Patient Name:      SHAMEKA SUN  Vanessa Physician:    [id number] Mis Hoffman MD Study Date:        1/23/2024            Ordering Provider:    [id number] CHUCKY BLACKBURN MRN/PID:           [id number]             Fellow: Accession#:        [id number]         Nurse: Date of Birth/Age: 1945 / 78 years Sonographer:          Roxana Pace RDCS Gender:            M                    Additional Staff: Height:            177.80 cm            Admit Date:           1/9/2024 Weight:            104.78 kg            Admission Status:     Inpatient -                                                               Routine BSA:               2.22 m2              Encounter#:           [id number]                                         Department Location:  [facility] Non                                                               Invasive Blood Pressure: 78 /65 mmHg Study Type:    TRANSTHORACIC ECHO (TTE) COMPLETE Diagnosis/ICD: Shock, unspecified-R57.9 Indication:    shock CPT Code:      Echo Complete w Full Doppler-01210 Patient History: Pertinent History: ESRD, HFrEF (35-40), AFib, PVD, AMS & Hypotension during HD,                    AO Root Dilitation, CAD, SSS, PHTN. Study Detail: The following Echo studies were performed: 2D, M-Mode, Doppler and               color flow. Technically challenging study due to poor acoustic               windows, prominent lung artifact, patient lying in supine               position, body habitus and coughing up phlegm. Definity used as a                contrast agent for endocardial border definition. Total contrast               used for this procedure was 1 mL via IV push. Unable to obtain               subcostal and suprasternal notch view.  PHYSICIAN INTERPRETATION: Left Ventricle: The left ventricular systolic function is mildly decreased, with an estimated ejection fraction of 40-45%. The patient is in atrial fibrillation which may influence the estimate of left ventricular function and transvalvular flows. There is global hypokinesis of the left ventricle with minor regional variations. The left ventricular cavity size is normal. There is left ventricular concentric remodeling. Left ventricular diastolic filling was indeterminate. Mildly increased LV wall thickness with proximal septal bulge up to 1.8 cm. Left Atrium: The left atrium is severely dilated. Right Ventricle: The right ventricle was not well visualized. Unable to determine right ventricular systolic function. A device is visualized in the right ventricle. Right Atrium: The right atrium is mildly dilated. There is a device visualized in the right atrium. Aortic Valve: The aortic valve is probably trileaflet. There is mild aortic valve cusp calcification. There is trivial aortic valve regurgitation. The peak instantaneous gradient of the aortic valve is 15.7 mmHg. The mean gradient of the aortic valve is 7.0 mmHg. Mitral Valve: The mitral valve is normal in structure. There is mild mitral annular calcification. There is mild mitral valve regurgitation. Tricuspid Valve: The tricuspid valve was not well visualized. There is trace to mild tricuspid regurgitation. The Doppler estimated RVSP is within normal limits at 35.5 mmHg. Pulmonic Valve: The pulmonic valve is structurally normal. There is physiologic pulmonic valve regurgitation. Pericardium: There is a trivial pericardial effusion. Aorta: The aortic root is abnormal. There is mild dilatation of the aortic root. In comparison to the previous  echocardiogram(s): Compared wit the prior exam from 10/2/2023 the LV appears slightly more dynamic today with LVEF 40-45% ( instead prior prior exam with LVEF 35-40%). There is still only mild MR. Note that the prior RVSP was moderately elevated at 64.8mmHg and is now normal or borderline at 35.5mmHg.  CONCLUSIONS:  1. Left ventricular systolic function is mildly decreased with a 40-45% estimated ejection fraction.  2. Poorly visualized anatomical structures due to suboptimal image quality.  3. Mildly increased LV wall thickness with proximal septal bulge up to 1.8 cm.  4. The left atrium is severely dilated.  5. RVSP within normal limits.  6. Compared wit the prior exam from 10/2/2023 the LV appears slightly more dynamic today with LVEF 40-45% ( instead prior prior exam with LVEF 35-40%). There is still only mild MR. Note that the prior RVSP was moderately elevated at 64.8mmHg and is now normal or borderline at 35.5mmHg.  7. The patient is in atrial fibrillation which may influence the estimate of left ventricular function and transvalvular flows.  8. There is global hypokinesis of the left ventricle with minor regional variations. QUANTITATIVE DATA SUMMARY: 2D MEASUREMENTS:                          Normal Ranges: Ao Root d:     3.70 cm   (2.0-3.7cm) LAs:           4.30 cm   (2.7-4.0cm) IVSd:          1.40 cm   (0.6-1.1cm) LVPWd:         1.30 cm   (0.6-1.1cm) LVIDd:         3.70 cm   (3.9-5.9cm) LVIDs:         3.10 cm LV Mass Index: 79.6 g/m2 LV % FS        16.2 % LA VOLUME:                               Normal Ranges: LA Vol A4C:        110.7 ml   (22+/-6mL/m2) LA Vol A2C:        149.4 ml LA Vol BP:         129.0 ml LA Vol Index A4C:  49.9ml/m2 LA Vol Index A2C:  67.3 ml/m2 LA Vol Index BP:   58.1 ml/m2 LA Area A4C:       30.3 cm2 LA Area A2C:       35.3 cm2 LA Major Axis A4C: 7.0 cm LA Major Axis A2C: 7.1 cm LA Volume Index:   58.1 ml/m2 RA VOLUME BY A/L METHOD:                       Normal Ranges: RA Area A4C:  19.9 cm2 AORTA MEASUREMENTS:                      Normal Ranges: Ao Sinus, d: 3.70 cm (2.1-3.5cm) LV SYSTOLIC FUNCTION BY 2D PLANIMETRY (MOD):                     Normal Ranges: EF-A4C View: 35.0 % (>=55%) EF-A2C View: 41.2 % EF-Biplane:  37.7 % LV DIASTOLIC FUNCTION:                           Normal Ranges: MV Peak E:    0.86 m/s    (0.7-1.2 m/s) MV e'         0.09 m/s    (>8.0) MV lateral e' 0.10 m/s MV medial e'  0.08 m/s MV A Dur:     193.00 msec E/e' Ratio:   9.57        (<8.0) a'            0.02 m/s MITRAL VALVE:                 Normal Ranges: MV DT: 193 msec (150-240msec) AORTIC VALVE:                                    Normal Ranges: AoV Vmax:                1.98 m/s  (<=1.7m/s) AoV Peak PG:             15.7 mmHg (<20mmHg) AoV Mean P.0 mmHg  (1.7-11.5mmHg) LVOT Max Dann:            0.91 m/s  (<=1.1m/s) AoV VTI:                 36.20 cm  (18-25cm) LVOT VTI:                17.20 cm LVOT Diameter:           1.90 cm   (1.8-2.4cm) AoV Area, VTI:           1.35 cm2  (2.5-5.5cm2) AoV Area,Vmax:           1.31 cm2  (2.5-4.5cm2) AoV Dimensionless Index: 0.48  RIGHT VENTRICLE: RV Basal 5.30 cm RV Major 8.9 cm RV s'    0.04 m/s TRICUSPID VALVE/RVSP:                             Normal Ranges: Peak TR Velocity: 2.85 m/s RV Syst Pressure: 35.5 mmHg (< 30mmHg) PULMONIC VALVE:                         Normal Ranges: PV Accel Time: 103 msec (>120ms) PV Max Dann:    1.0 m/s  (0.6-0.9m/s) PV Max PG:     3.6 mmHg  [id number] Mis Hoffman MD Electronically signed on 2024 at 3:42:15 PM  ** Final **     XR chest 1 view    Result Date: 2024  Interpreted By:  Jori Garcia and Dervishi Mario STUDY: XR CHEST 1 VIEW;  2024 8:21 pm   INDICATION: Signs/Symptoms:aborted central line, r/o pneumothorax.   COMPARISON: Chest radiograph: 2020   ACCESSION NUMBER(S): [id number]   ORDERING CLINICIAN: SALVADOR OLIVARES   FINDINGS: AP radiograph of the chest was provided.   A right-sided ICD device is again noted. Right IJ  central venous catheter with the tip projecting over the distal SVC.   CARDIOMEDIASTINAL SILHOUETTE: Cardiomediastinal silhouette is stable in size and configuration enlarged.   LUNGS: There is persistent low lung volumes with associated bronchovascular crowding. There is re-demonstration of blunting of bilateral right worse than left costophrenic angles suggesting pleural effusions with associated atelectasis. A trace focal lucency is seen projecting over the right lower lung field. No pneumothorax of the left lung.   ABDOMEN: No remarkable upper abdominal findings.   BONES: No acute osseous changes.       1.  Large right and small left pleural effusions with associated atelectasis. 2. Low lung volumes with associated bronchovascular crowding. 3. Trace focal lucency projecting over the right lower lung field which may be artifactual however a trace pneumothorax is not excluded. Repeat radiograph can be obtained as clinically indicated. 4. Medical devices as above.   I personally reviewed the images/study and I agree with the findings as stated. This study was interpreted at [place], [place].   MACRO: NONE.   Signed by: Jori Garcia 1/23/2024 9:01 AM Dictation workstation:   [id number]    XR chest 2 views    Result Date: 1/21/2024  Interpreted By:  Kirk Vanessa, STUDY: XR CHEST 2 VIEWS;  1/21/2024 9:19 am   INDICATION: Signs/Symptoms:s/p dialysis.   COMPARISON: Chest radiograph 01/16/2024 and chest CT 10/08/2023   ACCESSION NUMBER(S): [id number]   ORDERING CLINICIAN: GIBRAN ARMIJO   FINDINGS: PA and lateral radiographs of the chest, including dual energy subtraction images are available for interpretation. The evaluation is somewhat limited by patient rotation and suboptimal positioning. Stable positioning of a right subclavian approach dual chamber cardiac pacemaker. Left subclavian approach hemodialysis catheter tip overlies mid to lower SVC, unchanged.    CARDIOMEDIASTINAL SILHOUETTE: The cardiomediastinal silhouette is grossly stable in size and configuration.   LUNGS: Interval slight worsening of mild diffuse pulmonary edema. Small bibasilar pleural effusions, overall improved from prior. Interval worsening of right basilar opacification. No pneumothorax is seen.   ABDOMEN: No remarkable upper abdominal findings.   BONES: No acute osseous abnormality.       1. Limited examination with interval slight worsening of mild diffuse pulmonary edema. Correlate with patient's volume status. 2. Interval worsening of right basilar opacification, which may represent right middle/lower lobe atelectasis. Correlate with concern for central mucous plugging. 3. Small bibasilar pleural effusions have improved as compared to prior study.   Signed by: Kirk Vanessa 1/21/2024 10:58 AM Dictation workstation:   [id number]    XR foot right 1-2 views    Result Date: 1/19/2024  Interpreted By:  Liz Ruiz and Ohs Zachary STUDY: Right foot, 3 views.   INDICATION: Signs/Symptoms:post op tma   COMPARISON: Right foot radiograph 01/09/2024..   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 Sharp margination of the metatarsal stumps. Unremarkable appearance of the soft tissues of the stump with overlying wound VAC noted. Prominent vascular calcifications of the lower leg and ankle.   Remote appearing healed Douglas B distal fibular fracture with unchanged cortical irregularity.       1. 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 2. Additional findings as above.   I personally reviewed the images/study and I agree with the findings as stated by Dr. Aly Brand. This study was interpreted at [facility], [place],  [place].   MACRO: None.   Signed by: Liz Koselsa 1/19/2024 5:25 PM Dictation workstation:   [id number]        Assessment/Plan   Principal Problem:    Balanitis  Active Problems:    Sepsis (CMS/HCC)    Chronic renal failure    Ulcer of lower extremity (CMS/HCC)    Hypotension    # Sepsis  # Hypotension  # Balanitis?  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%   - TTE 1/23/24 with LVEF to 40-45%, mildly improved from previous in 2023, no other new acute findings    -Uptitrate GDMT as tolerated--> consider hydralazine and isordil if needed, currently limited 2/2 hypotension on midodrine  - consideration should be given to further ischemic workup in the outpatient setting due to extensive peripheral disease, risk factors, and no evidence of previous ischemic cardiac workup        # ESRD  -on HD  -nephrology consult    # Anemia of chronic disease  -At baseline hgb  - continue daily CBC     # DM with neuropathy  - A1c 5.3% 12/2023  -Cont home gabapentin  -Insulin glargine 6 units nightly    -SSI    # DVT ppx                  Randall Wallace MD

## 2024-02-17 NOTE — ED NOTES
Ej attempted by medic, line blew. CVC started right groin by MD. Cefepime started.      Kristin Gates RN  02/17/24 [id number]

## 2024-02-17 NOTE — PROGRESS NOTES
.Report from Sending RN:    Report From: Sobia Herrera RN  Recent Surgery of Procedure: Yes see EMR  Baseline Level of Consciousness (LOC): A&Ox1  Oxygen Use: Yes, 2liters  Type: supplemental  Diabetic: Yes  Last BP Med Given Day of Dialysis: see EMR  Last Pain Med Given: see EMR  Lab Tests to be Obtained with Dialysis: No  Blood Transfusion to be Given During Dialysis: No  Available IV Access: Yes, see EMR  Medications to be Administered During Dialysis: No  Continuous IV Infusion Running: No  Restraints on Currently or in the Last 24 Hours: No  Hand-Off Communication: Pt arrived from ED, via nursing facility. Patient is alert and oreint to self per ABEL Ramsay at bedside. Pt uses oxygen continous.

## 2024-02-17 NOTE — ED NOTES
Report given by nightshift nurse mazin. Nightshift had USIV but upon assessment did not draw or flush dcing it. ABX were attempted to be started but midline is leaking at insertion site. Cultures were sent. Abx will be started when new line is inserted. Can use right arm only.      Kristin Gates, RN  02/17/24 [id number]

## 2024-02-18 NOTE — CARE PLAN
Problem: Skin  Goal: Decreased wound size/increased tissue granulation at next dressing change  Outcome: Progressing  Goal: Participates in plan/prevention/treatment measures  Outcome: Progressing  Goal: Prevent/manage excess moisture  Outcome: Progressing  Goal: Prevent/minimize sheer/friction injuries  Outcome: Progressing  Goal: Promote/optimize nutrition  Outcome: Progressing  Goal: Promote skin healing  Outcome: Progressing   The patient's goals for the shift include      The clinical goals for the shift include remain hemodynamically stable

## 2024-02-18 NOTE — CARE PLAN
Problem: Skin  Goal: Decreased wound size/increased tissue granulation at next dressing change  Outcome: Progressing  Goal: Participates in plan/prevention/treatment measures  Outcome: Progressing  Goal: Prevent/manage excess moisture  Outcome: Progressing  Flowsheets (Taken 2/18/2024 0334)  Prevent/manage excess moisture:   Moisturize dry skin   Cleanse incontinence/protect with barrier cream  Goal: Prevent/minimize sheer/friction injuries  Outcome: Progressing  Goal: Promote/optimize nutrition  Outcome: Progressing  Goal: Promote skin healing  Outcome: Progressing     Problem: Diabetes  Goal: Achieve decreasing blood glucose levels by end of shift  Outcome: Progressing  Goal: Increase stability of blood glucose readings by end of shift  Outcome: Progressing  Goal: Decrease in ketones present in urine by end of shift  Outcome: Progressing  Goal: Maintain electrolyte levels within acceptable range throughout shift  Outcome: Progressing  Goal: Maintain glucose levels >70mg/dl to <250mg/dl throughout shift  Outcome: Progressing  Goal: No changes in neurological exam by end of shift  Outcome: Progressing  Goal: Learn about and adhere to nutrition recommendations by end of shift  Outcome: Progressing  Goal: Vital signs within normal range for age by end of shift  Outcome: Progressing  Goal: Increase self care and/or family involovement by end of shift  Outcome: Progressing  Goal: Receive DSME education by end of shift  Outcome: Progressing     Problem: Pain - Adult  Goal: Verbalizes/displays adequate comfort level or baseline comfort level  Outcome: Progressing     Problem: Safety - Adult  Goal: Free from fall injury  Outcome: Progressing     The clinical goals for the shift include Patient will remain free of injury for rmainder of shift

## 2024-02-18 NOTE — PROGRESS NOTES
Chevy Benton is a 78 y.o. male on day 1 of admission presenting with Balanitis.      Subjective   Pt seen and examined.        Objective     Last Recorded Vitals  /83   Pulse 90   Temp 36.8 °C (98.2 °F) (Temporal)   Resp 14   Wt 89 kg (196 lb 3.4 oz)   SpO2 99%   Intake/Output last 3 Shifts:    Intake/Output Summary (Last 24 hours) at 2/18/2024 1043  Last data filed at 2/17/2024 2118  Gross per 24 hour   Intake 2000 ml   Output 0 ml   Net 2000 ml       Admission Weight  Weight: 87.5 kg (193 lb) (02/17/24 1128)    Daily Weight  02/18/24 : 89 kg (196 lb 3.4 oz)      Physical Exam  Constitutional:       Appearance: Normal appearance.   HENT:      Head: Normocephalic and atraumatic.      Nose: Nose normal.      Mouth/Throat:      Pharynx: Oropharynx is clear.   Eyes:      Extraocular Movements: Extraocular movements intact.      Pupils: Pupils are equal, round, and reactive to light.   Cardiovascular:      Rate and Rhythm: Normal rate and regular rhythm.      Pulses: Normal pulses.   Pulmonary:      Effort: Pulmonary effort is normal.      Breath sounds: Normal breath sounds.   Abdominal:      General: There is no distension.      Tenderness: There is no abdominal tenderness. There is no right CVA tenderness, left CVA tenderness or guarding.   Musculoskeletal:         General: No swelling or deformity. Normal range of motion.      Cervical back: Normal range of motion.      Right lower leg: No edema.      Left lower leg: No edema.      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   Skin:     General: Skin is warm and dry.      Capillary Refill: Capillary refill takes less than 2 seconds.   Neurological:      General: No focal deficit present.      Mental Status: He is alert and oriented to person, place, and time.   Psychiatric:         Mood and Affect: Mood normal.         Behavior: Behavior normal.  Relevant Results  Results for orders placed or  performed during the hospital encounter of 02/16/24 (from the past 24 hour(s))   POCT GLUCOSE   Result Value Ref Range    POCT Glucose 135 (H) 74 - 99 mg/dL   POCT GLUCOSE   Result Value Ref Range    POCT Glucose 276 (H) 74 - 99 mg/dL   POCT GLUCOSE   Result Value Ref Range    POCT Glucose 177 (H) 74 - 99 mg/dL   CBC   Result Value Ref Range    WBC 13.5 (H) 4.4 - 11.3 x10*3/uL    nRBC 0.1 (H) 0.0 - 0.0 /100 WBCs    RBC 2.52 (L) 4.50 - 5.90 x10*6/uL    Hemoglobin 7.4 (L) 13.5 - 17.5 g/dL    Hematocrit 24.4 (L) 41.0 - 52.0 %    MCV 97 80 - 100 fL    MCH 29.4 26.0 - 34.0 pg    MCHC 30.3 (L) 32.0 - 36.0 g/dL    RDW 20.5 (H) 11.5 - 14.5 %    Platelets 424 150 - 450 x10*3/uL   Comprehensive Metabolic Panel   Result Value Ref Range    Glucose 184 (H) 74 - 99 mg/dL    Sodium 132 (L) 136 - 145 mmol/L    Potassium 3.5 3.5 - 5.3 mmol/L    Chloride 93 (L) 98 - 107 mmol/L    Bicarbonate 32 21 - 32 mmol/L    Anion Gap 11 10 - 20 mmol/L    Urea Nitrogen 26 (H) 6 - 23 mg/dL    Creatinine 1.96 (H) 0.50 - 1.30 mg/dL    eGFR 34 (L) >60 mL/min/1.73m*2    Calcium 9.5 8.6 - 10.3 mg/dL    Albumin 2.6 (L) 3.4 - 5.0 g/dL    Alkaline Phosphatase 133 33 - 136 U/L    Total Protein 5.6 (L) 6.4 - 8.2 g/dL    AST 14 9 - 39 U/L    Bilirubin, Total 0.5 0.0 - 1.2 mg/dL    ALT 16 10 - 52 U/L   POCT GLUCOSE   Result Value Ref Range    POCT Glucose 160 (H) 74 - 99 mg/dL        No orders to display       Scheduled medications  apixaban, 2.5 mg, oral, BID  atorvastatin, 40 mg, oral, Nightly  clopidogrel, 75 mg, oral, Daily  [START ON 2/19/2024] epoetin dane or biosimilar, 6,000 Units, subcutaneous, Once per day on Mon Wed Fri  gabapentin, 100 mg, oral, Daily  insulin glargine, 6 Units, subcutaneous, Nightly  insulin lispro, 0-5 Units, subcutaneous, TID with meals  midodrine, 15 mg, oral, q8h  pantoprazole, 40 mg, oral, Daily before breakfast  polyethylene glycol, 17 g, oral, Daily      Continuous medications     PRN medications  PRN medications:  albuterol, dextrose 10 % in water (D10W), dextrose, glucagon, ipratropium-albuteroL, vancomycin         Assessment/Plan   This patient currently has cardiac telemetry ordered; if you would like to modify or discontinue the telemetry order, click here to go to the orders activity to modify/discontinue the order.    This patient has a central line   Reason for the central line remaining today? Parenteral medication            Principal Problem:    Balanitis  Active Problems:    Sepsis (CMS/[facility])    Chronic renal failure    Ulcer of lower extremity (CMS/[facility])    Hypotension    # Sepsis  # Hypotension  # Balanitis?  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%   - TTE 1/23/24 with LVEF to 40-45%, mildly improved from previous in 2023, no other new acute findings    -Uptitrate GDMT as tolerated--> consider hydralazine and isordil if needed, currently limited 2/2 hypotension on midodrine  - consideration should be given to further ischemic workup in the outpatient setting due to extensive peripheral disease, risk factors, and no evidence of previous ischemic cardiac workup         # ESRD  -on HD  -nephrology consult     # Anemia of chronic disease  -At baseline hgb  - continue daily CBC     # DM with neuropathy  - A1c 5.3% 12/2023  -Cont home gabapentin  -Insulin glargine 6 units nightly    -SSI     # DVT ppx              Randall Wallace MD

## 2024-02-18 NOTE — PROGRESS NOTES
Chevy Benton is a 78 y.o. male on day 1 of admission presenting with Balanitis.    Subjective   Mr. Benton is a 78-year-old man with a history of hypercapnic respiratory failure, heart failure with reduced EF, diabetes, end-stage kidney disease on dialysis.  He presented with a high potassium, no apparent EKG changes.  He was suffering penile pain, that had been going on for approximately 2 weeks.  He was dialyzed here yesterday.  Being treated for balanitis.  No new complaints.  He can tell me little regarding who his nephrologist is, where he goes to dialysis.  He was getting 5 day/week dialysis presumably at a nursing home.       Objective       Physical Exam  Constitutional:       Appearance: Normal appearance.   HENT:      Mouth/Throat:      Mouth: Mucous membranes are moist.   Eyes:      Extraocular Movements: Extraocular movements intact.      Pupils: Pupils are equal, round, and reactive to light.   Cardiovascular:      Rate and Rhythm: Regular rhythm.      Heart sounds: S1 normal and S2 normal.   Pulmonary:      Breath sounds: Normal breath sounds.   Abdominal:      Comments: Soft, NT/ND, no masses, normal bowel sounds   Genitourinary:     Penile edema  Musculoskeletal:   Bilateral upper extremity edema  Skin:     General: Skin is warm and dry.   Neurological:      General: No focal deficit present.      Mental Status: She is alert and oriented to person, place, and time.   Psychiatric:         Behavior: Behavior normal.    Left upper extremity thrill and bruit    Meds    Current Facility-Administered Medications:     albuterol 2.5 mg /3 mL (0.083 %) nebulizer solution 2.5 mg, 2.5 mg, nebulization, q6h PRN, Mumtaz Almaraz, PharmD    apixaban (Eliquis) tablet 2.5 mg, 2.5 mg, oral, BID, Randall Wallace MD, 2.5 mg at 02/17/24 2147    atorvastatin (Lipitor) tablet 40 mg, 40 mg, oral, Nightly, Randall Wallace MD, 40 mg at 02/17/24 2147    clopidogrel (Plavix) tablet 75 mg, 75 mg, oral, Daily,  "Randall Wallace MD, 75 mg at 02/17/24 1129    dextrose 10 % in water (D10W) infusion, 0.3 g/kg/hr, intravenous, Once PRN, Randall Wallace MD    dextrose 50 % injection 25 g, 25 g, intravenous, q15 min PRN, Randall Wallace MD    gabapentin (Neurontin) capsule 100 mg, 100 mg, oral, Daily, Randall Wallace MD, 100 mg at 02/17/24 1129    glucagon (Glucagen) injection 1 mg, 1 mg, intramuscular, q15 min PRN, Randall Wallace MD    insulin glargine (Lantus) injection 6 Units, 6 Units, subcutaneous, Nightly, Randall Wallace MD, 6 Units at 02/17/24 2258    insulin lispro (HumaLOG) injection 0-5 Units, 0-5 Units, subcutaneous, TID with meals, Randall Wallace MD    ipratropium-albuteroL (Duo-Neb) 0.5-2.5 mg/3 mL nebulizer solution 3 mL, 3 mL, nebulization, q2h PRN, Randall Wallace MD    midodrine (Proamatine) tablet 15 mg, 15 mg, oral, q8h, Sabi Jimenez MD, 15 mg at 02/17/24 1128    pantoprazole (ProtoNix) EC tablet 40 mg, 40 mg, oral, Daily before breakfast, Randall Wallace MD, 40 mg at 02/18/24 0658    polyethylene glycol (Glycolax, Miralax) packet 17 g, 17 g, oral, Daily, Randall Wallace MD    vancomycin (Vancocin) placeholder, , miscellaneous, Daily PRN, Romana A Kuchta, PharmD   Medications Discontinued During This Encounter   Medication Reason    epoetin dane (Epogen,Procrit) 10,000 unit/mL injection Entered in Error    cefepime (Maxipime) 1 g in dextrose 5 % 50 mL IV     cefepime (Maxipime) 1 g in dextrose 5 % 50 mL IV     albuterol 90 mcg/actuation inhaler 2 puff     ipratropium-albuteroL (Duo-Neb) 0.5-2.5 mg/3 mL nebulizer solution 3 mL         Allergies  Allergies   Allergen Reactions    Penicillins Hives        Last Recorded Vitals  Blood pressure 151/83, pulse 90, temperature 36.8 °C (98.2 °F), temperature source Temporal, resp. rate 14, height 1.778 m (5' 10\"), weight 89 kg (196 lb 3.4 oz), SpO2 99 %.  Intake/Output last 3 Shifts:  I/O last 3 completed shifts:  In: 2000 " (22.5 mL/kg) [I.V.:1600 (18 mL/kg); Other:400]  Out: 0 (0 mL/kg)   Weight: 89 kg     Last 24 hour Results  Results for orders placed or performed during the hospital encounter of 02/16/24 (from the past 24 hour(s))   CBC and Auto Differential   Result Value Ref Range    WBC 14.0 (H) 4.4 - 11.3 x10*3/uL    nRBC 0.2 (H) 0.0 - 0.0 /100 WBCs    RBC 2.84 (L) 4.50 - 5.90 x10*6/uL    Hemoglobin 8.3 (L) 13.5 - 17.5 g/dL    Hematocrit 28.0 (L) 41.0 - 52.0 %    MCV 99 80 - 100 fL    MCH 29.2 26.0 - 34.0 pg    MCHC 29.6 (L) 32.0 - 36.0 g/dL    RDW 20.7 (H) 11.5 - 14.5 %    Platelets 431 150 - 450 x10*3/uL    Neutrophils % 74.1 40.0 - 80.0 %    Immature Granulocytes %, Automated 1.2 (H) 0.0 - 0.9 %    Lymphocytes % 11.9 13.0 - 44.0 %    Monocytes % 10.2 2.0 - 10.0 %    Eosinophils % 2.0 0.0 - 6.0 %    Basophils % 0.6 0.0 - 2.0 %    Neutrophils Absolute 10.36 (H) 1.60 - 5.50 x10*3/uL    Immature Granulocytes Absolute, Automated 0.17 0.00 - 0.50 x10*3/uL    Lymphocytes Absolute 1.66 0.80 - 3.00 x10*3/uL    Monocytes Absolute 1.42 (H) 0.05 - 0.80 x10*3/uL    Eosinophils Absolute 0.28 0.00 - 0.40 x10*3/uL    Basophils Absolute 0.08 0.00 - 0.10 x10*3/uL   Comprehensive Metabolic Panel   Result Value Ref Range    Glucose 129 (H) 74 - 99 mg/dL    Sodium 131 (L) 136 - 145 mmol/L    Potassium 3.3 (L) 3.5 - 5.3 mmol/L    Chloride 90 (L) 98 - 107 mmol/L    Bicarbonate 32 21 - 32 mmol/L    Anion Gap 12 10 - 20 mmol/L    Urea Nitrogen 39 (H) 6 - 23 mg/dL    Creatinine 2.63 (H) 0.50 - 1.30 mg/dL    eGFR 24 (L) >60 mL/min/1.73m*2    Calcium 9.9 8.6 - 10.3 mg/dL    Albumin 2.7 (L) 3.4 - 5.0 g/dL    Alkaline Phosphatase 164 (H) 33 - 136 U/L    Total Protein 5.7 (L) 6.4 - 8.2 g/dL    AST 19 9 - 39 U/L    Bilirubin, Total 0.5 0.0 - 1.2 mg/dL    ALT 16 10 - 52 U/L   Lactate   Result Value Ref Range    Lactate 1.0 0.4 - 2.0 mmol/L   Basic metabolic panel   Result Value Ref Range    Glucose 129 (H) 74 - 99 mg/dL    Sodium 131 (L) 136 - 145 mmol/L     Potassium 3.3 (L) 3.5 - 5.3 mmol/L    Chloride 90 (L) 98 - 107 mmol/L    Bicarbonate 32 21 - 32 mmol/L    Anion Gap 12 10 - 20 mmol/L    Urea Nitrogen 39 (H) 6 - 23 mg/dL    Creatinine 2.63 (H) 0.50 - 1.30 mg/dL    eGFR 24 (L) >60 mL/min/1.73m*2    Calcium 9.9 8.6 - 10.3 mg/dL   Morphology   Result Value Ref Range    RBC Morphology See Below     Polychromasia Mild     Ovalocytes Few    POCT GLUCOSE   Result Value Ref Range    POCT Glucose 135 (H) 74 - 99 mg/dL   POCT GLUCOSE   Result Value Ref Range    POCT Glucose 276 (H) 74 - 99 mg/dL   POCT GLUCOSE   Result Value Ref Range    POCT Glucose 177 (H) 74 - 99 mg/dL   CBC   Result Value Ref Range    WBC 13.5 (H) 4.4 - 11.3 x10*3/uL    nRBC 0.1 (H) 0.0 - 0.0 /100 WBCs    RBC 2.52 (L) 4.50 - 5.90 x10*6/uL    Hemoglobin 7.4 (L) 13.5 - 17.5 g/dL    Hematocrit 24.4 (L) 41.0 - 52.0 %    MCV 97 80 - 100 fL    MCH 29.4 26.0 - 34.0 pg    MCHC 30.3 (L) 32.0 - 36.0 g/dL    RDW 20.5 (H) 11.5 - 14.5 %    Platelets 424 150 - 450 x10*3/uL   Comprehensive Metabolic Panel   Result Value Ref Range    Glucose 184 (H) 74 - 99 mg/dL    Sodium 132 (L) 136 - 145 mmol/L    Potassium 3.5 3.5 - 5.3 mmol/L    Chloride 93 (L) 98 - 107 mmol/L    Bicarbonate 32 21 - 32 mmol/L    Anion Gap 11 10 - 20 mmol/L    Urea Nitrogen 26 (H) 6 - 23 mg/dL    Creatinine 1.96 (H) 0.50 - 1.30 mg/dL    eGFR 34 (L) >60 mL/min/1.73m*2    Calcium 9.5 8.6 - 10.3 mg/dL    Albumin 2.6 (L) 3.4 - 5.0 g/dL    Alkaline Phosphatase 133 33 - 136 U/L    Total Protein 5.6 (L) 6.4 - 8.2 g/dL    AST 14 9 - 39 U/L    Bilirubin, Total 0.5 0.0 - 1.2 mg/dL    ALT 16 10 - 52 U/L        Imaging results  Electrocardiogram, 12-lead PRN ACS symptoms    Result Date: 2/6/2024  Atrial fibrillation with premature ventricular or aberrantly conducted complexes Low voltage QRS Nonspecific T wave abnormality Abnormal ECG When compared with ECG of 23-JAN-2024 04:46, No significant change since Confirmed by Edwin May ([id number]) on 2/6/2024 3:23:42  PM    Electrocardiogram, 12-lead PRN ACS symptoms    Result Date: 2/3/2024  Atrial fibrillation with occasional ventricular-paced complexes Nonspecific T wave abnormality Abnormal ECG When compared with ECG of 22-JAN-2024 08:27, Vent. rate has decreased BY  16 BPM Confirmed by Edwin May ([id number]) on 2/3/2024 11:11:22 PM    IR CVC removal    Result Date: 2/2/2024  Interpreted By:  Noe Zaldivar, STUDY: IR CVC REMOVAL;  2/2/2024 3:20 pm   INDICATION: Signs/Symptoms:removal of tunneled line.   COMPARISON: None.   ACCESSION NUMBER(S): [id number]   ORDERING CLINICIAN: LUKE HWANG   TECHNIQUE: INTERVENTIONALIST(S): Noe Zaldivar CNP   CONSENT: The patient/patient's POA/next of kin was informed of the nature of the proposed procedure. The purposes, alternatives, risks, and benefits were explained and discussed. All questions were answered and consent was obtained.     MEDICATION/CONTRAST: No additional   TIME OUT: A time out was performed immediately prior to procedure start with the interventional team, correctly identifying the patient name, date of birth, MRN, procedure, anatomy (including marking of site and side), patient position, procedure consent form, relevant laboratory and imaging test results, antibiotic administration, safety precautions, and procedure-specific equipment needs.   COMPLICATIONS: No immediate adverse events identified.   FINDINGS: The existing skin site over the tunneled catheter insertion site was prepped and draped with maximal sterile manner.   The skin and subcutaneous tissues at the original catheter site were anesthetized with 1% lidocaine with epinephrine.  The subcutaneous tissues surrounding the catheter were anesthetized with 1% lidocaine. Using blunt dissection, the catheter cuff was externalized and the catheter leading to the jugular vein was completely removed. Hemostasis was obtained using manual compression at the internal jugular vein access site.   The incision site  was covered with a sterile dressing and tegaderm was subsequently applied.   There were no immediate or post-procedural complications.       Uneventful removal of a left chest central venous catheter.   I was present for and/or performed the critical portions of the procedure and immediately available throughout the entire procedure.   Performed and dictated at [facility].   Signed by: Noe Zaldivar 2/2/2024 4:39 PM Dictation workstation:   [id number]    Electrocardiogram, 12-lead PRN ACS symptoms    Result Date: 1/29/2024  Atrial fibrillation with occasional ventricular-paced complexes T wave abnormality, consider lateral ischemia Abnormal ECG When compared with ECG of 22-JAN-2024 08:26, Vent. rate has increased BY   4 BPM Confirmed by Edwin May ([id number]) on 1/29/2024 4:59:47 PM    Vascular US upper extremity venous duplex left    Result Date: 1/27/2024            [place] [address]   Tel [phone] and Fax [phone]  Vascular Lab Report VASC US UPPER EXTREMITY VENOUS DUPLEX LEFT  Patient Name:      SHAMEKA Mendez Physician: [id number] Jovanna Francis MD Study Date:        1/26/2024           Ordering           [id number] CHUCKY BLACKBURN                                        Physician: MRN/PID:           [id number]            Technologist:      Elisabeth Betts T Accession#:        [id number]        Technologist 2: Date of Birth/Age: 1945 / 78      Encounter#:        [id number]                    years Gender:            M Admission Status:  Inpatient           Location           [facility]                                        Performed:  Diagnosis/ICD: Left arm swelling-M79.89 Indication:    Limb swelling CPT Codes:     09527 Peripheral venous duplex scan for DVT Limited  CONCLUSIONS: Right Upper Venous: Unable to visualize subclavian vein  due to lines and bandages. Left Upper Venous: Technically limited exam; negative for acute deep vein thrombus in visualized veins. Unable to rule out deep vein thrombus in non-visualized mid and distal subclavian vein. The left basilic vein was not visualized. Left brachiocephalic arteriovenous fistula is noted. Visualized in segments due to bandages from recent dialysis access.  Additional Findings: Technically difficult due to patient positioning and limited mobility.  Imaging & Doppler Findings:  Left                Compress Thrombus        Flow Internal Jugular      Yes      None   Spontaneous/Phasic Subclavian Proximal   Yes      None   Spontaneous/Phasic Axillary              Yes      None   Spontaneous/Phasic Brachial              Yes      None Cephalic              Yes      None  [id number] Jovanna Francis MD Electronically signed by [id number] Jovanna Francis MD on 1/27/2024 at 2:56:51 PM  ** Final **     CT head wo IV contrast    Result Date: 1/26/2024  Interpreted By:  Marilyn Torres and Dervishi Mario STUDY: CT HEAD WO IV CONTRAST;  1/25/2024 10:08 pm   INDICATION: Signs/Symptoms:AMS CICU patient.   COMPARISON: None.   ACCESSION NUMBER(S): [id number]   ORDERING CLINICIAN: ALVIN AMBRIZ   TECHNIQUE: Noncontrast axial CT scan of head was performed. Angled reformats in brain and bone windows were generated. The images were reviewed in bone, brain, blood and soft tissue windows.   FINDINGS: CSF Spaces: The ventricles, sulci and basal cisterns are concordant with the degree of parenchymal atrophy. There is no extraaxial fluid collection.   Parenchyma: There are nonspecific patchy, confluent periventricular and subcortical white matter hypodensities which are likely sequela of chronic microvascular ischemic changes. Otherwise, the grey-white differentiation is intact. There is no mass effect or midline shift. There is no intracranial hemorrhage.   Calvarium: The calvarium is unremarkable.   Paranasal sinuses and  mastoids: Visualized paranasal sinuses and mastoids are clear.       1.  No evidence of acute intracranial hemorrhage or mass effect. 2. Nonspecific periventricular and subcortical white matter hypodensities likely sequela of chronic microvascular ischemic changes     I personally reviewed the images/study and I agree with the findings as stated by Resident Irwin Ingram MD. This study was interpreted at [facility], [place], [place].   MACRO: None   Signed by: Marilyn Torres 1/26/2024 7:23 AM Dictation workstation:   [id number]    XR abdomen 1 view    Result Date: 1/25/2024  Interpreted By:  Jori Garcia and Ritchie Brandon STUDY: XR ABDOMEN 1 VIEW;  1/25/2024 3:05 pm   INDICATION: Signs/Symptoms:CICU confirm NG placement.   COMPARISON: Chest x-ray 01/23/2024   ACCESSION NUMBER(S): [id number]   ORDERING CLINICIAN: ALVIN AMBRIZ   FINDINGS: Enteric tube courses midline below the level of the diaphragm with the tip located in the gastric body. The side-hole is below the GE junction.   Multiple gaseous distended loops of small and large bowel. For example there is a lucent area of gaseous ascending colon in the right upper quadrant measuring 5.2 cm. There is limited evaluation of pneumoperitoneum on supine imaging, however there does not appear to be any evidence of free air surrounding this gaseous distended loop of ascending/Rigler's sign. Right diaphragm is distinctly visualized separate from this loop of bowel without evidence of subdiaphragmatic free air.   Additional mildly prominent loops of small bowel scattered throughout the bilateral hemiabdomen, for example largest loop of small bowel can be seen in the left upper quadrant measuring 3.4 cm.   Visualized lungs are clear.   Osseous structures demonstrate no acute bony changes.       1. Enteric tube as described above with the distal tip projecting over the gastric body. 2. Multiple mildly distended loops of small and  large bowel as described above. Recommend attention on follow-up examinations as could reflect early ileus. 3. No definitive evidence of free air on current supine radiograph as described above.   MACRO: None   Signed by: Jori Garcia 1/25/2024 3:48 PM Dictation workstation:   [id number]    XR chest 1 view    Result Date: 1/23/2024  Interpreted By:  Jori Garcia and Ritchie Brandon STUDY: XR CHEST 1 VIEW;  1/23/2024 9:06 am   INDICATION: Signs/Symptoms:pneumothorax r/o. dyspnea..   COMPARISON: Chest x-ray 01/22/2024   ACCESSION NUMBER(S): [id number]   ORDERING CLINICIAN: DONNA SOLIMAN   FINDINGS: AP radiograph of the chest was provided.   Similar position of the left internal jugular approach central venous catheter with the distal tip projecting over the expected location of the distal SVC. There is a right-sided pacemaker/AICD with leads in similar position over the expected location of the right atrium/right ventricle.   CARDIOMEDIASTINAL SILHOUETTE: Cardiomediastinal silhouette is stable in size and configuration.   LUNGS: Similar low lung volumes/expiratory radiograph with bronchovascular crowding. Similar linear areas of bibasilar atelectasis. Improved bilateral pleural effusions. The previously described trace focal lucency projecting over the right lower lung field is no longer evident on current examination. No pneumothorax.   ABDOMEN: No remarkable upper abdominal findings.   BONES: No acute osseous changes.       1. Similar low lung volumes with improved bilateral pleural effusions. Unchanged bibasilar atelectasis. 2. No evidence of pneumothorax. 3. Medical devices as described above.   I personally reviewed the images/study and I agree with the findings as stated by resident Marvin Cueva. This study was interpreted at [place], [place].   MACRO: None   Signed by: Jori Garcia 1/23/2024 5:11 PM Dictation workstation:   [id number]    Transthoracic  Echo (TTE) Complete    Result Date: 1/23/2024   [facility] (formerly [facility]3], [address] [place] [address], [place] [address]                Tel [phone] and Fax [phone] TRANSTHORACIC ECHOCARDIOGRAM REPORT  Patient Name:      SHAMEKA BORGESZACK Mendez Physician:    [id number] Mis Hoffman MD Study Date:        1/23/2024            Ordering Provider:    [id number] CHUCKY BLACKBURN MRN/PID:           [id number]             Fellow: Accession#:        [id number]         Nurse: Date of Birth/Age: 1945 / 78 years Sonographer:          Roxana Pace RDCS Gender:            M                    Additional Staff: Height:            177.80 cm            Admit Date:           1/9/2024 Weight:            104.78 kg            Admission Status:     Inpatient -                                                               Routine BSA:               2.22 m2              Encounter#:           [id number]                                         Department Location:  [facility] Non                                                               Invasive Blood Pressure: 78 /65 mmHg Study Type:    TRANSTHORACIC ECHO (TTE) COMPLETE Diagnosis/ICD: Shock, unspecified-R57.9 Indication:    shock CPT Code:      Echo Complete w Full Doppler-68592 Patient History: Pertinent History: ESRD, HFrEF (35-40), AFib, PVD, AMS & Hypotension during HD,                    AO Root Dilitation, CAD, SSS, PHTN. Study Detail: The following Echo studies were performed: 2D, M-Mode, Doppler and               color flow. Technically challenging study due to poor acoustic               windows, prominent lung artifact, patient lying in supine               position, body habitus and coughing up phlegm. Definity used as a               contrast agent for endocardial border definition.  Total contrast               used for this procedure was 1 mL via IV push. Unable to obtain               subcostal and suprasternal notch view.  PHYSICIAN INTERPRETATION: Left Ventricle: The left ventricular systolic function is mildly decreased, with an estimated ejection fraction of 40-45%. The patient is in atrial fibrillation which may influence the estimate of left ventricular function and transvalvular flows. There is global hypokinesis of the left ventricle with minor regional variations. The left ventricular cavity size is normal. There is left ventricular concentric remodeling. Left ventricular diastolic filling was indeterminate. Mildly increased LV wall thickness with proximal septal bulge up to 1.8 cm. Left Atrium: The left atrium is severely dilated. Right Ventricle: The right ventricle was not well visualized. Unable to determine right ventricular systolic function. A device is visualized in the right ventricle. Right Atrium: The right atrium is mildly dilated. There is a device visualized in the right atrium. Aortic Valve: The aortic valve is probably trileaflet. There is mild aortic valve cusp calcification. There is trivial aortic valve regurgitation. The peak instantaneous gradient of the aortic valve is 15.7 mmHg. The mean gradient of the aortic valve is 7.0 mmHg. Mitral Valve: The mitral valve is normal in structure. There is mild mitral annular calcification. There is mild mitral valve regurgitation. Tricuspid Valve: The tricuspid valve was not well visualized. There is trace to mild tricuspid regurgitation. The Doppler estimated RVSP is within normal limits at 35.5 mmHg. Pulmonic Valve: The pulmonic valve is structurally normal. There is physiologic pulmonic valve regurgitation. Pericardium: There is a trivial pericardial effusion. Aorta: The aortic root is abnormal. There is mild dilatation of the aortic root. In comparison to the previous echocardiogram(s): Compared wit the prior exam from  10/2/2023 the LV appears slightly more dynamic today with LVEF 40-45% ( instead prior prior exam with LVEF 35-40%). There is still only mild MR. Note that the prior RVSP was moderately elevated at 64.8mmHg and is now normal or borderline at 35.5mmHg.  CONCLUSIONS:  1. Left ventricular systolic function is mildly decreased with a 40-45% estimated ejection fraction.  2. Poorly visualized anatomical structures due to suboptimal image quality.  3. Mildly increased LV wall thickness with proximal septal bulge up to 1.8 cm.  4. The left atrium is severely dilated.  5. RVSP within normal limits.  6. Compared wit the prior exam from 10/2/2023 the LV appears slightly more dynamic today with LVEF 40-45% ( instead prior prior exam with LVEF 35-40%). There is still only mild MR. Note that the prior RVSP was moderately elevated at 64.8mmHg and is now normal or borderline at 35.5mmHg.  7. The patient is in atrial fibrillation which may influence the estimate of left ventricular function and transvalvular flows.  8. There is global hypokinesis of the left ventricle with minor regional variations. QUANTITATIVE DATA SUMMARY: 2D MEASUREMENTS:                          Normal Ranges: Ao Root d:     3.70 cm   (2.0-3.7cm) LAs:           4.30 cm   (2.7-4.0cm) IVSd:          1.40 cm   (0.6-1.1cm) LVPWd:         1.30 cm   (0.6-1.1cm) LVIDd:         3.70 cm   (3.9-5.9cm) LVIDs:         3.10 cm LV Mass Index: 79.6 g/m2 LV % FS        16.2 % LA VOLUME:                               Normal Ranges: LA Vol A4C:        110.7 ml   (22+/-6mL/m2) LA Vol A2C:        149.4 ml LA Vol BP:         129.0 ml LA Vol Index A4C:  49.9ml/m2 LA Vol Index A2C:  67.3 ml/m2 LA Vol Index BP:   58.1 ml/m2 LA Area A4C:       30.3 cm2 LA Area A2C:       35.3 cm2 LA Major Axis A4C: 7.0 cm LA Major Axis A2C: 7.1 cm LA Volume Index:   58.1 ml/m2 RA VOLUME BY A/L METHOD:                       Normal Ranges: RA Area A4C: 19.9 cm2 AORTA MEASUREMENTS:                       Normal Ranges: Ao Sinus, d: 3.70 cm (2.1-3.5cm) LV SYSTOLIC FUNCTION BY 2D PLANIMETRY (MOD):                     Normal Ranges: EF-A4C View: 35.0 % (>=55%) EF-A2C View: 41.2 % EF-Biplane:  37.7 % LV DIASTOLIC FUNCTION:                           Normal Ranges: MV Peak E:    0.86 m/s    (0.7-1.2 m/s) MV e'         0.09 m/s    (>8.0) MV lateral e' 0.10 m/s MV medial e'  0.08 m/s MV A Dur:     193.00 msec E/e' Ratio:   9.57        (<8.0) a'            0.02 m/s MITRAL VALVE:                 Normal Ranges: MV DT: 193 msec (150-240msec) AORTIC VALVE:                                    Normal Ranges: AoV Vmax:                1.98 m/s  (<=1.7m/s) AoV Peak PG:             15.7 mmHg (<20mmHg) AoV Mean P.0 mmHg  (1.7-11.5mmHg) LVOT Max Dann:            0.91 m/s  (<=1.1m/s) AoV VTI:                 36.20 cm  (18-25cm) LVOT VTI:                17.20 cm LVOT Diameter:           1.90 cm   (1.8-2.4cm) AoV Area, VTI:           1.35 cm2  (2.5-5.5cm2) AoV Area,Vmax:           1.31 cm2  (2.5-4.5cm2) AoV Dimensionless Index: 0.48  RIGHT VENTRICLE: RV Basal 5.30 cm RV Major 8.9 cm RV s'    0.04 m/s TRICUSPID VALVE/RVSP:                             Normal Ranges: Peak TR Velocity: 2.85 m/s RV Syst Pressure: 35.5 mmHg (< 30mmHg) PULMONIC VALVE:                         Normal Ranges: PV Accel Time: 103 msec (>120ms) PV Max Dann:    1.0 m/s  (0.6-0.9m/s) PV Max PG:     3.6 mmHg  [id number] Mis Hoffman MD Electronically signed on 2024 at 3:42:15 PM  ** Final **     XR chest 1 view    Result Date: 2024  Interpreted By:  Jori Garcia and Dervishi Mario STUDY: XR CHEST 1 VIEW;  2024 8:21 pm   INDICATION: Signs/Symptoms:aborted central line, r/o pneumothorax.   COMPARISON: Chest radiograph: 2020   ACCESSION NUMBER(S): [id number]   ORDERING CLINICIAN: SALVADOR OLIVARES   FINDINGS: AP radiograph of the chest was provided.   A right-sided ICD device is again noted. Right IJ central venous catheter with the tip projecting  over the distal SVC.   CARDIOMEDIASTINAL SILHOUETTE: Cardiomediastinal silhouette is stable in size and configuration enlarged.   LUNGS: There is persistent low lung volumes with associated bronchovascular crowding. There is re-demonstration of blunting of bilateral right worse than left costophrenic angles suggesting pleural effusions with associated atelectasis. A trace focal lucency is seen projecting over the right lower lung field. No pneumothorax of the left lung.   ABDOMEN: No remarkable upper abdominal findings.   BONES: No acute osseous changes.       1.  Large right and small left pleural effusions with associated atelectasis. 2. Low lung volumes with associated bronchovascular crowding. 3. Trace focal lucency projecting over the right lower lung field which may be artifactual however a trace pneumothorax is not excluded. Repeat radiograph can be obtained as clinically indicated. 4. Medical devices as above.   I personally reviewed the images/study and I agree with the findings as stated. This study was interpreted at [place], [place].   MACRO: NONE.   Signed by: oJri Garcia 1/23/2024 9:01 AM Dictation workstation:   [id number]    XR chest 2 views    Result Date: 1/21/2024  Interpreted By:  Kirk Vanessa, STUDY: XR CHEST 2 VIEWS;  1/21/2024 9:19 am   INDICATION: Signs/Symptoms:s/p dialysis.   COMPARISON: Chest radiograph 01/16/2024 and chest CT 10/08/2023   ACCESSION NUMBER(S): [id number]   ORDERING CLINICIAN: GIBRAN ARMIJO   FINDINGS: PA and lateral radiographs of the chest, including dual energy subtraction images are available for interpretation. The evaluation is somewhat limited by patient rotation and suboptimal positioning. Stable positioning of a right subclavian approach dual chamber cardiac pacemaker. Left subclavian approach hemodialysis catheter tip overlies mid to lower SVC, unchanged.   CARDIOMEDIASTINAL SILHOUETTE: The cardiomediastinal  silhouette is grossly stable in size and configuration.   LUNGS: Interval slight worsening of mild diffuse pulmonary edema. Small bibasilar pleural effusions, overall improved from prior. Interval worsening of right basilar opacification. No pneumothorax is seen.   ABDOMEN: No remarkable upper abdominal findings.   BONES: No acute osseous abnormality.       1. Limited examination with interval slight worsening of mild diffuse pulmonary edema. Correlate with patient's volume status. 2. Interval worsening of right basilar opacification, which may represent right middle/lower lobe atelectasis. Correlate with concern for central mucous plugging. 3. Small bibasilar pleural effusions have improved as compared to prior study.   Signed by: Kirk Vanessa 1/21/2024 10:58 AM Dictation workstation:   [id number]    XR foot right 1-2 views    Result Date: 1/19/2024  Interpreted By:  Liz Ruiz,  Molly Lu STUDY: Right foot, 3 views.   INDICATION: Signs/Symptoms:post op tma   COMPARISON: Right foot radiograph 01/09/2024..   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 Sharp margination of the metatarsal stumps. Unremarkable appearance of the soft tissues of the stump with overlying wound VAC noted. Prominent vascular calcifications of the lower leg and ankle.   Remote appearing healed Douglas B distal fibular fracture with unchanged cortical irregularity.       1. 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 2. Additional findings as above.   I personally reviewed the images/study and I agree with the findings as stated by Dr. Aly Brand. This study was interpreted at [facility], [place], [place].   MACRO: None.   Signed by: Liz Ruiz  1/19/2024 5:25 PM Dictation workstation:   [id number]       Assessment and Plan  Mr. Benton is a 78-year-old man with a history of hypercapnic respiratory failure, heart failure with reduced EF, diabetes, end-stage kidney disease on dialysis.  He presented with a high potassium, no apparent EKG changes.  He was suffering penile pain, that had been going on for approximately 2 weeks.  He was dialyzed here yesterday.  Being treated for balanitis.  No new complaints.  He can tell me little regarding who his nephrologist is, where he goes to dialysis.  He was getting 5 day/week dialysis presumably at a nursing home.    Blood pressures are up and down.  I see that he is on midodrine.  Antibiotics are noted.  Blood urea nitrogen and creatinine are quite low, this may be a muscle mass issue.  No dialysis tomorrow.  I will add erythropoietin, check a phosphorus tomorrow.       I spent a total of 45 minutes with this patient today.    Joseph Hunt MD

## 2024-02-18 NOTE — PROGRESS NOTES
.Report to Receiving RN:    Report To: Mariposa Jarrett RN  Time Report Called: [id number]  Hand-Off Communication: Done at bedside. RN made aware of treatment details post HD.  Complications During Treatment: No  Ultrafiltration Treatment: No  Medications Administered During Dialysis: No  Blood Products Administered During Dialysis: No  Labs Sent During Dialysis: No  Heparin Drip Rate Changes: No    Electronic Signatures:  DELIA Smith   Last Updated: 9:47 PM by LOPEZ CABA

## 2024-02-19 NOTE — PROGRESS NOTES
Spoke w/ RN & per notes no indication pt will have HD today, discontinued vancomycin 750mg one time order & entered for AM lab on 2/20/2024 tomorrow, reported to pharmacy pt's outpt HD schedule is T, Th, Sat. Most likely will have HD tomorrow.

## 2024-02-19 NOTE — SIGNIFICANT EVENT
Arrived at patient bedside after nurse asked for assistance after noticing pool of blood in bed. Patient has left upper extremity AV fistula, and one access site had spontaneously opened and was bleeding profusely. Pressure was immediately applied to the area and we were able to ultimately gain control of the bleeding site and put a pressure dressing on. Patient remained awaken alert without hemodynamic changes.    Her nursing repor t last hemodialysis session was on the 17th and patient had not moved or been turned prior to this event so it is unclear what triggered the bleed.    Given this area of bleeding could be potentially secondary to proximal venous stenosis the patient should be evaluated by vascular surgery or international radiology.

## 2024-02-19 NOTE — SIGNIFICANT EVENT
Reassessment of left upper extremity AVF bleeding site. Pressure dressing was taken down and the prior side of bleeding appears to have achieved good hemostasis, however there is a 2 x 2 gauze pad over the area which appears to be intertwined with clot which I am hesitant to remove at the current time. At this point, we will apply a clean dressing on top of the existing 2x2 without any pressure and keep the arm elevated. Continue to mon itor closely for any signs of bleeding.

## 2024-02-19 NOTE — PROGRESS NOTES
Chevy Benton is a 78 y.o. male on day 2 of admission presenting with Balanitis.    Subjective   Mr. Benton is a 78-year-old man with a history of hypercapnic respiratory failure, heart failure with reduced EF, diabetes, end-stage kidney disease on dialysis.  He presented with a high potassium, no apparent EKG changes.  He was suffering penile pain, that had been going on for approximately 2 weeks.  He is being treated for balanitis. Overnight events noted of bleeding from his dialysis access. Vascular surgery will see him.     He is significantly volume overloaded on exam.   Chart/labs/meds/notes/imaging/VS reviewed.       Objective       Physical Exam  Constitutional:       Appearance: Normal appearance.    HENT:      Mouth/Throat:      Mouth: Mucous membranes are moist.   Eyes:      Extraocular Movements: Extraocular movements intact.      Pupils: Pupils are equal, round, and reactive to light.   Cardiovascular:      Rate and Rhythm: Regular rhythm.      Heart sounds: S1 normal and S2 normal.   Pulmonary:      Breath sounds: Normal breath sounds.   Abdominal:      Comments: Soft, NT/ND, no masses, normal bowel sounds. Anasarca  Genitourinary:     Penile edema  Musculoskeletal:   Bilateral upper extremity edema  Skin:     General: Skin is warm and dry.   Neurological:      General: No focal deficit present.      Mental Status: She is alert and oriented to person, place, and time.   Psychiatric:         Behavior: Behavior normal.    Left upper extremity thrill and bruit    Meds    Current Facility-Administered Medications:     albuterol 2.5 mg /3 mL (0.083 %) nebulizer solution 2.5 mg, 2.5 mg, nebulization, q6h PRN, Mumtza Almaraz, PharmD    apixaban (Eliquis) tablet 2.5 mg, 2.5 mg, oral, BID, Randall Wallace MD, 2.5 mg at 02/19/24 0848    atorvastatin (Lipitor) tablet 40 mg, 40 mg, oral, Nightly, Randall Wallace MD, 40 mg at 02/18/24 2142    clopidogrel (Plavix) tablet 75 mg, 75 mg, oral, Daily,  Randall Wallace MD, 75 mg at 02/19/24 0848    dextrose 10 % in water (D10W) infusion, 0.3 g/kg/hr, intravenous, Once PRN, Randall Wallace MD    dextrose 50 % injection 25 g, 25 g, intravenous, q15 min PRN, Randall Wallace MD    epoetin dane-epbx (Retacrit) injection 6,000 Units, 6,000 Units, subcutaneous, Once per day on Mon Wed Fri, Joseph Hunt MD, 6,000 Units at 02/19/24 0848    gabapentin (Neurontin) capsule 100 mg, 100 mg, oral, Daily, Randall Wallace MD, 100 mg at 02/19/24 0848    glucagon (Glucagen) injection 1 mg, 1 mg, intramuscular, q15 min PRN, Randall Wallace MD    insulin glargine (Lantus) injection 6 Units, 6 Units, subcutaneous, Nightly, Randall Wallace MD, 6 Units at 02/18/24 2142    insulin lispro (HumaLOG) injection 0-5 Units, 0-5 Units, subcutaneous, TID with meals, Randall Wallace MD, 1 Units at 02/18/24 1742    ipratropium-albuteroL (Duo-Neb) 0.5-2.5 mg/3 mL nebulizer solution 3 mL, 3 mL, nebulization, q2h PRN, Randall Wallace MD    midodrine (Proamatine) tablet 15 mg, 15 mg, oral, q8h, Sabi Jimenez MD, 15 mg at 02/19/24 0849    pantoprazole (ProtoNix) EC tablet 40 mg, 40 mg, oral, Daily before breakfast, Randall Wallace MD, 40 mg at 02/19/24 0618    polyethylene glycol (Glycolax, Miralax) packet 17 g, 17 g, oral, Daily, Randall Wallace MD, 17 g at 02/18/24 0900    vancomycin (Vancocin) placeholder, , miscellaneous, Daily PRN, Romana A Kuchta, Alex    vancomycin in dextrose 5 % (Vancocin) IVPB 750 mg, 750 mg, intravenous, Once, Randall Wallace MD   Medications Discontinued During This Encounter   Medication Reason    epoetin dane (Epogen,Procrit) 10,000 unit/mL injection Entered in Error    cefepime (Maxipime) 1 g in dextrose 5 % 50 mL IV     cefepime (Maxipime) 1 g in dextrose 5 % 50 mL IV     albuterol 90 mcg/actuation inhaler 2 puff     ipratropium-albuteroL (Duo-Neb) 0.5-2.5 mg/3 mL nebulizer solution 3 mL         Allergies  Allergies  "  Allergen Reactions    Penicillins Hives        Last Recorded Vitals  Blood pressure 120/85, pulse 94, temperature 36.6 °C (97.9 °F), temperature source Oral, resp. rate 16, height 1.778 m (5' 10\"), weight 89 kg (196 lb 3.4 oz), SpO2 98 %.  Intake/Output last 3 Shifts:  I/O last 3 completed shifts:  In: 1400 (15.7 mL/kg) [I.V.:1000 (11.2 mL/kg); Other:400]  Out: 0 (0 mL/kg)   Weight: 89 kg     Last 24 hour Results  Results for orders placed or performed during the hospital encounter of 02/16/24 (from the past 24 hour(s))   POCT GLUCOSE   Result Value Ref Range    POCT Glucose 132 (H) 74 - 99 mg/dL   POCT GLUCOSE   Result Value Ref Range    POCT Glucose 162 (H) 74 - 99 mg/dL   POCT GLUCOSE   Result Value Ref Range    POCT Glucose 151 (H) 74 - 99 mg/dL   CBC   Result Value Ref Range    WBC 11.5 (H) 4.4 - 11.3 x10*3/uL    nRBC 0.3 (H) 0.0 - 0.0 /100 WBCs    RBC 2.45 (L) 4.50 - 5.90 x10*6/uL    Hemoglobin 7.1 (L) 13.5 - 17.5 g/dL    Hematocrit 24.8 (L) 41.0 - 52.0 %     (H) 80 - 100 fL    MCH 29.0 26.0 - 34.0 pg    MCHC 28.6 (L) 32.0 - 36.0 g/dL    RDW 20.7 (H) 11.5 - 14.5 %    Platelets 460 (H) 150 - 450 x10*3/uL   Renal Function Panel   Result Value Ref Range    Glucose 115 (H) 74 - 99 mg/dL    Sodium 130 (L) 136 - 145 mmol/L    Potassium 3.7 3.5 - 5.3 mmol/L    Chloride 92 (L) 98 - 107 mmol/L    Bicarbonate 31 21 - 32 mmol/L    Anion Gap 11 10 - 20 mmol/L    Urea Nitrogen 37 (H) 6 - 23 mg/dL    Creatinine 2.93 (H) 0.50 - 1.30 mg/dL    eGFR 21 (L) >60 mL/min/1.73m*2    Calcium 9.5 8.6 - 10.3 mg/dL    Phosphorus 4.3 2.5 - 4.9 mg/dL    Albumin 2.4 (L) 3.4 - 5.0 g/dL   Vancomycin   Result Value Ref Range    Vancomycin 8.9 5.0 - 20.0 ug/mL   POCT GLUCOSE   Result Value Ref Range    POCT Glucose 95 74 - 99 mg/dL        Imaging results  Electrocardiogram, 12-lead PRN ACS symptoms    Result Date: 2/6/2024  Atrial fibrillation with premature ventricular or aberrantly conducted complexes Low voltage QRS Nonspecific T " wave abnormality Abnormal ECG When compared with ECG of 23-JAN-2024 04:46, No significant change since Confirmed by Edwin May ([id number]) on 2/6/2024 3:23:42 PM    Electrocardiogram, 12-lead PRN ACS symptoms    Result Date: 2/3/2024  Atrial fibrillation with occasional ventricular-paced complexes Nonspecific T wave abnormality Abnormal ECG When compared with ECG of 22-JAN-2024 08:27, Vent. rate has decreased BY  16 BPM Confirmed by Edwin May ([id number]) on 2/3/2024 11:11:22 PM    IR CVC removal    Result Date: 2/2/2024  Interpreted By:  Noe Zaldivar, STUDY: IR CVC REMOVAL;  2/2/2024 3:20 pm   INDICATION: Signs/Symptoms:removal of tunneled line.   COMPARISON: None.   ACCESSION NUMBER(S): [id number]   ORDERING CLINICIAN: LUKE HWANG   TECHNIQUE: INTERVENTIONALIST(S): Noe Zaldivar CNP   CONSENT: The patient/patient's POA/next of kin was informed of the nature of the proposed procedure. The purposes, alternatives, risks, and benefits were explained and discussed. All questions were answered and consent was obtained.     MEDICATION/CONTRAST: No additional   TIME OUT: A time out was performed immediately prior to procedure start with the interventional team, correctly identifying the patient name, date of birth, MRN, procedure, anatomy (including marking of site and side), patient position, procedure consent form, relevant laboratory and imaging test results, antibiotic administration, safety precautions, and procedure-specific equipment needs.   COMPLICATIONS: No immediate adverse events identified.   FINDINGS: The existing skin site over the tunneled catheter insertion site was prepped and draped with maximal sterile manner.   The skin and subcutaneous tissues at the original catheter site were anesthetized with 1% lidocaine with epinephrine.  The subcutaneous tissues surrounding the catheter were anesthetized with 1% lidocaine. Using blunt dissection, the catheter cuff was externalized and the catheter  leading to the jugular vein was completely removed. Hemostasis was obtained using manual compression at the internal jugular vein access site.   The incision site was covered with a sterile dressing and tegaderm was subsequently applied.   There were no immediate or post-procedural complications.       Uneventful removal of a left chest central venous catheter.   I was present for and/or performed the critical portions of the procedure and immediately available throughout the entire procedure.   Performed and dictated at [facility].   Signed by: Noe Zaldivar 2/2/2024 4:39 PM Dictation workstation:   [id number]    Electrocardiogram, 12-lead PRN ACS symptoms    Result Date: 1/29/2024  Atrial fibrillation with occasional ventricular-paced complexes T wave abnormality, consider lateral ischemia Abnormal ECG When compared with ECG of 22-JAN-2024 08:26, Vent. rate has increased BY   4 BPM Confirmed by Edwin May ([id number]) on 1/29/2024 4:59:47 PM    Vascular US upper extremity venous duplex left    Result Date: 1/27/2024            [place] [id number]   Tel [phone] and Fax [phone]  Vascular Lab Report VASC US UPPER EXTREMITY VENOUS DUPLEX LEFT  Patient Name:      SHAMEKA Mendez Physician: [id number] Jovanna Francis MD Study Date:        1/26/2024           Ordering           [id number] CHUCKY BLACKBURN                                        Physician: MRN/PID:           [id number]            Technologist:      Elisabeth Betts T Accession#:        [id number]        Technologist 2: Date of Birth/Age: 1945 / 78      Encounter#:        [id number]                    years Gender:            M Admission Status:  Inpatient           Location           [facility]                                        Performed:  Diagnosis/ICD: Left arm swelling-M79.89  Indication:    Limb swelling CPT Codes:     08183 Peripheral venous duplex scan for DVT Limited  CONCLUSIONS: Right Upper Venous: Unable to visualize subclavian vein due to lines and bandages. Left Upper Venous: Technically limited exam; negative for acute deep vein thrombus in visualized veins. Unable to rule out deep vein thrombus in non-visualized mid and distal subclavian vein. The left basilic vein was not visualized. Left brachiocephalic arteriovenous fistula is noted. Visualized in segments due to bandages from recent dialysis access.  Additional Findings: Technically difficult due to patient positioning and limited mobility.  Imaging & Doppler Findings:  Left                Compress Thrombus        Flow Internal Jugular      Yes      None   Spontaneous/Phasic Subclavian Proximal   Yes      None   Spontaneous/Phasic Axillary              Yes      None   Spontaneous/Phasic Brachial              Yes      None Cephalic              Yes      None  [id number] Jovnana Francis MD Electronically signed by [id number] Jovanna Francis MD on 1/27/2024 at 2:56:51 PM  ** Final **     CT head wo IV contrast    Result Date: 1/26/2024  Interpreted By:  Marilyn Torres and Dervishi Mario STUDY: CT HEAD WO IV CONTRAST;  1/25/2024 10:08 pm   INDICATION: Signs/Symptoms:AMS CICU patient.   COMPARISON: None.   ACCESSION NUMBER(S): [id number]   ORDERING CLINICIAN: ALVIN AMBRIZ   TECHNIQUE: Noncontrast axial CT scan of head was performed. Angled reformats in brain and bone windows were generated. The images were reviewed in bone, brain, blood and soft tissue windows.   FINDINGS: CSF Spaces: The ventricles, sulci and basal cisterns are concordant with the degree of parenchymal atrophy. There is no extraaxial fluid collection.   Parenchyma: There are nonspecific patchy, confluent periventricular and subcortical white matter hypodensities which are likely sequela of chronic microvascular ischemic changes. Otherwise, the grey-white  differentiation is intact. There is no mass effect or midline shift. There is no intracranial hemorrhage.   Calvarium: The calvarium is unremarkable.   Paranasal sinuses and mastoids: Visualized paranasal sinuses and mastoids are clear.       1.  No evidence of acute intracranial hemorrhage or mass effect. 2. Nonspecific periventricular and subcortical white matter hypodensities likely sequela of chronic microvascular ischemic changes     I personally reviewed the images/study and I agree with the findings as stated by Resident Irwin Ingram MD. This study was interpreted at [place], [place].   MACRO: None   Signed by: Marilyn Torres 1/26/2024 7:23 AM Dictation workstation:   [id number]    XR abdomen 1 view    Result Date: 1/25/2024  Interpreted By:  Jori Garcia and Ritchie Brandon STUDY: XR ABDOMEN 1 VIEW;  1/25/2024 3:05 pm   INDICATION: Signs/Symptoms:CICU confirm NG placement.   COMPARISON: Chest x-ray 01/23/2024   ACCESSION NUMBER(S): [id number]   ORDERING CLINICIAN: ALVIN AMBRIZ   FINDINGS: Enteric tube courses midline below the level of the diaphragm with the tip located in the gastric body. The side-hole is below the GE junction.   Multiple gaseous distended loops of small and large bowel. For example there is a lucent area of gaseous ascending colon in the right upper quadrant measuring 5.2 cm. There is limited evaluation of pneumoperitoneum on supine imaging, however there does not appear to be any evidence of free air surrounding this gaseous distended loop of ascending/Rigler's sign. Right diaphragm is distinctly visualized separate from this loop of bowel without evidence of subdiaphragmatic free air.   Additional mildly prominent loops of small bowel scattered throughout the bilateral hemiabdomen, for example largest loop of small bowel can be seen in the left upper quadrant measuring 3.4 cm.   Visualized lungs are clear.   Osseous structures  demonstrate no acute bony changes.       1. Enteric tube as described above with the distal tip projecting over the gastric body. 2. Multiple mildly distended loops of small and large bowel as described above. Recommend attention on follow-up examinations as could reflect early ileus. 3. No definitive evidence of free air on current supine radiograph as described above.   MACRO: None   Signed by: Jori Garcia 1/25/2024 3:48 PM Dictation workstation:   [id number]    XR chest 1 view    Result Date: 1/23/2024  Interpreted By:  Jori Garcia and Ritchie Brandon STUDY: XR CHEST 1 VIEW;  1/23/2024 9:06 am   INDICATION: Signs/Symptoms:pneumothorax r/o. dyspnea..   COMPARISON: Chest x-ray 01/22/2024   ACCESSION NUMBER(S): [id number]   ORDERING CLINICIAN: DONNA SOLIMAN   FINDINGS: AP radiograph of the chest was provided.   Similar position of the left internal jugular approach central venous catheter with the distal tip projecting over the expected location of the distal SVC. There is a right-sided pacemaker/AICD with leads in similar position over the expected location of the right atrium/right ventricle.   CARDIOMEDIASTINAL SILHOUETTE: Cardiomediastinal silhouette is stable in size and configuration.   LUNGS: Similar low lung volumes/expiratory radiograph with bronchovascular crowding. Similar linear areas of bibasilar atelectasis. Improved bilateral pleural effusions. The previously described trace focal lucency projecting over the right lower lung field is no longer evident on current examination. No pneumothorax.   ABDOMEN: No remarkable upper abdominal findings.   BONES: No acute osseous changes.       1. Similar low lung volumes with improved bilateral pleural effusions. Unchanged bibasilar atelectasis. 2. No evidence of pneumothorax. 3. Medical devices as described above.   I personally reviewed the images/study and I agree with the findings as stated by resident Marvin Cueva. This study was interpreted at  [facility], [place], [place].   MACRO: None   Signed by: Jori Garcia 1/23/2024 5:11 PM Dictation workstation:   [id number]    Transthoracic Echo (TTE) Complete    Result Date: 1/23/2024   [facility], [address] [place] [address]                Tel [phone] and Fax [phone] TRANSTHORACIC ECHOCARDIOGRAM REPORT  Patient Name:      SHAMEKA Mendez Physician:    [id number] Mis Hoffman MD Study Date:        1/23/2024            Ordering Provider:    [id number] CHUCKY BLACKBURN MRN/PID:           [id number]             Fellow: Accession#:        [id number]         Nurse: Date of Birth/Age: 1945 / 78 years Sonographer:          Roxana Pace RDCS Gender:            M                    Additional Staff: Height:            177.80 cm            Admit Date:           1/9/2024 Weight:            104.78 kg            Admission Status:     Inpatient -                                                               Routine BSA:               2.22 m2              Encounter#:           [id number]                                         Department Location:  [facility] Non                                                               Invasive Blood Pressure: 78 /65 mmHg Study Type:    TRANSTHORACIC ECHO (TTE) COMPLETE Diagnosis/ICD: Shock, unspecified-R57.9 Indication:    shock CPT Code:      Echo Complete w Full Doppler-58983 Patient History: Pertinent History: ESRD, HFrEF (35-40), AFib, PVD, AMS & Hypotension during HD,                    AO Root Dilitation, CAD, SSS, PHTN. Study Detail: The following Echo studies were performed: 2D, M-Mode, Doppler and               color flow. Technically challenging study due to poor acoustic               windows, prominent lung  artifact, patient lying in supine               position, body habitus and coughing up phlegm. Definity used as a               contrast agent for endocardial border definition. Total contrast               used for this procedure was 1 mL via IV push. Unable to obtain               subcostal and suprasternal notch view.  PHYSICIAN INTERPRETATION: Left Ventricle: The left ventricular systolic function is mildly decreased, with an estimated ejection fraction of 40-45%. The patient is in atrial fibrillation which may influence the estimate of left ventricular function and transvalvular flows. There is global hypokinesis of the left ventricle with minor regional variations. The left ventricular cavity size is normal. There is left ventricular concentric remodeling. Left ventricular diastolic filling was indeterminate. Mildly increased LV wall thickness with proximal septal bulge up to 1.8 cm. Left Atrium: The left atrium is severely dilated. Right Ventricle: The right ventricle was not well visualized. Unable to determine right ventricular systolic function. A device is visualized in the right ventricle. Right Atrium: The right atrium is mildly dilated. There is a device visualized in the right atrium. Aortic Valve: The aortic valve is probably trileaflet. There is mild aortic valve cusp calcification. There is trivial aortic valve regurgitation. The peak instantaneous gradient of the aortic valve is 15.7 mmHg. The mean gradient of the aortic valve is 7.0 mmHg. Mitral Valve: The mitral valve is normal in structure. There is mild mitral annular calcification. There is mild mitral valve regurgitation. Tricuspid Valve: The tricuspid valve was not well visualized. There is trace to mild tricuspid regurgitation. The Doppler estimated RVSP is within normal limits at 35.5 mmHg. Pulmonic Valve: The pulmonic valve is structurally normal. There is physiologic pulmonic valve regurgitation. Pericardium: There is a trivial  pericardial effusion. Aorta: The aortic root is abnormal. There is mild dilatation of the aortic root. In comparison to the previous echocardiogram(s): Compared wit the prior exam from 10/2/2023 the LV appears slightly more dynamic today with LVEF 40-45% ( instead prior prior exam with LVEF 35-40%). There is still only mild MR. Note that the prior RVSP was moderately elevated at 64.8mmHg and is now normal or borderline at 35.5mmHg.  CONCLUSIONS:  1. Left ventricular systolic function is mildly decreased with a 40-45% estimated ejection fraction.  2. Poorly visualized anatomical structures due to suboptimal image quality.  3. Mildly increased LV wall thickness with proximal septal bulge up to 1.8 cm.  4. The left atrium is severely dilated.  5. RVSP within normal limits.  6. Compared wit the prior exam from 10/2/2023 the LV appears slightly more dynamic today with LVEF 40-45% ( instead prior prior exam with LVEF 35-40%). There is still only mild MR. Note that the prior RVSP was moderately elevated at 64.8mmHg and is now normal or borderline at 35.5mmHg.  7. The patient is in atrial fibrillation which may influence the estimate of left ventricular function and transvalvular flows.  8. There is global hypokinesis of the left ventricle with minor regional variations. QUANTITATIVE DATA SUMMARY: 2D MEASUREMENTS:                          Normal Ranges: Ao Root d:     3.70 cm   (2.0-3.7cm) LAs:           4.30 cm   (2.7-4.0cm) IVSd:          1.40 cm   (0.6-1.1cm) LVPWd:         1.30 cm   (0.6-1.1cm) LVIDd:         3.70 cm   (3.9-5.9cm) LVIDs:         3.10 cm LV Mass Index: 79.6 g/m2 LV % FS        16.2 % LA VOLUME:                               Normal Ranges: LA Vol A4C:        110.7 ml   (22+/-6mL/m2) LA Vol A2C:        149.4 ml LA Vol BP:         129.0 ml LA Vol Index A4C:  49.9ml/m2 LA Vol Index A2C:  67.3 ml/m2 LA Vol Index BP:   58.1 ml/m2 LA Area A4C:       30.3 cm2 LA Area A2C:       35.3 cm2 LA Major Axis A4C: 7.0 cm  LA Major Axis A2C: 7.1 cm LA Volume Index:   58.1 ml/m2 RA VOLUME BY A/L METHOD:                       Normal Ranges: RA Area A4C: 19.9 cm2 AORTA MEASUREMENTS:                      Normal Ranges: Ao Sinus, d: 3.70 cm (2.1-3.5cm) LV SYSTOLIC FUNCTION BY 2D PLANIMETRY (MOD):                     Normal Ranges: EF-A4C View: 35.0 % (>=55%) EF-A2C View: 41.2 % EF-Biplane:  37.7 % LV DIASTOLIC FUNCTION:                           Normal Ranges: MV Peak E:    0.86 m/s    (0.7-1.2 m/s) MV e'         0.09 m/s    (>8.0) MV lateral e' 0.10 m/s MV medial e'  0.08 m/s MV A Dur:     193.00 msec E/e' Ratio:   9.57        (<8.0) a'            0.02 m/s MITRAL VALVE:                 Normal Ranges: MV DT: 193 msec (150-240msec) AORTIC VALVE:                                    Normal Ranges: AoV Vmax:                1.98 m/s  (<=1.7m/s) AoV Peak PG:             15.7 mmHg (<20mmHg) AoV Mean P.0 mmHg  (1.7-11.5mmHg) LVOT Max Dann:            0.91 m/s  (<=1.1m/s) AoV VTI:                 36.20 cm  (18-25cm) LVOT VTI:                17.20 cm LVOT Diameter:           1.90 cm   (1.8-2.4cm) AoV Area, VTI:           1.35 cm2  (2.5-5.5cm2) AoV Area,Vmax:           1.31 cm2  (2.5-4.5cm2) AoV Dimensionless Index: 0.48  RIGHT VENTRICLE: RV Basal 5.30 cm RV Major 8.9 cm RV s'    0.04 m/s TRICUSPID VALVE/RVSP:                             Normal Ranges: Peak TR Velocity: 2.85 m/s RV Syst Pressure: 35.5 mmHg (< 30mmHg) PULMONIC VALVE:                         Normal Ranges: PV Accel Time: 103 msec (>120ms) PV Max Dann:    1.0 m/s  (0.6-0.9m/s) PV Max PG:     3.6 mmHg  [id number] Mis Sabik MD Electronically signed on 2024 at 3:42:15 PM  ** Final **     XR chest 1 view    Result Date: 2024  Interpreted By:  Jori Garcia and Dervishi Mario STUDY: XR CHEST 1 VIEW;  2024 8:21 pm   INDICATION: Signs/Symptoms:aborted central line, r/o pneumothorax.   COMPARISON: Chest radiograph: 2020   ACCESSION NUMBER(S): [id number]    ORDERING CLINICIAN: SALVADOR OLIVARES   FINDINGS: AP radiograph of the chest was provided.   A right-sided ICD device is again noted. Right IJ central venous catheter with the tip projecting over the distal SVC.   CARDIOMEDIASTINAL SILHOUETTE: Cardiomediastinal silhouette is stable in size and configuration enlarged.   LUNGS: There is persistent low lung volumes with associated bronchovascular crowding. There is re-demonstration of blunting of bilateral right worse than left costophrenic angles suggesting pleural effusions with associated atelectasis. A trace focal lucency is seen projecting over the right lower lung field. No pneumothorax of the left lung.   ABDOMEN: No remarkable upper abdominal findings.   BONES: No acute osseous changes.       1.  Large right and small left pleural effusions with associated atelectasis. 2. Low lung volumes with associated bronchovascular crowding. 3. Trace focal lucency projecting over the right lower lung field which may be artifactual however a trace pneumothorax is not excluded. Repeat radiograph can be obtained as clinically indicated. 4. Medical devices as above.   I personally reviewed the images/study and I agree with the findings as stated. This study was interpreted at [place], [place].   MACRO: NONE.   Signed by: Jori Garcia 1/23/2024 9:01 AM Dictation workstation:   [id number]    XR chest 2 views    Result Date: 1/21/2024  Interpreted By:  Kirk Vanessa, STUDY: XR CHEST 2 VIEWS;  1/21/2024 9:19 am   INDICATION: Signs/Symptoms:s/p dialysis.   COMPARISON: Chest radiograph 01/16/2024 and chest CT 10/08/2023   ACCESSION NUMBER(S): [id number]   ORDERING CLINICIAN: GIBRAN ARMIJO   FINDINGS: PA and lateral radiographs of the chest, including dual energy subtraction images are available for interpretation. The evaluation is somewhat limited by patient rotation and suboptimal positioning. Stable positioning of a right subclavian  approach dual chamber cardiac pacemaker. Left subclavian approach hemodialysis catheter tip overlies mid to lower SVC, unchanged.   CARDIOMEDIASTINAL SILHOUETTE: The cardiomediastinal silhouette is grossly stable in size and configuration.   LUNGS: Interval slight worsening of mild diffuse pulmonary edema. Small bibasilar pleural effusions, overall improved from prior. Interval worsening of right basilar opacification. No pneumothorax is seen.   ABDOMEN: No remarkable upper abdominal findings.   BONES: No acute osseous abnormality.       1. Limited examination with interval slight worsening of mild diffuse pulmonary edema. Correlate with patient's volume status. 2. Interval worsening of right basilar opacification, which may represent right middle/lower lobe atelectasis. Correlate with concern for central mucous plugging. 3. Small bibasilar pleural effusions have improved as compared to prior study.   Signed by: Kirk Vanessa 1/21/2024 10:58 AM Dictation workstation:   [id number]    XR foot right 1-2 views    Result Date: 1/19/2024  Interpreted By:  Liz Ruiz and Ohs Zachary STUDY: Right foot, 3 views.   INDICATION: Signs/Symptoms:post op tma   COMPARISON: Right foot radiograph 01/09/2024..   ACCESSION NUMBER(S): [id number]   ORDERING CLINICIAN: KANE MAO   FINDINGS: Revision transmetatarsal amputation of the right foot to the level of the bases of the metatarsals. Sharp margination of the metatarsal stumps. Unremarkable appearance of the soft tissues of the stump with overlying wound VAC noted. Prominent vascular calcifications of the lower leg and ankle.   Remote appearing healed Douglas B distal fibular fracture with unchanged cortical irregularity.       1. 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 2. Additional findings as above.   I personally reviewed the images/study and I agree with  the findings as stated by Dr. Aly Brand. This study was interpreted at [facility], [place], [place].   MACRO: None.   Signed by: Liz Ruiz 1/19/2024 5:25 PM Dictation workstation:   [id number]       Assessment and Plan  Mr. Benton is a 78-year-old man with a history of hypercapnic respiratory failure, heart failure with reduced EF, diabetes, end-stage kidney disease on dialysis.  He presented with a high potassium, no apparent EKG changes.  He was suffering penile pain, that had been going on for approximately 2 weeks.  He is being treated for balanitis. Overnight events noted of bleeding from his dialysis access. Vascular surgery will see him. 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  We will hold off on dialyzing him today until seen by vascular surgery.  His hemoglobin is stable. AV fistula bleeding has subsided but we will await vascular's input.  I anticipate that he will need isolated ultrafiltration in between dialysis to address his volume status.  Nephrology will follow closely with his care.  Will follow.        I spent a total of 45 minutes with this patient today.    Bob Castellano, DO

## 2024-02-19 NOTE — NURSING NOTE
Rapid response RN rounded on this patient. Patients fistula site intact with no bleeding at this time. No concerns at this time.

## 2024-02-19 NOTE — DISCHARGE INSTRUCTIONS
PLEASE FOLLOW UP WITH YOUR DOCTORS: Kidney doctor, vascular doctor, heart doctor      Buttocks/sacrum- Mixed etiology MASD/Irritant contact dermatitis due to friction or contact with body fluids specified (fecal/urinary) & Stage 3 PI   Irrigate with normal saline or wound cleanser, Pat dry.  Apply no sting skin barrier (cavilon) to federica wound   Apply Medihoney alginate to wound bed   Cover with Mepilex border dressing Sacrum  Change every other day and as needed   Apply Triad hydrophilic wound dressing ointment to lower buttocks/groin three times a day and as needed.     Bilateral lateral LE   Irrigate with normal saline or wound cleanser, Pat dry.  Paint federica wound with betadine iodine   Apply single layer of  xeroform dressing over friable skin   Pad and protect with ABD, Kerlix and paper tape.   Change every day and as needed.     Please alternate between elevating heels with pillows, TruVue boots and EHOB waffle boots     Right TMA- amputation   Irrigate with normal saline or wound cleanser, Pat dry.  Apply no sting skin barrier (cavilon) to federica wound   Gently pack with Kerlix moistened with quarter strength Dakins solution  Cover with ABD, Kerlix and paper tape   Change every day and as needed.

## 2024-02-19 NOTE — PROGRESS NOTES
Chevy Benton is a 78 y.o. male on day 2 of admission presenting with Balanitis.      Subjective   Pt seen and examined.        Objective     Last Recorded Vitals  /85 (BP Location: Right arm, Patient Position: Lying)   Pulse 94   Temp 36.6 °C (97.9 °F) (Oral)   Resp 16   Wt 89 kg (196 lb 3.4 oz)   SpO2 98%   Intake/Output last 3 Shifts:  No intake or output data in the 24 hours ending 02/19/24 1017      Admission Weight  Weight: 87.5 kg (193 lb) (02/17/24 1128)    Daily Weight  02/18/24 : 89 kg (196 lb 3.4 oz)      Physical Exam  Constitutional:       Appearance: Normal appearance.   HENT:      Head: Normocephalic and atraumatic.      Nose: Nose normal.      Mouth/Throat:      Pharynx: Oropharynx is clear.   Eyes:      Extraocular Movements: Extraocular movements intact.      Pupils: Pupils are equal, round, and reactive to light.   Cardiovascular:      Rate and Rhythm: Normal rate and regular rhythm.      Pulses: Normal pulses.   Pulmonary:      Effort: Pulmonary effort is normal.      Breath sounds: Normal breath sounds.   Abdominal:      General: There is no distension.      Tenderness: There is no abdominal tenderness. There is no right CVA tenderness, left CVA tenderness or guarding.   Musculoskeletal:         General: No swelling or deformity. Normal range of motion.      Cervical back: Normal range of motion.      Right lower leg: No edema.      Left lower leg: No edema.      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   Skin:     General: Skin is warm and dry.      Capillary Refill: Capillary refill takes less than 2 seconds.   Neurological:      General: No focal deficit present.      Mental Status: He is alert and oriented to person, place, and time.   Psychiatric:         Mood and Affect: Mood normal.         Behavior: Behavior normal.  Relevant Results  Results for orders placed or performed during the hospital encounter of 02/16/24  (from the past 24 hour(s))   POCT GLUCOSE   Result Value Ref Range    POCT Glucose 132 (H) 74 - 99 mg/dL   POCT GLUCOSE   Result Value Ref Range    POCT Glucose 162 (H) 74 - 99 mg/dL   POCT GLUCOSE   Result Value Ref Range    POCT Glucose 151 (H) 74 - 99 mg/dL   CBC   Result Value Ref Range    WBC 11.5 (H) 4.4 - 11.3 x10*3/uL    nRBC 0.3 (H) 0.0 - 0.0 /100 WBCs    RBC 2.45 (L) 4.50 - 5.90 x10*6/uL    Hemoglobin 7.1 (L) 13.5 - 17.5 g/dL    Hematocrit 24.8 (L) 41.0 - 52.0 %     (H) 80 - 100 fL    MCH 29.0 26.0 - 34.0 pg    MCHC 28.6 (L) 32.0 - 36.0 g/dL    RDW 20.7 (H) 11.5 - 14.5 %    Platelets 460 (H) 150 - 450 x10*3/uL   Renal Function Panel   Result Value Ref Range    Glucose 115 (H) 74 - 99 mg/dL    Sodium 130 (L) 136 - 145 mmol/L    Potassium 3.7 3.5 - 5.3 mmol/L    Chloride 92 (L) 98 - 107 mmol/L    Bicarbonate 31 21 - 32 mmol/L    Anion Gap 11 10 - 20 mmol/L    Urea Nitrogen 37 (H) 6 - 23 mg/dL    Creatinine 2.93 (H) 0.50 - 1.30 mg/dL    eGFR 21 (L) >60 mL/min/1.73m*2    Calcium 9.5 8.6 - 10.3 mg/dL    Phosphorus 4.3 2.5 - 4.9 mg/dL    Albumin 2.4 (L) 3.4 - 5.0 g/dL   Vancomycin   Result Value Ref Range    Vancomycin 8.9 5.0 - 20.0 ug/mL   POCT GLUCOSE   Result Value Ref Range    POCT Glucose 95 74 - 99 mg/dL        No orders to display       Scheduled medications  apixaban, 2.5 mg, oral, BID  atorvastatin, 40 mg, oral, Nightly  clopidogrel, 75 mg, oral, Daily  epoetin dane or biosimilar, 6,000 Units, subcutaneous, Once per day on Mon Wed Fri  gabapentin, 100 mg, oral, Daily  insulin glargine, 6 Units, subcutaneous, Nightly  insulin lispro, 0-5 Units, subcutaneous, TID with meals  midodrine, 15 mg, oral, q8h  pantoprazole, 40 mg, oral, Daily before breakfast  polyethylene glycol, 17 g, oral, Daily  vancomycin, 750 mg, intravenous, Once      Continuous medications     PRN medications  PRN medications: albuterol, dextrose 10 % in water (D10W), dextrose, glucagon, ipratropium-albuteroL, vancomycin          Assessment/Plan   This patient currently has cardiac telemetry ordered; if you would like to modify or discontinue the telemetry order, click here to go to the orders activity to modify/discontinue the order.    This patient has a central line   Reason for the central line remaining today? Parenteral medication            Principal Problem:    Balanitis  Active Problems:    Sepsis (CMS/HCC)    Chronic renal failure    Ulcer of lower extremity (CMS/HCC)    Hypotension    # Sepsis  # Hypotension  # Balanitis?  -unknown source  -vanc and cefepime  -continue home midodrine  -blood cx negative so far, will await 24 more hrs  -monitor    # Left AV fistula bleed  -hemostasis achieved  -vascular surgery     # PVD S/p PTA to RLE c/b perforation of AT  hx of rt subclavian dvt  mild stenosis of left subclavian vein  Hx of right TMA  -1/19 Podiatry  revision of right TMA    - Cont plavix   - stop ASA  - c/w  eliquis 2.5mg BID d/w podiatry and endovascular, monitor for bleeding  - Cont home atorva     # Chronic systolic and diastolic heart failure, EF 35 - 40%   - TTE 1/23/24 with LVEF to 40-45%, mildly improved from previous in 2023, no other new acute findings    -Uptitrate GDMT as tolerated--> consider hydralazine and isordil if needed, currently limited 2/2 hypotension on midodrine  - consideration should be given to further ischemic workup in the outpatient setting due to extensive peripheral disease, risk factors, and no evidence of previous ischemic cardiac workup         # ESRD  -on HD  -nephrology consult     # Anemia of chronic disease  -At baseline hgb  - continue daily CBC     # DM with neuropathy  - A1c 5.3% 12/2023  -Cont home gabapentin  -Insulin glargine 6 units nightly    -SSI     # DVT ppx              Randall Wallace MD

## 2024-02-19 NOTE — PROGRESS NOTES
"Vancomycin Dosing by Pharmacy- FOLLOW UP    Chevy Benton is a 78 y.o. year old male who Pharmacy has been consulted for vancomycin dosing for other Sepsis . Based on the patient's indication and renal status this patient is being dosed based on a goal trough/random level of 15-20.     Renal function is currently stable. Pt on HD    Current vancomycin dose: 2000 mg given Once    Most recent random level: 8.9 mcg/mL    Visit Vitals  BP 97/61 (BP Location: Right arm, Patient Position: Lying)   Pulse 83   Temp 35.8 °C (96.4 °F) (Temporal)   Resp 16        Lab Results   Component Value Date    CREATININE 2.93 (H) 02/19/2024    CREATININE 1.96 (H) 02/18/2024    CREATININE 2.63 (H) 02/17/2024    CREATININE 2.63 (H) 02/17/2024        Patient weight is No results found for: \"PTWEIGHT\"    No results found for: \"CULTURE\"     I/O last 3 completed shifts:  In: 1400 (15.7 mL/kg) [I.V.:1000 (11.2 mL/kg); Other:400]  Out: 0 (0 mL/kg)   Weight: 89 kg   [unfilled]    Lab Results   Component Value Date    PATIENTTEMP 37.0 01/27/2024    PATIENTTEMP 37.0 01/27/2024    PATIENTTEMP 37.0 01/25/2024        Assessment/Plan    Below goal random/trough level. Orders placed for new vancomycin regimen of 750 mg every Once hours to begin at 1900 today 2/19/2024 . Scheduled for after today's possible HD session.  The next level will be obtained on 2/21/2024 at 0500. May be obtained sooner if clinically indicated.   Will continue to monitor renal function daily while on vancomycin and order serum creatinine at least every 48 hours if not already ordered.  Follow for continued vancomycin needs, clinical response, and signs/symptoms of toxicity.       Avery Gill, PharmD           "

## 2024-02-19 NOTE — PROGRESS NOTES
02/19/24 1539   Discharge Planning   Living Arrangements Alone   Support Systems Spouse/significant other;Family members   Assistance Needed Total car   Type of Residence Skilled nursing facility   Do you have animals or pets at home? No   Who is requesting discharge planning? Provider   Home or Post Acute Services Post acute facilities (Rehab/SNF/etc)   Type of Post Acute Facility Services Skilled nursing   Patient expects to be discharged to: RE: [facility]   Does the patient need discharge transport arranged? Yes   RoundTrip coordination needed? Yes   Has discharge transport been arranged? No   Financial Resource Strain   How hard is it for you to pay for the very basics like food, housing, medical care, and heating? Not hard   Housing Stability   In the last 12 months, was there a time when you were not able to pay the mortgage or rent on time? N   In the last 12 months, how many places have you lived? 1   In the last 12 months, was there a time when you did not have a steady place to sleep or slept in a shelter (including now)? N   Transportation Needs   In the past 12 months, has lack of transportation kept you from medical appointments or from getting medications? no   In the past 12 months, has lack of transportation kept you from meetings, work, or from getting things needed for daily living? No   Patient Choice   Provider Choice list and CMS website (https://medicare.gov/care-compare#search) for post-acute Quality and Resource Measure Data were provided and reviewed with: Family   Patient / Family choosing to utilize agency / facility established prior to hospitalization Yes        Met patient at bedside. Patient is just looking and not answering. Called patient's wife Julia ( [phone] ) and explained my role as care coordinator. Patient came from [facility]. He is total care, not able to ambulate. They use [employer] lift. He does have oxygen at the facility. Patient is also on  dialysis and is  getting dialysis 5 time a week at the SNF.Patient's PCP is Dr. Mccray from [facility]. Facility manages his medications. Wife is ok for him to return to [facility] when cleared medically. Requested from [facility] to send return referral to SNF. Requested PT/OT orders from the doctor.   no

## 2024-02-19 NOTE — CONSULTS
Reason For Consult  Bleeding AVF    History Of Present Illness  Chevy Benton is a 78 y.o. male with PMH of SBO, hypercapnic respiratory failure, acute on chronic HFrEF, DM, PVD s/p PTA to RLE c/b perforation of AT, ESRD on HD 5x/week who presented to the ED 2/16 for hyperkalemia (potassium 9). Patient recently saw Dr. Michelle Barrios on 1/12/24 for LUE fistulogram for brachiocephalic fistula created years ago at [facility]. Findings showed mid-subclavian and axillary vein stenosis. After balloon angioplasty, stenosis resolved without recoil on completion fistulogram. Last night patient developed profuse bleeding from AVF site. Hemostasis was achieved after pressure dressing was applied for about 2 hours. He is on Eliquis and Plavix. His last HD session was 2/17.      Past Medical History  He has a past medical history of Acute hypercapnic respiratory failure (CMS/HCC), Acute on chronic HFrEF (heart failure with reduced ejection fraction) (CMS/HCC), Diabetes mellitus (CMS/HCC), ESRD on hemodialysis (CMS/HCC), History of angioplasty of peripheral vessel (10/26/2023), HTN (hypertension), LFT elevation (07/20/2021), Small bowel obstruction (CMS/HCC) (10/10/2023), and Stage II pressure ulcer (CMS/[facility]).    Surgical History  He has a past surgical history that includes Invasive Vascular Procedure (Right, 1/17/2024).     Social History  He reports that he has never smoked. He has been exposed to tobacco smoke. He has never used smokeless tobacco. No history on file for alcohol use and drug use.    Family History  Family History   Problem Relation Name Age of Onset    Diabetes Mother          Allergies  Penicillins    Review of Systems  12 point ROS negative unless stated above       Physical Exam    PE:  Constitutional: A&Ox3, calm and cooperative, NAD  Eyes: PERRL, clear sclera   ENMT: Moist mucous membranes, no apparent injuries or lesions  Head/Neck: Neck supple, no JVD  Cardiovascular: RRR. 2+ equal pulses of the distal  "extremities.  Respiratory/Thorax: Diminished in all lobes, No rales, rhonchi, or wheezes. Good symmetric chest expansion.   Gastrointestinal: Abdomen nondistended, soft, nontender. +BS  Genitourinary: penile edema  Musculoskeletal: ROM intact, no joint swelling  Extremities: No peripheral edema, contusions, or wounds. LUE AVF +bruit and thrill- no sign of active bleeding, 2x2 dressing and tape c/d/I. Edematous in upper and lower extremities   Neurological: A&Ox3, No focal deficits   Psychological: Appropriate mood and behavior  Skin: Warm and dry, no rashes or lesions        Last Recorded Vitals  Blood pressure 95/65, pulse 89, temperature 36.6 °C (97.9 °F), temperature source Temporal, resp. rate 16, height 1.778 m (5' 10\"), weight 89 kg (196 lb 3.4 oz), SpO2 94 %.    Relevant Results  Scheduled medications  apixaban, 2.5 mg, oral, BID  atorvastatin, 40 mg, oral, Nightly  clopidogrel, 75 mg, oral, Daily  epoetin dane or biosimilar, 6,000 Units, subcutaneous, Once per day on Mon Wed Fri  gabapentin, 100 mg, oral, Daily  insulin glargine, 6 Units, subcutaneous, Nightly  insulin lispro, 0-5 Units, subcutaneous, TID with meals  midodrine, 15 mg, oral, q8h  pantoprazole, 40 mg, oral, Daily before breakfast  polyethylene glycol, 17 g, oral, Daily  vancomycin, 750 mg, intravenous, Once      Continuous medications     PRN medications  PRN medications: albuterol, dextrose 10 % in water (D10W), dextrose, glucagon, ipratropium-albuteroL, vancomycin    Results for orders placed or performed during the hospital encounter of 02/16/24 (from the past 24 hour(s))   POCT GLUCOSE   Result Value Ref Range    POCT Glucose 162 (H) 74 - 99 mg/dL   POCT GLUCOSE   Result Value Ref Range    POCT Glucose 151 (H) 74 - 99 mg/dL   CBC   Result Value Ref Range    WBC 11.5 (H) 4.4 - 11.3 x10*3/uL    nRBC 0.3 (H) 0.0 - 0.0 /100 WBCs    RBC 2.45 (L) 4.50 - 5.90 x10*6/uL    Hemoglobin 7.1 (L) 13.5 - 17.5 g/dL    Hematocrit 24.8 (L) 41.0 - 52.0 %    "  (H) 80 - 100 fL    MCH 29.0 26.0 - 34.0 pg    MCHC 28.6 (L) 32.0 - 36.0 g/dL    RDW 20.7 (H) 11.5 - 14.5 %    Platelets 460 (H) 150 - 450 x10*3/uL   Renal Function Panel   Result Value Ref Range    Glucose 115 (H) 74 - 99 mg/dL    Sodium 130 (L) 136 - 145 mmol/L    Potassium 3.7 3.5 - 5.3 mmol/L    Chloride 92 (L) 98 - 107 mmol/L    Bicarbonate 31 21 - 32 mmol/L    Anion Gap 11 10 - 20 mmol/L    Urea Nitrogen 37 (H) 6 - 23 mg/dL    Creatinine 2.93 (H) 0.50 - 1.30 mg/dL    eGFR 21 (L) >60 mL/min/1.73m*2    Calcium 9.5 8.6 - 10.3 mg/dL    Phosphorus 4.3 2.5 - 4.9 mg/dL    Albumin 2.4 (L) 3.4 - 5.0 g/dL   Vancomycin   Result Value Ref Range    Vancomycin 8.9 5.0 - 20.0 ug/mL   POCT GLUCOSE   Result Value Ref Range    POCT Glucose 95 74 - 99 mg/dL   POCT GLUCOSE   Result Value Ref Range    POCT Glucose 119 (H) 74 - 99 mg/dL     Consult to Interventional Radiology    (Results Pending)        Assessment/Plan     Chevy Benton is a 78 y.o. male on day 2 of admission presenting with hyperkalemia and balanitis. Vascular surgery consulted for bleeding AVF.     A/P:   Hemostasis achieved to LUE AVF site after 1-2 hours of pressure. Patient with chronic anemia- H&H stable at 7.1/24.8. Potassium stable at 3.7, creatinine 2.93, nephrology is holding off on HD today.   Discussed patient with Dr. Tovar. Recommend IR involvement for fistulogram to evaluate need for repeat angioplasty. Spoke to IR NP Ariel Lewis, can possibly add on for tomorrow.     Dispo: Pending fistulogram with IR    I spent 45 minutes in the professional and overall care of this patient.      Florinda Gottlieb, APRN-CNP

## 2024-02-19 NOTE — CONSULTS
Wound Care Consult     Visit Date: 2/19/2024      Patient Name: Chevy Benton         MRN: [id number]           YOB: 1945     Reason for Consult: Assessment completed to right TMA, bilateral lateral LE, and buttocks.        Pertinent Labs:   Albumin   Date Value Ref Range Status   02/19/2024 2.4 (L) 3.4 - 5.0 g/dL Final       Wound Assessment:  Wound Incision Foot Dorsal foot;Right (Active)   Wound Image    02/19/24 1040   Site Assessment Pink;Sloughing;Brown;Eschar 02/19/24 1040   Federica-Wound Assessment Intact 02/19/24 1040   Wound Length (cm) 4 cm 02/19/24 1040   Wound Width (cm) 9 cm 02/19/24 1040   Wound Surface Area (cm^2) 36 cm^2 02/19/24 1040   Wound Depth (cm) 0.5 cm 02/19/24 1040   Wound Volume (cm^3) 18 cm^3 02/19/24 1040   Margins Well-defined edges 02/19/24 1040   Dressing ABD;Gauze 02/19/24 1200   Dressing Status Dry;Clean 02/19/24 1200       Wound 01/09/24 Moisture Associated Skin Damage Buttocks Right (Active)   Wound Image   02/19/24 1101   Wound Length (cm) 8 cm 02/19/24 1101   Wound Width (cm) 7 cm 02/19/24 1101   Wound Surface Area (cm^2) 56 cm^2 02/19/24 1101   Dressing Foam 02/19/24 1200   Dressing Status Clean;Dry 02/19/24 1200       Wound 01/22/24 Skin Tear Arm Right;Ventral;Mid (Active)       Wound 02/19/24 Pretibial Right (Active)   Wound Image   02/19/24 1047   Wound Length (cm) 17 cm 02/19/24 1047   Wound Width (cm) 8 cm 02/19/24 1047   Wound Surface Area (cm^2) 136 cm^2 02/19/24 1047       Wound 02/19/24 Pretibial Left (Active)   Wound Image   02/19/24 1053   Wound Length (cm) 18 cm 02/19/24 1053   Wound Width (cm) 8 cm 02/19/24 1053   Wound Surface Area (cm^2) 144 cm^2 02/19/24 1053       Wound Team Summary Assessment: Notified Dr. Wallace of wound care recommendations     Right TMA- amputation   Irrigate with normal saline or wound cleanser, Pat dry.  Apply no sting skin barrier (cavilon) to federiac wound   Gently pack with Kerlix moistened with quarter strength Dakins  solution  Cover with ABD, Kerlix and paper tape   Change every day and as needed.       Right lateral LE-  Vascular full thickness/ Unstageable PI   Irrigate with normal saline or wound cleanser, Pat dry.  Apply no sting skin barrier (cavilon) to federica wound   Apply hydrofera blue ready (apply writing facing up)   Pad and protect with ABD, Kerlix and paper tape   Change every day and as needed.       Left Lateral LE- Vascular full thickness/ Evolving DTI PI   Irrigate with normal saline or wound cleanser, Pat dry.  Apply no sting skin barrier (cavilon) to federica wound   Apply single layer of  xeroform dressing  Pad and protect with ABD, Kerlix and paper tape   Change every day and as needed.      Buttocks/sacrum- Mixed etiology MASD/Irritant contact dermatitis due to friction or contact with body fluids specified (fecal/urinary) & Stage 3 PI   Irrigate with normal saline or wound cleanser, Pat dry.  Apply no sting skin barrier (cavilon) to federica wound   Apply Medihoney alginate to wound bed   Cover with Mepilex border dressing Sacrum  Change every other day and as needed   Apply Triad hydrophilic wound dressing ointment to lower buttocks/groin three times a day and as needed.          Wound Team Plan: WOC will follow up if still admitted     While in bed patient should only be on one fitted sheet, and one chux. Please, do not use brief while patient is resting in bed. Elevate heels off the bed surface at all times. Turn and reposition at least every 2 hours.     Thank you for this consultations, while inpatient please contact with any questions or changes in condition      Falguni Rico RN BSN,[facility]  [phone]   2/19/2024  4:27 PM

## 2024-02-20 NOTE — PROGRESS NOTES
Patient remains in SDU. He has leg drsgs. He is on 2L NC. Patient will need new auth to return to [facility] and new dialysis auth when he is medically cleared to return to facility. Still waiting for PT/OT to work with patient and rec.

## 2024-02-20 NOTE — PROGRESS NOTES
Physical Therapy    Physical Therapy Evaluation    Patient Name: Chevy Benton  MRN: [id number]  Today's Date: 2/20/2024   Time Calculation  Start Time: 1352  Stop Time: 1406  Time Calculation (min): 14 min    Assessment/Plan   PT Assessment  PT Assessment Results: Decreased strength, Decreased endurance, Impaired balance, Decreased mobility, Decreased coordination, Decreased cognition, Decreased skin integrity, Pain  Rehab Prognosis: Fair  Evaluation/Treatment Tolerance: Patient limited by fatigue  Medical Staff Made Aware: Yes  Strengths: Support of Caregivers  Barriers to Participation: Comorbidities  End of Session Communication: Bedside nurse  Assessment Comment: Pt presents with weakness, decreased ambulation and transfers, and moderate unsteadiness; can benefit from skilled PT intervention to assist with discharge planning and address the aforementioned issues to enable the pt to return to their prior level of function.  End of Session Patient Position: Bed, 3 rail up, Alarm on  IP OR SWING BED PT PLAN  Inpatient or Swing Bed: Inpatient  PT Plan  Treatment/Interventions: Bed mobility, Transfer training, Balance training, Neuromuscular re-education, Strengthening, Endurance training, Therapeutic exercise, Therapeutic activity, Home exercise program  PT Plan: Skilled PT  PT Frequency: 2 times per week  PT Discharge Recommendations: Moderate intensity level of continued care  PT Recommended Transfer Status: Total assist  PT - OK to Discharge: Yes (PT POC established)      Subjective   General Visit Information:  General  Reason for Referral: Pt admitted from SNF for balanitis, sepsis and renal failure.  Past Medical History Relevant to Rehab: Ulcer of LE, PAD, DMII, ESRD, pulmonary HTN, poly OA, PVD 2/2 DM, chronic ulcers of LEs, afib, LE embolism and thrombosis, pacemaker. amputation of toe R foot, critical limb ischemia BLEs, GERD  Family/Caregiver Present: No  Co-Treatment: OT  Co-Treatment Reason: to  maximize pt mobility and safety  Prior to Session Communication: Bedside nurse  Patient Position Received: Bed, 3 rail up, Alarm on  Preferred Learning Style: auditory  General Comment: Pt is alert and cooperative, participates with PT.  Home Living:  Home Living  Type of Home: House  Lives With: Spouse  Home Adaptive Equipment: Walker rolling or standard, Cane  Home Layout: Two level, Stairs to alternate level with rails  Alternate Level Stairs-Number of Steps: 12  Home Access: Stairs to enter with rails  Entrance Stairs-Number of Steps: 4  Prior Level of Function:  Prior Function Per Pt/Caregiver Report  Level of [place]: Independent with homemaking with ambulation (prior to September 2023)  ADL Assistance: Independent  Ambulatory Assistance: Independent (used cane for stairs)  Prior Function Comments: Since Sept 2023, pt has been in and out of SNF and hospitals.  Precautions:  Precautions  Medical Precautions: Fall precautions, Oxygen therapy device and L/min    Objective   Pain:  Pain Assessment  Pain Assessment: 0-10  Pain Score: 8  Pain Type: Acute pain  Pain Location:  (BLE)  Cognition:  Cognition  Orientation Level: Oriented X4  Processing Speed: Delayed    General Assessments:  General Observation  General Observation: BUE and BLE swollen     Activity Tolerance  Endurance: Decreased tolerance for upright activites    Perception  Inattention/Neglect: Appears intact    Static Sitting Balance  Static Sitting-Balance Support: Feet supported, No upper extremity supported  Static Sitting-Level of Assistance:  (varies from CGA to mod A)  Static Sitting-Comment/Number of Minutes: 4  Dynamic Sitting Balance  Dynamic Sitting-Balance Support: No upper extremity supported, Feet supported  Dynamic Sitting-Balance:  (BLE LAQ)  Dynamic Sitting-Comments: mod A for trunk control  Functional Assessments:  Bed Mobility 1  Bed Mobility 1: Rolling right  Level of Assistance 1: Maximum assistance, Moderate verbal cues,  Moderate tactile cues (x person)  Bed Mobility Comments 1: Pt does reach over to use LUE on opposite bedrail to assist.  Bed Mobility 2  Bed Mobility  2: Supine to sitting, Sitting to supine  Level of Assistance 2: Dependent, Maximum verbal cues, Maximum tactile cues    Transfers  Transfer:  (deferred due to poor sitting tolerance, BLE weakness)  Extremity/Trunk Assessments:  RUE   RUE :  (see OT eval for details)  LUE   LUE:  (see OT eval for details)  RLE   RLE : Exceptions to WFL  Strength RLE  R Hip Flexion: 1/5  R Knee Extension: 3/5  R Ankle Dorsiflexion: 3/5  R Ankle Plantar Flexion: 3/5  LLE   LLE : Exceptions to WFL  Strength LLE  L Hip Flexion: 1/5  L Knee Extension: 3-/5  L Ankle Dorsiflexion: 3/5  L Ankle Plantar Flexion: 3/5  Outcome Measures:  [facility] Basic Mobility  Turning from your back to your side while in a flat bed without using bedrails: Total  Moving from lying on your back to sitting on the side of a flat bed without using bedrails: Total  Moving to and from bed to chair (including a wheelchair): Total  Standing up from a chair using your arms (e.g. wheelchair or bedside chair): Total  To walk in hospital room: Total  Climbing 3-5 steps with railing: Total  Basic Mobility - Total Score: 6    Encounter Problems       Encounter Problems (Active)       Balance       STG - Maintains static sitting balance with upper extremity support x 10 min with SBA       Start:  02/20/24    Expected End:  03/05/24       INTERVENTIONS:  1. Practice sitting on the edge of a bed/mat with minimal support.  2. Educate patient about maintining total hip precautions while maintaining balance.  3. Educate patient about pressure relief.  4. Educate patient about use of assistive device.         STG - Maintains dynamic sitting balance with upper extremity support x 10 min with CGA       Start:  02/20/24    Expected End:  03/05/24       INTERVENTIONS:  1. Practice sitting on the edge of a bed/mat with minimal support.  2.  Educate patient about maintining total hip precautions while maintaining balance.  3. Educate patient about pressure relief.  4. Educate patient about use of assistive device.            Balance       STG - Maintains static standing balance with ww x 2 min with max A x 2.       Start:  02/20/24    Expected End:  03/05/24       INTERVENTIONS:  1. Practice standing with minimal support.  2. Educate patient about standing tolerance.  3. Educate patient about independence with gait, transfers, and ADL's.  4. Educate patient about use of assistive device.  5. Educate patient about self-directed care.            Mobility       Increase BLE strength to attain functional goals achieved through supine and seated TE.       Start:  02/20/24    Expected End:  03/05/24               Pain - Adult          Transfers       STG - Patient to transfer to and from sit to supine with mod A       Start:  02/20/24    Expected End:  03/05/24            STG - Patient will transfer sit to and from stand to ww with max A x 2.       Start:  02/20/24    Expected End:  03/05/24                   Education Documentation  Body Mechanics, taught by Stacey Albrecht, PT at 2/20/2024  3:11 PM.  Learner: Patient  Readiness: Acceptance  Method: Explanation  Response: Demonstrated Understanding    Mobility Training, taught by Stacey Albrecht, PT at 2/20/2024  3:11 PM.  Learner: Patient  Readiness: Acceptance  Method: Explanation  Response: Demonstrated Understanding    Education Comments  No comments found.

## 2024-02-20 NOTE — CONSULTS
Consults    Reason For Consult  Fistulogram    History Of Present Illness  Chevy Benton is a 78 y.o. male presenting with hyperkalemia. He is ESRD through a LUE brachipcephalic fistula. Patient recently saw Dr. Barrios on 1/12/24 for LUE fistulogram which showed mid-subclavian and axillary vein stenosis. After balloon angioplasty, stenosis resolved without recoil on completion fistulogram. Last night patient developed profuse bleeding from AVF site. Hemostasis was achieved after pressure dressing was applied for about 2 hours. He is on Eliquis and Plavix.      Past Medical History  He has a past medical history of Acute hypercapnic respiratory failure (CMS/[facility]), Acute on chronic HFrEF (heart failure with reduced ejection fraction) (CMS/[facility]), Diabetes mellitus (CMS/[facility]), ESRD on hemodialysis (CMS/[facility]), History of angioplasty of peripheral vessel (10/26/2023), HTN (hypertension), LFT elevation (07/20/2021), Small bowel obstruction (CMS/[facility]) (10/10/2023), and Stage II pressure ulcer (CMS/[facility]).    Surgical History  He has a past surgical history that includes Invasive Vascular Procedure (Right, 1/17/2024).     Social History  He reports that he has never smoked. He has been exposed to tobacco smoke. He has never used smokeless tobacco. No history on file for alcohol use and drug use.    Family History  Family History   Problem Relation Name Age of Onset    Diabetes Mother          Allergies  Penicillins    MEDS:    Current Facility-Administered Medications:     albuterol 2.5 mg /3 mL (0.083 %) nebulizer solution 2.5 mg, 2.5 mg, nebulization, q6h PRN, Mumtaz Almaraz, PharmD    apixaban (Eliquis) tablet 2.5 mg, 2.5 mg, oral, BID, Randall Wallace MD, 2.5 mg at 02/20/24 0821    atorvastatin (Lipitor) tablet 40 mg, 40 mg, oral, Nightly, Randall Wallace MD, 40 mg at 02/19/24 2108    clopidogrel (Plavix) tablet 75 mg, 75 mg, oral, Daily, Randall Wallace MD, 75 mg at 02/20/24 0821    dextrose 10 % in water  (D10W) infusion, 0.3 g/kg/hr, intravenous, Once PRN, Randall Wallace MD    dextrose 50 % injection 25 g, 25 g, intravenous, q15 min PRN, Randall Wallace MD    [START ON 2/21/2024] epoetin dane (Epogen,Procrit) injection 6,000 Units, 6,000 Units, subcutaneous, Once per day on Mon Wed Fri, Jovanna Bobo, PharmD    gabapentin (Neurontin) capsule 100 mg, 100 mg, oral, Daily, Randall Wallace MD, 100 mg at 02/20/24 0821    glucagon (Glucagen) injection 1 mg, 1 mg, intramuscular, q15 min PRN, Randall Wallace MD    insulin glargine (Lantus) injection 6 Units, 6 Units, subcutaneous, Nightly, Randall Wallace MD, 6 Units at 02/19/24 2111    insulin lispro (HumaLOG) injection 0-5 Units, 0-5 Units, subcutaneous, TID with meals, Randall Wallace MD, 1 Units at 02/20/24 0821    ipratropium-albuteroL (Duo-Neb) 0.5-2.5 mg/3 mL nebulizer solution 3 mL, 3 mL, nebulization, q2h PRN, Randall Wallace MD    lidocaine 4 % patch 1 patch, 1 patch, transdermal, Daily, César Garcia DO, 1 patch at 02/20/24 0900    midodrine (Proamatine) tablet 15 mg, 15 mg, oral, q8h, Sabi Jimenez MD, 15 mg at 02/20/24 1019    oxyCODONE-acetaminophen (Percocet) 5-325 mg per tablet 1 tablet, 1 tablet, oral, q6h PRN, Randall Wallace MD, 1 tablet at 02/20/24 0607    pantoprazole (ProtoNix) EC tablet 40 mg, 40 mg, oral, Daily before breakfast, Randall Wallace MD, 40 mg at 02/20/24 0607    polyethylene glycol (Glycolax, Miralax) packet 17 g, 17 g, oral, Daily, Randall Wallace MD, 17 g at 02/18/24 0900    sodium hypochlorite (Dakin's Quarter Strength) 0.125 % external solution, , irrigation, Daily, Randall Wallace MD, Given at 02/20/24 1300    traMADol (Ultram) tablet 50 mg, 50 mg, oral, TID PRN, César Garcia DO, 50 mg at 02/20/24 1019    vancomycin (Vancocin) placeholder, , miscellaneous, Daily PRN, Romana A Kuchta, PharmD    Review of Systems  History obtained from chart review  General ROS: positive  "for  - fatigue  Respiratory ROS: no cough, shortness of breath, or wheezing  Cardiovascular ROS: no chest pain or dyspnea on exertion  Gastrointestinal ROS: no abdominal pain, change in bowel habits, or black or bloody stools  Genitourinary ROS: no dysuria, trouble voiding, or hematuria     Last Recorded Vitals  /71   Pulse 89   Temp 37.2 °C (98.9 °F) (Axillary)   Resp 16   Wt 89 kg (196 lb 3.4 oz)   SpO2 96%      Physical Exam  Orientation: oriented to person, place, time, and general circumstances  HEENT: normocephalic, atraumatic  Pulm: normal  Cardiac: Regular rate and rhythm  LUE fistula with strong bruit and thrill. No bleeding or hematoma.     Relevant Results    LABS:  Lab Results   Component Value Date    WBC 12.3 (H) 02/20/2024    HGB 7.2 (L) 02/20/2024    HCT 24.5 (L) 02/20/2024     02/20/2024     02/20/2024      Results from last 72 hours   Lab Units 02/20/24  0353   SODIUM mmol/L 130*   POTASSIUM mmol/L 4.0   CHLORIDE mmol/L 92*   CO2 mmol/L 28   BUN mg/dL 42*   CREATININE mg/dL 3.58*   GLUCOSE mg/dL 121*   CALCIUM mg/dL 9.7   ANION GAP mmol/L 14   EGFR mL/min/1.73m*2 17*     Results from last 72 hours   Lab Units 02/19/24  0535 02/18/24  0447   ALK PHOS U/L  --  133   BILIRUBIN TOTAL mg/dL  --  0.5   PROTEIN TOTAL g/dL  --  5.6*   ALT U/L  --  16   AST U/L  --  14   ALBUMIN g/dL 2.4* 2.6*           No lab exists for component: \"PT\"    MICRO:  No results found for the last 14 days.      IMAGING:   IR angiogram fistula graft    (Results Pending)       Assessment/Plan     This is a 78 year old male on ESRD through a LUE brachiocephalic fistula, which spontaneously started to bleed early yesterday morning. Hemostasis achieved after 2 hours of pressure. Last HD 2/17. IR consulted for fistulogram to eval for proximal stenosis.     Of note, patient had a LUE fistulogram and balloon angioplasty of the mid-subclavian and axillary vein stenosis on 1/12/24 by Dr. Barrios.     Unfortunately " patient was not NPO, unable to complete today.   Please keep NPO after midnight in attempts to add on for tomorrow.         Olga Isbell, APRN-CNP        Time : Billing Time  Prep time on date of the patient encounter: 10 minutes.   Time spent directly with patient/family/caregiver: 15 minutes.   Documentation time: 10 minutes.   Total time (minutes):  35 minutes  Time Spent with this Patient (minutes).  More than 50% of This Time was Spent in Counseling and/or Coordination of Care

## 2024-02-20 NOTE — PROGRESS NOTES
Occupational Therapy    Evaluation    Patient Name: Chevy Benton  MRN: [id number]  Today's Date: 2/20/2024  Time Calculation  Start Time: 1353  Stop Time: 1406  Time Calculation (min): 13 min        Assessment:  OT Assessment:  (Mod complexity eval reveals impaired FMC, strength, activity tolerance, seated balance- impacting participation in ADLs, fxnl transfers and fxnl mob. Delayed processing is a barrier to new learning and ability to care for self)  Prognosis: Fair  Barriers to Discharge:  (complicated and intermittently unstable medical status)  Evaluation/Treatment Tolerance: Patient limited by pain, Patient limited by fatigue  Medical Staff Made Aware: Yes  End of Session Communication: Bedside nurse  End of Session Patient Position: Bed, 3 rail up, Alarm on  OT Assessment Results: Decreased ADL status, Decreased upper extremity range of motion, Decreased upper extremity strength, Decreased safe judgment during ADL, Decreased cognition, Decreased endurance, Decreased fine motor control, Decreased functional mobility  Prognosis: Fair  Barriers to Discharge:  (complicated and intermittently unstable medical status)  Evaluation/Treatment Tolerance: Patient limited by pain, Patient limited by fatigue  Medical Staff Made Aware: Yes  Strengths: Support of Caregivers  Barriers to Participation: Comorbidities, Premorbid level of function  Plan:  OT Frequency: 2 times per week  OT Recommended Transfer Status: Dependent  OT - OK to Discharge: Yes       Subjective   Current Problem:  1. Balanitis  clotrimazole (Lotrimin) 1 % cream    mupirocin (Bactroban) 2 % ointment      2. Sepsis, due to unspecified organism, unspecified whether acute organ dysfunction present (CMS/[facility])        3. Chronic renal failure, unspecified CKD stage        4. Ulcer of lower extremity, unspecified laterality, unspecified ulcer stage (CMS/[facility])        5. Hypotension, unspecified hypotension type          General:  General  Reason for  Referral: Pt admitted from SNF for balanitis, sepsis and renal failure.  Referred By: Dr. Garcia  Past Medical History Relevant to Rehab: Ulcer of LE, PAD, DMII, ESRD, pulmonary HTN, poly OA, PVD 2/2 DM, chronic ulcers of LEs, afib, LE embolism and thrombosis, pacemaker. amputation of toe R foot, critical limb ischemia BLEs, GERD  Family/Caregiver Present:  (spoke to spouse on phone to verify PLOF- spouse reporting pt has been going SNF < > hospital since Sept 2023, has been daly lifted)  Co-Treatment: PT  Co-Treatment Reason: to maximize patient safety and engagement  Prior to Session Communication: Bedside nurse  Patient Position Received: Bed, 3 rail up, Alarm on  General Comment: pt A & Ox 4 upon eval, extensively delayed processing time, unreliable historian , agreeable to OT eval  Precautions:  Medical Precautions: Fall precautions  Vital Signs:     Pain:  Pain Assessment  Pain Assessment: 0-10  Pain Score: 8  Pain Type: Acute pain  Pain Location:  (BLEs, R> L)    Objective   Cognition:  Overall Cognitive Status: Impaired at baseline  Orientation Level: Oriented X4    Home Living:  Type of Home:  (Patient admitted from SNF, has been in SNF or hospital since at least Sept 2023. Per spouse, mainly stays in bed or is daly lifted to w/c for dialysis. Per pt, extensive assist for ADLs.)  Prior Function:  Level of [place]: Needs assistance with ADLs, Needs assistance with homemaking, Needs assistance with functional transfers  Receives Help From: Primary caregiver (caregivers in SNF)  IADL History:  Homemaking Responsibilities: No  ADL:  Eating Assistance: Minimal (limited with s/u by BUE swelling)  Grooming Assistance: Moderate (limited by UE swelling and pain)  Bathing Assistance: Maximal  UE Dressing Assistance: Maximal  LE Dressing Assistance: Total  Toileting Assistance with Device: Total  Activity Tolerance:  Endurance: Decreased tolerance for upright activites (sitting EOB 3-5 min)  Bed  Mobility/Transfers: Bed Mobility  Bed Mobility: Yes  Bed Mobility 1  Bed Mobility 1: Rolling right  Level of Assistance 1: Maximum assistance (max a x2)  Bed Mobility 2  Bed Mobility  2: Supine to sitting  Level of Assistance 2:  (dep x2)  Bed Mobility 3  Bed Mobility 3: Sitting to supine  Level of Assistance 3:  (dep x2 assist)  Sitting Balance:  Static Sitting Balance  Static Sitting-Balance Support: No upper extremity supported  Static Sitting-Level of Assistance:  (CGA- Mod A on air mattress 3-5 min)    Vision:Vision - Basic Assessment  Current Vision: Wears glasses all the time  Perception:  Initiation: Cues to initiate tasks  Hand Function:  Gross Grasp: Impaired (significant BUE edema L> R)  Coordination: Impaired (significant BUE edema L> R)  Extremities: RUE   RUE :  (ROM and strength limited by UE edema, able to reach face, unable to lift shoulder >90* without assist) and LUE   LUE:  (ROM and strength limited by UE edema, able to reach face, unable to lift shoulder >90* without assist)    Outcome Measures:[facility] Daily Activity  Putting on and taking off regular lower body clothing: Total  Bathing (including washing, rinsing, drying): Total  Putting on and taking off regular upper body clothing: A lot  Toileting, which includes using toilet, bedpan or urinal: Total  Taking care of personal grooming such as brushing teeth: A little  Eating Meals: A little  Daily Activity - Total Score: 11    Education Documentation  Precautions, taught by Jenna Jaime OT at 2/20/2024  2:32 PM.  Learner: Patient  Readiness: Acceptance  Method: Explanation, Demonstration  Response: Needs Reinforcement    Body Mechanics, taught by Jenna Jaime OT at 2/20/2024  2:32 PM.  Learner: Patient  Readiness: Acceptance  Method: Explanation, Demonstration  Response: Needs Reinforcement    ADL Training, taught by Jenna Jaime OT at 2/20/2024  2:32 PM.  Learner: Patient  Readiness: Acceptance  Method: Explanation,  Demonstration  Response: Needs Reinforcement    Goals:  Encounter Problems       Encounter Problems (Active)       ADLs       Patient will perform UB bathing with stand by assist level of assistance at EOB        Start:  02/20/24    Expected End:  03/05/24            Patient with complete upper body dressing with minimal assist  level of assistance donning and doffing all UE clothes with PRN adaptive equipment while edge of bed        Start:  02/20/24    Expected End:  03/05/24            Patient will feed self with set-up level of assistance using PRN adaptive equipment.       Start:  02/20/24    Expected End:  03/05/24            Patient will complete daily grooming tasks brushing teeth and washing face/hair with stand by assist level of assistance and PRN adaptive equipment while edge of bed .       Start:  02/20/24    Expected End:  03/05/24               TRANSFERS       Patient will perform bed mobility with maximal assist with bed rails in order to improve safety and independence with mobility       Start:  02/20/24    Expected End:  03/05/24

## 2024-02-20 NOTE — PROGRESS NOTES
Chevy Benton is a 78 y.o. male on day 3 of admission presenting with Balanitis.    Subjective   Mr. Benton is a 78-year-old man with a history of hypercapnic respiratory failure, heart failure with reduced EF, diabetes, end-stage kidney disease on dialysis. He presented with a high potassium, no apparent EKG changes. He was suffering penile pain, that had been going on for approximately 2 weeks. He is being treated for balanitis. Over night events on Sunday noted of bleeding from his dialysis access. Vascular surgery saw him and he is planned for fistulogram with [facility]. Hgb is stable. No recurrent bleeding reported.   Chart/labs/meds/notes/imaging/VS reviewed.       Objective       Physical Exam  Constitutional:       Appearance: Normal appearance.    HENT:      Mouth/Throat:      Mouth: Mucous membranes are moist.   Eyes:      Extraocular Movements: Extraocular movements intact.      Pupils: Pupils are equal, round, and reactive to light.   Cardiovascular:      Rate and Rhythm: Regular rhythm.      Heart sounds: S1 normal and S2 normal.   Pulmonary:      Breath sounds: Normal breath sounds.   Abdominal:      Comments: Soft, NT/ND, no masses, normal bowel sounds. Anasarca  Genitourinary:     Penile edema  Musculoskeletal:   Bilateral upper extremity edema  Skin:     General: Skin is warm and dry.   Neurological:      General: No focal deficit present.      Mental Status: She is alert and oriented to person, place, and time.   Psychiatric:         Behavior: Behavior normal.    Left upper extremity thrill and bruit    Meds    Current Facility-Administered Medications:     albuterol 2.5 mg /3 mL (0.083 %) nebulizer solution 2.5 mg, 2.5 mg, nebulization, q6h PRN, Mumtaz Almaraz, PharmD    apixaban (Eliquis) tablet 2.5 mg, 2.5 mg, oral, BID, Randall Wallace MD, 2.5 mg at 02/20/24 0821    atorvastatin (Lipitor) tablet 40 mg, 40 mg, oral, Nightly, Randall Wallace MD, 40 mg at 02/19/24 2108     clopidogrel (Plavix) tablet 75 mg, 75 mg, oral, Daily, Randall Wallace MD, 75 mg at 02/20/24 0821    dextrose 10 % in water (D10W) infusion, 0.3 g/kg/hr, intravenous, Once PRN, Randall Wallace MD    dextrose 50 % injection 25 g, 25 g, intravenous, q15 min PRN, Randall Wallace MD    [START ON 2/21/2024] epoetin dane (Epogen,Procrit) injection 6,000 Units, 6,000 Units, subcutaneous, Once per day on Mon Wed Fri, Jovanna Bobo, PharmD    gabapentin (Neurontin) capsule 100 mg, 100 mg, oral, Daily, Randall Wallace MD, 100 mg at 02/20/24 0821    glucagon (Glucagen) injection 1 mg, 1 mg, intramuscular, q15 min PRN, Randall Wallace MD    insulin glargine (Lantus) injection 6 Units, 6 Units, subcutaneous, Nightly, Randall Wallace MD, 6 Units at 02/19/24 2111    insulin lispro (HumaLOG) injection 0-5 Units, 0-5 Units, subcutaneous, TID with meals, Randall Wallace MD, 1 Units at 02/20/24 0821    ipratropium-albuteroL (Duo-Neb) 0.5-2.5 mg/3 mL nebulizer solution 3 mL, 3 mL, nebulization, q2h PRN, Randall Wallace MD    lidocaine 4 % patch 1 patch, 1 patch, transdermal, Daily, César Garcia DO, 1 patch at 02/20/24 0900    midodrine (Proamatine) tablet 15 mg, 15 mg, oral, q8h, Sabi Jimenez MD, 15 mg at 02/20/24 1019    oxyCODONE-acetaminophen (Percocet) 5-325 mg per tablet 1 tablet, 1 tablet, oral, q6h PRN, Randall Wallace MD, 1 tablet at 02/20/24 0607    pantoprazole (ProtoNix) EC tablet 40 mg, 40 mg, oral, Daily before breakfast, Randall Wallace MD, 40 mg at 02/20/24 0607    polyethylene glycol (Glycolax, Miralax) packet 17 g, 17 g, oral, Daily, Randall Wallace MD, 17 g at 02/18/24 0900    sodium hypochlorite (Dakin's Quarter Strength) 0.125 % external solution, , irrigation, Daily, Randall Wallace MD    traMADol (Ultram) tablet 50 mg, 50 mg, oral, TID PRN, César Garcia DO, 50 mg at 02/20/24 1019    vancomycin (Vancocin) placeholder, , miscellaneous, Daily PRN,  "Romana A Kuchta, PharmD   Medications Discontinued During This Encounter   Medication Reason    epoetin dane (Epogen,Procrit) 10,000 unit/mL injection Entered in Error    cefepime (Maxipime) 1 g in dextrose 5 % 50 mL IV     cefepime (Maxipime) 1 g in dextrose 5 % 50 mL IV     albuterol 90 mcg/actuation inhaler 2 puff     ipratropium-albuteroL (Duo-Neb) 0.5-2.5 mg/3 mL nebulizer solution 3 mL     vancomycin in dextrose 5 % (Vancocin) IVPB 750 mg     epoetin dane-epbx (Retacrit) injection 6,000 Units         Allergies  Allergies   Allergen Reactions    Penicillins Hives        Last Recorded Vitals  Blood pressure 103/66, pulse 93, temperature 36 °C (96.8 °F), temperature source Temporal, resp. rate 18, height 1.778 m (5' 10\"), weight 89 kg (196 lb 3.4 oz), SpO2 96 %.  Intake/Output last 3 Shifts:  No intake/output data recorded.    Last 24 hour Results  Results for orders placed or performed during the hospital encounter of 02/16/24 (from the past 24 hour(s))   POCT GLUCOSE   Result Value Ref Range    POCT Glucose 119 (H) 74 - 99 mg/dL   POCT GLUCOSE   Result Value Ref Range    POCT Glucose 104 (H) 74 - 99 mg/dL   Basic Metabolic Panel   Result Value Ref Range    Glucose 121 (H) 74 - 99 mg/dL    Sodium 130 (L) 136 - 145 mmol/L    Potassium 4.0 3.5 - 5.3 mmol/L    Chloride 92 (L) 98 - 107 mmol/L    Bicarbonate 28 21 - 32 mmol/L    Anion Gap 14 10 - 20 mmol/L    Urea Nitrogen 42 (H) 6 - 23 mg/dL    Creatinine 3.58 (H) 0.50 - 1.30 mg/dL    eGFR 17 (L) >60 mL/min/1.73m*2    Calcium 9.7 8.6 - 10.3 mg/dL   CBC   Result Value Ref Range    WBC 12.3 (H) 4.4 - 11.3 x10*3/uL    nRBC 0.3 (H) 0.0 - 0.0 /100 WBCs    RBC 2.44 (L) 4.50 - 5.90 x10*6/uL    Hemoglobin 7.2 (L) 13.5 - 17.5 g/dL    Hematocrit 24.5 (L) 41.0 - 52.0 %     80 - 100 fL    MCH 29.5 26.0 - 34.0 pg    MCHC 29.4 (L) 32.0 - 36.0 g/dL    RDW 20.6 (H) 11.5 - 14.5 %    Platelets 447 150 - 450 x10*3/uL   Vancomycin, Trough   Result Value Ref Range    Vancomycin, " Trough 8.5 5.0 - 20.0 ug/mL   POCT GLUCOSE   Result Value Ref Range    POCT Glucose 191 (H) 74 - 99 mg/dL        Imaging results  Electrocardiogram, 12-lead PRN ACS symptoms    Result Date: 2/6/2024  Atrial fibrillation with premature ventricular or aberrantly conducted complexes Low voltage QRS Nonspecific T wave abnormality Abnormal ECG When compared with ECG of 23-JAN-2024 04:46, No significant change since Confirmed by Edwin May ([id number]) on 2/6/2024 3:23:42 PM    Electrocardiogram, 12-lead PRN ACS symptoms    Result Date: 2/3/2024  Atrial fibrillation with occasional ventricular-paced complexes Nonspecific T wave abnormality Abnormal ECG When compared with ECG of 22-JAN-2024 08:27, Vent. rate has decreased BY  16 BPM Confirmed by Edwin May ([id number]) on 2/3/2024 11:11:22 PM    IR CVC removal    Result Date: 2/2/2024  Interpreted By:  Noe Zaldivar, STUDY: IR CVC REMOVAL;  2/2/2024 3:20 pm   INDICATION: Signs/Symptoms:removal of tunneled line.   COMPARISON: None.   ACCESSION NUMBER(S): [id number]   ORDERING CLINICIAN: LUKE HWANG   TECHNIQUE: INTERVENTIONALIST(S): Noe Zaldivar CNP   CONSENT: The patient/patient's POA/next of kin was informed of the nature of the proposed procedure. The purposes, alternatives, risks, and benefits were explained and discussed. All questions were answered and consent was obtained.     MEDICATION/CONTRAST: No additional   TIME OUT: A time out was performed immediately prior to procedure start with the interventional team, correctly identifying the patient name, date of birth, MRN, procedure, anatomy (including marking of site and side), patient position, procedure consent form, relevant laboratory and imaging test results, antibiotic administration, safety precautions, and procedure-specific equipment needs.   COMPLICATIONS: No immediate adverse events identified.   FINDINGS: The existing skin site over the tunneled catheter insertion site was prepped and draped with  maximal sterile manner.   The skin and subcutaneous tissues at the original catheter site were anesthetized with 1% lidocaine with epinephrine.  The subcutaneous tissues surrounding the catheter were anesthetized with 1% lidocaine. Using blunt dissection, the catheter cuff was externalized and the catheter leading to the jugular vein was completely removed. Hemostasis was obtained using manual compression at the internal jugular vein access site.   The incision site was covered with a sterile dressing and tegaderm was subsequently applied.   There were no immediate or post-procedural complications.       Uneventful removal of a left chest central venous catheter.   I was present for and/or performed the critical portions of the procedure and immediately available throughout the entire procedure.   Performed and dictated at [facility].   Signed by: Noe Zaldivar 2/2/2024 4:39 PM Dictation workstation:   [id number]    Electrocardiogram, 12-lead PRN ACS symptoms    Result Date: 1/29/2024  Atrial fibrillation with occasional ventricular-paced complexes T wave abnormality, consider lateral ischemia Abnormal ECG When compared with ECG of 22-JAN-2024 08:26, Vent. rate has increased BY   4 BPM Confirmed by Edwin May ([id number]) on 1/29/2024 4:59:47 PM    Vascular US upper extremity venous duplex left    Result Date: 1/27/2024            [place] [id number]   Tel [phone] and Fax [phone]  Vascular Lab Report VASC US UPPER EXTREMITY VENOUS DUPLEX LEFT  Patient Name:      SHAMEKA Mendez Physician: [id number] Jovanna Francis MD Study Date:        1/26/2024           Ordering           [id number] CHUCKY BLACKBURN                                        Physician: MRN/PID:           [id number]            Technologist:      Elisabeth Betts T Accession#:        [id number]         Technologist 2: Date of Birth/Age: 1945 / 78      Encounter#:        [id number]                    years Gender:            M Admission Status:  Inpatient           Location           [facility]                                        Performed:  Diagnosis/ICD: Left arm swelling-M79.89 Indication:    Limb swelling CPT Codes:     26730 Peripheral venous duplex scan for DVT Limited  CONCLUSIONS: Right Upper Venous: Unable to visualize subclavian vein due to lines and bandages. Left Upper Venous: Technically limited exam; negative for acute deep vein thrombus in visualized veins. Unable to rule out deep vein thrombus in non-visualized mid and distal subclavian vein. The left basilic vein was not visualized. Left brachiocephalic arteriovenous fistula is noted. Visualized in segments due to bandages from recent dialysis access.  Additional Findings: Technically difficult due to patient positioning and limited mobility.  Imaging & Doppler Findings:  Left                Compress Thrombus        Flow Internal Jugular      Yes      None   Spontaneous/Phasic Subclavian Proximal   Yes      None   Spontaneous/Phasic Axillary              Yes      None   Spontaneous/Phasic Brachial              Yes      None Cephalic              Yes      None  [id number] Jovanna Francis MD Electronically signed by [id number] Jovanna Francis MD on 1/27/2024 at 2:56:51 PM  ** Final **     CT head wo IV contrast    Result Date: 1/26/2024  Interpreted By:  Marilyn Torres and Dervishi Mario STUDY: CT HEAD WO IV CONTRAST;  1/25/2024 10:08 pm   INDICATION: Signs/Symptoms:AMS CICU patient.   COMPARISON: None.   ACCESSION NUMBER(S): [id number]   ORDERING CLINICIAN: ALVIN AMBRIZ   TECHNIQUE: Noncontrast axial CT scan of head was performed. Angled reformats in brain and bone windows were generated. The images were reviewed in bone, brain, blood and soft tissue windows.   FINDINGS: CSF Spaces: The ventricles, sulci and basal cisterns are  concordant with the degree of parenchymal atrophy. There is no extraaxial fluid collection.   Parenchyma: There are nonspecific patchy, confluent periventricular and subcortical white matter hypodensities which are likely sequela of chronic microvascular ischemic changes. Otherwise, the grey-white differentiation is intact. There is no mass effect or midline shift. There is no intracranial hemorrhage.   Calvarium: The calvarium is unremarkable.   Paranasal sinuses and mastoids: Visualized paranasal sinuses and mastoids are clear.       1.  No evidence of acute intracranial hemorrhage or mass effect. 2. Nonspecific periventricular and subcortical white matter hypodensities likely sequela of chronic microvascular ischemic changes     I personally reviewed the images/study and I agree with the findings as stated by Resident Irwin Ingram MD. This study was interpreted at [place], [place].   MACRO: None   Signed by: Marilyn Torres 1/26/2024 7:23 AM Dictation workstation:   [id number]    XR abdomen 1 view    Result Date: 1/25/2024  Interpreted By:  Jori Garcia and Ritchie Brandon STUDY: XR ABDOMEN 1 VIEW;  1/25/2024 3:05 pm   INDICATION: Signs/Symptoms:CICU confirm NG placement.   COMPARISON: Chest x-ray 01/23/2024   ACCESSION NUMBER(S): [id number]   ORDERING CLINICIAN: ALVIN AMBRIZ   FINDINGS: Enteric tube courses midline below the level of the diaphragm with the tip located in the gastric body. The side-hole is below the GE junction.   Multiple gaseous distended loops of small and large bowel. For example there is a lucent area of gaseous ascending colon in the right upper quadrant measuring 5.2 cm. There is limited evaluation of pneumoperitoneum on supine imaging, however there does not appear to be any evidence of free air surrounding this gaseous distended loop of ascending/Rigler's sign. Right diaphragm is distinctly visualized separate from this loop of bowel  without evidence of subdiaphragmatic free air.   Additional mildly prominent loops of small bowel scattered throughout the bilateral hemiabdomen, for example largest loop of small bowel can be seen in the left upper quadrant measuring 3.4 cm.   Visualized lungs are clear.   Osseous structures demonstrate no acute bony changes.       1. Enteric tube as described above with the distal tip projecting over the gastric body. 2. Multiple mildly distended loops of small and large bowel as described above. Recommend attention on follow-up examinations as could reflect early ileus. 3. No definitive evidence of free air on current supine radiograph as described above.   MACRO: None   Signed by: Jori Garcia 1/25/2024 3:48 PM Dictation workstation:   [id number]    XR chest 1 view    Result Date: 1/23/2024  Interpreted By:  Jori Garcia and Ritchie Brandon STUDY: XR CHEST 1 VIEW;  1/23/2024 9:06 am   INDICATION: Signs/Symptoms:pneumothorax r/o. dyspnea..   COMPARISON: Chest x-ray 01/22/2024   ACCESSION NUMBER(S): [id number]   ORDERING CLINICIAN: DONNA SOLIMAN   FINDINGS: AP radiograph of the chest was provided.   Similar position of the left internal jugular approach central venous catheter with the distal tip projecting over the expected location of the distal SVC. There is a right-sided pacemaker/AICD with leads in similar position over the expected location of the right atrium/right ventricle.   CARDIOMEDIASTINAL SILHOUETTE: Cardiomediastinal silhouette is stable in size and configuration.   LUNGS: Similar low lung volumes/expiratory radiograph with bronchovascular crowding. Similar linear areas of bibasilar atelectasis. Improved bilateral pleural effusions. The previously described trace focal lucency projecting over the right lower lung field is no longer evident on current examination. No pneumothorax.   ABDOMEN: No remarkable upper abdominal findings.   BONES: No acute osseous changes.       1. Similar low lung  volumes with improved bilateral pleural effusions. Unchanged bibasilar atelectasis. 2. No evidence of pneumothorax. 3. Medical devices as described above.   I personally reviewed the images/study and I agree with the findings as stated by resident Marvin Cueva. This study was interpreted at [place], [place].   MACRO: None   Signed by: Jori Garcia 1/23/2024 5:11 PM Dictation workstation:   [id number]    Transthoracic Echo (TTE) Complete    Result Date: 1/23/2024   [facility], [address] [place] [address]                Tel [phone] and Fax [phone] TRANSTHORACIC ECHOCARDIOGRAM REPORT  Patient Name:      SHAMEKA Mendez Physician:    [id number] Mis Hoffman MD Study Date:        1/23/2024            Ordering Provider:    [id number] CHUCKY BLACKBURN MRN/PID:           [id number]             Fellow: Accession#:        [id number]         Nurse: Date of Birth/Age: 1945 / 78 years Sonographer:          Roxana Pace RDCS Gender:            M                    Additional Staff: Height:            177.80 cm            Admit Date:           1/9/2024 Weight:            104.78 kg            Admission Status:     Inpatient -                                                               Routine BSA:               2.22 m2              Encounter#:           [id number]                                         Department Location:  [facility]                                                               Invasive Blood Pressure: 78 /65 mmHg Study Type:    TRANSTHORACIC ECHO (TTE) COMPLETE Diagnosis/ICD: Shock, unspecified-R57.9 Indication:    shock CPT Code:      Echo Complete w Full Doppler-69220 Patient History: Pertinent History: ESRD, HFrEF  (35-40), AFib, PVD, AMS & Hypotension during HD,                    AO Root Dilitation, CAD, SSS, PHTN. Study Detail: The following Echo studies were performed: 2D, M-Mode, Doppler and               color flow. Technically challenging study due to poor acoustic               windows, prominent lung artifact, patient lying in supine               position, body habitus and coughing up phlegm. Definity used as a               contrast agent for endocardial border definition. Total contrast               used for this procedure was 1 mL via IV push. Unable to obtain               subcostal and suprasternal notch view.  PHYSICIAN INTERPRETATION: Left Ventricle: The left ventricular systolic function is mildly decreased, with an estimated ejection fraction of 40-45%. The patient is in atrial fibrillation which may influence the estimate of left ventricular function and transvalvular flows. There is global hypokinesis of the left ventricle with minor regional variations. The left ventricular cavity size is normal. There is left ventricular concentric remodeling. Left ventricular diastolic filling was indeterminate. Mildly increased LV wall thickness with proximal septal bulge up to 1.8 cm. Left Atrium: The left atrium is severely dilated. Right Ventricle: The right ventricle was not well visualized. Unable to determine right ventricular systolic function. A device is visualized in the right ventricle. Right Atrium: The right atrium is mildly dilated. There is a device visualized in the right atrium. Aortic Valve: The aortic valve is probably trileaflet. There is mild aortic valve cusp calcification. There is trivial aortic valve regurgitation. The peak instantaneous gradient of the aortic valve is 15.7 mmHg. The mean gradient of the aortic valve is 7.0 mmHg. Mitral Valve: The mitral valve is normal in structure. There is mild mitral annular calcification. There is mild mitral valve regurgitation. Tricuspid Valve: The  tricuspid valve was not well visualized. There is trace to mild tricuspid regurgitation. The Doppler estimated RVSP is within normal limits at 35.5 mmHg. Pulmonic Valve: The pulmonic valve is structurally normal. There is physiologic pulmonic valve regurgitation. Pericardium: There is a trivial pericardial effusion. Aorta: The aortic root is abnormal. There is mild dilatation of the aortic root. In comparison to the previous echocardiogram(s): Compared wit the prior exam from 10/2/2023 the LV appears slightly more dynamic today with LVEF 40-45% ( instead prior prior exam with LVEF 35-40%). There is still only mild MR. Note that the prior RVSP was moderately elevated at 64.8mmHg and is now normal or borderline at 35.5mmHg.  CONCLUSIONS:  1. Left ventricular systolic function is mildly decreased with a 40-45% estimated ejection fraction.  2. Poorly visualized anatomical structures due to suboptimal image quality.  3. Mildly increased LV wall thickness with proximal septal bulge up to 1.8 cm.  4. The left atrium is severely dilated.  5. RVSP within normal limits.  6. Compared wit the prior exam from 10/2/2023 the LV appears slightly more dynamic today with LVEF 40-45% ( instead prior prior exam with LVEF 35-40%). There is still only mild MR. Note that the prior RVSP was moderately elevated at 64.8mmHg and is now normal or borderline at 35.5mmHg.  7. The patient is in atrial fibrillation which may influence the estimate of left ventricular function and transvalvular flows.  8. There is global hypokinesis of the left ventricle with minor regional variations. QUANTITATIVE DATA SUMMARY: 2D MEASUREMENTS:                          Normal Ranges: Ao Root d:     3.70 cm   (2.0-3.7cm) LAs:           4.30 cm   (2.7-4.0cm) IVSd:          1.40 cm   (0.6-1.1cm) LVPWd:         1.30 cm   (0.6-1.1cm) LVIDd:         3.70 cm   (3.9-5.9cm) LVIDs:         3.10 cm LV Mass Index: 79.6 g/m2 LV % FS        16.2 % LA VOLUME:                                Normal Ranges: LA Vol A4C:        110.7 ml   (22+/-6mL/m2) LA Vol A2C:        149.4 ml LA Vol BP:         129.0 ml LA Vol Index A4C:  49.9ml/m2 LA Vol Index A2C:  67.3 ml/m2 LA Vol Index BP:   58.1 ml/m2 LA Area A4C:       30.3 cm2 LA Area A2C:       35.3 cm2 LA Major Axis A4C: 7.0 cm LA Major Axis A2C: 7.1 cm LA Volume Index:   58.1 ml/m2 RA VOLUME BY A/L METHOD:                       Normal Ranges: RA Area A4C: 19.9 cm2 AORTA MEASUREMENTS:                      Normal Ranges: Ao Sinus, d: 3.70 cm (2.1-3.5cm) LV SYSTOLIC FUNCTION BY 2D PLANIMETRY (MOD):                     Normal Ranges: EF-A4C View: 35.0 % (>=55%) EF-A2C View: 41.2 % EF-Biplane:  37.7 % LV DIASTOLIC FUNCTION:                           Normal Ranges: MV Peak E:    0.86 m/s    (0.7-1.2 m/s) MV e'         0.09 m/s    (>8.0) MV lateral e' 0.10 m/s MV medial e'  0.08 m/s MV A Dur:     193.00 msec E/e' Ratio:   9.57        (<8.0) a'            0.02 m/s MITRAL VALVE:                 Normal Ranges: MV DT: 193 msec (150-240msec) AORTIC VALVE:                                    Normal Ranges: AoV Vmax:                1.98 m/s  (<=1.7m/s) AoV Peak PG:             15.7 mmHg (<20mmHg) AoV Mean P.0 mmHg  (1.7-11.5mmHg) LVOT Max Dann:            0.91 m/s  (<=1.1m/s) AoV VTI:                 36.20 cm  (18-25cm) LVOT VTI:                17.20 cm LVOT Diameter:           1.90 cm   (1.8-2.4cm) AoV Area, VTI:           1.35 cm2  (2.5-5.5cm2) AoV Area,Vmax:           1.31 cm2  (2.5-4.5cm2) AoV Dimensionless Index: 0.48  RIGHT VENTRICLE: RV Basal 5.30 cm RV Major 8.9 cm RV s'    0.04 m/s TRICUSPID VALVE/RVSP:                             Normal Ranges: Peak TR Velocity: 2.85 m/s RV Syst Pressure: 35.5 mmHg (< 30mmHg) PULMONIC VALVE:                         Normal Ranges: PV Accel Time: 103 msec (>120ms) PV Max Dann:    1.0 m/s  (0.6-0.9m/s) PV Max PG:     3.6 mmHg  [id number] Mis Hoffman MD Electronically signed on 2024 at 3:42:15 PM  ** Final **      XR chest 1 view    Result Date: 1/23/2024  Interpreted By:  Jori Garcia and Dervishi Mario STUDY: XR CHEST 1 VIEW;  1/22/2024 8:21 pm   INDICATION: Signs/Symptoms:aborted central line, r/o pneumothorax.   COMPARISON: Chest radiograph: 01/21/2020   ACCESSION NUMBER(S): [id number]   ORDERING CLINICIAN: SALVADOR OLIVARES   FINDINGS: AP radiograph of the chest was provided.   A right-sided ICD device is again noted. Right IJ central venous catheter with the tip projecting over the distal SVC.   CARDIOMEDIASTINAL SILHOUETTE: Cardiomediastinal silhouette is stable in size and configuration enlarged.   LUNGS: There is persistent low lung volumes with associated bronchovascular crowding. There is re-demonstration of blunting of bilateral right worse than left costophrenic angles suggesting pleural effusions with associated atelectasis. A trace focal lucency is seen projecting over the right lower lung field. No pneumothorax of the left lung.   ABDOMEN: No remarkable upper abdominal findings.   BONES: No acute osseous changes.       1.  Large right and small left pleural effusions with associated atelectasis. 2. Low lung volumes with associated bronchovascular crowding. 3. Trace focal lucency projecting over the right lower lung field which may be artifactual however a trace pneumothorax is not excluded. Repeat radiograph can be obtained as clinically indicated. 4. Medical devices as above.   I personally reviewed the images/study and I agree with the findings as stated. This study was interpreted at [place], [place].   MACRO: NONE.   Signed by: Jori Garcia 1/23/2024 9:01 AM Dictation workstation:   [id number]    XR chest 2 views    Result Date: 1/21/2024  Interpreted By:  Kirk Vanessa, STUDY: XR CHEST 2 VIEWS;  1/21/2024 9:19 am   INDICATION: Signs/Symptoms:s/p dialysis.   COMPARISON: Chest radiograph 01/16/2024 and chest CT 10/08/2023   ACCESSION NUMBER(S): [id number]    ORDERING CLINICIAN: GIBRAN ARMIJO   FINDINGS: PA and lateral radiographs of the chest, including dual energy subtraction images are available for interpretation. The evaluation is somewhat limited by patient rotation and suboptimal positioning. Stable positioning of a right subclavian approach dual chamber cardiac pacemaker. Left subclavian approach hemodialysis catheter tip overlies mid to lower SVC, unchanged.   CARDIOMEDIASTINAL SILHOUETTE: The cardiomediastinal silhouette is grossly stable in size and configuration.   LUNGS: Interval slight worsening of mild diffuse pulmonary edema. Small bibasilar pleural effusions, overall improved from prior. Interval worsening of right basilar opacification. No pneumothorax is seen.   ABDOMEN: No remarkable upper abdominal findings.   BONES: No acute osseous abnormality.       1. Limited examination with interval slight worsening of mild diffuse pulmonary edema. Correlate with patient's volume status. 2. Interval worsening of right basilar opacification, which may represent right middle/lower lobe atelectasis. Correlate with concern for central mucous plugging. 3. Small bibasilar pleural effusions have improved as compared to prior study.   Signed by: Kirk Vanessa 1/21/2024 10:58 AM Dictation workstation:   [id number]    XR foot right 1-2 views    Result Date: 1/19/2024  Interpreted By:  Liz Ruiz and Ohs Zachary STUDY: Right foot, 3 views.   INDICATION: Signs/Symptoms:post op tma   COMPARISON: Right foot radiograph 01/09/2024..   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 Sharp margination of the metatarsal stumps. Unremarkable appearance of the soft tissues of the stump with overlying wound VAC noted. Prominent vascular calcifications of the lower leg and ankle.   Remote appearing healed Douglas B distal fibular fracture with  unchanged cortical irregularity.       1. 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 2. Additional findings as above.   I personally reviewed the images/study and I agree with the findings as stated by Dr. Aly Brand. This study was interpreted at [facility], [place], [place].   MACRO: None.   Signed by: Liz Ruiz 1/19/2024 5:25 PM Dictation workstation:   [id number]       Assessment and Plan  Mr. Benton is a 78-year-old man with a history of hypercapnic respiratory failure, heart failure with reduced EF, diabetes, end-stage kidney disease on dialysis.  He presented with a high potassium, no apparent EKG changes.  He was suffering penile pain, that had been going on for approximately 2 weeks.  He is being treated for balanitis. Overnight events on Sunday noted of bleeding from his dialysis access. Vascular surgery is following and he is planned for fistulogram with IR.     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  We will await his fistulogram prior to placing dialysis orders. His hemoglobin is stable. AV fistula bleeding has subsided.  I anticipate dialysis either today or tomorrow after access intervention.  Erythropoietin is ordered and we are monitoring his phosphorus level. Nephrology will follow closely with his care.  Will follow.      I spent a total of 35 minutes with this patient today.    Bob Castellano, DO

## 2024-02-21 NOTE — H&P
History Of Present Illness  Chevy Benton is a 78 y.o. male presenting with ESRD on HD, s/p bleeding from AVF on 2/19/24. Patient had LUE fistulogram with Dr. Barrios on 1/12/24 with successful balloon angioplasty. PMH includes  PVD s/p PTA to RLE c/b perforation of AT, Right subclavian DVT, right TMA, chronic systolic and diastolic HFrEF, DM with neuropathy.     Past Medical History  He has a past medical history of Acute hypercapnic respiratory failure (CMS/[facility]), Acute on chronic HFrEF (heart failure with reduced ejection fraction) (CMS/[facility]), Diabetes mellitus (CMS/[facility]), ESRD on hemodialysis (CMS/[facility]), History of angioplasty of peripheral vessel (10/26/2023), HTN (hypertension), LFT elevation (07/20/2021), Small bowel obstruction (CMS/[facility]) (10/10/2023), and Stage II pressure ulcer (CMS/[facility]).    Surgical History  He has a past surgical history that includes Invasive Vascular Procedure (Right, 1/17/2024).     Social History  He reports that he has never smoked. He has been exposed to tobacco smoke. He has never used smokeless tobacco. No history on file for alcohol use and drug use.    Family History  Family History   Problem Relation Name Age of Onset    Diabetes Mother          Allergies  Penicillins    Home Medications  No current facility-administered medications on file prior to encounter.     Current Outpatient Medications on File Prior to Encounter   Medication Sig Dispense Refill    acetaminophen (Tylenol) 325 mg tablet Take 3 tablets (975 mg) by mouth every 6 hours.      albuterol 90 mcg/actuation inhaler Inhale 2 puffs every 6 hours if needed for shortness of breath.      allopurinol (Zyloprim) 100 mg tablet Take 1 tablet (100 mg) by mouth once daily.      apixaban (Eliquis) 2.5 mg tablet Take 1 tablet (2.5 mg) by mouth 2 times a day.      atorvastatin (Lipitor) 40 mg tablet Take 1 tablet (40 mg) by mouth once daily at bedtime.      clopidogrel (Plavix) 75 mg tablet Take 1 tablet (75 mg) by mouth once  "daily.      collagenase (SantyL) 250 unit/gram ointment Apply 1 Application topically once daily as needed. \"MIGUEL\" to Right foot      dextrose 50 % injection Infuse 50 mL (25 g) into a venous catheter every 15 minutes if needed (For blood glucose less than or equal to 40 mg/dL).      gabapentin (Neurontin) 100 mg capsule Take 1 capsule (100 mg) by mouth once daily.      glucagon (Glucagen) 1 mg injection Inject 1 mg into the muscle every 15 minutes if needed for low blood sugar - see comments (For blood glucose less than or equal to 70 mg/dL and no IV access). 1 each 12    insulin glargine (Lantus) 100 unit/mL injection Inject 6 Units under the skin once daily at bedtime. Take as directed per insulin instructions.      insulin lispro (HumaLOG) 100 unit/mL injection Inject 0-0.05 mL (0-5 Units) under the skin 3 times a day with meals. Take as directed per insulin instructions.      ipratropium-albuteroL (Duo-Neb) 0.5-2.5 mg/3 mL nebulizer solution Take 3 mL by nebulization every 6 hours if needed for wheezing or shortness of breath. 180 mL 11    lidocaine 4 % patch Place 1 patch over 12 hours on the skin once daily. Remove & discard patch within 12 hours or as directed by MD.      loratadine (Claritin) 10 mg tablet TAKE 1 TABLET (10 MG) BY MOUTH EVERY OTHER DAY IF NEEDED FOR ALLERGIES. 90 tablet 0    melatonin 5 mg tablet Take 1 tablet (5 mg) by mouth once daily at bedtime.      midodrine (Proamatine) 5 mg tablet Take 3 tablets (15 mg) by mouth every 8 hours.      [] oxyCODONE (Roxicodone) 5 mg immediate release tablet Take 1 tablet (5 mg) by mouth every 6 hours if needed for severe pain (7 - 10) for up to 7 days. 15 tablet 0    pantoprazole (ProtoNix) 40 mg EC tablet Take 1 tablet (40 mg) by mouth once daily in the morning. Take before meals. Do not crush, chew, or split. Do not start before 2024.      polyethylene glycol (Miralax) 17 gram packet Take 17 g by mouth once daily.      povidone-iodine " (Betadine) 7.5 % solution Apply 1 Application topically once daily as needed for wound care.      sennosides (Senokot) 8.6 mg tablet Take 1 tablet (8.6 mg) by mouth once daily at bedtime.      Triphrocaps 1 mg capsule Take 1 capsule by mouth once daily.      [DISCONTINUED] epoetin dane (Epogen,Procrit) 10,000 unit/mL injection Inject 1.2 mL (12,000 Units) under the skin 3 times a week.            Inpatient Medications:  Scheduled medications   Medication Dose Route Frequency    apixaban  2.5 mg oral BID    atorvastatin  40 mg oral Nightly    clopidogrel  75 mg oral Daily    epoetin dane or biosimilar  6,000 Units subcutaneous Once per day on Mon Wed Fri    gabapentin  100 mg oral Daily    insulin glargine  6 Units subcutaneous Nightly    insulin lispro  0-5 Units subcutaneous TID with meals    lidocaine  1 patch transdermal Daily    midodrine  15 mg oral q8h    pantoprazole  40 mg oral Daily before breakfast    polyethylene glycol  17 g oral Daily    sodium hypochlorite   irrigation Daily     PRN medications   Medication    albuterol    dextrose 10 % in water (D10W)    dextrose    glucagon    ipratropium-albuteroL    oxyCODONE-acetaminophen    traMADol     Continuous Medications   Medication Dose Last Rate         Review of Systems   Unable to perform ROS: Other (patient does not answer questions from provider)          Physical Exam  Constitutional:       General: He is awake. He is not in acute distress.     Appearance: He is ill-appearing.   HENT:      Mouth/Throat:      Mouth: Mucous membranes are moist.      Pharynx: Oropharynx is clear.   Cardiovascular:      Rate and Rhythm: Normal rate and regular rhythm.      Pulses:           Dorsalis pedis pulses are 1+ on the right side and 1+ on the left side.      Heart sounds: Normal heart sounds. No murmur heard.     Comments: Right radial detected by doppler, unable to palpate    LUE AVF +thrill, dressing dry and intact, foul odor from dressing    LUE 1-2+ pitting  "edema  Chest:       Musculoskeletal:        Arms:       Right Lower Extremity: Right leg is amputated below ankle. (s/p TMA)  Neurological:      Mental Status: He is alert.      Comments: Able to follow some directions (pt opened mouth and stuck out tongue saying \"ahh\" for NP to assess Mallampati)        Sedation Plan    ASA 4     Mallampati class: III.         Last Recorded Vitals  Blood pressure (!) 197/110, pulse 86, temperature 36.4 °C (97.5 °F), temperature source Temporal, resp. rate 16, height 1.778 m (5' 10\"), weight 87.7 kg (193 lb 5.5 oz), SpO2 92 %.         Vitals from the Past 24 Hours  Heart Rate:  [85-96]   Temp:  [36.2 °C (97.1 °F)-36.9 °C (98.4 °F)]   Resp:  [16-18]   BP: (103-197)/()   Weight:  [87.7 kg (193 lb 5.5 oz)]   SpO2:  [92 %-97 %]          Relevant Results    Labs    CBC:   Recent Labs     02/21/24  0540 02/20/24  0353 02/19/24  0535 02/18/24 0447 02/17/24  0944 02/17/24  0039   WBC 11.0 12.3* 11.5* 13.5* 14.0* 13.2*   HGB 7.3* 7.2* 7.1* 7.4* 8.3* 8.2*   HCT 24.6* 24.5* 24.8* 24.4* 28.0* 27.2*   * 447 460* 424 431 456*   MCV 99 100 101* 97 99 100     BMP/CMP:   Recent Labs     02/21/24  0540 02/20/24  0353 02/19/24  0535 02/18/24  0447 02/17/24  0944 02/17/24  0039 01/29/24  0247 01/28/24  0205 01/27/24  1529 01/24/24  1839 01/24/24  0441   * 130* 130* 132* 131*  131* 128*   < > 133* 133*   < > 134*   K 4.2 4.0 3.7 3.5 3.3*  3.3* 3.6   < > 4.0 3.4*   < > 3.3*   CL 91* 92* 92* 93* 90*  90* 88*   < > 97* 97*   < > 98   BUN 52* 42* 37* 26* 39*  39* 34*   < > 23 17   < > 15   CREATININE 4.31* 3.58* 2.93* 1.96* 2.63*  2.63* 2.37*   < > 2.22* 1.88*   < > 1.53*   CO2 28 28 31 32 32  32 31   < > 29 25   < > 24   CALCIUM 9.6 9.7 9.5 9.5 9.9  9.9 10.0   < > 9.3 9.1   < > 9.6   PROT  --   --   --  5.6* 5.7* 5.9*  --  4.9* 4.9*  --  5.3*   BILITOT  --   --   --  0.5 0.5 0.5  --  0.5 0.6  --  0.7   ALKPHOS  --   --   --  133 164* 181*  --  150* 153*  --  83   ALT  --   --   " "--  16 16 19  --  6* 6*  --  4*   AST  --   --   --  14 19 37  --  23 27  --  28   GLUCOSE 114* 121* 115* 184* 129*  129* 172*   < > 205* 234*   < > 94    < > = values in this interval not displayed.      Lipid Panel: No results for input(s): \"CHOL\", \"HDL\", \"CHHDL\", \"LDL\", \"VLDL\", \"TRIG\", \"NHDL\" in the last 95736 hours.  Cardiac       No lab exists for component: \"CK\", \"CKMBP\"   Hemoglobin A1C:   Recent Labs     12/03/23  0738 04/25/22  2034   HGBA1C 5.3 6.5*     TSH/ Free T4:   Recent Labs     01/28/24  0937   TSH 2.00     Iron:   Recent Labs     01/24/24  0441 01/17/24  2249   * 1,525*     Coag:     ABO: No results found for: \"ABO\"    Past Cardiology Tests (Last 3 Years):  EKG:  Encounter Date: 02/16/24   ECG 12 lead   Result Value    Ventricular Rate 96    QRS Duration 82    QT Interval 332    QTC Calculation(Bazett) 419    R Axis 41    T Axis 67    QRS Count 16    Q Onset 224    T Offset 390    QTC Fredericia 388    Narrative    Atrial fibrillation with premature ventricular or aberrantly conducted complexes  Low voltage QRS  Nonspecific ST and T wave abnormality  Abnormal ECG  When compared with ECG of 25-JAN-2024 02:56,  No significant change was found  See ED provider note for full interpretation and clinical correlation  Confirmed by Kateryna An ([id number]) on 2/20/2024 4:11:42 PM     Echo:  Results for orders placed during the hospital encounter of 01/09/24    Transthoracic Echo (TTE) Complete    Narrative  [facility], [address] [place] [address]  Tel [phone] and Fax [phone]    TRANSTHORACIC ECHOCARDIOGRAM REPORT      Patient Name:      SHAMEKA SANDY CORINNE Mendez Physician:    [id number] Mis Hoffman MD  Study Date:        1/23/2024            Ordering Provider:    [id number] CHUCKY BLACKBURN  MRN/PID:           [id number]             Fellow:  Accession#:        [id number]         Nurse:  Date of Birth/Age: 1945 / 78 years Sonographer:          Roxana" Katelynn  [facility]  Gender:            M                    Additional Staff:  Height:            177.80 cm            Admit Date:           1/9/2024  Weight:            104.78 kg            Admission Status:     Inpatient -  Routine  BSA:               2.22 m2              Encounter#:           [id number]  Department Location:  [facility]  In[facility]  Blood Pressure: 78 /65 mmHg    Study Type:    TRANSTHORACIC ECHO (TTE) COMPLETE  Diagnosis/ICD: Shock, unspecified-R57.9  Indication:    shock  CPT Code:      Echo Complete w Full Doppler-[id number]    Patient History:  Pertinent History: ESRD, HFrEF (35-40), AFib, PVD, AMS & Hypotension during HD,  AO Root Dilitation, CAD, SSS, PHTN.    Study Detail: The following Echo studies were performed: 2D, M-Mode, Doppler and  color flow. Technically challenging study due to poor acoustic  windows, prominent lung artifact, patient lying in supine  position, body habitus and coughing up phlegm. Definity used as a  contrast agent for endocardial border definition. Total contrast  used for this procedure was 1 mL via IV push. Unable to obtain  subcostal and suprasternal notch view.      PHYSICIAN INTERPRETATION:  Left Ventricle: The left ventricular systolic function is mildly decreased, with an estimated ejection fraction of 40-45%. The patient is in atrial fibrillation which may influence the estimate of left ventricular function and transvalvular flows. There is global hypokinesis of the left ventricle with minor regional variations. The left ventricular cavity size is normal. There is left ventricular concentric remodeling. Left ventricular diastolic filling was indeterminate. Mildly increased LV wall thickness with proximal septal bulge up to 1.8 cm.  Left Atrium: The left atrium is severely dilated.  Right Ventricle: The right ventricle was not well visualized. Unable to determine right ventricular systolic function. A device is visualized in the right ventricle.  Right Atrium:  The right atrium is mildly dilated. There is a device visualized in the right atrium.  Aortic Valve: The aortic valve is probably trileaflet. There is mild aortic valve cusp calcification. There is trivial aortic valve regurgitation. The peak instantaneous gradient of the aortic valve is 15.7 mmHg. The mean gradient of the aortic valve is 7.0 mmHg.  Mitral Valve: The mitral valve is normal in structure. There is mild mitral annular calcification. There is mild mitral valve regurgitation.  Tricuspid Valve: The tricuspid valve was not well visualized. There is trace to mild tricuspid regurgitation. The Doppler estimated RVSP is within normal limits at 35.5 mmHg.  Pulmonic Valve: The pulmonic valve is structurally normal. There is physiologic pulmonic valve regurgitation.  Pericardium: There is a trivial pericardial effusion.  Aorta: The aortic root is abnormal. There is mild dilatation of the aortic root.  In comparison to the previous echocardiogram(s): Compared wit the prior exam from 10/2/2023 the LV appears slightly more dynamic today with LVEF 40-45% ( instead prior prior exam with LVEF 35-40%). There is still only mild MR. Note that the prior RVSP was moderately elevated at 64.8mmHg and is now normal or borderline at 35.5mmHg.      CONCLUSIONS:  1. Left ventricular systolic function is mildly decreased with a 40-45% estimated ejection fraction.  2. Poorly visualized anatomical structures due to suboptimal image quality.  3. Mildly increased LV wall thickness with proximal septal bulge up to 1.8 cm.  4. The left atrium is severely dilated.  5. RVSP within normal limits.  6. Compared wit the prior exam from 10/2/2023 the LV appears slightly more dynamic today with LVEF 40-45% ( instead prior prior exam with LVEF 35-40%). There is still only mild MR. Note that the prior RVSP was moderately elevated at 64.8mmHg and is now normal or borderline at 35.5mmHg.  7. The patient is in atrial fibrillation which may influence  the estimate of left ventricular function and transvalvular flows.  8. There is global hypokinesis of the left ventricle with minor regional variations.    QUANTITATIVE DATA SUMMARY:  2D MEASUREMENTS:  Normal Ranges:  Ao Root d:     3.70 cm   (2.0-3.7cm)  LAs:           4.30 cm   (2.7-4.0cm)  IVSd:          1.40 cm   (0.6-1.1cm)  LVPWd:         1.30 cm   (0.6-1.1cm)  LVIDd:         3.70 cm   (3.9-5.9cm)  LVIDs:         3.10 cm  LV Mass Index: 79.6 g/m2  LV % FS        16.2 %    LA VOLUME:  Normal Ranges:  LA Vol A4C:        110.7 ml   (22+/-6mL/m2)  LA Vol A2C:        149.4 ml  LA Vol BP:         129.0 ml  LA Vol Index A4C:  49.9ml/m2  LA Vol Index A2C:  67.3 ml/m2  LA Vol Index BP:   58.1 ml/m2  LA Area A4C:       30.3 cm2  LA Area A2C:       35.3 cm2  LA Major Axis A4C: 7.0 cm  LA Major Axis A2C: 7.1 cm  LA Volume Index:   58.1 ml/m2    RA VOLUME BY A/L METHOD:  Normal Ranges:  RA Area A4C: 19.9 cm2    AORTA MEASUREMENTS:  Normal Ranges:  Ao Sinus, d: 3.70 cm (2.1-3.5cm)    LV SYSTOLIC FUNCTION BY 2D PLANIMETRY (MOD):  Normal Ranges:  EF-A4C View: 35.0 % (>=55%)  EF-A2C View: 41.2 %  EF-Biplane:  37.7 %    LV DIASTOLIC FUNCTION:  Normal Ranges:  MV Peak E:    0.86 m/s    (0.7-1.2 m/s)  MV e'         0.09 m/s    (>8.0)  MV lateral e' 0.10 m/s  MV medial e'  0.08 m/s  MV A Dur:     193.00 msec  E/e' Ratio:   9.57        (<8.0)  a'            0.02 m/s    MITRAL VALVE:  Normal Ranges:  MV DT: 193 msec (150-240msec)    AORTIC VALVE:  Normal Ranges:  AoV Vmax:                1.98 m/s  (<=1.7m/s)  AoV Peak PG:             15.7 mmHg (<20mmHg)  AoV Mean P.0 mmHg  (1.7-11.5mmHg)  LVOT Max Dann:            0.91 m/s  (<=1.1m/s)  AoV VTI:                 36.20 cm  (18-25cm)  LVOT VTI:                17.20 cm  LVOT Diameter:           1.90 cm   (1.8-2.4cm)  AoV Area, VTI:           1.35 cm2  (2.5-5.5cm2)  AoV Area,Vmax:           1.31 cm2  (2.5-4.5cm2)  AoV Dimensionless Index: 0.48      RIGHT VENTRICLE:  RV  Basal 5.30 cm  RV Major 8.9 cm  RV s'    0.04 m/s    TRICUSPID VALVE/RVSP:  Normal Ranges:  Peak TR Velocity: 2.85 m/s  RV Syst Pressure: 35.5 mmHg (< 30mmHg)    PULMONIC VALVE:  Normal Ranges:  PV Accel Time: 103 msec (>120ms)  PV Max Dann:    1.0 m/s  (0.6-0.9m/s)  PV Max PG:     3.6 mmHg      [id number] Mis Hoffman MD  Electronically signed on 1/23/2024 at 3:42:15 PM        ** Final **      Results for orders placed during the hospital encounter of 09/19/23    Transthoracic Echo (TTE) Complete    Narrative  [facility] ([facility]), [address] [place] [address]  Tel [phone] and Fax [phone]    TRANSTHORACIC ECHOCARDIOGRAM REPORT      Patient Name:      SHAMEKA CORINNE Mendez Physician:    [id number] Reese Courtney MD  Study Date:        10/2/2023            Ordering Provider:    [id number] ANGEL JIMENEZ  MRN/PID:           [id number]             Fellow:  Accession#:        [id number]         Nurse:  Date of Birth/Age: 1945 / 78 years Sonographer:          Veronica Rosario RDCS  Gender:            M                    Additional Staff:     Brenda Sterling  Cardiac  Sonographer Intern  Height:            172.00               Admit Date:           9/19/2023  Weight:            94.00                Admission Status:     Inpatient -  Routine  BSA:               2.07 m2              Encounter#:           [id number]  Department Location:  [facility]  Blood Pressure: 134 /83 mmHg    Study Type:    TRANSTHORACIC ECHO (TTE) COMPLETE  Diagnosis/ICD: Longstanding persistent AFib-I48.11  Indication:    Longstanding persistent atrial fibrillation    Patient History:  Pertinent History: A-Fib and HTN. ESRD on HD, SSS s/p PPM.    Study Detail: The following Echo studies were performed: 2D, M-Mode, Doppler and  color flow. Technically challenging study due to patient lying in  supine position, body habitus, prominent lung artifact and poor  acoustic windows. Definity used as a contrast agent for  endocardial border  definition. Total contrast used for this  procedure was 2.0 mL via IV push.      PHYSICIAN INTERPRETATION:  Left Ventricle: The left ventricular systolic function is moderately decreased, with an estimated ejection fraction of 35-40%. The patient is in atrial fibrillation which may influence the estimate of left ventricular function and transvalvular flows. There is global hypokinesis of the left ventricle with minor regional variations. The left ventricular cavity size is normal. The left ventricular septal wall thickness is mildly increased. Spectral Doppler shows an abnormal pattern of left ventricular diastolic filling.  Left Atrium: The left atrium is severely dilated.  Right Ventricle: The right ventricle is mildly enlarged. There is mildly reduced right ventricular systolic function.  Right Atrium: The right atrium is severely dilated. There is a device visualized in the right atrium.  Aortic Valve: The aortic valve is trileaflet. There is mild aortic valve cusp calcification. There is evidence of mildly elevated transaortic gradients consistent with sclerosis of the aortic valve.  There are increased aortic valve velocities due to increased flow/dynamic ejection. The aortic valve dimensionless index is 0.86. There is trivial aortic valve regurgitation. The peak instantaneous gradient of the aortic valve is 16.5 mmHg. The mean gradient of the aortic valve is 8.0 mmHg.  Mitral Valve: The mitral valve is normal in structure. There is mild mitral annular calcification. There is mild mitral valve regurgitation. The BRUNA (Pisa method) is 0.10 cm2, consistent with mild MR.  Tricuspid Valve: The tricuspid valve is structurally normal. There is mild tricuspid regurgitation. The Doppler estimated RVSP is moderately elevated at 64.8 mmHg.  Pulmonic Valve: The pulmonic valve is structurally normal. There is physiologic pulmonic valve regurgitation.  Pericardium: There is a trivial pericardial effusion.  Aorta: The aortic  root is abnormal. There is mild dilatation of the ascending aorta. There is mild dilatation of the aortic root.  In comparison to the previous echocardiogram(s): There are no prior studies on this patient for comparison purposes.      CONCLUSIONS:  1. Left ventricular systolic function is moderately decreased with a 35-40% estimated ejection fraction.  2. Spectral Doppler shows an abnormal pattern of left ventricular diastolic filling.  3. There is mildly reduced right ventricular systolic function.  4. The left atrium is severely dilated.  5. The right atrium is severely dilated.  6. Moderately elevated right ventricular systolic pressure.  7. Aortic valve sclerosis.  8. The patient is in atrial fibrillation which may influence the estimate of left ventricular function and transvalvular flows.  9. There is global hypokinesis of the left ventricle with minor regional variations.    QUANTITATIVE DATA SUMMARY:  2D MEASUREMENTS:  Normal Ranges:  Ao Root d:     3.90 cm   (2.0-3.7cm)  LAs:           5.00 cm   (2.7-4.0cm)  IVSd:          1.30 cm   (0.6-1.1cm)  LVPWd:         1.00 cm   (0.6-1.1cm)  LVIDd:         4.70 cm   (3.9-5.9cm)  LVIDs:         3.80 cm  LV Mass Index: 96.5 g/m2  LV % FS        19.1 %    LA VOLUME:  Normal Ranges:  LA Vol A4C:        140.6 ml   (22+/-6mL/m2)  LA Vol A2C:        72.8 ml  LA Vol BP:         105.9 ml  LA Vol Index A4C:  68.0ml/m2  LA Vol Index A2C:  35.2 ml/m2  LA Vol Index BP:   51.2 ml/m2  LA Area A4C:       35.2 cm2  LA Area A2C:       24.2 cm2  LA Major Axis A4C: 7.5 cm  LA Major Axis A2C: 6.8 cm    M-MODE MEASUREMENTS:  Normal Ranges:  AoV Exc: 1.70 cm (1.5-2.5cm)    AORTA MEASUREMENTS:  Normal Ranges:  AoV Exc:   1.70 cm (1.5-2.5cm)  Asc Ao, d: 3.60 cm (2.1-3.4cm)    LV SYSTOLIC FUNCTION BY 2D PLANIMETRY (MOD):  Normal Ranges:  EF-A4C View: 35.9 % (>=55%)    LV DIASTOLIC FUNCTION:  Normal Ranges:  MV e'         0.10 m/s (>8.0)  MV lateral e' 0.11 m/s  MV medial e'  0.05  m/s    MITRAL INSUFFICIENCY:  Normal Ranges:  PISA Radius:  0.5 cm  MR VTI:       134.50 cm  MR Vmax:      487.00 cm/s  MR Alias Dann: 30.8 cm/s  MR Volume:    13.36 ml  MR Flow Rt:   48.38 ml/s  MR EROA:      0.10 cm2    AORTIC VALVE:  Normal Ranges:  AoV Vmax:                2.03 m/s  (<=1.7m/s)  AoV Peak P.5 mmHg (<20mmHg)  AoV Mean P.0 mmHg  (1.7-11.5mmHg)  LVOT Max Dann:            1.67 m/s  (<=1.1m/s)  AoV VTI:                 31.80 cm  (18-25cm)  LVOT VTI:                27.30 cm  LVOT Diameter:           2.20 cm   (1.8-2.4cm)  AoV Area, VTI:           3.26 cm2  (2.5-5.5cm2)  AoV Area,Vmax:           3.13 cm2  (2.5-4.5cm2)  AoV Dimensionless Index: 0.86      RIGHT VENTRICLE:  RV Basal 4.44 cm  RV Mid   2.02 cm  RV Major 7.6 cm  TAPSE:   14.6 mm  RV s'    0.09 m/s    TRICUSPID VALVE/RVSP:  Normal Ranges:  Peak TR Velocity: 3.70 m/s  RV Syst Pressure: 64.8 mmHg (< 30mmHg)    PULMONIC VALVE:  Normal Ranges:  PV Accel Time: 77 msec  (>120ms)  PV Max Dann:    1.0 m/s  (0.6-0.9m/s)  PV Max P.3 mmHg      [id number] Reese Courtney MD  Electronically signed on 10/2/2023 at 10:27:19 AM        ** Final **    Ejection Fractions:  LV biplane EF   Date/Time Value Ref Range Status   2024 10:30 AM 38 %      Cath:  No results found for this or any previous visit.    Stress Test:  No results found for this or any previous visit.    Cardiac Imaging:  No results found for this or any previous visit.        === 24 ===    CT HEAD WO IV CONTRAST    - Impression -  1.  No evidence of acute intracranial hemorrhage or mass effect.  2. Nonspecific periventricular and subcortical white matter  hypodensities likely sequela of chronic microvascular ischemic changes      I personally reviewed the images/study and I agree with the findings  as stated by Resident Irwin Ingram MD. This study was interpreted  at [facility], .    MACRO:  None    Signed  by: Marilynyvon Torres 1/26/2024 7:23 AM  Dictation workstation:   [id number]     Assessment/Plan    ESRD on HD, s/p bleeding from AVF on 2/19/24 with hemostasis since episode  -fistulogram with Dr. Lepe on 2/21/24       I spent 30 minutes in the professional and overall care of this patient.      Pancho Montano, APRN-CNP, DNP

## 2024-02-21 NOTE — CONSULTS
INFECTIOUS DISEASE INPATIENT INITIAL CONSULTATION    Referred By: Cséar Garcia    Reason For Consult: Antibiotic regimen review in [facility] of balanitis and non healing surgical wound     HPI:  This is a 78 y.o. male with PMH of heart failure, DM II, ESRD on HD who presented with penile pain.    He says penile pain is better. Has been on IV Vanc/Cefepime for balanitis. 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 No pain in foot currently. Denies fevers/chills.    Allergies:  Penicillins     Vitals (Last 24 Hours):  Heart Rate:  [85-96]   Temp:  [36.2 °C (97.1 °F)-37.2 °C (98.9 °F)]   Resp:  [16-18]   BP: ()/(52-89)   Weight:  [87.7 kg (193 lb 5.5 oz)]   SpO2:  [92 %-100 %]      PHYSICAL EXAM:  Gen - NAD  Heart - RRR  Lungs - no wheezing  Abd - soft, no ttp, BS present   - penis looks fine  Right Foot - s/p TMA with open wound and some dry eschar but no maranda infection  Skin - no rash    MEDS:    Current Facility-Administered Medications:     albuterol 2.5 mg /3 mL (0.083 %) nebulizer solution 2.5 mg, 2.5 mg, nebulization, q6h PRN, Mumtaz Almaraz, PharmD    apixaban (Eliquis) tablet 2.5 mg, 2.5 mg, oral, BID, Randall Wallace MD, 2.5 mg at 02/21/24 1001    atorvastatin (Lipitor) tablet 40 mg, 40 mg, oral, Nightly, Randall Wallace MD, 40 mg at 02/20/24 2159    clopidogrel (Plavix) tablet 75 mg, 75 mg, oral, Daily, Randall Wallace MD, 75 mg at 02/21/24 1001    dextrose 10 % in water (D10W) infusion, 0.3 g/kg/hr, intravenous, Once PRN, Randall Wallace MD    dextrose 50 % injection 25 g, 25 g, intravenous, q15 min PRN, Randall Wallace MD    epoetin dane (Epogen,Procrit) injection 6,000 Units, 6,000 Units, subcutaneous, Once per day on Mon Wed Fri, Jovanna Bobo, PharmD    gabapentin (Neurontin) capsule 100 mg, 100 mg, oral, Daily, Randall Wallace MD, 100 mg at 02/21/24 1001    glucagon (Glucagen) injection 1 mg, 1 mg, intramuscular, q15 min PRN, Randall Wallace MD    insulin  glargine (Lantus) injection 6 Units, 6 Units, subcutaneous, Nightly, Randall Wallace MD, 6 Units at 02/20/24 2159    insulin lispro (HumaLOG) injection 0-5 Units, 0-5 Units, subcutaneous, TID with meals, Randall Wallace MD, 1 Units at 02/20/24 0821    ipratropium-albuteroL (Duo-Neb) 0.5-2.5 mg/3 mL nebulizer solution 3 mL, 3 mL, nebulization, q2h PRN, Randall Wallace MD    lidocaine 4 % patch 1 patch, 1 patch, transdermal, Daily, César Garcia DO, 1 patch at 02/21/24 1000    midodrine (Proamatine) tablet 15 mg, 15 mg, oral, q8h, Sabi Jimenez MD, 15 mg at 02/21/24 1001    oxyCODONE-acetaminophen (Percocet) 5-325 mg per tablet 1 tablet, 1 tablet, oral, q6h PRN, Randall Wallace MD, 1 tablet at 02/20/24 0607    pantoprazole (ProtoNix) EC tablet 40 mg, 40 mg, oral, Daily before breakfast, Randall Wallace MD, 40 mg at 02/21/24 0700    polyethylene glycol (Glycolax, Miralax) packet 17 g, 17 g, oral, Daily, Randall Wallace MD, 17 g at 02/21/24 1001    sodium hypochlorite (Dakin's Quarter Strength) 0.125 % external solution, , irrigation, Daily, Randall Wallace MD, Given at 02/20/24 1300    traMADol (Ultram) tablet 50 mg, 50 mg, oral, TID PRN, César Garcia DO, 50 mg at 02/20/24 1019    vancomycin (Vancocin) placeholder, , miscellaneous, Daily PRN, Romana A Kuchta, PharmD     LABS:  Lab Results   Component Value Date    WBC 11.0 02/21/2024    HGB 7.3 (L) 02/21/2024    HCT 24.6 (L) 02/21/2024    MCV 99 02/21/2024     (H) 02/21/2024      Results from last 72 hours   Lab Units 02/21/24  0540   SODIUM mmol/L 129*   POTASSIUM mmol/L 4.2   CHLORIDE mmol/L 91*   CO2 mmol/L 28   BUN mg/dL 52*   CREATININE mg/dL 4.31*   GLUCOSE mg/dL 114*   CALCIUM mg/dL 9.6   ANION GAP mmol/L 14   EGFR mL/min/1.73m*2 13*     Results from last 72 hours   Lab Units 02/19/24  0535   ALBUMIN g/dL 2.4*     Estimated Creatinine Clearance: 15.5 mL/min (A) (by C-G formula based on SCr of 4.31 mg/dL  (H)).        ASSESSMENT/PLAN:    Balanitis - resolved  Left TMA Wound - wound care needed but doesn't seem to be overtly infected. Possible he will need I/D in the future and help with closure of wound.    Will stop abx now. Signing off. Please call back with questions. Thanks!    Good Graves MD  [facility]  Office #[phone]

## 2024-02-21 NOTE — PROGRESS NOTES
Chevy Benton is a 78 y.o. male on day 4 of admission presenting with Balanitis.      Subjective   Patient was seen and examined at bedside this morning, states that he slept well, but feeling hungry and wondering when is he going to be able to eat.  Patient is n.p.o. for procedure this afternoon and I asked him to be patient as procedure very important for his dialysis which he has not had for several days.       Objective     Last Recorded Vitals  /80   Pulse 66   Temp 36.3 °C (97.3 °F) (Oral)   Resp 18   Wt 87.7 kg (193 lb 5.5 oz)   SpO2 95%   Intake/Output last 3 Shifts:    Intake/Output Summary (Last 24 hours) at 2/21/2024 1720  Last data filed at 2/21/2024 1644  Gross per 24 hour   Intake --   Output 10 ml   Net -10 ml       Admission Weight  Weight: 87.5 kg (193 lb) (02/17/24 1128)    Daily Weight  02/21/24 : 87.7 kg (193 lb 5.5 oz)    Image Results  ECG 12 lead  Atrial fibrillation with premature ventricular or aberrantly conducted complexes  Low voltage QRS  Nonspecific ST and T wave abnormality  Abnormal ECG  When compared with ECG of 25-JAN-2024 02:56,  No significant change was found  See ED provider note for full interpretation and clinical correlation  Confirmed by Kateryna An ([id number]) on 2/20/2024 4:11:42 PM      Physical Exam  Constitutional: Obese gentleman, alert active, cooperative not in acute distress  Eyes: PERRLA, clear sclera  ENMT: Moist mucosal membranes, no exudate  Head / Neck: Atraumatic, normocephalic, supple neck, JVP not visualized  Lungs: Patent airways, CTABL  Heart: RRR, S1S2, no murmurs appreciated, palpable pulses in all extremities  GI: Soft, NT, ND, bowel sounds present in all quadrants  MSK: Moves all extremities freely, no restriction  of ROM, no joint edema  Extremities: PICC line on right upper extremity arm, mild bilateral lower extremities peripheral edema  : No Alfonso catheter inserted  Breast: Deferred  Neurological: AAO x 3 to person, place and date,  facial muscles symmetrical, sensation intact, strength 4/4, no acute focal neurological deficits appreciated  Psychological: Appropriate mood and behavior  Relevant Results             Scheduled medications  apixaban, 2.5 mg, oral, BID  atorvastatin, 40 mg, oral, Nightly  clopidogrel, 75 mg, oral, Daily  epoetin dane or biosimilar, 6,000 Units, subcutaneous, Once per day on Mon Wed Fri  gabapentin, 100 mg, oral, Daily  insulin glargine, 6 Units, subcutaneous, Nightly  insulin lispro, 0-5 Units, subcutaneous, TID with meals  lidocaine, 1 patch, transdermal, Daily  midodrine, 15 mg, oral, q8h  pantoprazole, 40 mg, oral, Daily before breakfast  polyethylene glycol, 17 g, oral, Daily  sodium hypochlorite, , irrigation, Daily      Continuous medications     PRN medications  PRN medications: albuterol, dextrose 10 % in water (D10W), dextrose, glucagon, iohexol, ipratropium-albuteroL, lidocaine, oxyCODONE-acetaminophen, traMADol    Assessment/Plan   This patient currently has cardiac telemetry ordered; if you would like to modify or discontinue the telemetry order, click here to go to the orders activity to modify/discontinue the order.    This patient has a central line   Reason for the central line remaining today? Dialysis/Hemapheresis      78 y.o. male with past medical history as noted below who is presenting to the emergency department for high potassium. This lab was drawn earlier today. Potassium is 9.0 and the patient has no noted EKG changes, no noted symptoms. Patient does additionally report that he is having pain in his penis that has been ongoing over the course the past 2 weeks        Principal Problem:    Balanitis  Active Problems:    Sepsis (CMS/[facility])    Chronic renal failure    Ulcer of lower extremity (CMS/[facility])    Hypotension     Sepsis: Presenting with hypotension  Suspect balanitis  -unknown source  -vanc and cefepime  -continue home midodrine  -blood cx negative so far, will await 24 more  hrs  -monitor  -ID consulted for antibiotic regimen review, recommendation appreciated     Left AV fistula bleed  -hemostasis achieved  -vascular surgery: Status post fistulogram, with 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, cautioned with HD catheter insertion for dialysis     PVD S/p PTA to RLE c/b perforation of AT  hx of rt subclavian dvt  mild stenosis of left subclavian vein  Hx of right TMA  -1/19 Podiatry  revision of right TMA    - Cont plavix   - stop ASA  - c/w  eliquis 2.5mg BID d/w podiatry and endovascular, monitor for bleeding  - Cont home atorva     Chronic systolic and diastolic heart failure, EF 35 - 40%   - TTE 1/23/24 with LVEF to 40-45%, mildly improved from previous in 2023, no other new acute findings    -Uptitrate GDMT as tolerated--> consider hydralazine and isordil if needed, currently limited 2/2 hypotension on midodrine  - consideration should be given to further ischemic workup in the outpatient setting due to extensive peripheral disease, risk factors, and no evidence of previous ischemic cardiac workup         ESRD  -on HD  -nephrology consult: Possible hemodialysis today after fistulogram, erythropoietin ordered     Anemia of chronic disease  -At baseline hgb  - continue daily CBC     DM with neuropathy  - A1c 5.3% 12/2023  -Cont home gabapentin  -Insulin glargine 6 units nightly    -SSI      Diet  -Diabetic  -N.p.o. after midnight     DVT prophylaxis     -On Eliquis 2.5 mg twice daily, Plavix 75 mg daily     Disposition: Presenting with cyanosis since, balanitis suspected, but also has multiple postsurgical healing wound, discharge pending clinical improvement              César Garcia DO

## 2024-02-21 NOTE — H&P (VIEW-ONLY)
INFECTIOUS DISEASE INPATIENT INITIAL CONSULTATION    Referred By: César Garcia    Reason For Consult: Antibiotic regimen review in [facility] of balanitis and non healing surgical wound     HPI:  This is a 78 y.o. male with PMH of heart failure, DM II, ESRD on HD who presented with penile pain.    He says penile pain is better. Has been on IV Vanc/Cefepime for balanitis. 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 No pain in foot currently. Denies fevers/chills.    Allergies:  Penicillins     Vitals (Last 24 Hours):  Heart Rate:  [85-96]   Temp:  [36.2 °C (97.1 °F)-37.2 °C (98.9 °F)]   Resp:  [16-18]   BP: ()/(52-89)   Weight:  [87.7 kg (193 lb 5.5 oz)]   SpO2:  [92 %-100 %]      PHYSICAL EXAM:  Gen - NAD  Heart - RRR  Lungs - no wheezing  Abd - soft, no ttp, BS present   - penis looks fine  Right Foot - s/p TMA with open wound and some dry eschar but no maranda infection  Skin - no rash    MEDS:    Current Facility-Administered Medications:     albuterol 2.5 mg /3 mL (0.083 %) nebulizer solution 2.5 mg, 2.5 mg, nebulization, q6h PRN, Mumtaz Almaraz, PharmD    apixaban (Eliquis) tablet 2.5 mg, 2.5 mg, oral, BID, Randall Wallace MD, 2.5 mg at 02/21/24 1001    atorvastatin (Lipitor) tablet 40 mg, 40 mg, oral, Nightly, Randall Wallace MD, 40 mg at 02/20/24 2159    clopidogrel (Plavix) tablet 75 mg, 75 mg, oral, Daily, Randall Wallace MD, 75 mg at 02/21/24 1001    dextrose 10 % in water (D10W) infusion, 0.3 g/kg/hr, intravenous, Once PRN, Randall Wallace MD    dextrose 50 % injection 25 g, 25 g, intravenous, q15 min PRN, Randall Wallace MD    epoetin dane (Epogen,Procrit) injection 6,000 Units, 6,000 Units, subcutaneous, Once per day on Mon Wed Fri, Jovanna Bobo, PharmD    gabapentin (Neurontin) capsule 100 mg, 100 mg, oral, Daily, Randall Wallace MD, 100 mg at 02/21/24 1001    glucagon (Glucagen) injection 1 mg, 1 mg, intramuscular, q15 min PRN, Randall Wallace MD    insulin  glargine (Lantus) injection 6 Units, 6 Units, subcutaneous, Nightly, Randall Wallace MD, 6 Units at 02/20/24 2159    insulin lispro (HumaLOG) injection 0-5 Units, 0-5 Units, subcutaneous, TID with meals, Randall Wallace MD, 1 Units at 02/20/24 0821    ipratropium-albuteroL (Duo-Neb) 0.5-2.5 mg/3 mL nebulizer solution 3 mL, 3 mL, nebulization, q2h PRN, Randall Wallace MD    lidocaine 4 % patch 1 patch, 1 patch, transdermal, Daily, César Garcia DO, 1 patch at 02/21/24 1000    midodrine (Proamatine) tablet 15 mg, 15 mg, oral, q8h, Sabi Jimenez MD, 15 mg at 02/21/24 1001    oxyCODONE-acetaminophen (Percocet) 5-325 mg per tablet 1 tablet, 1 tablet, oral, q6h PRN, Randall Wallace MD, 1 tablet at 02/20/24 0607    pantoprazole (ProtoNix) EC tablet 40 mg, 40 mg, oral, Daily before breakfast, Randall Wallace MD, 40 mg at 02/21/24 0700    polyethylene glycol (Glycolax, Miralax) packet 17 g, 17 g, oral, Daily, Randall Wallace MD, 17 g at 02/21/24 1001    sodium hypochlorite (Dakin's Quarter Strength) 0.125 % external solution, , irrigation, Daily, Randall Wallace MD, Given at 02/20/24 1300    traMADol (Ultram) tablet 50 mg, 50 mg, oral, TID PRN, César Garcia DO, 50 mg at 02/20/24 1019    vancomycin (Vancocin) placeholder, , miscellaneous, Daily PRN, Romana A Kuchta, PharmD     LABS:  Lab Results   Component Value Date    WBC 11.0 02/21/2024    HGB 7.3 (L) 02/21/2024    HCT 24.6 (L) 02/21/2024    MCV 99 02/21/2024     (H) 02/21/2024      Results from last 72 hours   Lab Units 02/21/24  0540   SODIUM mmol/L 129*   POTASSIUM mmol/L 4.2   CHLORIDE mmol/L 91*   CO2 mmol/L 28   BUN mg/dL 52*   CREATININE mg/dL 4.31*   GLUCOSE mg/dL 114*   CALCIUM mg/dL 9.6   ANION GAP mmol/L 14   EGFR mL/min/1.73m*2 13*     Results from last 72 hours   Lab Units 02/19/24  0535   ALBUMIN g/dL 2.4*     Estimated Creatinine Clearance: 15.5 mL/min (A) (by C-G formula based on SCr of 4.31 mg/dL  (H)).        ASSESSMENT/PLAN:    Balanitis - resolved  Left TMA Wound - wound care needed but doesn't seem to be overtly infected. Possible he will need I/D in the future and help with closure of wound.    Will stop abx now. Signing off. Please call back with questions. Thanks!    Good Graves MD  [facility]  Office #[phone]

## 2024-02-21 NOTE — NURSING NOTE
1336 Patient taken to IR for scheduled procedure   1644 Patient arrived back to room 412. See flowsheet for assessment. Site checked and WDL.

## 2024-02-21 NOTE — PROGRESS NOTES
Chevy Benton is a 78 y.o. male on day 3 of admission presenting with Balanitis.      Subjective   Patient was seen and examined at bedside this morning, stated that he is feeling much better compared to admission, early on nurse asked for additional pain medication, and was started on tramadol 50 mg 3 times daily.       Objective     Last Recorded Vitals  /74 (Patient Position: Lying)   Pulse 89   Temp 36.4 °C (97.6 °F) (Temporal)   Resp 18   Wt 89 kg (196 lb 3.4 oz)   SpO2 92%   Intake/Output last 3 Shifts:    Intake/Output Summary (Last 24 hours) at 2/20/2024 1907  Last data filed at 2/20/2024 0900  Gross per 24 hour   Intake 240 ml   Output --   Net 240 ml       Admission Weight  Weight: 87.5 kg (193 lb) (02/17/24 1128)    Daily Weight  02/18/24 : 89 kg (196 lb 3.4 oz)    Image Results  ECG 12 lead  Atrial fibrillation with premature ventricular or aberrantly conducted complexes  Low voltage QRS  Nonspecific ST and T wave abnormality  Abnormal ECG  When compared with ECG of 25-JAN-2024 02:56,  No significant change was found  See ED provider note for full interpretation and clinical correlation  Confirmed by Kateryna An ([id number]) on 2/20/2024 4:11:42 PM      Physical Exam  Constitutional: Obese gentleman, alert active, cooperative not in acute distress  Eyes: PERRLA, clear sclera  ENMT: Moist mucosal membranes, no exudate  Head / Neck: Atraumatic, normocephalic, supple neck, JVP not visualized  Lungs: Patent airways, CTABL  Heart: RRR, S1S2, no murmurs appreciated, palpable pulses in all extremities  GI: Soft, NT, ND, bowel sounds present in all quadrants  MSK: Moves all extremities freely, no restriction  of ROM, no joint edema  Extremities: PICC line on right upper extremity arm, mild bilateral lower extremities peripheral edema  : No Alfonso catheter inserted  Breast: Deferred  Neurological: AAO x 3 to person, place and date, facial muscles symmetrical, sensation intact, strength 4/4, no acute  focal neurological deficits appreciated  Psychological: Appropriate mood and behavior    Relevant Results           Scheduled medications  apixaban, 2.5 mg, oral, BID  atorvastatin, 40 mg, oral, Nightly  clopidogrel, 75 mg, oral, Daily  [START ON 2/21/2024] epoetin dane or biosimilar, 6,000 Units, subcutaneous, Once per day on Mon Wed Fri  gabapentin, 100 mg, oral, Daily  insulin glargine, 6 Units, subcutaneous, Nightly  insulin lispro, 0-5 Units, subcutaneous, TID with meals  lidocaine, 1 patch, transdermal, Daily  midodrine, 15 mg, oral, q8h  pantoprazole, 40 mg, oral, Daily before breakfast  polyethylene glycol, 17 g, oral, Daily  sodium hypochlorite, , irrigation, Daily      Continuous medications     PRN medications  PRN medications: albuterol, dextrose 10 % in water (D10W), dextrose, glucagon, ipratropium-albuteroL, oxyCODONE-acetaminophen, traMADol, vancomycin  ECG 12 lead    Result Date: 2/20/2024  Atrial fibrillation with premature ventricular or aberrantly conducted complexes Low voltage QRS Nonspecific ST and T wave abnormality Abnormal ECG When compared with ECG of 25-JAN-2024 02:56, No significant change was found See ED provider note for full interpretation and clinical correlation Confirmed by Kateryna An ([id number]) on 2/20/2024 4:11:42 PM    Electrocardiogram, 12-lead PRN ACS symptoms    Result Date: 2/6/2024  Atrial fibrillation with premature ventricular or aberrantly conducted complexes Low voltage QRS Nonspecific T wave abnormality Abnormal ECG When compared with ECG of 23-JAN-2024 04:46, No significant change since Confirmed by Edwin May ([id number]) on 2/6/2024 3:23:42 PM    Electrocardiogram, 12-lead PRN ACS symptoms    Result Date: 2/3/2024  Atrial fibrillation with occasional ventricular-paced complexes Nonspecific T wave abnormality Abnormal ECG When compared with ECG of 22-JAN-2024 08:27, Vent. rate has decreased BY  16 BPM Confirmed by Edwin May ([id number]) on 2/3/2024 11:11:22 PM    IR CVC  removal    Result Date: 2/2/2024  Interpreted By:  Noe Zaldivar, STUDY: IR CVC REMOVAL;  2/2/2024 3:20 pm   INDICATION: Signs/Symptoms:removal of tunneled line.   COMPARISON: None.   ACCESSION NUMBER(S): [id number]   ORDERING CLINICIAN: LUKE HWANG   TECHNIQUE: INTERVENTIONALIST(S): Noe Zaldivar CNP   CONSENT: The patient/patient's POA/next of kin was informed of the nature of the proposed procedure. The purposes, alternatives, risks, and benefits were explained and discussed. All questions were answered and consent was obtained.     MEDICATION/CONTRAST: No additional   TIME OUT: A time out was performed immediately prior to procedure start with the interventional team, correctly identifying the patient name, date of birth, MRN, procedure, anatomy (including marking of site and side), patient position, procedure consent form, relevant laboratory and imaging test results, antibiotic administration, safety precautions, and procedure-specific equipment needs.   COMPLICATIONS: No immediate adverse events identified.   FINDINGS: The existing skin site over the tunneled catheter insertion site was prepped and draped with maximal sterile manner.   The skin and subcutaneous tissues at the original catheter site were anesthetized with 1% lidocaine with epinephrine.  The subcutaneous tissues surrounding the catheter were anesthetized with 1% lidocaine. Using blunt dissection, the catheter cuff was externalized and the catheter leading to the jugular vein was completely removed. Hemostasis was obtained using manual compression at the internal jugular vein access site.   The incision site was covered with a sterile dressing and tegaderm was subsequently applied.   There were no immediate or post-procedural complications.       Uneventful removal of a left chest central venous catheter.   I was present for and/or performed the critical portions of the procedure and immediately available throughout the entire  procedure.   Performed and dictated at [facility].   Signed by: Noe Zaldivar 2/2/2024 4:39 PM Dictation workstation:   [id number]    Electrocardiogram, 12-lead PRN ACS symptoms    Result Date: 1/29/2024  Atrial fibrillation with occasional ventricular-paced complexes T wave abnormality, consider lateral ischemia Abnormal ECG When compared with ECG of 22-JAN-2024 08:26, Vent. rate has increased BY   4 BPM Confirmed by Edwin May ([id number]) on 1/29/2024 4:59:47 PM    Vascular US upper extremity venous duplex left    Result Date: 1/27/2024            [place] [id number]   Tel [phone] and Fax [phone]  Vascular Lab Report VASC US UPPER EXTREMITY VENOUS DUPLEX LEFT  Patient Name:      SHAMEKA Mendez Physician: [id number] Jovanna Francis MD Study Date:        1/26/2024           Ordering           [id number] CHUCKY BLACKBURN                                        Physician: MRN/PID:           [id number]            Technologist:      Elisabeth Betts T Accession#:        [id number]        Technologist 2: Date of Birth/Age: 1945 / 78      Encounter#:        [id number]                    years Gender:            M Admission Status:  Inpatient           Location           [facility]                                        Performed:  Diagnosis/ICD: Left arm swelling-M79.89 Indication:    Limb swelling CPT Codes:     07181 Peripheral venous duplex scan for DVT Limited  CONCLUSIONS: Right Upper Venous: Unable to visualize subclavian vein due to lines and bandages. Left Upper Venous: Technically limited exam; negative for acute deep vein thrombus in visualized veins. Unable to rule out deep vein thrombus in non-visualized mid and distal subclavian vein. The left basilic vein was not visualized. Left brachiocephalic arteriovenous fistula is noted. Visualized  in segments due to bandages from recent dialysis access.  Additional Findings: Technically difficult due to patient positioning and limited mobility.  Imaging & Doppler Findings:  Left                Compress Thrombus        Flow Internal Jugular      Yes      None   Spontaneous/Phasic Subclavian Proximal   Yes      None   Spontaneous/Phasic Axillary              Yes      None   Spontaneous/Phasic Brachial              Yes      None Cephalic              Yes      None  [id number] Jovanna Francis MD Electronically signed by [id number] Jovanna Francis MD on 1/27/2024 at 2:56:51 PM  ** Final **     CT head wo IV contrast    Result Date: 1/26/2024  Interpreted By:  Marilyn Torres and Dervishi Mario STUDY: CT HEAD WO IV CONTRAST;  1/25/2024 10:08 pm   INDICATION: Signs/Symptoms:AMS CICU patient.   COMPARISON: None.   ACCESSION NUMBER(S): [id number]   ORDERING CLINICIAN: ALVIN AMBRIZ   TECHNIQUE: Noncontrast axial CT scan of head was performed. Angled reformats in brain and bone windows were generated. The images were reviewed in bone, brain, blood and soft tissue windows.   FINDINGS: CSF Spaces: The ventricles, sulci and basal cisterns are concordant with the degree of parenchymal atrophy. There is no extraaxial fluid collection.   Parenchyma: There are nonspecific patchy, confluent periventricular and subcortical white matter hypodensities which are likely sequela of chronic microvascular ischemic changes. Otherwise, the grey-white differentiation is intact. There is no mass effect or midline shift. There is no intracranial hemorrhage.   Calvarium: The calvarium is unremarkable.   Paranasal sinuses and mastoids: Visualized paranasal sinuses and mastoids are clear.       1.  No evidence of acute intracranial hemorrhage or mass effect. 2. Nonspecific periventricular and subcortical white matter hypodensities likely sequela of chronic microvascular ischemic changes     I personally reviewed the images/study and I agree with the  findings as stated by Resident Irwin Ingram MD. This study was interpreted at [facility], [place], [place].   MACRO: None   Signed by: Marilyn Torres 1/26/2024 7:23 AM Dictation workstation:   [id number]    XR abdomen 1 view    Result Date: 1/25/2024  Interpreted By:  Jori Garcia and Ritchie Brandon STUDY: XR ABDOMEN 1 VIEW;  1/25/2024 3:05 pm   INDICATION: Signs/Symptoms:CICU confirm NG placement.   COMPARISON: Chest x-ray 01/23/2024   ACCESSION NUMBER(S): [id number]   ORDERING CLINICIAN: ALVIN AMBRIZ   FINDINGS: Enteric tube courses midline below the level of the diaphragm with the tip located in the gastric body. The side-hole is below the GE junction.   Multiple gaseous distended loops of small and large bowel. For example there is a lucent area of gaseous ascending colon in the right upper quadrant measuring 5.2 cm. There is limited evaluation of pneumoperitoneum on supine imaging, however there does not appear to be any evidence of free air surrounding this gaseous distended loop of ascending/Rigler's sign. Right diaphragm is distinctly visualized separate from this loop of bowel without evidence of subdiaphragmatic free air.   Additional mildly prominent loops of small bowel scattered throughout the bilateral hemiabdomen, for example largest loop of small bowel can be seen in the left upper quadrant measuring 3.4 cm.   Visualized lungs are clear.   Osseous structures demonstrate no acute bony changes.       1. Enteric tube as described above with the distal tip projecting over the gastric body. 2. Multiple mildly distended loops of small and large bowel as described above. Recommend attention on follow-up examinations as could reflect early ileus. 3. No definitive evidence of free air on current supine radiograph as described above.   MACRO: None   Signed by: Jori Garcia 1/25/2024 3:48 PM Dictation workstation:   [id number]    XR chest 1 view    Result Date:  1/23/2024  Interpreted By:  Jori Garcia and Ritchie Brandon STUDY: XR CHEST 1 VIEW;  1/23/2024 9:06 am   INDICATION: Signs/Symptoms:pneumothorax r/o. dyspnea..   COMPARISON: Chest x-ray 01/22/2024   ACCESSION NUMBER(S): [id number]   ORDERING CLINICIAN: DONNA SOLIMAN   FINDINGS: AP radiograph of the chest was provided.   Similar position of the left internal jugular approach central venous catheter with the distal tip projecting over the expected location of the distal SVC. There is a right-sided pacemaker/AICD with leads in similar position over the expected location of the right atrium/right ventricle.   CARDIOMEDIASTINAL SILHOUETTE: Cardiomediastinal silhouette is stable in size and configuration.   LUNGS: Similar low lung volumes/expiratory radiograph with bronchovascular crowding. Similar linear areas of bibasilar atelectasis. Improved bilateral pleural effusions. The previously described trace focal lucency projecting over the right lower lung field is no longer evident on current examination. No pneumothorax.   ABDOMEN: No remarkable upper abdominal findings.   BONES: No acute osseous changes.       1. Similar low lung volumes with improved bilateral pleural effusions. Unchanged bibasilar atelectasis. 2. No evidence of pneumothorax. 3. Medical devices as described above.   I personally reviewed the images/study and I agree with the findings as stated by resident Marvin Cueva. This study was interpreted at [place], [place].   MACRO: None   Signed by: Jori Garcia 1/23/2024 5:11 PM Dictation workstation:   [id number]    Transthoracic Echo (TTE) Complete    Result Date: 1/23/2024   [facility], [address] [place] [address]                Tel [phone] and Fax [phone] TRANSTHORACIC ECHOCARDIOGRAM REPORT  Patient Name:      SHAMEKA SUN  Reading Physician:    [id number] Mis Hoffman                                                                MD Study Date:        1/23/2024            Ordering Provider:    [id number] CHUCKY BLACKBURN MRN/PID:           [id number]             Fellow: Accession#:        [id number]         Nurse: Date of Birth/Age: 1945 / 78 years Sonographer:          Roxana Pace RDCS Gender:            M                    Additional Staff: Height:            177.80 cm            Admit Date:           1/9/2024 Weight:            104.78 kg            Admission Status:     Inpatient -                                                               Routine BSA:               2.22 m2              Encounter#:           [id number]                                         Department Location:  [facility] Non                                                               Invasive Blood Pressure: 78 /65 mmHg Study Type:    TRANSTHORACIC ECHO (TTE) COMPLETE Diagnosis/ICD: Shock, unspecified-R57.9 Indication:    shock CPT Code:      Echo Complete w Full Doppler-31199 Patient History: Pertinent History: ESRD, HFrEF (35-40), AFib, PVD, AMS & Hypotension during HD,                    AO Root Dilitation, CAD, SSS, PHTN. Study Detail: The following Echo studies were performed: 2D, M-Mode, Doppler and               color flow. Technically challenging study due to poor acoustic               windows, prominent lung artifact, patient lying in supine               position, body habitus and coughing up phlegm. Definity used as a               contrast agent for endocardial border definition. Total contrast               used for this procedure was 1 mL via IV push. Unable to obtain               subcostal and suprasternal notch view.  PHYSICIAN INTERPRETATION: Left Ventricle: The left ventricular systolic function is mildly decreased, with an estimated ejection fraction of 40-45%. The patient is in atrial  fibrillation which may influence the estimate of left ventricular function and transvalvular flows. There is global hypokinesis of the left ventricle with minor regional variations. The left ventricular cavity size is normal. There is left ventricular concentric remodeling. Left ventricular diastolic filling was indeterminate. Mildly increased LV wall thickness with proximal septal bulge up to 1.8 cm. Left Atrium: The left atrium is severely dilated. Right Ventricle: The right ventricle was not well visualized. Unable to determine right ventricular systolic function. A device is visualized in the right ventricle. Right Atrium: The right atrium is mildly dilated. There is a device visualized in the right atrium. Aortic Valve: The aortic valve is probably trileaflet. There is mild aortic valve cusp calcification. There is trivial aortic valve regurgitation. The peak instantaneous gradient of the aortic valve is 15.7 mmHg. The mean gradient of the aortic valve is 7.0 mmHg. Mitral Valve: The mitral valve is normal in structure. There is mild mitral annular calcification. There is mild mitral valve regurgitation. Tricuspid Valve: The tricuspid valve was not well visualized. There is trace to mild tricuspid regurgitation. The Doppler estimated RVSP is within normal limits at 35.5 mmHg. Pulmonic Valve: The pulmonic valve is structurally normal. There is physiologic pulmonic valve regurgitation. Pericardium: There is a trivial pericardial effusion. Aorta: The aortic root is abnormal. There is mild dilatation of the aortic root. In comparison to the previous echocardiogram(s): Compared wit the prior exam from 10/2/2023 the LV appears slightly more dynamic today with LVEF 40-45% ( instead prior prior exam with LVEF 35-40%). There is still only mild MR. Note that the prior RVSP was moderately elevated at 64.8mmHg and is now normal or borderline at 35.5mmHg.  CONCLUSIONS:  1. Left ventricular systolic function is mildly  decreased with a 40-45% estimated ejection fraction.  2. Poorly visualized anatomical structures due to suboptimal image quality.  3. Mildly increased LV wall thickness with proximal septal bulge up to 1.8 cm.  4. The left atrium is severely dilated.  5. RVSP within normal limits.  6. Compared wit the prior exam from 10/2/2023 the LV appears slightly more dynamic today with LVEF 40-45% ( instead prior prior exam with LVEF 35-40%). There is still only mild MR. Note that the prior RVSP was moderately elevated at 64.8mmHg and is now normal or borderline at 35.5mmHg.  7. The patient is in atrial fibrillation which may influence the estimate of left ventricular function and transvalvular flows.  8. There is global hypokinesis of the left ventricle with minor regional variations. QUANTITATIVE DATA SUMMARY: 2D MEASUREMENTS:                          Normal Ranges: Ao Root d:     3.70 cm   (2.0-3.7cm) LAs:           4.30 cm   (2.7-4.0cm) IVSd:          1.40 cm   (0.6-1.1cm) LVPWd:         1.30 cm   (0.6-1.1cm) LVIDd:         3.70 cm   (3.9-5.9cm) LVIDs:         3.10 cm LV Mass Index: 79.6 g/m2 LV % FS        16.2 % LA VOLUME:                               Normal Ranges: LA Vol A4C:        110.7 ml   (22+/-6mL/m2) LA Vol A2C:        149.4 ml LA Vol BP:         129.0 ml LA Vol Index A4C:  49.9ml/m2 LA Vol Index A2C:  67.3 ml/m2 LA Vol Index BP:   58.1 ml/m2 LA Area A4C:       30.3 cm2 LA Area A2C:       35.3 cm2 LA Major Axis A4C: 7.0 cm LA Major Axis A2C: 7.1 cm LA Volume Index:   58.1 ml/m2 RA VOLUME BY A/L METHOD:                       Normal Ranges: RA Area A4C: 19.9 cm2 AORTA MEASUREMENTS:                      Normal Ranges: Ao Sinus, d: 3.70 cm (2.1-3.5cm) LV SYSTOLIC FUNCTION BY 2D PLANIMETRY (MOD):                     Normal Ranges: EF-A4C View: 35.0 % (>=55%) EF-A2C View: 41.2 % EF-Biplane:  37.7 % LV DIASTOLIC FUNCTION:                           Normal Ranges: MV Peak E:    0.86 m/s    (0.7-1.2 m/s) MV e'          0.09 m/s    (>8.0) MV lateral e' 0.10 m/s MV medial e'  0.08 m/s MV A Dur:     193.00 msec E/e' Ratio:   9.57        (<8.0) a'            0.02 m/s MITRAL VALVE:                 Normal Ranges: MV DT: 193 msec (150-240msec) AORTIC VALVE:                                    Normal Ranges: AoV Vmax:                1.98 m/s  (<=1.7m/s) AoV Peak PG:             15.7 mmHg (<20mmHg) AoV Mean P.0 mmHg  (1.7-11.5mmHg) LVOT Max Dann:            0.91 m/s  (<=1.1m/s) AoV VTI:                 36.20 cm  (18-25cm) LVOT VTI:                17.20 cm LVOT Diameter:           1.90 cm   (1.8-2.4cm) AoV Area, VTI:           1.35 cm2  (2.5-5.5cm2) AoV Area,Vmax:           1.31 cm2  (2.5-4.5cm2) AoV Dimensionless Index: 0.48  RIGHT VENTRICLE: RV Basal 5.30 cm RV Major 8.9 cm RV s'    0.04 m/s TRICUSPID VALVE/RVSP:                             Normal Ranges: Peak TR Velocity: 2.85 m/s RV Syst Pressure: 35.5 mmHg (< 30mmHg) PULMONIC VALVE:                         Normal Ranges: PV Accel Time: 103 msec (>120ms) PV Max Dann:    1.0 m/s  (0.6-0.9m/s) PV Max PG:     3.6 mmHg  [id number] Mis Hoffman MD Electronically signed on 2024 at 3:42:15 PM  ** Final **     XR chest 1 view    Result Date: 2024  Interpreted By:  Jori Garcia and Dervishi Mario STUDY: XR CHEST 1 VIEW;  2024 8:21 pm   INDICATION: Signs/Symptoms:aborted central line, r/o pneumothorax.   COMPARISON: Chest radiograph: 2020   ACCESSION NUMBER(S): [id number]   ORDERING CLINICIAN: SALVADOR OLIVARES   FINDINGS: AP radiograph of the chest was provided.   A right-sided ICD device is again noted. Right IJ central venous catheter with the tip projecting over the distal SVC.   CARDIOMEDIASTINAL SILHOUETTE: Cardiomediastinal silhouette is stable in size and configuration enlarged.   LUNGS: There is persistent low lung volumes with associated bronchovascular crowding. There is re-demonstration of blunting of bilateral right worse than left costophrenic angles  suggesting pleural effusions with associated atelectasis. A trace focal lucency is seen projecting over the right lower lung field. No pneumothorax of the left lung.   ABDOMEN: No remarkable upper abdominal findings.   BONES: No acute osseous changes.       1.  Large right and small left pleural effusions with associated atelectasis. 2. Low lung volumes with associated bronchovascular crowding. 3. Trace focal lucency projecting over the right lower lung field which may be artifactual however a trace pneumothorax is not excluded. Repeat radiograph can be obtained as clinically indicated. 4. Medical devices as above.   I personally reviewed the images/study and I agree with the findings as stated. This study was interpreted at [facility], [place], [place].   MACRO: NONE.   Signed by: Jori Garcia 1/23/2024 9:01 AM Dictation workstation:   [id number]       Assessment/Plan   This patient currently has cardiac telemetry ordered; if you would like to modify or discontinue the telemetry order, click here to go to the orders activity to modify/discontinue the order.    78 y.o. male with past medical history as noted below who is presenting to the emergency department for high potassium. This lab was drawn earlier today. Potassium is 9.0 and the patient has no noted EKG changes, no noted symptoms. Patient does additionally report that he is having pain in his penis that has been ongoing over the course the past 2 weeks     Principal Problem:    Balanitis  Active Problems:    Sepsis (CMS/HCC)    Chronic renal failure    Ulcer of lower extremity (CMS/HCC)    Hypotension     Sepsis: Presenting with hypotension  Suspect balanitis  -unknown source  -vanc and cefepime  -continue home midodrine  -blood cx negative so far, will await 24 more hrs  -monitor     Left AV fistula bleed  -hemostasis achieved  -vascular surgery     PVD S/p PTA to RLE c/b perforation of AT  hx of rt subclavian dvt  mild  stenosis of left subclavian vein  Hx of right TMA  -1/19 Podiatry  revision of right TMA    - Cont plavix   - stop ASA  - c/w  eliquis 2.5mg BID d/w podiatry and endovascular, monitor for bleeding  - Cont home atorva     Chronic systolic and diastolic heart failure, 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%   - TTE 1/23/24 with LVEF to 40-45%, mildly improved from previous in 2023, no other new acute findings    -Uptitrate GDMT as tolerated--> consider hydralazine and isordil if needed, currently limited 2/2 hypotension on midodrine  - consideration should be given to further ischemic workup in the outpatient setting due to extensive peripheral disease, risk factors, and no evidence of previous ischemic cardiac workup         ESRD  -on HD  -nephrology consult     Anemia of chronic disease  -At baseline hgb  - continue daily CBC     DM with neuropathy  - A1c 5.3% 12/2023  -Cont home gabapentin  -Insulin glargine 6 units nightly    -SSI     Diet  -Diabetic  -N.p.o. after midnight    DVT prophylaxis   -On Eliquis 2.5 mg twice daily, Plavix 75 mg daily    Disposition: Presenting with cyanosis since, balanitis suspected, but also has multiple postsurgical healing wound, discharge pending clinical improvement    César Garcia, DO

## 2024-02-21 NOTE — PROGRESS NOTES
Chevy Benton is a 78 y.o. male on day 4 of admission presenting with Balanitis.    Subjective   Mr. Benton is a 78-year-old man with a history of hypercapnic respiratory failure, heart failure with reduced EF, diabetes, end-stage kidney disease on dialysis. He presented with a high potassium, no apparent EKG changes. He was suffering penile pain, that had been going on for approximately 2 weeks. He is being treated for balanitis. Over night events on Sunday noted of bleeding from his dialysis access. Vascular surgery saw him and he is planned for fistulogram with [facility] [address]. No recurrent bleeding reported. He is planned for fistulogram today.   Chart/labs/meds/notes/imaging/VS reviewed.       Objective       Physical Exam  Constitutional:       Appearance: Normal appearance.    HENT:      Mouth/Throat:      Mouth: Mucous membranes are moist.   Eyes:      Extraocular Movements: Extraocular movements intact.      Pupils: Pupils are equal, round, and reactive to light.   Cardiovascular:      Rate and Rhythm: Regular rhythm.      Heart sounds: S1 normal and S2 normal.   Pulmonary:      Breath sounds: Normal breath sounds.   Abdominal:      Comments: Soft, NT/ND, no masses, normal bowel sounds. Anasarca  Genitourinary:     Penile edema  Musculoskeletal:   Bilateral upper extremity edema  Skin:     General: Skin is warm and dry.   Neurological:      General: No focal deficit present.      Mental Status: She is alert and oriented to person, place, and time.   Psychiatric:         Behavior: Behavior normal.    Left upper extremity thrill and bruit    Meds    Current Facility-Administered Medications:     albuterol 2.5 mg /3 mL (0.083 %) nebulizer solution 2.5 mg, 2.5 mg, nebulization, q6h PRN, Mumtaz Almaraz, PharmD    apixaban (Eliquis) tablet 2.5 mg, 2.5 mg, oral, BID, Randall Wallace MD, 2.5 mg at 02/21/24 1001    atorvastatin (Lipitor) tablet 40 mg, 40 mg, oral, Nightly, Randall Wallace MD, 40 mg at  02/20/24 2159    clopidogrel (Plavix) tablet 75 mg, 75 mg, oral, Daily, Randall Wallace MD, 75 mg at 02/21/24 1001    dextrose 10 % in water (D10W) infusion, 0.3 g/kg/hr, intravenous, Once PRN, Randall Wallace MD    dextrose 50 % injection 25 g, 25 g, intravenous, q15 min PRN, Randall Wallace MD    epoetin dane (Epogen,Procrit) injection 6,000 Units, 6,000 Units, subcutaneous, Once per day on Mon Wed Fri, Jovanna Bobo, PharmD    gabapentin (Neurontin) capsule 100 mg, 100 mg, oral, Daily, Randall Wallace MD, 100 mg at 02/21/24 1001    glucagon (Glucagen) injection 1 mg, 1 mg, intramuscular, q15 min PRN, Randall Wallace MD    insulin glargine (Lantus) injection 6 Units, 6 Units, subcutaneous, Nightly, Randall Wallace MD, 6 Units at 02/20/24 2159    insulin lispro (HumaLOG) injection 0-5 Units, 0-5 Units, subcutaneous, TID with meals, Randall Wallace MD, 1 Units at 02/20/24 0821    ipratropium-albuteroL (Duo-Neb) 0.5-2.5 mg/3 mL nebulizer solution 3 mL, 3 mL, nebulization, q2h PRN, Randall Wallace MD    lidocaine 4 % patch 1 patch, 1 patch, transdermal, Daily, César Garcia DO, 1 patch at 02/21/24 1000    midodrine (Proamatine) tablet 15 mg, 15 mg, oral, q8h, Sabi Jimenez MD, 15 mg at 02/21/24 1001    oxyCODONE-acetaminophen (Percocet) 5-325 mg per tablet 1 tablet, 1 tablet, oral, q6h PRN, Randall Wallace MD, 1 tablet at 02/20/24 0607    pantoprazole (ProtoNix) EC tablet 40 mg, 40 mg, oral, Daily before breakfast, Randall Wallace MD, 40 mg at 02/21/24 0700    polyethylene glycol (Glycolax, Miralax) packet 17 g, 17 g, oral, Daily, Randall Wallace MD, 17 g at 02/21/24 1001    sodium hypochlorite (Dakin's Quarter Strength) 0.125 % external solution, , irrigation, Daily, Randall Wallace MD, Given at 02/20/24 1300    traMADol (Ultram) tablet 50 mg, 50 mg, oral, TID PRN, César Garcia DO, 50 mg at 02/20/24 1019    vancomycin (Vancocin) placeholder, ,  "miscellaneous, Daily PRN, Romana A Kuchta, PharmD   Medications Discontinued During This Encounter   Medication Reason    epoetin dane (Epogen,Procrit) 10,000 unit/mL injection Entered in Error    cefepime (Maxipime) 1 g in dextrose 5 % 50 mL IV     cefepime (Maxipime) 1 g in dextrose 5 % 50 mL IV     albuterol 90 mcg/actuation inhaler 2 puff     ipratropium-albuteroL (Duo-Neb) 0.5-2.5 mg/3 mL nebulizer solution 3 mL     vancomycin in dextrose 5 % (Vancocin) IVPB 750 mg     epoetin dane-epbx (Retacrit) injection 6,000 Units         Allergies  Allergies   Allergen Reactions    Penicillins Hives        Last Recorded Vitals  Blood pressure 103/64, pulse 93, temperature 36.4 °C (97.6 °F), temperature source Temporal, resp. rate 18, height 1.778 m (5' 10\"), weight 87.7 kg (193 lb 5.5 oz), SpO2 96 %.  Intake/Output last 3 Shifts:  I/O last 3 completed shifts:  In: 240 (2.7 mL/kg) [P.O.:240]  Out: 0 (0 mL/kg)   Weight: 87.7 kg     Last 24 hour Results  Results for orders placed or performed during the hospital encounter of 02/16/24 (from the past 24 hour(s))   POCT GLUCOSE   Result Value Ref Range    POCT Glucose 126 (H) 74 - 99 mg/dL   POCT GLUCOSE   Result Value Ref Range    POCT Glucose 124 (H) 74 - 99 mg/dL   POCT GLUCOSE   Result Value Ref Range    POCT Glucose 135 (H) 74 - 99 mg/dL   POCT GLUCOSE   Result Value Ref Range    POCT Glucose 154 (H) 74 - 99 mg/dL   Basic Metabolic Panel   Result Value Ref Range    Glucose 114 (H) 74 - 99 mg/dL    Sodium 129 (L) 136 - 145 mmol/L    Potassium 4.2 3.5 - 5.3 mmol/L    Chloride 91 (L) 98 - 107 mmol/L    Bicarbonate 28 21 - 32 mmol/L    Anion Gap 14 10 - 20 mmol/L    Urea Nitrogen 52 (H) 6 - 23 mg/dL    Creatinine 4.31 (H) 0.50 - 1.30 mg/dL    eGFR 13 (L) >60 mL/min/1.73m*2    Calcium 9.6 8.6 - 10.3 mg/dL   Vancomycin, Trough   Result Value Ref Range    Vancomycin, Trough 8.3 5.0 - 20.0 ug/mL   CBC   Result Value Ref Range    WBC 11.0 4.4 - 11.3 x10*3/uL    nRBC 0.3 (H) 0.0 - " 0.0 /100 WBCs    RBC 2.48 (L) 4.50 - 5.90 x10*6/uL    Hemoglobin 7.3 (L) 13.5 - 17.5 g/dL    Hematocrit 24.6 (L) 41.0 - 52.0 %    MCV 99 80 - 100 fL    MCH 29.4 26.0 - 34.0 pg    MCHC 29.7 (L) 32.0 - 36.0 g/dL    RDW 20.7 (H) 11.5 - 14.5 %    Platelets 459 (H) 150 - 450 x10*3/uL   POCT GLUCOSE   Result Value Ref Range    POCT Glucose 91 74 - 99 mg/dL   POCT GLUCOSE   Result Value Ref Range    POCT Glucose 98 74 - 99 mg/dL        Imaging results  Electrocardiogram, 12-lead PRN ACS symptoms    Result Date: 2/6/2024  Atrial fibrillation with premature ventricular or aberrantly conducted complexes Low voltage QRS Nonspecific T wave abnormality Abnormal ECG When compared with ECG of 23-JAN-2024 04:46, No significant change since Confirmed by Edwin May ([id number]) on 2/6/2024 3:23:42 PM    Electrocardiogram, 12-lead PRN ACS symptoms    Result Date: 2/3/2024  Atrial fibrillation with occasional ventricular-paced complexes Nonspecific T wave abnormality Abnormal ECG When compared with ECG of 22-JAN-2024 08:27, Vent. rate has decreased BY  16 BPM Confirmed by Edwin May ([id number]) on 2/3/2024 11:11:22 PM    IR CVC removal    Result Date: 2/2/2024  Interpreted By:  Noe Zaldivar, STUDY: IR CVC REMOVAL;  2/2/2024 3:20 pm   INDICATION: Signs/Symptoms:removal of tunneled line.   COMPARISON: None.   ACCESSION NUMBER(S): [id number]   ORDERING CLINICIAN: LUKE HWANG   TECHNIQUE: INTERVENTIONALIST(S): Noe Zaldivar CNP   CONSENT: The patient/patient's POA/next of kin was informed of the nature of the proposed procedure. The purposes, alternatives, risks, and benefits were explained and discussed. All questions were answered and consent was obtained.     MEDICATION/CONTRAST: No additional   TIME OUT: A time out was performed immediately prior to procedure start with the interventional team, correctly identifying the patient name, date of birth, MRN, procedure, anatomy (including marking of site and side), patient position,  procedure consent form, relevant laboratory and imaging test results, antibiotic administration, safety precautions, and procedure-specific equipment needs.   COMPLICATIONS: No immediate adverse events identified.   FINDINGS: The existing skin site over the tunneled catheter insertion site was prepped and draped with maximal sterile manner.   The skin and subcutaneous tissues at the original catheter site were anesthetized with 1% lidocaine with epinephrine.  The subcutaneous tissues surrounding the catheter were anesthetized with 1% lidocaine. Using blunt dissection, the catheter cuff was externalized and the catheter leading to the jugular vein was completely removed. Hemostasis was obtained using manual compression at the internal jugular vein access site.   The incision site was covered with a sterile dressing and tegaderm was subsequently applied.   There were no immediate or post-procedural complications.       Uneventful removal of a left chest central venous catheter.   I was present for and/or performed the critical portions of the procedure and immediately available throughout the entire procedure.   Performed and dictated at [facility].   Signed by: Noe Zaldivar 2/2/2024 4:39 PM Dictation workstation:   [id number]    Electrocardiogram, 12-lead PRN ACS symptoms    Result Date: 1/29/2024  Atrial fibrillation with occasional ventricular-paced complexes T wave abnormality, consider lateral ischemia Abnormal ECG When compared with ECG of 22-JAN-2024 08:26, Vent. rate has increased BY   4 BPM Confirmed by Edwin May ([id number]) on 1/29/2024 4:59:47 PM    Vascular US upper extremity venous duplex left    Result Date: 1/27/2024            [place] [address]   Tel [phone] and Fax [phone]  Vascular Lab Report VASC US UPPER EXTREMITY VENOUS DUPLEX LEFT  Patient Name:      SHAMEKA SUN Homer Physician: [id number]  Jovanna Francis MD Study Date:        1/26/2024           Ordering           [id number] CHUCKY BLACKBURN                                        Physician: MRN/PID:           [id number]            Technologist:      Elisabeth Betts RVARGELIA Accession#:        [id number]        Technologist 2: Date of Birth/Age: 1945 / 78      Encounter#:        [id number]                    years Gender:            M Admission Status:  Inpatient           Location           [facility]                                        Performed:  Diagnosis/ICD: Left arm swelling-M79.89 Indication:    Limb swelling CPT Codes:     59125 Peripheral venous duplex scan for DVT Limited  CONCLUSIONS: Right Upper Venous: Unable to visualize subclavian vein due to lines and bandages. Left Upper Venous: Technically limited exam; negative for acute deep vein thrombus in visualized veins. Unable to rule out deep vein thrombus in non-visualized mid and distal subclavian vein. The left basilic vein was not visualized. Left brachiocephalic arteriovenous fistula is noted. Visualized in segments due to bandages from recent dialysis access.  Additional Findings: Technically difficult due to patient positioning and limited mobility.  Imaging & Doppler Findings:  Left                Compress Thrombus        Flow Internal Jugular      Yes      None   Spontaneous/Phasic Subclavian Proximal   Yes      None   Spontaneous/Phasic Axillary              Yes      None   Spontaneous/Phasic Brachial              Yes      None Cephalic              Yes      None  [id number] Jovanna Francis MD Electronically signed by [id number] Jovanna Francis MD on 1/27/2024 at 2:56:51 PM  ** Final **     CT head wo IV contrast    Result Date: 1/26/2024  Interpreted By:  Marilyn Torres and Dervishi Mario STUDY: CT HEAD WO IV CONTRAST;  1/25/2024 10:08 pm   INDICATION: Signs/Symptoms:AMS CICU patient.   COMPARISON: None.   ACCESSION NUMBER(S):  [id number]   ORDERING CLINICIAN: ALVIN AMBRIZ   TECHNIQUE: Noncontrast axial CT scan of head was performed. Angled reformats in brain and bone windows were generated. The images were reviewed in bone, brain, blood and soft tissue windows.   FINDINGS: CSF Spaces: The ventricles, sulci and basal cisterns are concordant with the degree of parenchymal atrophy. There is no extraaxial fluid collection.   Parenchyma: There are nonspecific patchy, confluent periventricular and subcortical white matter hypodensities which are likely sequela of chronic microvascular ischemic changes. Otherwise, the grey-white differentiation is intact. There is no mass effect or midline shift. There is no intracranial hemorrhage.   Calvarium: The calvarium is unremarkable.   Paranasal sinuses and mastoids: Visualized paranasal sinuses and mastoids are clear.       1.  No evidence of acute intracranial hemorrhage or mass effect. 2. Nonspecific periventricular and subcortical white matter hypodensities likely sequela of chronic microvascular ischemic changes     I personally reviewed the images/study and I agree with the findings as stated by Resident Irwin Ingram MD. This study was interpreted at [place], [place].   MACRO: None   Signed by: Marilyn Torres 1/26/2024 7:23 AM Dictation workstation:   [id number]    XR abdomen 1 view    Result Date: 1/25/2024  Interpreted By:  Jori Garcia and Ritchie Brandon STUDY: XR ABDOMEN 1 VIEW;  1/25/2024 3:05 pm   INDICATION: Signs/Symptoms:CICU confirm NG placement.   COMPARISON: Chest x-ray 01/23/2024   ACCESSION NUMBER(S): [id number]   ORDERING CLINICIAN: ALVIN AMBRIZ   FINDINGS: Enteric tube courses midline below the level of the diaphragm with the tip located in the gastric body. The side-hole is below the GE junction.   Multiple gaseous distended loops of small and large bowel. For example there is a lucent area of gaseous ascending colon in  the right upper quadrant measuring 5.2 cm. There is limited evaluation of pneumoperitoneum on supine imaging, however there does not appear to be any evidence of free air surrounding this gaseous distended loop of ascending/Rigler's sign. Right diaphragm is distinctly visualized separate from this loop of bowel without evidence of subdiaphragmatic free air.   Additional mildly prominent loops of small bowel scattered throughout the bilateral hemiabdomen, for example largest loop of small bowel can be seen in the left upper quadrant measuring 3.4 cm.   Visualized lungs are clear.   Osseous structures demonstrate no acute bony changes.       1. Enteric tube as described above with the distal tip projecting over the gastric body. 2. Multiple mildly distended loops of small and large bowel as described above. Recommend attention on follow-up examinations as could reflect early ileus. 3. No definitive evidence of free air on current supine radiograph as described above.   MACRO: None   Signed by: Jori Garcia 1/25/2024 3:48 PM Dictation workstation:   [id number]    XR chest 1 view    Result Date: 1/23/2024  Interpreted By:  Jori Garcia and Ritchie Brandon STUDY: XR CHEST 1 VIEW;  1/23/2024 9:06 am   INDICATION: Signs/Symptoms:pneumothorax r/o. dyspnea..   COMPARISON: Chest x-ray 01/22/2024   ACCESSION NUMBER(S): [id number]   ORDERING CLINICIAN: DONNA SOLIMAN   FINDINGS: AP radiograph of the chest was provided.   Similar position of the left internal jugular approach central venous catheter with the distal tip projecting over the expected location of the distal SVC. There is a right-sided pacemaker/AICD with leads in similar position over the expected location of the right atrium/right ventricle.   CARDIOMEDIASTINAL SILHOUETTE: Cardiomediastinal silhouette is stable in size and configuration.   LUNGS: Similar low lung volumes/expiratory radiograph with bronchovascular crowding. Similar linear areas of bibasilar  atelectasis. Improved bilateral pleural effusions. The previously described trace focal lucency projecting over the right lower lung field is no longer evident on current examination. No pneumothorax.   ABDOMEN: No remarkable upper abdominal findings.   BONES: No acute osseous changes.       1. Similar low lung volumes with improved bilateral pleural effusions. Unchanged bibasilar atelectasis. 2. No evidence of pneumothorax. 3. Medical devices as described above.   I personally reviewed the images/study and I agree with the findings as stated by resident Marvin Cueva. This study was interpreted at [facility], [place], [place].   MACRO: None   Signed by: Jori Garcia 1/23/2024 5:11 PM Dictation workstation:   [id number]    Transthoracic Echo (TTE) Complete    Result Date: 1/23/2024   [facility], [address] [place] [address]                Tel [phone] and Fax [phone] TRANSTHORACIC ECHOCARDIOGRAM REPORT  Patient Name:      SHAMEKA SUN  Reading Physician:    [id number] Mis Hoffman MD Study Date:        1/23/2024            Ordering Provider:    [id number] CHUCKY BLACKBURN MRN/PID:           [id number]             Fellow: Accession#:        [id number]         Nurse: Date of Birth/Age: 1945 / 78 years Sonographer:          Roxana Pace RDCS Gender:            M                    Additional Staff: Height:            177.80 cm            Admit Date:           1/9/2024 Weight:            104.78 kg            Admission Status:     Inpatient -                                                               Routine BSA:               2.22 m2              Encounter#:           [id number]                                         Department Location:  [facility]  Non                                                               Invasive Blood Pressure: 78 /65 mmHg Study Type:    TRANSTHORACIC ECHO (TTE) COMPLETE Diagnosis/ICD: Shock, unspecified-R57.9 Indication:    shock CPT Code:      Echo Complete w Full Doppler-[id number] Patient History: Pertinent History: ESRD, HFrEF (35-40), AFib, PVD, AMS & Hypotension during HD,                    AO Root Dilitation, CAD, SSS, PHTN. Study Detail: The following Echo studies were performed: 2D, M-Mode, Doppler and               color flow. Technically challenging study due to poor acoustic               windows, prominent lung artifact, patient lying in supine               position, body habitus and coughing up phlegm. Definity used as a               contrast agent for endocardial border definition. Total contrast               used for this procedure was 1 mL via IV push. Unable to obtain               subcostal and suprasternal notch view.  PHYSICIAN INTERPRETATION: Left Ventricle: The left ventricular systolic function is mildly decreased, with an estimated ejection fraction of 40-45%. The patient is in atrial fibrillation which may influence the estimate of left ventricular function and transvalvular flows. There is global hypokinesis of the left ventricle with minor regional variations. The left ventricular cavity size is normal. There is left ventricular concentric remodeling. Left ventricular diastolic filling was indeterminate. Mildly increased LV wall thickness with proximal septal bulge up to 1.8 cm. Left Atrium: The left atrium is severely dilated. Right Ventricle: The right ventricle was not well visualized. Unable to determine right ventricular systolic function. A device is visualized in the right ventricle. Right Atrium: The right atrium is mildly dilated. There is a device visualized in the right atrium. Aortic Valve: The aortic valve is probably trileaflet. There is mild aortic valve cusp calcification. There is trivial  aortic valve regurgitation. The peak instantaneous gradient of the aortic valve is 15.7 mmHg. The mean gradient of the aortic valve is 7.0 mmHg. Mitral Valve: The mitral valve is normal in structure. There is mild mitral annular calcification. There is mild mitral valve regurgitation. Tricuspid Valve: The tricuspid valve was not well visualized. There is trace to mild tricuspid regurgitation. The Doppler estimated RVSP is within normal limits at 35.5 mmHg. Pulmonic Valve: The pulmonic valve is structurally normal. There is physiologic pulmonic valve regurgitation. Pericardium: There is a trivial pericardial effusion. Aorta: The aortic root is abnormal. There is mild dilatation of the aortic root. In comparison to the previous echocardiogram(s): Compared wit the prior exam from 10/2/2023 the LV appears slightly more dynamic today with LVEF 40-45% ( instead prior prior exam with LVEF 35-40%). There is still only mild MR. Note that the prior RVSP was moderately elevated at 64.8mmHg and is now normal or borderline at 35.5mmHg.  CONCLUSIONS:  1. Left ventricular systolic function is mildly decreased with a 40-45% estimated ejection fraction.  2. Poorly visualized anatomical structures due to suboptimal image quality.  3. Mildly increased LV wall thickness with proximal septal bulge up to 1.8 cm.  4. The left atrium is severely dilated.  5. RVSP within normal limits.  6. Compared wit the prior exam from 10/2/2023 the LV appears slightly more dynamic today with LVEF 40-45% ( instead prior prior exam with LVEF 35-40%). There is still only mild MR. Note that the prior RVSP was moderately elevated at 64.8mmHg and is now normal or borderline at 35.5mmHg.  7. The patient is in atrial fibrillation which may influence the estimate of left ventricular function and transvalvular flows.  8. There is global hypokinesis of the left ventricle with minor regional variations. QUANTITATIVE DATA SUMMARY: 2D MEASUREMENTS:                           Normal Ranges: Ao Root d:     3.70 cm   (2.0-3.7cm) LAs:           4.30 cm   (2.7-4.0cm) IVSd:          1.40 cm   (0.6-1.1cm) LVPWd:         1.30 cm   (0.6-1.1cm) LVIDd:         3.70 cm   (3.9-5.9cm) LVIDs:         3.10 cm LV Mass Index: 79.6 g/m2 LV % FS        16.2 % LA VOLUME:                               Normal Ranges: LA Vol A4C:        110.7 ml   (22+/-6mL/m2) LA Vol A2C:        149.4 ml LA Vol BP:         129.0 ml LA Vol Index A4C:  49.9ml/m2 LA Vol Index A2C:  67.3 ml/m2 LA Vol Index BP:   58.1 ml/m2 LA Area A4C:       30.3 cm2 LA Area A2C:       35.3 cm2 LA Major Axis A4C: 7.0 cm LA Major Axis A2C: 7.1 cm LA Volume Index:   58.1 ml/m2 RA VOLUME BY A/L METHOD:                       Normal Ranges: RA Area A4C: 19.9 cm2 AORTA MEASUREMENTS:                      Normal Ranges: Ao Sinus, d: 3.70 cm (2.1-3.5cm) LV SYSTOLIC FUNCTION BY 2D PLANIMETRY (MOD):                     Normal Ranges: EF-A4C View: 35.0 % (>=55%) EF-A2C View: 41.2 % EF-Biplane:  37.7 % LV DIASTOLIC FUNCTION:                           Normal Ranges: MV Peak E:    0.86 m/s    (0.7-1.2 m/s) MV e'         0.09 m/s    (>8.0) MV lateral e' 0.10 m/s MV medial e'  0.08 m/s MV A Dur:     193.00 msec E/e' Ratio:   9.57        (<8.0) a'            0.02 m/s MITRAL VALVE:                 Normal Ranges: MV DT: 193 msec (150-240msec) AORTIC VALVE:                                    Normal Ranges: AoV Vmax:                1.98 m/s  (<=1.7m/s) AoV Peak PG:             15.7 mmHg (<20mmHg) AoV Mean P.0 mmHg  (1.7-11.5mmHg) LVOT Max Dann:            0.91 m/s  (<=1.1m/s) AoV VTI:                 36.20 cm  (18-25cm) LVOT VTI:                17.20 cm LVOT Diameter:           1.90 cm   (1.8-2.4cm) AoV Area, VTI:           1.35 cm2  (2.5-5.5cm2) AoV Area,Vmax:           1.31 cm2  (2.5-4.5cm2) AoV Dimensionless Index: 0.48  RIGHT VENTRICLE: RV Basal 5.30 cm RV Major 8.9 cm RV s'    0.04 m/s TRICUSPID VALVE/RVSP:                             Normal  Ranges: Peak TR Velocity: 2.85 m/s RV Syst Pressure: 35.5 mmHg (< 30mmHg) PULMONIC VALVE:                         Normal Ranges: PV Accel Time: 103 msec (>120ms) PV Max Dann:    1.0 m/s  (0.6-0.9m/s) PV Max PG:     3.6 mmHg  [id number] Mis Hoffman MD Electronically signed on 1/23/2024 at 3:42:15 PM  ** Final **     XR chest 1 view    Result Date: 1/23/2024  Interpreted By:  Jori Garcia and Dervishi Mario STUDY: XR CHEST 1 VIEW;  1/22/2024 8:21 pm   INDICATION: Signs/Symptoms:aborted central line, r/o pneumothorax.   COMPARISON: Chest radiograph: 01/21/2020   ACCESSION NUMBER(S): [id number]   ORDERING CLINICIAN: SALVADOR OLIVARES   FINDINGS: AP radiograph of the chest was provided.   A right-sided ICD device is again noted. Right IJ central venous catheter with the tip projecting over the distal SVC.   CARDIOMEDIASTINAL SILHOUETTE: Cardiomediastinal silhouette is stable in size and configuration enlarged.   LUNGS: There is persistent low lung volumes with associated bronchovascular crowding. There is re-demonstration of blunting of bilateral right worse than left costophrenic angles suggesting pleural effusions with associated atelectasis. A trace focal lucency is seen projecting over the right lower lung field. No pneumothorax of the left lung.   ABDOMEN: No remarkable upper abdominal findings.   BONES: No acute osseous changes.       1.  Large right and small left pleural effusions with associated atelectasis. 2. Low lung volumes with associated bronchovascular crowding. 3. Trace focal lucency projecting over the right lower lung field which may be artifactual however a trace pneumothorax is not excluded. Repeat radiograph can be obtained as clinically indicated. 4. Medical devices as above.   I personally reviewed the images/study and I agree with the findings as stated. This study was interpreted at [facility], [place], [place].   MACRO: NONE.   Signed by: Jori Garcia 1/23/2024 9:01  AM Dictation workstation:   [id number]    XR chest 2 views    Result Date: 1/21/2024  Interpreted By:  Kirk Vanessa, STUDY: XR CHEST 2 VIEWS;  1/21/2024 9:19 am   INDICATION: Signs/Symptoms:s/p dialysis.   COMPARISON: Chest radiograph 01/16/2024 and chest CT 10/08/2023   ACCESSION NUMBER(S): [id number]   ORDERING CLINICIAN: GIBRAN ARMIJO   FINDINGS: PA and lateral radiographs of the chest, including dual energy subtraction images are available for interpretation. The evaluation is somewhat limited by patient rotation and suboptimal positioning. Stable positioning of a right subclavian approach dual chamber cardiac pacemaker. Left subclavian approach hemodialysis catheter tip overlies mid to lower SVC, unchanged.   CARDIOMEDIASTINAL SILHOUETTE: The cardiomediastinal silhouette is grossly stable in size and configuration.   LUNGS: Interval slight worsening of mild diffuse pulmonary edema. Small bibasilar pleural effusions, overall improved from prior. Interval worsening of right basilar opacification. No pneumothorax is seen.   ABDOMEN: No remarkable upper abdominal findings.   BONES: No acute osseous abnormality.       1. Limited examination with interval slight worsening of mild diffuse pulmonary edema. Correlate with patient's volume status. 2. Interval worsening of right basilar opacification, which may represent right middle/lower lobe atelectasis. Correlate with concern for central mucous plugging. 3. Small bibasilar pleural effusions have improved as compared to prior study.   Signed by: Kirk Vanessa 1/21/2024 10:58 AM Dictation workstation:   [id number]    XR foot right 1-2 views    Result Date: 1/19/2024  Interpreted By:  Liz Ruiz and Ohs Zachary STUDY: Right foot, 3 views.   INDICATION: Signs/Symptoms:post op tma   COMPARISON: Right foot radiograph 01/09/2024..   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 Sharp margination of the metatarsal stumps. Unremarkable appearance of the soft tissues of the stump with overlying wound VAC noted. Prominent vascular calcifications of the lower leg and ankle.   Remote appearing healed Douglas B distal fibular fracture with unchanged cortical irregularity.       1. 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 2. Additional findings as above.   I personally reviewed the images/study and I agree with the findings as stated by Dr. Aly Brand. This study was interpreted at [facility], [place], [place].   MACRO: None.   Signed by: Liz Ruiz 1/19/2024 5:25 PM Dictation workstation:   [id number]       Assessment and Plan  Mr. Benton is a 78-year-old man with a history of hypercapnic respiratory failure, heart failure with reduced EF, diabetes, end-stage kidney disease on dialysis.  He presented with a high potassium, no apparent EKG changes.  He was suffering penile pain, that had been going on for approximately 2 weeks.  He is being treated for balanitis. Overnight events on Sunday noted of bleeding from his dialysis access. Vascular surgery is following and he is planned for fistulogram with IR.     He will have the fistulogram today. His hemoglobin is stable. AV fistula bleeding has subsided. I have placed dialysis orders for today after access intervention. 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  Erythropoietin is ordered and we are monitoring his phosphorus level. Nephrology will follow closely with his care.     I spent a total of 35 minutes with this patient today.    Bob Castellano, DO

## 2024-02-21 NOTE — POST-PROCEDURE NOTE
Interventional Radiology Brief Postprocedure Note    Attending: Scott Lepe MD      Assistant: none    Diagnosis: Left cephalic outflow stenosis. Large pseudoaneurysms.    Description of procedure: Left AVF fitulagram shows multiple areas of greater than 50 percent stenosis in the cephalic outflow, this is contributing to pressurization of the bleeding PSA. No significant central stenosis. Balloon angioplasty of the cephalic outflow with an 8 mm Stevens. This resulted in resolution of all stenoses. Decreased pulsatility in the fistula.     Anesthesia:  Local    Complications: None    Estimated Blood Loss:  15 cc's    Medications  As of 02/21/24 1628      sodium chloride 0.9 % bolus 250 mL (mL/hr) Total volume:  Not documented* Dosing weight:  98.7   *Total volume has not been documented. View each administration to see the amount administered.     Date/Time Rate/Dose/Volume Action       02/17/24  0153 250 mL - 500 mL/hr (over 30 min) New Bag      0223  (over 30 min) Stopped               sodium chloride 0.9 % bolus 500 mL (mL/hr) Total volume:  Not documented*   *Total volume has not been documented. View each administration to see the amount administered.     Date/Time Rate/Dose/Volume Action       02/17/24  0949 500 mL - 1,000 mL/hr (over 30 min) New Bag      1021 1,000 mL/hr (over 30 min) Restarted      1129  (over 30 min) Stopped               cefepime (Maxipime) in dextrose 5 % water (D5W) 100 mL IV 2 g (mL/hr) Total volume:  Not documented*   *Total volume has not been documented. View each administration to see the amount administered.     Date/Time Rate/Dose/Volume Action       02/17/24  0731 2 g - 200 mL/hr (over 30 min) New Bag      0950 200 mL/hr (over 30 min) Restarted      1046  (over 30 min) Stopped               metroNIDAZOLE (Flagyl) 500 mg in NaCl (iso-os) 100 mL (mg) Total dose:  500 mg      Date/Time Rate/Dose/Volume Action       02/17/24  1020 500 mg (over 60 min) New Bag      1120  (over  60 min) Stopped               vancomycin (Vancocin) 2000 mg/500 ml in D5W 500 mL IV piggyback 2,000 mg (mg) Total dose:  2,000 mg      Date/Time Rate/Dose/Volume Action       02/17/24  1134 2,000 mg (over 120 min) Given               hydrocortisone sod succ (PF) (Solu-CORTEF) injection 100 mg (mg) Total dose:  100 mg      Date/Time Rate/Dose/Volume Action       02/17/24  0948 100 mg Given               apixaban (Eliquis) tablet 2.5 mg (mg) Total dose:  22.5 mg      Date/Time Rate/Dose/Volume Action       02/17/24  1129 2.5 mg Given      2147 2.5 mg Given     02/18/24  1024 2.5 mg Given      2142 2.5 mg Given     02/19/24  0848 2.5 mg Given      2108 2.5 mg Given     02/20/24  0821 2.5 mg Given      2159 2.5 mg Given     02/21/24  1001 2.5 mg Given               atorvastatin (Lipitor) tablet 40 mg (mg) Total dose:  160 mg      Date/Time Rate/Dose/Volume Action       02/17/24  2147 40 mg Given     02/18/24  2142 40 mg Given     02/19/24 2108 40 mg Given     02/20/24 2159 40 mg Given               clopidogrel (Plavix) tablet 75 mg (mg) Total dose:  375 mg      Date/Time Rate/Dose/Volume Action       02/17/24  1129 75 mg Given     02/18/24  1024 75 mg Given     02/19/24  0848 75 mg Given     02/20/24  0821 75 mg Given     02/21/24  1001 75 mg Given               gabapentin (Neurontin) capsule 100 mg (mg) Total dose:  500 mg      Date/Time Rate/Dose/Volume Action       02/17/24  1129 100 mg Given     02/18/24  0900 100 mg Given     02/19/24  0848 100 mg Given     02/20/24  0821 100 mg Given     02/21/24  1001 100 mg Given               insulin glargine (Lantus) injection 6 Units (Units) Total dose:  24 Units      Date/Time Rate/Dose/Volume Action       02/17/24  2258 6 Units Given     02/18/24 2142 6 Units Given     02/19/24 2111 6 Units Given     02/20/24  2159 6 Units Given               midodrine (Proamatine) tablet 15 mg (mg) Total dose:  150 mg*   *Administration not included in total     Date/Time  Rate/Dose/Volume Action       02/17/24  1128 15 mg Given      1850 *15 mg Missed     02/18/24  0250 *15 mg Missed      1024 15 mg Given      1824 15 mg Given     02/19/24  0250 15 mg Given      0849 15 mg Given      Canceled Entry      1840 15 mg Given     02/20/24  0229 15 mg Given      1019 15 mg Given      1845 *15 mg Missed     02/21/24  0344 15 mg Given      1001 15 mg Given               pantoprazole (ProtoNix) EC tablet 40 mg (mg) Total dose:  160 mg      Date/Time Rate/Dose/Volume Action       02/18/24  0658 40 mg Given     02/19/24  0618 40 mg Given     02/20/24  0607 40 mg Given     02/21/24  0700 40 mg Given               polyethylene glycol (Glycolax, Miralax) packet 17 g (g) Total dose:  34 g*   *Administration not included in total     Date/Time Rate/Dose/Volume Action       02/17/24  1050 *17 g Missed     02/18/24  0900 17 g Given     02/19/24  0900 *17 g Missed     02/20/24  0900 *17 g Missed     02/21/24  1001 17 g Given               cefepime (Maxipime) 1 g in dextrose 5 % 50 mL IV (g) Total dose:  Cannot be calculated*   *Administration dose not documented     Date/Time Rate/Dose/Volume Action       02/17/24  Canceled Entry               insulin lispro (HumaLOG) injection 0-5 Units (Units) Total dose:  3 Units*   *Administration not included in total     Date/Time Rate/Dose/Volume Action       02/17/24  1200 *Not included in total Missed     02/18/24  1024 1 Units Given      1200 *Not included in total Missed      1742 1 Units Given      1824 *1 Units Missed     02/19/24  0800 *Not included in total Missed      1200 *Not included in total Missed      1700 *Not included in total Missed     02/20/24  0821 1 Units Given      1200 *Not included in total Missed      1700 *Not included in total Missed     02/21/24  0800 *Not included in total Missed      1200 *Not included in total Missed               HYDROmorphone PF (Dilaudid) injection 0.2 mg (mg) Total dose:  0.2 mg Dosing weight:  89.2       Date/Time Rate/Dose/Volume Action       02/18/24  0148 0.2 mg Given               epoetin dane-epbx (Retacrit) injection 6,000 Units (Units) Total dose:  6,000 Units Dosing weight:  89      Date/Time Rate/Dose/Volume Action       02/19/24  0848 6,000 Units Given               HYDROmorphone (Dilaudid) injection 0.6 mg (mg) Total dose:  1.2 mg Dosing weight:  89      Date/Time Rate/Dose/Volume Action       02/18/24  2230 0.6 mg Given     02/19/24  0310 0.6 mg Given               sodium hypochlorite (Dakin's Quarter Strength) 0.125 % external solution Total dose:  Cannot be calculated* Dosing weight:  89   *Administration does not have dose documented     Date/Time Rate/Dose/Volume Action       02/20/24  1300  Given     02/21/24  1132  Given               oxyCODONE-acetaminophen (Percocet) 5-325 mg per tablet 1 tablet (tablet) Total dose:  2 tablet Dosing weight:  89      Date/Time Rate/Dose/Volume Action       02/19/24  1839 1 tablet Given     02/20/24  0607 1 tablet Given               traMADol (Ultram) tablet 50 mg (mg) Total dose:  50 mg Dosing weight:  89      Date/Time Rate/Dose/Volume Action       02/20/24  1019 50 mg Given               lidocaine 4 % patch 1 patch (patch) Total dose:  2 patch Dosing weight:  89      Date/Time Rate/Dose/Volume Action       02/20/24  0900 1 patch (over 720 min) Medication Applied      2100  (over 720 min) Medication Removed     02/21/24  1000 1 patch (over 720 min) Medication Applied               iohexol (OMNIPaque) 350 mg iodine/mL solution (mL) Total volume:  15 mL      Date/Time Rate/Dose/Volume Action       02/21/24  1625 15 mL Given                   No specimens collected      See detailed result report with images in PACS.    The patient tolerated the procedure well without incident or complication and is in stable condition.

## 2024-02-21 NOTE — PROGRESS NOTES
02/21/24 1602   Discharge Planning   Patient expects to be discharged to: [facility]          Sent message to Dr. Garcia this morning if we should start auth for the facility and he said yes. Facility started auth. Patient's dialysis fistula was not working. Patient went for fistulogram this afternoon. Patient is not med ready.

## 2024-02-22 NOTE — NURSING NOTE
Called Dialysis tech multiple time to confirm dialysis for this evening, no answer.     UPDATE 2330: Attempted to get a hold of inpatient Dialysis tech multiple times, unable to contact. Messaged Dr. Enriquez (on call) concering Dialysis orders placed for today and having Left Fistulogram completed earlier with no Dialysis completed yet. Awaiting further orders.     UPDATE 0630: Midline dressing change completed.

## 2024-02-22 NOTE — PROGRESS NOTES
..Report from Sending RN:    Report From: ABEL Ibarra  Recent Surgery of Procedure: Yes, Fistula gram  Baseline Level of Consciousness (LOC): x1  Oxygen Use: No  Type: room air  Diabetic: Yes  Last BP Med Given Day of Dialysis: none  Last Pain Med Given: none  Lab Tests to be Obtained with Dialysis: No  Blood Transfusion to be Given During Dialysis: No  Available IV Access: Yes  Medications to be Administered During Dialysis: No  Continuous IV Infusion Running: No  Restraints on Currently or in the Last 24 Hours: No  Hand-Off Communication: yes

## 2024-02-22 NOTE — PRE-PROCEDURE NOTE
Report from Sending RN:    Report From: Letty David  Recent Surgery of Procedure: No  Baseline Level of Consciousness (LOC): Alert and stable  Oxygen Use: No  Type: room air  Diabetic: No  Last BP Med Given Day of Dialysis: See Emar  Last Pain Med Given: See emar  Lab Tests to be Obtained with Dialysis: No  Blood Transfusion to be Given During Dialysis: No  Available IV Access: Yes  Medications to be Administered During Dialysis: No  Continuous IV Infusion Running: No  Restraints on Currently or in the Last 24 Hours: No  Hand-Off Communication: Pt is alert and stable and ready for tx.

## 2024-02-22 NOTE — PROGRESS NOTES
Chevy Benton is a 78 y.o. male on day 5 of admission presenting with Balanitis.    Subjective   Mr. Benton is a 78-year-old man with a history of hypercapnic respiratory failure, heart failure with reduced EF, diabetes, end-stage kidney disease on dialysis. He presented with a high potassium, no apparent EKG changes. He was suffering penile pain, that had been going on for approximately 2 weeks. He is being treated for balanitis. Over night events on Sunday noted of bleeding from his dialysis access. Vascular surgery saw him and he is planned for fistulogram with [facility] [address]. Pt is status post fistulogram yesterday. Unfortunately he had significant bleeding upon cannulation of his access this AM.   Chart/labs/meds/notes/imaging/VS reviewed.       Objective       Physical Exam  Constitutional:       Appearance: Normal appearance.    HENT:      Mouth/Throat:      Mouth: Mucous membranes are moist.   Eyes:      Extraocular Movements: Extraocular movements intact.      Pupils: Pupils are equal, round, and reactive to light.   Cardiovascular:      Rate and Rhythm: Regular rhythm.      Heart sounds: S1 normal and S2 normal.   Pulmonary:      Breath sounds: Normal breath sounds.   Abdominal:      Comments: Soft, NT/ND, no masses, normal bowel sounds. Anasarca  Genitourinary:     Penile edema  Musculoskeletal:   Bilateral upper extremity edema  Skin:     General: Skin is warm and dry.   Neurological:      General: No focal deficit present.      Mental Status: She is alert and oriented to person, place, and time.   Psychiatric:         Behavior: Behavior normal.    Left upper extremity thrill and bruit    Meds    Current Facility-Administered Medications:     albuterol 2.5 mg /3 mL (0.083 %) nebulizer solution 2.5 mg, 2.5 mg, nebulization, q6h PRN, Mumtaz Almaraz, PharmD    apixaban (Eliquis) tablet 2.5 mg, 2.5 mg, oral, BID, Randall Wallace MD, 2.5 mg at 02/21/24 2123    atorvastatin (Lipitor) tablet 40 mg, 40  mg, oral, Nightly, Randall Wallace MD, 40 mg at 02/21/24 2123    clopidogrel (Plavix) tablet 75 mg, 75 mg, oral, Daily, Randall Wallace MD, 75 mg at 02/22/24 0938    dextrose 10 % in water (D10W) infusion, 0.3 g/kg/hr, intravenous, Once PRN, Randall Wallace MD    dextrose 50 % injection 25 g, 25 g, intravenous, q15 min PRN, Randall Wallace MD    epoetin dane-epbx (Retacrit) injection 6,000 Units, 6,000 Units, subcutaneous, Once per day on Mon Wed Fri, Joseph Hunt MD, 6,000 Units at 02/21/24 2134    gabapentin (Neurontin) capsule 100 mg, 100 mg, oral, Daily, Randall Wallace MD, 100 mg at 02/22/24 0939    glucagon (Glucagen) injection 1 mg, 1 mg, intramuscular, q15 min PRN, Randall Wallace MD    insulin glargine (Lantus) injection 6 Units, 6 Units, subcutaneous, Nightly, Randall Wallace MD, 6 Units at 02/21/24 2124    insulin lispro (HumaLOG) injection 0-5 Units, 0-5 Units, subcutaneous, TID with meals, Randall Wallace MD, 1 Units at 02/20/24 0821    iohexol (OMNIPaque) 350 mg iodine/mL solution, , , PRN, Scott Lepe MD, 15 mL at 02/21/24 1625    ipratropium-albuteroL (Duo-Neb) 0.5-2.5 mg/3 mL nebulizer solution 3 mL, 3 mL, nebulization, q2h PRN, Randall Wallace MD    lidocaine (Xylocaine) 20 mg/mL (2 %) injection, , , PRN, Scott Lepe MD, 5 mL at 02/21/24 1548    lidocaine 4 % patch 1 patch, 1 patch, transdermal, Daily, César Garcia DO, 1 patch at 02/22/24 0939    midodrine (Proamatine) tablet 15 mg, 15 mg, oral, q8h, Sabi Jimenez MD, 15 mg at 02/22/24 0324    oxyCODONE-acetaminophen (Percocet) 5-325 mg per tablet 1 tablet, 1 tablet, oral, q6h PRN, Randall Wallace MD, 1 tablet at 02/21/24 1717    pantoprazole (ProtoNix) EC tablet 40 mg, 40 mg, oral, Daily before breakfast, Randall Wallace MD, 40 mg at 02/22/24 0636    polyethylene glycol (Glycolax, Miralax) packet 17 g, 17 g, oral, Daily, Randall Wallace MD, 17 g at 02/22/24 0939    sodium  "hypochlorite (Dakin's Quarter Strength) 0.125 % external solution, , irrigation, Daily, Randall Wallace MD, Given at 02/21/24 1132    traMADol (Ultram) tablet 50 mg, 50 mg, oral, TID PRN, César Garcia, , 50 mg at 02/20/24 1019   Medications Discontinued During This Encounter   Medication Reason    epoetin dane (Epogen,Procrit) 10,000 unit/mL injection Entered in Error    cefepime (Maxipime) 1 g in dextrose 5 % 50 mL IV     cefepime (Maxipime) 1 g in dextrose 5 % 50 mL IV     albuterol 90 mcg/actuation inhaler 2 puff     ipratropium-albuteroL (Duo-Neb) 0.5-2.5 mg/3 mL nebulizer solution 3 mL     vancomycin in dextrose 5 % (Vancocin) IVPB 750 mg     epoetin dane-epbx (Retacrit) injection 6,000 Units     vancomycin (Vancocin) placeholder     epoetin dane (Epogen,Procrit) injection 6,000 Units         Allergies  Allergies   Allergen Reactions    Penicillins Hives        Last Recorded Vitals  Blood pressure 104/77, pulse 97, temperature 36.6 °C (97.8 °F), temperature source Temporal, resp. rate 18, height 1.778 m (5' 10\"), weight 87.9 kg (193 lb 12.6 oz), SpO2 97 %.  Intake/Output last 3 Shifts:  I/O last 3 completed shifts:  In: - (0 mL/kg)   Out: 10 (0.1 mL/kg) [Blood:10]  Weight: 87.9 kg     Last 24 hour Results  Results for orders placed or performed during the hospital encounter of 02/16/24 (from the past 24 hour(s))   POCT GLUCOSE   Result Value Ref Range    POCT Glucose 98 74 - 99 mg/dL   POCT GLUCOSE   Result Value Ref Range    POCT Glucose 92 74 - 99 mg/dL   POCT GLUCOSE   Result Value Ref Range    POCT Glucose 111 (H) 74 - 99 mg/dL   Basic Metabolic Panel   Result Value Ref Range    Glucose 98 74 - 99 mg/dL    Sodium 127 (L) 136 - 145 mmol/L    Potassium 4.5 3.5 - 5.3 mmol/L    Chloride 89 (L) 98 - 107 mmol/L    Bicarbonate 26 21 - 32 mmol/L    Anion Gap 17 10 - 20 mmol/L    Urea Nitrogen 58 (H) 6 - 23 mg/dL    Creatinine 5.23 (H) 0.50 - 1.30 mg/dL    eGFR 11 (L) >60 mL/min/1.73m*2    Calcium 9.6 8.6 " - 10.3 mg/dL   CBC   Result Value Ref Range    WBC 11.3 4.4 - 11.3 x10*3/uL    nRBC 0.4 (H) 0.0 - 0.0 /100 WBCs    RBC 2.51 (L) 4.50 - 5.90 x10*6/uL    Hemoglobin 7.4 (L) 13.5 - 17.5 g/dL    Hematocrit 24.6 (L) 41.0 - 52.0 %    MCV 98 80 - 100 fL    MCH 29.5 26.0 - 34.0 pg    MCHC 30.1 (L) 32.0 - 36.0 g/dL    RDW 20.9 (H) 11.5 - 14.5 %    Platelets 500 (H) 150 - 450 x10*3/uL   POCT GLUCOSE   Result Value Ref Range    POCT Glucose 83 74 - 99 mg/dL        Imaging results  Electrocardiogram, 12-lead PRN ACS symptoms    Result Date: 2/6/2024  Atrial fibrillation with premature ventricular or aberrantly conducted complexes Low voltage QRS Nonspecific T wave abnormality Abnormal ECG When compared with ECG of 23-JAN-2024 04:46, No significant change since Confirmed by Edwin May ([id number]) on 2/6/2024 3:23:42 PM    Electrocardiogram, 12-lead PRN ACS symptoms    Result Date: 2/3/2024  Atrial fibrillation with occasional ventricular-paced complexes Nonspecific T wave abnormality Abnormal ECG When compared with ECG of 22-JAN-2024 08:27, Vent. rate has decreased BY  16 BPM Confirmed by Edwin May ([id number]) on 2/3/2024 11:11:22 PM    IR CVC removal    Result Date: 2/2/2024  Interpreted By:  Noe Zaldivar, STUDY: IR CVC REMOVAL;  2/2/2024 3:20 pm   INDICATION: Signs/Symptoms:removal of tunneled line.   COMPARISON: None.   ACCESSION NUMBER(S): [id number]   ORDERING CLINICIAN: LUKE HWANG   TECHNIQUE: INTERVENTIONALIST(S): Noe Zaldivar CNP   CONSENT: The patient/patient's POA/next of kin was informed of the nature of the proposed procedure. The purposes, alternatives, risks, and benefits were explained and discussed. All questions were answered and consent was obtained.     MEDICATION/CONTRAST: No additional   TIME OUT: A time out was performed immediately prior to procedure start with the interventional team, correctly identifying the patient name, date of birth, MRN, procedure, anatomy (including marking of site and  side), patient position, procedure consent form, relevant laboratory and imaging test results, antibiotic administration, safety precautions, and procedure-specific equipment needs.   COMPLICATIONS: No immediate adverse events identified.   FINDINGS: The existing skin site over the tunneled catheter insertion site was prepped and draped with maximal sterile manner.   The skin and subcutaneous tissues at the original catheter site were anesthetized with 1% lidocaine with epinephrine.  The subcutaneous tissues surrounding the catheter were anesthetized with 1% lidocaine. Using blunt dissection, the catheter cuff was externalized and the catheter leading to the jugular vein was completely removed. Hemostasis was obtained using manual compression at the internal jugular vein access site.   The incision site was covered with a sterile dressing and tegaderm was subsequently applied.   There were no immediate or post-procedural complications.       Uneventful removal of a left chest central venous catheter.   I was present for and/or performed the critical portions of the procedure and immediately available throughout the entire procedure.   Performed and dictated at [facility].   Signed by: Noe Zaldivar 2/2/2024 4:39 PM Dictation workstation:   [id number]    Electrocardiogram, 12-lead PRN ACS symptoms    Result Date: 1/29/2024  Atrial fibrillation with occasional ventricular-paced complexes T wave abnormality, consider lateral ischemia Abnormal ECG When compared with ECG of 22-JAN-2024 08:26, Vent. rate has increased BY   4 BPM Confirmed by Edwin May ([id number]) on 1/29/2024 4:59:47 PM    Vascular US upper extremity venous duplex left    Result Date: 1/27/2024            [place] [id number]   Tel [phone] and Fax [phone]  Vascular Lab Report VASC US UPPER EXTREMITY VENOUS DUPLEX LEFT  Patient Name:      SHAMEKA SUN  Reading Physician: [id number] Jovanna Francis MD Study Date:        1/26/2024           Ordering           [id number] CHUCKY PUJA BLACKBURN                                        Physician: MRN/PID:           [id number]            Technologist:      Elisabeth Betts T Accession#:        [id number]        Technologist 2: Date of Birth/Age: 1945 / 78      Encounter#:        [id number]                    years Gender:            M Admission Status:  Inpatient           Location           [facility]                                        Performed:  Diagnosis/ICD: Left arm swelling-M79.89 Indication:    Limb swelling CPT Codes:     59726 Peripheral venous duplex scan for DVT Limited  CONCLUSIONS: Right Upper Venous: Unable to visualize subclavian vein due to lines and bandages. Left Upper Venous: Technically limited exam; negative for acute deep vein thrombus in visualized veins. Unable to rule out deep vein thrombus in non-visualized mid and distal subclavian vein. The left basilic vein was not visualized. Left brachiocephalic arteriovenous fistula is noted. Visualized in segments due to bandages from recent dialysis access.  Additional Findings: Technically difficult due to patient positioning and limited mobility.  Imaging & Doppler Findings:  Left                Compress Thrombus        Flow Internal Jugular      Yes      None   Spontaneous/Phasic Subclavian Proximal   Yes      None   Spontaneous/Phasic Axillary              Yes      None   Spontaneous/Phasic Brachial              Yes      None Cephalic              Yes      None  [id number] Jovanna Francis MD Electronically signed by [id number] Jovanna Francis MD on 1/27/2024 at 2:56:51 PM  ** Final **     CT head wo IV contrast    Result Date: 1/26/2024  Interpreted By:  Marilyn Torres and Dervishi Mario STUDY: CT HEAD WO IV CONTRAST;  1/25/2024 10:08 pm   INDICATION: Signs/Symptoms:AMS CICU patient.   COMPARISON: None.    ACCESSION NUMBER(S): [id number]   ORDERING CLINICIAN: ALVIN AMBRIZ   TECHNIQUE: Noncontrast axial CT scan of head was performed. Angled reformats in brain and bone windows were generated. The images were reviewed in bone, brain, blood and soft tissue windows.   FINDINGS: CSF Spaces: The ventricles, sulci and basal cisterns are concordant with the degree of parenchymal atrophy. There is no extraaxial fluid collection.   Parenchyma: There are nonspecific patchy, confluent periventricular and subcortical white matter hypodensities which are likely sequela of chronic microvascular ischemic changes. Otherwise, the grey-white differentiation is intact. There is no mass effect or midline shift. There is no intracranial hemorrhage.   Calvarium: The calvarium is unremarkable.   Paranasal sinuses and mastoids: Visualized paranasal sinuses and mastoids are clear.       1.  No evidence of acute intracranial hemorrhage or mass effect. 2. Nonspecific periventricular and subcortical white matter hypodensities likely sequela of chronic microvascular ischemic changes     I personally reviewed the images/study and I agree with the findings as stated by Resident Irwin Ingram MD. This study was interpreted at [place], [place].   MACRO: None   Signed by: Marilyn Torres 1/26/2024 7:23 AM Dictation workstation:   [id number]    XR abdomen 1 view    Result Date: 1/25/2024  Interpreted By:  Jori Garcia and Ritchie Brandon STUDY: XR ABDOMEN 1 VIEW;  1/25/2024 3:05 pm   INDICATION: Signs/Symptoms:CICU confirm NG placement.   COMPARISON: Chest x-ray 01/23/2024   ACCESSION NUMBER(S): [id number]   ORDERING CLINICIAN: ALVIN AMBRIZ   FINDINGS: Enteric tube courses midline below the level of the diaphragm with the tip located in the gastric body. The side-hole is below the GE junction.   Multiple gaseous distended loops of small and large bowel. For example there is a lucent area of  gaseous ascending colon in the right upper quadrant measuring 5.2 cm. There is limited evaluation of pneumoperitoneum on supine imaging, however there does not appear to be any evidence of free air surrounding this gaseous distended loop of ascending/Rigler's sign. Right diaphragm is distinctly visualized separate from this loop of bowel without evidence of subdiaphragmatic free air.   Additional mildly prominent loops of small bowel scattered throughout the bilateral hemiabdomen, for example largest loop of small bowel can be seen in the left upper quadrant measuring 3.4 cm.   Visualized lungs are clear.   Osseous structures demonstrate no acute bony changes.       1. Enteric tube as described above with the distal tip projecting over the gastric body. 2. Multiple mildly distended loops of small and large bowel as described above. Recommend attention on follow-up examinations as could reflect early ileus. 3. No definitive evidence of free air on current supine radiograph as described above.   MACRO: None   Signed by: Jori Garcia 1/25/2024 3:48 PM Dictation workstation:   [id number]    XR chest 1 view    Result Date: 1/23/2024  Interpreted By:  Jori Garcia and Ritchie Brandon STUDY: XR CHEST 1 VIEW;  1/23/2024 9:06 am   INDICATION: Signs/Symptoms:pneumothorax r/o. dyspnea..   COMPARISON: Chest x-ray 01/22/2024   ACCESSION NUMBER(S): [id number]   ORDERING CLINICIAN: DONNA SOLIMAN   FINDINGS: AP radiograph of the chest was provided.   Similar position of the left internal jugular approach central venous catheter with the distal tip projecting over the expected location of the distal SVC. There is a right-sided pacemaker/AICD with leads in similar position over the expected location of the right atrium/right ventricle.   CARDIOMEDIASTINAL SILHOUETTE: Cardiomediastinal silhouette is stable in size and configuration.   LUNGS: Similar low lung volumes/expiratory radiograph with bronchovascular crowding. Similar  linear areas of bibasilar atelectasis. Improved bilateral pleural effusions. The previously described trace focal lucency projecting over the right lower lung field is no longer evident on current examination. No pneumothorax.   ABDOMEN: No remarkable upper abdominal findings.   BONES: No acute osseous changes.       1. Similar low lung volumes with improved bilateral pleural effusions. Unchanged bibasilar atelectasis. 2. No evidence of pneumothorax. 3. Medical devices as described above.   I personally reviewed the images/study and I agree with the findings as stated by resident Marvin Cueva. This study was interpreted at [place], [place].   MACRO: None   Signed by: Jori Garcia 1/23/2024 5:11 PM Dictation workstation:   [id number]    Transthoracic Echo (TTE) Complete    Result Date: 1/23/2024   [facility], [address] [place] [address]                Tel [phone] and Fax [phone] TRANSTHORACIC ECHOCARDIOGRAM REPORT  Patient Name:      SHAMEKA Mendez Physician:    [id number] Mis Hoffman MD Study Date:        1/23/2024            Ordering Provider:    [id number] CHUCKY BLACKBURN MRN/PID:           [id number]             Fellow: Accession#:        [id number]         Nurse: Date of Birth/Age: 1945 / 78 years Sonographer:          Roxana Pace RDCS Gender:            M                    Additional Staff: Height:            177.80 cm            Admit Date:           1/9/2024 Weight:            104.78 kg            Admission Status:     Inpatient -                                                               Routine BSA:               2.22 m2              Encounter#:           [id number]                                         Department  Location:  [facility] Non                                                               Invasive Blood Pressure: 78 /65 mmHg Study Type:    TRANSTHORACIC ECHO (TTE) COMPLETE Diagnosis/ICD: Shock, unspecified-R57.9 Indication:    shock CPT Code:      Echo Complete w Full Doppler-[id number] Patient History: Pertinent History: ESRD, HFrEF (35-40), AFib, PVD, AMS & Hypotension during HD,                    AO Root Dilitation, CAD, SSS, PHTN. Study Detail: The following Echo studies were performed: 2D, M-Mode, Doppler and               color flow. Technically challenging study due to poor acoustic               windows, prominent lung artifact, patient lying in supine               position, body habitus and coughing up phlegm. Definity used as a               contrast agent for endocardial border definition. Total contrast               used for this procedure was 1 mL via IV push. Unable to obtain               subcostal and suprasternal notch view.  PHYSICIAN INTERPRETATION: Left Ventricle: The left ventricular systolic function is mildly decreased, with an estimated ejection fraction of 40-45%. The patient is in atrial fibrillation which may influence the estimate of left ventricular function and transvalvular flows. There is global hypokinesis of the left ventricle with minor regional variations. The left ventricular cavity size is normal. There is left ventricular concentric remodeling. Left ventricular diastolic filling was indeterminate. Mildly increased LV wall thickness with proximal septal bulge up to 1.8 cm. Left Atrium: The left atrium is severely dilated. Right Ventricle: The right ventricle was not well visualized. Unable to determine right ventricular systolic function. A device is visualized in the right ventricle. Right Atrium: The right atrium is mildly dilated. There is a device visualized in the right atrium. Aortic Valve: The aortic valve is probably trileaflet. There is mild aortic valve cusp  calcification. There is trivial aortic valve regurgitation. The peak instantaneous gradient of the aortic valve is 15.7 mmHg. The mean gradient of the aortic valve is 7.0 mmHg. Mitral Valve: The mitral valve is normal in structure. There is mild mitral annular calcification. There is mild mitral valve regurgitation. Tricuspid Valve: The tricuspid valve was not well visualized. There is trace to mild tricuspid regurgitation. The Doppler estimated RVSP is within normal limits at 35.5 mmHg. Pulmonic Valve: The pulmonic valve is structurally normal. There is physiologic pulmonic valve regurgitation. Pericardium: There is a trivial pericardial effusion. Aorta: The aortic root is abnormal. There is mild dilatation of the aortic root. In comparison to the previous echocardiogram(s): Compared wit the prior exam from 10/2/2023 the LV appears slightly more dynamic today with LVEF 40-45% ( instead prior prior exam with LVEF 35-40%). There is still only mild MR. Note that the prior RVSP was moderately elevated at 64.8mmHg and is now normal or borderline at 35.5mmHg.  CONCLUSIONS:  1. Left ventricular systolic function is mildly decreased with a 40-45% estimated ejection fraction.  2. Poorly visualized anatomical structures due to suboptimal image quality.  3. Mildly increased LV wall thickness with proximal septal bulge up to 1.8 cm.  4. The left atrium is severely dilated.  5. RVSP within normal limits.  6. Compared wit the prior exam from 10/2/2023 the LV appears slightly more dynamic today with LVEF 40-45% ( instead prior prior exam with LVEF 35-40%). There is still only mild MR. Note that the prior RVSP was moderately elevated at 64.8mmHg and is now normal or borderline at 35.5mmHg.  7. The patient is in atrial fibrillation which may influence the estimate of left ventricular function and transvalvular flows.  8. There is global hypokinesis of the left ventricle with minor regional variations. QUANTITATIVE DATA SUMMARY: 2D  MEASUREMENTS:                          Normal Ranges: Ao Root d:     3.70 cm   (2.0-3.7cm) LAs:           4.30 cm   (2.7-4.0cm) IVSd:          1.40 cm   (0.6-1.1cm) LVPWd:         1.30 cm   (0.6-1.1cm) LVIDd:         3.70 cm   (3.9-5.9cm) LVIDs:         3.10 cm LV Mass Index: 79.6 g/m2 LV % FS        16.2 % LA VOLUME:                               Normal Ranges: LA Vol A4C:        110.7 ml   (22+/-6mL/m2) LA Vol A2C:        149.4 ml LA Vol BP:         129.0 ml LA Vol Index A4C:  49.9ml/m2 LA Vol Index A2C:  67.3 ml/m2 LA Vol Index BP:   58.1 ml/m2 LA Area A4C:       30.3 cm2 LA Area A2C:       35.3 cm2 LA Major Axis A4C: 7.0 cm LA Major Axis A2C: 7.1 cm LA Volume Index:   58.1 ml/m2 RA VOLUME BY A/L METHOD:                       Normal Ranges: RA Area A4C: 19.9 cm2 AORTA MEASUREMENTS:                      Normal Ranges: Ao Sinus, d: 3.70 cm (2.1-3.5cm) LV SYSTOLIC FUNCTION BY 2D PLANIMETRY (MOD):                     Normal Ranges: EF-A4C View: 35.0 % (>=55%) EF-A2C View: 41.2 % EF-Biplane:  37.7 % LV DIASTOLIC FUNCTION:                           Normal Ranges: MV Peak E:    0.86 m/s    (0.7-1.2 m/s) MV e'         0.09 m/s    (>8.0) MV lateral e' 0.10 m/s MV medial e'  0.08 m/s MV A Dur:     193.00 msec E/e' Ratio:   9.57        (<8.0) a'            0.02 m/s MITRAL VALVE:                 Normal Ranges: MV DT: 193 msec (150-240msec) AORTIC VALVE:                                    Normal Ranges: AoV Vmax:                1.98 m/s  (<=1.7m/s) AoV Peak PG:             15.7 mmHg (<20mmHg) AoV Mean P.0 mmHg  (1.7-11.5mmHg) LVOT Max Dann:            0.91 m/s  (<=1.1m/s) AoV VTI:                 36.20 cm  (18-25cm) LVOT VTI:                17.20 cm LVOT Diameter:           1.90 cm   (1.8-2.4cm) AoV Area, VTI:           1.35 cm2  (2.5-5.5cm2) AoV Area,Vmax:           1.31 cm2  (2.5-4.5cm2) AoV Dimensionless Index: 0.48  RIGHT VENTRICLE: RV Basal 5.30 cm RV Major 8.9 cm RV s'    0.04 m/s TRICUSPID VALVE/RVSP:                              Normal Ranges: Peak TR Velocity: 2.85 m/s RV Syst Pressure: 35.5 mmHg (< 30mmHg) PULMONIC VALVE:                         Normal Ranges: PV Accel Time: 103 msec (>120ms) PV Max Dann:    1.0 m/s  (0.6-0.9m/s) PV Max PG:     3.6 mmHg  [id number] Mis Hoffman MD Electronically signed on 1/23/2024 at 3:42:15 PM  ** Final **     XR chest 1 view    Result Date: 1/23/2024  Interpreted By:  Jori Garcia and Dervishi Mario STUDY: XR CHEST 1 VIEW;  1/22/2024 8:21 pm   INDICATION: Signs/Symptoms:aborted central line, r/o pneumothorax.   COMPARISON: Chest radiograph: 01/21/2020   ACCESSION NUMBER(S): [id number]   ORDERING CLINICIAN: SALVADOR OLIVARES   FINDINGS: AP radiograph of the chest was provided.   A right-sided ICD device is again noted. Right IJ central venous catheter with the tip projecting over the distal SVC.   CARDIOMEDIASTINAL SILHOUETTE: Cardiomediastinal silhouette is stable in size and configuration enlarged.   LUNGS: There is persistent low lung volumes with associated bronchovascular crowding. There is re-demonstration of blunting of bilateral right worse than left costophrenic angles suggesting pleural effusions with associated atelectasis. A trace focal lucency is seen projecting over the right lower lung field. No pneumothorax of the left lung.   ABDOMEN: No remarkable upper abdominal findings.   BONES: No acute osseous changes.       1.  Large right and small left pleural effusions with associated atelectasis. 2. Low lung volumes with associated bronchovascular crowding. 3. Trace focal lucency projecting over the right lower lung field which may be artifactual however a trace pneumothorax is not excluded. Repeat radiograph can be obtained as clinically indicated. 4. Medical devices as above.   I personally reviewed the images/study and I agree with the findings as stated. This study was interpreted at [facility], [place], [place].   MACRO: NONE.   Signed  by: Jori Garcia 1/23/2024 9:01 AM Dictation workstation:   [id number]    XR chest 2 views    Result Date: 1/21/2024  Interpreted By:  Kirk Vanessa, STUDY: XR CHEST 2 VIEWS;  1/21/2024 9:19 am   INDICATION: Signs/Symptoms:s/p dialysis.   COMPARISON: Chest radiograph 01/16/2024 and chest CT 10/08/2023   ACCESSION NUMBER(S): [id number]   ORDERING CLINICIAN: GIBRAN ARMIJO   FINDINGS: PA and lateral radiographs of the chest, including dual energy subtraction images are available for interpretation. The evaluation is somewhat limited by patient rotation and suboptimal positioning. Stable positioning of a right subclavian approach dual chamber cardiac pacemaker. Left subclavian approach hemodialysis catheter tip overlies mid to lower SVC, unchanged.   CARDIOMEDIASTINAL SILHOUETTE: The cardiomediastinal silhouette is grossly stable in size and configuration.   LUNGS: Interval slight worsening of mild diffuse pulmonary edema. Small bibasilar pleural effusions, overall improved from prior. Interval worsening of right basilar opacification. No pneumothorax is seen.   ABDOMEN: No remarkable upper abdominal findings.   BONES: No acute osseous abnormality.       1. Limited examination with interval slight worsening of mild diffuse pulmonary edema. Correlate with patient's volume status. 2. Interval worsening of right basilar opacification, which may represent right middle/lower lobe atelectasis. Correlate with concern for central mucous plugging. 3. Small bibasilar pleural effusions have improved as compared to prior study.   Signed by: Kirk Vanessa 1/21/2024 10:58 AM Dictation workstation:   [id number]    XR foot right 1-2 views    Result Date: 1/19/2024  Interpreted By:  Liz Ruiz and Ohs Zachary STUDY: Right foot, 3 views.   INDICATION: Signs/Symptoms:post op tma   COMPARISON: Right foot radiograph 01/09/2024..   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 Sharp margination of the metatarsal stumps. Unremarkable appearance of the soft tissues of the stump with overlying wound VAC noted. Prominent vascular calcifications of the lower leg and ankle.   Remote appearing healed Douglas B distal fibular fracture with unchanged cortical irregularity.       1. 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 2. Additional findings as above.   I personally reviewed the images/study and I agree with the findings as stated by Dr. Aly Brand. This study was interpreted at [facility], [place], [place].   MACRO: None.   Signed by: Liz Ruiz 1/19/2024 5:25 PM Dictation workstation:   [id number]       Assessment and Plan  Mr. Benton is a 78-year-old man with a history of hypercapnic respiratory failure, heart failure with reduced EF, diabetes, end-stage kidney disease on dialysis.  He presented with a high potassium, no apparent EKG changes.  He was suffering penile pain, that had been going on for approximately 2 weeks.  He is being treated for balanitis. Overnight events on Sunday noted of bleeding from his dialysis access. Vascular surgery is following and he is planned for fistulogram with IR.     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 Unfortunately he had significant bleeding upon attempted cannulation this morning. HD tech is holding pressure. I will have to ask IR to place a permcath. 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  Hopefully this can be done today. Otherwise, Erythropoietin is ordered and we are monitoring his phosphorus level. Nephrology will follow closely with his care.     I spent a total of 35 minutes with this patient  today.    Bob Castellano, DO

## 2024-02-22 NOTE — PROGRESS NOTES
02/22/24 1404   Discharge Planning   Patient expects to be discharged to: [facility]         Spoke to Dr. Garcia about discharge plan. Doctor stated patient's fistuloram was not successful yesterday. Dialysis was attempted today, not able to do it and fistula site was bleeding. Patient is going for tunneled dialysis line and will be dialyzed after line is in.  Possible discharge later today but most likely tomorrow. Facility made aware of tentative discharge tomorrow.

## 2024-02-22 NOTE — CARE PLAN
Problem: Skin  Goal: Decreased wound size/increased tissue granulation at next dressing change  Outcome: Progressing  Flowsheets (Taken 2/22/2024 0455)  Decreased wound size/increased tissue granulation at next dressing change:   Promote sleep for wound healing   Protective dressings over bony prominences  Goal: Participates in plan/prevention/treatment measures  Outcome: Progressing  Flowsheets (Taken 2/22/2024 0455)  Participates in plan/prevention/treatment measures: Elevate heels  Goal: Prevent/manage excess moisture  Outcome: Progressing  Flowsheets (Taken 2/22/2024 0455)  Prevent/manage excess moisture:   Cleanse incontinence/protect with barrier cream   Moisturize dry skin  Goal: Prevent/minimize sheer/friction injuries  Outcome: Progressing  Flowsheets (Taken 2/22/2024 0455)  Prevent/minimize sheer/friction injuries:   HOB 30 degrees or less   Turn/reposition every 2 hours/use positioning/transfer devices  Goal: Promote/optimize nutrition  Outcome: Progressing  Flowsheets (Taken 2/22/2024 0455)  Promote/optimize nutrition:   Consume > 50% meals/supplements   Assist with feeding  Goal: Promote skin healing  Outcome: Progressing  Flowsheets (Taken 2/22/2024 0455)  Promote skin healing:   Assess skin/pad under line(s)/device(s)   Turn/reposition every 2 hours/use positioning/transfer devices   The patient's goals for the shift include      The clinical goals for the shift include Patient will remain hemodynamically stable throughout shift. Patient BP to remain above 90 systolic while on PO Midodrine.

## 2024-02-22 NOTE — PROGRESS NOTES
..Report to Receiving RN:    Report To: ABEL Ibarra  Time Report Called: [id number]  Hand-Off Communication: yes  Complications During Treatment: Yes, did not receive treatment do to access issues  Ultrafiltration Treatment: No  Medications Administered During Dialysis: No  Blood Products Administered During Dialysis: No  Labs Sent During Dialysis: No  Heparin Drip Rate Changes: No    Electronic Signatures:   (Signed )   Authored:    (Signed )   Authored:     Last Updated: 10:17 AM by MIR BADILLO

## 2024-02-23 NOTE — CARE PLAN
The patient's goals for the shift include  pt will have permacath placed and dialyzed today    The clinical goals for the shift include Patient remain hemodynamically stable throughout the shift.

## 2024-02-23 NOTE — PROGRESS NOTES
Chevy Benton is a 78 y.o. male on day 5 of admission presenting with Balanitis.      Subjective   Patient was seen and examined at bedside this morning, reportedly attempt at hemodialysis post fistulogram was unsuccessful due to spontaneous bleeding at the fistula.       Objective     Last Recorded Vitals  BP (!) 162/131 (BP Location: Right arm, Patient Position: Lying)   Pulse 98   Temp 36.5 °C (97.7 °F) (Temporal)   Resp 18   Wt 87.9 kg (193 lb 12.6 oz)   SpO2 100%   Intake/Output last 3 Shifts:    Intake/Output Summary (Last 24 hours) at 2/22/2024 1935  Last data filed at 2/22/2024 0430  Gross per 24 hour   Intake --   Output 0 ml   Net 0 ml       Admission Weight  Weight: 87.5 kg (193 lb) (02/17/24 1128)    Daily Weight  02/22/24 : 87.9 kg (193 lb 12.6 oz)    Image Results  ECG 12 lead  Atrial fibrillation with premature ventricular or aberrantly conducted complexes  Low voltage QRS  Nonspecific ST and T wave abnormality  Abnormal ECG  When compared with ECG of 25-JAN-2024 02:56,  No significant change was found  See ED provider note for full interpretation and clinical correlation  Confirmed by Kateryna An ([id number]) on 2/20/2024 4:11:42 PM      Physical Exam  Constitutional: Obese gentleman, alert active, cooperative not in acute distress  Eyes: PERRLA, clear sclera  ENMT: Moist mucosal membranes, no exudate  Head / Neck: Atraumatic, normocephalic, supple neck, JVP not visualized  Lungs: Patent airways, CTABL  Heart: RRR, S1S2, no murmurs appreciated, palpable pulses in all extremities  GI: Soft, NT, ND, bowel sounds present in all quadrants  MSK: Moves all extremities freely, no restriction  of ROM, no joint edema  Extremities: PICC line on right upper extremity arm, mild bilateral lower extremities peripheral edema  : No Alfonso catheter inserted  Breast: Deferred  Neurological: AAO x 3 to person, place and date, facial muscles symmetrical, sensation intact, strength 4/4, no acute focal neurological  deficits appreciated  Psychological: Appropriate mood and behavior    Relevant Results             Scheduled medications  apixaban, 2.5 mg, oral, BID  atorvastatin, 40 mg, oral, Nightly  clopidogrel, 75 mg, oral, Daily  epoetin dane or biosimilar, 6,000 Units, subcutaneous, Once per day on Mon Wed Fri  gabapentin, 100 mg, oral, Daily  insulin glargine, 6 Units, subcutaneous, Nightly  insulin lispro, 0-5 Units, subcutaneous, TID with meals  lidocaine, 1 patch, transdermal, Daily  midodrine, 15 mg, oral, q8h  pantoprazole, 40 mg, oral, Daily before breakfast  polyethylene glycol, 17 g, oral, Daily  sodium hypochlorite, , irrigation, Daily      Continuous medications     PRN medications  PRN medications: albuterol, dextrose 10 % in water (D10W), dextrose, glucagon, iohexol, ipratropium-albuteroL, lidocaine, oxyCODONE-acetaminophen, traMADol    Assessment/Plan   This patient currently has cardiac telemetry ordered; if you would like to modify or discontinue the telemetry order, click here to go to the orders activity to modify/discontinue the order.    78 y.o. male with past medical history as noted below who is presenting to the emergency department for high potassium. This lab was drawn earlier today. Potassium is 9.0 and the patient has no noted EKG changes, no noted symptoms. Patient does additionally report that he is having pain in his penis that has been ongoing over the course the past 2 weeks         Principal Problem:    Balanitis  Active Problems:    Sepsis (CMS/[facility])    Chronic renal failure    Ulcer of lower extremity (CMS/[facility])    Hypotension     Sepsis: Presenting with hypotension  Suspect balanitis  -unknown source  -vanc and cefepime  -continue home midodrine  -blood cx negative so far, will await 24 more hrs  -monitor  -ID consulted for antibiotic regimen review, recommendation appreciated     Left AV fistula bleed  -hemostasis achieved  -vascular surgery: Status post fistulogram, with finding concerning  with aneurysm, cautioned with HD catheter insertion for dialysis     PVD S/p PTA to RLE c/b perforation of AT  hx of rt subclavian dvt  mild stenosis of left subclavian vein  Hx of right TMA  -1/19 Podiatry  revision of right TMA    - Cont plavix   - stop ASA  - c/w  eliquis 2.5mg BID d/w podiatry and endovascular, monitor for bleeding  - Cont home atorva     Chronic systolic and diastolic heart failure, EF 35 - 40%   - TTE 1/23/24 with LVEF to 40-45%, mildly improved from previous in 2023, no other new acute findings    -Uptitrate GDMT as tolerated--> consider hydralazine and isordil if needed, currently limited 2/2 hypotension on midodrine  - consideration should be given to further ischemic workup in the outpatient setting due to extensive peripheral disease, risk factors, and no evidence of previous ischemic cardiac workup         ESRD  -on HD  -nephrology consult: Appreciate management recommendation, planning hemodialysis once permacath placed     Anemia of chronic disease  -At baseline hgb  - continue daily CBC     DM with neuropathy  - A1c 5.3% 12/2023  -Cont home gabapentin  -Insulin glargine 6 units nightly    -SSI      Diet  -Diabetic  -N.p.o. after midnight     DVT prophylaxis     -On Eliquis 2.5 mg twice daily, Plavix 75 mg daily     Disposition: Presenting with sepsis, balanitis suspected, but also has multiple postsurgical healing wound, discharge pending permacath placement and hemodialysis session        César Garcia DO

## 2024-02-23 NOTE — PROCEDURES
Patient seen on dialysis.  He is status post left IJ PermCath placement after technically having a failed left arm fistulogram.  He is dialyzing on F1 60 kidney x 3.5 hours with a UF target of 3 L as tolerated, BFR//700 mL/minute, 3K bath, 2.5 calcium.  I will place him on a phosphorus binder.  Erythropoietin moderate ordered.  He is significantly overloaded on exam, anticipate adding him on for treatment tomorrow to address his volume status.

## 2024-02-23 NOTE — NURSING NOTE
Report from Sending RN:    Report From: Cath lab  Recent Surgery of Procedure: Yes  left chest catheter placement  Baseline Level of Consciousness (LOC): A&O X's3  Oxygen Use: No  Type: room air  Diabetic: No  Last BP Med Given Day of Dialysis: see EMAR  Last Pain Med Given: see EMAR  Lab Tests to be Obtained with Dialysis: No  Blood Transfusion to be Given During Dialysis: No  Available IV Access: Yes  Medications to be Administered During Dialysis: No  Continuous IV Infusion Running: No  Restraints on Currently or in the Last 24 Hours: No  Hand-Off Communication: Fistula in left arm that had work done and has gross amount of edema as right had has edema also.  Left chest catheter placed under sedation and flushes well.  OK to use verified in chart.

## 2024-02-23 NOTE — NURSING NOTE
Report to Receiving RN:    Report To: Priti  Time Report Called: [id number]  Hand-Off Communication: Patient received new tunnel catheter in his left chest and it worked fine for dialysis.  Lat /86 95 and removed 2.5 liters of fluid.  Complications During Treatment: No  Ultrafiltration Treatment: No  Medications Administered During Dialysis: No  Blood Products Administered During Dialysis: No  Labs Sent During Dialysis: No  Heparin Drip Rate Changes: No    Electronic Signatures:  Ceferino Patterson RN       Last Updated: 2:14 PM by CEFERINO PATTERSON

## 2024-02-23 NOTE — PROGRESS NOTES
Occupational Therapy                 Therapy Communication Note    Patient Name: Chevy Benton  MRN: [id number]  Today's Date: 2/23/2024     Discipline: Occupational Therapy    Missed Visit Reason:  (Attempted to see patient for follow up treatment, however patient off floor at IR. Patient also needs dialysis. RN is aware of reason for medical hold.)    Missed Time: Attempt    Comment:

## 2024-02-23 NOTE — POST-PROCEDURE NOTE
Interventional Radiology Brief Postprocedure Note    Attending: Kathleen    Assistant: None    Diagnosis: ESRD, bleeding from L arm AVF    Description of procedure: LIJ tunneled HD catheter, ready for use.  RIJ with CT findings of .     Anesthesia:  Local    Complications: None    Estimated Blood Loss: minimal        See detailed result report with images in PACS.    The patient tolerated the procedure well without incident or complication and is in stable condition.

## 2024-02-23 NOTE — PROGRESS NOTES
Patient inpatient day 7, awaiting medical clearance for DC today to SNF. Need to confirm successful dialysis & functioning fistula. Updates sent to SNF.     02/23/24 8530   Discharge Planning   Type of Post Acute Facility Services Skilled nursing   [facility]: [facility]   Does the patient need discharge transport arranged? Yes   RoundTrip coordination needed? Yes   Has discharge transport been arranged? No   What day is the transport expected? 02/23/24

## 2024-02-23 NOTE — PROGRESS NOTES
Physical Therapy                 Therapy Communication Note    Patient Name: Chevy Benton  MRN: [id number]  Today's Date: 2/23/2024     Discipline: Physical Therapy    Missed Visit Reason: Missed Visit Reason:  (per RN pt off the floor at IR for procedure and also needs dialysis as well.)    Missed Time: Attempt    Comment:

## 2024-02-23 NOTE — PRE-PROCEDURE NOTE
Interventional Radiology Preprocedure Note    Indication for procedure: right sided THD cath placement with Dr. Wang    Relevant review of systems: Constitutional:  Negative for fever or chills. Respiratory:  Negative for shortness of breath. Cardiovascular:  Negative for chest pain or palpitations. Gastrointestinal:  Negative for abdominal distention, abdominal pain, blood in stool, constipation, diarrhea, nausea and vomiting. Neurological: Negative for confusion.       Relevant Labs:   Lab Results   Component Value Date    CREATININE 6.11 (H) 02/23/2024    EGFR 9 (L) 02/23/2024    INR 1.1 01/22/2024    PROTIME 12.2 01/22/2024       Planned Sedation/Anesthesia: Moderate    Airway assessment: normal    Directed physical examination:    Physical Exam  Constitutional:       General: He is not in acute distress.  Cardiovascular:      Rate and Rhythm: Normal rate and regular rhythm. Anasarca  Pulmonary:      Effort: Pulmonary effort is normal. No respiratory distress.      Breath sounds: WNL  Abdominal:      General: Abdomen is flat. Bowel sounds are normal. There is no distension.      Palpations: Abdomen is soft, nontender, BS+  Neurological:      Mental Status: He is alert       Mallampati: III (soft and hard palate and base of uvula visible)    ASA Score: ASA 3 - Patient with moderate systemic disease with functional limitations    Benefits, risks and alternatives of procedure and planned sedation have been discussed with the patient and/or their representative. All questions answered and they agree to proceed.

## 2024-02-24 NOTE — PROGRESS NOTES
"Chevy Benton is a 78 y.o. male on day 6 of admission presenting with Balanitis.      Subjective   Patient was seen and examined bedside post permacath placement, in company of family members wife and son, who was concerned about resolution of patient balanitis, as he has been asking about application of ointment over the penis gland.  Family also expressed concern about anasarca, as patient has not had hemodialysis in days, and would like to keep the until anasarca to resolve or improve, because hemodialysis at the facility does not take a lot of \"water\".       Objective     Last Recorded Vitals  /79 (BP Location: Right arm, Patient Position: Lying)   Pulse 91   Temp 36.3 °C (97.3 °F) (Oral)   Resp 16   Wt 87.9 kg (193 lb 12.6 oz)   SpO2 94%   Intake/Output last 3 Shifts:    Intake/Output Summary (Last 24 hours) at 2/23/2024 1908  Last data filed at 2/23/2024 1405  Gross per 24 hour   Intake 1200 ml   Output 2605 ml   Net -1405 ml       Admission Weight  Weight: 87.5 kg (193 lb) (02/17/24 1128)    Daily Weight  02/22/24 : 87.9 kg (193 lb 12.6 oz)    Image Results  IR CVC tunneled  Narrative: Interpreted By:  Jose Wang,   STUDY:  IR CVC TUNNELED; ;  2/23/2024 10:04 am      ULTRASOUND AND FLUOROSCOPICALLY GUIDED LEFT INTERNAL JUGULAR VEIN  TUNNELED HEMODIALYSIS CATHETER      INDICATION:  Signs/Symptoms:permcath placement - ESRD. 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 Request for  replacement of tunneled hemodialysis catheter.      COMPARISON:  Angiogram 02/21/2024, chest radiograph 01/23/2024      ACCESSION NUMBER(S):  [id number]      ORDERING CLINICIAN:  YUE PEREZ      TECHNIQUE:      ATTENDING : Jose Wang M.D.      TECHNICAL DESCRIPTION/FINDINGS: The procedure, including all risks,  benefits and " alternatives were explained to the patient and his proxy  in detail. All questions were answered and written informed consent  was obtained.      The patient was positioned supine on the fluoroscopy table. A  time-out was performed.      The left neck and anterosuperior chest were prepped and draped in  usual sterile fashion. Focused ultrasound was performed of the left  internal jugular vein demonstrating patency and compressibility.  Ultrasound images were saved. 1% lidocaine was administered  subcutaneously for local anesthesia. Under real-time ultrasound  guidance, the left internal jugular vein was accessed in antegrade  direction using micropuncture technique. Ultrasound images were  saved. Under fluoroscopic visualization, an 018 guidewire was  advanced into the right atrium and a micropuncture transitional  introducer was placed.      Additional 1% lidocaine was then administered over the left  anterosuperior chest wall to anesthetize a subcutaneous tunnel tract.  The dual lumen cuffed hemodialysis catheter was then tunneled from a  left anterosuperior chest wall incision site and externalized  overlying the venotomy. After serial dilation over an 035 guidewire  which had been placed into the IVC, 15 Maori peel-away sheath was  placed. Through the sheath, a 14.5 Maori by 27 cm tip to cuff  dual-lumen hemodialysis catheter was placed. Completion fluoroscopic  image demonstrates the catheter tip at the superior cavoatrial  junction.      Catheter lumens easily aspirated and flushed were locked with a  concentrated heparinized solution (1000 units/cc). The catheter hub  was sutured to the skin with 2 0 Prolene stay suture. A sterile  dressing was applied.      SEDATION/MEDICATIONS: Continuous cardiopulmonary monitoring was  performed by a radiology nurse for the duration of the procedure. No  conscious sedation was administered. Procedural duration 20 minutes.  15 cc 1% Lidocaine was administered  subcutaneously for local  anesthesia. SPECIMENS: None.  ESTIMATED BLOOD LOSS:  5 cc  FLOUROSCOPY:  0.4 minutes; DAP  3760 mGy-cm*2; Air Kerma  11.86 mGy  CONTRAST: None.      Impression: Ultrasound and fluoroscopically guided left internal jugular vein  tunneled hemodialysis catheter. The catheter is ready for immediate  use.          Signed by: Jose Wang 2/23/2024 11:13 AM  Dictation workstation:   [id number]      Physical Exam  Constitutional: Obese gentleman, alert active, cooperative not in acute distress  Eyes: PERRLA, clear sclera  ENMT: Moist mucosal membranes, no exudate  Head / Neck: Atraumatic, normocephalic, supple neck, JVP not visualized  Lungs: Patent airways, CTABL  Heart: RRR, S1S2, no murmurs appreciated, palpable pulses in all extremities  GI: Soft, NT, ND, bowel sounds present in all quadrants  MSK: Moves all extremities freely, no restriction  of ROM, no joint edema  Extremities: PICC line on right upper extremity arm, mild bilateral lower extremities peripheral edema  : No Alfonso catheter inserted  Breast: Deferred  Neurological: AAO x 3 to person, place and date, facial muscles symmetrical, sensation intact, strength 4/4, no acute focal neurological deficits appreciated  Psychological: Appropriate mood and behavior    Relevant Results           Scheduled medications  apixaban, 2.5 mg, oral, BID  atorvastatin, 40 mg, oral, Nightly  clopidogrel, 75 mg, oral, Daily  epoetin dane or biosimilar, 6,000 Units, subcutaneous, Once per day on Mon Wed Fri  gabapentin, 100 mg, oral, Daily  insulin glargine, 6 Units, subcutaneous, Nightly  insulin lispro, 0-5 Units, subcutaneous, TID with meals  lidocaine, 1 patch, transdermal, Daily  midodrine, 15 mg, oral, q8h  mupirocin, , Topical, TID  pantoprazole, 40 mg, oral, Daily before breakfast  polyethylene glycol, 17 g, oral, Daily  sodium hypochlorite, , irrigation, Daily      Continuous medications     PRN medications  PRN medications: albuterol,  dextrose 10 % in water (D10W), dextrose, glucagon, iohexol, ipratropium-albuteroL, lidocaine, oxyCODONE-acetaminophen, traMADol  IR CVC tunneled    Result Date: 2/23/2024  Interpreted By:  Jose Wang, STUDY: IR CVC TUNNELED; ;  2/23/2024 10:04 am   ULTRASOUND AND FLUOROSCOPICALLY GUIDED LEFT INTERNAL JUGULAR VEIN TUNNELED HEMODIALYSIS CATHETER   INDICATION: Signs/Symptoms:permcath placement - ESRD. 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 Request for replacement of tunneled hemodialysis catheter.   COMPARISON: Angiogram 02/21/2024, chest radiograph 01/23/2024   ACCESSION NUMBER(S): [id number]   ORDERING CLINICIAN: YUE PEREZ   TECHNIQUE:   ATTENDING : Jose Wang M.D.   TECHNICAL DESCRIPTION/FINDINGS: The procedure, including all risks, benefits and alternatives were explained to the patient and his proxy in detail. All questions were answered and written informed consent was obtained.   The patient was positioned supine on the fluoroscopy table. A time-out was performed.   The left neck and anterosuperior chest were prepped and draped in usual sterile fashion. Focused ultrasound was performed of the left internal jugular vein demonstrating patency and compressibility. Ultrasound images were saved. 1% lidocaine was administered subcutaneously for local anesthesia. Under real-time ultrasound guidance, the left internal jugular vein was accessed in antegrade direction using micropuncture technique. Ultrasound images were saved. Under fluoroscopic visualization, an 018 guidewire was advanced into the right atrium and a micropuncture transitional introducer was placed.   Additional 1% lidocaine was then administered over the left anterosuperior chest wall to anesthetize a subcutaneous tunnel tract. The dual lumen cuffed  hemodialysis catheter was then tunneled from a left anterosuperior chest wall incision site and externalized overlying the venotomy. After serial dilation over an 035 guidewire which had been placed into the IVC, 15 Italian peel-away sheath was placed. Through the sheath, a 14.5 Italian by 27 cm tip to cuff dual-lumen hemodialysis catheter was placed. Completion fluoroscopic image demonstrates the catheter tip at the superior cavoatrial junction.   Catheter lumens easily aspirated and flushed were locked with a concentrated heparinized solution (1000 units/cc). The catheter hub was sutured to the skin with 2 0 Prolene stay suture. A sterile dressing was applied.   SEDATION/MEDICATIONS: Continuous cardiopulmonary monitoring was performed by a radiology nurse for the duration of the procedure. No conscious sedation was administered. Procedural duration 20 minutes. 15 cc 1% Lidocaine was administered subcutaneously for local anesthesia. SPECIMENS: None. ESTIMATED BLOOD LOSS:  5 cc FLOUROSCOPY:  0.4 minutes; DAP  3760 mGy-cm*2; Air Kerma  11.86 mGy CONTRAST: None.       Ultrasound and fluoroscopically guided left internal jugular vein tunneled hemodialysis catheter. The catheter is ready for immediate use.     Signed by: Jose Wang 2/23/2024 11:13 AM Dictation workstation:   [id number]    ECG 12 lead    Result Date: 2/20/2024  Atrial fibrillation with premature ventricular or aberrantly conducted complexes Low voltage QRS Nonspecific ST and T wave abnormality Abnormal ECG When compared with ECG of 25-JAN-2024 02:56, No significant change was found See ED provider note for full interpretation and clinical correlation Confirmed by Kateryna An ([id number]) on 2/20/2024 4:11:42 PM    Electrocardiogram, 12-lead PRN ACS symptoms    Result Date: 2/6/2024  Atrial fibrillation with premature ventricular or aberrantly conducted complexes Low voltage QRS Nonspecific T wave abnormality Abnormal ECG When compared with ECG of  23-JAN-2024 04:46, No significant change since Confirmed by Edwin May ([id number]) on 2/6/2024 3:23:42 PM    IR CVC removal    Result Date: 2/2/2024  Interpreted By:  Noe Zladivar, STUDY: IR CVC REMOVAL;  2/2/2024 3:20 pm   INDICATION: Signs/Symptoms:removal of tunneled line.   COMPARISON: None.   ACCESSION NUMBER(S): [id number]   ORDERING CLINICIAN: LUKE HWANG   TECHNIQUE: INTERVENTIONALIST(S): Noe Zaldivar CNP   CONSENT: The patient/patient's POA/next of kin was informed of the nature of the proposed procedure. The purposes, alternatives, risks, and benefits were explained and discussed. All questions were answered and consent was obtained.     MEDICATION/CONTRAST: No additional   TIME OUT: A time out was performed immediately prior to procedure start with the interventional team, correctly identifying the patient name, date of birth, MRN, procedure, anatomy (including marking of site and side), patient position, procedure consent form, relevant laboratory and imaging test results, antibiotic administration, safety precautions, and procedure-specific equipment needs.   COMPLICATIONS: No immediate adverse events identified.   FINDINGS: The existing skin site over the tunneled catheter insertion site was prepped and draped with maximal sterile manner.   The skin and subcutaneous tissues at the original catheter site were anesthetized with 1% lidocaine with epinephrine.  The subcutaneous tissues surrounding the catheter were anesthetized with 1% lidocaine. Using blunt dissection, the catheter cuff was externalized and the catheter leading to the jugular vein was completely removed. Hemostasis was obtained using manual compression at the internal jugular vein access site.   The incision site was covered with a sterile dressing and tegaderm was subsequently applied.   There were no immediate or post-procedural complications.       Uneventful removal of a left chest central venous catheter.   I was present  for and/or performed the critical portions of the procedure and immediately available throughout the entire procedure.   Performed and dictated at [facility].   Signed by: Noe Zaldivar 2/2/2024 4:39 PM Dictation workstation:   [id number]    Vascular US upper extremity venous duplex left    Result Date: 1/27/2024            [place] [id number]   Tel [phone] and Fax [phone]  Vascular Lab Report VASC US UPPER EXTREMITY VENOUS DUPLEX LEFT  Patient Name:      SHAMEKA Mendez Physician: [id number] Jovanna Francis MD Study Date:        1/26/2024           Ordering           [id number] CHUCKY BLACKBURN                                        Physician: MRN/PID:           [id number]            Technologist:      Elisabeth Betts ARGELIA Accession#:        [id number]        Technologist 2: Date of Birth/Age: 1945 / 78      Encounter#:        [id number]                    years Gender:            M Admission Status:  Inpatient           Location           [facility]                                        Performed:  Diagnosis/ICD: Left arm swelling-M79.89 Indication:    Limb swelling CPT Codes:     82864 Peripheral venous duplex scan for DVT Limited  CONCLUSIONS: Right Upper Venous: Unable to visualize subclavian vein due to lines and bandages. Left Upper Venous: Technically limited exam; negative for acute deep vein thrombus in visualized veins. Unable to rule out deep vein thrombus in non-visualized mid and distal subclavian vein. The left basilic vein was not visualized. Left brachiocephalic arteriovenous fistula is noted. Visualized in segments due to bandages from recent dialysis access.  Additional Findings: Technically difficult due to patient positioning and limited mobility.  Imaging & Doppler Findings:  Left                Compress Thrombus        Flow  Internal Jugular      Yes      None   Spontaneous/Phasic Subclavian Proximal   Yes      None   Spontaneous/Phasic Axillary              Yes      None   Spontaneous/Phasic Brachial              Yes      None Cephalic              Yes      None  [id number] Jovanna Francis MD Electronically signed by [id number] Jovanna Francis MD on 1/27/2024 at 2:56:51 PM  ** Final **     CT head wo IV contrast    Result Date: 1/26/2024  Interpreted By:  Marilyn Torres,  Ashvin Gayle STUDY: CT HEAD WO IV CONTRAST;  1/25/2024 10:08 pm   INDICATION: Signs/Symptoms:AMS CICU patient.   COMPARISON: None.   ACCESSION NUMBER(S): [id number]   ORDERING CLINICIAN: ALVIN AMBRIZ   TECHNIQUE: Noncontrast axial CT scan of head was performed. Angled reformats in brain and bone windows were generated. The images were reviewed in bone, brain, blood and soft tissue windows.   FINDINGS: CSF Spaces: The ventricles, sulci and basal cisterns are concordant with the degree of parenchymal atrophy. There is no extraaxial fluid collection.   Parenchyma: There are nonspecific patchy, confluent periventricular and subcortical white matter hypodensities which are likely sequela of chronic microvascular ischemic changes. Otherwise, the grey-white differentiation is intact. There is no mass effect or midline shift. There is no intracranial hemorrhage.   Calvarium: The calvarium is unremarkable.   Paranasal sinuses and mastoids: Visualized paranasal sinuses and mastoids are clear.       1.  No evidence of acute intracranial hemorrhage or mass effect. 2. Nonspecific periventricular and subcortical white matter hypodensities likely sequela of chronic microvascular ischemic changes     I personally reviewed the images/study and I agree with the findings as stated by Resident Irwin Ingram MD. This study was interpreted at [facility], [place], [place].   MACRO: None   Signed by: Marilyn Torres 1/26/2024 7:23 AM Dictation workstation:    [id number]    XR abdomen 1 view    Result Date: 1/25/2024  Interpreted By:  Jori Garcia and Ritchie Brandon STUDY: XR ABDOMEN 1 VIEW;  1/25/2024 3:05 pm   INDICATION: Signs/Symptoms:CICU confirm NG placement.   COMPARISON: Chest x-ray 01/23/2024   ACCESSION NUMBER(S): [id number]   ORDERING CLINICIAN: ALVIN AMBRIZ   FINDINGS: Enteric tube courses midline below the level of the diaphragm with the tip located in the gastric body. The side-hole is below the GE junction.   Multiple gaseous distended loops of small and large bowel. For example there is a lucent area of gaseous ascending colon in the right upper quadrant measuring 5.2 cm. There is limited evaluation of pneumoperitoneum on supine imaging, however there does not appear to be any evidence of free air surrounding this gaseous distended loop of ascending/Rigler's sign. Right diaphragm is distinctly visualized separate from this loop of bowel without evidence of subdiaphragmatic free air.   Additional mildly prominent loops of small bowel scattered throughout the bilateral hemiabdomen, for example largest loop of small bowel can be seen in the left upper quadrant measuring 3.4 cm.   Visualized lungs are clear.   Osseous structures demonstrate no acute bony changes.       1. Enteric tube as described above with the distal tip projecting over the gastric body. 2. Multiple mildly distended loops of small and large bowel as described above. Recommend attention on follow-up examinations as could reflect early ileus. 3. No definitive evidence of free air on current supine radiograph as described above.   MACRO: None   Signed by: Jori Garcia 1/25/2024 3:48 PM Dictation workstation:   [id number]       Assessment/Plan    78 y.o. male with past medical history as noted below who is presenting to the emergency department for high potassium. This lab was drawn earlier today. Potassium is 9.0 and the patient has no noted EKG changes, no noted symptoms. Patient  does additionally report that he is having pain in his penis that has been ongoing over the course the past 2 weeks       This patient has a central line   Reason for the central line remaining today? Dialysis/Hemapheresis      Principal Problem:    Balanitis  Active Problems:    Sepsis (CMS/HCC)    Chronic renal failure    Ulcer of lower extremity (CMS/HCC)    Hypotension      Sepsis: Presenting with hypotension  Suspect balanitis  -unknown source  -vanc and cefepime  -Mupirocin 1% ointment twice daily over the glans of penis  -continue home midodrine  -blood cx negative so far, will await 24 more hrs  -monitor  -ID consulted for antibiotic regimen review, recommendation appreciated     Left AV fistula bleed  -vascular surgery: Status post fistulogram, with 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, cautioned with HD catheter insertion for dialysis, was unsuccessful  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%   - TTE 1/23/24 with LVEF to 40-45%, mildly improved from previous in 2023, no other new acute findings    -Uptitrate GDMT as tolerated--> consider hydralazine and isordil if needed, currently limited 2/2 hypotension on midodrine  - consideration should be given to further ischemic workup in the outpatient setting due to extensive peripheral disease, risk factors, and no evidence of previous ischemic cardiac workup         ESRD  -on HD  -nephrology consult: Appreciate management recommendation, planning hemodialysis once permacath placed     Anemia of chronic disease  -At baseline hgb  - continue daily CBC     DM with neuropathy  - A1c 5.3% 12/2023  -Cont home gabapentin  -Insulin  glargine 6 units nightly    -SSI      Diet  -Diabetic  -N.p.o. after midnight     DVT prophylaxis     -On Eliquis 2.5 mg twice daily, Plavix 75 mg daily     Disposition: Presenting with sepsis, balanitis suspected, but also has multiple postsurgical healing wound, discharge pending permacath placement and hemodialysis session            César Garcia, DO

## 2024-02-24 NOTE — PROGRESS NOTES
Chevy Benton is a 78 y.o. male on day 7 of admission presenting with Balanitis.      Subjective   Patient was seen today while undergoing hemodialysis via his left sided tunneled dialysis catheter,  Angioplasty of his fistula failed of the left arm aVF.  Patient no new complaints       Objective        Vitals 24HR  Heart Rate:  []   Temp:  [36.3 °C (97.3 °F)-36.6 °C (97.9 °F)]   Resp:  [16-20]   BP: ()/(46-79)   SpO2:  [94 %-96 %]     Intake/Output last 3 Shifts:    Intake/Output Summary (Last 24 hours) at 2/24/2024 1331  Last data filed at 2/24/2024 1100  Gross per 24 hour   Intake 1840 ml   Output 5100 ml   Net -3260 ml       Physical Exam  GEN appearance: Awake and alert neck distress, currently on hemodialysis  Head and ENT: Normocephalic, atraumatic, supple neck, no JVD .  left-sided IJ PermCath noted  Lungs : Clear to auscultation, persistent basal crackles  Heart; RRR  Abdomen: Soft no tenderness  Extremities: +2 edema noted          Relevant Results     Results from last 7 days   Lab Units 02/24/24  0504 02/23/24  0431 02/22/24  0631   WBC AUTO x10*3/uL 10.2 11.8* 11.3   HEMOGLOBIN g/dL 7.1* 7.0* 7.4*   HEMATOCRIT % 24.0* 23.0* 24.6*   PLATELETS AUTO x10*3/uL 404 432 500*      Results from last 7 days   Lab Units 02/24/24  0504 02/23/24  0431 02/22/24  0631   SODIUM mmol/L 129* 127* 127*   POTASSIUM mmol/L 4.1 4.7 4.5   CHLORIDE mmol/L 92* 89* 89*   CO2 mmol/L 27 26 26   BUN mg/dL 38* 64* 58*   CREATININE mg/dL 3.85* 6.11* 5.23*   GLUCOSE mg/dL 102* 119* 98   CALCIUM mg/dL 9.5 9.3 9.6        Current Facility-Administered Medications:     albuterol 2.5 mg /3 mL (0.083 %) nebulizer solution 2.5 mg, 2.5 mg, nebulization, q6h PRN, EDWIN Sethi, DNP    apixaban (Eliquis) tablet 2.5 mg, 2.5 mg, oral, BID, EDWIN Sethi, ALPHONSO, 2.5 mg at 02/23/24 2037    atorvastatin (Lipitor) tablet 40 mg, 40 mg, oral, Nightly, EDWIN Sethi, ALPHONSO, 40 mg at 02/23/24  2037    clopidogrel (Plavix) tablet 75 mg, 75 mg, oral, Daily, EDWIN Sethi DNP, 75 mg at 02/22/24 0938    dextrose 10 % in water (D10W) infusion, 0.3 g/kg/hr, intravenous, Once PRN, EDWIN Sethi DNP    dextrose 50 % injection 25 g, 25 g, intravenous, q15 min PRN, EDWIN Sethi DNP    epoetin dane-epbx (Retacrit) injection 6,000 Units, 6,000 Units, subcutaneous, Once per day on Mon Wed Fri, Joseph Hunt MD, 6,000 Units at 02/23/24 1705    gabapentin (Neurontin) capsule 100 mg, 100 mg, oral, Daily, EDWIN Sethi DNP, 100 mg at 02/22/24 0939    glucagon (Glucagen) injection 1 mg, 1 mg, intramuscular, q15 min PRN, EDWIN Sethi DNP    heparin 1,000 unit/mL injection 2,000 Units, 2,000 Units, intra-catheter, After Dialysis, Bob Castellano DO, 2,000 Units at 02/24/24 1015    heparin 1,000 unit/mL injection 2,000 Units, 2,000 Units, intra-catheter, After Dialysis, Bob Castellano DO, 2,000 Units at 02/24/24 1015    insulin glargine (Lantus) injection 6 Units, 6 Units, subcutaneous, Nightly, EDWIN Sethi DNP, 6 Units at 02/23/24 2037    insulin lispro (HumaLOG) injection 0-5 Units, 0-5 Units, subcutaneous, TID with meals, EDWIN Sethi DNP, 1 Units at 02/20/24 0821    iohexol (OMNIPaque) 350 mg iodine/mL solution, , , PRN, Scott Lepe MD, 15 mL at 02/21/24 1625    ipratropium-albuteroL (Duo-Neb) 0.5-2.5 mg/3 mL nebulizer solution 3 mL, 3 mL, nebulization, q2h PRN, EDWIN Sethi DNP    lidocaine (Xylocaine) 20 mg/mL (2 %) injection, , , PRN, Scott Lepe MD, 10 mL at 02/23/24 0952    lidocaine 4 % patch 1 patch, 1 patch, transdermal, Daily, EDWIN Sethi, ALPHONSO, 1 patch at 02/22/24 0939    midodrine (Proamatine) tablet 15 mg, 15 mg, oral, q8h, EDWIN Sethi, ALPHONSO, 15 mg at 02/24/24 0644    mupirocin (Bactroban) 2 % ointment, , Topical, TID, [facility]  DO Radha, Given at 02/24/24 0223    oxyCODONE-acetaminophen (Percocet) 5-325 mg per tablet 1 tablet, 1 tablet, oral, q6h PRN, JENIFFER Sethi-CNP, DNP, 1 tablet at 02/24/24 1000    pantoprazole (ProtoNix) EC tablet 40 mg, 40 mg, oral, Daily before breakfast, EDWIN Sethi, DNP, 40 mg at 02/24/24 0645    polyethylene glycol (Glycolax, Miralax) packet 17 g, 17 g, oral, Daily, JENIFFER Sethi-CNP, DNP, 17 g at 02/22/24 0939    sodium hypochlorite (Dakin's Quarter Strength) 0.125 % external solution, , irrigation, Daily, EDWIN Sethi, DNP, Given at 02/22/24 1300    traMADol (Ultram) tablet 50 mg, 50 mg, oral, TID PRN, EDWIN Sethi, DNP, 50 mg at 02/23/24 1053           Assessment/Plan   1.  ESKD on hemodialysis  2.  Ulcer of lower extremity with sepsis, will continue antibiotics and ID follow-up  3.  Chronic low blood pressure, will manage with current medications  4.  Anemia of CKD, will continue with erythropoietin and iron as needed  5.  Metabolic bone disease due to CKD    Will follow BMP and CBC on daily basis reviewing all the house consultants neck dialysis, be Tuesday.                  I spent 35 minutes in the professional and overall care of this patient.      Rere Matson MD

## 2024-02-24 NOTE — NURSING NOTE
Report from Sending RN:    Report From: Sobia Herrera  Recent Surgery of Procedure: No  Baseline Level of Consciousness (LOC): A&O X'3  Oxygen Use: No  Type: room air  Diabetic: No  Last BP Med Given Day of Dialysis: Midodrine  Last Pain Med Given: see EMAR  Lab Tests to be Obtained with Dialysis: No  Blood Transfusion to be Given During Dialysis: No  Available IV Access: Yes  Medications to be Administered During Dialysis: No  Continuous IV Infusion Running: No  Restraints on Currently or in the Last 24 Hours: No  Hand-Off Communication: got midodrine this morning 15mg around 6:45 am. Last bp was 148/70.  Ready for treatment.

## 2024-02-24 NOTE — CARE PLAN
The patient's goals for the shift include pain management.    The clinical goals for the shift include hemodynamic stability.    Over the shift, the patient did not make progress toward the following goals. Barriers to progression include n/a. Recommendations to address these barriers include n/a.

## 2024-02-24 NOTE — NURSING NOTE
Report to Receiving RN:    Report To: Sobia Herrera  Time Report Called: 1200  Hand-Off Communication: Soft BP throughout treatment.  BP went too low in the 70 with in the last 45 minutes of treatment.  Weight removal stopped.  Removed total of 2.5 kg vs. Ordered 3.0.. Last /80    Complications During Treatment: No  Ultrafiltration Treatment: No  Medications Administered During Dialysis: No  Blood Products Administered During Dialysis: No  Labs Sent During Dialysis: No  Heparin Drip Rate Changes: No    Electronic Signatures:  Ceferino Patterson RN  Last Updated: 12:03 PM by CEFERINO PATTERSON

## 2024-02-25 NOTE — CARE PLAN
The patient's goals for the shift include  pt will maintain comfort throughout the shift    The clinical goals for the shift include Patient will remain free of injury for duration of shift    Over the shift, the patient did not make progress toward the following goals. Barriers to progression include. Recommendations to address these barriers include .

## 2024-02-25 NOTE — PROGRESS NOTES
Chevy Benton is a 78 y.o. male on day 8 of admission presenting with Balanitis.      Subjective   Patient seen and examined in his room, no new complaints.  Patient was hemodialyzed yesterday, next dialysis scheduled for Tuesday.       Objective        Vitals 24HR  Heart Rate:  []   Temp:  [36.3 °C (97.4 °F)]   Resp:  [16]   BP: ()/(51-66)   SpO2:  [93 %-98 %]     Intake/Output last 3 Shifts:    Intake/Output Summary (Last 24 hours) at 2/25/2024 1341  Last data filed at 2/25/2024 0716  Gross per 24 hour   Intake 300 ml   Output 2 ml   Net 298 ml       Physical Exam    GEN appearance: Awake and alert neck distress, currently on hemodialysis  Head and ENT: Normocephalic, atraumatic, supple neck, no JVD .  left-sided IJ PermCath noted  Lungs : Clear to auscultation, persistent basal crackles  Heart; RRR  Abdomen: Soft no tenderness  Extremities: +2 edema noted               Relevant Results     Results from last 7 days   Lab Units 02/25/24  0611 02/24/24  0504 02/23/24  0431   WBC AUTO x10*3/uL 7.3 10.2 11.8*   HEMOGLOBIN g/dL 9.4* 7.1* 7.0*   HEMATOCRIT % 32.4* 24.0* 23.0*   PLATELETS AUTO x10*3/uL 350 404 432      Results from last 7 days   Lab Units 02/25/24  0611 02/24/24  0504 02/23/24  0431   SODIUM mmol/L 135* 129* 127*   POTASSIUM mmol/L 4.2 4.1 4.7   CHLORIDE mmol/L 95* 92* 89*   CO2 mmol/L 27 27 26   BUN mg/dL 25* 38* 64*   CREATININE mg/dL 3.17* 3.85* 6.11*   GLUCOSE mg/dL 132* 102* 119*   CALCIUM mg/dL 9.8 9.5 9.3        Current Facility-Administered Medications:     albuterol 2.5 mg /3 mL (0.083 %) nebulizer solution 2.5 mg, 2.5 mg, nebulization, q6h PRN, Pancho Montano, APRN-CNP, DNP    apixaban (Eliquis) tablet 2.5 mg, 2.5 mg, oral, BID, EDWIN Sethi, ALPHONSO, 2.5 mg at 02/25/24 0902    atorvastatin (Lipitor) tablet 40 mg, 40 mg, oral, Nightly, EDWIN Sethi, ALPHONSO, 40 mg at 02/24/24 2102    clopidogrel (Plavix) tablet 75 mg, 75 mg, oral, Daily, Pancho JO  Miktarian, APRN-CNP, ALPHONSO, 75 mg at 02/25/24 0902    dextrose 10 % in water (D10W) infusion, 0.3 g/kg/hr, intravenous, Once PRN, EDWIN Sethi DNP    dextrose 50 % injection 25 g, 25 g, intravenous, q15 min PRN, EDWIN Sethi DNP    epoetin dane-epbx (Retacrit) injection 6,000 Units, 6,000 Units, subcutaneous, Once per day on Mon Wed Fri, Joseph Hunt MD, 6,000 Units at 02/23/24 1705    gabapentin (Neurontin) capsule 100 mg, 100 mg, oral, Daily, EDWIN Sethi DNP, 100 mg at 02/25/24 0902    glucagon (Glucagen) injection 1 mg, 1 mg, intramuscular, q15 min PRN, EDWIN Sethi DNP    heparin 1,000 unit/mL injection 2,000 Units, 2,000 Units, intra-catheter, After Dialysis, Bob Castellano DO, 2,000 Units at 02/24/24 1015    heparin 1,000 unit/mL injection 2,000 Units, 2,000 Units, intra-catheter, After Dialysis, Bob Castellano DO, 2,000 Units at 02/24/24 1015    insulin glargine (Lantus) injection 6 Units, 6 Units, subcutaneous, Nightly, EDWIN Sethi DNP, 6 Units at 02/23/24 2037    insulin lispro (HumaLOG) injection 0-5 Units, 0-5 Units, subcutaneous, TID with meals, EDWIN Sethi, ALPHONSO, 1 Units at 02/20/24 0821    iohexol (OMNIPaque) 350 mg iodine/mL solution, , , PRN, Scott Lepe MD, 15 mL at 02/21/24 1625    ipratropium-albuteroL (Duo-Neb) 0.5-2.5 mg/3 mL nebulizer solution 3 mL, 3 mL, nebulization, q2h PRN, EDWIN Sethi ALPHONSO    lidocaine (Xylocaine) 20 mg/mL (2 %) injection, , , PRN, Scott Lepe MD, 10 mL at 02/23/24 0952    lidocaine 4 % patch 1 patch, 1 patch, transdermal, Daily, EDWIN Sethi, ALPHONSO, 1 patch at 02/25/24 0902    midodrine (Proamatine) tablet 15 mg, 15 mg, oral, q8h, EDWIN Sethi, ALPHONSO, 15 mg at 02/25/24 0559    mupirocin (Bactroban) 2 % ointment, , Topical, TID, César Garcia DO, Given at 02/25/24 0903    oxyCODONE-acetaminophen (Percocet)  5-325 mg per tablet 1 tablet, 1 tablet, oral, q6h PRN, EDWIN Sethi, DNP, 1 tablet at 02/24/24 2101    pantoprazole (ProtoNix) EC tablet 40 mg, 40 mg, oral, Daily before breakfast, EDWIN Sethi, DNP, 40 mg at 02/25/24 0902    polyethylene glycol (Glycolax, Miralax) packet 17 g, 17 g, oral, Daily, EDWIN Sethi, DNP, 17 g at 02/25/24 0912    sodium hypochlorite (Dakin's Quarter Strength) 0.125 % external solution, , irrigation, Daily, EDWIN Sethi, ALPHONSO, Given at 02/24/24 1300    traMADol (Ultram) tablet 50 mg, 50 mg, oral, TID PRN, EDWIN Sethi, ALPHONSO, 50 mg at 02/25/24 0613           Assessment/Plan      1.  ESKD on hemodialysis  2.  Ulcer of lower extremity with sepsis, will continue antibiotics and ID follow-up  3.  Chronic low blood pressure, will manage with current medications  4.  Anemia of CKD, will continue with erythropoietin and iron as needed  5.  Metabolic bone disease due to CKD  6.  Hyponatremia, resolved   Will follow BMP and CBC on daily basis reviewing all th consultants notes and input    Next dialysis will schedule for Tuesday.            Principal Problem:    Balanitis  Active Problems:    Sepsis (CMS/HCC)    Chronic renal failure    Ulcer of lower extremity (CMS/HCC)    Hypotension              I spent 35 minutes in the professional and overall care of this patient.      Rere Matson MD

## 2024-02-25 NOTE — PROGRESS NOTES
Chevy Benton is a 78 y.o. male on day 7 of admission presenting with Balanitis.      Subjective   Patient was seen and examined at bedside with dialysis, which he stated he tolerated, and please to notice decreased anasarca that affected all extremities.  Family not present at time of encounter       Objective     Last Recorded Vitals  /66 (BP Location: Right arm, Patient Position: Sitting)   Pulse 97   Temp 36.5 °C (97.7 °F) (Oral)   Resp 18   Wt 87.9 kg (193 lb 12.6 oz)   SpO2 97%   Intake/Output last 3 Shifts:    Intake/Output Summary (Last 24 hours) at 2/24/2024 1910  Last data filed at 2/24/2024 1100  Gross per 24 hour   Intake 1040 ml   Output 2500 ml   Net -1460 ml       Admission Weight  Weight: 87.5 kg (193 lb) (02/17/24 1128)    Daily Weight  02/22/24 : 87.9 kg (193 lb 12.6 oz)    Image Results  IR CVC tunneled  Narrative: Interpreted By:  Jose Wang,   STUDY:  IR CVC TUNNELED; ;  2/23/2024 10:04 am      ULTRASOUND AND FLUOROSCOPICALLY GUIDED LEFT INTERNAL JUGULAR VEIN  TUNNELED HEMODIALYSIS CATHETER      INDICATION:  Signs/Symptoms:permcath placement - ESRD. 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 Request for  replacement of tunneled hemodialysis catheter.      COMPARISON:  Angiogram 02/21/2024, chest radiograph 01/23/2024      ACCESSION NUMBER(S):  [id number]      ORDERING CLINICIAN:  YUE PEREZ      TECHNIQUE:      ATTENDING : Jose Wang M.D.      TECHNICAL DESCRIPTION/FINDINGS: The procedure, including all risks,  benefits and alternatives were explained to the patient and his proxy  in detail. All questions were answered and written informed consent  was obtained.      The patient was positioned supine on the fluoroscopy table. A  time-out was performed.      The left neck and  anterosuperior chest were prepped and draped in  usual sterile fashion. Focused ultrasound was performed of the left  internal jugular vein demonstrating patency and compressibility.  Ultrasound images were saved. 1% lidocaine was administered  subcutaneously for local anesthesia. Under real-time ultrasound  guidance, the left internal jugular vein was accessed in antegrade  direction using micropuncture technique. Ultrasound images were  saved. Under fluoroscopic visualization, an 018 guidewire was  advanced into the right atrium and a micropuncture transitional  introducer was placed.      Additional 1% lidocaine was then administered over the left  anterosuperior chest wall to anesthetize a subcutaneous tunnel tract.  The dual lumen cuffed hemodialysis catheter was then tunneled from a  left anterosuperior chest wall incision site and externalized  overlying the venotomy. After serial dilation over an 035 guidewire  which had been placed into the IVC, 15 Lao peel-away sheath was  placed. Through the sheath, a 14.5 Lao by 27 cm tip to cuff  dual-lumen hemodialysis catheter was placed. Completion fluoroscopic  image demonstrates the catheter tip at the superior cavoatrial  junction.      Catheter lumens easily aspirated and flushed were locked with a  concentrated heparinized solution (1000 units/cc). The catheter hub  was sutured to the skin with 2 0 Prolene stay suture. A sterile  dressing was applied.      SEDATION/MEDICATIONS: Continuous cardiopulmonary monitoring was  performed by a radiology nurse for the duration of the procedure. No  conscious sedation was administered. Procedural duration 20 minutes.  15 cc 1% Lidocaine was administered subcutaneously for local  anesthesia. SPECIMENS: None.  ESTIMATED BLOOD LOSS:  5 cc  FLOUROSCOPY:  0.4 minutes; DAP  3760 mGy-cm*2; Air Kerma  11.86 mGy  CONTRAST: None.      Impression: Ultrasound and fluoroscopically guided left internal jugular vein  tunneled  hemodialysis catheter. The catheter is ready for immediate  use.          Signed by: Jose Wang 2/23/2024 11:13 AM  Dictation workstation:   [id number]      Physical Exam  Constitutional: Obese gentleman, alert active, cooperative not in acute distress  Skin: Permacath inserted in the left precordium insertion site intact covered with dressing  Eyes: PERRLA, clear sclera  ENMT: Moist mucosal membranes, no exudate  Head / Neck: Atraumatic, normocephalic, supple neck, JVP not visualized  Lungs: Patent airways, CTABL  Heart: RRR, S1S2, no murmurs appreciated, palpable pulses in all extremities  GI: Soft, NT, ND, bowel sounds present in all quadrants  MSK: Moves all extremities freely, no restriction  of ROM, no joint edema  Extremities: PICC line on right upper extremity arm, mild bilateral lower extremities peripheral edema  : No Alfonso catheter inserted, erythema over penis gland resolving, mupirocin cream available for application  Breast: Deferred  Neurological: AAO x 3 to person, place and date, facial muscles symmetrical, sensation intact, strength 4/4, no acute focal neurological deficits appreciated  Psychological: Appropriate mood and behavior    Relevant Results    Scheduled medications  apixaban, 2.5 mg, oral, BID  atorvastatin, 40 mg, oral, Nightly  clopidogrel, 75 mg, oral, Daily  epoetin dane or biosimilar, 6,000 Units, subcutaneous, Once per day on Mon Wed Fri  gabapentin, 100 mg, oral, Daily  heparin, 2,000 Units, intra-catheter, After Dialysis  heparin, 2,000 Units, intra-catheter, After Dialysis  insulin glargine, 6 Units, subcutaneous, Nightly  insulin lispro, 0-5 Units, subcutaneous, TID with meals  lidocaine, 1 patch, transdermal, Daily  midodrine, 15 mg, oral, q8h  mupirocin, , Topical, TID  pantoprazole, 40 mg, oral, Daily before breakfast  polyethylene glycol, 17 g, oral, Daily  sodium hypochlorite, , irrigation, Daily      Continuous medications     PRN medications  PRN medications:  albuterol, dextrose 10 % in water (D10W), dextrose, glucagon, iohexol, ipratropium-albuteroL, lidocaine, oxyCODONE-acetaminophen, traMADol             Assessment/Plan      78 y.o. male with past medical history as noted below who is presenting to the emergency department for high potassium. This lab was drawn earlier today. Potassium is 9.0 and the patient has no noted EKG changes, no noted symptoms. Patient does additionally report that he is having pain in his penis that has been ongoing over the course the past 2 weeks        Principal Problem:    Balanitis  Active Problems:    Sepsis (CMS/HCC)    Chronic renal failure    Ulcer of lower extremity (CMS/HCC)    Hypotension    Sepsis: Presenting with hypotension  Suspect balanitis: Mupirocin cream application, completed steroid application on admission  -unknown source  -vanc and cefepime  -Mupirocin 1% ointment twice daily over the glans of penis  -continue home midodrine  -blood cx negative so far, will await 24 more hrs  -ID consulted for antibiotic regimen review, recommendation appreciated     Left AV fistula bleed  -vascular surgery: Status post fistulogram, with 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, cautioned with HD catheter insertion for dialysis, was unsuccessful  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%   - TTE 1/23/24 with LVEF to 40-45%, mildly improved from previous in 2023, no other new acute findings    -Uptitrate GDMT as tolerated--> consider hydralazine and isordil if needed, currently limited 2/2 hypotension on midodrine  - consideration should be given to further ischemic workup in the outpatient setting due  to extensive peripheral disease, risk factors, and no evidence of previous ischemic cardiac workup         ESRD  -on HD  -nephrology consult: Appreciate management recommendation, planning hemodialysis once permacath placed     Anemia of chronic disease  -At baseline hgb  - continue daily CBC     DM with neuropathy  - A1c 5.3% 12/2023  -Cont home gabapentin  -Insulin glargine 6 units nightly    -SSI      Diet  -Diabetic  -N.p.o. after midnight     DVT prophylaxis     -On Eliquis 2.5 mg twice daily, Plavix 75 mg daily     Disposition: Presenting with sepsis, balanitis suspected, but also has multiple postsurgical healing wound, discharge pending permacath placement and hemodialysis session       César Garcia DO

## 2024-02-26 NOTE — PROGRESS NOTES
Occupational Therapy    Occupational Therapy Treatment    Name: Chevy Benton  MRN: [id number]  : 1945  Date: 24  Time Calculation  Start Time: 1029  Stop Time: 1058  Time Calculation (min): 29 min    Assessment:  End of Session Communication: Bedside nurse  End of Session Patient Position: Bed, 3 rail up, Alarm on  Plan:  OT Frequency: 2 times per week  OT Discharge Recommendations: Moderate intensity level of continued care  OT Recommended Transfer Status: Dependent  OT - OK to Discharge: Yes (per POC)    Subjective   Previous Visit Info:  OT Last Visit  OT Received On: 24  General:  General  Reason for Referral: Pt admitted from SNF for balanitis, sepsis and renal failure.  Co-Treatment: PT  Co-Treatment Reason: cotreat with PTA to maximize pt mobility and safety  Prior to Session Communication: Bedside nurse  Patient Position Received: Bed, 3 rail up, Alarm on  Preferred Learning Style: verbal, visual, auditory  General Comment: Pt [place], self limiting, cooperative and compliant, tremulous and deconditioned  Precautions:     Vitals:  Vital Signs  Heart Rate: 104  Pain Assessment:  Pain Assessment  Pain Assessment: 0-10  Pain Score: 4  Pain Location: Knee  Pain Orientation: Left, Right     Objective   Activities of Daily Living: Grooming  Grooming Level of Assistance: Minimum assistance  Grooming Where Assessed: Edge of bed  Grooming Comments: assist for tooth brushing and face washing. Tooth brush built up with built up handle.    UE Dressing  UE Dressing Level of Assistance: Maximum assistance  UE Dressing Where Assessed: Edge of bed  UE Dressing Comments: assist to don/doff gown    Functional Standing Tolerance:     Bed Mobility/Transfers: Bed Mobility  Bed Mobility: Yes  Bed Mobility 1  Bed Mobility 1: Supine to sitting, Sitting to supine  Level of Assistance 1: Maximum assistance (x 2)  Bed Mobility Comments 1: assist with LE advancement and trunk control  Bed Mobility 2  Bed Mobility   2: Rolling right, Rolling left  Level of Assistance 2: Maximum assistance, Moderate tactile cues, Moderate verbal cues (assist x 2 to roll from side to side to assist with sheet adjustment)  Bed Mobility 3  Bed Mobility 3: Scooting  Level of Assistance 3: Dependent  Bed Mobility Comments 3: x 2 to bring to HOB with use of draw sheet    Sitting Balance:  Static Sitting Balance  Static Sitting-Balance Support: Bilateral upper extremity supported, Feet supported  Static Sitting-Level of Assistance: Minimum assistance  Dynamic Sitting Balance  Dynamic Sitting-Balance Support: Right upper extremity supported, Feet supported  Dynamic Sitting-Balance: Reaching across midline, Forward lean  Dynamic Sitting-Comments: Pt tolerated sitting EOB ~15 min ranging from min/max assist with VC for optimal posture and encouragement.    Outcome Measures:  [facility] Daily Activity  Putting on and taking off regular lower body clothing: Total  Bathing (including washing, rinsing, drying): A lot  Putting on and taking off regular upper body clothing: A lot  Toileting, which includes using toilet, bedpan or urinal: Total  Taking care of personal grooming such as brushing teeth: A little  Eating Meals: A lot  Daily Activity - Total Score: 11      Education Documentation  Home Exercise Program, taught by CHRISTIAN Dhaliwal at 2/26/2024 11:18 AM.  Learner: Patient  Readiness: Acceptance  Method: Explanation, Demonstration  Response: Verbalizes Understanding, Needs Reinforcement, No Evidence of Learning    Precautions, taught by CHRISTIAN Dhaliwal at 2/26/2024 11:18 AM.  Learner: Patient  Readiness: Acceptance  Method: Explanation, Demonstration  Response: Verbalizes Understanding, Needs Reinforcement, No Evidence of Learning    Body Mechanics, taught by CHRISTIAN Dhaliwal at 2/26/2024 11:18 AM.  Learner: Patient  Readiness: Acceptance  Method: Explanation, Demonstration  Response: Verbalizes Understanding, Needs Reinforcement, No  Evidence of Learning    ADL Training, taught by CHRISTIAN Dhaliwal at 2/26/2024 11:18 AM.  Learner: Patient  Readiness: Acceptance  Method: Explanation, Demonstration  Response: Verbalizes Understanding, Needs Reinforcement, No Evidence of Learning    Education Comments  No comments found.      Goals:  Encounter Problems       Encounter Problems (Active)       ADLs       Patient will perform UB bathing with stand by assist level of assistance at EOB  (Not Progressing)       Start:  02/20/24    Expected End:  03/05/24            Patient with complete upper body dressing with minimal assist  level of assistance donning and doffing all UE clothes with PRN adaptive equipment while edge of bed  (Progressing)       Start:  02/20/24    Expected End:  03/05/24            Patient will feed self with set-up level of assistance using PRN adaptive equipment. (Not Progressing)       Start:  02/20/24    Expected End:  03/05/24            Patient will complete daily grooming tasks brushing teeth and washing face/hair with stand by assist level of assistance and PRN adaptive equipment while edge of bed . (Progressing)       Start:  02/20/24    Expected End:  03/05/24                     TRANSFERS       Patient will perform bed mobility with maximal assist with bed rails in order to improve safety and independence with mobility (Progressing)       Start:  02/20/24    Expected End:  03/05/24

## 2024-02-26 NOTE — PROGRESS NOTES
Physical Therapy    Physical Therapy Treatment    Patient Name: Chevy Benton  MRN: [id number]  Today's Date: 2/26/2024  Time Calculation  Start Time: 1030  Stop Time: 1055  Time Calculation (min): 25 min       Assessment/Plan   PT Assessment  End of Session Communication: Bedside nurse  End of Session Patient Position: Bed, 3 rail up, Alarm on  PT Plan  Inpatient/Swing Bed or Outpatient: Inpatient  PT Plan  Treatment/Interventions: Bed mobility, Therapeutic exercise, Strengthening  PT Plan: Skilled PT  PT Frequency: 2 times per week  PT Discharge Recommendations: Moderate intensity level of continued care  PT Recommended Transfer Status: Total assist  PT - OK to Discharge: Yes (per POC)      General Visit Information:   PT  Visit  PT Received On: 02/26/24  General  Reason for Referral: Pt admitted from SNF for balanitis, sepsis and renal failure.  Referred By: Dr. Garcia  Co-Treatment: OT  Co-Treatment Reason: to maximize pt safety and mobility  Prior to Session Communication: Bedside nurse  Patient Position Received: Bed, 3 rail up, Alarm on  General Comment: Pt supine in bed upon arrival, cooperative, performed all mobility slowly    Subjective   Precautions:  Precautions  Medical Precautions: Fall precautions  Vital Signs:       Objective   Pain:  Pain Assessment  Pain Assessment: 0-10  Pain Score: 4  Pain Location: Knee  Pain Orientation: Left, Right  Cognition:  Cognition  Overall Cognitive Status: Within Functional Limits  Postural Control:  Static Sitting Balance  Static Sitting-Balance Support: Bilateral upper extremity supported, Feet supported  Static Sitting-Level of Assistance: Moderate assistance  Static Sitting-Comment/Number of Minutes: 10 (Moderate assistance at EOB to maintain balance due to posterior lean, cues given for hand placement)  Extremity/Trunk Assessments:  Activity Tolerance:     Treatments:  Therapeutic Exercise  Therapeutic Exercise Performed: Yes  Therapeutic Exercise Activity  1: Pt performed BLE Mounika GILMAN 15x2 AAROM with cues for form and participation         Bed Mobility  Bed Mobility: Yes  Bed Mobility 1  Bed Mobility 1: Supine to sitting, Sitting to supine  Level of Assistance 1: Maximum assistance (x2)  Bed Mobility Comments 1: HOB slightly elevated, Performed with cues for hand placement on rail and assistance for BLE and trunk control  Bed Mobility 2  Bed Mobility  2: Rolling right, Rolling left  Level of Assistance 2: Maximum assistance (x2)  Bed Mobility Comments 2: Cues given for hand placement on rails  Bed Mobility 3  Bed Mobility 3: Scooting  Level of Assistance 3: Dependent  Bed Mobility Comments 3: To HOB with draw sheet    Participated in performance of tx and documentation under the direct supervision of CI, student Yashira PEDRO     Outcome Measures:  [facility] Basic Mobility  Turning from your back to your side while in a flat bed without using bedrails: Total  Moving from lying on your back to sitting on the side of a flat bed without using bedrails: Total  Moving to and from bed to chair (including a wheelchair): Total  Standing up from a chair using your arms (e.g. wheelchair or bedside chair): Total  To walk in hospital room: Total  Climbing 3-5 steps with railing: Total  Basic Mobility - Total Score: 6    Education Documentation  Body Mechanics, taught by ANGELA Rich at 2/26/2024 11:42 AM.  Learner: Patient  Readiness: Acceptance  Method: Explanation  Response: Verbalizes Understanding, Needs Reinforcement    Mobility Training, taught by ANGELA Rich at 2/26/2024 11:42 AM.  Learner: Patient  Readiness: Acceptance  Method: Explanation  Response: Verbalizes Understanding, Needs Reinforcement    Education Comments  No comments found.        OP EDUCATION:       Encounter Problems       Encounter Problems (Active)       Balance       STG - Maintains static sitting balance with upper extremity support x 10 min with SBA (Progressing)       Start:   02/20/24    Expected End:  03/05/24       INTERVENTIONS:  1. Practice sitting on the edge of a bed/mat with minimal support.  2. Educate patient about maintining total hip precautions while maintaining balance.  3. Educate patient about pressure relief.  4. Educate patient about use of assistive device.         STG - Maintains dynamic sitting balance with upper extremity support x 10 min with CGA       Start:  02/20/24    Expected End:  03/05/24       INTERVENTIONS:  1. Practice sitting on the edge of a bed/mat with minimal support.  2. Educate patient about maintining total hip precautions while maintaining balance.  3. Educate patient about pressure relief.  4. Educate patient about use of assistive device.            Balance       STG - Maintains static standing balance with ww x 2 min with max A x 2.       Start:  02/20/24    Expected End:  03/05/24       INTERVENTIONS:  1. Practice standing with minimal support.  2. Educate patient about standing tolerance.  3. Educate patient about independence with gait, transfers, and ADL's.  4. Educate patient about use of assistive device.  5. Educate patient about self-directed care.            Mobility       Increase BLE strength to attain functional goals achieved through supine and seated TE. (Progressing)       Start:  02/20/24    Expected End:  03/05/24               Pain - Adult          Transfers       STG - Patient to transfer to and from sit to supine with mod A (Progressing)       Start:  02/20/24    Expected End:  03/05/24            STG - Patient will transfer sit to and from stand to ww with max A x 2.       Start:  02/20/24    Expected End:  03/05/24

## 2024-02-26 NOTE — PROGRESS NOTES
Problem: At Risk for Falls  Goal: # Patient does not fall  Outcome: Outcome Met, Continue evaluating goal progress toward completion   Q 2 turns    Problem: Pain  Goal: #Acceptable pain level achieved/maintained at rest using NRS/Faces  Description: This goal is used for patients who can self-report.  Acceptable means the level is at or below the identified comfort/function goal.  Outcome: Outcome Met, Continue evaluating goal progress toward completion   Pain managed with fentanyl gtt and intermittent boluses    Problem: Fluid Volume Excess, Risk for  Goal: # Absence of New Onset Dyspnea  Description: Dyspnea greater than SOB with Activity may be indicator of fluid volume excess  Outcome: Outcome Met, Continue evaluating goal progress toward completion   Tolerating ventilator    Problem: At Risk for Falls  Goal: # Takes action to control fall-related risks  Outcome: Outcome Not Met, Continue to Monitor   Intubated and sedated    Problem: Fluid Volume Excess, Risk for  Goal: # Absence of Rapid Weight Gain (no more than 2kg in 24 hours)  Description: FVE Risk Patients may gain weight (but not more than 2 kg) but may not require aggressive treatment if in the absence of dyspnea; FVE (actual) patients should be monitored to achieve no weight gain.   Outcome: Outcome Not Met, Continue to Monitor   Started on CVVH     2/26/24 15:23 I spoke to the patient's wife at [phone] and advised her the patient was medically clear and he should be discharged soon to [facility], that we are just waiting for the authorization to come through. Elmira Desir RN

## 2024-02-26 NOTE — CARE PLAN
The patient's goals for the shift include      The clinical goals for the shift include pt will be free from injury throughout the shift    Over the shift, the patient did not make progress toward the following goals.

## 2024-02-26 NOTE — CONSULTS
Wound Care Consult     Visit Date: 2/26/2024      Patient Name: Chevy Benton         MRN: [id number]           YOB: 1945     Reason for Consult: Reassessment completed to BLE. Co-visit with podiatry student. Additional supplies left at bedside.           Pertinent Labs:   Albumin   Date Value Ref Range Status   02/26/2024 2.6 (L) 3.4 - 5.0 g/dL Final       Wound Assessment:  Wound Incision Foot Dorsal foot;Right (Active)   Wound Image   02/26/24 1130   Site Assessment Clean;Dry 02/21/24 2100   Janessa-Wound Assessment Clean;Dry 02/21/24 2100   Wound Length (cm) 3 cm 02/26/24 1130   Wound Width (cm) 8.5 cm 02/26/24 1130   Wound Surface Area (cm^2) 25.5 cm^2 02/26/24 1130   Wound Depth (cm) 0.4 cm 02/26/24 1130   Wound Volume (cm^3) 10.2 cm^3 02/26/24 1130   Margins Well-defined edges 02/19/24 1040   Drainage Description None 02/24/24 0700   Drainage Amount None 02/24/24 0100   Dressing ABD;Kerlix/rolled gauze;Moist to dry 02/24/24 0100   Dressing Status Clean;Dry 02/24/24 0100       Wound 01/09/24 Moisture Associated Skin Damage Buttocks Right (Active)   Wound Image   02/19/24 1101   Site Assessment Intact 02/21/24 2100   Janessa-Wound Assessment Clean;Dry 02/21/24 2100   Pressure Injury Stage 3 02/21/24 2100   Wound Length (cm) 8 cm 02/19/24 1101   Wound Width (cm) 7 cm 02/19/24 1101   Wound Surface Area (cm^2) 56 cm^2 02/19/24 1101   Drainage Description None 02/24/24 2115   Drainage Amount None 02/24/24 2115   Dressing Alginate;Foam 02/24/24 2115   Dressing Changed Changed 02/24/24 0100   Dressing Status Clean;Dry 02/24/24 2115       Wound 01/22/24 Skin Tear Arm Right;Ventral;Mid (Active)       Wound 02/19/24 Pretibial Right (Active)   Wound Image   02/26/24 1131   Wound Length (cm) 10 cm 02/26/24 1131   Wound Width (cm) 7 cm 02/26/24 1131   Wound Surface Area (cm^2) 70 cm^2 02/26/24 1131   Margins Poorly defined 02/19/24 1047       Wound 02/19/24 Pretibial Left (Active)   Wound Image   02/26/24  1132   Wound Length (cm) 14 cm 02/26/24 1132   Wound Width (cm) 6 cm 02/26/24 1132   Wound Surface Area (cm^2) 84 cm^2 02/26/24 1132   Margins Poorly defined 02/19/24 1053       Wound Team Summary Assessment: Notified Dr. Garcia of updated wound care recommendations to bilateral lateral LE wounds. Continue with current wound care recommendations to TMA site.     Bilateral lateral LE   Irrigate with normal saline or wound cleanser, Pat dry.  Paint federica wound with betadine iodine   Apply single layer of  xeroform dressing over friable skin   Pad and protect with ABD, Kerlix and paper tape.   Change every day and as needed.     Please alternate between elevating heels with pillows, TruVue boots and EHOB waffle boots     Wound Team Plan: WOC will follow up next week if still admitted     While in bed patient should only be on one fitted sheet, and one chux. Please, do not use brief while patient is resting in bed. Elevate heels off the bed surface at all times. Turn and reposition at least every 2 hours.     Thank you for this consultations, while inpatient please contact with any questions or changes in condition.       Falguni Rico RN BSN,[facility],CWOCN  [phone]   2/26/2024  1:27 PM

## 2024-02-26 NOTE — NURSING NOTE
Pt is refusing ble dressing shift dressing change. This nurse encouraged to change dressing and educated on the risk/benefits of getting dressing changed pt continues to refuse. Will continue to encourage dressing change later in shift.

## 2024-02-26 NOTE — PROGRESS NOTES
Report to Receiving RN:    Report To: Vannessa Ritter  Time Report Called: [id number]  Hand-Off Communication: pt stable and took off 2 L of fluid post BP 97/50 HR 67  Complications During Treatment: No  Ultrafiltration Treatment: No  Medications Administered During Dialysis: No  Blood Products Administered During Dialysis: No  Labs Sent During Dialysis: No  Heparin Drip Rate Changes: No    Electronic Signatures:  Loree Estrella RN (Signed )   Authored:    (Signed )   Authored:     Last Updated: 3:55 PM by LOREE ESTRELLA

## 2024-02-26 NOTE — NURSING NOTE
This nurse continued to encourage pt to get ble dressing changed. Pt continues to refuse. Dr. Colón  made aware.

## 2024-02-26 NOTE — PROGRESS NOTES
Report from Sending RN:    Report From: Vannessa Ritter  Recent Surgery of Procedure: No  Baseline Level of Consciousness (LOC): AOx3  Oxygen Use: No  Type: N/A  Diabetic: Yes  Last BP Med Given Day of Dialysis: See EMAR  Last Pain Med Given: See EMAR  Lab Tests to be Obtained with Dialysis: No  Blood Transfusion to be Given During Dialysis: No  Available IV Access: Yes  Medications to be Administered During Dialysis: No  Continuous IV Infusion Running: No  Restraints on Currently or in the Last 24 Hours: No  Hand-Off Communication: Pt stable and ready to come to PACU for tx

## 2024-02-26 NOTE — PROGRESS NOTES
Chevy Benton is a 78 y.o. male on day 9 of admission presenting with Balanitis.      Subjective   Patient was clinically stable, seemed more edematous than yesterday without evaluation me and primary care physician,  Will schedule patient for hemodialysis today to stabilize metabolic profile and try to attempt to get more fluids oFF.       Objective        Vitals 24HR  Heart Rate:  []   Temp:  [36.4 °C (97.5 °F)-36.7 °C (98 °F)]   Resp:  [16-17]   BP: (108-116)/(65-69)   SpO2:  [91 %-97 %]     Intake/Output last 3 Shifts:    Intake/Output Summary (Last 24 hours) at 2/26/2024 1438  Last data filed at 2/26/2024 1345  Gross per 24 hour   Intake 400 ml   Output --   Net 400 ml       Physical Exam    GEN appearance: Awake and alert neck distress, currently on hemodialysis  Head and ENT: Normocephalic, atraumatic, supple neck, no JVD .  left-sided IJ PermCath noted  Lungs : Clear to auscultation, persistent basal crackles  Heart; RRR  Abdomen: Soft no tenderness  Extremities: +2 edema noted                Relevant Results     Results from last 7 days   Lab Units 02/26/24  0809 02/25/24  0611 02/24/24  0504   WBC AUTO x10*3/uL 10.5 7.3 10.2   HEMOGLOBIN g/dL 7.2* 9.4* 7.1*   HEMATOCRIT % 25.0* 32.4* 24.0*   PLATELETS AUTO x10*3/uL 351 350 404      Results from last 7 days   Lab Units 02/26/24  0528 02/25/24  0611 02/24/24  0504   SODIUM mmol/L 129* 135* 129*   POTASSIUM mmol/L 4.4 4.2 4.1   CHLORIDE mmol/L 95* 95* 92*   CO2 mmol/L 23 27 27   BUN mg/dL 30* 25* 38*   CREATININE mg/dL 3.79* 3.17* 3.85*   GLUCOSE mg/dL 136* 132* 102*   CALCIUM mg/dL 9.6 9.8 9.5        Current Facility-Administered Medications:     albuterol 2.5 mg /3 mL (0.083 %) nebulizer solution 2.5 mg, 2.5 mg, nebulization, q6h PRN, Pancho A Miktarian, APRN-CNP, DNP    apixaban (Eliquis) tablet 2.5 mg, 2.5 mg, oral, BID, EDWIN Sethi, ALPHONSO, 2.5 mg at 02/26/24 0911    atorvastatin (Lipitor) tablet 40 mg, 40 mg, oral, Nightly,  EDWIN Sethi DNP, 40 mg at 02/25/24 2044    clopidogrel (Plavix) tablet 75 mg, 75 mg, oral, Daily, EDWIN Sethi, ALPHONSO, 75 mg at 02/26/24 0911    dextrose 10 % in water (D10W) infusion, 0.3 g/kg/hr, intravenous, Once PRN, EDWIN Sethi DNP    dextrose 50 % injection 25 g, 25 g, intravenous, q15 min PRN, EDWIN Sethi, ALPHONSO    epoetin dane-epbx (Retacrit) injection 6,000 Units, 6,000 Units, subcutaneous, Once per day on Mon Wed Fri, Joseph Hunt MD, 6,000 Units at 02/23/24 1705    gabapentin (Neurontin) capsule 100 mg, 100 mg, oral, Daily, EDWIN Sethi, ALPHONSO, 100 mg at 02/26/24 0911    glucagon (Glucagen) injection 1 mg, 1 mg, intramuscular, q15 min PRN, EDWIN Sethi, ALPHONSO    heparin 1,000 unit/mL injection 2,000 Units, 2,000 Units, intra-catheter, After Dialysis, Bob Castellano DO, 2,000 Units at 02/24/24 1015    heparin 1,000 unit/mL injection 2,000 Units, 2,000 Units, intra-catheter, After Dialysis, Bob Castellano DO, 2,000 Units at 02/24/24 1015    insulin glargine (Lantus) injection 6 Units, 6 Units, subcutaneous, Nightly, EDWIN Sethi, ALPHONSO, 6 Units at 02/25/24 2223    insulin lispro (HumaLOG) injection 0-5 Units, 0-5 Units, subcutaneous, TID with meals, EDWIN Sethi, DNP, 1 Units at 02/20/24 0821    iohexol (OMNIPaque) 350 mg iodine/mL solution, , , PRN, Scott Lepe MD, 15 mL at 02/21/24 1625    ipratropium-albuteroL (Duo-Neb) 0.5-2.5 mg/3 mL nebulizer solution 3 mL, 3 mL, nebulization, q2h PRN, EDWIN Sethi DNP    lidocaine (Xylocaine) 20 mg/mL (2 %) injection, , , PRN, Scott Lepe MD, 10 mL at 02/23/24 0952    lidocaine 4 % patch 1 patch, 1 patch, transdermal, Daily, EDWIN Sethi DNP, 1 patch at 02/26/24 0911    midodrine (Proamatine) tablet 15 mg, 15 mg, oral, q8h, EDWIN Sethi DNP, 15 mg at 02/26/24 0557    mupirocin  (Bactroban) 2 % ointment, , Topical, TID, César Garcia DO, Given at 02/26/24 0911    oxyCODONE-acetaminophen (Percocet) 5-325 mg per tablet 1 tablet, 1 tablet, oral, q6h PRN, JENIFFER Sethi-CNP, DNP, 1 tablet at 02/25/24 1847    pantoprazole (ProtoNix) EC tablet 40 mg, 40 mg, oral, Daily before breakfast, JENIFFER Sethi-CNP, DNP, 40 mg at 02/26/24 0600    polyethylene glycol (Glycolax, Miralax) packet 17 g, 17 g, oral, Daily, JENIFFER Sethi-CNP, DNP, 17 g at 02/26/24 0912    sodium hypochlorite (Dakin's Quarter Strength) 0.125 % external solution, , irrigation, Daily, EDWIN Sethi, DNP, Given at 02/25/24 2044    traMADol (Ultram) tablet 50 mg, 50 mg, oral, TID PRN, JENIFFER Sethi-CNP, DNP, 50 mg at 02/25/24 2045           Assessment/Plan       1.  ESKD on hemodialysis  2.  Ulcer of lower extremity with sepsis, will continue antibiotics and ID follow-up, with wound care  3.  Chronic low blood pressure, will manage with current medications  4.  Anemia of CKD, will continue with erythropoietin and iron as needed  5.  Metabolic bone disease due to CKD  6.  Hyponatremia, resolved   Will follow BMP and CBC on daily basis reviewing all th consultants notes and input                        Principal Problem:    Balanitis  Active Problems:    Sepsis (CMS/HCC)    Chronic renal failure    Ulcer of lower extremity (CMS/HCC)    Hypotension              I spent 35 minutes in the professional and overall care of this patient.      Rere Matson MD

## 2024-02-26 NOTE — CARE PLAN
The patient's goals for the shift include  no falls    The clinical goals for the shift include pain management      Problem: Skin  Goal: Decreased wound size/increased tissue granulation at next dressing change  Outcome: Progressing     Problem: Skin  Goal: Prevent/manage excess moisture  Outcome: Progressing     Problem: Diabetes  Goal: Achieve decreasing blood glucose levels by end of shift  Outcome: Progressing     Problem: Diabetes  Goal: Increase stability of blood glucose readings by end of shift  Outcome: Progressing     Problem: Safety - Adult  Goal: Free from fall injury  Flowsheets (Taken 2/26/2024 9273)  Free from fall injury: Based on caregiver fall risk screen, instruct family/caregiver to ask for assistance with transferring infant if caregiver noted to have fall risk factors

## 2024-02-26 NOTE — PROGRESS NOTES
Chevy Benton is a 78 y.o. male on day 9 of admission presenting with Balanitis.      Subjective   Patient was seen and examined at bedside this morning, stated that he would like to have dialysis today, as he feel that his arms are swollen, and denied having any other symptoms at that time.  [facility] rounds were conducted later on, with patient being informed about discharge arrangements, but he was unable to confirm, and stated that his wife is in charge of choosing SNF.       Objective     Last Recorded Vitals  /65 (Patient Position: Lying)   Pulse 67   Temp 36.2 °C (97.2 °F) (Temporal)   Resp 17   Wt 87.9 kg (193 lb 12.6 oz)   SpO2 96%   Intake/Output last 3 Shifts:    Intake/Output Summary (Last 24 hours) at 2/26/2024 1832  Last data filed at 2/26/2024 1550  Gross per 24 hour   Intake 800 ml   Output --   Net 800 ml       Admission Weight  Weight: 87.5 kg (193 lb) (02/17/24 1128)    Daily Weight  02/22/24 : 87.9 kg (193 lb 12.6 oz)    Image Results  IR CVC tunneled  Narrative: Interpreted By:  Jose Wang,   STUDY:  IR CVC TUNNELED; ;  2/23/2024 10:04 am      ULTRASOUND AND FLUOROSCOPICALLY GUIDED LEFT INTERNAL JUGULAR VEIN  TUNNELED HEMODIALYSIS CATHETER      INDICATION:  Signs/Symptoms:permcath placement - ESRD. 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 Request for  replacement of tunneled hemodialysis catheter.      COMPARISON:  Angiogram 02/21/2024, chest radiograph 01/23/2024      ACCESSION NUMBER(S):  [id number]      ORDERING CLINICIAN:  YEU PEREZ      TECHNIQUE:      ATTENDING : Jose Wang M.D.      TECHNICAL DESCRIPTION/FINDINGS: The procedure, including all risks,  benefits and alternatives were explained to the patient and his proxy  in detail. All questions were answered and written  informed consent  was obtained.      The patient was positioned supine on the fluoroscopy table. A  time-out was performed.      The left neck and anterosuperior chest were prepped and draped in  usual sterile fashion. Focused ultrasound was performed of the left  internal jugular vein demonstrating patency and compressibility.  Ultrasound images were saved. 1% lidocaine was administered  subcutaneously for local anesthesia. Under real-time ultrasound  guidance, the left internal jugular vein was accessed in antegrade  direction using micropuncture technique. Ultrasound images were  saved. Under fluoroscopic visualization, an 018 guidewire was  advanced into the right atrium and a micropuncture transitional  introducer was placed.      Additional 1% lidocaine was then administered over the left  anterosuperior chest wall to anesthetize a subcutaneous tunnel tract.  The dual lumen cuffed hemodialysis catheter was then tunneled from a  left anterosuperior chest wall incision site and externalized  overlying the venotomy. After serial dilation over an 035 guidewire  which had been placed into the IVC, 15 Latvian peel-away sheath was  placed. Through the sheath, a 14.5 Latvian by 27 cm tip to cuff  dual-lumen hemodialysis catheter was placed. Completion fluoroscopic  image demonstrates the catheter tip at the superior cavoatrial  junction.      Catheter lumens easily aspirated and flushed were locked with a  concentrated heparinized solution (1000 units/cc). The catheter hub  was sutured to the skin with 2 0 Prolene stay suture. A sterile  dressing was applied.      SEDATION/MEDICATIONS: Continuous cardiopulmonary monitoring was  performed by a radiology nurse for the duration of the procedure. No  conscious sedation was administered. Procedural duration 20 minutes.  15 cc 1% Lidocaine was administered subcutaneously for local  anesthesia. SPECIMENS: None.  ESTIMATED BLOOD LOSS:  5 cc  FLOUROSCOPY:  0.4 minutes; DAP  3760  mGy-cm*2; Air Kerma  11.86 mGy  CONTRAST: None.      Impression: Ultrasound and fluoroscopically guided left internal jugular vein  tunneled hemodialysis catheter. The catheter is ready for immediate  use.          Signed by: Jose Wang 2/23/2024 11:13 AM  Dictation workstation:   [id number]      Physical Exam  Constitutional: Obese gentleman, alert active, cooperative not in acute distress  Skin: Permacath inserted in the left precordium insertion site intact covered with dressing  Eyes: PERRLA, clear sclera  ENMT: Moist mucosal membranes, no exudate  Head / Neck: Atraumatic, normocephalic, supple neck, JVP not visualized  Lungs: Patent airways, CTABL  Heart: RRR, S1S2, no murmurs appreciated, palpable pulses in all extremities  GI: Soft, NT, ND, bowel sounds present in all quadrants  MSK: Moves all extremities freely, no restriction  of ROM, no joint edema  Extremities: PICC line on right upper extremity arm, mild bilateral lower extremities peripheral edema  : No Alfonso catheter inserted, erythema over penis gland resolving, mupirocin cream available for application  Breast: Deferred  Neurological: AAO x 3 to person, place and date, facial muscles symmetrical, sensation intact, strength 4/4, no acute focal neurological deficits appreciated  Psychological: Appropriate mood and behavior    Relevant Results               Assessment/Plan      78 y.o. male with past medical history as noted below who is presenting to the emergency department for high potassium. This lab was drawn earlier today. Potassium is 9.0 and the patient has no noted EKG changes, no noted symptoms. Patient does additionally report that he is having pain in his penis that has been ongoing over the course the past 2 weeks           Principal Problem:    Balanitis  Active Problems:    Sepsis (CMS/HCC)    Chronic renal failure    Ulcer of lower extremity (CMS/HCC)    Hypotension    Sepsis: Presented with hypotension on admission  Suspect  balanitis: Mupirocin cream application, completed steroid application on admission  -unknown source  -Completed vanc and cefepime  -Femoral central line to be removed  -Mupirocin 1% ointment twice daily over the glans of penis  -continue home midodrine  -blood cx negative so far, will await 24 more hrs  -ID consulted for antibiotic regimen review, recommendation appreciated  -Will need PICC line removed if no discharge IV antibiotic planned     Left AV fistula bleed  -vascular surgery: Status post fistulogram, with 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, cautioned with HD catheter insertion for dialysis, was unsuccessful  -Patient has appointment with Dr. Dunlap, unfortunately still inpatient, may contact vascular for possible intervention while inpatient.  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%   - TTE 1/23/24 with LVEF to 40-45%, mildly improved from previous in 2023, no other new acute findings    -Uptitrate GDMT as tolerated--> consider hydralazine and isordil if needed, currently limited 2/2 hypotension on midodrine  - consideration should be given to further ischemic workup in the outpatient setting due to extensive peripheral disease, risk factors, and no evidence of previous ischemic cardiac workup         ESRD  -on HD  -nephrology consult: Appreciate management recommendation, planning hemodialysis once permacath placed     Anemia of chronic disease  -At baseline hgb  - continue daily CBC     DM with neuropathy  - A1c 5.3% 12/2023  -Cont home gabapentin  -Insulin glargine 6 units nightly    -SSI      Diet  -Diabetic     DVT prophylaxis   -On Eliquis 2.5 mg twice daily,  Plavix 75 mg daily     Disposition: Presenting with sepsis, balanitis suspected, but also has multiple postsurgical healing wound, discharge pending authorization, which  on 2024.     Discussed with family member, his son and spouse    César Garcia, DO

## 2024-02-26 NOTE — PROGRESS NOTES
Chevy Benton is a 78 y.o. male on day 8 of admission presenting with Balanitis.      Subjective   Show seen and examined at bedside this morning, stated that he slept well, and when asked about if middle percent cream is being applied to his penis gland for his balanitis, patient denied, was unable to corroborate with RN.       Objective     Last Recorded Vitals  BP 92/65 (BP Location: Other (Comment)) Comment (BP Location): wrist  Pulse 71   Temp 36.3 °C (97.4 °F)   Resp 16   Wt 87.9 kg (193 lb 12.6 oz)   SpO2 93%   Intake/Output last 3 Shifts:    Intake/Output Summary (Last 24 hours) at 2/25/2024 1904  Last data filed at 2/25/2024 0716  Gross per 24 hour   Intake 300 ml   Output 2 ml   Net 298 ml       Admission Weight  Weight: 87.5 kg (193 lb) (02/17/24 1128)    Daily Weight  02/22/24 : 87.9 kg (193 lb 12.6 oz)    Image Results  IR CVC tunneled  Narrative: Interpreted By:  Jose Wang,   STUDY:  IR CVC TUNNELED; ;  2/23/2024 10:04 am      ULTRASOUND AND FLUOROSCOPICALLY GUIDED LEFT INTERNAL JUGULAR VEIN  TUNNELED HEMODIALYSIS CATHETER      INDICATION:  Signs/Symptoms:permcath placement - ESRD. 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 Request for  replacement of tunneled hemodialysis catheter.      COMPARISON:  Angiogram 02/21/2024, chest radiograph 01/23/2024      ACCESSION NUMBER(S):  [id number]      ORDERING CLINICIAN:  YUE PEREZ      TECHNIQUE:      ATTENDING : Jose Wang M.D.      TECHNICAL DESCRIPTION/FINDINGS: The procedure, including all risks,  benefits and alternatives were explained to the patient and his proxy  in detail. All questions were answered and written informed consent  was obtained.      The patient was positioned supine on the fluoroscopy table. A  time-out was performed.      The  left neck and anterosuperior chest were prepped and draped in  usual sterile fashion. Focused ultrasound was performed of the left  internal jugular vein demonstrating patency and compressibility.  Ultrasound images were saved. 1% lidocaine was administered  subcutaneously for local anesthesia. Under real-time ultrasound  guidance, the left internal jugular vein was accessed in antegrade  direction using micropuncture technique. Ultrasound images were  saved. Under fluoroscopic visualization, an 018 guidewire was  advanced into the right atrium and a micropuncture transitional  introducer was placed.      Additional 1% lidocaine was then administered over the left  anterosuperior chest wall to anesthetize a subcutaneous tunnel tract.  The dual lumen cuffed hemodialysis catheter was then tunneled from a  left anterosuperior chest wall incision site and externalized  overlying the venotomy. After serial dilation over an 035 guidewire  which had been placed into the IVC, 15 Welsh peel-away sheath was  placed. Through the sheath, a 14.5 Welsh by 27 cm tip to cuff  dual-lumen hemodialysis catheter was placed. Completion fluoroscopic  image demonstrates the catheter tip at the superior cavoatrial  junction.      Catheter lumens easily aspirated and flushed were locked with a  concentrated heparinized solution (1000 units/cc). The catheter hub  was sutured to the skin with 2 0 Prolene stay suture. A sterile  dressing was applied.      SEDATION/MEDICATIONS: Continuous cardiopulmonary monitoring was  performed by a radiology nurse for the duration of the procedure. No  conscious sedation was administered. Procedural duration 20 minutes.  15 cc 1% Lidocaine was administered subcutaneously for local  anesthesia. SPECIMENS: None.  ESTIMATED BLOOD LOSS:  5 cc  FLOUROSCOPY:  0.4 minutes; DAP  3760 mGy-cm*2; Air Kerma  11.86 mGy  CONTRAST: None.      Impression: Ultrasound and fluoroscopically guided left internal jugular  vein  tunneled hemodialysis catheter. The catheter is ready for immediate  use.          Signed by: Jose Wang 2/23/2024 11:13 AM  Dictation workstation:   [id number]      Physical Exam  Constitutional: Obese gentleman, alert active, cooperative not in acute distress  Skin: Permacath inserted in the left precordium insertion site intact covered with dressing  Eyes: PERRLA, clear sclera  ENMT: Moist mucosal membranes, no exudate  Head / Neck: Atraumatic, normocephalic, supple neck, JVP not visualized  Lungs: Patent airways, CTABL  Heart: RRR, S1S2, no murmurs appreciated, palpable pulses in all extremities  GI: Soft, NT, ND, bowel sounds present in all quadrants  MSK: Moves all extremities freely, no restriction  of ROM, no joint edema  Extremities: PICC line on right upper extremity arm, mild bilateral lower extremities peripheral edema  : No Alfonso catheter inserted, erythema over penis gland resolving, mupirocin cream available for application  Breast: Deferred  Neurological: AAO x 3 to person, place and date, facial muscles symmetrical, sensation intact, strength 4/4, no acute focal neurological deficits appreciated  Psychological: Appropriate mood and behavior    Relevant Results           Scheduled medications  apixaban, 2.5 mg, oral, BID  atorvastatin, 40 mg, oral, Nightly  clopidogrel, 75 mg, oral, Daily  epoetin dane or biosimilar, 6,000 Units, subcutaneous, Once per day on Mon Wed Fri  gabapentin, 100 mg, oral, Daily  heparin, 2,000 Units, intra-catheter, After Dialysis  heparin, 2,000 Units, intra-catheter, After Dialysis  insulin glargine, 6 Units, subcutaneous, Nightly  insulin lispro, 0-5 Units, subcutaneous, TID with meals  lidocaine, 1 patch, transdermal, Daily  midodrine, 15 mg, oral, q8h  mupirocin, , Topical, TID  pantoprazole, 40 mg, oral, Daily before breakfast  polyethylene glycol, 17 g, oral, Daily  sodium hypochlorite, , irrigation, Daily      Continuous medications     PRN  medications  PRN medications: albuterol, dextrose 10 % in water (D10W), dextrose, glucagon, iohexol, ipratropium-albuteroL, lidocaine, oxyCODONE-acetaminophen, traMADol      Assessment/Plan        This patient has a central line   Reason for the central line remaining today? Dialysis/Hemapheresis      78 y.o. male with past medical history as noted below who is presenting to the emergency department for high potassium. This lab was drawn earlier today. Potassium is 9.0 and the patient has no noted EKG changes, no noted symptoms. Patient does additionally report that he is having pain in his penis that has been ongoing over the course the past 2 weeks           Principal Problem:    Balanitis  Active Problems:    Sepsis (CMS/[facility])    Chronic renal failure    Ulcer of lower extremity (CMS/[facility])    Hypotension    Sepsis: Presenting with hypotension  Suspect balanitis: Mupirocin cream application, completed steroid application on admission  -unknown source  -vanc and cefepime  -Mupirocin 1% ointment twice daily over the glans of penis  -continue home midodrine  -blood cx negative so far, will await 24 more hrs  -ID consulted for antibiotic regimen review, recommendation appreciated     Left AV fistula bleed  -vascular surgery: Status post fistulogram, with 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, cautioned with HD catheter insertion for dialysis, was unsuccessful  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%   - TTE 1/23/24 with LVEF to 40-45%, mildly improved from previous in 2023, no other new acute findings    -Uptitrate GDMT as tolerated--> consider hydralazine and isordil if needed,  currently limited 2/2 hypotension on midodrine  - consideration should be given to further ischemic workup in the outpatient setting due to extensive peripheral disease, risk factors, and no evidence of previous ischemic cardiac workup         ESRD  -on HD  -nephrology consult: Appreciate management recommendation, planning hemodialysis once permacath placed     Anemia of chronic disease  -At baseline hgb  - continue daily CBC     DM with neuropathy  - A1c 5.3% 2023  -Cont home gabapentin  -Insulin glargine 6 units nightly    -SSI      Diet  -Diabetic  -N.p.o. after midnight     DVT prophylaxis     -On Eliquis 2.5 mg twice daily, Plavix 75 mg daily     Disposition: Presenting with sepsis, balanitis suspected, but also has multiple postsurgical healing wound, discharge pending authorization, which  on 2024        César Garcia DO

## 2024-02-27 NOTE — DOCUMENTATION CLARIFICATION NOTE
"    PATIENT:               SHAMEKA SUN  ACCT #:                  [id number]  MRN:                       [id number]  :                       1945  ADMIT DATE:       2024 11:06 PM  DISCH DATE:  RESPONDING PROVIDER #:        [id number]          PROVIDER RESPONSE TEXT:    Sepsis was a differential diagnosis and ruled out after study    CDI QUERY TEXT:    [facility]_CV Sepsis        Instruction:  Based on your assessment of the patient and the clinical information, please provide the requested documentation by clicking on the appropriate radio button and enter any additional information if prompted.    Question: Sepsis was documented in the medical record. Based on the documentation and the clinical information, can the diagnosis be further clarified as      When answering this query, please exercise your independent professional judgment. The fact that a question is being asked, does not imply that any particular answer is desired or expected.    The patient's clinical indicators include:  Clinical Information: Patient admitted with Balanitis of unknown source    Documented Diagnosis: History and physical and subsequent notes: \" Sepsis: Presenting with hypotension\"    Clinical Indicators:  -Vital Signs-history and physical note: Temp-36.8, HR-94. RR-23, BP-104/91, SpO2-95 percent on RA  -WBC: 24-13.2  -Microbiology Results: none  -Band Neutrophil Count/percent Band Neutrophil: not available  -Lactic acid: 24-1.0  -BUN/Creat: 24-34/2.37  -Blood cultures: negative  -Bilirubin: 24-0.5  -MAP: 68 on 24 at 02:30  -[place] Coma Scale: not available  -PAO2/FIO2: unknown O2 amount used on 24 and 24  -Procalcitonin: not available  -Platelets:   -Other clinical indicators: History and physical-ROS and physical exam: Respiratory:  \"Negative for cough, chest tightness and shortness of breath. Negative for agitation, behavioral problems and confusion. Normal appearance\"  -24 " "Renal note: \"Chronic low blood pressure, will manage with current medications\"  -2/27/24 Medicine note-\"chronic hypotension on midodrine\", no mention of Sepsis by new provider    Treatment: IV Vanco and Cefepime,    Risk Factors: SIRS, infection, advanced age  Options provided:  -- Sepsis was a differential diagnosis and ruled out after study  -- Sepsis with circulatory organ dysfunction of hypotension, Please confirm the underlying infection if known  -- Sepsis with other organ dysfunction, Please specify sepsis associated organ dysfunction and confirm the underlying infection if known below  -- Other - I will add my own diagnosis  -- Refer to Clinical Documentation Reviewer    Query created by: Tiffanie Hernandez on 2/27/2024 12:16 PM      Electronically signed by:  REYNOLD DAN MD 2/27/2024 1:16 PM          "

## 2024-02-27 NOTE — PROGRESS NOTES
"[facility] Hospitalist Progress Note        Chevy Benton is a 78 y.o. male on day 10 of admission presenting with Balanitis.    Subjective   NAEON. Overall stable, complains of b/l LE pain. Awaiting placement    Review of Systems   Constitutional:  Negative for fever.   Respiratory:  Negative for shortness of breath.    Cardiovascular:  Negative for chest pain.   Gastrointestinal:  Negative for abdominal distention, abdominal pain and vomiting.   Musculoskeletal:  Positive for arthralgias and myalgias.   Skin:  Positive for wound.       Objective     Physical Exam  Constitutional:       General: He is not in acute distress.  Cardiovascular:      Rate and Rhythm: Normal rate. Rhythm irregular.   Pulmonary:      Effort: Pulmonary effort is normal.      Breath sounds: Normal breath sounds.   Abdominal:      General: There is no distension.      Palpations: Abdomen is soft.   Skin:     Comments:   [facility] TDC  R arm midline   Neurological:      Mental Status: He is alert. Mental status is at baseline.         Last Recorded Vitals  Blood pressure 139/83, pulse 92, temperature 36.7 °C (98 °F), resp. rate 18, height 1.778 m (5' 10\"), weight 87.9 kg (193 lb 12.6 oz), SpO2 98 %.  Intake/Output last 3 Shifts:  I/O last 3 completed shifts:  In: 1420 (16.2 mL/kg) [P.O.:620; I.V.:800 (9.1 mL/kg)]  Out: 0 (0 mL/kg)   Weight: 87.9 kg     Relevant Results  Results for orders placed or performed during the hospital encounter of 02/16/24 (from the past 24 hour(s))   POCT GLUCOSE   Result Value Ref Range    POCT Glucose 136 (H) 74 - 99 mg/dL   POCT GLUCOSE   Result Value Ref Range    POCT Glucose 120 (H) 74 - 99 mg/dL   POCT GLUCOSE   Result Value Ref Range    POCT Glucose 151 (H) 74 - 99 mg/dL   CBC   Result Value Ref Range    WBC 10.5 4.4 - 11.3 x10*3/uL    nRBC 0.2 (H) 0.0 - 0.0 /100 WBCs    RBC 2.57 (L) 4.50 - 5.90 x10*6/uL    Hemoglobin 7.5 (L) 13.5 - 17.5 g/dL    Hematocrit 26.1 (L) 41.0 - 52.0 %     (H) 80 - 100 fL    MCH " 29.2 26.0 - 34.0 pg    MCHC 28.7 (L) 32.0 - 36.0 g/dL    RDW 20.4 (H) 11.5 - 14.5 %    Platelets 326 150 - 450 x10*3/uL   Renal Function Panel   Result Value Ref Range    Glucose 137 (H) 74 - 99 mg/dL    Sodium 131 (L) 136 - 145 mmol/L    Potassium 4.5 3.5 - 5.3 mmol/L    Chloride 94 (L) 98 - 107 mmol/L    Bicarbonate 27 21 - 32 mmol/L    Anion Gap 15 10 - 20 mmol/L    Urea Nitrogen 26 (H) 6 - 23 mg/dL    Creatinine 3.35 (H) 0.50 - 1.30 mg/dL    eGFR 18 (L) >60 mL/min/1.73m*2    Calcium 9.9 8.6 - 10.3 mg/dL    Phosphorus 4.5 2.5 - 4.9 mg/dL    Albumin 2.7 (L) 3.4 - 5.0 g/dL   POCT GLUCOSE   Result Value Ref Range    POCT Glucose 121 (H) 74 - 99 mg/dL       Imaging Results  No results found.     Medications:  apixaban, 2.5 mg, oral, BID  atorvastatin, 40 mg, oral, Nightly  clopidogrel, 75 mg, oral, Daily  epoetin dane or biosimilar, 6,000 Units, subcutaneous, Once per day on Mon Wed Fri  gabapentin, 100 mg, oral, Daily  heparin, 2,000 Units, intra-catheter, After Dialysis  heparin, 2,000 Units, intra-catheter, After Dialysis  insulin glargine, 6 Units, subcutaneous, Nightly  insulin lispro, 0-5 Units, subcutaneous, TID with meals  ketotifen, 1 drop, Both Eyes, BID  lidocaine, 1 patch, transdermal, Daily  midodrine, 15 mg, oral, q8h  mupirocin, , Topical, TID  pantoprazole, 40 mg, oral, Daily before breakfast  polyethylene glycol, 17 g, oral, Daily  sodium hypochlorite, , irrigation, Daily       PRN medications: albuterol, dextrose 10 % in water (D10W), dextrose, glucagon, iohexol, ipratropium-albuteroL, lidocaine, oxyCODONE-acetaminophen, traMADol     Assessment/Plan     #suspected balanitis  - appreciated ID eval  - mupirocin can complete 10-14 day course  - hygiene measures    #ESRD on iHD  #chronic hypotension on midodrine  #anemia of ESRD  - appreciate nephro    #L AV fistula w/ pseudoaneurysm  #new L TDC  - not accessing fistula now has new TDC    #PAD  #R subclavian DVT  #A fib  - continue his plavix, lower  dose Eliquis    #Chronic wounds  - appreciate wound care assistance    #idDM  - continue lantus and ssi    #reported hx of HF  - at present on midodrine  - would need outpatient cardiology follow up    DVT Prophylaxis:  Eliquis      Discharge Planning: SNF once approved    I personally examined the patient and reviewed chart, data, labs and radiology reports.  Plan of care was discussed with patient, all questions answered.    Total time spent: At least 38 minutes, providing counseling or in coordination of care. Total time on this day of visit includes record and documentation review before and after visit including documentation and time not explicitly included on EMR time stamp.    Dragan Marsh MD  [facility]

## 2024-02-27 NOTE — PROGRESS NOTES
2/27/24 13:29 faxed paperwork to [facility] for completion of [facility], for discharge planning, Elmira Desir      14:53 we have HD itzel, just waiting for Facility [facility], Elmira Desir RN TCC

## 2024-02-28 NOTE — CARE PLAN
The patient's goals for the shift include      The clinical goals for the shift include pt remains hemodynamically stable    Problem: Skin  Goal: Decreased wound size/increased tissue granulation at next dressing change  Outcome: Progressing  Goal: Participates in plan/prevention/treatment measures  Outcome: Progressing  Goal: Prevent/manage excess moisture  Outcome: Progressing  Goal: Prevent/minimize sheer/friction injuries  Outcome: Progressing  Goal: Promote/optimize nutrition  Outcome: Progressing  Goal: Promote skin healing  Outcome: Progressing     Problem: Diabetes  Goal: Achieve decreasing blood glucose levels by end of shift  Outcome: Progressing  Goal: Increase stability of blood glucose readings by end of shift  Outcome: Progressing  Goal: Decrease in ketones present in urine by end of shift  Outcome: Progressing  Goal: Maintain electrolyte levels within acceptable range throughout shift  Outcome: Progressing  Goal: Maintain glucose levels >70mg/dl to <250mg/dl throughout shift  Outcome: Progressing  Goal: No changes in neurological exam by end of shift  Outcome: Progressing  Goal: Learn about and adhere to nutrition recommendations by end of shift  Outcome: Progressing  Goal: Vital signs within normal range for age by end of shift  Outcome: Progressing  Goal: Increase self care and/or family involovement by end of shift  Outcome: Progressing  Goal: Receive DSME education by end of shift  Outcome: Progressing     Problem: Pain - Adult  Goal: Verbalizes/displays adequate comfort level or baseline comfort level  Outcome: Progressing     Problem: Safety - Adult  Goal: Free from fall injury  Outcome: Progressing     Problem: Discharge Planning  Goal: Discharge to home or other facility with appropriate resources  Outcome: Progressing     Problem: Chronic Conditions and Co-morbidities  Goal: Patient's chronic conditions and co-morbidity symptoms are monitored and maintained or improved  Outcome: Progressing

## 2024-02-28 NOTE — PROGRESS NOTES
Report from Sending RN:    Report From: Hamida Jane  Recent Surgery of Procedure: No  Baseline Level of Consciousness (LOC): AOx3  Oxygen Use: No  Type: N/A  Diabetic: No  Last BP Med Given Day of Dialysis: See EMAR  Last Pain Med Given: See EMAR  Lab Tests to be Obtained with Dialysis: No  Blood Transfusion to be Given During Dialysis: No  Available IV Access: Yes  Medications to be Administered During Dialysis: No  Continuous IV Infusion Running: No  Restraints on Currently or in the Last 24 Hours: No  Hand-Off Communication: Pt stable and ready to come to PACU

## 2024-02-28 NOTE — PROGRESS NOTES
02/28/24 0747   Discharge Planning   Patient expects to be discharged to: SNF     2/28/24 07:48 received auth from [facility], [employer] has been signed, will let [facility] know patient is being discharged so transport can be set up for patient. Elmira MORALES RN

## 2024-02-28 NOTE — PROCEDURES
Hemodialysis Note     Seen the patient on hemodialysis. Duration of hemodialysis 3 hrs, using F160 Dialyzer. Target ultrafiltration 1.5 L . Blood flow at 300 ml and the Dialysate flow at 500 ml.  Using 2K bath. Patient tolerating hemodialysis well.  On 6000 units 3 times a week for anemia in end-stage renal disease.  Recommend to stop Epo once hemoglobin is more than 10.5

## 2024-02-28 NOTE — PROGRESS NOTES
Report to Receiving RN:    Report To: Hamida Jane  Time Report Called: [id number]  Hand-Off Communication: Pt stable took off 1.5 L of fluid, post /78 HR 99  Complications During Treatment: No  Ultrafiltration Treatment: No  Medications Administered During Dialysis: No  Blood Products Administered During Dialysis: No  Labs Sent During Dialysis: No  Heparin Drip Rate Changes: No    Electronic Signatures:  Edwin Paz RN (Signed )   Authored:    (Paula)   Authored:     Last Updated: 4:36 PM by EDWIN PAZ

## 2024-02-28 NOTE — PROGRESS NOTES
02/28/24 0922   Discharge Planning   Patient expects to be discharged to: SNF     2/28/24 09:22 I did call the patient's wife to let her know the patient has been discharged and he will receive HD today than be discharged to [facility], sometime after 4pm.  She was agreeable with this. Elmira MORALES RN

## 2024-02-28 NOTE — DISCHARGE SUMMARY
[facility] Hospitalist Discharge Summary        Chevy HanleyOB: 1945MRN: [id number]    ADMIT DATE: 2/16/2024DISCHARGE DATE: 2/28/2024    PRIMARYCARE PHYSICIAN: No Assigned PCP Generic Provider, MD    VISIT STATUS: Inpatient    CODE STATUS: Full Code    DISCHARGE DIAGNOSES:    Principal Problem:    Balanitis  Active Problems:    Sepsis (CMS/HCC)    Chronic renal failure    Ulcer of lower extremity (CMS/[facility])    Hypotension      CONSULTANTS:  Nephro  ID    HOSPITAL COURSE:    #suspected balanitis  - appreciated ID eval  - mupirocin can complete 10-14 day course  - hygiene measures     #ESRD on iHD  #chronic hypotension on midodrine  #anemia of ESRD  #b/l UE edema  - appreciate nephro  - continue optimization of fluid balance  - elevate/compression wrap b/l UE     #L AV fistula w/ pseudoaneurysms  #new L TDC  - not accessing fistula now has new TDC  - discussed with vascular on call, follow up with his outpatient vascular surgeon regarding fistula/pseudoaneurysms     #PAD  #R subclavian DVT  #A fib  - continue his plavix, lower dose Eliquis     #Chronic wounds  - appreciate wound care assistance     #idDM  - continue lantus and ssi     #reported hx of HF  - at present on midodrine  - would need outpatient cardiology follow up      He is medically stable for DC to facility today    DAY OF DISCHARGE:  Review of Systems   Constitutional:  Negative for fever.   Respiratory:  Negative for shortness of breath.    Cardiovascular:  Negative for chest pain.   Gastrointestinal:  Negative for abdominal distention, abdominal pain and vomiting.   Skin:  Positive for wound.       Patient Vitals for the past 24 hrs:   BP Temp Temp src Pulse Resp SpO2   02/28/24 0755 166/76 36.3 °C (97.4 °F) -- 97 18 95 %   02/28/24 0600 139/83 36.5 °C (97.7 °F) -- 99 -- 94 %   02/28/24 0024 136/72 -- -- 97 -- --   02/27/24 2333 83/53 36.6 °C (97.9 °F) Oral 98 18 94 %   02/27/24 2040 84/53 36.9 °C (98.4 °F) Oral 108 18 93 %   02/27/24 1540 127/78  36.5 °C (97.7 °F) -- 102 18 94 %   24 1100 -- -- -- -- -- 95 %       Average, Min, and Max forlast 24 hours Vitals:  TEMPERATURE: Temp  Av.6 °C (97.8 °F)  Min: 36.3 °C (97.4 °F)  Max: 36.9 °C (98.4 °F)    RESPIRATIONS RANGE: Resp  Av  Min: 18  Max: 18    PULSE RANGE: Pulse  Av.2  Min: 97  Max: 108    BLOOD PRESSURE RANGE: Systolic (24hrs), Av , Min:83 , Max:166   ; Diastolic (24hrs), Av, Min:53, Max:83      PULSE OXIMETRYRANGE: SpO2  Av.2 %  Min: 93 %  Max: 95 %    I/O last 3 completed shifts:  In: 1440 (16.4 mL/kg) [P.O.:1440]  Out: - (0 mL/kg)   Weight: 87.9 kg     Physical Exam  Constitutional:       General: He is not in acute distress.  Cardiovascular:      Rate and Rhythm: Normal rate. Rhythm irregular.   Pulmonary:      Effort: Pulmonary effort is normal.      Breath sounds: Normal breath sounds.   Abdominal:      General: There is no distension.      Palpations: Abdomen is soft.   Skin:     Findings: Lesion present.   Neurological:      Mental Status: He is alert. Mental status is at baseline.           Discharge Meds       Your medication list        START taking these medications        Instructions Last Dose Given Next Dose Due   heparin 1,000 unit/mL injection  Start taking on: 2024      2 mL (2,000 Units) by intra-catheter route once daily on dialysis days. Do not start before 2024.       mupirocin 2 % ointment  Commonly known as: Bactroban      Apply topically 2 times a day for 14 days.              CHANGE how you take these medications        Instructions Last Dose Given Next Dose Due   acetaminophen 325 mg tablet  Commonly known as: Tylenol  What changed:   when to take this  reasons to take this      Take 3 tablets (975 mg) by mouth every 6 hours if needed for mild pain (1 - 3).              CONTINUE taking these medications        Instructions Last Dose Given Next Dose Due   albuterol 90 mcg/actuation inhaler           allopurinol 100 mg  tablet  Commonly known as: Zyloprim           apixaban 2.5 mg tablet  Commonly known as: Eliquis           atorvastatin 40 mg tablet  Commonly known as: Lipitor      Take 1 tablet (40 mg) by mouth once daily at bedtime.       clopidogrel 75 mg tablet  Commonly known as: Plavix           dextrose 50 % injection      Infuse 50 mL (25 g) into a venous catheter every 15 minutes if needed (For blood glucose less than or equal to 40 mg/dL).       gabapentin 100 mg capsule  Commonly known as: Neurontin      Take 1 capsule (100 mg) by mouth once daily.       glucagon 1 mg injection  Commonly known as: Glucagen      Inject 1 mg into the muscle every 15 minutes if needed for low blood sugar - see comments (For blood glucose less than or equal to 70 mg/dL and no IV access).       insulin glargine 100 unit/mL injection  Commonly known as: Lantus      Inject 6 Units under the skin once daily at bedtime. Take as directed per insulin instructions.       ipratropium-albuteroL 0.5-2.5 mg/3 mL nebulizer solution  Commonly known as: Duo-Neb      Take 3 mL by nebulization every 6 hours if needed for wheezing or shortness of breath.       loratadine 10 mg tablet  Commonly known as: Claritin      TAKE 1 TABLET (10 MG) BY MOUTH EVERY OTHER DAY IF NEEDED FOR ALLERGIES.       melatonin 5 mg tablet      Take 1 tablet (5 mg) by mouth once daily at bedtime.       midodrine 5 mg tablet  Commonly known as: Proamatine      Take 3 tablets (15 mg) by mouth every 8 hours.       Miralax 17 gram packet  Generic drug: polyethylene glycol           pantoprazole 40 mg EC tablet  Commonly known as: ProtoNix      Take 1 tablet (40 mg) by mouth once daily in the morning. Take before meals. Do not crush, chew, or split. Do not start before February 11, 2024.       povidone-iodine 7.5 % solution  Commonly known as: Betadine      Apply 1 Application topically once daily as needed for wound care.       SantyL 250 unit/gram ointment  Generic drug: collagenase            sennosides 8.6 mg tablet  Commonly known as: Senokot      Take 1 tablet (8.6 mg) by mouth once daily at bedtime.       Triphrocaps 1 mg capsule  Generic drug: B complex-vitamin C-folic acid           lidocaine 4 % patch      Place 1 patch over 12 hours on the skin once daily. Remove & discard patch within 12 hours or as directed by MD.              STOP taking these medications      insulin lispro 100 unit/mL injection  Commonly known as: HumaLOG        oxyCODONE 5 mg immediate release tablet  Commonly known as: Roxicodone                  Where to Get Your Medications        These medications were sent to [facility]/pharmacy #[id number] - [facility], [place] [facility] [address] [address] [facility]  [address] [address], [place] [facility] [place] [address]      Phone: [phone]   mupirocin 2 % ointment       Information about where to get these medications is not yet available    Ask your nurse or doctor about these medications  acetaminophen 325 mg tablet  heparin 1,000 unit/mL injection           FOLLOW UP TESTING, PENDING RESULTS OR REFERRALS AT FOLLOW UP VISIT:   [X] Yes   [ ] No    DISPOSITION: SNF    [facility] NAME[facility]    Follow up with PCP No Assigned PCP Generic Provider, MD    Outpatient Follow-Up Appointments  Future Appointments   Date Time Provider Department Center   2/28/2024  1:00 PM [facility] HEMODIALYSIS CHAIR 4 [id number] None   4/17/2024  1:00 PM Koko Gordon MD [id number]       I personally examined the patient and reviewed chart, data, labs and radiology reports.  Plan of care was discussed with patient/son, all questions answered.    DISCHARGE TIME: > 30 minutes providing counseling or in coordination of care. Total time on this day of visit includes record and documentation review before and after visit including documentation and time not explicitly included on EMR time stamp.    Dragan Marsh MD  [facility]

## 2024-02-29 NOTE — LETTER
Patient: Chevy Benton  : 1945    Encounter Date: 2024    PROGRESS NOTE    Subjective  Chief complaint: Chevy Benton is a 78 y.o. male who is an acute skilled patient being seen and evaluated for weakness    HPI:  HPI  This patient was readmitted to SNF for therapy due to generalized weakness and for medical management after recent hospitalization for sepsis.  Therapy to evaluate and establish plan of care and goals with patient.  Patient seen and examined at bedside in no apparent distress.  Nursing staff voices no new concerns today.  Patient denies nausea vomiting fever chills.     Objective  Vital signs: 112/68, 98.0, 52, 18, 99%    Physical Exam  Constitutional:       General: He is not in acute distress.  Cardiovascular:      Rate and Rhythm: Normal rate. Rhythm irregular.   Pulmonary:      Effort: Pulmonary effort is normal.      Breath sounds: Normal breath sounds.   Musculoskeletal:      Cervical back: Neck supple.      Right lower leg: No edema.      Left lower leg: No edema.      Comments: Left BKA   Neurological:      Mental Status: He is alert.      Motor: Weakness present.   Psychiatric:         Behavior: Behavior is cooperative.         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 (CMS/[facility])     BP at goal  Monitor blood pressure  CHF, stable  Monitor for shortness of breath  Remove extra fluid in dialysis  HD per nephrology         Weakness - Primary     Therapy to evaluate and establish plan of care and goals          Medications, treatments, and labs reviewed  Continue medications and treatments as listed in EMR      Darline Richter Scribe    attest that this documentation has been prepared under the direction and in the presence of EDWIN Aguilar    Provider Attestation - Scribe documentation  All medical record entries made by the Scribe were at my direction and personally dictated by me. I have reviewed the chart and agree that the record accurately reflects my personal performance of the history, physical exam, discussion and plan.   EDWIN Aguilar        Electronically Signed By: EDWIN Aguilar   3/8/24  9:14 AM

## 2024-03-01 PROBLEM — T07.XXXA MULTIPLE WOUNDS: Status: ACTIVE | Noted: 2024-01-01

## 2024-03-01 PROBLEM — I48.91 A-FIB (MULTI): Status: ACTIVE | Noted: 2024-01-01

## 2024-03-01 PROBLEM — I48.11 LONGSTANDING PERSISTENT ATRIAL FIBRILLATION (MULTI): Status: RESOLVED | Noted: 2023-01-01 | Resolved: 2024-01-01

## 2024-03-01 NOTE — PROGRESS NOTES
HISTORY & PHYSICAL    Subjective   Chief complaint: Chevy Benton is a 78 y.o. male who is being seen and evaluated for multiple medical problems.  Patient admitted to SNF for therapy due to weakness after recent hospitalization.    HPI:  HPI  Patient presents to the hospital due to high potassium that was drawn prior.  Patient's potassium was reported to be 9.0, no EKG changes and no other symptoms.  Patient did have complaints of penile pain over 2 weeks prior.  Patient was admitted for further evaluation management.  Patient was diagnosed with balanitis, sepsis and was placed on antibiotics with blood cultures followed.  Patient continued on HD for diagnosis of ESRD in hospital with nephrology consult.  Patient does have chronic wounds with treatment in place.  Patient was hemodynamically stable to discharge back to SNF for continued medical management and therapy.  Patient was seen and examined at bedside, appears to be in no acute distress.  Chest pain or shortness of breath.  Denies nausea or vomiting.  Past Medical History:   Diagnosis Date    Acute hypercapnic respiratory failure (CMS/HCC)     Acute on chronic HFrEF (heart failure with reduced ejection fraction) (CMS/[facility])     Diabetes mellitus (CMS/HCC)     ESRD on hemodialysis (CMS/[facility])     History of angioplasty of peripheral vessel 10/26/2023    HTN (hypertension)     LFT elevation 07/20/2021    Small bowel obstruction (CMS/HCC) 10/10/2023    Stage II pressure ulcer (CMS/[facility])        Past Surgical History:   Procedure Laterality Date    INVASIVE VASCULAR PROCEDURE Right 1/17/2024    Procedure: Lower Extremity Angiogram;  Surgeon: Stuart Nguyen MD;  Location: [place] [address] [facility];  Service: Cardiovascular;  Laterality: Right;       Family History   Problem Relation Name Age of Onset    Diabetes Mother         Social History     Socioeconomic History    Marital status:      Spouse name: Not on file    Number of children: Not on file    Years  of education: Not on file    Highest education level: Not on file   Occupational History    Not on file   Tobacco Use    Smoking status: Never     Passive exposure: Past    Smokeless tobacco: Never   Substance and Sexual Activity    Alcohol use: Not on file    Drug use: Not on file    Sexual activity: Not on file   Other Topics Concern    Not on file   Social History Narrative    Not on file     Social Determinants of Health     Financial Resource Strain: Low Risk  (2/19/2024)    Overall Financial Resource Strain (CARDIA)     Difficulty of Paying Living Expenses: Not hard at all   Recent Concern: Financial Resource Strain - Medium Risk (2/17/2024)    Overall Financial Resource Strain (CARDIA)     Difficulty of Paying Living Expenses: Somewhat hard   Food Insecurity: Not on file   Transportation Needs: No Transportation Needs (2/19/2024)    PRAPARE - Transportation     Lack of Transportation (Medical): No     Lack of Transportation (Non-Medical): No   Physical Activity: Not on file   Stress: Not on file   Social Connections: Not on file   Intimate Partner Violence: Not on file   Housing Stability: Low Risk  (2/19/2024)    Housing Stability Vital Sign     Unable to Pay for Housing in the Last Year: No     Number of Places Lived in the Last Year: 1     Unstable Housing in the Last Year: No       Vital signs: 114/64, 98.0, 52, 18, blood sugar 215, 99%    Objective   Physical Exam  Constitutional:       General: He is not in acute distress.  Eyes:      Extraocular Movements: Extraocular movements intact.   Cardiovascular:      Rate and Rhythm: Normal rate and regular rhythm.   Pulmonary:      Effort: Pulmonary effort is normal.      Breath sounds: Normal breath sounds.   Abdominal:      General: Bowel sounds are normal.      Palpations: Abdomen is soft.   Musculoskeletal:      Cervical back: Neck supple.      Right lower leg: No edema.      Left lower leg: No edema.      Comments: Left upper extremity AV  fistula  Bilateral lower extremity dressings clean dry and intact   Neurological:      Mental Status: He is alert.   Psychiatric:         Mood and Affect: Mood normal.         Behavior: Behavior is cooperative.         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  Remove extra fluid in dialysis  HD per nephrology         GERD (gastroesophageal reflux disease)     PPI  Monitor GI symptoms         Balanitis     Completed antibiotics in hospital         Multiple wounds     Continue treatment and interventions per EMR         A-fib (CMS/[facility])     Apixaban  Bleeding precautions          Hospital records reviewed  Medications, treatments, and labs reviewed  Continue medications and treatments as listed in EMR  Discussed with nursing and therapy      Scribe Attestation  Darline LAUREN Scribe   attest that this documentation has been prepared under the direction and in the presence of Miley Guerra MD    Provider Attestation - Scribe documentation  All medical record entries made by the Scribe were at my direction and personally dictated by me. I have reviewed the chart and agree that the record accurately reflects my personal performance of the history, physical exam, discussion and plan.   Miley Guerra MD

## 2024-03-01 NOTE — LETTER
Patient: Chevy Benton  : 1945    Encounter Date: 2024    HISTORY & PHYSICAL    Subjective  Chief complaint: Chevy Benton is a 78 y.o. male who is being seen and evaluated for multiple medical problems.  Patient admitted to SNF for therapy due to weakness after recent hospitalization.    HPI:  HPI  Patient presents to the hospital due to high potassium that was drawn prior.  Patient's potassium was reported to be 9.0, no EKG changes and no other symptoms.  Patient did have complaints of penile pain over 2 weeks prior.  Patient was admitted for further evaluation management.  Patient was diagnosed with balanitis, sepsis and was placed on antibiotics with blood cultures followed.  Patient continued on HD for diagnosis of ESRD in hospital with nephrology consult.  Patient does have chronic wounds with treatment in place.  Patient was hemodynamically stable to discharge back to SNF for continued medical management and therapy.  Patient was seen and examined at bedside, appears to be in no acute distress.  Chest pain or shortness of breath.  Denies nausea or vomiting.  Past Medical History:   Diagnosis Date   • Acute hypercapnic respiratory failure (CMS/HCC)    • Acute on chronic HFrEF (heart failure with reduced ejection fraction) (CMS/HCC)    • Diabetes mellitus (CMS/HCC)    • ESRD on hemodialysis (CMS/HCC)    • History of angioplasty of peripheral vessel 10/26/2023   • HTN (hypertension)    • LFT elevation 2021   • Small bowel obstruction (CMS/HCC) 10/10/2023   • Stage II pressure ulcer (CMS/HCC)        Past Surgical History:   Procedure Laterality Date   • INVASIVE VASCULAR PROCEDURE Right 2024    Procedure: Lower Extremity Angiogram;  Surgeon: Stuart Nguyen MD;  Location: [place] [address] [facility];  Service: Cardiovascular;  Laterality: Right;       Family History   Problem Relation Name Age of Onset   • Diabetes Mother         Social History     Socioeconomic History   • Marital  status:      Spouse name: Not on file   • Number of children: Not on file   • Years of education: Not on file   • Highest education level: Not on file   Occupational History   • Not on file   Tobacco Use   • Smoking status: Never     Passive exposure: Past   • Smokeless tobacco: Never   Substance and Sexual Activity   • Alcohol use: Not on file   • Drug use: Not on file   • Sexual activity: Not on file   Other Topics Concern   • Not on file   Social History Narrative   • Not on file     Social Determinants of Health     Financial Resource Strain: Low Risk  (2/19/2024)    Overall Financial Resource Strain (CARDIA)    • Difficulty of Paying Living Expenses: Not hard at all   Recent Concern: Financial Resource Strain - Medium Risk (2/17/2024)    Overall Financial Resource Strain (CARDIA)    • Difficulty of Paying Living Expenses: Somewhat hard   Food Insecurity: Not on file   Transportation Needs: No Transportation Needs (2/19/2024)    PRAPARE - Transportation    • Lack of Transportation (Medical): No    • Lack of Transportation (Non-Medical): No   Physical Activity: Not on file   Stress: Not on file   Social Connections: Not on file   Intimate Partner Violence: Not on file   Housing Stability: Low Risk  (2/19/2024)    Housing Stability Vital Sign    • Unable to Pay for Housing in the Last Year: No    • Number of Places Lived in the Last Year: 1    • Unstable Housing in the Last Year: No       Vital signs: 114/64, 98.0, 52, 18, blood sugar 215, 99%    Objective  Physical Exam  Constitutional:       General: He is not in acute distress.  Eyes:      Extraocular Movements: Extraocular movements intact.   Cardiovascular:      Rate and Rhythm: Normal rate and regular rhythm.   Pulmonary:      Effort: Pulmonary effort is normal.      Breath sounds: Normal breath sounds.   Abdominal:      General: Bowel sounds are normal.      Palpations: Abdomen is soft.   Musculoskeletal:      Cervical back: Neck supple.      Right  lower leg: No edema.      Left lower leg: No edema.      Comments: Left upper extremity AV fistula  Bilateral lower extremity dressings clean dry and intact   Neurological:      Mental Status: He is alert.   Psychiatric:         Mood and Affect: Mood normal.         Behavior: Behavior is cooperative.         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  Remove extra fluid in dialysis  HD per nephrology         GERD (gastroesophageal reflux disease)     PPI  Monitor GI symptoms         Balanitis     Completed antibiotics in hospital         Multiple wounds     Continue treatment and interventions per EMR         A-fib (CMS/[facility])     Apixaban  Bleeding precautions          Hospital records reviewed  Medications, treatments, and labs reviewed  Continue medications and treatments as listed in EMR  Discussed with nursing and therapy      Scribe Attestation  Darline LAUREN Scribe   attest that this documentation has been prepared under the direction and in the presence of Miley Guerra MD    Provider Attestation - Scribe documentation  All medical record entries made by the Scribe were at my direction and personally dictated by me. I have reviewed the chart and agree that the record accurately reflects my personal performance of the history, physical exam, discussion and plan.   Miley Guerra MD      Electronically Signed By: Miley Guerra MD   3/2/24  8:16 AM

## 2024-03-04 NOTE — PROGRESS NOTES
PROGRESS NOTE    Subjective   Chief complaint: Chevy Benton is a 78 y.o. male who is an acute skilled patient being seen and evaluated for weakness    HPI:  HPI  This patient was readmitted to SNF for therapy due to generalized weakness and for medical management after recent hospitalization for sepsis.  Therapy to evaluate and establish plan of care and goals with patient.  Patient seen and examined at bedside in no apparent distress.  Nursing staff voices no new concerns today.  Patient denies nausea vomiting fever chills.     Objective   Vital signs: 112/68, 98.0, 52, 18, 99%    Physical Exam  Constitutional:       General: He is not in acute distress.  Cardiovascular:      Rate and Rhythm: Normal rate. Rhythm irregular.   Pulmonary:      Effort: Pulmonary effort is normal.      Breath sounds: Normal breath sounds.   Musculoskeletal:      Cervical back: Neck supple.      Right lower leg: No edema.      Left lower leg: No edema.      Comments: Left BKA   Neurological:      Mental Status: He is alert.      Motor: Weakness present.   Psychiatric:         Behavior: Behavior is cooperative.         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 (CMS/[facility])     BP at goal  Monitor blood pressure  CHF, stable  Monitor for shortness of breath  Remove extra fluid in dialysis  HD per nephrology         Weakness - Primary     Therapy to evaluate and establish plan of care and goals          Medications, treatments, and labs reviewed  Continue medications and treatments as listed in EMR      [place]on Attestation  XIN, Erickson San   attest that this documentation has been prepared under the direction and in  the presence of EDWIN Aguilar    Provider Attestation - Scribe documentation  All medical record entries made by the Scribe were at my direction and personally dictated by me. I have reviewed the chart and agree that the record accurately reflects my personal performance of the history, physical exam, discussion and plan.   EDWIN Aguilar

## 2024-03-04 NOTE — ASSESSMENT & PLAN NOTE
BP at goal  Monitor blood pressure  CHF, stable  Monitor for shortness of breath  Remove extra fluid in dialysis  HD per nephrology

## 2024-03-04 NOTE — PROGRESS NOTES
PROGRESS NOTE    Subjective   Chief complaint: Chevy Benton is a 78 y.o. male who is an acute skilled patient being seen and evaluated for weakness    HPI:  HPI  Patient recently evaluated by PT to establish plan of care and goals following hospitalization for status post revision amputation of toe of the right foot.  Patient is recent COVID recovered.  Patient was seen and examined at bedside, appears to be in no acute distress.  Denies chest pain or shortness of breath.  Afebrile.  Nursing staff voicing no new concerns at this time.    Objective   Vital signs: 101/58, 97.9, 78, 18, blood sugar 130 95%    Physical Exam  Constitutional:       General: He is not in acute distress.  Eyes:      Extraocular Movements: Extraocular movements intact.   Cardiovascular:      Rate and Rhythm: Normal rate. Rhythm irregular.   Pulmonary:      Effort: Pulmonary effort is normal.      Breath sounds: Normal breath sounds.      Comments: O2  Abdominal:      General: Bowel sounds are normal.      Palpations: Abdomen is soft.   Musculoskeletal:      Cervical back: Neck supple.      Right lower leg: Edema present.      Left lower leg: Edema present.      Comments: Left BKA   Neurological:      Mental Status: He is alert.      Motor: Weakness present.   Psychiatric:         Mood and Affect: Mood normal.         Behavior: Behavior is cooperative.         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, with long-term current use of insulin (CMS/[facility])     Insulin  Gabapentin   Monitor blood sugar  FBG at goal           Weakness - Primary     PT evaluated to establish plan of care and goals          Medications, treatments, and labs reviewed  Continue medications and treatments as listed in EMR      Scribe Attestation  IDarline Scribe   attest that this documentation has been prepared under the direction and in the presence of EDWIN Aguilar    Provider Attestation - Scribe documentation  All medical record entries made by the Scribe were at my direction and personally dictated by me. I have reviewed the chart and agree that the record accurately reflects my personal performance of the history, physical exam, discussion and plan.   EDWIN Aguilar

## 2024-03-04 NOTE — ASSESSMENT & PLAN NOTE
Monitor labs BP and weight  HD per nephrology  Monitor SOB, orthopnea or weight gain  Remove extra fluid in dialysis

## 2024-03-04 NOTE — ASSESSMENT & PLAN NOTE
Critical  Monitor blood pressure  CHF, stable, monitor for shortness of breath.  Remove extra fluid in dialysis  HD per nephrology

## 2024-03-04 NOTE — ASSESSMENT & PLAN NOTE
Status post revision  Follow-up with podiatry outpatient  Follow-up with vascular outpatient  Wound care per surgeon as listed in EMR  Pain control  Therapy

## 2024-03-04 NOTE — LETTER
Patient: Chevy Bentno  : 1945    Encounter Date: 2024    PROGRESS NOTE    Subjective  Chief complaint: Chevy Benton is a 78 y.o. male who is an acute skilled patient being seen and evaluated for weakness    HPI:  HPI  Therapy has established plan of care and goals with patient.  Patient is to work with therapy, PT OT.  Patient was seen and examined at bedside, appears to be in no acute distress.  Denies chest pain or shortness of breath.  Afebrile.  Nursing staff voicing no new concerns at this time.    Objective  Vital signs: 104/61, 97.9, 54, 18, blood sugar 243, 97%    Physical Exam  Constitutional:       General: He is not in acute distress.  Cardiovascular:      Rate and Rhythm: Normal rate. Rhythm irregular.   Pulmonary:      Effort: Pulmonary effort is normal.      Breath sounds: Normal breath sounds.      Comments: O2  Musculoskeletal:      Cervical back: Neck supple.      Right lower leg: No edema.      Left lower leg: No edema.   Neurological:      Mental Status: He is alert.      Motor: Weakness present.   Psychiatric:         Behavior: Behavior is cooperative.         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 I have reviewed the chart and agree that the record accurately reflects my personal performance of the history, physical exam, discussion and plan.   EDWIN Aguilar        Electronically Signed By: EDWIN Aguilar   4/8/24  5:18 PM

## 2024-03-04 NOTE — PROGRESS NOTES
PROGRESS NOTE    Subjective   Chief complaint: Chevy Benton is a 78 y.o. male who is an acute skilled patient being seen and evaluated for weakness    HPI:  HPI  Therapy has been working with the patient to improve strength and endurance with ADLs, transfers, and mobility.  Patient continues to work toward goals.  Patient was seen and examined at bedside, appears to be in no acute distress.  Patient is stable and has no new complaints.  Nursing staff voices no new concerns today.    Objective   Vital signs: 101/58, 97.9, 78, 18, blood sugar 153, 95%    Physical Exam  Constitutional:       General: He is not in acute distress.  Eyes:      Extraocular Movements: Extraocular movements intact.   Cardiovascular:      Rate and Rhythm: Normal rate. Rhythm irregular.   Pulmonary:      Effort: Pulmonary effort is normal.      Breath sounds: Normal breath sounds.      Comments: O2  Abdominal:      General: Bowel sounds are normal.      Palpations: Abdomen is soft.   Musculoskeletal:      Cervical back: Neck supple.      Right lower leg: No edema.      Left lower leg: No edema.   Neurological:      Mental Status: He is alert.      Motor: Weakness present.   Psychiatric:         Mood and Affect: Mood normal.         Behavior: Behavior is cooperative.         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  Remove extra fluid in dialysis  HD per nephrology         A-fib (CMS/[facility])      Apixaban  Bleeding precautions  Monitor heart rate          Medications, treatments, and labs reviewed  Continue medications and treatments as listed in EMR      Scribe Attestation  I, Erickson San   attest that this documentation has been prepared under the direction and in the presence of EDWIN Aguilar    Provider Attestation - Scribe documentation  All medical record entries made by the Scribe were at my direction and personally dictated by me. I have reviewed the chart and agree that the record accurately reflects my personal performance of the history, physical exam, discussion and plan.   EDWIN Aguilar

## 2024-03-04 NOTE — PROGRESS NOTES
PROGRESS NOTE    Subjective   Chief complaint: Chevy Benton is a 78 y.o. male who is an acute skilled patient being seen and evaluated for weakness    HPI:  HPI  Patient does continue to work in therapy due to generalized weakness.  Patient is requiring max to total dependence for bed mobility and to complete transfers.  Nursing called yesterday reported the patient was short of breath with a pulse ox at 100% on room air.  Patient did test positive for COVID.  Patient was placed on isolation precautions.  Orders placed to start dexamethasone, obtain BMP and chest x-ray stat.  Patient's labs were unremarkable.  Chest x-ray showed moderate CHF with superimposed pneumonia.  Patient is afebrile.    Objective   Vital signs: 142/51, 98.0, 88, 18, blood sugar 139, 96%    Physical Exam  Constitutional:       General: He is not in acute distress.  Eyes:      Extraocular Movements: Extraocular movements intact.   Cardiovascular:      Rate and Rhythm: Normal rate. Rhythm irregular.   Pulmonary:      Effort: Pulmonary effort is normal.      Breath sounds: Normal breath sounds.   Abdominal:      General: Bowel sounds are normal.      Palpations: Abdomen is soft.   Musculoskeletal:      Cervical back: Neck supple.      Right lower leg: No edema.      Left lower leg: No edema.   Neurological:      Mental Status: He is alert.      Motor: Weakness present.   Psychiatric:         Mood and Affect: Mood normal.         Behavior: Behavior is cooperative.         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 (CMS/[facility])     Monitor BP  HD per nephrology  Monitor SOB, orthopnea or weight gain  Notify nephrology of chest x-ray results/moderate CHF         COVID     Dexamethasone  Isolation precautions         PNA  (pneumonia)     Start Levaquin          Medications, treatments, and labs reviewed  Continue medications and treatments as listed in EMR      Scribe Attestation  I, Erickson San   attest that this documentation has been prepared under the direction and in the presence of EDWIN Aguilar    Provider Attestation - Scribe documentation  All medical record entries made by the Scribe were at my direction and personally dictated by me. I have reviewed the chart and agree that the record accurately reflects my personal performance of the history, physical exam, discussion and plan.   EDWIN Aguilar

## 2024-03-04 NOTE — PROGRESS NOTES
PROGRESS NOTE    Subjective   Chief complaint: Chevy Benton is a 78 y.o. male who is an acute skilled patient being seen and evaluated for weakness    HPI:  HPI  Patient is working in therapy due to generalized weakness.  Patient is requiring max assist for bed mobility and rolling.  Patient is also requiring Rhett lift for transfers.  Patient seen in follow-up for COVID, remains on isolation precautions.  Afebrile and asymptomatic at this time.  Patient also seen for pneumonia and was started on Levaquin on 1/3,, tolerating without adverse effects.  Patient reports feeling okay.  Patient was seen and examined at bedside, appears to be in no acute distress.  Denies chest pain or shortness of breath.    Objective   Vital signs: 160/83, 97.8, 80, 18, blood sugar 152, 97%    Physical Exam  Constitutional:       General: He is not in acute distress.  Eyes:      Extraocular Movements: Extraocular movements intact.   Cardiovascular:      Rate and Rhythm: Normal rate. Rhythm irregular.   Pulmonary:      Effort: Pulmonary effort is normal.      Breath sounds: Normal breath sounds.      Comments: O2  Musculoskeletal:      Cervical back: Neck supple.      Right lower leg: No edema.      Left lower leg: No edema.   Neurological:      Mental Status: He is alert.      Motor: Weakness present.   Psychiatric:         Mood and Affect: Mood normal.         Behavior: Behavior is cooperative.         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 (CMS/[facility])     Monitor labs BP and weight  HD per nephrology  Monitor SOB, orthopnea or weight gain  Remove extra fluid in dialysis         PNA (pneumonia)     Continue Levaquin  until complete         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, with long-term current use of insulin (CMS/[facility])     Insulin  Gabapentin   Monitor blood sugar  FBG at goal           Weakness - Primary     Continue working in therapy, requiring Rhett for transfers          Medications, treatments, and labs reviewed  Continue medications and treatments as listed in EMR      Scribe Attestation  Darline LAUREN Scribe   attest that this documentation has been prepared under the direction and in the presence of EDWIN Aguilar    Provider Attestation - Scribe documentation  All medical record entries made by the Scribe were at my direction and personally dictated by me. I have reviewed the chart and agree that the record accurately reflects my personal performance of the history, physical exam, discussion and plan.   EDWIN Aguilar

## 2024-03-05 NOTE — LETTER
Patient: Chevy Benton  : 1945    Encounter Date: 2024    PROGRESS NOTE    Subjective  Chief complaint: Chevy Benton is a 78 y.o. male who is an acute skilled patient being seen and evaluated for weakness    HPI:  HPI  Patient is continue to work in therapy.  Patient is working on therapeutic exercises to increase strength for functional mobility.  Patient is total dependent for rolling and positioning in bed.  Patient also continues on HD for diagnosis of ESRD, tolerating well.  Patient was seen and examined at bedside, appears to be in no acute distress.  Denies chest pain or shortness of breath.  Denies nausea or vomiting.  Nursing staff voicing no new concerns at this time.  Patient stable.    Objective  Vital signs: 155, 97.9, 56, 18, 97%    Physical Exam  Constitutional:       General: He is not in acute distress.  Eyes:      Extraocular Movements: Extraocular movements intact.   Cardiovascular:      Rate and Rhythm: Normal rate and regular rhythm.   Pulmonary:      Effort: Pulmonary effort is normal.      Breath sounds: Normal breath sounds.   Abdominal:      General: Bowel sounds are normal.      Palpations: Abdomen is soft.   Musculoskeletal:      Cervical back: Neck supple.      Right lower leg: No edema.      Left lower leg: No edema.      Comments: Left upper extremity AV fistula  Bilateral lower extremity dressings clean dry and intact   Neurological:      Mental Status: He is alert.   Psychiatric:         Mood and Affect: Mood normal.         Behavior: Behavior is cooperative.         Assessment/Plan  Problem List Items Addressed This Visit       Weakness - Primary     Therapy evaluated patient and establish plan of care and goals.  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  Remove extra fluid in dialysis  HD per nephrology         GERD (gastroesophageal reflux disease)     PPI  Monitor GI symptoms         A-fib (CMS/[facility])     Apixaban  Bleeding precautions  Monitor heart rate          Medications, treatments, and labs reviewed  Continue medications and treatments as listed in EMR      Scribe Attestation  IDarline Scribe   attest that this documentation has been prepared under the direction and in the presence of Miley Guerra MD    Provider Attestation - Scribe documentation  All medical record entries made by the Scribe were at my direction and personally dictated by me. I have reviewed the chart and agree that the record accurately reflects my personal performance of the history, physical exam, discussion and plan.   Miley Guerra MD        Electronically Signed By: Miley Guerar MD   3/5/24  4:31 PM

## 2024-03-05 NOTE — PROGRESS NOTES
PROGRESS NOTE    Subjective   Chief complaint: Chevy Benton is a 78 y.o. male who is an acute skilled patient being seen and evaluated for weakness    HPI:  HPI  Patient is continue to work in therapy.  Patient is working on therapeutic exercises to increase strength for functional mobility.  Patient is total dependent for rolling and positioning in bed.  Patient also continues on HD for diagnosis of ESRD, tolerating well.  Patient was seen and examined at bedside, appears to be in no acute distress.  Denies chest pain or shortness of breath.  Denies nausea or vomiting.  Nursing staff voicing no new concerns at this time.  Patient stable.    Objective   Vital signs: 155, 97.9, 56, 18, 97%    Physical Exam  Constitutional:       General: He is not in acute distress.  Eyes:      Extraocular Movements: Extraocular movements intact.   Cardiovascular:      Rate and Rhythm: Normal rate and regular rhythm.   Pulmonary:      Effort: Pulmonary effort is normal.      Breath sounds: Normal breath sounds.   Abdominal:      General: Bowel sounds are normal.      Palpations: Abdomen is soft.   Musculoskeletal:      Cervical back: Neck supple.      Right lower leg: No edema.      Left lower leg: No edema.      Comments: Left upper extremity AV fistula  Bilateral lower extremity dressings clean dry and intact   Neurological:      Mental Status: He is alert.   Psychiatric:         Mood and Affect: Mood normal.         Behavior: Behavior is cooperative.         Assessment/Plan   Problem List Items Addressed This Visit       Weakness - Primary     Therapy evaluated patient and establish plan of care and goals.  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)     Monitor blood pressure  Midodrine  CHF, stable, monitor for shortness of breath.  Remove extra fluid in dialysis  HD per nephrology         GERD  (gastroesophageal reflux disease)     PPI  Monitor GI symptoms         A-fib (CMS/[facility])     Apixaban  Bleeding precautions  Monitor heart rate          Medications, treatments, and labs reviewed  Continue medications and treatments as listed in EMR      Scribe Attestation  Darline LAUREN Scribe   attest that this documentation has been prepared under the direction and in the presence of Miley Guerra MD    Provider Attestation - Scribe documentation  All medical record entries made by the Scribe were at my direction and personally dictated by me. I have reviewed the chart and agree that the record accurately reflects my personal performance of the history, physical exam, discussion and plan.   Miley Guerra MD

## 2024-03-06 NOTE — CONSULTS
Service:   Service: Allergy Immunology     Consult:  Consult requested by (Attending Name): Homar Linder   Reason: Penicillin Allergy     History of Present Illness:   HPI:    SUMMERSSHAMEKA is a 78 year old Male with PMH of AF on Eliquis, CHF (EF 37%), ESRD/HD [facility] [place] Dr. Medel) via left UE AV fistula,  SSS, status post pacemaker, non smoker, nondiabetic, who was referred to the hospital  by Dr. Sanchez, [facility], for consideration for revascularization, in the setting of right toe gangrene (podiatrist Dr. Reese Solares).      Allergy/Immunology was consulted for suspected Penicillin Allergy as they are interested in placing him on Augmentin for antimicrobial coverage. After discussing with Shameka and his partner, they report he last took Penicillin >40 years ago and developed  itchiness and warmth to the skin, (uncertain of how soon after reaction occurred) without any cardiovascular-respiratory symptoms or compromise.    Review Family/Social History and ROS:   Constitutional: NEGATIVE: Fever, Chills     Eyes: NEGATIVE: Blurry Vision, Drainage     ENMT: NEGATIVE: Nasal Discharge, Nasal Congestion     Respiratory: NEGATIVE: Dry Cough, Wheezing, Shortness  of Breath     Cardiac: NEGATIVE: Chest Pain, Dyspnea on Exertion     Gastrointestinal: NEGATIVE: Nausea, Vomiting, Diarrhea     Neurological: NEGATIVE: Dizziness, Confusion, Headache     Psychiatric: NEGATIVE: Mood Changes, Anxiety     Allergic/Immunologic: NEGATIVE: Anaphylaxis, Itchy/  Teary Eyes, Itching, Sneezing, Swelling              Allergies:  ·  penicillin : Itching    Objective:   Physical Exam by System:    Constitutional: Well developed, awake/alert/oriented  x3, no distress, alert and cooperative   Eyes: PERRL, EOMI, clear sclera   ENMT: mucous membranes moist, no apparent injury,  no lesions seen   Head/Neck: Neck supple, no apparent injury, normocephalic,  atraumatic   Respiratory/Thorax: Patent airways,  CTAB, normal  breath sounds with good chest expansion, thorax symmetric   Cardiovascular: RRR   Neurological: alert and oriented x3, grossly neurovascularly  intact   Lymphatic: No significant lymphadenopathy   Psychological: Appropriate mood and behavior   Skin: Warm and dry, gangrenous lesions to R toes  along with circular hyperpigmented lesions to arms       Assessment:    CHEVY SUN is a 78 year old Male with PMH of AF on Eliquis, CHF (EF 37%), ESRD/HD (Tuesday Thursday Saturday, [facility] [place], Dr. Medel) via left UE AV fistula,  SSS, status post pacemaker, non smoker, nondiabetic, who was referred to the hospital  by Dr. Sanchez, [facility], for consideration for revascularization, in the setting of right toe gangrene (podiatrist Dr. Reese Solares).      Allergy/Immunology was consulted for suspected Penicillin Allergy as they are interested in placing him on Augmentin for antimicrobial coverage. After discussing with Chevy and his partner, they report he last took Penicillin >40 years ago and developed  itchiness and warmth to the skin, (uncertain of how soon after reaction occurred) without any cardiovascular-respiratory symptoms or compromise.    Current literature state if Penicillin was not consumed in >8-10 years, the likelihood of growing out of a Penicillin allergy is very high.    Informed consent was obtained for drug challenge. Risks and benefits were discussed and he elected to go forward with the procedure.    Challenge starting with 8.75 mg of Augmentin was given with no reactions. He was observed for 15 minutes after the dose was given.  Second dose of 875 mg of Augmentin was given with no reactions. Patient was observed for 2 hours after the dose was given.      Plan:  - Chevy passed Augmentin challenge  - He is no longer allergic to Penicillin. Penicillin allergy can be removed from his chart  - ID/Primary team are free to start Augmentin 875 at any time  - Challenges prove that there was  no immediate reaction, but this cannot exclude a delayed reaction in the future    Thank you for the consult!     Ramiro Ayala DO  Pediatric and Adult Allergy/Immunology Fellow        Plan of Care Reviewed With:  Plan of Care Reviewed With: patient; spouse; children     Consult Status:  Consult Status    (select all that apply): initial  consult complete, will follow   Consult Order ID: [id number]     Attestation:   Note Completion:  I am a:  Resident/Fellow   Attending Attestation I saw and evaluated the patient.  I personally obtained the key and critical portions of the history and physical exam or was physically present for key and  critical portions performed by the resident/fellow. I reviewed the resident/fellow?s documentation and discussed the patient with the resident/fellow.  I agree with the resident/fellow?s medical decision making as documented in the note.     I personally evaluated the patient on 25-Sep-2023         Electronic Signatures:  Jimmy Lee)  (Signed 17-Oct-2023 14:52)   Authored: Assessment/Recommendations, Note Completion   Co-Signer: Service, History of Present Illness, Review Family/Social History and ROS, Allergies, Objective, Assessment/Recommendations,  Note Completion  Ramiro Ayala ( (Fellow))  (Signed 25-Sep-2023 17:43)   Authored: Service, History of Present Illness, Review  Family/Social History and ROS, Allergies, Objective, Assessment/Recommendations, Note Completion      Last Updated: 17-Oct-2023 14:52 by Jimmy Lee)

## 2024-03-06 NOTE — LETTER
Patient: Chevy Benton  : 1945    Encounter Date: 2024    PROGRESS NOTE    Subjective  Chief complaint: Chevy Benton is a 78 y.o. male who is an acute skilled patient being seen and evaluated for weakness    HPI:  HPI  Patient does continue in therapy working towards goals.  Patient is working on bed mobility, requiring total dependence for rolling and positioning.  Patient also working on therapeutic exercise and activities, neuromuscular education and ADLs.  Patient was seen and examined at bedside, appears to be in no acute distress.  Denies chest pain or shortness of breath.  Afebrile.    Objective  Vital signs: 105/70, 97.0, 62, 16, blood sugar 198, 96%    Physical Exam  Constitutional:       General: He is not in acute distress.  Eyes:      Extraocular Movements: Extraocular movements intact.   Cardiovascular:      Rate and Rhythm: Normal rate. Rhythm irregular.   Pulmonary:      Effort: Pulmonary effort is normal.      Breath sounds: Normal breath sounds.      Comments: O2  Musculoskeletal:      Cervical back: Neck supple.      Right lower leg: Edema present.      Left lower leg: Edema present.   Neurological:      Mental Status: He is alert.      Motor: Weakness present.   Psychiatric:         Mood and Affect: Mood normal.         Behavior: Behavior is cooperative.         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 I have reviewed the chart and agree that the record accurately reflects my personal performance of the history, physical exam, discussion and plan.   EDWIN Aguilar        Electronically Signed By: EDWIN Aguilar   4/8/24  5:08 PM

## 2024-03-07 NOTE — LETTER
Patient: Chevy Benton  : 1945    Encounter Date: 2024    PROGRESS NOTE    Subjective  Chief complaint: Chevy Benton is a 78 y.o. male who is an acute skilled patient being seen and evaluated for weakness    HPI:  HPI  Therapy has been working with the patient to improve strength and endurance with ADLs, transfers, and mobility.  Patient continues to work toward goals.  Patient is stable and has no new complaints.  Patient was seen and examined at the bedside, appears to be in no acute distress.  Patient does continue on HD for diagnosis of ESRD, tolerating treatment well.  Nursing staff voices no new concerns today.    Objective  Vital signs: 109/60, 97.0, 62, 16, blood sugar 160, 96%    Physical Exam  Constitutional:       General: He is not in acute distress.  Eyes:      Extraocular Movements: Extraocular movements intact.   Cardiovascular:      Rate and Rhythm: Normal rate. Rhythm irregular.   Pulmonary:      Effort: Pulmonary effort is normal.      Breath sounds: Normal breath sounds.      Comments: O2  Musculoskeletal:      Cervical back: Neck supple.      Right lower leg: Edema present.      Left lower leg: Edema present.   Neurological:      Mental Status: He is alert.      Motor: Weakness present.   Psychiatric:         Mood and Affect: Mood normal.         Behavior: Behavior is cooperative.         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 I have reviewed the chart and agree that the record accurately reflects my personal performance of the history, physical exam, discussion and plan.   EDWIN Aguilar        Electronically Signed By: EDWIN Aguilar   4/8/24  5:09 PM

## 2024-03-08 PROBLEM — N18.9 CHRONIC RENAL FAILURE: Status: RESOLVED | Noted: 2024-01-01 | Resolved: 2024-01-01

## 2024-03-08 PROBLEM — I96 GANGRENE OF TOE OF RIGHT FOOT (MULTI): Status: ACTIVE | Noted: 2024-01-01

## 2024-03-08 PROBLEM — E21.3 HYPERPARATHYROIDISM, UNSPECIFIED (MULTI): Status: ACTIVE | Noted: 2023-01-01

## 2024-03-08 PROBLEM — I77.0 A-V FISTULA (CMS-HCC): Status: ACTIVE | Noted: 2024-01-01

## 2024-03-08 PROBLEM — Z79.4 TYPE 2 DIABETES MELLITUS WITH DIABETIC PERIPHERAL ANGIOPATHY AND GANGRENE, WITH LONG-TERM CURRENT USE OF INSULIN (MULTI): Status: ACTIVE | Noted: 2022-04-30

## 2024-03-08 PROBLEM — F41.9 ANXIETY: Status: RESOLVED | Noted: 2023-01-01 | Resolved: 2024-01-01

## 2024-03-08 PROBLEM — I50.20 HFREF (HEART FAILURE WITH REDUCED EJECTION FRACTION) (MULTI): Status: ACTIVE | Noted: 2023-01-01

## 2024-03-08 PROBLEM — M10.9 GOUT, UNSPECIFIED: Status: ACTIVE | Noted: 2024-01-01

## 2024-03-08 PROBLEM — D62 ANEMIA DUE TO BLOOD LOSS, ACUTE: Status: ACTIVE | Noted: 2024-01-01

## 2024-03-08 PROBLEM — E87.1 HYPONATREMIA: Status: ACTIVE | Noted: 2023-01-01

## 2024-03-08 PROBLEM — E11.9 TYPE 2 DIABETES MELLITUS (MULTI): Status: ACTIVE | Noted: 2022-04-30

## 2024-03-08 PROBLEM — E11.52 TYPE 2 DIABETES MELLITUS WITH DIABETIC PERIPHERAL ANGIOPATHY WITH GANGRENE (MULTI): Status: ACTIVE | Noted: 2022-04-30

## 2024-03-08 NOTE — SIGNIFICANT EVENT
Anticipated Transfer:    78 M with ESRD on HD via L brachiocephalic fistula.  Bleeding from fistula today.  Has had long time issues with prolonged bleeding.  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    Hx: SBO, HFrEF (40-45% LVFEF in Jan 2024), DM2 (A1c 5.3% Dec 2023),     Abbreviated history of fistula:  - 2/23/24: [facility] tunneled HD catheter replaced  - 2/21/24: fistulogram with 8 mm [place] angioplasty of cephalic outflow in IR  - 2/2/24: [facility] tunneled HD catheter removed  - 1/12/24: fistulogram with 8 and 10 mm [place] angioplasty in cephalic vein and 12 mm [place] in distal subclavian    Multiple other fistulograms with angioplasty    - 2015: Initial placement of LUE brachiocephalic fistula at [facility]     Wilman Ojeda MD  Vascular Surgery Fellow  Service Pager: [phone]

## 2024-03-08 NOTE — CONSULTS
VASCULAR SURGERY CONSULT NOTE  [facility]  HPI:  Chevy Benton is a 78 y.o. male who presented to [facility] from his nursing home with a bleeding LUE AVF. Patient is somewhat of a poor historian, he cannot tell me if he was dialyzed via his fistula today or via the LIJ catheter that was placed during his last admission.  He has a pacer in the right chest and a nonocclusive R subclavian vein DVT diagnosed in October 2023.    During admission in January/February, the patient was noted to have prolonged bleeding times from his fistula after dialysis through it and underwent the following:  - 2/23/24: LIJ tunneled HD catheter replaced  - 2/21/24: fistulogram with 8 mm Vickery angioplasty of cephalic outflow in IR  - 2/2/24: LIJ tunneled HD catheter removed  - 1/12/24: fistulogram with 8 and 10 mm Vickery angioplasty in cephalic vein and 12 mm Aneta in distal subclavian    In the [facility], a bottle cap compression dressing was applied and the patient was transferred to [facility].  Upon arrival I took down this dressing and noted continued brisk bleeding from the fistula.  Pressure was applied to the inflow and 2 figure of eight silk sutures were placed to close the hole in the skin with successful hemostasis.  A moderate compression dressing was reapplied.    PMH:  ESRD on HD via L brachiocephalic fistula (2015), SBO, HFrEF (LVEF 40-45% Jan 2024), DM2 (A1c 5.3% Dec 2023), HTN, Afib (on Eliquis 2.5 BID), Rt subclavian vein DVT (nonocclusive per reports, 10/2023), MR, SSS s/p pacer, PAD    PSH:   L brachiocephalic fistula (2015), multiple fistulograms with angioplasty of cephalic and central stenoses, pacer, angiogram with PTA R AT/PT (4/2023), angiogram with PTA R AT (8/2023), angiogram with PTA R AT/DP, PT/lateral plantar, and plantar arch (9/2023 Dr. Linder), R TMA (9/2023)    Objective   Heart Rate:  [78-93]   Temperature:  [35.9 °C (96.7 °F)-36.4 °C (97.5 °F)]   Respirations:  [15-19]  "  BP: ()/(61-83)   Height:  [177.8 cm (5' 10\")]   Weight:  [90.7 kg (200 lb)]   Pulse Ox:  [93 %-94 %]     Physical Exam:  General: NAD, AAOx2 (person, year)  Neuro: no gross deficits, speech WNL  HEENT: NC/AT  CV: irregularly irregular rhythm, rate controlled  Pulm: normal respiratory effort on RA  Abd: soft, NTND  Extr: notably swollen BL UE  Skin: no lesions or rashes     ROS:  12-point review of systems was performed and is negative except as noted above.     Labs:  Results from last 7 days   Lab Units 03/08/24  1437   WBC AUTO x10*3/uL 11.9*   HEMOGLOBIN g/dL 7.7*   HEMATOCRIT % 27.2*   PLATELETS AUTO x10*3/uL 253     Results from last 7 days   Lab Units 03/08/24  1437   SODIUM mmol/L 129*   POTASSIUM mmol/L 3.2*   CHLORIDE mmol/L 90*   CO2 mmol/L 26   BUN mg/dL 25*   CREATININE mg/dL 2.29*   GLUCOSE mg/dL 149*   CALCIUM mg/dL 9.5     Results from last 7 days   Lab Units 03/08/24  1437   APTT seconds 27           Imaging:  Interpreted By:  Scott Lepe,   STUDY:  IR ANGIOGRAM FISTULA GRAFT;  2/21/2024 4:36 pm  INDICATION:  Signs/Symptoms:Angio for pressures.  IMPRESSION:  Left upper extremity AV fistulogram. The fistula is patent, although there are multiple greater than 50% stenosis of the cephalic outflow.  These were angioplastied successfully. 2 very large pseudoaneurysms identified at repeated access points. These are responsible for prolonged bleeding after access and will require surgical revision.      Performed and dictated at [facility].    Assessment/Plan   78 y.o. male with bleeding from LUE AVF.  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      Plan:  - Medical admission   - Dialyze tomorrow in anticipation of OR Sunday for revision of fistula vs ligation  - Hold plavix and Eliquis  - Correction of electrolyte abnormalities    D/w attending, Dr. Guy Ojeda MD  Vascular Surgery Fellow  Service Pager: [phone]  Available over Epic " Chat

## 2024-03-08 NOTE — Clinical Note
Initial NIBP taken on right arm. Plan to go right radial for the procedure. Left arm and bilateral legs wrapped with kerlix and have wounds- unable to place BP cuff in another place

## 2024-03-08 NOTE — ED TRIAGE NOTES
Pt transferred from [facility] for bleeding LUE fistula. Pt is vascular consult and vascular present in ED on pt's arrival. Pt states he completed full dialysis tx this morning and when he was eating lunch fistula began suddenly bleeding. Pt also noted to have dialysis cath in L upper chest. Edema noted to bilateral upper arms, however L arm noted to be more edematous than R.

## 2024-03-08 NOTE — ASSESSMENT & PLAN NOTE
"Chief Complaint   Patient presents with     RECHECK     Follow-up Weight Management       Vitals:    12/01/21 1042   Weight: 131.5 kg (290 lb)   Height: 1.702 m (5' 7\")       Body mass index is 45.42 kg/m .                          " Status post revision  Follow-up with podiatry outpatient  Follow-up with vascular outpatient  Wound care per surgeon as listed in EMR  Pain control  Therapy

## 2024-03-08 NOTE — ED PROVIDER NOTES
HPI   Chief Complaint   Patient presents with    bledding fistula      Pt. To ED via EMS from [facility] for concern for bleeding fistula and hypotension. Per EMS staff at facility said that his fistula on left arm started to spontaneously bleed. Direct pressure was applied by staff. On arrival to ED pt. Bleeding in under control        Chief complaint: Bleeding fistula    History of present illness: Patient is a 78-year-old male with history of end-stage renal disease dialysis dependent as well as A-fib currently on Eliquis therapy presenting to the emergency department with complaints of bleeding from his left AV fistula.  According to EMS, the patient began to spontaneously bleed prior to his arrival in the emergency department.  The patient lives at a facility.  The patient began to experience hypotension.  Bandages were placed on the arm and the patient was brought to the emergency department for further evaluation.  The patient states that he feels somewhat lightheaded but does not hurt.  The patient is accompanied by his family.        History provided by:  Patient and EMS personnel   used: No                        No data recorded                   Patient History   Past Medical History:   Diagnosis Date    Acute hypercapnic respiratory failure (CMS/HCC)     Acute on chronic HFrEF (heart failure with reduced ejection fraction) (CMS/HCC)     Diabetes mellitus (CMS/HCC)     ESRD on hemodialysis (CMS/[facility])     History of angioplasty of peripheral vessel 10/26/2023    HTN (hypertension)     LFT elevation 07/20/2021    Small bowel obstruction (CMS/HCC) 10/10/2023    Stage II pressure ulcer (CMS/[facility])      Past Surgical History:   Procedure Laterality Date    INVASIVE VASCULAR PROCEDURE Right 1/17/2024    Procedure: Lower Extremity Angiogram;  Surgeon: Stuart Nguyen MD;  Location: [place] [address] Cardiac Cath Lab;  Service: Cardiovascular;  Laterality: Right;     Family History   Problem Relation Name  Age of Onset    Diabetes Mother       Social History     Tobacco Use    Smoking status: Never     Passive exposure: Past    Smokeless tobacco: Never   Substance Use Topics    Alcohol use: Not on file    Drug use: Not on file       Physical Exam   ED Triage Vitals [03/08/24 1420]   Temperature Heart Rate Respirations BP   35.9 °C (96.7 °F) 78 19 85/61      Pulse Ox Temp src Heart Rate Source Patient Position   (!) 93 % -- -- --      BP Location FiO2 (%)     -- --       Physical Exam  Vitals and nursing note reviewed.   Constitutional:       General: He is not in acute distress.     Appearance: He is well-developed.   HENT:      Head: Normocephalic and atraumatic.   Eyes:      Conjunctiva/sclera: Conjunctivae normal.   Cardiovascular:      Rate and Rhythm: Normal rate and regular rhythm.      Heart sounds: No murmur heard.     Comments: The patient has an AV fistula in his left upper extremity which is covered by bloodsoaked bandages.  When the bandages taken down, the patient has vigorous bleeding  Pulmonary:      Effort: Pulmonary effort is normal. No respiratory distress.      Breath sounds: Normal breath sounds.   Abdominal:      Palpations: Abdomen is soft.      Tenderness: There is no abdominal tenderness.   Musculoskeletal:         General: No swelling.      Cervical back: Neck supple.   Skin:     General: Skin is warm and dry.      Capillary Refill: Capillary refill takes less than 2 seconds.   Neurological:      Mental Status: He is alert.   Psychiatric:         Mood and Affect: Mood normal.         ED Course & MDM        Medical Decision Making  Medical decision making: Initially on the patient's presentation to the emergency department, I evaluated the patient.  The patient had no active bleeding however the patient had blood soaked bandages over his fistula.  To evaluate the wound, I dressed down the patient's wound the patient immediately began to have a copious amount of bleeding sprain from his left AV  fistula.  Direct pressure was applied.  Using iodine, I sanitized a cap from a water bottle.  I then placed this over the bleed and applied pressure until the bleeding was tamponade it off.  This was dressed with Coban however the patient continued to have bleeding as result I stuffed a pill cup full of wet gauze and used this to apply direct pressure onto the bottle Then using Coban I was able to apply direct pressure to the bottle.  Hemostasis was achieved with this method.    Given the rate and intensity of the patient's bleeding, I do not feel that it is appropriate for me to attempt to resolve this bleeding in the emergency department as this could endanger the patient's life as a result, initially spoke with vascular surgery they requested the patient be transferred to [facility] for further evaluation and therapy this was explained to the patient's family who expressed understanding.    CBC demonstrated hemoglobin of 7.7 but no other significant abnormalities lactate was 3.8 PTT was 27 the patient's Chem-7 demonstrated changes consistent with end-stage renal disease and a sodium of 129.    I spoke with vascular surgery downtown regarding this patient's case they excepted the patient's case in transfer.  I then spoke with the emergency physician on-call at [facility] regarding his patient's case.  I had ordered Kcentra for the patient to reverse his Eliquis therapy as he will likely be going to the operating room in the near future this plan was explained to the patient throughout my time with him in the emergency department, the patient had no further bleeding.  The patient was then transferred to [facility] in guarded but greatly improved condition.    Amount and/or Complexity of Data Reviewed  Labs: ordered. Decision-making details documented in ED Course.        Procedure  Critical Care    Performed by: Balta Beltrán MD  Authorized by: Balta Beltrán MD     Critical care provider statement:     Critical care time (minutes):  45    Critical care was necessary to treat or prevent imminent or life-threatening deterioration of the following conditions: Hemorrage.    Critical care was time spent personally by me on the following activities:  Blood draw for specimens, discussions with consultants, evaluation of patient's response to treatment, examination of patient, ordering and review of laboratory studies, ordering and review of radiographic studies, pulse oximetry and re-evaluation of patient's condition    Care discussed with: admitting provider         Balta Beltrán MD  03/16/24 [id number]

## 2024-03-08 NOTE — LETTER
Patient: Chevy Benton  : 1945    Encounter Date: 2024    PROGRESS NOTE    Subjective  Chief complaint: Chevy Benton is a 78 y.o. male who is an acute skilled patient being seen and evaluated for weakness    HPI:  HPI  Patient is continue to work in therapy due to generalized weakness.  Patient is working on PROM and AAROM of bilateral lower extremities.  Therapy is also working on repositioning in bed to maintain skin integrity and patient is requiring max assist to complete.  Patient required verbal cues for encouragement to participate in range of motion activities.  Patient was seen and examined at bedside, appears to be in no acute distress.  Patient does continue on HD for diagnosis of ESRD, tolerating well.    Objective  Vital signs: 104/58, 97.8, 58, 18, 97%    Physical Exam  Constitutional:       General: He is not in acute distress.  Eyes:      Extraocular Movements: Extraocular movements intact.   Cardiovascular:      Rate and Rhythm: Normal rate and regular rhythm.   Pulmonary:      Effort: Pulmonary effort is normal.      Breath sounds: Normal breath sounds.   Abdominal:      General: Bowel sounds are normal.      Palpations: Abdomen is soft.   Musculoskeletal:      Cervical back: Neck supple.      Right lower leg: No edema.      Left lower leg: No edema.      Comments: Left upper extremity AV fistula  Bilateral lower extremity dressings clean dry and intact   Neurological:      Mental Status: He is alert.      Motor: Weakness present.   Psychiatric:         Mood and Affect: Mood normal.         Behavior: Behavior is cooperative.         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  Remove extra fluid in dialysis  HD per nephrology         GERD (gastroesophageal reflux disease)     PPI  Monitor GI symptoms         A-fib (CMS/[facility])     Apixaban  Bleeding precautions  Monitor heart rate          Medications, treatments, and labs reviewed  Continue medications and treatments as listed in EMR      Scribe Attestation  I, Carrington [place]   attest that this documentation has been prepared under the direction and in the presence of Miley Guerra MD    Provider Attestation - Scribe documentation  All medical record entries made by the Scribe were at my direction and personally dictated by me. I have reviewed the chart and agree that the record accurately reflects my personal performance of the history, physical exam, discussion and plan.   Miley Guerra MD        Electronically Signed By: Miley Guerra MD   3/8/24  2:09 PM

## 2024-03-08 NOTE — PROGRESS NOTES
PROGRESS NOTE    Subjective   Chief complaint: Chevy Benton is a 78 y.o. male who is an acute skilled patient being seen and evaluated for weakness    HPI:  HPI  Patient is continue to work in therapy due to generalized weakness.  Patient is working on PROM and AAROM of bilateral lower extremities.  Therapy is also working on repositioning in bed to maintain skin integrity and patient is requiring max assist to complete.  Patient required verbal cues for encouragement to participate in range of motion activities.  Patient was seen and examined at bedside, appears to be in no acute distress.  Patient does continue on HD for diagnosis of ESRD, tolerating well.    Objective   Vital signs: 104/58, 97.8, 58, 18, 97%    Physical Exam  Constitutional:       General: He is not in acute distress.  Eyes:      Extraocular Movements: Extraocular movements intact.   Cardiovascular:      Rate and Rhythm: Normal rate and regular rhythm.   Pulmonary:      Effort: Pulmonary effort is normal.      Breath sounds: Normal breath sounds.   Abdominal:      General: Bowel sounds are normal.      Palpations: Abdomen is soft.   Musculoskeletal:      Cervical back: Neck supple.      Right lower leg: No edema.      Left lower leg: No edema.      Comments: Left upper extremity AV fistula  Bilateral lower extremity dressings clean dry and intact   Neurological:      Mental Status: He is alert.      Motor: Weakness present.   Psychiatric:         Mood and Affect: Mood normal.         Behavior: Behavior is cooperative.         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  Remove extra fluid in dialysis  HD per nephrology         GERD (gastroesophageal reflux disease)     PPI  Monitor GI symptoms         A-fib (CMS/[facility])     Apixaban  Bleeding precautions  Monitor heart rate          Medications, treatments, and labs reviewed  Continue medications and treatments as listed in EMR      Scribe Attestation  I, Carrington [place]   attest that this documentation has been prepared under the direction and in the presence of Miley Geurra MD    Provider Attestation - Scribe documentation  All medical record entries made by the Scribe were at my direction and personally dictated by me. I have reviewed the chart and agree that the record accurately reflects my personal performance of the history, physical exam, discussion and plan.   Miley Guerra MD

## 2024-03-09 NOTE — ED PROVIDER NOTES
CC: Vascular Access Problem     HPI:  Chevy Benton is a 78 y.o. male with a past history of PAD, A-fib on Eliquis, CAD, T2DM, right subclavian DVT, GERD, sick sinus syndrome status post PPM, GERD, gout, ESRD on MWF dialysis, HLD, HFrEF presenting to the emergency department today due to bleeding from his left upper extremity fistula.  Patient was transferred to the emergency department for vascular surgery consultation as bleeding was difficult to control.  Denies any significant pain.  He does state that he has chronic swelling of bilateral uppers and lower extremities.  He denies any concerns at this time.    Limitations to History:  Patient is drowsy, confused, per wife largely baseline.  Additional History provided by:  EMS, EMR, wife.    External Records Reviewed:  Recent available ED and inpatient notes reviewed in EMR.  Reviewed outside hospital ED notes    PMHx/PSHx:  Per HPI.   - has a past medical history of A-fib (CMS/HCC), Acute hypercapnic respiratory failure (CMS/HCC), Acute on chronic HFrEF (heart failure with reduced ejection fraction) (CMS/HCC), Diabetes mellitus (CMS/HCC), ESRD on hemodialysis (CMS/HCC), History of angioplasty of peripheral vessel (10/26/2023), HTN (hypertension), LFT elevation (07/20/2021), Small bowel obstruction (CMS/HCC) (10/10/2023), and Stage II pressure ulcer (CMS/HCC).  - has a past surgical history that includes Invasive Vascular Procedure (Right, 1/17/2024).    Medications:  Reviewed in EMR. See EMR for complete list of medications and doses.    Allergies:  Penicillins    Social History:  - Tobacco:  reports that he has never smoked. He has been exposed to tobacco smoke. He has never used smokeless tobacco.   - Alcohol:  has no history on file for alcohol use.   - Illicit Drugs:  has no history on file for drug use.     ROS:  Per HPI.     ???????????????????????????????????????????????????????????????  Triage Vitals:  T 36.4 °C (97.5 °F)  HR 89  BP 94/77  RR 18   O2 (!) 93 % Supplemental oxygen    Physical Exam  Vitals and nursing note reviewed.   Constitutional:       General: He is not in acute distress.     Appearance: He is well-developed.   HENT:      Head: Normocephalic and atraumatic.   Eyes:      Conjunctiva/sclera: Conjunctivae normal.   Cardiovascular:      Rate and Rhythm: Normal rate and regular rhythm.      Heart sounds: No murmur heard.  Pulmonary:      Effort: Pulmonary effort is normal. No respiratory distress.      Breath sounds: Normal breath sounds.   Abdominal:      Palpations: Abdomen is soft.      Tenderness: There is no abdominal tenderness.   Musculoskeletal:         General: Swelling (3+ pitting edema to bilateral upper and lower extremities) present.      Cervical back: Neck supple.      Comments: Bleeding controlled by vascular surgery with suture over the fistula   Skin:     General: Skin is warm and dry.      Capillary Refill: Capillary refill takes less than 2 seconds.   Neurological:      Mental Status: He is alert.   Psychiatric:         Mood and Affect: Mood normal.       ???????????????????????????????????????????????????????????????  ED Course:  ED Course as of 03/10/24 1945   Fri Mar 08, 2024   1934 EKG shows a atrial fibrillation with a rate of 89, and normal ST and T wave pattern with no evidence of acute ischemia or other acute findings [SC]   2007 Bedside LUE soft-tissue US reveals soft-tissue edema but no non-compressible veins, cobblestoning, or abscess. [ES]      ED Course User Index  [ES] David Abraham MD  [SC] Angela Yo MD         Diagnoses as of 03/10/24 1945   A-V fistula (CMS/[facility])       EKG & Images:  Independently reviewed, See ED Course      MDM:  -The patient is a 78-year-old male with a past history of A-fib on Eliquis, ESRD on MWF dialysis presenting to the emergency department today as a transfer from outside hospital for vascular surgery consultation given the patient has a left upper extremity fistula with  bleeding that was difficult to control at the outside hospital.  Vascular surgery was present upon arrival to the emergency room.  They were able to achieve hemostasis after placing a suture over the fistula.  They wrapped the wound with Coban.  They are recommending admission to medicine as the patient may require repair over the next 2 days.  Repeat blood work did show that the patient is anemic.  Initial blood work showed a hemoglobin of 7.7 at the outside hospital, and 6.9 here.  Given the concern for acute bleeding, did contact the wife who did consent for blood.  As such, patient was transfused 1 unit of blood.  Patient was subsequently admitted to medicine for further management.    Final diagnoses:   [I77.0] A-V fistula (CMS/HCC)         Social Determinants Limiting Care:  None identified    Disposition:  Admit to floor    Angela Yo MD   Emergency Medicine Resident, PGY3  [facility]     Disclaimer: This note was dictated by speech recognition. Minor errors in transcription may be present    Procedures ? SmartLinks last updated 3/10/2024 7:45 PM        Angela Yo MD  Resident  03/10/24 1945    Emergency Medicine Attending Attestation:     ED Course as of 03/13/24 1317   Fri Mar 08, 2024   1934 EKG shows a atrial fibrillation with a rate of 89, and normal ST and T wave pattern with no evidence of acute ischemia or other acute findings [SC]   2007 Bedside LUE soft-tissue US reveals soft-tissue edema but no non-compressible veins, cobblestoning, or abscess. [ES]      ED Course User Index  [ES] David Abraham MD  [SC] Angela Yo MD         Diagnoses as of 03/13/24 1317   A-V fistula (CMS/HCC)       The patient was seen by the resident/fellow.  I have personally performed a substantive portion of the encounter.  I have seen and examined the patient; agree with the workup, evaluation, MDM, management and diagnosis.  The care plan has been discussed with the  resident; I have reviewed the resident’s note and agree with the documented findings.       Patient with known ESRD on HD - sent from [facility] for vascular surgery and evaluation of persistently bleeding fistula    Arrives to the ED with compressive dressing in place and complaining of pain  Vascular surgery at bedside and able to control bleeding with sutures and compressive dressing - see their note for details    Patient with pretty significant edema to both upper extremities  Screen with pocus and no fluid collections, compressible veins - will continue to monitor    Is anemic over baseline - consented for transfusion   Admit for observation and likely planned fistula revision   Analgesia as needed for pain     I independently interpreted patient's EKG and agree with the above mentioned interpretation.      MD Hilda Santana MD  03/13/24 [id number]

## 2024-03-09 NOTE — ED PROVIDER NOTES
HPI   Chief Complaint   Patient presents with    Vascular Access Problem       Patient transferred from [facility] for a bleeding fistula                          James Coma Scale Score: 15                     Patient History   Past Medical History:   Diagnosis Date    Acute hypercapnic respiratory failure (CMS/HCC)     Acute on chronic HFrEF (heart failure with reduced ejection fraction) (CMS/HCC)     Diabetes mellitus (CMS/HCC)     ESRD on hemodialysis (CMS/HCC)     History of angioplasty of peripheral vessel 10/26/2023    HTN (hypertension)     LFT elevation 07/20/2021    Small bowel obstruction (CMS/HCC) 10/10/2023    Stage II pressure ulcer (CMS/[facility])      Past Surgical History:   Procedure Laterality Date    INVASIVE VASCULAR PROCEDURE Right 1/17/2024    Procedure: Lower Extremity Angiogram;  Surgeon: Stuart Nguyen MD;  Location: [place] [address] Cardiac Cath Lab;  Service: Cardiovascular;  Laterality: Right;     Family History   Problem Relation Name Age of Onset    Diabetes Mother       Social History     Tobacco Use    Smoking status: Never     Passive exposure: Past    Smokeless tobacco: Never   Substance Use Topics    Alcohol use: Not on file    Drug use: Not on file       Physical Exam   ED Triage Vitals [03/08/24 1809]   Temperature Heart Rate Respirations BP   36.4 °C (97.5 °F) 89 18 94/77      Pulse Ox Temp src Heart Rate Source Patient Position   (!) 93 % -- -- --      BP Location FiO2 (%)     Right arm 28 %       Physical Exam    ED Course & MDM        Medical Decision Making      Procedure  Procedures     Hilda Chen MD  03/08/24 1950

## 2024-03-09 NOTE — SIGNIFICANT EVENT
03/09/24 1715   Onset Documentation   Rapid Response Initiated By Radar auto page   [place] [facility] [id number]   Pager Time 1714   Arrival Time 1735   Event End Time 1740   Level II Called No   Primary Reason for Call Radar auto page     Rapid Response Note    Radar auto-page received for a radar score of 6 with the following vital signs: 36.2, 94, 18, 90/54, 100%.  Vital signs were confirmed and reviewed with primary RN.  Patient had HD today with 1.5 L of UF.  Receiving 2 units of pRBCs for hgb of 6.4.  No further interventions are indicated by Rapid Response at this time.  RN to contact Rapid Response with any future concerns or signs of clinical decompensation.

## 2024-03-09 NOTE — NURSING NOTE
Report to Receiving RN:    Report To: ABEL Mena  Time Report Called: 14:59  Hand-Off Communication: No acute change from RN to RN report.  Patient tolerated treatment well with 1.5L fluid removed.  Last /52, HR 46.  No complaint of pain or discomfort.    Complications During Treatment: No  Ultrafiltration Treatment: No  Medications Administered During Dialysis: No  Blood Products Administered During Dialysis: No  Labs Sent During Dialysis: No  Heparin Drip Rate Changes: No    Electronic Signatures:   (Signed 3/9/24)   Authored: Ike Lai RN       Last Updated: 4:00 PM by IKE LAI

## 2024-03-09 NOTE — CONSULTS
Reason For Consult  ESRD on HD    History Of Present Illness  Seen on HD  Chevy Benton is a 78 y.o. male who was transferred from [facility] for bleeding AVF. This was controlled by vascular sx with figure of 8 suture and compression dressing. He is planned for OR tomorrow for definitive management.   He is a poor historian but tells me that he lives in a NH and receives dialysis on site 5 days a week. Unable to tell me his DW or when he last received HD.  States he is anxious in hospitals and so feels SoB, does not use O2 chronically, denies chest pain, headahce or cramps     Past Medical History  He has a past medical history of Acute hypercapnic respiratory failure (CMS/[facility]), Acute on chronic HFrEF (heart failure with reduced ejection fraction) (CMS/[facility]), Diabetes mellitus (CMS/[facility]), ESRD on hemodialysis (CMS/[facility]), History of angioplasty of peripheral vessel (10/26/2023), HTN (hypertension), LFT elevation (07/20/2021), Small bowel obstruction (CMS/[facility]) (10/10/2023), and Stage II pressure ulcer (CMS/[facility]).    Surgical History  He has a past surgical history that includes Invasive Vascular Procedure (Right, 1/17/2024).     Social History  He reports that he has never smoked. He has been exposed to tobacco smoke. He has never used smokeless tobacco. No history on file for alcohol use and drug use.    Family History  Family History   Problem Relation Name Age of Onset    Diabetes Mother          Allergies  Penicillins    Review of Systems  RoS negative for all other systems except as noted above.      Physical Exam  No distress  HEENT:  moist, no pallor  No edema magdiel LE. Magdiel UE edema +, LUE in dressing, has L TDC which is in use  Breath sounds magdiel equal, clear  S1 S2 regular, normal, no rub or murmur  Abdomen soft  AAO x3, non focal    I&O 24HR    Intake/Output Summary (Last 24 hours) at 3/9/2024 1302  Last data filed at 3/9/2024 1230  Gross per 24 hour   Intake 550 ml   Output --   Net 550 ml       Vitals  "24HR  Heart Rate:  [77-93]   Temp:  [35.4 °C (95.7 °F)-36.8 °C (98.2 °F)]   Resp:  [15-20]   BP: ()/(61-83)   Height:  [177.8 cm (5' 10\")]   Weight:  [90.7 kg (200 lb)-90.9 kg (200 lb 6.4 oz)]   SpO2:  [93 %-100 %]     Current Outpatient Medications   Medication Instructions    acetaminophen (TYLENOL) 975 mg, oral, Every 6 hours PRN    albuterol 90 mcg/actuation inhaler 2 puffs, inhalation, Every 6 hours PRN    allopurinol (ZYLOPRIM) 100 mg, oral, Daily    apixaban (ELIQUIS) 2.5 mg, oral, 2 times daily    atorvastatin (LIPITOR) 40 mg, oral, Nightly    clopidogrel (PLAVIX) 75 mg, oral, Daily    collagenase (SantyL) 250 unit/gram ointment 1 Application, Topical, Daily PRN, \"MIGUEL\" to Right foot    dextrose 25 g, intravenous, Every 15 min PRN    gabapentin (NEURONTIN) 100 mg, oral, Daily    glucagon (GLUCAGEN) 1 mg, intramuscular, Every 15 min PRN    heparin 2,000 Units, intra-catheter, After Dialysis    insulin glargine (LANTUS) 6 Units, subcutaneous, Nightly, Take as directed per insulin instructions.    ipratropium-albuteroL (Duo-Neb) 0.5-2.5 mg/3 mL nebulizer solution 3 mL, nebulization, Every 6 hours PRN    lidocaine 4 % patch 1 patch, transdermal, Daily, Remove & discard patch within 12 hours or as directed by MD.    loratadine (CLARITIN) 10 mg, oral, Every 48 hours PRN    melatonin 5 mg, oral, Nightly    midodrine (PROAMATINE) 15 mg, oral, Every 8 hours    pantoprazole (PROTONIX) 40 mg, oral, Daily before breakfast, Do not crush, chew, or split.    polyethylene glycol (MIRALAX) 17 g, oral, Daily    povidone-iodine (Betadine) 7.5 % solution 1 Application, Topical, Daily PRN    sennosides (SENOKOT) 8.6 mg, oral, Nightly    Triphrocaps 1 mg capsule 1 capsule, oral, Daily           Assessment/Plan   78 Year old with h/o ESRD on HD admitted with bleeding from LUE AVF, planned for OR tomorrow with [facility]  Nephrology following for ESRD    #ESRD: Tolerating HD per submitted orders, 4 K, 2.5 Ca, UF goal 1.5 L    # " Anemia: HgB   Hemoglobin   Date Value Ref Range Status   03/09/2024 6.5 (LL) 13.5 - 17.5 g/dL Final   Start epo 10,000 units IV QHD    # MBD: Ca   Calcium   Date Value Ref Range Status   03/09/2024 9.7 8.6 - 10.6 mg/dL Final     Phosphorus   Date Value Ref Range Status   03/09/2024 5.2 (H) 2.5 - 4.9 mg/dL Final     Comment:     The performance characteristics of phosphorus testing in heparinized plasma have been validated by the individual  laboratory site where testing is performed. Testing on heparinized plasma is not approved by the FDA; however, such approval is not necessary.     Continue daily renal MVI. Not on PO4 binders, continue low PO4 diet    #Hypertension: On midodrine support, BP acceptable    #Volume status: Clinically euvolemic    # Bleeding AVF: per vascular sx    Will continue to follow

## 2024-03-09 NOTE — PROGRESS NOTES
"Chevy Benton is a 78 y.o. male on day 1 of admission presenting with A-V fistula (CMS/[facility]).    Subjective   No events over night.     Reports feeling weak, and fluid overloaded. Asking for dialysis today.     RN reported low HB of 6.5.          Objective   Vitals:    03/09/24 1000   BP:    Pulse: 84   Resp:    Temp: 35.4 °C (95.7 °F)   SpO2:        Physical Exam  Constitutional:       Comments: Comfortable at rest on oxygen through NC   HENT:      Head: Normocephalic.      Nose: Nose normal.   Eyes:      Extraocular Movements: Extraocular movements intact.      Pupils: Pupils are equal, round, and reactive to light.   Cardiovascular:      Rate and Rhythm: Normal rate and regular rhythm.      Heart sounds: Normal heart sounds. No murmur heard.  Pulmonary:      Effort: No respiratory distress.      Breath sounds: Normal breath sounds. No wheezing.   Abdominal:      General: Bowel sounds are normal. There is no distension.      Palpations: Abdomen is soft.      Tenderness: There is no abdominal tenderness.   Musculoskeletal:         General: Normal range of motion.      Cervical back: Normal range of motion.      Comments: Fistula in LUE covered with ACE wrap,    Skin:     General: Skin is warm.   Neurological:      General: No focal deficit present.      Mental Status: He is alert and oriented to person, place, and time. Mental status is at baseline.   Psychiatric:         Mood and Affect: Mood normal.       Last Recorded Vitals  Blood pressure 124/71, pulse 84, temperature 35.4 °C (95.7 °F), temperature source Temporal, resp. rate 16, height 1.778 m (5' 10\"), weight 90.9 kg (200 lb 6.4 oz), SpO2 100 %.  Intake/Output last 3 Shifts:  I/O last 3 completed shifts:  In: 350 (3.9 mL/kg) [Blood:350]  Out: - (0 mL/kg)   Weight: 90.9 kg     Relevant Results  Scheduled medications  allopurinol, 100 mg, oral, Daily  atorvastatin, 40 mg, oral, Nightly  B complex-vitamin C-folic acid, 1 capsule, oral, Daily  [START ON " 3/11/2024] epoetin dane or biosimilar, 100 Units/kg, intravenous, Once per day on Mon Wed Fri  gabapentin, 100 mg, oral, Daily  insulin lispro, 0-5 Units, subcutaneous, TID with meals  melatonin, 5 mg, oral, Nightly  midodrine, 15 mg, oral, q8h  pantoprazole, 40 mg, oral, Daily before breakfast  sennosides-docusate sodium, 2 tablet, oral, BID      Continuous medications     PRN medications  PRN medications: acetaminophen, albuterol, dextrose 10 % in water (D10W), dextrose, glucagon, HYDROmorphone, ipratropium-albuteroL, ondansetron, traMADol    Results for orders placed or performed during the hospital encounter of 03/08/24 (from the past 24 hour(s))   Type And Screen   Result Value Ref Range    ABO TYPE B     Rh TYPE POS     ANTIBODY SCREEN NEG    CBC   Result Value Ref Range    WBC 10.9 4.4 - 11.3 x10*3/uL    nRBC 0.3 (H) 0.0 - 0.0 /100 WBCs    RBC 2.42 (L) 4.50 - 5.90 x10*6/uL    Hemoglobin 6.9 (L) 13.5 - 17.5 g/dL    Hematocrit 22.5 (L) 41.0 - 52.0 %    MCV 93 80 - 100 fL    MCH 28.5 26.0 - 34.0 pg    MCHC 30.7 (L) 32.0 - 36.0 g/dL    RDW 18.2 (H) 11.5 - 14.5 %    Platelets 243 150 - 450 x10*3/uL   Hemoglobin A1c   Result Value Ref Range    Hemoglobin A1C 4.9 see below %    Estimated Average Glucose 94 Not Established mg/dL   B-type natriuretic peptide   Result Value Ref Range    BNP 1,717 (H) 0 - 99 pg/mL   Prepare RBC: 1 Units   Result Value Ref Range    PRODUCT CODE Y8234S74     Unit Number [id number]     Unit ABO B     Unit RH POS     XM INTEP COMP     Dispense Status TR     Blood Expiration Date March 21, 2024 23:59 EDT     PRODUCT BLOOD TYPE 7300     UNIT VOLUME 350    POCT GLUCOSE   Result Value Ref Range    POCT Glucose 131 (H) 74 - 99 mg/dL   POCT GLUCOSE   Result Value Ref Range    POCT Glucose 112 (H) 74 - 99 mg/dL   CBC   Result Value Ref Range    WBC 10.4 4.4 - 11.3 x10*3/uL    nRBC 0.3 (H) 0.0 - 0.0 /100 WBCs    RBC 2.28 (L) 4.50 - 5.90 x10*6/uL    Hemoglobin 6.5 (LL) 13.5 - 17.5 g/dL     Hematocrit 22.2 (L) 41.0 - 52.0 %    MCV 97 80 - 100 fL    MCH 28.5 26.0 - 34.0 pg    MCHC 29.3 (L) 32.0 - 36.0 g/dL    RDW 18.7 (H) 11.5 - 14.5 %    Platelets 255 150 - 450 x10*3/uL   Renal Function Panel   Result Value Ref Range    Glucose 181 (H) 74 - 99 mg/dL    Sodium 130 (L) 136 - 145 mmol/L    Potassium 3.6 3.5 - 5.3 mmol/L    Chloride 87 (L) 98 - 107 mmol/L    Bicarbonate 27 21 - 32 mmol/L    Anion Gap 20 10 - 20 mmol/L    Urea Nitrogen 33 (H) 6 - 23 mg/dL    Creatinine 2.83 (H) 0.50 - 1.30 mg/dL    eGFR 22 (L) >60 mL/min/1.73m*2    Calcium 9.7 8.6 - 10.6 mg/dL    Phosphorus 5.2 (H) 2.5 - 4.9 mg/dL    Albumin 2.7 (L) 3.4 - 5.0 g/dL   Magnesium   Result Value Ref Range    Magnesium 1.85 1.60 - 2.40 mg/dL   Prepare RBC: 1 Units   Result Value Ref Range    PRODUCT CODE L3741L39     Unit Number [id number]     Unit ABO B     Unit RH POS     XM INTEP COMP     Dispense Status RE     Blood Expiration Date March 28, 2024 23:59 EDT     PRODUCT BLOOD TYPE 7300     UNIT VOLUME 350    Prepare RBC: 2 Units   Result Value Ref Range    PRODUCT CODE K2281H06     Unit Number [id number]     Unit ABO B     Unit RH POS     XM INTEP COMP     Dispense Status XM     Blood Expiration Date March 28, 2024 23:59 EDT     PRODUCT BLOOD TYPE 7300     UNIT VOLUME 350     PRODUCT CODE N1795M18     Unit Number [id number]     Unit ABO B     Unit RH POS     XM INTEP COMP     Dispense Status XM     Blood Expiration Date March 28, 2024 23:59 EDT     PRODUCT BLOOD TYPE 7300     UNIT VOLUME 350    POCT GLUCOSE   Result Value Ref Range    POCT Glucose 41 (L) 74 - 99 mg/dL   POCT GLUCOSE   Result Value Ref Range    POCT Glucose 109 (H) 74 - 99 mg/dL         Assessment/Plan   Principal Problem:    A-V fistula (CMS/HCC)  Active Problems:    Anemia due to blood loss, acute    Mr. Benton is a 78 year old gentleman with a PMH of PAD, Afib (on Eliquis), CAD, T2DM, R subclavian DVT (10/23), GERD, SSS s/p PPM placement, gout, ESRD on HD  (MWF), HLD, hypotension, HFrEF (last EF 40-45% 1/23/24), and gout who is transferred to [facility] from [facility] due to bleeding from his L upper arm AV fistula.     Vascular surgery is consulted in the ED who placed 2 figure 8 silk sutures in order to achieve hemostasis at fistula site. Plan is for pt to receive dialysis on 3/9 and go to OR with vascular surgery on Sunday for revision of fistula vs ligation. Labs obtained on admit notable for anemia, hyponatremia, hyponatremia, and lactic acidosis. Pt is admitted to medicine for further treatment and management of anemia due to acute blood loss and bleeding from AV fistula.        Assessment/Plan   Principal Problem:    A-V fistula (CMS/HCC)  Active Problems:    Anemia due to blood loss, acute     #Bleeding from AV fistula  # anemia d/t acute blood loss  - Vascular surgery consulted, appreciate recs  - Hemostasis obtained at L upper extremity fistula site after placement of two figure 8 sutures  - Holding home dose of Plavix and Eliquis d/t upcoming procedure with vascular surgery  - Hgb 6.5 today  -Transfuse 1 unit of PRBC on HD, Monitor HB  - NPO at [facility] (3/10) for revision of fistula vs ligation     #T2DM (last HgA1c 5.3% 12/3/23) c/b neuropathy  - accucheck ACHS  - Lispro SSI #1 0-5 units  - holding home dose of Lantus 6 units to prevent episodes of hypoglycemia while hospitalized and HgA1c <7%  - hypoglycemia order set placed  - continue home dose of Gabapentin 100 mg PO QD     #hyponatremia (chronic)  #ESRD on HD   - daily CHG bath d/t L chest dialysis cath site  - Na 129 on admit, Na 131 on 2/27, Na 129 on 2/26, Na 135 on 2/25  - Nephrology consulted for HD today,   - renal diet  - renally dose meds as needed  - continue home dose of Nephrocaps PO every day     #Afib  #hx of DVT (10/23)  - Duplex US of RUE done 10/22/23: Positive study for DVT in the right upper extremity.  There is a nonocclusive thrombus in the right subclavian artery.  - holding home dose of  Eliquis and Heparin gtt d/t anemia     #hypotension  - continue home dose of Midodrine 15 mg PO TID     #LLE wound  #R foot wound  - wound nurse consulted     Dispo: Pending fistula repair.     Personally reviewed prior inpatient records available, labs, imaging, and consultant notes. Personally discussed with nursing staff and transitional care coordinator. Current work up, and treatment and discharge plan is discussed with the patient and counselling is provided. Repeat labs are ordered as needed.         Artis Cruz MD

## 2024-03-09 NOTE — PROGRESS NOTES
"Chevy Benton is a 78 y.o. male on day 1 of admission presenting with A-V fistula (CMS/HCC).    Subjective   NAEON. States he has some soreness in left upper extremity but otherwise without acute complaints.        Objective     General: NAD, AAOx2 (person, year)  Neuro: no gross deficits, speech WNL  HEENT: NC/AT  CV: irregularly irregular rhythm, rate controlled  Pulm: normal respiratory effort on RA  Abd: soft, NTND  Extr: notably swollen BL UE  Skin: no lesions or rashes     Last Recorded Vitals  Blood pressure 124/71, pulse 84, temperature 35.4 °C (95.7 °F), temperature source Temporal, resp. rate 16, height 1.778 m (5' 10\"), weight 90.9 kg (200 lb 6.4 oz), SpO2 100 %.  Intake/Output last 3 Shifts:  I/O last 3 completed shifts:  In: 350 (3.9 mL/kg) [Blood:350]  Out: - (0 mL/kg)   Weight: 90.9 kg     Relevant Results  Scheduled medications  allopurinol, 100 mg, oral, Daily  atorvastatin, 40 mg, oral, Nightly  B complex-vitamin C-folic acid, 1 capsule, oral, Daily  gabapentin, 100 mg, oral, Daily  insulin lispro, 0-5 Units, subcutaneous, TID with meals  melatonin, 5 mg, oral, Nightly  midodrine, 15 mg, oral, q8h  pantoprazole, 40 mg, oral, Daily before breakfast  sennosides-docusate sodium, 2 tablet, oral, BID      Continuous medications     PRN medications  PRN medications: acetaminophen, albuterol, dextrose 10 % in water (D10W), dextrose, glucagon, HYDROmorphone, ipratropium-albuteroL, traMADol  Results for orders placed or performed during the hospital encounter of 03/08/24 (from the past 24 hour(s))   Type And Screen   Result Value Ref Range    ABO TYPE B     Rh TYPE POS     ANTIBODY SCREEN NEG    CBC   Result Value Ref Range    WBC 10.9 4.4 - 11.3 x10*3/uL    nRBC 0.3 (H) 0.0 - 0.0 /100 WBCs    RBC 2.42 (L) 4.50 - 5.90 x10*6/uL    Hemoglobin 6.9 (L) 13.5 - 17.5 g/dL    Hematocrit 22.5 (L) 41.0 - 52.0 %    MCV 93 80 - 100 fL    MCH 28.5 26.0 - 34.0 pg    MCHC 30.7 (L) 32.0 - 36.0 g/dL    RDW 18.2 (H) " 11.5 - 14.5 %    Platelets 243 150 - 450 x10*3/uL   Hemoglobin A1c   Result Value Ref Range    Hemoglobin A1C 4.9 see below %    Estimated Average Glucose 94 Not Established mg/dL   B-type natriuretic peptide   Result Value Ref Range    BNP 1,717 (H) 0 - 99 pg/mL   Prepare RBC: 1 Units   Result Value Ref Range    PRODUCT CODE F9153W86     Unit Number [id number]     Unit ABO B     Unit RH POS     XM INTEP COMP     Dispense Status TR     Blood Expiration Date March 21, 2024 23:59 EDT     PRODUCT BLOOD TYPE 7300     UNIT VOLUME 350    POCT GLUCOSE   Result Value Ref Range    POCT Glucose 131 (H) 74 - 99 mg/dL   POCT GLUCOSE   Result Value Ref Range    POCT Glucose 112 (H) 74 - 99 mg/dL           Assessment/Plan   78 y.o. male with bleeding from LUE AVF.  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       Plan:  - Appreciate continued care by medical team  - Dialysis today in anticipation of OR Sunday for revision of fistula vs ligation  - Continue to hold plavix and Eliquis  - Correction of electrolyte abnormalities  - NPO @[facility], type and screen, coags, CBC, RFP, Mg     Seen and discussed with fellow Dr. Ojeda. D/w attending, Dr. Guy Knight MD  PGY-1 Vascular Surgery  [id number]

## 2024-03-09 NOTE — CARE PLAN
The clinical goals for the shift include Pt will remain safe and free of fall or injury    Over the shift, the patient did make progress toward the following goals.       Problem: Pain  Goal: My pain/discomfort is manageable  Outcome: Progressing     Problem: Safety  Goal: Patient will be injury free during hospitalization  Outcome: Progressing  Goal: I will remain free of falls  Outcome: Progressing     Problem: Daily Care  Goal: Daily care needs are met  Outcome: Progressing

## 2024-03-09 NOTE — H&P
History Of Present Illness  Chevy Benton is a 78 y.o. male with a PMH of PAD, Afib (on Eliquis), CAD, T2DM, R subclavian DVT (10/23), GERD, SSS s/p PPM placement, gout, ESRD on HD (MWF), HLD, hypotension, HFrEF (last EF 40-45% 1/23/24), and gout. Mr. Benton was transferred to [facility] from [facility] due to bleeding from his L upper arm AV fistula. Vascular surgery consulted upon pt's arrival from OSH. Pt seen by consulting team while in the ED. Vascular surgery placed 2 figure 8 silk sutures in order to achieve hemostasis at fistula site. Plan is for pt to receive dialysis tomorrow (3/9) and go to OR with vascular surgery on Sunday for revision of fistula vs ligation. Labs obtained on admit notable for anemia, hyponatremia, hyponatremia, and lactic acidosis. Home dose of Plavix and Eliquis will be held until procedure is completed and due to anemia. No imaging obtained while in the ED. Pt was recently discharged from hospital 2/28/24 to [facility]. Pt resting when seen by writer in the ED. He does endorse 10/10 pain in R foot. Pt has TMA with drsg that is dry and intact. Edema present in both hands on exam. Pt admitted to medicine for further treatment and management of anemia 2/2 acute blood loss and bleeding from AV fistula.    ED interventions: Zofran 4 mg IV push x1 dose and Dilaudid 0.2 mg IV push x1 dose    Past Medical History  Past Medical History:   Diagnosis Date    Acute hypercapnic respiratory failure (CMS/HCC)     Acute on chronic HFrEF (heart failure with reduced ejection fraction) (CMS/HCC)     Diabetes mellitus (CMS/HCC)     ESRD on hemodialysis (CMS/HCC)     History of angioplasty of peripheral vessel 10/26/2023    HTN (hypertension)     LFT elevation 07/20/2021    Small bowel obstruction (CMS/HCC) 10/10/2023    Stage II pressure ulcer (CMS/HCC)        Surgical History  Past Surgical History:   Procedure Laterality Date    INVASIVE VASCULAR PROCEDURE Right 1/17/2024    Procedure: Lower  Extremity Angiogram;  Surgeon: Sturat Nguyen MD;  Location: [place] [address] Cardiac Cath Lab;  Service: Cardiovascular;  Laterality: Right;        Social History  He reports that he has never smoked. He has been exposed to tobacco smoke. He has never used smokeless tobacco. No history on file for alcohol use and drug use.    Family History  Family History   Problem Relation Name Age of Onset    Diabetes Mother          Allergies  Penicillins    Review of Systems   Constitutional:  Negative for chills and fever.   HENT:  Negative for congestion.    Cardiovascular:  Negative for chest pain.   Gastrointestinal:  Negative for abdominal pain, constipation, diarrhea, nausea and vomiting.   Genitourinary:  Negative for difficulty urinating and dysuria.   Musculoskeletal:         Foot pain     Skin:  Positive for wound.   Neurological:  Negative for dizziness.        Physical Exam  Vitals reviewed.   Constitutional:       General: He is not in acute distress.  HENT:      Head: Atraumatic.      Nose: Nose normal.      Mouth/Throat:      Mouth: Mucous membranes are dry.   Eyes:      Extraocular Movements: Extraocular movements intact.      Conjunctiva/sclera: Conjunctivae normal.   Cardiovascular:      Rate and Rhythm: Rhythm irregular.   Pulmonary:      Effort: Pulmonary effort is normal. No respiratory distress.      Breath sounds: Normal breath sounds.   Abdominal:      General: Bowel sounds are normal.      Palpations: Abdomen is soft.   Musculoskeletal:         General: Normal range of motion.      Cervical back: Normal range of motion.      Comments: BUE edema   Skin:     General: Skin is warm and dry.      Comments: LLE wound (drsg dry and intact) and R TMA wound (drsg dry and intact), L chest dialysis cath   Neurological:      Mental Status: He is alert.      Comments: Alert and oriented to self, place, and time ( knows he is in hospital but thought he was at [facility])   Psychiatric:         Mood and Affect: Mood normal.         "  Last Recorded Vitals  Blood pressure 112/75, pulse 85, temperature 36.8 °C (98.2 °F), resp. rate 20, height 1.778 m (5' 10\"), weight 90.7 kg (200 lb), SpO2 (!) 93 %.    Relevant Results  .  Results for orders placed or performed during the hospital encounter of 03/08/24 (from the past 96 hour(s))   Type And Screen   Result Value Ref Range    ABO TYPE B     Rh TYPE POS     ANTIBODY SCREEN NEG    CBC   Result Value Ref Range    WBC 10.9 4.4 - 11.3 x10*3/uL    nRBC 0.3 (H) 0.0 - 0.0 /100 WBCs    RBC 2.42 (L) 4.50 - 5.90 x10*6/uL    Hemoglobin 6.9 (L) 13.5 - 17.5 g/dL    Hematocrit 22.5 (L) 41.0 - 52.0 %    MCV 93 80 - 100 fL    MCH 28.5 26.0 - 34.0 pg    MCHC 30.7 (L) 32.0 - 36.0 g/dL    RDW 18.2 (H) 11.5 - 14.5 %    Platelets 243 150 - 450 x10*3/uL   Prepare RBC: 1 Units   Result Value Ref Range    PRODUCT CODE Z1984Y27     Unit Number [id number]     Unit ABO B     Unit RH POS     XM INTEP COMP     Dispense Status IS     Blood Expiration Date March 21, 2024 23:59 EDT     PRODUCT BLOOD TYPE 7300     UNIT VOLUME 350       Lab Results   Component Value Date    HGBA1C 5.3 12/03/2023      Scheduled medications  [START ON 3/9/2024] allopurinol, 100 mg, oral, Daily  atorvastatin, 40 mg, oral, Nightly  [START ON 3/9/2024] B complex-vitamin C-folic acid, 1 capsule, oral, Daily  [START ON 3/9/2024] gabapentin, 100 mg, oral, Daily  [START ON 3/9/2024] insulin lispro, 0-5 Units, subcutaneous, TID with meals  melatonin, 5 mg, oral, Nightly  [START ON 3/9/2024] midodrine, 15 mg, oral, q8h  [START ON 3/9/2024] pantoprazole, 40 mg, oral, Daily before breakfast  [START ON 3/9/2024] sennosides-docusate sodium, 2 tablet, oral, BID      Continuous medications     PRN medications  PRN medications: acetaminophen, albuterol, dextrose 10 % in water (D10W), dextrose, glucagon, ipratropium-albuteroL    Mr. Benton is a 78 year old male with a PMH of PAD, Afib (on Eliquis), CAD, T2DM, R subclavian DVT (10/23), GERD, SSS s/p PPM " placement, gout, ESRD on HD (MWF), HLD, hypotension, HFrEF (last EF 40-45% 1/23/24), and gout. Mr. Benton was transferred to [facility] from [facility] due to bleeding from his L upper arm AV fistula. Vascular surgery consulted upon pt's arrival from OSH. Pt seen by consulting team while in the ED. Vascular surgery placed 2 figure 8 silk sutures in order to achieve hemostasis at fistula site. Plan is for pt to receive dialysis tomorrow (3/9) and go to OR with vascular surgery on Sunday for revision of fistula vs ligation. Labs obtained on admit notable for anemia, hyponatremia, hyponatremia, and lactic acidosis. Pt admitted to medicine for further treatment and management of anemia 2/2 acute blood loss and bleeding from AV fistula.     Assessment/Plan   Principal Problem:    A-V fistula (CMS/HCC)  Active Problems:    Anemia due to blood loss, acute    #bleeding from AV fistula  # anemia d/t acute blood loss  - Vascular surgery consulted, appreciate recs  - Hemostasis obtained at L upper extremity fistula site after placement of two figure 8 sutures  - Holding home dose of Plavix and Eliquis d/t upcoming procedure with vascular surgery  - Hgb 6.9 on admit, Hgb 7.7 upon arrival to [facility] prior to transfer  - 1 unit of PRBC ordered and pending transfusion  - trend CBC  - NPO at [facility] (3/10) for revision of fistula vs ligation    #T2DM (last HgA1c 5.3% 12/3/23) c/b neuropathy  - accucheck ACHS  - Lispro SSI #1 0-5 units  - holding home dose of Lantus 6 units to prevent episodes of hypoglycemia while hospitalized and HgA1c <7%  - hypoglycemia order set placed  - continue home dose of Gabapentin 100 mg PO QD    #hyponatremia (chronic)  #ESRD on HD   - daily CHG bath d/t L chest dialysis cath site  - Na 129 on admit, Na 131 on 2/27, Na 129 on 2/26, Na 135 on 2/25  - consult Nephrology in AM for dialysis  - renal diet  - renally dose meds as needed  - continue home dose of Nephrocaps PO every day    #Afib  #hx of DVT (10/23)  - Duplex  US of RUE done 10/22/23: Positive study for DVT in the right upper extremity.  There is a nonocclusive thrombus in the right subclavian artery.  - holding home dose of Eliquis and Heparin gtt d/t anemia    #hypotension  - continue home dose of Midodrine 15 mg PO TID    #LLE wound  #R foot wound  - wound nurse consulted    Dispo: admit to RNF. Plan per above.     I spent 60 minutes in the professional and overall care of this patient.      Elizabeth Olvera, APROMAR-CNP

## 2024-03-09 NOTE — NURSING NOTE
.Report from Sending RN:    Report From: ABEL Mena  Recent Surgery of Procedure: No  Baseline Level of Consciousness (LOC): A&O X3  Oxygen Use: No  Type: 2L NC  Diabetic: Yes  Last BP Med Given Day of Dialysis: midodrine  Last Pain Med Given: tylenol 975 mg 0812  Lab Tests to be Obtained with Dialysis: Yes  Blood Transfusion to be Given During Dialysis: No  Available IV Access: Yes  Medications to be Administered During Dialysis: No  Continuous IV Infusion Running: No  Restraints on Currently or in the Last 24 Hours: No  Hand-Off Communication: Pt had no acute event overnight, vital signs stable. Not on precaution.

## 2024-03-10 NOTE — ANESTHESIA PROCEDURE NOTES
Peripheral IV  Date/Time: 3/10/2024 10:00 AM      Placement  Needle size: 20 G  Laterality: right  Location: antecubital  Local anesthetic: none  Site prep: chlorhexidine  Technique: ultrasound guided  Attempts: 1

## 2024-03-10 NOTE — BRIEF OP NOTE
Date: 3/8/2024 - 3/10/2024  OR Location: [facility] OR    Name: Chevy Benton, : 1945, Age: 78 y.o., MRN: [id number], Sex: male    Diagnosis  Pre-op Diagnosis     * A-V fistula (CMS/HCC) [I77.0] Post-op Diagnosis     * A-V fistula (CMS/HCC) [I77.0]     Procedures  Open ligation of left upper extremity arteriovenous fistula    Surgeons      * Rosa Tovar - Primary    Resident/Fellow/Other Assistant:  Surgeon(s) and Role:     * Wilman Ojeda MD - Resident - Assisting    Procedure Summary  Anesthesia: General  ASA: III  Anesthesia Staff: Anesthesiologist: Orlin Gutierrez DO  C-AA: CHOLO Daniels  Anesthesia Resident: Jimmy Hannon MD; Rika Viveros DO  Estimated Blood Loss: 100 mL  Intra-op Medications:   Administrations occurring from 0900 to 1030 on 03/10/24:   Medication Name Total Dose   acetaminophen (Tylenol) tablet 975 mg Cannot be calculated   albuterol 90 mcg/actuation inhaler 2 puff Cannot be calculated   allopurinol (Zyloprim) tablet 100 mg Cannot be calculated   atorvastatin (Lipitor) tablet 40 mg Cannot be calculated   B complex-vitamin C-folic acid (Nephrocaps) capsule 1 capsule Cannot be calculated   dextrose 10 % in water (D10W) infusion Cannot be calculated   dextrose 50 % injection 25 g Cannot be calculated   epoetin dane (Epogen,Procrit) injection 10,000 Units Cannot be calculated   gabapentin (Neurontin) capsule 100 mg Cannot be calculated   glucagon (Glucagen) injection 1 mg Cannot be calculated   HYDROmorphone (Dilaudid) injection 0.2 mg Cannot be calculated   insulin lispro (HumaLOG) injection 0-5 Units Cannot be calculated   ipratropium-albuteroL (Duo-Neb) 0.5-2.5 mg/3 mL nebulizer solution 3 mL Cannot be calculated   melatonin tablet 5 mg Cannot be calculated   midodrine (Proamatine) tablet 15 mg Cannot be calculated   ondansetron (Zofran) injection 4 mg Cannot be calculated   pantoprazole (ProtoNix) EC tablet 40 mg Cannot be calculated   sennosides-docusate sodium  (Janessa-Colace) 8.6-50 mg per tablet 2 tablet Cannot be calculated   traMADol (Ultram) tablet 50 mg Cannot be calculated              Anesthesia Record               Intraprocedure I/O Totals          Intake    PRBC 350.00 mL    Total Intake 350 mL          Specimen: No specimens collected     Staff:   Circulator: Gina Gleason RN  Relief Circulator: Radha Kruse RN  Relief Scrub: Roopa Deal  Scrub Person: Kristy Rios      Findings: calcified, aneurysmal fistula with large skin defect down to fistula in proximal arm    Post op plan of care:  - Dressing to remain for 48-72 hours, to be taken down by vascular service  - Continue HD through RIJ tunneled catheter  - Follow up in office in a month for suture removal    Complications:  None; patient tolerated the procedure well.     Disposition: PACU - hemodynamically stable.  Condition: stable  Specimens Collected: No specimens collected  Attending Attestation:     Rosa Tovar  Phone Number: [phone]

## 2024-03-10 NOTE — ANESTHESIA PROCEDURE NOTES
Airway  Date/Time: 3/10/2024 11:24 AM  Urgency: elective    Airway not difficult    Staffing  Performed: resident   Authorized by: Orlin Gutierrez DO    Performed by: Jimmy Hannon MD  Patient location during procedure: OR    Indications and Patient Condition  Indications for airway management: anesthesia  Spontaneous ventilation: present  Sedation level: deep  Preoxygenated: yes  Patient position: sniffing  Mask difficulty assessment: 2 - vent by mask + OA or adjuvant +/- NMBA  Planned trial extubation    Final Airway Details  Final airway type: endotracheal airway      Successful airway: ETT  Cuffed: yes   Successful intubation technique: direct laryngoscopy  Facilitating devices/methods: intubating stylet  Blade: Nima  Blade size: #4  ETT size (mm): 7.5  Cormack-Lehane Classification: grade IIa - partial view of glottis  Placement verified by: chest auscultation and capnometry   Measured from: teeth  ETT to teeth (cm): 22  Number of attempts at approach: 1  Number of other approaches attempted: 0

## 2024-03-10 NOTE — PROGRESS NOTES
03/10/24 1438   Discharge Planning   Living Arrangements Other (Comment)  (Pt is from [facility] (since 9/2023 per wife).)   Support Systems Children;Spouse/significant other   Assistance Needed return to SNF   Type of Residence Skilled nursing facility   Do you have animals or pets at home? No   Care Facility Name [facility]   Who is requesting discharge planning? Other (Comment)  (Wife)   Home or Post Acute Services Post acute facilities (Rehab/SNF/etc)   Type of Post Acute Facility Services Skilled nursing   Patient expects to be discharged to: return to [facility]   Does the patient need discharge transport arranged? Yes   RoundTrip coordination needed? Yes   Has discharge transport been arranged? No   Financial Resource Strain   How hard is it for you to pay for the very basics like food, housing, medical care, and heating? Not hard   Housing Stability   In the last 12 months, was there a time when you were not able to pay the mortgage or rent on time? N   In the last 12 months, was there a time when you did not have a steady place to sleep or slept in a shelter (including now)? N   Transportation Needs   In the past 12 months, has lack of transportation kept you from medical appointments or from getting medications? no   In the past 12 months, has lack of transportation kept you from meetings, work, or from getting things needed for daily living? No   Patient Choice   Patient / Family choosing to utilize agency / facility established prior to hospitalization Yes     Assessment Note:  Pt is currently in the OR, so spoke with pt's wife Julia Benton ([phone]) via phone, and introduced myself as care coordinator and member of the Care Transitions team for discharge planning.   Per wife, pt is from [facility] (since 9/2023), she would like for pt to return when medically ready.  [facility] has onsite HD.  Referral sent to SNF via [facility] for pt's return.  Await ADOD and plan of care.   Pt's wife does not have any questions/concerns at this time.     Caren Angel MSN, RN-BC  Transitional Care Coordinator (TCC)  [phone]

## 2024-03-10 NOTE — ANESTHESIA PREPROCEDURE EVALUATION
Patient: Chevy Benton    Procedure Information       Anesthesia Start Date/Time: 03/10/24 1022    Procedure: Repair Arteriovenous Fistula Upper Extremity (Left) - Repair vs ligation of upper extremity fistula    Location: [facility] / [facility]    Surgeons: Rosa Tovar MD            Relevant Problems   Anesthesia (within normal limits)   (-) Malignant hyperthermia      Cardiovascular   (+) A-fib (CMS/[facility])   (+) Atherosclerosis of native arteries of left leg with ulceration of heel and midfoot (CMS/[facility])   (+) Atherosclerosis of native artery of right lower extremity with gangrene (CMS/[facility])   (+) Coronary artery disease involving native coronary artery of native heart with angina pectoris (CMS/[facility])   (+) Critical limb ischemia of both lower extremities (CMS/[facility])   (+) Critical limb ischemia of right lower extremity (CMS/[facility])   (+) Embolism and thrombosis of arteries of the lower extremities (CMS/[facility])   (+) HFrEF (heart failure with reduced ejection fraction) (CMS/HCC)   (+) Hyperlipidemia LDL goal <70   (+) 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 (CMS/[facility])   (+) Non-rheumatic mitral regurgitation   (+) Nonrheumatic mitral (valve) insufficiency   (+) PAD (peripheral artery disease) (CMS/[facility])   (+) Pacemaker   (+) Peripheral vascular disorder due to diabetes mellitus (CMS/[facility])   (+) Pulmonary HTN (CMS/[facility])   (+) Sick sinus syndrome (CMS/[facility])   (+) 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, with long-term current use of insulin (CMS/[facility])      Endocrine   (+) Hyperparathyroidism, unspecified (CMS/[facility])   (+) Hyponatremia   (+) Thyroid enlarged   (+) Type 2 diabetes mellitus (CMS/[facility])   (+) 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, with long-term current use of insulin (CMS/[facility])      GI   (+) GERD (gastroesophageal reflux disease)       /Renal   (+) ESRD (end stage renal disease) on dialysis (CMS/HCC)   (+) Hepatomegaly, not elsewhere classified   (+) 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 (CMS/[facility])      Neuro/Psych (within normal limits)      Pulmonary   (+) PNA (pneumonia)   (+) Pulmonary HTN (CMS/HCC)      GI/Hepatic (within normal limits)      Hematology   (+) Anemia due to blood loss, acute   (+) Anemia in other chronic diseases classified elsewhere   (+) Embolism and thrombosis of arteries of the lower extremities (CMS/[facility])      Musculoskeletal   (+) Polyosteoarthritis, unspecified      Eyes, Ears, Nose, and Throat (within normal limits)      Infectious Disease   (+) COVID       Clinical information reviewed:   Tobacco  Allergies  Meds  Problems  Med Hx  Surg Hx   Fam Hx  Soc   Hx        NPO Detail:  NPO/Void Status  Date of Last Liquid: 03/09/24  Time of Last Liquid: 2000  Date of Last Solid: 03/09/24  Time of Last Solid: 2000         Physical Exam    Airway  Mallampati: III  TM distance: >3 FB  Neck ROM: full     Cardiovascular    Dental    Pulmonary    Abdominal   (+) obese             Anesthesia Plan    History of general anesthesia?: yes  History of complications of general anesthesia?: no    ASA 3     general and MAC     The patient is not a current smoker.    intravenous induction   Postoperative administration of opioids is intended.  Anesthetic plan and risks discussed with patient.  Use of blood products discussed with patient who consented to blood products.    Plan discussed with resident.

## 2024-03-10 NOTE — SIGNIFICANT EVENT
Patient's son (POA; also named Chevy Benton) consented for him.    Wilman Ojeda MD  Vascular Surgery Fellow  Service Pager: [phone]

## 2024-03-10 NOTE — ANESTHESIA PROCEDURE NOTES
Central Venous Line:    Date/Time: 3/10/2024 12:36 PM    A central venous line was placed in the OR for the following indication(s): central venous access.  Staffing  Performed: resident   Authorized by: Orlin Gutierrez DO    Performed by: Jimmy Hannon MD    Sterility preparation included the following: provider hand hygiene performed prior to central venous catheter insertion, all 5 sterile barriers used (gloves, gown, cap, mask, large sterile drape) during central venous catheter insertion, antiseptic used during central venous catheter insertion and skin prep agent completely dried prior to procedure.  The patient was placed in supine position.    Left femoral vein was prepped.    The site was prepped with Chlorhexidine.  Size: 7 Fr (8 Fr)   Length: 20  Number of Lumens: triple lumen      During the procedure, the following specific steps were taken: target vein identified, needle advanced into vein and blood aspirated and guidewire advanced into vein.  Seldinger technique used.  Procedure performed using ultrasound guidance.  Sterile gel and probe cover used in ultrasound-guided central venous catheter insertion.    Intravenous verification was obtained by ultrasound, venous blood return and manometry.      Post insertion care included: all ports aspirated, all ports flushed easily, guidewire removed intact, Biopatch applied, line sutured in place and dressing applied.    During the procedure the patient experienced: patient tolerated procedure well with no complications.          Additional notes:  CVC placement initially attempted via RIJ x2 attempts.  First attempt by PGY2 resident- introducer needle visualized on ultrasound with blood return into syringe.  Upon advancing the guidewire, resistance was felt after about 10 cm, guidewire and needle removed.    Second attempt by attending Dr. Gutierrez- needle visualized on ultrasound with blood return, again resistance felt when advancing the guidewire.  Needle  and wire removed and pressure held over site.  No complications noted.    Third attempt successful on Left femoral vein

## 2024-03-10 NOTE — PERIOPERATIVE NURSING NOTE
1029: discussion with Wilman from vascular surgery service and Rosie OR RN regarding consent; per service, consent was signed by son POA. Discussion with Dr Gutierrez, okay to proceed with surgery without cardiology fellow device interrogation. Dr Gutierrez reviewed device report from pt chart dated 9/22/23.

## 2024-03-10 NOTE — ANESTHESIA POSTPROCEDURE EVALUATION
Patient: Chevy Benton    Procedure Summary       Date: 03/10/24 Room / Location: [facility] OR 16 / Virtual [facility] OR    Anesthesia Start: 1036 Anesthesia Stop: 1408    Procedure: Repair Arteriovenous Fistula Upper Extremity (Left: Arm Upper) Diagnosis:       A-V fistula (CMS/HCC)      (A-V fistula (CMS/HCC) [I77.0])    Surgeons: Rosa Tovar MD Responsible Provider: Orlin Gutierrez DO    Anesthesia Type: general, MAC ASA Status: 3            Anesthesia Type: general, MAC    Vitals Value Taken Time   /80 03/10/24 1413   Temp 36.0 03/10/24 1413   Pulse 100 03/10/24 1413   Resp 14 03/10/24 1413   SpO2 100 03/10/24 1413       Anesthesia Post Evaluation    Patient location during evaluation: PACU  Patient participation: complete - patient participated  Level of consciousness: awake and alert  Pain score: 1  Pain management: adequate  Multimodal analgesia pain management approach  Airway patency: patent  Two or more strategies used to mitigate risk of obstructive sleep apnea  Cardiovascular status: acceptable  Respiratory status: acceptable  Hydration status: acceptable  Postoperative Nausea and Vomiting: none        No notable events documented.     aching/non-productive cough

## 2024-03-10 NOTE — SIGNIFICANT EVENT
S: patient asleep in bed. Difficult to arouse, arouses lightly on loud name calling and touch. Per bedside nurses, this has been baseline since OR.    O:  General: somnelent. Oriented to name when asked loudly.   LUE: wrapped in kerlix/ace.   Extremities: patient has difficulty following commands secondary to sleepiness    A/P: s/p LUE fistula repair  Post op plan of care:  - Dressing to remain for 48-72 hours, to be taken down by vascular service  - Continue HD through RIJ tunneled catheter  - Follow up in office in a month for suture removal

## 2024-03-10 NOTE — H&P
"Interval H&P    The below H&P reviewed. The patient was examined and there are no changes to the H&P.    Plan for OR today with Vascular Surgery sometime mid-morning. Consent obtained.       Kari Montero, PGY2  General Surgery  ==========================================================================================    \"HPI:  Chevy Benton is a 78 y.o. male who presented to [facility] from his nursing home with a bleeding LUE AVF. Patient is somewhat of a poor historian, he cannot tell me if he was dialyzed via his fistula today or via the LIJ catheter that was placed during his last admission.  He has a pacer in the right chest and a nonocclusive R subclavian vein DVT diagnosed in October 2023.     During admission in January/February, the patient was noted to have prolonged bleeding times from his fistula after dialysis through it and underwent the following:  - 2/23/24: LIJ tunneled HD catheter replaced  - 2/21/24: fistulogram with 8 mm Venango angioplasty of cephalic outflow in IR  - 2/2/24: LIJ tunneled HD catheter removed  - 1/12/24: fistulogram with 8 and 10 mm Venango angioplasty in cephalic vein and 12 mm Kimmell in distal subclavian     In the [facility], a bottle cap compression dressing was applied and the patient was transferred to [facility].  Upon arrival I took down this dressing and noted continued brisk bleeding from the fistula.  Pressure was applied to the inflow and 2 figure of eight silk sutures were placed to close the hole in the skin with successful hemostasis.  A moderate compression dressing was reapplied.     PMH:  ESRD on HD via L brachiocephalic fistula (2015), SBO, HFrEF (LVEF 40-45% Jan 2024), DM2 (A1c 5.3% Dec 2023), HTN, Afib (on Eliquis 2.5 BID), Rt subclavian vein DVT (nonocclusive per reports, 10/2023), MR, SSS s/p pacer, PAD     PSH:   L brachiocephalic fistula (2015), multiple fistulograms with angioplasty of cephalic and central stenoses, pacer, angiogram with PTA R AT/PT (4/2023), " "angiogram with PTA R AT (8/2023), angiogram with PTA R AT/DP, PT/lateral plantar, and plantar arch (9/2023 Dr. Linder), R TMA (9/2023)     Objective   Heart Rate:  [78-93]   Temperature:  [35.9 °C (96.7 °F)-36.4 °C (97.5 °F)]   Respirations:  [15-19]   BP: ()/(61-83)   Height:  [177.8 cm (5' 10\")]   Weight:  [90.7 kg (200 lb)]   Pulse Ox:  [93 %-94 %]      Physical Exam:  General: NAD, AAOx2 (person, year)  Neuro: no gross deficits, speech WNL  HEENT: NC/AT  CV: irregularly irregular rhythm, rate controlled  Pulm: normal respiratory effort on RA  Abd: soft, NTND  Extr: notably swollen BL UE  Skin: no lesions or rashes      ROS:  12-point review of systems was performed and is negative except as noted above.      Labs:       Results from last 7 days   Lab Units 03/08/24  1437   WBC AUTO x10*3/uL 11.9*   HEMOGLOBIN g/dL 7.7*   HEMATOCRIT % 27.2*   PLATELETS AUTO x10*3/uL 253           Results from last 7 days   Lab Units 03/08/24  1437   SODIUM mmol/L 129*   POTASSIUM mmol/L 3.2*   CHLORIDE mmol/L 90*   CO2 mmol/L 26   BUN mg/dL 25*   CREATININE mg/dL 2.29*   GLUCOSE mg/dL 149*   CALCIUM mg/dL 9.5           Results from last 7 days   Lab Units 03/08/24  1437   APTT seconds 27             Imaging:  Interpreted By:  Scott Lepe,   STUDY:  IR ANGIOGRAM FISTULA GRAFT;  2/21/2024 4:36 pm  INDICATION:  Signs/Symptoms:Angio for pressures.  IMPRESSION:  Left upper extremity AV fistulogram. The fistula is patent, although there are multiple greater than 50% stenosis of the cephalic outflow.  These were angioplastied successfully. 2 very large pseudoaneurysms identified at repeated access points. These are responsible for prolonged bleeding after access and will require surgical revision.      Performed and dictated at [facility].     Assessment/Plan   78 y.o. male with bleeding from LUE AVF.  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       Plan:  - Medical admission   - " "Dialyze tomorrow in anticipation of OR Sunday for revision of fistula vs ligation  - Hold plavix and Eliquis  - Correction of electrolyte abnormalities     D/w attending, Dr. Guy Ojeda MD  Vascular Surgery Fellow  Service Pager: [phone]  Available over Epic Chat\"    "

## 2024-03-10 NOTE — CARE PLAN
The clinical goals for the shift include pt CBC will remain stable after OR    Over the shift, the patient did make progress toward the following goals.     Post-op labs completed. Pt states his pain in managed. Pt remained safe and injury free during shift.     Problem: Safety  Goal: Patient will be injury free during hospitalization  Outcome: Progressing     Problem: Pain  Goal: My pain/discomfort is manageable  Outcome: Progressing     Problem: Daily Care  Goal: Daily care needs are met  Outcome: Progressing

## 2024-03-10 NOTE — ANESTHESIA PROCEDURE NOTES
Central Venous Line:    Date/Time: 3/10/2024 12:35 PM    A central venous line was placed in the OR for the following indication(s): central venous access.  Staffing  Performed: resident   Authorized by: Orlin Gutierrez DO    Performed by: Jimmy Hannon MD    Sterility preparation included the following: provider hand hygiene performed prior to central venous catheter insertion, all 5 sterile barriers used (gloves, gown, cap, mask, large sterile drape) during central venous catheter insertion, antiseptic used during central venous catheter insertion and skin prep agent completely dried prior to procedure.  The patient was placed in supine position.    Left femoral vein was prepped.    The site was prepped with Chlorhexidine.  Size: 7 Fr   Length: 20  Number of Lumens: triple lumen    This catheter was not an oximetric catheter.    During the procedure, the following specific steps were taken: target vein identified, needle advanced into vein and blood aspirated and guidewire advanced into vein.  Seldinger technique used.  Procedure performed using ultrasound guidance and surface landmarks.  Sterile gel and probe cover used in ultrasound-guided central venous catheter insertion.    Intravenous verification was obtained by ultrasound, venous blood return, x-ray, transducer and manometry.      Post insertion care included: all ports aspirated, all ports flushed easily, guidewire removed intact, Biopatch applied, line sutured in place and dressing applied.    During the procedure the patient experienced: patient tolerated procedure well with no complications.

## 2024-03-10 NOTE — PROGRESS NOTES
"Chevy Benton is a 78 y.o. male on day 2 of admission presenting with A-V fistula (CMS/HCC).    Subjective   No events over night.     Reports feeling weak, ( seen after returning from OR)    RN reported no new events.           Objective   Vitals:    03/10/24 0544   BP: (!) 99/46   Pulse: 90   Resp: 18   Temp: 36.5 °C (97.7 °F)   SpO2: 100%       Physical Exam  Constitutional:       Comments: Comfortable at rest on oxygen through NC   HENT:      Head: Normocephalic.      Nose: Nose normal.   Eyes:      Extraocular Movements: Extraocular movements intact.      Pupils: Pupils are equal, round, and reactive to light.   Cardiovascular:      Rate and Rhythm: Normal rate and regular rhythm.      Heart sounds: Normal heart sounds. No murmur heard.  Pulmonary:      Effort: No respiratory distress.      Breath sounds: Normal breath sounds. No wheezing.   Abdominal:      General: Bowel sounds are normal. There is no distension.      Palpations: Abdomen is soft.      Tenderness: There is no abdominal tenderness.   Musculoskeletal:         General: Normal range of motion.      Cervical back: Normal range of motion.      Comments: Fistula site in LUE covered with dressing after the ligation surgery,     Right foot TMA, ( ulcer noted over the TMA area, which is covered with dressing)   Skin:     General: Skin is warm.   Neurological:      General: No focal deficit present.      Mental Status: He is alert and oriented to person, place, and time. Mental status is at baseline.   Psychiatric:         Mood and Affect: Mood normal.         Last Recorded Vitals  Blood pressure (!) 99/46, pulse 90, temperature 36.5 °C (97.7 °F), resp. rate 18, height 1.778 m (5' 10\"), weight 90.9 kg (200 lb 6.4 oz), SpO2 100 %.  Intake/Output last 3 Shifts:  I/O last 3 completed shifts:  In: 741 (8.2 mL/kg) [I.V.:200 (2.2 mL/kg); Blood:541]  Out: - (0 mL/kg)   Weight: 90.9 kg     Relevant Results  Scheduled medications  allopurinol, 100 mg, oral, " Daily  atorvastatin, 40 mg, oral, Nightly  B complex-vitamin C-folic acid, 1 capsule, oral, Daily  [START ON 3/11/2024] epoetin dane or biosimilar, 100 Units/kg, intravenous, Once per day on Mon Wed Fri  gabapentin, 100 mg, oral, Daily  insulin lispro, 0-5 Units, subcutaneous, TID with meals  melatonin, 5 mg, oral, Nightly  midodrine, 15 mg, oral, q8h  pantoprazole, 40 mg, oral, Daily before breakfast  sennosides-docusate sodium, 2 tablet, oral, BID      Continuous medications     PRN medications  PRN medications: acetaminophen, albuterol, dextrose 10 % in water (D10W), dextrose, glucagon, HYDROmorphone, ipratropium-albuteroL, ondansetron, traMADol    Results for orders placed or performed during the hospital encounter of 03/08/24 (from the past 24 hour(s))   POCT GLUCOSE   Result Value Ref Range    POCT Glucose 112 (H) 74 - 99 mg/dL   CBC   Result Value Ref Range    WBC 10.4 4.4 - 11.3 x10*3/uL    nRBC 0.3 (H) 0.0 - 0.0 /100 WBCs    RBC 2.28 (L) 4.50 - 5.90 x10*6/uL    Hemoglobin 6.5 (LL) 13.5 - 17.5 g/dL    Hematocrit 22.2 (L) 41.0 - 52.0 %    MCV 97 80 - 100 fL    MCH 28.5 26.0 - 34.0 pg    MCHC 29.3 (L) 32.0 - 36.0 g/dL    RDW 18.7 (H) 11.5 - 14.5 %    Platelets 255 150 - 450 x10*3/uL   Renal Function Panel   Result Value Ref Range    Glucose 181 (H) 74 - 99 mg/dL    Sodium 130 (L) 136 - 145 mmol/L    Potassium 3.6 3.5 - 5.3 mmol/L    Chloride 87 (L) 98 - 107 mmol/L    Bicarbonate 27 21 - 32 mmol/L    Anion Gap 20 10 - 20 mmol/L    Urea Nitrogen 33 (H) 6 - 23 mg/dL    Creatinine 2.83 (H) 0.50 - 1.30 mg/dL    eGFR 22 (L) >60 mL/min/1.73m*2    Calcium 9.7 8.6 - 10.6 mg/dL    Phosphorus 5.2 (H) 2.5 - 4.9 mg/dL    Albumin 2.7 (L) 3.4 - 5.0 g/dL   Magnesium   Result Value Ref Range    Magnesium 1.85 1.60 - 2.40 mg/dL   Prepare RBC: 1 Units   Result Value Ref Range    PRODUCT CODE F1396S40     Unit Number [id number]     Unit ABO B     Unit RH POS     XM INTEP COMP     Dispense Status RE     Blood Expiration Date  March 28, 2024 23:59 EDT     PRODUCT BLOOD TYPE 7300     UNIT VOLUME 350    Prepare RBC: 2 Units   Result Value Ref Range    PRODUCT CODE R2323H79     Unit Number [id number]     Unit ABO B     Unit RH POS     XM INTEP COMP     Dispense Status XM     Blood Expiration Date March 28, 2024 23:59 EDT     PRODUCT BLOOD TYPE 7300     UNIT VOLUME 350     PRODUCT CODE X2895H37     Unit Number [id number]     Unit ABO B     Unit RH POS     XM INTEP COMP     Dispense Status TR     Blood Expiration Date March 28, 2024 23:59 EDT     PRODUCT BLOOD TYPE 7300     UNIT VOLUME 350    POCT GLUCOSE   Result Value Ref Range    POCT Glucose 41 (L) 74 - 99 mg/dL   POCT GLUCOSE   Result Value Ref Range    POCT Glucose 109 (H) 74 - 99 mg/dL   POCT GLUCOSE   Result Value Ref Range    POCT Glucose 207 (H) 74 - 99 mg/dL   POCT GLUCOSE   Result Value Ref Range    POCT Glucose 144 (H) 74 - 99 mg/dL         Assessment/Plan   Principal Problem:    A-V fistula (CMS/[facility])  Active Problems:    Anemia due to blood loss, acute    Mr. Benton is a 78 year old gentleman with a PMH of PAD, Afib (on Eliquis), CAD, T2DM, R subclavian DVT (10/23), GERD, SSS s/p PPM placement, gout, ESRD on HD (MWF), HLD, hypotension, HFrEF (last EF 40-45% 1/23/24), and gout who is transferred to [facility] from [facility] due to bleeding from his L upper arm AV fistula.     Vascular surgery is consulted in the ED who placed 2 figure 8 silk sutures in order to achieve hemostasis at fistula site. Plan is for pt to receive dialysis on 3/9 and go to OR with vascular surgery on Sunday for revision of fistula vs ligation. Labs obtained on admit notable for anemia, hyponatremia, hyponatremia, and lactic acidosis. Pt is admitted to medicine for further treatment and management of anemia due to acute blood loss and bleeding from AV fistula. Vascular surgery treated with ligation of the fistula on 3/10.         Assessment/Plan   Principal Problem:    A-V fistula (CMS/HCC)  Active  Problems:    Anemia due to blood loss, acute     #Bleeding from AV fistula  # anemia d/t acute blood loss  - Vascular surgery consulted, appreciate recs  - Hemostasis obtained at L upper extremity fistula site after placement of two figure 8 sutures  - Holding home dose of Plavix and Eliquis  - Hgb 6.5 on 3/9,   -Transfused 1 unit of PRBC on HD on 3/9,   Monitor HB, pending HB from today, transfuse if Hb less than 7.0,   S/p ligation of the fistula with vascular surgery on 3/10,   Continue to follow up vascular recs, If vascular surgery OK restart anticoagulation, ( vascular placed right femoral central line for access)     #T2DM (last HgA1c 5.3% 12/3/23) c/b neuropathy  - accucheck ACHS  - Lispro SSI #1 0-5 units  - holding home dose of Lantus 6 units to prevent episodes of hypoglycemia while hospitalized and HgA1c <7%  - hypoglycemia order set placed  - continue home dose of Gabapentin 100 mg PO QD     #hyponatremia (chronic)  #ESRD on HD   - daily CHG bath d/t L chest dialysis cath site  - Na 129 on admit, Na 131 on 2/27, Na 129 on 2/26, Na 135 on 2/25  - Nephrology consulted, S/p HD on 3/9, through left internal jugular tunnel line placed prior to this admission,   - renal diet  - renally dose meds as needed  - continue home dose of Nephrocaps PO every day     #Afib  #hx of DVT (10/23)  - Duplex US of RUE done 10/22/23: Positive study for DVT in the right upper extremity.  There is a nonocclusive thrombus in the right subclavian artery.  - holding home dose of Eliquis for procedure, restart if vascular surgery ok, on 3/11, ( patient has femoral central line for vascular access)     #hypotension  - continue home dose of Midodrine 15 mg PO TID     #LLE wound  #R foot wound  - wound nurse consulted, follow up recs.      Dispo:  PT/OT.      Personally reviewed prior inpatient records available, labs, imaging, and consultant notes. Personally discussed with nursing staff and transitional care coordinator. Current  work up, and treatment and discharge plan is discussed with the patient and counselling is provided. Repeat labs are ordered as needed.         Artis Cruz MD

## 2024-03-10 NOTE — ANESTHESIA PROCEDURE NOTES
Peripheral IV  Date/Time: 3/10/2024 12:35 PM      Placement  Needle size: 16 G  Laterality: right  Location: external jugular  Local anesthetic: none  Site prep: chlorhexidine  Technique: anatomical landmarks  Attempts: 1

## 2024-03-10 NOTE — NURSING NOTE
Rex consulted by bedside RN for PIV placement. Vast RN to bedside to assess. Patient is a dialysis patient, right arm access only. Right upper arm is edematous and tight from a previous infiltrate, not appropriate for IV cannulation. Bedside RN aware, alternative access needed.

## 2024-03-11 NOTE — PROGRESS NOTES
"Chevy Benton is a 78 y.o. male on day 3 of admission presenting with A-V fistula (CMS/HCC).    Subjective   Pt  resting in bed eating breakfast. Denies sob, n/v/d, fever, cough, chills, pain, chest pain , constipation, anuric       Objective     Physical Exam  Constitutional:       Appearance: He is obese.   Cardiovascular:      Rate and Rhythm: Normal rate.      Comments: HR-85  Pulmonary:      Comments: Ble diminished with minor crackles noted to lt base  Cont pox  Abdominal:      General: There is distension.      Comments: Non-tender to palpation   Genitourinary:     Comments: States anuric  Musculoskeletal:      Comments: Ble edema +2-3 pitting  Magdiel rt arm +3 pitting  Lt arm swollen with acewrap drsg-s/p surg   Skin:     General: Skin is warm and dry.      Comments: Magdiel feet with kerlix wrap   Neurological:      Mental Status: He is alert and oriented to person, place, and time.   Psychiatric:         Mood and Affect: Mood normal.         Behavior: Behavior normal.         Last Recorded Vitals  Blood pressure 115/61, pulse 87, temperature 36.5 °C (97.7 °F), resp. rate 16, height 1.778 m (5' 10\"), weight 90.9 kg (200 lb 6.4 oz), SpO2 100 %.  Intake/Output last 3 Shifts:  I/O last 3 completed shifts:  In: 1091 (12 mL/kg) [I.V.:300 (3.3 mL/kg); Blood:541; IV Piggyback:250]  Out: - (0 mL/kg)   Weight: 90.9 kg     Relevant Results  Scheduled medications  allopurinol, 100 mg, oral, Daily  atorvastatin, 40 mg, oral, Nightly  B complex-vitamin C-folic acid, 1 capsule, oral, Daily  [START ON 3/12/2024] epoetin dane or biosimilar, 100 Units/kg, intravenous, Once per day on Tue Thu Sat  gabapentin, 100 mg, oral, Daily  insulin lispro, 0-5 Units, subcutaneous, TID with meals  melatonin, 5 mg, oral, Nightly  midodrine, 15 mg, oral, q8h  pantoprazole, 40 mg, oral, Daily before breakfast  sennosides-docusate sodium, 2 tablet, oral, BID      Continuous medications     PRN medications  PRN medications: acetaminophen, " albuterol, dextrose 10 % in water (D10W), dextrose, glucagon, HYDROmorphone, ipratropium-albuteroL, ondansetron   Results for orders placed or performed during the hospital encounter of 03/08/24 (from the past 24 hour(s))   POCT GLUCOSE   Result Value Ref Range    POCT Glucose 115 (H) 74 - 99 mg/dL   Magnesium   Result Value Ref Range    Magnesium 1.88 1.60 - 2.40 mg/dL   Coagulation Screen   Result Value Ref Range    Protime 13.9 (H) 9.8 - 12.8 seconds    INR 1.2 (H) 0.9 - 1.1    aPTT 30 27 - 38 seconds   CBC   Result Value Ref Range    WBC 14.5 (H) 4.4 - 11.3 x10*3/uL    nRBC 0.3 (H) 0.0 - 0.0 /100 WBCs    RBC 2.75 (L) 4.50 - 5.90 x10*6/uL    Hemoglobin 8.1 (L) 13.5 - 17.5 g/dL    Hematocrit 26.7 (L) 41.0 - 52.0 %    MCV 97 80 - 100 fL    MCH 29.5 26.0 - 34.0 pg    MCHC 30.3 (L) 32.0 - 36.0 g/dL    RDW 18.8 (H) 11.5 - 14.5 %    Platelets 275 150 - 450 x10*3/uL   Comprehensive Metabolic Panel   Result Value Ref Range    Glucose 141 (H) 74 - 99 mg/dL    Sodium 133 (L) 136 - 145 mmol/L    Potassium 3.7 3.5 - 5.3 mmol/L    Chloride 95 (L) 98 - 107 mmol/L    Bicarbonate 27 21 - 32 mmol/L    Anion Gap 15 10 - 20 mmol/L    Urea Nitrogen 22 6 - 23 mg/dL    Creatinine 2.35 (H) 0.50 - 1.30 mg/dL    eGFR 27 (L) >60 mL/min/1.73m*2    Calcium 9.7 8.6 - 10.6 mg/dL    Albumin 2.8 (L) 3.4 - 5.0 g/dL    Alkaline Phosphatase 108 33 - 136 U/L    Total Protein 5.3 (L) 6.4 - 8.2 g/dL    AST 13 9 - 39 U/L    Bilirubin, Total 0.7 0.0 - 1.2 mg/dL    ALT 14 10 - 52 U/L   POCT GLUCOSE   Result Value Ref Range    POCT Glucose 132 (H) 74 - 99 mg/dL   POCT GLUCOSE   Result Value Ref Range    POCT Glucose 119 (H) 74 - 99 mg/dL   POCT GLUCOSE   Result Value Ref Range    POCT Glucose 113 (H) 74 - 99 mg/dL                     Assessment/Plan   Principal Problem:    A-V fistula (CMS/[facility])  Active Problems:    Anemia due to blood loss, acute    Tolerated hemodialysis Saturday with net fluid loss 1500cc    Bp extremely high 148/100-155/125. Floor vs  have been stable, hypervolemic on exam and has stable electrolytes      Outpatient Dialysis schedule:   [facility]Dr [facility]; will be TTS while inpatient     Access: lt chest cath - no issues - able to achieve   Lt groin temp cath     Anemia of ESRD:   epoetin dane-epbx (Retacrit) injection 10,000 Units on dialysis days... current hgb 8.1.. 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 Will cont to monitor.. B complex-vitamin C-folic acid (Nephrocaps) capsule 1 capsule daily     Plan HD tomorrow with UF as tolerated     Renal diet      Please obtain daily standing wt (if possible)     Medication to be adjusted for ESRD      Patient to continue regular HD schedule while inpatient and to follow with the outpatient nephrologist at discharge        JENIFFER Bates-CNP

## 2024-03-11 NOTE — PROGRESS NOTES
Physical Therapy    Physical Therapy Evaluation    Patient Name: Chevy Benton  MRN: [id number]  Today's Date: 3/11/2024   Time Calculation  Start Time: 1128  Stop Time: 1150  Time Calculation (min): 22 min    Assessment/Plan   PT Assessment  PT Assessment Results: Decreased strength, Decreased endurance, Decreased mobility, Impaired balance  Rehab Prognosis: Good  Barriers to Discharge: pain  Evaluation/Treatment Tolerance: Patient limited by pain  Strengths: Attitude of self, Coping skills  End of Session Communication: Bedside nurse  Assessment Comment: Pt limited by pain during session. Pt required max A for bed mobility and to mainatin static sitting. A stand was not performed due to safety. Patient would benefit from skilled physical therapy to maximize functional mobility and safety. Pt is appropriate for mod intensity therapy when medically appropriate for DC.   End of Session Patient Position: Bed, 3 rail up, Alarm off, not on at start of session  IP OR SWING BED PT PLAN  Inpatient or Swing Bed: Inpatient  PT Plan  Treatment/Interventions: Bed mobility, Transfer training, Gait training, Balance training, Strengthening, Endurance training, Therapeutic exercise, Therapeutic activity  PT Plan: Skilled PT  PT Frequency: 3 times per week  PT Discharge Recommendations: Moderate intensity level of continued care  Equipment Recommended upon Discharge:  (none)  PT Recommended Transfer Status: Total assist  PT - OK to Discharge: Yes (meaning pt seen and DC rec made)    Subjective   General Visit Information:  General  Reason for Referral: Left arm Ligation of AVF  Past Medical History Relevant to Rehab: PVD s/p PTA to RLE c/b perforation of AT, Right subclavian DVT, right TMA, chronic systolic and diastolic HFrEF, DM with neuropathy.  Prior to Session Communication: Bedside nurse  Patient Position Received: Bed, 3 rail up, Alarm off, not on at start of session  Preferred Learning Style: auditory, verbal  General  Comment: Pt pleasant and agreeable to therapy  Home Living:  Home Living  Type of Home: Skilled Nursing facility  Home Adaptive Equipment: Walker rolling or standard  Home Living Comments: [facility] from [facility]; Pt questionable historian  Prior Level of Function:  Prior Function Per Pt/Caregiver Report  Level of [place]: Needs assistance with ADLs, Needs assistance with homemaking, Needs assistance with functional transfers (Pt unable to clearly state)  Receives Help From: Primary caregiver (caregivers at SNF)  Ambulatory Assistance: Needs assistance  Prior Function Comments: Pt stated he could not recall what assist he recieved at SNF. Pt alsocould not recall if he was ambulating/transfering or if assist was needed  Precautions:  Precautions  LE Weight Bearing Status: Right Non-Weight Bearing (from previous admit in January 2024)  Medical Precautions: Fall precautions    Objective   Pain:  Pain Assessment  Pain Assessment: Eduardo-Baker FACES (pt did not give numerical rating)  Eduardo-Baker FACES Pain Rating: Hurts whole lot  Pain Type: Chronic pain  Pain Location: Leg (left arm)  Pain Orientation: Right, Left (feet and back of legs)  Pain Interventions: Repositioned, Ambulation/increased activity  Response to Interventions: had increased pain with activity  Cognition:  Cognition  Arousal/Alertness: Delayed responses to stimuli  Orientation Level: Disoriented to time, Disoriented to situation  Following Commands: Follows one step commands with repetition    General Assessments:  Activity Tolerance  Endurance: Tolerates 10 - 20 min exercise with multiple rests    Sensation  Light Touch: No apparent deficits (pt has neuropathy in feet)    Static Sitting Balance  Static Sitting-Balance Support: Bilateral upper extremity supported (left foot supportd)  Static Sitting-Level of Assistance: Maximum assistance  Static Sitting-Comment/Number of Minutes: x 5 min    Static Standing Balance  Static Standing-Balance Support:   (not performed due to safety)  Functional Assessments:  Bed Mobility  Bed Mobility: Yes  Bed Mobility 1  Bed Mobility 1: Supine to sitting, Sitting to supine  Level of Assistance 1: Maximum assistance, Moderate verbal cues  Bed Mobility Comments 1: pt needed mod vc for safety and sequencing    Transfers  Transfer: No (not performed due to safety)  Extremity/Trunk Assessments:  RLE   RLE :  (not tested due to pt pain but less than 3+/5)  LLE   LLE :  (not tested due to pt pain but less than 3+/5)  Outcome Measures:  [facility] Basic Mobility  Turning from your back to your side while in a flat bed without using bedrails: A lot  Moving from lying on your back to sitting on the side of a flat bed without using bedrails: Total  Moving to and from bed to chair (including a wheelchair): Total  Standing up from a chair using your arms (e.g. wheelchair or bedside chair): Total  To walk in hospital room: Total  Climbing 3-5 steps with railing: Total  Basic Mobility - Total Score: 7    Encounter Problems       Encounter Problems (Active)       PT Problem       Patient will complete supine to sit and sit to supine Min Assist        Start:  03/11/24    Expected End:  03/25/24            Patient will perform sit<>stand transfer with Rolling Walker, and Mod Assist        Start:  03/11/24    Expected End:  03/25/24            Patient will ambulate >10' with Rolling Walker and Mod Assist        Start:  03/11/24    Expected End:  03/25/24            Pt will be able to maintain static sitting with SBA x 5 min       Start:  03/11/24    Expected End:  03/25/24                   Education Documentation  Precautions, taught by Vale Smith PT at 3/11/2024 12:24 PM.  Learner: Patient  Readiness: Acceptance  Method: Explanation, Demonstration  Response: Verbalizes Understanding, Demonstrated Understanding    Body Mechanics, taught by Vale Smith PT at 3/11/2024 12:24 PM.  Learner: Patient  Readiness: Acceptance  Method: Explanation,  Demonstration  Response: Verbalizes Understanding, Demonstrated Understanding    Mobility Training, taught by Vale Smith PT at 3/11/2024 12:24 PM.  Learner: Patient  Readiness: Acceptance  Method: Explanation, Demonstration  Response: Verbalizes Understanding, Demonstrated Understanding    Education Comments  No comments found.

## 2024-03-11 NOTE — PROGRESS NOTES
Wound Care Progress Note     Visit Date: 3/11/2024      Patient Name: Chevy Benton         MRN: [id number]                Reason for Visit: RLE/ Right TMA     Wound Assessment:  Wound Incision Foot Dorsal foot;Right (Active)   No Date First Assessed or Time First Assessed found.   Primary Wound Type: Incision  Location: Foot  Wound Location Orientation: Dorsal foot;Right  Wound Outcome: Amputation      Assessments 3/11/2024  6:58 PM   Site Assessment Dark edges;Necrotic;Red;Sloughing   Wound Length (cm) 2 cm   Wound Width (cm) 6.5 cm   Wound Surface Area (cm^2) 13 cm^2   State of Healing Non-healing;Slough   Margins Attached edges   Closure None   Drainage Description Purulent;Tan   Drainage Amount Small   Dressing Alginate;Other (Comment);Abdominal dressing;Kerlix/rolled gauze   Dressing Status Clean;Dry       Active Orders   Date Order Priority Status Authorizing Provider   03/11/24 1533 Inpatient Consult to Wound and Ostomy Nurse Routine Active Donn Quiñonez MD     - Reason for Consult?:    Wound       Inactive Orders   Date Order Priority Status Authorizing Provider   02/19/24 1653 Wound Care Routine Discontinued EDWIN Sethi, ALPHONSO     - Wound Complexity:    Complex     - Cleanse with:    Normal Saline     - Cover with::    DCD - Dry Clean Dressing     - Janessa-wound::    Protective barrier no sting wipes   02/19/24 1653 Change dressing Routine Discontinued EDWIN Sethi DNP   02/01/24 0958 Inpatient Consult to Wound and Ostomy Nurse Routine Completed Stuart Nguyen MD     - Reason for Consult?:    Wound Vac   01/30/24 1016 Inpatient Consult to Wound and Ostomy Nurse Routine Completed Stuart Nguyen MD     - Reason for Consult?:    Wound Vac   01/21/24 1214 Inpatient Consult to Wound and Ostomy Nurse Routine Completed Koko Gordon MD     - Reason for Consult?:    Wound     - Reason for Consult?:    Wound Vac   01/18/24 1022 Apply dressing Routine Completed Alfred Randall MD    01/17/24 1132 Wound Care Routine Discontinued EDWIN Morales     - Wound Complexity:    Simple   01/11/24 0921 Apply dressing Routine Discontinued EDWIN Morales       Wound 02/19/24 Pretibial Right (Active)   Date First Assessed/Time First Assessed: 02/19/24 1047   Location: Pretibial  Wound Location Orientation: Right      Assessments 3/11/2024  6:58 PM   Wound Length (cm) 6 cm   Wound Width (cm) 4.5 cm   Wound Surface Area (cm^2) 27 cm^2   State of Healing Slough       Active Orders   Date Order Priority Status Authorizing Provider   03/11/24 1533 Inpatient Consult to Wound and Ostomy Nurse Routine Active Donn Quiñonez MD     - Reason for Consult?:    Wound       Inactive Orders   Date Order Priority Status Authorizing Provider   02/26/24 1347 Wound Care Routine Discontinued César Garcia DO     - Wound Complexity:    Simple     - Cleanse with:    Normal Saline     - Apply::    Betadine Swab     - Cover with::    Xeroform     - Cover with::    DCD - Dry Clean Dressing   02/26/24 1347 Change dressing Routine Discontinued César Garcia DO   02/19/24 1653 Wound Care Routine Discontinued EDWIN Sethi DNP     - Wound Complexity:    Complex     - Cleanse with:    Normal Saline     - Janessa-wound::    Protective barrier no sting wipes   02/19/24 1653 Change dressing Routine Discontinued EDWIN Sethi DNP     Wound location: R lateral lower leg        Undermining: no  Tracking: no  Wound type: Vascular compromise resulting in tissue damage  Wound bed: moist adherent dark brown eschar  Draining: yellow serous fluid  Periwound skin: dry peeling     Wound location: Right TMA  Undermining: no  Tracking: no  Wound type: previous TMA surgical site   Wound bed: Deep red base with adherent tan/brown slough- substructures visible   Draining: cloudy tan serous fluid   Periwound skin: dry fibrinous tissue     Recommendations: DAILY for DANIEL LE wounds      Irrigate  with normal saline or wound cleanser      Apply Medihoney to wound bed ([employer] order #[id number])       Cover with Aquacel extra ([employer] order #[id number])       Pad with ABD secure with Kerlix gauze     Elevate heels off the bed surface at all times.      Wound Team Plan: **HIGHLY recommend podiatry consult to evaluate DANIEL LE and Right TMA site. Primary provider, please review recommendation. If you agree with recommendation please enter as wound orders in EMR. Thank you.       Nanette Christensen RN, CWON  3/11/2024  7:00 PM

## 2024-03-11 NOTE — CARE PLAN
The clinical goals for the shift include Patient's 02 sat will remain %, by the end of this shift.    Patient remained free from injuries during this shift.    Patient is in bed: Supine, HOB elevated, call light within reach.    Patient was placed on continuous pulse ox to monitor oxygen saturation. Patient is maintaining %.    Plan of care ongoing.        Problem: Pain  Goal: My pain/discomfort is manageable  Outcome: Progressing     Problem: Safety  Goal: Patient will be injury free during hospitalization  Outcome: Progressing  Goal: I will remain free of falls  Outcome: Progressing     Problem: Daily Care  Goal: Daily care needs are met  Outcome: Progressing     Problem: Psychosocial Needs  Goal: Demonstrates ability to cope with hospitalization/illness  Outcome: Progressing  Goal: Collaborate with me, my family, and caregiver to identify my specific goals  Outcome: Progressing     Problem: Discharge Barriers  Goal: My discharge needs are met  Outcome: Progressing     Problem: Skin  Goal: Prevent/manage excess moisture  Outcome: Progressing  Flowsheets (Taken 3/11/2024 0005)  Prevent/manage excess moisture:   Cleanse incontinence/protect with barrier cream   Moisturize dry skin   Follow provider orders for dressing changes   Monitor for/manage infection if present  Goal: Promote skin healing  Outcome: Progressing  Flowsheets (Taken 3/11/2024 0005)  Promote skin healing:   Assess skin/pad under line(s)/device(s)   Ensure correct size (line/device) and apply per  instructions   Protective dressings over bony prominences   Rotate device position/do not position patient on device   Turn/reposition every 2 hours/use positioning/transfer devices

## 2024-03-11 NOTE — CARE PLAN
The patient's goals for the shift include having less pain.     The clinical goals for the shift include Pt's pulse ox will remain greater than 93% by the end of this shift.    Over the shift, the patient did make progress toward the following goals. Pt remains on 2L O2 at this time. Plan for HD tomorrow.

## 2024-03-11 NOTE — PROGRESS NOTES
Assessment/Plan      Mr. Benton is a 78 year old with a PMH of PAD, Afib (on Eliquis), CAD, T2DM, R subclavian DVT (10/23), GERD, SSS s/p PPM placement, gout, ESRD on HD (MWF), HLD, hypotension, HFrEF (last EF 40-45% 1/23/24), and gout who was transferred to [facility] from [facility] due to bleeding from his L upper arm AV fistula. Labs obtained on admit notable for anemia, hyponatremia, hyponatremia, and lactic acidosis.. Vascular surgery is consulted in the ED who placed 2 figure 8 silk sutures in order to achieve hemostasis at fistula site. Pt received new tunneled HD cath and received dialysis on 3/9, taken to OR 3/10  with vascular surgery  for ligation.          Assessment/Plan   Principal Problem:    A-V fistula (CMS/HCC)  Active Problems:    Anemia due to blood loss, acute     #Bleeding from AV fistula  # anemia d/t acute blood loss  - Vascular surgery consulted, appreciate recs  - Hemostasis obtained at L upper extremity fistula site after placement of two figure 8 sutures  - Holding home dose of Plavix and Eliquis d/t upcoming procedure with vascular surgery  - Hgb 6.5 -> 8.1, follow up repeat today. Discuss with surgery when to restart elliqus and plavix.  Recommended to continue to hold for now  -Transfuse 1 unit of PRBC on HD, Monitor HB.  Repeat  ordered  -Dressing to may remain in place until 3/12    #left hand numbness and pain  - 1st few digits, mostly pain but endorses decerased sensation, noted he reports he cannot move it but motor is intact but week and affecting the entire hand, potentially limited by pain and contrubted by swelling. Seeing if vasc surg can eval  - change to oxy q6 prn and make Dilaudid for breakthrough    #leukocytosis  - likely related to surgical stress, trend. Monitor for signs of infection.      #T2DM (last HgA1c 5.3% 12/3/23) c/b neuropathy  - accucheck ACHS  - Lispro SSI #1 0-5 units  - holding home dose of Lantus 6 units to prevent episodes of hypoglycemia while hospitalized  "and HgA1c <7%. Monitor more need to resume.    - hypoglycemia order set placed  - continue home dose of Gabapentin 100 mg PO every day       #hyponatremia (chronic)  #ESRD on HD   - daily CHG bath d/t L chest dialysis cath site  -hyponatremia from hypervolemia in setting of esrd, trend. If remains low then needs fluid restriciton.   - Nephrology consulted for HD today,   - renal diet  - renally dose meds as needed  - continue home dose of Nephrocaps PO every day  -Nephrology plans for next session tomorrow     #Afib  #hx of DVT (10/23)  - Duplex US of RUE done 10/22/23: Positive study for DVT in the right upper extremity.  There is a nonocclusive thrombus in the right subclavian artery.  - holding home dose of Eliquis and Heparin gtt d/t anemia, management as above     #hypotension, chronic  - continue home dose of Midodrine 15 mg PO TID     #LLE wound  #R foot wound  - wound nurse consult placed     Dispo: Pending fistula repair.   Scheduled outpatient appointments in system:   Future Appointments   Date Time Provider Department Center   4/17/2024  1:00 PM Koko Gordon MD [id number] [facility]     ---------------------------------------------------------------------------------------------------  Subjective   No new events.  Patient did report some increased pain in his left hand as well as some numbness.  He does have increased swelling of the hand compared to the contralateral side.  He reports difficulty moving most likely due to pain.  No fevers or chills, no evidence of other bleeding.     ---------------------------------------------------------------------------------------------------  Objective   Last Recorded Vitals  Blood pressure 115/61, pulse 87, temperature 36.5 °C (97.7 °F), resp. rate 16, height 1.778 m (5' 10\"), weight 90.9 kg (200 lb 6.4 oz), SpO2 100 %.  Intake/Output last 3 Shifts:  I/O last 3 completed shifts:  In: 1091 (12 mL/kg) [I.V.:300 (3.3 mL/kg); Blood:541; IV Piggyback:250]  Out: - (0 " mL/kg)   Weight: 90.9 kg     Physical Exam  Constitutional:       Comments: Comfortable at rest on oxygen through NC   HENT:      Head: Normocephalic.      Nose: Nose normal.   Eyes:      Extraocular Movements: Extraocular movements intact.      Pupils: Pupils are equal, round, and reactive to light.   Cardiovascular:      Rate and Rhythm: Normal rate and regular rhythm.      Heart sounds: Normal heart sounds. No murmur heard.  Pulmonary:      Effort: No respiratory distress.      Breath sounds: Normal breath sounds. No wheezing.   Abdominal:      General: Bowel sounds are normal. There is no distension.      Palpations: Abdomen is soft.      Tenderness: There is no abdominal tenderness.   Musculoskeletal:         General: Normal range of motion.      Cervical back: Normal range of motion.      Comments: Fistula site in LUE covered with dressing after the ligation surgery,     Right foot TMA, ( ulcer noted over the TMA area, which is covered with dressing)    Left arm wrapped in ace wrap, pain in fingers, neurovascularly intact, motor and sensation intact but reports mild decreased sensation, noted mild increased edema of hand compared to right.    Skin:     General: Skin is warm.   Neurological:      General: No focal deficit present.      Mental Status: He is alert and oriented to person, place, and time. Mental status is at baseline.   Psychiatric:         Mood and Affect: Mood normal.         Relevant Results  Lab Results   Component Value Date    WBC 14.5 (H) 03/10/2024    HGB 8.1 (L) 03/10/2024    HCT 26.7 (L) 03/10/2024    MCV 97 03/10/2024     03/10/2024      Lab Results   Component Value Date    GLUCOSE 141 (H) 03/10/2024    CALCIUM 9.7 03/10/2024     (L) 03/10/2024    K 3.7 03/10/2024    CO2 27 03/10/2024    CL 95 (L) 03/10/2024    BUN 22 03/10/2024    CREATININE 2.35 (H) 03/10/2024     Scheduled medications  allopurinol, 100 mg, oral, Daily  atorvastatin, 40 mg, oral, Nightly  B  complex-vitamin C-folic acid, 1 capsule, oral, Daily  epoetin dane or biosimilar, 100 Units/kg, intravenous, Once per day on Mon Wed Fri  gabapentin, 100 mg, oral, Daily  insulin lispro, 0-5 Units, subcutaneous, TID with meals  melatonin, 5 mg, oral, Nightly  midodrine, 15 mg, oral, q8h  pantoprazole, 40 mg, oral, Daily before breakfast  sennosides-docusate sodium, 2 tablet, oral, BID      Continuous medications     PRN medications  PRN medications: acetaminophen, albuterol, dextrose 10 % in water (D10W), dextrose, glucagon, HYDROmorphone, ipratropium-albuteroL, ondansetron    Donn Quiñonez MD

## 2024-03-12 NOTE — NURSING NOTE
Report from Sending RN:    Report From: Diana ( RN)  Recent Surgery of Procedure: Yes, LUE AVF revision  Baseline Level of Consciousness (LOC): a/o x 4  Oxygen Use: 2 liters  Type: NC  Diabetic: Yes, 153  Last BP Med Given Day of Dialysis: none  Last Pain Med Given: oxycodone 5 mg 0521 am  Lab Tests to be Obtained with Dialysis: No  Blood Transfusion to be Given During Dialysis: No  Available IV Access: Yes  Medications to be Administered During Dialysis: No  Continuous IV Infusion Running: No  Restraints on Currently or in the Last 24 Hours: No  Hand-Off Communication: No acute overnight or morning events; vss; Pt did not take morning medications; Pt will not need labs; Pt is a full code; Ela Morales RN.

## 2024-03-12 NOTE — PROGRESS NOTES
Nephrology Consult Progress Note    Admit Date: 3/8/2024    Interval history:  No complaints  Seen on dialysis      CURRENT MEDICATIONS:    Current Facility-Administered Medications:     acetaminophen (Tylenol) tablet 975 mg, 975 mg, oral, q6h PRN, Artis Cruz MD, 975 mg at 03/11/24 2031    albuterol 90 mcg/actuation inhaler 2 puff, 2 puff, inhalation, q6h PRN, Artis Cruz MD    allopurinol (Zyloprim) tablet 100 mg, 100 mg, oral, Daily, Artis Cruz MD, 100 mg at 03/11/24 0937    alteplase (Cathflo Activase) injection 2 mg, 2 mg, intra-catheter, PRN, Donn Quiñonez MD    atorvastatin (Lipitor) tablet 40 mg, 40 mg, oral, Nightly, Artis Cruz MD, 40 mg at 03/11/24 2032    B complex-vitamin C-folic acid (Nephrocaps) capsule 1 capsule, 1 capsule, oral, Daily, Artis Cruz MD, 1 capsule at 03/11/24 0937    dextrose 10 % in water (D10W) infusion, 0.3 g/kg/hr, intravenous, Once PRN, Artis Cruz MD    dextrose 50 % injection 25 g, 25 g, intravenous, q15 min PRN, Artis Cruz MD    epoetin dane-epbx (Retacrit) injection 10,000 Units, 100 Units/kg, intravenous, Once per day on Tue Thu Sat, JENIFFER Bates-CNP    gabapentin (Neurontin) capsule 100 mg, 100 mg, oral, Daily, Artis Cruz MD, 100 mg at 03/11/24 0937    glucagon (Glucagen) injection 1 mg, 1 mg, intramuscular, q15 min PRN, Artis Cruz MD    HYDROmorphone (Dilaudid) injection 0.2 mg, 0.2 mg, intravenous, q8h PRN, Donn Quiñonez MD, 0.2 mg at 03/12/24 0908    insulin lispro (HumaLOG) injection 0-5 Units, 0-5 Units, subcutaneous, TID with meals, Artis Cruz MD, 1 Units at 03/11/24 1833    ipratropium-albuteroL (Duo-Neb) 0.5-2.5 mg/3 mL nebulizer solution 3 mL, 3 mL, nebulization, q6h PRN, Artis Cruz MD    melatonin tablet 5 mg, 5 mg, oral, Nightly, Artis Cruz MD, 5 mg at 03/11/24 2032    midodrine (Proamatine) tablet 15 mg, 15 mg, oral, q8h, Artis Cruz MD, 15 mg at 03/11/24  "2323    ondansetron (Zofran) injection 4 mg, 4 mg, intravenous, q8h PRN, Artis Cruz MD, 4 mg at 03/09/24 1345    oxyCODONE (Roxicodone) immediate release tablet 5 mg, 5 mg, oral, q6h PRN, Donn Quiñonez MD, 5 mg at 03/12/24 0521    pantoprazole (ProtoNix) EC tablet 40 mg, 40 mg, oral, Daily before breakfast, Artis Cruz MD, 40 mg at 03/11/24 0937    polyethylene glycol (Glycolax, Miralax) packet 17 g, 17 g, oral, Daily, Donn Quiñonez MD, 17 g at 03/11/24 1622    sennosides-docusate sodium (Janessa-Colace) 8.6-50 mg per tablet 2 tablet, 2 tablet, oral, BID, Artis Cruz MD, 2 tablet at 03/11/24 0937    sodium chloride 0.9% flush 10 mL, 10 mL, intra-catheter, q12h, Donn Quiñonez MD, 10 mL at 03/12/24 0507    sodium chloride 0.9% flush 10 mL, 10 mL, intra-catheter, PRN, Donn Quiñonez MD       Intake/Output Summary (Last 24 hours) at 3/12/2024 0918  Last data filed at 3/12/2024 0900  Gross per 24 hour   Intake 1120 ml   Output --   Net 1120 ml       PHYSICAL EXAM:  /68   Pulse 85   Temp 36.3 °C (97.3 °F) (Temporal)   Resp 16   Ht 1.778 m (5' 10\")   Wt 90.9 kg (200 lb 6.4 oz)   SpO2 100%   BMI 28.75 kg/m²     Intake/Output Summary (Last 24 hours) at 3/12/2024 0918  Last data filed at 3/12/2024 0900  Gross per 24 hour   Intake 1120 ml   Output --   Net 1120 ml     Gen: AAO, NAD  Neck: No JVD  Cardiac: RRR  Resp: clear BS  Abd: Soft, non tender, +BS, non distended   Ext: trace edema   Access: [facility] TDC  Neuro: moves 4 ext    Labs:  Results for orders placed or performed during the hospital encounter of 03/08/24 (from the past 24 hour(s))   POCT GLUCOSE   Result Value Ref Range    POCT Glucose 158 (H) 74 - 99 mg/dL   POCT GLUCOSE   Result Value Ref Range    POCT Glucose 169 (H) 74 - 99 mg/dL   Renal Function Panel   Result Value Ref Range    Glucose 165 (H) 74 - 99 mg/dL    Sodium 131 (L) 136 - 145 mmol/L    Potassium 3.6 3.5 - 5.3 mmol/L    Chloride 92 (L) 98 - 107 mmol/L    " Bicarbonate 27 21 - 32 mmol/L    Anion Gap 16 10 - 20 mmol/L    Urea Nitrogen 30 (H) 6 - 23 mg/dL    Creatinine 3.42 (H) 0.50 - 1.30 mg/dL    eGFR 18 (L) >60 mL/min/1.73m*2    Calcium 10.4 8.6 - 10.6 mg/dL    Phosphorus 4.9 2.5 - 4.9 mg/dL    Albumin 2.8 (L) 3.4 - 5.0 g/dL   POCT GLUCOSE   Result Value Ref Range    POCT Glucose 153 (H) 74 - 99 mg/dL   CBC   Result Value Ref Range    WBC 14.9 (H) 4.4 - 11.3 x10*3/uL    nRBC 0.5 (H) 0.0 - 0.0 /100 WBCs    RBC 2.54 (L) 4.50 - 5.90 x10*6/uL    Hemoglobin 7.1 (L) 13.5 - 17.5 g/dL    Hematocrit 25.2 (L) 41.0 - 52.0 %    MCV 99 80 - 100 fL    MCH 28.0 26.0 - 34.0 pg    MCHC 28.2 (L) 32.0 - 36.0 g/dL    RDW 18.4 (H) 11.5 - 14.5 %    Platelets 261 150 - 450 x10*3/uL   Renal Function Panel   Result Value Ref Range    Glucose 146 (H) 74 - 99 mg/dL    Sodium 132 (L) 136 - 145 mmol/L    Potassium 3.7 3.5 - 5.3 mmol/L    Chloride 95 (L) 98 - 107 mmol/L    Bicarbonate 25 21 - 32 mmol/L    Anion Gap 16 10 - 20 mmol/L    Urea Nitrogen 32 (H) 6 - 23 mg/dL    Creatinine 3.77 (H) 0.50 - 1.30 mg/dL    eGFR 16 (L) >60 mL/min/1.73m*2    Calcium 9.5 8.6 - 10.6 mg/dL    Phosphorus 5.3 (H) 2.5 - 4.9 mg/dL    Albumin 2.6 (L) 3.4 - 5.0 g/dL        DATA:   Diagnostic tests reviewed for today's visit:    New labs and imaging     Assessment and Plan:  Came with LUE AVF bleeding, s/p ligation on 3/10  - ESKD on HD: Patient seen and examined while on dialysis, recent events, labs, medications reviewed.   Keeping usual TTS schedule   Target UF 1.5L  BP: controlled  Hb 7.1 on epogen, following cbc      Will continue to follow, overall management per primary team, and continue regular dialysis.      Ronaldo Koehler MD  Division of Nephrology and Hypertension

## 2024-03-12 NOTE — PROGRESS NOTES
Chevy Benton is a 78 y.o. male on day 4 of admission presenting with A-V fistula (CMS/HCC).    Subjective   Received update that patients ADOD is 1-2 days; patient is from [facility] and agreeable to return at discharge. Patient receives HD at facility.    Clinicals sent to SNF for HD authorization and requested that direct submit team submit for precert.     -Lona GARCIA MA, LSW  [phone] or [facility]  Care Transitions

## 2024-03-12 NOTE — NURSING NOTE
Report to Receiving RN:    Report To: Francisca RN   Time Report Called: [id number]  Hand-Off Communication: HD completed and tolerated well, no issues during tx. Gave dilaudid for pain. Removed 2.5L. Post vS 120/72 HR 91   Complications During Treatment: No  Ultrafiltration Treatment: Yes  Medications Administered During Dialysis: Yes, Dilaudid- see MAR   Blood Products Administered During Dialysis: No  Labs Sent During Dialysis: Yes  Heparin Drip Rate Changes: N/A

## 2024-03-12 NOTE — NURSING NOTE
Geriatric Nursing Rounds Summary  Mr Benton is a 78 year old admitted av fistual bleeding now has tunnel HD cath  PMH DM, wife is support, toe amp   Age friendly  What matters full code from facility ? Return, arm pain 10, successful HD  Mentation cam neg  Mobility dangled with therapy  Meds melatonin gabapentin insulin

## 2024-03-12 NOTE — PROGRESS NOTES
"Chevy Benton is a 78 y.o. male on day 4 of admission presenting with A-V fistula (CMS/HCC).    Subjective   POD 2 from LUE AVF ligation. Patient endorses that he had an uneventful day an dthat dialysis went \"fine\". Patient when prompted describes some discomfort around incisions.     Objective     General: NAD, AAOx2 (person, year)  Neuro: no gross deficits, speech WNL  HEENT: NC/AT  CV: irregularly irregular rhythm, rate controlled  Pulm: normal respiratory effort on RA  Abd: soft, NTND  Extr: notably swollen BL UE. LUE dressing removed with incisions C/D/I, distal incision with staples, proximal with suture both betadine painted and arm re-wrapped. Continued edema in bilateral upper extremities and hands with some discomfort to palpation.    Skin: no lesions or rashes     Last Recorded Vitals  Blood pressure 123/66, pulse 99, temperature 36.5 °C (97.7 °F), temperature source Temporal, resp. rate 15, height 1.778 m (5' 10\"), weight 90.9 kg (200 lb 6.4 oz), SpO2 100 %.  Intake/Output last 3 Shifts:  I/O last 3 completed shifts:  In: 1120 (12.3 mL/kg) [P.O.:720; I.V.:400 (4.4 mL/kg)]  Out: - (0 mL/kg)   Weight: 90.9 kg     Relevant Results  Scheduled medications  allopurinol, 100 mg, oral, Daily  atorvastatin, 40 mg, oral, Nightly  B complex-vitamin C-folic acid, 1 capsule, oral, Daily  epoetin dane or biosimilar, 100 Units/kg, intravenous, Once per day on Tue Thu Sat  gabapentin, 100 mg, oral, Daily  insulin lispro, 0-5 Units, subcutaneous, TID with meals  melatonin, 5 mg, oral, Nightly  midodrine, 15 mg, oral, q8h  pantoprazole, 40 mg, oral, Daily before breakfast  polyethylene glycol, 17 g, oral, Daily  sennosides-docusate sodium, 2 tablet, oral, BID  sodium chloride 0.9%, 10 mL, intra-catheter, q12h      Continuous medications     PRN medications  PRN medications: acetaminophen, albuterol, alteplase, dextrose 10 % in water (D10W), dextrose, glucagon, HYDROmorphone, ipratropium-albuteroL, ondansetron, " oxyCODONE, sodium chloride 0.9%  Results for orders placed or performed during the hospital encounter of 03/08/24 (from the past 24 hour(s))   POCT GLUCOSE   Result Value Ref Range    POCT Glucose 169 (H) 74 - 99 mg/dL   Renal Function Panel   Result Value Ref Range    Glucose 165 (H) 74 - 99 mg/dL    Sodium 131 (L) 136 - 145 mmol/L    Potassium 3.6 3.5 - 5.3 mmol/L    Chloride 92 (L) 98 - 107 mmol/L    Bicarbonate 27 21 - 32 mmol/L    Anion Gap 16 10 - 20 mmol/L    Urea Nitrogen 30 (H) 6 - 23 mg/dL    Creatinine 3.42 (H) 0.50 - 1.30 mg/dL    eGFR 18 (L) >60 mL/min/1.73m*2    Calcium 10.4 8.6 - 10.6 mg/dL    Phosphorus 4.9 2.5 - 4.9 mg/dL    Albumin 2.8 (L) 3.4 - 5.0 g/dL   POCT GLUCOSE   Result Value Ref Range    POCT Glucose 153 (H) 74 - 99 mg/dL   CBC   Result Value Ref Range    WBC 14.9 (H) 4.4 - 11.3 x10*3/uL    nRBC 0.5 (H) 0.0 - 0.0 /100 WBCs    RBC 2.54 (L) 4.50 - 5.90 x10*6/uL    Hemoglobin 7.1 (L) 13.5 - 17.5 g/dL    Hematocrit 25.2 (L) 41.0 - 52.0 %    MCV 99 80 - 100 fL    MCH 28.0 26.0 - 34.0 pg    MCHC 28.2 (L) 32.0 - 36.0 g/dL    RDW 18.4 (H) 11.5 - 14.5 %    Platelets 261 150 - 450 x10*3/uL   Renal Function Panel   Result Value Ref Range    Glucose 146 (H) 74 - 99 mg/dL    Sodium 132 (L) 136 - 145 mmol/L    Potassium 3.7 3.5 - 5.3 mmol/L    Chloride 95 (L) 98 - 107 mmol/L    Bicarbonate 25 21 - 32 mmol/L    Anion Gap 16 10 - 20 mmol/L    Urea Nitrogen 32 (H) 6 - 23 mg/dL    Creatinine 3.77 (H) 0.50 - 1.30 mg/dL    eGFR 16 (L) >60 mL/min/1.73m*2    Calcium 9.5 8.6 - 10.6 mg/dL    Phosphorus 5.3 (H) 2.5 - 4.9 mg/dL    Albumin 2.6 (L) 3.4 - 5.0 g/dL   Type and screen   Result Value Ref Range    ABO TYPE B     Rh TYPE POS     ANTIBODY SCREEN NEG            Assessment/Plan   78 y.o. male with bleeding from LUE AVF.  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 S/p LUE AVF ligation on 3/10 with Dr. Tovar.      Plan:  - Appreciate continued care by medical team  - Dressing taken down 3/12 with  well-appearing incisions. Incisions painted with betadine. Patient will require 2 week follow up for staple/suture removal.   - Continued HD through tunneled line   - Continue to monitor CBC given recent drop from 8.1 to 7.1 to determine appropriate time to restart plavix and Eliquis    D/w attending, Dr. Guy Knight MD  PGY-1 Vascular Surgery  [id number]

## 2024-03-12 NOTE — PROGRESS NOTES
Assessment/Plan      Mr. Benton is a 78 year old with a PMH of PAD, Afib (on Eliquis), CAD, T2DM, R subclavian DVT (10/23), GERD, SSS s/p PPM placement, gout, ESRD on HD (MWF), HLD, hypotension, HFrEF (last EF 40-45% 1/23/24), and gout who was transferred to [facility] from [facility] due to bleeding from his L upper arm AV fistula. Labs obtained on admit notable for anemia, hyponatremia, hyponatremia, and lactic acidosis.. Vascular surgery is consulted in the ED who placed 2 figure 8 silk sutures in order to achieve hemostasis at fistula site. Pt received new tunneled HD cath and received dialysis on 3/9, taken to OR 3/10  with vascular surgery  for ligation.          Assessment/Plan   Principal Problem:    A-V fistula (CMS/HCC)  Active Problems:    Anemia due to blood loss, acute     #Bleeding from AV fistula s/p ligation and tunneled HD line placement  # anemia d/t acute blood loss  - Vascular surgery consulted, appreciate recs  - Hemostasis obtained at L upper extremity fistula site after placement of two figure 8 sutures  - Holding home dose of Plavix and Eliquis d/t upcoming procedure with vascular surgery  - Hgb 6.5 -> 8.1, follow up repeat today. Discuss with surgery when to restart elliqus and plavix.  Recommended to continue to hold for now  -Transfuse 1 unit of PRBC on HD, Monitor HB.  Repeat  ordered  -Dressing to may remain in place until 3/12, vascular surgery to reeval if again come down today.  -Had drop in hemoglobin last night, awaiting repeat H&H.  On EPO, monitoring for continued bleeding.    #left hand numbness and pain  - 1st few digits, mostly pain but endorses decerased sensation, noted he reports he cannot move it but motor is intact but week and affecting the entire hand, potentially limited by pain and contrubted by swelling. Seeing if vasc surg can eval  - change to oxy q6 prn and make Dilaudid for breakthrough, encourage pt to notify nursing when pain is undercontrolled.   - symptoms and exam  stable. Vasc surg to reassess today.     #leukocytosis  - likely related to surgical stress, trend. Monitor for signs of infection.  Awaiting repeat CBC     #T2DM (last HgA1c 5.3% 12/3/23) c/b neuropathy  - accucheck ACHS  - Lispro SSI #1 0-5 units  - holding home dose of Lantus 6 units to prevent episodes of hypoglycemia while hospitalized and HgA1c <7%. Monitor more need to resume.    - hypoglycemia order set placed  - continue home dose of Gabapentin 100 mg PO every day      #hyponatremia (chronic)  #ESRD on HD   - daily CHG bath d/t L chest dialysis cath site  -hyponatremia from hypervolemia in setting of esrd, trend. If remains low then needs fluid restriciton.   - Nephrology consulted for HD today,   - renal diet  - renally dose meds as needed  - continue home dose of Nephrocaps PO every day  -Nephrology plans for next session tomorrow     #Afib  #hx of DVT (10/23)  - Duplex US of RUE done 10/22/23: Positive study for DVT in the right upper extremity.  There is a nonocclusive thrombus in the right subclavian artery.  - holding home dose of Eliquis and Heparin gtt d/t anemia, management as above     #hypotension, chronic  - continue home dose of Midodrine 15 mg PO TID     #LLE wound  #R foot wound  - wound nurse consult placed     Dispo: Pending fistula repair.   Scheduled outpatient appointments in system:   Future Appointments   Date Time Provider Department [place]   3/27/2024  1:20 PM Rosa Tovar MD [id number] [place]   4/17/2024  1:00 PM Koko Gordon MD [id number] [facility]     ---------------------------------------------------------------------------------------------------  Subjective   No new events.  And is stable still with pain, reports diffuse pain in arm and hand.  Advised to as nursing for his pain meds and nursing will contact if he is using pain meds and still and pain.  Vascular evaluated patient and agreed patient's hand symptoms are stable, to evaluate when they will release the dressing,  "possibly today although initial plan was for tomorrow per our conversation.  Patient denies any other complaints other than his pain.  Appears there is not a significant numbness component, and he had difficulty expressing his pain complaints.  He underwent dialysis today.  Hemoglobin did downtrend, awaiting repeat, he is on EPO but there is concern for bleeding still.  Patient however denies dizziness chest pain shortness of breath or other related symptoms.   ---------------------------------------------------------------------------------------------------  Objective   Last Recorded Vitals  Blood pressure 123/66, pulse 99, temperature 36.5 °C (97.7 °F), temperature source Temporal, resp. rate 15, height 1.778 m (5' 10\"), weight 90.9 kg (200 lb 6.4 oz), SpO2 100 %.  Intake/Output last 3 Shifts:  I/O last 3 completed shifts:  In: 1120 (12.3 mL/kg) [P.O.:720; I.V.:400 (4.4 mL/kg)]  Out: - (0 mL/kg)   Weight: 90.9 kg     Physical Exam  Constitutional:       Comments: Comfortable at rest on oxygen through NC   HENT:      Head: Normocephalic.      Nose: Nose normal.   Eyes:      Extraocular Movements: Extraocular movements intact.      Pupils: Pupils are equal, round, and reactive to light.   Cardiovascular:      Rate and Rhythm: Normal rate and regular rhythm.      Heart sounds: Normal heart sounds. No murmur heard.  Pulmonary:      Effort: No respiratory distress.      Breath sounds: Normal breath sounds. No wheezing.   Abdominal:      General: Bowel sounds are normal. There is no distension.      Palpations: Abdomen is soft.      Tenderness: There is no abdominal tenderness.   Musculoskeletal:         General: Normal range of motion.      Cervical back: Normal range of motion.      Comments: Fistula site in LUE covered with dressing after the ligation surgery,     Right foot TMA, ( ulcer noted over the TMA area, which is covered with dressing)    Left arm wrapped in ace wrap, pain in fingers, neurovascularly intact, " motor and sensation intact but reports mild decreased sensation, noted mild increased edema of hand compared to right.    Skin:     General: Skin is warm.   Neurological:      General: No focal deficit present.      Mental Status: He is alert and oriented to person, place, and time. Mental status is at baseline.   Psychiatric:         Mood and Affect: Mood normal.         Relevant Results  Lab Results   Component Value Date    WBC 14.9 (H) 03/11/2024    HGB 7.1 (L) 03/11/2024    HCT 25.2 (L) 03/11/2024    MCV 99 03/11/2024     03/11/2024      Lab Results   Component Value Date    GLUCOSE 146 (H) 03/12/2024    CALCIUM 9.5 03/12/2024     (L) 03/12/2024    K 3.7 03/12/2024    CO2 25 03/12/2024    CL 95 (L) 03/12/2024    BUN 32 (H) 03/12/2024    CREATININE 3.77 (H) 03/12/2024     Scheduled medications  allopurinol, 100 mg, oral, Daily  atorvastatin, 40 mg, oral, Nightly  B complex-vitamin C-folic acid, 1 capsule, oral, Daily  epoetin dane or biosimilar, 100 Units/kg, intravenous, Once per day on Tue Thu Sat  gabapentin, 100 mg, oral, Daily  insulin lispro, 0-5 Units, subcutaneous, TID with meals  melatonin, 5 mg, oral, Nightly  midodrine, 15 mg, oral, q8h  pantoprazole, 40 mg, oral, Daily before breakfast  polyethylene glycol, 17 g, oral, Daily  sennosides-docusate sodium, 2 tablet, oral, BID  sodium chloride 0.9%, 10 mL, intra-catheter, q12h      Continuous medications     PRN medications  PRN medications: acetaminophen, albuterol, alteplase, dextrose 10 % in water (D10W), dextrose, glucagon, HYDROmorphone, ipratropium-albuteroL, ondansetron, oxyCODONE, sodium chloride 0.9%    Donn Quiñonez MD

## 2024-03-12 NOTE — CARE PLAN
Patient was cooperative with care, complaints of pain in the left arm, pt medicated for pain, with ample relief, Labs drawn, pts pulse ox above 93% throughout the shift. Pt to have dialysis this morning.     Problem: Skin  Goal: Prevent/manage excess moisture  Flowsheets (Taken 3/12/2024 0333)  Prevent/manage excess moisture:   Moisturize dry skin   Cleanse incontinence/protect with barrier cream     Problem: Skin  Goal: Prevent/manage excess moisture  Flowsheets (Taken 3/12/2024 0333)  Prevent/manage excess moisture:   Moisturize dry skin   Cleanse incontinence/protect with barrier cream   The patient's goals for the shift include      The clinical goals for the shift include Pts pulse ox will be greater than 93% by the end of the shift    Over the shift, the patient did make progress toward the following goals.

## 2024-03-13 NOTE — PROGRESS NOTES
"Occupational Therapy                 Therapy Communication Note    Patient Name: Chevy Benton  MRN: [id number]  Today's Date: 3/13/2024     Discipline: Occupational Therapy    Missed Visit Reason: Missed Visit Reason: Patient sleeping (PATIENT PLEASANTLY ATTEMPTED PARTICIPTION BUT WAS UNABLE TO MAINTIAN ALERTNESS AND REQUESTED TO \"RESCHEDULE\". WILL  ATTEMPT DELIVERY OF OT SERVICES AS ABLE AND AS APPROPRIATE.)    Missed Time: Attempt    Comment:  "

## 2024-03-13 NOTE — PROGRESS NOTES
Assessment/Plan      Mr. Benton is a 78 year old with a PMH of PAD, Afib (on Eliquis), CAD, T2DM, R subclavian DVT (10/23), GERD, SSS s/p PPM placement, gout, ESRD on HD (MWF), HLD, hypotension, HFrEF (last EF 40-45% 1/23/24), and gout who was transferred to [facility] from [facility] due to bleeding from his L upper arm AV fistula. Labs obtained on admit notable for anemia, hyponatremia, hyponatremia, and lactic acidosis.. Vascular surgery is consulted in the ED who placed 2 figure 8 silk sutures in order to achieve hemostasis at fistula site. Pt received new tunneled HD cath and received dialysis on 3/9, taken to OR 3/10  with vascular surgery  for ligation.          Assessment/Plan   Principal Problem:    A-V fistula (CMS/HCC)  Active Problems:    Anemia due to blood loss, acute     #Bleeding from AV fistula s/p ligation and tunneled HD line placement  # anemia d/t acute blood loss  - Vascular surgery consulted, appreciate recs  - Hemostasis obtained at L upper extremity fistula site after placement of two figure 8 sutures  - Holding home dose of Plavix and Eliquis d/t upcoming procedure with vascular surgery  - Hgb 6.5 -> 8.1, follow up repeat today. Discuss with surgery when to restart elliqus and plavix.  Recommended to continue to hold for now  -Transfuse 1 unit of PRBC on HD on admit.  Monitor HB.    -Dressing to  remained in place until 3/12, vascular surgery to reeval if again come down today.  -Had drop in hemoglobin On EPO, monitoring for continued bleeding.  Since hemoglobin stable without intervention, likely lab variation.  Restarted apixaban and Plavix now and monitoring hemoglobin for stability.  Nursing unable to remove central line.  Will end up removing central line as soon as I have availability.  His MRSA screen was positive and starting MRSA decolonization protocol and will need repeat MRSA nare 3 days after completion.     #left hand numbness and pain  - 1st few digits, mostly pain but endorses  decerased sensation, noted he reports he cannot move it but motor is intact but week and affecting the entire hand, potentially limited by pain and contrubted by swelling. Seeing if vasc surg can eval  - change to oxy q6 prn and make Dilaudid for breakthrough, encourage pt to notify nursing when pain is undercontrolled.   - symptoms and exam stable. Vasc surg to reassess today.     #leukocytosis  - likely related to surgical stress, trend. Monitor for signs of infection.       #T2DM (last HgA1c 5.3% 12/3/23) c/b neuropathy  - accucheck ACHS  - Lispro SSI #1 0-5 units  - holding home dose of Lantus 6 units to prevent episodes of hypoglycemia while hospitalized and HgA1c <7%. Monitor more need to resume.    - hypoglycemia order set placed  - continue home dose of Gabapentin 100 mg PO every day      #hyponatremia (chronic)  #ESRD on HD   - daily CHG bath d/t L chest dialysis cath site  -hyponatremia from hypervolemia in setting of esrd, trend. If remains low then needs fluid restriciton.   - Nephrology consulted for HD today,   - renal diet  - renally dose meds as needed  - continue home dose of Nephrocaps PO every day  -Had dialysis today will reevaluate labs tomorrow     #Afib  #hx of DVT (10/23)  - Duplex US of RUE done 10/22/23: Positive study for DVT in the right upper extremity.  There is a nonocclusive thrombus in the right subclavian artery.  - holding home dose of Eliquis and Heparin gtt d/t anemia, management as above     #hypotension, chronic  - continue home dose of Midodrine 15 mg PO TID     #LLE wound  #R foot wound  - wound nurse consult placed     Dispo: Pending fistula repair.   Scheduled outpatient appointments in system:   Future Appointments   Date Time Provider Department [place]   3/27/2024  9:00 AM Rosa Tovar MD [id number] [place]   4/17/2024  1:00 PM Koko Gordon MD [id number] [facility]  "    ---------------------------------------------------------------------------------------------------  Subjective   No new events.  Overall his pain and motor function is improving after the dressing was loosened.  Hemoglobin is stable , Started on apixaban.  He reports pain is slightly better.  He was MRSA nare positive and started on decolonization.  He has a femoral line that will need to be removed at soonest ability assuming he remains stable.      ---------------------------------------------------------------------------------------------------  Objective   Last Recorded Vitals  Blood pressure 130/77, pulse 88, temperature 36.4 °C (97.5 °F), temperature source Skin, resp. rate 15, height 1.778 m (5' 10\"), weight 90.9 kg (200 lb 6.4 oz), SpO2 100 %.  Intake/Output last 3 Shifts:  I/O last 3 completed shifts:  In: 1680 (18.5 mL/kg) [P.O.:280; I.V.:600 (6.6 mL/kg); Other:800]  Out: 5800 (63.8 mL/kg) [Other:5800]  Weight: 90.9 kg     Physical Exam  Constitutional:       Comments: Comfortable at rest on oxygen through NC   HENT:      Head: Normocephalic.      Nose: Nose normal.   Eyes:      Extraocular Movements: Extraocular movements intact.      Pupils: Pupils are equal, round, and reactive to light.   Cardiovascular:      Rate and Rhythm: Normal rate and regular rhythm.      Heart sounds: Normal heart sounds. No murmur heard.     Comments: Right femoral line  Left tunneled dialysis catheter  EJ IV  Pulmonary:      Effort: No respiratory distress.      Breath sounds: Normal breath sounds. No wheezing.   Abdominal:      General: Bowel sounds are normal. There is no distension.      Palpations: Abdomen is soft.      Tenderness: There is no abdominal tenderness.   Musculoskeletal:         General: Normal range of motion.      Cervical back: Normal range of motion.      Comments: Fistula site in LUE covered with dressing after the ligation surgery,     Right foot TMA, ( ulcer noted over the TMA area, which is " covered with dressing)    Left arm wrapped in ace wrap, pain in fingers, neurovascularly intact, motor and sensation intact but reports mild decreased sensation, noted mild increased edema of hand compared to right.    Skin:     General: Skin is warm.   Neurological:      General: No focal deficit present.      Mental Status: He is alert and oriented to person, place, and time. Mental status is at baseline.   Psychiatric:         Mood and Affect: Mood normal.         Relevant Results  Lab Results   Component Value Date    WBC 12.9 (H) 03/13/2024    HGB 7.7 (L) 03/13/2024    HCT 26.3 (L) 03/13/2024     03/13/2024     03/13/2024      Lab Results   Component Value Date    GLUCOSE 130 (H) 03/13/2024    CALCIUM 9.2 03/13/2024     (L) 03/13/2024    K 3.8 03/13/2024    CO2 29 03/13/2024    CL 96 (L) 03/13/2024    BUN 22 03/13/2024    CREATININE 2.89 (H) 03/13/2024     Scheduled medications  allopurinol, 100 mg, oral, Daily  apixaban, 2.5 mg, oral, q12h  atorvastatin, 40 mg, oral, Nightly  B complex-vitamin C-folic acid, 1 capsule, oral, Daily  chlorhexidine, , Topical, Daily  clopidogrel, 75 mg, oral, Daily  [START ON 3/14/2024] epoetin dane or biosimilar, 15,000 Units, intravenous, Once per day on Tue Thu Sat  gabapentin, 100 mg, oral, Daily  insulin lispro, 0-5 Units, subcutaneous, TID with meals  melatonin, 5 mg, oral, Nightly  midodrine, 15 mg, oral, q8h  mupirocin, , Topical, BID  pantoprazole, 40 mg, oral, Daily before breakfast  polyethylene glycol, 17 g, oral, Daily  sennosides-docusate sodium, 2 tablet, oral, BID  sodium chloride 0.9%, 10 mL, intra-catheter, q12h      Continuous medications     PRN medications  PRN medications: acetaminophen, albuterol, alteplase, dextrose 10 % in water (D10W), dextrose, glucagon, HYDROmorphone, ipratropium-albuteroL, ondansetron, oxyCODONE, sodium chloride 0.9%    Donn Quiñonez MD

## 2024-03-13 NOTE — CARE PLAN
Problem: Skin  Goal: Promote skin healing  Flowsheets (Taken 3/13/2024 6879)  Promote skin healing:   Assess skin/pad under line(s)/device(s)   Protective dressings over bony prominences   Turn/reposition every 2 hours/use positioning/transfer devices       The patient's goals for the shift include      The clinical goals for the shift include Pt will rate pain a 5/10 or less by the end of this shift    Changed the dressing. Controled pain with PRN pain med through the shift.

## 2024-03-13 NOTE — CARE PLAN
Patient slept well throughout the night, pain was kept at a 5 or less, bed alarm active and audible.     Problem: Pain  Goal: My pain/discomfort is manageable  Outcome: Progressing     Problem: Safety  Goal: Patient will be injury free during hospitalization  Outcome: Progressing     Problem: Daily Care  Goal: Daily care needs are met  Outcome: Progressing     Problem: Skin  Goal: Prevent/manage excess moisture  Flowsheets (Taken 3/13/2024 0031)  Prevent/manage excess moisture:   Moisturize dry skin   Cleanse incontinence/protect with barrier cream   The patient's goals for the shift include      The clinical goals for the shift include pts pain will be a 5 or less throughout the shift    Over the shift, the patient did make progress toward the following goals

## 2024-03-13 NOTE — PROGRESS NOTES
"Chevy Benton is a 78 y.o. male on day 5 of admission presenting with A-V fistula (CMS/HCC).    Subjective   Pt  resting in bed. Denies sob, n/v/d, fever, cough, chills, chest pain , constipation, anuric, c/o generalized pain       Objective     Physical Exam  Constitutional:       Appearance: He is obese.   Cardiovascular:      Rate and Rhythm: Normal rate.      Comments: HR-85  Pulmonary:      Comments: Ble diminished with minor crackles noted to lt base  Cont pox  Abdominal:      General: There is distension.      Comments: Non-tender to palpation   Genitourinary:     Comments: States anuric  Musculoskeletal:      Comments: Ble edema +2-3 pitting  Magdiel rt arm +3 pitting  Lt arm swollen with acewrap drsg-s/p surg +3 pitting   Skin:     General: Skin is warm and dry.      Comments: Magdiel feet with kerlix wrap   Neurological:      Mental Status: He is alert and oriented to person, place, and time.   Psychiatric:         Mood and Affect: Mood normal.         Behavior: Behavior normal.         Last Recorded Vitals  Blood pressure 124/76, pulse 98, temperature 36.5 °C (97.7 °F), temperature source Temporal, resp. rate 17, height 1.778 m (5' 10\"), weight 90.9 kg (200 lb 6.4 oz), SpO2 100 %.  Intake/Output last 3 Shifts:  I/O last 3 completed shifts:  In: 1680 (18.5 mL/kg) [P.O.:280; I.V.:600 (6.6 mL/kg); Other:800]  Out: 5800 (63.8 mL/kg) [Other:5800]  Weight: 90.9 kg     Relevant Results  Scheduled medications  allopurinol, 100 mg, oral, Daily  apixaban, 2.5 mg, oral, q12h  atorvastatin, 40 mg, oral, Nightly  B complex-vitamin C-folic acid, 1 capsule, oral, Daily  [START ON 3/14/2024] epoetin dane or biosimilar, 15,000 Units, intravenous, Once per day on Tue Thu Sat  gabapentin, 100 mg, oral, Daily  insulin lispro, 0-5 Units, subcutaneous, TID with meals  melatonin, 5 mg, oral, Nightly  midodrine, 15 mg, oral, q8h  pantoprazole, 40 mg, oral, Daily before breakfast  polyethylene glycol, 17 g, oral, " Daily  sennosides-docusate sodium, 2 tablet, oral, BID  sodium chloride 0.9%, 10 mL, intra-catheter, q12h      Continuous medications     PRN medications  PRN medications: acetaminophen, albuterol, alteplase, dextrose 10 % in water (D10W), dextrose, glucagon, HYDROmorphone, ipratropium-albuteroL, ondansetron, oxyCODONE, sodium chloride 0.9%   Results for orders placed or performed during the hospital encounter of 03/08/24 (from the past 24 hour(s))   POCT GLUCOSE   Result Value Ref Range    POCT Glucose 135 (H) 74 - 99 mg/dL   Hemoglobin and hematocrit, blood   Result Value Ref Range    Hemoglobin 7.9 (L) 13.5 - 17.5 g/dL    Hematocrit 27.0 (L) 41.0 - 52.0 %   POCT GLUCOSE   Result Value Ref Range    POCT Glucose 128 (H) 74 - 99 mg/dL   POCT GLUCOSE   Result Value Ref Range    POCT Glucose 192 (H) 74 - 99 mg/dL   CBC   Result Value Ref Range    WBC 12.9 (H) 4.4 - 11.3 x10*3/uL    nRBC 0.3 (H) 0.0 - 0.0 /100 WBCs    RBC 2.62 (L) 4.50 - 5.90 x10*6/uL    Hemoglobin 7.7 (L) 13.5 - 17.5 g/dL    Hematocrit 26.3 (L) 41.0 - 52.0 %     80 - 100 fL    MCH 29.4 26.0 - 34.0 pg    MCHC 29.3 (L) 32.0 - 36.0 g/dL    RDW 18.2 (H) 11.5 - 14.5 %    Platelets 265 150 - 450 x10*3/uL   Renal Function Panel   Result Value Ref Range    Glucose 130 (H) 74 - 99 mg/dL    Sodium 135 (L) 136 - 145 mmol/L    Potassium 3.8 3.5 - 5.3 mmol/L    Chloride 96 (L) 98 - 107 mmol/L    Bicarbonate 29 21 - 32 mmol/L    Anion Gap 14 10 - 20 mmol/L    Urea Nitrogen 22 6 - 23 mg/dL    Creatinine 2.89 (H) 0.50 - 1.30 mg/dL    eGFR 21 (L) >60 mL/min/1.73m*2    Calcium 9.2 8.6 - 10.6 mg/dL    Phosphorus 4.2 2.5 - 4.9 mg/dL    Albumin 2.4 (L) 3.4 - 5.0 g/dL   POCT GLUCOSE   Result Value Ref Range    POCT Glucose 141 (H) 74 - 99 mg/dL   POCT GLUCOSE   Result Value Ref Range    POCT Glucose 153 (H) 74 - 99 mg/dL                     Assessment/Plan   Principal Problem:    A-V fistula (CMS/HCC)  Active Problems:    Anemia due to blood loss, acute    Tolerated  hemodialysis yesterday with net fluid loss 2500cc    Bp stable with tx, . Floor vs have been stable, hypervolemic on exam and has stable electrolytes      Outpatient Dialysis schedule:   [facility]Dr [facility]; will be TTS while inpatient     Access: lt chest cath - no issues - able to achieve   Lt groin temp   Rt EG     Anemia of ESRD:  3/13-increase  epoetin dane-epbx (Retacrit) injection 15,000 Units on dialysis days... current hgb 7.7.. 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 Will cont to monitor.. B complex-vitamin C-folic acid (Nephrocaps) capsule 1 capsule daily     Plan HD tomorrow with UF as tolerated     Renal diet      Please obtain daily standing wt (if possible)     Medication to be adjusted for ESRD      Patient to continue regular HD schedule while inpatient and to follow with the outpatient nephrologist at discharge        JENIFFER Bates-CNP

## 2024-03-13 NOTE — PROGRESS NOTES
"Chevy Benton is a 78 y.o. male on day 5 of admission presenting with A-V fistula (CMS/HCC).    Subjective   POD 3 from LUE AVF ligation. Patient when prompted describes some discomfort around incisions but can be difficult to gather more information 2/2 mental status.     Objective     General: NAD, AAOx2 (person, year)  Neuro: no gross deficits, speech WNL  HEENT: NC/AT  CV: irregularly irregular rhythm, rate controlled  Pulm: normal respiratory effort on RA  Abd: soft, NTND  Extr: notably swollen BL UE. LUE dressing removed with incisions C/D/I, distal incision with staples, proximal with suture both betadine painted and arm re-wrapped. Continued edema in bilateral upper extremities and hands with some discomfort to palpation.    Skin: no lesions or rashes     Last Recorded Vitals  Blood pressure 124/76, pulse 98, temperature 36.5 °C (97.7 °F), temperature source Temporal, resp. rate 17, height 1.778 m (5' 10\"), weight 90.9 kg (200 lb 6.4 oz), SpO2 100 %.  Intake/Output last 3 Shifts:  I/O last 3 completed shifts:  In: 1680 (18.5 mL/kg) [P.O.:280; I.V.:600 (6.6 mL/kg); Other:800]  Out: 5800 (63.8 mL/kg) [Other:5800]  Weight: 90.9 kg     Relevant Results  Scheduled medications  allopurinol, 100 mg, oral, Daily  apixaban, 2.5 mg, oral, q12h  atorvastatin, 40 mg, oral, Nightly  B complex-vitamin C-folic acid, 1 capsule, oral, Daily  [START ON 3/14/2024] epoetin dane or biosimilar, 15,000 Units, intravenous, Once per day on Tue Thu Sat  gabapentin, 100 mg, oral, Daily  insulin lispro, 0-5 Units, subcutaneous, TID with meals  melatonin, 5 mg, oral, Nightly  midodrine, 15 mg, oral, q8h  pantoprazole, 40 mg, oral, Daily before breakfast  polyethylene glycol, 17 g, oral, Daily  sennosides-docusate sodium, 2 tablet, oral, BID  sodium chloride 0.9%, 10 mL, intra-catheter, q12h      Continuous medications     PRN medications  PRN medications: acetaminophen, albuterol, alteplase, dextrose 10 % in water (D10W), " dextrose, glucagon, HYDROmorphone, ipratropium-albuteroL, ondansetron, oxyCODONE, sodium chloride 0.9%  Results for orders placed or performed during the hospital encounter of 03/08/24 (from the past 24 hour(s))   POCT GLUCOSE   Result Value Ref Range    POCT Glucose 135 (H) 74 - 99 mg/dL   Hemoglobin and hematocrit, blood   Result Value Ref Range    Hemoglobin 7.9 (L) 13.5 - 17.5 g/dL    Hematocrit 27.0 (L) 41.0 - 52.0 %   POCT GLUCOSE   Result Value Ref Range    POCT Glucose 128 (H) 74 - 99 mg/dL   POCT GLUCOSE   Result Value Ref Range    POCT Glucose 192 (H) 74 - 99 mg/dL   CBC   Result Value Ref Range    WBC 12.9 (H) 4.4 - 11.3 x10*3/uL    nRBC 0.3 (H) 0.0 - 0.0 /100 WBCs    RBC 2.62 (L) 4.50 - 5.90 x10*6/uL    Hemoglobin 7.7 (L) 13.5 - 17.5 g/dL    Hematocrit 26.3 (L) 41.0 - 52.0 %     80 - 100 fL    MCH 29.4 26.0 - 34.0 pg    MCHC 29.3 (L) 32.0 - 36.0 g/dL    RDW 18.2 (H) 11.5 - 14.5 %    Platelets 265 150 - 450 x10*3/uL   Renal Function Panel   Result Value Ref Range    Glucose 130 (H) 74 - 99 mg/dL    Sodium 135 (L) 136 - 145 mmol/L    Potassium 3.8 3.5 - 5.3 mmol/L    Chloride 96 (L) 98 - 107 mmol/L    Bicarbonate 29 21 - 32 mmol/L    Anion Gap 14 10 - 20 mmol/L    Urea Nitrogen 22 6 - 23 mg/dL    Creatinine 2.89 (H) 0.50 - 1.30 mg/dL    eGFR 21 (L) >60 mL/min/1.73m*2    Calcium 9.2 8.6 - 10.6 mg/dL    Phosphorus 4.2 2.5 - 4.9 mg/dL    Albumin 2.4 (L) 3.4 - 5.0 g/dL   POCT GLUCOSE   Result Value Ref Range    POCT Glucose 141 (H) 74 - 99 mg/dL           Assessment/Plan   78 y.o. male with bleeding from LUE AVF.  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 S/p LUE AVF ligation on 3/10 with Dr. Tovar.      Plan:  - Appreciate continued care by medical team  - Wrap kept in place 2/2 continued Edema. Incisions painted with betadine. Patient will require 2 week follow up for staple/suture removal.   - Continued HD through tunneled line   - Okay to restart plavix and Eliquis from vascular perspective      D/w attending, Dr. Guy Knight MD  PGY-1 Vascular Surgery  [id number]

## 2024-03-13 NOTE — PROGRESS NOTES
Chevy Benton is a 78 y.o. male on day 5 of admission presenting with A-V fistula (CMS/HCC).    Subjective   Patient will discharge to [facility] when medically ready; per MD ADOD is tomorrow, 3/14.    Christianne reviewed and noted that patient has authorization for SNF through 3/18 and received authorization for their onsite HD. Updated facility on planned ADOD. Transportation placed into will call for tomorrow.    -Lona GARCIA MA, LSW  [phone] or [facility] Chat  Care Transitions

## 2024-03-14 NOTE — NURSING NOTE
Report to Receiving RN:    Report To: ABEL Espinosa  Time Report Called: 10:16  Hand-Off Communication: No acute change from RN to RN report.  Patient tolerated treatment well with 1.5L fluid removed.  Last /64, HR 96.    Complications During Treatment: No  Ultrafiltration Treatment: No  Medications Administered During Dialysis: Yes, 5mg Oxycodone  Blood Products Administered During Dialysis: No  Labs Sent During Dialysis: No  Heparin Drip Rate Changes: No    Electronic Signatures:   (Signed 3/14/24)   Authored: Ike Lai RN     Last Updated: 10:16 AM by IKE LAI

## 2024-03-14 NOTE — CARE PLAN
Problem: Pain  Goal: My pain/discomfort is manageable  Outcome: Progressing   The patient's goals for the shift include      The clinical goals for the shift include Pt will be free from fall/injuries during this shift      Problem: Pain  Goal: My pain/discomfort is manageable  Outcome: Progressing

## 2024-03-14 NOTE — PROGRESS NOTES
Physical Therapy                 Therapy Communication Note    Patient Name: Chevy Benton  MRN: [id number]  Today's Date: 3/14/2024     Discipline: Physical Therapy    Missed Visit Reason: Missed Visit Reason: Patient in a medical procedure (pt off the floor at dialysis. will reattempt as schedule permits.)    Missed Time: Attempt    Comment:

## 2024-03-14 NOTE — NURSING NOTE
Report from Sending RN:    Report From: ABEL Catalan  Recent Surgery of Procedure: No  Baseline Level of Consciousness (LOC): x3  Oxygen Use: Yes, 2L  Type: nasal cannula  Diabetic: No  Last BP Med Given Day of Dialysis:   Last Pain Med Given:   Lab Tests to be Obtained with Dialysis: No  Blood Transfusion to be Given During Dialysis: No  Available IV Access: Yes  Medications to be Administered During Dialysis: No  Continuous IV Infusion Running: No  Restraints on Currently or in the Last 24 Hours: No  Hand-Off Communication:   Patient complaint no pain.  Will come in bed.

## 2024-03-14 NOTE — PROGRESS NOTES
Occupational Therapy                 Therapy Communication Note    Patient Name: Chevy Benton  MRN: [id number]  Today's Date: 3/14/2024     Discipline: Occupational Therapy    Missed Visit Reason: Missed Visit Reason: Patient in a medical procedure (A.M. ATTEMPT UA 2/2 TO CONFLICT OF SERVICES/OFF UNIT FOR HD; P.M. UA PER NURSE 2/2 RECENT LINE REMOVAL. WILL ATTEMPT DELIVERY OF OT SERVICES AS APPROPRIATE.)    Missed Time: Attempt    Comment:

## 2024-03-14 NOTE — PROGRESS NOTES
Nephrology Consult Progress Note    Admit Date: 3/8/2024    Interval history:  No complaints  Seen on dialysis      CURRENT MEDICATIONS:    Current Facility-Administered Medications:     acetaminophen (Tylenol) tablet 975 mg, 975 mg, oral, q6h PRN, Artis Cruz MD, 975 mg at 03/13/24 2021    albuterol 90 mcg/actuation inhaler 2 puff, 2 puff, inhalation, q6h PRN, Artis Cruz MD    allopurinol (Zyloprim) tablet 100 mg, 100 mg, oral, Daily, Artis Cruz MD, 100 mg at 03/13/24 0851    alteplase (Cathflo Activase) injection 2 mg, 2 mg, intra-catheter, PRN, Donn Quiñonez MD    apixaban (Eliquis) tablet 2.5 mg, 2.5 mg, oral, q12h, Olga Meadows DO, 2.5 mg at 03/13/24 2336    atorvastatin (Lipitor) tablet 40 mg, 40 mg, oral, Nightly, Artis Cruz MD, 40 mg at 03/13/24 2020    B complex-vitamin C-folic acid (Nephrocaps) capsule 1 capsule, 1 capsule, oral, Daily, Artis Cruz MD, 1 capsule at 03/13/24 0851    chlorhexidine (Hibiclens) 4 % liquid, , Topical, Daily, Donn Quiñonez MD, Given at 03/13/24 1515    clopidogrel (Plavix) tablet 75 mg, 75 mg, oral, Daily, Donn Quiñonez MD, 75 mg at 03/13/24 1547    dextrose 10 % in water (D10W) infusion, 0.3 g/kg/hr, intravenous, Once PRN, Artis Cruz MD    dextrose 50 % injection 25 g, 25 g, intravenous, q15 min PRN, Artis Cruz MD    epoetin dane (Epogen,Procrit) injection 15,000 Units, 15,000 Units, intravenous, Once per day on Tue Thu Sat, JENIFFER Bates-CNP    gabapentin (Neurontin) capsule 100 mg, 100 mg, oral, Daily, Artis Cruz MD, 100 mg at 03/13/24 0851    glucagon (Glucagen) injection 1 mg, 1 mg, intramuscular, q15 min PRN, Artis Cruz MD    HYDROmorphone (Dilaudid) injection 0.2 mg, 0.2 mg, intravenous, q8h PRN, Donn Quiñonez MD, 0.2 mg at 03/13/24 0851    insulin lispro (HumaLOG) injection 0-5 Units, 0-5 Units, subcutaneous, TID with meals, Artis Cruz MD, 1 Units at 03/13/24 9633     "ipratropium-albuteroL (Duo-Neb) 0.5-2.5 mg/3 mL nebulizer solution 3 mL, 3 mL, nebulization, q6h PRN, Artis Cruz MD    melatonin tablet 5 mg, 5 mg, oral, Nightly, Artis Cruz MD, 5 mg at 03/13/24 2021    midodrine (Proamatine) tablet 15 mg, 15 mg, oral, q8h, Artis Cruz MD, 15 mg at 03/14/24 0026    mupirocin (Bactroban) 2 % ointment, , Topical, BID, Donn Quiñonez MD, Given at 03/13/24 2021    ondansetron (Zofran) injection 4 mg, 4 mg, intravenous, q8h PRN, Artis Cruz MD, 4 mg at 03/09/24 1345    oxyCODONE (Roxicodone) immediate release tablet 5 mg, 5 mg, oral, q6h PRN, Donn Quiñonez MD, 5 mg at 03/13/24 2020    pantoprazole (ProtoNix) EC tablet 40 mg, 40 mg, oral, Daily before breakfast, Artis Cruz MD, 40 mg at 03/13/24 0851    polyethylene glycol (Glycolax, Miralax) packet 17 g, 17 g, oral, Daily, Donn Quiñonez MD, 17 g at 03/13/24 0850    sennosides-docusate sodium (Janessa-Colace) 8.6-50 mg per tablet 2 tablet, 2 tablet, oral, BID, Artis Cruz MD, 2 tablet at 03/13/24 2020    sodium chloride 0.9% flush 10 mL, 10 mL, intra-catheter, q12h, Donn Quiñonez MD, 10 mL at 03/14/24 0358    sodium chloride 0.9% flush 10 mL, 10 mL, intra-catheter, PRN, Donn Quiñonez MD       Intake/Output Summary (Last 24 hours) at 3/14/2024 0906  Last data filed at 3/13/2024 1129  Gross per 24 hour   Intake 354 ml   Output --   Net 354 ml         PHYSICAL EXAM:  /66   Pulse 103   Temp 37 °C (98.6 °F) (Temporal)   Resp 17   Ht 1.778 m (5' 10\")   Wt 90.9 kg (200 lb 6.4 oz)   SpO2 93%   BMI 28.75 kg/m²     Intake/Output Summary (Last 24 hours) at 3/14/2024 0906  Last data filed at 3/13/2024 1129  Gross per 24 hour   Intake 354 ml   Output --   Net 354 ml       Gen: AAO, NAD  Neck: No JVD  Cardiac: RRR  Resp: clear BS  Abd: Soft, non tender, +BS, non distended   Ext: trace edema   Access: [facility]J TDC  Neuro: moves 4 ext    Labs:  Results for orders placed or performed " during the hospital encounter of 03/08/24 (from the past 24 hour(s))   POCT GLUCOSE   Result Value Ref Range    POCT Glucose 153 (H) 74 - 99 mg/dL   POCT GLUCOSE   Result Value Ref Range    POCT Glucose 133 (H) 74 - 99 mg/dL   POCT GLUCOSE   Result Value Ref Range    POCT Glucose 188 (H) 74 - 99 mg/dL   CBC   Result Value Ref Range    WBC 13.8 (H) 4.4 - 11.3 x10*3/uL    nRBC 0.3 (H) 0.0 - 0.0 /100 WBCs    RBC 2.46 (L) 4.50 - 5.90 x10*6/uL    Hemoglobin 7.2 (L) 13.5 - 17.5 g/dL    Hematocrit 24.8 (L) 41.0 - 52.0 %     (H) 80 - 100 fL    MCH 29.3 26.0 - 34.0 pg    MCHC 29.0 (L) 32.0 - 36.0 g/dL    RDW 17.9 (H) 11.5 - 14.5 %    Platelets 320 150 - 450 x10*3/uL   Renal Function Panel   Result Value Ref Range    Glucose 152 (H) 74 - 99 mg/dL    Sodium 132 (L) 136 - 145 mmol/L    Potassium 4.3 3.5 - 5.3 mmol/L    Chloride 94 (L) 98 - 107 mmol/L    Bicarbonate 26 21 - 32 mmol/L    Anion Gap 16 10 - 20 mmol/L    Urea Nitrogen 30 (H) 6 - 23 mg/dL    Creatinine 3.75 (H) 0.50 - 1.30 mg/dL    eGFR 16 (L) >60 mL/min/1.73m*2    Calcium 9.4 8.6 - 10.6 mg/dL    Phosphorus 4.8 2.5 - 4.9 mg/dL    Albumin 2.4 (L) 3.4 - 5.0 g/dL        DATA:   Diagnostic tests reviewed for today's visit:    New labs and imaging     Assessment and Plan:  Came with LUE AVF bleeding, s/p ligation on 3/10  - ESKD on HD: Patient seen and examined while on dialysis, recent events, labs, medications reviewed. Target UF 1.5L  Keeping usual TTS schedule, next session Saturday  BP: controlled, on midodrine   Hb 7.2 on epogen, following cbc      Will continue to follow, overall management per primary team, and continue regular dialysis.      Ronaldo Koehler MD  Division of Nephrology and Hypertension

## 2024-03-14 NOTE — CARE PLAN
The patient's goals for the shift include having decreased pain.     The clinical goals for the shift include Pt will rate his pain a 5/10 or less by the end of this shift.    Over the shift, the patient did make progress toward the following goals. He was able to rest quietly after receiving his PRN pain medication.

## 2024-03-14 NOTE — PROGRESS NOTES
Assessment/Plan      Mr. Benton is a 78 year old with a PMH of PAD, Afib (on Eliquis), CAD, T2DM, R subclavian DVT (10/23), GERD, SSS s/p PPM placement, gout, ESRD on HD (MWF), HLD, hypotension, HFrEF (last EF 40-45% 1/23/24), and gout who was transferred to [facility] from [facility] due to bleeding from his L upper arm AV fistula. Labs obtained on admit notable for anemia, hyponatremia, hyponatremia, and lactic acidosis.. Vascular surgery is consulted in the ED who placed 2 figure 8 silk sutures in order to achieve hemostasis at fistula site. Pt received new tunneled HD cath and received dialysis on 3/9, taken to OR 3/10  with vascular surgery  for ligation.  Overall pain is improving. H/h stable after starting elliquis and plavix. Continues to have ansarca.         Assessment/Plan   Principal Problem:    A-V fistula (CMS/HCC)  Active Problems:    Anemia due to blood loss, acute     #Bleeding from AV fistula s/p ligation and tunneled HD line placement  # anemia d/t acute blood loss  - Vascular surgery consulted, appreciate recs  - Hemostasis obtained at L upper extremity fistula site after placement of two figure 8 sutures  - Holding home dose of Plavix and Eliquis d/t upcoming procedure with vascular surgery  - Hgb 6.5 -> 8.1, follow up repeat today. Discuss with surgery when to restart elliqus and plavix.  Recommended to continue to hold for now  -Transfuse 1 unit of PRBC on HD on admit.  Monitor HB.    -Dressing to  remained in place until 3/12, vascular surgery to reeval if again come down today.  -Had drop in hemoglobin On EPO, monitoring for continued bleeding.  Since hemoglobin stable without intervention, likely lab variation.  Restarted apixaban and Plavix now and monitoring hemoglobin for stability.  Nursing unable to remove central line.  Will end up removing central line as soon as I have availability.  His MRSA screen was positive and starting MRSA decolonization protocol and will need repeat MRSA nare 3 days  after completion.     #left hand numbness and pain  - 1st few digits, mostly pain but endorses decerased sensation, noted he reports he cannot move it but motor is intact but week and affecting the entire hand, potentially limited by pain and contrubted by swelling. Seeing if vasc surg can eval  - change to oxy q6 prn and make Dilaudid for breakthrough, encourage pt to notify nursing when pain is undercontrolled.   - symptoms and exam stable. Vasc surg to reassess today.     #leukocytosis  - likely related to surgical stress, trend. Monitor for signs of infection.       #T2DM (last HgA1c 5.3% 12/3/23) c/b neuropathy  - accucheck ACHS  - Lispro SSI #1 0-5 units  - holding home dose of Lantus 6 units to prevent episodes of hypoglycemia while hospitalized and HgA1c <7%. Monitor more need to resume.    - hypoglycemia order set placed  - continue home dose of Gabapentin 100 mg PO every day      #hyponatremia (chronic)  #ESRD on HD   - daily CHG bath d/t L chest dialysis cath site  -hyponatremia from hypervolemia in setting of esrd, trend. If remains low then needs fluid restriciton.   - Nephrology consulted for HD today,   - renal diet  - renally dose meds as needed  - continue home dose of Nephrocaps PO every day  -Had dialysis today will reevaluate labs tomorrow     #Afib  #hx of DVT (10/23)  - Duplex US of RUE done 10/22/23: Positive study for DVT in the right upper extremity.  There is a nonocclusive thrombus in the right subclavian artery.  - holding home dose of Eliquis and Heparin gtt d/t anemia, management as above     #hypotension, chronic  - continue home dose of Midodrine 15 mg PO TID     #LLE wound  #R foot wound  - wound nurse consult placed    Fem line removed, pressure held with assistance of nursing, appreciated.   Tip intact, no complications. Removed in trendelenburg with pt humming.     Monitoring h/h overnight    Family requested update, infomred they I will have to update first thing in the am.  "    On MRSA decolonization protocol       Scheduled outpatient appointments in system:   Future Appointments   Date Time Provider Department [place]   3/27/2024  9:00 AM Rosa Tovar MD [id number] [place]   4/17/2024  1:00 PM Koko Gordon MD [id number] [facility]     ---------------------------------------------------------------------------------------------------  Subjective   No new events.,,  He still has pain and swelling, no other complaints.  No bleeding noted.  Femoral line removed this afternoon.  ---------------------------------------------------------------------------------------------------  Objective   Last Recorded Vitals  Blood pressure (!) 148/104, pulse 93, temperature 37 °C (98.6 °F), temperature source Tympanic, resp. rate 18, height 1.778 m (5' 10\"), weight 90.9 kg (200 lb 6.4 oz), SpO2 100 %.  Intake/Output last 3 Shifts:  I/O last 3 completed shifts:  In: 354 (3.9 mL/kg) [P.O.:354]  Out: - (0 mL/kg)   Weight: 90.9 kg     Physical Exam  Constitutional:       Comments: Comfortable at rest on oxygen through NC   HENT:      Head: Normocephalic.      Nose: Nose normal.   Eyes:      Extraocular Movements: Extraocular movements intact.      Pupils: Pupils are equal, round, and reactive to light.   Cardiovascular:      Rate and Rhythm: Normal rate and regular rhythm.      Heart sounds: Normal heart sounds. No murmur heard.     Comments: Right femoral line  Left tunneled dialysis catheter  EJ IV  Pulmonary:      Effort: No respiratory distress.      Breath sounds: Normal breath sounds. No wheezing.   Abdominal:      General: Bowel sounds are normal. There is no distension.      Palpations: Abdomen is soft.      Tenderness: There is no abdominal tenderness.   Musculoskeletal:         General: Normal range of motion.      Cervical back: Normal range of motion.      Comments: Fistula site in LUE covered with dressing after the ligation surgery,     Right foot TMA, ( ulcer noted over the TMA area, which is " covered with dressing)    Left arm wrapped in ace wrap, pain in fingers, neurovascularly intact, motor and sensation intact but reports mild decreased sensation, noted mild increased edema of hand compared to right.    Skin:     General: Skin is warm.   Neurological:      General: No focal deficit present.      Mental Status: He is alert and oriented to person, place, and time. Mental status is at baseline.   Psychiatric:         Mood and Affect: Mood normal.       Relevant Results  Lab Results   Component Value Date    WBC 13.8 (H) 03/14/2024    HGB 7.2 (L) 03/14/2024    HCT 24.8 (L) 03/14/2024     (H) 03/14/2024     03/14/2024      Lab Results   Component Value Date    GLUCOSE 152 (H) 03/14/2024    CALCIUM 9.4 03/14/2024     (L) 03/14/2024    K 4.3 03/14/2024    CO2 26 03/14/2024    CL 94 (L) 03/14/2024    BUN 30 (H) 03/14/2024    CREATININE 3.75 (H) 03/14/2024     Scheduled medications  allopurinol, 100 mg, oral, Daily  apixaban, 2.5 mg, oral, q12h  atorvastatin, 40 mg, oral, Nightly  B complex-vitamin C-folic acid, 1 capsule, oral, Daily  chlorhexidine, , Topical, Daily  clopidogrel, 75 mg, oral, Daily  epoetin dane or biosimilar, 15,000 Units, intravenous, Once per day on Tue Thu Sat  gabapentin, 100 mg, oral, Daily  insulin lispro, 0-5 Units, subcutaneous, TID with meals  melatonin, 5 mg, oral, Nightly  midodrine, 15 mg, oral, q8h  mupirocin, , Topical, BID  pantoprazole, 40 mg, oral, Daily before breakfast  polyethylene glycol, 17 g, oral, Daily  sennosides-docusate sodium, 2 tablet, oral, BID  sodium chloride 0.9%, 10 mL, intra-catheter, q12h      Continuous medications     PRN medications  PRN medications: acetaminophen, albuterol, alteplase, dextrose 10 % in water (D10W), dextrose, glucagon, HYDROmorphone, ipratropium-albuteroL, ondansetron, oxyCODONE, sodium chloride 0.9%    Donn Quiñonez MD

## 2024-03-15 NOTE — CARE PLAN
The patient's goals for the shift include  having less pain.    The clinical goals for the shift include Pt will rate his pain a 4/10 or less by the end of this shift.    Over the shift, the patient did make progress toward the following goals. He has been able to rest after getting his PRN oxycodone. Plan for ultrasound of his upper extremities to rule out blood clots.

## 2024-03-15 NOTE — NURSING NOTE
.Report to Receiving RN:    Report To: ABEL Espinosa  Time Report Called: 2201  Hand-Off Communication: Pt tolerated HD well with no issue. Fluid removal 1.4 Liter /79   Complications During Treatment: No  Ultrafiltration Treatment: No  Medications Administered During Dialysis: No  Blood Products Administered During Dialysis: No  Labs Sent During Dialysis: No  Heparin Drip Rate Changes: No

## 2024-03-15 NOTE — PROGRESS NOTES
Occupational Therapy    Evaluation    Patient Name: Chevy Benton  MRN: [id number]  Today's Date: 3/15/2024  Time Calculation  Start Time: 0937  Stop Time: 1000  Time Calculation (min): 23 min    Assessment  IP OT Assessment  OT Assessment: CHEVY BENTON IS A 77 Y/O MALE WHO IS UNABLE TO PERFORM HIS ADL AND MOBILITY TASKS 2/2 PAIN/PAIN BEHAVIOR, BUE EDEMA, IMPAIRED BUE AROM AND STRENGTH, COGNITION, AND ENDURANCE. HE WOULD BENEFIT FROM IP THERAPY TO MAXIMIZE HIS POTENTIAL TO MAXIMIZE HIS POTENTIAL TO REGAIN FUNCTION AND ADVANCE TO MOD INTENSITY REHAB.  Prognosis: Good  Evaluation/Treatment Tolerance: Patient limited by pain, Patient limited by fatigue  Medical Staff Made Aware: Yes  End of Session Communication: PCT/NA/CTA  End of Session Patient Position: Bed, 3 rail up, Alarm on  Plan:  Treatment Interventions: ADL retraining, Functional transfer training, UE strengthening/ROM, Endurance training, Cognitive reorientation, Patient/family training, Equipment evaluation/education, Fine motor coordination activities, Compensatory technique education  OT Frequency: 3 times per week  OT Discharge Recommendations: Moderate intensity level of continued care  Equipment Recommended upon Discharge:  (TBD)  OT Recommended Transfer Status: Maximum assist, Assist of 2  OT - OK to Discharge: Yes    Subjective   Current Problem:  1. A-V fistula (CMS/HCC)  Case Request Operating Room: Repair Arteriovenous Fistula Upper Extremity    Case Request Operating Room: Repair Arteriovenous Fistula Upper Extremity        General:  General  Reason for Referral: Left arm Ligation of AVF  Past Medical History Relevant to Rehab: PVD s/p PTA to [facility] c/b perforation of AT, Right subclavian DVT, right TMA, chronic systolic and diastolic HFrEF, DM with neuropathy.  Missed Visit:  (8:34 FIRST ATTEMPT UA 2/2 NURSING CARE.)  Missed Visit Reason: Patient in a medical procedure (A.M. ATTEMPT UA 2/2 TO CONFLICT OF SERVICES/OFF UNIT FOR HD; P.M. UA  "PER NURSE 2/2 RECENT LINE REMOVAL. WILL ATTEMPT DELIVERY OF OT SERVICES AS APPROPRIATE.)  Family/Caregiver Present: No  Prior to Session Communication: Bedside nurse  Patient Position Received: Bed, 3 rail up, Alarm off, not on at start of session  Preferred Learning Style: auditory, verbal  General Comment: UPON APPROACH PATIENT WAS FINISHING BREAKFAST. HE WAS PLEASANTLY CONFUSED BUT RECEPTIVE TO PARTICIPATION AS TOLERATED.  Precautions:  Hearing/Visual Limitations: [place]te/ GLASSES  LE Weight Bearing Status: Right Non-Weight Bearing (AS PER JAN 2024 HOSPITAL ADMISSION)  Medical Precautions: Fall precautions  Precautions Comment: BLE WAFFLE BOOTS  Vital Signs:  Heart Rate: 52  Heart Rate Source: Monitor  SpO2: 99 % (O2 1 LPM NC)  Pain:  Pain Assessment  Pain Assessment: 0-10  Pain Score: 0 - No pain (\"EVERYTHING WAS HURTING ALL OVER LIKE A 10/10 THEN 5/10 NOW I'M OK BECAUSE OF THE MEDICINE FROM MY NURSE.\")    Objective   Cognition:  Overall Cognitive Status: Impaired  Arousal/Alertness: Delayed responses to stimuli  Orientation Level:  (O xPERSON AND TO TIME FOR MONTH AND YEAR)  Following Commands: Follows one step commands with increased time (AND REPETITION)  Attention: Within Functional Limits  Memory: Exceptions to WFL  Memory Comments: DECREASED RECALL OF ORIENTATION WITH VERBAL AND ENVIRONMENTAL CUES  Processing Speed: Delayed           Home Living:  Type of Home: Skilled Nursing facility  Home Adaptive Equipment: Walker rolling or standard   Prior Function:     IADL History:     ADL:  Eating Assistance: Total  Eating Deficit:  (BUE EDEMA + DECREASED GRASP)  Grooming Assistance: Total  Bathing Assistance: Total  UE Dressing Assistance: Total  LE Dressing Assistance: Total  Activity Tolerance:  Endurance: Tolerates less than 10 min exercise with changes in vital signs  Bed Mobility/Transfers: Bed Mobility  Bed Mobility: Yes  Bed Mobility 1  Bed Mobility 1:  (TOTAL A ROLLING R < > L)    Transfers  Transfer: No   "   Sitting Balance:  Static Sitting Balance  Static Sitting-Balance Support:  (UNABLE)    Vision: Vision - Basic Assessment  Current Vision: Wears glasses all the time  Sensation:  Light Touch: No apparent deficits (HIGH SENSITIVITY TO TOUCH)  Strength:  Strength Comments: BUE 2-/ 5  Perception:     Coordination:  Movements are Fluid and Coordinated: No (POOR TOLERANCE FOR MOVEMENT 2/2 PAIN AND EDEMA BEHAVIOR DURING ATTEMPTS TO ASSESS)   Hand Function:  Hand Function  Gross Grasp: Impaired  Extremities: RUE   RUE : Exceptions to WFL and LUE   LUE: Exceptions to WFL      Outcome Measures: [facility] Daily Activity  Putting on and taking off regular lower body clothing: Total  Bathing (including washing, rinsing, drying): Total  Putting on and taking off regular upper body clothing: Total  Toileting, which includes using toilet, bedpan or urinal: Total  Taking care of personal grooming such as brushing teeth: Total  Eating Meals: Total  Daily Activity - Total Score: 6         and Brief Confusion Assessment Method (bCAM)  CAM Result: Unable to assess  Education Documentation  Precautions, taught by Ingrid Sommer OT at 3/15/2024  3:49 PM.  Learner: Patient  Readiness: Acceptance  Method: Explanation  Response: Verbalizes Understanding    ADL Training, taught by Ingrid Sommer OT at 3/15/2024  3:49 PM.  Learner: Patient  Readiness: Acceptance  Method: Explanation  Response: Verbalizes Understanding    Education Comments  No comments found.      Goals:   Encounter Problems       Encounter Problems (Active)       ADLs       Patient with complete upper body dressing with minimal assist  level of assistance donning and doffing all UE clothes with PRN adaptive equipment while supine in bed       Start:  03/15/24    Expected End:  03/29/24            Patient will feed self with minimal assist  level of assistance and verbal cues using PRN adaptive equipment.       Start:  03/15/24    Expected End:  03/29/24             Patient will complete daily grooming tasks brushing teeth and washing face/hair with minimal assist  level of assistance and PRN adaptive equipment while supine in bed.       Start:  03/15/24    Expected End:  03/29/24               TRANSFERS       Patient will perform bed mobility minimal assist  level of assistance and bed rails and draw sheet in order to improve safety and independence with mobility       Start:  03/15/24    Expected End:  03/29/24

## 2024-03-15 NOTE — PROGRESS NOTES
"Chevy Benton is a 78 y.o. male on day 7 of admission presenting with A-V fistula (CMS/HCC).    Subjective   Pt  resting in bed. Denies sob, n/v/d, fever, cough, chills, chest pain , constipation, anuric, c/o generalized pain       Objective     Physical Exam  Constitutional:       Appearance: He is obese.   Cardiovascular:      Rate and Rhythm: Normal rate.      Comments: HR-97  Pulmonary:      Comments: Magdiel lungs diminished with minor crackles more noted to lt base  Cont pox 100% 1.5L  Abdominal:      General: There is distension.      Comments: Non-tender to palpation   Genitourinary:     Comments: States anuric  Musculoskeletal:      Comments: Ble edema +2-3 pitting   rt arm +3 pitting edema -fingers cool to touch  Lt arm swollen with acewrap drsg-s/p surg to lt arm..+3 pitting edema to lt hand   Skin:     General: Skin is warm and dry.      Comments: Magdiel feet with kerlix wrap   Neurological:      Mental Status: He is alert and oriented to person, place, and time.   Psychiatric:         Mood and Affect: Mood normal.         Behavior: Behavior normal.         Last Recorded Vitals  Blood pressure 88/60, pulse 97, temperature 36.7 °C (98.1 °F), resp. rate 16, height 1.778 m (5' 10\"), weight 90.9 kg (200 lb 6.4 oz), SpO2 100 %.  Intake/Output last 3 Shifts:  I/O last 3 completed shifts:  In: 600 (6.6 mL/kg) [I.V.:200 (2.2 mL/kg); Other:400]  Out: 1500 (16.5 mL/kg) [Other:1500]  Weight: 90.9 kg     Relevant Results  Scheduled medications  allopurinol, 100 mg, oral, Daily  apixaban, 2.5 mg, oral, q12h  atorvastatin, 40 mg, oral, Nightly  B complex-vitamin C-folic acid, 1 capsule, oral, Daily  chlorhexidine, , Topical, Daily  clopidogrel, 75 mg, oral, Daily  epoetin dane or biosimilar, 15,000 Units, intravenous, Once per day on Tue Thu Sat  gabapentin, 100 mg, oral, Daily  insulin lispro, 0-5 Units, subcutaneous, TID with meals  melatonin, 5 mg, oral, Nightly  midodrine, 15 mg, oral, q8h  mupirocin, , Topical, " BID  pantoprazole, 40 mg, oral, Daily before breakfast  polyethylene glycol, 17 g, oral, Daily  sennosides-docusate sodium, 2 tablet, oral, BID  sodium chloride 0.9%, 10 mL, intra-catheter, q12h      Continuous medications     PRN medications  PRN medications: acetaminophen, albuterol, alteplase, dextrose 10 % in water (D10W), dextrose, glucagon, ipratropium-albuteroL, ondansetron, oxyCODONE, sodium chloride 0.9%   Results for orders placed or performed during the hospital encounter of 03/08/24 (from the past 24 hour(s))   POCT GLUCOSE   Result Value Ref Range    POCT Glucose 145 (H) 74 - 99 mg/dL   POCT GLUCOSE   Result Value Ref Range    POCT Glucose 149 (H) 74 - 99 mg/dL   Renal Function Panel   Result Value Ref Range    Glucose 130 (H) 74 - 99 mg/dL    Sodium 133 (L) 136 - 145 mmol/L    Potassium 4.8 3.5 - 5.3 mmol/L    Chloride 97 (L) 98 - 107 mmol/L    Bicarbonate 24 21 - 32 mmol/L    Anion Gap 17 10 - 20 mmol/L    Urea Nitrogen 23 6 - 23 mg/dL    Creatinine 2.85 (H) 0.50 - 1.30 mg/dL    eGFR 22 (L) >60 mL/min/1.73m*2    Calcium 9.5 8.6 - 10.6 mg/dL    Phosphorus 4.4 2.5 - 4.9 mg/dL    Albumin 2.3 (L) 3.4 - 5.0 g/dL   POCT GLUCOSE   Result Value Ref Range    POCT Glucose 140 (H) 74 - 99 mg/dL   POCT GLUCOSE   Result Value Ref Range    POCT Glucose 150 (H) 74 - 99 mg/dL                     Assessment/Plan   Principal Problem:    A-V fistula (CMS/[facility])  Active Problems:    Anemia due to blood loss, acute    Tolerated hemodialysis yesterday with net fluid loss 1500cc. Since pt is volume overloaded will do an extra session of IUF today to improve fluid status.... 2.5hr and aim for 1.5L as yon    Bp stable with tx, . Floor vs have been stable, hypervolemic on exam and has stable electrolytes . K+=4.8     Outpatient Dialysis schedule:   [facility]Dr Hannah; will be TTS while inpatient     Access: lt chest cath - no issues - able to achieve   Lt groin temp cath removed 3/14  Rt EJ     Anemia of  ESRD:  3/13-increase  epoetin dane-epbx (Retacrit) injection 15,000 Units on dialysis days... current hgb 7.2.. 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 Will cont to monitor.. B complex-vitamin C-folic acid (Nephrocaps) capsule 1 capsule daily     Plan HD tomorrow with UF as tolerated     Renal diet      Please obtain daily standing wt (if possible)     Medication to be adjusted for ESRD      Patient to continue regular HD schedule while inpatient and to follow with the outpatient nephrologist at discharge        JENIFFER Bates-CNP

## 2024-03-15 NOTE — NURSING NOTE
Report from Sending RN:    Report From: ABEL Espinosa  Recent Surgery of Procedure: No,    Baseline Level of Consciousness (LOC): A/O x 4  Oxygen Use: Yes, 2L pulse ox 99%  Type: NC  Diabetic: Yes, last   Last BP Med Given Day of Dialysis: yes, midodrine for soft BP  Last Pain Med Given: yes, see EMAR  Lab Tests to be Obtained with Dialysis: Yes, CBC  Blood Transfusion to be Given During Dialysis: No  Available IV Access: Yes  Medications to be Administered During Dialysis: No  Continuous IV Infusion Running: No  Restraints on Currently or in the Last 24 Hours: No  Hand-Off Communication: full code, no isolation, travel by bed

## 2024-03-15 NOTE — CARE PLAN
The patient's goals for the shift include      The clinical goals for the shift include Pt will be free fall/injuries during this shift    Problem: Pain  Goal: My pain/discomfort is manageable  Outcome: Progressing

## 2024-03-15 NOTE — PROGRESS NOTES
Physical Therapy    Physical Therapy Treatment    Patient Name: Chevy Benton  MRN: [id number]  Today's Date: 3/15/2024  Time Calculation  Start Time: 1103  Stop Time: 1130  Time Calculation (min): 27 min       Assessment/Plan   PT Assessment  PT Assessment Results: Decreased strength, Decreased endurance, Decreased mobility, Impaired balance  Rehab Prognosis: Good  Barriers to Discharge: pain  Evaluation/Treatment Tolerance: Patient limited by fatigue, Patient limited by pain  Strengths: Coping skills, Attitude of self  End of Session Communication: Bedside nurse  Assessment Comment: pt still would benefit from skilled services to improve functional mobiltiy. pt with limited mobility due to edema, and pain. Pt also deconditioned  End of Session Patient Position: Bed, 3 rail up, Alarm off, not on at start of session  PT Plan  Inpatient/Swing Bed or Outpatient: Inpatient  PT Plan  Treatment/Interventions: Bed mobility, Transfer training, Gait training, Balance training, Strengthening, Endurance training, Therapeutic exercise, Therapeutic activity  PT Plan: Skilled PT  PT Frequency: 3 times per week  PT Discharge Recommendations: Moderate intensity level of continued care  Equipment Recommended upon Discharge:  (none)  PT Recommended Transfer Status: Total assist  PT - OK to Discharge: Yes      General Visit Information:   PT  Visit  PT Received On: 03/15/24  General  Reason for Referral: Left arm Ligation of AVF  Past Medical History Relevant to Rehab: PVD s/p PTA to RLE c/b perforation of AT, Right subclavian DVT, right TMA, chronic systolic and diastolic HFrEF, DM with neuropathy.  Missed Visit: Yes  Missed Visit Reason: Patient in a medical procedure (pt off the floor at dialysis. will reattempt as schedule permits.)  Family/Caregiver Present: No  Prior to Session Communication: Bedside nurse  Patient Position Received: Bed, 3 rail up, Alarm off, not on at start of session  General Comment: pt agreeable to  therapy    Subjective   Precautions:  Precautions  LE Weight Bearing Status: Right Non-Weight Bearing  Medical Precautions: Fall precautions  Vital Signs:  Vital Signs  BP: 88/60  BP Location: Right arm  BP Method: Automatic  Patient Position: Lying    Objective   Pain:  Pain Assessment  Pain Assessment: 0-10  Pain Score: 0 - No pain (not rating but grimacing with mobility of B UE and B LE R UE> L UE and R LE >L LE)  Pain Interventions: Repositioned  Cognition:  Cognition  Arousal/Alertness: Delayed responses to stimuli  Orientation Level: Disoriented to situation, Disoriented to time  Following Commands: Follows one step commands without difficulty  Postural Control:  Static Sitting Balance  Static Sitting-Balance Support: Bilateral upper extremity supported  Static Sitting-Level of Assistance: Maximum assistance (varying assist from max to min A)  Static Sitting-Comment/Number of Minutes: pt sat EOB for 10 mins    Activity Tolerance:  Activity Tolerance  Endurance: Tolerates 10 - 20 min exercise with multiple rests  Treatments:  Therapeutic Exercise  Therapeutic Exercise Performed: Yes  Therapeutic Exercise Activity 1: supine: HS, hip abd/add, Ap, 1 x 10 each B ; B shoulder eleveation 1 x 10 B    Therapeutic Activity  Therapeutic Activity Performed: Yes  Therapeutic Activity 1: pt sat EOB for 10 mins with varying assist. max to start and as he fatigued but min assist when positioned correctly         Bed Mobility  Bed Mobility: Yes  Bed Mobility 1  Bed Mobility 1: Supine to sitting, Sitting to supine  Level of Assistance 1: Maximum assistance, Moderate verbal cues  Bed Mobility 2  Bed Mobility  2: Rolling right, Rolling left  Level of Assistance 2: Maximum assistance  Bed Mobility 3  Bed Mobility 3: Scooting  Level of Assistance 3: Maximum assistance    Ambulation/Gait Training  Ambulation/Gait Training Performed: No  Transfers  Transfer: No         Outcome Measures:  AMPAC Basic Mobility  Turning from your back  to your side while in a flat bed without using bedrails: A lot  Moving from lying on your back to sitting on the side of a flat bed without using bedrails: A lot  Moving to and from bed to chair (including a wheelchair): Total  Standing up from a chair using your arms (e.g. wheelchair or bedside chair): Total  To walk in hospital room: Total  Climbing 3-5 steps with railing: Total  Basic Mobility - Total Score: 8    Education Documentation  Precautions, taught by Lisette Rosales PT at 3/15/2024 11:42 AM.  Learner: Patient  Readiness: Eager  Method: Explanation  Response: Verbalizes Understanding    Body Mechanics, taught by Lisette Rosales PT at 3/15/2024 11:42 AM.  Learner: Patient  Readiness: Eager  Method: Explanation  Response: Verbalizes Understanding    Mobility Training, taught by Lisette Rosales PT at 3/15/2024 11:42 AM.  Learner: Patient  Readiness: Eager  Method: Explanation  Response: Verbalizes Understanding    Education Comments  No comments found.        OP EDUCATION:       Encounter Problems       Encounter Problems (Active)       PT Problem       Patient will complete supine to sit and sit to supine Min Assist  (Progressing)       Start:  03/11/24    Expected End:  03/25/24            Patient will perform sit<>stand transfer with Rolling Walker, and Mod Assist  (Progressing)       Start:  03/11/24    Expected End:  03/25/24            Patient will ambulate >10' with Rolling Walker and Mod Assist  (Progressing)       Start:  03/11/24    Expected End:  03/25/24            Pt will be able to maintain static sitting with SBA x 5 min (Progressing)       Start:  03/11/24    Expected End:  03/25/24

## 2024-03-15 NOTE — PROGRESS NOTES
Assessment/Plan      Mr. Benton is a 78 year old with a PMH of PAD, Afib (on Eliquis), CAD, T2DM, R subclavian DVT (10/23), GERD, SSS s/p PPM placement, gout, ESRD on HD (MWF), HLD, hypotension, HFrEF (last EF 40-45% 1/23/24), and gout who was transferred to [facility] from [facility] due to bleeding from his L upper arm AV fistula. Labs obtained on admit notable for anemia, hyponatremia, hyponatremia, and lactic acidosis.. Vascular surgery is consulted in the ED who placed 2 figure 8 silk sutures in order to achieve hemostasis at fistula site. Pt received new tunneled HD cath and received dialysis on 3/9, taken to OR 3/10  with vascular surgery  for ligation.  Overall pain is improving. H/h stable after starting elliquis and plavix. Continues to have ansarca.  Nephrology on fluid management.        Assessment/Plan   Principal Problem:    A-V fistula (CMS/[facility])  Active Problems:    Anemia due to blood loss, acute     #Bleeding from AV fistula s/p ligation and tunneled HD line placement  # anemia d/t acute blood loss  - Vascular surgery consulted, appreciate recs  - Hemostasis obtained at L upper extremity fistula site after placement of two figure 8 sutures  - Holding home dose of Plavix and Eliquis d/t upcoming procedure with vascular surgery  - Hgb 6.5 -> 8.1, follow up repeat today. Discuss with surgery when to restart elliqus and plavix.  Recommended to continue to hold for now  -Transfuse 1 unit of PRBC on HD on admit.  Monitor HB.    -Dressing to  remained in place until 3/12, vascular surgery to reeval if again come down today.  -Had drop in hemoglobin On EPO, monitoring for continued bleeding.  Since hemoglobin stable without intervention, likely lab variation.  Restarted apixaban and Plavix now and monitoring hemoglobin for stability.  Nursing unable to remove central line.  Will end up removing central line as soon as I have availability.  His MRSA screen was positive and starting MRSA decolonization protocol and  will need repeat MRSA nare 3 days after completion.     #left hand numbness and pain  #UE edema - likely related to anasarca, positioning and ESRD  - 1st few digits, mostly pain but endorses decerased sensation, noted he reports he cannot move it but motor is intact but week and affecting the entire hand, potentially limited by pain and contrubted by swelling. Seeing if vasc surg can eval  - change to oxy q6 prn and make Dilaudid for breakthrough, encourage pt to notify nursing when pain is undercontrolled.   - symptoms and exam stable. Vasc surg to reassess today.   -Obtain duplex upper extremities, evaluating for venous obstruction.  Will discuss utility of the CT image to look for central obstruction with vascular surgery.    #leukocytosis  - likely related to surgical stress, trend. Monitor for signs of infection.    -Continues to have elevated white count, will obtain blood cultures     #T2DM (last HgA1c 5.3% 12/3/23) c/b neuropathy  - accucheck ACHS  - Lispro SSI #1 0-5 units  - holding home dose of Lantus 6 units to prevent episodes of hypoglycemia while hospitalized and HgA1c <7%. Monitor more need to resume.    - hypoglycemia order set placed  - continue home dose of Gabapentin 100 mg PO every day      #hyponatremia (chronic)  #ESRD on HD   - daily CHG bath d/t L chest dialysis cath site  -hyponatremia from hypervolemia in setting of esrd, trend. If remains low then needs fluid restriciton.   - Nephrology consulted for HD today,   - renal diet  - renally dose meds as needed  - continue home dose of Nephrocaps PO every day  -Had dialysis today will reevaluate labs tomorrow     #Afib  #hx of DVT (10/23)  - Duplex US of RUE done 10/22/23: Positive study for DVT in the right upper extremity.  There is a nonocclusive thrombus in the right subclavian artery.  - holding home dose of Eliquis and Heparin gtt d/t anemia, management as above     #hypotension, chronic  - continue home dose of Midodrine 15 mg PO TID    "  #LLE wound  #R foot wound  #PVD s/p revasc  - wound nurse consult placed, requesting updated wound care recs  -Family would like to touch base with endovascular given they missed their follow-up appointment after revascularization    #MRSA nare positive  On MRSA decolonization protocol  Femoral line removed on 3/14 as patient is clinically stable after starting apixaban and Plavix    Scheduled outpatient appointments in system:   Future Appointments   Date Time Provider Department [place]   3/27/2024  9:00 AM Rosa Tovar MD [id number] [place]   4/17/2024  1:00 PM Koko Gordon MD [id number] [place]     ---------------------------------------------------------------------------------------------------  Subjective   No new events.,,  Patient is moving his left hand better and arm better, still has more upper extremity edema than lower extremity.  Had talk with 1 family member this morning a separate family member during encounter and then had family meeting this afternoon.  Initial concerns over eye discharge, not correlated on exam but he is having dry eyes.  Family is concerned about the extent of the swelling, discussed with nephrology to go for additional blood removal today.  They are concerned about his decline over some time and that each time he gets back cancer back in the hospital and that he is not strong enough at this time.  Discussed goals of therapy and dialysis and fluid removal, his participation in his own therapy needs.  They missed an appointment with endovascular and would like to see if they can see them in house rather than having to transport him out of the facility.      ---------------------------------------------------------------------------------------------------  Objective   Last Recorded Vitals  Blood pressure 88/60, pulse 104, temperature 36.4 °C (97.5 °F), temperature source Temporal, resp. rate 16, height 1.778 m (5' 10\"), weight 90.9 kg (200 lb 6.4 oz), SpO2 99 %.  Intake/Output " last 3 Shifts:  I/O last 3 completed shifts:  In: 600 (6.6 mL/kg) [I.V.:200 (2.2 mL/kg); Other:400]  Out: 1500 (16.5 mL/kg) [Other:1500]  Weight: 90.9 kg     Physical Exam  Constitutional:       Comments: Comfortable at rest on oxygen through NC   HENT:      Head: Normocephalic.      Nose: Nose normal.   Eyes:      Extraocular Movements: Extraocular movements intact.      Pupils: Pupils are equal, round, and reactive to light.   Cardiovascular:      Rate and Rhythm: Normal rate and regular rhythm.      Heart sounds: Normal heart sounds. No murmur heard.     Comments: Right femoral line  Left tunneled dialysis catheter  EJ IV  Pulmonary:      Effort: No respiratory distress.      Breath sounds: Normal breath sounds. No wheezing.   Abdominal:      General: Bowel sounds are normal. There is no distension.      Palpations: Abdomen is soft.      Tenderness: There is no abdominal tenderness.   Musculoskeletal:         General: Normal range of motion.      Cervical back: Normal range of motion.      Comments: Fistula site in LUE covered with dressing after the ligation surgery,     Right foot TMA, ( ulcer noted over the TMA area, which is covered with dressing)    Left arm wrapped in ace wrap, pain in fingers, neurovascularly intact, motor and sensation intact but reports mild decreased sensation, noted mild increased edema of hand compared to right.    Skin:     General: Skin is warm.   Neurological:      General: No focal deficit present.      Mental Status: He is alert and oriented to person, place, and time. Mental status is at baseline.   Psychiatric:         Mood and Affect: Mood normal.         Relevant Results  Lab Results   Component Value Date    WBC 15.1 (H) 03/15/2024    HGB 7.7 (L) 03/15/2024    HCT 27.0 (L) 03/15/2024     (H) 03/15/2024     03/15/2024      Lab Results   Component Value Date    GLUCOSE 130 (H) 03/15/2024    CALCIUM 9.5 03/15/2024     (L) 03/15/2024    K 4.8 03/15/2024     CO2 24 03/15/2024    CL 97 (L) 03/15/2024    BUN 23 03/15/2024    CREATININE 2.85 (H) 03/15/2024     Scheduled medications  allopurinol, 100 mg, oral, Daily  apixaban, 2.5 mg, oral, q12h  atorvastatin, 40 mg, oral, Nightly  B complex-vitamin C-folic acid, 1 capsule, oral, Daily  chlorhexidine, , Topical, Daily  clopidogrel, 75 mg, oral, Daily  epoetin dane or biosimilar, 15,000 Units, intravenous, Once per day on Tue Thu Sat  gabapentin, 100 mg, oral, Daily  insulin lispro, 0-5 Units, subcutaneous, TID with meals  lubricating eye drops, 2 drop, Both Eyes, TID  melatonin, 5 mg, oral, Nightly  midodrine, 15 mg, oral, q8h  mupirocin, , Topical, BID  pantoprazole, 40 mg, oral, Daily before breakfast  polyethylene glycol, 17 g, oral, Daily  sennosides-docusate sodium, 2 tablet, oral, BID  sodium chloride 0.9%, 10 mL, intra-catheter, q12h      Continuous medications     PRN medications  PRN medications: acetaminophen, albuterol, alteplase, dextrose 10 % in water (D10W), dextrose, glucagon, ipratropium-albuteroL, ondansetron, oxyCODONE, sodium chloride 0.9%    Donn Quiñonez MD

## 2024-03-16 NOTE — CARE PLAN
The patient's goals for the shift include      The clinical goals for the shift include Pt will rate his pain a 4/10 or less by the end of this shift.    Over the shift, the patient did not make progress toward the following goals. Barriers to progression include . Recommendations to address these barriers include   Problem: Pain  Goal: My pain/discomfort is manageable  Outcome: Progressing     Problem: Safety  Goal: Patient will be injury free during hospitalization  Outcome: Progressing  Goal: I will remain free of falls  Outcome: Progressing     Problem: Daily Care  Goal: Daily care needs are met  Outcome: Progressing     Problem: Psychosocial Needs  Goal: Demonstrates ability to cope with hospitalization/illness  Outcome: Progressing  Goal: Collaborate with me, my family, and caregiver to identify my specific goals  Outcome: Progressing     Problem: Discharge Barriers  Goal: My discharge needs are met  Outcome: Progressing     Problem: Skin  Goal: Prevent/manage excess moisture  3/15/2024 2232 by Tony Trinh RN  Outcome: Progressing  3/15/2024 2231 by Tony Trinh RN  Flowsheets (Taken 3/15/2024 2231)  Prevent/manage excess moisture: Cleanse incontinence/protect with barrier cream  Goal: Promote skin healing  Outcome: Progressing   .

## 2024-03-16 NOTE — PROGRESS NOTES
Assessment/Plan      Mr. Benton is a 78 year old with a PMH of PAD, Afib (on Eliquis), CAD, T2DM, R subclavian DVT (10/23), GERD, SSS s/p PPM placement, gout, ESRD on HD (MWF), HLD, hypotension, HFrEF (last EF 40-45% 1/23/24), and gout who was transferred to [facility] from [facility] due to bleeding from his L upper arm AV fistula. Labs obtained on admit notable for anemia, hyponatremia, hyponatremia, and lactic acidosis.. Vascular surgery is consulted in the ED who placed 2 figure 8 silk sutures in order to achieve hemostasis at fistula site. Pt received new tunneled HD cath and received dialysis on 3/9, taken to OR 3/10  with vascular surgery  for ligation.  Overall pain is improving. H/h stable after starting elliquis and plavix. Continues to have ansarca.  Nephrology on fluid management.        Assessment/Plan   Principal Problem:    A-V fistula (CMS/[facility])  Active Problems:    Anemia due to blood loss, acute     #Bleeding from AV fistula s/p ligation and tunneled HD line placement  # anemia d/t acute blood loss  - Vascular surgery consulted, appreciate recs  - Hemostasis obtained at L upper extremity fistula site after placement of two figure 8 sutures  - Holding home dose of Plavix and Eliquis d/t upcoming procedure with vascular surgery  - Hgb 6.5 -> 8.1, follow up repeat today. Discuss with surgery when to restart elliqus and plavix.  Recommended to continue to hold for now  -Transfuse 1 unit of PRBC on HD on admit.  Monitor HB.    -Dressing to  remained in place until 3/12, vascular surgery to reeval if again come down today.  -Had drop in hemoglobin On EPO, monitoring for continued bleeding.  Since hemoglobin stable without intervention, likely lab variation.  Restarted apixaban and Plavix now and monitoring hemoglobin for stability.  Nursing unable to remove central line.  Will end up removing central line as soon as I have availability.  His MRSA screen was positive and starting MRSA decolonization protocol and  will need repeat MRSA nare 3 days after completion.     #left hand numbness and pain  #UE edema - likely related to anasarca, positioning and ESRD  # Respiratory failure with hypoxemia secondary to fluid overload  - 1st few digits, mostly pain but endorses decerased sensation, noted he reports he cannot move it but motor is intact but week and affecting the entire hand, potentially limited by pain and contrubted by swelling. Seeing if vasc surg can eval  - change to oxy q6 prn and make Dilaudid for breakthrough, encourage pt to notify nursing when pain is undercontrolled.   - symptoms and exam stable. Vasc surg to reassess and stable.  Symptoms have resolved  -Given persistent upper extremity edema, ordered  duplex upper extremities, evaluating for venous obstruction.  Will discuss utility of the CT image to look for central obstruction with vascular surgery.  Per vascular surgery since the swelling is symmetric, they do not recommend any further investigation.  Will continue to manage fluid status, nutrition, mobilize patient and if swelling does not prove or other concerning signs will need workup as outpatient.  -Patient continues  2 L, in setting of anasarca and ESRD.  Likely only requiring 1 L as I have weaned him down in the past.  Will obtain chest x-ray to reevaluate given leukocytosis and continued hypoxemia.    #leukocytosis  - likely related to surgical stress, trend. Monitor for signs of infection.  remains persistent, no new signs of infection/localizing signs.  -Continues to have elevated white count, will obtain blood cultures  -Had ordered blood cultures but does not appear to be collected, reordered     #T2DM (last HgA1c 5.3% 12/3/23) c/b neuropathy  - accucheck ACHS  - Lispro SSI #1 0-5 units  - holding home dose of Lantus 6 units to prevent episodes of hypoglycemia while hospitalized and HgA1c <7%. Monitor more need to resume.    - hypoglycemia order set placed  - continue home dose of Gabapentin  100 mg PO every day      #hyponatremia (chronic)  #ESRD on HD   - daily CHG bath d/t L chest dialysis cath site  -hyponatremia from hypervolemia in setting of esrd, trend. If remains low then needs fluid restriciton.   - Nephrology consulted for HD today,   - renal diet  - renally dose meds as needed  - continue home dose of Nephrocaps PO every day  -Had dialysis today will reevaluate labs tomorrow     #Afib  #hx of DVT (10/23)  - Duplex US of RUE done 10/22/23: Positive study for DVT in the right upper extremity.  There is a nonocclusive thrombus in the right subclavian artery.  - holding home dose of Eliquis and Heparin gtt d/t anemia, management as above     #hypotension, chronic  - continue home dose of Midodrine 15 mg PO TID     #LLE wound  #R foot wound  #PVD s/p revasc  - wound nurse consult placed, requesting updated wound care recs, wound care recs and record and now updated  -Family would like to touch base with endovascular given they missed their follow-up appointment after revascularization  -Paged endovascular to discuss family concerns about his missed appointment and ability to get to clinic, will discuss with consult as needed for inpatient evaluation  -Podiatry will see patient in a.m. to update recommendations for foot wound and to evaluate foot wound    #MRSA nare positive  On MRSA decolonization protocol, ordered mupirocin and chlorhexidine bath  Femoral line removed on 3/14 as patient is clinically stable after starting apixaban and Plavix  Will check if patient needs repeat MRSA nare after with infection control    Patient is still pending labs today, will need to follow blood counts.      Scheduled outpatient appointments in system:   Future Appointments   Date Time Provider Department Center   3/27/2024  9:00 AM Rosa Tovar MD [id number] [place]   4/17/2024  1:00 PM Koko Gordon MD [id number] [facility]  "    ---------------------------------------------------------------------------------------------------  Subjective   No new events.,,  Patient is feeling well he was sitting up eating breakfast.  Continues to have swelling in his arms.  He is able to function but still stating he has had pain.  He appears very sensitive to opioids into his dialysis as afterwards he tends to be very sleepy.  His eyes he feels are better with artificial tears.  He is tolerating his dialysis without issues.  No further bleeding issues.  He denies GI issues tolerating diet this morning.  Updated family today while in room with patient     ---------------------------------------------------------------------------------------------------  Objective   Last Recorded Vitals  Blood pressure 142/85, pulse (!) 117, temperature 36.6 °C (97.9 °F), resp. rate 18, height 1.778 m (5' 10\"), weight 90.9 kg (200 lb 6.4 oz), SpO2 95 %.  Intake/Output last 3 Shifts:  I/O last 3 completed shifts:  In: 4810 (52.9 mL/kg) [I.V.:800 (8.8 mL/kg); Other:4010]  Out: 1819 (20 mL/kg) [Other:1819]  Weight: 90.9 kg     Physical Exam  Constitutional:       Comments: Comfortable at rest on oxygen through NC   HENT:      Head: Normocephalic.      Nose: Nose normal.   Eyes:      Extraocular Movements: Extraocular movements intact.      Pupils: Pupils are equal, round, and reactive to light.   Cardiovascular:      Rate and Rhythm: Normal rate and regular rhythm.      Heart sounds: Normal heart sounds. No murmur heard.     Comments: Right femoral line  Left tunneled dialysis catheter  EJ IV  Pulmonary:      Effort: No respiratory distress.      Breath sounds: Normal breath sounds. No wheezing.   Abdominal:      General: Bowel sounds are normal. There is no distension.      Palpations: Abdomen is soft.      Tenderness: There is no abdominal tenderness.   Musculoskeletal:         General: Normal range of motion.      Cervical back: Normal range of motion.      Comments: " Fistula site in LUE covered with dressing after the ligation surgery,     Right foot TMA, ( ulcer noted over the TMA area, which is covered with dressing)    Left arm wrapped in ace wrap, pain in fingers, neurovascularly intact, motor and sensation intact but reports mild decreased sensation, noted mild increased edema of hand compared to right.    Skin:     General: Skin is warm.   Neurological:      General: No focal deficit present.      Mental Status: He is alert and oriented to person, place, and time. Mental status is at baseline.   Psychiatric:         Mood and Affect: Mood normal.         Relevant Results  Lab Results   Component Value Date    WBC 15.1 (H) 03/15/2024    HGB 7.7 (L) 03/15/2024    HCT 27.0 (L) 03/15/2024     (H) 03/15/2024     03/15/2024      Lab Results   Component Value Date    GLUCOSE 130 (H) 03/15/2024    CALCIUM 9.5 03/15/2024     (L) 03/15/2024    K 4.8 03/15/2024    CO2 24 03/15/2024    CL 97 (L) 03/15/2024    BUN 23 03/15/2024    CREATININE 2.85 (H) 03/15/2024     Scheduled medications  allopurinol, 100 mg, oral, Daily  apixaban, 2.5 mg, oral, q12h  atorvastatin, 40 mg, oral, Nightly  B complex-vitamin C-folic acid, 1 capsule, oral, Daily  chlorhexidine, , Topical, Daily  clopidogrel, 75 mg, oral, Daily  epoetin dane or biosimilar, 15,000 Units, intravenous, Once per day on Tue Thu Sat  gabapentin, 100 mg, oral, Daily  insulin lispro, 0-5 Units, subcutaneous, TID with meals  lubricating eye drops, 2 drop, Both Eyes, TID  melatonin, 5 mg, oral, Nightly  midodrine, 15 mg, oral, q8h  mupirocin, , Topical, BID  pantoprazole, 40 mg, oral, Daily before breakfast  polyethylene glycol, 17 g, oral, Daily  sennosides-docusate sodium, 2 tablet, oral, BID  sodium chloride 0.9%, 10 mL, intra-catheter, q12h      Continuous medications     PRN medications  PRN medications: acetaminophen, albuterol, alteplase, dextrose 10 % in water (D10W), dextrose, glucagon,  ipratropium-albuteroL, ondansetron, oxyCODONE, sodium chloride 0.9%    Donn Quiñonez MD

## 2024-03-16 NOTE — CONSULTS
"Nutrition Initial Assessment:   Nutrition Assessment    Reason for Assessment: Provider consult order (Consulted for nutrition assessment/recommendation; wound healing)    Patient is a 78 y.o. male presenting with bleeding from his L upper arm AV fistula.     PMH of PAD, Afib (on Eliquis), CAD, T2DM, R subclavian DVT (10/23), GERD, SSS s/p PPM placement, gout, ESRD on HD (MWF), HLD, hypotension, HFrEF (last EF 40-45% 1/23/24), and gout     Nutrition History:  Food and Nutrient History: Patient off division.  Chart reviewed. Ate 100% of last 2 meals documented.  Previous nutrition assessment/notes reviewed from Jan/Feb 2024. Patient tolerated diet of minced and moist foods although varied intake and drank Nepro shake for nutritional supplement.  Vitamin/Herbal Supplement Use: Triphrocap    Anthropometrics:  Height: 177.8 cm (5' 10\")   Weight: 90.9 kg (200 lb 6.4 oz)   BMI (Calculated): 28.75  IBW/kg (Dietitian Calculated): 75.5 kg  Percent of IBW: 120 %       Weight History:   Wt Readings from Last 30 Encounters:   03/08/24 90.9 kg (200 lb 6.4 oz)   03/08/24 90.7 kg (200 lb)   02/22/24 87.9 kg (193 lb 12.6 oz)   02/10/24 98.7 kg (217 lb 9.5 oz)   10/08/23 95 kg (209 lb 7 oz)   10/05/23 98.3 kg (216 lb 11.4 oz)       Weight Change %:  Weight History / % Weight Change: Hx of changes in volume status.  Unknown dry weight.    Nutrition Focused Physical Exam Findings:  defer: off division      Generalized, degree unknown   Skin: L arm incision, R & L pretibial wounds, R ventral skin tear, R foot incision  Nutrition Significant Labs:  CBC Trend:   Results from last 7 days   Lab Units 03/15/24  1330 03/14/24  0504 03/13/24  0529 03/12/24  1507 03/11/24  2156   WBC AUTO x10*3/uL 15.1* 13.8* 12.9*  --  14.9*   RBC AUTO x10*6/uL 2.65* 2.46* 2.62*  --  2.54*   HEMOGLOBIN g/dL 7.7* 7.2* 7.7*   < > 7.1*   HEMATOCRIT % 27.0* 24.8* 26.3*   < > 25.2*   MCV fL 102* 101* 100  --  99   PLATELETS AUTO x10*3/uL 341 320 265  --  261    < " > = values in this interval not displayed.    , Renal Lab Trend:   Results from last 7 days   Lab Units 03/15/24  0724 03/14/24  0504 03/13/24  0529 03/12/24  0516 03/11/24  1855 03/10/24  1612   POTASSIUM mmol/L 4.8 4.3 3.8 3.7   < > 3.7   PHOSPHORUS mg/dL 4.4 4.8 4.2 5.3*   < >  --    SODIUM mmol/L 133* 132* 135* 132*   < > 133*   MAGNESIUM mg/dL  --   --   --   --   --  1.88   EGFR mL/min/1.73m*2 22* 16* 21* 16*   < > 27*   BUN mg/dL 23 30* 22 32*   < > 22   CREATININE mg/dL 2.85* 3.75* 2.89* 3.77*   < > 2.35*    < > = values in this interval not displayed.        Nutrition Specific Medications:  Scheduled medications  allopurinol, 100 mg, oral, Daily  apixaban, 2.5 mg, oral, q12h  atorvastatin, 40 mg, oral, Nightly  B complex-vitamin C-folic acid, 1 capsule, oral, Daily  chlorhexidine, , Topical, Daily  clopidogrel, 75 mg, oral, Daily  epoetin dane or biosimilar, 15,000 Units, intravenous, Once per day on Tue Thu Sat  gabapentin, 100 mg, oral, Daily  insulin lispro, 0-5 Units, subcutaneous, TID with meals  lubricating eye drops, 2 drop, Both Eyes, TID  melatonin, 5 mg, oral, Nightly  midodrine, 15 mg, oral, q8h  mupirocin, , Topical, BID  pantoprazole, 40 mg, oral, Daily before breakfast  polyethylene glycol, 17 g, oral, Daily  sennosides-docusate sodium, 2 tablet, oral, BID  sodium chloride 0.9%, 10 mL, intra-catheter, q12h      I/O:   Last BM Date: 03/13/24; Stool Appearance: Loose (03/15/24 1103)    Dietary Orders (From admission, onward)       Start     Ordered    03/16/24 1017  Oral nutritional supplements  Until discontinued        Question Answer Comment   Deliver with Breakfast    Deliver with Dinner    Select supplement: Nepro        03/16/24 1016    03/10/24 1538  Adult diet Carb Controlled; 75 gram carb/meal, 45 gram Carb evening snack  Diet effective now        Question Answer Comment   Diet type Carb Controlled    Carb diet selection: 75 gram carb/meal, 45 gram Carb evening snack        03/10/24  1537                     Estimated Needs:   Total Energy Estimated Needs (kCal): 2200 kCal  Method for Estimating Needs: 30kcal/kg  Total Protein Estimated Needs (g): 90 g  Method for Estimating Needs: 1.2g/kg              Nutrition Diagnosis        Nutrition Diagnosis  Patient has Nutrition Diagnosis: Yes  Diagnosis Status (1): New  Nutrition Diagnosis 1: Increased nutrient needs  Related to (1): increased metabolic demand  As Evidenced by (1): HD requirement and need for wound healing       Nutrition Interventions/Recommendations         Nutrition Prescription:  Individualized Nutrition Prescription Provided for : Recommend 1) Continue DM diet (of note last admit on minced and moist, modify consistency as indicated). 2) Continue daily renal vitamin  3) Nepro BID (ordered).        Nutrition Interventions:   Interventions: Medical food supplement  Medical Food Supplement: Commercial beverage  Goal: Nepro shake BID (427kcal and 19g protein per each)      Nutrition Monitoring and Evaluation   Food/Nutrient Related History Monitoring  Monitoring and Evaluation Plan: Energy intake  Energy Intake: Estimated energy intake  Criteria: Eats at least 75% of meals TID and drinks Nepro BID    Body Composition/Growth/Weight History  Monitoring and Evaluation Plan: Weight  Weight: Estimated dry weight  Criteria: Achieves dry weight    Biochemical Data, Medical Tests and Procedures  Monitoring and Evaluation Plan: Electrolyte/renal panel  Criteria: WNL    Nutrition Focused Physical Findings  Monitoring and Evaluation Plan: Skin  Criteria: promote healing       Time Spent/Follow-up Reminder:   Time Spent (min): 45 minutes  Last Date of Nutrition Visit: 03/16/24  Nutrition Follow-Up Needed?: Dietitian to reassess per policy  Follow up Comment: Diet + ONS/unable to interview today

## 2024-03-16 NOTE — PROGRESS NOTES
Nephrology Follow-up Note   Patient ID: Chevy Benton is a 78 y.o. male.   Admitted for :   Chief Complaint   Patient presents with    Vascular Access Problem      Seen and examined during hemodialysis. Labs and events reviewed.      Subjective:   C/o pain in right arm and elbow and c/o back pain   No c/o related to HD     Patient Active Problem List   Diagnosis    PAD (peripheral artery disease) (CMS/HCC)    Anemia in other chronic diseases classified elsewhere    Aortic root dilation (CMS/HCC)    Bimalleolar ankle fracture    Coronary artery disease involving native coronary artery of native heart with angina pectoris (CMS/HCC)    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, with long-term current use of insulin (CMS/HCC)    ESRD (end stage renal disease) on dialysis (CMS/HCC)    Thyroid enlarged    Thoracic aortic ectasia (CMS/HCC)    Sick sinus syndrome (CMS/HCC)    Sepsis (CMS/HCC)    Pulmonary HTN (CMS/HCC)    Polyosteoarthritis, unspecified    Pneumatosis intestinalis    Peripheral vascular disorder due to diabetes mellitus (CMS/HCC)    Open wound of right foot    Gangrene of right foot (CMS/HCC)    Obesity, Class I, BMI 30-34.9    Nonrheumatic mitral (valve) insufficiency    Non-rheumatic mitral regurgitation    Skin ulcer of right heel with fat layer exposed (CMS/HCC)    Non-pressure chronic ulcer of other part of right foot with fat layer exposed (CMS/HCC)    Atherosclerosis of native artery of right lower extremity with gangrene (CMS/HCC)    Atherosclerosis of native arteries of left leg with ulceration of heel and midfoot (CMS/HCC)    Malnutrition of mild degree (CMS/HCC)    Elevated prostate specific antigen (PSA)    Ischemic ulcer of foot due to atherosclerosis of lower extremity (CMS/HCC)    Hyperlipidemia LDL goal <70    Hepatomegaly, not elsewhere classified    Gout    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)    Pacemaker    Critical  limb ischemia with history of revascularization of same extremity (CMS/[facility])    Stage II pressure ulcer (CMS/[facility])    Amputation of toe of right foot (CMS/[facility])    Weakness    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 (CMS/[facility])    COVID    PNA (pneumonia)    Critical limb ischemia of both lower extremities (CMS/[facility])    Critical limb ischemia of right lower extremity (CMS/[facility])    Subclavian vein stenosis, left    GERD (gastroesophageal reflux disease)    Balanitis    Ulcer of lower extremity (CMS/[facility])    Hypotension    Multiple wounds    A-fib (CMS/[facility])    A-V fistula (CMS/[facility])    Type 2 diabetes mellitus (CMS/[facility])    Hyponatremia    Hyperparathyroidism, unspecified (CMS/[facility])    HFrEF (heart failure with reduced ejection fraction) (CMS/[facility])    Gout, unspecified    Gangrene of toe of right foot (CMS/[facility])    Anemia due to blood loss, acute       Scheduled medications:  allopurinol, 100 mg, oral, Daily  apixaban, 2.5 mg, oral, q12h  atorvastatin, 40 mg, oral, Nightly  B complex-vitamin C-folic acid, 1 capsule, oral, Daily  chlorhexidine, , Topical, Daily  clopidogrel, 75 mg, oral, Daily  epoetin dane or biosimilar, 15,000 Units, intravenous, Once per day on Tue Thu Sat  gabapentin, 100 mg, oral, Daily  insulin lispro, 0-5 Units, subcutaneous, TID with meals  lubricating eye drops, 2 drop, Both Eyes, TID  melatonin, 5 mg, oral, Nightly  midodrine, 15 mg, oral, q8h  mupirocin, , Topical, BID  pantoprazole, 40 mg, oral, Daily before breakfast  polyethylene glycol, 17 g, oral, Daily  sennosides-docusate sodium, 2 tablet, oral, BID  sodium chloride 0.9%, 10 mL, intra-catheter, q12h         PRN medications: acetaminophen, albuterol, alteplase, dextrose 10 % in water (D10W), dextrose, glucagon, ipratropium-albuteroL, ondansetron, oxyCODONE, sodium chloride 0.9%     Heart Rate:  []   Temp:  [36.2 °C (97.2 °F)-36.7 °C (98.1 °F)]    Resp:  [16]   BP: ()/(59-92)   SpO2:  [75 %-100 %]    Weight: 90.7 kg (200 lb)   Gen: alert, NAD  HEENT: NC/AT  Neck: supple, no JVD   Pulm: clear ant b/l   CVS: RRR, no rub  Abd: S/NT/ND  LE: + edema , no cyanosis   Neuro: no asterixis   Dialysis acces:  LeftIJ TC     Lab Results   Component Value Date    WBC 15.1 (H) 03/15/2024    HGB 7.7 (L) 03/15/2024    HCT 27.0 (L) 03/15/2024     (H) 03/15/2024     03/15/2024     Lab Results   Component Value Date    GLUCOSE 130 (H) 03/15/2024    CALCIUM 9.5 03/15/2024     (L) 03/15/2024    K 4.8 03/15/2024    CO2 24 03/15/2024    CL 97 (L) 03/15/2024    BUN 23 03/15/2024    CREATININE 2.85 (H) 03/15/2024     Results from last 72 hours   Lab Units 03/15/24  1330 03/15/24  0724   ALBUMIN g/dL  --  2.3*   GLUCOSE mg/dL  --  130*   HEMOGLOBIN g/dL 7.7*  --    WBC AUTO x10*3/uL 15.1*  --       Results from last 72 hours   Lab Units 03/15/24  0724   SODIUM mmol/L 133*   POTASSIUM mmol/L 4.8   CO2 mmol/L 24   BUN mg/dL 23   CREATININE mg/dL 2.85*   PHOSPHORUS mg/dL 4.4   CALCIUM mg/dL 9.5        Assessment   ESRD-HD admitted with LUEAVF bleeding s/p ligation now with new leftIJTC    Plan   Plan HD per submitted orders, UF 2L as tolerated; change to 3K   MBD - Phosphorus binder: not needed  Anemia of CKD: EPO to be given x 3 per week   Access: issues  as above  BP: acceptable during treatment   Renal Diet   Daily renal MVI   Continue 3 x per week hemodialysis; SW to ensure availability of o/p HD chair at discharge   Mariaelena Mccloud MD MPH

## 2024-03-16 NOTE — NURSING NOTE
Report from Sending RN:    Report From: Tony ( RN)  Recent Surgery of Procedure: No  Baseline Level of Consciousness (LOC): a/o x 4  Oxygen Use: Yes, 2 liters  Type: NC  Diabetic: Yes, 170  Last BP Med Given Day of Dialysis: no  Last Pain Med Given: no  Lab Tests to be Obtained with Dialysis: Yes, CBC, RFP  Blood Transfusion to be Given During Dialysis: No  Available IV Access: Yes  Medications to be Administered During Dialysis: No  Continuous IV Infusion Running: No  Restraints on Currently or in the Last 24 Hours: No  Hand-Off Communication: No acute overnight or morning medications; vss; Pt did take morning medications; Pt will need labs; Pt is a full code; Ela Morales RN.

## 2024-03-16 NOTE — NURSING NOTE
Report to Receiving RN:    Report To: Fernanda RN   Time Report Called: [id number]  Hand-Off Communication: HD completed and tolerated well, no issues during treatment. Removed 2 L. Post /70 HR 74.   Complications During Treatment: No  Ultrafiltration Treatment: Yes  Medications Administered During Dialysis: Yes- Oxy and Midorine- see MAR   Blood Products Administered During Dialysis: No  Labs Sent During Dialysis: No  Heparin Drip Rate Changes: N/A

## 2024-03-16 NOTE — CARE PLAN
Problem: Pain  Goal: My pain/discomfort is manageable  Outcome: Progressing   The patient's goals for the shift include      The clinical goals for the shift include Pt will have improved wound healing    Over the shift, the patient did not make progress toward the following goals. Barriers to progression include . Recommendations to address these barriers include .

## 2024-03-17 NOTE — PROGRESS NOTES
Vancomycin Dosing by Pharmacy- INITIAL    Chevy Benton is a 78 y.o. year old male who Pharmacy has been consulted for vancomycin dosing for cellulitis, skin and soft tissue. Based on the patient's indication and renal status this patient will be dosed based on a goal pre-HD level of 15-25.     Renal function is currently on HD.    Visit Vitals  /71   Pulse 98   Temp 36.7 °C (98.1 °F)   Resp 20        Lab Results   Component Value Date    CREATININE 2.85 (H) 03/15/2024    CREATININE 3.75 (H) 03/14/2024    CREATININE 2.89 (H) 03/13/2024    CREATININE 3.77 (H) 03/12/2024        Lab Results   Component Value Date    PATIENTTEMP 37.0 03/10/2024    PATIENTTEMP 37.0 01/27/2024    PATIENTTEMP 37.0 01/27/2024          Assessment/Plan     Patient will be given a loading dose of 2000 mg.  Get Pre-HD level, follow nephro note for schedule.    This dosing regimen is predicted by [employer] to result in the following pharmacokinetic parameters:    Follow-up level will be ordered before next HD session, follow nephro note.  Will continue to monitor renal function daily while on vancomycin and order serum creatinine at least every 48 hours if not already ordered.  Follow for continued vancomycin needs, clinical response, and signs/symptoms of toxicity.       Marvin Izquierdo, ChristianD

## 2024-03-17 NOTE — PROGRESS NOTES
Assessment/Plan      Mr. Benton is a 78 year old with a PMH of PAD, Afib (on Eliquis), CAD, T2DM, R subclavian DVT (10/23), GERD, SSS s/p PPM placement, gout, ESRD on HD (MWF), HLD, hypotension, HFrEF (last EF 40-45% 1/23/24), and gout who was transferred to [facility] from [facility] due to bleeding from his L upper arm AV fistula. Labs obtained on admit notable for anemia, hyponatremia, hyponatremia, and lactic acidosis.. Vascular surgery is consulted in the ED who placed 2 figure 8 silk sutures in order to achieve hemostasis at fistula site. Pt received new tunneled HD cath and received dialysis on 3/9, taken to OR 3/10  with vascular surgery  for ligation.  Overall pain is improving. H/h stable after starting elliquis and plavix. Continues to have ansarca.  Nephrology on fluid management. Given persistent leukocytosis, pt does not have localizing symptoms, repeated blood cultures (still pending, ordered on 3/15 and again 3/16). Chest xray showed volume overload and difficult to descern if infiltrate is related to fluid, at time on 2L but not with other correlating symptoms. Wound care recs updated.  Pt developed new leg pain complaints podiatry consulted and possible purulence at [facility] (site now redressed), culture obtained by podiatry. As well on pt began developing sputum on 3/17.   Starting vanc/zosyn pending work up. Endovascular consulted given missed follow up s/p revascularization..         Assessment/Plan   Principal Problem:    A-V fistula (CMS/[facility])  Active Problems:    Anemia due to blood loss, acute       #leukocytosis  #conern for infection at [facility] site  #concern for developing pneumonia   - initially likely related to surgical stress, however remained persistent, no new signs of infection/localizing signs at time. However pt now developed foot pain and sputum production. Cxr with volume overload with possible infiltrate. Podiatry obtained wound cultures.   - ordered blood cultures, yet to be drawn.   - sputum  culture  -follow up wound cultures  - IS, accapella, RT consult to eval/rec.     #Bleeding from AV fistula s/p ligation and tunneled HD line placement - resolved  # anemia d/t acute blood loss  #MRSA colonization, on decolonization protocol due to central line placement.   - Vascular surgery consulted, appreciate recs  - Hemostasis obtained at L upper extremity fistula site after placement of two figure 8 sutures  - Holding home dose of Plavix and Eliquis d/t upcoming procedure with vascular surgery  - Hgb 6.5 -> 8.1, follow up repeat today. Discuss with surgery when to restart elliqus and plavix.  Recommended to continue to hold for now  -Transfuse 1 unit of PRBC on HD on admit.  Monitor HB.    -Dressing to  remained in place until 3/12, vascular surgery to reeval if again come down today.  -Had drop in hemoglobin On EPO, monitoring for continued bleeding.  Since hemoglobin stable without intervention, likely lab variation.  Restarted apixaban and Plavix now and monitoring hemoglobin for stability.  Nursing unable to remove central line.  Will end up removing central line as soon as I have availability.  His MRSA screen was positive and starting MRSA decolonization protocol and will need repeat MRSA nare 3 days after completion.     #left hand numbness and pain - resolved  #UE edema - likely related to anasarca, positioning and ESRD, mild improvement  # Respiratory failure with hypoxemia secondary to fluid overload  - 1st few digits, mostly pain but endorses decerased sensation, noted he reports he cannot move it but motor is intact but week and affecting the entire hand, potentially limited by pain and contrubted by swelling. Seeing if vasc surg can eval  - change to oxy q6 prn and make Dilaudid for breakthrough, encourage pt to notify nursing when pain is undercontrolled.   - symptoms and exam stable. Vasc surg to reassess and stable.  Symptoms have resolved  -Given persistent upper extremity edema, ordered   duplex upper extremities, evaluating for venous obstruction.  Will discuss utility of the CT image to look for central obstruction with vascular surgery.  Per vascular surgery since the swelling is symmetric, they do not recommend any further investigation.  Will continue to manage fluid status, nutrition, mobilize patient and if swelling does not prove or other concerning signs will need workup as outpatient.  -Patient continues  2 L, in setting of anasarca and ESRD.  Likely only requiring 1 L as I have weaned him down in the past.  Xray as above. Componenet of fluid overload.     #T2DM (last HgA1c 5.3% 12/3/23) c/b neuropathy  - accucheck ACHS  - Lispro SSI #1 0-5 units  - holding home dose of Lantus 6 units to prevent episodes of hypoglycemia while hospitalized and HgA1c <7%. Monitor more need to resume.    - hypoglycemia order set placed  - continue home dose of Gabapentin 100 mg PO every day  - glc at goal    #hyponatremia (chronic)  #ESRD on HD   - daily CHG bath d/t L chest dialysis cath site  -hyponatremia from hypervolemia in setting of esrd, trend. If remains low then needs fluid restriciton.   - Nephrology consulted for HD today,   - renal diet  - renally dose meds as needed  - continue home dose of Nephrocaps PO every day  -continue HD per nephrology. Has had additional sessions this week for fluid removal. Care with fluid removal given chronic hypotension on midodrine.      #Afib  #hx of DVT (10/23)  - Duplex US of RUE done 10/22/23: Positive study for DVT in the right upper extremity.  There is a nonocclusive thrombus in the right subclavian artery.  - holding home dose of Eliquis and Heparin gtt d/t anemia, management as above     #hypotension, chronic  - continue home dose of Midodrine 15 mg PO TID     #LLE wound  #R foot wound  #PVD s/p revasc  - wound nurse consult placed, requesting updated wound care recs, wound care recs and record and now updated  -Family would like to touch base with  "endovascular given they missed their follow-up appointment after revascularization  -Paged endovascular to discuss family concerns about his missed appointment and ability to get to clinic, will discuss with consult as needed for inpatient evaluation  -Podiatry will see patient in a.m. to update recommendations for foot wound and to evaluate foot wound    #MRSA nare positive  On MRSA decolonization protocol, ordered mupirocin and chlorhexidine bath  Femoral line removed on 3/14 as patient is clinically stable after starting apixaban and Plavix  Will check if patient needs repeat MRSA nare after with infection control    #Malnutrition  -With anasarca, wound needs, starting to have increase in appetite.  Nutrition consulted and started on Nepro twice daily    Called son to update on plan of care daily, last on 3/17.      Scheduled outpatient appointments in system:   Future Appointments   Date Time Provider Department [place]   3/27/2024  9:00 AM Rosa Tovar MD [id number] [place]   4/17/2024  1:00 PM Koko Gordon MD [id number]     ---------------------------------------------------------------------------------------------------  Subjective   No new events., pt complaining of foot/leg pain. He is awake and particpating. Hand and arm pain is better. Podiatry recently left the room, took swabs from foot. denies fevers. Reports he started having mucus production today.  No fevers. Called son and updated with pt  present.      ---------------------------------------------------------------------------------------------------  Objective   Last Recorded Vitals  Blood pressure (!) 116/48, pulse 100, temperature 37 °C (98.6 °F), resp. rate 16, height 1.778 m (5' 10\"), weight 90.9 kg (200 lb 6.4 oz), SpO2 100 %.  Intake/Output last 3 Shifts:  I/O last 3 completed shifts:  In: 1280 (14.1 mL/kg) [P.O.:480; I.V.:400 (4.4 mL/kg); Other:400]  Out: 2400 (26.4 mL/kg) [Other:2400]  Weight: 90.9 kg     Physical " Exam  Constitutional:       Comments: Comfortable at rest on oxygen through NC   HENT:      Head: Normocephalic.      Nose: Nose normal.   Eyes:      Extraocular Movements: Extraocular movements intact.      Pupils: Pupils are equal, round, and reactive to light.   Cardiovascular:      Rate and Rhythm: Normal rate and regular rhythm.      Heart sounds: Normal heart sounds. No murmur heard.     Comments: Right femoral line  Left tunneled dialysis catheter  EJ IV  Pulmonary:      Effort: No respiratory distress.      Breath sounds: Normal breath sounds. No wheezing.   Abdominal:      General: Bowel sounds are normal. There is no distension.      Palpations: Abdomen is soft.      Tenderness: There is no abdominal tenderness.   Musculoskeletal:         General: Normal range of motion.      Cervical back: Normal range of motion.      Comments: Fistula site in LUE covered with dressing after the ligation surgery,     Right foot TMA, ( ulcer noted over the TMA area, which is covered with dressing)    Left arm wrapped in ace wrap, pain in fingers, neurovascularly intact, motor and sensation intact but reports mild decreased sensation, noted mild increased edema of hand compared to right.    Skin:     General: Skin is warm.   Neurological:      General: No focal deficit present.      Mental Status: He is alert and oriented to person, place, and time. Mental status is at baseline.   Psychiatric:         Mood and Affect: Mood normal.         Relevant Results  Lab Results   Component Value Date    WBC 15.1 (H) 03/15/2024    HGB 7.7 (L) 03/15/2024    HCT 27.0 (L) 03/15/2024     (H) 03/15/2024     03/15/2024      Lab Results   Component Value Date    GLUCOSE 130 (H) 03/15/2024    CALCIUM 9.5 03/15/2024     (L) 03/15/2024    K 4.8 03/15/2024    CO2 24 03/15/2024    CL 97 (L) 03/15/2024    BUN 23 03/15/2024    CREATININE 2.85 (H) 03/15/2024     Scheduled medications  allopurinol, 100 mg, oral, Daily  apixaban,  2.5 mg, oral, q12h  atorvastatin, 40 mg, oral, Nightly  B complex-vitamin C-folic acid, 1 capsule, oral, Daily  chlorhexidine, , Topical, Daily  clopidogrel, 75 mg, oral, Daily  epoetin dane or biosimilar, 15,000 Units, intravenous, Once per day on Tue Thu Sat  gabapentin, 100 mg, oral, Daily  insulin lispro, 0-5 Units, subcutaneous, TID with meals  lubricating eye drops, 2 drop, Both Eyes, TID  melatonin, 5 mg, oral, Nightly  midodrine, 15 mg, oral, q8h  mupirocin, , Topical, BID  pantoprazole, 40 mg, oral, Daily before breakfast  polyethylene glycol, 17 g, oral, Daily  sennosides-docusate sodium, 2 tablet, oral, BID  sodium chloride 0.9%, 10 mL, intra-catheter, q12h      Continuous medications     PRN medications  PRN medications: acetaminophen, albuterol, alteplase, dextrose 10 % in water (D10W), dextrose, glucagon, ipratropium-albuteroL, ondansetron, oxyCODONE, sodium chloride 0.9%    Donn Quiñonez MD

## 2024-03-17 NOTE — CONSULTS
Inpatient consult to Podiatry  Consult performed by: Schuyler Lawton DPM  Consult ordered by: Donn Quiñonez MD          Reason For Consult  Bilateral lower extremity wounds    History Of Present Illness  Chevy Benton is a 78 y.o. male presenting with bleeding left arm fistula. Podiatry has been consulted for bilateral lower extremity wounds. Patient was seen at bedside today. He is unsure of how long the wounds have been present for and states that he does not see anyone for wound care. Unknown who performed the right foot TMA. He denies other pedla complaints today.     Past Medical History  He has a past medical history of A-fib (CMS/[facility]), Acute hypercapnic respiratory failure (CMS/[facility]), Acute on chronic HFrEF (heart failure with reduced ejection fraction) (CMS/[facility]), Diabetes mellitus (CMS/[facility]), ESRD on hemodialysis (CMS/[facility]), History of angioplasty of peripheral vessel (10/26/2023), HTN (hypertension), LFT elevation (07/20/2021), Small bowel obstruction (CMS/[facility]) (10/10/2023), and Stage II pressure ulcer (CMS/[facility]).    Surgical History  He has a past surgical history that includes Invasive Vascular Procedure (Right, 1/17/2024).     Social History  He reports that he has never smoked. He has been exposed to tobacco smoke. He has never used smokeless tobacco. No history on file for alcohol use and drug use.    Family History  Family History   Problem Relation Name Age of Onset    Diabetes Mother          Allergies  Penicillins    Review of Systems   Constitutional: Negative.    HENT: Negative.     Respiratory: Negative.     Cardiovascular: Negative.    Gastrointestinal: Negative.    Skin:  Positive for wound (BLE).   Neurological: Negative.    Psychiatric/Behavioral: Negative.       Physical Exam  General: AAOx3, no acute distress, bilateral lower extremity dressings intact    Vasc: Dorsalis pedis and posterior tibial pulses are non-palpable bilateral. Skin temperature is warm to warm from proximal  "tibia to distal digits bilateral. No erythema noted. Mild pitting edema noted in the BLE.      Neuro: Gross and light touch sensation is intact to the foot bilateral.    Derm: Skin is xerotic. Nails 1-5 left are dystrophic. Webspaces 1-4 are clean, dry and intact left. There is a full thickness ulceration at the right foot TMA site. Mild serous drainage. Mildly malodorous. Wound base is fibrogranular. Wound edges are necrotic. No crepitus, fluctuance, or purulence. There is an unstageable wound on the lateral left lower leg. Base is dry stable eschar with a central area of granulation. Edges are hyperemic but not macerated or hyperkeratotic. Scant serous drainage. No crepitus, fluctuance, purulence, or malodor. Stage 0 pressure injuries on the bilateral heels, erythema is blanchable.    Ortho: S/P right foot TMA       Last Recorded Vitals  Blood pressure (!) 116/48, pulse 100, temperature 37 °C (98.6 °F), resp. rate 16, height 1.778 m (5' 10\"), weight 90.9 kg (200 lb 6.4 oz), SpO2 100 %.    Relevant Results  Results for orders placed or performed during the hospital encounter of 03/08/24 (from the past 24 hour(s))   POCT GLUCOSE   Result Value Ref Range    POCT Glucose 119 (H) 74 - 99 mg/dL   POCT GLUCOSE   Result Value Ref Range    POCT Glucose 178 (H) 74 - 99 mg/dL   POCT GLUCOSE   Result Value Ref Range    POCT Glucose 180 (H) 74 - 99 mg/dL     XR chest 1 view    Result Date: 3/16/2024  Interpreted By:  Kendrick Clayton, STUDY: XR CHEST 1 VIEW; 3/16/2024 6:32 pm   INDICATION: Signs/Symptoms:eval continued hypoxemia. esrd fluid overload.   COMPARISON: Radiograph dated 01/23/2024   ACCESSION NUMBER(S): [id number]   ORDERING CLINICIAN: NETTIE JAIME   FINDINGS: Left IJ approach hemodialysis catheter is in place with the tip projecting over the right atrium. Right subclavian approach pacer/defibrillator is stable.   The cardiac silhouette size is unchanged.   Low lung volumes and bronchovascular " crowding. Right more than left bibasilar opacity. Mild interstitial prominence and edema. No sizable pneumothorax   No acute osseous abnormality.       1. Slight interval worsening of the aeration of the lungs with bilateral, right more than left pleural effusion and atelectasis/consolidation and mild interstitial edema.       Signed by: Homeroyadigautam Chandu Garima 3/16/2024 9:04 PM Dictation workstation:   [id number]    ECG 12 lead    Result Date: 3/12/2024  Atrial fibrillation ST & T wave abnormality, consider anterolateral ischemia Prolonged QT Abnormal ECG When compared with ECG of 08-MAR-2024 14:26, No significant change was found See ED provider note for full interpretation and clinical correlation Confirmed by Jluis Cee ([id number]) on 3/12/2024 9:34:16 AM    IR angiogram fistula graft    Result Date: 2/27/2024  Interpreted By:  Scott Lepe, STUDY: IR ANGIOGRAM FISTULA GRAFT;  2/21/2024 4:36 pm   INDICATION: Signs/Symptoms:Angio for pressures.   COMPARISON: None.   ACCESSION NUMBER(S): [id number]   ORDERING CLINICIAN: SAQIB PORRAS   TECHNIQUE: : Scott Lepe MD   CONSENT: The patient was informed of the nature of the proposed procedure. The purposes, alternatives, risks, and benefits were explained and discussed. All questions were answered and consent was obtained.   RADIATION EXPOSURE: Dose: 34 mGy   SEDATION: Lidocaine was administered for local anesthesia. No IV sedation was given.   MEDICATION/CONTRAST: 40 mL Omnipaque 350   TIME OUT: A time out was performed immediately prior to the procedure start with interventional team, correctly identifying the patient using multiple identifiers and ensuring the appropriate procedure and anatomy (including laterality, if applicable) were identified. The procedure consent form and all relevant laboratory and imaging tests were reviewed. The need for antibiotic administration or patient or procedure specific safety precautions or equipment was  reviewed.   COMPLICATIONS: None immediate     FINDINGS: The patient was positioned on the angiography table. The left arm was prepped and draped in a sterile fashion.   Utilizing sonographic guidance, the arteriovenous fistula was accessed towards the venous outflow with micropuncture technique. An image was stored to PACS. An 0.018 inch wire was passed into the fistula and used to place a transitional sheath. The wire and inner dilator were removed and a Bentson wire was advanced into the fistula. The transitional sheath was removed and a 6 Greenlandic sheath placed.   Angiograms were obtained through the sheath with imaging of the venous outflow, including the central veins. These images demonstrate multiple areas stenosis greater than 50% throughout the cephalic outflow. There are 2 very large pseudoaneurysms related to repeated access. This is also a site of bleeding. The axillary stenosis which has previously been ballooned is present but less than 50%. The remainder of the central veins are patent.   Through the sheath, a 5 Greenlandic Kumpe catheter and Glidewire were coaxially advanced beyond the stenosis. The Glidewire was removed and a 0.035 inch Bentson wire was placed. Finally the Kumpe catheter was removed.   Over the 0.035 inch wire, a 8x60 mm balloon was positioned across the multiple outflow stenoses of the cephalic outflow. Angioplasty was performed.  A final angiogram shows no significant residual stenosis and no evidence of vessel disruption.   All wires and catheters were removed. The sheath was then removed during deployment of a pursestring suture utilizing 3-0 Vicryl. Hemostasis was achieved with the suture and manual compression. A sterile was applied. Additional pressure was applied to the eroded pseudoaneurysm access site which was bleeding prior to the procedure and was bleeding again following preparation of the patient. Hemostasis was achieved with prolonged manual pressure.       Left upper  extremity AV fistulogram. The fistula is patent, although there are multiple greater than 50% stenosis of the cephalic outflow. These were angioplastied successfully. 2 very large pseudoaneurysms identified at repeated access points. These are responsible for prolonged bleeding after access and will require surgical revision.   Performed and dictated at [facility].   Signed by: Scott Lepe 2/27/2024 8:23 AM Dictation workstation:   [id number]    IR CVC tunneled    Result Date: 2/23/2024  Interpreted By:  Jose Wang, STUDY: IR CVC TUNNELED; ;  2/23/2024 10:04 am   ULTRASOUND AND FLUOROSCOPICALLY GUIDED LEFT INTERNAL JUGULAR VEIN TUNNELED HEMODIALYSIS CATHETER   INDICATION: Signs/Symptoms:permcath placement - ESRD. 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 Request for replacement of tunneled hemodialysis catheter.   COMPARISON: Angiogram 02/21/2024, chest radiograph 01/23/2024   ACCESSION NUMBER(S): [id number]   ORDERING CLINICIAN: YUE PEREZ   TECHNIQUE:   ATTENDING : Jose Wang M.D.   TECHNICAL DESCRIPTION/FINDINGS: The procedure, including all risks, benefits and alternatives were explained to the patient and his proxy in detail. All questions were answered and written informed consent was obtained.   The patient was positioned supine on the fluoroscopy table. A time-out was performed.   The left neck and anterosuperior chest were prepped and draped in usual sterile fashion. Focused ultrasound was performed of the left internal jugular vein demonstrating patency and compressibility. Ultrasound images were saved. 1% lidocaine was administered subcutaneously for local anesthesia. Under real-time ultrasound guidance, the left internal jugular vein was accessed in antegrade direction  using micropuncture technique. Ultrasound images were saved. Under fluoroscopic visualization, an 018 guidewire was advanced into the right atrium and a micropuncture transitional introducer was placed.   Additional 1% lidocaine was then administered over the left anterosuperior chest wall to anesthetize a subcutaneous tunnel tract. The dual lumen cuffed hemodialysis catheter was then tunneled from a left anterosuperior chest wall incision site and externalized overlying the venotomy. After serial dilation over an 035 guidewire which had been placed into the IVC, 15 Romanian peel-away sheath was placed. Through the sheath, a 14.5 Romanian by 27 cm tip to cuff dual-lumen hemodialysis catheter was placed. Completion fluoroscopic image demonstrates the catheter tip at the superior cavoatrial junction.   Catheter lumens easily aspirated and flushed were locked with a concentrated heparinized solution (1000 units/cc). The catheter hub was sutured to the skin with 2 0 Prolene stay suture. A sterile dressing was applied.   SEDATION/MEDICATIONS: Continuous cardiopulmonary monitoring was performed by a radiology nurse for the duration of the procedure. No conscious sedation was administered. Procedural duration 20 minutes. 15 cc 1% Lidocaine was administered subcutaneously for local anesthesia. SPECIMENS: None. ESTIMATED BLOOD LOSS:  5 cc FLOUROSCOPY:  0.4 minutes; DAP  3760 mGy-cm*2; Air Kerma  11.86 mGy CONTRAST: None.       Ultrasound and fluoroscopically guided left internal jugular vein tunneled hemodialysis catheter. The catheter is ready for immediate use.     Signed by: Jose Wang 2/23/2024 11:13 AM Dictation workstation:   [id number]    ECG 12 lead    Result Date: 2/20/2024  Atrial fibrillation with premature ventricular or aberrantly conducted complexes Low voltage QRS Nonspecific ST and T wave abnormality Abnormal ECG When compared with ECG of 25-JAN-2024 02:56, No significant change was found See ED  provider note for full interpretation and clinical correlation Confirmed by Kateryna An ([id number]) on 2/20/2024 4:11:42 PM             Assessment/Plan     #S/P right foot TMA  #Full thickness ulceration with necrosis, right foot TMA site  #Unstageable ulceration, left lateral leg  #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 Also concerned for calciphylaxis at the left leg wound.  -Recommend ordering parathyroid hormone for further assessment of possible calciphylaxis  -Recommend obtaining right foot x-rays  -Re-dressed with aquacel, abd pad, and kerlix wrap right and abd pad and kerlix wrap left  -Podiatry will follow  -Patient was seen and discussed with attending, Dr. Virgilio Lawton, RADHAM PGY-2

## 2024-03-17 NOTE — CARE PLAN
Problem: Pain  Goal: My pain/discomfort is manageable  Outcome: Progressing     Problem: Pain  Goal: My pain/discomfort is manageable  Outcome: Progressing   The patient's goals for the shift include      The clinical goals for the shift include Pt will be safe during this shift

## 2024-03-17 NOTE — CARE PLAN
Problem: Pain  Goal: My pain/discomfort is manageable  Outcome: Progressing   The patient's goals for the shift include      The clinical goals for the shift include Pt  will not incur increased wound injury witrh S&S of wound healing present    Over the shift, the patient did not make progress toward the following goals. Barriers to progression include . Recommendations to address these barriers include .

## 2024-03-18 NOTE — PROGRESS NOTES
"Chevy Benton is a 78 y.o. male on day 10 of admission presenting with A-V fistula (CMS/[facility]).    Subjective   Patient was seen at bedside, resting comfortably. He denies any constitutional symptoms or other pedal complaints today.       Physical Exam    Objective     Physical Exam  General: AAOx3, no acute distress, bilateral lower extremity dressings intact     RLE focused exam:  Vasc: Dorsalis pedis and posterior tibial pulses are non-palpable. Skin temperature is warm to warm from proximal tibia to distal foot. No erythema noted. Mild pitting edema noted.      Neuro: Gross and light touch sensation is intact to the foot.     Derm: There is a full thickness ulceration at the right foot TMA site. No drainage noted today. Mildly malodorous. Wound base is fibrogranular. Wound edges are necrotic. No crepitus, fluctuance, or purulence. There is an unstageable wound on the lateral right lower leg. Base is dry stable eschar. Edges are hyperemic but not macerated or hyperkeratotic. No drainage. No crepitus, fluctuance, purulence, or malodor. Stage 0 pressure injury on the right heel, erythema is blanchable. Prevalon boots in plae bilaterally today.     Ortho: S/P right foot TMA      Last Recorded Vitals  Blood pressure 127/63, pulse 80, temperature 36.2 °C (97.2 °F), temperature source Temporal, resp. rate 18, height 1.778 m (5' 10\"), weight 90.9 kg (200 lb 6.4 oz), SpO2 100 %.    Intake/Output last 3 Shifts:  I/O last 3 completed shifts:  In: 1060 (11.7 mL/kg) [P.O.:960; IV Piggyback:100]  Out: - (0 mL/kg)   Weight: 90.9 kg     Relevant Results  Results for orders placed or performed during the hospital encounter of 03/08/24 (from the past 24 hour(s))   Genital Culture    Specimen: Discharge; Swab   Result Value Ref Range    Genital Culture Culture in progress    Neisseria gonorrhoeae, Culture    Specimen: Discharge; Swab   Result Value Ref Range    Neisseria gonorrhoeae, Culture Culture in progress    POCT GLUCOSE "   Result Value Ref Range    POCT Glucose 119 (H) 74 - 99 mg/dL   POCT GLUCOSE   Result Value Ref Range    POCT Glucose 123 (H) 74 - 99 mg/dL   POCT GLUCOSE   Result Value Ref Range    POCT Glucose 125 (H) 74 - 99 mg/dL   POCT GLUCOSE   Result Value Ref Range    POCT Glucose 128 (H) 74 - 99 mg/dL     XR foot right 3+ views    Result Date: 3/18/2024  Interpreted By:  Liz Ruiz and Beyersdorf Conner STUDY: XR FOOT RIGHT 3+ VIEWS; ;  3/17/2024 7:49 pm   INDICATION: Signs/Symptoms:Evaluation of TMA right foot possible infection.   COMPARISON: X-ray right foot 01/19/2024   ACCESSION NUMBER(S): [id number]   ORDERING CLINICIAN: NETTIE JAIME   FINDINGS: Transmetatarsal amputation changes noted. There is marked paucity of soft tissue stump at the level of 1st metatarsal likely representing wound dehiscence/stump breakdown. There is also apparent soft tissue defect of the 5th metatarsal stump region which may also represent stump breakdown. There is irregular appearance of the stumps of the 1st and 5th digits suggesting osteomyelitis. Prominent vascular calcifications of the ankle and foot.   Mild midfoot degenerative changes with dorsal osteophytes. Remote healed distal fibular fracture sequela. No acute fracture or malalignment.       Transmetatarsal amputation changes of the foot with irregular appearance of the stumps of the 1st and 5th digits suggesting osteomyelitis with osteomyelitis of the rest of the digits not excluded. An MRI may be obtained for further evaluation if clinically warranted Findings suggesting stump breakdown at the level of 1st and 5th metatarsals.   I personally reviewed the image(s)/study and resident interpretation. I agree with the findings as stated by resident Rafael Tony. Data analyzed and images interpreted at [facility], [place], [place].   MACRO: None   Signed by: Liz Ruiz 3/18/2024 9:56 AM Dictation workstation:    [id number]    XR chest 1 view    Result Date: 3/16/2024  Interpreted By:  Kendrick Clayton, STUDY: XR CHEST 1 VIEW; 3/16/2024 6:32 pm   INDICATION: Signs/Symptoms:eval continued hypoxemia. esrd fluid overload.   COMPARISON: Radiograph dated 01/23/2024   ACCESSION NUMBER(S): [id number]   ORDERING CLINICIAN: NETTIE JAIME   FINDINGS: Left IJ approach hemodialysis catheter is in place with the tip projecting over the right atrium. Right subclavian approach pacer/defibrillator is stable.   The cardiac silhouette size is unchanged.   Low lung volumes and bronchovascular crowding. Right more than left bibasilar opacity. Mild interstitial prominence and edema. No sizable pneumothorax   No acute osseous abnormality.       1. Slight interval worsening of the aeration of the lungs with bilateral, right more than left pleural effusion and atelectasis/consolidation and mild interstitial edema.       Signed by: Kendrick Painting 3/16/2024 9:04 PM Dictation workstation:   [id number]    ECG 12 lead    Result Date: 3/12/2024  Atrial fibrillation ST & T wave abnormality, consider anterolateral ischemia Prolonged QT Abnormal ECG When compared with ECG of 08-MAR-2024 14:26, No significant change was found See ED provider note for full interpretation and clinical correlation Confirmed by Jluis Cee ([id number]) on 3/12/2024 9:34:16 AM    IR angiogram fistula graft    Result Date: 2/27/2024  Interpreted By:  Scott Lepe, STUDY: IR ANGIOGRAM FISTULA GRAFT;  2/21/2024 4:36 pm   INDICATION: Signs/Symptoms:Angio for pressures.   COMPARISON: None.   ACCESSION NUMBER(S): [id number]   ORDERING CLINICIAN: SAQIB PORRAS   TECHNIQUE: : Scott Lepe MD   CONSENT: The patient was informed of the nature of the proposed procedure. The purposes, alternatives, risks, and benefits were explained and discussed. All questions were answered and consent was obtained.   RADIATION EXPOSURE: Dose: 34 mGy   SEDATION:  Lidocaine was administered for local anesthesia. No IV sedation was given.   MEDICATION/CONTRAST: 40 mL Omnipaque 350   TIME OUT: A time out was performed immediately prior to the procedure start with interventional team, correctly identifying the patient using multiple identifiers and ensuring the appropriate procedure and anatomy (including laterality, if applicable) were identified. The procedure consent form and all relevant laboratory and imaging tests were reviewed. The need for antibiotic administration or patient or procedure specific safety precautions or equipment was reviewed.   COMPLICATIONS: None immediate     FINDINGS: The patient was positioned on the angiography table. The left arm was prepped and draped in a sterile fashion.   Utilizing sonographic guidance, the arteriovenous fistula was accessed towards the venous outflow with micropuncture technique. An image was stored to PACS. An 0.018 inch wire was passed into the fistula and used to place a transitional sheath. The wire and inner dilator were removed and a Bentson wire was advanced into the fistula. The transitional sheath was removed and a 6 Citizen of [place] sheath placed.   Angiograms were obtained through the sheath with imaging of the venous outflow, including the central veins. These images demonstrate multiple areas stenosis greater than 50% throughout the cephalic outflow. There are 2 very large pseudoaneurysms related to repeated access. This is also a site of bleeding. The axillary stenosis which has previously been ballooned is present but less than 50%. The remainder of the central veins are patent.   Through the sheath, a 5 Citizen of [place] Kumpe catheter and Glidewire were coaxially advanced beyond the stenosis. The Glidewire was removed and a 0.035 inch Bentson wire was placed. Finally the Kumpe catheter was removed.   Over the 0.035 inch wire, a 8x60 mm balloon was positioned across the multiple outflow stenoses of the cephalic outflow. Angioplasty  was performed.  A final angiogram shows no significant residual stenosis and no evidence of vessel disruption.   All wires and catheters were removed. The sheath was then removed during deployment of a pursestring suture utilizing 3-0 Vicryl. Hemostasis was achieved with the suture and manual compression. A sterile was applied. Additional pressure was applied to the eroded pseudoaneurysm access site which was bleeding prior to the procedure and was bleeding again following preparation of the patient. Hemostasis was achieved with prolonged manual pressure.       Left upper extremity AV fistulogram. The fistula is patent, although there are multiple greater than 50% stenosis of the cephalic outflow. These were angioplastied successfully. 2 very large pseudoaneurysms identified at repeated access points. These are responsible for prolonged bleeding after access and will require surgical revision.   Performed and dictated at [facility].   Signed by: Scott Lepe 2/27/2024 8:23 AM Dictation workstation:   [id number]    IR CVC tunneled    Result Date: 2/23/2024  Interpreted By:  Jose Wang, STUDY: IR CVC TUNNELED; ;  2/23/2024 10:04 am   ULTRASOUND AND FLUOROSCOPICALLY GUIDED LEFT INTERNAL JUGULAR VEIN TUNNELED HEMODIALYSIS CATHETER   INDICATION: Signs/Symptoms:permcath placement - ESRD. 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 Request for replacement of tunneled hemodialysis catheter.   COMPARISON: Angiogram 02/21/2024, chest radiograph 01/23/2024   ACCESSION NUMBER(S): [id number]   ORDERING CLINICIAN: YUE PEREZ   TECHNIQUE:   ATTENDING : Jose Wang M.D.   TECHNICAL DESCRIPTION/FINDINGS: The procedure, including all risks, benefits and alternatives were explained to the patient and his  proxy in detail. All questions were answered and written informed consent was obtained.   The patient was positioned supine on the fluoroscopy table. A time-out was performed.   The left neck and anterosuperior chest were prepped and draped in usual sterile fashion. Focused ultrasound was performed of the left internal jugular vein demonstrating patency and compressibility. Ultrasound images were saved. 1% lidocaine was administered subcutaneously for local anesthesia. Under real-time ultrasound guidance, the left internal jugular vein was accessed in antegrade direction using micropuncture technique. Ultrasound images were saved. Under fluoroscopic visualization, an 018 guidewire was advanced into the right atrium and a micropuncture transitional introducer was placed.   Additional 1% lidocaine was then administered over the left anterosuperior chest wall to anesthetize a subcutaneous tunnel tract. The dual lumen cuffed hemodialysis catheter was then tunneled from a left anterosuperior chest wall incision site and externalized overlying the venotomy. After serial dilation over an 035 guidewire which had been placed into the IVC, 15 Italian peel-away sheath was placed. Through the sheath, a 14.5 Italian by 27 cm tip to cuff dual-lumen hemodialysis catheter was placed. Completion fluoroscopic image demonstrates the catheter tip at the superior cavoatrial junction.   Catheter lumens easily aspirated and flushed were locked with a concentrated heparinized solution (1000 units/cc). The catheter hub was sutured to the skin with 2 0 Prolene stay suture. A sterile dressing was applied.   SEDATION/MEDICATIONS: Continuous cardiopulmonary monitoring was performed by a radiology nurse for the duration of the procedure. No conscious sedation was administered. Procedural duration 20 minutes. 15 cc 1% Lidocaine was administered subcutaneously for local anesthesia. SPECIMENS: None. ESTIMATED BLOOD LOSS:  5 cc FLOUROSCOPY:  0.4  minutes; DAP  3760 mGy-cm*2; Air Kerma  11.86 mGy CONTRAST: None.       Ultrasound and fluoroscopically guided left internal jugular vein tunneled hemodialysis catheter. The catheter is ready for immediate use.     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 Aeruginosa   -Blood cultures pending  -Concern for infection at the right foot TMA site. Also concerned for calciphylaxis at the left leg wound.  -Right foot x-rays show possible 1st and 5th met osteomyelitis  -Recommend obtaining right foot MRI   -Recommend obtaining lower extremity PVR/RAJ with TBI  -Recommend ordering parathyroid hormone for further assessment of possible calciphylaxis  -Re-dressed with aquacel, abd pad, and kerlix wrap   -Patient may require surgical intervention, full podiatry plan pending right foot MRI and the vascular studies  -Podiatry will follow  -Patient was seen and discussed with attending, Dr. Virgilio Lawton, DPM PGY-2

## 2024-03-18 NOTE — PROGRESS NOTES
Assessment/Plan            Assessment/Plan   Principal Problem:    A-V fistula (CMS/[facility])  Active Problems:    Anemia due to blood loss, acute       Mr. Benton, a 78-year-old patient with a comprehensive medical history including peripheral arterial disease, atrial fibrillation managed with Eliquis, coronary artery disease, type 2 diabetes mellitus, a right subclavian deep vein thrombosis in October 2023, gastroesophageal reflux disease, sick sinus syndrome with a pacemaker, gout, end-stage renal disease undergoing hemodialysis on Mondays, Wednesdays, and Fridays, hyperlipidemia, hypotension, heart failure with reduced ejection fraction (EF 40-45% as of January 2023), and gout, was transferred from [facility] to [facility] due to bleeding at his left upper arm arteriovenous fistula site. Initial laboratory results highlighted anemia, hyponatremia, and lactic acidosis. Vascular surgery successfully applied two figure-eight silk sutures for hemostasis at the fistula site. Following this, the patient received a new tunneled hemodialysis catheter and underwent dialysis. Surgery was performed on March 10th for fistula ligation. Postoperatively, the patient's pain has been managed effectively, and his hemoglobin and hematocrit levels have remained stable despite resuming anticoagulant and antiplatelet therapies. He continues to exhibit signs of generalized edema, managed by nephrology for fluid overload. Persistent leukocytosis without specific symptoms prompted repeated blood cultures, results of which are pending. A chest X-ray indicated volume overload with an indeterminate presence of infiltrates, challenging to discern due to the fluid overload. The patient, initially on 2 liters of oxygen, did not exhibit corresponding symptoms. Recommendations for wound care have been updated. New complaints of leg pain led to a consultation with podiatry, revealing potential purulence at the  transmetatarsal amputation site, now under redress and culture. Moreover, the patient started producing sputum on March 17, prompting the initiation of vancomycin and Zosyn while awaiting further diagnostic results. Endovascular consultation was also sought due to missed follow-up after a revascularization procedure.         -has been exhibiting a persistent elevation in white blood cell count, suggestive of potential infections including a right foot infection at the transmetatarsal amputation site and a developing pneumonia.   -Respiratory therapy has been engaged to aid in bronchial hygiene using the Acapella device.   -Podiatry continues to monitor his wound for signs of calciphylaxis, with specific attention to his left side, and the need for parathyroid hormone follow-up is noted. We are awaiting further cultures, including blood cultures, to guide management.   -Additionally, the patient did not attend his postoperative appointment with endovascular following revascularization, and a follow-up is necessary.  -Concerns have also arisen regarding a penile lesion, with recent cultures taken. Consultations with urology/wound care may be considered for further evaluation.  -experiencing significant fluid overload, particularly noted in the upper extremities. A duplex scan of the upper extremities has been ordered to assess for any vascular anomalies, and nephrology is actively managing his fluid status.  -Given the possibility of soft tissue infection and pneumonia, antibiotic therapy has been initiated. Also Pt is undergoing an MRSA decolonization protocol. The femoral line was removed a few days prior, and we will determine the need for repeating MRSA nares cultures three days post-completion of the protocol to ensure effectiveness.    --------------------------------------------------      #leukocytosis  #conern for infection at TMA site  #concern for developing pneumonia   - initially likely related to  surgical stress, however remained persistent, no new signs of infection/localizing signs at time. However pt now developed foot pain and sputum production. Cxr with volume overload with possible infiltrate. Podiatry obtained wound cultures.   - ordered blood cultures, yet to be drawn.   - sputum culture  -follow up wound cultures  - IS, accapella, RT consult to eval/rec.     #Bleeding from AV fistula s/p ligation and tunneled HD line placement - resolved  # anemia d/t acute blood loss  #MRSA colonization, on decolonization protocol due to central line placement.   - Vascular surgery consulted, appreciate recs  - Hemostasis obtained at L upper extremity fistula site after placement of two figure 8 sutures  - Holding home dose of Plavix and Eliquis d/t upcoming procedure with vascular surgery  - Hgb 6.5 -> 8.1, follow up repeat today. Discuss with surgery when to restart elliqus and plavix.  Recommended to continue to hold for now  -Transfuse 1 unit of PRBC on HD on admit.  Monitor HB.    -Dressing to  remained in place until 3/12, vascular surgery to reeval if again come down today.  -Had drop in hemoglobin On EPO, monitoring for continued bleeding.  Since hemoglobin stable without intervention, likely lab variation.  Restarted apixaban and Plavix now and monitoring hemoglobin for stability.  Nursing unable to remove central line.  Will end up removing central line as soon as I have availability.  His MRSA screen was positive and starting MRSA decolonization protocol and will need repeat MRSA nare 3 days after completion.     #left hand numbness and pain - resolved  #UE edema - likely related to anasarca, positioning and ESRD, mild improvement  # Respiratory failure with hypoxemia secondary to fluid overload  - 1st few digits, mostly pain but endorses decerased sensation, noted he reports he cannot move it but motor is intact but week and affecting the entire hand, potentially limited by pain and contrubted by  swelling. Seeing if vasc surg can eval  - change to oxy q6 prn and make Dilaudid for breakthrough, encourage pt to notify nursing when pain is undercontrolled.   - symptoms and exam stable. Vasc surg to reassess and stable.  Symptoms have resolved  -Given persistent upper extremity edema, ordered  duplex upper extremities, evaluating for venous obstruction.  Will discuss utility of the CT image to look for central obstruction with vascular surgery.  Per vascular surgery since the swelling is symmetric, they do not recommend any further investigation.  Will continue to manage fluid status, nutrition, mobilize patient and if swelling does not prove or other concerning signs will need workup as outpatient.  -Patient continues  2 L, in setting of anasarca and ESRD.  Likely only requiring 1 L as I have weaned him down in the past.  Xray as above. Componenet of fluid overload.     #T2DM (last HgA1c 5.3% 12/3/23) c/b neuropathy  - accucheck ACHS  - Lispro SSI #1 0-5 units  - holding home dose of Lantus 6 units to prevent episodes of hypoglycemia while hospitalized and HgA1c <7%. Monitor more need to resume.    - hypoglycemia order set placed  - continue home dose of Gabapentin 100 mg PO every day  - glc at goal    #hyponatremia (chronic)  #ESRD on HD   - daily CHG bath d/t L chest dialysis cath site  -hyponatremia from hypervolemia in setting of esrd, trend. If remains low then needs fluid restriciton.   - Nephrology consulted for HD today,   - renal diet  - renally dose meds as needed  - continue home dose of Nephrocaps PO every day  -continue HD per nephrology. Has had additional sessions this week for fluid removal. Care with fluid removal given chronic hypotension on midodrine.      #Afib  #hx of DVT (10/23)  - Duplex US of RUE done 10/22/23: Positive study for DVT in the right upper extremity.  There is a nonocclusive thrombus in the right subclavian artery.  - holding home dose of Eliquis and Heparin gtt d/t anemia,  "management as above     #hypotension, chronic  - continue home dose of Midodrine 15 mg PO TID     #LLE wound  #R foot wound  #PVD s/p revasc  - wound nurse consult placed, requesting updated wound care recs, wound care recs and record and now updated  -Family would like to touch base with endovascular given they missed their follow-up appointment after revascularization  -Paged endovascular to discuss family concerns about his missed appointment and ability to get to clinic, will discuss with consult as needed for inpatient evaluation  -Podiatry will see patient in a.m. to update recommendations for foot wound and to evaluate foot wound    #MRSA nare positive  On MRSA decolonization protocol, ordered mupirocin and chlorhexidine bath  Femoral line removed on 3/14 as patient is clinically stable after starting apixaban and Plavix       #Malnutrition  -With anasarca, wound needs, starting to have increase in appetite.  Nutrition consulted and started on Nepro twice daily    Called son to update on plan of care daily, last on 3/17.      Time > 40 minutes     Scheduled outpatient appointments in system:   Future Appointments   Date Time Provider Department Center   3/18/2024 10:00 AM [facility] 3 [id number] [facility]   3/27/2024  9:00 AM Rosa Tovar MD [id number] [facility]   4/17/2024  1:00 PM Koko Gordon MD [id number] [facility]     ---------------------------------------------------------------------------------------------------  Subjective   No new events.,       ---------------------------------------------------------------------------------------------------  Objective   Last Recorded Vitals  Blood pressure 127/63, pulse 80, temperature 36.2 °C (97.2 °F), temperature source Temporal, resp. rate 18, height 1.778 m (5' 10\"), weight 90.9 kg (200 lb 6.4 oz), SpO2 100 %.  Intake/Output last 3 Shifts:  I/O last 3 completed shifts:  In: 1060 (11.7 mL/kg) [P.O.:960; IV Piggyback:100]  Out: - (0 mL/kg)   Weight: 90.9 kg "     Physical Exam  Constitutional:       Comments: Comfortable at rest on oxygen through NC   HENT:      Head: Normocephalic.      Nose: Nose normal.   Eyes:      Extraocular Movements: Extraocular movements intact.      Pupils: Pupils are equal, round, and reactive to light.   Cardiovascular:      Rate and Rhythm: Normal rate and regular rhythm.      Heart sounds: Normal heart sounds. No murmur heard.     Comments: Right femoral line  Left tunneled dialysis catheter  EJ IV  Pulmonary:      Effort: No respiratory distress.      Breath sounds: Normal breath sounds. No wheezing.   Abdominal:      General: Bowel sounds are normal. There is no distension.      Palpations: Abdomen is soft.      Tenderness: There is no abdominal tenderness.   Musculoskeletal:         General: Normal range of motion.      Cervical back: Normal range of motion.      Comments: Fistula site in LUE covered with dressing after the ligation surgery,     Right foot TMA, ( ulcer noted over the TMA area, which is covered with dressing)    Left arm wrapped in ace wrap, pain in fingers, neurovascularly intact, motor and sensation intact but reports mild decreased sensation, noted mild increased edema of hand compared to right.    Skin:     General: Skin is warm.   Neurological:      General: No focal deficit present.      Mental Status: He is alert and oriented to person, place, and time. Mental status is at baseline.   Psychiatric:         Mood and Affect: Mood normal.         Relevant Results  Lab Results   Component Value Date    WBC 15.1 (H) 03/15/2024    HGB 7.7 (L) 03/15/2024    HCT 27.0 (L) 03/15/2024     (H) 03/15/2024     03/15/2024      Lab Results   Component Value Date    GLUCOSE 130 (H) 03/15/2024    CALCIUM 9.5 03/15/2024     (L) 03/15/2024    K 4.8 03/15/2024    CO2 24 03/15/2024    CL 97 (L) 03/15/2024    BUN 23 03/15/2024    CREATININE 2.85 (H) 03/15/2024     Scheduled medications  allopurinol, 100 mg, oral,  Daily  apixaban, 2.5 mg, oral, q12h  atorvastatin, 40 mg, oral, Nightly  B complex-vitamin C-folic acid, 1 capsule, oral, Daily  chlorhexidine, , Topical, Daily  clopidogrel, 75 mg, oral, Daily  epoetin dane or biosimilar, 15,000 Units, intravenous, Once per day on Tue Thu Sat  gabapentin, 100 mg, oral, Daily  insulin lispro, 0-5 Units, subcutaneous, TID with meals  lubricating eye drops, 2 drop, Both Eyes, TID  melatonin, 5 mg, oral, Nightly  midodrine, 15 mg, oral, q8h  mupirocin, , Topical, BID  pantoprazole, 40 mg, oral, Daily before breakfast  piperacillin-tazobactam, 2.25 g, intravenous, q12h  polyethylene glycol, 17 g, oral, Daily  sennosides-docusate sodium, 2 tablet, oral, BID  sodium chloride 0.9%, 10 mL, intra-catheter, q12h      Continuous medications     PRN medications  PRN medications: acetaminophen, albuterol, alteplase, dextrose 10 % in water (D10W), dextrose, glucagon, ipratropium-albuteroL, ondansetron, oxyCODONE, sodium chloride 0.9%, vancomycin    Wilma Redding MD

## 2024-03-18 NOTE — PROGRESS NOTES
Chevy Benton is a 78 y.o. male on day 10 of admission presenting with A-V fistula (CMS/[facility]).    Subjective   Patient will discharge to [facility] with onsite HD when medically cleared for discharge. Per MD, ADOD is 2-4 days, pending podiatry recommendations. SNF updated in [facility]. Patients current authorization for SNF expires today.    -Lona GARCIA MA, LSW  [phone] or [facility]  Care Transitions

## 2024-03-18 NOTE — CONSULTS
Inpatient consult to Endovascular [facility]  Consult performed by: Aruna Vazquez PA-C  Consult ordered by: Donn Quiñonez MD        History Of Present Illness:    Chevy Benton is a 78 y.o. male, well known to our service,  with PMH of ESRD on HD TTS (Lt chest TDC, Hx of LUE Fistula Pseudoaneurysms), Anemia, T2DM, HTN, HFrEF (TTE 38 %), A fib, PTA right lower extremity on 9/20/23 with subsequent TMA on 9/22/23, Non Occlusive DVT (Rt Subclavian), Aortic Root Dilation, MR, SSS s/p pacemaker, who was last seen in January and was admitted  for concerns of critical limb ischemia,   He has had multiple admission since his initial encounter with us on September  He is s/p Left subclavian vein angioplasty, with Vascular surgery on 1/12/24, Dr Barrios.  S/p LUE AVF ligation on 3/10 with Dr Tovar.   EVLS team was consulted for reevaluation for CLI, as pt has missed his follow up appointments due to hospitalizations.   Pt was seen earlier today in his room, complaining of bilateral foot pain R>L, and arm swelling        Last Recorded Vitals:  Vitals:    03/17/24 0514 03/17/24 1227 03/17/24 2025 03/18/24 0524   BP: (!) 116/48 111/71 114/62 127/63   BP Location:   Right arm Right arm   Patient Position:   Lying Lying   Pulse: 100 98 96 80   Resp: 16 20 16 18   Temp: 37 °C (98.6 °F) 36.7 °C (98.1 °F) 36.6 °C (97.9 °F) 36.2 °C (97.2 °F)   TempSrc:   Temporal Temporal   SpO2: 100% 98% 100% 100%   Weight:       Height:           Last Labs:  CBC - 3/15/2024:  1:30 PM  15.1 7.7 341    27.0      CMP - 3/15/2024:  7:24 AM  9.5 5.3 13 --- 0.7   4.4 2.3 14 108      PTT - 3/10/2024:  4:12 PM  1.2   13.9 30     Troponin I, High Sensitivity   Date/Time Value Ref Range Status   10/08/2023 09:00 PM 60 (HH) 0 - 20 ng/L Final     Comment:     Previous result verified on 10/8/2023 2030 on specimen/case [id number] called with component [facility] for procedure Troponin I, High Sensitivity, Initial with value 58 ng/L.   10/08/2023  07:26 PM 58 (HH) 0 - 20 ng/L Final     BNP   Date/Time Value Ref Range Status   03/08/2024 07:14 PM 1,717 (H) 0 - 99 pg/mL Final   01/24/2024 04:41  (H) 0 - 99 pg/mL Final     Hemoglobin A1C   Date/Time Value Ref Range Status   03/08/2024 07:14 PM 4.9 see below % Final   12/03/2023 07:38 AM 5.3 4.3 - 5.6 % Final     Comment:     American Diabetes Association guidelines indicate that patients with HgbA1c in the range 5.7-6.4% are at increased risk for development of diabetes, and intervention by lifestyle modification may be beneficial. HgbA1c greater or equal to 6.5% is considered diagnostic of diabetes.   04/25/2022 08:34 PM 6.5 (H) 4.3 - 5.6 % Final     Comment:     American Diabetes Association guidelines indicate that patients with HgbA1c in the range 5.7-6.4% are at increased risk for development of diabetes, and intervention by lifestyle modification may be beneficial. HgbA1c greater or equal to 6.5% is considered diagnostic of diabetes.      Last I/O:  I/O last 3 completed shifts:  In: 1060 (11.7 mL/kg) [P.O.:960; IV Piggyback:100]  Out: - (0 mL/kg)   Weight: 90.9 kg     Past Cardiology Tests (Last 3 Years):  EKG:  ECG 12 lead 03/08/2024      ECG 12 lead 02/17/2024      Electrocardiogram, 12-lead PRN ACS symptoms 01/25/2024      Electrocardiogram, 12-lead PRN ACS symptoms 01/23/2024      Electrocardiogram, 12-lead PRN ACS symptoms 01/22/2024      Electrocardiogram, 12-lead PRN ACS symptoms 01/18/2024    Echo:  Transthoracic Echo (TTE) Complete 01/23/2024      Transthoracic Echo (TTE) Complete 10/02/2023    Ejection Fractions:  EF   Date/Time Value Ref Range Status   01/23/2024 10:30 AM 38 %            Past Medical History:  He has a past medical history of A-fib (CMS/[facility]), Acute hypercapnic respiratory failure (CMS/HCC), Acute on chronic HFrEF (heart failure with reduced ejection fraction) (CMS/HCC), Diabetes mellitus (CMS/HCC), ESRD on hemodialysis (CMS/HCC), History of angioplasty of peripheral vessel  (10/26/2023), HTN (hypertension), LFT elevation (07/20/2021), Small bowel obstruction (CMS/HCC) (10/10/2023), and Stage II pressure ulcer (CMS/HCC).    Past Surgical History:  He has a past surgical history that includes Invasive Vascular Procedure (Right, 1/17/2024).      Social History:  He reports that he has never smoked. He has been exposed to tobacco smoke. He has never used smokeless tobacco. No history on file for alcohol use and drug use.    Family History:  Family History   Problem Relation Name Age of Onset    Diabetes Mother          Allergies:  Penicillins    Inpatient Medications:  Scheduled medications   Medication Dose Route Frequency    allopurinol  100 mg oral Daily    apixaban  2.5 mg oral q12h    atorvastatin  40 mg oral Nightly    B complex-vitamin C-folic acid  1 capsule oral Daily    chlorhexidine   Topical Daily    clopidogrel  75 mg oral Daily    epoetin dane or biosimilar  15,000 Units intravenous Once per day on Tue Thu Sat    gabapentin  100 mg oral Daily    insulin lispro  0-5 Units subcutaneous TID with meals    lubricating eye drops  2 drop Both Eyes TID    melatonin  5 mg oral Nightly    midodrine  15 mg oral q8h    pantoprazole  40 mg oral Daily before breakfast    piperacillin-tazobactam  2.25 g intravenous q12h    polyethylene glycol  17 g oral Daily    sennosides-docusate sodium  2 tablet oral BID     PRN medications   Medication    acetaminophen    albuterol    alteplase    dextrose 10 % in water (D10W)    dextrose    glucagon    ipratropium-albuteroL    ondansetron    oxyCODONE    sodium chloride 0.9%    vancomycin     Continuous Medications   Medication Dose Last Rate     Outpatient Medications:  Current Outpatient Medications   Medication Instructions    acetaminophen (TYLENOL) 975 mg, oral, Every 6 hours PRN    albuterol 90 mcg/actuation inhaler 2 puffs, inhalation, Every 6 hours PRN    allopurinol (ZYLOPRIM) 100 mg, oral, Daily    apixaban (ELIQUIS) 2.5 mg, oral, 2 times  "daily    atorvastatin (LIPITOR) 40 mg, oral, Nightly    clopidogrel (PLAVIX) 75 mg, oral, Daily    collagenase (SantyL) 250 unit/gram ointment 1 Application, Topical, Daily PRN, \"MIGUEL\" to Right foot    dextrose 25 g, intravenous, Every 15 min PRN    gabapentin (NEURONTIN) 100 mg, oral, Daily    glucagon (GLUCAGEN) 1 mg, intramuscular, Every 15 min PRN    heparin 2,000 Units, intra-catheter, After Dialysis    insulin glargine (LANTUS) 6 Units, subcutaneous, Nightly, Take as directed per insulin instructions.    ipratropium-albuteroL (Duo-Neb) 0.5-2.5 mg/3 mL nebulizer solution 3 mL, nebulization, Every 6 hours PRN    lidocaine 4 % patch 1 patch, transdermal, Daily, Remove & discard patch within 12 hours or as directed by MD.    loratadine (CLARITIN) 10 mg, oral, Every 48 hours PRN    melatonin 5 mg, oral, Nightly    midodrine (PROAMATINE) 15 mg, oral, Every 8 hours    pantoprazole (PROTONIX) 40 mg, oral, Daily before breakfast, Do not crush, chew, or split.    polyethylene glycol (MIRALAX) 17 g, oral, Daily    povidone-iodine (Betadine) 7.5 % solution 1 Application, Topical, Daily PRN    sennosides (SENOKOT) 8.6 mg, oral, Nightly    Triphrocaps 1 mg capsule 1 capsule, oral, Daily       Physical Exam:  Constitutional: Well developed, obese, NAD, awake/alert/oriented x3, pleasant, cooperative   Head/Neck: Neck supple,  No JVD, trachea midline, no bruits   Respiratory/Thorax: Patent airways, diminished  breath sounds, thorax symmetric   Cardiovascular: Regular, rate and rhythm, no murmurs, normal S 1 and S 2   Gastrointestinal: Nondistended, soft, non-tender, +BS,   Psychological: Appropriate mood and behavior   Skin: Warm and dry,   Extremities: bilateral UE/hand  3+ edema, LUE ACE wrapped. Rt TMA dressings in place, pictures reviewed,      Assessment/Plan   Chevy Benton is a 78 y.o. male, well known to our service,  with PMH of ESRD on HD TTS (Lt chest TDC, Hx of LUE Fistula Pseudoaneurysms), Anemia, T2DM, HTN, " HFrEF (TTE 38 %), A fib, PTA right lower extremity on 9/20/23 with subsequent TMA on 9/22/23, Non Occlusive DVT (Rt Subclavian), Aortic Root Dilation, MR, SSS s/p pacemaker, who was last seen in January and was admitted  for concerns of critical limb ischemia,   He has had multiple admission since his initial encounter with us on September  He is s/p Left subclavian vein angioplasty, with Vascular surgery on 1/12/24, Dr Barrios.  S/p LUE AVF ligation on 3/10 with Dr Tovar.   EVLS team was consulted for reevaluation for CLI, as pt has missed his follow up appointments due to hospitalizations.   Pt was seen earlier today in his room, complaining of bilateral foot pain R>L, and arm swelling    PAD/nonhealing  Rt TMA:  - 9/20/23 s/p PTA to AT, DP and planter arch, PTA to TP trunk, PT and plantar arch, complete reconstruction of AT, PT and plantar arch with Dr Linder.  - 1/17/24 s/p RLE PT/CPA/LPA/AT/DP/pedal arch PTA with Dr Nguyen.  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   - MRI Rt foot pending  - RAJ/TBI ordered, pending  - will review the results of RAJ/TBI and discuss the findings with Dr Nguyen, and make further plan of management    Thank you for the consult, please feel free to contact the Endovascular team if we can be of any further assistance (endovascular pager [phone], after hours/weekends pager [phone])       Peripheral IV 03/10/24 16 G Right External Jugular (Active)   Site Assessment Clean;Dry;Intact 03/18/24 0900   Dressing Status Clean;Dry 03/18/24 0900   Number of days: 8       Hemodialysis Cath 02/23/24 Left Tunneled catheter Subclavian (Active)   Line Necessity Hemodialysis 03/18/24 0900   Site Assessment Clean;Dry;Intact 03/18/24 0900   Dressing Status Clean;Dry 03/18/24 0900   Number of days: 24       Hemodialysis Arteriovenous Fistula  Left Upper arm (Active)   Site Assessment Clean;Dry;Intact 03/18/24  0900   Dressing Status Clean;Dry 03/18/24 0900   Number of days:        Code Status:  Full Code            Aruna Vazquez PA-C

## 2024-03-18 NOTE — PROGRESS NOTES
"Chevy Benton is a 78 y.o. male on day 10 of admission presenting with A-V fistula (CMS/HCC).    Subjective   Pt  resting in bed. Denies sob, n/v/d, fever, cough, chills, chest pain , constipation, anuric, c/o generalized pain       Objective     Physical Exam  Constitutional:       Appearance: He is obese.   Cardiovascular:      Rate and Rhythm: Normal rate.      Comments: HR-80  Pulmonary:      Comments: Magdiel lungs diminished with minor crackles more noted to lt base  Cont pox 100%  on 2L  Abdominal:      General: There is distension.      Comments: Non-tender to palpation   Genitourinary:     Comments: States anuric  Musculoskeletal:      Comments: Ble edema +2-3 pitting   rt arm +3 pitting edema -fingers cool to touch  Lt arm swollen with acewrap drsg-s/p surg to lt arm..+3 pitting edema to lt hand   Skin:     General: Skin is warm and dry.      Comments: Magdiel feet with kerlix wrap   Neurological:      Mental Status: He is alert and oriented to person, place, and time.   Psychiatric:         Mood and Affect: Mood normal.         Behavior: Behavior normal.         Last Recorded Vitals  Blood pressure 127/63, pulse 80, temperature 36.2 °C (97.2 °F), temperature source Temporal, resp. rate 18, height 1.778 m (5' 10\"), weight 90.9 kg (200 lb 6.4 oz), SpO2 100 %.  Intake/Output last 3 Shifts:  I/O last 3 completed shifts:  In: 1060 (11.7 mL/kg) [P.O.:960; IV Piggyback:100]  Out: - (0 mL/kg)   Weight: 90.9 kg     Relevant Results  Scheduled medications  allopurinol, 100 mg, oral, Daily  apixaban, 2.5 mg, oral, q12h  atorvastatin, 40 mg, oral, Nightly  B complex-vitamin C-folic acid, 1 capsule, oral, Daily  chlorhexidine, , Topical, Daily  clopidogrel, 75 mg, oral, Daily  epoetin dane or biosimilar, 15,000 Units, intravenous, Once per day on Tue Thu Sat  gabapentin, 100 mg, oral, Daily  insulin lispro, 0-5 Units, subcutaneous, TID with meals  lubricating eye drops, 2 drop, Both Eyes, TID  melatonin, 5 mg, oral, " Nightly  midodrine, 15 mg, oral, q8h  pantoprazole, 40 mg, oral, Daily before breakfast  piperacillin-tazobactam, 2.25 g, intravenous, q12h  polyethylene glycol, 17 g, oral, Daily  sennosides-docusate sodium, 2 tablet, oral, BID  sodium chloride 0.9%, 10 mL, intra-catheter, q12h      Continuous medications     PRN medications  PRN medications: acetaminophen, albuterol, alteplase, dextrose 10 % in water (D10W), dextrose, glucagon, ipratropium-albuteroL, ondansetron, oxyCODONE, sodium chloride 0.9%, vancomycin   Results for orders placed or performed during the hospital encounter of 03/08/24 (from the past 24 hour(s))   POCT GLUCOSE   Result Value Ref Range    POCT Glucose 162 (H) 74 - 99 mg/dL   Blood Culture    Specimen: Peripheral Venipuncture; Blood culture   Result Value Ref Range    Blood Culture Loaded on Instrument - Culture in progress    Blood Culture    Specimen: Peripheral Venipuncture; Blood culture   Result Value Ref Range    Blood Culture Loaded on Instrument - Culture in progress    Tissue/Wound Culture/Smear    Specimen: Wound/Tissue; Tissue/Biopsy   Result Value Ref Range    Tissue/Wound Culture/Smear (4+) Abundant Pseudomonas aeruginosa (A)     Gram Stain (4+) Abundant Polymorphonuclear leukocytes (A)     Gram Stain (3+) Moderate Yeast (A)    POCT GLUCOSE   Result Value Ref Range    POCT Glucose 119 (H) 74 - 99 mg/dL   POCT GLUCOSE   Result Value Ref Range    POCT Glucose 123 (H) 74 - 99 mg/dL   POCT GLUCOSE   Result Value Ref Range    POCT Glucose 125 (H) 74 - 99 mg/dL                     Assessment/Plan   Principal Problem:    A-V fistula (CMS/[facility])  Active Problems:    Anemia due to blood loss, acute    Tolerated hemodialysis Saturday  with net fluid loss 2000cc. Pt had IUF tx on 3/15 with net fluid loss 1400cc removed    Bp stable with tx, . Floor vs have been stable, hypervolemic on exam and has stable electrolytes .     Outpatient Dialysis schedule:   [facility]Dr Hannah; will be  TTS while inpatient     Access: lt chest cath - no issues - able to achieve   Lt groin temp cath removed 3/14  Rt EJ     Anemia of ESRD:  3/13-increase  epoetin dane-epbx (Retacrit) injection 15,000 Units on dialysis days... current hgb 7.7.. 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 Will cont to monitor.. B complex-vitamin C-folic acid (Nephrocaps) capsule 1 capsule daily     Plan HD tomorrow with UF as tolerated     Renal diet      Please obtain daily standing wt (if possible)     Medication to be adjusted for ESRD      Patient to continue regular HD schedule while inpatient and to follow with the outpatient nephrologist at discharge        JENIFFER Bates-CNP

## 2024-03-18 NOTE — CARE PLAN
The patient's goals for the shift include        Problem: Pain  Goal: My pain/discomfort is manageable  Outcome: Progressing     Problem: Safety  Goal: Patient will be injury free during hospitalization  Outcome: Progressing  Goal: I will remain free of falls  Outcome: Progressing     Problem: Daily Care  Goal: Daily care needs are met  Outcome: Progressing     Problem: Psychosocial Needs  Goal: Demonstrates ability to cope with hospitalization/illness  Outcome: Progressing  Goal: Collaborate with me, my family, and caregiver to identify my specific goals  Outcome: Progressing     Problem: Discharge Barriers  Goal: My discharge needs are met  Outcome: Progressing     Problem: Skin  Goal: Prevent/manage excess moisture  Outcome: Progressing  Flowsheets (Taken 3/18/2024 1154)  Prevent/manage excess moisture:   Cleanse incontinence/protect with barrier cream   Monitor for/manage infection if present  Goal: Promote skin healing  Outcome: Progressing  Flowsheets (Taken 3/18/2024 1154)  Promote skin healing:   Protective dressings over bony prominences   Turn/reposition every 2 hours/use positioning/transfer devices   Assess skin/pad under line(s)/device(s)

## 2024-03-18 NOTE — CARE PLAN
Problem: Pain  Goal: My pain/discomfort is manageable  Outcome: Progressing   The patient's goals for the shift include      The clinical goals for the shift include Monitor pt for any S/S of adverse reaction from ATB therapy during this shift

## 2024-03-19 NOTE — CARE PLAN
Pt underwent RAJ/TBI testing today, that showed possible stenosis in the inflow of the Rt LE.  Plan for peripheral angioplasty of Rt CFA tomorrow with Dr Wendy MATTHEWS after midnight  - family and primary team were updated with the plan  - hold Eliquis  - continue with Jenise Vazquez PA-C   Endovascular/Limb Salvage Service   Day: [id number]/Haiku/Night [id number]

## 2024-03-19 NOTE — PROGRESS NOTES
Physical Therapy                 Therapy Communication Note    Patient Name: Chevy Benton  MRN: [id number]  Today's Date: 3/19/2024     Discipline: Physical Therapy    Missed Visit Reason: Missed Visit Reason:  (Pt currently off of the floor at vascular lab. Will reattempt as schedules permit.)    Missed Time: Attempt

## 2024-03-19 NOTE — PROGRESS NOTES
Assessment/Plan            Assessment/Plan   Principal Problem:    A-V fistula (CMS/[facility])  Active Problems:    Anemia due to blood loss, acute       Mr. Benton, a 78-year-old patient with a comprehensive medical history including peripheral arterial disease, atrial fibrillation managed with Eliquis, coronary artery disease, type 2 diabetes mellitus, a right subclavian deep vein thrombosis in October 2023, gastroesophageal reflux disease, sick sinus syndrome with a pacemaker, gout, end-stage renal disease undergoing hemodialysis on Mondays, Wednesdays, and Fridays, hyperlipidemia, hypotension, heart failure with reduced ejection fraction (EF 40-45% as of January 2023), and gout, was transferred from [facility] to [facility] due to bleeding at his left upper arm arteriovenous fistula site. Vascular surgery successfully applied two figure-eight silk sutures for hemostasis at the fistula site. Following this, the patient received a new tunneled hemodialysis catheter and underwent dialysis. Surgery was performed on March 10th for fistula ligation. Postoperatively, the patient's pain has been managed effectively, and his hemoglobin and hematocrit levels have remained stable despite resuming anticoagulant and antiplatelet therapies. He continues to exhibit signs of generalized edema, managed by nephrology for fluid overload. Persistent leukocytosis without specific symptoms prompted repeated blood cultures, results of which are pending. A chest X-ray indicated volume overload with an indeterminate presence of infiltrates, challenging to discern due to the fluid overload. The patient, initially on 2 liters of oxygen, did not exhibit corresponding symptoms. Recommendations for wound care have been updated. New complaints of leg pain led to a consultation with podiatry, revealing potential purulence at the transmetatarsal amputation site, now under redress and culture. Moreover, the patient started producing sputum on  March 17, prompting the initiation of vancomycin and Zosyn while awaiting further diagnostic results. Endovascular consultation was also sought due to missed follow-up after a revascularization procedure.    3/18  -has been exhibiting a persistent elevation in white blood cell count, suggestive of potential infections including a right foot infection at the transmetatarsal amputation site and a developing pneumonia.    -Podiatry continues to monitor his wound for signs of calciphylaxis, with specific attention to his left side, and the need for parathyroid hormone follow-up is noted. We are awaiting further cultures, including blood cultures, to guide management.   -experiencing significant fluid overload, particularly noted in the upper extremities. A duplex scan of the upper extremities has been ordered to assess for any vascular anomalies, and nephrology is actively managing his fluid status.  -Given the possibility of soft tissue infection and pneumonia, antibiotic therapy has been initiated.         3/19  -Son Adan called and updated. Addressed all concerns.   -US showed new acute non occlussive thrombus in his rt subclavian vein proximal segment. Also with a hematoma in his right upper arm. Left arm was limited due to bandages. Per vascular surgery, no plan for any intervention, with only supportive care (arm elevation and local compression).  -MRI Rt foot pending  -RAJ/TBI reviewed, , with possible stenosis in the inflow of the Rt LE. Per Endovascular plan for peripheral angioplasty of Rt CFA tomorrow    -NPO after midnight      -Abx: continue with the current regimen, will discuss with podiatry later. Waiting on the MRI to determine surgical plan            --------------------------------------------------      #leukocytosis  #conern for infection at TMA site  #concern for developing pneumonia   - initially likely related to surgical stress, however remained persistent, no new signs of infection/localizing  signs at time. However pt now developed foot pain and sputum production. Cxr with volume overload with possible infiltrate. Podiatry obtained wound cultures.   - ordered blood cultures,  - sputum culture ordered   - follow up wound cultures  - IS, accapella, RT consult to eval/rec.       #Bleeding from AV fistula s/p ligation and tunneled HD line placement - resolved  #anemia d/t acute blood loss  #MRSA colonization, on decolonization protocol due to central line placement.   - Vascular surgery consulted, appreciate recs  - Hemostasis obtained at L upper extremity fistula site after placement of two figure 8 sutures   -Transfuse 1 unit of PRBC on HD on admit.  Monitor HB.     - Restarted apixaban and Plavix and monitoring hemoglobin for stability.          #UE edema - likely related to anasarca, positioning and ESRD, mild improvement  # Respiratory failure with hypoxemia secondary to fluid overload   -US showed new acute non occlussive thrombus in his rt subclavian vein proximal segment. Also with a hematoma in his right upper arm. Left arm was limited due to bandages. Per vascular surgery, no plan for any intervention, with only supportive care (arm elevation and local compression).  -Patient continues  2 L, in setting of anasarca and ESRD.  Likely only requiring 1 L as I have weaned him down in the past.  Xray as above. Componenet of fluid overload.       #T2DM (last HgA1c 5.3% 12/3/23) c/b neuropathy  - accucheck ACHS  - Lispro SSI #1 0-5 units  - holding home dose of Lantus 6 units to prevent episodes of hypoglycemia while hospitalized and HgA1c <7%. Monitor more need to resume.    - hypoglycemia order set placed  - continue home dose of Gabapentin 100 mg PO every day  - glc at goal      #hyponatremia (chronic)  #ESRD on HD   - daily CHG bath d/t L chest dialysis cath site   - Nephrology consulted for HD   - renal diet  - renally dose meds as needed  - continue home dose of Nephrocaps PO every day  -continue HD  "per nephrology. Has had additional sessions this week for fluid removal. Care with fluid removal given chronic hypotension on midodrine.      #Afib  #hx of DVT (10/23)  - Duplex US of LEONIDAS done 10/22/23: Positive study for DVT in the right upper extremity.  There is a nonocclusive thrombus in the right subclavian artery.  - home dose of Eliquis       #hypotension, chronic  - continue home dose of Midodrine 15 mg PO TID     #LLE wound  #R foot wound  #PVD s/p revasc  - wound nurse consult placed, requesting updated wound care recs, wound care recs and record and now updated   -Podiatry on board.     #MRSA nare positive  On MRSA decolonization protocol, ordered mupirocin and chlorhexidine bath  Femoral line removed on 3/14 as patient is clinically stable after starting apixaban and Plavix       #Malnutrition  -With anasarca, wound needs, starting to have increase in appetite.  Nutrition consulted and started on Nepro twice daily         Time > 50 minutes     Scheduled outpatient appointments in system:   Future Appointments   Date Time Provider Department Center   3/27/2024  9:00 AM Rosa Tovar MD [id number] [place]   4/17/2024  1:00 PM Koko Gordon MD [id number] [facility]     ---------------------------------------------------------------------------------------------------  Subjective   No new events. RN was at the bedside, no new wound was found upon exam. Addressed all concerns.      ---------------------------------------------------------------------------------------------------  Objective   Last Recorded Vitals  Blood pressure 125/77, pulse 100, temperature 36.8 °C (98.2 °F), temperature source Temporal, resp. rate 18, height 1.778 m (5' 10\"), weight 87 kg (191 lb 12.8 oz), SpO2 100 %.  Intake/Output last 3 Shifts:  I/O last 3 completed shifts:  In: 1270 (14 mL/kg) [P.O.:720; I.V.:400 (4.4 mL/kg); IV Piggyback:150]  Out: - (0 mL/kg)   Weight: 90.9 kg     Physical Exam  Constitutional:       Comments: " Comfortable at rest on oxygen through NC   HENT:      Head: Normocephalic.      Nose: Nose normal.   Eyes:      Extraocular Movements: Extraocular movements intact.      Pupils: Pupils are equal, round, and reactive to light.   Cardiovascular:      Rate and Rhythm: Normal rate and regular rhythm.      Heart sounds: Normal heart sounds. No murmur heard.     Comments: Right femoral line  Left tunneled dialysis catheter  EJ IV  Pulmonary:      Effort: No respiratory distress.      Breath sounds: Normal breath sounds. No wheezing.   Abdominal:      General: Bowel sounds are normal. There is no distension.      Palpations: Abdomen is soft.      Tenderness: There is no abdominal tenderness.   Musculoskeletal:         General: Normal range of motion.      Cervical back: Normal range of motion.      Comments: Fistula site in LUE covered with dressing after the ligation surgery,     Right foot TMA, ( ulcer noted over the TMA area, which is covered with dressing)    Left arm wrapped in ace wrap, pain in fingers, neurovascularly intact, motor and sensation intact but reports mild decreased sensation, noted mild increased edema of hand compared to right.    Skin:     General: Skin is warm.   Neurological:      General: No focal deficit present.      Mental Status: He is alert and oriented to person, place, and time. Mental status is at baseline.   Psychiatric:         Mood and Affect: Mood normal.         Relevant Results  Lab Results   Component Value Date    WBC 14.5 (H) 03/19/2024    HGB 7.1 (L) 03/19/2024    HCT 24.1 (L) 03/19/2024    MCV 97 03/19/2024     03/19/2024      Lab Results   Component Value Date    GLUCOSE 145 (H) 03/19/2024    CALCIUM 9.8 03/19/2024     (L) 03/19/2024    K 4.3 03/19/2024    CO2 28 03/19/2024    CL 91 (L) 03/19/2024    BUN 44 (H) 03/19/2024    CREATININE 4.56 (H) 03/19/2024     Scheduled medications  allopurinol, 100 mg, oral, Daily  apixaban, 2.5 mg, oral, q12h  atorvastatin, 40 mg,  oral, Nightly  B complex-vitamin C-folic acid, 1 capsule, oral, Daily  chlorhexidine, , Topical, Daily  clopidogrel, 75 mg, oral, Daily  epoetin dane or biosimilar, 15,000 Units, intravenous, Once per day on Tue Thu Sat  gabapentin, 100 mg, oral, Daily  insulin lispro, 0-5 Units, subcutaneous, TID with meals  lubricating eye drops, 2 drop, Both Eyes, TID  melatonin, 5 mg, oral, Nightly  midodrine, 15 mg, oral, q8h  pantoprazole, 40 mg, oral, Daily before breakfast  piperacillin-tazobactam, 2.25 g, intravenous, q12h  polyethylene glycol, 17 g, oral, Daily  sennosides-docusate sodium, 2 tablet, oral, BID  vancomycin, 750 mg, intravenous, Once      Continuous medications     PRN medications  PRN medications: acetaminophen, albuterol, alteplase, dextrose 10 % in water (D10W), dextrose, glucagon, ipratropium-albuteroL, ondansetron, oxyCODONE, sodium chloride 0.9%, vancomycin    Wilma Redding MD

## 2024-03-19 NOTE — PROGRESS NOTES
DIALYSIS NOTE:    Seen and examined during hemodialysis, undergoing treatment per submitted orders: 2 K, 2.5 Ca, 3.5  hours. Fluid removal 0.5-1 liter, as tolerated (keep SBP> 90mmHg).     No complaints today    Heart Rate:  [83-97]   Temp:  [36.4 °C (97.5 °F)-37 °C (98.6 °F)]   Resp:  [18]   BP: (124-149)/(53-67)   Weight:  [87 kg (191 lb 12.8 oz)]   SpO2:  [98 %-100 %]       General appearance: NAD  HEENT: NC/AT,  no scleral icterus, moist  mucous membranes  Skin: no apparent rashes  Heart: RRR,  no M,R,G  Lungs: CTAB anteriorly  Extremities: B hand edema  ACCESS: [facility] TDC      Scheduled medications  allopurinol, 100 mg, oral, Daily  apixaban, 2.5 mg, oral, q12h  atorvastatin, 40 mg, oral, Nightly  B complex-vitamin C-folic acid, 1 capsule, oral, Daily  chlorhexidine, , Topical, Daily  clopidogrel, 75 mg, oral, Daily  epoetin dane or biosimilar, 15,000 Units, intravenous, Once per day on Tue Thu Sat  gabapentin, 100 mg, oral, Daily  insulin lispro, 0-5 Units, subcutaneous, TID with meals  lubricating eye drops, 2 drop, Both Eyes, TID  melatonin, 5 mg, oral, Nightly  midodrine, 15 mg, oral, q8h  pantoprazole, 40 mg, oral, Daily before breakfast  piperacillin-tazobactam, 2.25 g, intravenous, q12h  polyethylene glycol, 17 g, oral, Daily  sennosides-docusate sodium, 2 tablet, oral, BID  vancomycin, 750 mg, intravenous, Once      Continuous medications     PRN medications  PRN medications: acetaminophen, albuterol, alteplase, dextrose 10 % in water (D10W), dextrose, glucagon, ipratropium-albuteroL, ondansetron, oxyCODONE, sodium chloride 0.9%, vancomycin      Recent Results (from the past 12 hour(s))   CBC    Collection Time: 03/19/24  6:49 AM   Result Value Ref Range    WBC 14.5 (H) 4.4 - 11.3 x10*3/uL    nRBC 0.6 (H) 0.0 - 0.0 /100 WBCs    RBC 2.49 (L) 4.50 - 5.90 x10*6/uL    Hemoglobin 7.1 (L) 13.5 - 17.5 g/dL    Hematocrit 24.1 (L) 41.0 - 52.0 %    MCV 97 80 - 100 fL    MCH 28.5 26.0 - 34.0 pg    MCHC 29.5 (L) 32.0  - 36.0 g/dL    RDW 17.2 (H) 11.5 - 14.5 %    Platelets 323 150 - 450 x10*3/uL   Renal Function Panel    Collection Time: 03/19/24  6:49 AM   Result Value Ref Range    Glucose 145 (H) 74 - 99 mg/dL    Sodium 131 (L) 136 - 145 mmol/L    Potassium 4.3 3.5 - 5.3 mmol/L    Chloride 91 (L) 98 - 107 mmol/L    Bicarbonate 28 21 - 32 mmol/L    Anion Gap 16 10 - 20 mmol/L    Urea Nitrogen 44 (H) 6 - 23 mg/dL    Creatinine 4.56 (H) 0.50 - 1.30 mg/dL    eGFR 12 (L) >60 mL/min/1.73m*2    Calcium 9.8 8.6 - 10.6 mg/dL    Phosphorus 5.5 (H) 2.5 - 4.9 mg/dL    Albumin 2.3 (L) 3.4 - 5.0 g/dL   Blood Culture    Collection Time: 03/19/24  9:01 AM    Specimen: Peripheral Venipuncture; Blood culture   Result Value Ref Range    Blood Culture Loaded on Instrument - Culture in progress

## 2024-03-19 NOTE — NURSING NOTE
Report from Sending RN:    Report From: Wendy  Recent Surgery of Procedure: No  Baseline Level of Consciousness (LOC): A and O x 3  Oxygen Use: Yes  Type: 2 LPM NC  Diabetic: No  Last BP Med Given Day of Dialysis: Midodrine 1 AM  Last Pain Med Given: 3 AM  Lab Tests to be Obtained with Dialysis: Yes  Blood Transfusion to be Given During Dialysis: No  Available IV Access: Yes  Medications to be Administered During Dialysis: No  Continuous IV Infusion Running: No  Restraints on Currently or in the Last 24 Hours: No  Hand-Off Communication: Is ready for dialysis, need bed.

## 2024-03-19 NOTE — PROGRESS NOTES
Assessment/Plan            Assessment/Plan   Principal Problem:    A-V fistula (CMS/Formerly [facility])  Active Problems:    Anemia due to blood loss, acute       Mr. Benton, a 78-year-old patient with a comprehensive medical history including peripheral arterial disease, atrial fibrillation managed with Eliquis, coronary artery disease, type 2 diabetes mellitus, a right subclavian deep vein thrombosis in October 2023, gastroesophageal reflux disease, sick sinus syndrome with a pacemaker, gout, end-stage renal disease undergoing hemodialysis on Mondays, Wednesdays, and Fridays, hyperlipidemia, hypotension, heart failure with reduced ejection fraction (EF 40-45% as of January 2023), and gout, was transferred from [facility] to [facility] due to bleeding at his left upper arm arteriovenous fistula site. Initial laboratory results highlighted anemia, hyponatremia, and lactic acidosis. Vascular surgery successfully applied two figure-eight silk sutures for hemostasis at the fistula site. Following this, the patient received a new tunneled hemodialysis catheter and underwent dialysis. Surgery was performed on March 10th for fistula ligation. Postoperatively, the patient's pain has been managed effectively, and his hemoglobin and hematocrit levels have remained stable despite resuming anticoagulant and antiplatelet therapies. He continues to exhibit signs of generalized edema, managed by nephrology for fluid overload. Persistent leukocytosis without specific symptoms prompted repeated blood cultures, results of which are pending. A chest X-ray indicated volume overload with an indeterminate presence of infiltrates, challenging to discern due to the fluid overload. The patient, initially on 2 liters of oxygen, did not exhibit corresponding symptoms. Recommendations for wound care have been updated. New complaints of leg pain led to a consultation with podiatry, revealing potential purulence at the  transmetatarsal amputation site, now under redress and culture. Moreover, the patient started producing sputum on March 17, prompting the initiation of vancomycin and Zosyn while awaiting further diagnostic results. Endovascular consultation was also sought due to missed follow-up after a revascularization procedure.         -has been exhibiting a persistent elevation in white blood cell count, suggestive of potential infections including a right foot infection at the transmetatarsal amputation site and a developing pneumonia.   -Respiratory therapy has been engaged to aid in bronchial hygiene using the Acapella device.   -Podiatry continues to monitor his wound for signs of calciphylaxis, with specific attention to his left side, and the need for parathyroid hormone follow-up is noted. We are awaiting further cultures, including blood cultures, to guide management.   -Additionally, the patient did not attend his postoperative appointment with endovascular following revascularization, and a follow-up is necessary.  -Concerns have also arisen regarding a penile lesion, with recent cultures taken. Consultations with urology/wound care may be considered for further evaluation.  -experiencing significant fluid overload, particularly noted in the upper extremities. A duplex scan of the upper extremities has been ordered to assess for any vascular anomalies, and nephrology is actively managing his fluid status.  -Given the possibility of soft tissue infection and pneumonia, antibiotic therapy has been initiated. Also Pt is undergoing an MRSA decolonization protocol. The femoral line was removed a few days prior, and we will determine the need for repeating MRSA nares cultures three days post-completion of the protocol to ensure effectiveness.    --------------------------------------------------      #leukocytosis  #conern for infection at TMA site  #concern for developing pneumonia   - initially likely related to  surgical stress, however remained persistent, no new signs of infection/localizing signs at time. However pt now developed foot pain and sputum production. Cxr with volume overload with possible infiltrate. Podiatry obtained wound cultures.   - ordered blood cultures, yet to be drawn.   - sputum culture  -follow up wound cultures  - IS, accapella, RT consult to eval/rec.     #Bleeding from AV fistula s/p ligation and tunneled HD line placement - resolved  # anemia d/t acute blood loss  #MRSA colonization, on decolonization protocol due to central line placement.   - Vascular surgery consulted, appreciate recs  - Hemostasis obtained at L upper extremity fistula site after placement of two figure 8 sutures  - Holding home dose of Plavix and Eliquis d/t upcoming procedure with vascular surgery  - Hgb 6.5 -> 8.1, follow up repeat today. Discuss with surgery when to restart elliqus and plavix.  Recommended to continue to hold for now  -Transfuse 1 unit of PRBC on HD on admit.  Monitor HB.    -Dressing to  remained in place until 3/12, vascular surgery to reeval if again come down today.  -Had drop in hemoglobin On EPO, monitoring for continued bleeding.  Since hemoglobin stable without intervention, likely lab variation.  Restarted apixaban and Plavix now and monitoring hemoglobin for stability.  Nursing unable to remove central line.  Will end up removing central line as soon as I have availability.  His MRSA screen was positive and starting MRSA decolonization protocol and will need repeat MRSA nare 3 days after completion.     #left hand numbness and pain - resolved  #UE edema - likely related to anasarca, positioning and ESRD, mild improvement  # Respiratory failure with hypoxemia secondary to fluid overload  - 1st few digits, mostly pain but endorses decerased sensation, noted he reports he cannot move it but motor is intact but week and affecting the entire hand, potentially limited by pain and contrubted by  swelling. Seeing if vasc surg can eval  - change to oxy q6 prn and make Dilaudid for breakthrough, encourage pt to notify nursing when pain is undercontrolled.   - symptoms and exam stable. Vasc surg to reassess and stable.  Symptoms have resolved  -Given persistent upper extremity edema, ordered  duplex upper extremities, evaluating for venous obstruction.  Will discuss utility of the CT image to look for central obstruction with vascular surgery.  Per vascular surgery since the swelling is symmetric, they do not recommend any further investigation.  Will continue to manage fluid status, nutrition, mobilize patient and if swelling does not prove or other concerning signs will need workup as outpatient.  -Patient continues  2 L, in setting of anasarca and ESRD.  Likely only requiring 1 L as I have weaned him down in the past.  Xray as above. Componenet of fluid overload.     #T2DM (last HgA1c 5.3% 12/3/23) c/b neuropathy  - accucheck ACHS  - Lispro SSI #1 0-5 units  - holding home dose of Lantus 6 units to prevent episodes of hypoglycemia while hospitalized and HgA1c <7%. Monitor more need to resume.    - hypoglycemia order set placed  - continue home dose of Gabapentin 100 mg PO every day  - glc at goal    #hyponatremia (chronic)  #ESRD on HD   - daily CHG bath d/t L chest dialysis cath site  -hyponatremia from hypervolemia in setting of esrd, trend. If remains low then needs fluid restriciton.   - Nephrology consulted for HD today,   - renal diet  - renally dose meds as needed  - continue home dose of Nephrocaps PO every day  -continue HD per nephrology. Has had additional sessions this week for fluid removal. Care with fluid removal given chronic hypotension on midodrine.      #Afib  #hx of DVT (10/23)  - Duplex US of RUE done 10/22/23: Positive study for DVT in the right upper extremity.  There is a nonocclusive thrombus in the right subclavian artery.  - holding home dose of Eliquis and Heparin gtt d/t anemia,  "management as above     #hypotension, chronic  - continue home dose of Midodrine 15 mg PO TID     #LLE wound  #R foot wound  #PVD s/p revasc  - wound nurse consult placed, requesting updated wound care recs, wound care recs and record and now updated  -Family would like to touch base with endovascular given they missed their follow-up appointment after revascularization  -Paged endovascular to discuss family concerns about his missed appointment and ability to get to clinic, will discuss with consult as needed for inpatient evaluation  -Podiatry will see patient in a.m. to update recommendations for foot wound and to evaluate foot wound    #MRSA nare positive  On MRSA decolonization protocol, ordered mupirocin and chlorhexidine bath  Femoral line removed on 3/14 as patient is clinically stable after starting apixaban and Plavix       #Malnutrition  -With anasarca, wound needs, starting to have increase in appetite.  Nutrition consulted and started on Nepro twice daily    Called son to update on plan of care daily, last on 3/17.      Time > 40 minutes     Scheduled outpatient appointments in system:   Future Appointments   Date Time Provider Department Center   3/19/2024 10:00 AM [facility] VASC 4 [id number] [facility]   3/19/2024 10:30 AM [facility] VASC 4 [id number] [facility]   3/27/2024  9:00 AM Rosa Tovar MD [id number] [place]   4/17/2024  1:00 PM Koko Gordon MD [id number] [facility]     ---------------------------------------------------------------------------------------------------  Subjective   No new events.,       ---------------------------------------------------------------------------------------------------  Objective   Last Recorded Vitals  Blood pressure 149/53, pulse 97, temperature 37 °C (98.6 °F), temperature source Skin, resp. rate 18, height 1.778 m (5' 10\"), weight 90.9 kg (200 lb 6.4 oz), SpO2 98 %.  Intake/Output last 3 Shifts:  I/O last 3 completed shifts:  In: 1270 (14 mL/kg) [P.O.:720; " I.V.:400 (4.4 mL/kg); IV Piggyback:150]  Out: - (0 mL/kg)   Weight: 90.9 kg     Physical Exam  Constitutional:       Comments: Comfortable at rest on oxygen through NC   HENT:      Head: Normocephalic.      Nose: Nose normal.   Eyes:      Extraocular Movements: Extraocular movements intact.      Pupils: Pupils are equal, round, and reactive to light.   Cardiovascular:      Rate and Rhythm: Normal rate and regular rhythm.      Heart sounds: Normal heart sounds. No murmur heard.     Comments: Right femoral line  Left tunneled dialysis catheter  EJ IV  Pulmonary:      Effort: No respiratory distress.      Breath sounds: Normal breath sounds. No wheezing.   Abdominal:      General: Bowel sounds are normal. There is no distension.      Palpations: Abdomen is soft.      Tenderness: There is no abdominal tenderness.   Musculoskeletal:         General: Normal range of motion.      Cervical back: Normal range of motion.      Comments: Fistula site in LUE covered with dressing after the ligation surgery,     Right foot TMA, ( ulcer noted over the TMA area, which is covered with dressing)    Left arm wrapped in ace wrap, pain in fingers, neurovascularly intact, motor and sensation intact but reports mild decreased sensation, noted mild increased edema of hand compared to right.    Skin:     General: Skin is warm.   Neurological:      General: No focal deficit present.      Mental Status: He is alert and oriented to person, place, and time. Mental status is at baseline.   Psychiatric:         Mood and Affect: Mood normal.         Relevant Results  Lab Results   Component Value Date    WBC 14.5 (H) 03/19/2024    HGB 7.1 (L) 03/19/2024    HCT 24.1 (L) 03/19/2024    MCV 97 03/19/2024     03/19/2024      Lab Results   Component Value Date    GLUCOSE 145 (H) 03/19/2024    CALCIUM 9.8 03/19/2024     (L) 03/19/2024    K 4.3 03/19/2024    CO2 28 03/19/2024    CL 91 (L) 03/19/2024    BUN 44 (H) 03/19/2024    CREATININE 4.56  (H) 03/19/2024     Scheduled medications  allopurinol, 100 mg, oral, Daily  apixaban, 2.5 mg, oral, q12h  atorvastatin, 40 mg, oral, Nightly  B complex-vitamin C-folic acid, 1 capsule, oral, Daily  chlorhexidine, , Topical, Daily  clopidogrel, 75 mg, oral, Daily  epoetin dane or biosimilar, 15,000 Units, intravenous, Once per day on Tue Thu Sat  gabapentin, 100 mg, oral, Daily  insulin lispro, 0-5 Units, subcutaneous, TID with meals  lubricating eye drops, 2 drop, Both Eyes, TID  melatonin, 5 mg, oral, Nightly  midodrine, 15 mg, oral, q8h  pantoprazole, 40 mg, oral, Daily before breakfast  piperacillin-tazobactam, 2.25 g, intravenous, q12h  polyethylene glycol, 17 g, oral, Daily  sennosides-docusate sodium, 2 tablet, oral, BID  vancomycin, 750 mg, intravenous, Once      Continuous medications     PRN medications  PRN medications: acetaminophen, albuterol, alteplase, dextrose 10 % in water (D10W), dextrose, glucagon, ipratropium-albuteroL, ondansetron, oxyCODONE, sodium chloride 0.9%, vancomycin    Wilma Redding MD

## 2024-03-19 NOTE — CARE PLAN
The patient's goals for the shift include being comfortable.     The clinical goals for the shift include Pt's WBC count will be WNL by the end of this shift.    Over the shift, the patient did not make progress toward the following goals. Barriers to progression include patient's WBC count still being elevated at 14.5. Pt will be NPO at midnight for a vascular procedure tomorrow.

## 2024-03-19 NOTE — NURSING NOTE
Report to Receiving RN:    Report To: Francisca RN   Time Report Called: [id number]  Hand-Off Communication: HD completedand tolerated well, no issues during treatment.. Removed 1 L. Post /44 /hr 119, Gave midodrine while here.   Complications During Treatment: No  Ultrafiltration Treatment: Yes  Medications Administered During Dialysis: Yes Midodrine, see mar   Blood Products Administered During Dialysis: No  Labs Sent During Dialysis: Yes  Heparin Drip Rate Changes: N/A

## 2024-03-19 NOTE — NURSING NOTE
Geriatric Nursing Rounds Summary  Mr Benton is a 78 year old admitted bleeding fistula (dialysis now resolved) possible osteomyelitis, TMA, sacral wounds,  wife is support  Age friendly  What matters full code decrease pain return to facility, hopefully as arm less swollen will be able to feed self  Mentation cam neg  Mobility patient requires full assist adl  Meds satnam honey eloquist, oxycodone

## 2024-03-19 NOTE — PROGRESS NOTES
"Chevy Benton is a 78 y.o. male on day 11 of admission presenting with A-V fistula (CMS/HCC).    Subjective   Patient reports feeling alright today. Reports mild generalized bilateral upper extremity pain.    Objective     General: NAD, AAOx2 (person, year)  Neuro: no gross deficits, speech WNL  HEENT: NC/AT  CV: irregularly irregular rhythm, rate controlled. Generalized edema of entire body  Pulm: normal respiratory effort on RA  Abd: soft, NTND  Extr: notably swollen BL UE. Continued edema in bilateral upper extremities and hands. RUE with ecchymosis of the biceps. Mild tenderness to palpation. Neurovascular intact in bilateral UE.  Skin: no lesions or rashes     Last Recorded Vitals  Blood pressure 125/77, pulse 100, temperature 36.8 °C (98.2 °F), temperature source Temporal, resp. rate 18, height 1.778 m (5' 10\"), weight 87 kg (191 lb 12.8 oz), SpO2 100 %.  Intake/Output last 3 Shifts:  I/O last 3 completed shifts:  In: 1270 (14 mL/kg) [P.O.:720; I.V.:400 (4.4 mL/kg); IV Piggyback:150]  Out: - (0 mL/kg)   Weight: 90.9 kg     Relevant Results  Scheduled medications  allopurinol, 100 mg, oral, Daily  apixaban, 2.5 mg, oral, q12h  atorvastatin, 40 mg, oral, Nightly  B complex-vitamin C-folic acid, 1 capsule, oral, Daily  chlorhexidine, , Topical, Daily  clopidogrel, 75 mg, oral, Daily  epoetin dane or biosimilar, 15,000 Units, intravenous, Once per day on Tue Thu Sat  gabapentin, 100 mg, oral, Daily  insulin lispro, 0-5 Units, subcutaneous, TID with meals  lubricating eye drops, 2 drop, Both Eyes, TID  melatonin, 5 mg, oral, Nightly  midodrine, 15 mg, oral, q8h  pantoprazole, 40 mg, oral, Daily before breakfast  piperacillin-tazobactam, 2.25 g, intravenous, q12h  polyethylene glycol, 17 g, oral, Daily  sennosides-docusate sodium, 2 tablet, oral, BID  vancomycin, 750 mg, intravenous, Once      Continuous medications     PRN medications  PRN medications: acetaminophen, albuterol, alteplase, dextrose 10 % in " water (D10W), dextrose, glucagon, ipratropium-albuteroL, ondansetron, oxyCODONE, sodium chloride 0.9%, vancomycin  Results for orders placed or performed during the hospital encounter of 03/08/24 (from the past 24 hour(s))   POCT GLUCOSE   Result Value Ref Range    POCT Glucose 116 (H) 74 - 99 mg/dL   CBC   Result Value Ref Range    WBC 14.5 (H) 4.4 - 11.3 x10*3/uL    nRBC 0.6 (H) 0.0 - 0.0 /100 WBCs    RBC 2.49 (L) 4.50 - 5.90 x10*6/uL    Hemoglobin 7.1 (L) 13.5 - 17.5 g/dL    Hematocrit 24.1 (L) 41.0 - 52.0 %    MCV 97 80 - 100 fL    MCH 28.5 26.0 - 34.0 pg    MCHC 29.5 (L) 32.0 - 36.0 g/dL    RDW 17.2 (H) 11.5 - 14.5 %    Platelets 323 150 - 450 x10*3/uL   Renal Function Panel   Result Value Ref Range    Glucose 145 (H) 74 - 99 mg/dL    Sodium 131 (L) 136 - 145 mmol/L    Potassium 4.3 3.5 - 5.3 mmol/L    Chloride 91 (L) 98 - 107 mmol/L    Bicarbonate 28 21 - 32 mmol/L    Anion Gap 16 10 - 20 mmol/L    Urea Nitrogen 44 (H) 6 - 23 mg/dL    Creatinine 4.56 (H) 0.50 - 1.30 mg/dL    eGFR 12 (L) >60 mL/min/1.73m*2    Calcium 9.8 8.6 - 10.6 mg/dL    Phosphorus 5.5 (H) 2.5 - 4.9 mg/dL    Albumin 2.3 (L) 3.4 - 5.0 g/dL   Blood Culture    Specimen: Peripheral Venipuncture; Blood culture   Result Value Ref Range    Blood Culture Loaded on Instrument - Culture in progress    POCT GLUCOSE   Result Value Ref Range    POCT Glucose 126 (H) 74 - 99 mg/dL           Assessment/Plan   78 y.o. male with bleeding from LUE AVF.  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 S/p LUE AVF ligation on 3/10 with Dr. Tovar. Vascular surgery asked about possible intervention on partially occlusive right subclavian DVT. Right subclavian DVT likely chronic and noted in October 2023 and again in January 2024 on venous duplex. Thrombectomy not indicated for chronic DVT. On eliquis and plavix    Recomendations:  - Thrombectomy not indicated for chronic DVT.  - Can manage RUE swelling conservatively with ACE wrap compression and elevation  however, component of edema likely related to overall volume overload/anasarca would recommend treatment of this to improve edema.  - Continue eliquis and plavix    D/w attending Dr. Guy Bonilla MD  General Surgery PGY-3  Vascular Surgery [id number]

## 2024-03-19 NOTE — CARE PLAN
Problem: Pain  Goal: My pain/discomfort is manageable  Outcome: Progressing   The patient's goals for the shift include      The clinical goals for the shift include Pt will be monitor for any S/S of adverse reaction from ATB therapy during this shift

## 2024-03-20 NOTE — POST-PROCEDURE NOTE
Physician Transition of Care Summary  Invasive Cardiovascular Lab    Procedure Date: 3/20/2024  Attending:    Annmarie Nguyen - Primary  Resident/Fellow/Other Assistant: Surgeon(s) and Role:     * Darnell Umanzor MD MPH - Fellow    Indications:   Pre-op Diagnosis     * Critical limb ischemia of right lower extremity (CMS/HCC) [I70.221]    Post-procedure diagnosis:   Post-op Diagnosis     * Critical limb ischemia of right lower extremity (CMS/HCC) [I70.221]    Procedure(s):     * Lower Extremity Angiogram  CHG ANGIOGRAPHY EXTREMITY UNILATERAL RS&I [20503]    Procedure Findings:   Occluded all 3 tibial run offs.  Patent iliacs and Femorals    Description of the Procedure:   Right radial approach.  6 Fr sheath  Selective angiogram of right leg.      Complications:   None    Stents/Implants:       Anticoagulation/Antiplatelet Plan:   Continue with current regimen    Estimated Blood Loss:   15 mL    Anesthesia: Moderate Sedation Anesthesia Staff: No anesthesia staff entered.    Any Specimen(s) Removed:   Order Name Source Comment Collection Info Order Time   TYPE AND SCREEN Blood, Venous  Collected By: Dinora Doss RN 3/20/2024 10:02 AM     Release result to [facility]   Immediate        PREPARE RBC    3/20/2024 10:02 AM     Transfusion indications   Hb > or = 7.0 g/dL euvolemic with active evidence of organ ischemia or bleeding with hemodynamic compromise          Has consent been obtained?   Yes          Does patient have a hemoglobinopathy?   No            Disposition:   [place].      Electronically signed by: Darnell Umanzor MD MPH, 3/20/2024 12:29 PM

## 2024-03-20 NOTE — CARE PLAN
Pt underwent diagnostic peripheral angiogram with Dr Nguyen today, that showed Occluded all 3 tibial run offs. Patent iliacs and Femorals.  No intervention performed.  Pt is too frail for any surgical intervention. (Debridement or BKA)  Resume Eliquis tomorrow, continue with Plavix and Atorvastatin  Continue with the rest of current medications.     Case will be discussed with Dr Linder to make further plan of management.     Aruna Vazquez PA-C   Endovascular/Limb Salvage Service   Day: [id number]/[place]/Night [facility] [id number]

## 2024-03-20 NOTE — PROGRESS NOTES
Assessment/Plan            Assessment/Plan   Principal Problem:    A-V fistula (CMS/[facility])  Active Problems:    Anemia due to blood loss, acute       Mr. Benton, a 78-year-old patient with a comprehensive medical history including peripheral arterial disease, atrial fibrillation managed with Eliquis, coronary artery disease, type 2 diabetes mellitus, a right subclavian deep vein thrombosis in October 2023, gastroesophageal reflux disease, sick sinus syndrome with a pacemaker, gout, end-stage renal disease undergoing hemodialysis on Mondays, Wednesdays, and Fridays, hyperlipidemia, hypotension, heart failure with reduced ejection fraction (EF 40-45% as of January 2023), and gout, was transferred from [facility] to [facility] due to bleeding at his left upper arm arteriovenous fistula site. Vascular surgery successfully applied two figure-eight silk sutures for hemostasis at the fistula site. Following this, the patient received a new tunneled hemodialysis catheter and underwent dialysis. Surgery was performed on March 10th for fistula ligation. Postoperatively, the patient's pain has been managed effectively, and his hemoglobin and hematocrit levels have remained stable despite resuming anticoagulant and antiplatelet therapies. He continues to exhibit signs of generalized edema, managed by nephrology for fluid overload. Persistent leukocytosis without specific symptoms prompted repeated blood cultures, results of which are pending. A chest X-ray indicated volume overload with an indeterminate presence of infiltrates, challenging to discern due to the fluid overload. The patient, initially on 2 liters of oxygen, did not exhibit corresponding symptoms. Recommendations for wound care have been updated. New complaints of leg pain led to a consultation with podiatry, revealing potential purulence at the transmetatarsal amputation site, now under redress and culture. Moreover, the patient started producing sputum on  March 17, prompting the initiation of vancomycin and Zosyn while awaiting further diagnostic results. Endovascular consultation was also sought due to missed follow-up after a revascularization procedure.    3/18  -has been exhibiting a persistent elevation in white blood cell count, suggestive of potential infections including a right foot infection at the transmetatarsal amputation site and a developing pneumonia.    -Podiatry continues to monitor his wound for signs of calciphylaxis, with specific attention to his left side, and the need for parathyroid hormone follow-up is noted. We are awaiting further cultures, including blood cultures, to guide management.   -experiencing significant fluid overload, particularly noted in the upper extremities. A duplex scan of the upper extremities has been ordered to assess for any vascular anomalies, and nephrology is actively managing his fluid status.  -Given the possibility of soft tissue infection and pneumonia, antibiotic therapy has been initiated.         3/19  -Son Adan called and updated. Addressed all concerns.   -US showed new acute non occlussive thrombus in his rt subclavian vein proximal segment. Also with a hematoma in his right upper arm. Left arm was limited due to bandages. Per vascular surgery, no plan for any intervention, with only supportive care (arm elevation and local compression).  -MRI Rt foot pending  -RAJ/TBI reviewed, , with possible stenosis in the inflow of the Rt LE. Per Endovascular plan for peripheral angioplasty of Rt CFA tomorrow    -NPO after midnight      -Abx: continue with the current regimen, will discuss with podiatry later. Waiting on the MRI to determine surgical plan    3/20  No events, headed to peripheral angioplasty of Rt CFA, no events was observed after the procedure.   Holding plavix and eliquis per cardiology recommendations         --------------------------------------------------      #leukocytosis  #conern for  infection at TMA site  #concern for developing pneumonia   - initially likely related to surgical stress, however remained persistent, no new signs of infection/localizing signs at time. However pt now developed foot pain and sputum production. Cxr with volume overload with possible infiltrate. Podiatry obtained wound cultures.   - ordered blood cultures,  - sputum culture ordered   - follow up wound cultures  - IS, accapella, RT consult to eval/rec.       #Bleeding from AV fistula s/p ligation and tunneled HD line placement - resolved  #anemia d/t acute blood loss  #MRSA colonization, on decolonization protocol due to central line placement.   - Vascular surgery consulted, appreciate recs  - Hemostasis obtained at L upper extremity fistula site after placement of two figure 8 sutures   -Transfuse 1 unit of PRBC on HD on admit.  Monitor HB.     - Restarted apixaban and Plavix and monitoring hemoglobin for stability.          #UE edema - likely related to anasarca, positioning and ESRD, mild improvement  # Respiratory failure with hypoxemia secondary to fluid overload   -US showed new acute non occlussive thrombus in his rt subclavian vein proximal segment. Also with a hematoma in his right upper arm. Left arm was limited due to bandages. Per vascular surgery, no plan for any intervention, with only supportive care (arm elevation and local compression).  -Patient continues  2 L, in setting of anasarca and ESRD.  Likely only requiring 1 L as I have weaned him down in the past.  Xray as above. Componenet of fluid overload.       #T2DM (last HgA1c 5.3% 12/3/23) c/b neuropathy  - accucheck ACHS  - Lispro SSI #1 0-5 units  - holding home dose of Lantus 6 units to prevent episodes of hypoglycemia while hospitalized and HgA1c <7%. Monitor more need to resume.    - hypoglycemia order set placed  - continue home dose of Gabapentin 100 mg PO every day  - glc at goal      #hyponatremia (chronic)  #ESRD on HD   - daily CHG bath  "d/t L chest dialysis cath site   - Nephrology consulted for HD   - renal diet  - renally dose meds as needed  - continue home dose of Nephrocaps PO every day  -continue HD per nephrology. Has had additional sessions this week for fluid removal. Care with fluid removal given chronic hypotension on midodrine.      #Afib  #hx of DVT (10/23)  - Duplex US of RUE done 10/22/23: Positive study for DVT in the right upper extremity.  There is a nonocclusive thrombus in the right subclavian artery.  - home dose of Eliquis       #hypotension, chronic  - continue home dose of Midodrine 15 mg PO TID     #LLE wound  #R foot wound  #PVD s/p revasc  - wound nurse consult placed, requesting updated wound care recs, wound care recs and record and now updated   -Podiatry on board.     #MRSA nare positive  On MRSA decolonization protocol, ordered mupirocin and chlorhexidine bath  Femoral line removed on 3/14 as patient is clinically stable after starting apixaban and Plavix       #Malnutrition  -With anasarca, wound needs, starting to have increase in appetite.  Nutrition consulted and started on Nepro twice daily         Time > 30 minutes     Scheduled outpatient appointments in system:   Future Appointments   Date Time Provider Department Center   3/27/2024  9:00 AM Rosa Tovar MD [id number] [place]   4/17/2024  1:00 PM Koko Gordon MD [id number] [facility]     ---------------------------------------------------------------------------------------------------  Subjective   No new events.      ---------------------------------------------------------------------------------------------------  Objective   Last Recorded Vitals  Blood pressure 118/69, pulse 93, temperature 36.7 °C (98.1 °F), resp. rate 18, height 1.778 m (5' 10\"), weight 87 kg (191 lb 12.8 oz), SpO2 94 %.  Intake/Output last 3 Shifts:  I/O last 3 completed shifts:  In: 1150 (13.2 mL/kg) [I.V.:600 (6.9 mL/kg); Other:400; IV Piggyback:150]  Out: 1000 (11.5 mL/kg) " [Other:1000]  Weight: 87 kg     Physical Exam  Constitutional:       Comments: Comfortable at rest on oxygen through NC   HENT:      Head: Normocephalic.      Nose: Nose normal.   Eyes:      Extraocular Movements: Extraocular movements intact.      Pupils: Pupils are equal, round, and reactive to light.   Cardiovascular:      Rate and Rhythm: Normal rate and regular rhythm.      Heart sounds: Normal heart sounds. No murmur heard.     Comments: Right femoral line  Left tunneled dialysis catheter  EJ IV  Pulmonary:      Effort: No respiratory distress.      Breath sounds: Normal breath sounds. No wheezing.   Abdominal:      General: Bowel sounds are normal. There is no distension.      Palpations: Abdomen is soft.      Tenderness: There is no abdominal tenderness.   Musculoskeletal:         General: Normal range of motion.      Cervical back: Normal range of motion.      Comments: Fistula site in LUE covered with dressing after the ligation surgery,     Right foot TMA, ( ulcer noted over the TMA area, which is covered with dressing)    Left arm wrapped in ace wrap, pain in fingers, neurovascularly intact, motor and sensation intact but reports mild decreased sensation, noted mild increased edema of hand compared to right.    Skin:     General: Skin is warm.   Neurological:      General: No focal deficit present.      Mental Status: He is alert and oriented to person, place, and time. Mental status is at baseline.   Psychiatric:         Mood and Affect: Mood normal.         Relevant Results  Lab Results   Component Value Date    WBC 12.0 (H) 03/20/2024    HGB 7.9 (L) 03/20/2024    HCT 29.9 (L) 03/20/2024     (H) 03/20/2024     03/20/2024      Lab Results   Component Value Date    GLUCOSE 176 (H) 03/20/2024    CALCIUM 9.6 03/20/2024     (L) 03/20/2024    K 4.1 03/20/2024    CO2 27 03/20/2024    CL 94 (L) 03/20/2024    BUN 35 (H) 03/20/2024    CREATININE 3.43 (H) 03/20/2024     Scheduled  medications  allopurinol, 100 mg, oral, Daily  apixaban, 2.5 mg, oral, q12h  atorvastatin, 40 mg, oral, Nightly  B complex-vitamin C-folic acid, 1 capsule, oral, Daily  clopidogrel, 75 mg, oral, Daily  epoetin dane or biosimilar, 15,000 Units, intravenous, Once per day on Tue Thu Sat  gabapentin, 100 mg, oral, Daily  insulin lispro, 0-5 Units, subcutaneous, TID with meals  lubricating eye drops, 2 drop, Both Eyes, TID  melatonin, 5 mg, oral, Nightly  midodrine, 15 mg, oral, q8h  pantoprazole, 40 mg, oral, Daily before breakfast  piperacillin-tazobactam, 2.25 g, intravenous, q12h  polyethylene glycol, 17 g, oral, Daily  sennosides-docusate sodium, 2 tablet, oral, BID      Continuous medications     PRN medications  PRN medications: acetaminophen, albuterol, alteplase, dextrose 10 % in water (D10W), dextrose, glucagon, ipratropium-albuteroL, ondansetron, oxyCODONE, sodium chloride 0.9%, vancomycin    Wilma Redding MD

## 2024-03-20 NOTE — PROGRESS NOTES
Physical Therapy                 Therapy Communication Note    Patient Name: Chevy Benton  MRN: [id number]  Today's Date: 3/20/2024     Discipline: Physical Therapy    Missed Visit Reason: Missed Visit Reason:  (Pt currently at cardiac cath lab, will reattempt as schedule permits.)    Missed Time: Attempt

## 2024-03-20 NOTE — CARE PLAN
The clinical goals for the shift include pts WBC count will be WNL by the end of the shift

## 2024-03-21 NOTE — CARE PLAN
The patient's goals for the shift include  to get comfortable and rest for at least one hour    The clinical goals for the shift include pt will remain HDS throughout shift    Problem: Pain  Goal: My pain/discomfort is manageable  Outcome: Progressing     Problem: Safety  Goal: Patient will be injury free during hospitalization  Outcome: Progressing  Goal: I will remain free of falls  Outcome: Progressing     Problem: Daily Care  Goal: Daily care needs are met  Outcome: Progressing     Problem: Psychosocial Needs  Goal: Demonstrates ability to cope with hospitalization/illness  Outcome: Progressing  Goal: Collaborate with me, my family, and caregiver to identify my specific goals  Outcome: Progressing     Problem: Discharge Barriers  Goal: My discharge needs are met  Outcome: Progressing     Problem: Skin  Goal: Prevent/manage excess moisture  Outcome: Progressing  Goal: Promote skin healing  Outcome: Progressing

## 2024-03-21 NOTE — NURSING NOTE
Report from Sending RN:    Report From: Lissette ( ABEL)  Recent Surgery of Procedure: Yes, fistula gram 03/10/24  Baseline Level of Consciousness (LOC): a/o x 3/4  Oxygen Use: yes  Type: nc  Diabetic: Yes, 170  Last BP Med Given Day of Dialysis: midodrine 15 mg 0200 am  Last Pain Med Given: none  Lab Tests to be Obtained with Dialysis: No  Blood Transfusion to be Given During Dialysis: No  Available IV Access: Yes  Medications to be Administered During Dialysis: No  Continuous IV Infusion Running: Yes  Restraints on Currently or in the Last 24 Hours: No  Hand-Off Communication: No acute overnight or morning events; vss; pt did take morning medications; Pt will not need labs; Pt is a full code; Ela Morales RN.

## 2024-03-21 NOTE — PROGRESS NOTES
"Chevy Benton is a 78 y.o. male on day 13 of admission presenting with A-V fistula (CMS/HCC).    Subjective   Patient was seen at bedside, resting comfortably. He is somewhat confused and asking who is playing with the lights, though the lights are on and not flickering.        Physical Exam    Objective     Physical Exam  General: AAOx1-2, no acute distress, bilateral lower extremity dressings intact     RLE focused exam:  Vasc: Dorsalis pedis and posterior tibial pulses are non-palpable. Skin temperature is warm to warm from proximal tibia to distal foot. No erythema noted. Mild pitting edema noted.      Neuro: Gross and light touch sensation is intact to the foot.     Derm: There is a full thickness ulceration at the right foot TMA site. No drainage noted today. No malodor. Wound base is fibrogranular. Wound edges are necrotic. No crepitus, fluctuance, or purulence. There is an unstageable wound on the lateral right lower leg. Base is dry stable eschar. Edges are hyperemic but not macerated or hyperkeratotic. No drainage. No crepitus, fluctuance, purulence, or malodor. Stage 0 pressure injury on the right heel, erythema is blanchable. Prevalon boots in place bilaterally.     Ortho: S/P right foot TMA    Last Recorded Vitals  Blood pressure (!) 124/47, pulse 86, temperature 36.6 °C (97.9 °F), resp. rate 18, height 1.778 m (5' 10\"), weight 87 kg (191 lb 12.8 oz), SpO2 99 %.    Intake/Output last 3 Shifts:  I/O last 3 completed shifts:  In: 440 (5.1 mL/kg) [P.O.:240; I.V.:200 (2.3 mL/kg)]  Out: 15 (0.2 mL/kg) [Blood:15]  Weight: 87 kg     Relevant Results  Results for orders placed or performed during the hospital encounter of 03/08/24 (from the past 24 hour(s))   POCT GLUCOSE   Result Value Ref Range    POCT Glucose 161 (H) 74 - 99 mg/dL   POCT GLUCOSE   Result Value Ref Range    POCT Glucose 170 (H) 74 - 99 mg/dL   Renal Function Panel   Result Value Ref Range    Glucose 148 (H) 74 - 99 mg/dL    Sodium 132 " (L) 136 - 145 mmol/L    Potassium 3.9 3.5 - 5.3 mmol/L    Chloride 93 (L) 98 - 107 mmol/L    Bicarbonate 30 21 - 32 mmol/L    Anion Gap 13 10 - 20 mmol/L    Urea Nitrogen 45 (H) 6 - 23 mg/dL    Creatinine 3.62 (H) 0.50 - 1.30 mg/dL    eGFR 16 (L) >60 mL/min/1.73m*2    Calcium 10.2 8.6 - 10.6 mg/dL    Phosphorus 5.8 (H) 2.5 - 4.9 mg/dL    Albumin 2.6 (L) 3.4 - 5.0 g/dL   CBC   Result Value Ref Range    WBC 14.0 (H) 4.4 - 11.3 x10*3/uL    nRBC 0.5 (H) 0.0 - 0.0 /100 WBCs    RBC 2.53 (L) 4.50 - 5.90 x10*6/uL    Hemoglobin 7.1 (L) 13.5 - 17.5 g/dL    Hematocrit 24.3 (L) 41.0 - 52.0 %    MCV 96 80 - 100 fL    MCH 28.1 26.0 - 34.0 pg    MCHC 29.2 (L) 32.0 - 36.0 g/dL    RDW 17.2 (H) 11.5 - 14.5 %    Platelets 358 150 - 450 x10*3/uL   Vancomycin   Result Value Ref Range    Vancomycin 20.4 (H) 5.0 - 20.0 ug/mL   Renal Function Panel   Result Value Ref Range    Glucose 159 (H) 74 - 99 mg/dL    Sodium 135 (L) 136 - 145 mmol/L    Potassium 4.2 3.5 - 5.3 mmol/L    Chloride 94 (L) 98 - 107 mmol/L    Bicarbonate 24 21 - 32 mmol/L    Anion Gap 21 (H) 10 - 20 mmol/L    Urea Nitrogen 45 (H) 6 - 23 mg/dL    Creatinine 4.00 (H) 0.50 - 1.30 mg/dL    eGFR 15 (L) >60 mL/min/1.73m*2    Calcium 9.4 8.6 - 10.6 mg/dL    Phosphorus 5.5 (H) 2.5 - 4.9 mg/dL    Albumin 2.4 (L) 3.4 - 5.0 g/dL   POCT GLUCOSE   Result Value Ref Range    POCT Glucose 118 (H) 74 - 99 mg/dL     Upper extremity venous duplex bilateral    Result Date: 3/20/2024            [place] [address]   Tel [phone] and Fax [phone]  Vascular Lab Report [facility] US UPPER EXTREMITY VENOUS DUPLEX BILATERAL  Patient Name:     SHAMEKA Mendez Physician: [id number] Stuart Nguyen MD Study Date:       3/19/2024           Ordering           [id number] NETTIE                                       Physician:         YENI MRN/PID:          [id number]            Technologist:      CARLOS Accession#:       [id number]        Technologist 2:  Date of           1945 / 78      Encounter#:        [id number] Birth/Age:        years Gender:           M Admission Status: Inpatient           Location           [facility]                                       Performed:  Diagnosis/ICD: Other specified soft tissue disorders-M79.89 CPT Codes:     67228 Peripheral venous duplex scan for DVT complete  **CRITICAL RESULT** Critical Result: acute non occlusive thrombus noted in the right subclavian proximal segment. Notification called to Francisca Chery RN; Wilma Redding MD on 3/19/2024 at 11:50:55 AM by Kristen Gloria.  CONCLUSIONS: Right Upper Venous: There is acute occlusive deep vein thrombosis visualized in the proximal subclavian vein. Remainder of exam is noted to be negative for acute DVT. Hematoma noted in right upper arm measuring 5.07cm x 2.01cm. Thrombus noted around line. Cannot rule out thrombus of non-visualized proximal cephalic, mid cephalic, distal cephalic and basilic veins due to swelling and edema. Left Upper Venous: Limited exam due to bandages. Cannot rule out thrombus of non-compressible mid subclavian and distal subclavian veins due to dressings and catheter. Cannot rule out thrombus of non-visualized proximal cephalic, mid cephalic, distal cephalic and basilic veins due to dressings.  Imaging & Doppler Findings:  Right               Compressible      Thrombus              Flow Internal Jugular        Yes             None         Spontaneous/Phasic Subclavian            Partial    Acute non-occlusive Subclavian Proximal     Yes             None Subclavian Mid          Yes             None         Spontaneous/Phasic Subclavian Distal                       None Axillary                Yes             None Brachial                Yes             None  Left                Compress Thrombus        Flow Internal Jugular      Yes      None   Spontaneous/Phasic Subclavian Proximal                   Spontaneous/Phasic Subclavian Mid         Yes      None Subclavian Distal     Yes      None   Spontaneous/Phasic Axillary              Yes      None   Spontaneous/Phasic Brachial              Yes      None  [id number] Stuart Nguyen MD Electronically signed by Jennifer Nguyen MD on 3/20/2024 at 11:33:21 PM  ** Final **     Vascular US Ankle Brachial Index (RAJ) Without Exercise    Result Date: 3/19/2024  Preliminary Cardiology Report           [place] [address]   Tel [phone] and Fax [phone]            Preliminary Vascular Lab Report  [facility] US ANKLE BRACHIAL INDEX (RAJ) WITHOUT EXERCISE  Patient Name:      SHAMEKA SUN Reading Physician:  Jennifer Nguyen MD Study Date:        3/19/2024           Ordering Physician: [id number] ARELIS MEJIAS MRN/PID:           [id number]            Technologist:       Beena Craft T Accession#:        [id number]        Technologist 2: Date of Birth/Age: 1945           Encounter#:         [id number] Gender:            M Admission Status:  Inpatient           Location Performed: [facility] [place]  Diagnosis/ICD: Peripheral vascular disease, unspecified-I73.9 Procedure/CPT: 91329 Peripheral artery RAJ Only  PRELIMINARY CONCLUSIONS: Right Lower PVR: Evidence of moderate arterial occlusive disease in the right lower extremity at rest. Monophasic flow is noted in the right posterior tibial artery and right dorsalis pedis artery. Biphasic flow is noted in the right dorsalis pedis artery. Left Lower PVR: Evidence of moderate arterial occlusive disease in the left lower extremity at rest. Biphasic flow is noted in the left common femoral artery, left posterior tibial artery and left dorsalis pedis artery. Unable to obtain brachial pressure due to dyalisis access.  Additional Findings: Technically difficult study due to patient bandaging, positioning, and tolerance.  Imaging & Doppler Findings:  RIGHT Lower PVR                Pressures Ratios Right Posterior Tibial (Ankle) 74  mmHg   0.66 Right Dorsalis Pedis (Ankle)   256 mmHg  2.29   LEFT Lower PVR                Pressures Ratios Left Posterior Tibial (Ankle) 256 mmHg  2.29 Left Dorsalis Pedis (Ankle)   256 mmHg  2.29 Left Digit (Great Toe)        52 mmHg   0.46                      Right Brachial Pressure 112 mmHg   VASCULAR PRELIMINARY REPORT completed by Beena Craft RVT on 3/19/2024 at 11:48:18 AM  ** Final **     XR foot right 3+ views    Result Date: 3/18/2024  Interpreted By:  Liz Ruiz and Beyersdorf Conner STUDY: XR FOOT RIGHT 3+ VIEWS; ;  3/17/2024 7:49 pm   INDICATION: Signs/Symptoms:Evaluation of TMA right foot possible infection.   COMPARISON: X-ray right foot 01/19/2024   ACCESSION NUMBER(S): [id number]   ORDERING CLINICIAN: NETTIE JAIME   FINDINGS: Transmetatarsal amputation changes noted. There is marked paucity of soft tissue stump at the level of 1st metatarsal likely representing wound dehiscence/stump breakdown. There is also apparent soft tissue defect of the 5th metatarsal stump region which may also represent stump breakdown. There is irregular appearance of the stumps of the 1st and 5th digits suggesting osteomyelitis. Prominent vascular calcifications of the ankle and foot.   Mild midfoot degenerative changes with dorsal osteophytes. Remote healed distal fibular fracture sequela. No acute fracture or malalignment.       Transmetatarsal amputation changes of the foot with irregular appearance of the stumps of the 1st and 5th digits suggesting osteomyelitis with osteomyelitis of the rest of the digits not excluded. An MRI may be obtained for further evaluation if clinically warranted Findings suggesting stump breakdown at the level of 1st and 5th metatarsals.   I personally reviewed the image(s)/study and resident interpretation. I agree with the findings as stated by resident Rafael Tony. Data analyzed and images interpreted at [facility], [place],  [place].   MACRO: None   Signed by: Liz Ruiz 3/18/2024 9:56 AM Dictation workstation:   [id number]    XR chest 1 view    Result Date: 3/16/2024  Interpreted By:  Kendrick Clayton, STUDY: XR CHEST 1 VIEW; 3/16/2024 6:32 pm   INDICATION: Signs/Symptoms:eval continued hypoxemia. esrd fluid overload.   COMPARISON: Radiograph dated 01/23/2024   ACCESSION NUMBER(S): [id number]   ORDERING CLINICIAN: NETTIE JAIME   FINDINGS: Left IJ approach hemodialysis catheter is in place with the tip projecting over the right atrium. Right subclavian approach pacer/defibrillator is stable.   The cardiac silhouette size is unchanged.   Low lung volumes and bronchovascular crowding. Right more than left bibasilar opacity. Mild interstitial prominence and edema. No sizable pneumothorax   No acute osseous abnormality.       1. Slight interval worsening of the aeration of the lungs with bilateral, right more than left pleural effusion and atelectasis/consolidation and mild interstitial edema.       Signed by: Kendrick Painting 3/16/2024 9:04 PM Dictation workstation:   [id number]    ECG 12 lead    Result Date: 3/12/2024  Atrial fibrillation ST & T wave abnormality, consider anterolateral ischemia Prolonged QT Abnormal ECG When compared with ECG of 08-MAR-2024 14:26, No significant change was found See ED provider note for full interpretation and clinical correlation Confirmed by Jluis Cee ([id number]) on 3/12/2024 9:34:16 AM    IR angiogram fistula graft    Result Date: 2/27/2024  Interpreted By:  Scott Lepe, STUDY: IR ANGIOGRAM FISTULA GRAFT;  2/21/2024 4:36 pm   INDICATION: Signs/Symptoms:Angio for pressures.   COMPARISON: None.   ACCESSION NUMBER(S): [id number]   ORDERING CLINICIAN: SAQIB PORRAS   TECHNIQUE: : Scott Lepe MD   CONSENT: The patient was informed of the nature of the proposed procedure. The purposes, alternatives, risks, and benefits were explained and discussed. All  questions were answered and consent was obtained.   RADIATION EXPOSURE: Dose: 34 mGy   SEDATION: Lidocaine was administered for local anesthesia. No IV sedation was given.   MEDICATION/CONTRAST: 40 mL Omnipaque 350   TIME OUT: A time out was performed immediately prior to the procedure start with interventional team, correctly identifying the patient using multiple identifiers and ensuring the appropriate procedure and anatomy (including laterality, if applicable) were identified. The procedure consent form and all relevant laboratory and imaging tests were reviewed. The need for antibiotic administration or patient or procedure specific safety precautions or equipment was reviewed.   COMPLICATIONS: None immediate     FINDINGS: The patient was positioned on the angiography table. The left arm was prepped and draped in a sterile fashion.   Utilizing sonographic guidance, the arteriovenous fistula was accessed towards the venous outflow with micropuncture technique. An image was stored to PACS. An 0.018 inch wire was passed into the fistula and used to place a transitional sheath. The wire and inner dilator were removed and a Bentson wire was advanced into the fistula. The transitional sheath was removed and a 6 Portuguese sheath placed.   Angiograms were obtained through the sheath with imaging of the venous outflow, including the central veins. These images demonstrate multiple areas stenosis greater than 50% throughout the cephalic outflow. There are 2 very large pseudoaneurysms related to repeated access. This is also a site of bleeding. The axillary stenosis which has previously been ballooned is present but less than 50%. The remainder of the central veins are patent.   Through the sheath, a 5 Portuguese Kumpe catheter and Glidewire were coaxially advanced beyond the stenosis. The Glidewire was removed and a 0.035 inch Bentson wire was placed. Finally the Kumpe catheter was removed.   Over the 0.035 inch wire, a 8x60 mm  balloon was positioned across the multiple outflow stenoses of the cephalic outflow. Angioplasty was performed.  A final angiogram shows no significant residual stenosis and no evidence of vessel disruption.   All wires and catheters were removed. The sheath was then removed during deployment of a pursestring suture utilizing 3-0 Vicryl. Hemostasis was achieved with the suture and manual compression. A sterile was applied. Additional pressure was applied to the eroded pseudoaneurysm access site which was bleeding prior to the procedure and was bleeding again following preparation of the patient. Hemostasis was achieved with prolonged manual pressure.       Left upper extremity AV fistulogram. The fistula is patent, although there are multiple greater than 50% stenosis of the cephalic outflow. These were angioplastied successfully. 2 very large pseudoaneurysms identified at repeated access points. These are responsible for prolonged bleeding after access and will require surgical revision.   Performed and dictated at [facility].   Signed by: Scott Lepe 2/27/2024 8:23 AM Dictation workstation:   [id number]    IR CVC tunneled    Result Date: 2/23/2024  Interpreted By:  Jose Wang, STUDY: IR CVC TUNNELED; ;  2/23/2024 10:04 am   ULTRASOUND AND FLUOROSCOPICALLY GUIDED LEFT INTERNAL JUGULAR VEIN TUNNELED HEMODIALYSIS CATHETER   INDICATION: Signs/Symptoms:permcath placement - ESRD. 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 Request for replacement of tunneled hemodialysis catheter.   COMPARISON: Angiogram 02/21/2024, chest radiograph 01/23/2024   ACCESSION NUMBER(S): [id number]   ORDERING CLINICIAN: YUE PEREZ   TECHNIQUE:   ATTENDING : Jose Wang M.D.   TECHNICAL DESCRIPTION/FINDINGS: The  procedure, including all risks, benefits and alternatives were explained to the patient and his proxy in detail. All questions were answered and written informed consent was obtained.   The patient was positioned supine on the fluoroscopy table. A time-out was performed.   The left neck and anterosuperior chest were prepped and draped in usual sterile fashion. Focused ultrasound was performed of the left internal jugular vein demonstrating patency and compressibility. Ultrasound images were saved. 1% lidocaine was administered subcutaneously for local anesthesia. Under real-time ultrasound guidance, the left internal jugular vein was accessed in antegrade direction using micropuncture technique. Ultrasound images were saved. Under fluoroscopic visualization, an 018 guidewire was advanced into the right atrium and a micropuncture transitional introducer was placed.   Additional 1% lidocaine was then administered over the left anterosuperior chest wall to anesthetize a subcutaneous tunnel tract. The dual lumen cuffed hemodialysis catheter was then tunneled from a left anterosuperior chest wall incision site and externalized overlying the venotomy. After serial dilation over an 035 guidewire which had been placed into the IVC, 15 Welsh peel-away sheath was placed. Through the sheath, a 14.5 Welsh by 27 cm tip to cuff dual-lumen hemodialysis catheter was placed. Completion fluoroscopic image demonstrates the catheter tip at the superior cavoatrial junction.   Catheter lumens easily aspirated and flushed were locked with a concentrated heparinized solution (1000 units/cc). The catheter hub was sutured to the skin with 2 0 Prolene stay suture. A sterile dressing was applied.   SEDATION/MEDICATIONS: Continuous cardiopulmonary monitoring was performed by a radiology nurse for the duration of the procedure. No conscious sedation was administered. Procedural duration 20 minutes. 15 cc 1% Lidocaine was administered  subcutaneously for local anesthesia. SPECIMENS: None. ESTIMATED BLOOD LOSS:  5 cc FLOUROSCOPY:  0.4 minutes; DAP  3760 mGy-cm*2; Air Kerma  11.86 mGy CONTRAST: None.       Ultrasound and fluoroscopically guided left internal jugular vein tunneled hemodialysis catheter. The catheter is ready for immediate use.     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 Aeruginosa and Candida parapsilosis  -Blood cultures negative  -Right foot x-rays show possible 1st and 5th met osteomyelitis  -Right foot MRI pending  -PVRs showed disease in the right, [facility] was consulted and took patient for angiogram yesterday. No flow below the knee and they were unable to revasc. [facility] believes patient is too unstable for a procedure, whether a BKA or debridement  -Podiatry not planning surgical intervention. Plan is for palliative wound care. Will continue to follow and change dressing every other day  -Orders Dakins for future dressing changes  -Re-dressed right with DSD  -Patient was seen and discussed with attending, Dr. Virgilio Lawton, DPM PGY-2

## 2024-03-21 NOTE — NURSING NOTE
Report to Receiving RN:    Report To: 1151 am  Time Report Called: Carlos Hernandez RN)  Hand-Off Communication: vs- 105/58; HR 83; Pt removed 1 liters  Complications During Treatment: Yes, Pt clotted off after 5 minutes of starting HD tx; Pt blood was returned; catheter was dwelled with cath flow for 30 minutes; Pt was reset;  due arterial > 300; Pt removed 1 liter for FR.  Ultrafiltration Treatment: No  Medications Administered During Dialysis: Yes, cath flow  Blood Products Administered During Dialysis: No  Labs Sent During Dialysis: Yes  Heparin Drip Rate Changes: No    Electronic Signatures:   (Signed Kathleen Quiroz RN)   Authored:    (Signed )   Authored:     Last Updated: 11:51 AM by KATHLEEN QUIROZ

## 2024-03-21 NOTE — PROGRESS NOTES
Vancomycin Dosing by Pharmacy- FOLLOW UP    Chevy Benton is a 78 y.o. year old male who Pharmacy has been consulted for vancomycin dosing for cellulitis, skin and soft tissue. Based on the patient's indication and renal status this patient is being dosed based on a goal pre-HD level of 15-25. on HD- TThSat schedule    Visit Vitals  /50   Pulse 88   Temp 36 °C (96.8 °F) (Temporal)   Resp 18        Lab Results   Component Value Date    CREATININE 3.62 (H) 03/21/2024    CREATININE 3.43 (H) 03/20/2024    CREATININE 4.56 (H) 03/19/2024    CREATININE 3.81 (H) 03/18/2024      I/O last 3 completed shifts:  In: 990 (11.4 mL/kg) [P.O.:240; I.V.:200 (2.3 mL/kg); Other:400; IV Piggyback:150]  Out: 1015 (11.7 mL/kg) [Other:1000; Blood:15]  Weight: 87 kg     Lab Results   Component Value Date    PATIENTTEMP 37.0 03/10/2024    PATIENTTEMP 37.0 01/27/2024    PATIENTTEMP 37.0 01/27/2024      Assessment/Plan    2g load, HD on 3/19, 750 mg x once on 3/19 post HD, FU lvl 3/21 pre-HD = 20.4. Will give 750 mg x once this evening post dialysis. Follow up level 3/23 AM labs.   Will continue to monitor renal function daily while on vancomycin and order serum creatinine at least every 48 hours if not already ordered.  Follow for continued vancomycin needs, clinical response, and signs/symptoms of toxicity.       Angela Dye, [facility]

## 2024-03-21 NOTE — TREATMENT PLAN
Incontinent Care performed pt completely cleaned, new large chux placed under pt, fresh sheet and blanket's applied. New gown placed on pt.     The following assisted with pt care: morning medical RN LETICIA, morning dialysis charge RN RS, morning HD tech WM and morning opening tech JT.

## 2024-03-21 NOTE — PROGRESS NOTES
"DIALYSIS NOTE:    Hemodialysis treatment per submitted orders: 3 K, 2.5 Ca, 3.5  hours. Fluid removal 1 liter, as tolerated (keep SBP> 90mmHg).         BP (!) 124/47   Pulse 86   Temp 36.6 °C (97.9 °F)   Resp 18   Ht 1.778 m (5' 10\")   Wt 87 kg (191 lb 12.8 oz)   SpO2 99%   BMI 27.52 kg/m²       Additional Recommendations:  Please give the midodrine before sending to dialysis    Scheduled medications  allopurinol, 100 mg, oral, Daily  apixaban, 2.5 mg, oral, q12h  atorvastatin, 40 mg, oral, Nightly  B complex-vitamin C-folic acid, 1 capsule, oral, Daily  clopidogrel, 75 mg, oral, Daily  epoetin dane or biosimilar, 15,000 Units, intravenous, Once per day on Tue Thu Sat  gabapentin, 100 mg, oral, Daily  insulin lispro, 0-5 Units, subcutaneous, TID with meals  lubricating eye drops, 2 drop, Both Eyes, TID  melatonin, 5 mg, oral, Nightly  midodrine, 15 mg, oral, q8h  pantoprazole, 40 mg, oral, Daily before breakfast  piperacillin-tazobactam, 2.25 g, intravenous, q12h  polyethylene glycol, 17 g, oral, Daily  sennosides-docusate sodium, 2 tablet, oral, BID  [START ON 3/22/2024] sodium hypochlorite, , irrigation, Daily  vancomycin, 750 mg, intravenous, Once      Recent Results (from the past 24 hour(s))   POCT GLUCOSE    Collection Time: 03/20/24  6:13 PM   Result Value Ref Range    POCT Glucose 161 (H) 74 - 99 mg/dL   POCT GLUCOSE    Collection Time: 03/20/24  9:29 PM   Result Value Ref Range    POCT Glucose 170 (H) 74 - 99 mg/dL   Renal Function Panel    Collection Time: 03/21/24  4:57 AM   Result Value Ref Range    Glucose 148 (H) 74 - 99 mg/dL    Sodium 132 (L) 136 - 145 mmol/L    Potassium 3.9 3.5 - 5.3 mmol/L    Chloride 93 (L) 98 - 107 mmol/L    Bicarbonate 30 21 - 32 mmol/L    Anion Gap 13 10 - 20 mmol/L    Urea Nitrogen 45 (H) 6 - 23 mg/dL    Creatinine 3.62 (H) 0.50 - 1.30 mg/dL    eGFR 16 (L) >60 mL/min/1.73m*2    Calcium 10.2 8.6 - 10.6 mg/dL    Phosphorus 5.8 (H) 2.5 - 4.9 mg/dL    Albumin 2.6 (L) 3.4 - " 5.0 g/dL   CBC    Collection Time: 03/21/24  4:57 AM   Result Value Ref Range    WBC 14.0 (H) 4.4 - 11.3 x10*3/uL    nRBC 0.5 (H) 0.0 - 0.0 /100 WBCs    RBC 2.53 (L) 4.50 - 5.90 x10*6/uL    Hemoglobin 7.1 (L) 13.5 - 17.5 g/dL    Hematocrit 24.3 (L) 41.0 - 52.0 %    MCV 96 80 - 100 fL    MCH 28.1 26.0 - 34.0 pg    MCHC 29.2 (L) 32.0 - 36.0 g/dL    RDW 17.2 (H) 11.5 - 14.5 %    Platelets 358 150 - 450 x10*3/uL   Vancomycin    Collection Time: 03/21/24  4:57 AM   Result Value Ref Range    Vancomycin 20.4 (H) 5.0 - 20.0 ug/mL   Renal Function Panel    Collection Time: 03/21/24  6:40 AM   Result Value Ref Range    Glucose 159 (H) 74 - 99 mg/dL    Sodium 135 (L) 136 - 145 mmol/L    Potassium 4.2 3.5 - 5.3 mmol/L    Chloride 94 (L) 98 - 107 mmol/L    Bicarbonate 24 21 - 32 mmol/L    Anion Gap 21 (H) 10 - 20 mmol/L    Urea Nitrogen 45 (H) 6 - 23 mg/dL    Creatinine 4.00 (H) 0.50 - 1.30 mg/dL    eGFR 15 (L) >60 mL/min/1.73m*2    Calcium 9.4 8.6 - 10.6 mg/dL    Phosphorus 5.5 (H) 2.5 - 4.9 mg/dL    Albumin 2.4 (L) 3.4 - 5.0 g/dL   POCT GLUCOSE    Collection Time: 03/21/24 12:22 PM   Result Value Ref Range    POCT Glucose 118 (H) 74 - 99 mg/dL

## 2024-03-21 NOTE — PROGRESS NOTES
Physical Therapy                 Therapy Communication Note    Patient Name: Chevy Benton  MRN: [id number]  Today's Date: 3/21/2024     Discipline: Physical Therapy    Missed Visit Reason: Missed Visit Reason: Patient placed on medical hold (Per chart, pt with new non-occlusive right subclavian vein thrombus on 3/19. Anti-coagulation (Eliquis) held on 3/20 and resumed 3/21 at 12:47pm. Per most recent guidelines, PT will hold mobility until 3 hours post DOAC administration.)    Missed Time: Attempt 3517

## 2024-03-22 NOTE — PROGRESS NOTES
Assessment/Plan            Assessment/Plan   Principal Problem:    A-V fistula (CMS/[facility])  Active Problems:    Anemia due to blood loss, acute       Mr. Benton, a 78-year-old patient with a comprehensive medical history including peripheral arterial disease, atrial fibrillation managed with Eliquis, coronary artery disease, type 2 diabetes mellitus, a right subclavian deep vein thrombosis in October 2023, gastroesophageal reflux disease, sick sinus syndrome with a pacemaker, gout, end-stage renal disease undergoing hemodialysis on Mondays, Wednesdays, and Fridays, hyperlipidemia, hypotension, heart failure with reduced ejection fraction (EF 40-45% as of January 2023), and gout, was transferred from [facility] to [facility] due to bleeding at his left upper arm arteriovenous fistula site. Vascular surgery successfully applied two figure-eight silk sutures for hemostasis at the fistula site. Following this, the patient received a new tunneled hemodialysis catheter and underwent dialysis. Surgery was performed on March 10th for fistula ligation. Postoperatively, the patient's pain has been managed effectively, and his hemoglobin and hematocrit levels have remained stable despite resuming anticoagulant and antiplatelet therapies. He continues to exhibit signs of generalized edema, managed by nephrology for fluid overload. Persistent leukocytosis without specific symptoms prompted repeated blood cultures, results of which are pending. A chest X-ray indicated volume overload with an indeterminate presence of infiltrates, challenging to discern due to the fluid overload. The patient, initially on 2 liters of oxygen, did not exhibit corresponding symptoms. Recommendations for wound care have been updated. New complaints of leg pain led to a consultation with podiatry, revealing potential purulence at the transmetatarsal amputation site, now under redress and culture. Moreover, the patient started producing sputum on  March 17, prompting the initiation of vancomycin and Zosyn while awaiting further diagnostic results. Endovascular consultation was also sought due to missed follow-up after a revascularization procedure.    3/18  -has been exhibiting a persistent elevation in white blood cell count, suggestive of potential infections including a right foot infection at the transmetatarsal amputation site and a developing pneumonia.    -Podiatry continues to monitor his wound for signs of calciphylaxis, with specific attention to his left side, and the need for parathyroid hormone follow-up is noted. We are awaiting further cultures, including blood cultures, to guide management.   -experiencing significant fluid overload, particularly noted in the upper extremities. A duplex scan of the upper extremities has been ordered to assess for any vascular anomalies, and nephrology is actively managing his fluid status.  -Given the possibility of soft tissue infection and pneumonia, antibiotic therapy has been initiated.         3/19  -Son Adan called and updated. Addressed all concerns.   -US showed new acute non occlussive thrombus in his rt subclavian vein proximal segment. Also with a hematoma in his right upper arm. Left arm was limited due to bandages. Per vascular surgery, no plan for any intervention, with only supportive care (arm elevation and local compression).  -MRI Rt foot pending  -RAJ/TBI reviewed, , with possible stenosis in the inflow of the Rt LE. Per Endovascular plan for peripheral angioplasty of Rt CFA tomorrow    -NPO after midnight      -Abx: continue with the current regimen, will discuss with podiatry later. Waiting on the MRI to determine surgical plan    3/20  No events, headed to peripheral angioplasty of Rt CFA, no events was observed after the procedure.   Holding plavix and eliquis per cardiology recommendations     3/21 no events, pending further discussion with endovascular attending next Monday.    Palliative care will be consulted for goc discussion too.      --------------------------------------------------      #leukocytosis  #conern for infection at TMA site  #concern for developing pneumonia   - initially likely related to surgical stress, however remained persistent, no new signs of infection/localizing signs at time. However pt now developed foot pain and sputum production. Cxr with volume overload with possible infiltrate. Podiatry obtained wound cultures.   - ordered blood cultures,  - sputum culture ordered   - follow up wound cultures  - IS, accapella, RT consult to eval/rec.       #Bleeding from AV fistula s/p ligation and tunneled HD line placement - resolved  #anemia d/t acute blood loss  #MRSA colonization, on decolonization protocol due to central line placement.   - Vascular surgery consulted, appreciate recs  - Hemostasis obtained at L upper extremity fistula site after placement of two figure 8 sutures   -Transfuse 1 unit of PRBC on HD on admit.  Monitor HB.     - Restarted apixaban and Plavix and monitoring hemoglobin for stability.          #UE edema - likely related to anasarca, positioning and ESRD, mild improvement  # Respiratory failure with hypoxemia secondary to fluid overload   -US showed new acute non occlussive thrombus in his rt subclavian vein proximal segment. Also with a hematoma in his right upper arm. Left arm was limited due to bandages. Per vascular surgery, no plan for any intervention, with only supportive care (arm elevation and local compression).  -Patient continues  2 L, in setting of anasarca and ESRD.  Likely only requiring 1 L as I have weaned him down in the past.  Xray as above. Componenet of fluid overload.       #T2DM (last HgA1c 5.3% 12/3/23) c/b neuropathy  - accucheck ACHS  - Lispro SSI #1 0-5 units  - holding home dose of Lantus 6 units to prevent episodes of hypoglycemia while hospitalized and HgA1c <7%. Monitor more need to resume.    - hypoglycemia  "order set placed  - continue home dose of Gabapentin 100 mg PO every day  - glc at goal      #hyponatremia (chronic)  #ESRD on HD   - daily CHG bath d/t L chest dialysis cath site   - Nephrology consulted for HD   - renal diet  - renally dose meds as needed  - continue home dose of Nephrocaps PO every day  -continue HD per nephrology. Has had additional sessions this week for fluid removal. Care with fluid removal given chronic hypotension on midodrine.      #Afib  #hx of DVT (10/23)  - Duplex US of RUE done 10/22/23: Positive study for DVT in the right upper extremity.  There is a nonocclusive thrombus in the right subclavian artery.  - home dose of Eliquis       #hypotension, chronic  - continue home dose of Midodrine 15 mg PO TID     #LLE wound  #R foot wound  #PVD s/p revasc  - wound nurse consult placed, requesting updated wound care recs, wound care recs and record and now updated   -Podiatry on board.     #MRSA nare positive  On MRSA decolonization protocol, ordered mupirocin and chlorhexidine bath  Femoral line removed on 3/14 as patient is clinically stable after starting apixaban and Plavix       #Malnutrition  -With anasarca, wound needs, starting to have increase in appetite.  Nutrition consulted and started on Nepro twice daily         Time > 30 minutes     Scheduled outpatient appointments in system:   Future Appointments   Date Time Provider Department Center   3/27/2024  9:00 AM Rosa Tovar MD [id number] [place]   4/17/2024  1:00 PM Koko Gordon MD [id number] [facility]     ---------------------------------------------------------------------------------------------------  Subjective   No new events.      ---------------------------------------------------------------------------------------------------  Objective   Last Recorded Vitals  Blood pressure 134/80, pulse 88, temperature 36.4 °C (97.5 °F), resp. rate 18, height 1.778 m (5' 10\"), weight 87 kg (191 lb 12.8 oz), SpO2 100 %.  Intake/Output " last 3 Shifts:  I/O last 3 completed shifts:  In: 1250 (14.4 mL/kg) [I.V.:400 (4.6 mL/kg); Other:800; IV Piggyback:50]  Out: 2000 (23 mL/kg) [Other:2000]  Weight: 87 kg     Physical Exam  Constitutional:       Comments: Comfortable at rest on oxygen through NC   HENT:      Head: Normocephalic.      Nose: Nose normal.   Eyes:      Extraocular Movements: Extraocular movements intact.      Pupils: Pupils are equal, round, and reactive to light.   Cardiovascular:      Rate and Rhythm: Normal rate and regular rhythm.      Heart sounds: Normal heart sounds. No murmur heard.     Comments: Right femoral line  Left tunneled dialysis catheter  EJ IV  Pulmonary:      Effort: No respiratory distress.      Breath sounds: Normal breath sounds. No wheezing.   Abdominal:      General: Bowel sounds are normal. There is no distension.      Palpations: Abdomen is soft.      Tenderness: There is no abdominal tenderness.   Musculoskeletal:         General: Normal range of motion.      Cervical back: Normal range of motion.      Comments: Fistula site in LUE covered with dressing after the ligation surgery,     Right foot TMA, ( ulcer noted over the TMA area, which is covered with dressing)    Left arm wrapped in ace wrap, pain in fingers, neurovascularly intact, motor and sensation intact but reports mild decreased sensation, noted mild increased edema of hand compared to right.    Skin:     General: Skin is warm.   Neurological:      General: No focal deficit present.      Mental Status: He is alert and oriented to person, place, and time. Mental status is at baseline.   Psychiatric:         Mood and Affect: Mood normal.         Relevant Results  Lab Results   Component Value Date    WBC 13.6 (H) 03/22/2024    HGB 7.2 (L) 03/22/2024    HCT 25.4 (L) 03/22/2024    MCV 98 03/22/2024     03/22/2024      Lab Results   Component Value Date    GLUCOSE 159 (H) 03/22/2024    CALCIUM 9.8 03/22/2024     (L) 03/22/2024    K 3.4 (L)  03/22/2024    CO2 28 03/22/2024    CL 94 (L) 03/22/2024    BUN 34 (H) 03/22/2024    CREATININE 3.10 (H) 03/22/2024     Scheduled medications  allopurinol, 100 mg, oral, Daily  apixaban, 2.5 mg, oral, q12h  atorvastatin, 40 mg, oral, Nightly  B complex-vitamin C-folic acid, 1 capsule, oral, Daily  clopidogrel, 75 mg, oral, Daily  epoetin dane or biosimilar, 15,000 Units, intravenous, Once per day on Tue Thu Sat  gabapentin, 100 mg, oral, Daily  insulin lispro, 0-5 Units, subcutaneous, TID with meals  lubricating eye drops, 2 drop, Both Eyes, TID  melatonin, 5 mg, oral, Nightly  midodrine, 15 mg, oral, q8h  pantoprazole, 40 mg, oral, Daily before breakfast  piperacillin-tazobactam, 2.25 g, intravenous, q12h  polyethylene glycol, 17 g, oral, Daily  sennosides-docusate sodium, 2 tablet, oral, BID  sodium hypochlorite, , irrigation, Daily      Continuous medications     PRN medications  PRN medications: acetaminophen, albuterol, alteplase, dextrose 10 % in water (D10W), dextrose, glucagon, ipratropium-albuteroL, lidocaine, ondansetron, oxyCODONE, sodium chloride 0.9%, vancomycin    Wilma Redding MD

## 2024-03-22 NOTE — PROGRESS NOTES
Occupational Therapy    Occupational Therapy Treatment    Name: Chevy Benton  MRN: [id number]  : 1945  Date: 24  Time Calculation  Start Time: 1435  Stop Time: 1452  Time Calculation (min): 17 min    Assessment:  OT Assessment: Pt presents with need for total assist in ADL activities, functional mobility, bed mobility and transfers. Pt demonstrated increased confusion and aggitation throughout session with OT and S/OT.  Plan:  Treatment Interventions: ADL retraining, Functional transfer training, UE strengthening/ROM, Endurance training, Cognitive reorientation, Patient/family training, Equipment evaluation/education, Fine motor coordination activities, Compensatory technique education  OT Frequency: 3 times per week  OT Discharge Recommendations: Moderate intensity level of continued care  Equipment Recommended upon Discharge:  (TBD)  OT Recommended Transfer Status: Maximum assist, Assist of 2  OT - OK to Discharge: Yes    Subjective   Previous Visit Info:  OT Last Visit  OT Received On: 24  General:  General  Reason for Referral: Left arm Ligation of AVF  Past Medical History Relevant to Rehab: PVD s/p PTA to RLE c/b perforation of AT, Right subclavian DVT, right TMA, chronic systolic and diastolic HFrEF, DM with neuropathy.  Prior to Session Communication: Bedside nurse  Patient Position Received: Bed, 2 rail up, Alarm off, not on at start of session  Preferred Learning Style: auditory, verbal  General Comment: Pt cleared for OT per RN, pt anticipating eating lunch upon start of session.  Precautions:  LE Weight Bearing Status: Right Non-Weight Bearing  Medical Precautions: Fall precautions, Cardiac precautions    Cognition:  Overall Cognitive Status: Impaired  Arousal/Alertness: Inconsistent responses to stimuli. pt inconsistent in responding to questions or commands during session, increased confusion during session stating he needs to eat, he does not want to eat, and requesting  "OT/S/OT to move multiple times throughout session.   Orientation Level: unable to provide name and  to RN upon requrest, required multiple verbal cues aswering \"Cole Mann\" when asked who are you, able to answer yes when asked \"are you Adan\" aswered yes when prompted \"is your birthday in [birth year]?\" then pt reported    Following Commands: pt increaed confusion and aggitation when prompted with one step commands throughout session, unable to follow 1 step commands consistently  Vitals:  Vital Signs  Heart Rate: 92  SpO2: 100 %  Patient Position: Lying  Pain Assessment:  Pain Assessment  Pain Score: 0 - No pain     Objective   Activities of Daily Living: Feeding  Feeding Where Assessed: Bed level  Feeding Comments: Pt anticipated to eat lunch with assistance upon begingin of session, pt confusion throughout session reporting he needs to eat, and he does not want to eat alternating between the two. Pt required max assist to position BUEs onto pillows to position hands up near food tray in functional position with increased aggitation reporting \"Move I need to eat\". Adapted spoon placed in pt RUE, when prompted to bring spoon toward mouth pt initiated movement, with increased hand over hand assistance pt resisted movement any movement bringing toward mouth. Pt presented with confusion and aggregation when one step directions were presented saying \"move\" when asked if he would like to continue towards eating meal. Pt reported he did not want to eat right now and to move table away, then reporting he needs to eat.        Bed Mobility/Transfers: Bed Mobility 1  Bed Mobility 1: Scooting  Level of Assistance 1: Dependent  Bed Mobility Comments 1: Pt required total assist x2 to scoot up in bed with bed in trendelenberg and use of drawsheet  Bed Mobility 2  Bed Mobility Comments 2: Pt required total assist x2 to raise HOB up and place pillow behind pt UB in functional position in preperation to eat lunch.     " "  Therapy/Activity: Therapeutic Activity  Therapeutic Activity 1: pt attempted to participate in feeding activities while positioned upright in bed with UE supported in functional positoin. Pt demonstrated increased confusion and aggitation throughout session.     LEONIDAS   RUE : Exceptions to WFL, CHETNA BASILIO: Exceptions to WFL,        Outcome Measures:  [facility] Daily Activity  Putting on and taking off regular lower body clothing: Total  Bathing (including washing, rinsing, drying): Total  Putting on and taking off regular upper body clothing: Total  Toileting, which includes using toilet, bedpan or urinal: Total  Taking care of personal grooming such as brushing teeth: Total  Eating Meals: Total  Daily Activity - Total Score: 6         and OT Adult Other Outcome Measures  4AT: 12 points; possible delirium and/or cognitive impairment. (RN present, reports that pt was slightly \"loopy\" earlier on after pain medication, but pt apparent state was different. RN reported that the team was notified on current cognitive status.)    Education Documentation  Body Mechanics, taught by ABHISHEK Levin at 3/22/2024  3:13 PM.  Learner: Patient  Readiness: Acceptance  Method: Explanation, Demonstration  Response: Needs Reinforcement    ADL Training, taught by ABHISHEK Levin at 3/22/2024  3:13 PM.  Learner: Patient  Readiness: Acceptance  Method: Explanation, Demonstration  Response: Needs Reinforcement    Education Comments  No comments found.      Goals:  Encounter Problems       Encounter Problems (Active)       ADLs       Patient with complete upper body dressing with minimal assist  level of assistance donning and doffing all UE clothes with PRN adaptive equipment while supine in bed (Progressing)       Start:  03/15/24    Expected End:  03/19/24            Patient will feed self with minimal assist  level of assistance and verbal cues using PRN adaptive equipment. (Progressing)       Start:  03/15/24    Expected End:  " 03/19/24            Patient will complete daily grooming tasks brushing teeth and washing face/hair with minimal assist  level of assistance and PRN adaptive equipment while supine in bed. (Progressing)       Start:  03/15/24    Expected End:  03/19/24               TRANSFERS       Patient will perform bed mobility minimal assist  level of assistance and bed rails and draw sheet in order to improve safety and independence with mobility (Progressing)       Start:  03/15/24    Expected End:  03/19/24

## 2024-03-22 NOTE — PROGRESS NOTES
"Chevy Benton is a 78 y.o. male on day 14 of admission presenting with A-V fistula (CMS/HCC).    Subjective   Pt  resting in bed. Denies sob, n/v/d, fever, cough, chills, chest pain , constipation, anuric, c/o generalized pain       Objective     Physical Exam  Constitutional:       Appearance: He is obese.   Cardiovascular:      Comments: AFIB 84  Pulmonary:      Comments: Magdiel lungs diminished with minor crackles more noted to lt base  Cont pox 100%  on 2L  Abdominal:      General: There is distension.      Comments: Non-tender to palpation   Genitourinary:     Comments: States anuric  Musculoskeletal:      Comments: Ble edema +2-3 pitting   rt arm +3 pitting edema -fingers cool to touch  Lt arm swollen with acewrap drsg-s/p surg to lt arm..+3 pitting edema to lt hand   Skin:     General: Skin is warm and dry.      Comments: Magdiel feet with kerlix wrap   Neurological:      Mental Status: He is alert and oriented to person, place, and time.   Psychiatric:         Mood and Affect: Mood normal.         Behavior: Behavior normal.         Last Recorded Vitals  Blood pressure 134/80, pulse 88, temperature 36.4 °C (97.5 °F), resp. rate 18, height 1.778 m (5' 10\"), weight 87 kg (191 lb 12.8 oz), SpO2 100 %.  Intake/Output last 3 Shifts:  I/O last 3 completed shifts:  In: 1250 (14.4 mL/kg) [I.V.:400 (4.6 mL/kg); Other:800; IV Piggyback:50]  Out: 2000 (23 mL/kg) [Other:2000]  Weight: 87 kg     Relevant Results  Scheduled medications  allopurinol, 100 mg, oral, Daily  apixaban, 2.5 mg, oral, q12h  atorvastatin, 40 mg, oral, Nightly  B complex-vitamin C-folic acid, 1 capsule, oral, Daily  clopidogrel, 75 mg, oral, Daily  epoetin dane or biosimilar, 15,000 Units, intravenous, Once per day on Tue Thu Sat  gabapentin, 100 mg, oral, Daily  insulin lispro, 0-5 Units, subcutaneous, TID with meals  lubricating eye drops, 2 drop, Both Eyes, TID  melatonin, 5 mg, oral, Nightly  midodrine, 15 mg, oral, q8h  pantoprazole, 40 mg, " oral, Daily before breakfast  piperacillin-tazobactam, 2.25 g, intravenous, q12h  polyethylene glycol, 17 g, oral, Daily  sennosides-docusate sodium, 2 tablet, oral, BID  sodium hypochlorite, , irrigation, Daily      Continuous medications     PRN medications  PRN medications: acetaminophen, albuterol, alteplase, dextrose 10 % in water (D10W), dextrose, glucagon, HYDROmorphone, ipratropium-albuteroL, lidocaine, ondansetron, oxyCODONE, sodium chloride 0.9%, vancomycin   Results for orders placed or performed during the hospital encounter of 03/08/24 (from the past 24 hour(s))   POCT GLUCOSE   Result Value Ref Range    POCT Glucose 178 (H) 74 - 99 mg/dL   POCT GLUCOSE   Result Value Ref Range    POCT Glucose 222 (H) 74 - 99 mg/dL   CBC   Result Value Ref Range    WBC 13.6 (H) 4.4 - 11.3 x10*3/uL    nRBC 0.7 (H) 0.0 - 0.0 /100 WBCs    RBC 2.60 (L) 4.50 - 5.90 x10*6/uL    Hemoglobin 7.2 (L) 13.5 - 17.5 g/dL    Hematocrit 25.4 (L) 41.0 - 52.0 %    MCV 98 80 - 100 fL    MCH 27.7 26.0 - 34.0 pg    MCHC 28.3 (L) 32.0 - 36.0 g/dL    RDW 17.4 (H) 11.5 - 14.5 %    Platelets 339 150 - 450 x10*3/uL   Renal Function Panel   Result Value Ref Range    Glucose 159 (H) 74 - 99 mg/dL    Sodium 134 (L) 136 - 145 mmol/L    Potassium 3.4 (L) 3.5 - 5.3 mmol/L    Chloride 94 (L) 98 - 107 mmol/L    Bicarbonate 28 21 - 32 mmol/L    Anion Gap 15 10 - 20 mmol/L    Urea Nitrogen 34 (H) 6 - 23 mg/dL    Creatinine 3.10 (H) 0.50 - 1.30 mg/dL    eGFR 20 (L) >60 mL/min/1.73m*2    Calcium 9.8 8.6 - 10.6 mg/dL    Phosphorus 4.7 2.5 - 4.9 mg/dL    Albumin 2.4 (L) 3.4 - 5.0 g/dL   POCT GLUCOSE   Result Value Ref Range    POCT Glucose 164 (H) 74 - 99 mg/dL                     Assessment/Plan   Principal Problem:    A-V fistula (CMS/HCC)  Active Problems:    Anemia due to blood loss, acute    Critical limb ischemia of right lower extremity (CMS/HCC)    Tolerated hemodialysis yesterday with net fluid loss 1000cc.     Bp stable with tx, . Floor vs have been  stable, hypervolemic on exam and has stable electrolytes .     Outpatient Dialysis schedule:   [facility]Dr Hannah; will be TTS while inpatient     Access: lt chest cath - no issues - able to achieve   Lt arm access without thrill/bruit  Rt EJ     Anemia of ESRD:  3/13-increase  epoetin dane-epbx (Retacrit) injection 15,000 Units on dialysis days... current hgb 7.2.. will cont to monitor     CKD-MBD: not on Phosphate Binder...phos level 5.5.. Will cont to monitor.. B complex-vitamin C-folic acid (Nephrocaps) capsule 1 capsule daily     Plan HD tomorrow with UF as tolerated     Renal diet      Please obtain daily standing wt (if possible)     Medication to be adjusted for ESRD      Patient to continue regular HD schedule while inpatient and to follow with the outpatient nephrologist at discharge        JENIFFER Bates-CNP

## 2024-03-22 NOTE — CARE PLAN
"The patient's goals for the shift include  to get pain managed, rate pain less than \"4\" (1-10) during this shift, for right foot pain    The clinical goals for the shift include pt will remain HDS stable throughout shift      Problem: Pain  Goal: My pain/discomfort is manageable  Outcome: Progressing     Problem: Safety  Goal: Patient will be injury free during hospitalization  Outcome: Progressing  Goal: I will remain free of falls  Outcome: Progressing     Problem: Daily Care  Goal: Daily care needs are met  Outcome: Progressing     Problem: Psychosocial Needs  Goal: Demonstrates ability to cope with hospitalization/illness  Outcome: Progressing  Goal: Collaborate with me, my family, and caregiver to identify my specific goals  Outcome: Progressing     Problem: Discharge Barriers  Goal: My discharge needs are met  Outcome: Progressing     Problem: Skin  Goal: Prevent/manage excess moisture  Outcome: Progressing  Goal: Promote skin healing  Outcome: Progressing       "

## 2024-03-22 NOTE — CONSULTS
Inpatient consult to Palliative Care  Consult performed by: JENIFFER Pearl-CNP  Consult ordered by: Wilma Redding MD    Palliative Medicine Consult  Complex medical decision making, symptom management, patient/family support    History obtained from chart review including ED note, H&P, patient's daily progress notes, review of lab/test results, and discussion with primary team and bedside RN.    Subjective    History of Present Illness  Chevy Benton is a 78 y.o. male with complex past medical history of including peripheral arterial disease, atrial fibrillation managed with Eliquis, coronary artery disease, type 2 diabetes mellitus, a right subclavian deep vein thrombosis in October 2023, gastroesophageal reflux disease, sick sinus syndrome with a pacemaker, gout, end-stage renal disease undergoing hemodialysis on Mondays, Wednesdays, and Fridays, HD ongoing since January 2015, hyperlipidemia, hypotension, heart failure with reduced ejection fraction (EF 40-45% as of January 2023), and gout, was transferred from [facility] on March 8th, 2024 due to bleeding at his left upper arm arteriovenous fistula site. Vascular surgery successfully applied two figure-eight silk sutures for hemostasis at the fistula site. Following this, the patient received a new tunneled hemodialysis catheter and underwent dialysis. Surgery was performed on March 10th, 2024 for fistula ligation. Pt continues to exhibit signs of generalized edema being followed by nephrology for fluid overload and receiving HD 3x week T-TH-SAT. Hospital course complicated with persistent leukocytosis, fluid overload, developing pneumonia, leg pain, concerns regarding recent right foot transmetatarsal amputation site infection, and left side calciphylaxis. Pt is being followed closely by Podiatry. Endovascular follow-up pending for Monday. Palliative Care consulted for [facility] discussion today.    Introduction to Palliative  Care  Met with patient at bedside. Called and spoke to pt's son Chevy Benton Jr.   Patient remains altered, does not have capacity to make their own medical decisions at this time. Unable to participate in ROS or goals of care discussion. Surrogate decision maker is pt's wife Julia Benton and pt's son Chevy Benton Jr.  Staff present: [facility]  Palliative Medicine was introduced as a specialty service for patients with serious illness to help with symptom management, improve quality of life, assist with goals of care conversations, navigate complex decision making, and provide support to patients and families. Support and empathy was provided throughout the encounter. Provided reflective listening and presence.     Symptoms  Walker Symptom Assessment System  Pain Score: 10 - Worst possible pain  Pain: Pt denies any pain currently during visit.  Dyspnea: Pt denies.  Fatigue: Pt denies.  Insomnia: Pt denies trouble sleeping at night.  Drowsiness: Pt denies.  Constipation: Pt denies.  Nausea: Pt denies.  Appetite: Pt reports good appetite and ate all of dinner. Pt reports dinner was good whatever he had. Pt's nurse (Joseph Flannery RN) reports pt only ate 25% for dinner.   Anxiety: Pt denies.  Depression: Pt denies.    Palliative Medicine Social History:  The patient is  to spouse, his wife Julia Benton. They have 1 son- Chevy Benton Jr. The patient lives with his wife at home up until September 2023 and since has been in and out of the hospital and SNFs for rehabilitation. The patient retired after working 46-47 years at [employer]. Pt denies current use of alcohol, tobacco or drugs. The pt's mobility was him driving to and from dialysis and very active until August/September 2023 per son. The patient spends most of the day watching television shows he enjoys. Pt loves [employer]. Pt sees their PCP and other specialists regularly. Hobbies were watching  "his favorite shows, reading the paper, sitting around the house, sleeping, and eating good food, but since illness has progressed, pt is no longer able to drive and be as active as he was. For enjoyment, the pt watches [employer] and television. Denies any safety concerns.    Music History:   Do you enjoy music? YES What type of music do you enjoy? Jazz and Blues    Personal/Social History  He reports that he has never smoked. He has been exposed to tobacco smoke. He has never used smokeless tobacco. No history on file for alcohol use and drug use.    Past Medical History  He has a past medical history of A-fib (CMS/[facility]), Acute hypercapnic respiratory failure (CMS/[facility]), Acute on chronic HFrEF (heart failure with reduced ejection fraction) (CMS/[facility]), Diabetes mellitus (CMS/[facility]), ESRD on hemodialysis (CMS/[facility]), History of angioplasty of peripheral vessel (10/26/2023), HTN (hypertension), LFT elevation (07/20/2021), Small bowel obstruction (CMS/[facility]) (10/10/2023), and Stage II pressure ulcer (CMS/[facility]).    Surgical History  He has a past surgical history that includes Invasive Vascular Procedure (Right, 1/17/2024).     Family History  Family History   Problem Relation Name Age of Onset    Diabetes Mother       Allergies  Penicillins    Objective    Last Recorded Vitals  Blood pressure 134/80, pulse 88, temperature 36.4 °C (97.5 °F), resp. rate 18, height 1.778 m (5' 10\"), weight 87 kg (191 lb 12.8 oz), SpO2 100 %. Body mass index is 27.52 kg/m².    Physical Exam  Vitals and nursing note reviewed.   Constitutional:       General: He is not in acute distress.     Appearance: He is ill-appearing. He is not toxic-appearing.      Comments: Chronically ill-appearing.    HENT:      Head: Normocephalic and atraumatic.      Mouth/Throat:      Mouth: Mucous membranes are moist.   Eyes:      General: No scleral icterus.  Cardiovascular:      Rate and Rhythm: Normal rate. Rhythm irregular.   Pulmonary:      Effort: No respiratory " distress.      Breath sounds: Decreased breath sounds present.      Comments: On supplemental oxygen therapy continuously via nasal cannula at 2L/minute  Abdominal:      General: There is distension.      Tenderness: There is no abdominal tenderness.   Musculoskeletal:      Right lower le+ Pitting Edema present.      Left lower le+ Pitting Edema present.      Comments: Dressings in place bilateral feet and right foot s/p transmetatarsal amputation   Skin:     General: Skin is warm.      Coloration: Skin is not jaundiced.   Neurological:      Mental Status: He is alert. He is confused.      Motor: Weakness present.      Comments: Pt able to recall name and birthday, pt unsure of date, month, or year, pt reports he is 79 years old. Pt unable to recall what he ate for dinner or last dialysis session date. A&Ox2 at time of visit.   Psychiatric:         Mood and Affect: Mood normal.         Cognition and Memory: He exhibits impaired recent memory.     Relevant Results  Results for orders placed or performed during the hospital encounter of 24 (from the past 24 hour(s))   POCT GLUCOSE   Result Value Ref Range    POCT Glucose 118 (H) 74 - 99 mg/dL   POCT GLUCOSE   Result Value Ref Range    POCT Glucose 178 (H) 74 - 99 mg/dL   POCT GLUCOSE   Result Value Ref Range    POCT Glucose 222 (H) 74 - 99 mg/dL   CBC   Result Value Ref Range    WBC 13.6 (H) 4.4 - 11.3 x10*3/uL    nRBC 0.7 (H) 0.0 - 0.0 /100 WBCs    RBC 2.60 (L) 4.50 - 5.90 x10*6/uL    Hemoglobin 7.2 (L) 13.5 - 17.5 g/dL    Hematocrit 25.4 (L) 41.0 - 52.0 %    MCV 98 80 - 100 fL    MCH 27.7 26.0 - 34.0 pg    MCHC 28.3 (L) 32.0 - 36.0 g/dL    RDW 17.4 (H) 11.5 - 14.5 %    Platelets 339 150 - 450 x10*3/uL   Renal Function Panel   Result Value Ref Range    Glucose 159 (H) 74 - 99 mg/dL    Sodium 134 (L) 136 - 145 mmol/L    Potassium 3.4 (L) 3.5 - 5.3 mmol/L    Chloride 94 (L) 98 - 107 mmol/L    Bicarbonate 28 21 - 32 mmol/L    Anion Gap 15 10 - 20 mmol/L     Urea Nitrogen 34 (H) 6 - 23 mg/dL    Creatinine 3.10 (H) 0.50 - 1.30 mg/dL    eGFR 20 (L) >60 mL/min/1.73m*2    Calcium 9.8 8.6 - 10.6 mg/dL    Phosphorus 4.7 2.5 - 4.9 mg/dL    Albumin 2.4 (L) 3.4 - 5.0 g/dL   POCT GLUCOSE   Result Value Ref Range    POCT Glucose 164 (H) 74 - 99 mg/dL      Allergies  Penicillins    Scheduled medications  allopurinol, 100 mg, oral, Daily  apixaban, 2.5 mg, oral, q12h  atorvastatin, 40 mg, oral, Nightly  B complex-vitamin C-folic acid, 1 capsule, oral, Daily  clopidogrel, 75 mg, oral, Daily  epoetin dane or biosimilar, 15,000 Units, intravenous, Once per day on Tue Thu Sat  gabapentin, 100 mg, oral, Daily  insulin lispro, 0-5 Units, subcutaneous, TID with meals  lubricating eye drops, 2 drop, Both Eyes, TID  melatonin, 5 mg, oral, Nightly  midodrine, 15 mg, oral, q8h  pantoprazole, 40 mg, oral, Daily before breakfast  piperacillin-tazobactam, 2.25 g, intravenous, q12h  polyethylene glycol, 17 g, oral, Daily  sennosides-docusate sodium, 2 tablet, oral, BID  sodium hypochlorite, , irrigation, Daily    PRN medications  PRN medications: acetaminophen, albuterol, alteplase, dextrose 10 % in water (D10W), dextrose, glucagon, HYDROmorphone, ipratropium-albuteroL, lidocaine, ondansetron, oxyCODONE, sodium chloride 0.9%, vancomycin     Assessment/Plan    Chevy Benton is a 78 y.o. male with complex past medical history of including peripheral arterial disease, atrial fibrillation managed with Eliquis, coronary artery disease, type 2 diabetes mellitus, a right subclavian deep vein thrombosis in October 2023, gastroesophageal reflux disease, sick sinus syndrome with a pacemaker, gout, end-stage renal disease undergoing hemodialysis on Mondays, Wednesdays, and Fridays, hyperlipidemia, hypotension, heart failure with reduced ejection fraction (EF 40-45% as of January 2023), and gout, was transferred from [facility] on March 8th, 2024 due to bleeding at his left upper arm  arteriovenous fistula site. Vascular surgery successfully applied two figure-eight silk sutures for hemostasis at the fistula site. Following this, the patient received a new tunneled hemodialysis catheter and underwent dialysis. Surgery was performed on March 10th, 2024 for fistula ligation. Pt continues to exhibit signs of generalized edema being followed by nephrology for fluid overload and receiving HD 3x week T-TH-SAT. Hospital course complicated with persistent leukocytosis, fluid overload, developing pneumonia, leg pain, concerns regarding recent right foot transmetatarsal amputation site infection, and left side calciphylaxis. Pt is being followed closely by Podiatry. Lower extremity angiogram completed 03/20/2024. Endovascular follow-up pending for Monday. Palliative Care consulted for GOC discussion and Palliative Care introduction today.    Palliative Performance Scale (PPS): 30%    ----------------------------------------------------------------------------------------------------------------------------------------------------------------------------------------------------------------------------------------------------------------------------------------------------------------------------------------------------------------------  Advanced Care Planning Completed with patient's son today at request of primary team Dr. Wilma Redding  Patient and family consented to a voluntary Advanced Care Planning meeting.   Serious Illness Assessment and Counseling: Leukocytosis, HF, ESRD, Pneumonia, Vascular Disease, PAD  Life Limiting Disease:   Severe Bilateral Limb Ischemia, Unknown source of infection, ESRD, HF, PAD and significant comorbidities posing threat to life or function.     Disease Specific Information Provided/Prognosis Discussed: Patient's current clinical condition, including diagnosis, prognosis, and management plan were discussed.   Counseling provided on goals of care, discharge expectations,  and outpatient Palliative Care services.   Counseling provided on guarded prognosis and what to expect with disease progression of PAD and severe bilateral limb ischemia.   Counseling provided on the irreversible and progressive nature of patient's diseases including end stage renal failure and heart failure.     Understanding/Overall Impression: Patient's son expressing fair understanding of overall health status and severity of illness. Shared decline and complication after complication since pt seeing [facility] vascular surgeon Dr. Sanchez August and September of 2023. Per son, Dr. Sanchez referred him to his colleague at  Vascular Surgeon Dr. Linder. Dr. Linder returns Monday from vacation and will re-evaluate patient per son and they are waiting for next steps.      Goals/Hopes: Discussion ensued about patient's goals for their medical care going forward. Allowed patient time to talk about his current quality of life, disease course/progression, and symptom and treatment burden. Discussed care plan to continue with aggressive hospital care despite symptom and treatment burden versus choosing to transition to comfort based plan of care that focuses on symptom management and quality of life. Pt reports he wants to go home. Pt's son reports his goal is for patient to get strong enough to go to rehab, participate in therapy, and return home.     Fears/Worries/Concerns: Pt's son is worried about the swelling in his dad's legs, and worried his dad walked into the hospital last year in September 2023 and worried he will not return to that baseline. Pt's son is worried about the pressure wounds on his dad's bottom, and his dad pushing too hard and declining any further.      Minimal Acceptable Quality of Life/Maximal [place] Tolerable for the Possibility of More Time: Counseling provided on the concept of MAO/Maximal [place]. Patient's son unsure if his father would never want to be in a health state where they were  dependent on other's for ADLs/toileting needs, bed bound, intubated or placed on a ventilator, have a permanent feeding tube if they could not eat, or have a tracheostomy placed. Patient's son would like time to discuss with his mother and think about.     Resuscitation Assessment: Counseling provided on the benefit versus burden of CPR in the setting of patient's overall health status and pt's son would like to continue FULL CODE measures at this time.       Advanced Directives:  Counseling provided on the importance of not crisis planning as disease burden progresses but to establish treatment limitations now so in the future medical team will be clear on what patient feels is an acceptable quality of life for the patient and what treatment limitations' patient would like set into place based on that.       Surrogate Health Care Decision Maker: pt's son Chevy Benton Jr. And pt's wife Julia Benton   HPOA: Yes, per son his mother should have copies, copies requested.   Living will: Unsure, copies requested.     Code Status: Decision to keep code status FULL CODE at this time.     All questions and concerns were addressed during encounter.     I spent 30 minutes in providing separately identifiable ACP services with the patient and/or surrogate decision maker in a voluntary conversation discussing the patient's wishes and goals as detailed in the above note.   ----------------------------------------------------------------------------------------------------------------------------------------------------------------------------------------------------------------------------------------------------------------------------------------------------------------------------------------------------------------------    #Complex Medical Decision Making  #Goals of Care  #Advanced Care Planning  - Code status: FULL CODE  - Surrogate decision maker: ptmariia Benton Jr. [phone] and jolynn fofana  Julia Benton [phone]  - Goals are mix of survival and time and improved quality of life  - Copies requested of Advanced Directives to be placed on file   - Plan for family meeting and ongoing GOC discussions after pt seen by      #Acute Pain   - Recommend continuing Oxycodone IR 5mg [id number] PRN for severe pain.  - Recommend continuing Acetaminophen 975 mg [id number] PRN mild pain.     #Fluid overload  #Heart Failure   #ESRD on HD 3x week  #Decreased appetite    #Psychosocial Support  - Music Therapy consult  - Spiritual Care Support  - Art Therapy consult  - Palliative SW Support    Plan of Care discussed with: Updated MD primary team Dr. Redding and bedside RN Joseph Flannery RN on goals of care decision, medication adjustments, and code status.     Medical Decision Making was high level due to high complexity of problems, extensive data review, and high risk of management/treatment.     - Severe Bilateral Limb Ischemia, Unknown source of infection, ESRD, HF, PAD and significant comorbidities posing threat to life and function.  - Reviewed external notes from Recent hospitalization at [facility] 02/16/2024-02/29/2024, and [facility] 01/09/2024-02/10/2024  - Reviews results from labs, vascular studies, consult notes, procedures, cultures which were used in decision making for GOC discussion with pt's son  - Assessment required independent historian: Pt's son, pt's nurse, primary team  - Discussion of management with primary team and bedside nurse  - Drug therapy requiring intensive monitoring for toxicity: IV Zosyn and IV Vanco        Thank you for allowing us to participate in the care of this patient. Palliative will continue to follow as needed. Palliative medicine is available Monday-Friday, 8a-6p. Please contact team with any questions or concerns.  Team pager [phone]  Whit Monroy CNP (on EPIC secure chat)  Palliative Medicine Nurse Practitioner   [email or web address]      Whit Monroy  APRN-CNP

## 2024-03-22 NOTE — PROGRESS NOTES
3/22/2024 Care coordination  Per Primary service  3/21 no events, pending further discussion with endovascular attending next Monday.   Palliative care will be consulted for goc discussion too. S/P right foot TMA    Pt is a return to [facility] with HD.  Will update the SNF.

## 2024-03-22 NOTE — PROGRESS NOTES
Physical Therapy    Physical Therapy Treatment    Patient Name: Chevy Benton  MRN: [id number]  Today's Date: 3/22/2024  Time Calculation  Start Time: 1031  Stop Time: 1103  Time Calculation (min): 32 min       Assessment/Plan   PT Assessment  End of Session Communication: Bedside nurse  Assessment Comment: Pt tolerated session fairly.  Required lots of repetition and significant increased time for one step commands this session.  Completed all ther ex with minimal AROM in B LEs.  Max A for bed mobility and to maintain static sitting balance, dependent to scoot to HOB in supine.  Pt continues to benefit from skilled PT and remains appropriate for moderate intensity PT upon discharge.  End of Session Patient Position: Bed, 3 rail up, Alarm off, not on at start of session  PT Plan  Inpatient/Swing Bed or Outpatient: Inpatient  PT Plan  Treatment/Interventions: Bed mobility, Transfer training, Gait training, Balance training, Strengthening, Endurance training, Therapeutic exercise, Therapeutic activity  PT Plan: Skilled PT  PT Frequency: 3 times per week  PT Discharge Recommendations: Moderate intensity level of continued care  Equipment Recommended upon Discharge:  (none)  PT Recommended Transfer Status: Total assist  PT - OK to Discharge: Yes      General Visit Information:   PT  Visit  PT Received On: 03/22/24  General  Missed Visit: Yes  Missed Visit Reason: Patient placed on medical hold (Per chart, pt with new non-occlusive right subclavian vein thrombus on 3/19. Anti-coagulation (Eliquis) held on 3/20 and resumed 3/21 at 12:47pm. Per most recent guidelines, PT will hold mobility until 3 hours post DOAC administration.)  Prior to Session Communication: Bedside nurse  Patient Position Received: Bed, 4 rail up, Alarm off, not on at start of session  General Comment: Pt cleared for PT by RN.  Pt alert and agreeable to PT.    Subjective   Precautions:  Precautions  LE Weight Bearing Status: Right Non-Weight  "Bearing  Medical Precautions: Fall precautions, Cardiac precautions  Vital Signs:  Vital Signs  Heart Rate:  (pre:81, post:89)  Heart Rate Source: Monitor    Objective   Pain:  Pain Assessment  Pain Assessment: 0-10  Pain Score: 10 - Worst possible pain  Pain Interventions: Repositioned, Rest (LE ther ex)  Response to Interventions: no change in pain  Cognition:  Cognition  Overall Cognitive Status: Within Functional Limits  Orientation Level: Oriented X4 (unable to name hospital \"[facility]", required choices for year)  Following Commands:  (follows one step commands with much repetition and increased time)  Postural Control:  Postural Control  Head Control: forward head posture  Trunk Control: Max Assist sitting EOB  Static Sitting Balance  Static Sitting-Balance Support: Bilateral upper extremity supported, Feet unsupported  Static Sitting-Level of Assistance: Maximum assistance  Dynamic Sitting Balance  Dynamic Sitting-Balance Support: Bilateral upper extremity supported, Feet unsupported  Dynamic Sitting-Balance: Forward lean  Dynamic Sitting-Comments: Max A  Activity Tolerance:  Activity Tolerance  Endurance: Tolerates 10 - 20 min exercise with multiple rests  Treatments:  Therapeutic Exercise  Therapeutic Exercise Performed: Yes  Therapeutic Exercise Activity 1: seated LAQ, supine ankle pumps, glute sets x10 each LE.  Attempted heel slides but pt unable to perform.  L LAQ with trace muscle activation observed.  decreased ankle AROM observed bilaterally.    Therapeutic Activity  Therapeutic Activity Performed: Yes  Therapeutic Activity 1: Pt sitting EOB ~6 minutes Max A, bed mobility Max A.    Bed Mobility  Bed Mobility: Yes  Bed Mobility 1  Bed Mobility 1: Supine to sitting, Sitting to supine  Level of Assistance 1: Maximum assistance  Bed Mobility 2  Bed Mobility  2: Scooting (to HOB in supine)  Level of Assistance 2: Dependent (x2)    Transfers  Transfer: No    Outcome Measures:  [facility] Basic " Mobility  Turning from your back to your side while in a flat bed without using bedrails: Total  Moving from lying on your back to sitting on the side of a flat bed without using bedrails: Total  Moving to and from bed to chair (including a wheelchair): Total  Standing up from a chair using your arms (e.g. wheelchair or bedside chair): Total  To walk in hospital room: Total  Climbing 3-5 steps with railing: Total  Basic Mobility - Total Score: 6    Education Documentation  Precautions, taught by Nevaeh Alanis PT at 3/22/2024 11:24 AM.  Learner: Patient  Readiness: Acceptance  Method: Explanation  Response: Verbalizes Understanding  Comment: PATTIE LEE    Mobility Training, taught by Nevaeh Alanis PT at 3/22/2024 11:24 AM.  Learner: Patient  Readiness: Acceptance  Method: Explanation  Response: Verbalizes Understanding  Comment: PATTIE LEE    Education Comments  No comments found.        Encounter Problems       Encounter Problems (Active)       PT Problem       Patient will complete supine to sit and sit to supine Min Assist  (Progressing)       Start:  03/11/24    Expected End:  03/25/24            Patient will perform sit<>stand transfer with Rolling Walker, and Mod Assist  (Not Progressing)       Start:  03/11/24    Expected End:  03/25/24            Patient will ambulate >10' with Rolling Walker and Mod Assist  (Not Progressing)       Start:  03/11/24    Expected End:  03/25/24            Pt will be able to maintain static sitting with SBA x 5 min (Progressing)       Start:  03/11/24    Expected End:  03/25/24

## 2024-03-22 NOTE — PROGRESS NOTES
Assessment/Plan            Assessment/Plan   Principal Problem:    A-V fistula (CMS/[facility])  Active Problems:    Anemia due to blood loss, acute       Mr. Benton, a 78-year-old patient with a comprehensive medical history including peripheral arterial disease, atrial fibrillation managed with Eliquis, coronary artery disease, type 2 diabetes mellitus, a right subclavian deep vein thrombosis in October 2023, gastroesophageal reflux disease, sick sinus syndrome with a pacemaker, gout, end-stage renal disease undergoing hemodialysis on Mondays, Wednesdays, and Fridays, hyperlipidemia, hypotension, heart failure with reduced ejection fraction (EF 40-45% as of January 2023), and gout, was transferred from [facility] to [facility] due to bleeding at his left upper arm arteriovenous fistula site. Vascular surgery successfully applied two figure-eight silk sutures for hemostasis at the fistula site. Following this, the patient received a new tunneled hemodialysis catheter and underwent dialysis. Surgery was performed on March 10th for fistula ligation. Postoperatively, the patient's pain has been managed effectively, and his hemoglobin and hematocrit levels have remained stable despite resuming anticoagulant and antiplatelet therapies. He continues to exhibit signs of generalized edema, managed by nephrology for fluid overload. Persistent leukocytosis without specific symptoms prompted repeated blood cultures, results of which are pending. A chest X-ray indicated volume overload with an indeterminate presence of infiltrates, challenging to discern due to the fluid overload. The patient, initially on 2 liters of oxygen, did not exhibit corresponding symptoms. Recommendations for wound care have been updated. New complaints of leg pain led to a consultation with podiatry, revealing potential purulence at the transmetatarsal amputation site, now under redress and culture. Moreover, the patient started producing sputum on  March 17, prompting the initiation of vancomycin and Zosyn while awaiting further diagnostic results. Endovascular consultation was also sought due to missed follow-up after a revascularization procedure.    3/18  -has been exhibiting a persistent elevation in white blood cell count, suggestive of potential infections including a right foot infection at the transmetatarsal amputation site and a developing pneumonia.    -Podiatry continues to monitor his wound for signs of calciphylaxis, with specific attention to his left side, and the need for parathyroid hormone follow-up is noted. We are awaiting further cultures, including blood cultures, to guide management.   -experiencing significant fluid overload, particularly noted in the upper extremities. A duplex scan of the upper extremities has been ordered to assess for any vascular anomalies, and nephrology is actively managing his fluid status.  -Given the possibility of soft tissue infection and pneumonia, antibiotic therapy has been initiated.         3/19  -Son Adan called and updated. Addressed all concerns.   -US showed new acute non occlussive thrombus in his rt subclavian vein proximal segment. Also with a hematoma in his right upper arm. Left arm was limited due to bandages. Per vascular surgery, no plan for any intervention, with only supportive care (arm elevation and local compression).  -MRI Rt foot pending  -RAJ/TBI reviewed, , with possible stenosis in the inflow of the Rt LE. Per Endovascular plan for peripheral angioplasty of Rt CFA tomorrow    -NPO after midnight      -Abx: continue with the current regimen, will discuss with podiatry later. Waiting on the MRI to determine surgical plan    3/20  No events, headed to peripheral angioplasty of Rt CFA, no events was observed after the procedure.   Holding plavix and eliquis per cardiology recommendations     3/21 no events, pending further discussion with endovascular attending next Monday.    Palliative care will be consulted for goc discussion too.      --------------------------------------------------      #leukocytosis  #conern for infection at TMA site  #concern for developing pneumonia   - initially likely related to surgical stress, however remained persistent, no new signs of infection/localizing signs at time. However pt now developed foot pain and sputum production. Cxr with volume overload with possible infiltrate. Podiatry obtained wound cultures.   - ordered blood cultures,  - sputum culture ordered   - follow up wound cultures  - IS, accapella, RT consult to eval/rec.       #Bleeding from AV fistula s/p ligation and tunneled HD line placement - resolved  #anemia d/t acute blood loss  #MRSA colonization, on decolonization protocol due to central line placement.   - Vascular surgery consulted, appreciate recs  - Hemostasis obtained at L upper extremity fistula site after placement of two figure 8 sutures   -Transfuse 1 unit of PRBC on HD on admit.  Monitor HB.     - Restarted apixaban and Plavix and monitoring hemoglobin for stability.          #UE edema - likely related to anasarca, positioning and ESRD, mild improvement  # Respiratory failure with hypoxemia secondary to fluid overload   -US showed new acute non occlussive thrombus in his rt subclavian vein proximal segment. Also with a hematoma in his right upper arm. Left arm was limited due to bandages. Per vascular surgery, no plan for any intervention, with only supportive care (arm elevation and local compression).  -Patient continues  2 L, in setting of anasarca and ESRD.  Likely only requiring 1 L as I have weaned him down in the past.  Xray as above. Componenet of fluid overload.       #T2DM (last HgA1c 5.3% 12/3/23) c/b neuropathy  - accucheck ACHS  - Lispro SSI #1 0-5 units  - holding home dose of Lantus 6 units to prevent episodes of hypoglycemia while hospitalized and HgA1c <7%. Monitor more need to resume.    - hypoglycemia  "order set placed  - continue home dose of Gabapentin 100 mg PO every day  - glc at goal      #hyponatremia (chronic)  #ESRD on HD   - daily CHG bath d/t L chest dialysis cath site   - Nephrology consulted for HD   - renal diet  - renally dose meds as needed  - continue home dose of Nephrocaps PO every day  -continue HD per nephrology. Has had additional sessions this week for fluid removal. Care with fluid removal given chronic hypotension on midodrine.      #Afib  #hx of DVT (10/23)  - Duplex US of RUE done 10/22/23: Positive study for DVT in the right upper extremity.  There is a nonocclusive thrombus in the right subclavian artery.  - home dose of Eliquis       #hypotension, chronic  - continue home dose of Midodrine 15 mg PO TID     #LLE wound  #R foot wound  #PVD s/p revasc  - wound nurse consult placed, requesting updated wound care recs, wound care recs and record and now updated   -Podiatry on board.     #MRSA nare positive  On MRSA decolonization protocol, ordered mupirocin and chlorhexidine bath  Femoral line removed on 3/14 as patient is clinically stable after starting apixaban and Plavix       #Malnutrition  -With anasarca, wound needs, starting to have increase in appetite.  Nutrition consulted and started on Nepro twice daily         Time > 30 minutes     Scheduled outpatient appointments in system:   Future Appointments   Date Time Provider Department Center   3/27/2024  9:00 AM Rosa Tovar MD [id number] [place]   4/17/2024  1:00 PM Koko Gordon MD [id number] [facility]     ---------------------------------------------------------------------------------------------------  Subjective   No new events.      ---------------------------------------------------------------------------------------------------  Objective   Last Recorded Vitals  Blood pressure 128/53, pulse 86, temperature 36.5 °C (97.7 °F), temperature source Temporal, resp. rate 18, height 1.778 m (5' 10\"), weight 87 kg (191 lb 12.8 oz), " SpO2 100 %.  Intake/Output last 3 Shifts:  I/O last 3 completed shifts:  In: 1440 (16.6 mL/kg) [P.O.:240; I.V.:400 (4.6 mL/kg); Other:800]  Out: 2015 (23.2 mL/kg) [Other:2000; Blood:15]  Weight: 87 kg     Physical Exam  Constitutional:       Comments: Comfortable at rest on oxygen through NC   HENT:      Head: Normocephalic.      Nose: Nose normal.   Eyes:      Extraocular Movements: Extraocular movements intact.      Pupils: Pupils are equal, round, and reactive to light.   Cardiovascular:      Rate and Rhythm: Normal rate and regular rhythm.      Heart sounds: Normal heart sounds. No murmur heard.     Comments: Right femoral line  Left tunneled dialysis catheter  EJ IV  Pulmonary:      Effort: No respiratory distress.      Breath sounds: Normal breath sounds. No wheezing.   Abdominal:      General: Bowel sounds are normal. There is no distension.      Palpations: Abdomen is soft.      Tenderness: There is no abdominal tenderness.   Musculoskeletal:         General: Normal range of motion.      Cervical back: Normal range of motion.      Comments: Fistula site in LUE covered with dressing after the ligation surgery,     Right foot TMA, ( ulcer noted over the TMA area, which is covered with dressing)    Left arm wrapped in ace wrap, pain in fingers, neurovascularly intact, motor and sensation intact but reports mild decreased sensation, noted mild increased edema of hand compared to right.    Skin:     General: Skin is warm.   Neurological:      General: No focal deficit present.      Mental Status: He is alert and oriented to person, place, and time. Mental status is at baseline.   Psychiatric:         Mood and Affect: Mood normal.         Relevant Results  Lab Results   Component Value Date    WBC 14.0 (H) 03/21/2024    HGB 7.1 (L) 03/21/2024    HCT 24.3 (L) 03/21/2024    MCV 96 03/21/2024     03/21/2024      Lab Results   Component Value Date    GLUCOSE 159 (H) 03/21/2024    CALCIUM 9.4 03/21/2024    NA  135 (L) 03/21/2024    K 4.2 03/21/2024    CO2 24 03/21/2024    CL 94 (L) 03/21/2024    BUN 45 (H) 03/21/2024    CREATININE 4.00 (H) 03/21/2024     Scheduled medications  allopurinol, 100 mg, oral, Daily  apixaban, 2.5 mg, oral, q12h  atorvastatin, 40 mg, oral, Nightly  B complex-vitamin C-folic acid, 1 capsule, oral, Daily  clopidogrel, 75 mg, oral, Daily  epoetin dane or biosimilar, 15,000 Units, intravenous, Once per day on Tue Thu Sat  gabapentin, 100 mg, oral, Daily  insulin lispro, 0-5 Units, subcutaneous, TID with meals  lubricating eye drops, 2 drop, Both Eyes, TID  melatonin, 5 mg, oral, Nightly  midodrine, 15 mg, oral, q8h  pantoprazole, 40 mg, oral, Daily before breakfast  piperacillin-tazobactam, 2.25 g, intravenous, q12h  polyethylene glycol, 17 g, oral, Daily  sennosides-docusate sodium, 2 tablet, oral, BID  [START ON 3/22/2024] sodium hypochlorite, , irrigation, Daily      Continuous medications     PRN medications  PRN medications: acetaminophen, albuterol, alteplase, dextrose 10 % in water (D10W), dextrose, glucagon, ipratropium-albuteroL, lidocaine, ondansetron, oxyCODONE, sodium chloride 0.9%, vancomycin    Wilma Redding MD

## 2024-03-23 NOTE — CARE PLAN
The patient's goals for the shift include      The clinical goals for the shift include no further skin breakdown, safety maintained, HDS

## 2024-03-23 NOTE — PROGRESS NOTES
Subjective     Interval History: Chevy Benton    Patient seen on dialysis, c/o neck pain, advised to discuss with primary team when gets back to the floor after HD          Current Facility-Administered Medications:     acetaminophen (Tylenol) tablet 975 mg, 975 mg, oral, q6h PRN, Wilma Redding MD, 975 mg at 03/23/24 1042    albuterol 90 mcg/actuation inhaler 2 puff, 2 puff, inhalation, q6h PRN, Artis Cruz MD    allopurinol (Zyloprim) tablet 100 mg, 100 mg, oral, Daily, Artis Cruz MD, 100 mg at 03/22/24 0934    alteplase (Cathflo Activase) injection 2 mg, 2 mg, intra-catheter, PRN, Donn Quiñonez MD, 2 mg at 03/21/24 0715    apixaban (Eliquis) tablet 2.5 mg, 2.5 mg, oral, q12h, Aruna Vazquez PA-C, 2.5 mg at 03/22/24 2154    atorvastatin (Lipitor) tablet 40 mg, 40 mg, oral, Nightly, Artis Cruz MD, 40 mg at 03/22/24 2154    B complex-vitamin C-folic acid (Nephrocaps) capsule 1 capsule, 1 capsule, oral, Daily, Artis Cruz MD, 1 capsule at 03/22/24 0934    clopidogrel (Plavix) tablet 75 mg, 75 mg, oral, Daily, Donn Quiñonez MD, 75 mg at 03/22/24 0934    dextrose 10 % in water (D10W) infusion, 0.3 g/kg/hr, intravenous, Once PRN, Artis Cruz MD    dextrose 50 % injection 25 g, 25 g, intravenous, q15 min PRN, Artis Cruz MD    epoetin dane (Epogen,Procrit) injection 15,000 Units, 15,000 Units, intravenous, Once per day on Tue Thu Sat, JENIFFER Bates-CNP, 15,000 Units at 03/21/24 2200    gabapentin (Neurontin) capsule 100 mg, 100 mg, oral, Daily, Artis Cruz MD, 100 mg at 03/22/24 0934    glucagon (Glucagen) injection 1 mg, 1 mg, intramuscular, q15 min PRN, Artis Cruz MD    insulin lispro (HumaLOG) injection 0-5 Units, 0-5 Units, subcutaneous, TID with meals, Artis Cruz MD, 1 Units at 03/22/24 1439    ipratropium-albuteroL (Duo-Neb) 0.5-2.5 mg/3 mL nebulizer solution 3 mL, 3 mL, nebulization, q6h PRN, Artis Cruz MD    lidocaine  (LMX) 4 % cream, , Topical, 4x daily PRN, Wilma Redding MD    lubricating eye drops ophthalmic solution 2 drop, 2 drop, Both Eyes, TID, Donn Quiñonez MD, 2 drop at 03/22/24 2154    melatonin tablet 5 mg, 5 mg, oral, Nightly, Artis Cruz MD, 5 mg at 03/22/24 2154    midodrine (Proamatine) tablet 15 mg, 15 mg, oral, q8h, Artis Cruz MD, 15 mg at 03/23/24 0129    ondansetron (Zofran) injection 4 mg, 4 mg, intravenous, q8h PRN, Artis Cruz MD, 4 mg at 03/09/24 1345    oxyCODONE (Roxicodone) immediate release tablet 5 mg, 5 mg, oral, q6h PRN, Wilma Redding MD, 5 mg at 03/23/24 0650    pantoprazole (ProtoNix) EC tablet 40 mg, 40 mg, oral, Daily before breakfast, Artis Cruz MD, 40 mg at 03/22/24 0942    piperacillin-tazobactam-dextrose (Zosyn) IV 2.25 g, 2.25 g, intravenous, q12h, Donn Quiñonez MD, Stopped at 03/23/24 0626    polyethylene glycol (Glycolax, Miralax) packet 17 g, 17 g, oral, Daily, Donn Quiñonez MD, 17 g at 03/18/24 0923    sennosides-docusate sodium (Janessa-Colace) 8.6-50 mg per tablet 2 tablet, 2 tablet, oral, BID, Artis Cruz MD, 2 tablet at 03/22/24 2153    sodium chloride 0.9% flush 10 mL, 10 mL, intra-catheter, PRN, Donn Quiñonez MD, 10 mL at 03/17/24 1950    sodium hypochlorite (Dakin's HALF-Strength) 0.25 % external solution, , irrigation, Daily, Schuyler Lawton DPM    vancomycin (Vancocin) placeholder, , miscellaneous, Daily PRN, Donn Quiñonez MD    Physical Exam  Physical Exam  Heart S1 S2 RRR, Lungs CTA, no edema      Vital signs in last 24 hours:  Temp:  [36.3 °C (97.3 °F)-36.6 °C (97.9 °F)] 36.5 °C (97.7 °F)  Heart Rate:  [] 94  Resp:  [17-20] 17  BP: (116-162)/(52-78) 144/78         Labs:  Results from last 7 days   Lab Units 03/23/24  0756   WBC AUTO x10*3/uL 15.7*  15.2*   RBC AUTO x10*6/uL 2.71*  2.59*   HEMOGLOBIN g/dL 8.4*  7.3*   HEMATOCRIT % 26.3*  24.7*     Results from last 7 days   Lab Units 03/23/24  0756    SODIUM mmol/L 130*   POTASSIUM mmol/L 3.5   CHLORIDE mmol/L 91*   CO2 mmol/L 26   BUN mg/dL 43*   CREATININE mg/dL 3.83*   CALCIUM mg/dL 9.9   PHOSPHORUS mg/dL 5.2*            Assessment/Plan   Patient seen and examined while on dialysis, recent events, labs, medications reviewed. Will follow overall management per primary team, and continue regular dialysis.    Principal Problem:    A-V fistula (CMS/HCC)  Active Problems:    Anemia due to blood loss, acute    Critical limb ischemia of right lower extremity (CMS/HCC)        Corine Will MD  3/23/2024  10:45 AM

## 2024-03-23 NOTE — CARE PLAN
The patient's goals for the shift include      The clinical goals for the shift include no further skin breakdown, maintain safety, HDS

## 2024-03-23 NOTE — PROGRESS NOTES
Vancomycin Dosing by Pharmacy- FOLLOW UP    Chevy Benton is a 78 y.o. year old male who Pharmacy has been consulted for vancomycin dosing for cellulitis, skin and soft tissue. Based on the patient's indication and renal status this patient is being dosed based on a goal pre-HD level of 15-25.     Renal function is currently stable, HD scheduled Tues, Thurs,Sat.    Last vancomycin dose was 750mg given 3/21 @1700    Most recent trough level: 21.9 mcg/mL    Visit Vitals  /78   Pulse 94   Temp 36.5 °C (97.7 °F) (Skin)   Resp 17        Lab Results   Component Value Date    CREATININE 3.83 (H) 03/23/2024    CREATININE 3.10 (H) 03/22/2024    CREATININE 4.00 (H) 03/21/2024    CREATININE 3.62 (H) 03/21/2024        Patient weight is 87 kg.       I/O last 3 completed shifts:  In: 100 (1.1 mL/kg) [IV Piggyback:100]  Out: - (0 mL/kg)   Weight: 87 kg   [unfilled]    Lab Results   Component Value Date    PATIENTTEMP 37.0 03/10/2024    PATIENTTEMP 37.0 01/27/2024    PATIENTTEMP 37.0 01/27/2024        Assessment/Plan    Within goal random/trough level. Start 750mg post HD on TTHS.     The next level will be obtained on 3/26 at am labs. May be obtained sooner if clinically indicated.   Will continue to monitor renal function daily while on vancomycin and order serum creatinine at least every 48 hours if not already ordered.  Follow for continued vancomycin needs, clinical response, and signs/symptoms of toxicity.       Angela Barros, PharmD

## 2024-03-23 NOTE — NURSING NOTE
Report from Sending RN:    Report From: ABEL Basurto  Recent Surgery of Procedure: No  Baseline Level of Consciousness (LOC): x3, confused sometimes  Oxygen Use: Yes, 2L  Type: nasal cannula  Diabetic: No  Last BP Med Given Day of Dialysis:   Last Pain Med Given:   Lab Tests to be Obtained with Dialysis: Yes, morning labs  Blood Transfusion to be Given During Dialysis: No  Available IV Access: Yes  Medications to be Administered During Dialysis: No  Continuous IV Infusion Running: No  Restraints on Currently or in the Last 24 Hours: No  Hand-Off Communication:   Patient complaint no pain.  Will come in bed.

## 2024-03-23 NOTE — PROGRESS NOTES
Assessment/Plan            Assessment/Plan   Principal Problem:    A-V fistula (CMS/[facility])  Active Problems:    Anemia due to blood loss, acute       Mr. Benton, a 78-year-old patient with a comprehensive medical history including peripheral arterial disease, atrial fibrillation managed with Eliquis, coronary artery disease, type 2 diabetes mellitus, a right subclavian deep vein thrombosis in October 2023, gastroesophageal reflux disease, sick sinus syndrome with a pacemaker, gout, end-stage renal disease undergoing hemodialysis on Mondays, Wednesdays, and Fridays, hyperlipidemia, hypotension, heart failure with reduced ejection fraction (EF 40-45% as of January 2023), and gout, was transferred from [facility] to [facility] due to bleeding at his left upper arm arteriovenous fistula site. Vascular surgery successfully applied two figure-eight silk sutures for hemostasis at the fistula site. Following this, the patient received a new tunneled hemodialysis catheter and underwent dialysis. Surgery was performed on March 10th for fistula ligation. Postoperatively, the patient's pain has been managed effectively, and his hemoglobin and hematocrit levels have remained stable despite resuming anticoagulant and antiplatelet therapies. He continues to exhibit signs of generalized edema, managed by nephrology for fluid overload. Persistent leukocytosis without specific symptoms prompted repeated blood cultures, results of which are pending. A chest X-ray indicated volume overload with an indeterminate presence of infiltrates, challenging to discern due to the fluid overload. The patient, initially on 2 liters of oxygen, did not exhibit corresponding symptoms. Recommendations for wound care have been updated. New complaints of leg pain led to a consultation with podiatry, revealing potential purulence at the transmetatarsal amputation site, now under redress and culture. Moreover, the patient started producing sputum on  March 17, prompting the initiation of vancomycin and Zosyn while awaiting further diagnostic results. Endovascular consultation was also sought due to missed follow-up after a revascularization procedure.    3/18  -has been exhibiting a persistent elevation in white blood cell count, suggestive of potential infections including a right foot infection at the transmetatarsal amputation site and a developing pneumonia.    -Podiatry continues to monitor his wound for signs of calciphylaxis, with specific attention to his left side, and the need for parathyroid hormone follow-up is noted. We are awaiting further cultures, including blood cultures, to guide management.   -experiencing significant fluid overload, particularly noted in the upper extremities. A duplex scan of the upper extremities has been ordered to assess for any vascular anomalies, and nephrology is actively managing his fluid status.  -Given the possibility of soft tissue infection and pneumonia, antibiotic therapy has been initiated.         3/19  -Son Adan called and updated. Addressed all concerns.   -US showed new acute non occlussive thrombus in his rt subclavian vein proximal segment. Also with a hematoma in his right upper arm. Left arm was limited due to bandages. Per vascular surgery, no plan for any intervention, with only supportive care (arm elevation and local compression).  -MRI Rt foot pending  -RAJ/TBI reviewed, , with possible stenosis in the inflow of the Rt LE. Per Endovascular plan for peripheral angioplasty of Rt CFA tomorrow    -NPO after midnight      -Abx: continue with the current regimen, will discuss with podiatry later. Waiting on the MRI to determine surgical plan    3/20  No events, headed to peripheral angioplasty of Rt CFA, no events was observed after the procedure.   Holding plavix and eliquis per cardiology recommendations     3/21 no events, pending further discussion with endovascular attending next Monday.    Palliative care will be consulted for goc discussion too.      3/22    -Regarding the MRI, Team received the pacemaker clearance.  Pacemaker patients are not done in evenings or weekends so the patient will be done next week Monday.  -Palliative  spoke with the son  yesterday evening, discussed concerns with his fathers condition. Son is waiting for his father to be seen by endovascular Monday. He felt he wanted to talk to his mother before making any decisions. Wife is NOK decision maker and Adan will bring in copy of the HCPOA. Planning family meeting with pt's wife and son Adan after seen Monday by Dr. Linder. Will continue ongoing GOC and support pt and family.    -Awaiting for Dr Linder reassessment Monday.        --------------------------------------------------      #leukocytosis  #conern for infection at TMA site  #concern for developing pneumonia   - initially likely related to surgical stress, however remained persistent, no new signs of infection/localizing signs at time. However pt now developed foot pain and sputum production. Cxr with volume overload with possible infiltrate. Podiatry obtained wound cultures.   - ordered blood cultures,  - sputum culture ordered   - follow up wound cultures  - IS, accapella, RT consult to eval/rec.       #Bleeding from AV fistula s/p ligation and tunneled HD line placement - resolved  #anemia d/t acute blood loss  #MRSA colonization, on decolonization protocol due to central line placement.   - Vascular surgery consulted, appreciate recs  - Hemostasis obtained at L upper extremity fistula site after placement of two figure 8 sutures   -Transfuse 1 unit of PRBC on HD on admit.  Monitor HB.     - Restarted apixaban and Plavix and monitoring hemoglobin for stability.          #UE edema - likely related to anasarca, positioning and ESRD, mild improvement  # Respiratory failure with hypoxemia secondary to fluid overload   -US showed new acute non occlussive  thrombus in his rt subclavian vein proximal segment. Also with a hematoma in his right upper arm. Left arm was limited due to bandages. Per vascular surgery, no plan for any intervention, with only supportive care (arm elevation and local compression).  -Patient continues  2 L, in setting of anasarca and ESRD.  Likely only requiring 1 L as I have weaned him down in the past.  Xray as above. Componenet of fluid overload.       #T2DM (last HgA1c 5.3% 12/3/23) c/b neuropathy  - accucheck ACHS  - Lispro SSI #1 0-5 units  - holding home dose of Lantus 6 units to prevent episodes of hypoglycemia while hospitalized and HgA1c <7%. Monitor more need to resume.    - hypoglycemia order set placed  - continue home dose of Gabapentin 100 mg PO every day  - glc at goal      #hyponatremia (chronic)  #ESRD on HD   - daily CHG bath d/t L chest dialysis cath site   - Nephrology consulted for HD   - renal diet  - renally dose meds as needed  - continue home dose of Nephrocaps PO every day  -continue HD per nephrology. Has had additional sessions this week for fluid removal. Care with fluid removal given chronic hypotension on midodrine.      #Afib  #hx of DVT (10/23)  - Duplex US of RUE done 10/22/23: Positive study for DVT in the right upper extremity.  There is a nonocclusive thrombus in the right subclavian artery.  - home dose of Eliquis       #hypotension, chronic  - continue home dose of Midodrine 15 mg PO TID     #LLE wound  #R foot wound  #PVD s/p revasc  - wound nurse consult placed, requesting updated wound care recs, wound care recs and record and now updated   -Podiatry on board.     #MRSA nare positive  On MRSA decolonization protocol, ordered mupirocin and chlorhexidine bath  Femoral line removed on 3/14 as patient is clinically stable after starting apixaban and Plavix       #Malnutrition  -With anasarca, wound needs, starting to have increase in appetite.  Nutrition consulted and started on Nepro twice daily    "      Time > 30 minutes     Scheduled outpatient appointments in system:   Future Appointments   Date Time Provider Department [place]   3/27/2024  9:00 AM Rosa Tovar MD [id number] [place]   4/17/2024  1:00 PM Koko Gordon MD [id number] [place]     ---------------------------------------------------------------------------------------------------  Subjective   No new events.      ---------------------------------------------------------------------------------------------------  Objective   Last Recorded Vitals  Blood pressure 145/55, pulse 88, temperature 36.5 °C (97.7 °F), temperature source Temporal, resp. rate 17, height 1.778 m (5' 10\"), weight 87 kg (191 lb 12.8 oz), SpO2 100 %.  Intake/Output last 3 Shifts:  I/O last 3 completed shifts:  In: 100 (1.1 mL/kg) [IV Piggyback:100]  Out: - (0 mL/kg)   Weight: 87 kg     Physical Exam  Constitutional:       Comments: Comfortable at rest on oxygen through NC   HENT:      Head: Normocephalic.      Nose: Nose normal.   Eyes:      Extraocular Movements: Extraocular movements intact.      Pupils: Pupils are equal, round, and reactive to light.   Cardiovascular:      Rate and Rhythm: Normal rate and regular rhythm.      Heart sounds: Normal heart sounds. No murmur heard.     Comments: Right femoral line  Left tunneled dialysis catheter  EJ IV  Pulmonary:      Effort: No respiratory distress.      Breath sounds: Normal breath sounds. No wheezing.   Abdominal:      General: Bowel sounds are normal. There is no distension.      Palpations: Abdomen is soft.      Tenderness: There is no abdominal tenderness.   Musculoskeletal:         General: Normal range of motion.      Cervical back: Normal range of motion.      Comments: Fistula site in LUE covered with dressing after the ligation surgery,     Right foot TMA, ( ulcer noted over the TMA area, which is covered with dressing)    Left arm wrapped in ace wrap, pain in fingers, neurovascularly intact, motor and sensation " intact but reports mild decreased sensation, noted mild increased edema of hand compared to right.    Skin:     General: Skin is warm.   Neurological:      General: No focal deficit present.      Mental Status: He is alert and oriented to person, place, and time. Mental status is at baseline.   Psychiatric:         Mood and Affect: Mood normal.         Relevant Results  Lab Results   Component Value Date    WBC 15.7 (H) 03/23/2024    WBC 15.2 (H) 03/23/2024    HGB 8.4 (L) 03/23/2024    HGB 7.3 (L) 03/23/2024    HCT 26.3 (L) 03/23/2024    HCT 24.7 (L) 03/23/2024    MCV 97 03/23/2024    MCV 95 03/23/2024     03/23/2024     03/23/2024      Lab Results   Component Value Date    GLUCOSE 164 (H) 03/23/2024    CALCIUM 9.9 03/23/2024     (L) 03/23/2024    K 3.5 03/23/2024    CO2 26 03/23/2024    CL 91 (L) 03/23/2024    BUN 43 (H) 03/23/2024    CREATININE 3.83 (H) 03/23/2024     Scheduled medications  allopurinol, 100 mg, oral, Daily  apixaban, 2.5 mg, oral, q12h  atorvastatin, 40 mg, oral, Nightly  B complex-vitamin C-folic acid, 1 capsule, oral, Daily  clopidogrel, 75 mg, oral, Daily  epoetin dane or biosimilar, 15,000 Units, intravenous, Once per day on Tue Thu Sat  gabapentin, 100 mg, oral, Daily  insulin lispro, 0-5 Units, subcutaneous, TID with meals  lubricating eye drops, 2 drop, Both Eyes, TID  melatonin, 5 mg, oral, Nightly  midodrine, 15 mg, oral, q8h  pantoprazole, 40 mg, oral, Daily before breakfast  piperacillin-tazobactam, 2.25 g, intravenous, q12h  polyethylene glycol, 17 g, oral, Daily  sennosides-docusate sodium, 2 tablet, oral, BID  sodium hypochlorite, , irrigation, Daily  vancomycin, 750 mg, intravenous, Once per day on Tue Thu Sat      Continuous medications     PRN medications  PRN medications: acetaminophen, albuterol, alteplase, dextrose 10 % in water (D10W), dextrose, glucagon, ipratropium-albuteroL, lidocaine, ondansetron, oxyCODONE, sodium chloride 0.9%, vancomycin    Wilma JO  MD [place]

## 2024-03-23 NOTE — NURSING NOTE
.Report to Receiving RN:    Report To: ABEL Mena  Time Report Called: 1210  Hand-Off Communication: Pt tolerated HD well with no issue. Fluid removal 1.5 Liter /51 HR 92  Complications During Treatment: No  Ultrafiltration Treatment: No  Medications Administered During Dialysis: Yes, TYLENOL 975 mg  Blood Products Administered During Dialysis: No  Labs Sent During Dialysis: No  Heparin Drip Rate Changes: No

## 2024-03-24 NOTE — PROGRESS NOTES
"Chevy Benton is a 78 y.o. male on day 15 of admission presenting with A-V fistula (CMS/HCC).    Subjective   Patient was seen at bedside, resting comfortably. No acute or new concerns, no constitutional symptoms.       Physical Exam    Objective     Physical Exam  General: AAOx1-2, no acute distress, bilateral lower extremity dressings intact     RLE focused exam:  Vasc: Dorsalis pedis and posterior tibial pulses are non-palpable. Skin temperature is warm to warm from proximal tibia to distal foot. No erythema noted. Mild pitting edema noted.      Neuro: Gross and light touch sensation is intact to the foot.     Derm: There is a full thickness ulceration at the right foot TMA site. No drainage noted today. No malodor. Wound base is fibrogranular. Wound edges are necrotic. No crepitus, fluctuance, or purulence. There is an unstageable wound on the lateral right lower leg. Base is dry stable eschar. Edges are hyperemic but not macerated or hyperkeratotic. No drainage. No crepitus, fluctuance, purulence, or malodor. Stage 0 pressure injury on the right heel, erythema is blanchable. Prevalon boots in place bilaterally.     Ortho: S/P right foot TMA    Last Recorded Vitals  Blood pressure 148/75, pulse 91, temperature 36.7 °C (98.1 °F), resp. rate 19, height 1.778 m (5' 10\"), weight 87 kg (191 lb 12.8 oz), SpO2 100 %.    Intake/Output last 3 Shifts:  I/O last 3 completed shifts:  In: 1250 (14.4 mL/kg) [I.V.:800 (9.2 mL/kg); Other:400; IV Piggyback:50]  Out: 1900 (21.8 mL/kg) [Other:1900]  Weight: 87 kg     Relevant Results  Results for orders placed or performed during the hospital encounter of 03/08/24 (from the past 24 hour(s))   POCT GLUCOSE   Result Value Ref Range    POCT Glucose 159 (H) 74 - 99 mg/dL   Blood Culture    Specimen: Peripheral Venipuncture; Blood culture   Result Value Ref Range    Blood Culture Loaded on Instrument - Culture in progress    CBC   Result Value Ref Range    WBC 15.7 (H) 4.4 - 11.3 " x10*3/uL    nRBC 0.6 (H) 0.0 - 0.0 /100 WBCs    RBC 2.71 (L) 4.50 - 5.90 x10*6/uL    Hemoglobin 8.4 (L) 13.5 - 17.5 g/dL    Hematocrit 26.3 (L) 41.0 - 52.0 %    MCV 97 80 - 100 fL    MCH 31.0 26.0 - 34.0 pg    MCHC 31.9 (L) 32.0 - 36.0 g/dL    RDW 18.6 (H) 11.5 - 14.5 %    Platelets 442 150 - 450 x10*3/uL   CBC   Result Value Ref Range    WBC 15.2 (H) 4.4 - 11.3 x10*3/uL    nRBC 0.6 (H) 0.0 - 0.0 /100 WBCs    RBC 2.59 (L) 4.50 - 5.90 x10*6/uL    Hemoglobin 7.3 (L) 13.5 - 17.5 g/dL    Hematocrit 24.7 (L) 41.0 - 52.0 %    MCV 95 80 - 100 fL    MCH 28.2 26.0 - 34.0 pg    MCHC 29.6 (L) 32.0 - 36.0 g/dL    RDW 17.7 (H) 11.5 - 14.5 %    Platelets 357 150 - 450 x10*3/uL   Renal Function Panel   Result Value Ref Range    Glucose 164 (H) 74 - 99 mg/dL    Sodium 130 (L) 136 - 145 mmol/L    Potassium 3.5 3.5 - 5.3 mmol/L    Chloride 91 (L) 98 - 107 mmol/L    Bicarbonate 26 21 - 32 mmol/L    Anion Gap 17 10 - 20 mmol/L    Urea Nitrogen 43 (H) 6 - 23 mg/dL    Creatinine 3.83 (H) 0.50 - 1.30 mg/dL    eGFR 15 (L) >60 mL/min/1.73m*2    Calcium 9.9 8.6 - 10.6 mg/dL    Phosphorus 5.2 (H) 2.5 - 4.9 mg/dL    Albumin 2.4 (L) 3.4 - 5.0 g/dL   Vancomycin   Result Value Ref Range    Vancomycin 21.9 (H) 5.0 - 20.0 ug/mL   POCT GLUCOSE   Result Value Ref Range    POCT Glucose 108 (H) 74 - 99 mg/dL   POCT GLUCOSE   Result Value Ref Range    POCT Glucose 147 (H) 74 - 99 mg/dL   POCT GLUCOSE   Result Value Ref Range    POCT Glucose 216 (H) 74 - 99 mg/dL     Upper extremity venous duplex bilateral    Result Date: 3/20/2024            [place] [address]   Tel [phone] and Fax [phone]  Vascular Lab Report VASC US UPPER EXTREMITY VENOUS DUPLEX BILATERAL  Patient Name:     SHAMEKA Mendez Physician: [id number] Stuart Nguyen MD Study Date:       3/19/2024           [facility]           [id number] [address]                                       Physician:         YENI MRN/PID:          [id number]             Technologist:      CARLOS Accession#:       [id number]        Technologist 2: Date of           1945 / 78      Encounter#:        [id number] Birth/Age:        years Gender:           M Admission Status: Inpatient           Location           [facility]                                       Performed:  Diagnosis/ICD: Other specified soft tissue disorders-M79.89 CPT Codes:     66607 Peripheral venous duplex scan for DVT complete  **CRITICAL RESULT** Critical Result: acute non occlusive thrombus noted in the right subclavian proximal segment. Notification called to Francisca Chery RN; Wilma Redding MD on 3/19/2024 at 11:50:55 AM by Kristen Gloria.  CONCLUSIONS: Right Upper Venous: There is acute occlusive deep vein thrombosis visualized in the proximal subclavian vein. Remainder of exam is noted to be negative for acute DVT. Hematoma noted in right upper arm measuring 5.07cm x 2.01cm. Thrombus noted around line. Cannot rule out thrombus of non-visualized proximal cephalic, mid cephalic, distal cephalic and basilic veins due to swelling and edema. Left Upper Venous: Limited exam due to bandages. Cannot rule out thrombus of non-compressible mid subclavian and distal subclavian veins due to dressings and catheter. Cannot rule out thrombus of non-visualized proximal cephalic, mid cephalic, distal cephalic and basilic veins due to dressings.  Imaging & Doppler Findings:  Right               Compressible      Thrombus              Flow Internal Jugular        Yes             None         Spontaneous/Phasic Subclavian            Partial    Acute non-occlusive Subclavian Proximal     Yes             None Subclavian Mid          Yes             None         Spontaneous/Phasic Subclavian Distal                       None Axillary                Yes             None Brachial                Yes             None  Left                Compress Thrombus        Flow Internal Jugular      Yes      None    Spontaneous/Phasic Subclavian Proximal                   Spontaneous/Phasic Subclavian Mid        Yes      None Subclavian Distal     Yes      None   Spontaneous/Phasic Axillary              Yes      None   Spontaneous/Phasic Brachial              Yes      None  [id number] Stuart Nguyen MD Electronically signed by Jennifer Nguyen MD on 3/20/2024 at 11:33:21 PM  ** Final **     Vascular US Ankle Brachial Index (RAJ) Without Exercise    Result Date: 3/19/2024  Preliminary Cardiology Report           [place] [address]   Tel [phone] and Fax [phone]            Preliminary Vascular Lab Report  [facility] US ANKLE BRACHIAL INDEX (RAJ) WITHOUT EXERCISE  Patient Name:      SHAMEKA SUN Reading Physician:  Jnenifer Nguyen MD Study Date:        3/19/2024           Ordering Physician: [id number] ARELIS MEJIAS MRN/PID:           [id number]            Technologist:       Beena Craft T Accession#:        [id number]        Technologist 2: Date of Birth/Age: 1945           Encounter#:         [id number] Gender:            M Admission Status:  Inpatient           Location Performed: [facility]  Diagnosis/ICD: Peripheral vascular disease, unspecified-I73.9 Procedure/CPT: 30473 Peripheral artery RAJ Only  PRELIMINARY CONCLUSIONS: Right Lower PVR: Evidence of moderate arterial occlusive disease in the right lower extremity at rest. Monophasic flow is noted in the right posterior tibial artery and right dorsalis pedis artery. Biphasic flow is noted in the right dorsalis pedis artery. Left Lower PVR: Evidence of moderate arterial occlusive disease in the left lower extremity at rest. Biphasic flow is noted in the left common femoral artery, left posterior tibial artery and left dorsalis pedis artery. Unable to obtain brachial pressure due to dyalisis access.  Additional Findings: Technically difficult study due to patient bandaging, positioning, and tolerance.  Imaging & Doppler  Findings:  RIGHT Lower PVR                Pressures Ratios Right Posterior Tibial (Ankle) 74 mmHg   0.66 Right Dorsalis Pedis (Ankle)   256 mmHg  2.29   LEFT Lower PVR                Pressures Ratios Left Posterior Tibial (Ankle) 256 mmHg  2.29 Left Dorsalis Pedis (Ankle)   256 mmHg  2.29 Left Digit (Great Toe)        52 mmHg   0.46                      Right Brachial Pressure 112 mmHg   VASCULAR PRELIMINARY REPORT completed by Beena Craft RVT on 3/19/2024 at 11:48:18 AM  ** Final **     XR foot right 3+ views    Result Date: 3/18/2024  Interpreted By:  Liz Ruiz and Beyersdorf Conner STUDY: XR FOOT RIGHT 3+ VIEWS; ;  3/17/2024 7:49 pm   INDICATION: Signs/Symptoms:Evaluation of TMA right foot possible infection.   COMPARISON: X-ray right foot 01/19/2024   ACCESSION NUMBER(S): [id number]   ORDERING CLINICIAN: NETTIE JAIME   FINDINGS: Transmetatarsal amputation changes noted. There is marked paucity of soft tissue stump at the level of 1st metatarsal likely representing wound dehiscence/stump breakdown. There is also apparent soft tissue defect of the 5th metatarsal stump region which may also represent stump breakdown. There is irregular appearance of the stumps of the 1st and 5th digits suggesting osteomyelitis. Prominent vascular calcifications of the ankle and foot.   Mild midfoot degenerative changes with dorsal osteophytes. Remote healed distal fibular fracture sequela. No acute fracture or malalignment.       Transmetatarsal amputation changes of the foot with irregular appearance of the stumps of the 1st and 5th digits suggesting osteomyelitis with osteomyelitis of the rest of the digits not excluded. An MRI may be obtained for further evaluation if clinically warranted Findings suggesting stump breakdown at the level of 1st and 5th metatarsals.   I personally reviewed the image(s)/study and resident interpretation. I agree with the findings as stated by resident Rafael Tony. Data  analyzed and images interpreted at [facility], [place], [place].   MACRO: None   Signed by: Liz Ruiz 3/18/2024 9:56 AM Dictation workstation:   [id number]    XR chest 1 view    Result Date: 3/16/2024  Interpreted By:  Kendrick Clayton, STUDY: XR CHEST 1 VIEW; 3/16/2024 6:32 pm   INDICATION: Signs/Symptoms:eval continued hypoxemia. esrd fluid overload.   COMPARISON: Radiograph dated 01/23/2024   ACCESSION NUMBER(S): [id number]   ORDERING CLINICIAN: NETTIE JAIME   FINDINGS: Left IJ approach hemodialysis catheter is in place with the tip projecting over the right atrium. Right subclavian approach pacer/defibrillator is stable.   The cardiac silhouette size is unchanged.   Low lung volumes and bronchovascular crowding. Right more than left bibasilar opacity. Mild interstitial prominence and edema. No sizable pneumothorax   No acute osseous abnormality.       1. Slight interval worsening of the aeration of the lungs with bilateral, right more than left pleural effusion and atelectasis/consolidation and mild interstitial edema.       Signed by: Kendrick Painting 3/16/2024 9:04 PM Dictation workstation:   [id number]    ECG 12 lead    Result Date: 3/12/2024  Atrial fibrillation ST & T wave abnormality, consider anterolateral ischemia Prolonged QT Abnormal ECG When compared with ECG of 08-MAR-2024 14:26, No significant change was found See ED provider note for full interpretation and clinical correlation Confirmed by Jluis Cee ([id number]) on 3/12/2024 9:34:16 AM    IR angiogram fistula graft    Result Date: 2/27/2024  Interpreted By:  Scott Lepe, STUDY: IR ANGIOGRAM FISTULA GRAFT;  2/21/2024 4:36 pm   INDICATION: Signs/Symptoms:Angio for pressures.   COMPARISON: None.   ACCESSION NUMBER(S): [id number]   ORDERING CLINICIAN: SAQIB PORRAS   TECHNIQUE: : Scott Lepe MD   CONSENT: The patient was informed of the nature of the proposed  procedure. The purposes, alternatives, risks, and benefits were explained and discussed. All questions were answered and consent was obtained.   RADIATION EXPOSURE: Dose: 34 mGy   SEDATION: Lidocaine was administered for local anesthesia. No IV sedation was given.   MEDICATION/CONTRAST: 40 mL Omnipaque 350   TIME OUT: A time out was performed immediately prior to the procedure start with interventional team, correctly identifying the patient using multiple identifiers and ensuring the appropriate procedure and anatomy (including laterality, if applicable) were identified. The procedure consent form and all relevant laboratory and imaging tests were reviewed. The need for antibiotic administration or patient or procedure specific safety precautions or equipment was reviewed.   COMPLICATIONS: None immediate     FINDINGS: The patient was positioned on the angiography table. The left arm was prepped and draped in a sterile fashion.   Utilizing sonographic guidance, the arteriovenous fistula was accessed towards the venous outflow with micropuncture technique. An image was stored to PACS. An 0.018 inch wire was passed into the fistula and used to place a transitional sheath. The wire and inner dilator were removed and a Bentson wire was advanced into the fistula. The transitional sheath was removed and a 6 Mozambican sheath placed.   Angiograms were obtained through the sheath with imaging of the venous outflow, including the central veins. These images demonstrate multiple areas stenosis greater than 50% throughout the cephalic outflow. There are 2 very large pseudoaneurysms related to repeated access. This is also a site of bleeding. The axillary stenosis which has previously been ballooned is present but less than 50%. The remainder of the central veins are patent.   Through the sheath, a 5 Mozambican Kumpe catheter and Glidewire were coaxially advanced beyond the stenosis. The Glidewire was removed and a 0.035 inch Bentson  wire was placed. Finally the Kumpe catheter was removed.   Over the 0.035 inch wire, a 8x60 mm balloon was positioned across the multiple outflow stenoses of the cephalic outflow. Angioplasty was performed.  A final angiogram shows no significant residual stenosis and no evidence of vessel disruption.   All wires and catheters were removed. The sheath was then removed during deployment of a pursestring suture utilizing 3-0 Vicryl. Hemostasis was achieved with the suture and manual compression. A sterile was applied. Additional pressure was applied to the eroded pseudoaneurysm access site which was bleeding prior to the procedure and was bleeding again following preparation of the patient. Hemostasis was achieved with prolonged manual pressure.       Left upper extremity AV fistulogram. The fistula is patent, although there are multiple greater than 50% stenosis of the cephalic outflow. These were angioplastied successfully. 2 very large pseudoaneurysms identified at repeated access points. These are responsible for prolonged bleeding after access and will require surgical revision.   Performed and dictated at [facility].   Signed by: Scott Lepe 2/27/2024 8:23 AM Dictation workstation:   [id number]    IR CVC tunneled    Result Date: 2/23/2024  Interpreted By:  Jose Wang, STUDY: IR CVC TUNNELED; ;  2/23/2024 10:04 am   ULTRASOUND AND FLUOROSCOPICALLY GUIDED LEFT INTERNAL JUGULAR VEIN TUNNELED HEMODIALYSIS CATHETER   INDICATION: Signs/Symptoms:permcath placement - ESRD. 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 Request for replacement of tunneled hemodialysis catheter.   COMPARISON: Angiogram 02/21/2024, chest radiograph 01/23/2024   ACCESSION NUMBER(S): [id number]   ORDERING CLINICIAN: YUE PEREZ    TECHNIQUE:   ATTENDING : Jose Wang M.D.   TECHNICAL DESCRIPTION/FINDINGS: The procedure, including all risks, benefits and alternatives were explained to the patient and his proxy in detail. All questions were answered and written informed consent was obtained.   The patient was positioned supine on the fluoroscopy table. A time-out was performed.   The left neck and anterosuperior chest were prepped and draped in usual sterile fashion. Focused ultrasound was performed of the left internal jugular vein demonstrating patency and compressibility. Ultrasound images were saved. 1% lidocaine was administered subcutaneously for local anesthesia. Under real-time ultrasound guidance, the left internal jugular vein was accessed in antegrade direction using micropuncture technique. Ultrasound images were saved. Under fluoroscopic visualization, an 018 guidewire was advanced into the right atrium and a micropuncture transitional introducer was placed.   Additional 1% lidocaine was then administered over the left anterosuperior chest wall to anesthetize a subcutaneous tunnel tract. The dual lumen cuffed hemodialysis catheter was then tunneled from a left anterosuperior chest wall incision site and externalized overlying the venotomy. After serial dilation over an 035 guidewire which had been placed into the IVC, 15 Bhutanese peel-away sheath was placed. Through the sheath, a 14.5 Bhutanese by 27 cm tip to cuff dual-lumen hemodialysis catheter was placed. Completion fluoroscopic image demonstrates the catheter tip at the superior cavoatrial junction.   Catheter lumens easily aspirated and flushed were locked with a concentrated heparinized solution (1000 units/cc). The catheter hub was sutured to the skin with 2 0 Prolene stay suture. A sterile dressing was applied.   SEDATION/MEDICATIONS: Continuous cardiopulmonary monitoring was performed by a radiology nurse for the duration of the procedure. No conscious sedation  was administered. Procedural duration 20 minutes. 15 cc 1% Lidocaine was administered subcutaneously for local anesthesia. SPECIMENS: None. ESTIMATED BLOOD LOSS:  5 cc FLOUROSCOPY:  0.4 minutes; DAP  3760 mGy-cm*2; Air Kerma  11.86 mGy CONTRAST: None.       Ultrasound and fluoroscopically guided left internal jugular vein tunneled hemodialysis catheter. The catheter is ready for immediate use.     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 Aeruginosa and Candida parapsilosis  -Blood cultures negative  -PVRs showed disease in the right, [facility] was consulted and took patient for angiogram. No flow below the knee and they were unable to revasc. EVLS believes patient is too unstable for a procedure, whether a BKA or debridement  -Podiatry not planning surgical intervention. Plan is for palliative wound care. Will continue to follow and change dressing every other day  -Orders Dakins for future dressing changes. Dressing changed today.  -Patient was seen and discussed with attending, Dr. Connolly.     Shadi Ibarra DPSHRUTI, PGY-III  [place]    I saw and evaluated the patient. I personally obtained the key and critical portions of the history and physical exam or was physically present for key and critical portions performed by the resident/fellow. I reviewed the resident/fellow's documentation and discussed the patient with the  resident/fellow. I agree with the resident/fellow's medical decision making as documented in the note.    I spent 30 minutes in the professional and overall care of this patient.    Rome Connolly DPM

## 2024-03-24 NOTE — PROGRESS NOTES
Assessment/Plan            Assessment/Plan   Principal Problem:    A-V fistula (CMS/Formerly [facility])  Active Problems:    Anemia due to blood loss, acute       Mr. Benton, a 78-year-old patient with a comprehensive medical history including peripheral arterial disease, atrial fibrillation managed with Eliquis, coronary artery disease, type 2 diabetes mellitus, a right subclavian deep vein thrombosis in October 2023, gastroesophageal reflux disease, sick sinus syndrome with a pacemaker, gout, end-stage renal disease undergoing hemodialysis on Mondays, Wednesdays, and Fridays, hyperlipidemia, hypotension, heart failure with reduced ejection fraction (EF 40-45% as of January 2023), and gout, was transferred from [facility] to [facility] due to bleeding at his left upper arm arteriovenous fistula site. Vascular surgery successfully applied two figure-eight silk sutures for hemostasis at the fistula site. Following this, the patient received a new tunneled hemodialysis catheter and underwent dialysis. Surgery was performed on March 10th for fistula ligation. Postoperatively, the patient's pain has been managed effectively, and his hemoglobin and hematocrit levels have remained stable despite resuming anticoagulant and antiplatelet therapies. He continues to exhibit signs of generalized edema, managed by nephrology for fluid overload. Persistent leukocytosis without specific symptoms prompted repeated blood cultures, results of which are pending. A chest X-ray indicated volume overload with an indeterminate presence of infiltrates, challenging to discern due to the fluid overload. The patient, initially on 2 liters of oxygen, did not exhibit corresponding symptoms. Recommendations for wound care have been updated. New complaints of leg pain led to a consultation with podiatry, revealing potential purulence at the transmetatarsal amputation site, now under redress and culture. Moreover, the patient started producing sputum on  March 17, prompting the initiation of vancomycin and Zosyn while awaiting further diagnostic results. Endovascular consultation was also sought due to missed follow-up after a revascularization procedure.  Things to do:   -PVR testing this week indicated disease presence in the right extremity, leading to a consultation with the Endovascular and Vascular Surgery team, who subsequently performed an angiogram. The results revealed no blood flow below the knee, and revascularization was not possible. Given the patient's instability, EVLS assessed that the patient was not a suitable candidate for any procedural intervention, pending reassessment Monday with Dr. Linder including below-knee amputation (BKA) or debridement.  -Podiatry has decided against surgical intervention. The plan moving forward is to focus on palliative wound care management.  -Abs will be adjusted based on the MRI result.   -Regarding the MRI, Team received the pacemaker clearance.  Pacemaker patients are not done in evenings or weekends so the patient will be done next week Monday.  -Palliative  spoke with the son  3/22  evening, discussed concerns with his fathers condition. Son is waiting for his father to be seen by endovascular Monday. He felt he wanted to talk to his mother before making any decisions. Wife is NOK decision maker and Adan will bring in copy of the HCPOA. Planning family meeting with pt's wife and son Adan after seen Monday by Dr. Linder. Will continue ongoing GOC and support pt and family.    -Awaiting for Dr Linder reassessment Monday.                   --------------------------------------------------      #leukocytosis  #conern for infection at [facility] site  #concern for developing pneumonia   - initially likely related to surgical stress, however remained persistent, no new signs of infection/localizing signs at time. However pt now developed foot pain and sputum production. Cxr with volume overload with possible  infiltrate. Podiatry obtained wound cultures.   - ordered blood cultures,  - sputum culture ordered   - follow up wound cultures  - IS, accapella, RT consult to eval/rec.       #Bleeding from AV fistula s/p ligation and tunneled HD line placement - resolved  #anemia d/t acute blood loss  #MRSA colonization, on decolonization protocol due to central line placement.   - Vascular surgery consulted, appreciate recs  - Hemostasis obtained at L upper extremity fistula site after placement of two figure 8 sutures   -Transfuse 1 unit of PRBC on HD on admit.  Monitor HB.     - Restarted apixaban and Plavix and monitoring hemoglobin for stability.          #UE edema - likely related to anasarca, positioning and ESRD, mild improvement  # Respiratory failure with hypoxemia secondary to fluid overload   -US showed new acute non occlussive thrombus in his rt subclavian vein proximal segment. Also with a hematoma in his right upper arm. Left arm was limited due to bandages. Per vascular surgery, no plan for any intervention, with only supportive care (arm elevation and local compression).  -Patient continues  2 L, in setting of anasarca and ESRD.  Likely only requiring 1 L as I have weaned him down in the past.  Xray as above. Componenet of fluid overload.       #T2DM (last HgA1c 5.3% 12/3/23) c/b neuropathy  - accucheck ACHS  - Lispro SSI #1 0-5 units  - holding home dose of Lantus 6 units to prevent episodes of hypoglycemia while hospitalized and HgA1c <7%. Monitor more need to resume.    - hypoglycemia order set placed  - continue home dose of Gabapentin 100 mg PO every day  - glc at goal      #hyponatremia (chronic)  #ESRD on HD   - daily CHG bath d/t L chest dialysis cath site   - Nephrology consulted for HD   - renal diet  - renally dose meds as needed  - continue home dose of Nephrocaps PO every day  -continue HD per nephrology. Has had additional sessions this week for fluid removal. Care with fluid removal given chronic  "hypotension on midodrine.      #Afib  #hx of DVT (10/23)  - Duplex US of RUE done 10/22/23: Positive study for DVT in the right upper extremity.  There is a nonocclusive thrombus in the right subclavian artery.  - home dose of Eliquis       #hypotension, chronic  - continue home dose of Midodrine 15 mg PO TID     #LLE wound  #R foot wound  #PVD s/p revasc  - wound nurse consult placed, requesting updated wound care recs, wound care recs and record and now updated   -Podiatry on board.     #MRSA nare positive  On MRSA decolonization protocol, ordered mupirocin and chlorhexidine bath  Femoral line removed on 3/14 as patient is clinically stable after starting apixaban and Plavix       #Malnutrition  -With anasarca, wound needs, starting to have increase in appetite.  Nutrition consulted and started on Nepro twice daily         Time   30 minutes     Scheduled outpatient appointments in system:   Future Appointments   Date Time Provider Department [place]   3/27/2024  9:00 AM Rosa Tovar MD [id number] [place]   4/17/2024  1:00 PM Koko Gordon MD [id number] [facility]     ---------------------------------------------------------------------------------------------------  Subjective   No new events.      ---------------------------------------------------------------------------------------------------  Objective   Last Recorded Vitals  Blood pressure 122/82, pulse 104, temperature 36.6 °C (97.9 °F), resp. rate 16, height 1.778 m (5' 10\"), weight 87 kg (191 lb 12.8 oz), SpO2 99 %.  Intake/Output last 3 Shifts:  I/O last 3 completed shifts:  In: 1200 (13.8 mL/kg) [I.V.:800 (9.2 mL/kg); Other:400]  Out: 1900 (21.8 mL/kg) [Other:1900]  Weight: 87 kg     Physical Exam  Constitutional:       Comments: Comfortable at rest on oxygen through NC   HENT:      Head: Normocephalic.      Nose: Nose normal.   Eyes:      Extraocular Movements: Extraocular movements intact.      Pupils: Pupils are equal, round, and reactive to light. "   Cardiovascular:      Rate and Rhythm: Normal rate and regular rhythm.      Heart sounds: Normal heart sounds. No murmur heard.     Comments: Right femoral line  Left tunneled dialysis catheter  EJ IV  Pulmonary:      Effort: No respiratory distress.      Breath sounds: Normal breath sounds. No wheezing.   Abdominal:      General: Bowel sounds are normal. There is no distension.      Palpations: Abdomen is soft.      Tenderness: There is no abdominal tenderness.   Musculoskeletal:         General: Normal range of motion.      Cervical back: Normal range of motion.      Comments: Fistula site in LUE covered with dressing after the ligation surgery,     Right foot TMA, ( ulcer noted over the TMA area, which is covered with dressing)    Left arm wrapped in ace wrap, pain in fingers, neurovascularly intact, motor and sensation intact but reports mild decreased sensation, noted mild increased edema of hand compared to right.    Skin:     General: Skin is warm.   Neurological:      General: No focal deficit present.      Mental Status: He is alert and oriented to person, place, and time. Mental status is at baseline.   Psychiatric:         Mood and Affect: Mood normal.         Relevant Results  Lab Results   Component Value Date    WBC 15.7 (H) 03/23/2024    WBC 15.2 (H) 03/23/2024    HGB 8.4 (L) 03/23/2024    HGB 7.3 (L) 03/23/2024    HCT 26.3 (L) 03/23/2024    HCT 24.7 (L) 03/23/2024    MCV 97 03/23/2024    MCV 95 03/23/2024     03/23/2024     03/23/2024      Lab Results   Component Value Date    GLUCOSE 164 (H) 03/23/2024    CALCIUM 9.9 03/23/2024     (L) 03/23/2024    K 3.5 03/23/2024    CO2 26 03/23/2024    CL 91 (L) 03/23/2024    BUN 43 (H) 03/23/2024    CREATININE 3.83 (H) 03/23/2024     Scheduled medications  allopurinol, 100 mg, oral, Daily  apixaban, 2.5 mg, oral, q12h  atorvastatin, 40 mg, oral, Nightly  B complex-vitamin C-folic acid, 1 capsule, oral, Daily  clopidogrel, 75 mg, oral,  Daily  epoetin dane or biosimilar, 15,000 Units, intravenous, Once per day on Tue Thu Sat  gabapentin, 100 mg, oral, Daily  insulin lispro, 0-5 Units, subcutaneous, TID with meals  lubricating eye drops, 2 drop, Both Eyes, TID  melatonin, 5 mg, oral, Nightly  midodrine, 15 mg, oral, q8h  pantoprazole, 40 mg, oral, Daily before breakfast  piperacillin-tazobactam, 2.25 g, intravenous, q12h  polyethylene glycol, 17 g, oral, Daily  sennosides-docusate sodium, 2 tablet, oral, BID  sodium hypochlorite, , irrigation, Daily  vancomycin, 750 mg, intravenous, Once per day on Tue Thu Sat      Continuous medications     PRN medications  PRN medications: acetaminophen, albuterol, alteplase, dextrose 10 % in water (D10W), dextrose, glucagon, ipratropium-albuteroL, lidocaine, ondansetron, oxyCODONE, sodium chloride 0.9%, vancomycin    Wilma Redding MD

## 2024-03-25 NOTE — PROGRESS NOTES
Assessment/Plan           Mr. Benton, a 78-year-old patient with a complex medical history including peripheral arterial disease, atrial fibrillation, coronary artery disease, type 2 diabetes mellitus, a right subclavian deep vein thrombosis in October 2023, gastroesophageal reflux disease, sick sinus syndrome with a pacemaker, gout, end-stage renal disease on hemodialysis, hyperlipidemia, hypotension, heart failure with reduced ejection fraction (EF 40-45% as of January 2023), and gout, who was transferred from [facility] to [facility]  due to bleeding at his left upper arm arteriovenous fistula site. Vascular surgery  applied two figure-eight silk sutures for hemostasis at the fistula site to stop bleeding and pt subsequently received a new tunneled hemodialysis catheter and underwent dialysis fistula ligation in the OR. Postoperatively, the patient's pain has been managed effectively, and his hemoglobin and hematocrit levels have remained stable after resuming anticoagulant and antiplatelet therapies, after which his central line was removed.  He was MRSA nare positive and one-to-one decolonization protocol.  He continues to exhibit signs of generalized edema, managed by nephrology for fluid overload and elevation of UE's. Pt had persistent leukocytosis without specific symptoms prompting repeated blood cultures, results of which and subsequent blood cultures were neg. . A chest X-ray indicated volume overload with possible infiltrate, after which, he subsequently developed lower extremity rest pain as well as a cough with sputum production. The has remained with stable 2 liters oxygen requirement.  Wound care, podiatry, and vascular reinvolved.  He was found to have new mild purulence from the TMA site, further PVR testing indicated right lower extremity vascular disease.  Patient had subsequent angiogram which revealed no distal blood flow below the knee although the limb remains warm with apparent perfusion on exam.   For endovascular at this time given multiple procedures her has been no indicated options for revascularization patient may need amputation pending further endovascular evaluation.  He is pending MRI, at timing dictated by his pacemaker needs.  Palliative has been involved for goals of care.  Podiatry is continuing palliative wound care.      #leukocytosis  #TMA site deep tissue infection in setting of RLE critical limb ischemia as below  #PNA - resolved  - initially likely related to surgical stress, however remained persistent, no new signs of infection/localizing signs at time. However pt  developed foot pain and sputum production. Cxr with volume overload with possible infiltrate. Podiatry obtained wound cultures grew Pseudomonas and Candida.  Blood cultures are negative  -Remains on 2 L, likely related to volume overload, continuing dialysis weaning as tolerated.  No signs of pneumonia  - continue Vanco Zosyn  - IS, accapella, RT consult to eval/rec.   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    #Bleeding from AV fistula s/p ligation and tunneled HD line placement - resolved  #anemia d/t acute blood loss  #MRSA colonization, on decolonization protocol due to central line placement.   - Vascular surgery consulted, appreciate recs  - Hemostasis obtained at L upper extremity fistula site after placement of two figure 8 sutures   -Transfuse 1 unit of PRBC on HD on admit.  Monitor HB.     - Restarted apixaban and Plavix and monitoring hemoglobin for stability.    Has remained stable.  Central line removed.  No other issues.      #MRSA nare positive  On MRSA decolonization protocol, ordered mupirocin and chlorhexidine bath  Femoral line removed on 3/14 as patient is clinically stable after starting apixaban and Plavix  Repeat MRSA nare for clearance    #UE edema - likely related to anasarca, positioning and ESRD, mild improvement  # Respiratory failure with hypoxemia secondary to fluid overload   -US showed new acute non occlussive thrombus in his rt subclavian vein proximal segment. Also with a hematoma in his right upper arm. Left arm was limited due to bandages. Per vascular surgery, no plan for any intervention, with only supportive care (arm elevation and local compression).  -Patient continues  2 L, in setting of anasarca and ESRD.  Likely only requiring 1 L as I have weaned him down in the past.  Xray as above. Componenet of fluid overload.   -Remained on 2 L and will attempt weaning as tolerated      #T2DM (last HgA1c 5.3% 12/3/23) c/b neuropathy  - accucheck ACHS  - Lispro SSI #1 0-5 units  - holding home dose of Lantus 6 units to prevent episodes of hypoglycemia while hospitalized and HgA1c <7%. Monitor more need to resume.    - hypoglycemia order set placed  - continue home dose of Gabapentin 100 mg PO every day  - glc at goal      #hyponatremia (chronic)  #ESRD on HD   - daily CHG bath d/t L chest dialysis cath site   - Nephrology consulted for HD   - renal diet  - renally dose meds as needed  - continue home dose of Nephrocaps PO every day  -continue HD per nephrology. Has had additional sessions this week for fluid removal. Care with fluid removal given chronic hypotension on midodrine.      #Afib  #hx of DVT (10/23)  - Duplex US of RUE done 10/22/23: Positive study for DVT in the right upper extremity.  There is a nonocclusive thrombus in the right subclavian artery.  - home dose of Eliquis       #hypotension, chronic  - continue home dose of Midodrine 15 mg PO  "TID     #Malnutrition  -With anasarca, wound needs, starting to have increase in appetite.  Nutrition consulted and started on Nepro twice daily         Patient transferring to nontelemetry floor, chest pain very much appears musculoskeletal.  Will continue to monitor.    Scheduled outpatient appointments in system:   Future Appointments   Date Time Provider Department [facility]   4/17/2024  1:00 PM Koko Gordon MD [id number] [place]     ---------------------------------------------------------------------------------------------------  Subjective   No new events.  Patient was informed that there was options for him, I discussed that right now we are discussing what to do in the setting of not having options to revascularize.  His pain is controlled.  No other issues reported.     ---------------------------------------------------------------------------------------------------  Objective   Last Recorded Vitals  Blood pressure 146/52, pulse 88, temperature 36.4 °C (97.5 °F), resp. rate 17, height 1.778 m (5' 10\"), weight 87 kg (191 lb 12.8 oz), SpO2 100 %.  Intake/Output last 3 Shifts:  I/O last 3 completed shifts:  In: 240 (2.8 mL/kg) [P.O.:240]  Out: - (0 mL/kg)   Weight: 87 kg     Physical Exam  Vitals and nursing note reviewed.   Constitutional:       General: He is not in acute distress.     Appearance: He is obese. He is ill-appearing. He is not toxic-appearing.   HENT:      Head: Normocephalic and atraumatic.      Nose:      Comments: On nasal canula     Mouth/Throat:      Mouth: Mucous membranes are moist.   Eyes:      General: No scleral icterus.     Extraocular Movements: Extraocular movements intact.      Conjunctiva/sclera: Conjunctivae normal.   Cardiovascular:      Rate and Rhythm: Normal rate and regular rhythm.      Heart sounds: S1 normal and S2 normal. No murmur heard.     Comments: Severe pain with palpation of the chest wall  Pulmonary:      Effort: Pulmonary effort is normal. No respiratory " distress.      Breath sounds: No wheezing or rhonchi.      Comments: Basilar rales  Abdominal:      General: Bowel sounds are normal. There is no distension.      Palpations: Abdomen is soft.      Tenderness: There is no abdominal tenderness. There is no guarding or rebound.   Musculoskeletal:         General: Swelling present. No deformity.      Cervical back: Neck supple.      Comments: Multiple LE wounds, R TMA dressed c/d/I  RUE >LUE swelling, skin wrinkling in left hand   Skin:     General: Skin is warm and dry.      Findings: No rash.      Comments: Unable to check below level of dressing fully but area check was warm, appeared to have some moderate return of color  with pressure applied   Neurological:      General: No focal deficit present.      Mental Status: He is alert. Mental status is at baseline.      Comments: Moving all extremities, no gross sensation changes.    Psychiatric:         Mood and Affect: Mood normal.         Relevant Results  Lab Results   Component Value Date    WBC 14.4 (H) 03/25/2024    HGB 7.2 (L) 03/25/2024    HCT 26.6 (L) 03/25/2024    MCV 99 03/25/2024     03/25/2024      Lab Results   Component Value Date    GLUCOSE 152 (H) 03/25/2024    CALCIUM 9.9 03/25/2024     (L) 03/25/2024    K 3.9 03/25/2024    CO2 27 03/25/2024    CL 94 (L) 03/25/2024    BUN 39 (H) 03/25/2024    CREATININE 3.70 (H) 03/25/2024     Scheduled medications  allopurinol, 100 mg, oral, Daily  apixaban, 2.5 mg, oral, q12h  atorvastatin, 40 mg, oral, Nightly  B complex-vitamin C-folic acid, 1 capsule, oral, Daily  clopidogrel, 75 mg, oral, Daily  [START ON 3/26/2024] epoetin dane or biosimilar, 20,000 Units, intravenous, Once per day on Tue Thu Sat  gabapentin, 100 mg, oral, Daily  insulin lispro, 0-5 Units, subcutaneous, TID with meals  lubricating eye drops, 2 drop, Both Eyes, TID  melatonin, 5 mg, oral, Nightly  midodrine, 15 mg, oral, q8h  pantoprazole, 40 mg, oral, Daily before  breakfast  piperacillin-tazobactam, 2.25 g, intravenous, q12h  polyethylene glycol, 17 g, oral, Daily  sennosides-docusate sodium, 2 tablet, oral, BID  sodium hypochlorite, , irrigation, Daily  vancomycin, 750 mg, intravenous, Once per day on Tue Thu Sat      Continuous medications     PRN medications  PRN medications: acetaminophen, albuterol, alteplase, dextrose 10 % in water (D10W), dextrose, glucagon, ipratropium-albuteroL, lidocaine, ondansetron, oxyCODONE, sodium chloride 0.9%, vancomycin    Donn Quiñonez MD

## 2024-03-25 NOTE — PROGRESS NOTES
"Chevy Benton is a 78 y.o. male on day 17 of admission presenting with A-V fistula (CMS/HCC).    Subjective   Patient was seen at bedside, resting comfortably. No acute or new concerns, no constitutional symptoms.       Physical Exam    Objective     Physical Exam  General: AAOx1-2, no acute distress, bilateral lower extremity dressings intact     RLE focused exam:  Vasc: Dorsalis pedis and posterior tibial pulses are non-palpable. Skin temperature is warm to warm from proximal tibia to distal foot. No erythema noted. Mild pitting edema noted.      Neuro: Gross and light touch sensation is intact to the foot.     Derm: There is a full thickness ulceration at the right foot TMA site. No drainage noted today. No malodor. Wound base is fibrogranular. Wound edges are necrotic. No crepitus, fluctuance, or purulence. There is an unstageable wound on the lateral right lower leg. Base is dry stable eschar. Edges are hyperemic but not macerated or hyperkeratotic. No drainage. No crepitus, fluctuance, purulence, or malodor. Stage 0 pressure injury on the right heel, erythema is blanchable. Prevalon boots in place bilaterally.     Ortho: S/P right foot TMA    Last Recorded Vitals  Blood pressure 132/69, pulse 78, temperature 36.3 °C (97.3 °F), temperature source Temporal, resp. rate 17, height 1.778 m (5' 10\"), weight 87 kg (191 lb 12.8 oz), SpO2 100 %.    Intake/Output last 3 Shifts:  I/O last 3 completed shifts:  In: 240 (2.8 mL/kg) [P.O.:240]  Out: - (0 mL/kg)   Weight: 87 kg     Relevant Results  Results for orders placed or performed during the hospital encounter of 03/08/24 (from the past 24 hour(s))   POCT GLUCOSE   Result Value Ref Range    POCT Glucose 158 (H) 74 - 99 mg/dL   POCT GLUCOSE   Result Value Ref Range    POCT Glucose 169 (H) 74 - 99 mg/dL   Renal Function Panel   Result Value Ref Range    Glucose 152 (H) 74 - 99 mg/dL    Sodium 133 (L) 136 - 145 mmol/L    Potassium 3.9 3.5 - 5.3 mmol/L    Chloride 94 " (L) 98 - 107 mmol/L    Bicarbonate 27 21 - 32 mmol/L    Anion Gap 16 10 - 20 mmol/L    Urea Nitrogen 39 (H) 6 - 23 mg/dL    Creatinine 3.70 (H) 0.50 - 1.30 mg/dL    eGFR 16 (L) >60 mL/min/1.73m*2    Calcium 9.9 8.6 - 10.6 mg/dL    Phosphorus 5.6 (H) 2.5 - 4.9 mg/dL    Albumin 2.3 (L) 3.4 - 5.0 g/dL   CBC   Result Value Ref Range    WBC 14.4 (H) 4.4 - 11.3 x10*3/uL    nRBC 0.3 (H) 0.0 - 0.0 /100 WBCs    RBC 2.69 (L) 4.50 - 5.90 x10*6/uL    Hemoglobin 7.2 (L) 13.5 - 17.5 g/dL    Hematocrit 26.6 (L) 41.0 - 52.0 %    MCV 99 80 - 100 fL    MCH 26.8 26.0 - 34.0 pg    MCHC 27.1 (L) 32.0 - 36.0 g/dL    RDW 18.3 (H) 11.5 - 14.5 %    Platelets 423 150 - 450 x10*3/uL   POCT GLUCOSE   Result Value Ref Range    POCT Glucose 152 (H) 74 - 99 mg/dL   POCT GLUCOSE   Result Value Ref Range    POCT Glucose 147 (H) 74 - 99 mg/dL     Upper extremity venous duplex bilateral    Result Date: 3/20/2024            [place] [address]   Tel [phone] and Fax [phone]  Vascular Lab Report [facility] US UPPER EXTREMITY VENOUS DUPLEX BILATERAL  Patient Name:     SHAMEKA Mendez Physician: [id number] Stuart Nguyen MD Study Date:       3/19/2024           Ordering           [id number] NETTIE                                       Physician:         YENI MRN/PID:          [id number]            Technologist:      CARLOS Accession#:       [id number]        Technologist 2: Date of           1945 / 78      Encounter#:        [id number] Birth/Age:        years Gender:           M Admission Status: Inpatient           Location           [facility]                                       Performed:  Diagnosis/ICD: Other specified soft tissue disorders-M79.89 CPT Codes:     86912 Peripheral venous duplex scan for DVT complete  **CRITICAL RESULT** Critical Result: acute non occlusive thrombus noted in the right subclavian proximal segment. Notification called to Francisca Chery RN; Wilma Redding MD  on 3/19/2024 at 11:50:55 AM by Kristen Gloria.  CONCLUSIONS: Right Upper Venous: There is acute occlusive deep vein thrombosis visualized in the proximal subclavian vein. Remainder of exam is noted to be negative for acute DVT. Hematoma noted in right upper arm measuring 5.07cm x 2.01cm. Thrombus noted around line. Cannot rule out thrombus of non-visualized proximal cephalic, mid cephalic, distal cephalic and basilic veins due to swelling and edema. Left Upper Venous: Limited exam due to bandages. Cannot rule out thrombus of non-compressible mid subclavian and distal subclavian veins due to dressings and catheter. Cannot rule out thrombus of non-visualized proximal cephalic, mid cephalic, distal cephalic and basilic veins due to dressings.  Imaging & Doppler Findings:  Right               Compressible      Thrombus              Flow Internal Jugular        Yes             None         Spontaneous/Phasic Subclavian            Partial    Acute non-occlusive Subclavian Proximal     Yes             None Subclavian Mid          Yes             None         Spontaneous/Phasic Subclavian Distal                       None Axillary                Yes             None Brachial                Yes             None  Left                Compress Thrombus        Flow Internal Jugular      Yes      None   Spontaneous/Phasic Subclavian Proximal                   Spontaneous/Phasic Subclavian Mid        Yes      None Subclavian Distal     Yes      None   Spontaneous/Phasic Axillary              Yes      None   Spontaneous/Phasic Brachial              Yes      None  [id number] Stuart Nguyen MD Electronically signed by [id number] Stuart Nguyen MD on 3/20/2024 at 11:33:21 PM  ** Final **     Vascular US Ankle Brachial Index (RAJ) Without Exercise    Result Date: 3/19/2024  Preliminary Cardiology Report           [place] [address]   Tel [phone] and Fax [phone]            Preliminary Vascular Lab Report   VASC US ANKLE BRACHIAL INDEX (RAJ) WITHOUT EXERCISE  Patient Name:      SHAMEKA SUN Reading Physician:  [id number] Stuart Nguyen MD Study Date:        3/19/2024           Ordering Physician: [id number] ARELIS MEJIAS MRN/PID:           [id number]            Technologist:       Beena Craft RVT Accession#:        [id number]        Technologist 2: Date of Birth/Age: 1945           Encounter#:         [id number] Gender:            M Admission Status:  Inpatient           Location Performed: [facility]  Diagnosis/ICD: Peripheral vascular disease, unspecified-I73.9 Procedure/CPT: 35455 Peripheral artery RAJ Only  PRELIMINARY CONCLUSIONS: Right Lower PVR: Evidence of moderate arterial occlusive disease in the right lower extremity at rest. Monophasic flow is noted in the right posterior tibial artery and right dorsalis pedis artery. Biphasic flow is noted in the right dorsalis pedis artery. Left Lower PVR: Evidence of moderate arterial occlusive disease in the left lower extremity at rest. Biphasic flow is noted in the left common femoral artery, left posterior tibial artery and left dorsalis pedis artery. Unable to obtain brachial pressure due to dyalisis access.  Additional Findings: Technically difficult study due to patient bandaging, positioning, and tolerance.  Imaging & Doppler Findings:  RIGHT Lower PVR                Pressures Ratios Right Posterior Tibial (Ankle) 74 mmHg   0.66 Right Dorsalis Pedis (Ankle)   256 mmHg  2.29   LEFT Lower PVR                Pressures Ratios Left Posterior Tibial (Ankle) 256 mmHg  2.29 Left Dorsalis Pedis (Ankle)   256 mmHg  2.29 Left Digit (Great Toe)        52 mmHg   0.46                      Right Brachial Pressure 112 mmHg   VASCULAR PRELIMINARY REPORT completed by Beena Craft RVT on 3/19/2024 at 11:48:18 AM  ** Final **     XR foot right 3+ views    Result Date: 3/18/2024  Interpreted By:  Liz Ruiz and Beyersdorf Conner STUDY: XR FOOT RIGHT 3+ VIEWS; ;   3/17/2024 7:49 pm   INDICATION: Signs/Symptoms:Evaluation of TMA right foot possible infection.   COMPARISON: X-ray right foot 01/19/2024   ACCESSION NUMBER(S): [id number]   ORDERING CLINICIAN: NETTIE JAIME   FINDINGS: Transmetatarsal amputation changes noted. There is marked paucity of soft tissue stump at the level of 1st metatarsal likely representing wound dehiscence/stump breakdown. There is also apparent soft tissue defect of the 5th metatarsal stump region which may also represent stump breakdown. There is irregular appearance of the stumps of the 1st and 5th digits suggesting osteomyelitis. Prominent vascular calcifications of the ankle and foot.   Mild midfoot degenerative changes with dorsal osteophytes. Remote healed distal fibular fracture sequela. No acute fracture or malalignment.       Transmetatarsal amputation changes of the foot with irregular appearance of the stumps of the 1st and 5th digits suggesting osteomyelitis with osteomyelitis of the rest of the digits not excluded. An MRI may be obtained for further evaluation if clinically warranted Findings suggesting stump breakdown at the level of 1st and 5th metatarsals.   I personally reviewed the image(s)/study and resident interpretation. I agree with the findings as stated by resident Rafael Tony. Data analyzed and images interpreted at [facility], [place], [place].   MACRO: None   Signed by: Liz Ruiz 3/18/2024 9:56 AM Dictation workstation:   [id number]    XR chest 1 view    Result Date: 3/16/2024  Interpreted By:  Kendrick Clayton, STUDY: XR CHEST 1 VIEW; 3/16/2024 6:32 pm   INDICATION: Signs/Symptoms:eval continued hypoxemia. esrd fluid overload.   COMPARISON: Radiograph dated 01/23/2024   ACCESSION NUMBER(S): [id number]   ORDERING CLINICIAN: NETTIE JAIME   FINDINGS: Left IJ approach hemodialysis catheter is in place with the tip projecting over the right atrium. Right  subclavian approach pacer/defibrillator is stable.   The cardiac silhouette size is unchanged.   Low lung volumes and bronchovascular crowding. Right more than left bibasilar opacity. Mild interstitial prominence and edema. No sizable pneumothorax   No acute osseous abnormality.       1. Slight interval worsening of the aeration of the lungs with bilateral, right more than left pleural effusion and atelectasis/consolidation and mild interstitial edema.       Signed by: Kendrick Chandutarun Painting 3/16/2024 9:04 PM Dictation workstation:   [id number]    ECG 12 lead    Result Date: 3/12/2024  Atrial fibrillation ST & T wave abnormality, consider anterolateral ischemia Prolonged QT Abnormal ECG When compared with ECG of 08-MAR-2024 14:26, No significant change was found See ED provider note for full interpretation and clinical correlation Confirmed by Jluis Cee ([id number]) on 3/12/2024 9:34:16 AM    IR angiogram fistula graft    Result Date: 2/27/2024  Interpreted By:  Scott Lepe, STUDY: IR ANGIOGRAM FISTULA GRAFT;  2/21/2024 4:36 pm   INDICATION: Signs/Symptoms:Angio for pressures.   COMPARISON: None.   ACCESSION NUMBER(S): [id number]   ORDERING CLINICIAN: SAQIB PORRAS   TECHNIQUE: : Scott Lepe MD   CONSENT: The patient was informed of the nature of the proposed procedure. The purposes, alternatives, risks, and benefits were explained and discussed. All questions were answered and consent was obtained.   RADIATION EXPOSURE: Dose: 34 mGy   SEDATION: Lidocaine was administered for local anesthesia. No IV sedation was given.   MEDICATION/CONTRAST: 40 mL Omnipaque 350   TIME OUT: A time out was performed immediately prior to the procedure start with interventional team, correctly identifying the patient using multiple identifiers and ensuring the appropriate procedure and anatomy (including laterality, if applicable) were identified. The procedure consent form and all relevant laboratory and imaging  tests were reviewed. The need for antibiotic administration or patient or procedure specific safety precautions or equipment was reviewed.   COMPLICATIONS: None immediate     FINDINGS: The patient was positioned on the angiography table. The left arm was prepped and draped in a sterile fashion.   Utilizing sonographic guidance, the arteriovenous fistula was accessed towards the venous outflow with micropuncture technique. An image was stored to PACS. An 0.018 inch wire was passed into the fistula and used to place a transitional sheath. The wire and inner dilator were removed and a Bentson wire was advanced into the fistula. The transitional sheath was removed and a 6 Guinean sheath placed.   Angiograms were obtained through the sheath with imaging of the venous outflow, including the central veins. These images demonstrate multiple areas stenosis greater than 50% throughout the cephalic outflow. There are 2 very large pseudoaneurysms related to repeated access. This is also a site of bleeding. The axillary stenosis which has previously been ballooned is present but less than 50%. The remainder of the central veins are patent.   Through the sheath, a 5 Guinean Kumpe catheter and Glidewire were coaxially advanced beyond the stenosis. The Glidewire was removed and a 0.035 inch Bentson wire was placed. Finally the Kumpe catheter was removed.   Over the 0.035 inch wire, a 8x60 mm balloon was positioned across the multiple outflow stenoses of the cephalic outflow. Angioplasty was performed.  A final angiogram shows no significant residual stenosis and no evidence of vessel disruption.   All wires and catheters were removed. The sheath was then removed during deployment of a pursestring suture utilizing 3-0 Vicryl. Hemostasis was achieved with the suture and manual compression. A sterile was applied. Additional pressure was applied to the eroded pseudoaneurysm access site which was bleeding prior to the procedure and was  bleeding again following preparation of the patient. Hemostasis was achieved with prolonged manual pressure.       Left upper extremity AV fistulogram. The fistula is patent, although there are multiple greater than 50% stenosis of the cephalic outflow. These were angioplastied successfully. 2 very large pseudoaneurysms identified at repeated access points. These are responsible for prolonged bleeding after access and will require surgical revision.   Performed and dictated at [facility].   Signed by: Scott Lepe 2/27/2024 8:23 AM Dictation workstation:   [id number]    IR CVC tunneled    Result Date: 2/23/2024  Interpreted By:  Jose Wang, STUDY: IR CVC TUNNELED; ;  2/23/2024 10:04 am   ULTRASOUND AND FLUOROSCOPICALLY GUIDED LEFT INTERNAL JUGULAR VEIN TUNNELED HEMODIALYSIS CATHETER   INDICATION: Signs/Symptoms:permcath placement - ESRD. 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 Request for replacement of tunneled hemodialysis catheter.   COMPARISON: Angiogram 02/21/2024, chest radiograph 01/23/2024   ACCESSION NUMBER(S): [id number]   ORDERING CLINICIAN: YUE PEREZ   TECHNIQUE:   ATTENDING : Jose Wang M.D.   TECHNICAL DESCRIPTION/FINDINGS: The procedure, including all risks, benefits and alternatives were explained to the patient and his proxy in detail. All questions were answered and written informed consent was obtained.   The patient was positioned supine on the fluoroscopy table. A time-out was performed.   The left neck and anterosuperior chest were prepped and draped in usual sterile fashion. Focused ultrasound was performed of the left internal jugular vein demonstrating patency and compressibility. Ultrasound images were saved. 1% lidocaine was administered subcutaneously  for local anesthesia. Under real-time ultrasound guidance, the left internal jugular vein was accessed in antegrade direction using micropuncture technique. Ultrasound images were saved. Under fluoroscopic visualization, an 018 guidewire was advanced into the right atrium and a micropuncture transitional introducer was placed.   Additional 1% lidocaine was then administered over the left anterosuperior chest wall to anesthetize a subcutaneous tunnel tract. The dual lumen cuffed hemodialysis catheter was then tunneled from a left anterosuperior chest wall incision site and externalized overlying the venotomy. After serial dilation over an 035 guidewire which had been placed into the IVC, 15 Thai peel-away sheath was placed. Through the sheath, a 14.5 Thai by 27 cm tip to cuff dual-lumen hemodialysis catheter was placed. Completion fluoroscopic image demonstrates the catheter tip at the superior cavoatrial junction.   Catheter lumens easily aspirated and flushed were locked with a concentrated heparinized solution (1000 units/cc). The catheter hub was sutured to the skin with 2 0 Prolene stay suture. A sterile dressing was applied.   SEDATION/MEDICATIONS: Continuous cardiopulmonary monitoring was performed by a radiology nurse for the duration of the procedure. No conscious sedation was administered. Procedural duration 20 minutes. 15 cc 1% Lidocaine was administered subcutaneously for local anesthesia. SPECIMENS: None. ESTIMATED BLOOD LOSS:  5 cc FLOUROSCOPY:  0.4 minutes; DAP  3760 mGy-cm*2; Air Kerma  11.86 mGy CONTRAST: None.       Ultrasound and fluoroscopically guided left internal jugular vein tunneled hemodialysis catheter. The catheter is ready for immediate use.     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 Aeruginosa and Candida parapsilosis  -Blood cultures negative  -Podiatry not planning surgical intervention. Plan is for palliative wound care. Our service changed the dressing today, and will follow from a distance for the remainder of his admission. Dressing orders placed for nursing service to complete Q2D.  -Orders Dakins for future dressing changes. Dressing changed today.  -Patient was seen and discussed with attending, Dr. Poole.     Shadi Ibarra DPM, PGY-III  [place]

## 2024-03-25 NOTE — PROGRESS NOTES
"Chevy Benton is a 78 y.o. male on day 17 of admission presenting with A-V fistula (CMS/HCC).    Subjective   Pt  resting in bed. Denies sob, n/v/d, fever, cough, chills, chest pain , constipation, anuric, pain       Objective     Physical Exam  Constitutional:       Appearance: He is obese.   Cardiovascular:      Rate and Rhythm: Rhythm irregular.      Comments: AFIB 87  Pulmonary:      Comments: Magdiel lungs diminished with minor crackles more noted to lt base  Cont pox 100%  on 2L  Abdominal:      General: There is distension.      Comments: Non-tender to palpation   Genitourinary:     Comments: States anuric  Musculoskeletal:      Comments: Ble edema +2-3 pitting   rt arm +3 pitting edema -fingers cool to touch  Lt upper arm swelling with kerlix drsg-without thrill/bruit   Skin:     General: Skin is warm and dry.      Comments: Magdiel feet with kerlix wrap   Neurological:      Mental Status: He is alert and oriented to person, place, and time.   Psychiatric:         Mood and Affect: Mood normal.         Behavior: Behavior normal.         Last Recorded Vitals  Blood pressure 132/69, pulse 78, temperature 36.3 °C (97.3 °F), temperature source Temporal, resp. rate 17, height 1.778 m (5' 10\"), weight 87 kg (191 lb 12.8 oz), SpO2 100 %.  Intake/Output last 3 Shifts:  I/O last 3 completed shifts:  In: 240 (2.8 mL/kg) [P.O.:240]  Out: - (0 mL/kg)   Weight: 87 kg     Relevant Results  Scheduled medications  allopurinol, 100 mg, oral, Daily  apixaban, 2.5 mg, oral, q12h  atorvastatin, 40 mg, oral, Nightly  B complex-vitamin C-folic acid, 1 capsule, oral, Daily  clopidogrel, 75 mg, oral, Daily  [START ON 3/26/2024] epoetin dane or biosimilar, 20,000 Units, intravenous, Once per day on Tue Thu Sat  gabapentin, 100 mg, oral, Daily  insulin lispro, 0-5 Units, subcutaneous, TID with meals  lubricating eye drops, 2 drop, Both Eyes, TID  melatonin, 5 mg, oral, Nightly  midodrine, 15 mg, oral, q8h  pantoprazole, 40 mg, oral, " Daily before breakfast  piperacillin-tazobactam, 2.25 g, intravenous, q12h  polyethylene glycol, 17 g, oral, Daily  sennosides-docusate sodium, 2 tablet, oral, BID  sodium hypochlorite, , irrigation, Daily  vancomycin, 750 mg, intravenous, Once per day on Tue Thu Sat      Continuous medications     PRN medications  PRN medications: acetaminophen, albuterol, alteplase, dextrose 10 % in water (D10W), dextrose, glucagon, ipratropium-albuteroL, lidocaine, ondansetron, oxyCODONE, sodium chloride 0.9%, vancomycin   Results for orders placed or performed during the hospital encounter of 03/08/24 (from the past 24 hour(s))   POCT GLUCOSE   Result Value Ref Range    POCT Glucose 158 (H) 74 - 99 mg/dL   POCT GLUCOSE   Result Value Ref Range    POCT Glucose 169 (H) 74 - 99 mg/dL   Renal Function Panel   Result Value Ref Range    Glucose 152 (H) 74 - 99 mg/dL    Sodium 133 (L) 136 - 145 mmol/L    Potassium 3.9 3.5 - 5.3 mmol/L    Chloride 94 (L) 98 - 107 mmol/L    Bicarbonate 27 21 - 32 mmol/L    Anion Gap 16 10 - 20 mmol/L    Urea Nitrogen 39 (H) 6 - 23 mg/dL    Creatinine 3.70 (H) 0.50 - 1.30 mg/dL    eGFR 16 (L) >60 mL/min/1.73m*2    Calcium 9.9 8.6 - 10.6 mg/dL    Phosphorus 5.6 (H) 2.5 - 4.9 mg/dL    Albumin 2.3 (L) 3.4 - 5.0 g/dL   CBC   Result Value Ref Range    WBC 14.4 (H) 4.4 - 11.3 x10*3/uL    nRBC 0.3 (H) 0.0 - 0.0 /100 WBCs    RBC 2.69 (L) 4.50 - 5.90 x10*6/uL    Hemoglobin 7.2 (L) 13.5 - 17.5 g/dL    Hematocrit 26.6 (L) 41.0 - 52.0 %    MCV 99 80 - 100 fL    MCH 26.8 26.0 - 34.0 pg    MCHC 27.1 (L) 32.0 - 36.0 g/dL    RDW 18.3 (H) 11.5 - 14.5 %    Platelets 423 150 - 450 x10*3/uL   POCT GLUCOSE   Result Value Ref Range    POCT Glucose 152 (H) 74 - 99 mg/dL   POCT GLUCOSE   Result Value Ref Range    POCT Glucose 147 (H) 74 - 99 mg/dL                     Assessment/Plan   Principal Problem:    A-V fistula (CMS/HCC)  Active Problems:    Anemia due to blood loss, acute    Critical limb ischemia of right lower  extremity (CMS/HCC)    Tolerated hemodialysis Saturday with net fluid loss 1500cc.     Bp stable with tx, . Hypervolemic on exam and has stable electrolytes .     Outpatient Dialysis schedule:   [facility]Dr Hannah; will be TTS while inpatient     Access: lt chest cath - no issues - able to achieve   -Lt arm access without thrill/bruit-Left arm was limited due to bandages. Per vascular surgery, no plan for any intervention, with only supportive care (arm elevation and local compression).   -Rt EJ   -US showed new acute non occlussive thrombus in his rt subclavian vein proximal segment. Also with a hematoma in his right upper arm     Anemia of ESRD:  3/25-increase  epoetin dane-epbx (Retacrit) injection 20,000 Units on dialysis days... current hgb 7.2.. 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 Will cont to monitor.. B complex-vitamin C-folic acid (Nephrocaps) capsule 1 capsule daily     Plan HD tomorrow with UF as tolerated     Renal diet      Please obtain daily standing wt (if possible)     Medication to be adjusted for ESRD      Patient to continue regular HD schedule while inpatient and to follow with the outpatient nephrologist at discharge        JENIFFER Bates-CNP

## 2024-03-25 NOTE — PROGRESS NOTES
Physical Therapy    Physical Therapy Treatment    Patient Name: Chevy Benton  MRN: [id number]  Today's Date: 3/25/2024  Time Calculation  Start Time: 1534  Stop Time: 1553  Time Calculation (min): 19 min       Assessment/Plan   PT Assessment  End of Session Communication: Bedside nurse  Assessment Comment: Pt completed bed level ther ex this session with minimal ROM observed and AAROM required for heel slides.  Session was ended early due to pt with need to have bowel movement.  Pt continues to benefit from skilled PT and remains appropriate for moderate intensity PT upon discharge.  End of Session Patient Position: Bed, 4 rail up, Alarm off, caregiver present (NCP in room)  PT Plan  Inpatient/Swing Bed or Outpatient: Inpatient  PT Plan  Treatment/Interventions: Bed mobility, Transfer training, Gait training, Balance training, Strengthening, Endurance training, Therapeutic exercise, Therapeutic activity  PT Plan: Skilled PT  PT Frequency: 3 times per week  PT Discharge Recommendations: Moderate intensity level of continued care  Equipment Recommended upon Discharge:  (none)  PT Recommended Transfer Status: Total assist  PT - OK to Discharge: Yes      General Visit Information:   PT  Visit  PT Received On: 03/25/24  General  Prior to Session Communication: Bedside nurse  Patient Position Received: Bed, 4 rail up, Alarm off, not on at start of session  General Comment: Pt cleared for PT by RN.  Pt alert and agreeable to PT with repetition and gentle encouragement.    Subjective   Precautions:  Precautions  LE Weight Bearing Status: Right Non-Weight Bearing  Medical Precautions: Fall precautions, Cardiac precautions  Vital Signs:  Vital Signs  Heart Rate:  (pre:88, post:91)  Heart Rate Source: Monitor    Objective   Pain:  Pain Assessment  Pain Assessment: 0-10  Pain Score: 10 - Worst possible pain  Pain Type: Surgical pain (from staple removal)  Pain Location: Arm  Pain Orientation: Left  Pain Interventions:  Repositioned, Rest  Response to Interventions: no change in pain  Cognition:  Cognition  Overall Cognitive Status: Within Functional Limits (oriented to person place and time with significant repetition and increased time)  Following Commands: Follows one step commands with repetition and increased time  Activity Tolerance:  Activity Tolerance  Endurance: Tolerates 10 - 20 min exercise with multiple rests  Treatments:  Therapeutic Exercise  Therapeutic Exercise Performed: Yes  Therapeutic Exercise Activity 1: supine ankle pumps, quad sets, and heel slides (AAROM) x10 each LE.  Decreased ankle ROM, minimal but visible quad contraction during quad sets    Bed Mobility  Bed Mobility: Yes  Bed Mobility 1  Bed Mobility 1: Rolling left  Level of Assistance 1: Dependent    Outcome Measures:  [facility] Basic Mobility  Turning from your back to your side while in a flat bed without using bedrails: Total  Moving from lying on your back to sitting on the side of a flat bed without using bedrails: Total  Moving to and from bed to chair (including a wheelchair): Total  Standing up from a chair using your arms (e.g. wheelchair or bedside chair): Total  To walk in hospital room: Total  Climbing 3-5 steps with railing: Total  Basic Mobility - Total Score: 6    Education Documentation  Home Exercise Program, taught by Nevaeh Alanis PT at 3/25/2024  4:02 PM.  Learner: Patient  Readiness: Acceptance  Method: Explanation  Response: Verbalizes Understanding  Comment: PATTIE LEE    Precautions, taught by Nevaeh Alanis PT at 3/25/2024  4:02 PM.  Learner: Patient  Readiness: Acceptance  Method: Explanation  Response: Verbalizes Understanding  Comment: PATTIE LEE    Body Mechanics, taught by Nevaeh Alanis PT at 3/25/2024  4:02 PM.  Learner: Patient  Readiness: Acceptance  Method: Explanation  Response: Verbalizes Understanding  Comment: PATTIE LEE    Mobility Training, taught by Nevaeh Alanis PT at 3/25/2024  4:02 PM.  Learner:  Patient  Readiness: Acceptance  Method: Explanation  Response: Verbalizes Understanding  Comment: PATTIE LEE    Education Comments  No comments found.        Encounter Problems       Encounter Problems (Active)       PT Problem       Patient will complete supine to sit and sit to supine Min Assist  (Progressing)       Start:  03/11/24    Expected End:  04/08/24            Patient will perform sit<>stand transfer with Rolling Walker, and Mod Assist  (Not Progressing)       Start:  03/11/24    Expected End:  04/08/24            Patient will ambulate >10' with Rolling Walker and Mod Assist  (Not Progressing)       Start:  03/11/24    Expected End:  04/08/24            Pt will be able to maintain static sitting with SBA x 5 min (Progressing)       Start:  03/11/24    Expected End:  04/08/24

## 2024-03-25 NOTE — CARE PLAN
The patient's goals for the shift include  to get foot pain under control enough to rest    The clinical goals for the shift include HDS    Problem: Safety  Goal: Patient will be injury free during hospitalization  Outcome: Progressing  Goal: I will remain free of falls  Outcome: Progressing     Problem: Pain  Goal: My pain/discomfort is manageable  Outcome: Progressing     Problem: Skin  Goal: Prevent/manage excess moisture  Outcome: Progressing  Goal: Promote skin healing  Outcome: Progressing

## 2024-03-26 NOTE — PROGRESS NOTES
Assessment/Plan           Mr. Benton, a 78-year-old patient with a complex medical history including peripheral arterial disease, atrial fibrillation, coronary artery disease, type 2 diabetes mellitus, a right subclavian deep vein thrombosis in October 2023, gastroesophageal reflux disease, sick sinus syndrome with a pacemaker, gout, end-stage renal disease on hemodialysis, hyperlipidemia, hypotension, heart failure with reduced ejection fraction (EF 40-45% as of January 2023), and gout, who was transferred from [facility] to [facility]  due to bleeding at his left upper arm arteriovenous fistula site. Vascular surgery  applied two figure-eight silk sutures for hemostasis at the fistula site to stop bleeding and pt subsequently received a new tunneled hemodialysis catheter and underwent dialysis fistula ligation in the OR. Postoperatively, the patient's pain has been managed effectively, and his hemoglobin and hematocrit levels have remained stable after resuming anticoagulant and antiplatelet therapies, after which his central line was removed.  He was MRSA nare positive and one-to-one decolonization protocol.  He continues to exhibit signs of generalized edema, managed by nephrology for fluid overload and elevation of UE's. Pt had persistent leukocytosis without specific symptoms prompting repeated blood cultures, results of which and subsequent blood cultures were neg. . A chest X-ray indicated volume overload with possible infiltrate, after which, he subsequently developed lower extremity rest pain as well as a cough with sputum production. The has remained with stable 2 liters oxygen requirement.  Wound care, podiatry, and vascular reinvolved.  He was found to have new mild purulence from the TMA site, further PVR testing indicated right lower extremity vascular disease.  Patient had subsequent angiogram which revealed no distal blood flow below the knee although the limb remains warm with apparent perfusion on exam.   For endovascular at this time given multiple procedures her has been no indicated options for revascularization patient may need amputation pending further endovascular evaluation.  He is pending MRI, at timing dictated by his pacemaker needs.  Palliative has been involved for goals of care.  Podiatry is continuing palliative wound care.      #leukocytosis  #TMA site deep tissue infection in setting of RLE critical limb ischemia as below  #PNA - resolved  - initially likely related to surgical stress, however remained persistent, no new signs of infection/localizing signs at time. However pt  developed foot pain and sputum production. Cxr with volume overload with possible infiltrate. Podiatry obtained wound cultures grew Pseudomonas and Candida.  Blood cultures are negative  -Remains on 2 L, likely related to volume overload, continuing dialysis weaning as tolerated.  No signs of pneumonia  - continue Vanco Zosyn  - IS, accapella, RT consult to eval/rec.   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  Having discussion with endovascular podiatry and palliative.  We have set up a meeting for tomorrow at 1:00.      #Bleeding from AV fistula s/p ligation and tunneled HD line placement - resolved  #anemia d/t acute blood loss  #MRSA colonization, on decolonization protocol due to central line placement.   - Vascular surgery consulted, appreciate recs  - Hemostasis obtained at L upper extremity fistula site after placement of two figure 8 sutures   -Transfuse 1 unit of  PRBC on HD on admit.  Monitor HB.     - Restarted apixaban and Plavix and monitoring hemoglobin for stability.   Has remained stable.  Central line removed.  No other issues.      Chest pain  -Patient reports he has had intermittent chest pain for 2 weeks, this is his first time I have heard and mention this.  He states it worsened today.  Troponin was added onto the morning labs but never received, tried to get a lab draw at the end of dialysis when speaking with dialysis unit but it was not drawn.  He ended up going to MRI she could declined and then return to floor.  Troponin was negative, EKG showing A-fib, lateral ST changes which are unchanged from prior.  His chest pain is very reproducible and worsened by pushing on his chest and also with coughing.  Ordered chest x-ray.  Overall suspicious for noncardiac etiology although he is a vasculopath.  He has no associated shortness of breath or other symptoms.  He is on appropriate medications including Plavix and apixaban. will continue to monitor, likely source is musculoskeletal.   -Chest x-ray ordered    #MRSA nare positive  On MRSA decolonization protocol, ordered mupirocin and chlorhexidine bath  Femoral line removed on 3/14 as patient is clinically stable after starting apixaban and Plavix  Repeat MRSA nare for clearance    #UE edema - likely related to anasarca, positioning and ESRD, mild improvement  # Respiratory failure with hypoxemia secondary to fluid overload   -US showed new acute non occlussive thrombus in his rt subclavian vein proximal segment. Also with a hematoma in his right upper arm. Left arm was limited due to bandages. Per vascular surgery, no plan for any intervention, with only supportive care (arm elevation and local compression).  -Patient continues  2 L, in setting of anasarca and ESRD.  Likely only requiring 1 L as I have weaned him down in the past.  Xray as above. Componenet of fluid overload.   -Remained on 2 L and will attempt  weaning as tolerated      #T2DM (last HgA1c 5.3% 12/3/23) c/b neuropathy  - accucheck ACHS  - Lispro SSI #1 0-5 units  - holding home dose of Lantus 6 units to prevent episodes of hypoglycemia while hospitalized and HgA1c <7%. Monitor more need to resume.    - hypoglycemia order set placed  - continue home dose of Gabapentin 100 mg PO every day  - glc at goal      #hyponatremia (chronic)  #ESRD on HD   - daily CHG bath d/t L chest dialysis cath site   - Nephrology consulted for HD   - renal diet  - renally dose meds as needed  - continue home dose of Nephrocaps PO every day  -continue HD per nephrology. Has had additional sessions this week for fluid removal. Care with fluid removal given chronic hypotension on midodrine.      #Afib  #hx of DVT (10/23)  - Duplex US of RUE done 10/22/23: Positive study for DVT in the right upper extremity.  There is a nonocclusive thrombus in the right subclavian artery.  - home dose of Eliquis       #hypotension, chronic  - continue home dose of Midodrine 15 mg PO TID     #Malnutrition  -With anasarca, wound needs, starting to have increase in appetite.  Nutrition consulted and started on Nepro twice daily         Patient transferring to nontelemetry floor, chest pain very much appears musculoskeletal.  Will continue to monitor.    Scheduled outpatient appointments in system:   Future Appointments   Date Time Provider Department Center   4/17/2024  1:00 PM Koko Gordon MD [id number]     ---------------------------------------------------------------------------------------------------  Subjective   No new events.  Patient being evaluated for chest pain.  Denies any other associated symptoms.  Is worse with palpation.  States he has had it for 2 weeks but did not mention it because it was not significant until today.  Workup so far is unremarkable.  Meeting scheduled with family tomorrow 1:00.  Patient continues to have poor insight into condition.    "---------------------------------------------------------------------------------------------------  Objective   Last Recorded Vitals  Blood pressure 130/64, pulse 99, temperature 36.5 °C (97.7 °F), resp. rate 18, height 1.778 m (5' 10\"), weight 87 kg (191 lb 12.8 oz), SpO2 100 %.  Intake/Output last 3 Shifts:  I/O last 3 completed shifts:  In: 960 (11 mL/kg) [P.O.:960]  Out: - (0 mL/kg)   Weight: 87 kg     Physical Exam  Vitals and nursing note reviewed.   Constitutional:       General: He is not in acute distress.     Appearance: He is obese. He is ill-appearing. He is not toxic-appearing.   HENT:      Head: Normocephalic and atraumatic.      Nose:      Comments: On nasal canula     Mouth/Throat:      Mouth: Mucous membranes are moist.   Eyes:      General: No scleral icterus.     Extraocular Movements: Extraocular movements intact.      Conjunctiva/sclera: Conjunctivae normal.   Cardiovascular:      Rate and Rhythm: Normal rate and regular rhythm.      Heart sounds: S1 normal and S2 normal. No murmur heard.     Comments: Severe pain with palpation of the chest wall  Pulmonary:      Effort: Pulmonary effort is normal. No respiratory distress.      Breath sounds: No wheezing or rhonchi.      Comments: Basilar rales  Abdominal:      General: Bowel sounds are normal. There is no distension.      Palpations: Abdomen is soft.      Tenderness: There is no abdominal tenderness. There is no guarding or rebound.   Musculoskeletal:         General: Swelling present. No deformity.      Cervical back: Neck supple.      Comments: Multiple LE wounds, R TMA dressed c/d/I  RUE >LUE swelling, skin wrinkling in left hand   Skin:     General: Skin is warm and dry.      Findings: No rash.      Comments: Unable to check below level of dressing fully but area check was warm, appeared to have some moderate return of color  with pressure applied   Neurological:      General: No focal deficit present.      Mental Status: He is alert. " Mental status is at baseline.      Comments: Moving all extremities, no gross sensation changes.    Psychiatric:         Mood and Affect: Mood normal.         Relevant Results  Lab Results   Component Value Date    WBC 14.8 (H) 03/26/2024    HGB 7.3 (L) 03/26/2024    HCT 25.1 (L) 03/26/2024    MCV 97 03/26/2024     03/26/2024      Lab Results   Component Value Date    GLUCOSE 182 (H) 03/26/2024    CALCIUM 9.9 03/26/2024     (L) 03/26/2024    K 3.7 03/26/2024    CO2 26 03/26/2024    CL 93 (L) 03/26/2024    BUN 46 (H) 03/26/2024    CREATININE 4.46 (H) 03/26/2024     Scheduled medications  allopurinol, 100 mg, oral, Daily  apixaban, 2.5 mg, oral, q12h  atorvastatin, 40 mg, oral, Nightly  B complex-vitamin C-folic acid, 1 capsule, oral, Daily  clopidogrel, 75 mg, oral, Daily  epoetin dane or biosimilar, 20,000 Units, intravenous, Once per day on Tue Thu Sat  gabapentin, 100 mg, oral, Daily  insulin lispro, 0-5 Units, subcutaneous, TID with meals  lubricating eye drops, 2 drop, Both Eyes, TID  melatonin, 5 mg, oral, Nightly  midodrine, 15 mg, oral, q8h  pantoprazole, 40 mg, oral, Daily before breakfast  piperacillin-tazobactam, 2.25 g, intravenous, q12h  polyethylene glycol, 17 g, oral, Daily  sennosides-docusate sodium, 2 tablet, oral, BID  sodium hypochlorite, , irrigation, Daily  vancomycin, 1,000 mg, intravenous, Once per day on Tue Thu Sat      Continuous medications     PRN medications  PRN medications: acetaminophen, albuterol, alteplase, dextrose 10 % in water (D10W), dextrose, glucagon, ipratropium-albuteroL, lidocaine, ondansetron, oxyCODONE, sodium chloride 0.9%, vancomycin    Donn Quiñonez MD

## 2024-03-26 NOTE — NURSING NOTE
.Report to Receiving RN:    Report To: ABEL Smith  Time Report Called: [id number]  Hand-Off Communication: Pt tolerated HD well with no issue. Fluid removal 3 Liter /61 HR 95   Complications During Treatment: No  Ultrafiltration Treatment: No  Medications Administered During Dialysis: yes, oxycodone 5 mg @0853  Blood Products Administered During Dialysis: No  Labs Sent During Dialysis: No  Heparin Drip Rate Changes: No

## 2024-03-26 NOTE — NURSING NOTE
Report from Sending RN:    Report From: Lissette ( RN)  Recent Surgery of Procedure: No  Baseline Level of Consciousness (LOC): a/o x 4  Oxygen Use: Yes, 2 liters  Type: nc  Diabetic: Yes, 173  Last BP Med Given Day of Dialysis: midodrine 15 mg,   Last Pain Med Given: oxycodone 10 mg  Lab Tests to be Obtained with Dialysis: No  Blood Transfusion to be Given During Dialysis: No  Available IV Access: Yes  Medications to be Administered During Dialysis: No  Continuous IV Infusion Running: No  Restraints on Currently or in the Last 24 Hours: No  Hand-Off Communication: No acute overnight or morning events; vss; Pt did not take morning medications; Pt will not need labs; Pt may go off unit without telemetry; Pt is a full code; Ela Morales RN.

## 2024-03-26 NOTE — CARE PLAN
The patient's goals for the shift include  remain comfortable, pain control.    The clinical goals for the shift include patient will remain HDS    Over the shift, the patient did make progress toward the following goals.       Problem: Pain  Goal: My pain/discomfort is manageable  Outcome: Progressing     Problem: Safety  Goal: Patient will be injury free during hospitalization  Outcome: Progressing  Goal: I will remain free of falls  Outcome: Progressing     Problem: Daily Care  Goal: Daily care needs are met  Outcome: Progressing     Problem: Psychosocial Needs  Goal: Demonstrates ability to cope with hospitalization/illness  Outcome: Progressing  Goal: Collaborate with me, my family, and caregiver to identify my specific goals  Outcome: Progressing     Problem: Skin  Goal: Prevent/manage excess moisture  Outcome: Progressing  Goal: Promote skin healing  Outcome: Progressing

## 2024-03-26 NOTE — PROCEDURES
"DIALYSIS NOTE:    Seen and examined during hemodialysis, undergoing treatment per submitted orders: 3 K, 2.5 Ca, 3.5  hours. Fluid removal 3 liters, as tolerated (keep SBP> 90mmHg).     C/o sternal chest pain, reproducible on palpation. Just got medicated with Oxi for that.    BP (!) 122/106 (BP Location: Right arm, Patient Position: Lying)   Pulse 85   Temp 35.6 °C (96.1 °F) (Skin)   Resp 16   Ht 1.778 m (5' 10\")   Wt 87 kg (191 lb 12.8 oz)   SpO2 100%   BMI 27.52 kg/m²       General appearance: NAD  HEENT: NC/AT,  no scleral icterus, moist  mucous membranes  Skin: no apparent rashes  Heart: RRR,  no M,R,G  Lungs: CTAB anteriorly  Extremities: 2+ edema  ACCESS: [facility]    Additional Recommendations:  Added ferritin and iron studies to determine if iron supplementation appropriate  Considering additional UF    Scheduled medications  allopurinol, 100 mg, oral, Daily  apixaban, 2.5 mg, oral, q12h  atorvastatin, 40 mg, oral, Nightly  B complex-vitamin C-folic acid, 1 capsule, oral, Daily  clopidogrel, 75 mg, oral, Daily  epoetin dane or biosimilar, 20,000 Units, intravenous, Once per day on Tue Thu Sat  gabapentin, 100 mg, oral, Daily  insulin lispro, 0-5 Units, subcutaneous, TID with meals  lubricating eye drops, 2 drop, Both Eyes, TID  melatonin, 5 mg, oral, Nightly  midodrine, 15 mg, oral, q8h  pantoprazole, 40 mg, oral, Daily before breakfast  piperacillin-tazobactam, 2.25 g, intravenous, q12h  polyethylene glycol, 17 g, oral, Daily  sennosides-docusate sodium, 2 tablet, oral, BID  sodium hypochlorite, , irrigation, Daily  vancomycin, 750 mg, intravenous, Once per day on Tue Thu Sat         PRN medications  PRN medications: acetaminophen, albuterol, alteplase, dextrose 10 % in water (D10W), dextrose, glucagon, ipratropium-albuteroL, lidocaine, ondansetron, oxyCODONE, sodium chloride 0.9%, vancomycin    Recent Results (from the past 12 hour(s))   CBC    Collection Time: 03/26/24  6:00 AM   Result Value Ref " Range    WBC 14.8 (H) 4.4 - 11.3 x10*3/uL    nRBC 0.3 (H) 0.0 - 0.0 /100 WBCs    RBC 2.58 (L) 4.50 - 5.90 x10*6/uL    Hemoglobin 7.3 (L) 13.5 - 17.5 g/dL    Hematocrit 25.1 (L) 41.0 - 52.0 %    MCV 97 80 - 100 fL    MCH 28.3 26.0 - 34.0 pg    MCHC 29.1 (L) 32.0 - 36.0 g/dL    RDW 18.5 (H) 11.5 - 14.5 %    Platelets 433 150 - 450 x10*3/uL   Renal Function Panel    Collection Time: 03/26/24  6:00 AM   Result Value Ref Range    Glucose 182 (H) 74 - 99 mg/dL    Sodium 132 (L) 136 - 145 mmol/L    Potassium 3.7 3.5 - 5.3 mmol/L    Chloride 93 (L) 98 - 107 mmol/L    Bicarbonate 26 21 - 32 mmol/L    Anion Gap 17 10 - 20 mmol/L    Urea Nitrogen 46 (H) 6 - 23 mg/dL    Creatinine 4.46 (H) 0.50 - 1.30 mg/dL    eGFR 13 (L) >60 mL/min/1.73m*2    Calcium 9.9 8.6 - 10.6 mg/dL    Phosphorus 6.1 (H) 2.5 - 4.9 mg/dL    Albumin 2.3 (L) 3.4 - 5.0 g/dL   Vancomycin    Collection Time: 03/26/24  6:00 AM   Result Value Ref Range    Vancomycin 18.5 5.0 - 20.0 ug/mL

## 2024-03-26 NOTE — NURSING NOTE
The device clinical nurse arrived checked patient's pacemaker, interrogated it and turned pacing off for MRI, per device clinical nurse. LAMAR

## 2024-03-26 NOTE — PROGRESS NOTES
"Vancomycin Dosing by Pharmacy- FOLLOW UP    Chevy Benton is a 78 y.o. year old male who Pharmacy has been consulted for vancomycin dosing for cellulitis, skin and soft tissue. Based on the patient's indication and renal status this patient is being dosed based on a goal pre-HD level of 15-25.     Renal function is currently on HD.    Current vancomycin dose: 750 mg given after each dialysis session    Most recent random level: 18 mcg/mL    Visit Vitals  BP (!) 122/106 (BP Location: Right arm, Patient Position: Lying)   Pulse 95   Temp 35.6 °C (96.1 °F) (Skin)   Resp 16        Lab Results   Component Value Date    CREATININE 4.46 (H) 03/26/2024    CREATININE 3.70 (H) 03/25/2024    CREATININE 3.83 (H) 03/23/2024    CREATININE 3.10 (H) 03/22/2024        Patient weight is No results found for: \"PTWEIGHT\"    No results found for: \"CULTURE\"     I/O last 3 completed shifts:  In: 960 (11 mL/kg) [P.O.:960]  Out: - (0 mL/kg)   Weight: 87 kg   [unfilled]    Lab Results   Component Value Date    PATIENTTEMP 37.0 03/10/2024    PATIENTTEMP 37.0 01/27/2024    PATIENTTEMP 37.0 01/27/2024        Assessment/Plan    Start 1g each dialysis session Tuesday, Thursday, and Saturday    This dosing regimen is predicted by [employer] to result in the following pharmacokinetic parameters:      The next level will be obtained on 3/28 at AM labs. May be obtained sooner if clinically indicated.   Will continue to monitor renal function daily while on vancomycin and order serum creatinine at least every 48 hours if not already ordered.  Follow for continued vancomycin needs, clinical response, and signs/symptoms of toxicity.       Marvin Izquierdo, PharmD           "

## 2024-03-26 NOTE — PROGRESS NOTES
"Chevy Benton is a 78 y.o. male on day 18 of admission presenting with A-V fistula (CMS/HCC).    Subjective   Pt reporting intermittent chest pain and SOB with movement. Denies n/v, fatigue. Fractured sleep r/t hospitalization.    Objective     Physical Exam  Vitals reviewed.   Constitutional:       General: He is awake.      Appearance: He is overweight. He is ill-appearing.      Interventions: Nasal cannula in place.   HENT:      Head: Normocephalic and atraumatic.      Nose: Nose normal.      Mouth/Throat:      Mouth: Mucous membranes are dry.   Eyes:      Extraocular Movements: Extraocular movements intact.      Pupils: Pupils are equal, round, and reactive to light.   Cardiovascular:      Rate and Rhythm: Normal rate and regular rhythm.   Pulmonary:      Effort: Pulmonary effort is normal.      Breath sounds: Normal breath sounds.   Abdominal:      General: There is distension.      Palpations: Abdomen is soft.   Skin:     General: Skin is warm and dry.   Neurological:      Mental Status: He is alert. He is disoriented.      Comments: A&Ox2   Psychiatric:         Mood and Affect: Mood normal.         Behavior: Behavior normal.         Thought Content: Thought content normal.         Judgment: Judgment normal.         Last Recorded Vitals  Blood pressure 118/61, pulse 104, temperature 36.3 °C (97.3 °F), resp. rate 20, height 1.778 m (5' 10\"), weight 87 kg (191 lb 12.8 oz), SpO2 97 %.  Intake/Output last 3 Shifts:  I/O last 3 completed shifts:  In: 960 (11 mL/kg) [P.O.:960]  Out: - (0 mL/kg)   Weight: 87 kg     Relevant Results  Scheduled medications  allopurinol, 100 mg, oral, Daily  apixaban, 2.5 mg, oral, q12h  atorvastatin, 40 mg, oral, Nightly  B complex-vitamin C-folic acid, 1 capsule, oral, Daily  clopidogrel, 75 mg, oral, Daily  epoetin dane or biosimilar, 20,000 Units, intravenous, Once per day on Tue Thu Sat  gabapentin, 100 mg, oral, Daily  insulin lispro, 0-5 Units, subcutaneous, TID with " meals  lubricating eye drops, 2 drop, Both Eyes, TID  melatonin, 5 mg, oral, Nightly  midodrine, 15 mg, oral, q8h  pantoprazole, 40 mg, oral, Daily before breakfast  piperacillin-tazobactam, 2.25 g, intravenous, q12h  polyethylene glycol, 17 g, oral, Daily  sennosides-docusate sodium, 2 tablet, oral, BID  sodium hypochlorite, , irrigation, Daily  vancomycin, 1,000 mg, intravenous, Once per day on Tue Thu Sat      Continuous medications     PRN medications  PRN medications: acetaminophen, albuterol, alteplase, dextrose 10 % in water (D10W), dextrose, glucagon, ipratropium-albuteroL, lidocaine, ondansetron, oxyCODONE, sodium chloride 0.9%, vancomycin    Results for orders placed or performed during the hospital encounter of 03/08/24 (from the past 24 hour(s))   POCT GLUCOSE   Result Value Ref Range    POCT Glucose 157 (H) 74 - 99 mg/dL   POCT GLUCOSE   Result Value Ref Range    POCT Glucose 173 (H) 74 - 99 mg/dL   CBC   Result Value Ref Range    WBC 14.8 (H) 4.4 - 11.3 x10*3/uL    nRBC 0.3 (H) 0.0 - 0.0 /100 WBCs    RBC 2.58 (L) 4.50 - 5.90 x10*6/uL    Hemoglobin 7.3 (L) 13.5 - 17.5 g/dL    Hematocrit 25.1 (L) 41.0 - 52.0 %    MCV 97 80 - 100 fL    MCH 28.3 26.0 - 34.0 pg    MCHC 29.1 (L) 32.0 - 36.0 g/dL    RDW 18.5 (H) 11.5 - 14.5 %    Platelets 433 150 - 450 x10*3/uL   Renal Function Panel   Result Value Ref Range    Glucose 182 (H) 74 - 99 mg/dL    Sodium 132 (L) 136 - 145 mmol/L    Potassium 3.7 3.5 - 5.3 mmol/L    Chloride 93 (L) 98 - 107 mmol/L    Bicarbonate 26 21 - 32 mmol/L    Anion Gap 17 10 - 20 mmol/L    Urea Nitrogen 46 (H) 6 - 23 mg/dL    Creatinine 4.46 (H) 0.50 - 1.30 mg/dL    eGFR 13 (L) >60 mL/min/1.73m*2    Calcium 9.9 8.6 - 10.6 mg/dL    Phosphorus 6.1 (H) 2.5 - 4.9 mg/dL    Albumin 2.3 (L) 3.4 - 5.0 g/dL   Vancomycin   Result Value Ref Range    Vancomycin 18.5 5.0 - 20.0 ug/mL   Ferritin   Result Value Ref Range    Ferritin 333 (H) 20 - 300 ng/mL   Iron and TIBC   Result Value Ref Range    Iron  20 (L) 35 - 150 ug/dL    UIBC 191 110 - 370 ug/dL    TIBC 211 (L) 240 - 445 ug/dL    % Saturation 9 (L) 25 - 45 %     MR foot right wo IV contrast    Result Date: 3/26/2024  These images are not reportable by radiology and will not be interpreted by  Radiologists.    Invasive vascular procedure    Result Date: 3/22/2024   [facility], Cath Lab, [address] [place] [address] Cardiovascular Catheterization Report Patient Name:      SHAMEKA SUN    Performing Physician:  Jennifer Nguyen MD Study Date:        3/20/2024            Verifying Physician:   Jennifer Nguyen MD MRN/PID:           [id number]             Cardiologist/Co-scrub: Accession#:        [id number]         Ordering Physician:    [id number] ARELIS MEJIAS Date of Birth/Age: 1945 / 78 years Fellow:                [id number] Darnell Umanzor MD Gender:            M                    Fellow:                [id number] Raymon Jay MD Encounter#:        [id number]  Study: Peripheral Angiogram  Indications: SHAMEKA SUN is a 78 year old male who presents with coronary artery disease, chronic kidney disease, hypertension, diabetes mellitus and peripheral artery disease, ESRD, CHF, T2DM, PAD with CLTI RLE s/p two prior complex tibial and pedal revascularization procedures who presents with persistent nonhealing TMA/Lisfranc amputation RLE. Plan for angiogram with possible intervention today.  Procedure Description: After infiltration with 2% Lidocaine utilizing two-dimensional ultrasound guidance, the right radial artery was cannulated using a modified Seldinger technique. Subsequently a 6 [place] sheath was placed antegrade in the right radial artery. After completion of the procedure, TR Band compression device was placed per protocol. Right radial  artery was accessed under ultrasound guidance with S-MADDI, followed by placement of 6Fr slender sheath. IM catheter over Versacore wire advanced into the descending aorta and cannulating the right iliac system. Quick Cross catheter was advanced into the R EIA and mid SFA where diagnostic angiograms were obtained.  Right Lower Extremity Arterial Findings: Right Common Iliac Artery: The right common iliac artery revealed no evidence of significant disease. Right Extermal Iliac Artery: The right external iliac artery revealed no evidence of significant disease. Right Internal Iliac Artery: The right internal iliac artery revealed no evidence of significant disease. Right Common Femoral Artery: The right common femoral artery revealed no evidence of significant disease. Right Profunda Femoris Artery: The right profunda femoris artery revealed no evidence of significant disease. Right Superior Femoral Artery: The right superficial femoral artery revealed mild proximal obstruction and mild irregularities. Right Popliteal Artery: The right popliteal artery revealed mild atherosclerotic disease, mild calcification and mild irregularities. Right Tibial-Peroneal Trunk: The right tibial-peroneal trunk revealed no evidence of significant disease. Right Anterior Tibial Artery: The right anterior tibial artery revealed mild proximal irregularities and chronic occlusion originating at the mid to distal segment. Right Posterior Tibial Artery: The right posterior tibial artery revealed chronic occlusion. Right Peroneal Artery: The right peroneal artery revealed chronic occlusion originating at the mid segment. Right Dorsalis Pedis Artery: The right dorsalis pedis artery revealed chronic occlusion.  Hemo Personnel: +----------+---------+ Name      Duty      +----------+---------+ Stuart Nguyen MD 1 +----------+---------+  Hemodynamic Pressures:  +----+---------------------+----------+-------------+--------------+---------+  Site      Date Time      Phase NameSystolic mmHgDiastolic mmHgMean mmHg +----+---------------------+----------+-------------+--------------+---------+   AO3/20/2024 11:58:53 AM      Rest          117            63       80 +----+---------------------+----------+-------------+--------------+---------+   AO3/20/2024 12:07:49 PM      Rest          140            61       84 +----+---------------------+----------+-------------+--------------+---------+  Complications: No in-lab complications observed.  Cardiac Cath Post Procedure Notes: Post Procedure Diagnosis: CLTI RLE RC VI. Blood Loss:               Estimated blood loss during the procedure was 10 mls. Specimens Removed:        Number of specimen(s) removed: none.  Recommendations: Maximize medical therapy. Agressive risk factor modification efforts. ____________________________________________________________________________________ CONCLUSIONS:  1. Indication: RLE CLTI RC VI.  2. Interventions: RLE angiogram, no intervention performed.  3. Findings: Reocclusion of PT and pedal arch with no major vessel within the foot.  4. Plan: Will consider LSAC discussion evaluation for amputation candidacy given severe tibial and pedal disease recalcitrant to two prior complex tibial/pedal revascularization procedures. ICD 10 Codes: Atherosclerosis of native arteries of right leg with ulceration of other part of foot-I70.235  CPT Codes: Moderate Sedation Services initial 15 minutes patient >5 years-[id number]; Moderate Sedation Services 1st additional 15 minutes patient >5 years-[id number]  [id number] Stuart Nguyen MD Performing Physician Electronically signed by Jennifer Nguyen MD on 3/22/2024 at 9:43:33 AM  ** Final **     Vascular US Ankle Brachial Index (RAJ) Without Exercise    Result Date: 3/21/2024            [place] [address]   Tel [phone] and Fax [phone]  Vascular Lab Report [facility] US ANKLE BRACHIAL INDEX (RAJ)  WITHOUT EXERCISE  Patient Name:      SHAMEKA SUN  Reading Physician:  [id number] Stuart Nguyen MD Study Date:        3/19/2024            Ordering Physician: [id number] ARELIS MEJIAS MRN/PID:           [id number]             Technologist:       Beena Craft T Accession#:        [id number]         Technologist 2: Date of Birth/Age: 1945 / 78 years Encounter#:         [id number] Gender:            M Admission Status:  Inpatient            Location Performed: [facility]  Diagnosis/ICD: Peripheral vascular disease, unspecified-I73.9 CPT Codes:     70094 Peripheral artery RAJ Only  CONCLUSIONS: Right Lower PVR: Evidence of moderate arterial occlusive disease in the right lower extremity at rest. Right pressures of >220 mmHg suggest no compressibility of vessels and may make absolute Segmental Limb Pressures (SLP) unreliable. Monophasic flow is noted in the right common femoral artery and right posterior tibial artery. Biphasic flow is noted in the right dorsalis pedis artery. S/p TMA RLE. Left Lower PVR: Evidence of mild arterial occlusive disease in the left lower extremity at rest. Left pressures of >220 mmHg suggest no compressibility of vessels and may make absolute Segmental Limb Pressures (SLP) unreliable. Decreased digital perfusion noted. Biphasic flow is noted in the left common femoral artery, left posterior tibial artery and left dorsalis pedis artery. Unable to obtain brachial pressure due to dialysis access.  Comparison: Compared with study from 1/9/2024, Interpretation moderate on RLE, mild LLE today.  Additional Findings: Technically difficult study due to patient bandaging, positioning, and tolerance.  Imaging & Doppler Findings:  RIGHT Lower PVR                Pressures Ratios Right Posterior Tibial (Ankle) 74 mmHg   0.66 Right Dorsalis Pedis (Ankle)   256 mmHg  2.29   LEFT Lower PVR                Pressures Ratios Left Posterior Tibial (Ankle) 256 mmHg  2.29 Left Dorsalis Pedis (Ankle)    256 mmHg  2.29 Left Digit (Great Toe)        52 mmHg   0.46                     Right Brachial Pressure 112 mmHg   [id number] Stuart Nguyen MD Electronically signed by Jennifer Nguyen MD on 3/21/2024 at 4:49:30 PM  ** Final **     Upper extremity venous duplex bilateral    Result Date: 3/20/2024            [place] [address]   Tel [phone] and Fax [phone]  Vascular Lab Report [facility] US UPPER EXTREMITY VENOUS DUPLEX BILATERAL  Patient Name:     SHAMEKA SUN Vanessa Physician: Jennifer Nguyen MD Study Date:       3/19/2024           Ordering           [id number] NETTIE                                       Physician:         YENI MRN/PID:          [id number]            Technologist:      CARLOS Accession#:       [id number]        Technologist 2: Date of           1945 / 78      Encounter#:        [id number] Birth/Age:        years Gender:           M Admission Status: Inpatient           Location           [facility]                                       Performed:  Diagnosis/ICD: Other specified soft tissue disorders-M79.89 CPT Codes:     04280 Peripheral venous duplex scan for DVT complete  **CRITICAL RESULT** Critical Result: acute non occlusive thrombus noted in the right subclavian proximal segment. Notification called to Francisca Chery RN; Wilma Redding MD on 3/19/2024 at 11:50:55 AM by Kristen Gloria.  CONCLUSIONS: Right Upper Venous: There is acute occlusive deep vein thrombosis visualized in the proximal subclavian vein. Remainder of exam is noted to be negative for acute DVT. Hematoma noted in right upper arm measuring 5.07cm x 2.01cm. Thrombus noted around line. Cannot rule out thrombus of non-visualized proximal cephalic, mid cephalic, distal cephalic and basilic veins due to swelling and edema. Left Upper Venous: Limited exam due to bandages. Cannot rule out thrombus of non-compressible mid subclavian and distal subclavian veins due to dressings  and catheter. Cannot rule out thrombus of non-visualized proximal cephalic, mid cephalic, distal cephalic and basilic veins due to dressings.  Imaging & Doppler Findings:  Right               Compressible      Thrombus              Flow Internal Jugular        Yes             None         Spontaneous/Phasic Subclavian            Partial    Acute non-occlusive Subclavian Proximal     Yes             None Subclavian Mid          Yes             None         Spontaneous/Phasic Subclavian Distal                       None Axillary                Yes             None Brachial                Yes             None  Left                Compress Thrombus        Flow Internal Jugular      Yes      None   Spontaneous/Phasic Subclavian Proximal                   Spontaneous/Phasic Subclavian Mid        Yes      None Subclavian Distal     Yes      None   Spontaneous/Phasic Axillary              Yes      None   Spontaneous/Phasic Brachial              Yes      None  [id number] Stuart Nguyen MD Electronically signed by [id number] Stuart Nguyen MD on 3/20/2024 at 11:33:21 PM  ** Final **     XR foot right 3+ views    Result Date: 3/18/2024  Interpreted By:  Liz Ruiz and [place]yersdor[place] Conner STUDY: XR FOOT RIGHT 3+ VIEWS; ;  3/17/2024 7:49 pm   INDICATION: Signs/Symptoms:Evaluation of TMA right foot possible infection.   COMPARISON: X-ray right foot 01/19/2024   ACCESSION NUMBER(S): [id number]   ORDERING CLINICIAN: NETTIE JAIME   FINDINGS: Transmetatarsal amputation changes noted. There is marked paucity of soft tissue stump at the level of 1st metatarsal likely representing wound dehiscence/stump breakdown. There is also apparent soft tissue defect of the 5th metatarsal stump region which may also represent stump breakdown. There is irregular appearance of the stumps of the 1st and 5th digits suggesting osteomyelitis. Prominent vascular calcifications of the ankle and foot.   Mild midfoot degenerative changes with dorsal osteophytes. Remote  healed distal fibular fracture sequela. No acute fracture or malalignment.       Transmetatarsal amputation changes of the foot with irregular appearance of the stumps of the 1st and 5th digits suggesting osteomyelitis with osteomyelitis of the rest of the digits not excluded. An MRI may be obtained for further evaluation if clinically warranted Findings suggesting stump breakdown at the level of 1st and 5th metatarsals.   I personally reviewed the image(s)/study and resident interpretation. I agree with the findings as stated by resident Rafael Tony. Data analyzed and images interpreted at [facility], [place], [place].   MACRO: None   Signed by: Liz Ruiz 3/18/2024 9:56 AM Dictation workstation:   [id number]    XR chest 1 view    Result Date: 3/16/2024  Interpreted By:  Kendrick Clayton, STUDY: XR CHEST 1 VIEW; 3/16/2024 6:32 pm   INDICATION: Signs/Symptoms:eval continued hypoxemia. esrd fluid overload.   COMPARISON: Radiograph dated 01/23/2024   ACCESSION NUMBER(S): [id number]   ORDERING CLINICIAN: NETTIE JAIME   FINDINGS: Left IJ approach hemodialysis catheter is in place with the tip projecting over the right atrium. Right subclavian approach pacer/defibrillator is stable.   The cardiac silhouette size is unchanged.   Low lung volumes and bronchovascular crowding. Right more than left bibasilar opacity. Mild interstitial prominence and edema. No sizable pneumothorax   No acute osseous abnormality.       1. Slight interval worsening of the aeration of the lungs with bilateral, right more than left pleural effusion and atelectasis/consolidation and mild interstitial edema.       Signed by: Kendrick Painting 3/16/2024 9:04 PM Dictation workstation:   [id number]    ECG 12 lead    Result Date: 3/12/2024  Atrial fibrillation ST & T wave abnormality, consider anterolateral ischemia Prolonged QT Abnormal ECG When compared with ECG of 08-MAR-2024 14:26, No  significant change was found See ED provider note for full interpretation and clinical correlation Confirmed by Jluis Cee ([id number]) on 3/12/2024 9:34:16 AM         Assessment/Plan   Introduction to Palliative Care:  Spoke with pt at bedside, and spoke with pt's son over the phone. Patient seemed to appreciate the extra layer of support.   Palliative care was introduced as a service for patients with serious illness to help with symptoms, assist with goals of care conversations, navigate complex decision making, improve quality of life for patients, and provide support both patients and families.    Supportive Interventions:  Music therapy as needed  Spiritual care    Medical decision making/ Goals of care:   Patient's current clinical condition, including diagnosis, prognosis, and management plan were discussed.   Life limiting disease: peripheral arterial disease, sepsis, ESRD, frailty - PPS 40%, HFrEF  Family: Supportive   Performance status: Major limitations due to disease process  Understanding of health: pt's son demonstrates moderate prognostic understanding of disease process  Information: Wants full disclosure of daily updates and prognosis   Goals: Symptom control, for pt to go home  Worries and fears now and future: ongoing symptoms, worsening health  Minimum acceptable outcome/QOL: cognition, independence with toileting, feeding, ambulation   Code status discussion: family meeting planned for 3/27/24 at 1 PM; primary notified.    Advance Care Planning:   No Advance Directives on file.    Surrogate decision maker is: pt's sonChevy: [phone]  Code status: full    Disposition:  Plan pending hospital course    Case discussed with primary team.    Thank you for allowing us to participate in the care of this patient. Palliative Team will continue to follow as needed.  Please contact team with any questions or concerns.    JENIFFER Harris-CNP   Palliative Care (weekdays - pager [phone])    I  spent 60 minutes in the care of this patient which included chart review, interviewing patient/family, discussion with primary team, coordination of care, and documentation.    Medical Decision Making was high level due to high complexity of problems, extensive data review, and high risk of management/treatment.

## 2024-03-27 NOTE — PROGRESS NOTES
Assessment/Plan           Mr. Benton, a 78-year-old patient with a complex medical history including peripheral arterial disease, atrial fibrillation, coronary artery disease, type 2 diabetes mellitus, a right subclavian deep vein thrombosis in October 2023, gastroesophageal reflux disease, sick sinus syndrome with a pacemaker, gout, end-stage renal disease on hemodialysis, hyperlipidemia, hypotension, heart failure with reduced ejection fraction (EF 40-45% as of January 2023), and gout, who was transferred from [facility] to [facility]  due to bleeding at his left upper arm arteriovenous fistula site. Vascular surgery  applied two figure-eight silk sutures for hemostasis at the fistula site to stop bleeding and pt subsequently received a new tunneled hemodialysis catheter and underwent dialysis fistula ligation in the OR. Postoperatively, the patient's pain has been managed effectively, and his hemoglobin and hematocrit levels have remained stable after resuming anticoagulant and antiplatelet therapies, after which his central line was removed.  He was MRSA nare positive and one-to-one decolonization protocol.  He continues to exhibit signs of generalized edema, managed by nephrology for fluid overload and elevation of UE's. Pt had persistent leukocytosis without specific symptoms prompting repeated blood cultures, results of which and subsequent blood cultures were neg. . A chest X-ray indicated volume overload with possible infiltrate, after which, he subsequently developed lower extremity rest pain as well as a cough with sputum production. The has remained with stable 2 liters oxygen requirement.  Wound care, podiatry, and vascular reinvolved.  He was found to have new mild purulence from the TMA site, further PVR testing indicated right lower extremity vascular disease.  Patient had subsequent angiogram which revealed no distal blood flow below the knee although the limb remains warm with apparent perfusion on exam.   For endovascular at this time given multiple procedures her has been no indicated options for revascularization patient may need amputation pending further endovascular evaluation.  He is pending MRI, at timing dictated by his pacemaker needs.  Palliative has been involved for goals of care.  Podiatry is continuing palliative wound care.  His wound is improving.      #leukocytosis  #TMA site deep tissue infection in setting of RLE critical limb ischemia as below  #PNA - resolved  - initially likely related to surgical stress, however remained persistent, no new signs of infection/localizing signs at time. However pt  developed foot pain and sputum production. Cxr with volume overload with possible infiltrate. Podiatry obtained wound cultures grew Pseudomonas and Candida.  Blood cultures are negative  -Remains on 2 L, likely related to volume overload, continuing dialysis weaning as tolerated.  No signs of pneumonia  - complted treatement for pneumonia.   - IS, accapella, RT consult to eval/rec.   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  Given possibility of revascularization will be discussed with podiatry service.  The wound was viewed today and looks clean.  -Palliative care consulted and assisting with goals of care conversations  -Had reviewed wound cultures, they grew Pseudomonas and Candida.  Continuing Zosyn. Given complicated of limb ischemia will obtain ID input for further recs.   -Reviewed PVRs and angiogram  -Seen and discussed today with the endovascular service, this time they feel that revascularization is a possibility, they would recommend patient continue to work on his functional status nutrition mobility at skilled nursing and  plan intervention in 1 to 2 weeks for revascularization.  Palliative was informed.  I called the family and informed them as well.  We are also planning on meeting with the family to further discuss CODE STATUS and other factors of his care tomorrow.      #metabolic encephalopathy - improved  -Overnight had confusion and aphasia, evaluated by code bat, less concern for large vessel occlusion, no correlating neurological deficits, no CT recommended, called off.  Recommended delirium workup  -Patient with some degree of cognitive impairment, high risk for hospital delirium and sundowning.  There was concern that change of rooms would lead to confusion, suspect change of environment was trigger.  He is doing better today.  - delirium precautions    #Bleeding from AV fistula s/p ligation and tunneled HD line placement - resolved  #anemia d/t acute blood loss  #MRSA colonization, on decolonization protocol due to central line placement.   - Vascular surgery consulted, appreciate recs  - Hemostasis obtained at L upper extremity fistula site after placement of two figure 8 sutures   -Transfuse 1 unit of PRBC on HD on admit.  Monitor HB.     - Restarted apixaban and Plavix and monitoring hemoglobin for stability.   Has remained stable.  Central line removed.  No other issues.      #Chest pain - ongoing work up  -Patient reports he has had intermittent chest pain for 2 weeks, this is his first time I have heard and mention this.  He states it worsened today.  Troponin was added onto the morning labs but never received, tried to get a lab draw at the end of dialysis when speaking with dialysis unit but it was not drawn.  He ended up going to MRI she could declined and then return to floor.  Troponin was negative, EKG showing A-fib, lateral ST changes which are unchanged from prior.  His chest pain is very reproducible and worsened by pushing on his chest and also with coughing.  Ordered chest x-ray.  Overall suspicious for  noncardiac etiology although he is a vasculopath.  He has no associated shortness of breath or other symptoms.  He is on appropriate medications including Plavix and apixaban. will continue to monitor, likely source is musculoskeletal.   -Chest x-ray ordered-showed slight interval worsening of his interstitial edema  -Continues to have chest pain again 3/27, this time whenever he moves, still suspicious for musculoskeletal etiology.  EKG is unchanged showing A-fib and chronic lateral T wave changes  -Will obtain echocardiogram  -Pain continues to seem musculoskeletal in etiology    #MRSA nare positive  On MRSA decolonization protocol, ordered mupirocin and chlorhexidine bath  Femoral line removed on 3/14 as patient is clinically stable after starting apixaban and Plavix  Repeat MRSA nare for clearance    #UE edema - likely related to anasarca, positioning and ESRD, mild improvement  # Respiratory failure with hypoxemia secondary to fluid overload   -US showed new acute non occlussive thrombus in his rt subclavian vein proximal segment. Also with a hematoma in his right upper arm. Left arm was limited due to bandages. Per vascular surgery, no plan for any intervention, with only supportive care (arm elevation and local compression).  -Patient continues  2 L, in setting of anasarca and ESRD.  Likely only requiring 1 L as I have weaned him down in the past.  Xray as above. Componenet of fluid overload.   -Remained on 2 L and will attempt weaning as tolerated  -Repeat chest x-ray 3/26 as above      #T2DM (last HgA1c 5.3% 12/3/23) c/b neuropathy  - accucheck ACHS  - Lispro SSI #1 0-5 units  - holding home dose of Lantus 6 units to prevent episodes of hypoglycemia while hospitalized and HgA1c <7%. Monitor more need to resume.    - hypoglycemia order set placed  - continue home dose of Gabapentin 100 mg PO every day  - glc at goal      #hyponatremia (chronic)  #ESRD on HD   - daily CHG bath d/t L chest dialysis cath site  "  - Nephrology consulted for HD   - renal diet  - renally dose meds as needed  - continue home dose of Nephrocaps PO every day  -continue HD per nephrology. Has had additional sessions this week for fluid removal. Care with fluid removal given chronic hypotension on midodrine.      #Afib  #hx of DVT (10/23)  - Duplex US of LEONIDAS done 10/22/23: Positive study for DVT in the right upper extremity.  There is a nonocclusive thrombus in the right subclavian artery.  - home dose of Eliquis       #hypotension, chronic  - continue home dose of Midodrine 15 mg PO TID     #Malnutrition  -With anasarca, wound needs, starting to have increase in appetite.  Nutrition consulted and started on Nepro twice daily     # Hypokalemia  -Cautious replacement given ESRD, 3.3 today.  Patient has cardiac history and A-fib, given 20 mill equivalents        Scheduled outpatient appointments in system:   Future Appointments   Date Time Provider Department Center   4/17/2024  1:00 PM Koko Gordon MD [id number] [facility]     ---------------------------------------------------------------------------------------------------  Subjective     Patient had code bat, called off.  When he changed rooms he developed some confusion and change in speech.  This morning he was speaking clearly but still slightly confused this afternoon was doing better.  Later in the afternoon he developed more chest pain, worse with moving.  He had no other complaints.  Patient was again seen for second visit alongside endovascular team.  Was visualized and appeared clean.  Ongoing discussions with palliative and updated family today.     ---------------------------------------------------------------------------------------------------  Objective   Last Recorded Vitals  Blood pressure 134/82, pulse 96, temperature 37.4 °C (99.3 °F), resp. rate 18, height 1.778 m (5' 10\"), weight 87 kg (191 lb 12.8 oz), SpO2 100 %.  Intake/Output last 3 Shifts:  I/O last 3 completed " shifts:  In: 1240 (14.3 mL/kg) [P.O.:240; I.V.:600 (6.9 mL/kg); Other:200; IV Piggyback:200]  Out: 3400 (39.1 mL/kg) [Other:3400]  Weight: 87 kg     Physical Exam  Vitals and nursing note reviewed.   Constitutional:       General: He is not in acute distress.     Appearance: He is obese. He is ill-appearing. He is not toxic-appearing.   HENT:      Head: Normocephalic and atraumatic.      Nose:      Comments: On nasal canula     Mouth/Throat:      Mouth: Mucous membranes are moist.   Eyes:      General: No scleral icterus.     Extraocular Movements: Extraocular movements intact.      Conjunctiva/sclera: Conjunctivae normal.   Cardiovascular:      Rate and Rhythm: Normal rate and regular rhythm.      Heart sounds: S1 normal and S2 normal. No murmur heard.     Comments: Severe pain with palpation of the chest wall  Pulmonary:      Effort: Pulmonary effort is normal. No respiratory distress.      Breath sounds: No wheezing or rhonchi.      Comments: Basilar rales  Abdominal:      General: Bowel sounds are normal. There is no distension.      Palpations: Abdomen is soft.      Tenderness: There is no abdominal tenderness. There is no guarding or rebound.   Musculoskeletal:         General: Swelling present. No deformity.      Cervical back: Neck supple.      Comments: Multiple LE wounds, R TMA dressed c/d/I  RUE >LUE swelling, skin wrinkling in left hand   Skin:     General: Skin is warm and dry.      Findings: No rash.      Comments: Unable to check below level of dressing fully but area check was warm, appeared to have some moderate return of color  with pressure applied   Neurological:      General: No focal deficit present.      Mental Status: He is alert. Mental status is at baseline.      Comments: Moving all extremities, no gross sensation changes.    Psychiatric:         Mood and Affect: Mood normal.         Relevant Results  Lab Results   Component Value Date    WBC 11.7 (H) 03/27/2024    HGB 7.1 (L) 03/27/2024     HCT 25.5 (L) 03/27/2024    MCV 99 03/27/2024     03/27/2024      Lab Results   Component Value Date    GLUCOSE 176 (H) 03/27/2024    CALCIUM 9.8 03/27/2024     (L) 03/27/2024    K 3.3 (L) 03/27/2024    CO2 29 03/27/2024    CL 94 (L) 03/27/2024    BUN 37 (H) 03/27/2024    CREATININE 3.45 (H) 03/27/2024     Scheduled medications  allopurinol, 100 mg, oral, Daily  apixaban, 2.5 mg, oral, q12h  atorvastatin, 40 mg, oral, Nightly  B complex-vitamin C-folic acid, 1 capsule, oral, Daily  clopidogrel, 75 mg, oral, Daily  epoetin dane or biosimilar, 20,000 Units, intravenous, Once per day on Tue Thu Sat  gabapentin, 100 mg, oral, Daily  insulin lispro, 0-5 Units, subcutaneous, TID with meals  lubricating eye drops, 2 drop, Both Eyes, TID  melatonin, 5 mg, oral, Nightly  midodrine, 15 mg, oral, q8h  pantoprazole, 40 mg, oral, Daily before breakfast  piperacillin-tazobactam, 2.25 g, intravenous, q12h  polyethylene glycol, 17 g, oral, Daily  sennosides-docusate sodium, 2 tablet, oral, BID  sodium hypochlorite, , irrigation, Daily  vancomycin, 1,000 mg, intravenous, Once per day on Tue Thu Sat      Continuous medications     PRN medications  PRN medications: acetaminophen, albuterol, alteplase, benzonatate, dextrose 10 % in water (D10W), dextrose, glucagon, hydrOXYzine HCL, ipratropium-albuteroL, lidocaine, naloxone, ondansetron, [Held by provider] oxyCODONE, sodium chloride 0.9%, vancomycin    Donn Quiñonez MD

## 2024-03-27 NOTE — NURSING NOTE
.Report from Sending RN:    Report From: ABEL Simon  Recent Surgery of Procedure: No  Baseline Level of Consciousness (LOC): A&O X3  Oxygen Use: Yes  Type: 2L NC  Diabetic: No  Last BP Med Given Day of Dialysis: midodrine @1237  Last Pain Med Given: oxycodone  Lab Tests to be Obtained with Dialysis: yes  Blood Transfusion to be Given During Dialysis: No  Available IV Access: Yes  Medications to be Administered During Dialysis: No  Continuous IV Infusion Running: No  Restraints on Currently or in the Last 24 Hours: No  Hand-Off Communication: Pt had no acute event overnight, vital signs SBP in 180's nurse said related to pain. Pain medication will be administered and BP recheck. Not on precaution and will travel by bed.

## 2024-03-27 NOTE — CARE PLAN
Problem: Pain  Goal: My pain/discomfort is manageable  Outcome: Progressing     Problem: Safety  Goal: Patient will be injury free during hospitalization  Outcome: Progressing  Goal: I will remain free of falls  Outcome: Progressing     Problem: Daily Care  Goal: Daily care needs are met  Outcome: Progressing     Problem: Psychosocial Needs  Goal: Demonstrates ability to cope with hospitalization/illness  Outcome: Progressing  Goal: Collaborate with me, my family, and caregiver to identify my specific goals  Outcome: Progressing     Problem: Discharge Barriers  Goal: My discharge needs are met  Outcome: Progressing     Problem: Skin  Goal: Prevent/manage excess moisture  3/27/2024 0219 by Alfred Astorga RN  Outcome: Progressing  3/27/2024 0219 by Alfred Astorga RN  Flowsheets (Taken 3/27/2024 0219)  Prevent/manage excess moisture: Cleanse incontinence/protect with barrier cream  Goal: Promote skin healing  Outcome: Progressing  Goal: Decreased wound size/increased tissue granulation at next dressing change  Outcome: Progressing  Goal: Participates in plan/prevention/treatment measures  Outcome: Progressing  Goal: Prevent/minimize sheer/friction injuries  Outcome: Progressing  Goal: Promote/optimize nutrition  Outcome: Progressing     Problem: Pain - Adult  Goal: Verbalizes/displays adequate comfort level or baseline comfort level  Outcome: Progressing     Problem: Diabetes  Goal: Achieve decreasing blood glucose levels by end of shift  Outcome: Progressing  Goal: Increase stability of blood glucose readings by end of shift  Outcome: Progressing  Goal: Decrease in ketones present in urine by end of shift  Outcome: Progressing  Goal: Maintain electrolyte levels within acceptable range throughout shift  Outcome: Progressing  Goal: Maintain glucose levels >70mg/dl to <250mg/dl throughout shift  Outcome: Progressing  Goal: No changes in neurological exam by end of shift  Outcome: Progressing  Goal: Learn about and adhere  to nutrition recommendations by end of shift  Outcome: Progressing  Goal: Vital signs within normal range for age by end of shift  Outcome: Progressing  Goal: Increase self care and/or family involovement by end of shift  Outcome: Progressing  Goal: Receive DSME education by end of shift  Outcome: Progressing     Problem: Pain  Goal: Takes deep breaths with improved pain control throughout the shift  Outcome: Progressing  Goal: Turns in bed with improved pain control throughout the shift  Outcome: Progressing  Goal: Walks with improved pain control throughout the shift  Outcome: Progressing  Goal: Performs ADL's with improved pain control throughout shift  Outcome: Progressing  Goal: Participates in PT with improved pain control throughout the shift  Outcome: Progressing  Goal: Free from opioid side effects throughout the shift  Outcome: Progressing  Goal: Free from acute confusion related to pain meds throughout the shift  Outcome: Progressing

## 2024-03-27 NOTE — NURSING NOTE
Report to Receiving RN:    Report To: Bessie ROMAN   Time Report Called: [id number]  Hand-Off Communication: UF tx completed and tolerated fine, no issues during treatment. Removed 2.5L. Post /55 72.   Complications During Treatment: No  Ultrafiltration Treatment: Yes  Medications Administered During Dialysis: No  Blood Products Administered During Dialysis: No  Labs Sent During Dialysis: Yes  Heparin Drip Rate Changes: N/A

## 2024-03-27 NOTE — PROGRESS NOTES
Vancomycin Dosing by Pharmacy- Cessation of Therapy    Consult to pharmacy for vancomycin dosing has been discontinued by the prescriber, pharmacy will sign off at this time.    Please call pharmacy if there are further questions or re-enter a consult if vancomycin is resumed.     Alma Pineda, PharmD

## 2024-03-27 NOTE — PROGRESS NOTES
Music Therapy Note    Chevy Benton    Therapy Session  Referral Type: New referral this admission  Visit Type: New visit  Session Start Time: 1553  Session End Time: 1555  Intervention Delivery: In-person  Conflict of Service: Declined treatment  Number of family members present: 0  Number of staff members present: 1               Treatment/Interventions       Post-assessment  Total Session Time (min): 2 minutes    Narrative  Assessment Detail: Pt eating applesauce with staff member. He declined an MT session today and agreed to a f/u later this week    Education Documentation  No documentation found.

## 2024-03-27 NOTE — CARE PLAN
The patient's goals for the shift include      The clinical goals for the shift include Patient will remain safe during shift    Problem: Pain  Goal: My pain/discomfort is manageable  Outcome: Progressing     Problem: Safety  Goal: Patient will be injury free during hospitalization  Outcome: Progressing  Goal: I will remain free of falls  Outcome: Progressing     Problem: Daily Care  Goal: Daily care needs are met  Outcome: Progressing     Problem: Psychosocial Needs  Goal: Demonstrates ability to cope with hospitalization/illness  Outcome: Progressing  Goal: Collaborate with me, my family, and caregiver to identify my specific goals  Outcome: Progressing     Problem: Discharge Barriers  Goal: My discharge needs are met  Outcome: Progressing     Problem: Skin  Goal: Prevent/manage excess moisture  Outcome: Progressing  Goal: Promote skin healing  Outcome: Progressing  Goal: Decreased wound size/increased tissue granulation at next dressing change  Outcome: Progressing  Goal: Participates in plan/prevention/treatment measures  Outcome: Progressing  Goal: Prevent/minimize sheer/friction injuries  Outcome: Progressing  Goal: Promote/optimize nutrition  Outcome: Progressing     Problem: Diabetes  Goal: Achieve decreasing blood glucose levels by end of shift  Outcome: Progressing  Goal: Increase stability of blood glucose readings by end of shift  Outcome: Progressing  Goal: Decrease in ketones present in urine by end of shift  Outcome: Progressing  Goal: Maintain electrolyte levels within acceptable range throughout shift  Outcome: Progressing  Goal: Maintain glucose levels >70mg/dl to <250mg/dl throughout shift  Outcome: Progressing  Goal: No changes in neurological exam by end of shift  Outcome: Progressing  Goal: Learn about and adhere to nutrition recommendations by end of shift  Outcome: Progressing  Goal: Vital signs within normal range for age by end of shift  Outcome: Progressing  Goal: Increase self care and/or  family involovement by end of shift  Outcome: Progressing  Goal: Receive DSME education by end of shift  Outcome: Progressing

## 2024-03-27 NOTE — SIGNIFICANT EVENT
BAT called by bedside RN 2/2 Right sided facial droop and slurred speech.  Vitals are stable and blood glucose is 161. Neuro at bedside on arrival. Patient to CT and back without incident. CTH negative per Neurology service.

## 2024-03-27 NOTE — PROGRESS NOTES
Patient is medically ready for discharge per team. Patient came from [facility] and they are willing to accept patient back. Precert needed. Sent message in [employer] requesting precert to be initiated.

## 2024-03-27 NOTE — PROGRESS NOTES
"Physical Therapy    Physical Therapy Treatment    Patient Name: Chevy Benton  MRN: [id number]  Today's Date: 3/27/2024  Time Calculation  Start Time: 1525  Stop Time: 1548  Time Calculation (min): 23 min       Assessment/Plan   PT Assessment  End of Session Communication: Bedside nurse, PCT/NA/CTA  Assessment Comment: Pt did not tolerate session.  Pt required increased time and lots of repetition for orientation questions.  Initially when asked about pain, pt reported foot soreness.  During supine to sitting transfer Max Ax1 with use of drawsheet, pt c/o chest pain rated 9/10 that felt \"like i got beat in the chest\".  Pt returned to supine with HOB slightly elevated and NCP took vitals.  RN notified and in to assess pt.  Pt c/o of same chest pain but no SOB.  PT session was discontinued.  Pt continues to benefit from skilled PT and remains appropriate for moderate intensity PT upon discharge.  End of Session Patient Position: Bed, 3 rail up, Alarm on  PT Plan  Inpatient/Swing Bed or Outpatient: Inpatient  PT Plan  Treatment/Interventions: Bed mobility, Transfer training, Gait training, Balance training, Strengthening, Endurance training, Therapeutic exercise, Therapeutic activity  PT Plan: Skilled PT  PT Frequency: 3 times per week  PT Discharge Recommendations: Moderate intensity level of continued care  Equipment Recommended upon Discharge:  (none)  PT Recommended Transfer Status: Total assist  PT - OK to Discharge: Yes (eval complete and discharge recommendations made)      General Visit Information:   PT  Visit  PT Received On: 03/27/24  General  Prior to Session Communication: Bedside nurse  Patient Position Received: Bed, 4 rail up, Alarm on  General Comment: Pt cleared for PT by RN.  Pt alert and agreeable to PT.  On 2L O2 via NC, hemodialysis cath, PIV.    Subjective   Precautions:  Precautions  Medical Precautions: Fall precautions  Vital Signs:  Vital Signs  Heart Rate: 96  Heart Rate Source: " "Monitor  SpO2: 100 %  BP: 134/82  BP Location: Right arm  BP Method: Automatic    Objective   Pain:  Pain Assessment  Pain Assessment: 0-10  Pain Score: 9  Pain Location: Chest (Pt initially c/o R foot soreness.  During session reported 9/10 chest pain.  RN notified and in to assess)  Pain Descriptors:  (\"it feels like I got beat in the chest\")  Pain Interventions: Repositioned, Rest  Response to Interventions: no change in pain  Cognition:  Cognition  Overall Cognitive Status: Impaired  Orientation Level: Disoriented to situation (required choices and significant repetition for month and year.)  Following Commands: Follows one step commands with repetition (and increasd time)  Postural Control:  Postural Control  Trunk Control: Max Assist sitting EOB, significant posterior lean  Static Sitting Balance  Static Sitting-Balance Support: Bilateral upper extremity supported, Feet supported  Static Sitting-Level of Assistance: Maximum assistance  Static Sitting-Comment/Number of Minutes: significant posterior lean  Activity Tolerance:  Activity Tolerance  Endurance: Tolerates less than 10 min exercise, no significant change in vital signs  Treatments:  Bed Mobility  Bed Mobility: Yes  Bed Mobility 1  Bed Mobility 1: Rolling right, Rolling left  Level of Assistance 1: Maximum assistance  Bed Mobility 2  Bed Mobility  2: Scooting (To HOB in supine)  Level of Assistance 2: Dependent (x2)  Bed Mobility 3  Bed Mobility 3: Supine sitting  Level of Assistance 3: Dependent  Bed Mobility 4  Bed Mobility 4: Sitting to supine  Level of Assistance 4: Maximum assistance    Transfers  Transfer: No    Outcome Measures:  [facility] Basic Mobility  Turning from your back to your side while in a flat bed without using bedrails: A lot  Moving from lying on your back to sitting on the side of a flat bed without using bedrails: A lot  Moving to and from bed to chair (including a wheelchair): Total  Standing up from a chair using your arms (e.g. " wheelchair or bedside chair): Total  To walk in hospital room: Total  Climbing 3-5 steps with railing: Total  Basic Mobility - Total Score: 8    Education Documentation  Precautions, taught by Nevaeh Alanis PT at 3/27/2024  4:00 PM.  Learner: Patient  Readiness: Acceptance  Method: Explanation  Response: Needs Reinforcement  Comment: PATTIE LEE    Body Mechanics, taught by Nevaeh Alanis PT at 3/27/2024  4:00 PM.  Learner: Patient  Readiness: Acceptance  Method: Explanation  Response: Needs Reinforcement  Comment: PATTIE LEE    Mobility Training, taught by Nevaeh Alanis PT at 3/27/2024  4:00 PM.  Learner: Patient  Readiness: Acceptance  Method: Explanation  Response: Needs Reinforcement  Comment: PATTIE LEE    Education Comments  No comments found.          Encounter Problems       Encounter Problems (Active)       PT Problem       Patient will complete supine to sit and sit to supine Min Assist  (Progressing)       Start:  03/11/24    Expected End:  04/08/24            Patient will perform sit<>stand transfer with Rolling Walker, and Mod Assist  (Not Progressing)       Start:  03/11/24    Expected End:  04/08/24            Patient will ambulate >10' with Rolling Walker and Mod Assist  (Not Progressing)       Start:  03/11/24    Expected End:  04/08/24            Pt will be able to maintain static sitting with SBA x 5 min (Progressing)       Start:  03/11/24    Expected End:  04/08/24               Pain - Adult

## 2024-03-27 NOTE — SIGNIFICANT EVENT
CANCELLED BAT NOTE     Chevy Benton is a 78 y.o. year old male patient with a PMHx of PAD, afib (on eliquis), CAD, T2DM, DVT, GERD, sick sinus syndrome, ESRD on HD, HLD, HFrEF (40-45%), gout admitted to  for LUE fistula bleed. Currently being treated for TMA site tissue infection and pneumonia on broad spectrum antibiotics.     Presented with aphasia and mental status change.. Last known well was 20:45-21:00 on 3/26, On arrival SBP was 125/80  BG was 161.     On initial exam patient was awake, oriented to self, intermittently following commands, Bilateral distal upper  and lower extremities were antigravity. (Per bedside nurse, strength was at baseline) No facial droop seen. Speech was aphasic and dysarthric - unable to name objects or interpret a picture.    NIHSS: 12  Level of consciousness: 0 = Alert  LOC Question: 1 = One correct  LOC Commands: 1 = Obeys one  Best Gaze: 0 = Normal  Visual Field: 0 = No visual loss  Facial Paresis: 0 = Normal  LUE: 2 = Effort vs gravity  RUE: 2 = Effort vs gravity  LLE: 2 = Effort vs gravity  RLE: 2 = Effort vs gravity  Dysarthria: 1 = Mild-moderate  Best Language: 1 = Mild-moderate aphasia  Limb Ataxia: 0 = Absent  Sensory: 0 = Absent  Neglect: 0 = None    CTH personally reviewed and did not show  loss of gray white matter differentiation, hyperdense vessels or hyperdensities c/f blood products.  CTA not obtained because low c/f LVO and patient is on dialysis. Not a TNK candidate because on Eliquis. Not a MT Candidate because low c/f stroke. At baseline the patient is bed bound.     On return to the floor per bedside nurse patient had returned to baseline.     Overall given baseline exam and unremarkable head CT low concern for stroke.     - Toxic metabolic workup per primary   - Please obtain MRI brain if patient becomes worse       Radha Gonzalez MD  Department of Neurology, PGY-2  Stroke pager  [phone]

## 2024-03-27 NOTE — CARE PLAN
Problem: Skin  Goal: Prevent/manage excess moisture  Outcome: Progressing  Goal: Promote skin healing  Outcome: Progressing  Goal: Decreased wound size/increased tissue granulation at next dressing change  Outcome: Progressing  Goal: Participates in plan/prevention/treatment measures  Outcome: Progressing  Goal: Prevent/minimize sheer/friction injuries  Outcome: Progressing  Goal: Promote/optimize nutrition  Outcome: Progressing     Problem: Pain  Goal: Takes deep breaths with improved pain control throughout the shift  Outcome: Progressing  Goal: Turns in bed with improved pain control throughout the shift  Outcome: Progressing  Goal: Walks with improved pain control throughout the shift  Outcome: Progressing  Goal: Performs ADL's with improved pain control throughout shift  Outcome: Progressing  Goal: Participates in PT with improved pain control throughout the shift  Outcome: Progressing  Goal: Free from opioid side effects throughout the shift  Outcome: Progressing  Goal: Free from acute confusion related to pain meds throughout the shift  Outcome: Progressing   The patient's goals for the shift include      The clinical goals for the shift include Patient will remain safe during shift

## 2024-03-28 NOTE — PROGRESS NOTES
"Chevy Benton is a 78 y.o. male on day 20 of admission presenting with A-V fistula (CMS/HCC).    Subjective   Pt resting comfortably in bed, denies SOB, n/v, fatigue. Family meeting held with primary and pt's son and wife at the bedside.     Objective     Physical Exam  Vitals reviewed.   Constitutional:       General: He is awake.      Appearance: He is overweight. He is ill-appearing.      Interventions: Nasal cannula in place.   HENT:      Head: Normocephalic and atraumatic.      Nose: Nose normal.      Mouth/Throat:      Mouth: Mucous membranes are dry.   Eyes:      Extraocular Movements: Extraocular movements intact.      Pupils: Pupils are equal, round, and reactive to light.   Cardiovascular:      Rate and Rhythm: Normal rate and regular rhythm.   Pulmonary:      Effort: Pulmonary effort is normal.      Breath sounds: Normal breath sounds.   Abdominal:      General: There is distension.      Palpations: Abdomen is soft.   Skin:     General: Skin is warm and dry.   Neurological:      Mental Status: He is alert. He is disoriented.      Comments: A&Ox2   Psychiatric:         Mood and Affect: Mood normal.         Behavior: Behavior normal.         Thought Content: Thought content normal.         Judgment: Judgment normal.         Last Recorded Vitals  Blood pressure 126/77, pulse 84, temperature 36.4 °C (97.5 °F), temperature source Temporal, resp. rate 16, height 1.778 m (5' 10\"), weight 87 kg (191 lb 12.8 oz), SpO2 96 %.  Intake/Output last 3 Shifts:  I/O last 3 completed shifts:  In: 1060 (12.2 mL/kg) [P.O.:60; I.V.:600 (6.9 mL/kg); Other:400]  Out: 2901 (33.3 mL/kg) [Other:2900; Stool:1]  Weight: 87 kg     Relevant Results  Scheduled medications  allopurinol, 100 mg, oral, Daily  apixaban, 2.5 mg, oral, q12h  atorvastatin, 40 mg, oral, Nightly  B complex-vitamin C-folic acid, 1 capsule, oral, Daily  clopidogrel, 75 mg, oral, Daily  epoetin dane or biosimilar, 20,000 Units, intravenous, Once per day on " Tue Thu Sat  gabapentin, 100 mg, oral, Daily  insulin lispro, 0-5 Units, subcutaneous, TID with meals  lubricating eye drops, 2 drop, Both Eyes, TID  melatonin, 5 mg, oral, Nightly  midodrine, 15 mg, oral, q8h  pantoprazole, 40 mg, oral, Daily before breakfast  piperacillin-tazobactam, 2.25 g, intravenous, q12h  polyethylene glycol, 17 g, oral, Daily  sennosides-docusate sodium, 2 tablet, oral, BID  sodium hypochlorite, , irrigation, Daily      Continuous medications     PRN medications  PRN medications: acetaminophen, albuterol, alteplase, benzonatate, dextrose 10 % in water (D10W), dextrose, glucagon, hydrOXYzine HCL, ipratropium-albuteroL, lidocaine, naloxone, ondansetron, [Held by provider] oxyCODONE, sodium chloride 0.9%    Results for orders placed or performed during the hospital encounter of 03/08/24 (from the past 24 hour(s))   POCT GLUCOSE   Result Value Ref Range    POCT Glucose 176 (H) 74 - 99 mg/dL   POCT GLUCOSE   Result Value Ref Range    POCT Glucose 180 (H) 74 - 99 mg/dL   CBC   Result Value Ref Range    WBC 13.1 (H) 4.4 - 11.3 x10*3/uL    nRBC 0.5 (H) 0.0 - 0.0 /100 WBCs    RBC 2.72 (L) 4.50 - 5.90 x10*6/uL    Hemoglobin 7.7 (L) 13.5 - 17.5 g/dL    Hematocrit 27.1 (L) 41.0 - 52.0 %     80 - 100 fL    MCH 28.3 26.0 - 34.0 pg    MCHC 28.4 (L) 32.0 - 36.0 g/dL    RDW 19.1 (H) 11.5 - 14.5 %    Platelets 424 150 - 450 x10*3/uL   Renal Function Panel   Result Value Ref Range    Glucose 139 (H) 74 - 99 mg/dL    Sodium 132 (L) 136 - 145 mmol/L    Potassium 3.9 3.5 - 5.3 mmol/L    Chloride 94 (L) 98 - 107 mmol/L    Bicarbonate 24 21 - 32 mmol/L    Anion Gap 18 10 - 20 mmol/L    Urea Nitrogen 48 (H) 6 - 23 mg/dL    Creatinine 3.71 (H) 0.50 - 1.30 mg/dL    eGFR 16 (L) >60 mL/min/1.73m*2    Calcium 10.0 8.6 - 10.6 mg/dL    Phosphorus 5.8 (H) 2.5 - 4.9 mg/dL    Albumin 2.5 (L) 3.4 - 5.0 g/dL   POCT GLUCOSE   Result Value Ref Range    POCT Glucose 112 (H) 74 - 99 mg/dL   Transthoracic Echo (TTE) Complete    Result Value Ref Range    BSA 2.07 m2     MR foot right wo IV contrast    Result Date: 3/26/2024  These images are not reportable by radiology and will not be interpreted by  Radiologists.    Invasive vascular procedure    Result Date: 3/22/2024   [facility], Cath Lab, [address] [place] [address] Cardiovascular Catheterization Report Patient Name:      SHAMEKA SUN    Performing Physician:  Jennifer Nguyen MD Study Date:        3/20/2024            Verifying Physician:   Jennifer Nguyen MD MRN/PID:           [id number]             Cardiologist/Co-scrub: Accession#:        [id number]         Ordering Physician:    [id number] ARELIS MEJIAS Date of Birth/Age: 1945 / 78 years Fellow:                [id number] Darnell Umanzor MD Gender:            M                    Fellow:                [id number] Raymon Jay MD Encounter#:        [id number]  Study: Peripheral Angiogram  Indications: SHAMEKA SUN is a 78 year old male who presents with coronary artery disease, chronic kidney disease, hypertension, diabetes mellitus and peripheral artery disease, ESRD, CHF, T2DM, PAD with CLTI RLE s/p two prior complex tibial and pedal revascularization procedures who presents with persistent nonhealing TMA/Lisfranc amputation RLE. Plan for angiogram with possible intervention today.  Procedure Description: After infiltration with 2% Lidocaine utilizing two-dimensional ultrasound guidance, the right radial artery was cannulated using a modified Seldinger technique. Subsequently a 6 Irish sheath was placed antegrade in the right radial artery. After completion of the procedure, TR Band compression device was placed per protocol. Right radial artery was accessed under ultrasound guidance with NAYELY, followed by  placement of 6Fr slender sheath. IM catheter over Versacore wire advanced into the descending aorta and cannulating the right iliac system. Quick Cross catheter was advanced into the R EIA and mid SFA where diagnostic angiograms were obtained.  Right Lower Extremity Arterial Findings: Right Common Iliac Artery: The right common iliac artery revealed no evidence of significant disease. Right Extermal Iliac Artery: The right external iliac artery revealed no evidence of significant disease. Right Internal Iliac Artery: The right internal iliac artery revealed no evidence of significant disease. Right Common Femoral Artery: The right common femoral artery revealed no evidence of significant disease. Right Profunda Femoris Artery: The right profunda femoris artery revealed no evidence of significant disease. Right Superior Femoral Artery: The right superficial femoral artery revealed mild proximal obstruction and mild irregularities. Right Popliteal Artery: The right popliteal artery revealed mild atherosclerotic disease, mild calcification and mild irregularities. Right Tibial-Peroneal Trunk: The right tibial-peroneal trunk revealed no evidence of significant disease. Right Anterior Tibial Artery: The right anterior tibial artery revealed mild proximal irregularities and chronic occlusion originating at the mid to distal segment. Right Posterior Tibial Artery: The right posterior tibial artery revealed chronic occlusion. Right Peroneal Artery: The right peroneal artery revealed chronic occlusion originating at the mid segment. Right Dorsalis Pedis Artery: The right dorsalis pedis artery revealed chronic occlusion.  Hemo Personnel: +----------+---------+ Name      Duty      +----------+---------+ Stuart Nguyen MDPROC MD 1 +----------+---------+  Hemodynamic Pressures:  +----+---------------------+----------+-------------+--------------+---------+ Site      Date Time      Phase NameSystolic mmHgDiastolic mmHgMean  mmHg +----+---------------------+----------+-------------+--------------+---------+   AO3/20/2024 11:58:53 AM      Rest          117            63       80 +----+---------------------+----------+-------------+--------------+---------+   AO3/20/2024 12:07:49 PM      Rest          140            61       84 +----+---------------------+----------+-------------+--------------+---------+  Complications: No in-lab complications observed.  Cardiac Cath Post Procedure Notes: Post Procedure Diagnosis: CLTI RLE RC VI. Blood Loss:               Estimated blood loss during the procedure was 10 mls. Specimens Removed:        Number of specimen(s) removed: none.  Recommendations: Maximize medical therapy. Agressive risk factor modification efforts. ____________________________________________________________________________________ CONCLUSIONS:  1. Indication: RLE CLTI RC VI.  2. Interventions: RLE angiogram, no intervention performed.  3. Findings: Reocclusion of PT and pedal arch with no major vessel within the foot.  4. Plan: Will consider LSAC discussion evaluation for amputation candidacy given severe tibial and pedal disease recalcitrant to two prior complex tibial/pedal revascularization procedures. ICD 10 Codes: Atherosclerosis of native arteries of right leg with ulceration of other part of foot-I70.235  CPT Codes: Moderate Sedation Services initial 15 minutes patient >5 years-18887; Moderate Sedation Services 1st additional 15 minutes patient >5 years-33063  [id number] Stuart Nguyen MD Performing Physician Electronically signed by [id number] Stuart Nguyen MD on 3/22/2024 at 9:43:33 AM  ** Final **     Vascular US Ankle Brachial Index (RAJ) Without Exercise    Result Date: 3/21/2024            [place] [address]   Tel [phone] and Fax [phone]  Vascular Lab Report [facility] US ANKLE BRACHIAL INDEX (RAJ) WITHOUT EXERCISE  Patient Name:      SHAMEKA Mendez Physician:   [id number] Stuart Nguyen MD Study Date:        3/19/2024            Ordering Physician: [id number] ARELIS MEJIAS MRN/PID:           [id number]             Technologist:       Beena Craft T Accession#:        [id number]         Technologist 2: Date of Birth/Age: 1945 / 78 years Encounter#:         [id number] Gender:            M Admission Status:  Inpatient            Location Performed: [facility]  Diagnosis/ICD: Peripheral vascular disease, unspecified-I73.9 CPT Codes:     46323 Peripheral artery RAJ Only  CONCLUSIONS: Right Lower PVR: Evidence of moderate arterial occlusive disease in the right lower extremity at rest. Right pressures of >220 mmHg suggest no compressibility of vessels and may make absolute Segmental Limb Pressures (SLP) unreliable. Monophasic flow is noted in the right common femoral artery and right posterior tibial artery. Biphasic flow is noted in the right dorsalis pedis artery. S/p TMA RLE. Left Lower PVR: Evidence of mild arterial occlusive disease in the left lower extremity at rest. Left pressures of >220 mmHg suggest no compressibility of vessels and may make absolute Segmental Limb Pressures (SLP) unreliable. Decreased digital perfusion noted. Biphasic flow is noted in the left common femoral artery, left posterior tibial artery and left dorsalis pedis artery. Unable to obtain brachial pressure due to dialysis access.  Comparison: Compared with study from 1/9/2024, Interpretation moderate on RLE, mild LLE today.  Additional Findings: Technically difficult study due to patient bandaging, positioning, and tolerance.  Imaging & Doppler Findings:  RIGHT Lower PVR                Pressures Ratios Right Posterior Tibial (Ankle) 74 mmHg   0.66 Right Dorsalis Pedis (Ankle)   256 mmHg  2.29   LEFT Lower PVR                Pressures Ratios Left Posterior Tibial (Ankle) 256 mmHg  2.29 Left Dorsalis Pedis (Ankle)   256 mmHg  2.29 Left Digit (Great Toe)        52 mmHg   0.46                      Right Brachial Pressure 112 mmHg   [id number] Stuart Nguyen MD Electronically signed by [id number] Nguyen MD on 3/21/2024 at 4:49:30 PM  ** Final **     Upper extremity venous duplex bilateral    Result Date: 3/20/2024            [place] [address]   Tel [phone] and Fax [phone]  Vascular Lab Report [facility] US UPPER EXTREMITY VENOUS DUPLEX BILATERAL  Patient Name:     SHAMEKA BORGESZACK Mendez Physician: Jennifer Nguyen MD Study Date:       3/19/2024           Ordering           [id number] NETTIE                                       Physician:         YENI MRN/PID:          [id number]            Technologist:      CARLOS Accession#:       [id number]        Technologist 2: Date of           1945 / 78      Encounter#:        [id number] Birth/Age:        years Gender:           M Admission Status: Inpatient           Location           [facility]                                       Performed:  Diagnosis/ICD: Other specified soft tissue disorders-M79.89 CPT Codes:     55566 Peripheral venous duplex scan for DVT complete  **CRITICAL RESULT** Critical Result: acute non occlusive thrombus noted in the right subclavian proximal segment. Notification called to Francisca Chery RN; Wilma Redding MD on 3/19/2024 at 11:50:55 AM by Kristen Gloria.  CONCLUSIONS: Right Upper Venous: There is acute occlusive deep vein thrombosis visualized in the proximal subclavian vein. Remainder of exam is noted to be negative for acute DVT. Hematoma noted in right upper arm measuring 5.07cm x 2.01cm. Thrombus noted around line. Cannot rule out thrombus of non-visualized proximal cephalic, mid cephalic, distal cephalic and basilic veins due to swelling and edema. Left Upper Venous: Limited exam due to bandages. Cannot rule out thrombus of non-compressible mid subclavian and distal subclavian veins due to dressings and catheter. Cannot rule out thrombus of non-visualized proximal cephalic, mid  cephalic, distal cephalic and basilic veins due to dressings.  Imaging & Doppler Findings:  Right               Compressible      Thrombus              Flow Internal Jugular        Yes             None         Spontaneous/Phasic Subclavian            Partial    Acute non-occlusive Subclavian Proximal     Yes             None Subclavian Mid          Yes             None         Spontaneous/Phasic Subclavian Distal                       None Axillary                Yes             None Brachial                Yes             None  Left                Compress Thrombus        Flow Internal Jugular      Yes      None   Spontaneous/Phasic Subclavian Proximal                   Spontaneous/Phasic Subclavian Mid        Yes      None Subclavian Distal     Yes      None   Spontaneous/Phasic Axillary              Yes      None   Spontaneous/Phasic Brachial              Yes      None  [id number] Stuart Nguyen MD Electronically signed by [id number] Stuart Nguyen MD on 3/20/2024 at 11:33:21 PM  ** Final **     XR foot right 3+ views    Result Date: 3/18/2024  Interpreted By:  Liz Ruiz and [place]yersdorf Conner STUDY: XR FOOT RIGHT 3+ VIEWS; ;  3/17/2024 7:49 pm   INDICATION: Signs/Symptoms:Evaluation of TMA right foot possible infection.   COMPARISON: X-ray right foot 01/19/2024   ACCESSION NUMBER(S): [id number]   ORDERING CLINICIAN: NETTIE JAIME   FINDINGS: Transmetatarsal amputation changes noted. There is marked paucity of soft tissue stump at the level of 1st metatarsal likely representing wound dehiscence/stump breakdown. There is also apparent soft tissue defect of the 5th metatarsal stump region which may also represent stump breakdown. There is irregular appearance of the stumps of the 1st and 5th digits suggesting osteomyelitis. Prominent vascular calcifications of the ankle and foot.   Mild midfoot degenerative changes with dorsal osteophytes. Remote healed distal fibular fracture sequela. No acute fracture or malalignment.        Transmetatarsal amputation changes of the foot with irregular appearance of the stumps of the 1st and 5th digits suggesting osteomyelitis with osteomyelitis of the rest of the digits not excluded. An MRI may be obtained for further evaluation if clinically warranted Findings suggesting stump breakdown at the level of 1st and 5th metatarsals.   I personally reviewed the image(s)/study and resident interpretation. I agree with the findings as stated by resident Rafael Tony. Data analyzed and images interpreted at [facility], [place], [place].   MACRO: None   Signed by: Liz Ruiz 3/18/2024 9:56 AM Dictation workstation:   [id number]    XR chest 1 view    Result Date: 3/16/2024  Interpreted By:  Kendrick Clayton, STUDY: XR CHEST 1 VIEW; 3/16/2024 6:32 pm   INDICATION: Signs/Symptoms:eval continued hypoxemia. esrd fluid overload.   COMPARISON: Radiograph dated 01/23/2024   ACCESSION NUMBER(S): [id number]   ORDERING CLINICIAN: NETTIE JAIME   FINDINGS: Left IJ approach hemodialysis catheter is in place with the tip projecting over the right atrium. Right subclavian approach pacer/defibrillator is stable.   The cardiac silhouette size is unchanged.   Low lung volumes and bronchovascular crowding. Right more than left bibasilar opacity. Mild interstitial prominence and edema. No sizable pneumothorax   No acute osseous abnormality.       1. Slight interval worsening of the aeration of the lungs with bilateral, right more than left pleural effusion and atelectasis/consolidation and mild interstitial edema.       Signed by: Kendrick Painting 3/16/2024 9:04 PM Dictation workstation:   [id number]    ECG 12 lead    Result Date: 3/12/2024  Atrial fibrillation ST & T wave abnormality, consider anterolateral ischemia Prolonged QT Abnormal ECG When compared with ECG of 08-MAR-2024 14:26, No significant change was found See ED provider note for full interpretation and  clinical correlation Confirmed by Jluis Cee ([id number]) on 3/12/2024 9:34:16 AM         Assessment/Plan   Supportive Interventions:  Music therapy as needed  Spiritual care    Medical decision making/ Goals of care:   Patient's current clinical condition, including diagnosis, prognosis, and management plan were discussed.   Life limiting disease: peripheral arterial disease, sepsis, ESRD, frailty - PPS 40%, HFrEF  Family: Supportive   Performance status: Major limitations due to disease process  Understanding of health: pt's son demonstrates moderate prognostic understanding of disease process  Information: Wants full disclosure of daily updates and prognosis   Goals: Symptom control, for pt to go home  Worries and fears now and future: ongoing symptoms, worsening health  Minimum acceptable outcome/QOL: cognition, independence with toileting, feeding, ambulation   Code status discussion: completed today (3/28/24) with pt's son and wife at the bedside with primary team, myself and palliative . It was explained of pt's hospitalization with ongoing challenges of ADHF, peripheral arterial disease with vascular consult for RLE intervention. It was explained to pt's family that the pt is a candidate for surgical intervention per vascular, with tentative surgery date of Monday, 4/1/24. Pt's family voluntarily participated in an advanced care planning discussion; agreeable to DNR status given pt's overall frailty and comorbidities. They are understanding that pt's code status will be reversed to full code during and 24 hours after surgical intervention.     Advance Care Planning:   No Advance Directives on file.    Surrogate decision maker is: pt's Chevy eli: [phone]  Code status: DNR    Disposition:  Plan pending hospital course    Case discussed with primary team.    Thank you for allowing us to participate in the care of this patient. Palliative Team will continue to follow as needed.  Please contact  team with any questions or concerns.    JENIFFER Harris-CNP   Palliative Care (weekdays - pager [phone])    I spent 60 minutes in the care of this patient which included chart review, interviewing patient/family, discussion with primary team, coordination of care, and documentation.    Medical Decision Making was high level due to high complexity of problems, extensive data review, and high risk of management/treatment.

## 2024-03-28 NOTE — ACP (ADVANCE CARE PLANNING)
Confirming Previous Code Status:   Advance Care Planning Note     Discussion Date: 03/28/24   Discussion Participants: patient, spouse, and son    The patient wishes to discuss Advance Care Planning today and the following is a brief summary of our discussion.     Patient has capacity to make their own medical decisions: No  Health Care Agent/Surrogate Decision Maker documented in chart: Yes    Documents on file and valid:  Advance Directive/Living Will: No   Health Care Power of : No    Communication of Medical Status/Prognosis:   Code status discussion: completed today (3/28/24) with pt's son and wife at the bedside with primary team, myself and palliative . It was explained of pt's hospitalization with ongoing challenges of ADHF, peripheral arterial disease with vascular consult for RLE intervention. It was explained to pt's family that the pt is a candidate for surgical intervention per vascular, with tentative surgery date of Monday, 4/1/24.     Communication of Treatment Goals/Options:   Pt's family voluntarily participated in an advanced care planning discussion; agreeable to DNR status given pt's overall frailty and comorbidities. They are understanding that pt's code status will be reversed to full code during and 24 hours after surgical intervention.     Treatment Decisions  Goals of Care: quality of life is prioritized; willing to accept low-burden treatments to achieve meaningful goals     Follow Up Plan  Primary team to place code status orders  Team Members  Myself, Dr. Donn Quiñonez and Rev. Judith Hsieh  Time Statement: Total face to face time spent on advance care planning was 30 minutes with 30 minutes spent in counseling, including the explanation.    Radha Montana, JENIFFER-CNP  3/28/2024 4:34 PM

## 2024-03-28 NOTE — PROCEDURES
"DIALYSIS NOTE:    Seen and examined during hemodialysis, undergoing treatment per submitted orders: 4 K, 2.5 Ca, 3.5  hours. Fluid removal 3 as tolerated (keep SBP> 90mmHg).     No complaints today    /84 (BP Location: Right arm, Patient Position: Lying)   Pulse 83   Temp 36.2 °C (97.2 °F) (Temporal)   Resp 18   Ht 1.778 m (5' 10\")   Wt 87 kg (191 lb 12.8 oz)   SpO2 99%   BMI 27.52 kg/m²       Additional Recommendations:  Iron studies c/w iron deficiency. Will start parenteral iron (venofer 100 mg weekly) to help boost erythropoiesis.    Scheduled medications  allopurinol, 100 mg, oral, Daily  apixaban, 2.5 mg, oral, q12h  atorvastatin, 40 mg, oral, Nightly  B complex-vitamin C-folic acid, 1 capsule, oral, Daily  clopidogrel, 75 mg, oral, Daily  epoetin dane or biosimilar, 20,000 Units, intravenous, Once per day on Tue Thu Sat  gabapentin, 100 mg, oral, Daily  insulin lispro, 0-5 Units, subcutaneous, TID with meals  lubricating eye drops, 2 drop, Both Eyes, TID  melatonin, 5 mg, oral, Nightly  midodrine, 15 mg, oral, q8h  pantoprazole, 40 mg, oral, Daily before breakfast  piperacillin-tazobactam, 2.25 g, intravenous, q12h  polyethylene glycol, 17 g, oral, Daily  sennosides-docusate sodium, 2 tablet, oral, BID  sodium hypochlorite, , irrigation, Daily      Continuous medications     PRN medications  PRN medications: acetaminophen, albuterol, alteplase, benzonatate, dextrose 10 % in water (D10W), dextrose, glucagon, hydrOXYzine HCL, ipratropium-albuteroL, lidocaine, naloxone, ondansetron, [Held by provider] oxyCODONE, sodium chloride 0.9%      Recent Results (from the past 24 hour(s))   POCT GLUCOSE    Collection Time: 03/27/24  5:50 PM   Result Value Ref Range    POCT Glucose 176 (H) 74 - 99 mg/dL   POCT GLUCOSE    Collection Time: 03/27/24  8:33 PM   Result Value Ref Range    POCT Glucose 180 (H) 74 - 99 mg/dL   CBC    Collection Time: 03/28/24  7:23 AM   Result Value Ref Range    WBC 13.1 (H) 4.4 - " 11.3 x10*3/uL    nRBC 0.5 (H) 0.0 - 0.0 /100 WBCs    RBC 2.72 (L) 4.50 - 5.90 x10*6/uL    Hemoglobin 7.7 (L) 13.5 - 17.5 g/dL    Hematocrit 27.1 (L) 41.0 - 52.0 %     80 - 100 fL    MCH 28.3 26.0 - 34.0 pg    MCHC 28.4 (L) 32.0 - 36.0 g/dL    RDW 19.1 (H) 11.5 - 14.5 %    Platelets 424 150 - 450 x10*3/uL   Renal Function Panel    Collection Time: 03/28/24  7:23 AM   Result Value Ref Range    Glucose 139 (H) 74 - 99 mg/dL    Sodium 132 (L) 136 - 145 mmol/L    Potassium 3.9 3.5 - 5.3 mmol/L    Chloride 94 (L) 98 - 107 mmol/L    Bicarbonate 24 21 - 32 mmol/L    Anion Gap 18 10 - 20 mmol/L    Urea Nitrogen 48 (H) 6 - 23 mg/dL    Creatinine 3.71 (H) 0.50 - 1.30 mg/dL    eGFR 16 (L) >60 mL/min/1.73m*2    Calcium 10.0 8.6 - 10.6 mg/dL    Phosphorus 5.8 (H) 2.5 - 4.9 mg/dL    Albumin 2.5 (L) 3.4 - 5.0 g/dL

## 2024-03-28 NOTE — CARE PLAN
The patient's goals for the shift include      The clinical goals for the shift include pt will remain safe during shift    Problem: Pain  Goal: My pain/discomfort is manageable  Outcome: Progressing     Problem: Safety  Goal: Patient will be injury free during hospitalization  Outcome: Progressing  Goal: I will remain free of falls  Outcome: Progressing     Problem: Daily Care  Goal: Daily care needs are met  Outcome: Progressing     Problem: Psychosocial Needs  Goal: Demonstrates ability to cope with hospitalization/illness  Outcome: Progressing  Goal: Collaborate with me, my family, and caregiver to identify my specific goals  Outcome: Progressing     Problem: Discharge Barriers  Goal: My discharge needs are met  Outcome: Progressing     Problem: Skin  Goal: Prevent/manage excess moisture  Outcome: Progressing  Goal: Promote skin healing  Outcome: Progressing  Goal: Decreased wound size/increased tissue granulation at next dressing change  Outcome: Progressing  Goal: Participates in plan/prevention/treatment measures  Outcome: Progressing  Goal: Prevent/minimize sheer/friction injuries  Outcome: Progressing  Goal: Promote/optimize nutrition  Outcome: Progressing     Problem: Pain - Adult  Goal: Verbalizes/displays adequate comfort level or baseline comfort level  Outcome: Progressing     Problem: Diabetes  Goal: Achieve decreasing blood glucose levels by end of shift  Outcome: Progressing  Goal: Increase stability of blood glucose readings by end of shift  Outcome: Progressing  Goal: Decrease in ketones present in urine by end of shift  Outcome: Progressing  Goal: Maintain electrolyte levels within acceptable range throughout shift  Outcome: Progressing  Goal: Maintain glucose levels >70mg/dl to <250mg/dl throughout shift  Outcome: Progressing  Goal: No changes in neurological exam by end of shift  Outcome: Progressing  Goal: Learn about and adhere to nutrition recommendations by end of shift  Outcome:  Progressing  Goal: Vital signs within normal range for age by end of shift  Outcome: Progressing  Goal: Increase self care and/or family involovement by end of shift  Outcome: Progressing  Goal: Receive DSME education by end of shift  Outcome: Progressing     Problem: Pain  Goal: Takes deep breaths with improved pain control throughout the shift  Outcome: Progressing  Goal: Turns in bed with improved pain control throughout the shift  Outcome: Progressing  Goal: Walks with improved pain control throughout the shift  Outcome: Progressing  Goal: Performs ADL's with improved pain control throughout shift  Outcome: Progressing  Goal: Participates in PT with improved pain control throughout the shift  Outcome: Progressing  Goal: Free from opioid side effects throughout the shift  Outcome: Progressing  Goal: Free from acute confusion related to pain meds throughout the shift  Outcome: Progressing

## 2024-03-28 NOTE — CARE PLAN
Problem: Skin  Goal: Prevent/manage excess moisture  Outcome: Progressing  Goal: Promote skin healing  Outcome: Progressing  Goal: Decreased wound size/increased tissue granulation at next dressing change  Outcome: Progressing  Goal: Participates in plan/prevention/treatment measures  Outcome: Progressing  Goal: Prevent/minimize sheer/friction injuries  Outcome: Progressing  Goal: Promote/optimize nutrition  Outcome: Progressing     Problem: Pain  Goal: Takes deep breaths with improved pain control throughout the shift  Outcome: Progressing  Goal: Turns in bed with improved pain control throughout the shift  Outcome: Progressing  Goal: Walks with improved pain control throughout the shift  Outcome: Progressing  Goal: Performs ADL's with improved pain control throughout shift  Outcome: Progressing  Goal: Participates in PT with improved pain control throughout the shift  Outcome: Progressing  Goal: Free from opioid side effects throughout the shift  Outcome: Progressing  Goal: Free from acute confusion related to pain meds throughout the shift  Outcome: Progressing   The patient's goals for the shift include      The clinical goals for the shift include pt will remain safe and use call light

## 2024-03-28 NOTE — PROGRESS NOTES
Spiritual Care Visit     People who were present: Patient, patient's wife and son, Primary Attending and Palliative CNP    Topics discussed: Patient's condition, upcoming procedures, patient's wishes re future medical procedures    Interventions: Provided non-anxious, supportive presence, reflective listening    Plan of Care: Palliative  to provide ongoing spiritual care

## 2024-03-28 NOTE — PROGRESS NOTES
"Nutrition Follow Up Assessment:   Nutrition Assessment    Reason for Assessment: Dietitian discretion (follow up eval)    Pt now on LT6 after stints on [facility] 20 and LT7. Critical limb ischemia of RLE > may be revascularization candidate. Mentation has improved since hospital admission. Also with ESRD on TTS HD -- off the floor this morning.     Nutrition History:  Energy Intake: Poor < 50 %, Fair 50-75 % (with good intake of Nepro ONS 3/27)       Anthropometrics:  Height: 177.8 cm (5' 10\")   Weight: 87 kg (191 lb 12.8 oz)   BMI (Calculated): 27.52  IBW/kg (Dietitian Calculated): 75.5 kg  Percent of IBW: 115 %       Weight History:   Date/Time Weight   03/19/24  87 kg (191 lb 12.8 oz)   03/09/24      90.9 kg    Wt Readings from Last 30 Encounters:   03/08/24 90.9 kg (200 lb 6.4 oz)   03/08/24 90.7 kg (200 lb)   02/22/24 87.9 kg (193 lb 12.6 oz)   02/10/24 98.7 kg (217 lb 9.5 oz)   10/08/23 95 kg (209 lb 7 oz)   10/05/23 98.3 kg (216 lb 11.4 oz)     Weight Change %:  Weight History / % Weight Change: ~4 kg loss x 10 days, may be from fluid shifts s/p HD  Significant Weight Loss: Yes  Interpretation of Weight Loss: >2% in 1 week    Nutrition Focused Physical Exam Findings:  defer: pt at HD at time of visit this morning  Subcutaneous Fat Loss:      Muscle Wasting:     Edema:  Edema: +3 moderate  Edema Location: bilateral upper and lower extremities  Physical Findings:  Skin: Positive (per nursing flow sheets: coccyx PI, LLE incision, BLE venous wounds and skin tears)    Nutrition Significant Labs:  CBC Trend:   Results from last 7 days   Lab Units 03/28/24  0723 03/27/24  0730 03/26/24  0600 03/25/24  0530   WBC AUTO x10*3/uL 13.1* 11.7* 14.8* 14.4*   RBC AUTO x10*6/uL 2.72* 2.59* 2.58* 2.69*   HEMOGLOBIN g/dL 7.7* 7.1* 7.3* 7.2*   HEMATOCRIT % 27.1* 25.5* 25.1* 26.6*   MCV fL 100 99 97 99   PLATELETS AUTO x10*3/uL 424 411 433 423    , A1C:  Lab Results   Component Value Date    HGBA1C 4.9 03/08/2024   , BG POCT trend: " "  Results from last 7 days   Lab Units 03/28/24  1236 03/27/24  2033 03/27/24  1750 03/27/24  1022 03/27/24  0052   POCT GLUCOSE mg/dL 112* 180* 176* 130* 161*    , Liver Function Trend:    , Renal Lab Trend:   Results from last 7 days   Lab Units 03/28/24  0723 03/27/24  0730 03/26/24  0600 03/25/24  0530   POTASSIUM mmol/L 3.9 3.3* 3.7 3.9   PHOSPHORUS mg/dL 5.8* 5.0* 6.1* 5.6*   SODIUM mmol/L 132* 133* 132* 133*   EGFR mL/min/1.73m*2 16* 17* 13* 16*   BUN mg/dL 48* 37* 46* 39*   CREATININE mg/dL 3.71* 3.45* 4.46* 3.70*    , Lipid Panel: No results found for: \"CHOL\", \"HDL\", \"CHHDL\", \"LDLF\", \"VLDL\", \"TRIG\" , Vit D: No results found for: \"VITD25\"     Nutrition Specific Medications:  B complex-vitamin C-folic acid, 1 capsule, oral, Daily  insulin lispro, 0-5 Units, subcutaneous, TID with meals  pantoprazole, 40 mg, oral, Daily before breakfast  piperacillin-tazobactam, 2.25 g, intravenous, q12h  polyethylene glycol, 17 g, oral, Daily  sennosides-docusate sodium, 2 tablet, oral, BID    I/O:   Last BM Date: 03/27/24; Stool Appearance: Soft (03/27/24 1818)    Dietary Orders (From admission, onward)       Start     Ordered    03/20/24 1407  Adult diet Potassium restricted 2 gm (50mEq)  Diet effective now        Question:  Diet type  Answer:  Potassium restricted 2 gm (50mEq)    03/20/24 1406    03/16/24 1017  Oral nutritional supplements  Until discontinued        Question Answer Comment   Deliver with Breakfast    Deliver with Dinner    Select supplement: Nepro        03/16/24 1016                     Estimated Needs:   Total Energy Estimated Needs (kCal): 2200 kCal  Method for Estimating Needs: 30kcal/kg  Total Protein Estimated Needs (g): 90 g  Method for Estimating Needs: 1.2g/kg  Total Fluid Estimated Needs (mL):  (per team)           Nutrition Diagnosis        Nutrition Diagnosis  Patient has Nutrition Diagnosis: Yes  Diagnosis Status (1): Ongoing  Nutrition Diagnosis 1: Increased nutrient needs  Related to (1): " increased metabolic demand  As Evidenced by (1): HD requirement and need for wound healing       Nutrition Interventions/Recommendations         Nutrition Prescription:  1) consider liberalizing to regular diet as serum potassium has been low end of normal  2) continue ONS as prescribed    3) agree with renal MVI  4) addition of phos binder per nephrology discretion        Nutrition Interventions:   Goal: Nepro shake BID (427 kcal and 19gm pro each)       Nutrition Monitoring and Evaluation   Food/Nutrient Related History Monitoring  Energy Intake: Estimated energy intake  Criteria: >75% est nutritent needs via PO diet + ONS    Body Composition/Growth/Weight History  Weight: Estimated dry weight  Criteria: acheives dry wt    Biochemical Data, Medical Tests and Procedures  Monitoring and Evaluation Plan: Electrolyte/renal panel  Electrolyte and Renal Panel: Potassium, Phosphorus  Criteria: WNL    Nutrition Focused Physical Findings  Monitoring and Evaluation Plan: Skin  Skin: Impaired wound healing  Criteria: promote healing       Time Spent/Follow-up Reminder:   Time Spent (min): 45 minutes  Last Date of Nutrition Visit: 03/28/24  Nutrition Follow-Up Needed?: Dietitian to reassess per policy  Follow up Comment: continue diet+ONS

## 2024-03-28 NOTE — CONSULTS
Inpatient consult to Infectious Diseases  Consult performed by: Glory Krause PA-C  Consult ordered by: Donn Quiñonez MD        Referred by Dr. Donn Quiñonez    Primary MD: Joe Guevara MD    Reason For Consult  TMA stump infection I/s/o critical limb ischemia    History Of Present Illness  Chevy Benton is a 78 y.o. male with a PMHx of ESRD on HD MWF, PAD, A fib on Eliquis, T2DM, CAD, GERD, sick sinus syndrome s/p permanent pacemaker, HFrEF (40-45% LVEF), right transmetatarsal amputation (9/22/23) s/p R foot debridement to level of muscle/fasia (1/19/24) who presented to the ED on 3/8/24 with spontaneous bleeding from L AV brachiocephalic fistula. Pt required two figure-of-eight sutures to stop the bleeding from the fistula. Pt was found to be anemic and hyponatremic (Na 129) and was admitted.     On 3/10, pt underwent ligation of the R AV fistula. On 3/15, pt was noted to have up-trending leukocytosis (15.1k). He was started on vancomycin and Zosyn on 3/17. Blood cultures drawn on 3/17 were negative. CXR on 3/17 showed R>L pleural effusion w/ atelectasis/ consolidation.  Pt was noted to have productive cough. Pt also complained of new leg pain. Possible purulence was noted at the R TMA stump.   Podiatry saw the pt on 3/18 and noted the wound was malodorous, mild serous drainage, and necrotic wound edges. A culture of the wound grew Pseudomonas aeruginosa (sensitive to every antibiotic tested) and Candida parapsilosis. A xray of the R foot showed possible 1st and 5th metatarsal stump osteomyelitis.    On 3/19, PVR showed disease on RLE and EVLS was consulted. 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 EVLS was unable to revascularize and pt thought to be too unstable for BKA or debridement at that time. Plan was for palliative wound care. Vancomycin was discontinued on 3/27.  Pt currently on Zosyn 2.25g IV q12h.   Currently, plan is for surgical intervention with vascular surgery on 4/1/24.    Pt was evaluated bedside today. Son was in the room. Pt endorses R foot pain and chest wall pain that worsens with movement. Pt denies fever, chills, abdominal pain, N/V. Pt on 2L NC, afebrile, mild leukocytosis (13.1k).       Past Medical History  He has a past medical history of A-fib (CMS/[facility]), Acute hypercapnic respiratory failure (CMS/[facility]), Acute on chronic HFrEF (heart failure with reduced ejection fraction) (CMS/[facility]), Diabetes mellitus (CMS/[facility]), ESRD on hemodialysis (CMS/[facility]), History of angioplasty of peripheral vessel (10/26/2023), HTN (hypertension), LFT elevation (07/20/2021), Small bowel obstruction (CMS/[facility]) (10/10/2023), and Stage II pressure ulcer (CMS/[facility]).    Surgical History  He has a past surgical history that includes Invasive Vascular Procedure (Right, 1/17/2024) and Invasive Vascular Procedure (Right, 3/20/2024).     Social History     Occupational History    Not on file   Tobacco Use    Smoking status: Never     Passive exposure: Past    Smokeless tobacco: Never   Substance and Sexual Activity    Alcohol use: Not on file    Drug use: Not on file    Sexual activity: Not on file     Travel History   Travel since 02/28/24    No documented travel since 02/28/24            Pets: unknown  Hobbies: unknown    Family History  Family History   Problem Relation Name Age of Onset    Diabetes Mother       Allergies  Penicillins     Immunization History   Administered Date(s) Administered    Flu vaccine, quadrivalent, high-dose, preservative free, age 65y+ (FLUZONE) 08/31/2022    Influenza Whole 10/01/2014    Influenza, High Dose Seasonal, Preservative Free 09/30/2016, 09/20/2017, 09/14/2018, 09/06/2019    Influenza, Seasonal, Quadrivalent, Adjuvanted 09/12/2021, 08/15/2023    Influenza, Unspecified 12/01/2010, 10/31/2012    Moderna COVID-19 vaccine, bivalent, blue cap/gray label *Check  "age/dose* 11/03/2022, 06/29/2023    Moderna SARS-CoV-2 Vaccination 02/06/2021, 03/06/2021, 10/19/2021, 03/10/2022, 09/01/2022    PPD Test 04/30/2022, 05/08/2022, 10/09/2023    Pneumococcal conjugate vaccine, 13-valent (PREVNAR 13) 09/12/2021    Pneumococcal polysaccharide vaccine, 23-valent, age 2 years and older (PNEUMOVAX 23) 04/01/2015, 08/19/2020    Tdap vaccine, age 7 year and older (BOOSTRIX, ADACEL) 05/10/2021    Zoster vaccine, recombinant, adult (SHINGRIX) 05/14/2018, 09/14/2018     Medications  Home medications:  Medications Prior to Admission   Medication Sig Dispense Refill Last Dose    acetaminophen (Tylenol) 325 mg tablet Take 3 tablets (975 mg) by mouth every 6 hours if needed for mild pain (1 - 3).       albuterol 90 mcg/actuation inhaler Inhale 2 puffs every 6 hours if needed for shortness of breath.       allopurinol (Zyloprim) 100 mg tablet Take 1 tablet (100 mg) by mouth once daily.       apixaban (Eliquis) 2.5 mg tablet Take 1 tablet (2.5 mg) by mouth 2 times a day.       atorvastatin (Lipitor) 40 mg tablet Take 1 tablet (40 mg) by mouth once daily at bedtime.       clopidogrel (Plavix) 75 mg tablet Take 1 tablet (75 mg) by mouth once daily.       collagenase (SantyL) 250 unit/gram ointment Apply 1 Application topically once daily as needed. \"MIGUEL\" to Right foot       dextrose 50 % injection Infuse 50 mL (25 g) into a venous catheter every 15 minutes if needed (For blood glucose less than or equal to 40 mg/dL).       gabapentin (Neurontin) 100 mg capsule Take 1 capsule (100 mg) by mouth once daily.       glucagon (Glucagen) 1 mg injection Inject 1 mg into the muscle every 15 minutes if needed for low blood sugar - see comments (For blood glucose less than or equal to 70 mg/dL and no IV access). 1 each 12     heparin 1,000 unit/mL injection 2 mL (2,000 Units) by intra-catheter route once daily on dialysis days. Do not start before March 1, 2024.       insulin glargine (Lantus) 100 unit/mL " injection Inject 6 Units under the skin once daily at bedtime. Take as directed per insulin instructions.       ipratropium-albuteroL (Duo-Neb) 0.5-2.5 mg/3 mL nebulizer solution Take 3 mL by nebulization every 6 hours if needed for wheezing or shortness of breath. 180 mL 11     lidocaine 4 % patch Place 1 patch over 12 hours on the skin once daily. Remove & discard patch within 12 hours or as directed by MD.       loratadine (Claritin) 10 mg tablet TAKE 1 TABLET (10 MG) BY MOUTH EVERY OTHER DAY IF NEEDED FOR ALLERGIES. 90 tablet 0     melatonin 5 mg tablet Take 1 tablet (5 mg) by mouth once daily at bedtime.       midodrine (Proamatine) 5 mg tablet Take 3 tablets (15 mg) by mouth every 8 hours.       [] mupirocin (Bactroban) 2 % ointment Apply topically 2 times a day for 14 days. 22 g 0     pantoprazole (ProtoNix) 40 mg EC tablet Take 1 tablet (40 mg) by mouth once daily in the morning. Take before meals. Do not crush, chew, or split. Do not start before 2024.       polyethylene glycol (Miralax) 17 gram packet Take 17 g by mouth once daily.       povidone-iodine (Betadine) 7.5 % solution Apply 1 Application topically once daily as needed for wound care.       sennosides (Senokot) 8.6 mg tablet Take 1 tablet (8.6 mg) by mouth once daily at bedtime.       Triphrocaps 1 mg capsule Take 1 capsule by mouth once daily.        Current medications:  Scheduled medications  allopurinol, 100 mg, oral, Daily  apixaban, 2.5 mg, oral, q12h  atorvastatin, 40 mg, oral, Nightly  B complex-vitamin C-folic acid, 1 capsule, oral, Daily  clopidogrel, 75 mg, oral, Daily  epoetin dane or biosimilar, 20,000 Units, intravenous, Once per day on Tue Thu Sat  gabapentin, 100 mg, oral, Daily  insulin lispro, 0-5 Units, subcutaneous, TID with meals  lubricating eye drops, 2 drop, Both Eyes, TID  melatonin, 5 mg, oral, Nightly  midodrine, 15 mg, oral, q8h  pantoprazole, 40 mg, oral, Daily before  breakfast  piperacillin-tazobactam, 2.25 g, intravenous, q12h  polyethylene glycol, 17 g, oral, Daily  sennosides-docusate sodium, 2 tablet, oral, BID  sodium hypochlorite, , irrigation, Daily      Continuous medications     PRN medications  PRN medications: acetaminophen, albuterol, alteplase, benzonatate, dextrose 10 % in water (D10W), dextrose, glucagon, hydrOXYzine HCL, ipratropium-albuteroL, lidocaine, naloxone, ondansetron, [Held by provider] oxyCODONE, sodium chloride 0.9%    Review of Systems   Musculoskeletal:         Chest wall pain  R foot pain        Objective  Range of Vitals (last 24 hours)  Heart Rate:  []   Temp:  [36.2 °C (97.2 °F)-36.6 °C (97.9 °F)]   Resp:  [16-18]   BP: (126-137)/(77-84)   SpO2:  [95 %-99 %]   Daily Weight  03/19/24 : 87 kg (191 lb 12.8 oz)    Body mass index is 27.52 kg/m².     Physical Exam  Constitutional:       Appearance: Normal appearance.   HENT:      Head: Normocephalic and atraumatic.   Eyes:      General: No scleral icterus.     Extraocular Movements: Extraocular movements intact.   Cardiovascular:      Rate and Rhythm: Normal rate and regular rhythm.      Heart sounds: Normal heart sounds.   Pulmonary:      Effort: Pulmonary effort is normal.      Breath sounds: Normal breath sounds.      Comments: 2L NC  Abdominal:      General: Abdomen is flat.      Tenderness: There is no abdominal tenderness.   Musculoskeletal:         General: Swelling present. Normal range of motion.      Comments: R TMA- wound dressing in place and dry  L pretibial wound covered w/ bandage   Skin:     General: Skin is warm and dry.   Neurological:      Mental Status: He is alert. Mental status is at baseline.   Psychiatric:         Mood and Affect: Mood normal.         Behavior: Behavior normal.            Relevant Results  Labs  Results from last 72 hours   Lab Units 03/28/24  0723 03/27/24  0730 03/26/24  0600   WBC AUTO x10*3/uL 13.1* 11.7* 14.8*   HEMOGLOBIN g/dL 7.7* 7.1* 7.3*  "  HEMATOCRIT % 27.1* 25.5* 25.1*   PLATELETS AUTO x10*3/uL 424 411 433     Results from last 72 hours   Lab Units 03/28/24  0723 03/27/24  0730 03/26/24  0600   SODIUM mmol/L 132* 133* 132*   POTASSIUM mmol/L 3.9 3.3* 3.7   CHLORIDE mmol/L 94* 94* 93*   CO2 mmol/L 24 29 26   BUN mg/dL 48* 37* 46*   CREATININE mg/dL 3.71* 3.45* 4.46*   GLUCOSE mg/dL 139* 176* 182*   CALCIUM mg/dL 10.0 9.8 9.9   ANION GAP mmol/L 18 13 17   EGFR mL/min/1.73m*2 16* 17* 13*   PHOSPHORUS mg/dL 5.8* 5.0* 6.1*     Results from last 72 hours   Lab Units 03/28/24 0723 03/27/24 0730 03/26/24  0600   ALBUMIN g/dL 2.5* 2.4* 2.3*     Estimated Creatinine Clearance: 16.7 mL/min (A) (by C-G formula based on SCr of 3.71 mg/dL (H)).  C-Reactive Protein   Date Value Ref Range Status   10/07/2023 11.53 (H) <1.00 mg/dL Final   10/01/2023 21.44 (H) <1.00 mg/dL Final     Sedimentation Rate   Date Value Ref Range Status   10/07/2023 110 (H) 0 - 20 mm/h Final     No results found for: \"HIV1X2\", \"HIVCONF\", \"XKNDBR7SH\"  No results found for: \"HEPCABINIT\", \"HEPCAB\", \"HCVPCRQUANT\"  Microbiology  Susceptibility data from last 90 days.  Collected Specimen Info Organism Amoxicillin/Clavulanate Ampicillin Ampicillin/Sulbactam Aztreonam Cefazolin Cefepime Ceftazidime Ciprofloxacin Gentamicin Levofloxacin Piperacillin/Tazobactam Tobramycin   03/17/24 Tissue/Biopsy from Wound/Tissue Pseudomonas aeruginosa    S  S S S  S S S     Candida parapsilosis               03/11/24 Swab from Anterior Nares Methicillin Resistant Staphylococcus aureus (MRSA)               01/19/24 Tissue from BONE BIOPSY (DECAL) Candida parapsilosis               01/19/24 Tissue from BONE BIOPSY (DECAL) Enterobacter cloacae complex R R R  R S  S S S S      Pseudomonas aeruginosa    S  S S S S S S S     Collected Specimen Info Organism Trimethoprim/Sulfamethoxazole   03/17/24 Tissue/Biopsy from Wound/Tissue Pseudomonas aeruginosa      Candida parapsilosis    03/11/24 Swab from Anterior Nares " Methicillin Resistant Staphylococcus aureus (MRSA)    01/19/24 Tissue from BONE BIOPSY (DECAL) Candida parapsilosis    01/19/24 Tissue from BONE BIOPSY (DECAL) Enterobacter cloacae complex S     Pseudomonas aeruginosa    3/17/24 Blood culture- negative    Imaging  XR FOOT RIGHT 3+ VIEWS; ;  3/17/2024 7:49 pm   Impression:     Transmetatarsal amputation changes of the foot with irregular  appearance of the stumps of the 1st and 5th digits suggesting  osteomyelitis with osteomyelitis of the rest of the digits not  excluded. An MRI may be obtained for further evaluation if clinically  warranted Findings suggesting stump breakdown at the level of 1st and  5th metatarsals.     XR CHEST 1 VIEW; 3/16/2024 6:32 pm   Impression:     1. Slight interval worsening of the aeration of the lungs with  bilateral, right more than left pleural effusion and  atelectasis/consolidation and mild interstitial edema.     Assessment/Plan   Chevy Benton is a 78 y.o. male with a PMHx of ESRD on HD MWF, PAD, A fib on Eliquis, T2DM, CAD, GERD, sick sinus syndrome s/p permanent pacemaker, HFrEF (40-45% LVEF), right transmetatarsal amputation (9/22/23) s/p R foot debridement to level of muscle/fasia (1/19/24) who presented to the ED on 3/8/24 with spontaneous bleeding from L AV brachiocephalic fistula. On 3/15, pt was noted to have up-trending leukocytosis (15.1k) and was started on Vancomycin (discontinued on 3/27) and Zosyn. On 3/17, pt began complaining of RLE pain. Xray R foot showed possible osteomyelitis in 1st and 5th metatarsal stumps. Wound culture grew Pseudomonas and Candida parapsilosis. A RLE angiogram on 3/20 showed lack of blood flow below the knee. Revascularization was not done at that time. Initial plan was for palliative wound care. However, it now appears pt will be having a vascular procedure on 4/1/24. If revascularization is successful, ID recommendations may change as antibiotics will be able to reach the RLE wound.   For now, recommend increasing Zosyn frequency from 2.25g IV q12h to 2.25g IV q8h, wet-to-dry wound dressings, and soaking R foot stump in Dakin's solution.    Recommendations:  1) Increase Zosyn frequency from 2.25g IV q12h to 2.25g IV q8h  2) Wet-to-dry wound dressings  3) Soak R foot wound in Dakin's solution    Plan was discussed with Dr. Mays.    I spent 45 minutes in the professional and overall care of this patient.      Glory Krause PA-C  Infectious Disease Team A, pager [phone]  EPIC chat preferred

## 2024-03-28 NOTE — NURSING NOTE
.Report from Sending RN:    Report From: ABEL Daniel  Recent Surgery of Procedure: No  Baseline Level of Consciousness (LOC): A&O X2  Oxygen Use: Yes  Type: 2 Liter NC  Diabetic: Yes  Last BP Med Given Day of Dialysis: midodrine  Last Pain Med Given: none  Lab Tests to be Obtained with Dialysis: No  Blood Transfusion to be Given During Dialysis: No  Available IV Access: Yes  Medications to be Administered During Dialysis: No  Continuous IV Infusion Running: No  Restraints on Currently or in the Last 24 Hours: No  Hand-Off Communication: Pt had no acute event overnight, vital signs stable. Not on precaution, no BP medication given.

## 2024-03-28 NOTE — PROGRESS NOTES
[facility] covering 3/27 requested [facility] to start precert. [facility] sent updates for precert. [facility] updated [facility] covering today. Will continue to follow.    0859-Per MD, patient will have a procedure on Monday and likely will be here a few days after. MD asking if precert can be cancelled. [facility] updated SNF and asked for precert to be cancelled.  Precert team can initiate precert for this insurance plan once EDOD is closer. [facility] updated SNF. Will continue to follow.    JEAN-PIERRE Means

## 2024-03-28 NOTE — NURSING NOTE
Report to Receiving RN:    Report To: Bessie Hernandez RN)  Time Report Called: 1143 am  Hand-Off Communication: vs 101/65; HR 86; Pt removed 3.2 liters for fluid removal  Complications During Treatment: Yes, c/o generalized pain  Ultrafiltration Treatment: No  Medications Administered During Dialysis: Yes, midodrine 15 mg; oxycodone 5 mg 0830 am  Blood Products Administered During Dialysis: No  Labs Sent During Dialysis: No  Heparin Drip Rate Changes: No    Electronic Signatures:   (Signed Ela Quiroz RN)   Authored:    (Signed )   Authored:     Last Updated: 11:43 AM by ELA QUIROZ

## 2024-03-28 NOTE — PROGRESS NOTES
Assessment/Plan           Mr. Benton, a 78-year-old patient with a complex medical history including peripheral arterial disease, atrial fibrillation, coronary artery disease, type 2 diabetes mellitus, a right subclavian deep vein thrombosis in October 2023, gastroesophageal reflux disease, sick sinus syndrome with a pacemaker, gout, end-stage renal disease on hemodialysis, hyperlipidemia, hypotension, heart failure with reduced ejection fraction (EF 40-45% as of January 2023), and gout, who was transferred from [facility] to [facility]  due to bleeding at his left upper arm arteriovenous fistula site. Vascular surgery  applied two figure-eight silk sutures for hemostasis at the fistula site to stop bleeding and pt subsequently received a new tunneled hemodialysis catheter and underwent dialysis fistula ligation in the OR. Postoperatively, the patient's pain has been managed effectively, and his hemoglobin and hematocrit levels have remained stable after resuming anticoagulant and antiplatelet therapies, after which his central line was removed.  He was MRSA nare positive and one-to-one decolonization protocol.  He continues to exhibit signs of generalized edema, managed by nephrology for fluid overload and elevation of UE's. Pt had persistent leukocytosis without specific symptoms prompting repeated blood cultures, results of which and subsequent blood cultures were neg. . A chest X-ray indicated volume overload with possible infiltrate, after which, he subsequently developed lower extremity rest pain as well as a cough with sputum production. The has remained with stable 2 liters oxygen requirement.  Wound care, podiatry, and vascular reinvolved.  He was found to have new mild purulence from the TMA site, further PVR testing indicated right lower extremity vascular disease.  Patient had subsequent angiogram which revealed no distal blood flow below the knee although the limb remains warm with apparent perfusion on exam.   For endovascular at this time given multiple procedures her has been no indicated options for revascularization patient may need amputation pending further endovascular evaluation.  He is pending MRI, at timing dictated by his pacemaker needs.  Palliative has been involved for goals of care.  Podiatry is continuing palliative wound care.  His wound is improving.  ID consulted and recommended increasing Zosyn to every 8 hours.  Vancomycin was stopped on 3/27.  After further discussion with endovascular, they plan to take him to try to revascularize the leg on 4/1.      #leukocytosis  #TMA site deep tissue infection in setting of RLE critical limb ischemia as below  #PNA - resolved  - initially likely related to surgical stress, however remained persistent, no new signs of infection/localizing signs at time. However pt  developed foot pain and sputum production. Cxr with volume overload with possible infiltrate. Podiatry obtained wound cultures grew Pseudomonas and Candida.  Blood cultures are negative  -Remains on 2 L, likely related to volume overload, continuing dialysis weaning as tolerated.  No signs of pneumonia  - complted treatement for pneumonia.   - IS, accapella, RT consult to eval/rec.   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  Given possibility of revascularization will be discussed with podiatry service.  The wound was viewed today and looks clean.  -Palliative care consulted and assisting with goals of care conversations  -Had reviewed wound cultures, they grew Pseudomonas and Candida.  Continuing Zosyn. Given complicated of limb ischemia will obtain ID input for further recs.   -Reviewed PVRs and angiogram  - ----Tomorrow night, will  hold Eliquis and start heparin drip.  At this time plans have changed for revascularization procedure to potentially take place on Monday      #metabolic encephalopathy - improved  -Overnight had confusion and aphasia, evaluated by code bat, less concern for large vessel occlusion, no correlating neurological deficits, no CT recommended, called off.  Recommended delirium workup  -Patient with some degree of cognitive impairment, high risk for hospital delirium and sundowning.  There was concern that change of rooms would lead to confusion, suspect change of environment was trigger.  He is doing better today.  - delirium precautions    #Bleeding from AV fistula s/p ligation and tunneled HD line placement - resolved  #anemia d/t acute blood loss  #MRSA colonization, on decolonization protocol due to central line placement.   - Vascular surgery consulted, appreciate recs  - Hemostasis obtained at L upper extremity fistula site after placement of two figure 8 sutures   -Transfuse 1 unit of PRBC on HD on admit.  Monitor HB.     - Restarted apixaban and Plavix and monitoring hemoglobin for stability.   Has remained stable.  Central line removed.  No other issues.      #Chest pain - ongoing work up  -Patient reports he has had intermittent chest pain for 2 weeks, this is his first time I have heard and mention this.  He states it worsened today.  Troponin was added onto the morning labs but never received, tried to get a lab draw at the end of dialysis when speaking with dialysis unit but it was not drawn.  He ended up going to MRI she could declined and then return to floor.  Troponin was negative, EKG showing A-fib, lateral ST changes which are unchanged from prior.  His chest pain is very reproducible and worsened by pushing on his chest and also with coughing.  Ordered chest x-ray.  Overall suspicious for noncardiac etiology although he is a vasculopath.  He has no associated shortness of breath or other symptoms.  He  is on appropriate medications including Plavix and apixaban. will continue to monitor, likely source is musculoskeletal.   -Chest x-ray ordered-showed slight interval worsening of his interstitial edema  -Continues to have chest pain again 3/27, this time whenever he moves, still suspicious for musculoskeletal etiology.  EKG is unchanged showing A-fib and chronic lateral T wave changes  -Echocardiogram obtained, showing continued reduced EF of 40 to 45%, there is a septal bulge, reduced RV function mildly, severe left atrial dilation, moderate RA dilation mild to moderate aortic stenosis, mild aortic valve regurg.   -Pain continues to seem musculoskeletal in etiology, he is potentially having the procedure on Monday, will discuss with team about potential cardiac workup.     #MRSA nare positive  On MRSA decolonization protocol, ordered mupirocin and chlorhexidine bath  Femoral line removed on 3/14 as patient is clinically stable after starting apixaban and Plavix  Repeat MRSA nare for clearance    #UE edema - likely related to anasarca, positioning and ESRD, mild improvement  # Respiratory failure with hypoxemia secondary to fluid overload   -US showed new acute non occlussive thrombus in his rt subclavian vein proximal segment. Also with a hematoma in his right upper arm. Left arm was limited due to bandages. Per vascular surgery, no plan for any intervention, with only supportive care (arm elevation and local compression).  -Patient continues  2 L, in setting of anasarca and ESRD.  Likely only requiring 1 L as I have weaned him down in the past.  Xray as above. Componenet of fluid overload.   -Remained on 2 L and will attempt weaning as tolerated  -Repeat chest x-ray 3/26 as above    #T2DM (last HgA1c 5.3% 12/3/23) c/b neuropathy  - accucheck ACHS  - Lispro SSI #1 0-5 units  - holding home dose of Lantus 6 units to prevent episodes of hypoglycemia while hospitalized and HgA1c <7%. Monitor more need to resume.    -  hypoglycemia order set placed  - continue home dose of Gabapentin 100 mg PO every day  - glc at goal      #hyponatremia (chronic)  #ESRD on HD   - daily CHG bath d/t L chest dialysis cath site   - Nephrology consulted for HD   - renal diet  - renally dose meds as needed  - continue home dose of Nephrocaps PO every day  -continue HD per nephrology. Has had additional sessions this week for fluid removal. Care with fluid removal given chronic hypotension on midodrine.      #Afib  #hx of DVT (10/23)  - Duplex US of RUE done 10/22/23: Positive study for DVT in the right upper extremity.  There is a nonocclusive thrombus in the right subclavian artery.  - home dose of Eliquis       #hypotension, chronic  - continue home dose of Midodrine 15 mg PO TID     #Malnutrition  -With anasarca, wound needs, starting to have increase in appetite.  Nutrition consulted and started on Nepro twice daily     # Hypokalemia  -Cautious replacement given ESRD, 3.3 today.  Patient has cardiac history and A-fib, given 20 mill equivalents    30-minute meeting with family regarding CODE STATUS, plan of care for update, CODE STATUS changed to DNR.  Okay for perioperative and periprocedural code reversals to full code as needed      Scheduled outpatient appointments in system:   Future Appointments   Date Time Provider Department Center   4/17/2024  1:00 PM Koko Gordon MD [id number] [facility]     ---------------------------------------------------------------------------------------------------  Subjective     Mentation is improving.  He was seen during HD.  No complaints.  He is still having chest pain when asked however still very painful to push on his chest.  Later in the day he reported it was okay until I push on the chest again.  He is scheduled to undergo anesthesia though on Monday for his revascularization.  Breathing reports that is at baseline.  His pain has been controlled.  Had family meeting today and last night.  CODE STATUS  "changed to DNR     ---------------------------------------------------------------------------------------------------  Objective   Last Recorded Vitals  Blood pressure 126/77, pulse 84, temperature 36.4 °C (97.5 °F), temperature source Temporal, resp. rate 16, height 1.778 m (5' 10\"), weight 87 kg (191 lb 12.8 oz), SpO2 96 %.  Intake/Output last 3 Shifts:  I/O last 3 completed shifts:  In: 1060 (12.2 mL/kg) [P.O.:60; I.V.:600 (6.9 mL/kg); Other:400]  Out: 2901 (33.3 mL/kg) [Other:2900; Stool:1]  Weight: 87 kg     Physical Exam  Vitals and nursing note reviewed.   Constitutional:       General: He is not in acute distress.     Appearance: He is obese. He is ill-appearing. He is not toxic-appearing.   HENT:      Head: Normocephalic and atraumatic.      Nose:      Comments: On nasal canula     Mouth/Throat:      Mouth: Mucous membranes are moist.   Eyes:      General: No scleral icterus.     Extraocular Movements: Extraocular movements intact.      Conjunctiva/sclera: Conjunctivae normal.   Cardiovascular:      Rate and Rhythm: Normal rate and regular rhythm.      Heart sounds: S1 normal and S2 normal. No murmur heard.     Comments: Severe pain with palpation of the chest wall  Pulmonary:      Effort: Pulmonary effort is normal. No respiratory distress.      Breath sounds: No wheezing or rhonchi.      Comments: Basilar rales  Abdominal:      General: Bowel sounds are normal. There is no distension.      Palpations: Abdomen is soft.      Tenderness: There is no abdominal tenderness. There is no guarding or rebound.   Musculoskeletal:         General: Swelling present. No deformity.      Cervical back: Neck supple.      Comments: Multiple LE wounds, R TMA dressed c/d/I  RUE >LUE swelling, skin wrinkling in left hand   Skin:     General: Skin is warm and dry.      Findings: No rash.      Comments: Unable to check below level of dressing fully but area check was warm, appeared to have some moderate return of color  with " pressure applied   Neurological:      General: No focal deficit present.      Mental Status: He is alert. Mental status is at baseline.      Comments: Moving all extremities, no gross sensation changes.    Psychiatric:         Mood and Affect: Mood normal.         Relevant Results  Lab Results   Component Value Date    WBC 13.1 (H) 03/28/2024    HGB 7.7 (L) 03/28/2024    HCT 27.1 (L) 03/28/2024     03/28/2024     03/28/2024      Lab Results   Component Value Date    GLUCOSE 139 (H) 03/28/2024    CALCIUM 10.0 03/28/2024     (L) 03/28/2024    K 3.9 03/28/2024    CO2 24 03/28/2024    CL 94 (L) 03/28/2024    BUN 48 (H) 03/28/2024    CREATININE 3.71 (H) 03/28/2024     Scheduled medications  allopurinol, 100 mg, oral, Daily  apixaban, 2.5 mg, oral, q12h  atorvastatin, 40 mg, oral, Nightly  B complex-vitamin C-folic acid, 1 capsule, oral, Daily  clopidogrel, 75 mg, oral, Daily  epoetin dane or biosimilar, 20,000 Units, intravenous, Once per day on Tue Thu Sat  gabapentin, 100 mg, oral, Daily  insulin lispro, 0-5 Units, subcutaneous, TID with meals  lubricating eye drops, 2 drop, Both Eyes, TID  melatonin, 5 mg, oral, Nightly  midodrine, 15 mg, oral, q8h  pantoprazole, 40 mg, oral, Daily before breakfast  [START ON 3/29/2024] piperacillin-tazobactam, 2.25 g, intravenous, q8h  polyethylene glycol, 17 g, oral, Daily  sennosides-docusate sodium, 2 tablet, oral, BID  sodium hypochlorite, , irrigation, Daily      Continuous medications     PRN medications  PRN medications: acetaminophen, albuterol, alteplase, benzonatate, dextrose 10 % in water (D10W), dextrose, glucagon, hydrOXYzine HCL, ipratropium-albuteroL, lidocaine, naloxone, ondansetron, [Held by provider] oxyCODONE, sodium chloride 0.9%    Donn Quiñonez MD

## 2024-03-29 NOTE — PROGRESS NOTES
Occupational Therapy                 Therapy Communication Note    Patient Name: Chevy Benton  MRN: [id number]  Today's Date: 3/29/2024     Discipline: Occupational Therapy    Comment: OT for follow up treatment session per POC and POC goal update. Spoke with pt at bedside. Pt pending revacularization procedure Monday 4/1; discussed need for re-evaluation of goals and pt requests reassessment/treatment to be held until Monday. Nursing aware and in agreement. No treatment this date. Pt encouraged to maintain activity as able prior to surgery. OT to follow up as appropriate.

## 2024-03-29 NOTE — PROGRESS NOTES
Assessment/Plan      Mr. Benton, a 78-year-old patient with a complex medical history including peripheral arterial disease, atrial fibrillation, coronary artery disease, type 2 diabetes mellitus, a right subclavian deep vein thrombosis in October 2023, gastroesophageal reflux disease, sick sinus syndrome with a pacemaker, gout, end-stage renal disease on hemodialysis, hyperlipidemia, hypotension, heart failure with reduced ejection fraction (EF 40-45% as of January 2023), and gout, who was transferred from [facility] to [facility]  due to bleeding at his left upper arm arteriovenous fistula site. Vascular surgery  applied two figure-eight silk sutures for hemostasis at the fistula site to stop bleeding and pt subsequently received a new tunneled hemodialysis catheter and underwent dialysis fistula ligation in the OR. Postoperatively, the patient's pain has been managed effectively, and his hemoglobin and hematocrit levels have remained stable after resuming anticoagulant and antiplatelet therapies, after which his central line was removed.  He was MRSA nare positive and one-to-one decolonization protocol.  He continues to exhibit signs of generalized edema, managed by nephrology for fluid overload and elevation of UE's. Pt had persistent leukocytosis without specific symptoms prompting repeated blood cultures, results of which and subsequent blood cultures were neg. . A chest X-ray indicated volume overload with possible infiltrate, after which, he subsequently developed lower extremity rest pain as well as a cough with sputum production. The has remained with stable 2 liters oxygen requirement.  Wound care, podiatry, and vascular reinvolved.  He was found to have new mild purulence from the TMA site, further PVR testing indicated right lower extremity vascular disease.  Patient had subsequent angiogram which revealed no distal blood flow below the knee although the limb remains warm with apparent perfusion on exam.  For  endovascular at this time given multiple procedures her has been no indicated options for revascularization patient may need amputation pending further endovascular evaluation.  He is pending MRI, at timing dictated by his pacemaker needs.  MRI was canceled/patient refused as well.  Palliative has been involved for goals of care.  Podiatry is continuing palliative wound care.  His wound is improving.  ID consulted and recommended increasing Zosyn to every 8 hours.  Vancomycin was stopped on 3/27.  After further discussion with endovascular, they plan to take him to try to revascularize the leg on 4/1.  Her EVLS shravan stop apixaban for now and placed on heparin dripnow.    #leukocytosis  #TMA site deep tissue infection in setting of RLE critical limb ischemia as below  #PNA - resolved  - initially likely related to surgical stress, however remained persistent, no new signs of infection/localizing signs at time. However pt  developed foot pain and sputum production. Cxr with volume overload with possible infiltrate. Podiatry obtained wound cultures grew Pseudomonas and Candida.  Blood cultures are negative  -Remains on 2 L, likely related to volume overload, continuing dialysis weaning as tolerated.  No signs of pneumonia  - complted treatement for pneumonia.   - IS, accapella, RT consult to eval/rec.   -Wound management as below  -Had reviewed wound cultures, they grew Pseudomonas and Candida. Dc/'d vanc  Continuing Zosyn. Given complicated of limb ischemia will obtain ID input for further recs.  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  Given possibility of revascularization will be discussed with podiatry  service.  The wound was viewed today and looks clean.  -Palliative care consulted and assisting with goals of care conversations  -Reviewed PVRs and angiogram  -Hold apixaban.  Started on heparin drip per EVLS  -Planning intervention on Monday    #metabolic encephalopathy - improved  -Overnight had confusion and aphasia, evaluated by code bat, less concern for large vessel occlusion, no correlating neurological deficits, no CT recommended, called off.  Recommended delirium workup  -Patient with some degree of cognitive impairment, high risk for hospital delirium and sundowning.  There was concern that change of rooms would lead to confusion, suspect change of environment was trigger.  He is doing better today.  - delirium precautions    #Bleeding from AV fistula s/p ligation and tunneled HD line placement - resolved  #anemia d/t acute blood loss  #MRSA colonization, on decolonization protocol due to central line placement.   - Vascular surgery consulted, appreciate recs  - Hemostasis obtained at L upper extremity fistula site after placement of two figure 8 sutures   -Transfuse 1 unit of PRBC on HD on admit.  Monitor HB.     - Restarted apixaban and Plavix and monitoring hemoglobin for stability.   Has remained stable.  Central line removed.  No other issues.      #Chest pain   -Patient reports he has had intermittent chest pain for 2 weeks, this is his first time I have heard and mention this.  He states it worsened today.  Troponin was added onto the morning labs but never received, tried to get a lab draw at the end of dialysis when speaking with dialysis unit but it was not drawn.  He ended up going to MRI she could declined and then return to floor.  Troponin was negative, EKG showing A-fib, lateral ST changes which are unchanged from prior.  His chest pain is very reproducible and worsened by pushing on his chest and also with coughing.  Ordered chest x-ray.  Overall suspicious for noncardiac etiology although  he is a vasculopath.  He has no associated shortness of breath or other symptoms.  He is on appropriate medications including Plavix and apixaban. will continue to monitor, likely source is musculoskeletal.   -Chest x-ray ordered-showed slight interval worsening of his interstitial edema  -Continues to have chest pain again 3/27, this time whenever he moves, still suspicious for musculoskeletal etiology.  EKG is unchanged showing A-fib and chronic lateral T wave changes  -Echocardiogram obtained, showing continued reduced EF of 40 to 45%, there is a septal bulge, reduced RV function mildly, severe left atrial dilation, moderate RA dilation mild to moderate aortic stenosis, mild aortic valve regurg.   -Pain continues to seem musculoskeletal in etiology, he is potentially having the procedure on Monday,.  At this time pain is only present when he moves from side-to-side in his chest wall is low likelihood of cardiac etiology    #MRSA nare positive  On MRSA decolonization protocol, ordered mupirocin and chlorhexidine bath  Femoral line removed on 3/14 as patient is clinically stable after starting apixaban and Plavix  Repeat MRSA nare for clearance    #UE edema - likely related to anasarca, positioning and ESRD, mild improvement  # Respiratory failure with hypoxemia secondary to fluid overload   -US showed new acute non occlussive thrombus in his rt subclavian vein proximal segment. Also with a hematoma in his right upper arm. Left arm was limited due to bandages. Per vascular surgery, no plan for any intervention, with only supportive care (arm elevation and local compression).  -Patient continues  2 L, in setting of anasarca and ESRD.  Likely only requiring 1 L as I have weaned him down in the past.  Xray as above. Componenet of fluid overload.   -Remained on 2 L and will attempt weaning as tolerated  -Repeat chest x-ray 3/26 as above    #T2DM (last HgA1c 5.3% 12/3/23) c/b neuropathy  - accucheck ACHS  - Lispro SSI  #1 0-5 units  - holding home dose of Lantus 6 units to prevent episodes of hypoglycemia while hospitalized and HgA1c <7%. Monitor more need to resume.    - hypoglycemia order set placed  - continue home dose of Gabapentin 100 mg PO every day  - glc at goal      #hyponatremia (chronic)  #ESRD on HD   - daily CHG bath d/t L chest dialysis cath site   - Nephrology consulted for HD   - renal diet  - renally dose meds as needed  - continue home dose of Nephrocaps PO every day  -continue HD per nephrology. Has had additional sessions this week for fluid removal. Care with fluid removal given chronic hypotension on midodrine.      #Afib  #hx of DVT (10/23)  - Duplex US of RUE done 10/22/23: Positive study for DVT in the right upper extremity.  There is a nonocclusive thrombus in the right subclavian artery.  - home dose of Eliquis       #hypotension, chronic  - continue home dose of Midodrine 15 mg PO TID     #Malnutrition  -With anasarca, wound needs, starting to have increase in appetite.  Nutrition consulted and started on Nepro twice daily     # Hypokalemia  -Cautious replacement given ESRD, 3.3 today.  Patient has cardiac history and A-fib, given 20 mill equivalents    Patient is DNR, okay for perioperative and periprocedural/testing code reversal as needed  Would not want long-term vent support      Scheduled outpatient appointments in system:   Future Appointments   Date Time Provider Department Center   4/17/2024  1:00 PM Koko Gordon MD [id number] [facility]     ---------------------------------------------------------------------------------------------------  Subjective     Mentation is improved today, chest pain is not present unless he moves significantly from side-to-side.  We discussed the chest pain and elected that he does not need further cardiac workup at this time, patient also does not want to change rooms to telemetry floor this was discussed with family as well and they want to keep the same  "environment as well.  ---------------------------------------------------------------------------------------------------  Objective   Last Recorded Vitals  Blood pressure 128/79, pulse 105, temperature 36.4 °C (97.5 °F), temperature source Temporal, resp. rate 16, height 1.778 m (5' 10\"), weight 87 kg (191 lb 12.8 oz), SpO2 100 %.  Intake/Output last 3 Shifts:  I/O last 3 completed shifts:  In: 2240 (25.7 mL/kg) [P.O.:240; I.V.:600 (6.9 mL/kg); Other:800; IV Piggyback:600]  Out: 7200 (82.8 mL/kg) [Other:7200]  Weight: 87 kg     Physical Exam  Vitals and nursing note reviewed.   Constitutional:       General: He is not in acute distress.     Appearance: He is obese. He is ill-appearing. He is not toxic-appearing.   HENT:      Head: Normocephalic and atraumatic.      Nose:      Comments: On nasal canula     Mouth/Throat:      Mouth: Mucous membranes are moist.   Eyes:      General: No scleral icterus.     Extraocular Movements: Extraocular movements intact.      Conjunctiva/sclera: Conjunctivae normal.   Cardiovascular:      Rate and Rhythm: Normal rate and regular rhythm.      Heart sounds: S1 normal and S2 normal. No murmur heard.     Comments: Severe pain with palpation of the chest wall  Pulmonary:      Effort: Pulmonary effort is normal. No respiratory distress.      Breath sounds: No wheezing or rhonchi.      Comments: Basilar rales  Abdominal:      General: Bowel sounds are normal. There is no distension.      Palpations: Abdomen is soft.      Tenderness: There is no abdominal tenderness. There is no guarding or rebound.   Musculoskeletal:         General: Swelling present. No deformity.      Cervical back: Neck supple.      Comments: Multiple LE wounds, R TMA dressed c/d/I  RUE >LUE swelling, skin wrinkling in left hand   Skin:     General: Skin is warm and dry.      Findings: No rash.      Comments: Unable to check below level of dressing fully but area check was warm, appeared to have some moderate return " of color  with pressure applied   Neurological:      General: No focal deficit present.      Mental Status: He is alert. Mental status is at baseline.      Comments: Moving all extremities, no gross sensation changes.    Psychiatric:         Mood and Affect: Mood normal.         Relevant Results  Lab Results   Component Value Date    WBC 12.0 (H) 03/29/2024    HGB 7.7 (L) 03/29/2024    HCT 27.2 (L) 03/29/2024    MCV 98 03/29/2024     03/29/2024      Lab Results   Component Value Date    GLUCOSE 179 (H) 03/29/2024    CALCIUM 9.6 03/29/2024     (L) 03/29/2024    K 4.6 03/29/2024    CO2 26 03/29/2024    CL 95 (L) 03/29/2024    BUN 35 (H) 03/29/2024    CREATININE 3.23 (H) 03/29/2024     Scheduled medications  allopurinol, 100 mg, oral, Daily  atorvastatin, 40 mg, oral, Nightly  B complex-vitamin C-folic acid, 1 capsule, oral, Daily  clopidogrel, 75 mg, oral, Daily  epoetin dane or biosimilar, 20,000 Units, intravenous, Once per day on Tue Thu Sat  gabapentin, 100 mg, oral, Daily  heparin, 80 Units/kg, intravenous, Once  insulin lispro, 0-5 Units, subcutaneous, TID with meals  lubricating eye drops, 2 drop, Both Eyes, TID  melatonin, 5 mg, oral, Nightly  midodrine, 15 mg, oral, q8h  pantoprazole, 40 mg, oral, Daily before breakfast  piperacillin-tazobactam, 2.25 g, intravenous, q8h  polyethylene glycol, 17 g, oral, Daily  sennosides-docusate sodium, 2 tablet, oral, BID  sodium hypochlorite, , irrigation, Daily      Continuous medications  heparin, 0-4,500 Units/hr    PRN medications  PRN medications: acetaminophen, albuterol, alteplase, benzonatate, dextrose 10 % in water (D10W), dextrose, glucagon, heparin, hydrOXYzine HCL, ipratropium-albuteroL, lidocaine, naloxone, ondansetron, [Held by provider] oxyCODONE, sodium chloride 0.9%    Donn Quiñonez MD

## 2024-03-29 NOTE — CARE PLAN
The patient's goals for the shift include      The clinical goals for the shift include pt will remain safe and use call light    Problem: Pain  Goal: My pain/discomfort is manageable  Outcome: Progressing     Problem: Safety  Goal: Patient will be injury free during hospitalization  Outcome: Progressing  Goal: I will remain free of falls  Outcome: Progressing     Problem: Daily Care  Goal: Daily care needs are met  Outcome: Progressing     Problem: Psychosocial Needs  Goal: Demonstrates ability to cope with hospitalization/illness  Outcome: Progressing  Goal: Collaborate with me, my family, and caregiver to identify my specific goals  Outcome: Progressing     Problem: Discharge Barriers  Goal: My discharge needs are met  Outcome: Progressing     Problem: Skin  Goal: Prevent/manage excess moisture  Outcome: Progressing  Goal: Promote skin healing  Outcome: Progressing  Goal: Decreased wound size/increased tissue granulation at next dressing change  Outcome: Progressing  Goal: Participates in plan/prevention/treatment measures  Outcome: Progressing  Goal: Prevent/minimize sheer/friction injuries  Outcome: Progressing  Goal: Promote/optimize nutrition  Outcome: Progressing     Problem: Pain - Adult  Goal: Verbalizes/displays adequate comfort level or baseline comfort level  Outcome: Progressing     Problem: Diabetes  Goal: Achieve decreasing blood glucose levels by end of shift  Outcome: Progressing  Goal: Increase stability of blood glucose readings by end of shift  Outcome: Progressing  Goal: Decrease in ketones present in urine by end of shift  Outcome: Progressing  Goal: Maintain electrolyte levels within acceptable range throughout shift  Outcome: Progressing  Goal: Maintain glucose levels >70mg/dl to <250mg/dl throughout shift  Outcome: Progressing  Goal: No changes in neurological exam by end of shift  Outcome: Progressing  Goal: Learn about and adhere to nutrition recommendations by end of shift  Outcome:  Progressing  Goal: Vital signs within normal range for age by end of shift  Outcome: Progressing  Goal: Increase self care and/or family involovement by end of shift  Outcome: Progressing  Goal: Receive DSME education by end of shift  Outcome: Progressing     Problem: Pain  Goal: Takes deep breaths with improved pain control throughout the shift  Outcome: Progressing  Goal: Turns in bed with improved pain control throughout the shift  Outcome: Progressing  Goal: Walks with improved pain control throughout the shift  Outcome: Progressing  Goal: Performs ADL's with improved pain control throughout shift  Outcome: Progressing  Goal: Participates in PT with improved pain control throughout the shift  Outcome: Progressing  Goal: Free from opioid side effects throughout the shift  Outcome: Progressing  Goal: Free from acute confusion related to pain meds throughout the shift  Outcome: Progressing

## 2024-03-29 NOTE — PROGRESS NOTES
Physical Therapy                 Therapy Communication Note    Patient Name: Chevy Benton  MRN: [id number]  Today's Date: 3/29/2024     Discipline: Physical Therapy    Missed Visit Reason: Missed Visit Reason:  (Attempted follow up however pt. eating.)    Missed Time: Attempt    Comment:

## 2024-03-29 NOTE — PROGRESS NOTES
"Chevy Benton is a 78 y.o. male on day 21 of admission presenting with A-V fistula (CMS/HCC).    Subjective   Pt  resting in bed. C/o a little sob (weaned to room air), denies n/v/d, fever, cough, chills, chest pain , constipation, pain       Objective     Physical Exam  Constitutional:       Appearance: He is obese.   Cardiovascular:      Rate and Rhythm: Rhythm irregular.      Comments: Tachy hr 105  Pulmonary:      Comments: Magdiel lungs diminished with minor crackles more noted to lt base  Now on room air 98%-satting well on room air  Abdominal:      General: There is distension.      Comments: Non-tender to palpation   Genitourinary:     Comments: States anuric  Musculoskeletal:      Comments: Ble edema +2-3 pitting   rt arm +3 pitting edema -fingers cool to touch  Lt upper arm swelling with kerlix drsg-without thrill/bruit   Skin:     General: Skin is warm and dry.      Comments: Magdiel feet with kerlix wrap   Neurological:      Mental Status: He is alert and oriented to person, place, and time.   Psychiatric:         Mood and Affect: Mood normal.         Behavior: Behavior normal.         Last Recorded Vitals  Blood pressure 128/79, pulse 105, temperature 36.4 °C (97.5 °F), temperature source Temporal, resp. rate 16, height 1.778 m (5' 10\"), weight 87 kg (191 lb 12.8 oz), SpO2 100 %.  Intake/Output last 3 Shifts:  I/O last 3 completed shifts:  In: 2240 (25.7 mL/kg) [P.O.:240; I.V.:600 (6.9 mL/kg); Other:800; IV Piggyback:600]  Out: 7200 (82.8 mL/kg) [Other:7200]  Weight: 87 kg     Relevant Results  Scheduled medications  allopurinol, 100 mg, oral, Daily  apixaban, 2.5 mg, oral, q12h  atorvastatin, 40 mg, oral, Nightly  B complex-vitamin C-folic acid, 1 capsule, oral, Daily  clopidogrel, 75 mg, oral, Daily  epoetin dane or biosimilar, 20,000 Units, intravenous, Once per day on Tue Thu Sat  gabapentin, 100 mg, oral, Daily  insulin lispro, 0-5 Units, subcutaneous, TID with meals  lubricating eye drops, 2 drop, " Both Eyes, TID  melatonin, 5 mg, oral, Nightly  midodrine, 15 mg, oral, q8h  pantoprazole, 40 mg, oral, Daily before breakfast  piperacillin-tazobactam, 2.25 g, intravenous, q8h  polyethylene glycol, 17 g, oral, Daily  sennosides-docusate sodium, 2 tablet, oral, BID  sodium hypochlorite, , irrigation, Daily      Continuous medications     PRN medications  PRN medications: acetaminophen, albuterol, alteplase, benzonatate, dextrose 10 % in water (D10W), dextrose, glucagon, hydrOXYzine HCL, ipratropium-albuteroL, lidocaine, naloxone, ondansetron, [Held by provider] oxyCODONE, sodium chloride 0.9%   Results for orders placed or performed during the hospital encounter of 03/08/24 (from the past 24 hour(s))   POCT GLUCOSE   Result Value Ref Range    POCT Glucose 112 (H) 74 - 99 mg/dL   Transthoracic Echo (TTE) Complete   Result Value Ref Range    AV pk buck 2.51 m/s    AV mn grad 13.0 mmHg    LVOT diam 2.00 cm    LV biplane EF 38 %    LA vol index A/L 63.8 ml/m2    Tricuspid annular plane systolic excursion 0.9 cm    LVIDd 4.50 cm    RVSP 33.5 mmHg    Aortic Valve Area by Continuity of VTI 1.50 cm2    Aortic Valve Area by Continuity of Peak Velocity 1.58 cm2    AV pk grad 25.2 mmHg    LV A4C EF 39.4    POCT GLUCOSE   Result Value Ref Range    POCT Glucose 210 (H) 74 - 99 mg/dL   POCT GLUCOSE   Result Value Ref Range    POCT Glucose 151 (H) 74 - 99 mg/dL   POCT GLUCOSE   Result Value Ref Range    POCT Glucose 159 (H) 74 - 99 mg/dL   CBC   Result Value Ref Range    WBC 12.0 (H) 4.4 - 11.3 x10*3/uL    nRBC 0.7 (H) 0.0 - 0.0 /100 WBCs    RBC 2.77 (L) 4.50 - 5.90 x10*6/uL    Hemoglobin 7.7 (L) 13.5 - 17.5 g/dL    Hematocrit 27.2 (L) 41.0 - 52.0 %    MCV 98 80 - 100 fL    MCH 27.8 26.0 - 34.0 pg    MCHC 28.3 (L) 32.0 - 36.0 g/dL    RDW 19.5 (H) 11.5 - 14.5 %    Platelets 418 150 - 450 x10*3/uL   Renal Function Panel   Result Value Ref Range    Glucose 179 (H) 74 - 99 mg/dL    Sodium 131 (L) 136 - 145 mmol/L    Potassium 4.6 3.5  - 5.3 mmol/L    Chloride 95 (L) 98 - 107 mmol/L    Bicarbonate 26 21 - 32 mmol/L    Anion Gap 15 10 - 20 mmol/L    Urea Nitrogen 35 (H) 6 - 23 mg/dL    Creatinine 3.23 (H) 0.50 - 1.30 mg/dL    eGFR 19 (L) >60 mL/min/1.73m*2    Calcium 9.6 8.6 - 10.6 mg/dL    Phosphorus 5.6 (H) 2.5 - 4.9 mg/dL    Albumin 2.4 (L) 3.4 - 5.0 g/dL                     Assessment/Plan   Principal Problem:    A-V fistula (CMS/HCC)  Active Problems:    Gangrene of toe of right foot (CMS/HCC)    Anemia due to blood loss, acute    Critical limb ischemia of right lower extremity (CMS/HCC)    Tolerated hemodialysis yesterday with net fluid loss 3200cc.   ( 3/26-3.1 removed, 3/27-2.3 removed, 3/28-3.2 removed)    Bp stable with tx, . Hypervolemic on exam and has stable electrolytes .K+=3.9     Outpatient Dialysis schedule:   [facility]Dr Hannah; will be TTS while inpatient     Access: lt chest cath - no issues - able to achieve   -Lt arm access without thrill/bruit-Left arm was limited due to bandages. Per vascular surgery, no plan for any intervention, with only supportive care (arm elevation and local compression).   -Rt EJ   -US showed new acute non occlussive thrombus in his rt subclavian vein proximal segment. Also with a hematoma in his right upper arm     Anemia of ESRD:  3/25-increase  epoetin dane-epbx (Retacrit) injection 20,000 Units on dialysis days... current hgb 7.7.. 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 Will cont to monitor.. B complex-vitamin C-folic acid (Nephrocaps) capsule 1 capsule daily     Plan HD tomorrow with UF as tolerated     Renal diet      Please obtain daily standing wt (if possible)     Medication to be adjusted for ESRD      Patient to continue regular HD schedule while inpatient and to follow with the outpatient nephrologist at discharge        JENIFFER Bates-CNP

## 2024-03-29 NOTE — PROGRESS NOTES
Patient is not medically cleared for discharge at this time he is pending an endo vascular procedure on Monday. Patient will return to [facility] when cleared for discharge. I will continue to follow with a safe discharge plan.

## 2024-03-30 NOTE — PROGRESS NOTES
Assessment/Plan      Mr. Benton, a 78-year-old patient with a complex medical history including peripheral arterial disease, atrial fibrillation, coronary artery disease, type 2 diabetes mellitus, a right subclavian deep vein thrombosis in October 2023, gastroesophageal reflux disease, sick sinus syndrome with a pacemaker, gout, end-stage renal disease on hemodialysis, hyperlipidemia, hypotension, heart failure with reduced ejection fraction (EF 40-45% as of January 2023), and gout, who was transferred from [facility] to [facility]  due to bleeding at his left upper arm arteriovenous fistula site. Vascular surgery  applied two figure-eight silk sutures for hemostasis at the fistula site to stop bleeding and pt subsequently received a new tunneled hemodialysis catheter and underwent dialysis fistula ligation in the OR. Postoperatively, the patient's pain has been managed effectively, and his hemoglobin and hematocrit levels have remained stable after resuming anticoagulant and antiplatelet therapies, after which his central line was removed.  He was MRSA nare positive and one-to-one decolonization protocol.  He continues to exhibit signs of generalized edema, managed by nephrology for fluid overload and elevation of UE's. Pt had persistent leukocytosis without specific symptoms prompting repeated blood cultures, results of which and subsequent blood cultures were neg. . A chest X-ray indicated volume overload with possible infiltrate, after which, he subsequently developed lower extremity rest pain as well as a cough with sputum production. The has remained with stable 2 liters oxygen requirement.  Wound care, podiatry, and vascular reinvolved.  He was found to have new mild purulence from the TMA site, further PVR testing indicated right lower extremity vascular disease.  Patient had subsequent angiogram which revealed no distal blood flow below the knee although the limb remains warm with apparent perfusion on exam.  For  endovascular at this time given multiple procedures her has been no indicated options for revascularization patient may need amputation pending further endovascular evaluation.  He is pending MRI, at timing dictated by his pacemaker needs.  MRI was canceled/patient refused as well.  Palliative has been involved for goals of care.  Podiatry is continuing palliative wound care.  His wound is improving.  ID consulted and recommended increasing Zosyn to every 8 hours.  Vancomycin was stopped on 3/27.  After further discussion with endovascular, they plan to take him to try to revascularize the leg on 4/1.  Her EVLS srhavan stop apixaban for now and placed on heparin drip until further surgical recommendations.     #leukocytosis  #TMA site deep tissue infection in setting of RLE critical limb ischemia as below  #PNA - resolved  - initially likely related to surgical stress, however remained persistent, no new signs of infection/localizing signs at time. However pt  developed foot pain and sputum production. Cxr with volume overload with possible infiltrate. Podiatry obtained wound cultures grew Pseudomonas and Candida.  Blood cultures are negative  -Remains on 2 L, likely related to volume overload, continuing dialysis weaning as tolerated.  No signs of pneumonia  - complted treatement for pneumonia.   - IS, accapella, RT consult to eval/rec.   -Wound management as below  -Had reviewed wound cultures, they grew Pseudomonas and Candida. Dc/'d vanc  Continuing Zosyn. Given complicated of limb ischemia will obtain ID input for further recs.  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  Given possibility of revascularization  will be discussed with podiatry service.  The wound was viewed today and looks clean.  -Palliative care consulted and assisting with goals of care conversations  -Reviewed PVRs and angiogram  -Hold apixaban.  Started on heparin drip per EVLS.   -Planning intervention on Monday    #metabolic encephalopathy - improved  -Overnight had confusion and aphasia, evaluated by code bat, less concern for large vessel occlusion, no correlating neurological deficits, no CT recommended, called off.  Recommended delirium workup  -Patient with some degree of cognitive impairment, high risk for hospital delirium and sundowning.  There was concern that change of rooms would lead to confusion, suspect change of environment was trigger.  He is doing better today.  - delirium precautions    #Bleeding from AV fistula s/p ligation and tunneled HD line placement - resolved  #anemia d/t acute blood loss  #MRSA colonization, on decolonization protocol due to central line placement.   - Vascular surgery consulted, appreciate recs  - Hemostasis obtained at L upper extremity fistula site after placement of two figure 8 sutures   -Transfuse 1 unit of PRBC on HD on admit.  Monitor HB.     - Restarted apixaban and Plavix and monitoring hemoglobin for stability.   Has remained stable.  Central line removed.  No other issues.      #Chest pain   -Patient reports he has had intermittent chest pain for 2 weeks, this is his first time I have heard and mention this.  He states it worsened today.  Troponin was added onto the morning labs but never received, tried to get a lab draw at the end of dialysis when speaking with dialysis unit but it was not drawn.  He ended up going to MRI she could declined and then return to floor.  Troponin was negative, EKG showing A-fib, lateral ST changes which are unchanged from prior.  His chest pain is very reproducible and worsened by pushing on his chest and also with coughing.   Overall suspicious for noncardiac  etiology although he is a vasculopath.  He has no associated shortness of breath or other symptoms.  He is on appropriate medications including Plavix and apixaban. will continue to monitor, likely source is musculoskeletal.  Chest x-ray ordered-showed slight interval worsening of his interstitial edema.  He has since been weaned off of oxygen but currently borderline.  ACS was negative.  Chest pain has been improving, currently only bothering him when he is transferred/when they move him  -Echocardiogram obtained, showing continued reduced EF of 40 to 45%, there is a septal bulge, reduced RV function mildly, severe left atrial dilation, moderate RA dilation mild to moderate aortic stenosis, mild aortic valve regurg.     #MRSA nare positive  On MRSA decolonization protocol, ordered mupirocin and chlorhexidine bath  Femoral line removed on 3/14 as patient is clinically stable after starting apixaban and Plavix  Repeat MRSA nare for clearance, was not collected.  Will need to check with infection control if needed    #UE edema - likely related to anasarca, positioning and ESRD, mild improvement  # Respiratory failure with hypoxemia secondary to fluid overload   -US showed new acute non occlussive thrombus in his rt subclavian vein proximal segment. Also with a hematoma in his right upper arm. Left arm was limited due to bandages. Per vascular surgery, no plan for any intervention, with only supportive care (arm elevation and local compression).  -Patient continues  2 L, in setting of anasarca and ESRD.  Likely only requiring 1 L as I have weaned him down in the past.  Xray as above. Componenet of fluid overload.   -Remained on 2 L and will attempt weaning as tolerated  -Repeat chest x-ray 3/26 as above    #T2DM (last HgA1c 5.3% 12/3/23) c/b neuropathy  - accucheck ACHS  - Lispro SSI #1 0-5 units  - holding home dose of Lantus 6 units to prevent episodes of hypoglycemia while hospitalized and HgA1c <7%. Monitor more  need to resume.    - hypoglycemia order set placed  - continue home dose of Gabapentin 100 mg PO every day      #hyponatremia (chronic)  #ESRD on HD   - daily CHG bath d/t L chest dialysis cath site   - Nephrology consulted for HD   - renal diet  - renally dose meds as needed  - continue home dose of Nephrocaps PO every day  -continue HD per nephrology. Has had additional sessions this week for fluid removal. Care with fluid removal given chronic hypotension on midodrine.      #Afib  #hx of DVT (10/23)  - Duplex US of RUE done 10/22/23: Positive study for DVT in the right upper extremity.  There is a nonocclusive thrombus in the right subclavian artery.  - home dose of Eliquis       #hypotension, chronic  - continue home dose of Midodrine 15 mg PO TID     #Malnutrition  -With anasarca, wound needs, starting to have increase in appetite.  Nutrition consulted and started on Nepro twice daily     # Hypokalemia  -Cautious replacement given ESRD, 3.3 today.  Patient has cardiac history and A-fib, given 20 mill equivalents    Patient is DNR, okay for perioperative and periprocedural/testing code reversal as needed  Would not want long-term vent support    Will need to discuss with the EVLS timing after procedure, in order to time precertification for our manner      Scheduled outpatient appointments in system:   Future Appointments   Date Time Provider Department Center   4/17/2024  1:00 PM Koko Gordon MD [id number] [facility]     ---------------------------------------------------------------------------------------------------  Subjective   No new events, receiving dialysis this morning.  Reports he is doing well, denies breathing issues denies chest pain.  States he is only getting chest pain now when he is moved/transferred.  Reports his leg pain is controlled.  He is aware of revascularization planned for Monday.  He is tolerating dialysis.  On return from dialysis he was saturating 80% which is borderline, try and  "have respiratory work with him.    ---------------------------------------------------------------------------------------------------  Objective   Last Recorded Vitals  Blood pressure 145/83, pulse 100, temperature 36.2 °C (97.2 °F), temperature source Temporal, resp. rate 18, height 1.778 m (5' 10\"), weight 87 kg (191 lb 12.8 oz), SpO2 96 %.  Intake/Output last 3 Shifts:  No intake/output data recorded.    Physical Exam  Vitals and nursing note reviewed.   Constitutional:       General: He is not in acute distress.     Appearance: He is obese. He is ill-appearing. He is not toxic-appearing.   HENT:      Head: Normocephalic and atraumatic.      Nose:      Comments: On nasal canula     Mouth/Throat:      Mouth: Mucous membranes are moist.   Eyes:      General: No scleral icterus.     Extraocular Movements: Extraocular movements intact.      Conjunctiva/sclera: Conjunctivae normal.   Cardiovascular:      Rate and Rhythm: Normal rate and regular rhythm.      Heart sounds: S1 normal and S2 normal. No murmur heard.     Comments: Severe pain with palpation of the chest wall  Pulmonary:      Effort: Pulmonary effort is normal. No respiratory distress.      Breath sounds: No wheezing or rhonchi.      Comments: Basilar rales  Abdominal:      General: Bowel sounds are normal. There is no distension.      Palpations: Abdomen is soft.      Tenderness: There is no abdominal tenderness. There is no guarding or rebound.   Musculoskeletal:         General: Swelling present. No deformity.      Cervical back: Neck supple.      Comments: Multiple LE wounds, R TMA dressed c/d/I  RUE >LUE swelling, skin wrinkling in left hand   Skin:     General: Skin is warm and dry.      Findings: No rash.      Comments: Unable to check below level of dressing fully but area check was warm, appeared to have some moderate return of color  with pressure applied   Neurological:      General: No focal deficit present.      Mental Status: He is alert. " Mental status is at baseline.      Comments: Moving all extremities, no gross sensation changes.   Currently oriented and communicating well   Psychiatric:         Mood and Affect: Mood normal.         Relevant Results  Lab Results   Component Value Date    WBC 10.2 03/30/2024    HGB 8.4 (L) 03/30/2024    HCT 28.9 (L) 03/30/2024    MCV 97 03/30/2024     03/30/2024      Lab Results   Component Value Date    GLUCOSE 200 (H) 03/30/2024    CALCIUM 9.8 03/30/2024     (L) 03/30/2024    K 4.5 03/30/2024    CO2 20 (L) 03/30/2024    CL 94 (L) 03/30/2024    BUN 44 (H) 03/30/2024    CREATININE 3.92 (H) 03/30/2024     Scheduled medications  allopurinol, 100 mg, oral, Daily  atorvastatin, 40 mg, oral, Nightly  B complex-vitamin C-folic acid, 1 capsule, oral, Daily  clopidogrel, 75 mg, oral, Daily  epoetin dane or biosimilar, 20,000 Units, intravenous, Once per day on Tue Thu Sat  gabapentin, 100 mg, oral, Daily  insulin lispro, 0-5 Units, subcutaneous, TID with meals  lubricating eye drops, 2 drop, Both Eyes, TID  melatonin, 5 mg, oral, Nightly  midodrine, 15 mg, oral, q8h  pantoprazole, 40 mg, oral, Daily before breakfast  piperacillin-tazobactam, 2.25 g, intravenous, q8h  polyethylene glycol, 17 g, oral, Daily  sennosides-docusate sodium, 2 tablet, oral, BID  sodium hypochlorite, , irrigation, Daily      Continuous medications  heparin, 0-4,500 Units/hr, Last Rate: 11.6 Units/hr (03/30/24 0731)    PRN medications  PRN medications: acetaminophen, albuterol, alteplase, benzonatate, dextrose 10 % in water (D10W), dextrose, glucagon, heparin, hydrOXYzine HCL, ipratropium-albuteroL, lidocaine, naloxone, ondansetron, [Held by provider] oxyCODONE, sodium chloride 0.9%    Donn Quiñonez MD

## 2024-03-30 NOTE — NURSING NOTE
15:00: Request for PIV insertion received, Patient's right arm is swollen, unable to see any veins, RN is informed.

## 2024-03-30 NOTE — NURSING NOTE
Patient returned from dialysis with EJ out had to stop heparin drip , Dr Quiñonez aware, IV team tried to place IV couldn't pt edematous Dr aware of this as well.  Stacey Tristan RN

## 2024-03-30 NOTE — NURSING NOTE
Report to Receiving RN:    Report To: ABEL Ibarra  Time Report Called: 12:30  Hand-Off Communication: tolerated tx well, removed 2.5L of fluid, post tx /96 , alert and stable post tx; catheter dressing was changed   Complications During Treatment: No  Ultrafiltration Treatment: Yes  Medications Administered During Dialysis: Yes, Tylenol see MAR  Blood Products Administered During Dialysis: No  Labs Sent During Dialysis: No  Heparin Drip Rate Changes: N/A      Last Updated: 12:28 PM by SHONDA MCKEON

## 2024-03-30 NOTE — NURSING NOTE
Report from Sending RN:    Report From: Prasanth HernandezRN)  Recent Surgery of Procedure: No  Baseline Level of Consciousness (LOC): a/o x 3/4  Oxygen Use: Yes, 2 liters  Type: NC  Diabetic: Yes, 210  Last BP Med Given Day of Dialysis: midodrine 15 0500 am  Last Pain Med Given: none  Lab Tests to be Obtained with Dialysis: Yes, Heparin drip @ 10 am  Blood Transfusion to be Given During Dialysis: No  Available IV Access: Yes  Medications to be Administered During Dialysis: No  Continuous IV Infusion Running: Yes, heparin drip @ 13 ml/hr  Restraints on Currently or in the Last 24 Hours: No  Hand-Off Communication: No acute overnight or morning events; vss; Pt did not take morning medications; Pt will need labs; pt is a full code; Ela Morales RN.

## 2024-03-31 NOTE — CARE PLAN
Problem: Pain  Goal: My pain/discomfort is manageable  3/31/2024 0802 by Alfred Astorga RN  Outcome: Progressing  3/31/2024 0802 by Alfred Astorga RN  Outcome: Progressing     Problem: Safety  Goal: Patient will be injury free during hospitalization  3/31/2024 0802 by Alfred Astorga RN  Outcome: Progressing  3/31/2024 0802 by Alfred Astorga RN  Outcome: Progressing  Goal: I will remain free of falls  3/31/2024 0802 by Alfred Astorga RN  Outcome: Progressing  3/31/2024 0802 by Alfred Astorga RN  Outcome: Progressing     Problem: Psychosocial Needs  Goal: Demonstrates ability to cope with hospitalization/illness  3/31/2024 0802 by Alfred Astorga RN  Outcome: Progressing  3/31/2024 0802 by Alfred Astorga RN  Outcome: Progressing  Goal: Collaborate with me, my family, and caregiver to identify my specific goals  3/31/2024 0802 by Alfred Astorga RN  Outcome: Progressing  3/31/2024 0802 by Alfred Astorga RN  Outcome: Progressing     Problem: Discharge Barriers  Goal: My discharge needs are met  3/31/2024 0802 by Alfred Astorga RN  Outcome: Progressing  3/31/2024 0802 by Alfred Astorga RN  Outcome: Progressing     Problem: Skin  Goal: Prevent/manage excess moisture  3/31/2024 0802 by Alfred Astorga RN  Outcome: Progressing  3/31/2024 0802 by Alfred Astorga RN  Outcome: Progressing  Flowsheets (Taken 3/31/2024 0802)  Prevent/manage excess moisture: Moisturize dry skin  Goal: Promote skin healing  3/31/2024 0802 by Alfred Astorga RN  Outcome: Progressing  3/31/2024 0802 by Alfred Astorga RN  Outcome: Progressing  Goal: Decreased wound size/increased tissue granulation at next dressing change  3/31/2024 0802 by Alfred Astorga RN  Outcome: Progressing  3/31/2024 0802 by Alfred Astorga RN  Outcome: Progressing  Goal: Participates in plan/prevention/treatment measures  3/31/2024 0802 by Alfred Astorga RN  Outcome: Progressing  3/31/2024 0802 by Alfred Astorga RN  Outcome: Progressing  Goal: Prevent/minimize sheer/friction injuries  3/31/2024 0802  by Alfred Rizwan, RN  Outcome: Progressing  3/31/2024 0802 by Alfred Astorga RN  Outcome: Progressing  Goal: Promote/optimize nutrition  3/31/2024 0802 by Alfred Astorga RN  Outcome: Progressing  3/31/2024 0802 by Alfred Astorga RN  Outcome: Progressing

## 2024-03-31 NOTE — PROGRESS NOTES
Chevy Benton is a 78 y.o. male on day 23 of admission presenting with A-V fistula (CMS/HCC).    Subjective   Interval History:   -NAEO  -Seen after he had a bowel movement. Otherwise feeling well  -Lost access for IV ABX, provided with levoquin    Objective   Range of Vitals (last 24 hours)  Heart Rate:  []   Temp:  [36.8 °C (98.2 °F)-37.1 °C (98.8 °F)]   Resp:  [17-26]   BP: (119-163)/(72-90)   SpO2:  [95 %-100 %]   Daily Weight  03/19/24 : 87 kg (191 lb 12.8 oz)    Body mass index is 27.52 kg/m².    Physical Exam  Constitutional:       Appearance: He is obese. He is ill-appearing.   HENT:      Mouth/Throat:      Mouth: Mucous membranes are dry.   Eyes:      Extraocular Movements: Extraocular movements intact.      Conjunctiva/sclera: Conjunctivae normal.   Cardiovascular:      Rate and Rhythm: Regular rhythm.      Heart sounds: No murmur heard.  Pulmonary:      Effort: Pulmonary effort is normal.   Abdominal:      General: There is no distension.      Palpations: Abdomen is soft.      Tenderness: There is no abdominal tenderness.   Musculoskeletal:      Comments: S/p R TMA, dressing in place   Neurological:      Mental Status: He is alert.         Relevant Results  Labs  Results from last 72 hours   Lab Units 03/31/24  0547 03/30/24  0514 03/29/24  0908   WBC AUTO x10*3/uL 7.7 10.2 12.0*   HEMOGLOBIN g/dL 7.9* 8.4* 7.7*   HEMATOCRIT % 28.9* 28.9* 27.2*   PLATELETS AUTO x10*3/uL 394 415 418     Results from last 72 hours   Lab Units 03/31/24  0547 03/30/24  0514 03/29/24  0908   SODIUM mmol/L 133* 131* 131*   POTASSIUM mmol/L 4.0 4.5 4.6   CHLORIDE mmol/L 94* 94* 95*   CO2 mmol/L 23 20* 26   BUN mg/dL 34* 44* 35*   CREATININE mg/dL 3.49* 3.92* 3.23*   GLUCOSE mg/dL 151* 200* 179*   CALCIUM mg/dL 9.4 9.8 9.6   ANION GAP mmol/L 20 22* 15   EGFR mL/min/1.73m*2 17* 15* 19*   PHOSPHORUS mg/dL 5.5* 6.0* 5.6*     Results from last 72 hours   Lab Units 03/31/24  0547 03/30/24  0514 03/29/24  0908   ALBUMIN  g/dL 2.5* 2.5* 2.4*     Estimated Creatinine Clearance: 17.7 mL/min (A) (by C-G formula based on SCr of 3.49 mg/dL (H)).  C-Reactive Protein   Date Value Ref Range Status   10/07/2023 11.53 (H) <1.00 mg/dL Final   10/01/2023 21.44 (H) <1.00 mg/dL Final     Microbiology  -03/17 Wound culture (swab of purulence): PsA (pan-S), C. Parapsilosis  -03/19 Bcx: NG  -03/23 Bcx: NG    Assessment/Plan   78yM with ESRD on HD MWF, PAD, A fib on Eliquis, T2DM, CAD, GERD, sick sinus syndrome s/p permanent pacemaker, HFrEF (40-45% LVEF), right transmetatarsal amputation (9/22/23) s/p R foot debridement to level of muscle/fascia (1/19/24) who presented to the ED on 3/8/24 with spontaneous bleeding from L AV brachiocephalic fistula. Later found with purulent wound of R TMA.    PsA R TMA osteomyelitis; unclear pathogenicity of Candida at the moment. Planned for revascularization tomorrow, which will be critical for getting antibiotics to his TMA site. We will follow Podiatry plans in the event further intervention will be pursued but can try to plan accordingly if not. Agree with levofloxacin while access is lost.    Recommendations:  -Will follow-up revascularization plans and/or any additional interventions from our Podiatry colleagues  -If further debridement is planned, we will follow any additional cultures  -Agree with renally-dosed levofloxacin while access is being obtained  -We can likely develop a post-HD regimen pending the above     Please contact ID Team A (fellow Chance Baldwin MD or myself) via Epic Chat or pager [phone] with questions.     Lavon Mcfarland MD

## 2024-03-31 NOTE — NURSING NOTE
9:00:  Request for PIV insertion received from bedside RN, Patient's right arm is assessed, the swollen is improved a little, one small vein found at right AC area, one attempt made with US, unsuccessful, RN is informed.

## 2024-03-31 NOTE — PROGRESS NOTES
Assessment/Plan      Mr. Benton, a 78-year-old patient with a complex medical history including peripheral arterial disease, atrial fibrillation, coronary artery disease, type 2 diabetes mellitus, a right subclavian deep vein thrombosis in October 2023, gastroesophageal reflux disease, sick sinus syndrome with a pacemaker, gout, end-stage renal disease on hemodialysis, hyperlipidemia, hypotension, heart failure with reduced ejection fraction (EF 40-45% as of January 2023), and gout, who was transferred from [facility] to [facility]  due to bleeding at his left upper arm arteriovenous fistula site. Vascular surgery  applied two figure-eight silk sutures for hemostasis at the fistula site to stop bleeding and pt subsequently received a new tunneled hemodialysis catheter and underwent dialysis fistula ligation in the OR. Postoperatively, his hemoglobin and hematocrit levels have remained stable and resumed anticoagulant and antiplatelet therapies, after which his central line was removed.  He was MRSA nare positive and completed decolonization protocol.  He has not received his repeat MRSA nare yet. He continues to exhibit signs of generalized edema, managed by nephrology for fluid overload and elevation of Ue's which is improving. He also had persistent volume overload and interstitial pulm edema and now weaned to room air.  pt had persistent leukocytosis without specific symptoms prompting repeated blood cultures, which were neg. . A chest X-ray indicated volume overload with possible infiltrate, after which, he subsequently developed lower extremity rest pain as well as a cough with sputum production.   Wound care, podiatry, and vascular reinvolved.  He was found to have new mild purulence from the TMA site, further PVR testing indicated right lower extremity vascular disease.  Wound cultures grew Pseudomonas and Candida.  Patient had subsequent angiogram which revealed no distal blood flow below the knee although the limb  remains warm with apparent perfusion on exam.  Initially patient was told no intervention could be performed, palliative was involved for goals of care.  Podiatry was completing palliative wound care and patient completed course of antibiotics for HAP and continued on Zosyn for his infection.  ID consulted and dosage of Zosyn adjusted.  Eventually after further review, endovascular now planning revascularization on 4/1.  Patient transitioned to heparin drip but lost IV thus temporarily on subcutaneous therapeutic Lovenox protocol and changed to oral Levaquin with pending further discussions.    # lacks IV access other than HD cath   -Lost his IV access, we are unable to get it again on another attempt.  Transition to subcu therapeutic heparin.  Transition to oral antibiotic temporarily.  Will discuss with nephrology if midline is needed and with ESBL last about plans.    #leukocytosis  #TMA site deep tissue infection in setting of RLE critical limb ischemia as below  #PNA - resolved  - treated for HAP  - TMA site infection with critical limb ischemia, planning revascularization.  Patient was on Zosyn 2.25 q. 8 hour, lost IV access and received Levaquin.  Will need to be dosed every 48 if he does not get access.  --1 grow Pseudomonas and Candida.   - IS, accapella, RT consult to eval/rec.   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  Given possibility of revascularization, will need to discuss if they plan any intervention after revascularization  -Palliative care consulted and assisting with goals of care conversations  -Reviewed PVRs and angiogram  -Hold apixaban.  Started on heparin drip per EVLS.  Patient lost IV access and will temporarily need to be on subcutaneous therapeutic  heparin.  Will discuss with the EVLS  and nephrology if patient needs a line  -Planning intervention on Monday, n.p.o. at midnight    -#metabolic encephalopathy - improved  - had confusion and aphasia on transfer from [facility] to over 6, evaluated by code bat, less concern for large vessel occlusion, no correlating neurological deficits, no CT recommended, called off.  Recommended delirium workup  -Patient with some degree of cognitive impairment, high risk for hospital delirium and sundowning.  There was concern that change of rooms would lead to confusion, suspect change of environment was trigger.  He is doing better today.  - delirium precautions    #Bleeding from AV fistula s/p ligation and tunneled HD line placement - resolved  #anemia d/t acute blood loss and CKD  -Blood counts stable  -Has tunneled dialysis catheter  -Managed by vascular surgery      #MRSA colonization, on decolonization protocol due to central line placement.   -Completed protocol  -Still pending repeat MRSA nare, patient is potential for need for another central line as well.  Need to obtain.    #Chest pain   -Patient reports he has had intermittent chest pain for 2 weeks, this is his first time I have heard and mention this.  He states it worsened today.  Troponin was added onto the morning labs but never received, tried to get a lab draw at the end of dialysis when speaking with dialysis unit but it was not drawn.  He ended up going to MRI she could declined and then return to floor.  Troponin was negative, EKG showing A-fib, lateral ST changes which are unchanged from prior.  His chest pain is very reproducible and worsened by pushing on his chest and also with coughing.   Overall suspicious for noncardiac etiology although he is a vasculopath.  He has no associated shortness of breath or other symptoms.  He is on appropriate medications including Plavix and apixaban. will continue to monitor, likely source is musculoskeletal.  Chest x-ray ordered-showed slight  interval worsening of his interstitial edema.  He has since been weaned off of oxygen but currently borderline.  ACS was negative.  Chest pain has been improving, currently only bothering him when he is transferred/when they move him  -Echocardiogram obtained, showing continued reduced EF of 40 to 45%, there is a septal bulge, reduced RV function mildly, severe left atrial dilation, moderate RA dilation mild to moderate aortic stenosis, mild aortic valve regurg.     #MRSA nare positive  On MRSA decolonization protocol, ordered mupirocin and chlorhexidine bath  Femoral line removed on 3/14 as patient is clinically stable after starting apixaban and Plavix  Repeat MRSA nare for clearance, was not collected.  Will need to check with infection control if needed    #UE edema - likely related to anasarca, positioning and ESRD, mild improvement  # Respiratory failure with hypoxemia secondary to fluid overload -weaned off of oxygen  -US showed new acute non occlussive thrombus in his rt subclavian vein proximal segment. Also with a hematoma in his right upper arm. Left arm was limited due to bandages. Per vascular surgery, no plan for any intervention, with only supportive care (arm elevation and local compression).  -Completed treatment of pneumonia  -Chest x-rays have show continue interstitial pulmonary edema  -Remained on 2 L and weaned off oxygen on 3/29    #T2DM (last HgA1c 5.3% 12/3/23) c/b neuropathy  - accucheck ACHS  - Lispro SSI #1 0-5 units  - holding home dose of Lantus 6 units to prevent episodes of hypoglycemia while hospitalized and HgA1c <7%. Monitor more need to resume.    - hypoglycemia order set placed  - continue home dose of Gabapentin 100 mg PO every day    #hyponatremia (chronic)  #ESRD on HD   - daily CHG bath d/t L chest dialysis cath site   - Nephrology consulted for HD   - renal diet  - renally dose meds as needed  - continue home dose of Nephrocaps PO every day  -continue HD per nephrology. Has  "had additional sessions this week for fluid removal. Care with fluid removal given chronic hypotension on midodrine.      #Afib  #hx of DVT (10/23)  - Duplex US of RUE done 10/22/23: Positive study for DVT in the right upper extremity.  There is a nonocclusive thrombus in the right subclavian artery.  - home dose of Eliquis on hold as above     #hypotension, chronic  - continue home dose of Midodrine 15 mg PO TID     #Malnutrition  -With anasarca, wound needs, starting to have increase in appetite.  Nutrition consulted and started on Nepro twice daily     # Hypokalemia  -Cautious replacement given ESRD, 3.3 today.  Patient has cardiac history and A-fib, given 20 mill equivalents.  Monitor    Patient is DNR, okay for perioperative and periprocedural/testing code reversal as needed  Would not want long-term vent support    Will need to discuss with the EVLS timing after procedure, in order to time precertification for our manner  Will need to discuss IV access today      Scheduled outpatient appointments in system:   Future Appointments   Date Time Provider Department Center   4/17/2024  1:00 PM Koko Gordon MD [id number] [facility]     ---------------------------------------------------------------------------------------------------  Subjective   No new events, tolerated dialysis yesterday.  No new complaints, states he is feeling well.  Lost his IV access, we are unable to get it again on another attempt.  Transition to subcu therapeutic heparin.  Transition to oral antibiotic temporarily.  Will discuss with nephrology if midline is needed and with ESBL last about plans.    ---------------------------------------------------------------------------------------------------  Objective   Last Recorded Vitals  Blood pressure 152/90, pulse 103, temperature 37.1 °C (98.8 °F), resp. rate 20, height 1.778 m (5' 10\"), weight 87 kg (191 lb 12.8 oz), SpO2 99 %.  Intake/Output last 3 Shifts:  I/O last 3 completed shifts:  In: " 1000 (11.5 mL/kg) [I.V.:600 (6.9 mL/kg); Other:400]  Out: 2500 (28.7 mL/kg) [Other:2500]  Weight: 87 kg     Physical Exam  Vitals and nursing note reviewed.   Constitutional:       General: He is not in acute distress.     Appearance: He is obese. He is ill-appearing. He is not toxic-appearing.   HENT:      Head: Normocephalic and atraumatic.      Nose:      Comments: On nasal canula     Mouth/Throat:      Mouth: Mucous membranes are moist.   Eyes:      General: No scleral icterus.     Extraocular Movements: Extraocular movements intact.      Conjunctiva/sclera: Conjunctivae normal.   Cardiovascular:      Rate and Rhythm: Normal rate and regular rhythm.      Heart sounds: S1 normal and S2 normal. No murmur heard.     Comments: Severe pain with palpation of the chest wall  Pulmonary:      Effort: Pulmonary effort is normal. No respiratory distress.      Breath sounds: No wheezing or rhonchi.      Comments: Basilar rales  Abdominal:      General: Bowel sounds are normal. There is no distension.      Palpations: Abdomen is soft.      Tenderness: There is no abdominal tenderness. There is no guarding or rebound.   Musculoskeletal:         General: Swelling present. No deformity.      Cervical back: Neck supple.      Comments: Multiple LE wounds, R TMA dressed c/d/I  RUE >LUE swelling, skin wrinkling in left hand   Skin:     General: Skin is warm and dry.      Findings: No rash.      Comments: Unable to check below level of dressing fully but area check was warm, appeared to have some moderate return of color  with pressure applied   Neurological:      General: No focal deficit present.      Mental Status: He is alert. Mental status is at baseline.      Comments: Moving all extremities, no gross sensation changes.   Currently oriented and communicating well   Psychiatric:         Mood and Affect: Mood normal.         Relevant Results  Lab Results   Component Value Date    WBC 7.7 03/31/2024    HGB 7.9 (L) 03/31/2024     HCT 28.9 (L) 03/31/2024    MCV 98 03/31/2024     03/31/2024      Lab Results   Component Value Date    GLUCOSE 151 (H) 03/31/2024    CALCIUM 9.4 03/31/2024     (L) 03/31/2024    K 4.0 03/31/2024    CO2 23 03/31/2024    CL 94 (L) 03/31/2024    BUN 34 (H) 03/31/2024    CREATININE 3.49 (H) 03/31/2024     Scheduled medications  allopurinol, 100 mg, oral, Daily  atorvastatin, 40 mg, oral, Nightly  B complex-vitamin C-folic acid, 1 capsule, oral, Daily  clopidogrel, 75 mg, oral, Daily  epoetin dane or biosimilar, 20,000 Units, intravenous, Once per day on Tue Thu Sat  gabapentin, 100 mg, oral, Daily  heparin, 250 Units/kg, subcutaneous, q12h  insulin lispro, 0-5 Units, subcutaneous, TID with meals  lubricating eye drops, 2 drop, Both Eyes, TID  melatonin, 5 mg, oral, Nightly  midodrine, 15 mg, oral, q8h  pantoprazole, 40 mg, oral, Daily before breakfast  [Held by provider] piperacillin-tazobactam, 2.25 g, intravenous, q8h  polyethylene glycol, 17 g, oral, Daily  sennosides-docusate sodium, 2 tablet, oral, BID  sodium hypochlorite, , irrigation, Daily      Continuous medications  [Held by provider] heparin, 0-4,500 Units/hr, Last Rate: 960 Units/hr (03/30/24 1107)    PRN medications  PRN medications: acetaminophen, albuterol, alteplase, benzonatate, dextrose 10 % in water (D10W), dextrose, glucagon, heparin, hydrOXYzine HCL, ipratropium-albuteroL, lidocaine, naloxone, ondansetron, [Held by provider] oxyCODONE, sodium chloride 0.9%    Donn Quiñonez MD

## 2024-04-01 NOTE — PROGRESS NOTES
Physical Therapy    Physical Therapy Treatment    Patient Name: Chevy Benton  MRN: [id number]  Today's Date: 4/1/2024  Time Calculation  Start Time: 1445  Stop Time: 1527  Time Calculation (min): 42 min       Assessment/Plan   PT Assessment  End of Session Communication: Bedside nurse  Assessment Comment: Pt tolerated session with fatigue.  Slight decrease in pain sitting EOB.  Pt completed ther ex with increased AROM compared to previous sessions.  Pt also more alert this session with family present.  Pt continues to benefit from skilled PT and remains appropriate for moderate intensity PT upon discharge.  End of Session Patient Position: Bed, 3 rail up, Alarm on  PT Plan  Inpatient/Swing Bed or Outpatient: Inpatient  PT Plan  Treatment/Interventions: Bed mobility, Transfer training, Gait training, Balance training, Strengthening, Endurance training, Therapeutic exercise, Therapeutic activity  PT Plan: Skilled PT  PT Frequency: 3 times per week  PT Discharge Recommendations: Moderate intensity level of continued care  Equipment Recommended upon Discharge:  (none)  PT Recommended Transfer Status: Total assist  PT - OK to Discharge: Yes (eval complete and discharge recommendations made)      General Visit Information:   PT  Visit  PT Received On: 04/01/24  General  Family/Caregiver Present: Yes (supportive family)  Prior to Session Communication: Bedside nurse  Patient Position Received: Bed, 3 rail up, Alarm off, not on at start of session  General Comment: Pt cleared for PT by RN.  Pt alert and agreeable to PT. 2L O2 via NC    Subjective   Precautions:  Precautions  LE Weight Bearing Status: Right Non-Weight Bearing  Medical Precautions: Fall precautions, Oxygen therapy device and L/min (2L O2 via NC)    Objective   Pain:  Pain Assessment  Pain Assessment: 0-10  Pain Score: 9  Pain Location: Leg  Pain Orientation: Right, Left  Pain Interventions: Repositioned, Rest  Response to Interventions: pain decreased to  "8/10 with sitting EOB  Cognition:  Cognition  Overall Cognitive Status: Within Functional Limits  Orientation Level: Oriented X4 (states \"4\" for month, unable to name April, reorientation provided)  Postural Control:  Postural Control  Trunk Control: Max A sitting EOB with use of draw sheet  Posture Comment: forward flexed posture  Static Sitting Balance  Static Sitting-Balance Support: No upper extremity supported, Feet unsupported  Static Sitting-Level of Assistance: Maximum assistance  Static Sitting-Comment/Number of Minutes: use of draw sheet  Dynamic Sitting Balance  Dynamic Sitting-Balance Support: No upper extremity supported, Feet unsupported  Dynamic Sitting-Balance:  (LE ther ex)  Dynamic Sitting-Comments: Max A, use of draw sheet  Activity Tolerance:  Activity Tolerance  Endurance: Tolerates 10 - 20 min exercise with multiple rests  Treatments:  Therapeutic Exercise  Therapeutic Exercise Performed: Yes  Therapeutic Exercise Activity 1: seated marches x5, LAQ, supine ankle pumps and quad sets x10 each LE    Therapeutic Activity  Therapeutic Activity Performed: Yes  Therapeutic Activity 1: bed mobility, sitting EOB x8 minutes, patient and family education    Bed Mobility  Bed Mobility: Yes  Bed Mobility 1  Bed Mobility 1: Rolling right, Rolling left  Level of Assistance 1: Dependent, Maximum assistance  Bed Mobility Comments 1: dependent to initiate, max A to maintain sidelying  Bed Mobility 2  Bed Mobility  2: Side lying right to sit  Level of Assistance 2: Maximum assistance  Bed Mobility Comments 2: use of draw sheet  Bed Mobility 3  Bed Mobility 3: Sitting to supine  Level of Assistance 3: Maximum assistance  Bed Mobility Comments 3: at trunk and LEs    Transfers  Transfer: No    Outcome Measures:  [facility] Basic Mobility  Turning from your back to your side while in a flat bed without using bedrails: Total  Moving from lying on your back to sitting on the side of a flat bed without using bedrails: " Total  Moving to and from bed to chair (including a wheelchair): Total  Standing up from a chair using your arms (e.g. wheelchair or bedside chair): Total  To walk in hospital room: Total  Climbing 3-5 steps with railing: Total  Basic Mobility - Total Score: 6    Education Documentation  Home Exercise Program, taught by Nevaeh Alanis PT at 4/1/2024  3:38 PM.  Learner: Patient  Readiness: Acceptance  Method: Explanation  Response: Verbalizes Understanding  Comment: PATTIE LEE    Precautions, taught by Nevaeh Alanis PT at 4/1/2024  3:38 PM.  Learner: Patient  Readiness: Acceptance  Method: Explanation  Response: Verbalizes Understanding  Comment: PATTIE LEE    Body Mechanics, taught by Nevaeh Alanis PT at 4/1/2024  3:38 PM.  Learner: Patient  Readiness: Acceptance  Method: Explanation  Response: Verbalizes Understanding  Comment: PATTIE LEE    Mobility Training, taught by Nevaeh Alanis PT at 4/1/2024  3:38 PM.  Learner: Patient  Readiness: Acceptance  Method: Explanation  Response: Verbalizes Understanding  Comment: PATTIE LEE    Education Comments  No comments found.        Encounter Problems       Encounter Problems (Active)       PT Problem       Patient will complete supine to sit and sit to supine Min Assist  (Progressing)       Start:  03/11/24    Expected End:  04/08/24            Patient will perform sit<>stand transfer with Rolling Walker, and Mod Assist  (Not Progressing)       Start:  03/11/24    Expected End:  04/08/24            Patient will ambulate >10' with Rolling Walker and Mod Assist  (Not Progressing)       Start:  03/11/24    Expected End:  04/08/24            Pt will be able to maintain static sitting with SBA x 5 min (Progressing)       Start:  03/11/24    Expected End:  04/08/24               Pain - Adult

## 2024-04-01 NOTE — PROGRESS NOTES
"Chevy Benton is a 78 y.o. male on day 24 of admission presenting with A-V fistula (CMS/HCC).    Subjective   Pt  resting in bed. C/o a little sob , denies n/v/d, fever, has dry cough, denies chills, chest pain , constipation, pain       Objective     Physical Exam  Constitutional:       Appearance: He is obese.   Pulmonary:      Comments: Magdiel lungs diminished with minor crackles more noted to lt base  O2 2L nc  Dry np cough    Abdominal:      General: There is distension.      Comments: Non-tender to palpation   Genitourinary:     Comments: States anuric  Musculoskeletal:      Comments: Ble edema +2-3 pitting   rt arm +3 pitting edema -fingers cool to touch  Lt upper arm swelling with kerlix drsg-without thrill/bruit   Skin:     General: Skin is warm and dry.      Comments: Magdiel feet with kerlix wrap   Neurological:      Mental Status: He is alert and oriented to person, place, and time.   Psychiatric:         Mood and Affect: Mood normal.         Behavior: Behavior normal.       Last Recorded Vitals  Blood pressure 145/87, pulse 99, temperature 36.9 °C (98.4 °F), temperature source Tympanic, resp. rate 18, height 1.778 m (5' 10\"), weight 87 kg (191 lb 12.8 oz), SpO2 97 %.  Intake/Output last 3 Shifts:  I/O last 3 completed shifts:  In: 0 (0 mL/kg)   Out: 1 (0 mL/kg) [Stool:1]  Weight: 87 kg     Relevant Results  Scheduled medications  allopurinol, 100 mg, oral, Daily  atorvastatin, 40 mg, oral, Nightly  B complex-vitamin C-folic acid, 1 capsule, oral, Daily  clopidogrel, 75 mg, oral, Daily  epoetin dane or biosimilar, 20,000 Units, intravenous, Once per day on Tue Thu Sat  gabapentin, 100 mg, oral, Daily  heparin, 250 Units/kg, subcutaneous, q12h  insulin lispro, 0-5 Units, subcutaneous, TID with meals  lidocaine, 5 mL, infiltration, Once  lidocaine, 2 patch, transdermal, Daily  lubricating eye drops, 2 drop, Both Eyes, TID  melatonin, 5 mg, oral, Nightly  midodrine, 15 mg, oral, q8h  pantoprazole, 40 mg, " oral, Daily before breakfast  [Held by provider] piperacillin-tazobactam, 2.25 g, intravenous, q8h  polyethylene glycol, 17 g, oral, Daily  sennosides-docusate sodium, 2 tablet, oral, BID  sodium hypochlorite, , irrigation, Daily      Continuous medications  [Held by provider] heparin, 0-4,500 Units/hr, Last Rate: 960 Units/hr (04/01/24 0931)    PRN medications  PRN medications: acetaminophen, albuterol, alteplase, benzonatate, dextrose 10 % in water (D10W), dextrose, glucagon, heparin, hydrOXYzine HCL, ipratropium-albuteroL, lidocaine, naloxone, ondansetron, [Held by provider] oxyCODONE, sodium chloride 0.9%   Results for orders placed or performed during the hospital encounter of 03/08/24 (from the past 24 hour(s))   POCT GLUCOSE   Result Value Ref Range    POCT Glucose 132 (H) 74 - 99 mg/dL   POCT GLUCOSE   Result Value Ref Range    POCT Glucose 139 (H) 74 - 99 mg/dL   POCT GLUCOSE   Result Value Ref Range    POCT Glucose 184 (H) 74 - 99 mg/dL   CBC   Result Value Ref Range    WBC 7.7 4.4 - 11.3 x10*3/uL    nRBC 0.4 (H) 0.0 - 0.0 /100 WBCs    RBC 2.84 (L) 4.50 - 5.90 x10*6/uL    Hemoglobin 7.7 (L) 13.5 - 17.5 g/dL    Hematocrit 29.0 (L) 41.0 - 52.0 %     (H) 80 - 100 fL    MCH 27.1 26.0 - 34.0 pg    MCHC 26.6 (L) 32.0 - 36.0 g/dL    RDW 19.0 (H) 11.5 - 14.5 %    Platelets 312 150 - 450 x10*3/uL   Renal Function Panel   Result Value Ref Range    Glucose 141 (H) 74 - 99 mg/dL    Sodium 132 (L) 136 - 145 mmol/L    Potassium 4.0 3.5 - 5.3 mmol/L    Chloride 94 (L) 98 - 107 mmol/L    Bicarbonate 22 21 - 32 mmol/L    Anion Gap 20 10 - 20 mmol/L    Urea Nitrogen 41 (H) 6 - 23 mg/dL    Creatinine 4.06 (H) 0.50 - 1.30 mg/dL    eGFR 14 (L) >60 mL/min/1.73m*2    Calcium 9.8 8.6 - 10.6 mg/dL    Phosphorus 6.8 (H) 2.5 - 4.9 mg/dL    Albumin 2.7 (L) 3.4 - 5.0 g/dL   POCT GLUCOSE   Result Value Ref Range    POCT Glucose 142 (H) 74 - 99 mg/dL   POCT GLUCOSE   Result Value Ref Range    POCT Glucose 117 (H) 74 - 99 mg/dL                    Assessment/Plan   Principal Problem:    A-V fistula (CMS/HCC)  Active Problems:    Gangrene of toe of right foot (CMS/HCC)    Anemia due to blood loss, acute    Critical limb ischemia of right lower extremity (CMS/HCC)    Tolerated hemodialysis yesterday with net fluid loss 2500cc.     Bp stable with tx, . Hypervolemic on exam and has stable electrolytes .K+=4.0     Outpatient Dialysis schedule:   [facility]/Dr Hannah; will be TTS while inpatient     Access: lt chest cath - no issues - able to achieve   -Lt arm access without thrill/bruit-Left arm was limited due to bandages. Per vascular surgery, no plan for any intervention, with only supportive care (arm elevation and local compression).    -US showed new acute non occlussive thrombus in his rt subclavian vein proximal segment. Also with a hematoma in his right upper arm     Anemia of ESRD:  3/25-increase  epoetin dane-epbx (Retacrit) injection 20,000 Units on dialysis days... current hgb 7.7.. will cont to monitor     CKD-MBD: not on Phosphate Binder...phos level 6.8. Will cont to monitor.. B complex-vitamin C-folic acid (Nephrocaps) capsule 1 capsule daily     Plan HD tomorrow with UF as tolerated     Renal diet      Please obtain daily standing wt (if possible)     Medication to be adjusted for ESRD      Patient to continue regular HD schedule while inpatient and to follow with the outpatient nephrologist at discharge        JENIFFER Bates-CNP

## 2024-04-01 NOTE — CARE PLAN
Problem: Skin  Goal: Promote skin healing  Flowsheets (Taken 4/1/2024 8712)  Promote skin healing: Turn/reposition every 2 hours/use positioning/transfer devices

## 2024-04-01 NOTE — PROGRESS NOTES
Assessment/Plan        Mr. Benton, aged 78, with a notable medical history that includes peripheral arterial disease, atrial fibrillation, coronary artery disease, type 2 diabetes mellitus, a right subclavian deep vein thrombosis in October 2023, gastroesophageal reflux disease, sick sinus syndrome with pacemaker implantation, gout, end-stage renal disease requiring hemodialysis, hyperlipidemia, hypotension, and heart failure with a reduced ejection fraction of 40-45% as of January 2023, was transferred from [facility] to [facility] due to bleeding at the site of his left upper arm arteriovenous fistula. Vascular surgery intervened with two figure-eight silk sutures to achieve hemostasis at the fistula site, and the patient was provided with a new tunneled hemodialysis catheter, followed by surgical ligation of the fistula. Postoperative management included effective pain control and stable hemoglobin and hematocrit levels after the resumption of anticoagulant and antiplatelet medications, and the central line was subsequently removed. Despite these measures, Mr. Benton showed signs of generalized edema, managed by the nephrology team for fluid overload. Persistent leukocytosis led to repeated blood cultures, which returned negative. Chest X-rays suggested volume overload, with a possible infiltrate noted, leading to the development of lower extremity rest pain and a productive cough. The patient maintained a stable oxygen requirement of 2 liters. Further evaluations, including wound care assessments, podiatry consultations, and vascular reevaluations, identified mild purulence at the TMA site and right lower extremity vascular disease. An angiogram revealed no distal blood flow below the knee, although physical examination showed the limb remained warm, indicating some perfusion. Despite previous procedures, no revascularization options were deemed available, raising the possibility of amputation pending further  evaluation. The patient's preference led to the cancellation of an MRI, and palliative care was consulted to discuss goals of care. Podiatry continued with palliative wound care, noting improvement in the wound. Infectious diseases recommended an increase in Zosyn dosage and discontinuation of Vancomycin. After additional discussions, endovascular surgery plans to attempt leg revascularization on April 1. Until then, Apixaban has been halted in favor of a heparin drip, awaiting further surgical recommendations.          -Getting revascularization 4/1.  Midline to be placed 4/1. Currently he is on therapeutic subcutaneous Lovenox protocol.  -We need to dose Cipro or Levaquin.  If he gets an IV we can restart his Zosyn.  If not he needs to get a dose 4/1 because it is every 48.  He got Levaquin 3/31.   -Need to ask podiatry if they plan to intervene after revascularization, this will affect ID decision.  -Otherwise he is close to ready to go back to SNF.  Very debilitated      ----------------------------------------------------------    # lacks IV access other than HD cath   -Lost his IV access, we are unable to get it again on another attempt.  Transition to subcu therapeutic heparin.  Transition to oral antibiotic temporarily.  Will discuss with nephrology if midline is needed and with ESBL last about plans.    #leukocytosis  #TMA site deep tissue infection in setting of RLE critical limb ischemia as below  #PNA - resolved  - treated for HAP  - TMA site infection with critical limb ischemia, planning revascularization.  Patient was on Zosyn 2.25 q. 8 hour, lost IV access and received Levaquin.  Will need to be dosed every 48 if he does not get access.  --1 grow Pseudomonas and Candida.   - IS, accapella, RT consult to eval/rec.   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  Given possibility of revascularization, will need to discuss if they plan any intervention after revascularization  -Palliative care consulted and assisting with goals of care conversations  -Reviewed PVRs and angiogram  -Hold apixaban.  Started on heparin drip per EVLS.  Patient lost IV access and will temporarily need to be on subcutaneous therapeutic  heparin.  Will discuss with the EVLS and nephrology if patient needs a line  -Planning intervention on Monday, n.p.o. at midnight    -#metabolic encephalopathy - improved  - had confusion and aphasia on transfer from [facility] to over 6, evaluated by code bat, less concern for large vessel occlusion, no correlating neurological deficits, no CT recommended, called off.  Recommended delirium workup  -Patient with some degree of cognitive impairment, high risk for hospital delirium and sundowning.  There was concern that change of rooms would lead to confusion, suspect change of environment was trigger.  He is doing better today.  - delirium precautions    #Bleeding from AV fistula s/p ligation and tunneled HD line placement - resolved  #anemia d/t acute blood loss and CKD  -Blood counts stable  -Has tunneled dialysis catheter  -Managed by vascular surgery      #MRSA colonization, on decolonization protocol due to central line placement.   -Completed protocol  -Still pending repeat MRSA nare, patient is potential for need for another central line as well.  Need to obtain.    #Chest pain   -Patient reports he has had intermittent chest pain for 2 weeks, this is his first time I have heard and mention this.  He states it worsened today.  Troponin was added onto the morning labs but never received, tried to get a lab draw at the end of dialysis when speaking with dialysis unit but it was not drawn.  He ended up going to MRI she could declined and then return to floor.  Troponin was negative, EKG  showing A-fib, lateral ST changes which are unchanged from prior.  His chest pain is very reproducible and worsened by pushing on his chest and also with coughing.   Overall suspicious for noncardiac etiology although he is a vasculopath.  He has no associated shortness of breath or other symptoms.  He is on appropriate medications including Plavix and apixaban. will continue to monitor, likely source is musculoskeletal.  Chest x-ray ordered-showed slight interval worsening of his interstitial edema.  He has since been weaned off of oxygen but currently borderline.  ACS was negative.  Chest pain has been improving, currently only bothering him when he is transferred/when they move him  -Echocardiogram obtained, showing continued reduced EF of 40 to 45%, there is a septal bulge, reduced RV function mildly, severe left atrial dilation, moderate RA dilation mild to moderate aortic stenosis, mild aortic valve regurg.     #MRSA nare positive  On MRSA decolonization protocol, ordered mupirocin and chlorhexidine bath  Femoral line removed on 3/14 as patient is clinically stable after starting apixaban and Plavix  Repeat MRSA nare for clearance, was not collected.  Will need to check with infection control if needed    #UE edema - likely related to anasarca, positioning and ESRD, mild improvement  # Respiratory failure with hypoxemia secondary to fluid overload -weaned off of oxygen  -US showed new acute non occlussive thrombus in his rt subclavian vein proximal segment. Also with a hematoma in his right upper arm. Left arm was limited due to bandages. Per vascular surgery, no plan for any intervention, with only supportive care (arm elevation and local compression).  -Completed treatment of pneumonia  -Chest x-rays have show continue interstitial pulmonary edema  -Remained on 2 L and weaned off oxygen on 3/29    #T2DM (last HgA1c 5.3% 12/3/23) c/b neuropathy  - accucheck ACHS  - Lispro SSI #1 0-5 units  - holding home dose  of Lantus 6 units to prevent episodes of hypoglycemia while hospitalized and HgA1c <7%. Monitor more need to resume.    - hypoglycemia order set placed  - continue home dose of Gabapentin 100 mg PO every day    #hyponatremia (chronic)  #ESRD on HD   - daily CHG bath d/t L chest dialysis cath site   - Nephrology consulted for HD   - renal diet  - renally dose meds as needed  - continue home dose of Nephrocaps PO every day  -continue HD per nephrology. Has had additional sessions this week for fluid removal. Care with fluid removal given chronic hypotension on midodrine.      #Afib  #hx of DVT (10/23)  - Duplex US of RUE done 10/22/23: Positive study for DVT in the right upper extremity.  There is a nonocclusive thrombus in the right subclavian artery.  - home dose of Eliquis on hold as above     #hypotension, chronic  - continue home dose of Midodrine 15 mg PO TID     #Malnutrition  -With anasarca, wound needs, starting to have increase in appetite.  Nutrition consulted and started on Nepro twice daily     # Hypokalemia  -Cautious replacement given ESRD, 3.3 today.  Patient has cardiac history and A-fib, given 20 mill equivalents.  Monitor    Patient is DNR, okay for perioperative and periprocedural/testing code reversal as needed  Would not want long-term vent support    Will need to discuss with the EVLS timing after procedure, in order to time precertification for our manner  Will need to discuss IV access today      Scheduled outpatient appointments in system:   Future Appointments   Date Time Provider Department Center   4/17/2024  1:00 PM Koko Gordon MD [id number] [facility]     ---------------------------------------------------------------------------------------------------  Subjective   No new events      ---------------------------------------------------------------------------------------------------  Objective   Last Recorded Vitals  Blood pressure 145/87, pulse 99, temperature 36.9 °C (98.4 °F),  "temperature source Tympanic, resp. rate 18, height 1.778 m (5' 10\"), weight 87 kg (191 lb 12.8 oz), SpO2 97 %.  Intake/Output last 3 Shifts:  I/O last 3 completed shifts:  In: 0 (0 mL/kg)   Out: 1 (0 mL/kg) [Stool:1]  Weight: 87 kg     Physical Exam  Vitals and nursing note reviewed.   Constitutional:       General: He is not in acute distress.     Appearance: He is obese. He is ill-appearing. He is not toxic-appearing.   HENT:      Head: Normocephalic and atraumatic.      Nose:      Comments: On nasal canula     Mouth/Throat:      Mouth: Mucous membranes are moist.   Eyes:      General: No scleral icterus.     Extraocular Movements: Extraocular movements intact.      Conjunctiva/sclera: Conjunctivae normal.   Cardiovascular:      Rate and Rhythm: Normal rate and regular rhythm.      Heart sounds: S1 normal and S2 normal. No murmur heard.     Comments: Severe pain with palpation of the chest wall  Pulmonary:      Effort: Pulmonary effort is normal. No respiratory distress.      Breath sounds: No wheezing or rhonchi.      Comments: Basilar rales  Abdominal:      General: Bowel sounds are normal. There is no distension.      Palpations: Abdomen is soft.      Tenderness: There is no abdominal tenderness. There is no guarding or rebound.   Musculoskeletal:         General: Swelling present. No deformity.      Cervical back: Neck supple.      Comments: Multiple LE wounds, R TMA dressed c/d/I  RUE >LUE swelling, skin wrinkling in left hand   Skin:     General: Skin is warm and dry.      Findings: No rash.      Comments: Unable to check below level of dressing fully but area check was warm, appeared to have some moderate return of color  with pressure applied   Neurological:      General: No focal deficit present.      Mental Status: He is alert. Mental status is at baseline.      Comments: Moving all extremities, no gross sensation changes.   Currently oriented and communicating well   Psychiatric:         Mood and " Affect: Mood normal.         Relevant Results  Lab Results   Component Value Date    WBC 7.7 04/01/2024    HGB 7.7 (L) 04/01/2024    HCT 29.0 (L) 04/01/2024     (H) 04/01/2024     04/01/2024      Lab Results   Component Value Date    GLUCOSE 141 (H) 04/01/2024    CALCIUM 9.8 04/01/2024     (L) 04/01/2024    K 4.0 04/01/2024    CO2 22 04/01/2024    CL 94 (L) 04/01/2024    BUN 41 (H) 04/01/2024    CREATININE 4.06 (H) 04/01/2024     Scheduled medications  allopurinol, 100 mg, oral, Daily  atorvastatin, 40 mg, oral, Nightly  B complex-vitamin C-folic acid, 1 capsule, oral, Daily  clopidogrel, 75 mg, oral, Daily  epoetin dane or biosimilar, 20,000 Units, intravenous, Once per day on Tue Thu Sat  gabapentin, 100 mg, oral, Daily  heparin, 250 Units/kg, subcutaneous, q12h  insulin lispro, 0-5 Units, subcutaneous, TID with meals  lidocaine, 5 mL, infiltration, Once  lubricating eye drops, 2 drop, Both Eyes, TID  melatonin, 5 mg, oral, Nightly  midodrine, 15 mg, oral, q8h  pantoprazole, 40 mg, oral, Daily before breakfast  [Held by provider] piperacillin-tazobactam, 2.25 g, intravenous, q8h  polyethylene glycol, 17 g, oral, Daily  sennosides-docusate sodium, 2 tablet, oral, BID  sodium hypochlorite, , irrigation, Daily      Continuous medications  [Held by provider] heparin, 0-4,500 Units/hr, Last Rate: 960 Units/hr (04/01/24 0931)    PRN medications  PRN medications: acetaminophen, albuterol, alteplase, benzonatate, dextrose 10 % in water (D10W), dextrose, glucagon, heparin, hydrOXYzine HCL, ipratropium-albuteroL, lidocaine, naloxone, ondansetron, [Held by provider] oxyCODONE, sodium chloride 0.9%    Wilma Redding MD

## 2024-04-01 NOTE — CONSULTS
Vascular Access Team  Consult     Visit Date: 4/1/2024      Patient Name: Chevy Benton         MRN: [id number]                Reason for Consult: Midline IV  Unable to place       Assessment:    Patient's right upper extremity assessed using ultrasound. The left arm was not assessed due to presence of hemodialysis access. Attempts to adequately visualize vessels of the RUE were unsuccessful. Severity of overlying edema complicated exam with visualized segments coursing below depth suitable for cannulation. Chart review significant for right subclavian thrombus on 3/19. Assessment d/w bedside RN.      Plan:   If venous access will be necessary to continue patient's treatment, please discuss with primary provider and request they consider engaging interventional radiology team per nephrology recommendation from 4/1 0818.       Rubén Lui RN  4/1/2024  12:06 PM

## 2024-04-01 NOTE — PROGRESS NOTES
Chevy Benton is a 78 y.o. male on day 22 of admission presenting with A-V fistula (CMS/HCC).    Seen on HD   Orders reviewed - No changes.   Breanna Metcalf MD

## 2024-04-01 NOTE — CARE PLAN
Contacted regarding Nephro approval for midline due to lack of IV access. Pt is ESRD via L TDC. Ideally goal should be to preserve UE vasculature for future AVF/AVG. Could consider tunneled PICC for access but can not be done then ok to place midline.    Filiberto Suarez MD  Nephrology Fellow PGY 5  Contact via secure chat  Nephrology Pager # [phone] ([phone])

## 2024-04-01 NOTE — CARE PLAN
Problem: Pain  Goal: My pain/discomfort is manageable  Outcome: Progressing     Problem: Safety  Goal: Patient will be injury free during hospitalization  Outcome: Progressing  Goal: I will remain free of falls  Outcome: Progressing     Problem: Daily Care  Goal: Daily care needs are met  Outcome: Progressing     Problem: Psychosocial Needs  Goal: Demonstrates ability to cope with hospitalization/illness  Outcome: Progressing  Goal: Collaborate with me, my family, and caregiver to identify my specific goals  Outcome: Progressing     Problem: Discharge Barriers  Goal: My discharge needs are met  Outcome: Progressing     Problem: Skin  Goal: Prevent/manage excess moisture  Outcome: Progressing  Goal: Promote skin healing  Outcome: Progressing  Goal: Decreased wound size/increased tissue granulation at next dressing change  Outcome: Progressing  Goal: Participates in plan/prevention/treatment measures  Outcome: Progressing  Goal: Prevent/minimize sheer/friction injuries  Outcome: Progressing  Goal: Promote/optimize nutrition  Outcome: Progressing     Problem: Pain - Adult  Goal: Verbalizes/displays adequate comfort level or baseline comfort level  Outcome: Progressing     Problem: Diabetes  Goal: Achieve decreasing blood glucose levels by end of shift  Outcome: Progressing  Goal: Increase stability of blood glucose readings by end of shift  Outcome: Progressing  Goal: Decrease in ketones present in urine by end of shift  Outcome: Progressing  Goal: Maintain electrolyte levels within acceptable range throughout shift  Outcome: Progressing  Goal: Maintain glucose levels >70mg/dl to <250mg/dl throughout shift  Outcome: Progressing  Goal: No changes in neurological exam by end of shift  Outcome: Progressing  Goal: Learn about and adhere to nutrition recommendations by end of shift  Outcome: Progressing  Goal: Vital signs within normal range for age by end of shift  Outcome: Progressing  Goal: Increase self care and/or  family involovement by end of shift  Outcome: Progressing  Goal: Receive DSME education by end of shift  Outcome: Progressing

## 2024-04-02 NOTE — CARE PLAN
The patient's goals for the shift include      The clinical goals for the shift include Patient will remain free from injury through end of shift.      Problem: Pain  Goal: My pain/discomfort is manageable  Outcome: Progressing     Problem: Safety  Goal: Patient will be injury free during hospitalization  Outcome: Progressing  Goal: I will remain free of falls  Outcome: Progressing     Problem: Daily Care  Goal: Daily care needs are met  Outcome: Progressing     Problem: Psychosocial Needs  Goal: Demonstrates ability to cope with hospitalization/illness  Outcome: Progressing  Goal: Collaborate with me, my family, and caregiver to identify my specific goals  Outcome: Progressing     Problem: Discharge Barriers  Goal: My discharge needs are met  Outcome: Progressing     Problem: Skin  Goal: Prevent/manage excess moisture  Outcome: Progressing  Flowsheets (Taken 4/1/2024 2135)  Prevent/manage excess moisture: Moisturize dry skin  Goal: Promote skin healing  Outcome: Progressing  Flowsheets (Taken 4/1/2024 2135)  Promote skin healing: Turn/reposition every 2 hours/use positioning/transfer devices  Goal: Decreased wound size/increased tissue granulation at next dressing change  Outcome: Progressing  Flowsheets (Taken 4/1/2024 2135)  Decreased wound size/increased tissue granulation at next dressing change: Promote sleep for wound healing  Goal: Participates in plan/prevention/treatment measures  Outcome: Progressing  Flowsheets (Taken 4/1/2024 2135)  Participates in plan/prevention/treatment measures: Elevate heels  Goal: Prevent/minimize sheer/friction injuries  Outcome: Progressing  Flowsheets (Taken 4/1/2024 2135)  Prevent/minimize sheer/friction injuries: Turn/reposition every 2 hours/use positioning/transfer devices  Goal: Promote/optimize nutrition  Outcome: Progressing  Flowsheets (Taken 4/1/2024 2135)  Promote/optimize nutrition: Monitor/record intake including meals     Problem: Pain - Adult  Goal:  Verbalizes/displays adequate comfort level or baseline comfort level  Outcome: Progressing     Problem: Diabetes  Goal: Achieve decreasing blood glucose levels by end of shift  Outcome: Progressing  Goal: Increase stability of blood glucose readings by end of shift  Outcome: Progressing  Goal: Decrease in ketones present in urine by end of shift  Outcome: Progressing  Goal: Maintain electrolyte levels within acceptable range throughout shift  Outcome: Progressing  Goal: Maintain glucose levels >70mg/dl to <250mg/dl throughout shift  Outcome: Progressing  Goal: No changes in neurological exam by end of shift  Outcome: Progressing  Goal: Learn about and adhere to nutrition recommendations by end of shift  Outcome: Progressing  Goal: Vital signs within normal range for age by end of shift  Outcome: Progressing  Goal: Increase self care and/or family involovement by end of shift  Outcome: Progressing  Goal: Receive DSME education by end of shift  Outcome: Progressing     Problem: Pain  Goal: Takes deep breaths with improved pain control throughout the shift  Outcome: Progressing  Goal: Turns in bed with improved pain control throughout the shift  Outcome: Progressing  Goal: Walks with improved pain control throughout the shift  Outcome: Progressing  Goal: Performs ADL's with improved pain control throughout shift  Outcome: Progressing  Goal: Participates in PT with improved pain control throughout the shift  Outcome: Progressing  Goal: Free from opioid side effects throughout the shift  Outcome: Progressing  Goal: Free from acute confusion related to pain meds throughout the shift  Outcome: Progressing

## 2024-04-02 NOTE — PROGRESS NOTES
Chevy Benton is a 78 y.o. male on day 25 of admission presenting with A-V fistula (CMS/HCC).    Subjective   Interval History:   -NAEO  -This afternoon he reports feeling well overall, though still feels edematous  -Denies fevers, chills, sweats, cough, abdominal pain, worsening LE swelling      Objective   Range of Vitals (last 24 hours)  Heart Rate:  []   Temp:  [36.3 °C (97.3 °F)-36.8 °C (98.2 °F)]   Resp:  [17]   BP: (130-143)/(87-94)   SpO2:  [98 %-99 %]   Daily Weight  03/19/24 : 87 kg (191 lb 12.8 oz)    Body mass index is 27.52 kg/m².    Physical Exam  Constitutional:       Appearance: He is obese. He is ill-appearing.   HENT:      Mouth/Throat:      Mouth: Mucous membranes are dry.   Eyes:      Extraocular Movements: Extraocular movements intact.      Conjunctiva/sclera: Conjunctivae normal.   Cardiovascular:      Rate and Rhythm: Regular rhythm.      Heart sounds: No murmur heard.  Pulmonary:      Effort: Pulmonary effort is normal.   Abdominal:      General: There is no distension.      Palpations: Abdomen is soft.      Tenderness: There is no abdominal tenderness.   Musculoskeletal:      Comments: S/p R TMA, dressing in place   Neurological:      Mental Status: He is alert.       Relevant Results  Labs  Results from last 72 hours   Lab Units 04/02/24  0641 04/01/24  0727 03/31/24  0547   WBC AUTO x10*3/uL 9.9 7.7 7.7   HEMOGLOBIN g/dL 8.5* 7.7* 7.9*   HEMATOCRIT % 31.9* 29.0* 28.9*   PLATELETS AUTO x10*3/uL 350 312 394       Results from last 72 hours   Lab Units 04/02/24  0641 04/01/24  0727 03/31/24  0547   SODIUM mmol/L 132* 132* 133*   POTASSIUM mmol/L 4.3 4.0 4.0   CHLORIDE mmol/L 94* 94* 94*   CO2 mmol/L 19* 22 23   BUN mg/dL 52* 41* 34*   CREATININE mg/dL 4.59* 4.06* 3.49*   GLUCOSE mg/dL 119* 141* 151*   CALCIUM mg/dL 9.7 9.8 9.4   ANION GAP mmol/L 23* 20 20   EGFR mL/min/1.73m*2 12* 14* 17*   PHOSPHORUS mg/dL 7.7* 6.8* 5.5*       Results from last 72 hours   Lab Units 04/02/24  0641  "04/01/24  0727 03/31/24  0547   ALBUMIN g/dL 3.0* 2.7* 2.5*       Estimated Creatinine Clearance: 13.5 mL/min (A) (by C-G formula based on SCr of 4.59 mg/dL (H)).  C-Reactive Protein   Date Value Ref Range Status   10/07/2023 11.53 (H) <1.00 mg/dL Final   10/01/2023 21.44 (H) <1.00 mg/dL Final     Microbiology  -03/17 Wound culture (swab of purulence): PsA (pan-S), C. Parapsilosis  -03/19 Bcx: NG  -03/23 Bcx: NG    Assessment/Plan   78yM with ESRD on HD MWF, PAD, A fib on Eliquis, T2DM, CAD, GERD, sick sinus syndrome s/p permanent pacemaker, HFrEF (40-45% LVEF), right transmetatarsal amputation (9/22/23) s/p R foot debridement to level of muscle/fascia (1/19/24) who presented to the ED on 3/8/24 with spontaneous bleeding from L AV brachiocephalic fistula. Later found with purulent wound of R TMA.    Mr. 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 He underwent a palliative debridement with Podiatry on 3/17 and is planned for revascularization as an outpatient. Palliative wound care is planned by Podiatry at this point. For now, it seems like bony source control may not be achieved, but if this plan changes as an outpatient, we can reassess. Will at least cover for bacterial causes for now.    Recommendations:  -Appreciate the care of all involved teams  -Plan for 6 weeks of cefepime 2g post-HD from 3/17 (end 4/28)  -Please request weekly CBCdiff and CMP and fax results to ID clinic at [phone] with \"Re: attending Dr. Mcfarland\"  -I will see him in 2-3 weeks time to review any further interventions for his foot. If none are planned, I will likely stop therapy at that time, as the utility of further therapy without further bony source control is limited. If there is intervention, I may add isavuconazole therapy for his C. Parapsilosis  -Will sign off     Please contact ID Team A (fellow Chance Baldwin MD or myself) via Epic Chat or pager [phone] with questions.     Lavon Mcfraland MD  "

## 2024-04-02 NOTE — PROGRESS NOTES
Renal Staff HD Note    Patient seen, examined on dialysis   Tolerating treatment without event    136/80  L AVF O2 per NC 2K DW 86.9 (bed wt 93.2)  Continue treatment per submitted orders  3L per protocol  IUF tomorrow, pt agreeable    Limit Na and fluids as able

## 2024-04-02 NOTE — PROGRESS NOTES
Occupational Therapy    Communication Note    Missed Visit: Yes  Missed Visit Reason:  (Pt off floor at HD. Will reattempt time permitting)      04/02/24 at 7:49 AM   Stephie Corbin OT   Rehab Office: [phone]

## 2024-04-02 NOTE — NURSING NOTE
Report to Receiving RN:    Report To: Brianna RN   Time Report Called: 1112  Hand-Off Communication: HD completed and tolerated well, no issues during tx. Removed 3 L. Post /90 HR 98. Some c/o of difficulty breathing, RT was paged twice to give breathing tx however no one arrived. He's fine now, sats 100% with O2 at 3 L.   Complications During Treatment: No  Ultrafiltration Treatment: Yes  Medications Administered During Dialysis: No  Blood Products Administered During Dialysis: No  Labs Sent During Dialysis: No  Heparin Drip Rate Changes: No  Dialysis Catheter Dressing: CDI  Last Dressing Change: 3/30/24

## 2024-04-02 NOTE — PROGRESS NOTES
Assessment/Plan        Mr. Benton, aged 78, with a notable medical history that includes peripheral arterial disease, atrial fibrillation, coronary artery disease, type 2 diabetes mellitus, a right subclavian deep vein thrombosis in October 2023, gastroesophageal reflux disease, sick sinus syndrome with pacemaker implantation, gout, end-stage renal disease requiring hemodialysis, hyperlipidemia, hypotension, and heart failure with a reduced ejection fraction of 40-45% as of January 2023, was transferred from [facility] to [facility] due to bleeding at the site of his left upper arm arteriovenous fistula. Vascular surgery intervened with two figure-eight silk sutures to achieve hemostasis at the fistula site, and the patient was provided with a new tunneled hemodialysis catheter, followed by surgical ligation of the fistula. Postoperative management included effective pain control and stable hemoglobin and hematocrit levels after the resumption of anticoagulant and antiplatelet medications, and the central line was subsequently removed. Despite these measures, Mr. Benton showed signs of generalized edema, managed by the nephrology team for fluid overload. Persistent leukocytosis led to repeated blood cultures, which returned negative. Chest X-rays suggested volume overload, with a possible infiltrate noted, leading to the development of lower extremity rest pain and a productive cough. The patient maintained a stable oxygen requirement of 2 liters. Further evaluations, including wound care assessments, podiatry consultations, and vascular reevaluations, identified mild purulence at the TMA site and right lower extremity vascular disease. An angiogram revealed no distal blood flow below the knee, although physical examination showed the limb remained warm, indicating some perfusion. Despite previous procedures, no revascularization options were deemed available, raising the possibility of amputation pending further  "evaluation. The patient's preference led to the cancellation of an MRI, and palliative care was consulted to discuss goals of care. Podiatry continued with palliative wound care, noting improvement in the wound.          -Unable to place midline, Pt is more alert today and is  complaining of left leg pain, controlled with lidocaine patch.  -Endovascular team postponed the procedure (Dr. Linder), Plan for outpatient.     -Plan for IUF tomorrow; ever after removing 3 L today he will be 3L above his DW; wanted to make sure he's on a low Na, fluid restricted diet so we can keep chipping away at this  -Abx:  cefepime 2g post-HD for 6 weeks, (end 4/28).  -Resumed Eliquis.   -Otherwise he is close to ready to go back to SNF.  Very debilitated.    -Will request weekly CBCdiff and CMP and fax results to ID clinic at [phone] with \"Re: attending Dr. Mcfarland\"    ----------------------------------------------------------    # lacks IV access other than HD cath   -Lost his IV access, we are unable to get it again on another attempt.  Transition to subcu therapeutic heparin.  Transition to oral antibiotic temporarily.  Will discuss with nephrology if midline is needed and with ESBL last about plans.    #leukocytosis  #TMA site deep tissue infection in setting of RLE critical limb ischemia as below  #PNA - resolved  - treated for HAP  - TMA site infection with critical limb ischemia, planning revascularization.  Patient was on Zosyn 2.25 q. 8 hour, lost IV access and received Levaquin.  Will need to be dosed every 48 if he does not get access.  --1 grow Pseudomonas and Candida.   - IS, accapella, RT consult to eval/rec.   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  Given possibility of revascularization, will need to discuss " if they plan any intervention after revascularization  -Palliative care consulted and assisting with goals of care conversations  -Reviewed PVRs and angiogram  -Hold apixaban.  Started on heparin drip per EVLS.  Patient lost IV access and will temporarily need to be on subcutaneous therapeutic  heparin.  Will discuss with the EVLS and nephrology if patient needs a line  -Planning intervention on Monday, n.p.o. at midnight    -#metabolic encephalopathy - improved  - had confusion and aphasia on transfer from [facility] to over 6, evaluated by code bat, less concern for large vessel occlusion, no correlating neurological deficits, no CT recommended, called off.  Recommended delirium workup  -Patient with some degree of cognitive impairment, high risk for hospital delirium and sundowning.  There was concern that change of rooms would lead to confusion, suspect change of environment was trigger.  He is doing better today.  - delirium precautions    #Bleeding from AV fistula s/p ligation and tunneled HD line placement - resolved  #anemia d/t acute blood loss and CKD  -Blood counts stable  -Has tunneled dialysis catheter  -Managed by vascular surgery      #MRSA colonization, on decolonization protocol due to central line placement.   -Completed protocol  -Still pending repeat MRSA nare, patient is potential for need for another central line as well.  Need to obtain.    #Chest pain   -Patient reports he has had intermittent chest pain for 2 weeks, this is his first time I have heard and mention this.  He states it worsened today.  Troponin was added onto the morning labs but never received, tried to get a lab draw at the end of dialysis when speaking with dialysis unit but it was not drawn.  He ended up going to MRI she could declined and then return to floor.  Troponin was negative, EKG showing A-fib, lateral ST changes which are unchanged from prior.  His chest pain is very reproducible and worsened by pushing  on his chest and also with coughing.   Overall suspicious for noncardiac etiology although he is a vasculopath.  He has no associated shortness of breath or other symptoms.  He is on appropriate medications including Plavix and apixaban. will continue to monitor, likely source is musculoskeletal.  Chest x-ray ordered-showed slight interval worsening of his interstitial edema.  He has since been weaned off of oxygen but currently borderline.  ACS was negative.  Chest pain has been improving, currently only bothering him when he is transferred/when they move him  -Echocardiogram obtained, showing continued reduced EF of 40 to 45%, there is a septal bulge, reduced RV function mildly, severe left atrial dilation, moderate RA dilation mild to moderate aortic stenosis, mild aortic valve regurg.     #MRSA nare positive  On MRSA decolonization protocol, ordered mupirocin and chlorhexidine bath  Femoral line removed on 3/14 as patient is clinically stable after starting apixaban and Plavix  Repeat MRSA nare for clearance, was not collected.  Will need to check with infection control if needed    #UE edema - likely related to anasarca, positioning and ESRD, mild improvement  # Respiratory failure with hypoxemia secondary to fluid overload -weaned off of oxygen  -US showed new acute non occlussive thrombus in his rt subclavian vein proximal segment. Also with a hematoma in his right upper arm. Left arm was limited due to bandages. Per vascular surgery, no plan for any intervention, with only supportive care (arm elevation and local compression).  -Completed treatment of pneumonia  -Chest x-rays have show continue interstitial pulmonary edema  -Remained on 2 L and weaned off oxygen on 3/29    #T2DM (last HgA1c 5.3% 12/3/23) c/b neuropathy  - accucheck ACHS  - Lispro SSI #1 0-5 units  - holding home dose of Lantus 6 units to prevent episodes of hypoglycemia while hospitalized and HgA1c <7%. Monitor more need to resume.    -  "hypoglycemia order set placed  - continue home dose of Gabapentin 100 mg PO every day    #hyponatremia (chronic)  #ESRD on HD   - daily CHG bath d/t L chest dialysis cath site   - Nephrology consulted for HD   - renal diet  - renally dose meds as needed  - continue home dose of Nephrocaps PO every day  -continue HD per nephrology. Has had additional sessions this week for fluid removal. Care with fluid removal given chronic hypotension on midodrine.      #Afib  #hx of DVT (10/23)  - Duplex US of RUE done 10/22/23: Positive study for DVT in the right upper extremity.  There is a nonocclusive thrombus in the right subclavian artery.  - home dose of Eliquis on hold as above     #hypotension, chronic  - continue home dose of Midodrine 15 mg PO TID     #Malnutrition  -With anasarca, wound needs, starting to have increase in appetite.  Nutrition consulted and started on Nepro twice daily     # Hypokalemia  -Cautious replacement given ESRD, 3.3 today.  Patient has cardiac history and A-fib, given 20 mill equivalents.  Monitor    Patient is DNR, okay for perioperative and periprocedural/testing code reversal as needed  Would not want long-term vent support    Will need to discuss with the EVLS timing after procedure, in order to time precertification for our manner  Will need to discuss IV access today      Scheduled outpatient appointments in system:   Future Appointments   Date Time Provider Department Center   4/17/2024  1:00 PM Koko Gordon MD [id number]     ---------------------------------------------------------------------------------------------------  Subjective   No new events      ---------------------------------------------------------------------------------------------------  Objective   Last Recorded Vitals  Blood pressure 130/87, pulse 108, temperature 36.6 °C (97.9 °F), temperature source Temporal, resp. rate 17, height 1.778 m (5' 10\"), weight 87 kg (191 lb 12.8 oz), SpO2 98 %.  Intake/Output " last 3 Shifts:  No intake/output data recorded.    Physical Exam  Vitals and nursing note reviewed.   Constitutional:       General: He is not in acute distress.     Appearance: He is obese. He is ill-appearing. He is not toxic-appearing.   HENT:      Head: Normocephalic and atraumatic.      Nose:      Comments: On nasal canula     Mouth/Throat:      Mouth: Mucous membranes are moist.   Eyes:      General: No scleral icterus.     Extraocular Movements: Extraocular movements intact.      Conjunctiva/sclera: Conjunctivae normal.   Cardiovascular:      Rate and Rhythm: Normal rate and regular rhythm.      Heart sounds: S1 normal and S2 normal. No murmur heard.     Comments: Severe pain with palpation of the chest wall  Pulmonary:      Effort: Pulmonary effort is normal. No respiratory distress.      Breath sounds: No wheezing or rhonchi.      Comments: Basilar rales  Abdominal:      General: Bowel sounds are normal. There is no distension.      Palpations: Abdomen is soft.      Tenderness: There is no abdominal tenderness. There is no guarding or rebound.   Musculoskeletal:         General: Swelling present. No deformity.      Cervical back: Neck supple.      Comments: Multiple LE wounds, R TMA dressed c/d/I  RUE >LUE swelling, skin wrinkling in left hand   Skin:     General: Skin is warm and dry.      Findings: No rash.      Comments: Unable to check below level of dressing fully but area check was warm, appeared to have some moderate return of color  with pressure applied   Neurological:      General: No focal deficit present.      Mental Status: He is alert. Mental status is at baseline.      Comments: Moving all extremities, no gross sensation changes.   Currently oriented and communicating well   Psychiatric:         Mood and Affect: Mood normal.         Relevant Results  Lab Results   Component Value Date    WBC 9.9 04/02/2024    HGB 8.5 (L) 04/02/2024    HCT 31.9 (L) 04/02/2024     (H) 04/02/2024    PLT  350 04/02/2024      Lab Results   Component Value Date    GLUCOSE 119 (H) 04/02/2024    CALCIUM 9.7 04/02/2024     (L) 04/02/2024    K 4.3 04/02/2024    CO2 19 (L) 04/02/2024    CL 94 (L) 04/02/2024    BUN 52 (H) 04/02/2024    CREATININE 4.59 (H) 04/02/2024     Scheduled medications  allopurinol, 100 mg, oral, Daily  apixaban, 2.5 mg, oral, BID  atorvastatin, 40 mg, oral, Nightly  B complex-vitamin C-folic acid, 1 capsule, oral, Daily  clopidogrel, 75 mg, oral, Daily  epoetin dane or biosimilar, 20,000 Units, intravenous, Once per day on Tue Thu Sat  gabapentin, 100 mg, oral, Daily  insulin lispro, 0-5 Units, subcutaneous, TID with meals  lidocaine, 5 mL, infiltration, Once  lidocaine, 2 patch, transdermal, Daily  lubricating eye drops, 2 drop, Both Eyes, TID  melatonin, 5 mg, oral, Nightly  midodrine, 15 mg, oral, q8h  pantoprazole, 40 mg, oral, Daily before breakfast  [Held by provider] piperacillin-tazobactam, 2.25 g, intravenous, q8h  polyethylene glycol, 17 g, oral, Daily  sennosides-docusate sodium, 2 tablet, oral, BID  sodium hypochlorite, , irrigation, Daily      Continuous medications     PRN medications  PRN medications: acetaminophen, albuterol, alteplase, benzonatate, dextrose 10 % in water (D10W), dextrose, glucagon, hydrOXYzine HCL, ipratropium-albuteroL, lidocaine, naloxone, ondansetron, [Held by provider] oxyCODONE, sodium chloride 0.9%    Wilma Redding MD

## 2024-04-02 NOTE — PROGRESS NOTES
SAEED sent updates to [facility]. Patient will need precert for discharge. SW updated TCC/MD. Per update patient is ready for dc today. SW requested precert team to initiate precert. SW updated SNF. SAEED will continue to follow.        JEAN-PIERRE Means

## 2024-04-02 NOTE — PROGRESS NOTES
"Chevy Benton is a 78 y.o. male on day 25 of admission presenting with A-V fistula (CMS/HCC).    Subjective   Pt resting comfortably in bed, denies SOB, n/v, fatigue. No family at bedside.    Objective     Physical Exam  Vitals reviewed.   Constitutional:       General: He is awake.      Appearance: He is overweight. He is ill-appearing.      Interventions: Nasal cannula in place.   HENT:      Head: Normocephalic and atraumatic.      Nose: Nose normal.      Mouth/Throat:      Mouth: Mucous membranes are dry.   Eyes:      Extraocular Movements: Extraocular movements intact.      Pupils: Pupils are equal, round, and reactive to light.   Cardiovascular:      Rate and Rhythm: Normal rate and regular rhythm.   Pulmonary:      Effort: Pulmonary effort is normal.      Breath sounds: Normal breath sounds.   Abdominal:      General: There is distension.      Palpations: Abdomen is soft.   Skin:     General: Skin is warm and dry.   Neurological:      Mental Status: He is alert. He is disoriented.      Comments: A&Ox2   Psychiatric:         Mood and Affect: Mood normal.         Behavior: Behavior normal.         Thought Content: Thought content normal.         Judgment: Judgment normal.         Last Recorded Vitals  Blood pressure (!) 143/94, pulse 97, temperature 36.3 °C (97.3 °F), temperature source Temporal, resp. rate 17, height 1.778 m (5' 10\"), weight 87 kg (191 lb 12.8 oz), SpO2 98 %.  Intake/Output last 3 Shifts:  No intake/output data recorded.    Relevant Results  Scheduled medications  allopurinol, 100 mg, oral, Daily  apixaban, 2.5 mg, oral, BID  atorvastatin, 40 mg, oral, Nightly  B complex-vitamin C-folic acid, 1 capsule, oral, Daily  [START ON 4/3/2024] cefepime, 2 g, intravenous, Once per day on Mon Wed Fri  clopidogrel, 75 mg, oral, Daily  epoetin dane or biosimilar, 20,000 Units, intravenous, Once per day on Tue Thu Sat  gabapentin, 100 mg, oral, Daily  insulin lispro, 0-5 Units, subcutaneous, TID with " meals  lidocaine, 5 mL, infiltration, Once  lidocaine, 2 patch, transdermal, Daily  lubricating eye drops, 2 drop, Both Eyes, TID  melatonin, 5 mg, oral, Nightly  midodrine, 15 mg, oral, q8h  pantoprazole, 40 mg, oral, Daily before breakfast  polyethylene glycol, 17 g, oral, Daily  sennosides-docusate sodium, 2 tablet, oral, BID  sodium hypochlorite, , irrigation, Daily      Continuous medications     PRN medications  PRN medications: acetaminophen, albuterol, alteplase, benzonatate, dextrose 10 % in water (D10W), dextrose, glucagon, hydrOXYzine HCL, ipratropium-albuteroL, lidocaine, naloxone, ondansetron, [Held by provider] oxyCODONE, sodium chloride 0.9%    Results for orders placed or performed during the hospital encounter of 03/08/24 (from the past 24 hour(s))   POCT GLUCOSE   Result Value Ref Range    POCT Glucose 108 (H) 74 - 99 mg/dL   Hepatitis B surface antigen   Result Value Ref Range    Hepatitis B Surface AG Nonreactive Nonreactive   POCT GLUCOSE   Result Value Ref Range    POCT Glucose 122 (H) 74 - 99 mg/dL   CBC   Result Value Ref Range    WBC 9.9 4.4 - 11.3 x10*3/uL    nRBC 0.2 (H) 0.0 - 0.0 /100 WBCs    RBC 3.10 (L) 4.50 - 5.90 x10*6/uL    Hemoglobin 8.5 (L) 13.5 - 17.5 g/dL    Hematocrit 31.9 (L) 41.0 - 52.0 %     (H) 80 - 100 fL    MCH 27.4 26.0 - 34.0 pg    MCHC 26.6 (L) 32.0 - 36.0 g/dL    RDW 19.0 (H) 11.5 - 14.5 %    Platelets 350 150 - 450 x10*3/uL   Renal Function Panel   Result Value Ref Range    Glucose 119 (H) 74 - 99 mg/dL    Sodium 132 (L) 136 - 145 mmol/L    Potassium 4.3 3.5 - 5.3 mmol/L    Chloride 94 (L) 98 - 107 mmol/L    Bicarbonate 19 (L) 21 - 32 mmol/L    Anion Gap 23 (H) 10 - 20 mmol/L    Urea Nitrogen 52 (H) 6 - 23 mg/dL    Creatinine 4.59 (H) 0.50 - 1.30 mg/dL    eGFR 12 (L) >60 mL/min/1.73m*2    Calcium 9.7 8.6 - 10.6 mg/dL    Phosphorus 7.7 (H) 2.5 - 4.9 mg/dL    Albumin 3.0 (L) 3.4 - 5.0 g/dL   POCT GLUCOSE   Result Value Ref Range    POCT Glucose 111 (H) 74 - 99  mg/dL     MR foot right wo IV contrast    Result Date: 3/26/2024  These images are not reportable by radiology and will not be interpreted by  Radiologists.    Invasive vascular procedure    Result Date: 3/22/2024   [facility], Cath Lab, [address] [place] [address] Cardiovascular Catheterization Report Patient Name:      SHAMEKA SUN    Performing Physician:  Jennifer Nguyen MD Study Date:        3/20/2024            Verifying Physician:   Jennifer Nguyen MD MRN/PID:           [id number]             Cardiologist/Co-scrub: Accession#:        [id number]         Ordering Physician:    [id number] ARELIS MEJIAS Date of Birth/Age: 1945 / 78 years Fellow:                [id number] Darnell Umanzor MD Gender:            M                    Fellow:                [id number] Raymon Jay MD Encounter#:        [id number]  Study: Peripheral Angiogram  Indications: SHAMEKA SUN is a 78 year old male who presents with coronary artery disease, chronic kidney disease, hypertension, diabetes mellitus and peripheral artery disease, ESRD, CHF, T2DM, PAD with CLTI RLE s/p two prior complex tibial and pedal revascularization procedures who presents with persistent nonhealing TMA/Lisfranc amputation RLE. Plan for angiogram with possible intervention today.  Procedure Description: After infiltration with 2% Lidocaine utilizing two-dimensional ultrasound guidance, the right radial artery was cannulated using a modified Seldinger technique. Subsequently a 6 Finnish sheath was placed antegrade in the right radial artery. After completion of the procedure, TR Band compression device was placed per protocol. Right radial artery was accessed under ultrasound guidance with S-MADDI, followed by placement of 6Fr slender sheath. IM  catheter over Versacore wire advanced into the descending aorta and cannulating the right iliac system. Quick Cross catheter was advanced into the R EIA and mid SFA where diagnostic angiograms were obtained.  Right Lower Extremity Arterial Findings: Right Common Iliac Artery: The right common iliac artery revealed no evidence of significant disease. Right Extermal Iliac Artery: The right external iliac artery revealed no evidence of significant disease. Right Internal Iliac Artery: The right internal iliac artery revealed no evidence of significant disease. Right Common Femoral Artery: The right common femoral artery revealed no evidence of significant disease. Right Profunda Femoris Artery: The right profunda femoris artery revealed no evidence of significant disease. Right Superior Femoral Artery: The right superficial femoral artery revealed mild proximal obstruction and mild irregularities. Right Popliteal Artery: The right popliteal artery revealed mild atherosclerotic disease, mild calcification and mild irregularities. Right Tibial-Peroneal Trunk: The right tibial-peroneal trunk revealed no evidence of significant disease. Right Anterior Tibial Artery: The right anterior tibial artery revealed mild proximal irregularities and chronic occlusion originating at the mid to distal segment. Right Posterior Tibial Artery: The right posterior tibial artery revealed chronic occlusion. Right Peroneal Artery: The right peroneal artery revealed chronic occlusion originating at the mid segment. Right Dorsalis Pedis Artery: The right dorsalis pedis artery revealed chronic occlusion.  Hemo Personnel: +----------+---------+ Name      Duty      +----------+---------+ Stuart Nguyen MDPROC MD 1 +----------+---------+  Hemodynamic Pressures:  +----+---------------------+----------+-------------+--------------+---------+ Site      Date Time      Phase NameSystolic mmHgDiastolic mmHgMean mmHg  +----+---------------------+----------+-------------+--------------+---------+   AO3/20/2024 11:58:53 AM      Rest          117            63       80 +----+---------------------+----------+-------------+--------------+---------+   AO3/20/2024 12:07:49 PM      Rest          140            61       84 +----+---------------------+----------+-------------+--------------+---------+  Complications: No in-lab complications observed.  Cardiac Cath Post Procedure Notes: Post Procedure Diagnosis: CLTI RLE RC VI. Blood Loss:               Estimated blood loss during the procedure was 10 mls. Specimens Removed:        Number of specimen(s) removed: none.  Recommendations: Maximize medical therapy. Agressive risk factor modification efforts. ____________________________________________________________________________________ CONCLUSIONS:  1. Indication: RLE CLTI RC VI.  2. Interventions: RLE angiogram, no intervention performed.  3. Findings: Reocclusion of PT and pedal arch with no major vessel within the foot.  4. Plan: Will consider LSAC discussion evaluation for amputation candidacy given severe tibial and pedal disease recalcitrant to two prior complex tibial/pedal revascularization procedures. ICD 10 Codes: Atherosclerosis of native arteries of right leg with ulceration of other part of foot-I70.235  CPT Codes: Moderate Sedation Services initial 15 minutes patient >5 years-29007; Moderate Sedation Services 1st additional 15 minutes patient >5 years-02019  [id number] Stuart Nguyen MD Performing Physician Electronically signed by [id number] Nguyen MD on 3/22/2024 at 9:43:33 AM  ** Final **     Vascular US Ankle Brachial Index (RAJ) Without Exercise    Result Date: 3/21/2024            [place] [address]   Tel [phone] and Fax [phone]  Vascular Lab Report [facility] US ANKLE BRACHIAL INDEX (RAJ) WITHOUT EXERCISE  Patient Name:      SHAMEKA Mendez Physician:   [id number] Stuart Nguyen MD Study Date:        3/19/2024            Ordering Physician: [id number] ARELIS MEJIAS MRN/PID:           [id number]             Technologist:       Beena Craft T Accession#:        [id number]         Technologist 2: Date of Birth/Age: 1945 / 78 years Encounter#:         [id number] Gender:            M Admission Status:  Inpatient            Location Performed: [facility]  Diagnosis/ICD: Peripheral vascular disease, unspecified-I73.9 CPT Codes:     24462 Peripheral artery RAJ Only  CONCLUSIONS: Right Lower PVR: Evidence of moderate arterial occlusive disease in the right lower extremity at rest. Right pressures of >220 mmHg suggest no compressibility of vessels and may make absolute Segmental Limb Pressures (SLP) unreliable. Monophasic flow is noted in the right common femoral artery and right posterior tibial artery. Biphasic flow is noted in the right dorsalis pedis artery. S/p TMA RLE. Left Lower PVR: Evidence of mild arterial occlusive disease in the left lower extremity at rest. Left pressures of >220 mmHg suggest no compressibility of vessels and may make absolute Segmental Limb Pressures (SLP) unreliable. Decreased digital perfusion noted. Biphasic flow is noted in the left common femoral artery, left posterior tibial artery and left dorsalis pedis artery. Unable to obtain brachial pressure due to dialysis access.  Comparison: Compared with study from 1/9/2024, Interpretation moderate on RLE, mild LLE today.  Additional Findings: Technically difficult study due to patient bandaging, positioning, and tolerance.  Imaging & Doppler Findings:  RIGHT Lower PVR                Pressures Ratios Right Posterior Tibial (Ankle) 74 mmHg   0.66 Right Dorsalis Pedis (Ankle)   256 mmHg  2.29   LEFT Lower PVR                Pressures Ratios Left Posterior Tibial (Ankle) 256 mmHg  2.29 Left Dorsalis Pedis (Ankle)   256 mmHg  2.29 Left Digit (Great Toe)        52 mmHg   0.46                      Right Brachial Pressure 112 mmHg   [id number] Stuart Nguyen MD Electronically signed by [id number] Nguyen MD on 3/21/2024 at 4:49:30 PM  ** Final **     Upper extremity venous duplex bilateral    Result Date: 3/20/2024            [place] [address]   Tel [phone] and Fax [phone]  Vascular Lab Report [facility] US UPPER EXTREMITY VENOUS DUPLEX BILATERAL  Patient Name:     SHAMEKA BORGESZACK Mendez Physician: Jennifer Nguyen MD Study Date:       3/19/2024           Ordering           [id number] NETTIE                                       Physician:         YENI MRN/PID:          [id number]            Technologist:      CARLOS Accession#:       [id number]        Technologist 2: Date of           1945 / 78      Encounter#:        [id number] Birth/Age:        years Gender:           M Admission Status: Inpatient           Location           [facility]                                       Performed:  Diagnosis/ICD: Other specified soft tissue disorders-M79.89 CPT Codes:     87011 Peripheral venous duplex scan for DVT complete  **CRITICAL RESULT** Critical Result: acute non occlusive thrombus noted in the right subclavian proximal segment. Notification called to Francisca Chery RN; Wilma Redding MD on 3/19/2024 at 11:50:55 AM by Kristen Gloria.  CONCLUSIONS: Right Upper Venous: There is acute occlusive deep vein thrombosis visualized in the proximal subclavian vein. Remainder of exam is noted to be negative for acute DVT. Hematoma noted in right upper arm measuring 5.07cm x 2.01cm. Thrombus noted around line. Cannot rule out thrombus of non-visualized proximal cephalic, mid cephalic, distal cephalic and basilic veins due to swelling and edema. Left Upper Venous: Limited exam due to bandages. Cannot rule out thrombus of non-compressible mid subclavian and distal subclavian veins due to dressings and catheter. Cannot rule out thrombus of non-visualized proximal cephalic, mid  cephalic, distal cephalic and basilic veins due to dressings.  Imaging & Doppler Findings:  Right               Compressible      Thrombus              Flow Internal Jugular        Yes             None         Spontaneous/Phasic Subclavian            Partial    Acute non-occlusive Subclavian Proximal     Yes             None Subclavian Mid          Yes             None         Spontaneous/Phasic Subclavian Distal                       None Axillary                Yes             None Brachial                Yes             None  Left                Compress Thrombus        Flow Internal Jugular      Yes      None   Spontaneous/Phasic Subclavian Proximal                   Spontaneous/Phasic Subclavian Mid        Yes      None Subclavian Distal     Yes      None   Spontaneous/Phasic Axillary              Yes      None   Spontaneous/Phasic Brachial              Yes      None  [id number] Stuart Nguyen MD Electronically signed by [id number] Stuart Nguyen MD on 3/20/2024 at 11:33:21 PM  ** Final **     XR foot right 3+ views    Result Date: 3/18/2024  Interpreted By:  Liz Ruiz and [place]yersdorf Conner STUDY: XR FOOT RIGHT 3+ VIEWS; ;  3/17/2024 7:49 pm   INDICATION: Signs/Symptoms:Evaluation of TMA right foot possible infection.   COMPARISON: X-ray right foot 01/19/2024   ACCESSION NUMBER(S): [id number]   ORDERING CLINICIAN: NETTIE JAIME   FINDINGS: Transmetatarsal amputation changes noted. There is marked paucity of soft tissue stump at the level of 1st metatarsal likely representing wound dehiscence/stump breakdown. There is also apparent soft tissue defect of the 5th metatarsal stump region which may also represent stump breakdown. There is irregular appearance of the stumps of the 1st and 5th digits suggesting osteomyelitis. Prominent vascular calcifications of the ankle and foot.   Mild midfoot degenerative changes with dorsal osteophytes. Remote healed distal fibular fracture sequela. No acute fracture or malalignment.        Transmetatarsal amputation changes of the foot with irregular appearance of the stumps of the 1st and 5th digits suggesting osteomyelitis with osteomyelitis of the rest of the digits not excluded. An MRI may be obtained for further evaluation if clinically warranted Findings suggesting stump breakdown at the level of 1st and 5th metatarsals.   I personally reviewed the image(s)/study and resident interpretation. I agree with the findings as stated by resident Rafael Tony. Data analyzed and images interpreted at [facility], [place], [place].   MACRO: None   Signed by: Liz Ruiz 3/18/2024 9:56 AM Dictation workstation:   [id number]    XR chest 1 view    Result Date: 3/16/2024  Interpreted By:  Kendrick Clayton, STUDY: XR CHEST 1 VIEW; 3/16/2024 6:32 pm   INDICATION: Signs/Symptoms:eval continued hypoxemia. esrd fluid overload.   COMPARISON: Radiograph dated 01/23/2024   ACCESSION NUMBER(S): [id number]   ORDERING CLINICIAN: NETTIE JAIME   FINDINGS: Left IJ approach hemodialysis catheter is in place with the tip projecting over the right atrium. Right subclavian approach pacer/defibrillator is stable.   The cardiac silhouette size is unchanged.   Low lung volumes and bronchovascular crowding. Right more than left bibasilar opacity. Mild interstitial prominence and edema. No sizable pneumothorax   No acute osseous abnormality.       1. Slight interval worsening of the aeration of the lungs with bilateral, right more than left pleural effusion and atelectasis/consolidation and mild interstitial edema.       Signed by: Kendrick Painting 3/16/2024 9:04 PM Dictation workstation:   [id number]    ECG 12 lead    Result Date: 3/12/2024  Atrial fibrillation ST & T wave abnormality, consider anterolateral ischemia Prolonged QT Abnormal ECG When compared with ECG of 08-MAR-2024 14:26, No significant change was found See ED provider note for full interpretation and  clinical correlation Confirmed by Jluis Cee ([id number]) on 3/12/2024 9:34:16 AM         Assessment/Plan   Supportive Interventions:  Music therapy as needed  Spiritual care    Medical decision making/ Goals of care:   Patient's current clinical condition, including diagnosis, prognosis, and management plan were discussed.   Life limiting disease: peripheral arterial disease, sepsis, ESRD, frailty - PPS 40%, HFrEF  Family: Supportive   Performance status: Major limitations due to disease process  Understanding of health: pt's son demonstrates moderate prognostic understanding of disease process  Information: Wants full disclosure of daily updates and prognosis   Goals: Symptom control, for pt to go home  Worries and fears now and future: ongoing symptoms, worsening health  Minimum acceptable outcome/QOL: cognition, independence with toileting, feeding, ambulation   Code status discussion: completed on 3/28/24 with pt's son and wife at the bedside with primary team, myself and palliative ; pt is DNR. Pt's family are understanding that pt's code status will be reversed to full code during and 24 hours after surgical intervention. Ongoing discussion regarding ?revascularization and timing/completed outpatient.     Advance Care Planning:   No Advance Directives on file.    Surrogate decision maker is: pt's Chevy eli: [phone]  Code status: DNR    Disposition:  Plan pending hospital course    Case discussed with primary team via Epic chat.    Thank you for allowing us to participate in the care of this patient. Palliative Team will continue to follow as needed.  Please contact team with any questions or concerns.    JENIFFER Harris-CNP   Palliative Care (weekdays - pager [phone])    I spent 60 minutes in the care of this patient which included chart review, interviewing patient/family, discussion with primary team, coordination of care, and documentation.    Medical Decision Making was high level  due to high complexity of problems, extensive data review, and high risk of management/treatment.

## 2024-04-02 NOTE — NURSING NOTE
Report from Sending RN:    Report From: Vonnie ROMAN)  Recent Surgery of Procedure: No  Baseline Level of Consciousness (LOC): a/o x 3/4  Oxygen Use: Yes, 2 liters  Type: nc  Diabetic: Yes, 122  Last BP Med Given Day of Dialysis: midodrine 15 mg  Last Pain Med Given: none  Lab Tests to be Obtained with Dialysis: Yes  Blood Transfusion to be Given During Dialysis: No  Available IV Access: Yes  Medications to be Administered During Dialysis: No  Continuous IV Infusion Running: No  Restraints on Currently or in the Last 24 Hours: No  Hand-Off Communication: No acute overnight or morning events; vss; Pt did not take morning medications; Pt will need labs; Pt is full code  Dialysis Catheter Dressin24  Last Dressing Change: 24

## 2024-04-03 NOTE — CARE PLAN
The patient's goals for the shift include pain will remain at a tolerable level throughout shift.     The clinical goals for the shift include patient remains safe throughout shift    Problem: Pain  Goal: My pain/discomfort is manageable  Outcome: Progressing     Problem: Safety  Goal: Patient will be injury free during hospitalization  Outcome: Progressing  Goal: I will remain free of falls  Outcome: Progressing     Problem: Daily Care  Goal: Daily care needs are met  Outcome: Progressing     Problem: Psychosocial Needs  Goal: Demonstrates ability to cope with hospitalization/illness  Outcome: Progressing  Goal: Collaborate with me, my family, and caregiver to identify my specific goals  Outcome: Progressing     Problem: Discharge Barriers  Goal: My discharge needs are met  Outcome: Progressing     Problem: Skin  Goal: Prevent/manage excess moisture  Outcome: Progressing  Goal: Promote skin healing  Outcome: Progressing  Goal: Decreased wound size/increased tissue granulation at next dressing change  Outcome: Progressing  Goal: Participates in plan/prevention/treatment measures  Outcome: Progressing  Goal: Prevent/minimize sheer/friction injuries  Outcome: Progressing  Goal: Promote/optimize nutrition  Outcome: Progressing     Problem: Pain - Adult  Goal: Verbalizes/displays adequate comfort level or baseline comfort level  Outcome: Progressing     Problem: Diabetes  Goal: Achieve decreasing blood glucose levels by end of shift  Outcome: Progressing  Goal: Increase stability of blood glucose readings by end of shift  Outcome: Progressing  Goal: Decrease in ketones present in urine by end of shift  Outcome: Progressing  Goal: Maintain electrolyte levels within acceptable range throughout shift  Outcome: Progressing  Goal: Maintain glucose levels >70mg/dl to <250mg/dl throughout shift  Outcome: Progressing  Goal: No changes in neurological exam by end of shift  Outcome: Progressing  Goal: Learn about and adhere to  nutrition recommendations by end of shift  Outcome: Progressing  Goal: Vital signs within normal range for age by end of shift  Outcome: Progressing  Goal: Increase self care and/or family involovement by end of shift  Outcome: Progressing  Goal: Receive DSME education by end of shift  Outcome: Progressing     Problem: Pain  Goal: Takes deep breaths with improved pain control throughout the shift  Outcome: Progressing  Goal: Turns in bed with improved pain control throughout the shift  Outcome: Progressing  Goal: Walks with improved pain control throughout the shift  Outcome: Progressing  Goal: Performs ADL's with improved pain control throughout shift  Outcome: Progressing  Goal: Participates in PT with improved pain control throughout the shift  Outcome: Progressing  Goal: Free from opioid side effects throughout the shift  Outcome: Progressing  Goal: Free from acute confusion related to pain meds throughout the shift  Outcome: Progressing

## 2024-04-03 NOTE — PROGRESS NOTES
"Auth received. Patient currently in dialysis. SNF requesting HD updates. [facility] sent updates to [facility]. [facility] requested 1500 stretcher with 02 transport. Pending Roundtrip confirmation and finalized [facility]. Will continue to follow.     1126-[facility] hospital confirmed 1500 stretcher transport. SAEED updated patient, TCC, SNF and the floor. [facility] updated patient at bedside, patient was confused. SAEED updated wife, wife is not agreeable to discharge stating the last update she received was about a procedure that should be done. [facility] updated TCC and MD. [facility] asked: \"Dialysis asked: Patient's treatment was stopped due to clotting, is this something that they will address prior to discharge?\" SAEED updated TCC and MD. Will continue to follow    1435-GR completed. SAEED sent to SNF and updated DSC for 7000.        JEAN-PIERRE Means      "

## 2024-04-03 NOTE — DISCHARGE SUMMARY
Discharge Diagnosis  A-V fistula (CMS/[facility])    Issues Requiring Follow-Up  Fu with endovascular cardiology team and PCP    Test Results Pending At Discharge  Pending Labs       No current pending labs.            Hospital Course       Mr. 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 Vascular surgery intervened with two figure-eight silk sutures to achieve hemostasis at the fistula site, and the patient was provided with a new tunneled hemodialysis catheter, followed by surgical ligation of the fistula. Postoperative management included effective pain control and stable hemoglobin and hematocrit levels after the resumption of anticoagulant and antiplatelet medications, and the central line was subsequently removed. Despite these measures, Mr. Benton showed signs of generalized edema, managed by the nephrology team for fluid overload. Persistent leukocytosis led to repeated blood cultures, which returned negative. Chest X-rays suggested volume overload, with a possible infiltrate noted, leading to the development of lower extremity rest pain and a productive cough. The patient maintained a stable oxygen requirement of 2 liters. Further evaluations, including wound care assessments, podiatry consultations, and vascular reevaluations, identified mild purulence at the TMA site and right lower extremity vascular disease. An angiogram revealed no distal blood flow below the knee, although physical  examination showed the limb remained warm, indicating some perfusion. Despite previous procedures, no revascularization options were deemed available, raising the possibility of amputation pending further evaluation. The patient's preference led to the cancellation of an MRI, and palliative care was consulted to discuss goals of care. Podiatry continued with palliative wound care, noting improvement in the wound. Pt is scheduled for a six-week course of post-hemodialysis cefepime 2g starting from March 17th, ending on April 28th. His procedure was cancelled by endovascular team this admission due to spot unavailability. The patient is set to follow up with the endovascular cardiology team, led by Dr. Linder, for the upcoming procedure. The current plan is to maintain Plavix and temporarily discontinue Eliquis three days prior to the procedure. Both the patient's wife and son were informed of these updates in the afternoon.    Discharge plan of care discussed, education and counseling provided involving active problem care plan, warning signs,  risks/benefits of new medications or medication changes, follow-up care and testing.  Patient advised to follow-up with her primary care within 1 week of discharge or the next available for posthospitalization transition of care.  There was verbalized understanding and agreement with discharge care plan.      Pt instructed to take all medications as prescribed.  Keep all follow-up appointments.  Contact their primary care physician with any questions or concerns that arise.  Come to the emergency department with worsening of your symptoms or any other medical emergency. Instructed that any outpatient tests that are ordered for outpatient follow up are meant to expedite outpatient work up and management, and that the results are not followed or managed by the inpatient ordering team and MUST be followed up with the outpatient primary care or outpatient specialist.  Verbal  understanding and agreement obtained to order tests for outpatient follow up purposes.       Time conducting this discharge is >40 minutes               Pertinent Physical Exam At Time of Discharge  Physical Exam    Home Medications     Medication List      START taking these medications     benzonatate 200 mg capsule; Commonly known as: Tessalon; Take 1 capsule   (200 mg) by mouth 3 times a day as needed for cough. Do not crush or chew.   cefepime IV; Commonly known as: Maxipime; Infuse 100 mL (2 g) at 200   mL/hr over 30 minutes into a venous catheter 3 (three) times a week for 23   days. 2 g of cefepime after each hemodialysis Do not start before April 5, 2024.; Start taking on: April 5, 2024   hydrOXYzine HCL 25 mg tablet; Commonly known as: Atarax; Take 1 tablet   (25 mg) by mouth every 8 hours if needed for itching or anxiety.   insulin lispro 100 unit/mL injection; Commonly known as: HumaLOG; Inject   0-0.05 mL (0-5 Units) under the skin 3 times a day with meals. Take as   directed per insulin instructions.     CONTINUE taking these medications     acetaminophen 325 mg tablet; Commonly known as: Tylenol; Take 3 tablets   (975 mg) by mouth every 6 hours if needed for mild pain (1 - 3).   albuterol 90 mcg/actuation inhaler   allopurinol 100 mg tablet; Commonly known as: Zyloprim   apixaban 2.5 mg tablet; Commonly known as: Eliquis   atorvastatin 40 mg tablet; Commonly known as: Lipitor; Take 1 tablet (40   mg) by mouth once daily at bedtime.   clopidogrel 75 mg tablet; Commonly known as: Plavix   dextrose 50 % injection; Infuse 50 mL (25 g) into a venous catheter   every 15 minutes if needed (For blood glucose less than or equal to 40   mg/dL).   gabapentin 100 mg capsule; Commonly known as: Neurontin; Take 1 capsule   (100 mg) by mouth once daily.   glucagon 1 mg injection; Commonly known as: Glucagen; Inject 1 mg into   the muscle every 15 minutes if needed for low blood sugar - see comments   (For blood  glucose less than or equal to 70 mg/dL and no IV access).   heparin 1,000 unit/mL injection; 2 mL (2,000 Units) by intra-catheter   route once daily on dialysis days. Do not start before March 1, 2024.   ipratropium-albuteroL 0.5-2.5 mg/3 mL nebulizer solution; Commonly known   as: Duo-Neb; Take 3 mL by nebulization every 6 hours if needed for   wheezing or shortness of breath.   lidocaine 4 % patch; Place 1 patch over 12 hours on the skin once daily.   Remove & discard patch within 12 hours or as directed by MD.   loratadine 10 mg tablet; Commonly known as: Claritin; TAKE 1 TABLET (10   MG) BY MOUTH EVERY OTHER DAY IF NEEDED FOR ALLERGIES.   melatonin 5 mg tablet; Take 1 tablet (5 mg) by mouth once daily at   bedtime.   midodrine 5 mg tablet; Commonly known as: Proamatine; Take 3 tablets (15   mg) by mouth every 8 hours.   Miralax 17 gram packet; Generic drug: polyethylene glycol   pantoprazole 40 mg EC tablet; Commonly known as: ProtoNix; Take 1 tablet   (40 mg) by mouth once daily in the morning. Take before meals. Do not   crush, chew, or split. Do not start before February 11, 2024.   povidone-iodine 7.5 % solution; Commonly known as: Betadine; Apply 1   Application topically once daily as needed for wound care.   SantyL 250 unit/gram ointment; Generic drug: collagenase   sennosides 8.6 mg tablet; Commonly known as: Senokot; Take 1 tablet (8.6   mg) by mouth once daily at bedtime.   Triphrocaps 1 mg capsule; Generic drug: B complex-vitamin C-folic acid     STOP taking these medications     insulin glargine 100 unit/mL injection; Commonly known as: Lantus   mupirocin 2 % ointment; Commonly known as: Bactroban       Outpatient Follow-Up  Future Appointments   Date Time Provider Department [place]   4/16/2024  1:00 PM Homar Linder DO [id number] [facility]   4/17/2024  1:00 PM Koko Gordon MD [id number] [facility]   4/24/2024  1:40 PM Lavon Mcfarland MD [id number] [facility]       Wilma Redding MD

## 2024-04-03 NOTE — PROGRESS NOTES
Occupational Therapy    Occupational Therapy Treatment    Name: Chevy Benton  MRN: [id number]  : 1945  Date: 24  Time Calculation  Start Time: 142  Stop Time: 1446  Time Calculation (min): 25 min    Assessment:     Plan:  Treatment Interventions: ADL retraining, Functional transfer training, UE strengthening/ROM, Endurance training, Cognitive reorientation, Patient/family training, Equipment evaluation/education, Fine motor coordination activities, Compensatory technique education  OT Frequency: 3 times per week  OT Discharge Recommendations: Moderate intensity level of continued care  Equipment Recommended upon Discharge:  (TBD)  OT Recommended Transfer Status: Maximum assist, Assist of 2  OT - OK to Discharge: Yes    Subjective   General:  OT Last Visit  OT Received On: 24  Co-Treatment: PT  Co-Treatment Reason: To maximize pt's safety and benefit from provided therapy services  Prior to Session Communication: Bedside nurse  Patient Position Received: Bed, 3 rail up, Alarm on  Family/Caregiver Present: No  General Comment: Pt pleasant and agreeable to OT session, delayed processing throughout and minimal verbal interaction   Precautions:  Hearing/Visual Limitations: [place]/ GLASSES  LE Weight Bearing Status: Right Non-Weight Bearing  Medical Precautions: Fall precautions, Oxygen therapy device and L/min (2L O2 via NC)  Precautions Comment: BLE WAFFLE BOOTS  Vitals:     Lines/Tubes/Drains:  Hemodialysis Cath 24 Left Tunneled catheter Subclavian (Active)   Number of days: 40       Cognition:  Overall Cognitive Status: Impaired  Orientation Level: Disoriented to place, Disoriented to time, Disoriented to situation (Orientation questions given in multiple choice format, able to correctly choose year, no answer for month or city.)  Following Commands: Follows one step commands with increased time (Approx 50%)    Pain Assessment:  Pain Assessment  Pain Assessment: Eduardo-Baker  FACES  Eduardo-Ramirez FACES Pain Rating: Hurts little more  Pain Type: Acute pain  Pain Location: Leg  Pain Orientation: Right  Pain Interventions: Repositioned  Response to Interventions: Best at rest     Objective   Activities of Daily Living:        Grooming  Grooming Level of Assistance: Maximum assistance  Grooming Where Assessed: Edge of bed  Grooming Comments: Pt provided with bath wide for hand hygiene with limited initiation. Several cues provided for patient to reach for wipe with R UE. Increased time for patient to clean hands with poor accuracy, assist for thoroughness.       UE Dressing  UE Dressing Level of Assistance: Maximum assistance  UE Dressing Where Assessed: Edge of bed  UE Dressing Comments: Following untying of gown, cues for pt to remove gown from UEs with poor follow through, assist to remove and thread over B UEs     Bed Mobility/Transfers:     Bed Mobility  Bed Mobility: Yes  Bed Mobility 1  Bed Mobility 1: Rolling right, Rolling left  Level of Assistance 1: Dependent  Bed Mobility Comments 1: Cueing to reach across body and use bed rails for rolling, poor follow through, total assist for effective roll  Bed Mobility 2  Bed Mobility  2: Supine to sitting, Sitting to supine  Level of Assistance 2: Maximum assistance (x2)  Bed Mobility Comments 2: Pt moves LEs minimally within bed for adjustment, Max A to trunk for control and to LEs for pivoting  Bed Mobility 3  Bed Mobility 3: Scooting  Level of Assistance 3: Dependent  Bed Mobility Comments 3: bed in trendelenburg    Transfers  Transfer: No    Balance:  Static Sitting Balance  Static Sitting-Balance Support: No upper extremity supported, Feet supported  Static Sitting-Level of Assistance: Moderate assistance, Maximum assistance  Static Sitting-Comment/Number of Minutes: EOB  Therapy/Activity:   Therapeutic Exercise  Therapeutic Exercise Performed: Yes  Therapeutic Exercise Activity 1: AAROM to B UEs for shoulder and elbow flexion/extension.  Pt reports moving UEs feels good. Gross muscle/joint tightness notes in UEs.     Balance/Neuromuscular Re-Education  Balance/Neuromuscular Re-Education Activity Performed: Yes  Balance/Neuromuscular Re-Education Activity 1: Sitting EOB for approx 10 minutes, visual and verbal cues provided to move trunk to midline with poor follow though, Mod-Max A for seated balance, assist with positioning to increase balance, cueing for upright posutre to increase pt's ability to engage in functional tasks.  Outcome Measures:  [facility] Daily Activity  Putting on and taking off regular lower body clothing: Total  Bathing (including washing, rinsing, drying): Total  Putting on and taking off regular upper body clothing: A lot  Toileting, which includes using toilet, bedpan or urinal: Total  Taking care of personal grooming such as brushing teeth: A lot  Eating Meals: A lot  Daily Activity - Total Score: 9     Education Documentation  Precautions, taught by Wayne Abdullahi OT at 4/3/2024  3:26 PM.  Learner: Patient  Readiness: Acceptance  Method: Explanation, Demonstration  Response: Verbalizes Understanding, Needs Reinforcement    Body Mechanics, taught by Wayne Abdullahi OT at 4/3/2024  3:26 PM.  Learner: Patient  Readiness: Acceptance  Method: Explanation, Demonstration  Response: Verbalizes Understanding, Needs Reinforcement    ADL Training, taught by Wayne Abdullahi OT at 4/3/2024  3:26 PM.  Learner: Patient  Readiness: Acceptance  Method: Explanation, Demonstration  Response: Verbalizes Understanding, Needs Reinforcement    Education Comments  No comments found.    Goals:  Encounter Problems       Encounter Problems (Active)       ADLs       Patient with complete upper body dressing with minimal assist  level of assistance donning and doffing all UE clothes with PRN adaptive equipment while supine in bed (Progressing)       Start:  03/15/24    Expected End:  04/24/24            Patient will feed self with minimal assist  level of  assistance and verbal cues using PRN adaptive equipment. (Progressing)       Start:  03/15/24    Expected End:  04/24/24            Patient will complete daily grooming tasks brushing teeth and washing face/hair with minimal assist  level of assistance and PRN adaptive equipment while supine in bed. (Progressing)       Start:  03/15/24    Expected End:  04/24/24               TRANSFERS       Patient will perform bed mobility minimal assist  level of assistance and bed rails and draw sheet in order to improve safety and independence with mobility (Progressing)       Start:  03/15/24    Expected End:  04/24/24 04/03/24 at 3:27 PM   CONG HUGGINS, OT   [phone]

## 2024-04-03 NOTE — NURSING NOTE
Report to Receiving RN:    Report To: Brianna RN   Time Report Called: [id number]  Hand-Off Communication: UF tx completed and tolerated fair. Ended 40 min early due to system clotting. Removed 1.3L. Post /82 HR 61   Complications During Treatment: No  Ultrafiltration Treatment: Yes  Medications Administered During Dialysis: No  Blood Products Administered During Dialysis: No  Labs Sent During Dialysis: Yes  Heparin Drip Rate Changes: N/A  Dialysis Catheter Dressing: CDI  Last Dressing Change: 3.30.24

## 2024-04-03 NOTE — PROGRESS NOTES
"Physical Therapy    Physical Therapy Treatment    Patient Name: Chevy Benton  MRN: [id number]  Today's Date: 4/3/2024  Time Calculation  Start Time: 1421  Stop Time: 1446  Time Calculation (min): 25 min       Assessment/Plan   PT Assessment  PT Assessment Results: Decreased strength, Decreased endurance, Impaired balance, Decreased mobility, Decreased coordination, Decreased cognition, Orthopedic restrictions  Rehab Prognosis: Good  Evaluation/Treatment Tolerance: Patient limited by fatigue  End of Session Communication: Bedside nurse  Assessment Comment: Pt tolerated bed mobility and sitting EOB, occasionally asking \"why are you bothering with me\" but otherwise agreeable. Requires assist x2 for bed mobility, not saef to attempt transfers at this time.  End of Session Patient Position: Bed, 3 rail up, Alarm on  PT Plan  Inpatient/Swing Bed or Outpatient: Inpatient  PT Plan  Treatment/Interventions: Bed mobility, Transfer training, Gait training, Balance training, Strengthening, Endurance training, Therapeutic exercise, Therapeutic activity  PT Plan: Skilled PT  PT Frequency: 3 times per week  PT Discharge Recommendations: Moderate intensity level of continued care  Equipment Recommended upon Discharge:  (none)  PT Recommended Transfer Status: Total assist  PT - OK to Discharge: Yes (eval complete and discharge recommendations made)      General Visit Information:   PT  Visit  PT Received On: 04/03/24  Response to Previous Treatment: Patient with no complaints from previous session.  General  Co-Treatment: OT  Co-Treatment Reason:  (To facilitate pt participation and encourage activity sitting EOB due to complex medical status and AMPAC=6)  Prior to Session Communication: Bedside nurse  Patient Position Received: Bed, 3 rail up, Alarm on  General Comment: Pt cleared for PT/OT, agreeable but requires max cues and encouragement  Per handoff with RN, pt is appropriate for therapy, vitals are stable and pain is " "controlled. Other concerns prior to tx are: none    Subjective   Precautions:  Precautions  LE Weight Bearing Status: Right Non-Weight Bearing  Medical Precautions: Fall precautions      Objective   Pain:  Pain Assessment  Pain Assessment: 0-10  Pain Score: 0 - No pain  Cognition:  Cognition  Overall Cognitive Status: Impaired  Arousal/Alertness: Delayed responses to stimuli  Orientation Level:  (Pt unable to recall year, able to correctly choose 2024 from 3 options. When asked for month and given options, pt repeats \"2024\". Does not respond to questions assisting orientation to place.)    Treatments:  Therapeutic Exercise  Therapeutic Exercise Performed: Yes  Therapeutic Exercise Activity 1: B LE AAROM hip/knee flexion/extension in spine, 5x/LE    Balance/Neuromuscular Re-Education  Balance/Neuromuscular Re-Education Activity Performed: Yes  Balance/Neuromuscular Re-Education Activity 1: Sitting EOB ~10min with mod-maxA throughout, max cues for upright posture, fwd gaze and to correct L sided lean    Bed Mobility 1  Bed Mobility 1: Rolling right, Rolling left  Level of Assistance 1: Dependent  Bed Mobility 2  Bed Mobility  2: Supine to sitting  Level of Assistance 2: Maximum assistance (x2 with HOB raised)  Bed Mobility 3  Bed Mobility 3: Sitting to supine  Level of Assistance 3: Maximum assistance (x2)  Bed Mobility 4  Bed Mobility 4: Scooting  Level of Assistance 4: Dependent (x2)    Outcome Measures:     [facility] Basic Mobility  Turning from your back to your side while in a flat bed without using bedrails: Total  Moving from lying on your back to sitting on the side of a flat bed without using bedrails: Total  Moving to and from bed to chair (including a wheelchair): Total  Standing up from a chair using your arms (e.g. wheelchair or bedside chair): Total  To walk in hospital room: Total  Climbing 3-5 steps with railing: Total  Basic Mobility - Total Score: 6    Education Documentation  Precautions, taught by " Lisa Otoole PTA at 4/3/2024  3:06 PM.  Learner: Patient  Readiness: Acceptance  Method: Explanation, Demonstration  Response: Needs Reinforcement    Body Mechanics, taught by Lisa Otoole PTA at 4/3/2024  3:06 PM.  Learner: Patient  Readiness: Acceptance  Method: Explanation, Demonstration  Response: Needs Reinforcement    Mobility Training, taught by Lisa Otoole PTA at 4/3/2024  3:06 PM.  Learner: Patient  Readiness: Acceptance  Method: Explanation, Demonstration  Response: Needs Reinforcement    Education Comments  No comments found.        OP EDUCATION:       Encounter Problems       Encounter Problems (Active)       PT Problem       Patient will complete supine to sit and sit to supine Min Assist  (Progressing)       Start:  03/11/24    Expected End:  04/08/24            Patient will perform sit<>stand transfer with Rolling Walker, and Mod Assist  (Progressing)       Start:  03/11/24    Expected End:  04/08/24            Patient will ambulate >10' with Rolling Walker and Mod Assist  (Progressing)       Start:  03/11/24    Expected End:  04/08/24            Pt will be able to maintain static sitting with SBA x 5 min (Progressing)       Start:  03/11/24    Expected End:  04/08/24               Pain - Adult            AMIRAH Tuttle

## 2024-04-04 NOTE — LETTER
Patient: Chevy Benton  : 1945    Encounter Date: 2024    PROGRESS NOTE    Subjective  Chief complaint: Chevy Benton is a 78 y.o. male who is an acute skilled patient being seen and evaluated for weakness    HPI:  HPI  Patient readmitted to nursing facility following left upper extremity AV fistula bleed with replacement.  Therapy to evaluate and establish plan of care and goals with patient.  Patient was seen and examined at bedside.  Patient had complaints of generalized pain reporting that Tylenol was not helping.  Denies fever or chills.  Denies chest pain or shortness of breath.    Objective  Vital signs: 100/54, 97.8, 58, 18    Physical Exam  Constitutional:       General: He is not in acute distress.  Eyes:      Extraocular Movements: Extraocular movements intact.   Cardiovascular:      Rate and Rhythm: Normal rate and regular rhythm.   Pulmonary:      Effort: Pulmonary effort is normal.      Breath sounds: Normal breath sounds.      Comments: O2  Abdominal:      General: Bowel sounds are normal.      Palpations: Abdomen is soft.   Musculoskeletal:      Right upper arm: Swelling present.      Left upper arm: Swelling present.      Cervical back: Neck supple.   Neurological:      Mental Status: He is alert.      Motor: Weakness present.   Psychiatric:         Mood and Affect: Mood normal.         Behavior: Behavior is cooperative.         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  Remove extra fluid in dialysis  HD per nephrology         A-fib (Multi)     Apixaban  Bleeding precautions  Monitor heart rate         Hemorrhage of arteriovenous  fistula (CMS-[facility])     Replaced in hospital         Generalized pain     Start Ultram as needed          Medications, treatments, and labs reviewed  Continue medications and treatments as listed in EMR      Scribe Attestation  I, Erickson San   attest that this documentation has been prepared under the direction and in the presence of EDWIN Aguilar    Provider Attestation - Scribe documentation  All medical record entries made by the Scribe were at my direction and personally dictated by me. I have reviewed the chart and agree that the record accurately reflects my personal performance of the history, physical exam, discussion and plan.   EDWIN Aguilar        Electronically Signed By: EDWIN Aguilar   4/17/24  8:19 PM

## 2024-04-05 PROBLEM — E11.9 TYPE 2 DIABETES MELLITUS (MULTI): Status: RESOLVED | Noted: 2022-04-30 | Resolved: 2024-01-01

## 2024-04-05 PROBLEM — T82.838A HEMORRHAGE OF ARTERIOVENOUS FISTULA (CMS-HCC): Status: ACTIVE | Noted: 2024-01-01

## 2024-04-05 PROBLEM — I70.223 CRITICAL LIMB ISCHEMIA OF BOTH LOWER EXTREMITIES (MULTI): Status: RESOLVED | Noted: 2024-01-01 | Resolved: 2024-01-01

## 2024-04-05 PROBLEM — Z98.890 CRITICAL LIMB ISCHEMIA WITH HISTORY OF REVASCULARIZATION OF SAME EXTREMITY (MULTI): Status: RESOLVED | Noted: 2023-01-01 | Resolved: 2024-01-01

## 2024-04-05 PROBLEM — I74.3 EMBOLISM AND THROMBOSIS OF ARTERIES OF THE LOWER EXTREMITIES (MULTI): Status: RESOLVED | Noted: 2023-01-01 | Resolved: 2024-01-01

## 2024-04-05 PROBLEM — I70.221 CRITICAL LIMB ISCHEMIA OF RIGHT LOWER EXTREMITY (MULTI): Status: RESOLVED | Noted: 2024-01-01 | Resolved: 2024-01-01

## 2024-04-05 PROBLEM — I70.229 CRITICAL LIMB ISCHEMIA WITH HISTORY OF REVASCULARIZATION OF SAME EXTREMITY (MULTI): Status: RESOLVED | Noted: 2023-01-01 | Resolved: 2024-01-01

## 2024-04-05 NOTE — PROGRESS NOTES
HISTORY & PHYSICAL    Subjective   Chief complaint: Chevy Benton is a 78 y.o. male who is being seen and evaluated for multiple medical problems.  Patient admitted to SNF for therapy due to weakness after recent hospitalization.    HPI:  HPI  Patient presented to ED with bleeding from left upper extremity AV fistula.  Patient was admitted for further evaluation management.  Vascular surgery followed patient throughout hospitalization and replaced.  Patient's tunneled hemodialysis catheter followed by surgical ligation of fistula.  Patient was managed by nephrology team for fluid overload.  Patient was noted to have purulence from TMA site and right lower extremity vascular disease.  Angiogram performed revealed no distal blood flow from below the knee.  No revascularization options were deemed available.  Patient preferred canceling further imaging and palliative care was consulted to discuss goals of care.  Podiatry continued with palliative wound care.  Patient to continue on antibiotics postdialysis.  Patient was hemodynamically stable to discharge to SNF with outpatient follow-up with endovascular cardiology team.  Patient was seen and examined at bedside, appears to be in no acute distress.  Denies chest pain or shortness of breath.  Denies nausea or vomiting.  Afebrile.  Patient tolerating antibiotic without adverse effects.  Past Medical History:   Diagnosis Date    A-fib (CMS/HCC)     Acute hypercapnic respiratory failure (CMS/HCC)     Acute on chronic HFrEF (heart failure with reduced ejection fraction) (CMS/HCC)     Diabetes mellitus (CMS/HCC)     ESRD on hemodialysis (CMS/HCC)     History of angioplasty of peripheral vessel 10/26/2023    HTN (hypertension)     LFT elevation 07/20/2021    Small bowel obstruction (CMS/HCC) 10/10/2023    Stage II pressure ulcer (CMS/HCC)        Past Surgical History:   Procedure Laterality Date    INVASIVE VASCULAR PROCEDURE Right 1/17/2024    Procedure: Lower  Extremity Angiogram;  Surgeon: Stuart Nguyen MD;  Location: [place] [address] [facility];  Service: Cardiovascular;  Laterality: Right;    INVASIVE VASCULAR PROCEDURE Right 3/20/2024    Procedure: Lower Extremity Angiogram;  Surgeon: Stuart Nguyen MD;  Location: [place] [address] [facility];  Service: Cardiovascular;  Laterality: Right;  at 10 am       Family History   Problem Relation Name Age of Onset    Diabetes Mother         Social History     Socioeconomic History    Marital status:      Spouse name: Not on file    Number of children: Not on file    Years of education: Not on file    Highest education level: Not on file   Occupational History    Not on file   Tobacco Use    Smoking status: Never     Passive exposure: Past    Smokeless tobacco: Never   Substance and Sexual Activity    Alcohol use: Not on file    Drug use: Not on file    Sexual activity: Not on file   Other Topics Concern    Not on file   Social History Narrative    Not on file     Social Determinants of Health     Financial Resource Strain: Low Risk  (3/10/2024)    Overall Financial Resource Strain (CARDIA)     Difficulty of Paying Living Expenses: Not hard at all   Recent Concern: Financial Resource Strain - Medium Risk (3/8/2024)    Overall Financial Resource Strain (CARDIA)     Difficulty of Paying Living Expenses: Somewhat hard   Food Insecurity: Not on file   Transportation Needs: No Transportation Needs (3/10/2024)    PRAPARE - Transportation     Lack of Transportation (Medical): No     Lack of Transportation (Non-Medical): No   Physical Activity: Not on file   Stress: Not on file   Social Connections: Not on file   Intimate Partner Violence: Not on file   Housing Stability: Low Risk  (3/10/2024)    Housing Stability Vital Sign     Unable to Pay for Housing in the Last Year: No     Number of Places Lived in the Last Year: 1     Unstable Housing in the Last Year: No       Vital signs: 102/58, 97.8, 58, 18, 97%, blood sugar 127    Objective    Physical Exam  Constitutional:       General: He is not in acute distress.  Cardiovascular:      Rate and Rhythm: Normal rate. Rhythm irregular.   Pulmonary:      Effort: Pulmonary effort is normal.      Breath sounds: Normal breath sounds.   Musculoskeletal:      Cervical back: Neck supple.      Right lower leg: No edema.      Left lower leg: No edema.      Comments: Left BKA   Neurological:      Mental Status: He is alert.      Motor: Weakness present.   Psychiatric:         Behavior: Behavior is cooperative.         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 (CMS/[facility])     Monitor blood pressure  Midodrine  CHF-monitor for shortness of breath  Remove extra fluid in dialysis  HD per nephrology         GERD (gastroesophageal reflux disease)     PPI  Monitor GI symptoms         A-fib (CMS/[facility])     Apixaban  Bleeding precautions  Monitor heart rate         Hemorrhage of arteriovenous fistula (CMS/[facility]) - Primary     Replaced in hospital          Hospital records reviewed  Medications, treatments, and labs reviewed  Continue medications and treatments as listed in EMR  Discussed with nursing and therapy      Scribe Attestation  XNI, Erickson San   attest that this documentation has been prepared under the direction and in the presence of Miley Guerra MD    Provider Attestation - Erickson  documentation  All medical record entries made by the Scribe were at my direction and personally dictated by me. I have reviewed the chart and agree that the record accurately reflects my personal performance of the history, physical exam, discussion and plan.   Miley Guerra MD

## 2024-04-05 NOTE — LETTER
Patient: Chevy Benton  : 1945    Encounter Date: 2024    HISTORY & PHYSICAL    Subjective  Chief complaint: Chevy Benton is a 78 y.o. male who is being seen and evaluated for multiple medical problems.  Patient admitted to SNF for therapy due to weakness after recent hospitalization.    HPI:  HPI  Patient presented to ED with bleeding from left upper extremity AV fistula.  Patient was admitted for further evaluation management.  Vascular surgery followed patient throughout hospitalization and replaced.  Patient's tunneled hemodialysis catheter followed by surgical ligation of fistula.  Patient was managed by nephrology team for fluid overload.  Patient was noted to have purulence from TMA site and right lower extremity vascular disease.  Angiogram performed revealed no distal blood flow from below the knee.  No revascularization options were deemed available.  Patient preferred canceling further imaging and palliative care was consulted to discuss goals of care.  Podiatry continued with palliative wound care.  Patient to continue on antibiotics postdialysis.  Patient was hemodynamically stable to discharge to SNF with outpatient follow-up with endovascular cardiology team.  Patient was seen and examined at bedside, appears to be in no acute distress.  Denies chest pain or shortness of breath.  Denies nausea or vomiting.  Afebrile.  Patient tolerating antibiotic without adverse effects.  Past Medical History:   Diagnosis Date   • A-fib (CMS/[facility])    • Acute hypercapnic respiratory failure (CMS/HCC)    • Acute on chronic HFrEF (heart failure with reduced ejection fraction) (CMS/HCC)    • Diabetes mellitus (CMS/HCC)    • ESRD on hemodialysis (CMS/HCC)    • History of angioplasty of peripheral vessel 10/26/2023   • HTN (hypertension)    • LFT elevation 2021   • Small bowel obstruction (CMS/HCC) 10/10/2023   • Stage II pressure ulcer (CMS/HCC)        Past Surgical History:   Procedure  Laterality Date   • INVASIVE VASCULAR PROCEDURE Right 1/17/2024    Procedure: Lower Extremity Angiogram;  Surgeon: Stuart Nguyen MD;  Location: [place] [address] Cardiac [facility] Lab;  Service: Cardiovascular;  Laterality: Right;   • INVASIVE VASCULAR PROCEDURE Right 3/20/2024    Procedure: Lower Extremity Angiogram;  Surgeon: Stuart Nguyen MD;  Location: [facility] Card[facility];  Service: Cardiovascular;  Laterality: Right;  at 10 am       Family History   Problem Relation Name Age of Onset   • Diabetes Mother         Social History     Socioeconomic History   • Marital status:      Spouse name: Not on file   • Number of children: Not on file   • Years of education: Not on file   • Highest education level: Not on file   Occupational History   • Not on file   Tobacco Use   • Smoking status: Never     Passive exposure: Past   • Smokeless tobacco: Never   Substance and Sexual Activity   • Alcohol use: Not on file   • Drug use: Not on file   • Sexual activity: Not on file   Other Topics Concern   • Not on file   Social History Narrative   • Not on file     Social Determinants of Health     Financial Resource Strain: Low Risk  (3/10/2024)    Overall Financial Resource Strain (CARDIA)    • Difficulty of Paying Living Expenses: Not hard at all   Recent Concern: Financial Resource Strain - Medium Risk (3/8/2024)    Overall Financial Resource Strain (CARDIA)    • Difficulty of Paying Living Expenses: Somewhat hard   Food Insecurity: Not on file   Transportation Needs: No Transportation Needs (3/10/2024)    PRAPARE - Transportation    • Lack of Transportation (Medical): No    • Lack of Transportation (Non-Medical): No   Physical Activity: Not on file   Stress: Not on file   Social Connections: Not on file   Intimate Partner Violence: Not on file   Housing Stability: Low Risk  (3/10/2024)    Housing Stability Vital Sign    • Unable to Pay for Housing in the Last Year: No    • Number of Places Lived in the Last Year: 1    • Unstable  Housing in the Last Year: No       Vital signs: 102/58, 97.8, 58, 18, 97%, blood sugar 127    Objective  Physical Exam  Constitutional:       General: He is not in acute distress.  Cardiovascular:      Rate and Rhythm: Normal rate. Rhythm irregular.   Pulmonary:      Effort: Pulmonary effort is normal.      Breath sounds: Normal breath sounds.   Musculoskeletal:      Cervical back: Neck supple.      Right lower leg: No edema.      Left lower leg: No edema.      Comments: Left BKA   Neurological:      Mental Status: He is alert.      Motor: Weakness present.   Psychiatric:         Behavior: Behavior is cooperative.         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, with long-term current use of insulin (CMS/[facility])     Glucoscans  Sliding scale insulin  Renal diet         Amputation of toe of right foot (CMS/[facility])     Status post revision  Follow-up with podiatry outpatient  Follow-up with vascular outpatient  Wound care per surgeon as listed in EMR  Pain control  Therapy  Continue antibiotic until complete, every Monday Wednesday Friday         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 (CMS/[facility])     Monitor blood pressure  Midodrine  CHF-monitor for shortness of breath  Remove extra fluid in dialysis  HD per nephrology         GERD (gastroesophageal reflux disease)     PPI  Monitor GI symptoms         A-fib (CMS/[facility])     Apixaban  Bleeding precautions  Monitor heart rate         Hemorrhage of arteriovenous fistula (CMS/[facility]) - Primary     Replaced in hospital          Hospital records reviewed  Medications, treatments, and labs reviewed  Continue medications and treatments as listed in EMR  Discussed with nursing and therapy      Scribe Attestation  I, Erickson San   attest that this documentation has been prepared under  the direction and in the presence of Miley Guerra MD    Provider Attestation - Scribe documentation  All medical record entries made by the Scribe were at my direction and personally dictated by me. I have reviewed the chart and agree that the record accurately reflects my personal performance of the history, physical exam, discussion and plan.   Miley Guerra MD      Electronically Signed By: Miley Guerra MD   4/5/24  3:44 PM

## 2024-04-05 NOTE — ASSESSMENT & PLAN NOTE
Status post revision  Follow-up with podiatry outpatient  Follow-up with vascular outpatient  Wound care per surgeon as listed in EMR  Pain control  Therapy  Continue antibiotic until complete, every Monday Wednesday Friday

## 2024-04-05 NOTE — ASSESSMENT & PLAN NOTE
Monitor blood pressure  Midodrine  CHF-monitor for shortness of breath  Remove extra fluid in dialysis  HD per nephrology

## 2024-04-06 PROBLEM — J10.1 INFLUENZA A: Status: ACTIVE | Noted: 2024-01-01

## 2024-04-06 NOTE — ED PROVIDER NOTES
HPI   No chief complaint on file.      HPI: 78-year-old male with a history of atrial fibrillation, CAD, DM 2, ESRD on HD, HLD and CHF presents for altered mental status.  As per staff and the patient's family he was difficult to arouse.  By the time the patient arrived to the ED his family states that he is at his baseline mental status.  The patient does have a history of dementia and is a poor historian.  He reports mild shortness of breath but denies any pain.      Limitations to history: None  Independent Historians: Patient  External Records Reviewed: HIE, outpatient notes, inpatient notes  ------------------------------------------------------------------------------------------------------------------------------------------  ROS: a ten point review of systems was performed and was negative except as per HPI.  ------------------------------------------------------------------------------------------------------------------------------------------  PMH / PSH: as per HPI, otherwise reviewed in EMR  MEDS: as per HPI, otherwise reviewed in EMR  ALLERGIES: as per HPI, otherwise reviewed in EMR  SocH:  as per HPI, otherwise reviewed in EMR  FH:  as per HPI, otherwise reviewed in EMR  ------------------------------------------------------------------------------------------------------------------------------------------  Physical Exam:  VS: As documented in the triage note and EMR flowsheet from this visit was reviewed  General: Well appearing. No acute distress.   Eyes:  Extraocular movements grossly intact. No scleral icterus. No discharge  HEENT:  Normocephalic.  Atraumatic  Neck: Moves neck freely. No gross masses  CV: Regular rhythm. No murmurs, rubs or gallops   Resp: Clear to auscultation bilaterally. No respiratory distress.  Patient is displaying a mild nonproductive cough  GI: Soft, no masses, nontender. No rebound tenderness or guarding  MSK: Symmetric muscle bulk. No deformities.  Bilateral lower  extremity symmetrical edema with 2+ pitting up to the level of the knees.  The distal bilateral lower extremities are wrapped in dressings.  Status post amputation of the right toes.  The overlying wounds appeared nonpurulent.  Skin: Warm, dry, intact.  There is a stage II sacral decubitus ulcer over the left gluteus muscle  Neuro: No focal deficits.  A&O to person and place but not time  Psych: Appropriate for situation  ------------------------------------------------------------------------------------------------------------------------------------------  Hospital Course / Medical Decision Making:  Independent Interpretations: Chest xray  EKG as interpreted by me: Atrial fibrillation with an approximate ventricular rate of 91 bpm with normal axis, no bundle branch block and no signs of acute ischemia    MDM: This is a 78-year-old male history of atrial fibrillation, CAD, DM 2, ESRD on hemodialysis and CHF presenting for altered mental status.  By the time the patient arrived to the ED he was reportedly at his baseline mentation.  He is reporting mild shortness of breath but does not have any pain.  Troponin moderately elevated.  EKG is nonischemic.  He is positive for influenza A.  Chest x-ray shows no acute cardiopulmonary process.  He is requiring 2 to 3 L of oxygen via nasal cannula to maintain his oxygen saturations.  The patient was admitted to the medicine service for further management.    Discussion of Management with Other Providers:   I discussed the patient/results with: Emergency medicine team    Final diagnosis and disposition as below.    Results for orders placed or performed during the hospital encounter of 04/06/24  -Magnesium:        Result                      Value             Ref Range           Magnesium                   2.20              1.60 - 2.40 *  -Comprehensive metabolic panel:        Result                      Value             Ref Range           Glucose                     135  (H)           74 - 99 mg/dL       Sodium                      132 (L)           136 - 145 mm*       Potassium                   3.8               3.5 - 5.3 mm*       Chloride                    91 (L)            98 - 107 mmo*       Bicarbonate                 26                21 - 32 mmol*       Anion Gap                   19                10 - 20 mmol*       Urea Nitrogen               50 (H)            6 - 23 mg/dL        Creatinine                  4.55 (H)          0.50 - 1.30 *       eGFR                        12 (L)            >60 mL/min/1*       Calcium                     9.2               8.6 - 10.3 m*       Albumin                     2.8 (L)           3.4 - 5.0 g/*       Alkaline Phosphatase        133               33 - 136 U/L        Total Protein               6.0 (L)           6.4 - 8.2 g/*       AST                         20                9 - 39 U/L          Bilirubin, Total            0.5               0.0 - 1.2 mg*       ALT                         12                10 - 52 U/L    -Troponin I, High Sensitivity:        Result                      Value             Ref Range           Troponin I, High Sensi*     91 (HH)           0 - 20 ng/L    -Sars-CoV-2 and Influenza A/B PCR:        Result                      Value             Ref Range           Flu A Result                Detected (A)      Not Detected        Flu B Result                Not Detected      Not Detected        Coronavirus 2019, PCR       Not Detected      Not Detected   XR chest 1 view   Final Result    No discrete infiltrate.     Signed by Heath Doherty MD                                 [place] Coma Scale Score: 13                     Patient History   Past Medical History:   Diagnosis Date    A-fib (CMS/[facility])     Acute hypercapnic respiratory failure (CMS/[facility])     Acute on chronic HFrEF (heart failure with reduced ejection fraction) (CMS/[facility])     Diabetes mellitus (CMS/[facility])     ESRD on hemodialysis (CMS/[facility])     History  of angioplasty of peripheral vessel 10/26/2023    HTN (hypertension)     LFT elevation 07/20/2021    Small bowel obstruction (CMS/HCC) 10/10/2023    Stage II pressure ulcer (CMS/HCC)      Past Surgical History:   Procedure Laterality Date    INVASIVE VASCULAR PROCEDURE Right 1/17/2024    Procedure: Lower Extremity Angiogram;  Surgeon: Stuart Nguyen MD;  Location: [place] [address] Cardiac Cath Lab;  Service: Cardiovascular;  Laterality: Right;    INVASIVE VASCULAR PROCEDURE Right 3/20/2024    Procedure: Lower Extremity Angiogram;  Surgeon: Stuart Nguyen MD;  Location: [place] [address] Cardiac Cath Lab;  Service: Cardiovascular;  Laterality: Right;  at 10 am     Family History   Problem Relation Name Age of Onset    Diabetes Mother       Social History     Tobacco Use    Smoking status: Never     Passive exposure: Past    Smokeless tobacco: Never   Substance Use Topics    Alcohol use: Not on file    Drug use: Not on file       Physical Exam   ED Triage Vitals   Temperature Heart Rate Respirations BP   04/06/24 1616 04/06/24 1616 04/06/24 1616 04/06/24 1616   36.4 °C (97.6 °F) 96 13 120/87      Pulse Ox Temp Source Heart Rate Source Patient Position   04/06/24 1615 04/06/24 1616 -- --   100 % Axillary        BP Location FiO2 (%)     -- 04/06/24 1616      40 %       Physical Exam    ED Course & MDM   ED Course as of 04/12/24 1342   Sat Apr 06, 202406, 2024 2025 Troponin I, High Sensitivity(!!): 92 [CC]      ED Course User Index  [CC] Goyo Bean DO         Diagnoses as of 04/12/24 1342   Influenza A       Medical Decision Making      Procedure  Procedures     Goyo Bean DO  04/12/24 1352

## 2024-04-06 NOTE — ED TRIAGE NOTES
Pt to ED from SNF for AMS. Per EMS pt's family came to visit pt at 1600 and stated he was confused and not at his baseline mentation. EMS stated that pt has a hx of afib and wears oxygen at baseline but unsure of how many liters. Pt is alert to self and place.

## 2024-04-07 NOTE — CODE DOCUMENTATION
Code rapid response    At about 18:40 rapid response called to room 503, on arrival patient was already unresponsive to voice command, has no pulse.  Per nursing staff this happened suddenly while they were turning the patient for cleaning after bowel movement.  Son was in the room, stated that his father was conversing with him few minutes prior to rapid response call. Patient reported was listed as DNR based on documentation from [facility], but his son was adamant that such decision was not made by family, and would like everything done for the patient at this time. I asked RN to activate CODE BLUE, which was called at that time. I initiated ACLS protocol with start of CPR, and delivery of 1st epi dose at 18:47.  ICU team arrived shortly after and took over.  I stay throughout the entire code, run by intensivist.   Please refer to code entry note for timing of drug administrations, intubation, and duration of code.  Patient was pronounced dead at 19:02, I spoke to family, his son and spouse, and presented my sincere condolences.    César Garcia DO  [facility] hospitalist  [facility]

## 2024-04-07 NOTE — CONSULTS
CONSULT: NEPHROLOGY SERVICE    REASON FOR CONSULT: ESKD  Admit Date: 4/6/2024  4:00 PM       HPI: Patient is a 78 y.o. male admitted 4/6/2024 with h/o ESKD on HD, A. fib, T2DM, CAD, GERD, sick sinus syndrome s/p permanent pacemaker, HFrEF, DVT, gout and multiple LE wound infections getting cefepime with dialysis, coming with confusion, +influenza, clear CXR    [facility] /[facility] schedule    Past Medical History:   Diagnosis Date    A-fib (CMS/[facility])     Acute hypercapnic respiratory failure (CMS/[facility])     Acute on chronic HFrEF (heart failure with reduced ejection fraction) (CMS/[facility])     Diabetes mellitus (CMS/[facility])     ESRD on hemodialysis (CMS/[facility])     History of angioplasty of peripheral vessel 10/26/2023    HTN (hypertension)     LFT elevation 07/20/2021    Small bowel obstruction (CMS/[facility]) 10/10/2023    Stage II pressure ulcer (CMS/[facility])      Allergies: Penicillins     Past Surgical History:   Procedure Laterality Date    INVASIVE VASCULAR PROCEDURE Right 1/17/2024    Procedure: Lower Extremity Angiogram;  Surgeon: Stuart Nguyen MD;  Location: [place] [address] Cardiac Cath Lab;  Service: Cardiovascular;  Laterality: Right;    INVASIVE VASCULAR PROCEDURE Right 3/20/2024    Procedure: Lower Extremity Angiogram;  Surgeon: Stuart Nguyen MD;  Location: [place] [address] Cardiac Cath Lab;  Service: Cardiovascular;  Laterality: Right;  at 10 am       Family History   Problem Relation Name Age of Onset    Diabetes Mother         Social History  He reports that he has never smoked. He has been exposed to tobacco smoke. He has never used smokeless tobacco. No history on file for alcohol use and drug use.    Review of Systems  As above     CURRENT HOSP MEDS:    Current Facility-Administered Medications:     acetaminophen (Tylenol) tablet 650 mg, 650 mg, oral, q4h PRN **OR** acetaminophen (Tylenol) oral liquid 650 mg, 650 mg, oral, q4h PRN **OR** acetaminophen (Tylenol) suppository 650 mg, 650 mg, rectal, q4h PRN, Lyle SANDY  Chone, DO    allopurinol (Zyloprim) tablet 100 mg, 100 mg, oral, Daily, Lyle SANDY Salomone, DO, 100 mg at 04/07/24 0800    apixaban (Eliquis) tablet 2.5 mg, 2.5 mg, oral, BID, Lyel VIKA Givense, DO, 2.5 mg at 04/07/24 0800    atorvastatin (Lipitor) tablet 40 mg, 40 mg, oral, Nightly, Lyle Givense, DO    B complex-vitamin C-folic acid (Nephrocaps) capsule 1 capsule, 1 capsule, oral, Daily, Lyle SANDY Saljuanchoe, DO, 1 capsule at 04/07/24 0800    benzonatate (Tessalon) capsule 200 mg, 200 mg, oral, TID PRN, Lyle Givense, DO    cefepime (Maxipime) 1 g in dextrose 5% 50 mL IV, 1 g, intravenous, q24h, Lyle Colón, DO, Stopped at 04/07/24 0130    clopidogrel (Plavix) tablet 75 mg, 75 mg, oral, Daily, Lyle Albertoomone, DO, 75 mg at 04/07/24 0800    dextrose 50 % injection 12.5 g, 12.5 g, intravenous, q15 min PRN, Lyle Givense, DO    dextrose 50 % injection 25 g, 25 g, intravenous, q15 min PRN, Lyle Givense, DO    gabapentin (Neurontin) capsule 100 mg, 100 mg, oral, Daily, Lyle SANDY Salomone, DO, 100 mg at 04/07/24 0800    glucagon (Glucagen) injection 1 mg, 1 mg, intramuscular, q15 min PRN, Lyle SANDY Salomone, DO    glucagon (Glucagen) injection 1 mg, 1 mg, intramuscular, q15 min PRN, Lyle SANDY Saljuanchoe, DO    [START ON 4/8/2024] heparin 1,000 unit/mL injection 2,000 Units, 2,000 Units, intra-catheter, Every Mon/Wed/Fri, Lyle Givense, DO    hydrOXYzine HCL (Atarax) tablet 25 mg, 25 mg, oral, q8h PRN, Lyle Givense, DO    insulin lispro (HumaLOG) injection 0-10 Units, 0-10 Units, subcutaneous, TID with meals, Lyle Colón DO    ipratropium-albuteroL (Duo-Neb) 0.5-2.5 mg/3 mL nebulizer solution 3 mL, 3 mL, nebulization, q2h PRN, Lyle Colón DO    lidocaine 4 % patch 1 patch, 1 patch, transdermal, Daily, Lyle Colón DO, 1 patch at 04/07/24 0800    loratadine (Claritin) tablet 10 mg, 10 mg, oral, q48h PRN, Lyle Colón DO    melatonin tablet 6 mg, 6 mg, oral, Nightly, Lyle Colón DO    midodrine  "(Proamatine) tablet 15 mg, 15 mg, oral, q8h, Lyle Colón DO    ondansetron (Zofran) tablet 4 mg, 4 mg, oral, q8h PRN **OR** ondansetron (Zofran) injection 4 mg, 4 mg, intravenous, q8h PRN, Lyle Colón DO    oseltamivir (Tamiflu) capsule 30 mg, 30 mg, oral, Once, Lyle Colón DO    [START ON 4/8/2024] oseltamivir (Tamiflu) capsule 30 mg, 30 mg, oral, Once per day on Mon Wed Fri, Lyle Colón DO    pantoprazole (ProtoNix) EC tablet 40 mg, 40 mg, oral, Daily before breakfast, 40 mg at 04/07/24 0756 **OR** pantoprazole (ProtoNix) injection 40 mg, 40 mg, intravenous, Daily before breakfast, Lyle Colón DO    polyethylene glycol (Glycolax, Miralax) packet 17 g, 17 g, oral, Daily, Lyle Colón DO, 17 g at 04/07/24 0800    sennosides (Senokot) tablet 8.6 mg, 1 tablet, oral, Nightly, Lyle Colón DO    traMADol (Ultram) tablet 50 mg, 50 mg, oral, q12h PRN, Lyle Colón DO     PHYSICAL EXAM:  /56 (BP Location: Right arm, Patient Position: Lying)   Pulse 91   Temp 36 °C (96.8 °F) (Temporal)   Resp 18   Ht 1.753 m (5' 9.02\")   Wt 87 kg (191 lb 12.8 oz)   SpO2 96%   BMI 28.31 kg/m²   No intake or output data in the 24 hours ending 04/07/24 1053  Gen: Awake, confuse, NAD  Neck: No JVD  Cardiac: RRR  Resp: clear BS  Abd: Soft, non tender, +BS, non distended   Ext: +1 edema   Access: LIJ TDC  Neuro: moves 4 ext  Peripheral Pulses: weakpulses.  Skin: Skin color, texture, turgor normal, no suspicious rashes or lesions.    LABS:   Results for orders placed or performed during the hospital encounter of 04/06/24 (from the past 24 hour(s))   CBC and Auto Differential   Result Value Ref Range    WBC 10.8 4.4 - 11.3 x10*3/uL    nRBC 0.4 (H) 0.0 - 0.0 /100 WBCs    RBC 3.09 (L) 4.50 - 5.90 x10*6/uL    Hemoglobin 8.3 (L) 13.5 - 17.5 g/dL    Hematocrit 29.6 (L) 41.0 - 52.0 %    MCV 96 80 - 100 fL    MCH 26.9 26.0 - 34.0 pg    MCHC 28.0 (L) 32.0 - 36.0 g/dL    RDW 18.8 (H) 11.5 - 14.5 %    " Platelets 265 150 - 450 x10*3/uL    Immature Granulocytes %, Automated 0.6 0.0 - 0.9 %    Immature Granulocytes Absolute, Automated 0.06 0.00 - 0.50 x10*3/uL   Magnesium   Result Value Ref Range    Magnesium 2.20 1.60 - 2.40 mg/dL   Comprehensive metabolic panel   Result Value Ref Range    Glucose 135 (H) 74 - 99 mg/dL    Sodium 132 (L) 136 - 145 mmol/L    Potassium 3.8 3.5 - 5.3 mmol/L    Chloride 91 (L) 98 - 107 mmol/L    Bicarbonate 26 21 - 32 mmol/L    Anion Gap 19 10 - 20 mmol/L    Urea Nitrogen 50 (H) 6 - 23 mg/dL    Creatinine 4.55 (H) 0.50 - 1.30 mg/dL    eGFR 12 (L) >60 mL/min/1.73m*2    Calcium 9.2 8.6 - 10.3 mg/dL    Albumin 2.8 (L) 3.4 - 5.0 g/dL    Alkaline Phosphatase 133 33 - 136 U/L    Total Protein 6.0 (L) 6.4 - 8.2 g/dL    AST 20 9 - 39 U/L    Bilirubin, Total 0.5 0.0 - 1.2 mg/dL    ALT 12 10 - 52 U/L   Troponin I, High Sensitivity   Result Value Ref Range    Troponin I, High Sensitivity 91 (HH) 0 - 20 ng/L   B-type natriuretic peptide   Result Value Ref Range    BNP 4,033 (H) 0 - 99 pg/mL   Manual Differential   Result Value Ref Range    Neutrophils %, Manual 77.0 40.0 - 80.0 %    Lymphocytes %, Manual 11.0 13.0 - 44.0 %    Monocytes %, Manual 5.0 2.0 - 10.0 %    Eosinophils %, Manual 6.0 0.0 - 6.0 %    Basophils %, Manual 0.0 0.0 - 2.0 %    Atypical Lymphocytes %, Manual 1.0 0.0 - 2.0 %    Seg Neutrophils Absolute, Manual 8.32 (H) 1.60 - 5.00 x10*3/uL    Lymphocytes Absolute, Manual 1.19 0.80 - 3.00 x10*3/uL    Monocytes Absolute, Manual 0.54 0.05 - 0.80 x10*3/uL    Eosinophils Absolute, Manual 0.65 (H) 0.00 - 0.40 x10*3/uL    Basophils Absolute, Manual 0.00 0.00 - 0.10 x10*3/uL    Atypical Lymphs Absolute, Manual 0.11 0.00 - 0.30 x10*3/uL    Total Cells Counted 100     RBC Morphology See Below     Polychromasia Mild    Sars-CoV-2 and Influenza A/B PCR   Result Value Ref Range    Flu A Result Detected (A) Not Detected    Flu B Result Not Detected Not Detected    Coronavirus 2019, PCR Not Detected  Not Detected   Troponin I, High Sensitivity   Result Value Ref Range    Troponin I, High Sensitivity 92 (HH) 0 - 20 ng/L   POCT GLUCOSE   Result Value Ref Range    POCT Glucose 85 74 - 99 mg/dL       DATA:   Diagnostic tests reviewed for today's visit:    Labs and meds    ASSESSMENT AND PLAN:  - ESKD on HD: acceptable volume status, last HD 4/5?  Lytes and acid base acceptable  HTN: controled  Hb 8.3    PLAN:  - routine HD tomorrow, adding epogen    Greatly appreciate the opportunity to assist in the care of this patient. Will continue to follow.     Signature: Ronaldo Koehler MD  Division of Nephrology and Hypertension

## 2024-04-07 NOTE — CONSULTS
''Infectious Disease Consult Note''        Referred by Dr Colón  Reason For Consult: Flu A and R foot wound       History Of Present Illness:  Pt is a 77 yo M with ESRD on HD MWF, PAD, A fib, T2DM, CAD, GERD, sick sinus syndrome s/p permanent pacemaker, HFrEF, DVT, gout, right transmetatarsal amputation (9/22/23) s/p R foot debridement to level of muscle/fascia (1/19/24) c/b R TMA infection with OM s/p debridement 3/17/24 and on cefepime with dialysis thru 4/28/24 by Dr Graves, admitted on 4/6 with worsening confusion and sob. Flu A is positive. CXR unremarkable. ID is consulted for abx management.      Current Antibiotic:  Cefepime  Tamiflu    Medications:  No current facility-administered medications on file prior to encounter.     Current Outpatient Medications on File Prior to Encounter   Medication Sig Dispense Refill    acetaminophen (Tylenol) 325 mg tablet Take 3 tablets (975 mg) by mouth every 6 hours if needed for mild pain (1 - 3).      albuterol-budesonide (Airsupra) 90-80 mcg/actuation inhaler Inhale 2 puffs every 6 hours if needed.      allopurinol (Zyloprim) 100 mg tablet Take 1 tablet (100 mg) by mouth once daily.      apixaban (Eliquis) 2.5 mg tablet Take 1 tablet (2.5 mg) by mouth 2 times a day.      atorvastatin (Lipitor) 40 mg tablet Take 1 tablet (40 mg) by mouth once daily at bedtime.      benzonatate (Tessalon) 200 mg capsule Take 1 capsule (200 mg) by mouth 3 times a day as needed for cough. Do not crush or chew. 20 capsule 0    cefepime (Maxipime) IV Infuse 100 mL (2 g) at 200 mL/hr over 30 minutes into a venous catheter 3 (three) times a week for 23 days. 2 g of cefepime after each hemodialysis Do not start before April 5, 2024. 1000 mL 0    clopidogrel (Plavix) 75 mg tablet Take 1 tablet (75 mg) by mouth once daily.      collagenase (SantyL) 250 unit/gram ointment Apply 1  "Application topically once daily as needed. \"MIGUEL\" to Right foot      dextrose 50 % injection Infuse 50 mL (25 g) into a venous catheter every 15 minutes if needed (For blood glucose less than or equal to 40 mg/dL).      gabapentin (Neurontin) 100 mg capsule Take 1 capsule (100 mg) by mouth once daily.      glucagon (Glucagen) 1 mg injection Inject 1 mg into the muscle every 15 minutes if needed for low blood sugar - see comments (For blood glucose less than or equal to 70 mg/dL and no IV access). 1 each 12    heparin 1,000 unit/mL injection 2 mL (2,000 Units) by intra-catheter route once daily on dialysis days. Do not start before March 1, 2024.      hydrOXYzine HCL (Atarax) 25 mg tablet Take 1 tablet (25 mg) by mouth every 8 hours if needed for itching or anxiety.      insulin lispro (HumaLOG) 100 unit/mL injection Inject 0-0.05 mL (0-5 Units) under the skin 3 times a day with meals. Take as directed per insulin instructions. (Patient taking differently: Inject 0-0.12 mL (0-12 Units) under the skin 4 times a day. Before Meals and Bedtime, Per Sliding Scale: 151-200=2; 201-250=4; 251-300=6; 301-350=8; 351-400=10; 401-450=12; Call if Greater than 450.) 4.5 mL 0    ipratropium-albuteroL (Duo-Neb) 0.5-2.5 mg/3 mL nebulizer solution Take 3 mL by nebulization every 6 hours if needed for wheezing or shortness of breath. 180 mL 11    lidocaine 4 % patch Place 1 patch over 12 hours on the skin once daily. Remove & discard patch within 12 hours or as directed by MD.      loratadine (Claritin) 10 mg tablet TAKE 1 TABLET (10 MG) BY MOUTH EVERY OTHER DAY IF NEEDED FOR ALLERGIES. 90 tablet 0    melatonin 5 mg tablet Take 1 tablet (5 mg) by mouth once daily at bedtime.      midodrine (Proamatine) 5 mg tablet Take 3 tablets (15 mg) by mouth every 8 hours.      pantoprazole (ProtoNix) 40 mg EC tablet Take 1 tablet (40 mg) by mouth once daily in the morning. Take before meals. Do not crush, chew, or split. Do not start before " February 11, 2024.      polyethylene glycol (Glycolax, Miralax) 17 gram packet Take 17 g by mouth once daily.      povidone-iodine (Betadine) 7.5 % solution Apply 1 Application topically once daily as needed for wound care.      sennosides (Senokot) 8.6 mg tablet Take 1 tablet (8.6 mg) by mouth once daily at bedtime.      traMADol (Ultram) 50 mg tablet Take 1 tablet (50 mg) by mouth every 12 hours if needed for severe pain (7 - 10). 60 tablet 3    Triphrocaps 1 mg capsule Take 1 capsule by mouth once daily. [facility]      [DISCONTINUED] albuterol 90 mcg/actuation inhaler Inhale 2 puffs every 6 hours if needed for shortness of breath.      [DISCONTINUED] insulin glargine (Lantus) 100 unit/mL injection Inject 6 Units under the skin once daily at bedtime. Take as directed per insulin instructions.      [DISCONTINUED] mupirocin (Bactroban) 2 % ointment Apply topically 2 times a day for 14 days. 22 g 0    [DISCONTINUED] polyethylene glycol (Miralax) 17 gram packet Take 17 g by mouth once daily.           Past Medical History:   Diagnosis Date    A-fib (CMS/[facility])     Acute hypercapnic respiratory failure (CMS/[facility])     Acute on chronic HFrEF (heart failure with reduced ejection fraction) (CMS/[facility])     Diabetes mellitus (CMS/[facility])     ESRD on hemodialysis (CMS/[facility])     History of angioplasty of peripheral vessel 10/26/2023    HTN (hypertension)     LFT elevation 07/20/2021    Small bowel obstruction (CMS/[facility]) 10/10/2023    Stage II pressure ulcer (CMS/[facility])      Past Surgical History:   Procedure Laterality Date    INVASIVE VASCULAR PROCEDURE Right 1/17/2024    Procedure: Lower Extremity Angiogram;  Surgeon: Stuart Nguyen MD;  Location: [place] [address] Cardiac Cath Lab;  Service: Cardiovascular;  Laterality: Right;    INVASIVE VASCULAR PROCEDURE Right 3/20/2024    Procedure: Lower Extremity Angiogram;  Surgeon: Stuart Nguyen MD;  Location: [place] [address] Cardiac Cath Lab;  Service: Cardiovascular;  Laterality: Right;  at 10 am     Social  History     Socioeconomic History    Marital status:      Spouse name: Not on file    Number of children: Not on file    Years of education: Not on file    Highest education level: Not on file   Occupational History    Not on file   Tobacco Use    Smoking status: Never     Passive exposure: Past    Smokeless tobacco: Never   Substance and Sexual Activity    Alcohol use: Not on file    Drug use: Not on file    Sexual activity: Not on file   Other Topics Concern    Not on file   Social History Narrative    Not on file     Social Determinants of Health     Financial Resource Strain: Patient Unable To Answer (4/7/2024)    Overall Financial Resource Strain (CARDIA)     Difficulty of Paying Living Expenses: Patient unable to answer   Recent Concern: Financial Resource Strain - Medium Risk (3/8/2024)    Overall Financial Resource Strain (CARDIA)     Difficulty of Paying Living Expenses: Somewhat hard   Food Insecurity: Not on file   Transportation Needs: Patient Unable To Answer (4/7/2024)    PRAPARE - Transportation     Lack of Transportation (Medical): Patient unable to answer     Lack of Transportation (Non-Medical): Patient unable to answer   Physical Activity: Not on file   Stress: Not on file   Social Connections: Not on file   Intimate Partner Violence: Not on file   Housing Stability: Patient Unable To Answer (4/7/2024)    Housing Stability Vital Sign     Unable to Pay for Housing in the Last Year: Patient unable to answer     Number of Places Lived in the Last Year: 2     Unstable Housing in the Last Year: Patient unable to answer     Family History   Problem Relation Name Age of Onset    Diabetes Mother       Allergies   Allergen Reactions    Penicillins Hives         Review of Systems:   Patient is confused and I was not able to take ROS from him.     Physical Exam:  /56 (BP Location: Right arm, Patient Position: Lying)   Pulse 91   Temp 36 °C (96.8 °F) (Temporal)   Resp 18   Ht 1.753 m (5'  "9.02\")   Wt 87 kg (191 lb 12.8 oz)   SpO2 96%   BMI 28.31 kg/m²   General: NAD, nontoxic appearing  Skin: + R TMA wound and stage 2 sacral PU  Eyes: no scleral icterus  ENT: no oral thrush or lesions  Chest: TDC  Resp: lungs CTA b/l  CV:  normal S1/S2, no murmur, + PPM  Abd: soft, non-tender  Back: no CVA tenderness   Ext: no edema     Lab:  Lab Results   Component Value Date    WBC 10.8 04/06/2024    HGB 8.3 (L) 04/06/2024    HCT 29.6 (L) 04/06/2024    MCV 96 04/06/2024     04/06/2024      Results from last 72 hours   Lab Units 04/06/24  1738   SODIUM mmol/L 132*   POTASSIUM mmol/L 3.8   CHLORIDE mmol/L 91*   CO2 mmol/L 26   BUN mg/dL 50*   CREATININE mg/dL 4.55*   GLUCOSE mg/dL 135*   CALCIUM mg/dL 9.2   ANION GAP mmol/L 19   EGFR mL/min/1.73m*2 12*     Results from last 72 hours   Lab Units 04/06/24  1738   ALK PHOS U/L 133   BILIRUBIN TOTAL mg/dL 0.5   PROTEIN TOTAL g/dL 6.0*   ALT U/L 12   AST U/L 20   ALBUMIN g/dL 2.8*     Estimated Creatinine Clearance: 14.4 mL/min (A) (by C-G formula based on SCr of 4.55 mg/dL (H)).  C-Reactive Protein   Date/Time Value Ref Range Status   10/07/2023 07:19 AM 11.53 (H) <1.00 mg/dL Final   10/01/2023 01:52 AM 21.44 (H) <1.00 mg/dL Final     Sedimentation Rate   Date/Time Value Ref Range Status   10/07/2023 07:19  (H) 0 - 20 mm/h Final     No results found for: \"HIV1X2\", \"HIVCONF\", \"MYKQPK1DG\"  No results found for: \"HCVPCRQUANT\"      Cultures/Micro:  4/6 FluA: positive        Imaging: reviewed       Assessment:  Pt is a 79 yo M with A. fib, T2DM, CAD, GERD, sick sinus syndrome s/p permanent pacemaker, HFrEF, DVT, gout, admitted on 4/6 with AMS and sob.    -Influenza A infection   -H/o R foot infection s/p R TMA (9/22/23) then deep debridement t1/19/24) c/b R TMA infection with OM s/p debridement 3/17/24 and on cefepime with dialysis thru 4/28/24 by Dr Graves  -DM  -ESRD on HD MWF  -PAD  -PCN allergy    Plan/Recommendations:  -c/w Tamiflu x 5days  -c/w cefepime " with dialysis thru 4/28/24  -supportive care  -droplet isolation   -Monitor for diarrhea and rash      Please call ID with any concerns or questions.   Discussed with RN.      Jaclyn Moore MD  [facility]  #[phone]

## 2024-04-07 NOTE — CARE PLAN
The patient's goals for the shift include maintaining comfort.    The clinical goals for the shift include remaining safe and comfortable.    Over the shift, the patient did make progress toward the following goals.

## 2024-04-07 NOTE — PROGRESS NOTES
Pharmacy Medication History Review    Chevy Benton is a 78 y.o. male admitted for Influenza A. Pharmacy reviewed the patient's [id number] medications and allergies for accuracy.      PTA Medication List has been verified/updated as appears on Medication Review Report from [facility] ([facility]) [4/6/24, 15:14:49 ET].       The list below reflects the updated PTA list. Comments regarding how patient may be taking medications differently can be found in the Admit Orders Activity  Prior to Admission Medications   Prescriptions  Facility Reported?   Triphrocaps 1 mg capsule  Yes   Sig: Take 1 capsule by mouth once daily. ([facility])   acetaminophen (Tylenol) 325 mg tablet  Yes   Sig: Take 3 tablets (975 mg) by mouth every 6 hours   if needed for mild pain (1 - 3).   albuterol-budesonide (Airsupra) 90-80 mcg/actuation inhaler  Yes   Sig: Inhale 2 puffs every 6 hours if needed.   allopurinol (Zyloprim) 100 mg tablet  Yes   Sig: Take 1 tablet (100 mg) by mouth once daily.   apixaban (Eliquis) 2.5 mg tablet  Yes   Sig: Take 1 tablet (2.5 mg) by mouth 2 times a day.   atorvastatin (Lipitor) 40 mg tablet  Yes   Sig: Take 1 tablet (40 mg) by mouth once daily at bedtime.   benzonatate (Tessalon) 200 mg capsule  Yes   Sig: Take 1 capsule (200 mg) by mouth 3 times a day   as needed for cough.    cefepime (Maxipime) IV  Yes   Sig: Infuse 100 mL (2 g) at 200 mL/hr over 30 minutes into   a venous catheter 3 (three) times a week for right foot wound.   2 g of cefepime after each hemodialysis, M,W,F   clopidogrel (Plavix) 75 mg tablet  Yes   Sig: Take 1 tablet (75 mg) by mouth once daily.   gabapentin (Neurontin) 100 mg capsule  Yes   Sig: Take 1 capsule (100 mg) by mouth once daily.   glucagon (Glucagen) 1 mg injection  Yes   Sig: Inject 1 mg into the muscle every 15 minutes   if needed for low blood sugar - see comments   (For blood glucose less than or equal to 70 mg/dL and no IV access).      hydrOXYzine HCL  (Atarax) 25 mg tablet  Yes   Sig: Take 1 tablet (25 mg) by mouth every 8 hours   if needed for itching or anxiety.   insulin lispro (HumaLOG) 100 unit/mL injection  Yes   Sig: Inject 0-0.12 mL (0-12 Units) under the skin 4 times a day.   Before Meals and Bedtime, Per Sliding Scale: 151-200=2;   201-250=4; 251-300=6; 301-350=8; 351-400=10; 401-450=12;   Call if Greater than 450.   ipratropium-albuteroL (Duo-Neb) 0.5-2.5 mg/3 mL nebulizer solution  Yes   Sig: Take 3 mL by nebulization every 6 hours if needed for   wheezing or shortness of breath.   lidocaine 4 % patch  Yes   Sig: Place 1 patch over 12 hours on the skin once daily.   Remove & discard patch within 12 hours or as directed by MD.   loratadine (Claritin) 10 mg tablet  Yes   Sig: TAKE 1 TABLET (10 MG) BY MOUTH EVERY   OTHER DAY IF NEEDED FOR ALLERGIES.   melatonin 5 mg tablet  Yes   Sig: Take 1 tablet (5 mg) by mouth once daily at bedtime.   midodrine (Proamatine) 5 mg tablet  Yes   Sig: Take 3 tablets (15 mg) by mouth every 8 hours.   pantoprazole (ProtoNix) 40 mg EC tablet  Yes   Sig: Take 1 tablet (40 mg) by mouth once daily in the morning.    polyethylene glycol (Glycolax, Miralax) 17 gram packet  Yes   Sig: Take 17 g by mouth once daily.      sennosides (Senokot) 8.6 mg tablet  Yes   Sig: Take 1 tablet (8.6 mg) by mouth once daily at bedtime.   traMADol (Ultram) 50 mg tablet  Yes   Sig: Take 1 tablet (50 mg) by mouth every   12 hours if needed for severe pain (7 - 10).      Facility-Administered Medications: None        The list below reflects the updated allergy list. Please review each documented allergy for additional clarification and justification.  Allergies  Indicated as Unable to Assess by Dinora Lambert RN on 4/6/2024 (Patient unreliable)        Severity Reactions Comments    Penicillins Medium Hives           ------------------------------  Reese Sutton, PharmD, [facility]  Transitions of Care Pharmacist  Overnight Virtual Coverage from  [facility]  Medication reconciliation complete  Please reach out via [employer] Secure Chat for questions,   or if no response call [phone]  [facility]

## 2024-04-07 NOTE — PROGRESS NOTES
[facility] Hospitalist Progress Note        Chevy Benton    :  1945(78 y.o.)    MRN:  [id number]  Date: 24     Assessment and Plan:     Acute respiratory insufficiency due to influenza A infection- Started on renally dosed tamiflu for influenza infection. Wean O2 for goal o2 sat 88-92%  Acute metabolic encephalopathy- suspect due to influenza infection  ESRD on HD- nephrology consulted for IP HD  PAF- continue eliuquis  T2DM- monitor on SSI  CAD/PAD- continue eliquis, plavix, statin  GERD- continue ppi  SSS s/p PPM  Chronic HFrEF  DVT- continue eliquis  Gout- on allopurinol  Right transmetatarsal amputation (23) s/p R foot debridement to level of muscle/fascia (24) c/b R TMA infection with OM s/p debridement 3/17/24 and on cefepime with dialysis thru 24      DVT Prophylaxis: full anticoagulation with eliquis    Disposition: continue to monitor inpatient, await consultant recommendations, await test results, and await clinical improvement    The patient/family had opportunity to ask questions. All questions were answered to the best of my ability.    Between 7AM-7PM please message me via Epic Secure Chat.  After 7PM please page Nocturnist on call.    Electronically signed by Hernan Sanchez DO on 24 at 7:12 PM     Subjective:      Interval History:   Vitals and chart notes from overnight reviewed.   No acute issues overnight.   Patient seen and evaluated at bedside.   Confused today, no family at bedside. Per H&P was confused on arrival.  Able to follow commands but doesn't reply to my questions.    Review of Systems:   Other than patient's chronic conditions and those complaints in the history above, the rest of the 10 systems review were done and were negative.     Current medications:  Scheduled Meds:allopurinol, 100 mg, oral, Daily  apixaban, 2.5 mg, oral, BID  atorvastatin, 40 mg, oral, Nightly  B complex-vitamin C-folic acid, 1 capsule, oral, Daily  cefepime, 1 g, intravenous,  "q24h  clopidogrel, 75 mg, oral, Daily  [START ON 4/8/2024] epoetin dane or biosimilar, 10,000 Units, subcutaneous, Once per day on Mon Wed Fri  gabapentin, 100 mg, oral, Daily  [START ON 4/8/2024] heparin, 2,000 Units, intra-catheter, Every Mon/Wed/Fri  insulin lispro, 0-10 Units, subcutaneous, TID with meals  lidocaine, 1 patch, transdermal, Daily  melatonin, 6 mg, oral, Nightly  midodrine, 15 mg, oral, q8h  [START ON 4/8/2024] oseltamivir, 30 mg, oral, Once per day on Mon Wed Fri  pantoprazole, 40 mg, oral, Daily before breakfast   Or  pantoprazole, 40 mg, intravenous, Daily before breakfast  polyethylene glycol, 17 g, oral, Daily  sennosides, 1 tablet, oral, Nightly      Continuous Infusions:   PRN Meds:PRN medications: acetaminophen **OR** acetaminophen **OR** acetaminophen, benzonatate, dextrose, dextrose, glucagon, glucagon, hydrOXYzine HCL, ipratropium-albuteroL, loratadine, ondansetron **OR** ondansetron, traMADol      Objective:     Heart Rate:  [88-93]   Temp:  [35.9 °C (96.7 °F)-36.1 °C (97 °F)]   Resp:  [16-20]   BP: (117-128)/(56-90)   Height:  [175.3 cm (5' 9.02\")]   Weight:  [87 kg (191 lb 12.8 oz)]   SpO2:  [92 %-100 %]          Physical Exam  Vitals and nursing note reviewed.   Constitutional:       Comments: drowsy   HENT:      Mouth/Throat:      Mouth: Mucous membranes are moist.      Pharynx: Oropharynx is clear.   Cardiovascular:      Rate and Rhythm: Normal rate and regular rhythm.   Pulmonary:      Effort: Pulmonary effort is normal.      Breath sounds: Wheezing present.   Abdominal:      General: There is no distension.      Palpations: Abdomen is soft.      Tenderness: There is no abdominal tenderness.   Neurological:      Comments: Wiggles toes, gives me thumbs up         Labs:   Lab Results   Component Value Date     (L) 04/06/2024    K 3.8 04/06/2024    CL 91 (L) 04/06/2024    CO2 26 04/06/2024    BUN 50 (H) 04/06/2024    CREATININE 4.55 (H) 04/06/2024    GLUCOSE 135 (H) 04/06/2024 "    CALCIUM 9.2 04/06/2024    PROT 6.0 (L) 04/06/2024    BILITOT 0.5 04/06/2024    ALKPHOS 133 04/06/2024    AST 20 04/06/2024    ALT 12 04/06/2024       Lab Results   Component Value Date    WBC 10.8 04/06/2024    HGB 8.3 (L) 04/06/2024    HCT 29.6 (L) 04/06/2024    MCV 96 04/06/2024     04/06/2024

## 2024-04-07 NOTE — NURSING NOTE
2230 Patient arrived from ED to unit. Patient is A&O x 1. Skin assessment done with charge nurse Sushila ROMAN and documented per eMAR. Bed alarm activated with bed locked in lowest position and call light within reach. Plan of care ongoing.

## 2024-04-07 NOTE — H&P
History Of Present Illness  Chevy Benton is a 78 y.o. male with a past medical history of peripheral arterial disease, recent right great toe amputation, persistent atrial fibrillation currently paced, coronary artery disease, type 2 diabetes mellitus, right subclavian DVT October 2023, GERD, sick sinus syndrome with pacemaker, gout, end-stage renal disease on hemodialysis every Monday Wednesday Friday, hyperlipidemia, hypotension on midodrine, systolic CHF ejection fraction 40 to 45%, and replace AV fistula due to hemorrhage.  He was discharged from [facility] on 4/3/2023 and presents to [facility] ER complaining of altered mental status.  The skilled nursing home staff and the patient's family said that he was difficult to arouse from bed this morning.  He was taken to [facility] via EMS and upon arrival his mental status returned to baseline.   He reports mild shortness of breath but denies any pain.  He is on home oxygen at 2 L/min per nasal cannula at baseline.  He is influenza A test was positive.  No mention of any fever or chills chest pain abdominal pain nausea or vomiting     Past Medical History  Past Medical History:   Diagnosis Date    A-fib (CMS/[facility])     Acute hypercapnic respiratory failure (CMS/[facility])     Acute on chronic HFrEF (heart failure with reduced ejection fraction) (CMS/[facility])     Diabetes mellitus (CMS/[facility])     ESRD on hemodialysis (CMS/[facility])     History of angioplasty of peripheral vessel 10/26/2023    HTN (hypertension)     LFT elevation 07/20/2021    Small bowel obstruction (CMS/[facility]) 10/10/2023    Stage II pressure ulcer (CMS/[facility])         Surgical History  Past Surgical History:   Procedure Laterality Date    INVASIVE VASCULAR PROCEDURE Right 1/17/2024    Procedure: Lower Extremity Angiogram;  Surgeon: Stuart Nguyen MD;  Location: [place] [address] Cardiac Cath Lab;  Service: Cardiovascular;  Laterality: Right;    INVASIVE VASCULAR PROCEDURE Right 3/20/2024    Procedure: Lower  Extremity Angiogram;  Surgeon: Stuart Nguyen MD;  Location: [place] [address] Cardiac Cath Lab;  Service: Cardiovascular;  Laterality: Right;  at 10 am         Social History  He reports that he has never smoked. He has been exposed to tobacco smoke. He has never used smokeless tobacco. No history on file for alcohol use and drug use.    Family History  Family History   Problem Relation Name Age of Onset    Diabetes Mother          Allergies  Penicillins    Review of Systems   Constitutional:  Positive for activity change and appetite change.   HENT: Negative.     Eyes: Negative.    Respiratory:  Positive for cough and shortness of breath.    Cardiovascular: Negative.    Gastrointestinal: Negative.    Endocrine: Negative.    Genitourinary: Negative.    Musculoskeletal: Negative.         Recent right great toe   Skin: Negative.    Allergic/Immunologic: Negative.    Neurological: Negative.    Hematological: Negative.    Psychiatric/Behavioral: Negative.          Pleasantly confused   All other systems reviewed and are negative.       Physical Exam  Vitals and nursing note reviewed.   Constitutional:       Appearance: Normal appearance.   HENT:      Head: Normocephalic.      Right Ear: External ear normal.      Left Ear: External ear normal.      Nose: Nose normal.      Mouth/Throat:      Mouth: Mucous membranes are dry.      Pharynx: Oropharynx is clear.   Eyes:      Extraocular Movements: Extraocular movements intact.      Conjunctiva/sclera: Conjunctivae normal.      Pupils: Pupils are equal, round, and reactive to light.   Cardiovascular:      Rate and Rhythm: Normal rate and regular rhythm.   Pulmonary:      Effort: Pulmonary effort is normal.      Comments: Diminished breath sounds bilaterally with poor inspiratory effort  Abdominal:      General: Abdomen is flat. Bowel sounds are normal.      Palpations: Abdomen is soft.   Musculoskeletal:         General: Normal range of motion.      Comments: Right great toe amputation  "  Skin:     General: Skin is warm and dry.      Comments: Stage II pressure ulcer on the sacrum   Neurological:      General: No focal deficit present.      Mental Status: He is alert. Mental status is at baseline.   Psychiatric:         Mood and Affect: Mood normal.         Behavior: Behavior normal.          Last Recorded Vitals  Blood pressure 128/66, pulse 94, temperature 36.4 °C (97.6 °F), temperature source Axillary, resp. rate 15, height 1.753 m (5' 9\"), weight 87 kg (191 lb 12.8 oz), SpO2 98 %.    Relevant Results  Meds:  Scheduled medications  [START ON 4/7/2024] insulin lispro, 0-10 Units, subcutaneous, TID with meals  oseltamivir, 30 mg, oral, Once  [START ON 4/7/2024] oseltamivir, 30 mg, oral, After Dialysis  [START ON 4/7/2024] pantoprazole, 40 mg, oral, Daily before breakfast   Or  [START ON 4/7/2024] pantoprazole, 40 mg, intravenous, Daily before breakfast      Continuous medications     PRN medications  PRN medications: acetaminophen **OR** acetaminophen **OR** acetaminophen, dextrose, dextrose, glucagon, glucagon, ondansetron **OR** ondansetron   Current Outpatient Medications   Medication Instructions    acetaminophen (TYLENOL) 975 mg, oral, Every 6 hours PRN    albuterol 90 mcg/actuation inhaler 2 puffs, inhalation, Every 6 hours PRN    allopurinol (ZYLOPRIM) 100 mg, oral, Daily    apixaban (ELIQUIS) 2.5 mg, oral, 2 times daily    atorvastatin (LIPITOR) 40 mg, oral, Nightly    benzonatate (TESSALON) 200 mg, oral, 3 times daily PRN, Do not crush or chew.    cefepime (Maxipime) IV 2 g, intravenous, 3 times weekly, 2 g of cefepime after each hemodialysis    clopidogrel (PLAVIX) 75 mg, oral, Daily    collagenase (SantyL) 250 unit/gram ointment 1 Application, Topical, Daily PRN, \"MIGUEL\" to Right foot    dextrose 25 g, intravenous, Every 15 min PRN    gabapentin (NEURONTIN) 100 mg, oral, Daily    glucagon (GLUCAGEN) 1 mg, intramuscular, Every 15 min PRN    heparin 2,000 Units, intra-catheter, After " Dialysis    hydrOXYzine HCL (ATARAX) 25 mg, oral, Every 8 hours PRN    insulin lispro (HUMALOG) 0-5 Units, subcutaneous, 3 times daily with meals, Take as directed per insulin instructions.    ipratropium-albuteroL (Duo-Neb) 0.5-2.5 mg/3 mL nebulizer solution 3 mL, nebulization, Every 6 hours PRN    lidocaine 4 % patch 1 patch, transdermal, Daily, Remove & discard patch within 12 hours or as directed by MD.    loratadine (CLARITIN) 10 mg, oral, Every 48 hours PRN    melatonin 5 mg, oral, Nightly    midodrine (PROAMATINE) 15 mg, oral, Every 8 hours    pantoprazole (PROTONIX) 40 mg, oral, Daily before breakfast, Do not crush, chew, or split.    polyethylene glycol (MIRALAX) 17 g, oral, Daily    povidone-iodine (Betadine) 7.5 % solution 1 Application, Topical, Daily PRN    sennosides (SENOKOT) 8.6 mg, oral, Nightly    traMADol (ULTRAM) 50 mg, oral, Every 12 hours PRN    Triphrocaps 1 mg capsule 1 capsule, oral, Daily        Labs:  Results for orders placed or performed during the hospital encounter of 04/06/24 (from the past 24 hour(s))   CBC and Auto Differential   Result Value Ref Range    WBC 10.8 4.4 - 11.3 x10*3/uL    nRBC 0.4 (H) 0.0 - 0.0 /100 WBCs    RBC 3.09 (L) 4.50 - 5.90 x10*6/uL    Hemoglobin 8.3 (L) 13.5 - 17.5 g/dL    Hematocrit 29.6 (L) 41.0 - 52.0 %    MCV 96 80 - 100 fL    MCH 26.9 26.0 - 34.0 pg    MCHC 28.0 (L) 32.0 - 36.0 g/dL    RDW 18.8 (H) 11.5 - 14.5 %    Platelets 265 150 - 450 x10*3/uL    Immature Granulocytes %, Automated 0.6 0.0 - 0.9 %    Immature Granulocytes Absolute, Automated 0.06 0.00 - 0.50 x10*3/uL   Magnesium   Result Value Ref Range    Magnesium 2.20 1.60 - 2.40 mg/dL   Comprehensive metabolic panel   Result Value Ref Range    Glucose 135 (H) 74 - 99 mg/dL    Sodium 132 (L) 136 - 145 mmol/L    Potassium 3.8 3.5 - 5.3 mmol/L    Chloride 91 (L) 98 - 107 mmol/L    Bicarbonate 26 21 - 32 mmol/L    Anion Gap 19 10 - 20 mmol/L    Urea Nitrogen 50 (H) 6 - 23 mg/dL    Creatinine 4.55 (H)  0.50 - 1.30 mg/dL    eGFR 12 (L) >60 mL/min/1.73m*2    Calcium 9.2 8.6 - 10.3 mg/dL    Albumin 2.8 (L) 3.4 - 5.0 g/dL    Alkaline Phosphatase 133 33 - 136 U/L    Total Protein 6.0 (L) 6.4 - 8.2 g/dL    AST 20 9 - 39 U/L    Bilirubin, Total 0.5 0.0 - 1.2 mg/dL    ALT 12 10 - 52 U/L   Troponin I, High Sensitivity   Result Value Ref Range    Troponin I, High Sensitivity 91 (HH) 0 - 20 ng/L   B-type natriuretic peptide   Result Value Ref Range    BNP 4,033 (H) 0 - 99 pg/mL   Manual Differential   Result Value Ref Range    Neutrophils %, Manual 77.0 40.0 - 80.0 %    Lymphocytes %, Manual 11.0 13.0 - 44.0 %    Monocytes %, Manual 5.0 2.0 - 10.0 %    Eosinophils %, Manual 6.0 0.0 - 6.0 %    Basophils %, Manual 0.0 0.0 - 2.0 %    Atypical Lymphocytes %, Manual 1.0 0.0 - 2.0 %    Seg Neutrophils Absolute, Manual 8.32 (H) 1.60 - 5.00 x10*3/uL    Lymphocytes Absolute, Manual 1.19 0.80 - 3.00 x10*3/uL    Monocytes Absolute, Manual 0.54 0.05 - 0.80 x10*3/uL    Eosinophils Absolute, Manual 0.65 (H) 0.00 - 0.40 x10*3/uL    Basophils Absolute, Manual 0.00 0.00 - 0.10 x10*3/uL    Atypical Lymphs Absolute, Manual 0.11 0.00 - 0.30 x10*3/uL    Total Cells Counted 100     RBC Morphology See Below     Polychromasia Mild    Sars-CoV-2 and Influenza A/B PCR   Result Value Ref Range    Flu A Result Detected (A) Not Detected    Flu B Result Not Detected Not Detected    Coronavirus 2019, PCR Not Detected Not Detected   Troponin I, High Sensitivity   Result Value Ref Range    Troponin I, High Sensitivity 92 (HH) 0 - 20 ng/L      Imaging:  XR chest 1 view    Result Date: 4/6/2024  STUDY: Chest Radiograph;  04/06/2024 INDICATION: Cough, AMS. COMPARISON: 04/02/2024 XR chest ACCESSION NUMBER(S): [id number] ORDERING CLINICIAN: MARISSA SAN TECHNIQUE:  Frontal chest was obtained at 18:02 hours. FINDINGS: CARDIOMEDIASTINAL SILHOUETTE: Cardiomediastinal silhouette is normal in size and configuration. Left chest tunneled catheter present.  Right  chest pacemaker noted.   LUNGS: Small bilateral pleural effusions present.  Lung volumes decreased. No discrete infiltrate identified.  ABDOMEN: No remarkable upper abdominal findings.  BONES: No acute osseous changes.    No discrete infiltrate. Signed by Heath Doherty MD       Assessment/Plan   Metabolic encephalopathy secondary to influenza A  Plan:  Tamiflu for end-stage renal disease  Telemetry    Influenza A viremia  Plan:  Tamiflu    End-stage renal disease  Plan:  Hemodialysis every Monday Wednesday Friday  Consult nephrology  Renal diet    Elevated troponin  Continue to trend  Telemetry    Hypotension  Plan:  On midodrine    Type 2 diabetes mellitus  Carbohydrate controlled diet  Humalog per corrective scale    Peripheral arterial disease  Status post right great toe amputation  Patient is currently on cefepime until 4/28/2024  Continue aspirin 81 mg daily and Plavix 75 mg p.o. daily    GERD  Protonix 40 mg orally daily    Hyperlipidemia  Atorvastatin 40 mg orally daily    Chronic hypoxic respiratory failure  On home oxygen at 2 L per nasal cannula    Persistent atrial fibrillation currently paced  Continue apixaban    Sacral pressure ulcer stage II    Chronic systolic congestive heart failure, ejection fraction 40 to 45%, compensated    DVT history  On apixaban    DVT prophylaxis  Covered with renally dosed apixaban  Holding off on SCDs as he has severe peripheral vascular disease    I spent 60 minutes in the professional and overall care of this patient.      Lyle Colón DO

## 2024-04-07 NOTE — CARE PLAN
Problem: Skin  Goal: Decreased wound size/increased tissue granulation at next dressing change  Outcome: Progressing  Goal: Participates in plan/prevention/treatment measures  Outcome: Progressing  Goal: Prevent/manage excess moisture  Flowsheets (Taken 4/7/2024 0246)  Prevent/manage excess moisture:   Monitor for/manage infection if present   Cleanse incontinence/protect with barrier cream  Goal: Prevent/minimize sheer/friction injuries  Outcome: Progressing  Flowsheets (Taken 4/7/2024 0246)  Prevent/minimize sheer/friction injuries: Turn/reposition every 2 hours/use positioning/transfer devices  Goal: Promote/optimize nutrition  Outcome: Progressing  Flowsheets (Taken 4/7/2024 0246)  Promote/optimize nutrition: Consume > 50% meals/supplements  Goal: Promote skin healing  Outcome: Progressing  Flowsheets (Taken 4/7/2024 0246)  Promote skin healing: Turn/reposition every 2 hours/use positioning/transfer devices   The patient's goals for the shift include      The clinical goals for the shift include safety, comfort    Over the shift, the patient did make progress toward the following goals. Barriers to progression include pt is bed rest, and limited mobility and incontinent. Recommendations to address these barriers include hourly rounds, incontinence care, and q 2 turn .

## 2024-04-07 NOTE — PROCEDURES
INTUBATION     Indication(s):  -Hypercapnic respiratory failure  -Hypoxemic respiratory failure  -Airway Protection   -Code Blue setting     Induction agents:  - ON GOING CardioPulm Arrest     Procedure:  Pt was on  BMV performed with 100% O2 by ambu.  Induction agents as above. VL with Mac 3, grade I view. Atraumatic intubation with #7.5 ETT @ 23 cm @ lips. +EtCO2 color change. Breath sound equal bilaterally. Patient tolerated well. No complications.

## 2024-04-07 NOTE — CODE DOCUMENTATION
Code blue called    Upon my arrival patient was receiving chest compressions with bag-mask ventilation.  Despite intubation and multiple doses of epinephrine, 2 doses of calcium chloride and one amp on sodium bicarb the patient never re-established a native pulse.  Code called with no objection or other ideas at patient was declared dead at 19:02    I personally updated the patients son and wife at bedside and provide emotional support.      Dr. Garcia at bedside during entirety of the code.

## 2024-04-08 NOTE — PROGRESS NOTES
PROGRESS NOTE    Subjective   Chief complaint: Chevy Benton is a 78 y.o. male who is an acute skilled patient being seen and evaluated for weakness    HPI:  HPI  Therapy has been working with the patient to improve strength and endurance with ADLs, transfers, and mobility.  Patient continues to work toward goals.  Patient is stable and has no new complaints.  Patient was seen and examined at the bedside, appears to be in no acute distress.  Patient does continue on HD for diagnosis of ESRD, tolerating treatment well.  Nursing staff voices no new concerns today.    Objective   Vital signs: 109/60, 97.0, 62, 16, blood sugar 160, 96%    Physical Exam  Constitutional:       General: He is not in acute distress.  Eyes:      Extraocular Movements: Extraocular movements intact.   Cardiovascular:      Rate and Rhythm: Normal rate. Rhythm irregular.   Pulmonary:      Effort: Pulmonary effort is normal.      Breath sounds: Normal breath sounds.      Comments: O2  Musculoskeletal:      Cervical back: Neck supple.      Right lower leg: Edema present.      Left lower leg: Edema present.   Neurological:      Mental Status: He is alert.      Motor: Weakness present.   Psychiatric:         Mood and Affect: Mood normal.         Behavior: Behavior is cooperative.         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, with long-term current use of insulin (CMS/[facility])     Insulin  Gabapentin   Monitor blood sugar  FBG at goal           Weakness - Primary      Continue working in therapy          Medications, treatments, and labs reviewed  Continue medications and treatments as listed in EMR      Scribe Attestation  I, Erickson San   attest that this documentation has been prepared under the direction and in the presence of EDWIN Aguilar    Provider Attestation - Scribe documentation  All medical record entries made by the Scribe were at my direction and personally dictated by me. I have reviewed the chart and agree that the record accurately reflects my personal performance of the history, physical exam, discussion and plan.   EDWIN Aguilar

## 2024-04-08 NOTE — NURSING NOTE
1838- while cleaning patient up from having a bowel movement patient became unresponsive with labored breathing.  1840-Rapid Response activated.  1842-Hospitalist at bedside to assess patient. Patients charted listed as DNR.  1845-Family stating patient should not be listed as DNR and Hospitalist at bedside activated code blood and instructed nursing staff to begin CPR code documentation in epic.

## 2024-04-08 NOTE — ASSESSMENT & PLAN NOTE
Monitor blood pressure  BP at goal  Midodrine  CHF, stable, monitor for shortness of breath.  Remove extra fluid in dialysis  HD per nephrology

## 2024-04-08 NOTE — ASSESSMENT & PLAN NOTE
Monitor blood pressure  BP controlled  Midodrine  CHF, stable, monitor for shortness of breath.  Remove extra fluid in dialysis  HD per nephrology

## 2024-04-08 NOTE — PROGRESS NOTES
PROGRESS NOTE    Subjective   Chief complaint: Chevy Benton is a 78 y.o. male who is an acute skilled patient being seen and evaluated for weakness    HPI:  HPI  Therapy has established plan of care and goals with patient.  Patient is to work with therapy, PT OT.  Patient was seen and examined at bedside, appears to be in no acute distress.  Denies chest pain or shortness of breath.  Afebrile.  Nursing staff voicing no new concerns at this time.    Objective   Vital signs: 104/61, 97.9, 54, 18, blood sugar 243, 97%    Physical Exam  Constitutional:       General: He is not in acute distress.  Cardiovascular:      Rate and Rhythm: Normal rate. Rhythm irregular.   Pulmonary:      Effort: Pulmonary effort is normal.      Breath sounds: Normal breath sounds.      Comments: O2  Musculoskeletal:      Cervical back: Neck supple.      Right lower leg: No edema.      Left lower leg: No edema.   Neurological:      Mental Status: He is alert.      Motor: Weakness present.   Psychiatric:         Behavior: Behavior is cooperative.         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, with long-term current use of insulin (CMS/[facility])     Insulin  Gabapentin   Monitor blood sugar           Weakness - Primary     Continue therapy          Medications, treatments, and labs reviewed  Continue medications and treatments as listed in EMR      Scribe Attestation  I, Erickson San   attest that this documentation has been prepared under the direction and in the  presence of EDWIN Aguilar    Provider Attestation - Scribe documentation  All medical record entries made by the Scribe were at my direction and personally dictated by me. I have reviewed the chart and agree that the record accurately reflects my personal performance of the history, physical exam, discussion and plan.   EDWIN Aguilar

## 2024-04-08 NOTE — PROGRESS NOTES
PROGRESS NOTE    Subjective   Chief complaint: Chevy Benton is a 78 y.o. male who is an acute skilled patient being seen and evaluated for weakness    HPI:  HPI  Patient does continue in therapy working towards goals.  Patient is working on bed mobility, requiring total dependence for rolling and positioning.  Patient also working on therapeutic exercise and activities, neuromuscular education and ADLs.  Patient was seen and examined at bedside, appears to be in no acute distress.  Denies chest pain or shortness of breath.  Afebrile.    Objective   Vital signs: 105/70, 97.0, 62, 16, blood sugar 198, 96%    Physical Exam  Constitutional:       General: He is not in acute distress.  Eyes:      Extraocular Movements: Extraocular movements intact.   Cardiovascular:      Rate and Rhythm: Normal rate. Rhythm irregular.   Pulmonary:      Effort: Pulmonary effort is normal.      Breath sounds: Normal breath sounds.      Comments: O2  Musculoskeletal:      Cervical back: Neck supple.      Right lower leg: Edema present.      Left lower leg: Edema present.   Neurological:      Mental Status: He is alert.      Motor: Weakness present.   Psychiatric:         Mood and Affect: Mood normal.         Behavior: Behavior is cooperative.         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, with long-term current use of insulin (CMS/[facility])     Insulin  Gabapentin   Monitor blood  sugar  FBG at goal           Weakness - Primary     Continue therapy          Medications, treatments, and labs reviewed  Continue medications and treatments as listed in EMR      Scribe Attestation  I, Erickson San   attest that this documentation has been prepared under the direction and in the presence of EDWIN Aguilar    Provider Attestation - Scribe documentation  All medical record entries made by the Scribe were at my direction and personally dictated by me. I have reviewed the chart and agree that the record accurately reflects my personal performance of the history, physical exam, discussion and plan.   EDWIN Aguilar

## 2024-04-09 NOTE — DISCHARGE SUMMARY
Discharge Diagnosis  Acute respiratory insufficiency due to influenza A infection  Acute metabolic encephalopathy  ESRD on HD  PAF  T2DM  CAD  PAD  GERD  SSS s/p PPM  Chronic HFrEF  DVT  Gout  Right transmetatarsal amputation (9/22/23) s/p R foot debridement to level of muscle/fascia (1/19/24) c/b R TMA infection with OM s/p debridement 3/17/24 and on cefepime with dialysis thru 4/28/24    Issues Requiring Follow-Up  N/A    Discharge Meds     Your medication list        ASK your doctor about these medications        Instructions Last Dose Given Next Dose Due   acetaminophen 325 mg tablet  Commonly known as: Tylenol      Take 3 tablets (975 mg) by mouth every 6 hours if needed for mild pain (1 - 3).       albuterol-budesonide 90-80 mcg/actuation inhaler  Commonly known as: Airsupra           allopurinol 100 mg tablet  Commonly known as: Zyloprim           apixaban 2.5 mg tablet  Commonly known as: Eliquis           atorvastatin 40 mg tablet  Commonly known as: Lipitor      Take 1 tablet (40 mg) by mouth once daily at bedtime.       benzonatate 200 mg capsule  Commonly known as: Tessalon      Take 1 capsule (200 mg) by mouth 3 times a day as needed for cough. Do not crush or chew.       cefepime IV  Commonly known as: Maxipime      Infuse 100 mL (2 g) at 200 mL/hr over 30 minutes into a venous catheter 3 (three) times a week for 23 days. 2 g of cefepime after each hemodialysis Do not start before April 5, 2024.       clopidogrel 75 mg tablet  Commonly known as: Plavix           dextrose 50 % injection      Infuse 50 mL (25 g) into a venous catheter every 15 minutes if needed (For blood glucose less than or equal to 40 mg/dL).       gabapentin 100 mg capsule  Commonly known as: Neurontin      Take 1 capsule (100 mg) by mouth once daily.       glucagon 1 mg injection  Commonly known as: Glucagen      Inject 1 mg into the muscle every 15 minutes if needed for low blood sugar - see comments (For blood glucose less than or  equal to 70 mg/dL and no IV access).       heparin 1,000 unit/mL injection      2 mL (2,000 Units) by intra-catheter route once daily on dialysis days. Do not start before March 1, 2024.       hydrOXYzine HCL 25 mg tablet  Commonly known as: Atarax      Take 1 tablet (25 mg) by mouth every 8 hours if needed for itching or anxiety.       insulin lispro 100 unit/mL injection  Commonly known as: HumaLOG      Inject 0-0.05 mL (0-5 Units) under the skin 3 times a day with meals. Take as directed per insulin instructions.       ipratropium-albuteroL 0.5-2.5 mg/3 mL nebulizer solution  Commonly known as: Duo-Neb      Take 3 mL by nebulization every 6 hours if needed for wheezing or shortness of breath.       lidocaine 4 % patch      Place 1 patch over 12 hours on the skin once daily. Remove & discard patch within 12 hours or as directed by MD.       loratadine 10 mg tablet  Commonly known as: Claritin      TAKE 1 TABLET (10 MG) BY MOUTH EVERY OTHER DAY IF NEEDED FOR ALLERGIES.       melatonin 5 mg tablet      Take 1 tablet (5 mg) by mouth once daily at bedtime.       midodrine 5 mg tablet  Commonly known as: Proamatine      Take 3 tablets (15 mg) by mouth every 8 hours.       polyethylene glycol 17 gram packet  Commonly known as: Glycolax, Miralax  Ask about: Which instructions should I use?           pantoprazole 40 mg EC tablet  Commonly known as: ProtoNix      Take 1 tablet (40 mg) by mouth once daily in the morning. Take before meals. Do not crush, chew, or split. Do not start before February 11, 2024.       povidone-iodine 7.5 % solution  Commonly known as: Betadine      Apply 1 Application topically once daily as needed for wound care.       SantyL 250 unit/gram ointment  Generic drug: collagenase           sennosides 8.6 mg tablet  Commonly known as: Senokot      Take 1 tablet (8.6 mg) by mouth once daily at bedtime.       traMADol 50 mg tablet  Commonly known as: Ultram      Take 1 tablet (50 mg) by mouth every 12  hours if needed for severe pain (7 - 10).       Triphrocaps 1 mg capsule  Generic drug: B complex-vitamin C-folic acid                    Test Results Pending At Discharge  Pending Labs       No current pending labs.            Hospital Course   78 y.o. male with a past medical history of peripheral arterial disease, recent right great toe amputation, persistent atrial fibrillation currently paced, coronary artery disease, type 2 diabetes mellitus, right subclavian DVT 2023, GERD, sick sinus syndrome with pacemaker, gout, end-stage renal disease on hemodialysis every , hyperlipidemia, hypotension on midodrine, systolic CHF ejection fraction 40 to 45%, and replace AV fistula due to hemorrhage.  He was discharged from [facility] [place], [place] on 4/3/2023 and presents to [facility] ER complaining of altered mental status.  The skilled nursing home staff and the patient's family said that he was difficult to arouse from bed this morning. Noted to be fluid overloaded and influenza a positive. Started on tamiflu.     At about 18:40 rapid response called to room 503, on arrival patient was already unresponsive to voice command, has no pulse.  Per nursing staff this happened suddenly while they were turning the patient for cleaning after bowel movement.  Son was in the room, stated that his father was conversing with him few minutes prior to rapid response call.  Patient reported was listed as DNR based on documentation from [facility] [place], [place], but his son was adamant that such decision was not made by family, and would like everything done for the patient at this time. Code blue called. ICU team arrived and despite their measures ROSC was unsuccessful. Patient was pronounced dead at 19:02.        Pertinent Physical Exam At Time of Discharge  Physical Exam      Outpatient Follow-Up  No future appointments.      Deep Sanchez, DO

## 2024-04-16 ENCOUNTER — APPOINTMENT (OUTPATIENT)
Dept: CARDIOLOGY | Facility: CLINIC | Age: 79
End: 2024-04-16
Payer: MEDICARE

## 2024-04-16 PROBLEM — R52 GENERALIZED PAIN: Status: ACTIVE | Noted: 2024-04-16

## 2024-04-16 NOTE — PROGRESS NOTES
PROGRESS NOTE    Subjective   Chief complaint: Chevy Benton is a 78 y.o. male who is an acute skilled patient being seen and evaluated for weakness    HPI:  HPI  Patient readmitted to nursing facility following left upper extremity AV fistula bleed with replacement.  Therapy to evaluate and establish plan of care and goals with patient.  Patient was seen and examined at bedside.  Patient had complaints of generalized pain reporting that Tylenol was not helping.  Denies fever or chills.  Denies chest pain or shortness of breath.    Objective   Vital signs: 100/54, 97.8, 58, 18    Physical Exam  Constitutional:       General: He is not in acute distress.  Eyes:      Extraocular Movements: Extraocular movements intact.   Cardiovascular:      Rate and Rhythm: Normal rate and regular rhythm.   Pulmonary:      Effort: Pulmonary effort is normal.      Breath sounds: Normal breath sounds.      Comments: O2  Abdominal:      General: Bowel sounds are normal.      Palpations: Abdomen is soft.   Musculoskeletal:      Right upper arm: Swelling present.      Left upper arm: Swelling present.      Cervical back: Neck supple.   Neurological:      Mental Status: He is alert.      Motor: Weakness present.   Psychiatric:         Mood and Affect: Mood normal.         Behavior: Behavior is cooperative.         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  Remove extra fluid in dialysis  HD per nephrology         A-fib ([facility])     Apixaban  Bleeding precautions  Monitor heart rate         Hemorrhage of arteriovenous fistula (CMS-[facility])     Replaced in hospital         Generalized pain      Start Ultram as needed          Medications, treatments, and labs reviewed  Continue medications and treatments as listed in EMR      Scribe Attestation  I, Erickson San   attest that this documentation has been prepared under the direction and in the presence of EDWIN Aguilar    Provider Attestation - Scribe documentation  All medical record entries made by the Scribe were at my direction and personally dictated by me. I have reviewed the chart and agree that the record accurately reflects my personal performance of the history, physical exam, discussion and plan.   EDWIN Aguilar

## 2024-04-17 ENCOUNTER — APPOINTMENT (OUTPATIENT)
Dept: CARDIOLOGY | Facility: CLINIC | Age: 79
End: 2024-04-17
Payer: MEDICARE

## 2024-04-24 ENCOUNTER — APPOINTMENT (OUTPATIENT)
Dept: INFECTIOUS DISEASES | Facility: CLINIC | Age: 79
End: 2024-04-24
Payer: MEDICARE

## 2024-06-24 NOTE — PROGRESS NOTES
URGENT CARE DEPARTMENT ENCOUNTER      CHIEF COMPLAINT    Chief Complaint   Patient presents with    Fever    Throat Problem    Eye Problem     Goopy Eyes        HISTORY OF PRESENT ILLNESS    Harmeet Torres is a pleasant 10 year old female who presents to the [facility] with father and sister for evaluation and treatment of upper respiratory symptoms.  5 day history goopy red eyes, sore throat.  Father states fever over the weekend of 101.  Denies any ear pain.  Took some Tylenol during febrile stage.      ALLERGIES    ALLERGIES:  No Known Allergies    CURRENT MEDICATIONS    Current Outpatient Medications   Medication Sig Dispense Refill    ofloxacin (OCUFLOX) 0.3 % ophthalmic solution Place 1-2 drops into both eyes 4 times daily for 7 days. 5 mL 0    ibuprofen (CHILDRENS ADVIL) 100 MG/5ML suspension Take by mouth every 8 hours as needed for Fever.      acetaminophen (TYLENOL) 160 MG/5ML suspension Take by mouth every 4 hours as needed for Fever.      imiquimod (ALDARA) 5 % cream Apply topically to wart on right thumb nightly 12 each 5    triamcinolone (ARISTOCORT) 0.1 % cream Apply topically once daily to affected area on the left thigh as needed for redness and itching.  If using for 3 weeks take 1 week break, repeat as needed.  Avoid using on the face, armpits or genitals 15 g 1     No current facility-administered medications for this visit.       REVIEW OF SYSTEMS    Pertinent review systems noted in past medical history    PHYSICAL EXAM    Vitals:    06/24/24 1010   BP: (!) 123/66   BP Location: LUE - Left upper extremity   Patient Position: Sitting   Cuff Size: Small Adult   Pulse: 100   Temp: 97.3 °F (36.3 °C)   TempSrc: Temporal   SpO2: 100%   Weight: 43.5 kg (95 lb 14.4 oz)     Constitutional:  Well developed, well nourished. No acute distress, non-toxic appearance.   Eyes:  Sclera anicteric. PERRL.  Bilateral conjunctival injection.  Scant dried mattering on lid  Chevy Benton is a 78 y.o. male on day 21 of admission presenting with A-V fistula (CMS/HCC).      Subjective   ***       Objective          Vitals 24HR  Heart Rate:  []   Temp:  [36.4 °C (97.5 °F)]   Resp:  [16-19]   BP: (126-166)/(77-86)   SpO2:  [96 %-100 %]     {Transplant Status:[id number]}    Intake/Output last 3 Shifts:    Intake/Output Summary (Last 24 hours) at 3/29/2024 1501  Last data filed at 3/29/2024 0600  Gross per 24 hour   Intake 600 ml   Output 0 ml   Net 600 ml       Physical Exam    Relevant Results  {If you would like to pull in Medications, type .meds     If you would like to pull in Lab results for the last 24 hours, type .[id number]    If you would like to pull in Imaging results, type .imgrslt :99}      {Link to Stroke Scoring tools - Link :99}       Assessment/Plan              Principal Problem:    A-V fistula (CMS/HCC)  Active Problems:    Gangrene of toe of right foot (CMS/HCC)    Anemia due to blood loss, acute    Critical limb ischemia of right lower extremity (CMS/HCC)    ***          I spent *** minutes in the professional and overall care of this patient.      JENIFFER Bates-CNP       margins.  HENT:  Head is atraumatic. Mucus membranes are moist.  Air-fluid level right tympanic membrane.  Neither TM is erythemic.  Oropharynx is slightly erythemic at anterior tonsillar pillars.  No tonsillar hypertrophy or exudate.  Neck:  Neck is supple. Full passive range of motion.  Mild cervical lymphadenopathy.   Respiratory:  No respiratory distress. Lungs are clear to auscultation bilaterally. No rales or wheezing appreciated. No stridor.   LABS    No results found for this or any previous visit (from the past 8 hour(s)).   UC COURSE & MEDICAL DECISION MAKING    10 year old female who presents with sore throat and red eyes.    DDX: Includes consideration for causes such as; allergic rhinitis, asthma, bronchitis,epiglottitis, influenza, laryngitis, mononucleosis, otitis media, pertussis, pharyngitis, pneumonia, sinusitis.   H&P consistent with bilateral conjunctivitis.  Will treat with ofloxacin.  Throat about 50% strep.  Will swab and notify.  Warm saltwater gargles and Claritin in the meantime.  IMPRESSION  Diagnosis:  The primary encounter diagnosis was Sore throat. A diagnosis of Acute conjunctivitis of both eyes, unspecified acute conjunctivitis type was also pertinent to this visit.    PLAN  Orders Placed This Encounter    POCT Streptococcus Group A PCR    ofloxacin (OCUFLOX) 0.3 % ophthalmic solution    Warm saltwater gargles q.i.d..  Over-the-counter children's Claritin.  2. Treatment plan to include conservative supportive measures  3. All questions and concerns addressed.  4. Return precautions discussed.  Follow-up with family practice in the next few days, if sx persist or ED/UC if sx worsen.   5. Patient discharged in stable condition.      Terry Carpenter PA-C, MPAS  [facility]

## 2024-10-03 NOTE — NURSING NOTE
Patient is presently waiting on the device clinical nurse to check his pacemaker and set it into MRI safe mode. LAMAR   Spoke with the patient's daughter-in-law regarding his platelet count. She was advised we will continue to monitor and treat as previously prescribed. She verbalized an understanding.

## 2025-06-17 NOTE — PROGRESS NOTES
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 coli bacteremia 2025    Metabolic alkalosis 2025    LETY (acute kidney injury) (HCC) 2025    Goals of care, counseling/discussion 2025    Palliative care encounter 2025    Primary CNS lymphoma 2025    Encephalopathy 2025    Ambulatory dysfunction 2025    Thyroid nodule 2025    Pulmonary nodule 2025    Liver lesion 2025    Primary central nervous system (CNS) lymphoma 2025    Type 2 diabetes mellitus with hyperglycemia, without long-term current use of insulin (HCC) 2022    HTN, goal below 130/80 2022    Mixed hyperlipidemia 2022    Vitamin D deficiency 2022    NAFLD (nonalcoholic fatty liver disease) 2022      LOS (days): 1  Geometric Mean LOS (GMLOS) (days):   Days to GMLOS:     OBJECTIVE:    Risk of Unplanned Readmission Score: 29.5         Current admission status: Inpatient       Preferred Pharmacy:   [facility]/pharmacy #[id number] - [place] - [address] [address]  [address] [address]  Phone: [phone] Fax: [phone]    [employer]  Music Therapy Note    Chevy Benton     Therapy Session  Referral Type: New referral this admission  Visit Type: New visit  Session Start Time: 1507  Conflict of Service: Asleep               Treatment/Interventions            Narrative  Assessment Detail: MT attempted sesson but pt found asleep. MT will continue to f/u    Education Documentation  No documentation found.           - [facility] - [place], [place] - [address]  [address]  [facility] [address]  Phone: [phone] Fax: [phone]    [facility] - [place], [place] - [address]  [address]  [address]  [place] [address]  Phone: [phone] Fax: [phone]    [facility] - [place], [place] - [address]  [address]  [facility] [address]  Phone: [phone] Fax: [phone]    [facility] ([facility]) #[id number] - [place], [place] - [address] [address]  Phone: [phone] Fax: [phone]    Primary Care Provider: PATI Mckeon    Primary Insurance: BLUE CROSS  Secondary Insurance: CAPITAL    ASSESSMENT:  Active Health Care Proxies       Henrik Elizabeth [facility] Representative - Daughter   Primary Phone: [phone] (Home)                    Readmission Root Cause  30 Day Readmission: No    Patient Information  Admitted from:: Home  Mental Status: Alert  During Assessment patient was accompanied by: Not accompanied during assessment  Assessment information provided by:: Patient  Primary Caregiver: Self  Support Systems: Self, Spouse/significant other  County of Residence: [place]  What [facility] do you live in?: [place]  Home entry access options. Select all that apply.: Stairs  Number of steps to enter home.: 3  Upon entering residence, is there a bedroom on the main floor (no further steps)?: No  A bedroom is located on the following floor levels of residence (select all that apply):: 2nd Floor  Upon entering residence, is there a bathroom on the main floor (no further steps)?: Yes  Number of steps to 2nd floor from main floor: One Flight  Living Arrangements: Lives w/ Spouse/significant other  Is patient a ?: No    Activities of Daily Living Prior to Admission  Functional Status: Independent  Completes ADLs independently?:  Yes  Ambulates independently?: Yes  Does patient use assisted devices?: No  Does patient currently own DME?: No  Does patient have a history of Outpatient Therapy (PT/OT)?: No  Does the patient have a history of Short-Term Rehab?: No  Does patient have a history of HHC?: No  Does patient currently have HHC?: No         Patient Information Continued  Income Source: SSI/SSD  Does patient have prescription coverage?: Yes  Can the patient afford their medications and any related supplies (such as glucometers or test strips)?: Yes  Does patient receive dialysis treatments?: No  Does patient have a history of substance abuse?: No  Does patient have a history of Mental Health Diagnosis?: No         Means of Transportation  Means of Transport to Appts:: Family transport    DISCHARGE DETAILS:    Discharge planning discussed with:: patient  Freedom of Choice: Yes  Comments - Freedom of Choice: FOC pending any needs  CM contacted family/caregiver?: No- see comments  Were Treatment Team discharge recommendations reviewed with patient/caregiver?: No  Did patient/caregiver verbalize understanding of patient care needs?: No  Were patient/caregiver advised of the risks associated with not following Treatment Team discharge recommendations?: No- see comments    Contacts  Patient Contacts: daughter Elizabeth Chester  Relationship to Patient:: Family  Contact Method: Phone  Phone Number: [phone]  Reason/Outcome: Continuity of Care, Emergency Contact, Discharge Planning  CM introduced self and role to patient.  He lives with his spouse in a two story home, independent with ambulation and adl's, spouse transports for now and on SSD. No hx of STR/HHC/SA or MI.  Hx of OP.  Cm to follow with any DCP needs.

## 2025-07-17 NOTE — PROGRESS NOTES
[facility]  [facility] NP/[place] CARDIOLOGY PROGRESS NOTE    Chevy Benton/[id number]  Admit Date: 1/9/2024  Hospital Length of Stay: 12   Current Attending: Dionicio Todd MD   Outpatient Cardiologist:     INTERVAL EVENTS / PERTINENT ROS:   Transfer to the floor   S/p Endovascualr Jan 17th right sided angio/intervention  Known dialysis Tues/Thurs/Sat  Appears grossly volume overloaded   From [facility]   1/9 revision of right foot TMA by podiatry > VAC change on Monday per podiatry note   1/21 Discussed with podiatry add IV antibitoics vanc/cefepime (allergic to PCN)   Cultures sent by podiatry in the OR   Currently plavix/asa and can be transitioned to DOAC when ok with surgery -podiatry   Lower platlets - trend   - Dialysis daily for better volume removal  (less swelling to legs today )   - 2vCXR   - VBG this am     MEDICATIONS  Infusions:       Scheduled:  allopurinol, 100 mg, Daily  aspirin, 81 mg, Daily  atorvastatin, 40 mg, Nightly  B complex-vitamin C-folic acid, 1 capsule, Daily  cefepime, 500 mg, q24h  clopidogrel, 75 mg, Daily  gabapentin, 100 mg, q24h  [Held by provider] insulin glargine, 6 Units, Nightly  [Held by provider] insulin lispro, 0-5 Units, TID with meals  metoprolol succinate XL, 25 mg, Daily  pantoprazole, 40 mg, Daily before breakfast  polyethylene glycol, 17 g, Daily  sennosides, 1 tablet, Nightly  sevelamer carbonate, 800 mg, TID with meals  vancomycin, 2,000 mg, Once      PRN:   acetaminophen, 650 mg, q4h PRN   Or  acetaminophen, 650 mg, q4h PRN   Or  acetaminophen, 650 mg, q4h PRN  albuterol, 2 puff, q6h PRN  alum-mag hydroxide-simeth, 30 mL, 4x daily PRN  ammonium lactate, , q1h PRN  dextrose, 25 g, q15 min PRN  docusate sodium, 100 mg, BID PRN  glucagon, 1 mg, q15 min PRN  hydrocortisone, , BID PRN  ipratropium-albuteroL, 3 mL, q6h PRN  methocarbamol, 500 mg, q8h PRN  povidone-iodine, 1 Application, Daily PRN  vancomycin, , Daily PRN      Prior to Admission  Meds:  Facility-Administered Medications Prior to Admission   Medication Dose Route Frequency Provider Last Rate Last Admin    epoetin dane (Epogen,Procrit) injection 5,000 Units  5,000 Units intravenous Once Aruna Vazquez PA-C         Medications Prior to Admission   Medication Sig Dispense Refill Last Dose    albuterol 90 mcg/actuation inhaler Inhale 2 puffs every 6 hours if needed for shortness of breath.       allopurinol (Zyloprim) 100 mg tablet Take 1 tablet (100 mg) by mouth once daily.       amLODIPine (Norvasc) 10 mg tablet Take 1 tablet (10 mg) by mouth once daily.       apixaban (Eliquis) 2.5 mg tablet Take 1 tablet (2.5 mg) by mouth 2 times a day.       aspirin (Adult Low Dose Aspirin) 81 mg EC tablet Take 1 tablet (81 mg) by mouth once daily.       atorvastatin (Lipitor) 10 mg tablet Take 1 tablet (10 mg) by mouth once daily.       cinacalcet (Sensipar) 30 mg tablet Take 1 tablet (30 mg) by mouth once daily with a meal. Take with food or shortly afer a meal. Swallow tablet whole; do not break or divide. 30 tablet 0     cloNIDine (Catapres) 0.2 mg tablet Take 1 tablet (0.2 mg) by mouth every 12 hours if needed.       clopidogrel (Plavix) 75 mg tablet Take 1 tablet (75 mg) by mouth once daily.       colchicine, gout, 0.6 mg tablet Take 0.5 tablets (0.3 mg) by mouth 2 times a week.       docusate sodium (Colace) 100 mg capsule Take 1 capsule (100 mg) by mouth once daily.       epoetin dane (Epogen,Procrit) 3,000 unit/mL injection Inject 1 mL (3,000 Units) under the skin 3 times a week. 1 mL 11     gabapentin (Neurontin) 100 mg capsule Take 1 capsule (100 mg) by mouth once daily on dialysis days.       ipratropium (Atrovent) 21 mcg (0.03 %) nasal spray Administer 2 sprays into each nostril every 12 hours.       lanthanum (Fosrenol) 750 mg chewable tablet Chew 1 tablet (750 mg) 3 times a day with meals.       lidocaine (ZTlido) 1.8 % adhesive patch,medicated Apply topically. Apply patch 12 hours on 12  hours off       loratadine (Claritin) 10 mg tablet TAKE 1 TABLET (10 MG) BY MOUTH EVERY OTHER DAY IF NEEDED FOR ALLERGIES. 90 tablet 0     menthol-zinc oxide (Calmoseptine - Risamine) 0.44-20.6 % ointment Apply 1 Application topically once daily. Apply to sacrum 1 g 0     metoprolol succinate XL (Toprol-XL) 25 mg 24 hr tablet TAKE 1 TABLET (25 MG) BY MOUTH DAILY DO NOT CRUSH OR CHEW 90 tablet 0     midodrine (Proamatine) 10 mg tablet Take 1 tablet (10 mg) by mouth 2 times a day.       naphazoline-pheniramine (Naphcon-A) 0.025-0.3 % ophthalmic solution Administer 1 drop into both eyes 4 times a day. 10 mL 0     pantoprazole (ProtoNix) 40 mg EC tablet TAKE 1 TABLET (40 MG) BY MOUTH ONCE DAILY IN THE MORNING. TAKE BEFORE MEALS. DO NOT CRUSH, CHEW, OR SPLIT. 90 tablet 0     polyethylene glycol (Miralax) 17 gram packet Take 17 g by mouth once daily.       traMADol (Ultram) 50 mg tablet Take 0.5 tablets (25 mg) by mouth every 12 hours if needed for severe pain (7 - 10). 30 tablet 1     Triphrocaps 1 mg capsule Take 1 capsule by mouth once daily.          Vitals/Physical:      1/21/2024    11:09 AM 1/21/2024    10:16 AM 1/20/2024     7:24 PM 1/20/2024     5:15 PM 1/20/2024     4:15 PM 1/20/2024     1:44 PM 1/20/2024     9:55 AM   Vitals   Systolic 98 110 133 110   88   Diastolic 63 70 73 58   54   Heart Rate  97 64 84  79 79   Temp 36.6 °C (97.9 °F) 36.7 °C (98.1 °F) 36 °C (96.8 °F) 36 °C (96.8 °F) 35.9 °C (96.7 °F) 36.3 °C (97.3 °F)    Resp  22 18 20        Wt Readings from Last 5 Encounters:   01/19/24 101 kg (221 lb 9.6 oz)   10/08/23 95 kg (209 lb 7 oz)   10/05/23 98.3 kg (216 lb 11.4 oz)     INTAKE/OUTPUT:  I/O last 3 completed shifts:  In: 2639 (26.3 mL/kg) [P.O.:1220; I.V.:1000 (9.9 mL/kg); Other:419]  Out: 4000 (39.8 mL/kg) [Other:4000]  Weight: 100.5 kg    Constitutional:       Appearance: Frail. Chronically ill-appearing.   Neck:      Comments: Thick neck  Pulmonary:      Breath sounds: Rhonchi present.       Comments: Nasal cannula   Abdominal:      Palpations: Abdomen is soft.   Musculoskeletal:         General: Tenderness present.      Comments: Both arms swollen - blisters and ecchmotic   + Left arm AVF pulsatile   Legs + 2 3 swelling pitting  Right foot TMA dressing dry and intact  Skin:     General: Skin is warm and dry.   Neurological:      Mental Status: Alert.      Comments: Oriented to self and place      Tremors noted of hands       Labs:  CMP:  Recent Labs     01/21/24  0652 01/20/24  0542 01/19/24  0631 01/18/24  0349 01/17/24  2249 10/05/23  1500 10/03/23  0640 10/02/23  0441 10/01/23  0152 09/27/23  0751 09/26/23  0631 09/25/23  1608 09/24/23  0847   * 136 133* 131* 132*   < > 136   < > 138   < > 134*   < > 136   K 4.3 4.3 5.1 4.4 4.5   < > 4.1   < > 3.8   < > 4.6   < > 4.2   CL 92* 94* 91* 91* 91*   < > 93*   < > 94*   < > 92*   < > 95*   CO2 26 29 28 28 27   < > 25   < > 27   < > 25   < > 25   ANIONGAP 20 17 19 16 19   < > 22*   < > 21*   < > 22*   < > 20   BUN 40* 33* 54* 44* 41*   < > 55*   < > 23   < > 61*   < > 32*   CREATININE 4.68* 3.59* 5.34* 4.44* 4.31*   < > 8.05*   < > 4.58*   < > 9.79*   < > 5.84*   EGFR 12* 17* 10* 13* 13*   < > 6*   < > 12*   < >  --   --   --    MG  --   --   --   --  2.06  --  2.89*  --  2.36  --  2.39  --  2.15    < > = values in this interval not displayed.       Recent Labs     01/21/24  0652 01/20/24  0542 01/19/24  0631 01/18/24  0349 01/17/24 2249 01/09/24  2135 10/08/23  1926 10/07/23  0719 10/02/23  0441 10/01/23  0152 09/30/23  1616 09/30/23  1616 09/25/23  1608   ALBUMIN 2.8* 2.9* 3.3* 3.1* 3.1*   < > 3.8 3.1*  3.2*   < > 3.3*   < > 3.3* 3.4   ALKPHOS  --   --   --   --   --   --  115 110  --  107  --  112 127   ALT  --   --   --   --   --   --  17 17  --  27  --  27 21   AST  --   --   --   --   --   --  18 18  --  21  --  23 18   BILITOT  --   --   --   --   --   --  0.5 0.4  --  0.7  --  0.6 0.7    < > = values in this interval not displayed.        CBC:  Recent Labs     01/21/24  0652 01/20/24  0542 01/19/24  0631 01/18/24  0349 01/17/24  2249   WBC 9.1 11.0 8.6 8.5 10.4   HGB 9.2* 8.9* 10.3* 10.0* 10.1*   HCT 29.4* 30.1* 32.5* 30.8* 31.8*   PLT 83* 80* 78* 89* 88*   MCV 99 100 99 94 93       COAG:   Recent Labs     01/17/24  0459 01/16/24  0627 01/15/24  0622 01/11/24  2055 01/11/24  1508 01/10/24  0422 01/09/24  2135 09/20/23  0848 09/20/23  0338   INR  --   --   --   --  1.3*  --  1.3*  --  1.4*   HAUF 0.4 0.5 0.6   < > 0.8   < >  --    < > 0.7    < > = values in this interval not displayed.       ABO:   Recent Labs     01/16/24  0627   ABO B       HEME/ENDO:   Recent Labs     12/03/23  0738 04/25/22  2034   HGBA1C 5.3 6.5*       CARDIAC:   Recent Labs     01/17/24  2249 10/08/23  2100 10/08/23  1926   TROPHS  --  60* 58*   BNP 1,525*  --   --          MICRO:   Recent Labs     01/10/24  1013 10/07/23  0719 10/01/23  0152   CRP  --  11.53* 21.44*   PROCAL 0.70*  --   --                Assessment/Plan   Chevy Benton is a 78 y.o. male with PMH of ESRD on HD TTS (Lt chest TDC, Hx of LUE Fistula Pseudoaneurysms), DM, HTN, HFrEF (TTE 10/23 EF 35-40%), PTA right lower extremity on 9/20/23 with subsequent TMA right foot on 9/22/23, Non Occlusive DVT (Rt Subclavian), Aortic Root Dilation, MR, chronic afib and SSS s/p pacemaker who presents DAVID angioplasty C/b perforation of AT artery. Patient admitted to CICU for further monitoring. Additionally patient noted to be overloaded on examination, on 2L with diffuse anasarca more pronounced in upper extremities. He is anuric at baseline and is on TTS dialysis. Patient was stable overnight with no signs/symptoms of compartment syndrome.      -Discuss resuming Eliquis with endovascular after podiatry surgery  -Follow up PF4 ab for c/f HIT (High probability on 4T score) - negative     NEUROLOGIC  ::Aox2 (self and place) unclear baseline  -Delirium precuations     CARDIOVASCULAR  #PVD S/p PTA to RLE c/b  perforation of AT  #hx of right tma  #hx of rt subclavian dvt  #mild stenosis of left subclavian vein  #Hfref  #afib  #DM with neuropathy  #HTN  #Hx of right TMA  ::S/p Plavix load  ::1/19 Podiatry today revision of right TMA    - 1/20 Per Podiatry post op note VAC change on Monday and no weight bearing to RLE   - 1/21 Discussed with podiatry add IV antibitoics vanc/cefepime (allergic to PCN)   Cultures sent by podiatry in the OR   Plan:  - 1/21  Hold tramadol pain meds for tremors of the hands/twitching/ jerking may be opioid metabolites ESRD use tylenol   -Cont plavix & aspirin resumption of eliquis pending ok surgical podiatry team   -Cont home metoprolol succ 25mg  -Uptitrate GDMT as tolerated consider hydralazine and isordil   -Cont home atorva  -Cont home gabapentin  -Insulin glargine 6 units nightly (holding) - watch with poor nutrition   -SSI     PULMONARY  #AHRF  #2/2 to ESRD and Hfref  #COVID  ::S/p 10 day course of dex (ended 1/14)  Plan:  -Continue with dialysis regimen  -Wean and titrate oxygen as needed  -Cont IS, pulm hygiene  - Repeat 2vCXR   - Check VBG      RENAL/  #ESRD with dialysis TTS  #LUE fistula occlusion s/p venoplasty  Plan:  -Cont with dialysis schedule - gets 5 days a week at [facility] will discuss with nephrology team - volume overloaded   - 1/20 Had dialysis yesterday and today (legs are less swollen) will check VBG   -Monitor for signs of need for urgent dialysis  -Cont home renvela  -Cont home nephro caps     GASTROINTESTINAL  Gerd  -Cont home pantoprazole     MSK  #hx of gout  -cont home allopurinol     HEME/ONC  #Anemia of chronic disease  ::At baseline hgb  ::2/2 to ESRD  Plan:  -CTM     INFECTIOUS DISEASE  #Covid infection s/p dexamethasone  :;Afebrile, no leukocytosis  ::xray with pulmonary edema  Plan:  -Continue to monitor for worsening signs or sx of pneumonia  - 2vCXR today - try for better volume removal with dialysis      F: Carb diet, NPO midnight   E: K > 4, Mg > 2  N:  Renal diet  A: PIV, left subclaavian trialysos  DVT ppx: Heparin SubQ  GI ppx: Protonix    Code Status: Full code   Surrogate Medical Decision-maker: Spouse ()              LDA:  Midline 01/11/24 Single lumen Right Cephalic vein (Active)   Placement Date/Time: 01/11/24 1717   Hand Hygiene Completed: Yes  Catheter Time Out Checklist Completed: Yes  Size (Fr): 3  Lumen Type: Single lumen  Catheter to Vein Ratio Less Than 50%: Yes  Total Length (cm): 11 cm  External Length (cm): 0 cm  Orie...   Number of days: 9       Hemodialysis Cath Double lumen Left Chest (Active)   No placement date or time found.   Hemodialysis Catheter Type: Double lumen  Placed by: presented on admission  Orientation: Left  Access Location: Chest   Number of days:      NUTRITION: Adult diet Regular  EMERGENCY CONTACT: Extended Emergency Contact Information  Primary Emergency Contact: Julia Betnon  Address: [id number] [place] [address]  Home Phone: [phone]  Relation: Spouse  Preferred language: English   needed? No  Secondary Emergency Contact: SHAMEKA BENTON  Address: [id number] [place], [place] [address] [facility]  Home Phone: [phone]  Work Phone: [phone]  Mobile Phone: [phone]  Relation: Child  Preferred language: English   needed? No  CODE STATUS: Full Code  DISPO:   Home: [address] [address]  AMPAC: Daily Activity - Total Score: 12  Discharge Planning  Living Arrangements: Spouse/significant other  Support Systems: Spouse/significant other, Children, Family members  Assistance Needed: none  Type of Residence: Private residence  Number of Stairs to Enter Residence: 2  Number of Stairs Within Residence: 3  Do you have animals or pets at home?: No  Home or Post Acute Services: None  Patient expects to be discharged to:: back to SNF  Does the patient need discharge transport arranged?: No  FOLLOWUP:   No future  [Follow-Up Visit] : a follow-up visit for appointments.       Patient seen and discussed with Dr. Dionicio Todd, MD      ________________________________________________  Nicol Lopez, APROMAR-CNP    [Other: ____] : [unfilled]

## (undated) DEVICE — DRESSING KIT, V.A.C., W/DRAPE/TUBING, MEDIUM, FOAM 5PK

## (undated) DEVICE — BANDAGE, ELASTIC, SELF-CLOSE, 4 IN, HONEYCOMB, STERILE

## (undated) DEVICE — DRAPE, SHEET, EXTREMITY, W/ARM BOARD COVERS, 87 X 106 X 128 IN, DISPOSABLE, LF, STERILE

## (undated) DEVICE — CATHETER, ARMADA 14PTA, 2.0MM X 80MM X 150CM

## (undated) DEVICE — SUTURE, VICRYL, 3-0, 27 IN, SH

## (undated) DEVICE — SUTURE, PROLENE, 6-0, 30 IN, BV-1 BV-1

## (undated) DEVICE — COVER, CART, 45 X 27 X 48 IN, CLEAR

## (undated) DEVICE — Device

## (undated) DEVICE — CATHETER, CXI SUPPORT, .018 X 150CM, STR TIP

## (undated) DEVICE — GUIDEWIRE, HI-TORQUE, VERSACORE, 260CM, FLOPPY

## (undated) DEVICE — GUIDEWIRE, STIFF SHAFT, ANGLE TIP, .035 DIA, 180 CM,  3 CM TIP"

## (undated) DEVICE — PROTECTOR, NERVE, ULNAR, PINK

## (undated) DEVICE — NEEDLE, PERCUTANEOUS ENTRY, THINWALL, W/O BASEPLATE, 18 G X 7 CM

## (undated) DEVICE — BANDAGE, GAUZE, CONFORMING, KERLIX, 6 PLY, 4.5 IN X 4.1 YD

## (undated) DEVICE — SUTURE, PROLENE, 6-0, 24 IN, BV-1, BLUE

## (undated) DEVICE — COVER, TABLE, 44 X 75 IN, DISPOSABLE, LF, STERILE

## (undated) DEVICE — DRESSING, ISLAND, TELFA, 4 X 5 IN

## (undated) DEVICE — SUTURE, SILK, 3-0, 30 IN, BR SH, BLACK

## (undated) DEVICE — BALLOON ATLAS PTA 6.5F 12X4X75

## (undated) DEVICE — DRAPE, INCISE, ANTIMICROBIAL, IOBAN 2, LARGE, 17 X 23 IN, DISPOSABLE, STERILE

## (undated) DEVICE — CATHETER, BALLOON, CHOCOLATE, 3.0MM X 120MM, 150CM, OTW PTA

## (undated) DEVICE — DEVICE, CLOSURE, PERCLOSE, PROSTYLE

## (undated) DEVICE — SHEATH, PINNACLE, 10 CM,  5FR INTRODUCER, 5FR DIA, 2.5 CM DIALATOR

## (undated) DEVICE — SUTURE, MONOCRYL, 4-0, 18 IN, PS2, UNDYED

## (undated) DEVICE — DRESSING, GAUZE, 16 PLY, 4 X 4 IN, STERILE

## (undated) DEVICE — GEL, ULTRASOUND, AQUASONIC 100, 20 GM, STERILE

## (undated) DEVICE — SHEATH, GLIDESHEATH, SLENDER, 6FR 10CM

## (undated) DEVICE — DEVICE, INFLATION, ENCORE 26 (5/BOX)

## (undated) DEVICE — ACCESS KIT, S-MAK MINI, 4FR 10CM 0.018IN 40CM, NT/PT, ECHO ENHANCE NEEDLE

## (undated) DEVICE — DRAPE, PAD, PREP, W/ 9 IN CUFF, 24 X 41, LF, NS

## (undated) DEVICE — STRAP, CIRCUMFERENTIAL, 2 X 76""

## (undated) DEVICE — COVER PROBE, SOFT FLEX W/ GEL, 5 X 48 IN (13X122CM)

## (undated) DEVICE — GUIDEWIRE, SPARTA CORE,  .014 X 300, 5CM

## (undated) DEVICE — SPEAR, EYE, SURGICAL, WECK-CEL, CELLULOSE

## (undated) DEVICE — DRAPE, SHEET, FAN FOLDED, HALF, 44 X 58 IN, DISPOSABLE, LF, STERILE

## (undated) DEVICE — TUBESET, IRRIGATION, BONE CUTTER BONESCALPEL

## (undated) DEVICE — DRAPE, INSTRUMENT, W/POUCH, STERI DRAPE, 7 X 11 IN, DISPOSABLE, STERILE

## (undated) DEVICE — DRAPE, PAD, INSTRUMENT, MAGNETIC, MEDIUM, 10 X 16 IN, DISPOSABLE

## (undated) DEVICE — MICROCATHETER, ASAHI CORSAIR PRO XS, 150CM

## (undated) DEVICE — CATHETER, BALLOON, PTA, DORADO, 10 X 4 X 80

## (undated) DEVICE — CATHETER, BALLOON, PACIFIC PLUS PTA, 3.0 X 150 X 150

## (undated) DEVICE — DEVICE, INFLATION, ENCORE 20

## (undated) DEVICE — TIP, SUCTION, YANKAUER, FLEXIBLE

## (undated) DEVICE — CATHETER, 5 FR. 65CM, ANGLE TAPER AT TIP

## (undated) DEVICE — CONTAINER, SPECIMEN, 120 ML, STERILE

## (undated) DEVICE — INSERT, CLAMP, SURGICAL, SOFT/TRACTION, STEALTH, 1 MM

## (undated) DEVICE — GLOVE, SURGICAL, PROTEXIS PI , 6.5, PF, LF

## (undated) DEVICE — TR BAND, RADIAL COMPRESSION, STANDARD, 24CM

## (undated) DEVICE — SPONGE, LAP, XRAY DECT, 18IN X 18IN, W/MASTER DMT, STERILE

## (undated) DEVICE — DRAPE, IRRIGATION, W/POUCH, ADHESIVE STRIP, STERI DRAPE, 19 X 23 IN, DISPOSABLE, STERILE

## (undated) DEVICE — PREP TRAY, SKIN, DRY, W/GLOVES

## (undated) DEVICE — GUIDEWIRE, HI-TORQUE PILOT 200, .014IN X 300CM, STRAIGHT

## (undated) DEVICE — MANIFOLD, 4 PORT NEPTUNE STANDARD

## (undated) DEVICE — GUIDEWIRE, COMMAND ST, HI-TORQUE, 0.18 X 300CM

## (undated) DEVICE — SYRINGE, LUER LOCK, 12ML

## (undated) DEVICE — INTRODUCER, GUIDING SHEATH, FLEXOR ANSEL, 5FR, 55 CM, STRAIGHT

## (undated) DEVICE — GUIDEWIRE, HI-TORQUE, COMMAND ES, 0.014 X 300CM

## (undated) DEVICE — CURETTE, DERMAL, DISPOSABLE, 4MM

## (undated) DEVICE — CATHETER, ANGIO, IMPULSE, IM, 5 FR X 100 CM

## (undated) DEVICE — APPLICATOR, CHLORAPREP, W/ORANGE TINT, 26ML

## (undated) DEVICE — SYRINGE, 20 CC, LUER LOCK, MONOJECT, W/O CAP, LF

## (undated) DEVICE — SPONGE, HEMOSTATIC, GELATIN, SURGIFOAM, 8 X 12.5 CM X 10 MM

## (undated) DEVICE — CATHETER, NANOCROSS, .014, 2.5MM X 120MM, 150CM

## (undated) DEVICE — SUTURE, MONOCRYL, 3-0, 27 IN, SH, UNDYED

## (undated) DEVICE — STOCKINETTE, DOUBLE PLY, 4 X 48 IN, STERILE

## (undated) DEVICE — GLOVE, SURGICAL, PROTEXIS PI MICRO, 5.5, PF, LF

## (undated) DEVICE — FLOSEAL, MATRIX, HEMOSTATIC, FULL STERILE PREP, 5ML

## (undated) DEVICE — BANDAGE, ELASTIC, SELF-CLOSE, 6 IN, HONEYCOMB, STERILE

## (undated) DEVICE — SHEATH, INTRODUCER, 6CM, 6FR, R/O RADIOPAQUE MARKER, 6FR DIA, 2.5 CM DILATOR.

## (undated) DEVICE — CATHETER, QUICKCROSS SUPPORT .035 X 150CM

## (undated) DEVICE — DRAPE, TIBURON, SPLIT SHEET, REINF ADH STRIP, 77X122

## (undated) DEVICE — SHEATH, INTRODUCER, 10CM, 7FR, R/O RADIOPAQUE MARKER, 7FR DIA, 2.5 CM DILATOR.

## (undated) DEVICE — SUTURE, SILK, 2-0, 30 IN, SH, BLACK